# Patient Record
Sex: MALE | Race: WHITE | Employment: OTHER
[De-identification: names, ages, dates, MRNs, and addresses within clinical notes are randomized per-mention and may not be internally consistent; named-entity substitution may affect disease eponyms.]

---

## 2017-02-27 ENCOUNTER — OFFICE VISIT (OUTPATIENT)
Dept: CARDIOLOGY | Facility: CLINIC | Age: 72
End: 2017-02-27

## 2017-02-27 VITALS
SYSTOLIC BLOOD PRESSURE: 142 MMHG | WEIGHT: 207 LBS | RESPIRATION RATE: 16 BRPM | DIASTOLIC BLOOD PRESSURE: 71 MMHG | HEART RATE: 57 BPM | HEIGHT: 69 IN | OXYGEN SATURATION: 99 % | BODY MASS INDEX: 30.66 KG/M2

## 2017-02-27 DIAGNOSIS — I50.43 ACUTE ON CHRONIC COMBINED SYSTOLIC AND DIASTOLIC CONGESTIVE HEART FAILURE, NYHA CLASS 3 (HCC): Primary | ICD-10-CM

## 2017-02-27 PROCEDURE — 99214 OFFICE O/P EST MOD 30 MIN: CPT | Performed by: FAMILY MEDICINE

## 2017-02-27 PROCEDURE — 1123F ACP DISCUSS/DSCN MKR DOCD: CPT | Performed by: FAMILY MEDICINE

## 2017-02-27 PROCEDURE — 4040F PNEUMOC VAC/ADMIN/RCVD: CPT | Performed by: FAMILY MEDICINE

## 2017-02-27 PROCEDURE — 93000 ELECTROCARDIOGRAM COMPLETE: CPT | Performed by: FAMILY MEDICINE

## 2017-02-27 PROCEDURE — G8417 CALC BMI ABV UP PARAM F/U: HCPCS | Performed by: FAMILY MEDICINE

## 2017-02-27 PROCEDURE — 3017F COLORECTAL CA SCREEN DOC REV: CPT | Performed by: FAMILY MEDICINE

## 2017-02-27 PROCEDURE — G8598 ASA/ANTIPLAT THER USED: HCPCS | Performed by: FAMILY MEDICINE

## 2017-02-27 PROCEDURE — G8484 FLU IMMUNIZE NO ADMIN: HCPCS | Performed by: FAMILY MEDICINE

## 2017-02-27 PROCEDURE — 1036F TOBACCO NON-USER: CPT | Performed by: FAMILY MEDICINE

## 2017-02-27 PROCEDURE — G8427 DOCREV CUR MEDS BY ELIG CLIN: HCPCS | Performed by: FAMILY MEDICINE

## 2017-02-27 RX ORDER — FUROSEMIDE 20 MG/1
20 TABLET ORAL DAILY
Qty: 36 TABLET | Refills: 3 | Status: SHIPPED | OUTPATIENT
Start: 2017-02-27 | End: 2017-03-22

## 2017-03-17 LAB
ANION GAP SERPL CALCULATED.3IONS-SCNC: 11 MMOL/L
BUN BLDV-MCNC: 23 MG/DL (ref 10–25)
CALCIUM SERPL-MCNC: 9.3 MG/DL (ref 8.7–10.8)
CHLORIDE BLD-SCNC: 102 MMOL/L (ref 95–111)
CO2: 29 MMOL/L (ref 21–32)
CREAT SERPL-MCNC: 1.4 MG/DL (ref 0.7–1.5)
EGFR AFRICAN AMERICAN: 60
EGFR IF NONAFRICAN AMERICAN: 50
GLUCOSE: 148 MG/DL (ref 70–100)
POTASSIUM SERPL-SCNC: 3.7 MMOL/L (ref 3.5–5.4)
SODIUM BLD-SCNC: 138 MMOL/L (ref 134–147)

## 2017-03-22 ENCOUNTER — OFFICE VISIT (OUTPATIENT)
Dept: CARDIOLOGY | Age: 72
End: 2017-03-22
Payer: MEDICARE

## 2017-03-22 VITALS
OXYGEN SATURATION: 98 % | RESPIRATION RATE: 16 BRPM | DIASTOLIC BLOOD PRESSURE: 81 MMHG | HEART RATE: 56 BPM | WEIGHT: 213 LBS | HEIGHT: 69 IN | BODY MASS INDEX: 31.55 KG/M2 | SYSTOLIC BLOOD PRESSURE: 180 MMHG

## 2017-03-22 DIAGNOSIS — I50.33 ACUTE ON CHRONIC DIASTOLIC CHF (CONGESTIVE HEART FAILURE), NYHA CLASS 3 (HCC): Primary | ICD-10-CM

## 2017-03-22 DIAGNOSIS — I25.10 CORONARY ARTERY DISEASE INVOLVING NATIVE CORONARY ARTERY OF NATIVE HEART WITHOUT ANGINA PECTORIS: ICD-10-CM

## 2017-03-22 PROCEDURE — G8417 CALC BMI ABV UP PARAM F/U: HCPCS | Performed by: FAMILY MEDICINE

## 2017-03-22 PROCEDURE — 4040F PNEUMOC VAC/ADMIN/RCVD: CPT | Performed by: FAMILY MEDICINE

## 2017-03-22 PROCEDURE — 1123F ACP DISCUSS/DSCN MKR DOCD: CPT | Performed by: FAMILY MEDICINE

## 2017-03-22 PROCEDURE — G8428 CUR MEDS NOT DOCUMENT: HCPCS | Performed by: FAMILY MEDICINE

## 2017-03-22 PROCEDURE — G8484 FLU IMMUNIZE NO ADMIN: HCPCS | Performed by: FAMILY MEDICINE

## 2017-03-22 PROCEDURE — 99214 OFFICE O/P EST MOD 30 MIN: CPT | Performed by: FAMILY MEDICINE

## 2017-03-22 PROCEDURE — G8598 ASA/ANTIPLAT THER USED: HCPCS | Performed by: FAMILY MEDICINE

## 2017-03-22 PROCEDURE — 1036F TOBACCO NON-USER: CPT | Performed by: FAMILY MEDICINE

## 2017-03-22 PROCEDURE — 3017F COLORECTAL CA SCREEN DOC REV: CPT | Performed by: FAMILY MEDICINE

## 2017-03-22 RX ORDER — FUROSEMIDE 20 MG/1
20 TABLET ORAL 2 TIMES DAILY
Qty: 90 TABLET | Refills: 3 | Status: SHIPPED | OUTPATIENT
Start: 2017-03-22 | End: 2017-04-24 | Stop reason: SDUPTHER

## 2017-04-04 ENCOUNTER — OFFICE VISIT (OUTPATIENT)
Dept: CARDIOLOGY | Age: 72
End: 2017-04-04
Payer: MEDICARE

## 2017-04-04 VITALS
RESPIRATION RATE: 18 BRPM | BODY MASS INDEX: 31.1 KG/M2 | HEART RATE: 62 BPM | HEIGHT: 69 IN | OXYGEN SATURATION: 96 % | DIASTOLIC BLOOD PRESSURE: 75 MMHG | WEIGHT: 210 LBS | SYSTOLIC BLOOD PRESSURE: 146 MMHG

## 2017-04-04 DIAGNOSIS — I20.9 ANGINA, CLASS III (HCC): Primary | ICD-10-CM

## 2017-04-04 DIAGNOSIS — I50.33 ACUTE ON CHRONIC DIASTOLIC CHF (CONGESTIVE HEART FAILURE), NYHA CLASS 3 (HCC): ICD-10-CM

## 2017-04-04 DIAGNOSIS — I25.10 CORONARY ARTERY DISEASE INVOLVING NATIVE CORONARY ARTERY OF NATIVE HEART WITHOUT ANGINA PECTORIS: ICD-10-CM

## 2017-04-04 PROCEDURE — 1036F TOBACCO NON-USER: CPT | Performed by: FAMILY MEDICINE

## 2017-04-04 PROCEDURE — 99214 OFFICE O/P EST MOD 30 MIN: CPT | Performed by: FAMILY MEDICINE

## 2017-04-04 PROCEDURE — 4040F PNEUMOC VAC/ADMIN/RCVD: CPT | Performed by: FAMILY MEDICINE

## 2017-04-04 PROCEDURE — G8417 CALC BMI ABV UP PARAM F/U: HCPCS | Performed by: FAMILY MEDICINE

## 2017-04-04 PROCEDURE — 1123F ACP DISCUSS/DSCN MKR DOCD: CPT | Performed by: FAMILY MEDICINE

## 2017-04-04 PROCEDURE — G8598 ASA/ANTIPLAT THER USED: HCPCS | Performed by: FAMILY MEDICINE

## 2017-04-04 PROCEDURE — 3017F COLORECTAL CA SCREEN DOC REV: CPT | Performed by: FAMILY MEDICINE

## 2017-04-04 PROCEDURE — G8427 DOCREV CUR MEDS BY ELIG CLIN: HCPCS | Performed by: FAMILY MEDICINE

## 2017-04-10 ENCOUNTER — HOSPITAL ENCOUNTER (OUTPATIENT)
Dept: CARDIAC CATH/INVASIVE PROCEDURES | Age: 72
Discharge: HOME | End: 2017-04-11
Attending: INTERNAL MEDICINE | Admitting: INTERNAL MEDICINE
Payer: MEDICARE

## 2017-04-10 DIAGNOSIS — Z95.5 S/P DRUG ELUTING CORONARY STENT PLACEMENT: ICD-10-CM

## 2017-04-10 LAB
ACTIVATED CLOTTING TIME: 234 SEC (ref 79–149)
GLUCOSE BLD-MCNC: 137 MG/DL (ref 74–106)
PLATELET # BLD: 176 K/UL (ref 140–450)
POC BUN: 26 MG/DL (ref 6–20)
POC CHLORIDE: 100 MMOL/L (ref 98–110)
POC CREATININE: 1.3 MG/DL (ref 0.6–1.4)
POC HEMATOCRIT: 34 % (ref 41–53)
POC HEMOGLOBIN: 11.6 GM/DL (ref 13.5–17.5)
POC POTASSIUM: 3.6 MMOL/L (ref 3.5–5.1)
POC SODIUM: 141 MMOL/L (ref 136–145)

## 2017-04-10 PROCEDURE — C1894 INTRO/SHEATH, NON-LASER: HCPCS

## 2017-04-10 PROCEDURE — 7100000011 HC PHASE II RECOVERY - ADDTL 15 MIN

## 2017-04-10 PROCEDURE — 85014 HEMATOCRIT: CPT

## 2017-04-10 PROCEDURE — 84295 ASSAY OF SERUM SODIUM: CPT

## 2017-04-10 PROCEDURE — C9600 PERC DRUG-EL COR STENT SING: HCPCS | Performed by: INTERNAL MEDICINE

## 2017-04-10 PROCEDURE — 2500000003 HC RX 250 WO HCPCS

## 2017-04-10 PROCEDURE — 82947 ASSAY GLUCOSE BLOOD QUANT: CPT

## 2017-04-10 PROCEDURE — 84132 ASSAY OF SERUM POTASSIUM: CPT

## 2017-04-10 PROCEDURE — C1725 CATH, TRANSLUMIN NON-LASER: HCPCS

## 2017-04-10 PROCEDURE — C1887 CATHETER, GUIDING: HCPCS

## 2017-04-10 PROCEDURE — 6370000000 HC RX 637 (ALT 250 FOR IP)

## 2017-04-10 PROCEDURE — C1769 GUIDE WIRE: HCPCS

## 2017-04-10 PROCEDURE — 82435 ASSAY OF BLOOD CHLORIDE: CPT

## 2017-04-10 PROCEDURE — 85347 COAGULATION TIME ACTIVATED: CPT

## 2017-04-10 PROCEDURE — 93458 L HRT ARTERY/VENTRICLE ANGIO: CPT | Performed by: INTERNAL MEDICINE

## 2017-04-10 PROCEDURE — 2709999900 HC NON-CHARGEABLE SUPPLY

## 2017-04-10 PROCEDURE — 85049 AUTOMATED PLATELET COUNT: CPT

## 2017-04-10 PROCEDURE — 84520 ASSAY OF UREA NITROGEN: CPT

## 2017-04-10 PROCEDURE — 6360000002 HC RX W HCPCS

## 2017-04-10 PROCEDURE — 7100000010 HC PHASE II RECOVERY - FIRST 15 MIN

## 2017-04-10 PROCEDURE — 82565 ASSAY OF CREATININE: CPT

## 2017-04-10 PROCEDURE — C1874 STENT, COATED/COV W/DEL SYS: HCPCS

## 2017-04-10 RX ORDER — FUROSEMIDE 20 MG/1
20 TABLET ORAL 2 TIMES DAILY
Status: DISCONTINUED | OUTPATIENT
Start: 2017-04-10 | End: 2017-04-11 | Stop reason: HOSPADM

## 2017-04-10 RX ORDER — MELATONIN
2000 DAILY
Status: DISCONTINUED | OUTPATIENT
Start: 2017-04-10 | End: 2017-04-11 | Stop reason: HOSPADM

## 2017-04-10 RX ORDER — LISINOPRIL 10 MG/1
40 TABLET ORAL DAILY
Status: DISCONTINUED | OUTPATIENT
Start: 2017-04-10 | End: 2017-04-11 | Stop reason: HOSPADM

## 2017-04-10 RX ORDER — SODIUM CHLORIDE 9 MG/ML
INJECTION, SOLUTION INTRAVENOUS CONTINUOUS
Status: DISCONTINUED | OUTPATIENT
Start: 2017-04-10 | End: 2017-04-11 | Stop reason: HOSPADM

## 2017-04-10 RX ORDER — CARVEDILOL 25 MG/1
25 TABLET ORAL 2 TIMES DAILY WITH MEALS
Status: DISCONTINUED | OUTPATIENT
Start: 2017-04-10 | End: 2017-04-11 | Stop reason: HOSPADM

## 2017-04-10 RX ORDER — NITROGLYCERIN 0.4 MG/1
0.4 TABLET SUBLINGUAL EVERY 5 MIN PRN
Status: DISCONTINUED | OUTPATIENT
Start: 2017-04-10 | End: 2017-04-11 | Stop reason: HOSPADM

## 2017-04-10 RX ORDER — ASPIRIN 81 MG/1
81 TABLET ORAL DAILY
Status: DISCONTINUED | OUTPATIENT
Start: 2017-04-10 | End: 2017-04-11 | Stop reason: HOSPADM

## 2017-04-10 RX ORDER — AMLODIPINE BESYLATE 10 MG/1
10 TABLET ORAL DAILY
Status: DISCONTINUED | OUTPATIENT
Start: 2017-04-10 | End: 2017-04-11 | Stop reason: HOSPADM

## 2017-04-10 RX ORDER — CLONIDINE HYDROCHLORIDE 0.1 MG/1
0.1 TABLET ORAL 2 TIMES DAILY
Status: DISCONTINUED | OUTPATIENT
Start: 2017-04-10 | End: 2017-04-11 | Stop reason: HOSPADM

## 2017-04-10 RX ORDER — GLIPIZIDE 5 MG/1
5 TABLET ORAL
Status: DISCONTINUED | OUTPATIENT
Start: 2017-04-11 | End: 2017-04-11 | Stop reason: HOSPADM

## 2017-04-10 RX ORDER — PEN NEEDLE, DIABETIC 31 G X1/4"
1 NEEDLE, DISPOSABLE MISCELLANEOUS DAILY
Status: DISCONTINUED | OUTPATIENT
Start: 2017-04-10 | End: 2017-04-10

## 2017-04-10 RX ORDER — PREDNISOLONE ACETATE 10 MG/ML
1 SUSPENSION/ DROPS OPHTHALMIC DAILY
Status: DISCONTINUED | OUTPATIENT
Start: 2017-04-10 | End: 2017-04-11 | Stop reason: HOSPADM

## 2017-04-10 RX ADMIN — SODIUM CHLORIDE: 9 INJECTION, SOLUTION INTRAVENOUS at 13:18

## 2017-04-11 VITALS
DIASTOLIC BLOOD PRESSURE: 66 MMHG | RESPIRATION RATE: 16 BRPM | WEIGHT: 209 LBS | TEMPERATURE: 97.9 F | SYSTOLIC BLOOD PRESSURE: 160 MMHG | HEIGHT: 69 IN | OXYGEN SATURATION: 96 % | BODY MASS INDEX: 30.96 KG/M2 | HEART RATE: 60 BPM

## 2017-04-11 LAB — GLUCOSE BLD-MCNC: 200 MG/DL (ref 75–110)

## 2017-04-11 PROCEDURE — 6370000000 HC RX 637 (ALT 250 FOR IP): Performed by: INTERNAL MEDICINE

## 2017-04-11 PROCEDURE — 82947 ASSAY GLUCOSE BLOOD QUANT: CPT

## 2017-04-11 RX ORDER — CLOPIDOGREL BISULFATE 75 MG/1
75 TABLET ORAL DAILY
Status: DISCONTINUED | OUTPATIENT
Start: 2017-04-11 | End: 2017-04-11 | Stop reason: HOSPADM

## 2017-04-11 RX ORDER — CLOPIDOGREL BISULFATE 75 MG/1
75 TABLET ORAL DAILY
Qty: 30 TABLET | Refills: 11 | Status: SHIPPED | OUTPATIENT
Start: 2017-04-11 | End: 2017-04-24 | Stop reason: SDUPTHER

## 2017-04-11 RX ADMIN — CLOPIDOGREL 75 MG: 75 TABLET, FILM COATED ORAL at 09:17

## 2017-04-24 PROBLEM — I50.33 ACUTE ON CHRONIC DIASTOLIC CHF (CONGESTIVE HEART FAILURE), NYHA CLASS 3 (HCC): Status: RESOLVED | Noted: 2017-03-22 | Resolved: 2017-04-24

## 2017-04-24 PROBLEM — E78.5 HYPERLIPIDEMIA: Status: ACTIVE | Noted: 2017-04-24

## 2017-04-24 PROBLEM — I50.32 CHRONIC DIASTOLIC HEART FAILURE (HCC): Status: ACTIVE | Noted: 2017-04-24

## 2017-04-25 ENCOUNTER — OFFICE VISIT (OUTPATIENT)
Dept: CARDIOLOGY | Age: 72
End: 2017-04-25
Payer: MEDICARE

## 2017-04-25 VITALS
DIASTOLIC BLOOD PRESSURE: 75 MMHG | HEIGHT: 69 IN | SYSTOLIC BLOOD PRESSURE: 155 MMHG | BODY MASS INDEX: 30.96 KG/M2 | OXYGEN SATURATION: 95 % | RESPIRATION RATE: 16 BRPM | WEIGHT: 209 LBS | HEART RATE: 56 BPM

## 2017-04-25 DIAGNOSIS — R53.81 PHYSICAL DECONDITIONING: ICD-10-CM

## 2017-04-25 DIAGNOSIS — Z95.5 S/P DRUG ELUTING CORONARY STENT PLACEMENT: ICD-10-CM

## 2017-04-25 DIAGNOSIS — I25.10 CORONARY ARTERY DISEASE INVOLVING NATIVE CORONARY ARTERY OF NATIVE HEART WITHOUT ANGINA PECTORIS: Primary | ICD-10-CM

## 2017-04-25 PROCEDURE — G8417 CALC BMI ABV UP PARAM F/U: HCPCS | Performed by: FAMILY MEDICINE

## 2017-04-25 PROCEDURE — G8427 DOCREV CUR MEDS BY ELIG CLIN: HCPCS | Performed by: FAMILY MEDICINE

## 2017-04-25 PROCEDURE — G8598 ASA/ANTIPLAT THER USED: HCPCS | Performed by: FAMILY MEDICINE

## 2017-04-25 PROCEDURE — 1036F TOBACCO NON-USER: CPT | Performed by: FAMILY MEDICINE

## 2017-04-25 PROCEDURE — 99213 OFFICE O/P EST LOW 20 MIN: CPT | Performed by: FAMILY MEDICINE

## 2017-04-25 PROCEDURE — 1123F ACP DISCUSS/DSCN MKR DOCD: CPT | Performed by: FAMILY MEDICINE

## 2017-04-25 PROCEDURE — 3017F COLORECTAL CA SCREEN DOC REV: CPT | Performed by: FAMILY MEDICINE

## 2017-04-25 PROCEDURE — 4040F PNEUMOC VAC/ADMIN/RCVD: CPT | Performed by: FAMILY MEDICINE

## 2017-05-01 ENCOUNTER — HOSPITAL ENCOUNTER (OUTPATIENT)
Dept: CARDIAC REHAB | Age: 72
Setting detail: THERAPIES SERIES
Discharge: HOME | End: 2017-05-01
Payer: MEDICARE

## 2017-05-01 VITALS
RESPIRATION RATE: 16 BRPM | WEIGHT: 208 LBS | DIASTOLIC BLOOD PRESSURE: 76 MMHG | OXYGEN SATURATION: 97 % | BODY MASS INDEX: 31.52 KG/M2 | HEIGHT: 68 IN | SYSTOLIC BLOOD PRESSURE: 138 MMHG | HEART RATE: 58 BPM

## 2017-05-03 ENCOUNTER — HOSPITAL ENCOUNTER (OUTPATIENT)
Dept: CARDIAC REHAB | Age: 72
Setting detail: THERAPIES SERIES
Discharge: HOME | End: 2017-05-03
Payer: MEDICARE

## 2017-05-03 PROCEDURE — 93798 PHYS/QHP OP CAR RHAB W/ECG: CPT

## 2017-05-04 ENCOUNTER — HOSPITAL ENCOUNTER (OUTPATIENT)
Dept: CARDIAC REHAB | Age: 72
Setting detail: THERAPIES SERIES
Discharge: HOME | End: 2017-05-04
Payer: MEDICARE

## 2017-05-04 PROCEDURE — 93798 PHYS/QHP OP CAR RHAB W/ECG: CPT

## 2017-05-08 ENCOUNTER — HOSPITAL ENCOUNTER (OUTPATIENT)
Dept: CARDIAC REHAB | Age: 72
Setting detail: THERAPIES SERIES
Discharge: HOME | End: 2017-05-08
Payer: MEDICARE

## 2017-05-08 PROCEDURE — 93798 PHYS/QHP OP CAR RHAB W/ECG: CPT

## 2017-05-10 ENCOUNTER — HOSPITAL ENCOUNTER (OUTPATIENT)
Dept: CARDIAC REHAB | Age: 72
Setting detail: THERAPIES SERIES
Discharge: HOME | End: 2017-05-10
Payer: MEDICARE

## 2017-05-10 PROCEDURE — 93798 PHYS/QHP OP CAR RHAB W/ECG: CPT

## 2017-05-11 ENCOUNTER — HOSPITAL ENCOUNTER (OUTPATIENT)
Dept: CARDIAC REHAB | Age: 72
Setting detail: THERAPIES SERIES
Discharge: HOME | End: 2017-05-11
Payer: MEDICARE

## 2017-05-11 PROCEDURE — 93798 PHYS/QHP OP CAR RHAB W/ECG: CPT

## 2017-05-15 ENCOUNTER — HOSPITAL ENCOUNTER (OUTPATIENT)
Dept: CARDIAC REHAB | Age: 72
Setting detail: THERAPIES SERIES
Discharge: HOME | End: 2017-05-15
Payer: MEDICARE

## 2017-05-15 PROCEDURE — 93798 PHYS/QHP OP CAR RHAB W/ECG: CPT

## 2017-05-17 ENCOUNTER — HOSPITAL ENCOUNTER (OUTPATIENT)
Dept: CARDIAC REHAB | Age: 72
Setting detail: THERAPIES SERIES
Discharge: HOME | End: 2017-05-17
Payer: MEDICARE

## 2017-05-17 PROCEDURE — 93798 PHYS/QHP OP CAR RHAB W/ECG: CPT

## 2017-05-18 ENCOUNTER — HOSPITAL ENCOUNTER (OUTPATIENT)
Dept: CARDIAC REHAB | Age: 72
Setting detail: THERAPIES SERIES
Discharge: HOME | End: 2017-05-18
Payer: MEDICARE

## 2017-05-18 PROCEDURE — 93798 PHYS/QHP OP CAR RHAB W/ECG: CPT

## 2017-05-31 ENCOUNTER — HOSPITAL ENCOUNTER (OUTPATIENT)
Dept: CARDIAC REHAB | Age: 72
Setting detail: THERAPIES SERIES
Discharge: HOME | End: 2017-05-31
Payer: MEDICARE

## 2017-05-31 PROCEDURE — 93798 PHYS/QHP OP CAR RHAB W/ECG: CPT

## 2017-06-01 ENCOUNTER — HOSPITAL ENCOUNTER (OUTPATIENT)
Dept: CARDIAC REHAB | Age: 72
Setting detail: THERAPIES SERIES
Discharge: HOME | End: 2017-06-01
Payer: MEDICARE

## 2017-06-01 PROCEDURE — 93798 PHYS/QHP OP CAR RHAB W/ECG: CPT

## 2017-06-05 ENCOUNTER — HOSPITAL ENCOUNTER (OUTPATIENT)
Dept: CARDIAC REHAB | Age: 72
Setting detail: THERAPIES SERIES
Discharge: HOME | End: 2017-06-05
Payer: MEDICARE

## 2017-06-05 PROCEDURE — 93798 PHYS/QHP OP CAR RHAB W/ECG: CPT

## 2017-06-07 ENCOUNTER — HOSPITAL ENCOUNTER (OUTPATIENT)
Dept: CARDIAC REHAB | Age: 72
Setting detail: THERAPIES SERIES
Discharge: HOME | End: 2017-06-07
Payer: MEDICARE

## 2017-06-07 PROCEDURE — 93798 PHYS/QHP OP CAR RHAB W/ECG: CPT

## 2017-06-08 ENCOUNTER — HOSPITAL ENCOUNTER (OUTPATIENT)
Dept: CARDIAC REHAB | Age: 72
Setting detail: THERAPIES SERIES
Discharge: HOME | End: 2017-06-08
Payer: MEDICARE

## 2017-06-08 PROCEDURE — 93798 PHYS/QHP OP CAR RHAB W/ECG: CPT

## 2017-06-12 ENCOUNTER — HOSPITAL ENCOUNTER (OUTPATIENT)
Dept: CARDIAC REHAB | Age: 72
Setting detail: THERAPIES SERIES
Discharge: HOME | End: 2017-06-12
Payer: MEDICARE

## 2017-06-12 PROCEDURE — 93798 PHYS/QHP OP CAR RHAB W/ECG: CPT

## 2017-06-13 ENCOUNTER — OFFICE VISIT (OUTPATIENT)
Dept: CARDIOLOGY | Age: 72
End: 2017-06-13
Payer: MEDICARE

## 2017-06-13 VITALS
OXYGEN SATURATION: 95 % | WEIGHT: 210 LBS | BODY MASS INDEX: 31.1 KG/M2 | DIASTOLIC BLOOD PRESSURE: 66 MMHG | HEART RATE: 63 BPM | RESPIRATION RATE: 16 BRPM | HEIGHT: 69 IN | SYSTOLIC BLOOD PRESSURE: 148 MMHG

## 2017-06-13 DIAGNOSIS — I25.10 CORONARY ARTERY DISEASE INVOLVING NATIVE CORONARY ARTERY OF NATIVE HEART WITHOUT ANGINA PECTORIS: Primary | ICD-10-CM

## 2017-06-13 DIAGNOSIS — R07.89 CHEST PAIN, NON-CARDIAC: ICD-10-CM

## 2017-06-13 DIAGNOSIS — Z95.5 S/P DRUG ELUTING CORONARY STENT PLACEMENT: ICD-10-CM

## 2017-06-13 PROCEDURE — 1123F ACP DISCUSS/DSCN MKR DOCD: CPT | Performed by: FAMILY MEDICINE

## 2017-06-13 PROCEDURE — G8417 CALC BMI ABV UP PARAM F/U: HCPCS | Performed by: FAMILY MEDICINE

## 2017-06-13 PROCEDURE — G8427 DOCREV CUR MEDS BY ELIG CLIN: HCPCS | Performed by: FAMILY MEDICINE

## 2017-06-13 PROCEDURE — 3017F COLORECTAL CA SCREEN DOC REV: CPT | Performed by: FAMILY MEDICINE

## 2017-06-13 PROCEDURE — 4040F PNEUMOC VAC/ADMIN/RCVD: CPT | Performed by: FAMILY MEDICINE

## 2017-06-13 PROCEDURE — G8598 ASA/ANTIPLAT THER USED: HCPCS | Performed by: FAMILY MEDICINE

## 2017-06-13 PROCEDURE — 1036F TOBACCO NON-USER: CPT | Performed by: FAMILY MEDICINE

## 2017-06-13 PROCEDURE — 99213 OFFICE O/P EST LOW 20 MIN: CPT | Performed by: FAMILY MEDICINE

## 2017-06-14 ENCOUNTER — HOSPITAL ENCOUNTER (OUTPATIENT)
Dept: CARDIAC REHAB | Age: 72
Setting detail: THERAPIES SERIES
Discharge: HOME | End: 2017-06-14
Payer: MEDICARE

## 2017-06-14 PROCEDURE — 93798 PHYS/QHP OP CAR RHAB W/ECG: CPT

## 2017-10-31 PROBLEM — R53.81 PHYSICAL DECONDITIONING: Status: RESOLVED | Noted: 2017-04-25 | Resolved: 2017-10-31

## 2017-11-02 ENCOUNTER — HOSPITAL ENCOUNTER (OUTPATIENT)
Dept: LAB | Age: 72
Discharge: HOME | End: 2017-11-02
Payer: MEDICARE

## 2017-11-02 DIAGNOSIS — I10 ESSENTIAL HYPERTENSION: ICD-10-CM

## 2017-11-02 DIAGNOSIS — Z12.5 ENCOUNTER FOR PROSTATE CANCER SCREENING: ICD-10-CM

## 2017-11-02 LAB
ABSOLUTE EOS #: 0.5 K/UL (ref 0–0.4)
ABSOLUTE IMMATURE GRANULOCYTE: ABNORMAL K/UL (ref 0–0.3)
ABSOLUTE LYMPH #: 1.9 K/UL (ref 1–4.8)
ABSOLUTE MONO #: 0.6 K/UL (ref 0–1)
ALBUMIN SERPL-MCNC: 4.2 G/DL (ref 3.5–5.2)
ALBUMIN/GLOBULIN RATIO: 1.9 (ref 1–2.5)
ALP BLD-CCNC: 65 U/L (ref 40–129)
ALT SERPL-CCNC: 21 U/L (ref 5–41)
ANION GAP SERPL CALCULATED.3IONS-SCNC: 13 MMOL/L (ref 9–17)
AST SERPL-CCNC: 17 U/L
BASOPHILS # BLD: 1 %
BASOPHILS ABSOLUTE: 0 K/UL (ref 0–0.2)
BILIRUB SERPL-MCNC: 0.44 MG/DL (ref 0.3–1.2)
BUN BLDV-MCNC: 27 MG/DL (ref 8–23)
BUN/CREAT BLD: 22 (ref 9–20)
CALCIUM SERPL-MCNC: 9 MG/DL (ref 8.6–10.4)
CHLORIDE BLD-SCNC: 103 MMOL/L (ref 98–107)
CHOLESTEROL/HDL RATIO: 7.5
CHOLESTEROL: 172 MG/DL
CO2: 26 MMOL/L (ref 20–31)
CREAT SERPL-MCNC: 1.21 MG/DL (ref 0.7–1.2)
DIFFERENTIAL TYPE: ABNORMAL
EOSINOPHILS RELATIVE PERCENT: 6 %
GFR AFRICAN AMERICAN: >60 ML/MIN
GFR NON-AFRICAN AMERICAN: 59 ML/MIN
GFR SERPL CREATININE-BSD FRML MDRD: ABNORMAL ML/MIN/{1.73_M2}
GFR SERPL CREATININE-BSD FRML MDRD: ABNORMAL ML/MIN/{1.73_M2}
GLUCOSE BLD-MCNC: 149 MG/DL (ref 70–99)
HCT VFR BLD CALC: 38.3 % (ref 41–53)
HDLC SERPL-MCNC: 23 MG/DL
HEMOGLOBIN: 13.2 G/DL (ref 13.5–17)
IMMATURE GRANULOCYTES: ABNORMAL %
LDL CHOLESTEROL DIRECT: 44 MG/DL
LDL CHOLESTEROL: ABNORMAL MG/DL (ref 0–130)
LYMPHOCYTES # BLD: 26 %
MCH RBC QN AUTO: 29.7 PG (ref 26–34)
MCHC RBC AUTO-ENTMCNC: 34.4 G/DL (ref 31–37)
MCV RBC AUTO: 86.4 FL (ref 80–100)
MONOCYTES # BLD: 8 %
PDW BLD-RTO: 14.6 % (ref 12.1–15.2)
PLATELET # BLD: 206 K/UL (ref 140–450)
PLATELET ESTIMATE: ABNORMAL
PMV BLD AUTO: 8.3 FL (ref 6–12)
POTASSIUM SERPL-SCNC: 3.7 MMOL/L (ref 3.7–5.3)
PROSTATE SPECIFIC ANTIGEN: 3.26 UG/L
RBC # BLD: 4.43 M/UL (ref 4.5–5.9)
RBC # BLD: ABNORMAL 10*6/UL
SEG NEUTROPHILS: 59 %
SEGMENTED NEUTROPHILS ABSOLUTE COUNT: 4.5 K/UL (ref 1.8–7.7)
SODIUM BLD-SCNC: 142 MMOL/L (ref 135–144)
TOTAL PROTEIN: 6.4 G/DL (ref 6.4–8.3)
TRIGL SERPL-MCNC: 672 MG/DL
TSH SERPL DL<=0.05 MIU/L-ACNC: 2.26 MIU/L (ref 0.3–5)
VLDLC SERPL CALC-MCNC: ABNORMAL MG/DL (ref 1–30)
WBC # BLD: 7.5 K/UL (ref 3.5–11)
WBC # BLD: ABNORMAL 10*3/UL

## 2017-11-02 PROCEDURE — 83721 ASSAY OF BLOOD LIPOPROTEIN: CPT

## 2017-11-02 PROCEDURE — 80061 LIPID PANEL: CPT

## 2017-11-02 PROCEDURE — G0103 PSA SCREENING: HCPCS

## 2017-11-02 PROCEDURE — 85025 COMPLETE CBC W/AUTO DIFF WBC: CPT

## 2017-11-02 PROCEDURE — 80053 COMPREHEN METABOLIC PANEL: CPT

## 2017-11-02 PROCEDURE — 84443 ASSAY THYROID STIM HORMONE: CPT

## 2017-11-02 PROCEDURE — 36415 COLL VENOUS BLD VENIPUNCTURE: CPT

## 2017-12-20 ENCOUNTER — OFFICE VISIT (OUTPATIENT)
Dept: CARDIOLOGY | Age: 72
End: 2017-12-20
Payer: MEDICARE

## 2017-12-20 VITALS
HEIGHT: 69 IN | HEART RATE: 54 BPM | DIASTOLIC BLOOD PRESSURE: 63 MMHG | SYSTOLIC BLOOD PRESSURE: 149 MMHG | RESPIRATION RATE: 16 BRPM | WEIGHT: 202.8 LBS | BODY MASS INDEX: 30.04 KG/M2 | OXYGEN SATURATION: 97 %

## 2017-12-20 DIAGNOSIS — R42 EPISODIC LIGHTHEADEDNESS: ICD-10-CM

## 2017-12-20 DIAGNOSIS — I25.10 CORONARY ATHEROSCLEROSIS DUE TO CALCIFIED CORONARY LESION: ICD-10-CM

## 2017-12-20 DIAGNOSIS — R55 NEAR SYNCOPE: Primary | ICD-10-CM

## 2017-12-20 DIAGNOSIS — G90.1 DYSAUTONOMIA (HCC): ICD-10-CM

## 2017-12-20 DIAGNOSIS — I25.84 CORONARY ATHEROSCLEROSIS DUE TO CALCIFIED CORONARY LESION: ICD-10-CM

## 2017-12-20 DIAGNOSIS — I10 ESSENTIAL HYPERTENSION: ICD-10-CM

## 2017-12-20 PROBLEM — I20.9 ANGINA, CLASS III (HCC): Status: RESOLVED | Noted: 2017-04-04 | Resolved: 2017-12-20

## 2017-12-20 PROCEDURE — G8484 FLU IMMUNIZE NO ADMIN: HCPCS | Performed by: FAMILY MEDICINE

## 2017-12-20 PROCEDURE — G8427 DOCREV CUR MEDS BY ELIG CLIN: HCPCS | Performed by: FAMILY MEDICINE

## 2017-12-20 PROCEDURE — 1123F ACP DISCUSS/DSCN MKR DOCD: CPT | Performed by: FAMILY MEDICINE

## 2017-12-20 PROCEDURE — 3017F COLORECTAL CA SCREEN DOC REV: CPT | Performed by: FAMILY MEDICINE

## 2017-12-20 PROCEDURE — 1036F TOBACCO NON-USER: CPT | Performed by: FAMILY MEDICINE

## 2017-12-20 PROCEDURE — 99214 OFFICE O/P EST MOD 30 MIN: CPT | Performed by: FAMILY MEDICINE

## 2017-12-20 PROCEDURE — G8417 CALC BMI ABV UP PARAM F/U: HCPCS | Performed by: FAMILY MEDICINE

## 2017-12-20 PROCEDURE — G8598 ASA/ANTIPLAT THER USED: HCPCS | Performed by: FAMILY MEDICINE

## 2017-12-20 PROCEDURE — 4040F PNEUMOC VAC/ADMIN/RCVD: CPT | Performed by: FAMILY MEDICINE

## 2017-12-20 NOTE — PROGRESS NOTES
Patient: Mariama Posey  : 1945  Date of Visit: 2017    REASON FOR VISIT / CONSULTATION: 6 Month Follow-Up (Hx: CAD, CHF and stent on 17. Pt states that he is doing pretty good. Mild SOB when putting his hands up. Pt was in the shower and fell, he had said that mat came out from underneath him. He had a near fall at the grocery store [employer] due to losing his balance. Dizziness/lightheadedness after taking BP medicine but then goes away about an hour after. Denies CP and palpitations. C/O: back pain and thinks this may be due to his falling bc the left leg giving out on him (he has had a history of rods) )      Dear Jeri Cervantes MD    As you know, Mr. London Roberto is a 70 y.o. male with a known history of dysautonomia where on tilt table testing his blood pressure dropped from 981 systolic to 78 systolic with only a 48-91 HR response. More recently, a CT of he head in the past showed evidence of at least 2 old CVA's and a heart catheterization showed severe single vessel disease in the ostium of a moderate sized OM1 branch of the circumflex. However, maximal guidelines directed medical management was opted for an place of stenting partly due to the risk associated with stenting this vessel. Recently he has had a coronary angiography procedure with stenting of his HA Om1. As you know, Mr. London Roberto  is a 67 y.o. male with a history of recent onset substernal chest discomfort that led to a stress test and a subsequent heart catheterization which showed severe single vessel coronary artery disease of the HA Om1 with successful angioplasty and stenting of that vessel that was done by Dr Linda Fontana on 4/10/17. Since the last time I saw Mr. London Roberto, he does complain of intermittent episodes of lightheadedness and dizziness as well as 2 falls, one while in the shower and another at [employer].  He is not clear about the circumstances but thinks he may have had some pain in his back and left leg, may have gotten lightheaded and dizzy and almost fell but his wife caught him. He denies any repeat chest pain or chest pressure. He denied any current chest pain, shortness of breath, abdominal pain, bleeding problems including blood in his stool, blood in his urine, or black tarry stools, problems with his medications or any other concerns at this time. Past Medical History:   Diagnosis Date    Acute MI     CAD (coronary artery disease)     Cerebral artery occlusion with cerebral infarction ([facility] [address]     [facility]congest[facility] failure) [facility]     Chronic back pain     Closed fracture of lumbar vertebra without mention of spinal cord injury     Diabetes mellitus ([facility] [address]     Displacement of intervertebral disc, site unspecified, without myelopathy     H/O cardiac catheterization 2/19/16    LMCA: Mild irregularities 10-20%. LAD: Lesion on PRox LAD: Mid subsection. 65% stenosis. LCx: Lesion on 1st Ob Valentina: Proximal subesection. 70% steniosis. RCA: Small non-dominant RCA. Lesion on PRox RCA: Ostial. 50% stenosis. EF:55%.  H/O cardiovascular stress test 2/19/16    Abnormal. Moderate perfusion defect of mild intensity in the inferior, inferoseptal adn inferoapical regions during stress imaging, which most consistent with ischemia. Global LV systolic function normal without regional wall motion abnormalities. OVerall these results are most consistent with an intermediate risk for signficant CAD. Additional testing including cardiac cath may be indicated.  History of 24 hour EKG monitoring 8/14/14    Occasional PAC's and PVC's which appear to be at least moderately symptomatic.  History of 24 hour EKG monitoring 11/19/14    Event Monitor. Sinus rhythm and sinus bradycardia. Infrequent isolated PVC's    History of cardiovascular stress test 2/19/14    Relatively NL    History of CVA (cerebrovascular accident) without residual deficits 2014    Incidentally found on CT head.  No known impairment now or in the past.    History of echocardiogram 2/18/14    LA mildly dilated, EF 55%, LV wall thickness is moderately increased, no definite wall motion abnormalilities, what appears to be a pacer wire is seen w/n the RA and RV, mild-mod TR, mild pulmonary hypertension.  History of tilt table evaluation 8/12/14    Abnormal    Hyperlipidemia     Hypertension     Pacemaker     non-functioning    S/P cardiac cath 04/10/2017    S/P coronary artery stent placement     Successful PTCA - HA Om1    Type II or unspecified type diabetes mellitus without mention of complication, not stated as uncontrolled        CURRENT ALLERGIES: Lipitor [atorvastatin]; Crestor [rosuvastatin calcium]; Invokana [canagliflozin]; and Januvia [sitagliptin] REVIEW OF SYSTEMS: 10 systems were reviewed. Pertinent positives and negatives as above, all else negative. Past Surgical History:   Procedure Laterality Date    BACK SURGERY      CARDIAC CATHETERIZATION Left 2/19/16    via right radial approach/ [facility]/ Dr. Jonathon Cerna  8/17/15    Liang/[facility]/ [facility]    COLONOSCOPY  2010    COLONOSCOPY  2/19/15    -polyps,diverticulosis,hemorrhoids    CORNEAL TRANSPLANT      left eye    PACEMAKER INSERTION      PACEMAKER PLACEMENT      Social History:  Social History   Substance Use Topics    Smoking status: Former Smoker     Packs/day: 1.50     Years: 8.00     Quit date: 3/4/1980    Smokeless tobacco: Never Used    Alcohol use No        CURRENT MEDICATIONS:  Outpatient Prescriptions Marked as Taking for the 12/20/17 encounter (Office Visit) with Ryan Tobias MD   Medication Sig Dispense Refill    Iron-Vitamins (GERITOL COMPLETE PO) Take by mouth      traMADol (ULTRAM) 50 MG tablet Take 1 tablet by mouth every 6 hours as needed for Pain .  90 tablet 0    tiZANidine (ZANAFLEX) 2 MG tablet Take 1 tablet by mouth every 6 hours as needed (spasm) 30 tablet 2    gemfibrozil (LOPID) 600 MG tablet Take 1 tablet by mouth 2 times daily (before meals) 60 tablet 3    amLODIPine (NORVASC) 10 MG tablet Take 1 tablet by mouth daily 90 tablet 0    carvedilol (COREG) 25 MG tablet Take 1 tablet by mouth 2 times daily (with meals) 180 tablet 0    cloNIDine (CATAPRES) 0.1 MG tablet Take 1 tablet by mouth three times daily 270 tablet 0    glipiZIDE (GLIPIZIDE XL) 5 MG extended release tablet TAKE 2 TABLETS TWICE DAILY 360 tablet 0    insulin glargine (LANTUS SOLOSTAR) 100 UNIT/ML injection pen Inject 40 Units into the skin nightly (Patient taking differently: Inject 20 Units into the skin 2 times daily ) 5 Pen 3    lisinopril (PRINIVIL;ZESTRIL) 40 MG tablet TAKE ONE TABLET BY MOUTH ONCE DAILY 90 tablet 0    DIANA CONTOUR TEST strip USE ONE STRIP TO CHECK GLUCOSE TWICE DAILY 100 strip 11    clopidogrel (PLAVIX) 75 MG tablet Take 1 tablet by mouth daily 90 tablet 3    furosemide (LASIX) 20 MG tablet Take 1 tablet by mouth 2 times daily 90 tablet 3    nitroGLYCERIN (NITROSTAT) 0.4 MG SL tablet Place 1 tablet under the tongue every 5 minutes as needed for Chest pain 25 tablet 3    DIANA MICROLET LANCETS MISC TEST TWICE DAILY 100 each 2    Insulin Pen Needle (PEN NEEDLES) 31G X 6 MM MISC 1 each by Does not apply route daily 100 each 3    aspirin 81 MG tablet Take 81 mg by mouth daily       prednisoLONE acetate (PRED FORTE) 1 % ophthalmic suspension Place 1 drop into the left eye daily          FAMILY HISTORY: family history includes Cancer in his father and mother. PHYSICAL EXAM:   BP (!) 161/74 (Site: Left Arm, Position: Sitting, Cuff Size: Large Adult)   Pulse 58   Resp 16   Ht 5' 9\" (1.753 m)   Wt 202 lb 12.8 oz (92 kg)   SpO2 97%   BMI 29.95 kg/m²  Body mass index is 29.95 kg/m². Constitutional: He is oriented to person, place, and time. He appears well-developed and well-nourished. In no acute distress. HEENT: Normocephalic and atraumatic. No JVD present. Carotid bruit is not present.  No mass and no thyromegaly present. No lymphadenopathy present. Cardiovascular: Normal rate, regular rhythm, normal heart sounds and intact distal pulses. Exam reveals no gallop and no friction rub. Pulmonary/Chest: Effort normal and breath sounds normal. No respiratory distress. He has no wheezes, rhonchi or rales. Abdominal: Soft, non-tender. Bowel sounds and aorta are normal. He exhibits no organomegaly, mass or bruit. Extremities: No edema. Pulses are 2+ radial/carotid/dorsalis pedis and posterior tibial bilaterally. No cyanosis, or clubbing. Pulses are 2+ radial/carotid/dorsalis pedis and posterior tibial bilaterally. Compression stockings in place. Neurological: He is alert and oriented to person, place, and time. No evidence of gross cranial nerve deficit. Coordination appeared normal.   Skin: Skin is warm and dry. There is no rash or diaphoresis. Psychiatric: He has a normal mood and affect. His speech is normal and behavior is normal.      MOST RECENT LABS ON RECORD:   Lab Results   Component Value Date    WBC 7.5 11/02/2017    HGB 13.2 (L) 11/02/2017    HCT 38.3 (L) 11/02/2017     11/02/2017    CHOL 172 11/02/2017    TRIG 672 (H) 11/02/2017    HDL 23 (L) 11/02/2017    LDLDIRECT 44 11/02/2017    ALT 21 11/02/2017    AST 17 11/02/2017     11/02/2017    K 3.7 11/02/2017     11/02/2017    CREATININE 1.21 (H) 11/02/2017    BUN 27 (H) 11/02/2017    CO2 26 11/02/2017    TSH 2.26 11/02/2017    PSA 3.26 11/02/2017    INR 1.0 02/17/2016    LABA1C 7.3 10/31/2017    LABMICR 30 10/17/2015       ASSESSMENT:  1. Near syncope    2. Episodic lightheadedness    3. Coronary atherosclerosis due to calcified coronary lesion    4. Dysautonomia    5. Essential hypertension         PLAN:  Syncope/Near Syncope  · Pharmacological Management: Although his blood pressure is elevated today and I would be tempted to start additional treatment for his dysautonomia but I would like to get further information before doing this.

## 2017-12-26 ENCOUNTER — HOSPITAL ENCOUNTER (OUTPATIENT)
Dept: NON INVASIVE DIAGNOSTICS | Age: 72
Discharge: HOME | End: 2017-12-26
Payer: MEDICARE

## 2017-12-26 DIAGNOSIS — R42 EPISODIC LIGHTHEADEDNESS: ICD-10-CM

## 2017-12-26 DIAGNOSIS — R55 NEAR SYNCOPE: ICD-10-CM

## 2017-12-26 PROCEDURE — 93660 TILT TABLE EVALUATION: CPT

## 2017-12-26 PROCEDURE — 6370000000 HC RX 637 (ALT 250 FOR IP): Performed by: FAMILY MEDICINE

## 2017-12-26 PROCEDURE — 93225 XTRNL ECG REC<48 HRS REC: CPT

## 2017-12-26 RX ORDER — NITROGLYCERIN 0.3 MG/1
0.3 TABLET SUBLINGUAL EVERY 5 MIN PRN
Status: DISCONTINUED | OUTPATIENT
Start: 2017-12-26 | End: 2017-12-27 | Stop reason: HOSPADM

## 2017-12-26 RX ADMIN — NITROGLYCERIN 0.3 MG: 0.3 TABLET SUBLINGUAL at 11:40

## 2017-12-26 NOTE — PROCEDURES
[facility] [address], [address]                                  TILT TABLE TEST    PATIENT NAME: Jamison Troncoso                     :        1945  MED REC NO:   [id number]                              ROOM:  ACCOUNT NO:   [de-identified]                           ADMIT DATE: 2017  PROVIDER:     Lavern Talamantes    Cardiovascular Diagnostics Department    DATE OF PROCEDURE:  2017    ORDERING PROVIDER:  Raul Story MD    PRIMARY CARE PROVIDER:  Nilesh Ludwig MD    INTERPRETING PHYSICIAN:  Lavern Talamantes MD    Diagnosis:  Near syncope; episodic lightheadedness    PROCEDURE SUMMARY:  After explaining the risk, benefits and alternatives to  the procedure, informed written consent was obtained. The patient was  brought to the tilt table laboratory in a fasting and resting state. The  patient was placed on the tilt table in the supine position, ECG patches  were applied and an IV was placed. The patient's baseline blood pressure  was 178/81 mmHg with a heart rate of 51/minute. The patient was then  raised to the 70 degree head upright tilt position with and pulse rate,  blood pressure and cardiac rhythm were monitored and recorded each minute  of the study for a maximum of 30 minutes. During the initial 20 minutes of the study, the patient's blood pressure  ranged from a high of 162/81 mmHg to a low of 127/82 mmHg, while their  heart rate ranged from a low of 57/minute to a high of 64/minute. During  this period, the patient reported no symptoms. During the last 10 minutes of the study, nitroglycerin 0.3 mg was give  sublingually. During this time, the patient's blood pressure ranged from a  high of 135/76 mmHg to a low of 80/56 mmHg, while their heart rate ranged  from a low of 53/minute to a high of 64/minute.   During this period, the  patient reported severe lightheadedness, vision change and feeling as if he  were going to pass out. At this point, the patient was returned to the supine position and  monitored for an additional ten minutes. Once the patient felt well enough  to be discharged home, they were discharged home with instructions to  follow up with their primary care physician and/or cardiologist as  previously scheduled. STUDY CONCLUSIONS:  Abnormal head upright tilt table study. The patient's heart rate, blood  pressure response and symptoms were most consistent with dysautonomia. Combined with vigilant maintenance of euvolemia and maintaining a moderate  salt intake, pharmacologic treatment with a serotonin selective reuptake  inhibitor (SSRI) such as Lexapro and/or Mestinon among other treatments  have shown some effectiveness in the treatment of this condition. PATRICK THORNTON    D: 12/26/2017 15:39:02       T: 12/26/2017 15:42:34     MARIAN_LEONARD  Job#: [id number]    Doc#: Unknown    CC:  Melissa Rowan

## 2017-12-29 ENCOUNTER — TELEPHONE (OUTPATIENT)
Dept: CARDIOLOGY | Age: 72
End: 2017-12-29

## 2017-12-29 NOTE — TELEPHONE ENCOUNTER
----- Message from Harolyn Rubinstein, MD sent at 12/28/2017 11:20 PM EST -----  Please make an appointment for them in the next 2-3 weeks if not already done. Thanks. Please have him reduce his coreg to 12.5 mg bid because of the results of his tilt table testing.   Jackelin

## 2017-12-29 NOTE — PROGRESS NOTES
Please make an appointment for them in the next 2-3 weeks if not already done. Thanks. Please have him reduce his coreg to 12.5 mg bid because of the results of his tilt table testing.   Jackelin

## 2018-01-02 ENCOUNTER — TELEPHONE (OUTPATIENT)
Dept: CARDIOLOGY | Age: 73
End: 2018-01-02

## 2018-01-08 ENCOUNTER — TELEPHONE (OUTPATIENT)
Dept: CARDIOLOGY | Age: 73
End: 2018-01-08

## 2018-01-08 ENCOUNTER — OFFICE VISIT (OUTPATIENT)
Dept: CARDIOLOGY | Age: 73
End: 2018-01-08
Payer: MEDICARE

## 2018-01-08 VITALS
WEIGHT: 204.6 LBS | OXYGEN SATURATION: 97 % | RESPIRATION RATE: 16 BRPM | BODY MASS INDEX: 30.3 KG/M2 | SYSTOLIC BLOOD PRESSURE: 160 MMHG | DIASTOLIC BLOOD PRESSURE: 76 MMHG | HEIGHT: 69 IN | HEART RATE: 60 BPM

## 2018-01-08 DIAGNOSIS — I20.9 ANGINA, CLASS II (HCC): ICD-10-CM

## 2018-01-08 DIAGNOSIS — G90.1 DYSAUTONOMIA (HCC): ICD-10-CM

## 2018-01-08 DIAGNOSIS — I10 HYPERTENSION, UNCONTROLLED: Primary | ICD-10-CM

## 2018-01-08 DIAGNOSIS — I50.32 HEART FAILURE, DIASTOLIC, CHRONIC (HCC): ICD-10-CM

## 2018-01-08 DIAGNOSIS — I25.10 CORONARY ARTERY DISEASE INVOLVING NATIVE CORONARY ARTERY OF NATIVE HEART WITHOUT ANGINA PECTORIS: ICD-10-CM

## 2018-01-08 PROCEDURE — 1123F ACP DISCUSS/DSCN MKR DOCD: CPT | Performed by: FAMILY MEDICINE

## 2018-01-08 PROCEDURE — 4040F PNEUMOC VAC/ADMIN/RCVD: CPT | Performed by: FAMILY MEDICINE

## 2018-01-08 PROCEDURE — G8598 ASA/ANTIPLAT THER USED: HCPCS | Performed by: FAMILY MEDICINE

## 2018-01-08 PROCEDURE — G8417 CALC BMI ABV UP PARAM F/U: HCPCS | Performed by: FAMILY MEDICINE

## 2018-01-08 PROCEDURE — G8484 FLU IMMUNIZE NO ADMIN: HCPCS | Performed by: FAMILY MEDICINE

## 2018-01-08 PROCEDURE — G8427 DOCREV CUR MEDS BY ELIG CLIN: HCPCS | Performed by: FAMILY MEDICINE

## 2018-01-08 PROCEDURE — 3017F COLORECTAL CA SCREEN DOC REV: CPT | Performed by: FAMILY MEDICINE

## 2018-01-08 PROCEDURE — 99214 OFFICE O/P EST MOD 30 MIN: CPT | Performed by: FAMILY MEDICINE

## 2018-01-08 PROCEDURE — 1036F TOBACCO NON-USER: CPT | Performed by: FAMILY MEDICINE

## 2018-01-08 RX ORDER — FUROSEMIDE 20 MG/1
20 TABLET ORAL DAILY
Qty: 90 TABLET | Refills: 3
Start: 2018-01-08 | End: 2018-04-27

## 2018-01-08 RX ORDER — SPIRONOLACTONE 25 MG/1
25 TABLET ORAL DAILY
Qty: 90 TABLET | Refills: 3 | Status: SHIPPED | OUTPATIENT
Start: 2018-01-08 | End: 2018-04-27 | Stop reason: SINTOL

## 2018-01-08 NOTE — TELEPHONE ENCOUNTER
See Narayan called in and stated that today you put him on Aldactone 25 mg. In Dec of 2016 it was DC due to him breaking out in a rash. He would like to know if there is anything else he can take?

## 2018-01-08 NOTE — PROGRESS NOTES
Patient: Tamara Tilley  : 1945  Date of Visit: 2018    REASON FOR VISIT / CONSULTATION: Results (Hx: CHF and CAD. Tilt table test done  and holter monitor done . Due to Tilt table test, pt was called to decrease coreg to 12.5 mg BID. C/o: pt continues to have balance issues. CP with shoveling snow (left side of chest and sharp which radiated to left arm (duration of half hour) and went away with rest and had taken a nitro about 15 mins after it went away. Denies palpitations. weight at last visit was 202 lb and todays weight is 204 lb. )      Dear Lawrence Kumar MD    As you know, Mr. Jake Plunkett is a 70 y.o. male with a known history of dysautonomia where on tilt table testing his blood pressure dropped from 340 systolic to 78 systolic with only a 54-00 HR response. More recently, a CT of he head in the past showed evidence of at least 2 old CVA's and a heart catheterization showed severe single vessel disease in the ostium of a moderate sized OM1 branch of the circumflex. However, maximal guidelines directed medical management was opted for an place of stenting partly due to the risk associated with stenting this vessel. Recently he has had a coronary angiography procedure with stenting of his HA Om1. As you know, Mr. Jake Plunkett  is a 67 y.o. male with a history of recent onset substernal chest discomfort that led to a stress test and a subsequent heart catheterization which showed severe single vessel coronary artery disease of the HA Om1 with successful angioplasty and stenting of that vessel that was done by Dr. Prema Joshua on 4/10/17. More recently he has had problems with balance problems when he is in his feet and says that a Neurologist has told him in the past this is because of his previous strokes. Since the last time I saw him, he reports continuing to participate in physical therapy and also continues to wear his compression stockings.  However, he does report 1 episodes of among other treatments have shown some effectiveness in treatment of this condition    History of 24 hour EKG monitoring 8/14/14    Occasional PAC's and PVC's which appear to be at least moderately symptomatic.  History of 24 hour EKG monitoring 11/19/14    Event Monitor. Sinus rhythm and sinus bradycardia. Infrequent isolated PVC's    History of cardiovascular stress test 2/19/14    Relatively NL    History of coronary artery stent placement 04/2017    PTCA / Drug Eluting Stent:, CX and / or branches    History of CVA (cerebrovascular accident) without residual deficits 2014    Incidentally found on CT head. No known impairment now or in the past.    History of echocardiogram 2/18/14    LA mildly dilated, EF 55%, LV wall thickness is moderately increased, no definite wall motion abnormalilities, what appears to be a pacer wire is seen w/n the RA and RV, mild-mod TR, mild pulmonary hypertension.  History of Holter monitoring 12/29/2017    Rare PAC's and PVC's.  History of tilt table evaluation 8/12/14    Abnormal    Hyperlipidemia     Hypertension     Pacemaker     non-functioning    S/P cardiac cath 04/10/2017    S/P coronary artery stent placement     Successful PTCA - HA Om1    Type II or unspecified type diabetes mellitus without mention of complication, not stated as uncontrolled        CURRENT ALLERGIES: Lipitor [atorvastatin]; Crestor [rosuvastatin calcium]; Invokana [canagliflozin]; and Januvia [sitagliptin] REVIEW OF SYSTEMS: 10 systems were reviewed. Pertinent positives and negatives as above, all else negative.      Past Surgical History:   Procedure Laterality Date    BACK SURGERY      CARDIAC CATHETERIZATION Left 2/19/16    via right radial approach/ [facility]/ Dr. Shepherd Cubero  8/17/15    Liang/Charles/[facility]/ [facility]    COLONOSCOPY  2010    COLONOSCOPY  2/19/15    -polyps,diverticulosis,hemorrhoids    CORNEAL TRANSPLANT      left eye    PACEMAKER INSERTION      PACEMAKER PLACEMENT      Social History:  Social History   Substance Use Topics    Smoking status: Former Smoker     Packs/day: 1.50     Years: 8.00     Quit date: 3/4/1980    Smokeless tobacco: Never Used    Alcohol use No        CURRENT MEDICATIONS:  Outpatient Prescriptions Marked as Taking for the 1/8/18 encounter (Office Visit) with Raya Rodriguez MD   Medication Sig Dispense Refill    furosemide (LASIX) 20 MG tablet Take 1 tablet by mouth daily 90 tablet 3    spironolactone (ALDACTONE) 25 MG tablet Take 1 tablet by mouth daily 90 tablet 3    Iron-Vitamins (GERITOL COMPLETE PO) Take by mouth daily       traMADol (ULTRAM) 50 MG tablet Take 1 tablet by mouth every 6 hours as needed for Pain .  90 tablet 0    tiZANidine (ZANAFLEX) 2 MG tablet Take 1 tablet by mouth every 6 hours as needed (spasm) 30 tablet 2    amLODIPine (NORVASC) 10 MG tablet Take 1 tablet by mouth daily 90 tablet 0    carvedilol (COREG) 25 MG tablet Take 1 tablet by mouth 2 times daily (with meals) (Patient taking differently: Take 12.5 mg by mouth 2 times daily (with meals) ) 180 tablet 0    cloNIDine (CATAPRES) 0.1 MG tablet Take 1 tablet by mouth three times daily 270 tablet 0    glipiZIDE (GLIPIZIDE XL) 5 MG extended release tablet TAKE 2 TABLETS TWICE DAILY 360 tablet 0    insulin glargine (LANTUS SOLOSTAR) 100 UNIT/ML injection pen Inject 40 Units into the skin nightly (Patient taking differently: Inject 20 Units into the skin 2 times daily ) 5 Pen 3    lisinopril (PRINIVIL;ZESTRIL) 40 MG tablet TAKE ONE TABLET BY MOUTH ONCE DAILY 90 tablet 0    DIANA CONTOUR TEST strip USE ONE STRIP TO CHECK GLUCOSE TWICE DAILY 100 strip 11    nitroGLYCERIN (NITROSTAT) 0.4 MG SL tablet Place 1 tablet under the tongue every 5 minutes as needed for Chest pain 25 tablet 3    DIANA MICROLET LANCETS MISC TEST TWICE DAILY 100 each 2    Insulin Pen Needle (PEN NEEDLES) 31G X 6 MM MISC 1 each by Does not apply route daily 100 each 11/02/2017     11/02/2017    CREATININE 1.21 (H) 11/02/2017    BUN 27 (H) 11/02/2017    CO2 26 11/02/2017    TSH 2.26 11/02/2017    PSA 3.26 11/02/2017    INR 1.0 02/17/2016    LABA1C 7.3 10/31/2017    LABMICR 30 10/17/2015       ASSESSMENT:  1. Hypertension, uncontrolled    2. Dysautonomia    3. Coronary artery disease involving native coronary artery of native heart without angina pectoris    4. Angina, class II ([facility] [address]         PLAN:  Essential Hypertension: Controlled  · ACE Inibitor/ARB: Continue lisinopril 40 mg      · Diuretics: Start aldactone  25 mg once daily I told him to watch for any increased lightheadedness or dizziness and call if this develops  · Calcium Channel Blocker: Continue amlodipine (Norvasc) 10 mg daily. · Beta Blocker Therapy: Continue carvedilol 12.5 mg twice daily. This was actually reduced after his recent tilt table test result. Dysautonomia:  · Pharmacological Management: Decrease coreg 12.5 mg bid      · Nonpharmacologic counseling: Because of his condition, I reminded him to try and keep himself well-hydrated and to take extra time when moving from laying to sitting, sitting to standing and standing to walking. I also explained to him to help improve his symptoms he should include 3 g sodium diet, 1 or 2 L of sports drinks daily, Knee-high, thigh to high or waist to high custom fitted compressions stockings, 4-6 inch elevation of the head of the bed, and regular exercise, which could initially consist of non-gravitational exercise. I also told him to practice, learn, and regularly implement vasovagal abortive counter pressure counter measure maneuvers which are simply isometric exercises of the upper and lower extremities, called leg crossing and arm tensing, which are to be performed on a regular basis and practice and applied during the his syncopal episodes.    · Additional Testing: None    · Atherosclerotic Heart Disease:  Antiplatelet Agent: Continue Aspirin 81 mg daily and clopidogrel (Plavix 75 mg daily. I also reminded him to watch for signs of blood in his stool or black tarry stools and stop the medication immediately if this develops as this could be life threatening. · Beta Blocker Therapy: Continue Carvedilol 12.5 mg twice daily I discussed the potential side effects of this medication including lightheadedness and dizziness and told him to call the office if this occurs. · Other Anti-anginal medications: Continue Amlodipine    · Statin Therapy: Not able to take due to muscle aches. Continue Lopid    · Additional Testing: none     Once again, thank you for allowing me to participate in this patients care. Please do not hesitate to contact me if I could be of any further assistance. FOLLOW UP:   I told Mr. Jayesh Dempsey  to call my office if he had any problems, but otherwise told him to Return in about 4 months (around 5/8/2018). However, as always I would be happy to see him sooner should the need arise. Once again, thank you for allowing me to participate in this patients care. Please do not hesitate to contact me could I be of further assistance. Sincerely,  Patricia Wei. Jackelin BLOUNT, MS, F.A.C.C. [facility]iology Specialist  [address], [facility], [address] [place] [address]  Phone: [phone], Fax: [phone]     I believe that the risk of significant morbidity and mortality related to the patient's current medical conditions are: Intermediate.

## 2018-03-16 ENCOUNTER — HOSPITAL ENCOUNTER (OUTPATIENT)
Dept: GENERAL RADIOLOGY | Age: 73
Discharge: HOME | End: 2018-03-18
Payer: MEDICARE

## 2018-03-16 ENCOUNTER — HOSPITAL ENCOUNTER (OUTPATIENT)
Age: 73
Discharge: HOME | End: 2018-03-18
Payer: MEDICARE

## 2018-03-16 DIAGNOSIS — R06.02 SHORTNESS OF BREATH: ICD-10-CM

## 2018-03-16 PROCEDURE — 71046 X-RAY EXAM CHEST 2 VIEWS: CPT

## 2018-03-21 ENCOUNTER — HOSPITAL ENCOUNTER (OUTPATIENT)
Dept: NON INVASIVE DIAGNOSTICS | Age: 73
Discharge: HOME | End: 2018-03-21
Payer: MEDICARE

## 2018-03-21 DIAGNOSIS — I20.9 ANGINA, CLASS II (HCC): ICD-10-CM

## 2018-03-21 PROCEDURE — 3430000000 HC RX DIAGNOSTIC RADIOPHARMACEUTICAL: Performed by: FAMILY MEDICINE

## 2018-03-21 PROCEDURE — 78452 HT MUSCLE IMAGE SPECT MULT: CPT

## 2018-03-21 PROCEDURE — A9500 TC99M SESTAMIBI: HCPCS | Performed by: FAMILY MEDICINE

## 2018-03-21 PROCEDURE — 93017 CV STRESS TEST TRACING ONLY: CPT

## 2018-03-21 PROCEDURE — 6360000002 HC RX W HCPCS: Performed by: FAMILY MEDICINE

## 2018-03-21 RX ADMIN — Medication 30 MILLICURIE: at 09:47

## 2018-03-21 RX ADMIN — REGADENOSON 0.4 MG: 0.08 INJECTION, SOLUTION INTRAVENOUS at 09:44

## 2018-03-22 ENCOUNTER — HOSPITAL ENCOUNTER (OUTPATIENT)
Dept: NON INVASIVE DIAGNOSTICS | Age: 73
Discharge: HOME | End: 2018-03-22
Payer: MEDICARE

## 2018-03-22 PROCEDURE — A9500 TC99M SESTAMIBI: HCPCS | Performed by: FAMILY MEDICINE

## 2018-03-22 PROCEDURE — 3430000000 HC RX DIAGNOSTIC RADIOPHARMACEUTICAL: Performed by: FAMILY MEDICINE

## 2018-03-22 RX ADMIN — Medication 30 MILLICURIE: at 12:56

## 2018-03-23 ENCOUNTER — OFFICE VISIT (OUTPATIENT)
Dept: CARDIOLOGY | Age: 73
End: 2018-03-23
Payer: MEDICARE

## 2018-03-23 VITALS
RESPIRATION RATE: 16 BRPM | OXYGEN SATURATION: 96 % | WEIGHT: 206.4 LBS | DIASTOLIC BLOOD PRESSURE: 72 MMHG | SYSTOLIC BLOOD PRESSURE: 154 MMHG | BODY MASS INDEX: 30.57 KG/M2 | HEART RATE: 59 BPM | HEIGHT: 69 IN

## 2018-03-23 DIAGNOSIS — R07.89 NON-CARDIAC CHEST PAIN: ICD-10-CM

## 2018-03-23 DIAGNOSIS — G90.1 DYSAUTONOMIA (HCC): ICD-10-CM

## 2018-03-23 DIAGNOSIS — R00.1 BRADYCARDIA: ICD-10-CM

## 2018-03-23 DIAGNOSIS — I10 ESSENTIAL HYPERTENSION: ICD-10-CM

## 2018-03-23 DIAGNOSIS — T50.905A MEDICATION SIDE EFFECT, INITIAL ENCOUNTER: Primary | ICD-10-CM

## 2018-03-23 DIAGNOSIS — I25.10 CORONARY ARTERY DISEASE INVOLVING NATIVE CORONARY ARTERY OF NATIVE HEART WITHOUT ANGINA PECTORIS: ICD-10-CM

## 2018-03-23 PROCEDURE — G8598 ASA/ANTIPLAT THER USED: HCPCS | Performed by: FAMILY MEDICINE

## 2018-03-23 PROCEDURE — G8417 CALC BMI ABV UP PARAM F/U: HCPCS | Performed by: FAMILY MEDICINE

## 2018-03-23 PROCEDURE — 1123F ACP DISCUSS/DSCN MKR DOCD: CPT | Performed by: FAMILY MEDICINE

## 2018-03-23 PROCEDURE — G8427 DOCREV CUR MEDS BY ELIG CLIN: HCPCS | Performed by: FAMILY MEDICINE

## 2018-03-23 PROCEDURE — 99214 OFFICE O/P EST MOD 30 MIN: CPT | Performed by: FAMILY MEDICINE

## 2018-03-23 PROCEDURE — 1036F TOBACCO NON-USER: CPT | Performed by: FAMILY MEDICINE

## 2018-03-23 PROCEDURE — 3017F COLORECTAL CA SCREEN DOC REV: CPT | Performed by: FAMILY MEDICINE

## 2018-03-23 PROCEDURE — 4040F PNEUMOC VAC/ADMIN/RCVD: CPT | Performed by: FAMILY MEDICINE

## 2018-03-23 PROCEDURE — G8482 FLU IMMUNIZE ORDER/ADMIN: HCPCS | Performed by: FAMILY MEDICINE

## 2018-03-23 RX ORDER — ASPIRIN 81 MG/1
81 TABLET ORAL DAILY
Qty: 30 TABLET | Refills: 3 | Status: ON HOLD | COMMUNITY
Start: 2018-03-23 | End: 2021-09-22 | Stop reason: HOSPADM

## 2018-03-23 RX ORDER — HYDRALAZINE HYDROCHLORIDE 50 MG/1
50 TABLET, FILM COATED ORAL 3 TIMES DAILY
Qty: 90 TABLET | Refills: 3
Start: 2018-03-23 | End: 2018-04-03 | Stop reason: SDUPTHER

## 2018-03-23 NOTE — PROGRESS NOTES
which most consistent with ischemia. Global LV systolic function normal without regional wall motion abnormalities. OVerall these results are most consistent with an intermediate risk for signficant CAD. Additional testing including cardiac cath may be indicated.  H/O tilt table evaluation 12/26/2017    Abnormal. Patients HR, BP response and symptoms were most consistent with dysautonomia. Combined with viligant maintenance of euvolemia and maintaining a moderate salft intake, pharmacologic treatment with SSRI such as lexapro and or mestinon among other treatments have shown some effectiveness in treatment of this condition    H/O tilt table evaluation 12/26/2017    Abnormal head upright tilt table study. The pt heart rate, blood pressure response and symptoms were most consistent with dysautonomia.  History of 24 hour EKG monitoring 8/14/14    Occasional PAC's and PVC's which appear to be at least moderately symptomatic.  History of 24 hour EKG monitoring 11/19/14    Event Monitor. Sinus rhythm and sinus bradycardia. Infrequent isolated PVC's    History of cardiovascular stress test 2/19/14    Relatively NL    History of coronary artery stent placement 04/2017    PTCA / Drug Eluting Stent:, CX and / or branches    History of CVA (cerebrovascular accident) without residual deficits 2014    Incidentally found on CT head. No known impairment now or in the past.    History of echocardiogram 2/18/14    LA mildly dilated, EF 55%, LV wall thickness is moderately increased, no definite wall motion abnormalilities, what appears to be a pacer wire is seen w/n the RA and RV, mild-mod TR, mild pulmonary hypertension.  History of Holter monitoring 12/29/2017    Rare PAC's and PVC's.      History of tilt table evaluation 8/12/14    Abnormal    Hyperlipidemia     Hypertension     Pacemaker     non-functioning    S/P cardiac cath 04/10/2017    S/P coronary artery stent placement     Successful PTCA - HA Pulses are 2+ radial/carotid/dorsalis pedis and posterior tibial bilaterally. No cyanosis, or clubbing. Pulses are 2+ radial/carotid/dorsalis pedis and posterior tibial bilaterally. Compression stockings in place. Neurological: He is alert and oriented to person, place, and time. No evidence of gross cranial nerve deficit. Coordination appeared normal.   Skin: Skin is warm and dry. There is no rash or diaphoresis. Psychiatric: He has a normal mood and affect. His speech is normal and behavior is normal.      MOST RECENT LABS ON RECORD:   Lab Results   Component Value Date    WBC 7.5 11/02/2017    HGB 13.2 (L) 11/02/2017    HCT 38.3 (L) 11/02/2017     11/02/2017    CHOL 172 11/02/2017    TRIG 672 (H) 11/02/2017    HDL 23 (L) 11/02/2017    LDLDIRECT 44 11/02/2017    ALT 21 11/02/2017    AST 17 11/02/2017     11/02/2017    K 3.7 11/02/2017     11/02/2017    CREATININE 1.21 (H) 11/02/2017    BUN 27 (H) 11/02/2017    CO2 26 11/02/2017    TSH 2.26 11/02/2017    PSA 3.26 11/02/2017    INR 1.0 02/17/2016    LABA1C 8.0 01/30/2018    LABMICR 30 10/17/2015       ASSESSMENT:  1. Bradycardia    2. Coronary artery disease involving native coronary artery of native heart without angina pectoris    3. Non-cardiac chest pain    4. Essential hypertension    5. Dysautonomia         PLAN:  · Medication side effect: Mr. Vy Gomez  Is convinced that clonidine is the source of all of his symptoms and therefore I think that it may be worthwhile to at least test to see how he does off of this medications. Therefore I gave instructions to titrate off of this and increase his hydralazine as below. Bradycardia: Possibly symptomatic  · Beta Blocker Therapy: Continue Carvedilol 12.5 mg twice daily   · Calcium Channel Blocker: Continue Amlodipine (Norvasc) 10 mg daily    · Alpha agonist: I told Mr. Jimenez to take his Clonidine tomorrow night but after that I told him to stop this completely to see if this is the source of

## 2018-03-23 NOTE — PROCEDURES
[facility] [address], [address] [place]                                CARDIAC STRESS TEST    PATIENT NAME: Khushi Cardoso                     :        1945  MED REC NO:   [id number]                              ROOM:  ACCOUNT NO:   [de-identified]                           ADMIT DATE: 2018  PROVIDER:     [facility]    CARDIOVASCULAR DIAGNOSTIC DEPARTMENT    DATE OF STUDY:  2018    ORDERING PROVIDER:  Joselyn Greene MD    PRIMARY CARE PROVIDER:  Joselyn Greene MD    INTERPRETING PHYSICIAN:  Teresa Nunez. Eligio Mercado MD    PHARMACOLOGIC MYOCARDIAL PERFUSION STRESS TESTING    Stress/rest single isotope SPECT imaging with exercise stress and gated  SPECT imaging. INDICATIONS:  Assessment of a cardiac cause of:  Angina II    CLINICAL HISTORY:  The patient is a 68-year-old man with known coronary  artery disease. Previous cardiac history includes:  CAD, stress test, cardiac  catheterization, PTCA, myocardial infarction. Other previous history includes:  Palpitations, fatigue, dyspnea,  lightheadedness, diabetes mellitus, heartburn, arthritis, pacemaker    Symptoms just prior to testing include:  Shortness of breath    Relevant medications:  Coreg, Norvasc    PROCEDURE:  The heart rate was 64 at baseline and brigido to 72 beats per  minute during the regadenoson infusion. The rest blood pressure was 165/81  mm/Hg and decreased to 107/60 mm/Hg. The patient did complain of shortness  of breath following infusion. Pharmacologic stress testing was performed with regadenoson at a dose of  0.4 mg. Additionally, low level exercise using hand compressions were  performed along with vasodilator infusion. MYOCARDIAL PERFUSION IMAGING:  Imaging was performed at rest 30-45 minutes  following the injection of 30.0 mCi of sestamibi. Approximately 10 seconds  after Lexiscan injection, the patient was injected with 30.0 mCi of  sestamibi.

## 2018-04-02 ENCOUNTER — TELEPHONE (OUTPATIENT)
Dept: CARDIOLOGY | Age: 73
End: 2018-04-02

## 2018-04-03 RX ORDER — HYDRALAZINE HYDROCHLORIDE 100 MG/1
100 TABLET, FILM COATED ORAL 3 TIMES DAILY
Qty: 90 TABLET | Refills: 3 | Status: SHIPPED | OUTPATIENT
Start: 2018-04-03 | End: 2018-07-25 | Stop reason: SDUPTHER

## 2018-04-03 RX ORDER — HYDRALAZINE HYDROCHLORIDE 50 MG/1
50 TABLET, FILM COATED ORAL 3 TIMES DAILY
Qty: 90 TABLET | Refills: 3 | Status: SHIPPED | OUTPATIENT
Start: 2018-04-03 | End: 2018-04-03

## 2018-04-27 ENCOUNTER — OFFICE VISIT (OUTPATIENT)
Dept: CARDIOLOGY | Age: 73
End: 2018-04-27
Payer: MEDICARE

## 2018-04-27 VITALS
SYSTOLIC BLOOD PRESSURE: 117 MMHG | OXYGEN SATURATION: 98 % | DIASTOLIC BLOOD PRESSURE: 57 MMHG | BODY MASS INDEX: 29.18 KG/M2 | WEIGHT: 197 LBS | HEART RATE: 62 BPM | RESPIRATION RATE: 16 BRPM | HEIGHT: 69 IN

## 2018-04-27 DIAGNOSIS — I10 ESSENTIAL HYPERTENSION: ICD-10-CM

## 2018-04-27 DIAGNOSIS — I25.10 CORONARY ARTERY DISEASE INVOLVING NATIVE CORONARY ARTERY OF NATIVE HEART WITHOUT ANGINA PECTORIS: ICD-10-CM

## 2018-04-27 DIAGNOSIS — G90.1 DYSAUTONOMIA (HCC): Primary | ICD-10-CM

## 2018-04-27 PROCEDURE — G8417 CALC BMI ABV UP PARAM F/U: HCPCS | Performed by: FAMILY MEDICINE

## 2018-04-27 PROCEDURE — G8598 ASA/ANTIPLAT THER USED: HCPCS | Performed by: FAMILY MEDICINE

## 2018-04-27 PROCEDURE — 1123F ACP DISCUSS/DSCN MKR DOCD: CPT | Performed by: FAMILY MEDICINE

## 2018-04-27 PROCEDURE — 1036F TOBACCO NON-USER: CPT | Performed by: FAMILY MEDICINE

## 2018-04-27 PROCEDURE — 3017F COLORECTAL CA SCREEN DOC REV: CPT | Performed by: FAMILY MEDICINE

## 2018-04-27 PROCEDURE — G8427 DOCREV CUR MEDS BY ELIG CLIN: HCPCS | Performed by: FAMILY MEDICINE

## 2018-04-27 PROCEDURE — 4040F PNEUMOC VAC/ADMIN/RCVD: CPT | Performed by: FAMILY MEDICINE

## 2018-04-27 PROCEDURE — 99214 OFFICE O/P EST MOD 30 MIN: CPT | Performed by: FAMILY MEDICINE

## 2018-05-19 PROBLEM — Z12.11 COLON CANCER SCREENING: Status: ACTIVE | Noted: 2018-05-19

## 2018-05-23 ENCOUNTER — ANESTHESIA (OUTPATIENT)
Dept: OPERATING ROOM | Age: 73
End: 2018-05-23
Payer: MEDICARE

## 2018-05-23 ENCOUNTER — HOSPITAL ENCOUNTER (OUTPATIENT)
Age: 73
Setting detail: OUTPATIENT SURGERY
Discharge: HOME | End: 2018-05-23
Attending: INTERNAL MEDICINE | Admitting: INTERNAL MEDICINE
Payer: MEDICARE

## 2018-05-23 ENCOUNTER — ANESTHESIA EVENT (OUTPATIENT)
Dept: OPERATING ROOM | Age: 73
End: 2018-05-23
Payer: MEDICARE

## 2018-05-23 VITALS
HEIGHT: 69 IN | SYSTOLIC BLOOD PRESSURE: 106 MMHG | RESPIRATION RATE: 16 BRPM | WEIGHT: 196 LBS | OXYGEN SATURATION: 97 % | DIASTOLIC BLOOD PRESSURE: 66 MMHG | BODY MASS INDEX: 29.03 KG/M2 | HEART RATE: 57 BPM | TEMPERATURE: 98.9 F

## 2018-05-23 VITALS
RESPIRATION RATE: 18 BRPM | OXYGEN SATURATION: 97 % | DIASTOLIC BLOOD PRESSURE: 40 MMHG | SYSTOLIC BLOOD PRESSURE: 84 MMHG

## 2018-05-23 PROCEDURE — C1773 RET DEV, INSERTABLE: HCPCS | Performed by: INTERNAL MEDICINE

## 2018-05-23 PROCEDURE — 7100000010 HC PHASE II RECOVERY - FIRST 15 MIN: Performed by: INTERNAL MEDICINE

## 2018-05-23 PROCEDURE — 2580000003 HC RX 258: Performed by: INTERNAL MEDICINE

## 2018-05-23 PROCEDURE — 3700000000 HC ANESTHESIA ATTENDED CARE: Performed by: INTERNAL MEDICINE

## 2018-05-23 PROCEDURE — 6360000002 HC RX W HCPCS: Performed by: NURSE ANESTHETIST, CERTIFIED REGISTERED

## 2018-05-23 PROCEDURE — 3609010600 HC COLONOSCOPY POLYPECTOMY SNARE/COLD BIOPSY: Performed by: INTERNAL MEDICINE

## 2018-05-23 PROCEDURE — 7100000011 HC PHASE II RECOVERY - ADDTL 15 MIN: Performed by: INTERNAL MEDICINE

## 2018-05-23 PROCEDURE — 88305 TISSUE EXAM BY PATHOLOGIST: CPT

## 2018-05-23 PROCEDURE — 45385 COLONOSCOPY W/LESION REMOVAL: CPT | Performed by: INTERNAL MEDICINE

## 2018-05-23 PROCEDURE — 2500000003 HC RX 250 WO HCPCS: Performed by: NURSE ANESTHETIST, CERTIFIED REGISTERED

## 2018-05-23 PROCEDURE — 3700000001 HC ADD 15 MINUTES (ANESTHESIA): Performed by: INTERNAL MEDICINE

## 2018-05-23 RX ORDER — LIDOCAINE HYDROCHLORIDE 20 MG/ML
INJECTION, SOLUTION EPIDURAL; INFILTRATION; INTRACAUDAL; PERINEURAL PRN
Status: DISCONTINUED | OUTPATIENT
Start: 2018-05-23 | End: 2018-05-23 | Stop reason: SDUPTHER

## 2018-05-23 RX ORDER — PROPOFOL 10 MG/ML
INJECTION, EMULSION INTRAVENOUS PRN
Status: DISCONTINUED | OUTPATIENT
Start: 2018-05-23 | End: 2018-05-23 | Stop reason: SDUPTHER

## 2018-05-23 RX ORDER — SODIUM CHLORIDE, SODIUM LACTATE, POTASSIUM CHLORIDE, CALCIUM CHLORIDE 600; 310; 30; 20 MG/100ML; MG/100ML; MG/100ML; MG/100ML
INJECTION, SOLUTION INTRAVENOUS CONTINUOUS
Status: DISCONTINUED | OUTPATIENT
Start: 2018-05-23 | End: 2018-05-23 | Stop reason: HOSPADM

## 2018-05-23 RX ORDER — PROPOFOL 10 MG/ML
INJECTION, EMULSION INTRAVENOUS CONTINUOUS PRN
Status: DISCONTINUED | OUTPATIENT
Start: 2018-05-23 | End: 2018-05-23 | Stop reason: SDUPTHER

## 2018-05-23 RX ADMIN — LIDOCAINE HYDROCHLORIDE 100 MG: 20 INJECTION, SOLUTION EPIDURAL; INFILTRATION; INTRACAUDAL; PERINEURAL at 09:37

## 2018-05-23 RX ADMIN — PHENYLEPHRINE HYDROCHLORIDE 50 MCG: 10 INJECTION INTRAVENOUS at 10:04

## 2018-05-23 RX ADMIN — PROPOFOL 160 MCG/KG/MIN: 10 INJECTION, EMULSION INTRAVENOUS at 09:37

## 2018-05-23 RX ADMIN — SODIUM CHLORIDE, POTASSIUM CHLORIDE, SODIUM LACTATE AND CALCIUM CHLORIDE: 600; 310; 30; 20 INJECTION, SOLUTION INTRAVENOUS at 08:58

## 2018-05-23 RX ADMIN — PROPOFOL 40 MG: 10 INJECTION, EMULSION INTRAVENOUS at 09:38

## 2018-05-23 RX ADMIN — PROPOFOL 130 MG: 10 INJECTION, EMULSION INTRAVENOUS at 09:37

## 2018-05-23 RX ADMIN — PROPOFOL 35 MG: 10 INJECTION, EMULSION INTRAVENOUS at 09:40

## 2018-05-23 ASSESSMENT — PAIN SCALES - GENERAL
PAINLEVEL_OUTOF10: 0
PAINLEVEL_OUTOF10: 0

## 2018-05-23 ASSESSMENT — PAIN - FUNCTIONAL ASSESSMENT: PAIN_FUNCTIONAL_ASSESSMENT: 0-10

## 2018-05-26 LAB — SURGICAL PATHOLOGY REPORT: NORMAL

## 2018-06-18 PROBLEM — Z12.11 COLON CANCER SCREENING: Status: RESOLVED | Noted: 2018-05-19 | Resolved: 2018-06-18

## 2018-07-25 ENCOUNTER — OFFICE VISIT (OUTPATIENT)
Dept: CARDIOLOGY | Age: 73
End: 2018-07-25
Payer: MEDICARE

## 2018-07-25 VITALS
WEIGHT: 205 LBS | HEIGHT: 69 IN | SYSTOLIC BLOOD PRESSURE: 133 MMHG | HEART RATE: 66 BPM | RESPIRATION RATE: 20 BRPM | DIASTOLIC BLOOD PRESSURE: 75 MMHG | BODY MASS INDEX: 30.36 KG/M2

## 2018-07-25 DIAGNOSIS — Z95.5 S/P DRUG ELUTING CORONARY STENT PLACEMENT: ICD-10-CM

## 2018-07-25 DIAGNOSIS — G90.1 DYSAUTONOMIA (HCC): ICD-10-CM

## 2018-07-25 DIAGNOSIS — I10 ESSENTIAL HYPERTENSION: ICD-10-CM

## 2018-07-25 DIAGNOSIS — I25.10 CORONARY ARTERY DISEASE INVOLVING NATIVE CORONARY ARTERY OF NATIVE HEART WITHOUT ANGINA PECTORIS: ICD-10-CM

## 2018-07-25 DIAGNOSIS — I50.33 ACUTE ON CHRONIC DIASTOLIC CHF (CONGESTIVE HEART FAILURE), NYHA CLASS 3 (HCC): Primary | ICD-10-CM

## 2018-07-25 PROCEDURE — 1123F ACP DISCUSS/DSCN MKR DOCD: CPT | Performed by: FAMILY MEDICINE

## 2018-07-25 PROCEDURE — 3017F COLORECTAL CA SCREEN DOC REV: CPT | Performed by: FAMILY MEDICINE

## 2018-07-25 PROCEDURE — G8598 ASA/ANTIPLAT THER USED: HCPCS | Performed by: FAMILY MEDICINE

## 2018-07-25 PROCEDURE — G8427 DOCREV CUR MEDS BY ELIG CLIN: HCPCS | Performed by: FAMILY MEDICINE

## 2018-07-25 PROCEDURE — G8417 CALC BMI ABV UP PARAM F/U: HCPCS | Performed by: FAMILY MEDICINE

## 2018-07-25 PROCEDURE — 1101F PT FALLS ASSESS-DOCD LE1/YR: CPT | Performed by: FAMILY MEDICINE

## 2018-07-25 PROCEDURE — 99213 OFFICE O/P EST LOW 20 MIN: CPT | Performed by: FAMILY MEDICINE

## 2018-07-25 PROCEDURE — 4040F PNEUMOC VAC/ADMIN/RCVD: CPT | Performed by: FAMILY MEDICINE

## 2018-07-25 PROCEDURE — 1036F TOBACCO NON-USER: CPT | Performed by: FAMILY MEDICINE

## 2018-07-25 RX ORDER — HYDRALAZINE HYDROCHLORIDE 100 MG/1
100 TABLET, FILM COATED ORAL 3 TIMES DAILY
Qty: 240 TABLET | Refills: 3 | Status: SHIPPED | OUTPATIENT
Start: 2018-07-25 | End: 2019-01-09 | Stop reason: SDUPTHER

## 2018-07-25 RX ORDER — FUROSEMIDE 20 MG/1
20 TABLET ORAL DAILY
Qty: 90 TABLET | Refills: 3 | Status: SHIPPED | OUTPATIENT
Start: 2018-07-25 | End: 2018-09-04 | Stop reason: DRUGHIGH

## 2018-07-25 NOTE — PROGRESS NOTES
History:   Procedure Laterality Date    BACK SURGERY      CARDIAC CATHETERIZATION Left 2/19/16    via right radial approach/ [id number] [facility]/ Dr. Mateo Dowd  8/17/15    Liang/Charles/[facility]/ Lap    COLONOSCOPY  2010    COLONOSCOPY  2/19/15    -polyps,diverticulosis,hemorrhoids    COLONOSCOPY  05/23/2018    Dr Estevan Cavanaugh    COLONOSCOPY N/A 5/23/2018    COLONOSCOPY POLYPECTOMY SNARE/COLD BIOPSY  cold snare  and  hot snare performed by Rodrigo Alvarado MD at [address] [facility]      left eye    PACEMAKER INSERTION      PACEMAKER PLACEMENT      Social History:  Social History   Substance Use Topics    Smoking status: Former Smoker     Packs/day: 1.50     Years: 8.00     Quit date: 3/4/1980    Smokeless tobacco: Never Used    Alcohol use No        CURRENT MEDICATIONS:  Outpatient Prescriptions Marked as Taking for the 7/25/18 encounter (Office Visit) with Leann Dash MD   Medication Sig Dispense Refill    insulin glargine (LANTUS SOLOSTAR) 100 UNIT/ML injection pen Inject 20 Units into the skin 2 times daily 36 mL 0    amLODIPine (NORVASC) 10 MG tablet Take 1 tablet by mouth daily 90 tablet 0    carvedilol (COREG) 12.5 MG tablet Take 1 tablet by mouth 2 times daily 180 tablet 0    glipiZIDE (GLIPIZIDE XL) 5 MG extended release tablet TAKE 2 TABLETS TWICE DAILY 360 tablet 0    lisinopril (PRINIVIL;ZESTRIL) 40 MG tablet TAKE ONE TABLET BY MOUTH ONCE DAILY 90 tablet 0    albuterol sulfate HFA (PROAIR HFA) 108 (90 Base) MCG/ACT inhaler Inhale 2 puffs into the lungs every 6 hours as needed for Wheezing 1 Inhaler 3    hydrALAZINE (APRESOLINE) 100 MG tablet Take 1 tablet by mouth 3 times daily 90 tablet 3    aspirin EC 81 MG EC tablet Take 1 tablet by mouth daily 30 tablet 3    Iron-Vitamins (GERITOL COMPLETE PO) Take by mouth daily       traMADol (ULTRAM) 50 MG tablet Take 1 tablet by mouth every 6 hours as needed for Pain .  90 tablet 0    tiZANidine (ZANAFLEX) 2 MG tablet Take 1 tablet by mouth every 6 hours as needed (spasm) 30 tablet 2    DIANA CONTOUR TEST strip USE ONE STRIP TO CHECK GLUCOSE TWICE DAILY 100 strip 11    nitroGLYCERIN (NITROSTAT) 0.4 MG SL tablet Place 1 tablet under the tongue every 5 minutes as needed for Chest pain 25 tablet 3    DIANA MICROLET LANCETS MISC TEST TWICE DAILY 100 each 2    Insulin Pen Needle (PEN NEEDLES) 31G X 6 MM MISC 1 each by Does not apply route daily 100 each 3    prednisoLONE acetate (PRED FORTE) 1 % ophthalmic suspension Place 1 drop into the left eye daily          FAMILY HISTORY: family history includes Cancer in his father and mother. PHYSICAL EXAM:   /75 (Site: Left Arm, Position: Standing, Cuff Size: Medium Adult)   Pulse 66   Resp 20   Ht 5' 9\" (1.753 m)   Wt 205 lb (93 kg)   BMI 30.27 kg/m²  Body mass index is 30.27 kg/m². Constitutional: He is oriented to person, place, and time. He appears well-developed and well-nourished. In no acute distress. HEENT: Normocephalic and atraumatic. No JVD present. Carotid bruit is not present. No mass and no thyromegaly present. No lymphadenopathy present. Cardiovascular: Normal rate, regular rhythm, normal heart sounds and intact distal pulses. Exam reveals no gallop and no friction rub. No murmur heard. Pulmonary/Chest: Effort normal and breath sounds normal. No respiratory distress. He has no wheezes, rhonchi or rales. Abdominal: Soft, non-tender. Bowel sounds and aorta are normal. He exhibits no organomegaly, mass or bruit. Extremities: 1+ lower extremity pitting pedal edema. No cyanosis and no clubbing. Pulses are 2+ radial and carotid pulses. 2+ dorsalis pedis and posterior tibial pulses bilaterally. L > R. Neurological: He is alert and oriented to person, place, and time. No evidence of gross cranial nerve deficit. Coordination appeared normal.   Skin: Skin is warm and dry. There is no rash or diaphoresis.    Psychiatric: He has a normal mood and affect. His speech is normal and behavior is normal.      MOST RECENT LABS ON RECORD:   Lab Results   Component Value Date    WBC 7.5 11/02/2017    HGB 13.2 (L) 11/02/2017    HCT 38.3 (L) 11/02/2017     11/02/2017    CHOL 172 11/02/2017    TRIG 672 (H) 11/02/2017    HDL 23 (L) 11/02/2017    LDLDIRECT 44 11/02/2017    ALT 21 11/02/2017    AST 17 11/02/2017     11/02/2017    K 3.7 11/02/2017     11/02/2017    CREATININE 1.21 (H) 11/02/2017    BUN 27 (H) 11/02/2017    CO2 26 11/02/2017    TSH 2.26 11/02/2017    PSA 3.26 11/02/2017    INR 1.0 02/17/2016    LABA1C 8.0 01/30/2018    LABMICR 30 10/17/2015       ASSESSMENT:  1. Acute on chronic diastolic CHF (congestive heart failure), NYHA class 3 ([address]    2. Dysautonomia    3. Coronary artery disease involving native coronary artery of native heart without angina pectoris    4. Essential hypertension    5. S/P drug eluting coronary stent placement-OM1 4/10/17       PLAN:  Acute on Chronic Diastolic Heart Failure: >6 lb weight gain over 3 weeks  Beta Blocker: Continue carvedilol (Coreg) 12.5 mg twice daily. ACE Inibitor/ARB: Continue lisinopril 40 mgonce daily   Diuretics: Restart furosemide (lasix) 20 mg  once daily. I also discussed the potential side effects of this medication including lightheadedness and dizziness and told him to call the office if this occurs. Nonpharmacologic management of Heart Failure: I advised him to try and keep his legs up whenever possible and to limit salt in his diet. Laboratory testing: Basic metabolic panel (BMP) for 5-7 days from now to assess his potassium and renal function.    Additional Testing: None    Dysautonomia: Currently well controlled  · Pharmacological Management:   · Beta Blocker: Continue Carvedilol 12.5 mg twice daily   · Nonpharmacologic counseling: Because of his condition, I reminded him to try and keep himself well-hydrated and to take extra time when moving from patient's current medical conditions are: Intermediate. The documentation recorded by the scribe, accurately and completely reflects the services I personally performed and the decisions made by me. Simran Wisdom.  Terrence Shook MD, MS, F.A.C.C. 7/25/2018

## 2018-07-25 NOTE — PATIENT INSTRUCTIONS
SURVEY:    You may be receiving a survey from [employer] regarding your visit today. Please complete the survey to enable us to provide the highest quality of care to you and your family. If you cannot score us a very good on any question, please call the office to discuss how we could have made your experience a very good one. Thank you.

## 2018-07-31 PROBLEM — I50.33 ACUTE ON CHRONIC DIASTOLIC CHF (CONGESTIVE HEART FAILURE), NYHA CLASS 3 (HCC): Status: RESOLVED | Noted: 2017-03-22 | Resolved: 2018-07-31

## 2018-08-08 ENCOUNTER — HOSPITAL ENCOUNTER (OUTPATIENT)
Dept: LAB | Age: 73
Discharge: HOME | End: 2018-08-08
Payer: MEDICARE

## 2018-08-08 DIAGNOSIS — I10 ESSENTIAL HYPERTENSION: ICD-10-CM

## 2018-08-08 DIAGNOSIS — G90.1 DYSAUTONOMIA (HCC): ICD-10-CM

## 2018-08-08 DIAGNOSIS — I25.10 CORONARY ARTERY DISEASE INVOLVING NATIVE CORONARY ARTERY OF NATIVE HEART WITHOUT ANGINA PECTORIS: ICD-10-CM

## 2018-08-08 LAB
ABSOLUTE EOS #: 0.36 K/UL (ref 0–0.44)
ABSOLUTE IMMATURE GRANULOCYTE: 0.07 K/UL (ref 0–0.3)
ABSOLUTE LYMPH #: 1.9 K/UL (ref 1.1–3.7)
ABSOLUTE MONO #: 0.8 K/UL (ref 0.1–1.2)
ALBUMIN SERPL-MCNC: 4.3 G/DL (ref 3.5–5.2)
ALBUMIN/GLOBULIN RATIO: 1.5 (ref 1–2.5)
ALP BLD-CCNC: 56 U/L (ref 40–129)
ALT SERPL-CCNC: 29 U/L (ref 5–41)
ANION GAP SERPL CALCULATED.3IONS-SCNC: 12 MMOL/L (ref 9–17)
AST SERPL-CCNC: 22 U/L
BASOPHILS # BLD: 1 % (ref 0–2)
BASOPHILS ABSOLUTE: 0.06 K/UL (ref 0–0.2)
BILIRUB SERPL-MCNC: 0.59 MG/DL (ref 0.3–1.2)
BUN BLDV-MCNC: 32 MG/DL (ref 8–23)
BUN/CREAT BLD: 18 (ref 9–20)
CALCIUM SERPL-MCNC: 9.8 MG/DL (ref 8.6–10.4)
CHLORIDE BLD-SCNC: 104 MMOL/L (ref 98–107)
CHOLESTEROL/HDL RATIO: 5.1
CHOLESTEROL: 152 MG/DL
CO2: 28 MMOL/L (ref 20–31)
CREAT SERPL-MCNC: 1.76 MG/DL (ref 0.7–1.2)
DIFFERENTIAL TYPE: ABNORMAL
EOSINOPHILS RELATIVE PERCENT: 5 % (ref 1–4)
GFR AFRICAN AMERICAN: 46 ML/MIN
GFR NON-AFRICAN AMERICAN: 38 ML/MIN
GFR SERPL CREATININE-BSD FRML MDRD: ABNORMAL ML/MIN/{1.73_M2}
GFR SERPL CREATININE-BSD FRML MDRD: ABNORMAL ML/MIN/{1.73_M2}
GLUCOSE BLD-MCNC: 115 MG/DL (ref 70–99)
HCT VFR BLD CALC: 36.7 % (ref 40.7–50.3)
HDLC SERPL-MCNC: 30 MG/DL
HEMOGLOBIN: 12.4 G/DL (ref 13–17)
IMMATURE GRANULOCYTES: 1 %
LDL CHOLESTEROL: 60 MG/DL (ref 0–130)
LYMPHOCYTES # BLD: 25 % (ref 24–43)
MCH RBC QN AUTO: 30.1 PG (ref 25.2–33.5)
MCHC RBC AUTO-ENTMCNC: 33.8 G/DL (ref 28.4–34.8)
MCV RBC AUTO: 89.1 FL (ref 82.6–102.9)
MONOCYTES # BLD: 10 % (ref 3–12)
NRBC AUTOMATED: 0 PER 100 WBC
PDW BLD-RTO: 13.8 % (ref 11.8–14.4)
PLATELET # BLD: 191 K/UL (ref 138–453)
PLATELET ESTIMATE: ABNORMAL
PMV BLD AUTO: 10.5 FL (ref 8.1–13.5)
POTASSIUM SERPL-SCNC: 3.8 MMOL/L (ref 3.7–5.3)
RBC # BLD: 4.12 M/UL (ref 4.21–5.77)
RBC # BLD: ABNORMAL 10*6/UL
SEG NEUTROPHILS: 58 % (ref 36–65)
SEGMENTED NEUTROPHILS ABSOLUTE COUNT: 4.58 K/UL (ref 1.5–8.1)
SODIUM BLD-SCNC: 144 MMOL/L (ref 135–144)
TOTAL PROTEIN: 7.1 G/DL (ref 6.4–8.3)
TRIGL SERPL-MCNC: 309 MG/DL
VLDLC SERPL CALC-MCNC: ABNORMAL MG/DL (ref 1–30)
WBC # BLD: 7.8 K/UL (ref 3.5–11.3)
WBC # BLD: ABNORMAL 10*3/UL

## 2018-08-08 PROCEDURE — 80053 COMPREHEN METABOLIC PANEL: CPT

## 2018-08-08 PROCEDURE — 80061 LIPID PANEL: CPT

## 2018-08-08 PROCEDURE — 85025 COMPLETE CBC W/AUTO DIFF WBC: CPT

## 2018-08-08 PROCEDURE — 36415 COLL VENOUS BLD VENIPUNCTURE: CPT

## 2018-09-11 ENCOUNTER — HOSPITAL ENCOUNTER (OUTPATIENT)
Dept: ULTRASOUND IMAGING | Age: 73
Discharge: HOME | End: 2018-09-13
Payer: MEDICARE

## 2018-09-11 ENCOUNTER — HOSPITAL ENCOUNTER (OUTPATIENT)
Dept: LAB | Age: 73
Discharge: HOME | End: 2018-09-11
Payer: MEDICARE

## 2018-09-11 DIAGNOSIS — Z79.4 CONTROLLED TYPE 2 DIABETES MELLITUS WITH DIABETIC NEPHROPATHY, WITH LONG-TERM CURRENT USE OF INSULIN (HCC): ICD-10-CM

## 2018-09-11 DIAGNOSIS — I10 ESSENTIAL HYPERTENSION: ICD-10-CM

## 2018-09-11 DIAGNOSIS — E11.21 CONTROLLED TYPE 2 DIABETES MELLITUS WITH DIABETIC NEPHROPATHY, WITH LONG-TERM CURRENT USE OF INSULIN (HCC): ICD-10-CM

## 2018-09-11 DIAGNOSIS — N18.30 CHRONIC KIDNEY DISEASE, STAGE III (MODERATE) (HCC): ICD-10-CM

## 2018-09-11 LAB
ALBUMIN SERPL-MCNC: 4.2 G/DL (ref 3.5–5.2)
ANION GAP SERPL CALCULATED.3IONS-SCNC: 13 MMOL/L (ref 9–17)
BUN BLDV-MCNC: 26 MG/DL (ref 8–23)
BUN/CREAT BLD: 21 (ref 9–20)
CALCIUM SERPL-MCNC: 9 MG/DL (ref 8.6–10.4)
CHLORIDE BLD-SCNC: 98 MMOL/L (ref 98–107)
CO2: 24 MMOL/L (ref 20–31)
COMPLEMENT C3: 134 MG/DL (ref 90–180)
COMPLEMENT C4: 23 MG/DL (ref 10–40)
CREAT SERPL-MCNC: 1.25 MG/DL (ref 0.7–1.2)
CREATININE URINE: 99.6 MG/DL (ref 39–259)
FREE KAPPA/LAMBDA RATIO: 1.2 (ref 0.26–1.65)
GFR AFRICAN AMERICAN: >60 ML/MIN
GFR NON-AFRICAN AMERICAN: 57 ML/MIN
GFR SERPL CREATININE-BSD FRML MDRD: ABNORMAL ML/MIN/{1.73_M2}
GFR SERPL CREATININE-BSD FRML MDRD: ABNORMAL ML/MIN/{1.73_M2}
GLUCOSE BLD-MCNC: 298 MG/DL (ref 70–99)
HCT VFR BLD CALC: 37.2 % (ref 40.7–50.3)
HEMOGLOBIN: 12.3 G/DL (ref 13–17)
KAPPA FREE LIGHT CHAINS QNT: 1.68 MG/DL (ref 0.37–1.94)
LAMBDA FREE LIGHT CHAINS QNT: 1.4 MG/DL (ref 0.57–2.63)
MCH RBC QN AUTO: 29.7 PG (ref 25.2–33.5)
MCHC RBC AUTO-ENTMCNC: 33.1 G/DL (ref 28.4–34.8)
MCV RBC AUTO: 89.9 FL (ref 82.6–102.9)
NRBC AUTOMATED: 0 PER 100 WBC
PDW BLD-RTO: 13.7 % (ref 11.8–14.4)
PLATELET # BLD: 175 K/UL (ref 138–453)
PMV BLD AUTO: 10.3 FL (ref 8.1–13.5)
POTASSIUM SERPL-SCNC: 4.7 MMOL/L (ref 3.7–5.3)
RBC # BLD: 4.14 M/UL (ref 4.21–5.77)
SODIUM BLD-SCNC: 135 MMOL/L (ref 135–144)
TOTAL PROTEIN, URINE: 7 MG/DL
WBC # BLD: 7.6 K/UL (ref 3.5–11.3)

## 2018-09-11 PROCEDURE — 86334 IMMUNOFIX E-PHORESIS SERUM: CPT

## 2018-09-11 PROCEDURE — 76770 US EXAM ABDO BACK WALL COMP: CPT

## 2018-09-11 PROCEDURE — 85027 COMPLETE CBC AUTOMATED: CPT

## 2018-09-11 PROCEDURE — 80048 BASIC METABOLIC PNL TOTAL CA: CPT

## 2018-09-11 PROCEDURE — 86160 COMPLEMENT ANTIGEN: CPT

## 2018-09-11 PROCEDURE — 84156 ASSAY OF PROTEIN URINE: CPT

## 2018-09-11 PROCEDURE — 82040 ASSAY OF SERUM ALBUMIN: CPT

## 2018-09-11 PROCEDURE — 84165 PROTEIN E-PHORESIS SERUM: CPT

## 2018-09-11 PROCEDURE — 83883 ASSAY NEPHELOMETRY NOT SPEC: CPT

## 2018-09-11 PROCEDURE — 36415 COLL VENOUS BLD VENIPUNCTURE: CPT

## 2018-09-11 PROCEDURE — 82570 ASSAY OF URINE CREATININE: CPT

## 2018-09-11 PROCEDURE — 84155 ASSAY OF PROTEIN SERUM: CPT

## 2018-09-11 PROCEDURE — 86038 ANTINUCLEAR ANTIBODIES: CPT

## 2018-09-12 LAB — ANTI-NUCLEAR ANTIBODY (ANA): NEGATIVE

## 2018-09-13 LAB
ALBUMIN (CALCULATED): 3.9 G/DL (ref 3.2–5.2)
ALBUMIN PERCENT: 62 % (ref 45–65)
ALPHA 1 PERCENT: 3 % (ref 3–6)
ALPHA 2 PERCENT: 11 % (ref 6–13)
ALPHA-1-GLOBULIN: 0.2 G/DL (ref 0.1–0.4)
ALPHA-2-GLOBULIN: 0.7 G/DL (ref 0.5–0.9)
BETA GLOBULIN: 0.7 G/DL (ref 0.5–1.1)
BETA PERCENT: 12 % (ref 11–19)
GAMMA GLOBULIN %: 13 % (ref 9–20)
GAMMA GLOBULIN: 0.8 G/DL (ref 0.5–1.5)
PATHOLOGIST: ABNORMAL
PATHOLOGIST: NORMAL
PROTEIN ELECTROPHORESIS, SERUM: ABNORMAL
SERUM IFX INTERP: NORMAL
TOTAL PROT. SUM,%: 101 % (ref 98–102)
TOTAL PROT. SUM: 6.3 G/DL (ref 6.3–8.2)
TOTAL PROTEIN: 6.3 G/DL (ref 6.4–8.3)

## 2018-10-02 PROBLEM — I70.1 RENAL ARTERY STENOSIS, NATIVE (HCC): Status: ACTIVE | Noted: 2018-10-02

## 2018-10-02 PROBLEM — N17.0 ACUTE RENAL FAILURE WITH TUBULAR NECROSIS (HCC): Status: ACTIVE | Noted: 2018-10-02

## 2018-11-02 ENCOUNTER — HOSPITAL ENCOUNTER (OUTPATIENT)
Age: 73
Discharge: HOME | End: 2018-11-02
Payer: MEDICARE

## 2018-11-02 DIAGNOSIS — Z12.5 ENCOUNTER FOR PROSTATE CANCER SCREENING: ICD-10-CM

## 2018-11-02 LAB — PROSTATE SPECIFIC ANTIGEN: 3.11 UG/L

## 2018-11-02 PROCEDURE — G0103 PSA SCREENING: HCPCS

## 2018-11-02 PROCEDURE — 36415 COLL VENOUS BLD VENIPUNCTURE: CPT

## 2018-12-10 ENCOUNTER — HOSPITAL ENCOUNTER (OUTPATIENT)
Age: 73
Discharge: HOME | End: 2018-12-10
Payer: MEDICARE

## 2018-12-10 DIAGNOSIS — N18.30 CHRONIC KIDNEY DISEASE, STAGE III (MODERATE) (HCC): ICD-10-CM

## 2018-12-10 LAB
-: ABNORMAL
ALBUMIN SERPL-MCNC: 4.2 G/DL (ref 3.5–5.2)
AMORPHOUS: ABNORMAL
ANION GAP SERPL CALCULATED.3IONS-SCNC: 10 MMOL/L (ref 9–17)
BACTERIA: ABNORMAL
BILIRUBIN URINE: NEGATIVE
BUN BLDV-MCNC: 30 MG/DL (ref 8–23)
BUN/CREAT BLD: 21 (ref 9–20)
CALCIUM SERPL-MCNC: 9.5 MG/DL (ref 8.6–10.4)
CASTS UA: ABNORMAL /LPF
CHLORIDE BLD-SCNC: 99 MMOL/L (ref 98–107)
CO2: 25 MMOL/L (ref 20–31)
COLOR: YELLOW
COMMENT UA: ABNORMAL
CREAT SERPL-MCNC: 1.43 MG/DL (ref 0.7–1.2)
CREATININE URINE: 181.5 MG/DL (ref 39–259)
CRYSTALS, UA: ABNORMAL /HPF
EPITHELIAL CELLS UA: ABNORMAL /HPF (ref 0–5)
GFR AFRICAN AMERICAN: 59 ML/MIN
GFR NON-AFRICAN AMERICAN: 48 ML/MIN
GFR SERPL CREATININE-BSD FRML MDRD: ABNORMAL ML/MIN/{1.73_M2}
GFR SERPL CREATININE-BSD FRML MDRD: ABNORMAL ML/MIN/{1.73_M2}
GLUCOSE BLD-MCNC: 267 MG/DL (ref 70–99)
GLUCOSE URINE: ABNORMAL
HCT VFR BLD CALC: 38.5 % (ref 40.7–50.3)
HEMOGLOBIN: 12.8 G/DL (ref 13–17)
KETONES, URINE: ABNORMAL
LEUKOCYTE ESTERASE, URINE: NEGATIVE
MCH RBC QN AUTO: 29.9 PG (ref 25.2–33.5)
MCHC RBC AUTO-ENTMCNC: 33.2 G/DL (ref 28.4–34.8)
MCV RBC AUTO: 90 FL (ref 82.6–102.9)
MUCUS: ABNORMAL
NITRITE, URINE: NEGATIVE
NRBC AUTOMATED: 0 PER 100 WBC
OTHER OBSERVATIONS UA: ABNORMAL
PDW BLD-RTO: 13.7 % (ref 11.8–14.4)
PH UA: 5.5 (ref 5–9)
PHOSPHORUS: 3.5 MG/DL (ref 2.5–4.5)
PLATELET # BLD: 210 K/UL (ref 138–453)
PMV BLD AUTO: 10.9 FL (ref 8.1–13.5)
POTASSIUM SERPL-SCNC: 4.1 MMOL/L (ref 3.7–5.3)
PROTEIN UA: NEGATIVE
PTH INTACT: 26.65 PG/ML (ref 15–65)
RBC # BLD: 4.28 M/UL (ref 4.21–5.77)
RBC UA: ABNORMAL /HPF (ref 0–2)
RENAL EPITHELIAL, UA: ABNORMAL /HPF
SODIUM BLD-SCNC: 134 MMOL/L (ref 135–144)
SPECIFIC GRAVITY UA: >1.03 (ref 1.01–1.02)
TOTAL PROTEIN, URINE: 14 MG/DL
TRICHOMONAS: ABNORMAL
TURBIDITY: CLEAR
URINE HGB: NEGATIVE
UROBILINOGEN, URINE: NORMAL
WBC # BLD: 11.4 K/UL (ref 3.5–11.3)
WBC UA: ABNORMAL /HPF (ref 0–5)
YEAST: ABNORMAL

## 2018-12-10 PROCEDURE — 85027 COMPLETE CBC AUTOMATED: CPT

## 2018-12-10 PROCEDURE — 36415 COLL VENOUS BLD VENIPUNCTURE: CPT

## 2018-12-10 PROCEDURE — 82040 ASSAY OF SERUM ALBUMIN: CPT

## 2018-12-10 PROCEDURE — 83970 ASSAY OF PARATHORMONE: CPT

## 2018-12-10 PROCEDURE — 84100 ASSAY OF PHOSPHORUS: CPT

## 2018-12-10 PROCEDURE — 80048 BASIC METABOLIC PNL TOTAL CA: CPT

## 2018-12-10 PROCEDURE — 84156 ASSAY OF PROTEIN URINE: CPT

## 2018-12-10 PROCEDURE — 81001 URINALYSIS AUTO W/SCOPE: CPT

## 2018-12-10 PROCEDURE — 82570 ASSAY OF URINE CREATININE: CPT

## 2019-01-09 DIAGNOSIS — G90.1 DYSAUTONOMIA (HCC): ICD-10-CM

## 2019-01-09 DIAGNOSIS — I10 ESSENTIAL HYPERTENSION: ICD-10-CM

## 2019-01-09 DIAGNOSIS — I25.10 CORONARY ARTERY DISEASE INVOLVING NATIVE CORONARY ARTERY OF NATIVE HEART WITHOUT ANGINA PECTORIS: ICD-10-CM

## 2019-01-09 RX ORDER — HYDRALAZINE HYDROCHLORIDE 100 MG/1
100 TABLET, FILM COATED ORAL 3 TIMES DAILY
Qty: 270 TABLET | Refills: 3 | Status: SHIPPED | OUTPATIENT
Start: 2019-01-09 | End: 2019-03-12 | Stop reason: SDUPTHER

## 2019-02-05 ENCOUNTER — OFFICE VISIT (OUTPATIENT)
Dept: CARDIOLOGY | Age: 74
End: 2019-02-05
Payer: MEDICARE

## 2019-02-05 VITALS
RESPIRATION RATE: 16 BRPM | OXYGEN SATURATION: 97 % | HEART RATE: 61 BPM | WEIGHT: 207.2 LBS | HEIGHT: 69 IN | BODY MASS INDEX: 30.69 KG/M2 | SYSTOLIC BLOOD PRESSURE: 162 MMHG | DIASTOLIC BLOOD PRESSURE: 75 MMHG

## 2019-02-05 DIAGNOSIS — I10 ESSENTIAL HYPERTENSION: ICD-10-CM

## 2019-02-05 DIAGNOSIS — I50.32 CHRONIC DIASTOLIC (CONGESTIVE) HEART FAILURE (HCC): Primary | ICD-10-CM

## 2019-02-05 DIAGNOSIS — G90.1 DYSAUTONOMIA (HCC): ICD-10-CM

## 2019-02-05 DIAGNOSIS — I25.10 CORONARY ARTERY DISEASE INVOLVING NATIVE CORONARY ARTERY OF NATIVE HEART WITHOUT ANGINA PECTORIS: ICD-10-CM

## 2019-02-05 PROCEDURE — G8599 NO ASA/ANTIPLAT THER USE RNG: HCPCS | Performed by: FAMILY MEDICINE

## 2019-02-05 PROCEDURE — 3017F COLORECTAL CA SCREEN DOC REV: CPT | Performed by: FAMILY MEDICINE

## 2019-02-05 PROCEDURE — G8427 DOCREV CUR MEDS BY ELIG CLIN: HCPCS | Performed by: FAMILY MEDICINE

## 2019-02-05 PROCEDURE — 4040F PNEUMOC VAC/ADMIN/RCVD: CPT | Performed by: FAMILY MEDICINE

## 2019-02-05 PROCEDURE — G8482 FLU IMMUNIZE ORDER/ADMIN: HCPCS | Performed by: FAMILY MEDICINE

## 2019-02-05 PROCEDURE — G8417 CALC BMI ABV UP PARAM F/U: HCPCS | Performed by: FAMILY MEDICINE

## 2019-02-05 PROCEDURE — 99213 OFFICE O/P EST LOW 20 MIN: CPT | Performed by: FAMILY MEDICINE

## 2019-02-05 PROCEDURE — 1123F ACP DISCUSS/DSCN MKR DOCD: CPT | Performed by: FAMILY MEDICINE

## 2019-02-05 PROCEDURE — 1036F TOBACCO NON-USER: CPT | Performed by: FAMILY MEDICINE

## 2019-02-05 PROCEDURE — 1101F PT FALLS ASSESS-DOCD LE1/YR: CPT | Performed by: FAMILY MEDICINE

## 2019-03-12 ENCOUNTER — HOSPITAL ENCOUNTER (OUTPATIENT)
Age: 74
Discharge: HOME | End: 2019-03-12
Payer: MEDICARE

## 2019-03-12 DIAGNOSIS — N18.30 CHRONIC KIDNEY DISEASE, STAGE III (MODERATE) (HCC): ICD-10-CM

## 2019-03-12 LAB
-: NORMAL
ALBUMIN SERPL-MCNC: 4.2 G/DL (ref 3.5–5.2)
AMORPHOUS: NORMAL
ANION GAP SERPL CALCULATED.3IONS-SCNC: 12 MMOL/L (ref 9–17)
BACTERIA: NORMAL
BILIRUBIN URINE: NEGATIVE
BUN BLDV-MCNC: 26 MG/DL (ref 8–23)
BUN/CREAT BLD: 21 (ref 9–20)
CALCIUM SERPL-MCNC: 9.8 MG/DL (ref 8.6–10.4)
CASTS UA: NORMAL /LPF
CHLORIDE BLD-SCNC: 96 MMOL/L (ref 98–107)
CO2: 24 MMOL/L (ref 20–31)
COLOR: YELLOW
COMMENT UA: ABNORMAL
CREAT SERPL-MCNC: 1.25 MG/DL (ref 0.7–1.2)
CREATININE URINE: 92.3 MG/DL (ref 39–259)
CRYSTALS, UA: NORMAL /HPF
EPITHELIAL CELLS UA: NORMAL /HPF (ref 0–5)
GFR AFRICAN AMERICAN: >60 ML/MIN
GFR NON-AFRICAN AMERICAN: 57 ML/MIN
GFR SERPL CREATININE-BSD FRML MDRD: ABNORMAL ML/MIN/{1.73_M2}
GFR SERPL CREATININE-BSD FRML MDRD: ABNORMAL ML/MIN/{1.73_M2}
GLUCOSE BLD-MCNC: 358 MG/DL (ref 70–99)
GLUCOSE URINE: ABNORMAL
HCT VFR BLD CALC: 37.6 % (ref 40.7–50.3)
HEMOGLOBIN: 12.5 G/DL (ref 13–17)
KETONES, URINE: NEGATIVE
LEUKOCYTE ESTERASE, URINE: NEGATIVE
MCH RBC QN AUTO: 30 PG (ref 25.2–33.5)
MCHC RBC AUTO-ENTMCNC: 33.2 G/DL (ref 28.4–34.8)
MCV RBC AUTO: 90.2 FL (ref 82.6–102.9)
MUCUS: NORMAL
NITRITE, URINE: NEGATIVE
NRBC AUTOMATED: 0 PER 100 WBC
OTHER OBSERVATIONS UA: NORMAL
PDW BLD-RTO: 14.4 % (ref 11.8–14.4)
PH UA: 5.5 (ref 5–9)
PHOSPHORUS: 3.6 MG/DL (ref 2.5–4.5)
PLATELET # BLD: 173 K/UL (ref 138–453)
PMV BLD AUTO: 10.9 FL (ref 8.1–13.5)
POTASSIUM SERPL-SCNC: 4 MMOL/L (ref 3.7–5.3)
PROTEIN UA: NEGATIVE
PTH INTACT: 22.31 PG/ML (ref 15–65)
RBC # BLD: 4.17 M/UL (ref 4.21–5.77)
RBC UA: NORMAL /HPF (ref 0–2)
RENAL EPITHELIAL, UA: NORMAL /HPF
SODIUM BLD-SCNC: 132 MMOL/L (ref 135–144)
SPECIFIC GRAVITY UA: 1.02 (ref 1.01–1.02)
TOTAL PROTEIN, URINE: 8 MG/DL
TRICHOMONAS: NORMAL
TURBIDITY: CLEAR
URINE HGB: NEGATIVE
UROBILINOGEN, URINE: NORMAL
WBC # BLD: 6.8 K/UL (ref 3.5–11.3)
WBC UA: NORMAL /HPF (ref 0–5)
YEAST: NORMAL

## 2019-03-12 PROCEDURE — 80048 BASIC METABOLIC PNL TOTAL CA: CPT

## 2019-03-12 PROCEDURE — 81001 URINALYSIS AUTO W/SCOPE: CPT

## 2019-03-12 PROCEDURE — 84100 ASSAY OF PHOSPHORUS: CPT

## 2019-03-12 PROCEDURE — 83970 ASSAY OF PARATHORMONE: CPT

## 2019-03-12 PROCEDURE — 82570 ASSAY OF URINE CREATININE: CPT

## 2019-03-12 PROCEDURE — 85027 COMPLETE CBC AUTOMATED: CPT

## 2019-03-12 PROCEDURE — 36415 COLL VENOUS BLD VENIPUNCTURE: CPT

## 2019-03-12 PROCEDURE — 84156 ASSAY OF PROTEIN URINE: CPT

## 2019-03-12 PROCEDURE — 82040 ASSAY OF SERUM ALBUMIN: CPT

## 2019-03-28 ENCOUNTER — HOSPITAL ENCOUNTER (OUTPATIENT)
Age: 74
Discharge: HOME | End: 2019-03-28
Payer: MEDICARE

## 2019-03-28 DIAGNOSIS — M79.18 ACUTE MYOFASCIAL PAIN: ICD-10-CM

## 2019-03-28 LAB
MYOGLOBIN: 62 NG/ML (ref 28–72)
TOTAL CK: 116 U/L (ref 39–308)

## 2019-03-28 PROCEDURE — 82550 ASSAY OF CK (CPK): CPT

## 2019-03-28 PROCEDURE — 36415 COLL VENOUS BLD VENIPUNCTURE: CPT

## 2019-03-28 PROCEDURE — 83874 ASSAY OF MYOGLOBIN: CPT

## 2019-04-01 ENCOUNTER — HOSPITAL ENCOUNTER (OUTPATIENT)
Dept: PHYSICAL THERAPY | Age: 74
Setting detail: THERAPIES SERIES
Discharge: HOME | End: 2019-04-01
Payer: MEDICARE

## 2019-04-01 PROCEDURE — 97162 PT EVAL MOD COMPLEX 30 MIN: CPT

## 2019-04-02 NOTE — PLAN OF CARE
[facility]           Phone: [phone]             Outpatient Physical Therapy  Fax: [phone]                                           Date: 2019  Patient: Nasim Fisher : 1945 [facility] #: [id number]   Referring Practitioner:  Chapis Hudson MD Referral Date:  19       [X] Plan of Care   [] Updated Plan of Care    Dates of Service to Include: 2019 to 19    Diagnosis:  Acute myofascial pain, M79.18    Rehab (Treatment) Diagnosis:  Back pain, R hip/anterior thigh pain             Onset Date:  19    Attendance  Total # of Visits to Date: 1 No Show: 0 Canceled Appointment: 0    Assessment  Body structures, Functions, Activity limitations: Decreased functional mobility , Decreased ADL status, Decreased endurance, Decreased ROM, Decreased strength, Decreased balance, Increased Pain  Assessment: The patient is a 68 y.o. male who reports a month history of R thoracic spine pain along with R hip and radiating pain to R knee. The patient presents with R thoracic spine hypertonicity and tenderness to palpation. The patient would benefit from aquatic exercise and manual techniques to improve core stability and strength in an unloaded environment. Goals  Short term goals  Time Frame for Short term goals: 2 weeks  Short term goal 1: Patient will modify his daily exercise routine  Short term goal 2: Patient will tolerate 30 min of aquatic exercise  Long term goals  Time Frame for Long term goals : 4 weeks  Long term goal 1: Patient will be independent and compliant with a HEP  Long term goal 2: Patient will improve R LE strength to 5/5 in all major joints and planes  Long term goal 3: Patient will report decreased pain to <5/10 at worst  Long term goal 4: Patient will be able to ambulate 300' with an exacerbation of symptoms.      Prognosis  Prognosis: Fair    Treatment Plan   Times per week: 3  Plan weeks: 4  [X] HP/CP      [] Electrical Stim   [X] Therapeutic Exercise      [] Gait Training  [X] Aquatics   [] Ultrasound         [X]Patient Education/HEP   [X] Manual Therapy  [] Traction    [X] Neuro-kateryna        [X] Soft Tissue Mobs            [] Home TENS  [] Iontophoresis    [] Orthotic casting/fitting      [X] Dry Needling             Electronically signed by: Mark Colmenares PT, DPT    Date: 4/1/2019      ______________________________________ Date: 4/1/2019   Physician Signature

## 2019-04-02 NOTE — PROGRESS NOTES
Phone: [address]          Fax: [phone]                      Outpatient Physical Therapy                                                                      Evaluation  Date: 2019  Patient: Rashard Weeks  : 1945  CSN #: [id number]  Referring Practitioner: Tunde Flores. Marcelino Cha MD    Referral Date : 19     Diagnosis: Acute myofascial pain, M79.18    Treatment Diagnosis: Back pain, R hip/anterior thigh pain  Onset Date: 19  PT Insurance Information: Medicare/[employer]  Total # of Visits Approved: 12   Total # of Visits to Date: 1  No Show: 0  Canceled Appointment: 0     Subjective  Subjective: The patient reports a previous onset of R LBP with pain radiating to right knee which resolved on it's own in October. He reports a recent onset 3/5/19 of R thoracic spine pain along with pain radiating from R hip to anterior right knee. He reports pain ranges from 2/10 at best to 10/10 at worst.  Patient reports pain is worse in the evenings and will wake up with 10/10 pain in the morning with. He reports walking is also an aggravating factor. He reports relief with heat, ice packs, medication, and sitting with feet reclined. Additional Pertinent Hx: History of L1-2 fusion, pacemaker, DMII, HTN    Objective     Observation/Palpation  Posture: Fair  Palpation: Hypertonicity of bilateral thoracic paraspinals. No palpable tenderness of IT band, glut med, piriformis, R quads  Observation: Pt ambulates with SC, had an instance of LOB when ambulating into clinic. He amb with stiff knee gait pattern.     Spine  Lumbar: Flexion: 75%, extension: 50%, bilateral side bend: WNL  No change in symptoms     Strength RLE  Strength RLE: Exception  R Hip Flexion: 4/5  R Hip ABduction: 4+/5  R Knee Extension: 4+/5  Strength LLE  Strength LLE: WNL    Additional Measures  Flexibility: SLR to 60* bilaterally  Special Tests: -SLR, slump test    Functional Outcome Measures  Pt disability report: 90% limitation    Assessment  Body structures, Functions, Activity limitations: Decreased functional mobility , Decreased ADL status, Decreased endurance, Decreased ROM, Decreased strength, Decreased balance, Increased Pain  Assessment: The patient is a 68 y.o. male who reports a month history of R thoracic spine pain along with R hip and radiating pain to R knee. The patient presents with R thoracic spine hypertonicity and tenderness to palpation. The patient would benefit from aquatic exercise and manual techniques to improve core stability and strength in an unloaded environment. Prognosis: Fair        Decision Making: Medium Complexity    Patient Education  Patient Education: POC  Pt verbalized/demonstrated good understanding:     [X] Yes         ? No, pt required further clarification. Goals  Short term goals  Time Frame for Short term goals: 2 weeks  Short term goal 1: Patient will modify his daily exercise routine  Short term goal 2: Patient will tolerate 30 min of aquatic exercise    Long term goals  Time Frame for Long term goals : 4 weeks  Long term goal 1: Patient will be independent and compliant with a HEP  Long term goal 2: Patient will improve R LE strength to 5/5 in all major joints and planes  Long term goal 3: Patient will report decreased pain to <5/10 at worst  Long term goal 4: Patient will be able to ambulate 300' with an exacerbation of symptoms.     Patient goals : Decrease pain to be able to walk and sleep    Minutes Tracking:  Time In: 1815  Time Out: 1858  Minutes: Valerie Borrego, JERRY Hargrove   4/1/2019

## 2019-04-05 ENCOUNTER — HOSPITAL ENCOUNTER (OUTPATIENT)
Dept: PHYSICAL THERAPY | Age: 74
Setting detail: THERAPIES SERIES
Discharge: HOME | End: 2019-04-05
Payer: MEDICARE

## 2019-04-05 PROCEDURE — 97113 AQUATIC THERAPY/EXERCISES: CPT

## 2019-04-05 ASSESSMENT — PAIN DESCRIPTION - DESCRIPTORS: DESCRIPTORS: ACHING;THROBBING;BURNING

## 2019-04-05 ASSESSMENT — PAIN SCALES - GENERAL: PAINLEVEL_OUTOF10: 8

## 2019-04-05 ASSESSMENT — PAIN DESCRIPTION - PAIN TYPE: TYPE: CHRONIC PAIN

## 2019-04-05 ASSESSMENT — PAIN DESCRIPTION - ORIENTATION: ORIENTATION: RIGHT

## 2019-04-05 ASSESSMENT — PAIN DESCRIPTION - LOCATION: LOCATION: LEG

## 2019-04-05 NOTE — PROGRESS NOTES
[x]Met   []Partially met  []Not met   Short term goal 2: Patient will tolerate 30 min of aquatic exercise-met  [x]Met   []Partially met  []Not met      []Met []Partially met  []Not met      []Met   []Partially met  []Not met     Long Term Goals - Time Frame for Long term goals : 4 weeks  Long term goal 1: Patient will be independent and compliant with a HEP []Met  []Partially met  [x]Not met   Long term goal 2: Patient will improve R LE strength to 5/5 in all major joints and planes []Met  []Partially met  [x]Not met   Long term goal 3: Patient will report decreased pain to <5/10 at worst []Met  []Partially met  [x]Not met   Long term goal 4: Patient will be able to ambulate 300' with an exacerbation of symptoms.  []Met  []Partially met  [x]Not met     []Met  []Partially met  []Not met       Minutes Tracking:  Time In: 1115  Time Out: 1200  Minutes: [address] [id number]    Date: 4/5/2019

## 2019-04-08 ENCOUNTER — HOSPITAL ENCOUNTER (OUTPATIENT)
Dept: PHYSICAL THERAPY | Age: 74
Setting detail: THERAPIES SERIES
Discharge: HOME | End: 2019-04-08
Payer: MEDICARE

## 2019-04-08 PROCEDURE — 97113 AQUATIC THERAPY/EXERCISES: CPT

## 2019-04-08 ASSESSMENT — PAIN DESCRIPTION - PAIN TYPE: TYPE: CHRONIC PAIN

## 2019-04-08 ASSESSMENT — PAIN SCALES - GENERAL: PAINLEVEL_OUTOF10: 8

## 2019-04-08 ASSESSMENT — PAIN DESCRIPTION - FREQUENCY: FREQUENCY: INTERMITTENT

## 2019-04-08 ASSESSMENT — PAIN DESCRIPTION - ORIENTATION: ORIENTATION: RIGHT

## 2019-04-08 ASSESSMENT — PAIN DESCRIPTION - DESCRIPTORS: DESCRIPTORS: ACHING;BURNING;DULL

## 2019-04-08 ASSESSMENT — PAIN DESCRIPTION - LOCATION: LOCATION: KNEE;LEG

## 2019-04-08 NOTE — PROGRESS NOTES
Phone: Diann           Fax: [phone]                           Outpatient Physical Therapy                                                                            Daily Note    Patient: David Hurtado : 1945  [facility] #: [id number]   Referring Practitioner:  Suzanne Everett. Rayna Ram MD    Referral Date : 19     Date: 2019    Diagnosis: Acute myofascial pain, M79.18  Treatment Diagnosis: Back pain, R hip/anterior thigh pain    Onset Date: 19  PT Insurance Information: Medicare/[employer]  Total # of Visits Approved: 12 Per Physician Order  Total # of Visits to Date: 3  No Show: 0  Canceled Appointment: 0      Pre-Treatment Pain:  8/10  Subjective: Pt reports pain in R knee and posterior to knee, LB and B shoulder blades 210    Exercises:  Exercise 1: HEP: removed crunches and added bridge to his morning exercise routine  Exercise 2: water walking x2 laps each of: forward, retro, side stepping, sink ex x10, side lunges [id number] on side lunges due to increased LBP. Exercise 3: FSU/LSU x10, step stretches with 5 sec hold x5, push blue float down x10  Exercise 4: bue bar for abs x10, bench ex x10, biking in well x5 min, hanging in well x5 min  Exercise 5: jet massage x5 min   Exercise 7: Continue with aquatics to decrease pain, increase endurance, RLE strength, ambulation distance without increase in sxs. Assessment  Assessment: Pt continues to demonstrate fair toleance to treatment. Required constast VCs to slow down with reps on ther ex. Required 2 seated rest breaks during tx due to RLE pain. Patient Education  Educated pt on correct technique and slow steady reps on there ex. Pt verbalized/demonstrated good understanding:     [x] Yes         [] No, pt required further clarification.     Post Treatment Pain:  8/10      Plan  Times per week: 3  Plan weeks: 4      Goals  (Total # of Visits to Date: 3)   Short Term Goals - Time Frame for Short term goals: 2 weeks    Short term goal 1: Patient will modify his daily exercise routine-met                                        [x]Met   []Partially met  []Not met   Short term goal 2: Patient will tolerate 30 min of aquatic exercise-met  [x]Met   []Partially met  []Not met      []Met   []Partially met  []Not met      []Met   []Partially met  []Not met     Long Term Goals - Time Frame for Long term goals : 4 weeks  Long term goal 1: Patient will be independent and compliant with a HEP []Met  []Partially met  [x]Not met   Long term goal 2: Patient will improve R LE strength to 5/5 in all major joints and planes []Met  []Partially met  [x]Not met   Long term goal 3: Patient will report decreased pain to <5/10 at worst []Met  []Partially met  [x]Not met   Long term goal 4: Patient will be able to ambulate 300' with an exacerbation of symptoms.  []Met  []Partially met  [x]Not met     []Met  []Partially met  []Not met       Minutes Tracking:  Time In: [address] [facility] [[email or web address]]  Time Out: [address] [facility]  Minutes: [address] [facility] [id number]    Date: 4/8/2019

## 2019-04-10 ENCOUNTER — HOSPITAL ENCOUNTER (OUTPATIENT)
Dept: PHYSICAL THERAPY | Age: 74
Setting detail: THERAPIES SERIES
Discharge: HOME | End: 2019-04-10
Payer: MEDICARE

## 2019-04-10 PROCEDURE — 97140 MANUAL THERAPY 1/> REGIONS: CPT

## 2019-04-10 PROCEDURE — 97110 THERAPEUTIC EXERCISES: CPT

## 2019-04-10 NOTE — PROGRESS NOTES
Phone: Diann           Fax: [phone]                           Outpatient Physical Therapy                                                                            Daily Note    Patient: Uche Rangel : 1945  [facility] #: [id number]   Referring Practitioner:  Andie Taylor MD    Referral Date : 19     Date: 4/10/2019    Diagnosis: Acute myofascial pain, M79.18  Treatment Diagnosis: Back pain, R hip/anterior thigh pain    Onset Date: 19  PT Insurance Information: Medicare/[employer]  Total # of Visits Approved: 12 Per Physician Order  Total # of Visits to Date: 4  No Show: 0  Canceled Appointment: 0      Pre-Treatment Pain:  7/10  Subjective: Pt. reports he can only sleep about 3 hours in bed on his side before pain wakes him up and he has to move to relieve pain. Pt. reports current pain 7/10 in R hip traveling down the front of the thigh. Exercises:  Exercise 1: HEP: Bridges, LTR, SKTC, DKTC  Exercise 8: Scifit x10 min L1.5 hills  Exercise 9: Supine bridges 2x10  Exercise 10: LTR, SKTC    Manual:  Joint mobilization: R hip inferolateral distraction Gr II-III mobilizations  PROM: R passive piriformis stretch [id number]  Soft Tissue Mobalization: DTM to R lumbar paraspinals    Assessment  Assessment: Pt. reports compliance with supine HEP LTR, SKTC and bridges and reports no pain with these exercises. Pt. reports relief of pain from 7/10 to 4/10 with inferolateral hip distraction in supine and increased pain with manual traction over therapy ball with cramping in R anterior thigh. Pt. reports increased pain with extension movements. Will cont. to progress as marco. Patient Education  Exercise technique; manual rationale    Pt verbalized/demonstrated good understanding:     [x] Yes         [] No, pt required further clarification.     Post Treatment Pain:  4/10      Plan  Times per week: 3  Plan weeks: 4      Goals  (Total # of Visits to

## 2019-04-12 ENCOUNTER — HOSPITAL ENCOUNTER (OUTPATIENT)
Dept: PHYSICAL THERAPY | Age: 74
Setting detail: THERAPIES SERIES
Discharge: HOME | End: 2019-04-12
Payer: MEDICARE

## 2019-04-12 PROCEDURE — 97113 AQUATIC THERAPY/EXERCISES: CPT

## 2019-04-12 ASSESSMENT — PAIN DESCRIPTION - LOCATION: LOCATION: BACK;LEG

## 2019-04-12 ASSESSMENT — PAIN DESCRIPTION - ORIENTATION: ORIENTATION: RIGHT;LOWER

## 2019-04-12 ASSESSMENT — PAIN DESCRIPTION - PAIN TYPE: TYPE: CHRONIC PAIN

## 2019-04-12 ASSESSMENT — PAIN DESCRIPTION - FREQUENCY: FREQUENCY: INTERMITTENT

## 2019-04-12 ASSESSMENT — PAIN SCALES - GENERAL: PAINLEVEL_OUTOF10: 6

## 2019-04-12 ASSESSMENT — PAIN DESCRIPTION - DESCRIPTORS: DESCRIPTORS: ACHING;BURNING

## 2019-04-12 NOTE — PROGRESS NOTES
Phone: Diann           Fax: [phone]                           Outpatient Physical Therapy                                                                            Daily Note    Patient: Cheri Chang : 1945  CSN #: [id number]   Referring Practitioner:  Lawrence Montiel MD    Referral Date : 19     Date: 2019    Diagnosis: Acute myofascial pain, M79.18  Treatment Diagnosis: Back pain, R hip/anterior thigh pain    Onset Date: 19  PT Insurance Information: Medicare/[employer]  Total # of Visits Approved: 12 Per Physician Order  Total # of Visits to Date: 5  No Show: 0  Canceled Appointment: 0      Pre-Treatment Pain:  6/10  Subjective: Pt reports pain has decreased since last land tx. Tody pain in 10 in LB and R LE radiating down to knee. Exercises:  Exercise 1: HEP: Bridges, LTR, SKTC, DKTC  Exercise 2: water walking x2 laps each of: forward, retro, side stepping, sink ex x15, side lunges [id number] on side lunges due to increased LBP. Exercise 3: FSU/LSU x15, step stretches with 5 sec hold x5, push blue float down x15, ant lunges with pink board x10  Exercise 4: bue bar for abs x15, bench ex x15, biking in well x5 min, hanging in well x5 min  Exercise 5: jet massage x5 min   Exercise 7: Continue with aquatics to decrease pain, increase endurance, RLE strength, ambulation distance without increase in sxs. Assessment  Assessment: Pt demonstrated good tolerance to exercise progression, without c/o of increased pain or sxs. Negotiated pool steps, reciprocally 1 HR. Patient Education  Educated pt on slowing speed of reps and decreasing reps on HEP. Pt verbalized/demonstrated good understanding:     [x] Yes         [] No, pt required further clarification.     Post Treatment Pain:  5/10      Plan  Times per week: 3  Plan weeks: 4      Goals  (Total # of Visits to Date: 5)   Short Term Goals - Time Frame for Short term goals: 2 weeks     Short term goal 1: Patient will modify his daily exercise routine-met                                        [x]Met   []Partially met  []Not met   Short term goal 2: Patient will tolerate 30 min of aquatic exercise-met  [x]Met []Partially met  []Not met      []Met   []Partially met  []Not met      []Met   []Partially met  []Not met     Long Term Goals - Time Frame for Long term goals : 4 weeks  Long term goal 1: Patient will be independent and compliant with a HEP-progressing []Met  []Partially met  [x]Not met   Long term goal 2: Patient will improve R LE strength to 5/5 in all major joints and planes []Met  []Partially met  [x]Not met   Long term goal 3: Patient will report decreased pain to <5/10 at worst-progressing []Met  [x]Partially met  []Not met   Long term goal 4: Patient will be able to ambulate 300' with an exacerbation of symptoms.  []Met  []Partially met  [x]Not met     []Met  []Partially met  []Not met       Minutes Tracking:  Time In: 0930  Time Out: [address]  Minutes: 401 [facility] [place] [employer] [id number]    Date: 4/12/2019

## 2019-04-15 ENCOUNTER — HOSPITAL ENCOUNTER (OUTPATIENT)
Dept: PHYSICAL THERAPY | Age: 74
Setting detail: THERAPIES SERIES
Discharge: HOME | End: 2019-04-15
Payer: MEDICARE

## 2019-04-15 PROCEDURE — 97113 AQUATIC THERAPY/EXERCISES: CPT

## 2019-04-15 ASSESSMENT — PAIN DESCRIPTION - PAIN TYPE: TYPE: CHRONIC PAIN

## 2019-04-15 ASSESSMENT — PAIN DESCRIPTION - LOCATION: LOCATION: HIP;LEG

## 2019-04-15 ASSESSMENT — PAIN DESCRIPTION - DESCRIPTORS: DESCRIPTORS: ACHING;BURNING

## 2019-04-15 ASSESSMENT — PAIN SCALES - GENERAL: PAINLEVEL_OUTOF10: 6

## 2019-04-15 ASSESSMENT — PAIN DESCRIPTION - FREQUENCY: FREQUENCY: INTERMITTENT

## 2019-04-15 ASSESSMENT — PAIN DESCRIPTION - ORIENTATION: ORIENTATION: RIGHT;LOWER

## 2019-04-15 NOTE — PROGRESS NOTES
Phone: Diann           Fax: [phone]                           Outpatient Physical Therapy                                                                            Daily Note    Patient: Janelle Jiménez : 1945  CSN #: [id number]   Referring Practitioner:  Stephanie Salgado. Jose M Fink MD    Referral Date : 19     Date: 4/15/2019    Diagnosis: Acute myofascial pain, M79.18  Treatment Diagnosis: Back pain, R hip/anterior thigh pain    Onset Date: 19  PT Insurance Information: Medicare/[employer]  Total # of Visits Approved: 12 Per Physician Order  Total # of Visits to Date: 6  No Show: 0  Canceled Appointment: 0      Pre-Treatment Pain:  5-6/10  Subjective: Pt reports R LE, hip to knee, and R hip 5-6/10 today. Exercises:  Exercise 1: HEP: Sandra Moreno, DKTC  Exercise 2: water walking x2 laps each of: forward, retro, side stepping, sink ex x15, side lunges x10  Exercise 3: FSU/LSU x15, step stretches with 5 sec hold x5, push blue float down x15, ant lunges with pink board x10  Exercise 4: blue bar for abs x15, bench ex x15, biking in well x5 min, hanging in well x5 min  Exercise 5: jet massage x5 min   Exercise 7: Continue with aquatics to decrease pain, increase endurance, RLE strength, ambulation distance without increase in sxs. Assessment  Assessment: Pt demonstrated good tolerance to tx today. Step stretches were the only exercise that increase pain, but pain subsided when stretch was complete. Patient Education  Educated pt on importance of compliance with daily exercise and ambulation  Pt verbalized/demonstrated good understanding:     [x] Yes         [] No, pt required further clarification.     Post Treatment Pain:  4/10      Plan  Times per week: 3  Plan weeks: 4      Goals  (Total # of Visits to Date: 6)   Short Term Goals - Time Frame for Short term goals: 2 weeks     Short term goal 1: Patient will modify his daily exercise routine-met                                        [x]Met   []Partially met  []Not met   Short term goal 2: Patient will tolerate 30 min of aquatic exercise-met  [x]Met []Partially met  []Not met      []Met []Partially met  []Not met      []Met   []Partially met  []Not met     Long Term Goals -    Long term goal 1: Patient will be independent and compliant with a HEP-progressing []Met  [x]Partially met  []Not met   Long term goal 2: Patient will improve R LE strength to 5/5 in all major joints and planes []Met  []Partially met  [x]Not met   Long term goal 3: Patient will report decreased pain to <5/10 at worst-progressing []Met  [x]Partially met  []Not met   Long term goal 4: Patient will be able to ambulate 300' with an exacerbation of symptoms.  []Met  []Partially met  [x]Not met     []Met  []Partially met  []Not met       Minutes Tracking:  Time In: Ezekiel  Time Out: [address]  Minutes: 401 [facility] [address] [id number]     Date: 4/15/2019

## 2019-04-17 ENCOUNTER — HOSPITAL ENCOUNTER (OUTPATIENT)
Dept: PHYSICAL THERAPY | Age: 74
Setting detail: THERAPIES SERIES
Discharge: HOME | End: 2019-04-17
Payer: MEDICARE

## 2019-04-17 PROCEDURE — 97140 MANUAL THERAPY 1/> REGIONS: CPT

## 2019-04-17 PROCEDURE — 97110 THERAPEUTIC EXERCISES: CPT

## 2019-04-17 NOTE — PROGRESS NOTES
[]Met   []Partially met  []Not met      []Met   []Partially met  []Not met     Long Term Goals - Time Frame for Long term goals : 4 weeks  Long term goal 1: Patient will be independent and compliant with a HEP-progressing []Met  [x]Partially met  []Not met   Long term goal 2: Patient will improve R LE strength to 5/5 in all major joints and planes []Met  []Partially met  [x]Not met   Long term goal 3: Patient will report decreased pain to <5/10 at worst-progressing []Met  [x]Partially met  []Not met   Long term goal 4: Patient will be able to ambulate 300' with an exacerbation of symptoms.  []Met  []Partially met  [x]Not met     []Met  []Partially met  []Not met       Minutes Tracking:  Time In: 9517  Time Out: 52[address] [facility]  Minutes: [address] [place] [address]        Date: 4/17/2019

## 2019-04-19 ENCOUNTER — HOSPITAL ENCOUNTER (OUTPATIENT)
Dept: PHYSICAL THERAPY | Age: 74
Setting detail: THERAPIES SERIES
Discharge: HOME | End: 2019-04-19
Payer: MEDICARE

## 2019-04-19 PROCEDURE — 97113 AQUATIC THERAPY/EXERCISES: CPT

## 2019-04-19 ASSESSMENT — PAIN DESCRIPTION - FREQUENCY: FREQUENCY: CONTINUOUS

## 2019-04-19 ASSESSMENT — PAIN DESCRIPTION - LOCATION: LOCATION: HIP;KNEE

## 2019-04-19 ASSESSMENT — PAIN DESCRIPTION - DESCRIPTORS: DESCRIPTORS: BURNING;THROBBING

## 2019-04-19 ASSESSMENT — PAIN SCALES - GENERAL: PAINLEVEL_OUTOF10: 5

## 2019-04-19 ASSESSMENT — PAIN DESCRIPTION - PAIN TYPE: TYPE: CHRONIC PAIN

## 2019-04-19 NOTE — PROGRESS NOTES
Phone: Diann           Fax: [phone]                           Outpatient Physical Therapy                                                                            Daily Note    Patient: Rashard Weeks : 1945  CSN #: [id number]   Referring Practitioner:  Tunde Flores. Marcelino Cha MD    Referral Date : 19     Date: 2019    Diagnosis: Acute myofascial pain, M79.18  Treatment Diagnosis: Back pain, R hip/anterior thigh pain    Onset Date: 19  PT Insurance Information: Medicare/[employer]  Total # of Visits Approved: 12 Per Physician Order  Total # of Visits to Date: 8  No Show: 0  Canceled Appointment: 0      Pre-Treatment Pain:  5/10  Subjective: Pt currently reports 5/10 LB to R knee pain upon arrival.  Pt reports feeling \"50% better since beginning therapy. \"    Exercises:  Exercise 1: HEP: Roxy Rosas, SKTC, DKTC  Exercise 2: water walking x2 laps each of: forward, retro, side stepping, sink ex x15, side lunges x10  Exercise 3: FSU/LSU x15, step stretches with 5 sec hold x5, push blue float down x15, ant lunges with pink board x10  Exercise 4: blue bar for abs x15, bench ex x15, biking in well x5 min, hanging in well x5 min  Exercise 5: jet massage x5 min   Exercise 7: Continue with aquatics to decrease pain, increase endurance, RLE strength, ambulation distance without increase in sxs. Assessment  Assessment: Pt tolerated tx well on this date. Pt reports no increase or decrease in pain post tx. Pt stated he wants pain below a 2/10 before he travels again. Patient Education  Importance of HEP  Pt verbalized/demonstrated good understanding:     [x] Yes         [] No, pt required further clarification.     Post Treatment Pain:  5/10      Plan  Times per week: 3  Plan weeks: 4      Goals  (Total # of Visits to Date: 8)   Short Term Goals - Time Frame for Short term goals: 2 weeks    Short term goal 1: Patient will modify his daily exercise routine-met                                        [x]Met   []Partially met  []Not met   Short term goal 2: Patient will tolerate 30 min of aquatic exercise-met  [x]Met   []Partially met  []Not met      []Met   []Partially met  []Not met      []Met   []Partially met  []Not met     Long Term Goals - Time Frame for Long term goals : 4 weeks  Long term goal 1: Patient will be independent and compliant with a HEP-progressing []Met  [x]Partially met  []Not met   Long term goal 2: Patient will improve R LE strength to 5/5 in all major joints and planes []Met  []Partially met  [x]Not met   Long term goal 3: Patient will report decreased pain to <5/10 at worst-progressing []Met  [x]Partially met  []Not met   Long term goal 4: Patient will be able to ambulate 300' with an exacerbation of symptoms.  []Met  []Partially met  [x]Not met     []Met  []Partially met  []Not met       Minutes Tracking:  Time In: 0915  Time Out: 1000  Minutes: [address] PTA     Date: 4/19/2019

## 2019-04-22 ENCOUNTER — HOSPITAL ENCOUNTER (OUTPATIENT)
Dept: PHYSICAL THERAPY | Age: 74
Setting detail: THERAPIES SERIES
Discharge: HOME | End: 2019-04-22
Payer: MEDICARE

## 2019-04-22 PROCEDURE — 97113 AQUATIC THERAPY/EXERCISES: CPT

## 2019-04-22 ASSESSMENT — PAIN SCALES - GENERAL: PAINLEVEL_OUTOF10: 5

## 2019-04-22 ASSESSMENT — PAIN DESCRIPTION - PAIN TYPE: TYPE: CHRONIC PAIN

## 2019-04-22 ASSESSMENT — PAIN DESCRIPTION - DESCRIPTORS: DESCRIPTORS: ACHING;THROBBING

## 2019-04-22 ASSESSMENT — PAIN DESCRIPTION - FREQUENCY: FREQUENCY: INTERMITTENT

## 2019-04-22 ASSESSMENT — PAIN DESCRIPTION - ORIENTATION: ORIENTATION: RIGHT;LOWER

## 2019-04-22 ASSESSMENT — PAIN DESCRIPTION - LOCATION: LOCATION: BACK;LEG

## 2019-04-22 NOTE — PROGRESS NOTES
Phone: Diann           Fax: [phone]                           Outpatient Physical Therapy                                                                            Daily Note    Patient: Korey Myers : 1945  CSN #: [id number]   Referring Practitioner:  Colin Aguilar. Love Burton MD    Referral Date : 19     Date: 2019    Diagnosis: Acute myofascial pain, M79.18  Treatment Diagnosis: Back pain, R hip/anterior thigh pain    Onset Date: 19  PT Insurance Information: Medicare/[employer]  Total # of Visits Approved: 12 Per Physician Order  Total # of Visits to Date: 9  No Show: 0  Canceled Appointment: 0      Pre-Treatment Pain:  5/10  Subjective: Pt reports R LB pain at 5/10 but 0/10 pain when first got up. Then pain gradually increased as morning went on and radiated down leg to knee. Exercises:  Exercise 1: HEP: FUENTES English  Exercise 2: water walking x2 laps each of: forward, retro, side stepping, sink ex x15, side lunges x10  Exercise 3: FSU/LSU x15, step stretches with 5 sec hold x5, push blue float down x15, ant lunges with pink board x10  Exercise 4: blue bar for abs x15, bench ex x15, biking in well x5 min, hanging in well x5 min  Exercise 5: jet massage x5 min   Exercise 7: Continue with aquatics to decrease pain, increase endurance, RLE strength, ambulation distance without increase in sxs. Assessment  Assessment: Pt entered pool steps non-reciprocally with B HR. MMT R hip flex 4 to 4+ with c/o slight pain. Patient Education:   Educated pt on importance of proper posture daily. Pt verbalized/demonstrated good understanding:     [x] Yes         [] No, pt required further clarification.     Post Treatment Pain:  5/10      Plan  Times per week: 3  Plan weeks: 4      Goals  (Total # of Visits to Date: 5)   Short Term Goals - Time Frame for Short term goals: 2 weeks    Short term goal 1: Patient will modify his daily exercise routine-met                                        [x]Met   []Partially met  []Not met   Short term goal 2: Patient will tolerate 30 min of aquatic exercise-met  [x]Met   []Partially met  []Not met      []Met []Partially met  []Not met      []Met   []Partially met  []Not met     Long Term Goals - Time Frame for Long term goals : 4 weeks  Long term goal 1: Patient will be independent and compliant with a HEP-progressing []Met  [x]Partially met  []Not met   Long term goal 2: Patient will improve R LE strength to 5/5 in all major joints and planes-progressing []Met  [x]Partially met  []Not met   Long term goal 3: Patient will report decreased pain to <5/10 at worst-progressing []Met  [x]Partially met  []Not met     []Met  []Partially met  []Not met     []Met  []Partially met  []Not met       Minutes Tracking:  Time In: 1100  Time Out: 121 [facility]  Minutes: 401 [facility] [id number]    Date: 4/22/2019

## 2019-04-23 ENCOUNTER — HOSPITAL ENCOUNTER (OUTPATIENT)
Dept: CT IMAGING | Age: 74
Discharge: HOME | End: 2019-04-25
Payer: MEDICARE

## 2019-04-23 DIAGNOSIS — M54.41 ACUTE MIDLINE LOW BACK PAIN WITH RIGHT-SIDED SCIATICA: ICD-10-CM

## 2019-04-23 PROCEDURE — 72131 CT LUMBAR SPINE W/O DYE: CPT

## 2019-04-24 ENCOUNTER — HOSPITAL ENCOUNTER (OUTPATIENT)
Dept: PHYSICAL THERAPY | Age: 74
Setting detail: THERAPIES SERIES
Discharge: HOME | End: 2019-04-24
Payer: MEDICARE

## 2019-04-24 PROCEDURE — 97140 MANUAL THERAPY 1/> REGIONS: CPT

## 2019-04-24 PROCEDURE — 97110 THERAPEUTIC EXERCISES: CPT

## 2019-04-24 NOTE — PROGRESS NOTES
Phone: Diann           Fax: [phone]                           Outpatient Physical Therapy                                                                            Daily Note    Patient: Magali Marin : 1945  [facility] #: [id number]   Referring Practitioner:  Nehemias Anton. Hill Nguyen MD    Referral Date : 19     Date: 2019    Diagnosis: Acute myofascial pain, M79.18  Treatment Diagnosis: Back pain, R hip/anterior thigh pain    Onset Date: 19  PT Insurance Information: Medicare/[employer]  Total # of Visits Approved: 12 Per Physician Order  Total # of Visits to Date: 10  No Show: 0  Canceled Appointment: 0      Pre-Treatment Pain:  5/10  Subjective: Pt reports 5/10 LBP that radiates to right knee. He reports he is about 50% better overall since starting PT. Exercises:  Exercise 10: LTR, SKTC  Exercise 13: Sit to stands 2x5  Exercise 14: BTB rows and shoulder extension 2x10    Manual:  Joint mobilization: R hip inferolateral distraction Gr II-III mobilizations, MWM IR/ER with lateral distraction  PROM: R passive piriformis stretch [id number]  Soft Tissue Mobalization: Heated thermoprobe to lumbar and thoracic spine paraspinals x15 min    Assessment  Body structures, Functions, Activity limitations: Decreased functional mobility , Decreased ADL status, Decreased endurance, Decreased ROM, Decreased strength, Decreased balance, Increased Pain  Assessment: Used manual techniques to decrease back pain and to improve hip ROM. Patient reported decreased pain following manual interventions. Theraband rows and shoulder extension added to HEP. Patient Education  Patient Education: New ex rationale. Pt verbalized/demonstrated good understanding:     [x] Yes         [] No, pt required further clarification.     Post Treatment Pain:  3/10      Plan  Times per week: 3  Plan weeks: 4      Goals  (Total # of Visits to Date: 10)   Short Term Goals - Time Frame for Short term goals: 2 weeks     Short term goal 1: Patient will modify his daily exercise routine-met                                        [x]Met   []Partially met  []Not met   Short term goal 2: Patient will tolerate 30 min of aquatic exercise-met  [x]Met   []Partially met  []Not met      []Met   []Partially met  []Not met      []Met   []Partially met  []Not met     Long Term Goals - Time Frame for Long term goals : 4 weeks  Long term goal 1: Patient will be independent and compliant with a HEP-progressing []Met  []Partially met  []Not met   Long term goal 2: Patient will improve R LE strength to 5/5 in all major joints and planes-progressing []Met  []Partially met  []Not met   Long term goal 3: Patient will report decreased pain to <5/10 at worst-progressing []Met  []Partially met  []Not met   Long term goal 4: Patient will be able to ambulate 300' with an exacerbation of symptoms.  []Met  []Partially met  []Not met     []Met  []Partially met  []Not met       Minutes Tracking:  Time In: 1330  Time Out: Asher Gutierrez  Minutes: Valerie 33 PT, DPT     Date: 4/24/2019

## 2019-04-25 ENCOUNTER — TELEPHONE (OUTPATIENT)
Dept: CARDIOLOGY | Age: 74
End: 2019-04-25

## 2019-04-26 ENCOUNTER — HOSPITAL ENCOUNTER (OUTPATIENT)
Dept: PHYSICAL THERAPY | Age: 74
Setting detail: THERAPIES SERIES
Discharge: HOME | End: 2019-04-26
Payer: MEDICARE

## 2019-04-26 PROCEDURE — 97113 AQUATIC THERAPY/EXERCISES: CPT

## 2019-04-26 ASSESSMENT — PAIN SCALES - GENERAL: PAINLEVEL_OUTOF10: 5

## 2019-04-26 ASSESSMENT — PAIN DESCRIPTION - LOCATION: LOCATION: BACK;LEG

## 2019-04-26 ASSESSMENT — PAIN DESCRIPTION - DESCRIPTORS: DESCRIPTORS: ACHING;THROBBING

## 2019-04-26 ASSESSMENT — PAIN DESCRIPTION - FREQUENCY: FREQUENCY: INTERMITTENT

## 2019-04-26 ASSESSMENT — PAIN DESCRIPTION - ORIENTATION: ORIENTATION: RIGHT;LOWER

## 2019-04-26 NOTE — PROGRESS NOTES
Phone: Diann           Fax: [phone]                           Outpatient Physical Therapy                                                                            Daily Note    Patient: Ree Muniz : 1945  CSN #: [id number]   Referring Practitioner:  Carlos Tamez. Latia Lopez MD    Referral Date : 19     Date: 2019    Diagnosis: Acute myofascial pain, M79.18  Treatment Diagnosis: Back pain, R hip/anterior thigh pain    Onset Date: 19  PT Insurance Information: Medicare/[employer] Life  Total # of Visits Approved: 12 Per Physician Order  Total # of Visits to Date: 11  No Show: 0  Canceled Appointment: 0      Pre-Treatment Pain:  5/10  Subjective: Pt c/o 5/10 LBP that radiates into R knee. Pt had CT scan completed on  and a follow up appt this morning. Pt stated that his Dr. noted \"V29 and L1 disc degeneration which is causing the pain. \"  Per pt, the Dr stated that he noticed his R LE is stronger since beginning therapy. Exercises:  Exercise 1: HEP: FUENTES Crowley  Exercise 2: water walking x2 laps each of: forward, retro, side stepping, sink ex x15, side lunges x10  Exercise 3: FSU/LSU x15, step stretches with 10 sec hold x5, push blue float down x15, ant lunges with pink board x10  Exercise 4: blue bar for abs x15, bench ex x15, biking in well x5 min, hanging in well x5 min  Exercise 5: jet massage x5 min   Exercise 7: Continue with aquatics to decrease pain, increase endurance, RLE strength, ambulation distance without increase in sxs. Assessment  Body structures, Functions, Activity limitations: Decreased functional mobility , Decreased ADL status, Decreased endurance, Decreased ROM, Decreased strength, Decreased balance, Increased Pain  Assessment: Pt has fair tolerance to tx on this date. Pt has no increase/decrease in pain following tx session. Increased to 10 sec holds with step stretch with fair tolerance.  Pt reports compliance with updated HEP, no questions or concerns. Patient Education  HEP  Pt verbalized/demonstrated good understanding:     [x] Yes         [] No, pt required further clarification. Post Treatment Pain:  5/10      Plan  Times per week: 3  Plan weeks: 4      Goals  (Total # of Visits to Date: 6)   Short Term Goals - Time Frame for Short term goals: 2 weeks     Short term goal 1: Patient will modify his daily exercise routine-met                                        [x]Met   []Partially met  []Not met   Short term goal 2: Patient will tolerate 30 min of aquatic exercise-met  [x]Met  []Partially met  []Not met      []Met   []Partially met  []Not met      []Met   []Partially met  []Not met     Long Term Goals - Time Frame for Long term goals : 4 weeks  Long term goal 1: Patient will be independent and compliant with a HEP-met [x]Met  []Partially met  []Not met   Long term goal 2: Patient will improve R LE strength to 5/5 in all major joints and planes-progressing []Met  [x]Partially met  []Not met   Long term goal 3: Patient will report decreased pain to <5/10 at worst-progressing []Met  [x]Partially met  []Not met   Long term goal 4: Patient will be able to ambulate 300' with an exacerbation of symptoms.  []Met  []Partially met  [x]Not met     []Met  []Partially met  []Not met       Minutes Tracking:  Time In: 1102  Time Out: 121 [facility]  Minutes: 17 [facility]     Date: 4/26/2019

## 2019-04-29 ENCOUNTER — HOSPITAL ENCOUNTER (OUTPATIENT)
Dept: PHYSICAL THERAPY | Age: 74
Setting detail: THERAPIES SERIES
Discharge: HOME | End: 2019-04-29
Payer: MEDICARE

## 2019-04-29 PROCEDURE — 97113 AQUATIC THERAPY/EXERCISES: CPT

## 2019-04-29 ASSESSMENT — PAIN DESCRIPTION - DESCRIPTORS: DESCRIPTORS: ACHING

## 2019-04-29 ASSESSMENT — PAIN DESCRIPTION - ORIENTATION: ORIENTATION: RIGHT;LEFT

## 2019-04-29 ASSESSMENT — PAIN DESCRIPTION - FREQUENCY: FREQUENCY: INTERMITTENT

## 2019-04-29 ASSESSMENT — PAIN SCALES - GENERAL: PAINLEVEL_OUTOF10: 2

## 2019-04-29 ASSESSMENT — PAIN DESCRIPTION - LOCATION: LOCATION: SHOULDER

## 2019-04-29 ASSESSMENT — PAIN DESCRIPTION - PAIN TYPE: TYPE: CHRONIC PAIN

## 2019-05-01 ENCOUNTER — HOSPITAL ENCOUNTER (OUTPATIENT)
Dept: PHYSICAL THERAPY | Age: 74
Setting detail: THERAPIES SERIES
Discharge: HOME | End: 2019-05-01
Payer: MEDICARE

## 2019-05-01 PROCEDURE — 97110 THERAPEUTIC EXERCISES: CPT

## 2019-05-01 NOTE — PROGRESS NOTES
Phone: Diann           Fax: [phone]                           Outpatient Physical Therapy                                                                            Daily Note    Patient: Josue Rodriguez : 1945  [facility] #: [id number]   Referring Practitioner:  Shirley Saavedra MD    Referral Date : 19     Date: 2019    Diagnosis: Acute myofascial pain, M79.18  Treatment Diagnosis: Back pain, R hip/anterior thigh pain    Onset Date: 19  PT Insurance Information: Medicare/[employer]  Total # of Visits Approved: 12 Per Physician Order  Total # of Visits to Date: 13  No Show: 0  Canceled Appointment: 0      Pre-Treatment Pain:  0/10  Subjective: Pt denies pain coming into therapy. He reports he is sleeping better and has been able to ambulate without his cane at times. He reports 4/10 pain has been the worst in the past week. He reports he is 99% better right now than he was initially. Exercises:  Exercise 8: Scifit x10 min L2.0 hills  Exercise 9: Supine bridges 2x20, single leg bridge x5 ea  Exercise 10: LTR, SKTC  Exercise 13: Sit to stands 2x10  Exercise 14: PTB rows and shoulder extension 2x10 ea  Exercise 15: Palloff press x10 ea with PTB    Assessment  Body structures, Functions, Activity limitations: Decreased functional mobility , Decreased ADL status, Decreased endurance, Decreased ROM, Decreased strength, Decreased balance, Increased Pain  Assessment: Pt reports a significant improvement in pain levels allowing him to sleep and ambulate without a cane at times. He reports he is 99% better overall. Reviewed patient's HEP and educated patient to continue. Patient will be put on a two week hold at this time. Patient Education  Patient Education: New ex rationale. Pt verbalized/demonstrated good understanding:     [x] Yes         [] No, pt required further clarification.     Post Treatment Pain:  0/10      Plan  Times per week: 3  Plan weeks: 4      Goals  (Total # of Visits to Date: 15)   Short Term Goals - Time Frame for Short term goals: 2 weeks     Short term goal 1: Patient will modify his daily exercise routine-met                                        [x]Met   []Partially met  []Not met   Short term goal 2: Patient will tolerate 30 min of aquatic exercise-met  [x]Met   []Partially met  []Not met      []Met   []Partially met  []Not met      []Met   []Partially met  []Not met     Long Term Goals - Time Frame for Long term goals : 4 weeks  Long term goal 1: Patient will be independent and compliant with a HEP-met [x]Met  []Partially met  []Not met   Long term goal 2: Patient will improve R LE strength to 5/5 in all major joints and planes-progressing []Met  []Partially met  []Not met   Long term goal 3: Patient will report decreased pain to <5/10 at worst-progressing []Met  []Partially met  []Not met   Long term goal 4: Patient will be able to ambulate 300' with an exacerbation of symptoms. -progressing []Met  []Partially met  []Not met     []Met  []Partially met  []Not met       Minutes Tracking:  Time In: 1030  Time Out: [address] [employer]  Minutes: 5277 Airline Wilmer PT, DPT     Date: 5/1/2019

## 2019-05-07 ENCOUNTER — HOSPITAL ENCOUNTER (EMERGENCY)
Age: 74
Discharge: HOME | End: 2019-05-07
Attending: EMERGENCY MEDICINE
Payer: MEDICARE

## 2019-05-07 VITALS
SYSTOLIC BLOOD PRESSURE: 183 MMHG | DIASTOLIC BLOOD PRESSURE: 73 MMHG | RESPIRATION RATE: 16 BRPM | TEMPERATURE: 97.3 F | OXYGEN SATURATION: 98 % | BODY MASS INDEX: 29.53 KG/M2 | HEART RATE: 68 BPM | WEIGHT: 200 LBS

## 2019-05-07 DIAGNOSIS — M51.36 DEGENERATIVE DISC DISEASE, LUMBAR: ICD-10-CM

## 2019-05-07 DIAGNOSIS — G89.29 CHRONIC RIGHT-SIDED LOW BACK PAIN WITHOUT SCIATICA: Primary | ICD-10-CM

## 2019-05-07 DIAGNOSIS — M54.50 CHRONIC RIGHT-SIDED LOW BACK PAIN WITHOUT SCIATICA: Primary | ICD-10-CM

## 2019-05-07 PROCEDURE — 96372 THER/PROPH/DIAG INJ SC/IM: CPT

## 2019-05-07 PROCEDURE — 6360000002 HC RX W HCPCS: Performed by: EMERGENCY MEDICINE

## 2019-05-07 PROCEDURE — 99283 EMERGENCY DEPT VISIT LOW MDM: CPT

## 2019-05-07 PROCEDURE — 6370000000 HC RX 637 (ALT 250 FOR IP): Performed by: EMERGENCY MEDICINE

## 2019-05-07 RX ORDER — TRAMADOL HYDROCHLORIDE 50 MG/1
100 TABLET ORAL ONCE
Status: COMPLETED | OUTPATIENT
Start: 2019-05-07 | End: 2019-05-07

## 2019-05-07 RX ORDER — ONDANSETRON 4 MG/1
4 TABLET, ORALLY DISINTEGRATING ORAL ONCE
Status: COMPLETED | OUTPATIENT
Start: 2019-05-07 | End: 2019-05-07

## 2019-05-07 RX ORDER — ORPHENADRINE CITRATE 30 MG/ML
60 INJECTION INTRAMUSCULAR; INTRAVENOUS ONCE
Status: COMPLETED | OUTPATIENT
Start: 2019-05-07 | End: 2019-05-07

## 2019-05-07 RX ORDER — TRAMADOL HYDROCHLORIDE 50 MG/1
50 TABLET ORAL EVERY 4 HOURS PRN
Qty: 18 TABLET | Refills: 0 | Status: SHIPPED | OUTPATIENT
Start: 2019-05-07 | End: 2019-05-10

## 2019-05-07 RX ORDER — MORPHINE SULFATE 4 MG/ML
4 INJECTION, SOLUTION INTRAMUSCULAR; INTRAVENOUS ONCE
Status: COMPLETED | OUTPATIENT
Start: 2019-05-07 | End: 2019-05-07

## 2019-05-07 RX ADMIN — ONDANSETRON 4 MG: 4 TABLET, ORALLY DISINTEGRATING ORAL at 08:39

## 2019-05-07 RX ADMIN — ORPHENADRINE CITRATE 60 MG: 30 INJECTION INTRAMUSCULAR; INTRAVENOUS at 08:39

## 2019-05-07 RX ADMIN — MORPHINE SULFATE 4 MG: 4 INJECTION INTRAVENOUS at 08:39

## 2019-05-07 RX ADMIN — TRAMADOL HYDROCHLORIDE 100 MG: 50 TABLET, FILM COATED ORAL at 09:46

## 2019-05-07 ASSESSMENT — ENCOUNTER SYMPTOMS
ABDOMINAL PAIN: 0
DIARRHEA: 0
SHORTNESS OF BREATH: 0
BACK PAIN: 1
NAUSEA: 0
COLOR CHANGE: 0
VOMITING: 0
COUGH: 0

## 2019-05-07 ASSESSMENT — PAIN DESCRIPTION - PAIN TYPE: TYPE: ACUTE PAIN

## 2019-05-07 ASSESSMENT — PAIN SCALES - GENERAL
PAINLEVEL_OUTOF10: 10

## 2019-05-07 ASSESSMENT — PAIN DESCRIPTION - DESCRIPTORS: DESCRIPTORS: BURNING

## 2019-05-07 ASSESSMENT — PAIN DESCRIPTION - LOCATION: LOCATION: BACK

## 2019-05-07 NOTE — ED PROVIDER NOTES
[facility] ED  eMERGENCY dEPARTMENT eNCOUnter      Pt Name: Uche Rangel  MRN: [id number]  [place] 1945  Date of evaluation: 5/7/2019  Provider: [address]       Chief Complaint   Patient presents with    Back Pain     mid upper back pain with mid lower and right flank, with radiation down his right leg         HISTORY OF PRESENT ILLNESS   (Location/Symptom, Timing/Onset, Context/Setting, Quality, Duration, Modifying Factors, Severity) Note limiting factors. HPI    Uche Rangel is a 68 y.o. male who presents to the emergency department with complaint of back pain. patient reports he has a history of back pain that he had to have a fusion approximately 15 years ago. He states he's been having pain in his low back for approximately 2 months. He states that there was no injury prior to the pain beginning. He states that Has been evaluated by his family doctor gone through physical therapy and had a CT scan. He states despite doing this he's had no symptom improvement. He denies any dysuria or hematuria he denies any loss of bowel or bladder control or IV drug use. However secondary to the persistent pain he presents for reevaluation      Nursing Notes were reviewed. REVIEW OF SYSTEMS    (2+ for level 4; 10+ for level 5)   Review of Systems   Constitutional: Negative for chills and fever. Respiratory: Negative for cough and shortness of breath. Cardiovascular: Negative for chest pain. Gastrointestinal: Negative for abdominal pain, diarrhea, nausea and vomiting. Genitourinary: Negative for dysuria, flank pain and hematuria. Musculoskeletal: Positive for back pain. Skin: Negative for color change and rash. Neurological: Negative for weakness, light-headedness, numbness and headaches. All other systems reviewed and are negative.       PAST MEDICAL HISTORY     Past Medical History:   Diagnosis Date    Acute MI ([facility] [address]     Acute renal failure with tubular necrosis ([facility] [address] 10/2/2018    ssecondary to hemodynamic effects of loop diuretics and ace inhibitors, bbaseline 1.2-1.3 which peaked up to 1.8, resolving    CAD (coronary artery disease)     Cerebral artery occlusion with cerebral infarction ([facility] [address]     CHF (congestive heart failure) (HCC)     Chronic back pain     Closed fracture of lumbar vertebra without mention of spinal cord injury     Diabetes mellitus ([facility] [address]     Displacement of intervertebral disc, site unspecified, without myelopathy     H/O cardiac catheterization 2/19/16    LMCA: Mild irregularities 10-20%. LAD: Lesion on PRox LAD: Mid subsection. 65% stenosis. LCx: Lesion on 1st Ob Valentina: Proximal subesection. 70% steniosis. RCA: Small non-dominant RCA. Lesion on PRox RCA: Ostial. 50% stenosis. EF:55%.  H/O cardiovascular stress test 2/19/16    Abnormal. Moderate perfusion defect of mild intensity in the inferior, inferoseptal adn inferoapical regions during stress imaging, which most consistent with ischemia. Global LV systolic function normal without regional wall motion abnormalities. OVerall these results are most consistent with an intermediate risk for signficant CAD. Additional testing including cardiac cath may be indicated.  H/O tilt table evaluation 12/26/2017    Abnormal. Patients HR, BP response and symptoms were most consistent with dysautonomia. Combined with viligant maintenance of euvolemia and maintaining a moderate salft intake, pharmacologic treatment with SSRI such as lexapro and or mestinon among other treatments have shown some effectiveness in treatment of this condition    H/O tilt table evaluation 12/26/2017    Abnormal head upright tilt table study. The pt heart rate, blood pressure response and symptoms were most consistent with dysautonomia.  History of 24 hour EKG monitoring 8/14/14    Occasional PAC's and PVC's which appear to be at least moderately symptomatic.     History of 24 hour EKG monitoring 11/19/14    Event Monitor. Sinus rhythm and sinus bradycardia. Infrequent isolated PVC's    History of cardiovascular stress test 2/19/14    Relatively NL    History of cardiovascular stress test 03/21/2018    Normal myocardial perfusion. Global left ventricular systolic function was normal with an EF of 68%. Overall, these results are most consistent with a low risk scan.  History of coronary artery stent placement 04/2017    PTCA / Drug Eluting Stent:, CX and / or branches    History of CVA (cerebrovascular accident) without residual deficits 2014    Incidentally found on CT head. No known impairment now or in the past.    History of echocardiogram 2/18/14    LA mildly dilated, EF 55%, LV wall thickness is moderately increased, no definite wall motion abnormalilities, what appears to be a pacer wire is seen w/n the RA and RV, mild-mod TR, mild pulmonary hypertension.  History of Holter monitoring 12/29/2017    Rare PAC's and PVC's.      History of tilt table evaluation 8/12/14    Abnormal    Hyperlipidemia     Hypertension     Non critical Right Renal artery stenosis, native ([address] 10/2/2018    Non critical Right Renal artery stenosis, based on cath in 2016, Rt RA 30% stenosis    Pacemaker     non-functioning    S/P cardiac cath 04/10/2017    S/P coronary artery stent placement     Successful PTCA - HA Om1    Type II or unspecified type diabetes mellitus without mention of complication, not stated as uncontrolled        SURGICAL HISTORY       Past Surgical History:   Procedure Laterality Date    BACK SURGERY      CARDIAC CATHETERIZATION Left 2/19/16    via right radial approach/ [address] [place]/ Dr. Colmenares Pronto  8/17/15    Liang/Charles/[facility]/ [facility]    COLONOSCOPY  2010    COLONOSCOPY  2/19/15    -polyps,diverticulosis,hemorrhoids    COLONOSCOPY  05/23/2018    Dr Abril Alanis    COLONOSCOPY N/A 5/23/2018    COLONOSCOPY POLYPECTOMY SNARE/COLD BIOPSY  cold snare  and  hot snare performed by Leavy Hammans, MD at [facility]      left eye   [facility]       Discharge Medication List as of 5/7/2019  9:42 AM      CONTINUE these medications which have NOT CHANGED    Details   acyclovir (ZOVIRAX) 400 MG tablet Take 400 mg by mouth 3 times dailyHistorical Med      tiZANidine (ZANAFLEX) 2 MG tablet Take 1 tablet by mouth every 6 hours as needed (spasm), Disp-30 tablet, R-2Normal      hydrALAZINE (APRESOLINE) 100 MG tablet Take 1 tablet by mouth 3 times daily, Disp-270 tablet, R-3Normal      insulin glargine (LANTUS SOLOSTAR) 100 UNIT/ML injection pen Inject 20 Units into the skin 2 times daily, Disp-36 mL, R-0Normal      lisinopril (PRINIVIL;ZESTRIL) 40 MG tablet TAKE 1 TABLET BY MOUTH ONCE DAILY, Disp-90 tablet, R-0Normal      glipiZIDE (GLIPIZIDE XL) 5 MG extended release tablet TAKE 2 TABLETS BY MOUTH TWICE DAILY, Disp-360 tablet, R-0Normal      carvedilol (COREG) 12.5 MG tablet TAKE 1 TABLET BY MOUTH TWICE DAILY, Disp-180 tablet, R-0Normal      eplerenone (INSPRA) 25 MG tablet Take 1 tablet by mouth daily, Disp-90 tablet, R-3Normal      Omega-3 Fatty Acids (FISH OIL) 1000 MG CAPS Take 1,000 mg by mouth 2 times dailyHistorical Med      DIANA CONTOUR TEST strip USE ONE STRIP TO CHECK GLUCOSE TWICE DAILY, Disp-100 strip, R-11Please consider 90 day supplies to promote better adherenceNormal      albuterol sulfate HFA (PROAIR HFA) 108 (90 Base) MCG/ACT inhaler Inhale 2 puffs into the lungs every 6 hours as needed for Wheezing, Disp-1 Inhaler, R-3Normal      aspirin EC 81 MG EC tablet Take 1 tablet by mouth daily, Disp-30 tablet, R-3OTC      Iron-Vitamins (GERITOL COMPLETE PO) Take by mouth daily Historical Med      nitroGLYCERIN (NITROSTAT) 0.4 MG SL tablet Place 1 tablet under the tongue every 5 minutes as needed for Chest pain, Disp-25 tablet, R-3      DIANA MICROLET LANCETS MISC Disp-100 each, R-2, NormalTEST TWICE DAILY      Insulin Pen Needle (PEN NEEDLES) 31G X 6 MM MISC DAILY Starting 10/16/2015, Until Discontinued, Disp-100 each, R-3, Normal      prednisoLONE acetate (PRED FORTE) 1 % ophthalmic suspension Place 1 drop into the left eye daily              ALLERGIES     Lipitor [atorvastatin]; Aldactone [spironolactone]; Crestor [rosuvastatin calcium]; Lopid [gemfibrozil];  Invokana [canagliflozin]; and Januvia [sitagliptin]    FAMILY HISTORY       Family History   Problem Relation Age of Onset    Cancer Mother         breast    Cancer Father         lung        SOCIAL HISTORY       Social History     Socioeconomic History    Marital status:      Spouse name: Not on file    Number of children: Not on file    Years of education: Not on file    Highest education level: Not on file   Occupational History    Not on file   Social Needs    Financial resource strain: Not on file    Food insecurity:     Worry: Not on file     Inability: Not on file    Transportation needs:     Medical: Not on file     Non-medical: Not on file   Tobacco Use    Smoking status: Former Smoker     Packs/day: 1.50     Years: 8.00     Pack years: 12.00     Types: Cigarettes     Last attempt to quit: 3/4/1980     Years since quittin.2    Smokeless tobacco: Never Used   Substance and Sexual Activity    Alcohol use: No    Drug use: No    Sexual activity: Not on file   Lifestyle    Physical activity:     Days per week: Not on file     Minutes per session: Not on file    Stress: Not on file   Relationships    Social connections:     Talks on phone: Not on file     Gets together: Not on file     Attends [employer] service: Not on file     Active member of club or organization: Not on file     Attends meetings of clubs or organizations: Not on file     Relationship status: Not on file    Intimate partner violence:     Fear of current or ex partner: Not on file     Emotionally abused: Not on file     Physically abused: Not on file     Forced sexual activity: Not on file   Other Topics Concern    Not on file   Social History Narrative    Not on file       SCREENINGS           PHYSICAL EXAM    (up to 7 for level 4, 8 or more for level 5)   @[email or web address]    Physical Exam   Constitutional: He is oriented to person, place, and time. He appears well-developed and well-nourished. No distress. HENT:   Head: Normocephalic and atraumatic. Nose: Nose normal.   Mouth/Throat: Oropharynx is clear and moist. No oropharyngeal exudate. Eyes: Pupils are equal, round, and reactive to light. Conjunctivae and EOM are normal. Right eye exhibits no discharge. Left eye exhibits no discharge. No scleral icterus. Neck: Normal range of motion. Neck supple. Cardiovascular: Normal rate, regular rhythm, normal heart sounds and intact distal pulses. Exam reveals no gallop and no friction rub. No murmur heard. Radial pulses are +2 out of 4 bilaterally there is equal and symmetric   Pulmonary/Chest: Effort normal and breath sounds normal. No stridor. No respiratory distress. He has no wheezes. Abdominal: Soft. Bowel sounds are normal. He exhibits no distension. There is no tenderness. There is no guarding. No voluntary guarding or rigidity no pulsatile mass   Musculoskeletal: Normal range of motion. He exhibits tenderness. He exhibits no edema or deformity. No bony deformity or step-off of the thoracic or lumbar spine. There is mild midline lumbar pain with palpation. Is also bilateral paralumbar tenderness that worsens with motion. No saddle anesthesia. Negative straight leg raise. Negative clonus and Babinski. Patellar reflex is +2 out of 4 in the left and +1 out of 4 on the right. Neurological: He is alert and oriented to person, place, and time. No cranial nerve deficit or sensory deficit. Skin: Skin is warm and dry. Capillary refill takes less than 2 seconds. No rash noted.  He is not

## 2019-05-19 ENCOUNTER — HOSPITAL ENCOUNTER (INPATIENT)
Age: 74
LOS: 2 days | Discharge: HOME | DRG: 641 | End: 2019-05-21
Attending: EMERGENCY MEDICINE | Admitting: FAMILY MEDICINE
Payer: MEDICARE

## 2019-05-19 ENCOUNTER — APPOINTMENT (OUTPATIENT)
Dept: CT IMAGING | Age: 74
DRG: 641 | End: 2019-05-19
Payer: MEDICARE

## 2019-05-19 DIAGNOSIS — N39.0 URINARY TRACT INFECTION WITHOUT HEMATURIA, SITE UNSPECIFIED: ICD-10-CM

## 2019-05-19 DIAGNOSIS — R11.2 NAUSEA AND VOMITING, INTRACTABILITY OF VOMITING NOT SPECIFIED, UNSPECIFIED VOMITING TYPE: ICD-10-CM

## 2019-05-19 DIAGNOSIS — E87.1 HYPONATREMIA: ICD-10-CM

## 2019-05-19 DIAGNOSIS — E86.0 DEHYDRATION: Primary | ICD-10-CM

## 2019-05-19 DIAGNOSIS — A41.9 SEPTICEMIA (HCC): ICD-10-CM

## 2019-05-19 PROBLEM — R65.10 SIRS (SYSTEMIC INFLAMMATORY RESPONSE SYNDROME) (HCC): Status: ACTIVE | Noted: 2019-05-19

## 2019-05-19 LAB
-: ABNORMAL
ABSOLUTE EOS #: 0.14 K/UL (ref 0–0.44)
ABSOLUTE IMMATURE GRANULOCYTE: 0 K/UL (ref 0–0.3)
ABSOLUTE LYMPH #: 0.54 K/UL (ref 1.1–3.7)
ABSOLUTE MONO #: 0.54 K/UL (ref 0.1–1.2)
AMORPHOUS: ABNORMAL
ANION GAP SERPL CALCULATED.3IONS-SCNC: 15 MMOL/L (ref 9–17)
BACTERIA: ABNORMAL
BASOPHILS # BLD: 0 % (ref 0–2)
BASOPHILS ABSOLUTE: 0 K/UL (ref 0–0.2)
BILIRUBIN URINE: NEGATIVE
BUN BLDV-MCNC: 35 MG/DL (ref 8–23)
BUN/CREAT BLD: 31 (ref 9–20)
CALCIUM SERPL-MCNC: 9.1 MG/DL (ref 8.6–10.4)
CASTS UA: ABNORMAL /LPF
CHLORIDE BLD-SCNC: 95 MMOL/L (ref 98–107)
CO2: 22 MMOL/L (ref 20–31)
COLOR: YELLOW
COMMENT UA: ABNORMAL
CREAT SERPL-MCNC: 1.14 MG/DL (ref 0.7–1.2)
CRYSTALS, UA: ABNORMAL /HPF
DIFFERENTIAL TYPE: ABNORMAL
EOSINOPHILS RELATIVE PERCENT: 1 % (ref 1–4)
EPITHELIAL CELLS UA: ABNORMAL /HPF (ref 0–5)
ESTIMATED AVERAGE GLUCOSE: 197 MG/DL
GFR AFRICAN AMERICAN: >60 ML/MIN
GFR NON-AFRICAN AMERICAN: >60 ML/MIN
GFR SERPL CREATININE-BSD FRML MDRD: ABNORMAL ML/MIN/{1.73_M2}
GFR SERPL CREATININE-BSD FRML MDRD: ABNORMAL ML/MIN/{1.73_M2}
GLUCOSE BLD-MCNC: 286 MG/DL (ref 70–99)
GLUCOSE BLD-MCNC: 340 MG/DL (ref 74–100)
GLUCOSE BLD-MCNC: 364 MG/DL (ref 74–100)
GLUCOSE URINE: ABNORMAL
HBA1C MFR BLD: 8.5 % (ref 4.8–5.9)
HCT VFR BLD CALC: 35.3 % (ref 40.7–50.3)
HEMOGLOBIN: 12.1 G/DL (ref 13–17)
IMMATURE GRANULOCYTES: 0 %
KETONES, URINE: ABNORMAL
LACTIC ACID, SEPSIS WHOLE BLOOD: ABNORMAL MMOL/L (ref 0.5–1.9)
LACTIC ACID, SEPSIS WHOLE BLOOD: ABNORMAL MMOL/L (ref 0.5–1.9)
LACTIC ACID, SEPSIS: 2 MMOL/L (ref 0.5–1.9)
LACTIC ACID, SEPSIS: 2 MMOL/L (ref 0.5–1.9)
LEUKOCYTE ESTERASE, URINE: ABNORMAL
LYMPHOCYTES # BLD: 4 % (ref 24–43)
MCH RBC QN AUTO: 31.1 PG (ref 25.2–33.5)
MCHC RBC AUTO-ENTMCNC: 34.3 G/DL (ref 28.4–34.8)
MCV RBC AUTO: 90.7 FL (ref 82.6–102.9)
MONOCYTES # BLD: 4 % (ref 3–12)
MORPHOLOGY: NORMAL
MUCUS: ABNORMAL
NITRITE, URINE: POSITIVE
NRBC AUTOMATED: 0 PER 100 WBC
OTHER OBSERVATIONS UA: ABNORMAL
PDW BLD-RTO: 13.3 % (ref 11.8–14.4)
PH UA: 5.5 (ref 5–9)
PLATELET # BLD: 136 K/UL (ref 138–453)
PLATELET ESTIMATE: ABNORMAL
PMV BLD AUTO: 10.3 FL (ref 8.1–13.5)
POTASSIUM SERPL-SCNC: 3.7 MMOL/L (ref 3.7–5.3)
PROTEIN UA: ABNORMAL
RBC # BLD: 3.89 M/UL (ref 4.21–5.77)
RBC # BLD: ABNORMAL 10*6/UL
RBC UA: ABNORMAL /HPF (ref 0–2)
RENAL EPITHELIAL, UA: ABNORMAL /HPF
SEG NEUTROPHILS: 91 % (ref 36–65)
SEGMENTED NEUTROPHILS ABSOLUTE COUNT: 12.38 K/UL (ref 1.5–8.1)
SODIUM BLD-SCNC: 132 MMOL/L (ref 135–144)
SPECIFIC GRAVITY UA: >1.03 (ref 1.01–1.02)
TRICHOMONAS: ABNORMAL
TURBIDITY: ABNORMAL
URINE HGB: ABNORMAL
UROBILINOGEN, URINE: NORMAL
WBC # BLD: 13.6 K/UL (ref 3.5–11.3)
WBC # BLD: ABNORMAL 10*3/UL
WBC UA: ABNORMAL /HPF (ref 0–5)
YEAST: ABNORMAL

## 2019-05-19 PROCEDURE — 6370000000 HC RX 637 (ALT 250 FOR IP): Performed by: FAMILY MEDICINE

## 2019-05-19 PROCEDURE — P9612 CATHETERIZE FOR URINE SPEC: HCPCS

## 2019-05-19 PROCEDURE — 99285 EMERGENCY DEPT VISIT HI MDM: CPT

## 2019-05-19 PROCEDURE — 85025 COMPLETE CBC W/AUTO DIFF WBC: CPT

## 2019-05-19 PROCEDURE — 96375 TX/PRO/DX INJ NEW DRUG ADDON: CPT

## 2019-05-19 PROCEDURE — 81001 URINALYSIS AUTO W/SCOPE: CPT

## 2019-05-19 PROCEDURE — 83605 ASSAY OF LACTIC ACID: CPT

## 2019-05-19 PROCEDURE — 80048 BASIC METABOLIC PNL TOTAL CA: CPT

## 2019-05-19 PROCEDURE — 2580000003 HC RX 258: Performed by: FAMILY MEDICINE

## 2019-05-19 PROCEDURE — 6360000002 HC RX W HCPCS: Performed by: EMERGENCY MEDICINE

## 2019-05-19 PROCEDURE — 36415 COLL VENOUS BLD VENIPUNCTURE: CPT

## 2019-05-19 PROCEDURE — 2580000003 HC RX 258: Performed by: EMERGENCY MEDICINE

## 2019-05-19 PROCEDURE — 82947 ASSAY GLUCOSE BLOOD QUANT: CPT

## 2019-05-19 PROCEDURE — 74176 CT ABD & PELVIS W/O CONTRAST: CPT

## 2019-05-19 PROCEDURE — 94664 DEMO&/EVAL PT USE INHALER: CPT

## 2019-05-19 PROCEDURE — 87086 URINE CULTURE/COLONY COUNT: CPT

## 2019-05-19 PROCEDURE — 96365 THER/PROPH/DIAG IV INF INIT: CPT

## 2019-05-19 PROCEDURE — 83036 HEMOGLOBIN GLYCOSYLATED A1C: CPT

## 2019-05-19 PROCEDURE — 6360000002 HC RX W HCPCS: Performed by: FAMILY MEDICINE

## 2019-05-19 PROCEDURE — 87040 BLOOD CULTURE FOR BACTERIA: CPT

## 2019-05-19 PROCEDURE — 1200000000 HC SEMI PRIVATE

## 2019-05-19 RX ORDER — SODIUM CHLORIDE 0.9 % (FLUSH) 0.9 %
10 SYRINGE (ML) INJECTION EVERY 12 HOURS SCHEDULED
Status: DISCONTINUED | OUTPATIENT
Start: 2019-05-19 | End: 2019-05-19

## 2019-05-19 RX ORDER — M-VIT,TX,IRON,MINS/CALC/FOLIC 27MG-0.4MG
1 TABLET ORAL DAILY
Status: DISCONTINUED | OUTPATIENT
Start: 2019-05-20 | End: 2019-05-21 | Stop reason: HOSPADM

## 2019-05-19 RX ORDER — SODIUM CHLORIDE 0.9 % (FLUSH) 0.9 %
10 SYRINGE (ML) INJECTION EVERY 12 HOURS SCHEDULED
Status: DISCONTINUED | OUTPATIENT
Start: 2019-05-19 | End: 2019-05-21 | Stop reason: HOSPADM

## 2019-05-19 RX ORDER — CHLORAL HYDRATE 500 MG
1000 CAPSULE ORAL 2 TIMES DAILY
Status: DISCONTINUED | OUTPATIENT
Start: 2019-05-19 | End: 2019-05-19

## 2019-05-19 RX ORDER — TIZANIDINE 4 MG/1
2 TABLET ORAL EVERY 6 HOURS PRN
Status: DISCONTINUED | OUTPATIENT
Start: 2019-05-19 | End: 2019-05-21 | Stop reason: HOSPADM

## 2019-05-19 RX ORDER — SODIUM CHLORIDE 9 MG/ML
INJECTION, SOLUTION INTRAVENOUS CONTINUOUS
Status: DISCONTINUED | OUTPATIENT
Start: 2019-05-19 | End: 2019-05-21 | Stop reason: HOSPADM

## 2019-05-19 RX ORDER — FAMOTIDINE 20 MG/1
20 TABLET, FILM COATED ORAL 2 TIMES DAILY
Status: DISCONTINUED | OUTPATIENT
Start: 2019-05-19 | End: 2019-05-21 | Stop reason: HOSPADM

## 2019-05-19 RX ORDER — SODIUM CHLORIDE 0.9 % (FLUSH) 0.9 %
10 SYRINGE (ML) INJECTION PRN
Status: DISCONTINUED | OUTPATIENT
Start: 2019-05-19 | End: 2019-05-21 | Stop reason: HOSPADM

## 2019-05-19 RX ORDER — NICOTINE POLACRILEX 4 MG
15 LOZENGE BUCCAL PRN
Status: DISCONTINUED | OUTPATIENT
Start: 2019-05-19 | End: 2019-05-21 | Stop reason: HOSPADM

## 2019-05-19 RX ORDER — ALBUTEROL SULFATE 90 UG/1
2 AEROSOL, METERED RESPIRATORY (INHALATION) EVERY 6 HOURS PRN
Status: DISCONTINUED | OUTPATIENT
Start: 2019-05-19 | End: 2019-05-21 | Stop reason: HOSPADM

## 2019-05-19 RX ORDER — EPLERENONE 25 MG/1
25 TABLET, FILM COATED ORAL DAILY
Status: DISCONTINUED | OUTPATIENT
Start: 2019-05-19 | End: 2019-05-21 | Stop reason: HOSPADM

## 2019-05-19 RX ORDER — NITROGLYCERIN 0.4 MG/1
0.4 TABLET SUBLINGUAL EVERY 5 MIN PRN
Status: DISCONTINUED | OUTPATIENT
Start: 2019-05-19 | End: 2019-05-21 | Stop reason: HOSPADM

## 2019-05-19 RX ORDER — CARVEDILOL 12.5 MG/1
12.5 TABLET ORAL 2 TIMES DAILY
Status: DISCONTINUED | OUTPATIENT
Start: 2019-05-19 | End: 2019-05-21 | Stop reason: HOSPADM

## 2019-05-19 RX ORDER — ONDANSETRON 2 MG/ML
4 INJECTION INTRAMUSCULAR; INTRAVENOUS ONCE
Status: COMPLETED | OUTPATIENT
Start: 2019-05-19 | End: 2019-05-19

## 2019-05-19 RX ORDER — 0.9 % SODIUM CHLORIDE 0.9 %
30 INTRAVENOUS SOLUTION INTRAVENOUS ONCE
Status: DISCONTINUED | OUTPATIENT
Start: 2019-05-19 | End: 2019-05-19

## 2019-05-19 RX ORDER — HYDRALAZINE HYDROCHLORIDE 50 MG/1
100 TABLET, FILM COATED ORAL 3 TIMES DAILY
Status: DISCONTINUED | OUTPATIENT
Start: 2019-05-19 | End: 2019-05-21 | Stop reason: HOSPADM

## 2019-05-19 RX ORDER — ASPIRIN 81 MG/1
81 TABLET ORAL DAILY
Status: DISCONTINUED | OUTPATIENT
Start: 2019-05-19 | End: 2019-05-21 | Stop reason: HOSPADM

## 2019-05-19 RX ORDER — GLIPIZIDE 5 MG/1
10 TABLET ORAL
Status: DISCONTINUED | OUTPATIENT
Start: 2019-05-20 | End: 2019-05-21 | Stop reason: HOSPADM

## 2019-05-19 RX ORDER — B1/B2/B3/B5/B6/IRON/METH/CHOLN 2.5-18/15
1 LIQUID (ML) ORAL DAILY
Status: DISCONTINUED | OUTPATIENT
Start: 2019-05-19 | End: 2019-05-19 | Stop reason: CLARIF

## 2019-05-19 RX ORDER — PREDNISOLONE ACETATE 10 MG/ML
1 SUSPENSION/ DROPS OPHTHALMIC DAILY
Status: DISCONTINUED | OUTPATIENT
Start: 2019-05-19 | End: 2019-05-21 | Stop reason: HOSPADM

## 2019-05-19 RX ORDER — DEXTROSE MONOHYDRATE 50 MG/ML
100 INJECTION, SOLUTION INTRAVENOUS PRN
Status: DISCONTINUED | OUTPATIENT
Start: 2019-05-19 | End: 2019-05-21 | Stop reason: HOSPADM

## 2019-05-19 RX ORDER — ACETAMINOPHEN 325 MG/1
650 TABLET ORAL EVERY 4 HOURS PRN
Status: DISCONTINUED | OUTPATIENT
Start: 2019-05-19 | End: 2019-05-21 | Stop reason: HOSPADM

## 2019-05-19 RX ORDER — LISINOPRIL 20 MG/1
40 TABLET ORAL DAILY
Status: DISCONTINUED | OUTPATIENT
Start: 2019-05-20 | End: 2019-05-21 | Stop reason: HOSPADM

## 2019-05-19 RX ORDER — 0.9 % SODIUM CHLORIDE 0.9 %
1000 INTRAVENOUS SOLUTION INTRAVENOUS ONCE
Status: DISCONTINUED | OUTPATIENT
Start: 2019-05-19 | End: 2019-05-19

## 2019-05-19 RX ORDER — ONDANSETRON 2 MG/ML
4 INJECTION INTRAMUSCULAR; INTRAVENOUS EVERY 6 HOURS PRN
Status: DISCONTINUED | OUTPATIENT
Start: 2019-05-19 | End: 2019-05-21 | Stop reason: HOSPADM

## 2019-05-19 RX ORDER — DEXTROSE MONOHYDRATE 25 G/50ML
12.5 INJECTION, SOLUTION INTRAVENOUS PRN
Status: DISCONTINUED | OUTPATIENT
Start: 2019-05-19 | End: 2019-05-21 | Stop reason: HOSPADM

## 2019-05-19 RX ORDER — 0.9 % SODIUM CHLORIDE 0.9 %
500 INTRAVENOUS SOLUTION INTRAVENOUS ONCE
Status: COMPLETED | OUTPATIENT
Start: 2019-05-19 | End: 2019-05-19

## 2019-05-19 RX ORDER — DOCUSATE SODIUM 100 MG/1
100 CAPSULE, LIQUID FILLED ORAL 2 TIMES DAILY
Status: DISCONTINUED | OUTPATIENT
Start: 2019-05-19 | End: 2019-05-21 | Stop reason: HOSPADM

## 2019-05-19 RX ORDER — LISINOPRIL 40 MG/1
1 TABLET ORAL DAILY
Status: DISCONTINUED | OUTPATIENT
Start: 2019-05-19 | End: 2019-05-19

## 2019-05-19 RX ADMIN — PREDNISOLONE ACETATE 1 DROP: 10 SUSPENSION/ DROPS OPHTHALMIC at 15:54

## 2019-05-19 RX ADMIN — INSULIN LISPRO 5 UNITS: 100 INJECTION, SOLUTION INTRAVENOUS; SUBCUTANEOUS at 20:48

## 2019-05-19 RX ADMIN — SODIUM CHLORIDE: 9 INJECTION, SOLUTION INTRAVENOUS at 13:30

## 2019-05-19 RX ADMIN — CEFEPIME 2 G: 2 INJECTION, POWDER, FOR SOLUTION INTRAVENOUS at 10:19

## 2019-05-19 RX ADMIN — CEFTRIAXONE 1 G: 1 INJECTION, POWDER, FOR SOLUTION INTRAMUSCULAR; INTRAVENOUS at 20:47

## 2019-05-19 RX ADMIN — INSULIN GLARGINE 20 UNITS: 100 INJECTION, SOLUTION SUBCUTANEOUS at 20:48

## 2019-05-19 RX ADMIN — SODIUM CHLORIDE 500 ML: 9 INJECTION, SOLUTION INTRAVENOUS at 10:13

## 2019-05-19 RX ADMIN — INSULIN LISPRO 8 UNITS: 100 INJECTION, SOLUTION INTRAVENOUS; SUBCUTANEOUS at 17:12

## 2019-05-19 RX ADMIN — FAMOTIDINE 20 MG: 20 TABLET ORAL at 20:47

## 2019-05-19 RX ADMIN — ENOXAPARIN SODIUM 40 MG: 40 INJECTION SUBCUTANEOUS at 15:54

## 2019-05-19 RX ADMIN — TIZANIDINE 2 MG: 4 TABLET ORAL at 15:53

## 2019-05-19 RX ADMIN — ONDANSETRON 4 MG: 2 INJECTION INTRAMUSCULAR; INTRAVENOUS at 11:03

## 2019-05-19 RX ADMIN — ASPIRIN 81 MG: 81 TABLET ORAL at 15:53

## 2019-05-19 RX ADMIN — HYDRALAZINE HYDROCHLORIDE 100 MG: 50 TABLET, FILM COATED ORAL at 20:47

## 2019-05-19 RX ADMIN — CARVEDILOL 12.5 MG: 12.5 TABLET, FILM COATED ORAL at 20:47

## 2019-05-19 RX ADMIN — SODIUM CHLORIDE: 9 INJECTION, SOLUTION INTRAVENOUS at 20:37

## 2019-05-19 RX ADMIN — HYDRALAZINE HYDROCHLORIDE 100 MG: 50 TABLET, FILM COATED ORAL at 15:54

## 2019-05-19 ASSESSMENT — ENCOUNTER SYMPTOMS
SHORTNESS OF BREATH: 0
CHEST TIGHTNESS: 0
SORE THROAT: 0
EYE REDNESS: 0
VOMITING: 1
APNEA: 0
NAUSEA: 1
ABDOMINAL PAIN: 0
EYE DISCHARGE: 0

## 2019-05-19 ASSESSMENT — PAIN DESCRIPTION - DESCRIPTORS: DESCRIPTORS: BURNING

## 2019-05-19 ASSESSMENT — PAIN DESCRIPTION - LOCATION: LOCATION: GROIN

## 2019-05-19 ASSESSMENT — PAIN SCALES - GENERAL
PAINLEVEL_OUTOF10: 0
PAINLEVEL_OUTOF10: 8
PAINLEVEL_OUTOF10: 0
PAINLEVEL_OUTOF10: 0

## 2019-05-19 ASSESSMENT — PAIN DESCRIPTION - PAIN TYPE: TYPE: ACUTE PAIN

## 2019-05-19 NOTE — ED PROVIDER NOTES
[facility] AT THE [facility]  EMERGENCY DEPARTMENT ENCOUNTER      Pt Name: Jose Fuentes  MRN: [id number]  [place] 1945  Date of evaluation: 5/19/2019  Provider: Robert Mauro, [facility]       Chief Complaint   Patient presents with    Urinary Retention     unable to pee since 11pm,     Nausea       HISTORY OF PRESENT ILLNESS    Jose Fuentes is a 68 y.o. male with PMH of diabetes, pacemaker, CRF, and CHF who presents to the emergency department from home with CC of pelvic pain, dysuria, nausea, and vomiting. Patient states he noticed \"sharp, burning pain with urination at 11 pm last night. \"  Patient states he has also had multiple bouts of nausea and vomiting since then. Patient has vomited 5 times since yesterday. He has been unable to keep down fluids. Patient has never had symptoms similar to this in the past.  Patient denies:  Fever, flank pain, and diarrhea. Upon presentation to emergency room, patient was found to have an elevated Heart rate, lactate level, and WBC count. Patient is also complaining of continued vomiting in emergency room. Triage notes and Nursing notes were reviewed by myself. Any discrepancies are addressed above. PAST MEDICAL HISTORY     Past Medical History:   Diagnosis Date    Acute MI ([address]     Acute renal failure with tubular necrosis ([address] 10/2/2018    ssecondary to hemodynamic effects of loop diuretics and ace inhibitors, bbaseline 1.2-1.3 which peaked up to 1.8, resolving    CAD (coronary artery disease)     Cerebral artery occlusion with cerebral infarction ([address]     CHF (congestive heart failure) (HCC)     Chronic back pain     Closed fracture of lumbar vertebra without mention of spinal cord injury     Diabetes mellitus ([address]     Displacement of intervertebral disc, site unspecified, without myelopathy     H/O cardiac catheterization 2/19/16    LMCA: Mild irregularities 10-20%. LAD: Lesion on PRox LAD: Mid subsection. 65% stenosis.  LCx: Lesion on 1st Ob Valentina: Proximal subesection. 70% steniosis. RCA: Small non-dominant RCA. Lesion on PRox RCA: Ostial. 50% stenosis. EF:55%.  H/O cardiovascular stress test 2/19/16    Abnormal. Moderate perfusion defect of mild intensity in the inferior, inferoseptal adn inferoapical regions during stress imaging, which most consistent with ischemia. Global LV systolic function normal without regional wall motion abnormalities. OVerall these results are most consistent with an intermediate risk for signficant CAD. Additional testing including cardiac cath may be indicated.  H/O tilt table evaluation 12/26/2017    Abnormal. Patients HR, BP response and symptoms were most consistent with dysautonomia. Combined with viligant maintenance of euvolemia and maintaining a moderate salft intake, pharmacologic treatment with SSRI such as lexapro and or mestinon among other treatments have shown some effectiveness in treatment of this condition    H/O tilt table evaluation 12/26/2017    Abnormal head upright tilt table study. The pt heart rate, blood pressure response and symptoms were most consistent with dysautonomia.  History of 24 hour EKG monitoring 8/14/14    Occasional PAC's and PVC's which appear to be at least moderately symptomatic.  History of 24 hour EKG monitoring 11/19/14    Event Monitor. Sinus rhythm and sinus bradycardia. Infrequent isolated PVC's    History of cardiovascular stress test 2/19/14    Relatively NL    History of cardiovascular stress test 03/21/2018    Normal myocardial perfusion. Global left ventricular systolic function was normal with an EF of 68%. Overall, these results are most consistent with a low risk scan.  History of coronary artery stent placement 04/2017    PTCA / Drug Eluting Stent:, CX and / or branches    History of CVA (cerebrovascular accident) without residual deficits 2014    Incidentally found on CT head.  No known impairment now or in the past.    History of echocardiogram 2/18/14    LA mildly dilated, EF 55%, LV wall thickness is moderately increased, no definite wall motion abnormalilities, what appears to be a pacer wire is seen w/n the RA and RV, mild-mod TR, mild pulmonary hypertension.  History of Holter monitoring 12/29/2017    Rare PAC's and PVC's.  History of tilt table evaluation 8/12/14    Abnormal    Hyperlipidemia     Hypertension     Non critical Right Renal artery stenosis, native ([facility] 10/2/2018    Non critical Right Renal artery stenosis, based on cath in 2016, Rt RA 30% stenosis    Pacemaker     non-functioning    S/P cardiac cath 04/10/2017    S/P coronary artery stent placement     Successful PTCA - HA Om1    SIRS (systemic inflammatory response syndrome) ([facility] 5/19/2019    Type II or unspecified type diabetes mellitus without mention of complication, not stated as uncontrolled        SURGICAL HISTORY       Past Surgical History:   Procedure Laterality Date    BACK SURGERY      CARDIAC CATHETERIZATION Left 2/19/16    via right radial approach/ [facility]/ Dr. Ofelia Alvarado  8/17/15    Liang/Charles/Saulo/ Lap    COLONOSCOPY  2010    COLONOSCOPY  2/19/15    -polyps,diverticulosis,hemorrhoids    COLONOSCOPY  05/23/2018    Dr Juan Ramos    COLONOSCOPY N/A 5/23/2018    COLONOSCOPY POLYPECTOMY SNARE/COLD BIOPSY  cold snare  and  hot snare performed by Danyel MD Abdias at [facility]      left eye   [address]       Current Discharge Medication List      CONTINUE these medications which have NOT CHANGED    Details   tiZANidine (ZANAFLEX) 2 MG tablet Take 1 tablet by mouth every 6 hours as needed (spasm)  Qty: 30 tablet, Refills: 2      hydrALAZINE (APRESOLINE) 100 MG tablet Take 1 tablet by mouth 3 times daily  Qty: 270 tablet, Refills: 3    Associated Diagnoses: Dysautonomia ([address]  Coronary artery disease involving native coronary artery of native heart without angina pectoris; Essential hypertension      insulin glargine (LANTUS SOLOSTAR) 100 UNIT/ML injection pen Inject 20 Units into the skin 2 times daily  Qty: 36 mL, Refills: 0      lisinopril (PRINIVIL;ZESTRIL) 40 MG tablet TAKE 1 TABLET BY MOUTH ONCE DAILY  Qty: 90 tablet, Refills: 0      glipiZIDE (GLIPIZIDE XL) 5 MG extended release tablet TAKE 2 TABLETS BY MOUTH TWICE DAILY  Qty: 360 tablet, Refills: 0      carvedilol (COREG) 12.5 MG tablet TAKE 1 TABLET BY MOUTH TWICE DAILY  Qty: 180 tablet, Refills: 0      eplerenone (INSPRA) 25 MG tablet Take 1 tablet by mouth daily  Qty: 90 tablet, Refills: 3      Omega-3 Fatty Acids (FISH OIL) 1000 MG CAPS Take 1,000 mg by mouth 2 times daily      DIANA CONTOUR TEST strip USE ONE STRIP TO CHECK GLUCOSE TWICE DAILY  Qty: 100 strip, Refills: 11    Comments: Please consider 90 day supplies to promote better adherence  Associated Diagnoses: Type 2 diabetes mellitus with hyperglycemia, with long-term current use of insulin (HCC)      aspirin EC 81 MG EC tablet Take 1 tablet by mouth daily  Qty: 30 tablet, Refills: 3      Iron-Vitamins (GERITOL COMPLETE PO) Take by mouth daily       DIANA MICROLET LANCETS MISC TEST TWICE DAILY  Qty: 100 each, Refills: 2      Insulin Pen Needle (PEN NEEDLES) 31G X 6 MM MISC 1 each by Does not apply route daily  Qty: 100 each, Refills: 3      prednisoLONE acetate (PRED FORTE) 1 % ophthalmic suspension Place 1 drop into the left eye daily       docusate sodium (COLACE) 100 MG capsule Take 1 capsule by mouth 2 times daily  Qty: 60 capsule, Refills: 0      albuterol sulfate HFA (PROAIR HFA) 108 (90 Base) MCG/ACT inhaler Inhale 2 puffs into the lungs every 6 hours as needed for Wheezing  Qty: 1 Inhaler, Refills: 3      nitroGLYCERIN (NITROSTAT) 0.4 MG SL tablet Place 1 tablet under the tongue every 5 minutes as needed for Chest pain  Qty: 25 tablet, Refills: 3             ALLERGIES Lipitor [atorvastatin]; Aldactone [spironolactone]; Crestor [rosuvastatin calcium]; Lopid [gemfibrozil]; Invokana [canagliflozin]; and Januvia [sitagliptin]    FAMILY HISTORY       Family History   Problem Relation Age of Onset    Cancer Mother         breast    Cancer Father         lung        SOCIAL HISTORY       Social History     Socioeconomic History    Marital status:      Spouse name: None    Number of children: None    Years of education: None    Highest education level: None   Occupational History    None   Social Needs    Financial resource strain: None    Food insecurity:     Worry: None     Inability: None    Transportation needs:     Medical: None     Non-medical: None   Tobacco Use    Smoking status: Former Smoker     Packs/day: 1.50     Years: 8.00     Pack years: 12.00     Types: Cigarettes     Last attempt to quit: 3/4/1980     Years since quittin.2    Smokeless tobacco: Never Used   Substance and Sexual Activity    Alcohol use: No    Drug use: No    Sexual activity: None   Lifestyle    Physical activity:     Days per week: None     Minutes per session: None    Stress: None   Relationships    Social connections:     Talks on phone: None     Gets together: None     Attends [employer] service: None     Active member of club or organization: None     Attends meetings of clubs or organizations: None     Relationship status: None    Intimate partner violence:     Fear of current or ex partner: None     Emotionally abused: None     Physically abused: None     Forced sexual activity: None   Other Topics Concern    None   Social History Narrative    None       REVIEW OF SYSTEMS     Review of Systems   Constitutional: Negative for activity change, diaphoresis and fever. HENT: Negative for congestion, ear discharge, ear pain and sore throat. Eyes: Negative for discharge and redness. Respiratory: Negative for apnea, chest tightness and shortness of breath. Cardiovascular: Negative for chest pain. Gastrointestinal: Positive for nausea and vomiting. Negative for abdominal pain. Genitourinary: Positive for dysuria. Skin: Negative for pallor and rash. Neurological: Negative for dizziness, seizures, syncope, facial asymmetry, speech difficulty, weakness and headaches. All other systems reviewed and are negative. Except as noted above the remainder of the review of systems was reviewed and is negative. PHYSICAL EXAM    (up to 7 for level 4, 8 or more for level 5)     ED Triage Vitals [05/19/19 0859]   BP Temp Temp src Pulse Resp SpO2 Height Weight   (!) 197/84 99.2 °F (37.3 °C) -- 94 17 96 % -- --       Physical Exam   Constitutional: He is oriented to person, place, and time. He appears well-developed and well-nourished. HENT:   Head: Normocephalic and atraumatic. Eyes: Pupils are equal, round, and reactive to light. Conjunctivae and EOM are normal.   Neck: Normal range of motion. Neck supple. Cardiovascular: Normal rate, regular rhythm, normal heart sounds and intact distal pulses. Pulmonary/Chest: Effort normal and breath sounds normal.   Abdominal: Soft. Bowel sounds are normal. There is tenderness in the right lower quadrant, suprapubic area and left lower quadrant. Musculoskeletal: Normal range of motion. Neurological: He is alert and oriented to person, place, and time. Skin: Skin is warm and dry. Nursing note and vitals reviewed. DIAGNOSTIC RESULTS         RADIOLOGY:   Interpretation per the Radiologist below, if available at the time of this note:    CT ABDOMEN PELVIS WO CONTRAST Additional Contrast? None   Final Result   No acute abdominal or pelvic abnormality. Hepatic steatosis. Mild splenomegaly. Uncomplicated colonic diverticulosis.              LABS:  Labs Reviewed   BASIC METABOLIC PANEL - Abnormal; Notable for the following components:       Result Value    Glucose 286 (*)     BUN 35 (*)     Bun/Cre Ratio 31 (*)     Sodium 132 (*)     Chloride 95 (*)     All other components within normal limits   CBC WITH AUTO DIFFERENTIAL - Abnormal; Notable for the following components:    WBC 13.6 (*)     RBC 3.89 (*)     Hemoglobin 12.1 (*)     Hematocrit 35.3 (*)     Platelets 240 (*)     Seg Neutrophils 91 (*)     Lymphocytes 4 (*)     Segs Absolute 12.38 (*)     Absolute Lymph # 0.54 (*)     All other components within normal limits   URINALYSIS WITH MICROSCOPIC - Abnormal; Notable for the following components:    Turbidity UA SLIGHTLY CLOUDY (*)     Glucose, Ur 2+ (*)     Ketones, Urine TRACE (*)     Specific Gravity, UA >1.030 (*)     Urine Hgb 2+ (*)     Protein, UA 2+ (*)     Nitrite, Urine POSITIVE (*)     Leukocyte Esterase, Urine TRACE (*)     Bacteria, UA 2+ (*)     All other components within normal limits   LACTATE, SEPSIS - Abnormal; Notable for the following components:    Lactic Acid, Sepsis 2.0 (*)     All other components within normal limits   LACTATE, SEPSIS - Abnormal; Notable for the following components:    Lactic Acid, Sepsis 2.0 (*)     All other components within normal limits   HEMOGLOBIN A1C - Abnormal; Notable for the following components:    Hemoglobin A1C 8.5 (*)     All other components within normal limits   GLUCOSE, WHOLE BLOOD - Abnormal; Notable for the following components:    POC Glucose 340 (*)     All other components within normal limits   CBC WITH AUTO DIFFERENTIAL - Abnormal; Notable for the following components:    WBC 15.1 (*)     RBC 3.51 (*)     Hemoglobin 10.5 (*)     Hematocrit 32.0 (*)     Platelets 004 (*)     Seg Neutrophils 86 (*)     Lymphocytes 8 (*)     Eosinophils % 0 (*)     Immature Granulocytes 1 (*)     Segs Absolute 12.89 (*)     All other components within normal limits   COMPREHENSIVE METABOLIC PANEL W/ REFLEX TO MG FOR LOW K - Abnormal; Notable for the following components:    Glucose 259 (*)     BUN 35 (*)     CREATININE 1.37 (*)     Bun/Cre Ratio 26 (*) Calcium 8.4 (*)     Sodium 134 (*)     Total Protein 5.9 (*)     Alb 3.2 (*)     GFR Non- 51 (*)     All other components within normal limits   GLUCOSE, WHOLE BLOOD - Abnormal; Notable for the following components:    POC Glucose 364 (*)     All other components within normal limits   GLUCOSE, WHOLE BLOOD - Abnormal; Notable for the following components:    POC Glucose 248 (*)     All other components within normal limits   GLUCOSE, WHOLE BLOOD - Abnormal; Notable for the following components:    POC Glucose 350 (*)     All other components within normal limits   GLUCOSE, WHOLE BLOOD - Abnormal; Notable for the following components:    POC Glucose 203 (*)     All other components within normal limits   CBC WITH AUTO DIFFERENTIAL - Abnormal; Notable for the following components:    WBC 11.5 (*)     RBC 3.10 (*)     Hemoglobin 9.5 (*)     Hematocrit 28.5 (*)     Platelets 140 (*)     Seg Neutrophils 86 (*)     Lymphocytes 7 (*)     Eosinophils % 0 (*)     Immature Granulocytes 1 (*)     Segs Absolute 9.95 (*)     Absolute Lymph # 0.75 (*)     All other components within normal limits   COMPREHENSIVE METABOLIC PANEL W/ REFLEX TO MG FOR LOW K - Abnormal; Notable for the following components:    Glucose 210 (*)     BUN 34 (*)     CREATININE 1.34 (*)     Bun/Cre Ratio 25 (*)     Calcium 8.3 (*)     Potassium 3.4 (*)     Total Protein 5.8 (*)     Alb 3.0 (*)     GFR Non- 52 (*)     All other components within normal limits   GLUCOSE, WHOLE BLOOD - Abnormal; Notable for the following components:    POC Glucose 273 (*)     All other components within normal limits   GLUCOSE, WHOLE BLOOD - Abnormal; Notable for the following components:    POC Glucose 188 (*)     All other components within normal limits   GLUCOSE, WHOLE BLOOD - Abnormal; Notable for the following components:    POC Glucose 247 (*)     All other components within normal limits   CULTURE BLOOD #1   CULTURE BLOOD #2   URINE (LOVENOX) injection 40 mg (40 mg Subcutaneous Given 5/21/19 0927)   glucose (GLUTOSE) 40 % oral gel 15 g (has no administration in time range)   dextrose 50 % solution 12.5 g (has no administration in time range)   glucagon (rDNA) injection 1 mg (has no administration in time range)   dextrose 5 % solution (has no administration in time range)   acetaminophen (TYLENOL) tablet 650 mg (650 mg Oral Given 5/20/19 2110)   cefTRIAXone (ROCEPHIN) 1 g in sterile water 10 mL IV syringe (1 g Intravenous New Bag 5/20/19 2109)   insulin lispro (HUMALOG) injection pen 0-12 Units (4 Units Subcutaneous Given 5/21/19 1146)   insulin lispro (HUMALOG) injection pen 0-6 Units (3 Units Subcutaneous Given 5/20/19 2122)   therapeutic multivitamin-minerals 1 tablet (1 tablet Oral Given 5/21/19 0927)   lisinopril (PRINIVIL;ZESTRIL) tablet 40 mg (40 mg Oral Given 5/21/19 0927)   promethazine (PHENERGAN) injection 12.5 mg (12.5 mg Intramuscular Given 5/20/19 1554)   cefepime (MAXIPIME) 2 g IVPB extended (mini-bag) (0 g Intravenous Stopped 5/19/19 1120)   0.9 % sodium chloride bolus (0 mLs Intravenous Stopped 5/19/19 1215)   ondansetron (ZOFRAN) injection 4 mg (4 mg Intravenous Given 5/19/19 1103)   potassium chloride (KLOR-CON M) extended release tablet 40 mEq (40 mEq Oral Given 5/21/19 1146)       CONSULTS:   1104:  Spoke with Dr. Jose M Fink about all aspects of patient's case. Will admit to hospital.       CLINICAL       1. Dehydration    2. Nausea and vomiting, intractability of vomiting not specified, unspecified vomiting type    3. Septicemia ([address]    4. Urinary tract infection without hematuria, site unspecified    5.  Hyponatremia          DISPOSITION/PLAN   DISPOSITION            (Please note that portions of this note were completed with a voice recognition program.  Efforts were made to edit the dictations but occasionallywords are mis-transcribed.)      Wyatt Stringer DO (electronically signed)  Attending Emergency Department Provider

## 2019-05-19 NOTE — PROGRESS NOTES
RESPIRATORY ASSESSMENT PROTOCOL                                                                                              Patient Name: Darcy Gilliam Room#: 1536/2326-11 : 1945     Admitting diagnosis: UTI (urinary tract infection) [N39.0]  Urinary tract infection [N39.0]       Medical History:   Past Medical History:   Diagnosis Date    Acute MI ([facility]     Acute renal failure with tubular necrosis ([facility] 10/2/2018    ssecondary to hemodynamic effects of loop diuretics and ace inhibitors, bbaseline 1.2-1.3 which peaked up to 1.8, resolving    CAD (coronary artery disease)     Cerebral artery occlusion with cerebral infarction ([facility]     CHF (congestive heart failure) ([facility])     Chronic back pain     Closed fracture of lumbar vertebra without mention of spinal cord injury     Diabetes mellitus ([facility]     Displacement of intervertebral disc, site unspecified, without myelopathy     H/O cardiac catheterization 16    LMCA: Mild irregularities 10-20%. LAD: Lesion on PRox LAD: Mid subsection. 65% stenosis. LCx: Lesion on 1st Ob Valentina: Proximal subesection. 70% steniosis. RCA: Small non-dominant RCA. Lesion on PRox RCA: Ostial. 50% stenosis. EF:55%.  H/O cardiovascular stress test 16    Abnormal. Moderate perfusion defect of mild intensity in the inferior, inferoseptal adn inferoapical regions during stress imaging, which most consistent with ischemia. Global LV systolic function normal without regional wall motion abnormalities. OVerall these results are most consistent with an intermediate risk for signficant CAD. Additional testing including cardiac cath may be indicated.  H/O tilt table evaluation 2017    Abnormal. Patients HR, BP response and symptoms were most consistent with dysautonomia.  Combined with viligant maintenance of euvolemia and maintaining a moderate salft intake, pharmacologic treatment with SSRI such as lexapro and or mestinon among other treatments have shown some effectiveness in treatment of this condition    H/O tilt table evaluation 12/26/2017    Abnormal head upright tilt table study. The pt heart rate, blood pressure response and symptoms were most consistent with dysautonomia.  History of 24 hour EKG monitoring 8/14/14    Occasional PAC's and PVC's which appear to be at least moderately symptomatic.  History of 24 hour EKG monitoring 11/19/14    Event Monitor. Sinus rhythm and sinus bradycardia. Infrequent isolated PVC's    History of cardiovascular stress test 2/19/14    Relatively NL    History of cardiovascular stress test 03/21/2018    Normal myocardial perfusion. Global left ventricular systolic function was normal with an EF of 68%. Overall, these results are most consistent with a low risk scan.  History of coronary artery stent placement 04/2017    PTCA / Drug Eluting Stent:, CX and / or branches    History of CVA (cerebrovascular accident) without residual deficits 2014    Incidentally found on CT head. No known impairment now or in the past.    History of echocardiogram 2/18/14    LA mildly dilated, EF 55%, LV wall thickness is moderately increased, no definite wall motion abnormalilities, what appears to be a pacer wire is seen w/n the RA and RV, mild-mod TR, mild pulmonary hypertension.  History of Holter monitoring 12/29/2017    Rare PAC's and PVC's.      History of tilt table evaluation 8/12/14    Abnormal    Hyperlipidemia     Hypertension     Non critical Right Renal artery stenosis, native ([address] 10/2/2018    Non critical Right Renal artery stenosis, based on cath in 2016, Rt RA 30% stenosis    Pacemaker     non-functioning    S/P cardiac cath 04/10/2017    S/P coronary artery stent placement     Successful PTCA - HA Om1    Type II or unspecified type diabetes mellitus without mention of complication, not stated as uncontrolled        PATIENT ASSESSMENT    LABORATORY DATA  Hematology:   Lab Results   Component Value Date WBC 13.6 05/19/2019    RBC 3.89 05/19/2019    HGB 12.1 05/19/2019    HCT 35.3 05/19/2019     05/19/2019     Chemistry:  No results found for: PHART, VNX1YZF, PO2ART, C6LJLDDG, VER6WWN, PBEA    VITALS  Pulse: 92   Resp: 16  BP: (!) 179/73  SpO2: 97 % O2 Device: None (Room air)  Temp: 99.6 °F (37.6 °C)    SKIN COLOR  [x] Normal  [] Pale  [] Dusky  [] Cyanotic    ESPIRATORY PATTERN  [] Normal  [] Dyspnea  [] Cheyne-Cruz  [] Kussmaul  [] Biots    AMBULATORY  [] Yes  [] No  [] With Assistance      Patient Acuity 0 1 2 3 4 Score   Level of Concious (LOC) [x]  Alert & Oriented or Pt normal LOC []  Confused;follows directions []  Confused & uncooper-ative []  Obtunded []  Comatose 0   Respiratory Rate  (RR) [x]  Reg. rate & pattern. 12 - 20 bpm  []  Increased RR. Greater than 20 bpm   []  SOB w/ exertion or RR greater than 24 bpm []  Access- ory muscle use at rest. Abn.  resp. []  SOB at rest.   0   Bilateral Breath Sounds (BBS) [x]  Clear []  Diminish-ed bases  []  Diminish-ed t/o, or rales   []  Sporadic, scattered wheezes or rhonchi []  Persistentwheezes and, or absent BBS 0   Cough [x]  Strong, effective, & non-prod. []  Effective & prod. Less than 25 ml (2 TBSP) over past 24 hrs []  Ineffective & non-prod to less than 25 ML over past 24 hrs []  Ineffective and, or greater than 25 ml sputum prod. past 24 hrs. []  Nonspon- taneous; Requires suctioning 0   Pulmonary History  (PULM HX) [x]  No smoking and no chronic pulmonaryhistory []  Former smoker. Quit over 12 mos. ago []  Current smoker or quit w/ in 12 mos []  Pulm. History and, or 20 pk/yr smoking hx []  Admitted w/ acute pulm. dx and, or has been admitted w/ pulm. dx 2 or more times over past 12 mos 0   Surgical History this Admit  (SURG HX) [x]  No surgery []  General surgery []  Lower abdominal []  Thoracic or upper abdominal   []  Thoracic w/ pulm.  disease 0   Chest X-Ray (CXR)/CT Scan [x]  Clear or not applicable []  Not available []  Atelect- asis clearance. Reverse atelectasis. [] Bronchopulmonary Hygiene Protocol    Total Acuity:   16-32  []  Secondary Assessment in 24 hrs Total Acuity:  9-15  []  Secondary Assessment in 24 hrs Total Acuity:  4-8  []  Secondary Assessment in 48 hrs Total Acuity:  0-3  []  Secondary Assessment in 72 hrs   METANEB QID with [physician-ordered bronchodilator(s)] if CXR Acuity = 4; otherwise:  PD&P, PEP, or Vest QID & PRN  NT Sxn PRN for ineffective cough  METANEB QID with [physician-ordered bronchodilator(s)] if CXR Acuity = 4; otherwise:  PD&P, PEP, or Vest TID & PRN  NT Sxn PRN for ineffective cough  Instruct patient to self-perform IS q1hr WA   Directed Cough self-performed q1hr WA     If Acuity Level is 2 or above in the following:    [] PULMONARY HISTORY (PULM HX)    Goal: Assist patient in quitting smoking to slow or stop the progression of lung disease.     [] Smoking Cessation Protocol    SMOKING CESSATION EDUCATION provided according to policy [id number]: (vito with an X)  ____Yes    ____ No     ____ NA    Smoking Cessation Booklet given:  ____Yes  ____No ____Patient Keagan Escobar

## 2019-05-19 NOTE — H&P
[facility]  History and Physical        Patient:  Petar cMcoy  MRN: [id number]    Chief Complaint:  Fever,dysuria,vomiting    History Obtained From:  patient, family member - spouce    PCP: Oralia Johnson MD    History of Present Illness: The patient is a 68 y.o. male who presents with sudenonset of fever,nausea,vomiting and dysuria,came to er and has uti with sirs and hence admitted    Past Medical History:        Diagnosis Date    Acute MI ([address]     Acute renal failure with tubular necrosis ([address] 10/2/2018    ssecondary to hemodynamic effects of loop diuretics and ace inhibitors, bbaseline 1.2-1.3 which peaked up to 1.8, resolving    CAD (coronary artery disease)     Cerebral artery occlusion with cerebral infarction ([address]     CHF (congestive heart failure) ([address]     Chronic back pain     Closed fracture of lumbar vertebra without mention of spinal cord injury     Diabetes mellitus ([address]     Displacement of intervertebral disc, site unspecified, without myelopathy     H/O cardiac catheterization 2/19/16    LMCA: Mild irregularities 10-20%. LAD: Lesion on PRox LAD: Mid subsection. 65% stenosis. LCx: Lesion on 1st Ob Valentina: Proximal subesection. 70% steniosis. RCA: Small non-dominant RCA. Lesion on PRox RCA: Ostial. 50% stenosis. EF:55%.  H/O cardiovascular stress test 2/19/16    Abnormal. Moderate perfusion defect of mild intensity in the inferior, inferoseptal adn inferoapical regions during stress imaging, which most consistent with ischemia. Global LV systolic function normal without regional wall motion abnormalities. OVerall these results are most consistent with an intermediate risk for signficant CAD. Additional testing including cardiac cath may be indicated.  H/O tilt table evaluation 12/26/2017    Abnormal. Patients HR, BP response and symptoms were most consistent with dysautonomia.  Combined with viligant maintenance of euvolemia and maintaining a moderate salft intake, pharmacologic treatment with SSRI such as lexapro and or mestinon among other treatments have shown some effectiveness in treatment of this condition    H/O tilt table evaluation 12/26/2017    Abnormal head upright tilt table study. The pt heart rate, blood pressure response and symptoms were most consistent with dysautonomia.  History of 24 hour EKG monitoring 8/14/14    Occasional PAC's and PVC's which appear to be at least moderately symptomatic.  History of 24 hour EKG monitoring 11/19/14    Event Monitor. Sinus rhythm and sinus bradycardia. Infrequent isolated PVC's    History of cardiovascular stress test 2/19/14    Relatively NL    History of cardiovascular stress test 03/21/2018    Normal myocardial perfusion. Global left ventricular systolic function was normal with an EF of 68%. Overall, these results are most consistent with a low risk scan.  History of coronary artery stent placement 04/2017    PTCA / Drug Eluting Stent:, CX and / or branches    History of CVA (cerebrovascular accident) without residual deficits 2014    Incidentally found on CT head. No known impairment now or in the past.    History of echocardiogram 2/18/14    LA mildly dilated, EF 55%, LV wall thickness is moderately increased, no definite wall motion abnormalilities, what appears to be a pacer wire is seen w/n the RA and RV, mild-mod TR, mild pulmonary hypertension.  History of Holter monitoring 12/29/2017    Rare PAC's and PVC's.      History of tilt table evaluation 8/12/14    Abnormal    Hyperlipidemia     Hypertension     Non critical Right Renal artery stenosis, native ([address] 10/2/2018    Non critical Right Renal artery stenosis, based on cath in 2016, Rt RA 30% stenosis    Pacemaker     non-functioning    S/P cardiac cath 04/10/2017    S/P coronary artery stent placement     Successful PTCA - HA Om1    Type II or unspecified type diabetes mellitus without mention of complication, not stated as uncontrolled        Past Surgical History:        Procedure Laterality Date    BACK SURGERY      CARDIAC CATHETERIZATION Left 2/19/16    via right radial approach/ [facility] [place]/ Dr. Sandra Diane  8/17/15    Liang/Charles/Saulo/ Millicent    COLONOSCOPY  2010    COLONOSCOPY  2/19/15    -polyps,diverticulosis,hemorrhoids    COLONOSCOPY  05/23/2018    Dr Doyle Holder    COLONOSCOPY N/A 5/23/2018    COLONOSCOPY POLYPECTOMY SNARE/COLD BIOPSY  cold snare  and  hot snare performed by Tia Narvaez MD at [facility]      left eye    PACEMAKER INSERTION      PACEMAKER PLACEMENT         Family History:       Problem Relation Age of Onset   Jamia Polo Cancer Mother         breast    Cancer Father         lung       Social History:   TOBACCO:   reports that he quit smoking about 39 years ago. His smoking use included cigarettes. He has a 12.00 pack-year smoking history. He has never used smokeless tobacco.  ETOH:   reports that he does not drink alcohol. OCCUPATION: none    Allergies:  Lipitor [atorvastatin]; Aldactone [spironolactone]; Crestor [rosuvastatin calcium]; Lopid [gemfibrozil]; Invokana [canagliflozin]; and Januvia [sitagliptin]    Medications Prior to Admission:    Prior to Admission medications    Medication Sig Start Date End Date Taking?  Authorizing Provider   tiZANidine (ZANAFLEX) 2 MG tablet Take 1 tablet by mouth every 6 hours as needed (spasm) 3/28/19  Yes Rosalie Peng MD   hydrALAZINE (APRESOLINE) 100 MG tablet Take 1 tablet by mouth 3 times daily 3/12/19  Yes Rosalie Peng MD   insulin glargine (LANTUS SOLOSTAR) 100 UNIT/ML injection pen Inject 20 Units into the skin 2 times daily 3/12/19 6/10/19 Yes Rosalie Peng MD   lisinopril (PRINIVIL;ZESTRIL) 40 MG tablet TAKE 1 TABLET BY MOUTH ONCE DAILY 3/12/19  Yes Rosalie ePng MD   glipiZIDE (GLIPIZIDE XL) 5 MG extended release tablet TAKE 2 TABLETS BY MOUTH pain,positive for nausea,positive for vomiting, negative for diarrhea, negative for constipation, and negative for hematochezia or melena  Genitourinary: positive for dysuria, positive for urinary urgency, positive for urinary frequency, and negative for hematuria  Skin: negative for skin rash, and negative for skin lesions  Neurological: positive for unilateral weakness, numbness and pain rt leg. Physical Exam:    Vitals:   Vitals:    05/19/19 1202   BP: (!) 179/73   Pulse:    Resp:    Temp:    SpO2:      Weight: 196 lb 12.8 oz (89.3 kg)   Height: 5' 9\" (175.3 cm)     GEN:  alert and oriented to person, place and time, well-developed and well-nourished, in no acute distress  EYES: No gross abnormalities.   NECK: normal, supple, no lymphadenopathy,  no carotid bruits  PULM: clear to auscultation bilaterally- no wheezes, rales or rhonchi, normal air movement, no respiratory distress  COR: regular rate & rhythm, no gallops and 2/6 murmer  ABD:  soft, non-tender, non-distended, normal bowel sounds, no masses or organomegaly  EXT:   no cyanosis, clubbing or edema present    NEURO: follows commands, VARGAS, no deficits  SKIN:  no rashes or significant lesions  -----------------------------------------------------------------  Diagnostic Data:   Lab Results   Component Value Date    WBC 13.6 (H) 05/19/2019    HGB 12.1 (L) 05/19/2019     (L) 05/19/2019       Lab Results   Component Value Date    BUN 35 (H) 05/19/2019    CREATININE 1.14 05/19/2019     (L) 05/19/2019    K 3.7 05/19/2019    CALCIUM 9.1 05/19/2019    CL 95 (L) 05/19/2019    CO2 22 05/19/2019    LABGLOM >60 05/19/2019       Lab Results   Component Value Date    WBCUA 10 TO 20 05/19/2019    RBCUA 5 TO 10 05/19/2019    EPITHUA None 05/19/2019    LEUKOCYTESUR TRACE (A) 05/19/2019    SPECGRAV >1.030 (H) 05/19/2019    GLUCOSEU 2+ (A) 05/19/2019    KETUA TRACE (A) 05/19/2019    PROTEINU 2+ (A) 05/19/2019    HGBUR 2+ (A) 05/19/2019    CASTUA NOT REPORTED 05/19/2019    CRYSTUA NOT REPORTED 05/19/2019    BACTERIA 2+ (A) 05/19/2019    YEAST NOT REPORTED 05/19/2019       Lab Results   Component Value Date    MYOGLOBIN 62 03/28/2019    TROPONINT <0.03 02/17/2016    CKTOTAL 116 03/28/2019       Ct Abdomen Pelvis Wo Contrast Additional Contrast? None    Result Date: 5/19/2019  EXAMINATION: CT OF THE ABDOMEN AND PELVIS WITHOUT CONTRAST 5/19/2019 9:55 am TECHNIQUE: CT of the abdomen and pelvis was performed without the administration of intravenous contrast. Multiplanar reformatted images are provided for review. Dose modulation, iterative reconstruction, and/or weight based adjustment of the mA/kV was utilized to reduce the radiation dose to as low as reasonably achievable. COMPARISON: None. HISTORY: ORDERING SYSTEM PROVIDED HISTORY: PYELONEPHRITIS, COMPLICATED (DM, IMMUNODIFF, HX OF STONES, PRIOR SURGERY, NOT RESPONDING TO ABX) TECHNOLOGIST PROVIDED HISTORY: FINDINGS: Lower Chest: The lung bases are without consolidation or effusion. The visualized cardiac structures are unremarkable. Organs: There is diffuse hepatic steatosis. The gallbladder has been removed. The pancreas and adrenal glands are unremarkable. The spleen is enlarged measuring 15.2 cm in craniocaudal dimension. The kidneys are without obstructive uropathy. The ureters are not dilated. The urinary bladder is unremarkable. GI/Bowel: The stomach is unremarkable. Loops of small bowel are normal in caliber without evidence for obstruction. The colon contains air and fecal residue. There are uncomplicated diverticula. The appendix is normal. There is no intraperitoneal free air or free fluid. Pelvis: The prostate gland and seminal vesicles are unremarkable. Phleboliths are seen in the pelvis. Peritoneum/Retroperitoneum: The psoas muscles are symmetric. The abdominal aorta is normal in caliber containing calcification. The inferior vena cava is unremarkable.   There is no retroperitoneal or mesenteric adenopathy. Bones/Soft Tissues: The extra-abdominal soft tissues are unremarkable. There is no acute osseous abnormality. No acute abdominal or pelvic abnormality. Hepatic steatosis. Mild splenomegaly. Uncomplicated colonic diverticulosis. Assessment:    Principal Problem:    UTI (urinary tract infection)  Active Problems:    Essential hypertension    Controlled type 2 diabetes mellitus with diabetic nephropathy, with long-term current use of insulin (HCC)    Coronary artery disease involving native coronary artery of native heart without angina pectoris    SIRS (systemic inflammatory response syndrome) ([address]  Resolved Problems:    * No resolved hospital problems.  *      Patient Active Problem List    Diagnosis Date Noted    S/P drug eluting coronary stent placement-OM1 4/10/17 04/10/2017     Priority: High    Coronary atherosclerosis due to calcified coronary lesion 02/20/2015     Priority: Medium     Class: Chronic    UTI (urinary tract infection) 05/19/2019    Urinary tract infection 05/19/2019    SIRS (systemic inflammatory response syndrome) ([address] 05/19/2019    Acute renal failure with tubular necrosis ([address] 10/02/2018    Non critical Right Renal artery stenosis, native ([address] 10/02/2018    Medication side effect, initial encounter 03/23/2018    Angina, class II ([address] 01/08/2018    Hyperlipidemia 04/24/2017    Chronic diastolic heart failure ([address] 04/24/2017    Coronary artery disease involving native coronary artery of native heart without angina pectoris 05/02/2016    Cervical stenosis of spinal canal 02/29/2016    Abnormal tilt table test 02/23/2016    Chronic kidney disease, stage III (moderate) ([address] 02/22/2016    Controlled type 2 diabetes mellitus with diabetic nephropathy, with long-term current use of insulin ([address] 02/22/2016    Ischemic chest pain 02/17/2016    Essential hypertension 10/16/2015    Chronotropic incompetence with autonomic dysfunction 09/02/2015   

## 2019-05-19 NOTE — CONSULTS
[facility] Herbal Suspension    To avoid potential drug interactions with herbal and nutritional supplements, it is the policy of [facility] to suspend all orders for these products at the time of admission. Per hospital policy the following herbal/nutritional supplements were suspended during the hospital stay:    Fish Oil capsule 1000 mg    Thank you,    Bridgette Willard.  Huong Carrillo, 5/19/2019, 2:00 PM

## 2019-05-20 LAB
ABSOLUTE EOS #: <0.03 K/UL (ref 0–0.44)
ABSOLUTE IMMATURE GRANULOCYTE: 0.08 K/UL (ref 0–0.3)
ABSOLUTE LYMPH #: 1.14 K/UL (ref 1.1–3.7)
ABSOLUTE MONO #: 0.92 K/UL (ref 0.1–1.2)
ALBUMIN SERPL-MCNC: 3.2 G/DL (ref 3.5–5.2)
ALBUMIN/GLOBULIN RATIO: 1.2 (ref 1–2.5)
ALP BLD-CCNC: 50 U/L (ref 40–129)
ALT SERPL-CCNC: 17 U/L (ref 5–41)
ANION GAP SERPL CALCULATED.3IONS-SCNC: 11 MMOL/L (ref 9–17)
AST SERPL-CCNC: 14 U/L
BASOPHILS # BLD: 0 % (ref 0–2)
BASOPHILS ABSOLUTE: 0.04 K/UL (ref 0–0.2)
BILIRUB SERPL-MCNC: 1.02 MG/DL (ref 0.3–1.2)
BUN BLDV-MCNC: 35 MG/DL (ref 8–23)
BUN/CREAT BLD: 26 (ref 9–20)
CALCIUM SERPL-MCNC: 8.4 MG/DL (ref 8.6–10.4)
CHLORIDE BLD-SCNC: 100 MMOL/L (ref 98–107)
CO2: 23 MMOL/L (ref 20–31)
CREAT SERPL-MCNC: 1.37 MG/DL (ref 0.7–1.2)
CULTURE: NORMAL
DIFFERENTIAL TYPE: ABNORMAL
EOSINOPHILS RELATIVE PERCENT: 0 % (ref 1–4)
GFR AFRICAN AMERICAN: >60 ML/MIN
GFR NON-AFRICAN AMERICAN: 51 ML/MIN
GFR SERPL CREATININE-BSD FRML MDRD: ABNORMAL ML/MIN/{1.73_M2}
GFR SERPL CREATININE-BSD FRML MDRD: ABNORMAL ML/MIN/{1.73_M2}
GLUCOSE BLD-MCNC: 203 MG/DL (ref 74–100)
GLUCOSE BLD-MCNC: 248 MG/DL (ref 74–100)
GLUCOSE BLD-MCNC: 259 MG/DL (ref 70–99)
GLUCOSE BLD-MCNC: 273 MG/DL (ref 74–100)
GLUCOSE BLD-MCNC: 350 MG/DL (ref 74–100)
HCT VFR BLD CALC: 32 % (ref 40.7–50.3)
HEMOGLOBIN: 10.5 G/DL (ref 13–17)
IMMATURE GRANULOCYTES: 1 %
LACTIC ACID, SEPSIS WHOLE BLOOD: NORMAL MMOL/L (ref 0.5–1.9)
LACTIC ACID, SEPSIS: 1.1 MMOL/L (ref 0.5–1.9)
LYMPHOCYTES # BLD: 8 % (ref 24–43)
Lab: NORMAL
MCH RBC QN AUTO: 29.9 PG (ref 25.2–33.5)
MCHC RBC AUTO-ENTMCNC: 32.8 G/DL (ref 28.4–34.8)
MCV RBC AUTO: 91.2 FL (ref 82.6–102.9)
MONOCYTES # BLD: 6 % (ref 3–12)
NRBC AUTOMATED: 0 PER 100 WBC
PDW BLD-RTO: 13.6 % (ref 11.8–14.4)
PLATELET # BLD: 110 K/UL (ref 138–453)
PLATELET ESTIMATE: ABNORMAL
PMV BLD AUTO: 10.7 FL (ref 8.1–13.5)
POTASSIUM SERPL-SCNC: 3.7 MMOL/L (ref 3.7–5.3)
RBC # BLD: 3.51 M/UL (ref 4.21–5.77)
RBC # BLD: ABNORMAL 10*6/UL
SEG NEUTROPHILS: 86 % (ref 36–65)
SEGMENTED NEUTROPHILS ABSOLUTE COUNT: 12.89 K/UL (ref 1.5–8.1)
SODIUM BLD-SCNC: 134 MMOL/L (ref 135–144)
SPECIMEN DESCRIPTION: NORMAL
TOTAL PROTEIN: 5.9 G/DL (ref 6.4–8.3)
WBC # BLD: 15.1 K/UL (ref 3.5–11.3)
WBC # BLD: ABNORMAL 10*3/UL

## 2019-05-20 PROCEDURE — 2580000003 HC RX 258: Performed by: FAMILY MEDICINE

## 2019-05-20 PROCEDURE — 80053 COMPREHEN METABOLIC PANEL: CPT

## 2019-05-20 PROCEDURE — 82947 ASSAY GLUCOSE BLOOD QUANT: CPT

## 2019-05-20 PROCEDURE — 97161 PT EVAL LOW COMPLEX 20 MIN: CPT

## 2019-05-20 PROCEDURE — 51798 US URINE CAPACITY MEASURE: CPT

## 2019-05-20 PROCEDURE — 6360000002 HC RX W HCPCS: Performed by: FAMILY MEDICINE

## 2019-05-20 PROCEDURE — 6360000002 HC RX W HCPCS: Performed by: PHYSICIAN ASSISTANT

## 2019-05-20 PROCEDURE — 83605 ASSAY OF LACTIC ACID: CPT

## 2019-05-20 PROCEDURE — 85025 COMPLETE CBC W/AUTO DIFF WBC: CPT

## 2019-05-20 PROCEDURE — 6370000000 HC RX 637 (ALT 250 FOR IP): Performed by: FAMILY MEDICINE

## 2019-05-20 PROCEDURE — 36415 COLL VENOUS BLD VENIPUNCTURE: CPT

## 2019-05-20 PROCEDURE — 1200000000 HC SEMI PRIVATE

## 2019-05-20 PROCEDURE — 97116 GAIT TRAINING THERAPY: CPT

## 2019-05-20 RX ORDER — PROMETHAZINE HYDROCHLORIDE 25 MG/ML
12.5 INJECTION, SOLUTION INTRAMUSCULAR; INTRAVENOUS EVERY 8 HOURS PRN
Status: DISCONTINUED | OUTPATIENT
Start: 2019-05-20 | End: 2019-05-21 | Stop reason: HOSPADM

## 2019-05-20 RX ADMIN — HYDRALAZINE HYDROCHLORIDE 100 MG: 50 TABLET, FILM COATED ORAL at 09:51

## 2019-05-20 RX ADMIN — CEFTRIAXONE 1 G: 1 INJECTION, POWDER, FOR SOLUTION INTRAMUSCULAR; INTRAVENOUS at 21:09

## 2019-05-20 RX ADMIN — FAMOTIDINE 20 MG: 20 TABLET ORAL at 21:10

## 2019-05-20 RX ADMIN — ACETAMINOPHEN 650 MG: 325 TABLET, FILM COATED ORAL at 01:25

## 2019-05-20 RX ADMIN — INSULIN GLARGINE 20 UNITS: 100 INJECTION, SOLUTION SUBCUTANEOUS at 21:24

## 2019-05-20 RX ADMIN — MULTIPLE VITAMINS W/ MINERALS TAB 1 TABLET: TAB at 09:51

## 2019-05-20 RX ADMIN — PROMETHAZINE HYDROCHLORIDE 12.5 MG: 25 INJECTION INTRAMUSCULAR; INTRAVENOUS at 15:54

## 2019-05-20 RX ADMIN — ONDANSETRON 4 MG: 2 INJECTION INTRAMUSCULAR; INTRAVENOUS at 07:58

## 2019-05-20 RX ADMIN — GLIPIZIDE 10 MG: 5 TABLET ORAL at 09:51

## 2019-05-20 RX ADMIN — INSULIN LISPRO 4 UNITS: 100 INJECTION, SOLUTION INTRAVENOUS; SUBCUTANEOUS at 18:01

## 2019-05-20 RX ADMIN — ACETAMINOPHEN 650 MG: 325 TABLET, FILM COATED ORAL at 21:10

## 2019-05-20 RX ADMIN — ONDANSETRON 4 MG: 2 INJECTION INTRAMUSCULAR; INTRAVENOUS at 21:10

## 2019-05-20 RX ADMIN — INSULIN LISPRO 3 UNITS: 100 INJECTION, SOLUTION INTRAVENOUS; SUBCUTANEOUS at 21:22

## 2019-05-20 RX ADMIN — ENOXAPARIN SODIUM 40 MG: 40 INJECTION SUBCUTANEOUS at 08:13

## 2019-05-20 RX ADMIN — ACETAMINOPHEN 650 MG: 325 TABLET, FILM COATED ORAL at 15:54

## 2019-05-20 RX ADMIN — DOCUSATE SODIUM 100 MG: 100 CAPSULE, LIQUID FILLED ORAL at 09:51

## 2019-05-20 RX ADMIN — INSULIN LISPRO 4 UNITS: 100 INJECTION, SOLUTION INTRAVENOUS; SUBCUTANEOUS at 08:14

## 2019-05-20 RX ADMIN — PREDNISOLONE ACETATE 1 DROP: 10 SUSPENSION/ DROPS OPHTHALMIC at 08:00

## 2019-05-20 RX ADMIN — CARVEDILOL 12.5 MG: 12.5 TABLET, FILM COATED ORAL at 21:09

## 2019-05-20 RX ADMIN — HYDRALAZINE HYDROCHLORIDE 100 MG: 50 TABLET, FILM COATED ORAL at 15:54

## 2019-05-20 RX ADMIN — FAMOTIDINE 20 MG: 20 TABLET ORAL at 09:51

## 2019-05-20 RX ADMIN — CARVEDILOL 12.5 MG: 12.5 TABLET, FILM COATED ORAL at 09:51

## 2019-05-20 RX ADMIN — EPLERENONE 25 MG: 25 TABLET, FILM COATED ORAL at 09:51

## 2019-05-20 RX ADMIN — INSULIN LISPRO 10 UNITS: 100 INJECTION, SOLUTION INTRAVENOUS; SUBCUTANEOUS at 13:06

## 2019-05-20 RX ADMIN — INSULIN GLARGINE 20 UNITS: 100 INJECTION, SOLUTION SUBCUTANEOUS at 08:13

## 2019-05-20 RX ADMIN — ASPIRIN 81 MG: 81 TABLET ORAL at 09:50

## 2019-05-20 RX ADMIN — LISINOPRIL 40 MG: 20 TABLET ORAL at 09:51

## 2019-05-20 RX ADMIN — Medication 10 ML: at 08:13

## 2019-05-20 RX ADMIN — HYDRALAZINE HYDROCHLORIDE 100 MG: 50 TABLET, FILM COATED ORAL at 21:10

## 2019-05-20 ASSESSMENT — PAIN SCALES - GENERAL
PAINLEVEL_OUTOF10: 1
PAINLEVEL_OUTOF10: 0
PAINLEVEL_OUTOF10: 1
PAINLEVEL_OUTOF10: 6
PAINLEVEL_OUTOF10: 0

## 2019-05-20 ASSESSMENT — PAIN DESCRIPTION - LOCATION: LOCATION: HEAD

## 2019-05-20 ASSESSMENT — PAIN DESCRIPTION - DESCRIPTORS: DESCRIPTORS: HEADACHE

## 2019-05-20 ASSESSMENT — PAIN DESCRIPTION - FREQUENCY: FREQUENCY: INTERMITTENT

## 2019-05-20 ASSESSMENT — PAIN - FUNCTIONAL ASSESSMENT: PAIN_FUNCTIONAL_ASSESSMENT: ACTIVITIES ARE NOT PREVENTED

## 2019-05-20 ASSESSMENT — PAIN DESCRIPTION - PAIN TYPE: TYPE: ACUTE PAIN

## 2019-05-20 NOTE — PROGRESS NOTES
Nutrition Assessment    Type and Reason for Visit: Initial, Positive Nutrition Screen(MST-1)    Nutrition Recommendations: Continue current diet, 60gm CHO/meal. Encourage PO intake as tolerated. Nutrition Assessment: Inadequate oral intake RT nausea, vomiting AEB intakes of 0-25%, N/V, and lack of appetite x 2 days. Pt admitted with UTI, experienced N/V yesterday along with nausea this morning. Stated his appetite has been fine prior to illness. Has had 4.8% wt loss x 3.5 mos. Pt tried eating dinner last night and breakfast this morning but was only able to tolerate a couple bites. BUN/creatinine are altered, Hx of CKD. Glucose is elevated, A1c of 8.5. Education not appropriate at this time, pt feeling poor. Will continue to monitor PO and education needs. Malnutrition Assessment:  · Malnutrition Status: At risk for malnutrition  · Context: Acute illness or injury  · Findings of the 6 clinical characteristics of malnutrition (Minimum of 2 out of 6 clinical characteristics is required to make the diagnosis of moderate or severe Protein Calorie Malnutrition based on AND/ASPEN Guidelines):  1. Energy Intake-Less than or equal to 50% of estimated energy requirement, (x 2 days)    2. Weight Loss-2% loss or greater, in 3 months  3. Fat Loss-No significant subcutaneous fat loss,    4. Muscle Loss-No significant muscle mass loss,    5. Fluid Accumulation-No significant fluid accumulation,    6.  Strength-Not measured    Nutrition Risk Level:  Moderate    Nutrient Needs:  · Estimated Daily Total Kcal: 1590-0977(23-15WGMO/PK)  · Estimated Daily Protein (g): (1.3-1.5g/kg)  · Estimated Daily Total Fluid (ml/day): 4919-1905(30-35mL/kg)    Nutrition Diagnosis:   · Problem: Inadequate oral intake  · Etiology: related to Nausea, Vomiting     Signs and symptoms:  as evidenced by Intake 0-25%, Nausea, Vomiting, Patient report of(lack of appetite)    Objective Information:  · Nutrition-Focused Physical Findings: Has dentures  · Wound Type: None  · Current Nutrition Therapies:  · Oral Diet Orders: Carb Control 4 Carbs/Meal   · Oral Diet intake: 1-25%  · Oral Nutrition Supplement (ONS) Orders: None  · Anthropometric Measures:  · Ht: 5' 9\" (175.3 cm)   · Current Body Wt: 197 lb 4 oz (89.5 kg)  · Admission Body Wt: 196 lb 12.8 oz (89.3 kg)  · Usual Body Wt: 207 lb (93.9 kg)  · % Weight Change:  ,  4.8% wt loss x 3.5 mos  · Ideal Body Wt: 160 lb (72.6 kg), % Ideal Body 123%  · BMI Classification: BMI 25.0 - 29.9 Overweight    Nutrition Interventions:   Continue current diet  Continued Inpatient Monitoring, Education not appropriate at this time, Coordination of Care    Nutrition Evaluation:   · Evaluation: Goals set   · Goals: PO >75% of meals    · Monitoring: Meal Intake, I&O, Weight, Pertinent Labs, Nausea or Vomiting, Patient/Family Education    Recent Labs     05/19/19  0930 05/20/19  0549   * 134*   K 3.7 3.7   CL 95* 100   CO2 22 23   BUN 35* 35*   CREATININE 1.14 1.37*   GLUCOSE 286* 259*   ALT  --  17   ALKPHOS  --  50   GFR                            Lab Results   Component Value Date    LABALBU 3.2 05/20/2019     Lab Results   Component Value Date    LABA1C 8.5 05/19/2019        Wt Readings from Last 10 Encounters:   05/20/19 197 lb 4 oz (89.5 kg)   05/07/19 200 lb (90.7 kg)   04/30/19 204 lb (92.5 kg)   04/26/19 201 lb (91.2 kg)   02/05/19 207 lb 3.2 oz (94 kg)   01/29/19 208 lb (94.3 kg)   10/02/18 207 lb (93.9 kg)   09/04/18 206 lb 1.6 oz (93.5 kg)   07/31/18 206 lb (93.4 kg)   07/25/18 205 lb (93 kg)       Electronically signed by Nilesh Leal on 5/20/19 at 11:28 AM    Contact Number: [phone]

## 2019-05-20 NOTE — PROGRESS NOTES
Physical Therapy    Facility/Department: [facility] MED SURG  Initial Assessment    NAME: Korey Myers  : 1945  MRN: [id number]    Date of Service: 2019    Discharge Recommendations:  Continue to assess pending progress        Assessment   Body structures, Functions, Activity limitations: Decreased functional mobility ; Decreased high-level IADLs;Decreased ADL status; Decreased endurance;Decreased strength;Decreased balance;Decreased safe awareness  Assessment: Pt is a 67 y/o male admitted for UTI. Pt currently requires supervision with bed mobility, SBA with transfers and CGA with gait. Pt ambulates 8ft with use of IV pole, but typically uses SPC for ambulation. No LOB observed at time of evaluation. Pt will benefit from skilled PT services during hospital stay to improve functional mobility and restore toward PLOF. Treatment Diagnosis: generalized weakness  Prognosis: Good  Decision Making: Low Complexity  Patient Education: Purpose of PT eval and POC. REQUIRES PT FOLLOW UP: Yes  Activity Tolerance  Activity Tolerance: Patient Tolerated treatment well       Patient Diagnosis(es): The primary encounter diagnosis was Dehydration. Diagnoses of Nausea and vomiting, intractability of vomiting not specified, unspecified vomiting type, Septicemia ([address] Urinary tract infection without hematuria, site unspecified, and Hyponatremia were also pertinent to this visit.      has a past medical history of Acute MI ([address] Acute renal failure with tubular necrosis ([address] CAD (coronary artery disease), Cerebral artery occlusion with cerebral infarction [facility]), CHF (congestive heart failure) ([address] Chronic back pain, Closed fracture of lumbar vertebra without mention of spinal cord injury, Diabetes mellitus ([address] Displacement of intervertebral disc, site unspecified, without myelopathy, H/O cardiac catheterization, H/O cardiovascular stress test, H/O tilt table evaluation, H/O tilt table evaluation, History of 24 hour EKG monitoring, History of 24 hour EKG monitoring, History of cardiovascular stress test, History of cardiovascular stress test, History of coronary artery stent placement, History of CVA (cerebrovascular accident) without residual deficits, History of echocardiogram, History of Holter monitoring, History of tilt table evaluation, Hyperlipidemia, Hypertension, Non critical Right Renal artery stenosis, native [facility]), Pacemaker, S/P cardiac cath, S/P coronary artery stent placement, and Type II or unspecified type diabetes mellitus without mention of complication, not stated as uncontrolled. has a past surgical history that includes Pacemaker insertion; back surgery; Cholecystectomy (8/17/15); Corneal transplant; Cardiac catheterization (Left, 2/19/16); pacemaker placement; Colonoscopy (2010); Colonoscopy (2/19/15); Colonoscopy (05/23/2018); and Colonoscopy (N/A, 5/23/2018). Restrictions  Restrictions/Precautions  Restrictions/Precautions: General Precautions, Fall Risk  Vision/Hearing  Vision: Impaired  Vision Exceptions: Wears glasses for reading  Hearing: Within functional limits     Subjective  General  Patient assessed for rehabilitation services?: Yes  Subjective  Subjective: Pt denies pain upon arrival to pt's room.    Pain Screening  Patient Currently in Pain: No          Orientation  Orientation  Overall Orientation Status: Within Functional Limits  Social/Functional History  Social/Functional History  Lives With: Spouse  Type of Home: House  Home Layout: One level  Home Access: Stairs to enter with rails  Entrance Stairs - Number of Steps: 2  Home Equipment: Cane  ADL Assistance: Independent  Homemaking Assistance: Independent  Active : Yes  Occupation: Retired  Cognition   Cognition  Overall Cognitive Status: WNL    Objective          AROM RLE (degrees)  RLE AROM: WFL  AROM LLE (degrees)  LLE AROM : WFL  Strength RLE  Strength RLE: WFL  Strength LLE  Strength LLE: WFL        Bed mobility  Rolling

## 2019-05-20 NOTE — PROGRESS NOTES
[facility] SPECIALTY [facility]  OCCUPATIONAL THERAPY  No Visit Note    [] ICU    [x] Acute   Patient: Tyree Hernández  Room: 0329/0329-01      Tyree Hernández not seen on 5/20/2019 at 3:45 PM due to this therapist re-attempted OT eval. Pt still reporting that he feels nauseous and dizzy while laying down. Family reports that fever spiked to 101 degrees. Pt was waiting on tylenol and medication for the nausea. Requested that therapy wait until tomorrow. Will re-attempt at our earliest opportunity.           Signature: Rohan Mayer, GEOFF, OTR/L

## 2019-05-20 NOTE — PLAN OF CARE
Problem: Falls - Risk of:  Goal: Will remain free from falls  Description  Will remain free from falls  Outcome: Ongoing  Note:   Bed in lowest position. Wheels locked. Call light in reach. 2/4 siderails up. Bed alarm on. Problem: SAFETY  Goal: Free from accidental physical injury  Outcome: Ongoing  Note:   Wheels locked, call light within reach, siderails 2/4 up, and bed in lowest position. Patient will remain free of injury. Problem: DAILY CARE  Goal: Daily care needs are met  Outcome: Ongoing  Note:   Patient is up x1 assist with a cane. Patient is able to bathe and eat on his own. Will continue to assess. Problem: PAIN  Goal: Patient's pain/discomfort is manageable  Outcome: Ongoing  Note:   Patient is able to verbalize pain. Patient stated no pain on 0-10 pain scale. Will continue to monitor.

## 2019-05-20 NOTE — PROGRESS NOTES
Physical Therapy  Facility/Department: [facility] MED SURG  Daily Treatment Note  NAME: Reba Raphael  : 1945  MRN: [id number]    Date of Service: 2019    Discharge Recommendations:  Continue to assess pending progress        Patient Diagnosis(es): The primary encounter diagnosis was Dehydration. Diagnoses of Nausea and vomiting, intractability of vomiting not specified, unspecified vomiting type, Septicemia ([facility] Urinary tract infection without hematuria, site unspecified, and Hyponatremia were also pertinent to this visit. has a past medical history of Acute MI ([facility] Acute renal failure with tubular necrosis ([facility] CAD (coronary artery disease), Cerebral artery occlusion with cerebral infarction [facility]), CHF (congestive heart failure) ([facility] [address] Chronic back pain, Closed fracture of lumbar vertebra without mention of spinal cord injury, Diabetes mellitus ([facility] [address] [facility] Displacement of intervertebral disc, site unspecified, without myelopathy, H/O cardiac catheterization, H/O cardiovascular stress test, H/O tilt table evaluation, H/O tilt table evaluation, History of 24 hour EKG monitoring, History of 24 hour EKG monitoring, History of cardiovascular stress test, History of cardiovascular stress test, History of coronary artery stent placement, History of CVA (cerebrovascular accident) without residual deficits, History of echocardiogram, History of Holter monitoring, History of tilt table evaluation, Hyperlipidemia, Hypertension, Non critical Right Renal artery stenosis, native ([facility] Pacemaker, S/P cardiac cath, S/P coronary artery stent placement, and Type II or unspecified type diabetes mellitus without mention of complication, not stated as uncontrolled. has a past surgical history that includes Pacemaker insertion; back surgery; Cholecystectomy (8/17/15); Corneal transplant; Cardiac catheterization (Left, 16); pacemaker placement; Colonoscopy (); Colonoscopy (2/19/15);  Colonoscopy (2018); and Colonoscopy (N/A, 5/23/2018). Restrictions  Restrictions/Precautions  Restrictions/Precautions: General Precautions, Fall Risk  Subjective   General  Chart Reviewed: Yes  Response To Previous Treatment: Patient with no complaints from previous session. Family / Caregiver Present: Yes  Subjective  Subjective: Pt with no c/o pain, but reports feeling very nauseous upon arrival.  Pt requested to use bathroom before beginning exercises. Pain Screening  Patient Currently in Pain: No  Vital Signs  Patient Currently in Pain: No       Orientation  Orientation  Overall Orientation Status: Within Functional Limits  Cognition      Objective   Bed mobility  Rolling to Right: Supervision  Supine to Sit: Supervision  Scooting: Supervision  Transfers  Sit to Stand: Stand by assistance  Stand to sit: Stand by assistance  Ambulation  Ambulation?: Yes  Ambulation 1  Surface: level tile  Device: (Pushing IV pole.)  Assistance: Contact guard assistance  Quality of Gait: SLow lyndsey with reciprocal pattern. Distance: 8 ft x2  Comments: Pt slightly unsteady when initiating ambulation. Stairs/Curb  Stairs?: No        Exercises  Comments: no ther ex completed today d/t pt having reports of increased nausea and requested to hold off. Assessment   Treatment Diagnosis: generalized weakness  Prognosis: Good  REQUIRES PT FOLLOW UP: Yes  Activity Tolerance  Activity Tolerance: Treatment limited secondary to medical complications (free text)(Increased nausea throughout session.)     Goals  Short term goals  Time Frame for Short term goals: 10 days  Short term goal 1: Pt will be independent with bed mob/transfers for functional independence. Short term goal 2: Pt will ambulate 300ft with Lily and no LOB or unsteadiness for safety with household/community ambulation. Short term goal 3: Pt will tolerate 20-30mins ther ex/act to improve endurance for ADLs and functional tasks.      Plan    Plan  Times per week: 7 days per week  Times per day: Twice a day(Daily on weekends)  Current Treatment Recommendations: Strengthening, ADL/Self-care Training, Gait Training, Patient/Caregiver Education & Training, IADL Training, Stair training, Balance Training, Neuromuscular Re-education, Functional Mobility Training, Endurance Training, Home Exercise Program, Transfer Training, Safety Education & Training  Safety Devices  Type of devices:  All fall risk precautions in place, Call light within reach, Left in bed, Gait belt     Therapy Time   Individual Concurrent Group Co-treatment   Time In 1220         Time Out 1234         Minutes [address]

## 2019-05-20 NOTE — PROGRESS NOTES
Progress Note  Monae Arce MD    SUBJECTIVE: Patient is seen for Principal Problem:    UTI (urinary tract infection)  Active Problems:    Essential hypertension    Chronic kidney disease, stage III (moderate) ([facility])    Controlled type 2 diabetes mellitus with diabetic nephropathy, with long-term current use of insulin ([facility])    Coronary artery disease involving native coronary artery of native heart without angina pectoris    SIRS (systemic inflammatory response syndrome) ([address]  Resolved Problems:    * No resolved hospital problems. *     pt improving  No fever  Pain better  Lactic acid normal    OBJECTIVE:    Vitals:   Vitals:    05/20/19 0115   BP: (!) 149/56   Pulse: 82   Resp: 16   Temp: 99.8 °F (37.7 °C)   SpO2: 94%     Weight: 197 lb 4 oz (89.5 kg)   Height: 5' 9\" (175.3 cm)   -----------------------------------------------------------------  Exam:    CONSTITUTIONAL:  awake, alert, cooperative, no apparent distress, and appears stated age  EYES:  Lids and lashes normal, pupils equal, round and reactive to light, extra ocular muscles intact, sclera clear, conjunctiva normal  ENT:  Normocephalic, without obvious abnormality, atraumatic, sinuses nontender on palpation, external ears without lesions, oral pharynx with moist mucus membranes, tonsils without erythema or exudates, gums normal and good dentition.   NECK:  Supple, symmetrical, trachea midline, no adenopathy, thyroid symmetric, not enlarged and no tenderness, skin normal  HEMATOLOGIC/LYMPHATICS:  no cervical lymphadenopathy  LUNGS:  No increased work of breathing, good air exchange, clear to auscultation bilaterally, no crackles or wheezing  CARDIOVASCULAR:  Normal apical impulse, regular rate and rhythm, normal S1 and S2, no S3 or S4, and 2/6 murmur noted  ABDOMEN:  No scars, normal bowel sounds, soft, non-distended, non-tender, no masses palpated, no hepatosplenomegally    MUSCULOSKELETAL:  there is no redness, warmth, or swelling of the joints  NEUROLOGIC:  No motor deficit  SKIN:  no bruising or bleeding  EXT:     no cyanosis, clubbing or edema present    -----------------------------------------------------------------  Diagnostic Data: Reviewed    More Recent Labs:    Results for orders placed or performed during the hospital encounter of 05/19/19   Culture blood #1   Result Value Ref Range    Specimen Description . BLOOD     Special Requests LT FA 20 ML     Culture NO GROWTH 21 HOURS    Culture blood #2   Result Value Ref Range    Specimen Description . BLOOD     Special Requests RT AC  20 ML     Culture NO GROWTH 21 HOURS    Basic Metabolic Panel   Result Value Ref Range    Glucose 286 (H) 70 - 99 mg/dL    BUN 35 (H) 8 - 23 mg/dL    CREATININE 1.14 0.70 - 1.20 mg/dL    Bun/Cre Ratio 31 (H) 9 - 20    Calcium 9.1 8.6 - 10.4 mg/dL    Sodium 132 (L) 135 - 144 mmol/L    Potassium 3.7 3.7 - 5.3 mmol/L    Chloride 95 (L) 98 - 107 mmol/L    CO2 22 20 - 31 mmol/L    Anion Gap 15 9 - 17 mmol/L    GFR Non-African American >60 >60 mL/min    GFR African American >60 >60 mL/min    GFR Comment          GFR Staging         CBC Auto Differential   Result Value Ref Range    WBC 13.6 (H) 3.5 - 11.3 k/uL    RBC 3.89 (L) 4.21 - 5.77 m/uL    Hemoglobin 12.1 (L) 13.0 - 17.0 g/dL    Hematocrit 35.3 (L) 40.7 - 50.3 %    MCV 90.7 82.6 - 102.9 fL    MCH 31.1 25.2 - 33.5 pg    MCHC 34.3 28.4 - 34.8 g/dL    RDW 13.3 11.8 - 14.4 %    Platelets 665 (L) 245 - 453 k/uL    MPV 10.3 8.1 - 13.5 fL    NRBC Automated 0.0 0.0 per 100 WBC    Differential Type NOT REPORTED     WBC Morphology NOT REPORTED     RBC Morphology NOT REPORTED     Platelet Estimate NOT REPORTED     Seg Neutrophils 91 (H) 36 - 65 %    Lymphocytes 4 (L) 24 - 43 %    Monocytes 4 3 - 12 %    Eosinophils % 1 1 - 4 %    Immature Granulocytes 0 0 %    Basophils 0 0 - 2 %    Segs Absolute 12.38 (H) 1.50 - 8.10 k/uL    Absolute Lymph # 0.54 (L) 1.10 - 3.70 k/uL    Absolute Mono # 0.54 0.10 - 1.20 k/uL    Absolute Eos # 0. 14 0.00 - 0.44 k/uL    Absolute Immature Granulocyte 0.00 0.00 - 0.30 k/uL    Basophils # 0.00 0.0 - 0.2 k/uL    Morphology Normal    Urinalysis with Microscopic   Result Value Ref Range    Color, UA YELLOW YELLOW    Turbidity UA SLIGHTLY CLOUDY (A) CLEAR    Glucose, Ur 2+ (A) NEGATIVE    Bilirubin Urine NEGATIVE NEGATIVE    Ketones, Urine TRACE (A) NEGATIVE    Specific Gravity, UA >1.030 (H) 1.010 - 1.020    Urine Hgb 2+ (A) NEGATIVE    pH, UA 5.5 5.0 - 9.0    Protein, UA 2+ (A) NEGATIVE    Urobilinogen, Urine Normal Normal    Nitrite, Urine POSITIVE (A) NEGATIVE    Leukocyte Esterase, Urine TRACE (A) NEGATIVE    Urinalysis Comments NOT REPORTED     -          WBC, UA 10 TO 20 0 - 5 /HPF    RBC, UA 5 TO 10 0 - 2 /HPF    Casts UA NOT REPORTED /LPF    Crystals UA NOT REPORTED None /HPF    Epithelial Cells UA None 0 - 5 /HPF    Renal Epithelial, Urine NOT REPORTED 0 /HPF    Bacteria, UA 2+ (A) None    Mucus, UA NOT REPORTED None    Trichomonas, UA NOT REPORTED None    Amorphous, UA NOT REPORTED None    Other Observations UA NOT REPORTED NOT REQ.     Yeast, UA NOT REPORTED None   Lactate, Sepsis   Result Value Ref Range    Lactic Acid, Sepsis 2.0 (H) 0.5 - 1.9 mmol/L    Lactic Acid, Sepsis, Whole Blood NOT REPORTED 0.5 - 1.9 mmol/L   Lactate, Sepsis   Result Value Ref Range    Lactic Acid, Sepsis 2.0 (H) 0.5 - 1.9 mmol/L    Lactic Acid, Sepsis, Whole Blood NOT REPORTED 0.5 - 1.9 mmol/L   Hemoglobin A1c   Result Value Ref Range    Hemoglobin A1C 8.5 (H) 4.8 - 5.9 %    Estimated Avg Glucose 197 mg/dL   Glucose, Whole Blood   Result Value Ref Range    POC Glucose 340 (H) 74 - 100 mg/dL   CBC auto differential   Result Value Ref Range    WBC 15.1 (H) 3.5 - 11.3 k/uL    RBC 3.51 (L) 4.21 - 5.77 m/uL    Hemoglobin 10.5 (L) 13.0 - 17.0 g/dL    Hematocrit 32.0 (L) 40.7 - 50.3 %    MCV 91.2 82.6 - 102.9 fL    MCH 29.9 25.2 - 33.5 pg    MCHC 32.8 28.4 - 34.8 g/dL    RDW 13.6 11.8 - 14.4 %    Platelets 965 (L) 737 - 453 k/uL MPV 10.7 8.1 - 13.5 fL    NRBC Automated 0.0 0.0 per 100 WBC    Differential Type NOT REPORTED     WBC Morphology NOT REPORTED     RBC Morphology NOT REPORTED     Platelet Estimate NOT REPORTED     Seg Neutrophils 86 (H) 36 - 65 %    Lymphocytes 8 (L) 24 - 43 %    Monocytes 6 3 - 12 %    Eosinophils % 0 (L) 1 - 4 %    Basophils 0 0 - 2 %    Immature Granulocytes 1 (H) 0 %    Segs Absolute 12.89 (H) 1.50 - 8.10 k/uL    Absolute Lymph # 1.14 1.10 - 3.70 k/uL    Absolute Mono # 0.92 0.10 - 1.20 k/uL    Absolute Eos # <0.03 0.00 - 0.44 k/uL    Basophils # 0.04 0.00 - 0.20 k/uL    Absolute Immature Granulocyte 0.08 0.00 - 0.30 k/uL   Comprehensive Metabolic Panel w/ Reflex to MG   Result Value Ref Range    Glucose 259 (H) 70 - 99 mg/dL    BUN 35 (H) 8 - 23 mg/dL    CREATININE 1.37 (H) 0.70 - 1.20 mg/dL    Bun/Cre Ratio 26 (H) 9 - 20    Calcium 8.4 (L) 8.6 - 10.4 mg/dL    Sodium 134 (L) 135 - 144 mmol/L    Potassium 3.7 3.7 - 5.3 mmol/L    Chloride 100 98 - 107 mmol/L    CO2 23 20 - 31 mmol/L    Anion Gap 11 9 - 17 mmol/L    Alkaline Phosphatase 50 40 - 129 U/L    ALT 17 5 - 41 U/L    AST 14 <40 U/L    Total Bilirubin 1.02 0.3 - 1.2 mg/dL    Total Protein 5.9 (L) 6.4 - 8.3 g/dL    Alb 3.2 (L) 3.5 - 5.2 g/dL    Albumin/Globulin Ratio 1.2 1.0 - 2.5    GFR Non- 51 (L) >60 mL/min    GFR African American >60 >60 mL/min    GFR Comment          GFR Staging         Lactate, Sepsis   Result Value Ref Range    Lactic Acid, Sepsis 1.1 0.5 - 1.9 mmol/L    Lactic Acid, Sepsis, Whole Blood NOT REPORTED 0.5 - 1.9 mmol/L   Glucose, Whole Blood   Result Value Ref Range    POC Glucose 364 (H) 74 - 100 mg/dL   Glucose, Whole Blood   Result Value Ref Range    POC Glucose 248 (H) 74 - 100 mg/dL       ASSESSMENT:   Patient Active Problem List    Diagnosis Date Noted    S/P drug eluting coronary stent placement-OM1 4/10/17 04/10/2017     Priority: High    Coronary atherosclerosis due to calcified coronary lesion 02/20/2015 Priority: Medium     Class: Chronic    UTI (urinary tract infection) 05/19/2019    Urinary tract infection 05/19/2019    SIRS (systemic inflammatory response syndrome) ([address] 05/19/2019    Acute renal failure with tubular necrosis ([facility]) 10/02/2018    Non critical Right Renal artery stenosis, native ([address] 10/02/2018    Medication side effect, initial encounter 03/23/2018    Angina, class II ([address] 01/08/2018    Hyperlipidemia 04/24/2017    Chronic diastolic heart failure ([address] 04/24/2017    Coronary artery disease involving native coronary artery of native heart without angina pectoris 05/02/2016    Cervical stenosis of spinal canal 02/29/2016    Abnormal tilt table test 02/23/2016    Chronic kidney disease, stage III (moderate) ([facility]) 02/22/2016    Controlled type 2 diabetes mellitus with diabetic nephropathy, with long-term current use of insulin ([address] 02/22/2016    Ischemic chest pain 02/17/2016    Essential hypertension 10/16/2015    Chronotropic incompetence with autonomic dysfunction 09/02/2015    Episodic lightheadedness 09/02/2015    Cholecystitis 08/17/2015    Syncope, near 08/05/2015    Dysautonomia ([address] 06/29/2015    History of CVA (cerebrovascular accident) without residual deficits     Displacement of intervertebral disc, site unspecified, without myelopathy 03/04/2015    History of colon polyps 77/33/4417    Systolic murmur 29/70/6042    History of MI (myocardial infarction) 02/12/2014    Vertigo, benign paroxysmal 10/17/2012     Class: Acute         PLAN:  · Continue iv antibiotics  · Await culture  · Pt and ot      Tawnya Tse M.D.

## 2019-05-20 NOTE — PROGRESS NOTES
[facility] SPECIALTY [facility]  OCCUPATIONAL THERAPY  No Visit Note    [] ICU    [x] Acute   Patient: Sandoval Laguerre  Room: 0329/0329-01      Sandoval Laguerre not seen on 5/20/2019 at 1:02 PM due to pt reporting feeling very dizzy and nauseous, even while laying in bed. Pt and family reporting that when pt was just up with PT that they had to lay him back down d/t the nausea and dizziness. Nurse notified. Will re-attempt evaluation at our earliest opportunity.         Signature: Minna Goltz, MOT, OTR/L

## 2019-05-21 VITALS
OXYGEN SATURATION: 96 % | DIASTOLIC BLOOD PRESSURE: 54 MMHG | SYSTOLIC BLOOD PRESSURE: 141 MMHG | WEIGHT: 199.7 LBS | TEMPERATURE: 99 F | HEART RATE: 76 BPM | RESPIRATION RATE: 18 BRPM | BODY MASS INDEX: 29.58 KG/M2 | HEIGHT: 69 IN

## 2019-05-21 PROBLEM — R65.10 SIRS (SYSTEMIC INFLAMMATORY RESPONSE SYNDROME) (HCC): Status: RESOLVED | Noted: 2019-05-19 | Resolved: 2019-05-21

## 2019-05-21 PROBLEM — D64.9 ANEMIA: Status: ACTIVE | Noted: 2019-05-21

## 2019-05-21 LAB
ABSOLUTE EOS #: <0.03 K/UL (ref 0–0.44)
ABSOLUTE IMMATURE GRANULOCYTE: 0.06 K/UL (ref 0–0.3)
ABSOLUTE LYMPH #: 0.75 K/UL (ref 1.1–3.7)
ABSOLUTE MONO #: 0.73 K/UL (ref 0.1–1.2)
ALBUMIN SERPL-MCNC: 3 G/DL (ref 3.5–5.2)
ALBUMIN/GLOBULIN RATIO: 1.1 (ref 1–2.5)
ALP BLD-CCNC: 63 U/L (ref 40–129)
ALT SERPL-CCNC: 13 U/L (ref 5–41)
ANION GAP SERPL CALCULATED.3IONS-SCNC: 10 MMOL/L (ref 9–17)
AST SERPL-CCNC: 12 U/L
BASOPHILS # BLD: 0 % (ref 0–2)
BASOPHILS ABSOLUTE: 0.03 K/UL (ref 0–0.2)
BILIRUB SERPL-MCNC: 0.51 MG/DL (ref 0.3–1.2)
BUN BLDV-MCNC: 34 MG/DL (ref 8–23)
BUN/CREAT BLD: 25 (ref 9–20)
CALCIUM SERPL-MCNC: 8.3 MG/DL (ref 8.6–10.4)
CHLORIDE BLD-SCNC: 101 MMOL/L (ref 98–107)
CO2: 24 MMOL/L (ref 20–31)
CREAT SERPL-MCNC: 1.34 MG/DL (ref 0.7–1.2)
DIFFERENTIAL TYPE: ABNORMAL
EOSINOPHILS RELATIVE PERCENT: 0 % (ref 1–4)
GFR AFRICAN AMERICAN: >60 ML/MIN
GFR NON-AFRICAN AMERICAN: 52 ML/MIN
GFR SERPL CREATININE-BSD FRML MDRD: ABNORMAL ML/MIN/{1.73_M2}
GFR SERPL CREATININE-BSD FRML MDRD: ABNORMAL ML/MIN/{1.73_M2}
GLUCOSE BLD-MCNC: 188 MG/DL (ref 74–100)
GLUCOSE BLD-MCNC: 210 MG/DL (ref 70–99)
GLUCOSE BLD-MCNC: 247 MG/DL (ref 74–100)
HCT VFR BLD CALC: 28.5 % (ref 40.7–50.3)
HEMOGLOBIN: 9.5 G/DL (ref 13–17)
IMMATURE GRANULOCYTES: 1 %
LYMPHOCYTES # BLD: 7 % (ref 24–43)
MAGNESIUM: 1.9 MG/DL (ref 1.6–2.6)
MCH RBC QN AUTO: 30.6 PG (ref 25.2–33.5)
MCHC RBC AUTO-ENTMCNC: 33.3 G/DL (ref 28.4–34.8)
MCV RBC AUTO: 91.9 FL (ref 82.6–102.9)
MONOCYTES # BLD: 6 % (ref 3–12)
NRBC AUTOMATED: 0 PER 100 WBC
PDW BLD-RTO: 13.7 % (ref 11.8–14.4)
PLATELET # BLD: 103 K/UL (ref 138–453)
PLATELET ESTIMATE: ABNORMAL
PMV BLD AUTO: 11.1 FL (ref 8.1–13.5)
POTASSIUM SERPL-SCNC: 3.4 MMOL/L (ref 3.7–5.3)
RBC # BLD: 3.1 M/UL (ref 4.21–5.77)
RBC # BLD: ABNORMAL 10*6/UL
SEG NEUTROPHILS: 86 % (ref 36–65)
SEGMENTED NEUTROPHILS ABSOLUTE COUNT: 9.95 K/UL (ref 1.5–8.1)
SODIUM BLD-SCNC: 135 MMOL/L (ref 135–144)
TOTAL PROTEIN: 5.8 G/DL (ref 6.4–8.3)
WBC # BLD: 11.5 K/UL (ref 3.5–11.3)
WBC # BLD: ABNORMAL 10*3/UL

## 2019-05-21 PROCEDURE — 6370000000 HC RX 637 (ALT 250 FOR IP): Performed by: FAMILY MEDICINE

## 2019-05-21 PROCEDURE — 2580000003 HC RX 258: Performed by: FAMILY MEDICINE

## 2019-05-21 PROCEDURE — 82947 ASSAY GLUCOSE BLOOD QUANT: CPT

## 2019-05-21 PROCEDURE — 80053 COMPREHEN METABOLIC PANEL: CPT

## 2019-05-21 PROCEDURE — 6360000002 HC RX W HCPCS: Performed by: FAMILY MEDICINE

## 2019-05-21 PROCEDURE — 36415 COLL VENOUS BLD VENIPUNCTURE: CPT

## 2019-05-21 PROCEDURE — 97110 THERAPEUTIC EXERCISES: CPT

## 2019-05-21 PROCEDURE — 85025 COMPLETE CBC W/AUTO DIFF WBC: CPT

## 2019-05-21 PROCEDURE — 97116 GAIT TRAINING THERAPY: CPT

## 2019-05-21 PROCEDURE — 97165 OT EVAL LOW COMPLEX 30 MIN: CPT

## 2019-05-21 PROCEDURE — 83735 ASSAY OF MAGNESIUM: CPT

## 2019-05-21 PROCEDURE — 6370000000 HC RX 637 (ALT 250 FOR IP): Performed by: PHYSICIAN ASSISTANT

## 2019-05-21 RX ORDER — LOPERAMIDE HYDROCHLORIDE 2 MG/1
2 CAPSULE ORAL 4 TIMES DAILY PRN
COMMUNITY
Start: 2019-05-21 | End: 2019-05-31

## 2019-05-21 RX ORDER — ONDANSETRON 4 MG/1
4 TABLET, ORALLY DISINTEGRATING ORAL EVERY 8 HOURS PRN
Qty: 20 TABLET | Refills: 0 | Status: SHIPPED | OUTPATIENT
Start: 2019-05-21 | End: 2020-04-20 | Stop reason: ALTCHOICE

## 2019-05-21 RX ORDER — POTASSIUM CHLORIDE 20 MEQ/1
40 TABLET, EXTENDED RELEASE ORAL ONCE
Status: COMPLETED | OUTPATIENT
Start: 2019-05-21 | End: 2019-05-21

## 2019-05-21 RX ADMIN — GLIPIZIDE 10 MG: 5 TABLET ORAL at 07:31

## 2019-05-21 RX ADMIN — INSULIN LISPRO 4 UNITS: 100 INJECTION, SOLUTION INTRAVENOUS; SUBCUTANEOUS at 11:46

## 2019-05-21 RX ADMIN — CARVEDILOL 12.5 MG: 12.5 TABLET, FILM COATED ORAL at 09:27

## 2019-05-21 RX ADMIN — SODIUM CHLORIDE: 9 INJECTION, SOLUTION INTRAVENOUS at 03:52

## 2019-05-21 RX ADMIN — ASPIRIN 81 MG: 81 TABLET ORAL at 09:27

## 2019-05-21 RX ADMIN — LISINOPRIL 40 MG: 20 TABLET ORAL at 09:27

## 2019-05-21 RX ADMIN — MULTIPLE VITAMINS W/ MINERALS TAB 1 TABLET: TAB at 09:27

## 2019-05-21 RX ADMIN — INSULIN LISPRO 2 UNITS: 100 INJECTION, SOLUTION INTRAVENOUS; SUBCUTANEOUS at 09:28

## 2019-05-21 RX ADMIN — ONDANSETRON 4 MG: 2 INJECTION INTRAMUSCULAR; INTRAVENOUS at 07:32

## 2019-05-21 RX ADMIN — INSULIN GLARGINE 20 UNITS: 100 INJECTION, SOLUTION SUBCUTANEOUS at 09:28

## 2019-05-21 RX ADMIN — HYDRALAZINE HYDROCHLORIDE 100 MG: 50 TABLET, FILM COATED ORAL at 09:27

## 2019-05-21 RX ADMIN — EPLERENONE 25 MG: 25 TABLET, FILM COATED ORAL at 09:33

## 2019-05-21 RX ADMIN — PREDNISOLONE ACETATE 1 DROP: 10 SUSPENSION/ DROPS OPHTHALMIC at 09:28

## 2019-05-21 RX ADMIN — FAMOTIDINE 20 MG: 20 TABLET ORAL at 09:27

## 2019-05-21 RX ADMIN — POTASSIUM CHLORIDE 40 MEQ: 20 TABLET, EXTENDED RELEASE ORAL at 11:46

## 2019-05-21 RX ADMIN — ENOXAPARIN SODIUM 40 MG: 40 INJECTION SUBCUTANEOUS at 09:27

## 2019-05-21 RX ADMIN — DOCUSATE SODIUM 100 MG: 100 CAPSULE, LIQUID FILLED ORAL at 09:27

## 2019-05-21 ASSESSMENT — PAIN DESCRIPTION - DESCRIPTORS
DESCRIPTORS: ACHING
DESCRIPTORS: BURNING

## 2019-05-21 ASSESSMENT — PAIN DESCRIPTION - ORIENTATION: ORIENTATION: RIGHT

## 2019-05-21 ASSESSMENT — PAIN - FUNCTIONAL ASSESSMENT: PAIN_FUNCTIONAL_ASSESSMENT: ACTIVITIES ARE NOT PREVENTED

## 2019-05-21 ASSESSMENT — PAIN DESCRIPTION - PAIN TYPE
TYPE: ACUTE PAIN
TYPE: ACUTE PAIN

## 2019-05-21 ASSESSMENT — PAIN SCALES - GENERAL
PAINLEVEL_OUTOF10: 5
PAINLEVEL_OUTOF10: 3

## 2019-05-21 ASSESSMENT — PAIN DESCRIPTION - LOCATION
LOCATION: GROIN
LOCATION: PELVIS

## 2019-05-21 ASSESSMENT — PAIN DESCRIPTION - FREQUENCY
FREQUENCY: INTERMITTENT
FREQUENCY: INTERMITTENT

## 2019-05-21 ASSESSMENT — PAIN DESCRIPTION - ONSET: ONSET: GRADUAL

## 2019-05-21 ASSESSMENT — PAIN DESCRIPTION - PROGRESSION: CLINICAL_PROGRESSION: GRADUALLY WORSENING

## 2019-05-21 NOTE — PLAN OF CARE
Problem: Falls - Risk of:  Goal: Will remain free from falls  Description  Will remain free from falls  Outcome: Ongoing  Note:   Patient is alert and oriented. Bed alarm on. Patient education reinforced on the need of putting the call light on while getting up. Needs assessed with hourly rounding and environment scanned for any safety hazards. Problem: DAILY CARE  Goal: Daily care needs are met  Outcome: Ongoing  Note:   Daily care needs are met by patient independently. Patient is assisted when needed. Problem: PAIN  Goal: Patient's pain/discomfort is manageable  Outcome: Ongoing  Note:   Patient is able to report the pain. Patient is stating the pain is tolerable and refused any medication at this moment.

## 2019-05-21 NOTE — PROGRESS NOTES
and Colonoscopy (N/A, 5/23/2018). Restrictions  Restrictions/Precautions  Restrictions/Precautions: General Precautions  Subjective   General  Chart Reviewed: Yes  Response To Previous Treatment: Patient with no complaints from previous session. Family / Caregiver Present: Yes  Subjective  Subjective: Pt reports a \"3/10 groin pain\" upon arrival.  Pt reports feeling \"so much better today\" and reports not getting sick since yesterday. BRE Roach) came in room at beginning of session to update pt on status (Pt has been cleared of UTI and is planned to be discharged today)  Pain Screening  Patient Currently in Pain: Yes  Vital Signs  Patient Currently in Pain: Yes       Orientation  Orientation  Overall Orientation Status: Within Functional Limits  Cognition      Objective      Transfers  Sit to Stand: Stand by assistance  Stand to sit: Stand by assistance           Exercises  Hip Flexion: x15  Hip Abduction: x15  Knee Long Arc Quad: x15  Ankle Pumps: x15  Comments: Seated ther ex completed with exercises listed above. Assessment   Treatment Diagnosis: generalized weakness  Prognosis: Good  REQUIRES PT FOLLOW UP: Yes  Activity Tolerance  Activity Tolerance: Patient Tolerated treatment well     Short term goals  Time Frame for Short term goals: 10 days  Short term goal 1: Pt will be independent with bed mob/transfers for functional independence. Short term goal 2: Pt will ambulate 300ft with Lily and no LOB or unsteadiness for safety with household/community ambulation. Short term goal 3: Pt will tolerate 20-30mins ther ex/act to improve endurance for ADLs and functional tasks.      Plan    Plan  Times per week: 7 days per week  Times per day: Twice a day(Daily on weekends)  Current Treatment Recommendations: Strengthening, ADL/Self-care Training, Gait Training, Patient/Caregiver Education & Training, IADL Training, Stair training, Balance Training, Neuromuscular Re-education, Functional Mobility Training, Endurance Training, Home Exercise Program, Transfer Training, Safety Education & Training  Safety Devices  Type of devices:  All fall risk precautions in place, Call light within reach, Left in chair, Gait belt(Family in room upon arrival/departure.)     Therapy Time   Individual Concurrent Group Co-treatment   Time In 1012         Time Out [address], PTA

## 2019-05-21 NOTE — DISCHARGE SUMMARY
no masses or organomegaly  EXT:    no cyanosis, clubbing or edema present    NEURO: follows commands, VARGAS, no deficits  SKIN:   no rashes or significant lesions        Significant Diagnostic Studies:   Lab Results   Component Value Date    WBC 11.5 (H) 05/21/2019    HGB 9.5 (L) 05/21/2019     (L) 05/21/2019       Lab Results   Component Value Date    BUN 34 (H) 05/21/2019    CREATININE 1.34 (H) 05/21/2019     05/21/2019    K 3.4 (L) 05/21/2019    CALCIUM 8.3 (L) 05/21/2019     05/21/2019    CO2 24 05/21/2019    LABGLOM 52 (L) 05/21/2019       Lab Results   Component Value Date    WBCUA 10 TO 20 05/19/2019    RBCUA 5 TO 10 05/19/2019    EPITHUA None 05/19/2019    LEUKOCYTESUR TRACE (A) 05/19/2019    SPECGRAV >1.030 (H) 05/19/2019    GLUCOSEU 2+ (A) 05/19/2019    KETUA TRACE (A) 05/19/2019    PROTEINU 2+ (A) 05/19/2019    HGBUR 2+ (A) 05/19/2019    CASTUA NOT REPORTED 05/19/2019    CRYSTUA NOT REPORTED 05/19/2019    BACTERIA 2+ (A) 05/19/2019    YEAST NOT REPORTED 05/19/2019       Ct Abdomen Pelvis Wo Contrast Additional Contrast? None    Result Date: 5/19/2019  EXAMINATION: CT OF THE ABDOMEN AND PELVIS WITHOUT CONTRAST 5/19/2019 9:55 am TECHNIQUE: CT of the abdomen and pelvis was performed without the administration of intravenous contrast. Multiplanar reformatted images are provided for review. Dose modulation, iterative reconstruction, and/or weight based adjustment of the mA/kV was utilized to reduce the radiation dose to as low as reasonably achievable. COMPARISON: None. HISTORY: ORDERING SYSTEM PROVIDED HISTORY: PYELONEPHRITIS, COMPLICATED (DM, IMMUNODIFF, HX OF STONES, PRIOR SURGERY, NOT RESPONDING TO ABX) TECHNOLOGIST PROVIDED HISTORY: FINDINGS: Lower Chest: The lung bases are without consolidation or effusion. The visualized cardiac structures are unremarkable. Organs: There is diffuse hepatic steatosis. The gallbladder has been removed. The pancreas and adrenal glands are unremarkable. The spleen is enlarged measuring 15.2 cm in craniocaudal dimension. The kidneys are without obstructive uropathy. The ureters are not dilated. The urinary bladder is unremarkable. GI/Bowel: The stomach is unremarkable. Loops of small bowel are normal in caliber without evidence for obstruction. The colon contains air and fecal residue. There are uncomplicated diverticula. The appendix is normal. There is no intraperitoneal free air or free fluid. Pelvis: The prostate gland and seminal vesicles are unremarkable. Phleboliths are seen in the pelvis. Peritoneum/Retroperitoneum: The psoas muscles are symmetric. The abdominal aorta is normal in caliber containing calcification. The inferior vena cava is unremarkable. There is no retroperitoneal or mesenteric adenopathy. Bones/Soft Tissues: The extra-abdominal soft tissues are unremarkable. There is no acute osseous abnormality. No acute abdominal or pelvic abnormality. Hepatic steatosis. Mild splenomegaly. Uncomplicated colonic diverticulosis. Discharge Diagnoses:     Active Problems:    Essential hypertension    Chronic kidney disease, stage III (moderate) ([facility])    Controlled type 2 diabetes mellitus with diabetic nephropathy, with long-term current use of insulin ([facility])    Coronary artery disease involving native coronary artery of native heart without angina pectoris    Anemia - unknown cause  Resolved Problems:    SIRS due to non-infectious process without acute organ dysfunction [facility])      Active Hospital Problems    Diagnosis Date Noted    Anemia - unknown cause [D64.9] 05/21/2019    Coronary artery disease involving native coronary artery of native heart without angina pectoris [I25.10] 05/02/2016    Controlled type 2 diabetes mellitus with diabetic nephropathy, with long-term current use of insulin ([address] [E11.21, Z79.4] 02/22/2016    Chronic kidney disease, stage III (moderate) ([address] [N18.3] 02/22/2016    Essential hypertension [I10] tiZANidine (ZANAFLEX) 2 MG tablet  Take 1 tablet by mouth every 6 hours as needed (spasm)                 Patient Instructions:    Activity: activity as tolerated  Diet: diabetic diet  Wound Care: none needed  Other: none     Disposition:   Discharge to Home    Follow up:  Patient will be followed by Margaret Banerjee MD this week    CORE MEASURES on Discharge (if applicable)  ACE/ARB in CHF: NA  Statin in MI: NA  ASA in MI: NA  Statin in CVA: NA  Antiplatelet in CVA: NA    Total time spent on discharge services: 30 minutes    Including the following activities:  Evaluation and Management of patient  Discussion with patient and/or surrogate about current care plan  Coordination with Case Management and/or   Coordination of care with Consultants (if applicable)   Coordination of care with Receiving Facility Physician (if applicable)  Completion of DME forms (if applicable)  Preparation of Discharge Summary  Preparation of Medication Reconciliation  Preparation of Discharge Prescriptions    Signed:  Paul Bush PA-C  5/21/2019, 11:21 AM

## 2019-05-21 NOTE — PROGRESS NOTES
Discussed discharge plans with the patient. Patient is a 68year old male here with UTI with sirs requiring iv antibiotics and iv fluids. He is alert and oriented. Patient is  and lives at home with his wife. He uses a cane. The patient and his wife do the cooking and the cleaning. He manages his own medications and drives. His PCP is Dr. Alva Taylor. He has medical insurance that helps with medication costs. The discharge plan is home with no services. He does not have advance directives. LSW to monitor and assist with any needs or concerns as they arise.     FERDINAND Hannah

## 2019-05-21 NOTE — PROGRESS NOTES
Hospitalist Progress Note    SUBJECTIVE/INTERVAL HISTORY:    Patient seen in follow up for SIRS, urine culture negative. Nausea overnight. Some loose stools as well. Overall feels better than on admission. T max 100.5. K 3.4. WBC 11.5. NO urinary complaints. 5/20/2019  7:44 PM - Ludin Wilson Incoming Lab Results From [facility]     Specimen Information: Urine        Component Collected Lab   Specimen Description 05/19/2019  9:35 PM [address]   . URINE    Special Requests 05/19/2019  9:35 PM [place]    Culture 05/19/2019  9:35  [address]   NO SIGNIFICANT GROWTH    Testing Performed By     Lab - Abbreviation Name Director Address Valid Date Range   [address] [facility] Neha Carrasco  [place].  [facility] [address] 08/30/17 0802-Present   [address] Nguyen  [address] [address] 08/30/17 0801-Present   Lab and Collection     Urine culture - 5/19/2019   Result History     Urine culture on 5/20/2019         OBJECTIVE:    Vitals:   Temp: 99 °F (37.2 °C)  BP: (!) 141/54  Resp: 18  Pulse: 76  SpO2: 96 %  24HR INTAKE/OUTPUT:      Intake/Output Summary (Last 24 hours) at 5/21/2019 1109  Last data filed at 5/21/2019 0847  Gross per 24 hour   Intake 2372.31 ml   Output 375 ml   Net 1997.31 ml       -----------------------------------------------------------------  Review of Systems:  Constitutional:negative  for fevers, and negative for chills.   Eyes: negative for visual disturbance   ENT: negative for sore throat, negative nasal congestion, and negative for earache  Respiratory: negative for shortness of breath, negative for cough, and negative for wheezing  Cardiovascular: negative for chest pain, negative for palpitations, and negative for syncope  Gastrointestinal: negative for abdominal pain, negative for nausea,negative for vomiting, negative for diarrhea, List    Diagnosis Date Noted    S/P drug eluting coronary stent placement-OM1 4/10/17 04/10/2017     Priority: High    Coronary atherosclerosis due to calcified coronary lesion 02/20/2015     Priority: Medium     Class: Chronic    Urinary tract infection 05/19/2019    Acute renal failure with tubular necrosis (HCC) 10/02/2018    Non critical Right Renal artery stenosis, native ([address] 10/02/2018    Medication side effect, initial encounter 03/23/2018    Angina, class II ([address] 01/08/2018    Hyperlipidemia 04/24/2017    Chronic diastolic heart failure ([address] 04/24/2017    Coronary artery disease involving native coronary artery of native heart without angina pectoris 05/02/2016    Cervical stenosis of spinal canal 02/29/2016    Abnormal tilt table test 02/23/2016    Chronic kidney disease, stage III (moderate) ([facility]) 02/22/2016    Controlled type 2 diabetes mellitus with diabetic nephropathy, with long-term current use of insulin ([address] 02/22/2016    Ischemic chest pain 02/17/2016    Essential hypertension 10/16/2015    Chronotropic incompetence with autonomic dysfunction 09/02/2015    Episodic lightheadedness 09/02/2015    Cholecystitis 08/17/2015    Syncope, near 08/05/2015    Dysautonomia ([address] 06/29/2015    History of CVA (cerebrovascular accident) without residual deficits     Displacement of intervertebral disc, site unspecified, without myelopathy 03/04/2015    History of colon polyps 11/20/9730    Systolic murmur 73/61/5093    History of MI (myocardial infarction) 02/12/2014    Vertigo, benign paroxysmal 10/17/2012     Class: Acute       PLAN:  SIRS due to non-infectious process without acute organ dysfunction   Urine culture NGTD   Per Dr. Lizeth Chisholm no abx on discharge    Essential hypertension    Chronic kidney disease, stage III     Controlled type 2 diabetes mellitus with diabetic nephropathy, with long-term current use of insulin     Coronary artery disease involving native coronary artery of native heart without angina pectoris  Kdur 40mEq X 1  Prn Aislinn Sidle    See Dr. Wanda Middleton this week    · High risk medication: none    Nina Patel PA-C  5/21/2019, 11:09 AM

## 2019-05-21 NOTE — PROGRESS NOTES
Writer reviewed discharge instructions with patient and spouse. Both verbalized understanding. Denies questions. Copy of discharge instructions given to patient.

## 2019-05-21 NOTE — PROGRESS NOTES
Occupational Therapy   Occupational Therapy Initial Assessment  Date: 2019   Patient Name: Nasim Fisher  MRN: [id number]     : 1945    Date of Service: 2019    Discharge Recommendations:  Home with assist PRN       Assessment   Assessment: Pt. finishing up in restroom upon arrival, sarkis's ability to complete fxl mobility pushing IV pole and simple ADL tasks with SUP for eval purposes, no LOB or SOB noted, G safety, pt. states he is feeling much better compared to yesterday. Pt. requires no further OT services at this time, pt. agreeable and reports no further questions/concerns. No Skilled OT: At baseline function; No OT goals identified  REQUIRES OT FOLLOW UP: No  Safety Devices  Safety Devices in place: Yes  Type of devices: Left in chair;Call light within reach           Patient Diagnosis(es): The primary encounter diagnosis was Dehydration. Diagnoses of Nausea and vomiting, intractability of vomiting not specified, unspecified vomiting type, Septicemia ([address] Urinary tract infection without hematuria, site unspecified, and Hyponatremia were also pertinent to this visit.      has a past medical history of Acute MI ([address] Acute renal failure with tubular necrosis ([address] CAD (coronary artery disease), Cerebral artery occlusion with cerebral infarction [facility]), CHF (congestive heart failure) ([address] Chronic back pain, Closed fracture of lumbar vertebra without mention of spinal cord injury, Diabetes mellitus ([address] Displacement of intervertebral disc, site unspecified, without myelopathy, H/O cardiac catheterization, H/O cardiovascular stress test, H/O tilt table evaluation, H/O tilt table evaluation, History of 24 hour EKG monitoring, History of 24 hour EKG monitoring, History of cardiovascular stress test, History of cardiovascular stress test, History of coronary artery stent placement, History of CVA (cerebrovascular accident) without residual deficits, History of echocardiogram, History of Holter monitoring, History of tilt table evaluation, Hyperlipidemia, Hypertension, Non critical Right Renal artery stenosis, native [facility]), Pacemaker, S/P cardiac cath, S/P coronary artery stent placement, and Type II or unspecified type diabetes mellitus without mention of complication, not stated as uncontrolled. has a past surgical history that includes Pacemaker insertion; back surgery; Cholecystectomy (8/17/15); Corneal transplant; Cardiac catheterization (Left, 2/19/16); pacemaker placement; Colonoscopy (2010); Colonoscopy (2/19/15); Colonoscopy (05/23/2018); and Colonoscopy (N/A, 5/23/2018). Restrictions  Restrictions/Precautions  Restrictions/Precautions: General Precautions    Subjective   General  Chart Reviewed: Yes  Patient assessed for rehabilitation services?: Yes  Family / Caregiver Present: Yes  Diagnosis: UTI  General Comment  Comments: Pt. states he is feeling much better this date, hoping to discharge home this afternoon.         Social/Functional History  Social/Functional History  Lives With: Spouse  Type of Home: House  Home Layout: One level  Home Access: Stairs to enter with rails  Entrance Stairs - Number of Steps: 2  Bathroom Shower/Tub: Walk-in shower  Bathroom Toilet: Standard  Home Equipment: [employer]  ADL Assistance: Independent  Homemaking Assistance: Independent  Homemaking Responsibilities: Yes  Ambulation Assistance: Independent  Transfer Assistance: Independent  Active : Yes  Mode of Transportation: [facility]  Occupation: Retired       Objective   Vision: Impaired  Vision Exceptions: Wears glasses for reading  Hearing: Within functional limits    Orientation  Overall Orientation Status: Within Functional Limits     Functional Mobility  Functional - Mobility Device: Other(pushing IV pole)  Activity: To/from bathroom  Assist Level: Supervision  ADL  Feeding: Independent  Grooming: Supervision  UE Bathing: Supervision  LE Bathing: Supervision  UE Dressing: Supervision  LE Dressing: Supervision  Toileting: Supervision        Transfers  Stand Pivot Transfers: Supervision  Sit to stand: Supervision  Stand to sit: Supervision     Cognition  Overall Cognitive Status: WFL       LUE AROM (degrees)  LUE AROM : WFL  RUE AROM (degrees)  RUE AROM : WFL  LUE Strength  Gross LUE Strength: WFL  RUE Strength  Gross RUE Strength: WFL          Therapy Time   Individual Concurrent Group Co-treatment   Time In 9380         Time Out 1004         Minutes [address] [place], [place]

## 2019-05-22 ENCOUNTER — CARE COORDINATION (OUTPATIENT)
Dept: CASE MANAGEMENT | Age: 74
End: 2019-05-22

## 2019-05-22 DIAGNOSIS — N30.00 ACUTE CYSTITIS WITHOUT HEMATURIA: Primary | ICD-10-CM

## 2019-05-22 PROCEDURE — 1111F DSCHRG MED/CURRENT MED MERGE: CPT | Performed by: FAMILY MEDICINE

## 2019-05-22 NOTE — CARE COORDINATION
[address] [facility] Transitions Initial Follow Up Call    Call within 2 business days of discharge: Yes    Patient: Ousmane Scales Patient : 1945   MRN: <[id number]>  Reason for Admission: dehydration  Discharge Date: 19 RARS: Readmission Risk Score: 23      Last Discharge [facility]       Complaint Diagnosis Description Type Department Provider    19 Urinary Retention; Nausea Dehydration . .. ED to Hosp-Admission (Discharged) (Jennifer Blanco) Marcia Del Valle MD; Nate WHITEHEAD Be. .. Spoke with: [address] [facility] [address]: [place]    Non-face-to-face services provided:  Obtained and reviewed discharge summary and/or continuity of care documents  Assessment and support for treatment adherence and medication management-completed 1111f    Care Transitions 24 Hour Call    Do you have any ongoing symptoms?:  No  Do you have a copy of your discharge instructions?:  Yes  Do you have all of your prescriptions and are they filled?:  Yes  Have you been contacted by a [address]?:  No  Have you scheduled your follow up appointment?:  Yes  How are you going to get to your appointment?:  Car - drive self  Were you discharged with any Home Care or Post Acute Services:  No  Do you feel like you have everything you need to keep you well at home?:  Yes  Care Transitions Interventions       CTC spoke to [facility] for initial transitions call. Pt stated prior to admission he was up all night and unable to urinate. Pt diagnosed with UTI. Stated he is feeling much better and has increased fluid intake. Denies abdominal pain, urinary frequency or dysuria, burning, hematuria. Reviewed medications 1111f. Pt has not needed Zofran or anti diarrheal medication. Stated bowels are moving regularly. Stated his FBS was 295 this a.m. Stated typically his FBS runs @  and he isn't sure why it's been elevated lately. Denies nausea, jitteriness, confusion, HA, increased fatigue.  Pt has PCP f/u on  and will discuss with

## 2019-05-24 LAB
CULTURE: NORMAL
CULTURE: NORMAL
Lab: NORMAL
Lab: NORMAL
SPECIMEN DESCRIPTION: NORMAL
SPECIMEN DESCRIPTION: NORMAL

## 2019-05-28 ENCOUNTER — ANESTHESIA EVENT (OUTPATIENT)
Dept: OPERATING ROOM | Age: 74
End: 2019-05-28
Payer: MEDICARE

## 2019-05-28 ENCOUNTER — ANESTHESIA (OUTPATIENT)
Dept: OPERATING ROOM | Age: 74
End: 2019-05-28
Payer: MEDICARE

## 2019-05-28 ENCOUNTER — HOSPITAL ENCOUNTER (OUTPATIENT)
Age: 74
Setting detail: OUTPATIENT SURGERY
Discharge: HOME | End: 2019-05-28
Attending: INTERNAL MEDICINE | Admitting: INTERNAL MEDICINE
Payer: MEDICARE

## 2019-05-28 VITALS
WEIGHT: 200 LBS | TEMPERATURE: 97 F | DIASTOLIC BLOOD PRESSURE: 79 MMHG | RESPIRATION RATE: 16 BRPM | HEART RATE: 69 BPM | BODY MASS INDEX: 29.62 KG/M2 | SYSTOLIC BLOOD PRESSURE: 169 MMHG | HEIGHT: 69 IN | OXYGEN SATURATION: 99 %

## 2019-05-28 VITALS
DIASTOLIC BLOOD PRESSURE: 41 MMHG | OXYGEN SATURATION: 99 % | SYSTOLIC BLOOD PRESSURE: 117 MMHG | RESPIRATION RATE: 21 BRPM

## 2019-05-28 LAB — GLUCOSE BLD-MCNC: 130 MG/DL (ref 74–100)

## 2019-05-28 PROCEDURE — 3609012400 HC EGD TRANSORAL BIOPSY SINGLE/MULTIPLE: Performed by: INTERNAL MEDICINE

## 2019-05-28 PROCEDURE — 82947 ASSAY GLUCOSE BLOOD QUANT: CPT

## 2019-05-28 PROCEDURE — 43239 EGD BIOPSY SINGLE/MULTIPLE: CPT | Performed by: INTERNAL MEDICINE

## 2019-05-28 PROCEDURE — 3700000000 HC ANESTHESIA ATTENDED CARE: Performed by: INTERNAL MEDICINE

## 2019-05-28 PROCEDURE — 2500000003 HC RX 250 WO HCPCS: Performed by: NURSE ANESTHETIST, CERTIFIED REGISTERED

## 2019-05-28 PROCEDURE — 2580000003 HC RX 258: Performed by: INTERNAL MEDICINE

## 2019-05-28 PROCEDURE — 7100000011 HC PHASE II RECOVERY - ADDTL 15 MIN: Performed by: INTERNAL MEDICINE

## 2019-05-28 PROCEDURE — 7100000010 HC PHASE II RECOVERY - FIRST 15 MIN: Performed by: INTERNAL MEDICINE

## 2019-05-28 PROCEDURE — 2709999900 HC NON-CHARGEABLE SUPPLY: Performed by: INTERNAL MEDICINE

## 2019-05-28 PROCEDURE — 87077 CULTURE AEROBIC IDENTIFY: CPT

## 2019-05-28 PROCEDURE — 6360000002 HC RX W HCPCS: Performed by: NURSE ANESTHETIST, CERTIFIED REGISTERED

## 2019-05-28 RX ORDER — PROPOFOL 10 MG/ML
INJECTION, EMULSION INTRAVENOUS PRN
Status: DISCONTINUED | OUTPATIENT
Start: 2019-05-28 | End: 2019-05-28 | Stop reason: SDUPTHER

## 2019-05-28 RX ORDER — SODIUM CHLORIDE, SODIUM LACTATE, POTASSIUM CHLORIDE, CALCIUM CHLORIDE 600; 310; 30; 20 MG/100ML; MG/100ML; MG/100ML; MG/100ML
INJECTION, SOLUTION INTRAVENOUS CONTINUOUS
Status: DISCONTINUED | OUTPATIENT
Start: 2019-05-28 | End: 2019-05-28 | Stop reason: HOSPADM

## 2019-05-28 RX ORDER — LIDOCAINE HYDROCHLORIDE 20 MG/ML
INJECTION, SOLUTION INFILTRATION; PERINEURAL PRN
Status: DISCONTINUED | OUTPATIENT
Start: 2019-05-28 | End: 2019-05-28 | Stop reason: SDUPTHER

## 2019-05-28 RX ADMIN — PROPOFOL 30 MG: 10 INJECTION, EMULSION INTRAVENOUS at 09:35

## 2019-05-28 RX ADMIN — PROPOFOL 30 MG: 10 INJECTION, EMULSION INTRAVENOUS at 09:38

## 2019-05-28 RX ADMIN — PROPOFOL 20 MG: 10 INJECTION, EMULSION INTRAVENOUS at 09:39

## 2019-05-28 RX ADMIN — PROPOFOL 20 MG: 10 INJECTION, EMULSION INTRAVENOUS at 09:36

## 2019-05-28 RX ADMIN — SODIUM CHLORIDE, POTASSIUM CHLORIDE, SODIUM LACTATE AND CALCIUM CHLORIDE: 600; 310; 30; 20 INJECTION, SOLUTION INTRAVENOUS at 09:00

## 2019-05-28 RX ADMIN — LIDOCAINE HYDROCHLORIDE 5 ML: 20 INJECTION, SOLUTION INFILTRATION; PERINEURAL at 09:35

## 2019-05-28 RX ADMIN — PROPOFOL 30 MG: 10 INJECTION, EMULSION INTRAVENOUS at 09:37

## 2019-05-28 ASSESSMENT — PAIN - FUNCTIONAL ASSESSMENT: PAIN_FUNCTIONAL_ASSESSMENT: 0-10

## 2019-05-28 ASSESSMENT — PAIN SCALES - GENERAL
PAINLEVEL_OUTOF10: 0

## 2019-05-28 NOTE — OP NOTE
PROCEDURE NOTE    DATE OF PROCEDURE: 5/28/2019     ENDOSCOPIST: Jaz Nuñez. Lay Burris MD, [facility]    ASSISTANT: None    PREOPERATIVE DIAGNOSIS: Epigastric pain, history of melena, anemia    POSTOPERATIVE DIAGNOSIS: -Duodenal bulbar/antral erythema    OPERATION: EGD with biopsy, DIANE test    ANESTHESIA: MAC     ESTIMATED BLOOD LOSS:  Minimal    COMPLICATIONS: None. SPECIMENS: were obtained    HISTORY: The patient is a 68y.o. year old male with history of above preop diagnosis. Esophagogastroduodenoscopy with possible biopsy and dilation has been recommended. I explained the risk, benefits, expected outcome, and alternatives to the procedure. Risks include but are not limited to bleeding, infection, respiratory distress, hypotension, and perforation of the esophagus, stomach, or duodenum. Patient understands and is in agreement. PROCEDURE: The patient was given monitored anesthesia care. The patient was given oxygen by nasal cannula. The endoscope was inserted orally and advanced under direct vision through the esophagus, through the stomach, through the pylorus, and into the descending duodenum. Findings:  Duodenum:     Descending: normal    Bulb: abnormal: Erythema    Stomach:    Antrum: abnormal: Erythema, biopsied for CLOtest    Body: normal    Fundus: normal    Esophagus: normal    The scope was removed and the patient tolerated the procedure well.        Electronically signed by Geraldo Soares MD  on 5/28/2019 at 9:51 AM

## 2019-05-28 NOTE — H&P
History and Physical    Patient's Name/Date of Birth: Janlele Jiménez / 1945 (67 y.o.)    Date: May 28, 2019     CHIEF COMPLAINT:  Epigastric pain, melena    Past Medical History:   Diagnosis Date    Acute MI ([facility]     Acute renal failure with tubular necrosis ([facility] 10/2/2018    ssecondary to hemodynamic effects of loop diuretics and ace inhibitors, bbaseline 1.2-1.3 which peaked up to 1.8, resolving    CAD (coronary artery disease)     Cerebral artery occlusion with cerebral infarction ([address]     CHF (congestive heart failure) (HCC)     Chronic back pain     Closed fracture of lumbar vertebra without mention of spinal cord injury     Diabetes mellitus ([facility]     Displacement of intervertebral disc, site unspecified, without myelopathy     H/O cardiac catheterization 2/19/16    LMCA: Mild irregularities 10-20%. LAD: Lesion on PRox LAD: Mid subsection. 65% stenosis. LCx: Lesion on 1st Ob Valentina: Proximal subesection. 70% steniosis. RCA: Small non-dominant RCA. Lesion on PRox RCA: Ostial. 50% stenosis. EF:55%.  H/O cardiovascular stress test 2/19/16    Abnormal. Moderate perfusion defect of mild intensity in the inferior, inferoseptal adn inferoapical regions during stress imaging, which most consistent with ischemia. Global LV systolic function normal without regional wall motion abnormalities. OVerall these results are most consistent with an intermediate risk for signficant CAD. Additional testing including cardiac cath may be indicated.  H/O tilt table evaluation 12/26/2017    Abnormal. Patients HR, BP response and symptoms were most consistent with dysautonomia. Combined with viligant maintenance of euvolemia and maintaining a moderate salft intake, pharmacologic treatment with SSRI such as lexapro and or mestinon among other treatments have shown some effectiveness in treatment of this condition    H/O tilt table evaluation 12/26/2017    Abnormal head upright tilt table study.  The pt heart rate, blood pressure response and symptoms were most consistent with dysautonomia.  History of 24 hour EKG monitoring 8/14/14    Occasional PAC's and PVC's which appear to be at least moderately symptomatic.  History of 24 hour EKG monitoring 11/19/14    Event Monitor. Sinus rhythm and sinus bradycardia. Infrequent isolated PVC's    History of cardiovascular stress test 2/19/14    Relatively NL    History of cardiovascular stress test 03/21/2018    Normal myocardial perfusion. Global left ventricular systolic function was normal with an EF of 68%. Overall, these results are most consistent with a low risk scan.  History of coronary artery stent placement 04/2017    PTCA / Drug Eluting Stent:, CX and / or branches    History of CVA (cerebrovascular accident) without residual deficits 2014    Incidentally found on CT head. No known impairment now or in the past.    History of echocardiogram 2/18/14    LA mildly dilated, EF 55%, LV wall thickness is moderately increased, no definite wall motion abnormalilities, what appears to be a pacer wire is seen w/n the RA and RV, mild-mod TR, mild pulmonary hypertension.  History of Holter monitoring 12/29/2017    Rare PAC's and PVC's.      History of tilt table evaluation 8/12/14    Abnormal    Hyperlipidemia     Hypertension     Non critical Right Renal artery stenosis, native ([address] 10/2/2018    Non critical Right Renal artery stenosis, based on cath in 2016, Rt RA 30% stenosis    Pacemaker     non-functioning    S/P cardiac cath 04/10/2017    S/P coronary artery stent placement     Successful PTCA - HA Om1    SIRS (systemic inflammatory response syndrome) ([address] 5/19/2019    Type II or unspecified type diabetes mellitus without mention of complication, not stated as uncontrolled      Past Surgical History:   Procedure Laterality Date    BACK SURGERY      CARDIAC CATHETERIZATION Left 2/19/16    via right radial approach/ [facility]/ Dr. Janki Mckeon CHOLECYSTECTOMY  8/17/15    Liang/Charles/[facility]/ Lap    COLONOSCOPY      COLONOSCOPY  2/19/15    -polyps,diverticulosis,hemorrhoids    COLONOSCOPY  2018    Dr Sophy Garcia    COLONOSCOPY N/A 2018    COLONOSCOPY POLYPECTOMY SNARE/COLD BIOPSY  cold snare  and  hot snare performed by Hugh Mortensen MD at [facility]      left eye    PACEMAKER INSERTION      PACEMAKER PLACEMENT       Current Facility-Administered Medications   Medication Dose Route Frequency Provider Last Rate Last Dose    lactated ringers infusion   Intravenous Continuous Hugh Mortensen  mL/hr at 19 0900       Allergies   Allergen Reactions    Lipitor [Atorvastatin] Other (See Comments)     Muscle aches and joint pain    Aldactone [Spironolactone] Hives    Crestor [Rosuvastatin Calcium] Other (See Comments)     Muscle aches and joint pain    Lopid [Gemfibrozil]      hyperglycemia    Invokana [Canagliflozin] Rash    Januvia [Sitagliptin] Nausea And Vomiting     Family History   Problem Relation Age of Onset    Cancer Mother         breast    Cancer Father         lung     Social History     Socioeconomic History    Marital status:      Spouse name: Not on file    Number of children: Not on file    Years of education: Not on file    Highest education level: Not on file   Occupational History    Not on file   Social Needs    Financial resource strain: Not on file    Food insecurity:     Worry: Not on file     Inability: Not on file    Transportation needs:     Medical: Not on file     Non-medical: Not on file   Tobacco Use    Smoking status: Former Smoker     Packs/day: 1.50     Years: 8.00     Pack years: 12.00     Types: Cigarettes     Last attempt to quit: 3/4/1980     Years since quittin.2    Smokeless tobacco: Never Used   Substance and Sexual Activity    Alcohol use: No    Drug use: No    Sexual activity: Not on file Lifestyle    Physical activity:     Days per week: Not on file     Minutes per session: Not on file    Stress: Not on file   Relationships    Social connections:     Talks on phone: Not on file     Gets together: Not on file     Attends Hinduism service: Not on file     Active member of club or organization: Not on file     Attends meetings of clubs or organizations: Not on file     Relationship status: Not on file    Intimate partner violence:     Fear of current or ex partner: Not on file     Emotionally abused: Not on file     Physically abused: Not on file     Forced sexual activity: Not on file   Other Topics Concern    Not on file   Social History Narrative    Not on file     ROS: Non-contributory    Physical Exam:  Vitals:    05/28/19 0834   BP: (!) 152/69   Pulse: 72   Resp: 18   Temp: 96.9 °F (36.1 °C)   SpO2: 97%       Chest: Breath sounds were clear and equal with no rales, wheezes, or rhonchi. Respiratory effort was normal with no retractions or use of accessory muscles. Cardiovascular: Heart sounds were normal with a regular rate and rhythm. There were no murmurs, gallops or rubs. Abdomen: Bowel sounds were normal.  The abdomen was soft and non distended. There was no tenderness, guarding, rebound, or rigidity. There were no masses, hepatosplenomegaly, or hernias.     Plan: EGD      Electronically by Leavy Hammans, MD  on 5/28/2019 at 9:21 AM

## 2019-05-28 NOTE — PROGRESS NOTES
Discharge instructions given to patient and patient wife. Both verbalize understanding and denies any questions at this time. Discharge Criteria    Inpatients must meet Criteria 1 through 7. All other patients are either YES or N/A. If a NO is chosen then Anesthesia or Surgeon must be notified. 1.  Minimum 30 minutes after last dose of sedative medication, minimum 120 minutes after last dose of reversal agent. Yes      2. Systolic BP stable within 20 mmHg for 30 minutes & systolic BP between 90 & 264 or within 10 mmHg of baseline. Yes      3. Pulse between 60 and 100 or within 10 bpm of baseline. Yes      4. Spontaneous respiratory rate >/= 10 per minute. Yes      5. SaO2 >/= 95 or  >/= baseline. Yes      6. Able to cough and swallow or return to baseline function. Yes      7. Alert and oriented or return to baseline mental status. Yes      8. Demonstrates controlled, coordinated movements, ambulates with steady gait, or return to baseline activity function. Yes      9. Minimal or no pain or nausea, or at a level tolerable and acceptable to patient. Yes      10. Takes and retains oral fluids as allowed. Yes      11. Procedural / perioperative site stable. Minimal or no bleeding. Yes          12. If GI endoscopy procedure, minimal or no abdominal distention or passing flatus. Yes      13. Written discharge instructions and emergency telephone number provided. Yes      14. Accompanied by a responsible adult.     Yes      Adult patient discharged from facility without responsible person meets above criteria plus the following:   a) remains awake without stimulus for 30 minutes     b) oriented appropriate for age      c) all vital signs stable   d) no significant risk of losing protective reflexes      e) able to maintain pre-procedure mobility without assistance   f) no nausea or dizziness      g) transportation arrangements that do not require patient to operate motor Vehicle.      N/A

## 2019-05-28 NOTE — ANESTHESIA PRE PROCEDURE
Department of Anesthesiology  Preprocedure Note       Name:  Varinder Morataya   Age:  68 y.o.  :  1945                                          MRN:  [id number]         Date:  2019      Surgeon: Kingston Escoto):  Leavy Hammans, MD    Procedure: EGD ESOPHAGOGASTRODUODENOSCOPY (N/A )    Medications prior to admission:   Prior to Admission medications    Medication Sig Start Date End Date Taking?  Authorizing Provider   insulin glargine (LANTUS SOLOSTAR) 100 UNIT/ML injection pen Inject 24 Units into the skin 2 times daily 19 Yes Ronald Meyers MD   ondansetron (ZOFRAN ODT) 4 MG disintegrating tablet Take 1 tablet by mouth every 8 hours as needed for Nausea or Vomiting 19  Yes Laura Pickering PA-C   docusate sodium (COLACE) 100 MG capsule Take 1 capsule by mouth 2 times daily 5/10/19  Yes Ronald Meyers MD   tiZANidine (ZANAFLEX) 2 MG tablet Take 1 tablet by mouth every 6 hours as needed (spasm) 3/28/19  Yes Ronald Meyers MD   hydrALAZINE (APRESOLINE) 100 MG tablet Take 1 tablet by mouth 3 times daily 3/12/19  Yes Ronald Meyers MD   lisinopril (PRINIVIL;ZESTRIL) 40 MG tablet TAKE 1 TABLET BY MOUTH ONCE DAILY 3/12/19  Yes Ronald Meyers MD   glipiZIDE (GLIPIZIDE XL) 5 MG extended release tablet TAKE 2 TABLETS BY MOUTH TWICE DAILY 3/12/19  Yes Ronald Meyers MD   carvedilol (COREG) 12.5 MG tablet TAKE 1 TABLET BY MOUTH TWICE DAILY 3/12/19  Yes Ronald Meyers MD   eplerenone (INSPRA) 25 MG tablet Take 1 tablet by mouth daily  Patient taking differently: Take 25 mg by mouth daily Insurance not covering it 18  Yes Terrence Mcdaniels MD   Omega-3 Fatty Acids (FISH OIL) 1000 MG CAPS Take 1,000 mg by mouth 2 times daily   Yes Historical Provider, MD   Iron-Vitamins (GERITOL COMPLETE PO) Take by mouth daily    Yes Historical Provider, MD   prednisoLONE acetate (PRED FORTE) 1 % ophthalmic suspension Place 1 drop into the left eye daily  3/30/15  Yes Historical Provider, MD   loperamide (RA ANTI-DIARRHEAL) 2 MG capsule Take 1 capsule by mouth 4 times daily as needed for Diarrhea 5/21/19 5/31/19  Carren Sever D [place], PA-C   DIANA CONTOUR TEST strip USE ONE STRIP TO CHECK GLUCOSE TWICE DAILY 8/15/18   Aditi De La Cruz MD   albuterol sulfate HFA (PROAIR HFA) 108 (90 Base) MCG/ACT inhaler Inhale 2 puffs into the lungs every 6 hours as needed for Wheezing 4/10/18   Aditi De La Cruz MD   aspirin EC 81 MG EC tablet Take 1 tablet by mouth daily 3/23/18   Tobias Cordova MD   nitroGLYCERIN (NITROSTAT) 0.4 MG SL tablet Place 1 tablet under the tongue every 5 minutes as needed for Chest pain 2/20/16   Gregoria García MD   DIANA MICROLET LANCETS [address] [facility] TEST TWICE DAILY 11/16/15   Aditi De La Cruz MD   Insulin Pen Needle (PEN NEEDLES) 31G X 6 MM MISC 1 each by Does not apply route daily 10/16/15   Aditi De La Cruz MD       Current medications:    Current Facility-Administered Medications   Medication Dose Route Frequency Provider Last Rate Last Dose    lactated ringers infusion   Intravenous Continuous Junior Abdias  mL/hr at 05/28/19 0900         Allergies:     Allergies   Allergen Reactions    Lipitor [Atorvastatin] Other (See Comments)     Muscle aches and joint pain    Aldactone [Spironolactone] Hives    Crestor [Rosuvastatin Calcium] Other (See Comments)     Muscle aches and joint pain    Lopid [Gemfibrozil]      hyperglycemia    Invokana [Canagliflozin] Rash    Januvia [Sitagliptin] Nausea And Vomiting       Problem List:    Patient Active Problem List   Diagnosis Code    Vertigo, benign paroxysmal I19.61    Systolic murmur V64.7    History of MI (myocardial infarction) I25.2    History of colon polyps Z86.010    Coronary atherosclerosis due to calcified coronary lesion I25.10, I25.84    Displacement of intervertebral disc, site unspecified, without myelopathy EPS1415    History of CVA (cerebrovascular accident) without residual deficits Z86.73    D[facility] ([facility] G90.1    Syncope, near R55    Cholecystitis K81.9    Chronotropic incompetence with autonomic dysfunction G90.9    Episodic lightheadedness R42    Essential hypertension I10    Ischemic chest pain I25.9    Chronic kidney disease, stage III (moderate) ([facility] N18.3    Controlled type 2 diabetes mellitus with diabetic nephropathy, with long-term current use of insulin ([facility]) E11.21, Z79.4    Abnormal tilt table test R94.09    Cervical stenosis of spinal canal M48.02    Coronary artery disease involving native coronary artery of native heart without angina pectoris I25.10    S/P drug eluting coronary stent placement-OM1 4/10/17 Z95.5    Hyperlipidemia E78.5    Chronic diastolic heart failure ([facility]) I50.32    Angina, class II ([facility]) I20.9    Medication side effect, initial encounter T50.905A    Acute renal failure with tubular necrosis ([facility]) N17.0    Non critical Right Renal artery stenosis, native ([facility]) I70.1    Urinary tract infection N39.0    Anemia - unknown cause D64.9       Past Medical History:        Diagnosis Date    Acute MI ([facility]     Acute renal failure with tubular necrosis ([facility] 10/2/2018    ssecondary to hemodynamic effects of loop diuretics and ace inhibitors, bbaseline 1.2-1.3 which peaked up to 1.8, resolving    CAD (coronary artery disease)     Cerebral artery occlusion with cerebral infarction ([facility]     CHF (congestive heart failure) ([facility])     Chronic back pain     Closed fracture of lumbar vertebra without mention of spinal cord injury     Diabetes mellitus ([facility]     Displacement of intervertebral disc, site unspecified, without myelopathy     H/O cardiac catheterization 2/19/16    LMCA: Mild irregularities 10-20%. LAD: Lesion on PRox LAD: Mid subsection. 65% stenosis. LCx: Lesion on 1st Ob Valentina: Proximal subesection. 70% steniosis. RCA: Small non-dominant RCA. Lesion on PRox RCA: Ostial. 50% stenosis. EF:55%.      H/O cardiovascular stress test 2/19/16    Abnormal. Moderate perfusion defect of mild intensity in the inferior, inferoseptal adn inferoapical regions during stress imaging, which most consistent with ischemia. Global LV systolic function normal without regional wall motion abnormalities. OVerall these results are most consistent with an intermediate risk for signficant CAD. Additional testing including cardiac cath may be indicated.  H/O tilt table evaluation 12/26/2017    Abnormal. Patients HR, BP response and symptoms were most consistent with dysautonomia. Combined with viligant maintenance of euvolemia and maintaining a moderate salft intake, pharmacologic treatment with SSRI such as lexapro and or mestinon among other treatments have shown some effectiveness in treatment of this condition    H/O tilt table evaluation 12/26/2017    Abnormal head upright tilt table study. The pt heart rate, blood pressure response and symptoms were most consistent with dysautonomia.  History of 24 hour EKG monitoring 8/14/14    Occasional PAC's and PVC's which appear to be at least moderately symptomatic.  History of 24 hour EKG monitoring 11/19/14    Event Monitor. Sinus rhythm and sinus bradycardia. Infrequent isolated PVC's    History of cardiovascular stress test 2/19/14    Relatively NL    History of cardiovascular stress test 03/21/2018    Normal myocardial perfusion. Global left ventricular systolic function was normal with an EF of 68%. Overall, these results are most consistent with a low risk scan.  History of coronary artery stent placement 04/2017    PTCA / Drug Eluting Stent:, CX and / or branches    History of CVA (cerebrovascular accident) without residual deficits 2014    Incidentally found on CT head.  No known impairment now or in the past.    History of echocardiogram 2/18/14    LA mildly dilated, EF 55%, LV wall thickness is moderately increased, no definite wall motion abnormalilities, what appears to be a pacer wire is seen w/n the RA and RV, mild-mod TR, mild pulmonary hypertension.  History of Holter monitoring 2017    Rare PAC's and PVC's.  History of tilt table evaluation 14    Abnormal    Hyperlipidemia     Hypertension     Non critical Right Renal artery stenosis, native [facility] 10/2/2018    Non critical Right Renal artery stenosis, based on cath in , Rt RA 30% stenosis    Pacemaker     non-functioning    S/P cardiac cath 04/10/2017    S/P coronary artery stent placement     Successful PTCA - HA Om1    SIRS (systemic inflammatory response syndrome) ([facility] 2019    Type II or unspecified type diabetes mellitus without mention of complication, not stated as uncontrolled        Past Surgical History:        Procedure Laterality Date    BACK SURGERY      CARDIAC CATHETERIZATION Left 16    via right radial approach/ [facility]/ Dr. Mely Geiger  8/17/15    Liang/Charles/[facility]/ Lap    COLONOSCOPY      COLONOSCOPY  2/19/15    -polyps,diverticulosis,hemorrhoids    COLONOSCOPY  2018    Dr Yesi Bose    COLONOSCOPY N/A 2018    COLONOSCOPY POLYPECTOMY SNARE/COLD BIOPSY  cold snare  and  hot snare performed by J Carlos Sabillon MD at [address] [facility]      left eye    PACEMAKER INSERTION      PACEMAKER PLACEMENT         Social History:    Social History     Tobacco Use    Smoking status: Former Smoker     Packs/day: 1.50     Years: 8.00     Pack years: 12.00     Types: Cigarettes     Last attempt to quit: 3/4/1980     Years since quittin.2    Smokeless tobacco: Never Used   Substance Use Topics    Alcohol use:  No                                Counseling given: Not Answered      Vital Signs (Current):   Vitals:    19 0834   BP: (!) 152/69   Pulse: 72   Resp: 18   Temp: 36.1 °C (96.9 °F)   TempSrc: Temporal   SpO2: 97%   Weight: 200 lb (90.7 kg)   Height: 5' 9\" (1.753 m) BP Readings from Last 3 Encounters:   05/28/19 (!) 152/69   05/23/19 136/78   05/21/19 (!) 141/54       NPO Status: Time of last liquid consumption: 2100(sips this am at 0700 with meds)                        Time of last solid consumption: 1700                        Date of last liquid consumption: 05/27/19                        Date of last solid food consumption: 05/27/19    BMI:   Wt Readings from Last 3 Encounters:   05/28/19 200 lb (90.7 kg)   05/21/19 199 lb 11.2 oz (90.6 kg)   05/07/19 200 lb (90.7 kg)     Body mass index is 29.53 kg/m².     CBC:   Lab Results   Component Value Date    WBC 11.5 05/21/2019    RBC 3.10 05/21/2019    HGB 9.5 05/21/2019    HCT 28.5 05/21/2019    MCV 91.9 05/21/2019    RDW 13.7 05/21/2019     05/21/2019       CMP:   Lab Results   Component Value Date     05/21/2019    K 3.4 05/21/2019     05/21/2019    CO2 24 05/21/2019    BUN 34 05/21/2019    CREATININE 1.34 05/21/2019    GFRAA >60 05/21/2019    LABGLOM 52 05/21/2019    GLUCOSE 210 05/21/2019    GLUCOSE 148 03/16/2017    PROT 5.8 05/21/2019    CALCIUM 8.3 05/21/2019    BILITOT 0.51 05/21/2019    ALKPHOS 63 05/21/2019    AST 12 05/21/2019    ALT 13 05/21/2019       POC Tests:   Recent Labs     05/28/19  0859   POCGLU 130*       Coags:   Lab Results   Component Value Date    PROTIME 10.0 02/17/2016    INR 1.0 02/17/2016    APTT 30.9 02/17/2016       HCG (If Applicable): No results found for: PREGTESTUR, PREGSERUM, HCG, HCGQUANT     ABGs: No results found for: PHART, PO2ART, REI7SDT, JDK3MBF, BEART, L4XYAXEQ     Type & Screen (If Applicable):  No results found for: LABABO, LABRH    Anesthesia Evaluation   no history of anesthetic complications:   Airway: Mallampati: III  TM distance: <3 FB   Neck ROM: full  Mouth opening: > = 3 FB Dental: normal exam         Pulmonary:Negative Pulmonary ROS and normal exam  breath sounds clear to auscultation                             Cardiovascular:  Exercise tolerance: poor (<4 METS),   (+) hypertension:, valvular problems/murmurs (murmur):, angina (resolves with nitroglycerin, episode over 1 month ago): with exertion, pacemaker (pacemaker battery is dead, Dr Gabo douglas says he does not require the pacemaker at this time): pacemaker, past MI: > 6 months, CAD:, CHF:, murmur, hyperlipidemia      ECG reviewed        Stress test reviewed                Neuro/Psych:   (+) CVA: no interval change,             GI/Hepatic/Renal:   (+) GERD: well controlled, PUD, renal disease: CRI, morbid obesity          Endo/Other:    (+) DiabetesType II DM, well controlled, using insulin, . Abdominal:   (+) obese,         Vascular:                                        Anesthesia Plan      general and TIVA     ASA 4       Induction: intravenous. Anesthetic plan and risks discussed with patient.                       REY Rob - CRNA   5/28/2019

## 2019-05-29 ENCOUNTER — CARE COORDINATION (OUTPATIENT)
Dept: CASE MANAGEMENT | Age: 74
End: 2019-05-29

## 2019-05-29 LAB
DIRECT EXAM: NEGATIVE
Lab: NORMAL
SPECIMEN DESCRIPTION: NORMAL

## 2019-05-29 NOTE — CARE COORDINATION
[facility] Transitions Follow Up Call    2019    Patient: Morris Webster  Patient : 1945   MRN: <[id number]>  Reason for Admission:   Discharge Date: 19 RARS: Readmission Risk Score: 23         Spoke with: [address] Transitions Subsequent and Final Call    Subsequent and Final Calls  Do you have any ongoing symptoms?:  No  Have your medications changed?:  No  Do you have any questions related to your medications?:  No  Do you currently have any active services?:  No  Do you have any needs or concerns that I can assist you with?:  No  Identified Barriers:  None  Care Transitions Interventions  No Identified Needs  Other Interventions: Follow Up : Per patient he is doing \"much better\", he denies abd pain, pelvic pain, n/v/d, fever, chills, chest pain, sob, he is eating more he stated that his appetite is returning, he is drinking fluids and having normal elimination patterns. He had EGD yesterday, denies any discomfort, or complications, He denies any issues, needs, or concerns to report at this time. He has f/u appt's scheduled and has transportation.      Future Appointments   Date Time Provider [facility]   2019  2:40 PM [facility] [place] [facility]   2019  9:45 AM MD Krystin Orozco   2019  9:40 AM MD MICHAEL Dale [facility]   2019  1:40 PM MD Merari Peck None       Shana Zambrano, PIO BSN  Care Transitions Coordinator

## 2019-06-05 ENCOUNTER — CARE COORDINATION (OUTPATIENT)
Dept: CASE MANAGEMENT | Age: 74
End: 2019-06-05

## 2019-06-05 NOTE — CARE COORDINATION
[facility] Transitions Follow Up Call    2019    Patient: Uche Rangel  Patient : 1945   MRN: [id number]  Reason for Admission:   Discharge Date: 19 RARS: Readmission Risk Score: 23         Spoke with:  [address] Transitions Subsequent and Final Call    Subsequent and Final Calls  Do you have any ongoing symptoms?:  Yes  Onset of Patient-reported symptoms:  Other  Patient-reported symptoms:  Other  Interventions for patient-reported symptoms:  Other  Have your medications changed?:  No  Do you have any questions related to your medications?:  No  Do you currently have any active services?:  No  Do you have any needs or concerns that I can assist you with?:  No  Identified Barriers:  None  Care Transitions Interventions  Other Interventions:          CTC spoke to [facility] for f/u transitions call. Pt stated he is doing OK, has Neuro appt on 19 for lower back pain. Stated he used to have PT and was told pain is r/t pinched nerve. Pt stated FBS has been good since increase in Lantus to 24 U bid. Stated FBS this a.m. Was 80 and 121 yesterday. Denies increased SOB, CP/pressure, palpitations. No questions or concerns for CTC. Will continue to follow for transitions.     Richard Miranda RN BSN   Care Transitions Coordinator  [phone]     Follow Up  Future Appointments   Date Time Provider Isadora Younger   2019  2:40 PM Lilo Looney F[place], [place] [facility] Neuro Graylin Pila   2019  9:45 AM MD Krystin Marieakkumar A   2019  9:40 AM MD MICHAEL Carter TPP   2019  1:40 PM MD Merari Daughertyf None       Richard Miranda RN

## 2019-06-07 ENCOUNTER — OFFICE VISIT (OUTPATIENT)
Dept: NEUROSURGERY | Age: 74
End: 2019-06-07
Payer: MEDICARE

## 2019-06-07 VITALS
BODY MASS INDEX: 28.5 KG/M2 | HEART RATE: 69 BPM | WEIGHT: 193 LBS | SYSTOLIC BLOOD PRESSURE: 158 MMHG | DIASTOLIC BLOOD PRESSURE: 77 MMHG

## 2019-06-07 DIAGNOSIS — M48.062 SPINAL STENOSIS OF LUMBAR REGION WITH NEUROGENIC CLAUDICATION: Primary | ICD-10-CM

## 2019-06-07 DIAGNOSIS — Z98.1 HISTORY OF LUMBAR SPINAL FUSION: ICD-10-CM

## 2019-06-07 DIAGNOSIS — R26.9 GAIT DIFFICULTY: ICD-10-CM

## 2019-06-07 PROCEDURE — G8417 CALC BMI ABV UP PARAM F/U: HCPCS | Performed by: PHYSICIAN ASSISTANT

## 2019-06-07 PROCEDURE — G8427 DOCREV CUR MEDS BY ELIG CLIN: HCPCS | Performed by: PHYSICIAN ASSISTANT

## 2019-06-07 PROCEDURE — 99202 OFFICE O/P NEW SF 15 MIN: CPT | Performed by: PHYSICIAN ASSISTANT

## 2019-06-07 RX ORDER — PREDNISONE 20 MG/1
TABLET ORAL
Qty: 18 TABLET | Refills: 0 | Status: SHIPPED | OUTPATIENT
Start: 2019-06-07 | End: 2019-06-16

## 2019-06-07 NOTE — PROGRESS NOTES
[facility]  [address], [facility] [address]  [address] [id number]  Dept: [phone]    Patient:  David Hurtado  YOB: 1945  Date: 6/7/2019   PRIMARY CARE PHYSICIAN: Alina Sanchez MD  REFERRED BY: No ref. provider found     Chief Complaint   Patient presents with    Back Pain     lumbar, Hx L4/5 fusion    Leg Pain     right thigh        HPI:     David Hurtado is a 68 y.o. male on whom a neurosurgical consultation has been requested by Alina Sanchez MD for increased low back and right leg pain x 6 months, gait decline. Current complaint was of acute onset about 6 months ago. No preceding trauma or other event. Patient underwent L4/5 spinal fusion about 15 years ago. He had complete resolution of pre operative symptoms (low back and left leg pain) up until 6 months ago. Pain is midline lumbar and radiates into right hip, groin, and anterior lateral thigh to the knee. .Quality is aching, burning, and stabbing. Severity is 8/10. Exacerbation is with walking and standing. Pain is somewhat improved with sitting down. Pain is associated with numbness and tingling in the thigh. Patient admits to difficulty emptying his bladder at times. Was hospitalized at [facility] for a UTI 5/19/19. Denies bowel or bladder incontinence or saddle anesthesia. Back pain is>leg pain. He has completed 12 sessions of  physical therapy without improvement. Patient's symptoms have limited overall function and mobility to a significant extent. He has not yet seen a pain management physician.     History:     Past Medical History:   Diagnosis Date    Acute MI ([address]     Acute renal failure with tubular necrosis ([address] 10/2/2018    ssecondary to hemodynamic effects of loop diuretics and ace inhibitors, bbaseline 1.2-1.3 which peaked up to 1.8, resolving    CAD (coronary artery disease)     Cerebral artery occlusion with cerebral infarction ([address]     CHF (congestive heart failure) (HCC)     Chronic back pain     Closed fracture of lumbar vertebra without mention of spinal cord injury     Diabetes mellitus ([facility]     Displacement of intervertebral disc, site unspecified, without myelopathy     H/O cardiac catheterization 2/19/16    LMCA: Mild irregularities 10-20%. LAD: Lesion on PRox LAD: Mid subsection. 65% stenosis. LCx: Lesion on 1st Ob Valentina: Proximal subesection. 70% steniosis. RCA: Small non-dominant RCA. Lesion on PRox RCA: Ostial. 50% stenosis. EF:55%.  H/O cardiovascular stress test 2/19/16    Abnormal. Moderate perfusion defect of mild intensity in the inferior, inferoseptal adn inferoapical regions during stress imaging, which most consistent with ischemia. Global LV systolic function normal without regional wall motion abnormalities. OVerall these results are most consistent with an intermediate risk for signficant CAD. Additional testing including cardiac cath may be indicated.  H/O tilt table evaluation 12/26/2017    Abnormal. Patients HR, BP response and symptoms were most consistent with dysautonomia. Combined with viligant maintenance of euvolemia and maintaining a moderate salft intake, pharmacologic treatment with SSRI such as lexapro and or mestinon among other treatments have shown some effectiveness in treatment of this condition    H/O tilt table evaluation 12/26/2017    Abnormal head upright tilt table study. The pt heart rate, blood pressure response and symptoms were most consistent with dysautonomia.  History of 24 hour EKG monitoring 8/14/14    Occasional PAC's and PVC's which appear to be at least moderately symptomatic.  History of 24 hour EKG monitoring 11/19/14    Event Monitor. Sinus rhythm and sinus bradycardia. Infrequent isolated PVC's    History of cardiovascular stress test 2/19/14    Relatively NL    History of cardiovascular stress test 03/21/2018    Normal myocardial perfusion.  Global left ventricular systolic function was normal with an EF of 68%. Overall, these results are most consistent with a low risk scan.  History of coronary artery stent placement 04/2017    PTCA / Drug Eluting Stent:, CX and / or branches    History of CVA (cerebrovascular accident) without residual deficits 2014    Incidentally found on CT head. No known impairment now or in the past.    History of echocardiogram 2/18/14    LA mildly dilated, EF 55%, LV wall thickness is moderately increased, no definite wall motion abnormalilities, what appears to be a pacer wire is seen w/n the RA and RV, mild-mod TR, mild pulmonary hypertension.  History of Holter monitoring 12/29/2017    Rare PAC's and PVC's.      History of tilt table evaluation 8/12/14    Abnormal    Hyperlipidemia     Hypertension     Non critical Right Renal artery stenosis, native ([facility] 10/2/2018    Non critical Right Renal artery stenosis, based on cath in 2016, Rt RA 30% stenosis    Pacemaker     non-functioning    S/P cardiac cath 04/10/2017    S/P coronary artery stent placement     Successful PTCA - HA Om1    SIRS (systemic inflammatory response syndrome) ([facility] 5/19/2019    Type II or unspecified type diabetes mellitus without mention of complication, not stated as uncontrolled      Past Surgical History:   Procedure Laterality Date    BACK SURGERY      CARDIAC CATHETERIZATION Left 2/19/16    via right radial approach/ [address] [place]/ Dr. Arteaga Exon  8/17/15    Liang/Charles/[facility]/ Mill[facility]    COLONOSCOPY  2010    COLONOSCOPY  2/19/15    -polyps,diverticulosis,hemorrhoids    COLONOSCOPY  05/23/2018    Dr Aleja Nunez    COLONOSCOPY N/A 5/23/2018    COLONOSCOPY POLYPECTOMY SNARE/COLD BIOPSY  cold snare  and  hot snare performed by Jordan Chowdhury MD at [facility]      left eye   [address] ENDOSCOPY  05/28/2019 Dr. Ashley Lin H-Pylori)duodenal bulbar/antral erythema    UPPER GASTROINTESTINAL ENDOSCOPY N/A 5/28/2019    EGD BIOPSY, clotest performed by Naila Wang MD at [place] [address] History   Problem Relation Age of Onset    Cancer Mother         breast    Cancer Father         lung     Current Outpatient Medications on File Prior to Visit   Medication Sig Dispense Refill    insulin glargine (LANTUS SOLOSTAR) 100 UNIT/ML injection pen Inject 24 Units into the skin 2 times daily 43.2 mL 0    ondansetron (ZOFRAN ODT) 4 MG disintegrating tablet Take 1 tablet by mouth every 8 hours as needed for Nausea or Vomiting 20 tablet 0    docusate sodium (COLACE) 100 MG capsule Take 1 capsule by mouth 2 times daily 60 capsule 0    hydrALAZINE (APRESOLINE) 100 MG tablet Take 1 tablet by mouth 3 times daily 270 tablet 3    lisinopril (PRINIVIL;ZESTRIL) 40 MG tablet TAKE 1 TABLET BY MOUTH ONCE DAILY 90 tablet 0    glipiZIDE (GLIPIZIDE XL) 5 MG extended release tablet TAKE 2 TABLETS BY MOUTH TWICE DAILY 360 tablet 0    carvedilol (COREG) 12.5 MG tablet TAKE 1 TABLET BY MOUTH TWICE DAILY 180 tablet 0    eplerenone (INSPRA) 25 MG tablet Take 1 tablet by mouth daily (Patient taking differently: Take 25 mg by mouth daily Insurance not covering it) 90 tablet 3    Omega-3 Fatty Acids (FISH OIL) 1000 MG CAPS Take 1,000 mg by mouth 2 times daily      DIANA CONTOUR TEST strip USE ONE STRIP TO CHECK GLUCOSE TWICE DAILY 100 strip 11    albuterol sulfate HFA (PROAIR HFA) 108 (90 Base) MCG/ACT inhaler Inhale 2 puffs into the lungs every 6 hours as needed for Wheezing 1 Inhaler 3    aspirin EC 81 MG EC tablet Take 1 tablet by mouth daily 30 tablet 3    Iron-Vitamins (GERITOL COMPLETE PO) Take by mouth daily       nitroGLYCERIN (NITROSTAT) 0.4 MG SL tablet Place 1 tablet under the tongue every 5 minutes as needed for Chest pain 25 tablet 3    DIANA MICROLET LANCETS MISC TEST TWICE DAILY 100 each 2    Insulin Pen Needle (PEN NEEDLES) 31G X 6 MM MISC 1 each by Does not apply route daily 100 each 3    prednisoLONE acetate (PRED FORTE) 1 % ophthalmic suspension Place 1 drop into the left eye daily        No current facility-administered medications on file prior to visit. Social History     Tobacco Use    Smoking status: Former Smoker     Packs/day: 1.50     Years: 8.00     Pack years: 12.00     Types: Cigarettes     Last attempt to quit: 3/4/1980     Years since quittin.3    Smokeless tobacco: Never Used   Substance Use Topics    Alcohol use: No    Drug use: No     Allergies   Allergen Reactions    Lipitor [Atorvastatin] Other (See Comments)     Muscle aches and joint pain    Aldactone [Spironolactone] Hives    Crestor [Rosuvastatin Calcium] Other (See Comments)     Muscle aches and joint pain    Lopid [Gemfibrozil]      hyperglycemia    Invokana [Canagliflozin] Rash    Januvia [Sitagliptin] Nausea And Vomiting       Review of Systems  Constitutional: Negative for activity change and appetite change. HEENT: Negative for ear pain and facial swelling. Eyes: Negative for discharge and itching. Respiratory: Negative for choking and chest tightness. Cardiovascular: Negative for chest pain and leg swelling. Gastrointestinal: Negative for nausea and abdominal pain. Endocrine: Negative for cold intolerance and heat intolerance. Genitourinary: Negative for frequency and flank pain. Musculoskeletal: Negative for myalgias and joint swelling. Skin: Negative for rash and wound. Allergic/Immunologic: Negative for environmental allergies and food allergies. Hematological: Negative for adenopathy. Does not bruise/bleed easily. Psychiatric/Behavioral: Negative for self-injury. The patient is not nervous/anxious.       Physical Exam:      BP (!) 158/77 (Site: Right Upper Arm, Position: Sitting, Cuff Size: Medium Adult)   Pulse 69   Wt 193 lb (87.5 kg)   BMI 28.50 kg/m²   Estimated body mass index is 28.5 kg/m² as calculated from the following:    Height as of 5/28/19: 5' 9\" (1.753 m). Weight as of this encounter: 193 lb (87.5 kg). General:  Ousmane Scales is a 68y.o. year old male who appears his stated age. HEENT: Normocephalic atraumatic. Neck supple. Chest: Clear to auscultation bilaterally. Regular rate and rhythm. Abdomen: Soft nontender nondistended. Normoactive bowel sounds. Neurological Exam  Alert and oriented x 3. CN II-XII intact. Motor examination:   Strength in right upper extremity at 5/5 deltoid, triceps, biceps, wrist ext/flex, and . Strength in left upper extremity at 5/5 deltoid, triceps, biceps, wrist ext/flex, and . Tender to palpation over lumbar spine, right SI joint/hip joint. Fabers negative bilaterally. Strength right lower extremity at +4/5 iliopsoas, quadriceps, hamstring, 5/5 tibialis anterior, gastrocnemius, and EHL. Strength left lower extremity at 5/5 iliopsoas, quadriceps, hamstring, tibialis anterior, gastrocnemius, and EHL. Sensory: Grossly intact. Reflexes: +2/4 in bilateral upper extremities. 1/4 right patellar +2/4 left patellar and right achilles. Hoffmans negative, no clonus. Gait: Antalgic wide based gait, ambulating with cane    Studies Review:     Reviewed CT Type:  Film and Report    Images:  Narrative   EXAMINATION:   CT OF THE LUMBAR SPINE WITHOUT CONTRAST  4/23/2019       TECHNIQUE:   CT of the lumbar spine was performed without the administration of   intravenous contrast. Multiplanar reformatted images are provided for review.    Dose modulation, iterative reconstruction, and/or weight based adjustment of   the mA/kV was utilized to reduce the radiation dose to as low as reasonably   achievable.       COMPARISON:   CT abdomen pelvis from February 19, 2015       HISTORY:   ORDERING SYSTEM PROVIDED HISTORY: Acute midline low back pain with   right-sided sciatica       FINDINGS:   BONES/ALIGNMENT: Posterior transpedicular fusion L4-L5 back and right leg pain, gait decline x 6 months. Patient's symptoms have limited overall function and mobility. Pertinent pmhx for L4/5 fusion abut 15 years ago and complete resolution of axial lower back pain until 6 months ago. Would do CT myelogram lumbar spine considering development of axial spine pain after initial improvement to allow evaluation of hardware /fusion site. Will obtain XR imaging of the lumbar spine, including flexion/extension views, to rule out intervertebral instability. Referred to pain management for evaluation and initiation of conservative pain management interventions including steroid injections (spinal, joint, trigger point, etc.) as indicated. Diagnosis and plan discussed with the patient and all questions answered. Followup: Return in about 2 weeks (around 6/21/2019) for with Dr Jamesetta Runner for surgical consult, hx lumbar fusion, adjacent disease.     Prescriptions Ordered:  Orders Placed This Encounter   Medications    predniSONE (DELTASONE) 20 MG tablet     Sig: Take 3 tabs daily x 3 days, take 2 tabs daily x 3 days, take 1 tab daily x 3 days     Dispense:  18 tablet     Refill:  0        Orders Placed:  Orders Placed This Encounter   Procedures    IR LUMBAR PUNCTURE FOR MYELOGRAM  CT     Standing Status:   Future     Number of Occurrences:   1     Standing Expiration Date:   6/7/2020    CT Lumbar Spine W Contrast     Standing Status:   Future     Number of Occurrences:   1     Standing Expiration Date:   9/5/2019    XR Lumbar Spine Flex and Ext Only     Standing Status:   Future     Number of Occurrences:   1     Standing Expiration Date:   9/5/2019     Order Specific Question:   Reason for exam:     Answer:   neurogenic claudication    External Referral To Pain Clinic     Referral Priority:   Routine     Referral Type:   Eval and Treat     Referral Reason:   Specialty Services Required     Referred to Provider:   Deisy Jamison MD     Requested Specialty: Pain Management     Number of Visits Requested:   1       Electronically signed by BRE Carlos on 6/30/2019 at 8:16 PM    Please note that this chart was generated using voice recognition Dragon dictation software. Although every effort was made to ensure the accuracy of this automated transcription, some errors in transcription may have occurred.

## 2019-06-10 ENCOUNTER — HOSPITAL ENCOUNTER (OUTPATIENT)
Dept: GENERAL RADIOLOGY | Age: 74
Discharge: HOME | End: 2019-06-12
Payer: MEDICARE

## 2019-06-10 ENCOUNTER — HOSPITAL ENCOUNTER (OUTPATIENT)
Age: 74
Discharge: HOME | End: 2019-06-12
Payer: MEDICARE

## 2019-06-10 ENCOUNTER — TELEPHONE (OUTPATIENT)
Dept: NEUROSURGERY | Age: 74
End: 2019-06-10

## 2019-06-10 DIAGNOSIS — R26.9 GAIT DIFFICULTY: ICD-10-CM

## 2019-06-10 DIAGNOSIS — M48.062 SPINAL STENOSIS OF LUMBAR REGION WITH NEUROGENIC CLAUDICATION: ICD-10-CM

## 2019-06-10 DIAGNOSIS — M48.062 SPINAL STENOSIS OF LUMBAR REGION WITH NEUROGENIC CLAUDICATION: Primary | ICD-10-CM

## 2019-06-10 DIAGNOSIS — Z98.1 HISTORY OF LUMBAR SPINAL FUSION: ICD-10-CM

## 2019-06-10 PROCEDURE — 72120 X-RAY BEND ONLY L-S SPINE: CPT

## 2019-06-11 ENCOUNTER — TELEPHONE (OUTPATIENT)
Dept: NEUROLOGY | Age: 74
End: 2019-06-11

## 2019-06-11 DIAGNOSIS — M48.02 CERVICAL STENOSIS OF SPINAL CANAL: ICD-10-CM

## 2019-06-11 DIAGNOSIS — M48.062 LUMBAR STENOSIS WITH NEUROGENIC CLAUDICATION: Primary | ICD-10-CM

## 2019-06-11 NOTE — TELEPHONE ENCOUNTER
Dr. Nancy Aviles no longer in [place] and pt would rather see Dr. Abhi Mcclain in [facility]. Fax [phone]. Can we make a new order for this?

## 2019-06-13 ENCOUNTER — CARE COORDINATION (OUTPATIENT)
Dept: CASE MANAGEMENT | Age: 74
End: 2019-06-13

## 2019-06-18 ENCOUNTER — APPOINTMENT (OUTPATIENT)
Dept: LAB | Age: 74
End: 2019-06-18
Payer: MEDICARE

## 2019-06-18 ENCOUNTER — HOSPITAL ENCOUNTER (OUTPATIENT)
Dept: INTERVENTIONAL RADIOLOGY/VASCULAR | Age: 74
Discharge: HOME | End: 2019-06-20
Payer: MEDICARE

## 2019-06-18 ENCOUNTER — HOSPITAL ENCOUNTER (OUTPATIENT)
Dept: GENERAL RADIOLOGY | Age: 74
Discharge: HOME | End: 2019-06-20
Payer: MEDICARE

## 2019-06-18 ENCOUNTER — HOSPITAL ENCOUNTER (OUTPATIENT)
Dept: CT IMAGING | Age: 74
Discharge: HOME | End: 2019-06-20
Payer: MEDICARE

## 2019-06-18 VITALS
HEART RATE: 61 BPM | SYSTOLIC BLOOD PRESSURE: 142 MMHG | OXYGEN SATURATION: 97 % | RESPIRATION RATE: 16 BRPM | DIASTOLIC BLOOD PRESSURE: 63 MMHG

## 2019-06-18 DIAGNOSIS — R26.9 GAIT DIFFICULTY: ICD-10-CM

## 2019-06-18 DIAGNOSIS — M48.062 SPINAL STENOSIS OF LUMBAR REGION WITH NEUROGENIC CLAUDICATION: ICD-10-CM

## 2019-06-18 DIAGNOSIS — Z98.1 HISTORY OF LUMBAR SPINAL FUSION: ICD-10-CM

## 2019-06-18 PROBLEM — N39.0 URINARY TRACT INFECTION: Status: RESOLVED | Noted: 2019-05-19 | Resolved: 2019-06-18

## 2019-06-18 PROCEDURE — 6360000004 HC RX CONTRAST MEDICATION: Performed by: PHYSICIAN ASSISTANT

## 2019-06-18 PROCEDURE — 72132 CT LUMBAR SPINE W/DYE: CPT

## 2019-06-18 PROCEDURE — 2500000003 HC RX 250 WO HCPCS: Performed by: RADIOLOGY

## 2019-06-18 PROCEDURE — 62284 INJECTION FOR MYELOGRAM: CPT

## 2019-06-18 RX ORDER — LIDOCAINE HYDROCHLORIDE 10 MG/ML
INJECTION, SOLUTION EPIDURAL; INFILTRATION; INTRACAUDAL; PERINEURAL
Status: COMPLETED | OUTPATIENT
Start: 2019-06-18 | End: 2019-06-18

## 2019-06-18 RX ORDER — ACETAMINOPHEN 325 MG/1
650 TABLET ORAL EVERY 4 HOURS PRN
Status: DISCONTINUED | OUTPATIENT
Start: 2019-06-18 | End: 2019-06-21 | Stop reason: HOSPADM

## 2019-06-18 RX ADMIN — LIDOCAINE HYDROCHLORIDE 3 ML: 10 INJECTION, SOLUTION EPIDURAL; INFILTRATION; INTRACAUDAL; PERINEURAL at 10:42

## 2019-06-18 RX ADMIN — IOHEXOL 12 ML: 240 INJECTION, SOLUTION INTRATHECAL; INTRAVASCULAR; INTRAVENOUS; ORAL at 11:00

## 2019-06-18 NOTE — OP NOTE
Brief Postoperative Note    Jose Mercado  YOB: 1945  [id number]    Pre-operative Diagnosis: Lumbar stenosis, s/p fusion    Post-operative Diagnosis: Same    Procedure: Lumbar myelogram    Anesthesia: Local    Surgeons/Assistants: Rupesh Linda MD    Estimated Blood Loss: less than 50     Complications: None    Specimens: Was Not Obtained    Findings: 10ml injected, accessed at LS junction.      Electronically signed by Starla Aguila MD on 6/18/2019 at 10:59 AM

## 2019-06-18 NOTE — PROGRESS NOTES
Patients head elevated to 45 degrees. Denies headache, pain, numbness or tingling. Will continue to monitor.

## 2019-07-01 ENCOUNTER — OFFICE VISIT (OUTPATIENT)
Dept: NEUROSURGERY | Age: 74
End: 2019-07-01
Payer: MEDICARE

## 2019-07-01 VITALS — SYSTOLIC BLOOD PRESSURE: 132 MMHG | HEART RATE: 70 BPM | DIASTOLIC BLOOD PRESSURE: 66 MMHG

## 2019-07-01 DIAGNOSIS — M54.17 LUMBOSACRAL RADICULOPATHY AT L3: Primary | ICD-10-CM

## 2019-07-01 DIAGNOSIS — M43.16 LUMBAR ADJACENT SEGMENT DISEASE WITH SPONDYLOLISTHESIS: ICD-10-CM

## 2019-07-01 DIAGNOSIS — M51.36 LUMBAR ADJACENT SEGMENT DISEASE WITH SPONDYLOLISTHESIS: ICD-10-CM

## 2019-07-01 PROCEDURE — 99213 OFFICE O/P EST LOW 20 MIN: CPT | Performed by: NEUROLOGICAL SURGERY

## 2019-07-01 PROCEDURE — 1123F ACP DISCUSS/DSCN MKR DOCD: CPT | Performed by: NEUROLOGICAL SURGERY

## 2019-07-01 PROCEDURE — 3017F COLORECTAL CA SCREEN DOC REV: CPT | Performed by: NEUROLOGICAL SURGERY

## 2019-07-01 PROCEDURE — G8598 ASA/ANTIPLAT THER USED: HCPCS | Performed by: NEUROLOGICAL SURGERY

## 2019-07-01 PROCEDURE — G8427 DOCREV CUR MEDS BY ELIG CLIN: HCPCS | Performed by: NEUROLOGICAL SURGERY

## 2019-07-01 PROCEDURE — 1036F TOBACCO NON-USER: CPT | Performed by: NEUROLOGICAL SURGERY

## 2019-07-01 PROCEDURE — G8417 CALC BMI ABV UP PARAM F/U: HCPCS | Performed by: NEUROLOGICAL SURGERY

## 2019-07-01 PROCEDURE — 4040F PNEUMOC VAC/ADMIN/RCVD: CPT | Performed by: NEUROLOGICAL SURGERY

## 2019-07-02 NOTE — DISCHARGE SUMMARY
Phone: Diann          Fax: [phone]                            Outpatient Physical Therapy                                                                    Discharge Summary    Patient: Candido Poe  : 1945  [facility] #: [id number]   Referring physician: No admitting provider for patient encounter. Referring Practitioner: Alise Rossi. oTya Ramesh MD      Diagnosis: Acute myofascial pain, M79.18      Date Treatment Initiated: 19  Date of Last Treatment: 19      PT Visit Information  Onset Date: 19  PT Insurance Information: Medicare/[employer]  Total # of Visits Approved: 12  Total # of Visits to Date: 15  Plan of Care/Certification Expiration Date: 19  No Show: 0  Canceled Appointment: 0      Frequency/Duration  3 times per week  4 weeks      Treatment Received  [x] HP/CP      [] Electrical Stim   [x] Therapeutic Exercise      [] Gait Training  [x] Aquatics   [] Ultrasound         [x] Patient Education/HEP   [x] Manual Therapy  [] Traction    [x] Neuro-kateryna        [x] Soft Tissue Mobs            [] Home TENS  [] Iontophoresis    [] Orthotic casting/fitting      [x] Dry Needling    Assessment  Assessment: At last attended visit, patient met goals for tolerating aquatic exercise, decreased pain and independence with HEP with moderations as necessary and made good progress toward goals for improved B LE strength and tolerance to walking without cane. Pt. reported feeling 99% better overall and was placed on two week hold with HEP and did not return for further PT. Will d/c patient at this time due to meeting goals and optimal function.         Goals  Short term goals  Time Frame for Short term goals: 2 weeks  Short term goal 1: Patient will modify his daily exercise routine-met  Short term goal 2: Patient will tolerate 30 min of aquatic exercise-met    Long term goals  Time Frame for Long term goals : 4 weeks  Long term goal 1: Patient will be independent and compliant with a HEP-met  Long term goal 2: Patient will improve R LE strength to 5/5 in all major joints and planes-progressing  Long term goal 3: Patient will report decreased pain to <5/10 at [address] term goal 4: Patient will be able to ambulate 300' with an exacerbation of symptoms. -progressing      Reason for Discharge  [x] Goals Achieved                        []  Poor Follow Through/Attendance                  [x]  Optimal Function Achieved     []  Patient Discharged Self    []  Hospitalization                         []  Physician discharge      Thank you for this referral      Nyla Russo, PT, DPT               Date: 7/2/2019

## 2019-07-05 ENCOUNTER — HOSPITAL ENCOUNTER (OUTPATIENT)
Age: 74
Discharge: HOME | End: 2019-07-05
Payer: MEDICARE

## 2019-07-05 DIAGNOSIS — N18.30 CHRONIC KIDNEY DISEASE, STAGE III (MODERATE) (HCC): ICD-10-CM

## 2019-07-05 LAB
-: ABNORMAL
ALBUMIN SERPL-MCNC: 4 G/DL (ref 3.5–5.2)
AMORPHOUS: ABNORMAL
ANION GAP SERPL CALCULATED.3IONS-SCNC: 11 MMOL/L (ref 9–17)
BACTERIA: ABNORMAL
BILIRUBIN URINE: NEGATIVE
BUN BLDV-MCNC: 28 MG/DL (ref 8–23)
BUN/CREAT BLD: 19 (ref 9–20)
CALCIUM SERPL-MCNC: 9.6 MG/DL (ref 8.6–10.4)
CASTS UA: ABNORMAL /LPF
CHLORIDE BLD-SCNC: 102 MMOL/L (ref 98–107)
CO2: 26 MMOL/L (ref 20–31)
COLOR: YELLOW
COMMENT UA: ABNORMAL
CREAT SERPL-MCNC: 1.46 MG/DL (ref 0.7–1.2)
CREATININE URINE: 139.7 MG/DL (ref 39–259)
CRYSTALS, UA: ABNORMAL /HPF
EPITHELIAL CELLS UA: ABNORMAL /HPF (ref 0–5)
GFR AFRICAN AMERICAN: 57 ML/MIN
GFR NON-AFRICAN AMERICAN: 47 ML/MIN
GFR SERPL CREATININE-BSD FRML MDRD: ABNORMAL ML/MIN/{1.73_M2}
GFR SERPL CREATININE-BSD FRML MDRD: ABNORMAL ML/MIN/{1.73_M2}
GLUCOSE BLD-MCNC: 233 MG/DL (ref 70–99)
GLUCOSE URINE: ABNORMAL
HCT VFR BLD CALC: 31.1 % (ref 40.7–50.3)
HEMOGLOBIN: 10.4 G/DL (ref 13–17)
KETONES, URINE: NEGATIVE
LEUKOCYTE ESTERASE, URINE: ABNORMAL
MCH RBC QN AUTO: 29.2 PG (ref 25.2–33.5)
MCHC RBC AUTO-ENTMCNC: 33.4 G/DL (ref 28.4–34.8)
MCV RBC AUTO: 87.4 FL (ref 82.6–102.9)
MUCUS: ABNORMAL
NITRITE, URINE: POSITIVE
NRBC AUTOMATED: 0 PER 100 WBC
OTHER OBSERVATIONS UA: ABNORMAL
PDW BLD-RTO: 14 % (ref 11.8–14.4)
PH UA: 6 (ref 5–9)
PHOSPHORUS: 2.9 MG/DL (ref 2.5–4.5)
PLATELET # BLD: 194 K/UL (ref 138–453)
PMV BLD AUTO: 10.6 FL (ref 8.1–13.5)
POTASSIUM SERPL-SCNC: 4 MMOL/L (ref 3.7–5.3)
PROTEIN UA: ABNORMAL
PTH INTACT: 21.76 PG/ML (ref 15–65)
RBC # BLD: 3.56 M/UL (ref 4.21–5.77)
RBC UA: ABNORMAL /HPF (ref 0–2)
RENAL EPITHELIAL, UA: ABNORMAL /HPF
SODIUM BLD-SCNC: 139 MMOL/L (ref 135–144)
SPECIFIC GRAVITY UA: 1.02 (ref 1.01–1.02)
TOTAL PROTEIN, URINE: 39 MG/DL
TRICHOMONAS: ABNORMAL
TURBIDITY: ABNORMAL
URINE HGB: ABNORMAL
UROBILINOGEN, URINE: NORMAL
WBC # BLD: 7 K/UL (ref 3.5–11.3)
WBC UA: ABNORMAL /HPF (ref 0–5)
YEAST: ABNORMAL

## 2019-07-05 PROCEDURE — 82570 ASSAY OF URINE CREATININE: CPT

## 2019-07-05 PROCEDURE — 36415 COLL VENOUS BLD VENIPUNCTURE: CPT

## 2019-07-05 PROCEDURE — 84156 ASSAY OF PROTEIN URINE: CPT

## 2019-07-05 PROCEDURE — 85027 COMPLETE CBC AUTOMATED: CPT

## 2019-07-05 PROCEDURE — 80048 BASIC METABOLIC PNL TOTAL CA: CPT

## 2019-07-05 PROCEDURE — 84100 ASSAY OF PHOSPHORUS: CPT

## 2019-07-05 PROCEDURE — 83970 ASSAY OF PARATHORMONE: CPT

## 2019-07-05 PROCEDURE — 82040 ASSAY OF SERUM ALBUMIN: CPT

## 2019-07-05 PROCEDURE — 81001 URINALYSIS AUTO W/SCOPE: CPT

## 2019-07-09 ENCOUNTER — HOSPITAL ENCOUNTER (OUTPATIENT)
Age: 74
Discharge: HOME | End: 2019-07-09
Payer: MEDICARE

## 2019-07-09 DIAGNOSIS — R31.9 URINARY TRACT INFECTION WITH HEMATURIA, SITE UNSPECIFIED: ICD-10-CM

## 2019-07-09 DIAGNOSIS — N39.0 URINARY TRACT INFECTION WITH HEMATURIA, SITE UNSPECIFIED: ICD-10-CM

## 2019-07-09 PROCEDURE — 87086 URINE CULTURE/COLONY COUNT: CPT

## 2019-07-09 PROCEDURE — 87186 SC STD MICRODIL/AGAR DIL: CPT

## 2019-07-09 PROCEDURE — 87077 CULTURE AEROBIC IDENTIFY: CPT

## 2019-07-11 LAB
CULTURE: ABNORMAL
Lab: ABNORMAL
SPECIMEN DESCRIPTION: ABNORMAL

## 2019-07-30 ENCOUNTER — OFFICE VISIT (OUTPATIENT)
Dept: NEUROLOGY | Age: 74
End: 2019-07-30
Payer: MEDICARE

## 2019-07-30 DIAGNOSIS — M43.16 LUMBAR ADJACENT SEGMENT DISEASE WITH SPONDYLOLISTHESIS: ICD-10-CM

## 2019-07-30 DIAGNOSIS — R20.0 NUMBNESS IN BOTH LEGS: Primary | ICD-10-CM

## 2019-07-30 DIAGNOSIS — M54.17 LUMBOSACRAL RADICULOPATHY AT L3: ICD-10-CM

## 2019-07-30 DIAGNOSIS — M51.36 LUMBAR ADJACENT SEGMENT DISEASE WITH SPONDYLOLISTHESIS: ICD-10-CM

## 2019-07-30 PROCEDURE — 95886 MUSC TEST DONE W/N TEST COMP: CPT | Performed by: PSYCHIATRY & NEUROLOGY

## 2019-07-30 PROCEDURE — 95912 NRV CNDJ TEST 11-12 STUDIES: CPT | Performed by: PSYCHIATRY & NEUROLOGY

## 2019-07-30 NOTE — PROGRESS NOTES
LOWER EXTREMITY QUESTIONNAIRE    Reason for this testing:  Right leg pain, numbness and tingling. Left foot pain in the arch. Is there any history of head or neck injury? No    Is there any history of back injury? Yes    Is there any history of motor vehicle accident? No    Is there any pending litigation (or possible lawsuit) involved? If yes, please explain. No    Are you currently taking any blood thinners?  (for ex: ASA, Plavix, Coumadin. No    Do you have a pacemaker? Yes    Do you have an implanted defibrillator? No      Do you have back pain? Yes    If so, what kind of pain? Lower back   Where does the pain start? Where does the pain stop? Does the pain radiate down to the thigh(Rt / Erla Kermit / both)? Which part of the thigh (front / anterior) or (back / posterior)? Yes right  upper   Does the pain radiate down to the leg ([employer] / Mehnaz Elena)? Which part of the leg (medial calf / lateral calf)? Yes lateral   Does the pain radiate down to the foot? Yes right foot     Does the back pain get worse with sitting? No    Does the back pain get worse with standing or walking? Yes    Does the back pain get worse with lying flat? Yes    Do you have weakness in your legs and foot? Yes    Do you have numbness and tingling shooting down from the lower back or hip? Yes    If so where does it shoot down to (thigh, leg, foot, toes)? Tell me more about it. To the ankle   What makes you feel better as far as pain concerned?  sitting down   What makes the pain get worse?    walking   For how long have you had these problems? 6 month(s)    Do you have bladder or bowel incontinence? No    Do you have diabetes? Yes    Do you have MRI of the lumbar spine? No    Do you have CT scan of the lumbar spine? Yes    Did you ever have any work related injury? Is so, when and what kind? No    Do you lift weights? No    Did you have EMG/nerve conduction study before? If so, when and where? No    Do you have cold feet?  Yes

## 2019-07-30 NOTE — PROGRESS NOTES
distal latency with significantly reduced amplitude and conduction velocity. 9. Left tibial motor potential demonstrated prolonged distal latency with significantly reduced amplitude and conduction velocity. 10. Left tibial F-wave latency is significantly prolonged. 11. Needle recording using monopolar needle was performed in a sampling of L2-L3 to L5-S1 innervated limb and paraspinal muscles and right lower extremity. At his request, needle study of left lower extremity is not performed. 12. Right lower extremity needle study demonstrated increased amplitude and decreased recruitment along with few fibrillation potentials in right rectus femoris, vastus lateralis and right adductor longus muscles. Right upper paraspinal muscles demonstrated small fibrillation potentials. Electrodiagnostic interpretation:   There is electrophysiologic evidence of right L3 lumbar radiculopathy of subacute-chronic nature.  Nerve conduction study demonstrated findings suggest length dependent peripheral polyneuropathy.  Findings are significant for sensorimotor peripheral polyneuropathy in bilateral lower extremities suggestive of diabetic peripheral polyneuropathy. Clinical correlation is recommended. Please feel free to contact me should you have any questions or concerns regarding results of any of the above studies. Again, thank you for referring this patient and for allowing me to participate in the care of your patient. With sincere appreciation,      _______________________  Carlos Flores M.D.   Board Certified Neurologist  Dictated, but not edited    Greg Watson :-  *EMG: electromyography *NCS: nerve conduction study*FDI : first dorsal Interosseous *ADM : abductor digiti minimi *APB : abductor pollicis brevis *FCU : flexor corpi ulnaris  *EIP : extensor indicis pollicis  *EDC : extensor digitorum communis *ECR : extensor carpi radialis *Rect Fem: rectus femoris * Vast Lat: vastus lateralis * Gastroc Med: gastrocnemius, medial  *Tib Ant: tibialis anterior * Peron Long: peroneus longus * Abd Berger: abductor hallucis * Abd dig henrietta: abductor digiti quinti *abn : abnormal  *fibs : fibrillations  *PSW : positive sharp waves  *Fascics : fasciculations*MUAP : motor unit action potential *CTS : carpal tunnel syndrome*CRD: complex repetitive discharges*norm: normal*UNE : ulnar neuropathy @ elbow  *SNAP : sensory nerve action potential  *CMAP : compound muscle action potential

## 2019-08-05 ENCOUNTER — OFFICE VISIT (OUTPATIENT)
Dept: CARDIOLOGY | Age: 74
End: 2019-08-05
Payer: MEDICARE

## 2019-08-05 VITALS
DIASTOLIC BLOOD PRESSURE: 74 MMHG | WEIGHT: 196.8 LBS | SYSTOLIC BLOOD PRESSURE: 157 MMHG | OXYGEN SATURATION: 98 % | HEART RATE: 70 BPM | BODY MASS INDEX: 29.15 KG/M2 | HEIGHT: 69 IN

## 2019-08-05 DIAGNOSIS — G90.1 DYSAUTONOMIA (HCC): ICD-10-CM

## 2019-08-05 DIAGNOSIS — I25.10 CORONARY ARTERY DISEASE INVOLVING NATIVE CORONARY ARTERY OF NATIVE HEART WITHOUT ANGINA PECTORIS: ICD-10-CM

## 2019-08-05 DIAGNOSIS — I10 ESSENTIAL HYPERTENSION: ICD-10-CM

## 2019-08-05 DIAGNOSIS — D64.9 ANEMIA, UNSPECIFIED TYPE: ICD-10-CM

## 2019-08-05 DIAGNOSIS — I50.32 CHRONIC DIASTOLIC CONGESTIVE HEART FAILURE (HCC): Primary | ICD-10-CM

## 2019-08-05 PROCEDURE — 93000 ELECTROCARDIOGRAM COMPLETE: CPT | Performed by: FAMILY MEDICINE

## 2019-08-05 PROCEDURE — 99213 OFFICE O/P EST LOW 20 MIN: CPT | Performed by: FAMILY MEDICINE

## 2019-08-05 PROCEDURE — 4040F PNEUMOC VAC/ADMIN/RCVD: CPT | Performed by: FAMILY MEDICINE

## 2019-08-05 PROCEDURE — 3017F COLORECTAL CA SCREEN DOC REV: CPT | Performed by: FAMILY MEDICINE

## 2019-08-05 PROCEDURE — 1123F ACP DISCUSS/DSCN MKR DOCD: CPT | Performed by: FAMILY MEDICINE

## 2019-08-05 PROCEDURE — 1036F TOBACCO NON-USER: CPT | Performed by: FAMILY MEDICINE

## 2019-08-05 PROCEDURE — G8427 DOCREV CUR MEDS BY ELIG CLIN: HCPCS | Performed by: FAMILY MEDICINE

## 2019-08-05 PROCEDURE — G8598 ASA/ANTIPLAT THER USED: HCPCS | Performed by: FAMILY MEDICINE

## 2019-08-05 PROCEDURE — G8417 CALC BMI ABV UP PARAM F/U: HCPCS | Performed by: FAMILY MEDICINE

## 2019-08-05 RX ORDER — NITROGLYCERIN 0.4 MG/1
0.4 TABLET SUBLINGUAL EVERY 5 MIN PRN
Qty: 25 TABLET | Refills: 3 | Status: SHIPPED | OUTPATIENT
Start: 2019-08-05 | End: 2021-06-28 | Stop reason: SDUPTHER

## 2019-08-05 NOTE — PATIENT INSTRUCTIONS
SURVEY:    You may be receiving a survey from [employer] regarding your visit today. Please complete the survey to enable us to provide the highest quality of care to you and your family. If you cannot score us a very good on any question, please call the office to discuss how we could have made your experience a very good one. Thank you.

## 2019-08-05 NOTE — PROGRESS NOTES
[atorvastatin]; Aldactone [spironolactone]; Crestor [rosuvastatin calcium]; Lopid [gemfibrozil]; Invokana [canagliflozin]; and Januvia [sitagliptin] REVIEW OF SYSTEMS: 14 systems were reviewed. Pertinent positives and negatives as above, all else negative.      Past Surgical History:   Procedure Laterality Date    BACK SURGERY      CARDIAC CATHETERIZATION Left 16    via right radial approach/ [facility] J[facility] [place]/ Dr. Mana White  8/17/15    Liang/Charles/Saulo/ Lap    COLONOSCOPY      COLONOSCOPY  2/19/15    -polyps,diverticulosis,hemorrhoids    COLONOSCOPY  2018    Dr Yosvany Guillermo    COLONOSCOPY N/A 2018    COLONOSCOPY POLYPECTOMY SNARE/COLD BIOPSY  cold snare  and  hot snare performed by Giovanny Asencio MD at [facility]      left eye    PACEMAKER INSERTION      PACEMAKER PLACEMENT      UPPER GASTROINTESTINAL ENDOSCOPY  2019    Dr. Veena Timmons H-Pylori)duodenal bulbar/antral erythema    UPPER GASTROINTESTINAL ENDOSCOPY N/A 2019    EGD BIOPSY, clotest performed by Giovanny Asencio MD at [address] History:  Social History     Tobacco Use    Smoking status: Former Smoker     Packs/day: 1.50     Years: 8.00     Pack years: 12.00     Types: Cigarettes     Last attempt to quit: 3/4/1980     Years since quittin.4    Smokeless tobacco: Never Used   Substance Use Topics    Alcohol use: No    Drug use: No        CURRENT MEDICATIONS:  Outpatient Medications Marked as Taking for the 19 encounter (Office Visit) with Betty Ball MD   Medication Sig Dispense Refill    glipiZIDE (GLIPIZIDE XL) 5 MG extended release tablet TAKE 2 TABLETS BY MOUTH TWICE DAILY 360 tablet 0    lisinopril (PRINIVIL;ZESTRIL) 40 MG tablet TAKE 1 TABLET BY MOUTH ONCE DAILY 90 tablet 0    carvedilol (COREG) 12.5 MG tablet TAKE 1 TABLET BY MOUTH TWICE DAILY 180 tablet 0    LANTUS SOLOSTAR 100 UNIT/ML injection pen INJECT 20 UNITS SUBCUTANEOUSLY TWICE DAILY (Patient taking differently: 24 Units 2 times daily ) 45 mL 0    tiZANidine (ZANAFLEX) 2 MG tablet Take 1 tablet by mouth every 6 hours as needed (spasm) 30 tablet 2    ondansetron (ZOFRAN ODT) 4 MG disintegrating tablet Take 1 tablet by mouth every 8 hours as needed for Nausea or Vomiting 20 tablet 0    docusate sodium (COLACE) 100 MG capsule Take 1 capsule by mouth 2 times daily 60 capsule 0    hydrALAZINE (APRESOLINE) 100 MG tablet Take 1 tablet by mouth 3 times daily 270 tablet 3    eplerenone (INSPRA) 25 MG tablet Take 1 tablet by mouth daily (Patient taking differently: Take 25 mg by mouth daily Insurance not covering it) 90 tablet 3    Omega-3 Fatty Acids (FISH OIL) 1000 MG CAPS Take 1,000 mg by mouth 2 times daily      [facility] CONTOUR TEST strip USE ONE STRIP TO CHECK GLUCOSE TWICE DAILY 100 strip 11    aspirin EC 81 MG EC tablet Take 1 tablet by mouth daily 30 tablet 3    Iron-Vitamins (GERITOL COMPLETE PO) Take by mouth daily       nitroGLYCERIN (NITROSTAT) 0.4 MG SL tablet Place 1 tablet under the tongue every 5 minutes as needed for Chest pain 25 tablet 3    DIANA MICROLET LANCETS MISC TEST TWICE DAILY 100 each 2    Insulin Pen Needle (PEN NEEDLES) 31G X 6 MM MISC 1 each by Does not apply route daily 100 each 3    prednisoLONE acetate (PRED FORTE) 1 % ophthalmic suspension Place 1 drop into the left eye daily          FAMILY HISTORY: family history includes Cancer in his father and mother. PHYSICAL EXAM:   BP (!) 148/67 (Site: Left Upper Arm, Position: Standing, Cuff Size: Medium Adult)   Pulse 75   Ht 5' 9\" (1.753 m)   Wt 196 lb 12.8 oz (89.3 kg)   SpO2 98%   BMI 29.06 kg/m²  Body mass index is 29.06 kg/m². Constitutional: He is oriented to person, place, and time. He appears well-developed and well-nourished. In no acute distress. HEENT: Normocephalic and atraumatic. No JVD present. Carotid bruit is not present.  No mass and no thyromegaly present. No lymphadenopathy present. Cardiovascular: Normal rate, regular rhythm, normal heart sounds. Exam reveals no gallop and no friction rubs. 2/6 systolic murmur, 5th intercostal space on the LEFT in the mid-clavicular line (cardiac apex). Pulmonary/Chest: Effort normal and breath sounds normal. No respiratory distress. He has no wheezes, rhonchi or rales. Abdominal: Soft, non-tender. Bowel sounds and aorta are normal. He exhibits no organomegaly, mass or bruit. Extremities: No edema. No cyanosis and no clubbing. Pulses are 2+ radial and carotid pulses. 2+ dorsalis pedis and posterior tibial pulses bilaterally. Neurological: He is alert and oriented to person, place, and time. No evidence of gross cranial nerve deficit. Coordination appeared normal.   Skin: Skin is warm and dry. There is no rash or diaphoresis. Psychiatric: He has a normal mood and affect. His speech is normal and behavior is normal.      MOST RECENT LABS ON RECORD:   Lab Results   Component Value Date    WBC 7.0 07/05/2019    HGB 10.4 (L) 07/05/2019    HCT 31.1 (L) 07/05/2019     07/05/2019    CHOL 152 08/08/2018    TRIG 309 (H) 08/08/2018    HDL 30 (L) 08/08/2018    LDLDIRECT 44 11/02/2017    ALT 13 05/21/2019    AST 12 05/21/2019     07/05/2019    K 4.0 07/05/2019     07/05/2019    CREATININE 1.46 (H) 07/05/2019    BUN 28 (H) 07/05/2019    CO2 26 07/05/2019    TSH 2.26 11/02/2017    PSA 3.11 11/02/2018    INR 1.0 02/17/2016    LABA1C 8.5 (H) 05/19/2019    LABMICR 30 10/17/2015       ASSESSMENT:  1. Chronic diastolic congestive heart failure ([address]    2. Dysautonomia ([address]    3. Coronary artery disease involving native coronary artery of native heart without angina pectoris    4. Essential hypertension       PLAN:  Chronic Diastolic Heart RRGWGPN:UR:>36% via echo on 2/17/2016. Currently well controlled. Beta Blocker: Continue carvedilol (Coreg) 12.5 mg twice daily.     ACE Inibitor/ARB: Continue lisinopril 40 mg Please do not hesitate to contact me could I be of further assistance. Sincerely,  Cristine Benítez MD, MS, F.A.C.C. [facility] Cardiology Specialist   [address]  Phone: [phone], Fax: [phone]     I believe that the risk of significant morbidity and mortality related to the patient's current medical conditions are: low-intermediate. The documentation recorded by the scribe, accurately and completely reflects the services I personally performed and the decisions made by me. Britt Millan MD, MS, F.A.C.C.  August 5, 2019

## 2019-08-07 ENCOUNTER — OFFICE VISIT (OUTPATIENT)
Dept: NEUROSURGERY | Age: 74
End: 2019-08-07
Payer: MEDICARE

## 2019-08-07 VITALS — DIASTOLIC BLOOD PRESSURE: 72 MMHG | HEART RATE: 73 BPM | SYSTOLIC BLOOD PRESSURE: 169 MMHG

## 2019-08-07 DIAGNOSIS — M51.36 LUMBAR ADJACENT SEGMENT DISEASE WITH SPONDYLOLISTHESIS: ICD-10-CM

## 2019-08-07 DIAGNOSIS — M54.17 LUMBOSACRAL RADICULOPATHY AT L3: Primary | ICD-10-CM

## 2019-08-07 DIAGNOSIS — M43.16 LUMBAR ADJACENT SEGMENT DISEASE WITH SPONDYLOLISTHESIS: ICD-10-CM

## 2019-08-07 PROCEDURE — 4040F PNEUMOC VAC/ADMIN/RCVD: CPT | Performed by: NEUROLOGICAL SURGERY

## 2019-08-07 PROCEDURE — G8428 CUR MEDS NOT DOCUMENT: HCPCS | Performed by: NEUROLOGICAL SURGERY

## 2019-08-07 PROCEDURE — 99213 OFFICE O/P EST LOW 20 MIN: CPT | Performed by: NEUROLOGICAL SURGERY

## 2019-08-07 PROCEDURE — 1036F TOBACCO NON-USER: CPT | Performed by: NEUROLOGICAL SURGERY

## 2019-08-07 PROCEDURE — 1123F ACP DISCUSS/DSCN MKR DOCD: CPT | Performed by: NEUROLOGICAL SURGERY

## 2019-08-07 PROCEDURE — 3017F COLORECTAL CA SCREEN DOC REV: CPT | Performed by: NEUROLOGICAL SURGERY

## 2019-08-07 PROCEDURE — G8417 CALC BMI ABV UP PARAM F/U: HCPCS | Performed by: NEUROLOGICAL SURGERY

## 2019-08-07 PROCEDURE — G8598 ASA/ANTIPLAT THER USED: HCPCS | Performed by: NEUROLOGICAL SURGERY

## 2019-08-07 NOTE — PROGRESS NOTES
echocardiogram 2/18/14    LA mildly dilated, EF 55%, LV wall thickness is moderately increased, no definite wall motion abnormalilities, what appears to be a pacer wire is seen w/n the RA and RV, mild-mod TR, mild pulmonary hypertension.  History of Holter monitoring 12/29/2017    Rare PAC's and PVC's.      History of tilt table evaluation 8/12/14    Abnormal    Hyperlipidemia     Hypertension     Non critical Right Renal artery stenosis, native ([address] 10/2/2018    Non critical Right Renal artery stenosis, based on cath in 2016, Rt RA 30% stenosis    Pacemaker     non-functioning    S/P cardiac cath 04/10/2017    S/P coronary artery stent placement     Successful PTCA - HA Om1    SIRS (systemic inflammatory response syndrome) ([facility] [address] 5/19/2019    Type II or unspecified type diabetes mellitus without mention of complication, not stated as uncontrolled      Past Surgical History:   Procedure Laterality Date    BACK SURGERY      CARDIAC CATHETERIZATION Left 2/19/16    via right radial approach/ [facility] [place]/ Dr. Stefanie Aguirre  8/17/15    Liang/Charles/Saulo/ [facility]    COLONOSCOPY  2010    COLONOSCOPY  2/19/15    -polyps,diverticulosis,hemorrhoids    COLONOSCOPY  05/23/2018    Dr Yudy Limon    COLONOSCOPY N/A 5/23/2018    COLONOSCOPY POLYPECTOMY SNARE/COLD BIOPSY  cold snare  and  hot snare performed by Orville Au MD at [address] [place] [address]      left eye    PACEMAKER INSERTION      PACEMAKER PLACEMENT      UPPER GASTROINTESTINAL ENDOSCOPY  05/28/2019    Dr. Ceci Manzano H-Pylori)duodenal bulbar/antral erythema    UPPER GASTROINTESTINAL ENDOSCOPY N/A 5/28/2019    EGD BIOPSY, clotest performed by Orville Au MD at [place] [address] History   Problem Relation Age of Onset    Cancer Mother         breast    Cancer Father         lung     Current Outpatient Medications on File Prior to Visit   Medication Sig Dispense Refill   

## 2019-10-09 ENCOUNTER — HOSPITAL ENCOUNTER (OUTPATIENT)
Age: 74
Discharge: HOME | End: 2019-10-09
Payer: MEDICARE

## 2019-10-09 DIAGNOSIS — N18.30 CHRONIC KIDNEY DISEASE, STAGE III (MODERATE) (HCC): ICD-10-CM

## 2019-10-09 DIAGNOSIS — M48.062 SPINAL STENOSIS OF LUMBAR REGION WITH NEUROGENIC CLAUDICATION: ICD-10-CM

## 2019-10-09 LAB
-: ABNORMAL
ABSOLUTE EOS #: 0.26 K/UL (ref 0–0.44)
ABSOLUTE IMMATURE GRANULOCYTE: 0.05 K/UL (ref 0–0.3)
ABSOLUTE LYMPH #: 2.19 K/UL (ref 1.1–3.7)
ABSOLUTE MONO #: 0.73 K/UL (ref 0.1–1.2)
ALBUMIN SERPL-MCNC: 4.3 G/DL (ref 3.5–5.2)
AMORPHOUS: ABNORMAL
ANION GAP SERPL CALCULATED.3IONS-SCNC: 15 MMOL/L (ref 9–17)
BACTERIA: ABNORMAL
BASOPHILS # BLD: 1 % (ref 0–2)
BASOPHILS ABSOLUTE: 0.06 K/UL (ref 0–0.2)
BILIRUBIN URINE: NEGATIVE
BUN BLDV-MCNC: 32 MG/DL (ref 8–23)
BUN/CREAT BLD: 25 (ref 9–20)
CALCIUM SERPL-MCNC: 9.8 MG/DL (ref 8.6–10.4)
CASTS UA: ABNORMAL /LPF
CHLORIDE BLD-SCNC: 99 MMOL/L (ref 98–107)
CO2: 23 MMOL/L (ref 20–31)
COLOR: YELLOW
COMMENT UA: ABNORMAL
CREAT SERPL-MCNC: 1.27 MG/DL (ref 0.7–1.2)
CREATININE URINE: 215.4 MG/DL (ref 39–259)
CRYSTALS, UA: ABNORMAL /HPF
DIFFERENTIAL TYPE: ABNORMAL
EOSINOPHILS RELATIVE PERCENT: 3 % (ref 1–4)
EPITHELIAL CELLS UA: ABNORMAL /HPF (ref 0–5)
GFR AFRICAN AMERICAN: >60 ML/MIN
GFR NON-AFRICAN AMERICAN: 56 ML/MIN
GFR SERPL CREATININE-BSD FRML MDRD: ABNORMAL ML/MIN/{1.73_M2}
GFR SERPL CREATININE-BSD FRML MDRD: ABNORMAL ML/MIN/{1.73_M2}
GLUCOSE BLD-MCNC: 201 MG/DL (ref 70–99)
GLUCOSE URINE: ABNORMAL
HCT VFR BLD CALC: 37.1 % (ref 40.7–50.3)
HEMOGLOBIN: 12.4 G/DL (ref 13–17)
IMMATURE GRANULOCYTES: 1 %
KETONES, URINE: NEGATIVE
LEUKOCYTE ESTERASE, URINE: NEGATIVE
LYMPHOCYTES # BLD: 23 % (ref 24–43)
MCH RBC QN AUTO: 30 PG (ref 25.2–33.5)
MCHC RBC AUTO-ENTMCNC: 33.4 G/DL (ref 28.4–34.8)
MCV RBC AUTO: 89.6 FL (ref 82.6–102.9)
MONOCYTES # BLD: 8 % (ref 3–12)
MUCUS: ABNORMAL
NITRITE, URINE: NEGATIVE
NRBC AUTOMATED: 0 PER 100 WBC
OTHER OBSERVATIONS UA: ABNORMAL
PDW BLD-RTO: 14.3 % (ref 11.8–14.4)
PH UA: 5.5 (ref 5–9)
PHOSPHORUS: 3.5 MG/DL (ref 2.5–4.5)
PLATELET # BLD: 193 K/UL (ref 138–453)
PLATELET ESTIMATE: ABNORMAL
PMV BLD AUTO: 10.6 FL (ref 8.1–13.5)
POTASSIUM SERPL-SCNC: 4.1 MMOL/L (ref 3.7–5.3)
PROTEIN UA: NEGATIVE
PTH INTACT: 19.96 PG/ML (ref 15–65)
RBC # BLD: 4.14 M/UL (ref 4.21–5.77)
RBC # BLD: ABNORMAL 10*6/UL
RBC UA: ABNORMAL /HPF (ref 0–2)
RENAL EPITHELIAL, UA: ABNORMAL /HPF
SEG NEUTROPHILS: 64 % (ref 36–65)
SEGMENTED NEUTROPHILS ABSOLUTE COUNT: 6.05 K/UL (ref 1.5–8.1)
SODIUM BLD-SCNC: 137 MMOL/L (ref 135–144)
SPECIFIC GRAVITY UA: >1.03 (ref 1.01–1.02)
TOTAL PROTEIN, URINE: 15 MG/DL
TRICHOMONAS: ABNORMAL
TURBIDITY: ABNORMAL
URINE HGB: NEGATIVE
UROBILINOGEN, URINE: NORMAL
WBC # BLD: 9.3 K/UL (ref 3.5–11.3)
WBC # BLD: ABNORMAL 10*3/UL
WBC UA: ABNORMAL /HPF (ref 0–5)
YEAST: ABNORMAL

## 2019-10-09 PROCEDURE — 81001 URINALYSIS AUTO W/SCOPE: CPT

## 2019-10-09 PROCEDURE — 84100 ASSAY OF PHOSPHORUS: CPT

## 2019-10-09 PROCEDURE — 80048 BASIC METABOLIC PNL TOTAL CA: CPT

## 2019-10-09 PROCEDURE — 36415 COLL VENOUS BLD VENIPUNCTURE: CPT

## 2019-10-09 PROCEDURE — 83970 ASSAY OF PARATHORMONE: CPT

## 2019-10-09 PROCEDURE — 82040 ASSAY OF SERUM ALBUMIN: CPT

## 2019-10-09 PROCEDURE — 85025 COMPLETE CBC W/AUTO DIFF WBC: CPT

## 2019-10-09 PROCEDURE — 84156 ASSAY OF PROTEIN URINE: CPT

## 2019-10-09 PROCEDURE — 82570 ASSAY OF URINE CREATININE: CPT

## 2019-11-25 ENCOUNTER — HOSPITAL ENCOUNTER (OUTPATIENT)
Age: 74
Discharge: HOME | End: 2019-11-25
Payer: MEDICARE

## 2019-11-25 DIAGNOSIS — Z12.5 ENCOUNTER FOR PROSTATE CANCER SCREENING: ICD-10-CM

## 2019-11-25 LAB — PROSTATE SPECIFIC ANTIGEN: 6.13 UG/L

## 2019-11-25 PROCEDURE — 36415 COLL VENOUS BLD VENIPUNCTURE: CPT

## 2019-11-25 PROCEDURE — G0103 PSA SCREENING: HCPCS

## 2019-12-10 ENCOUNTER — OFFICE VISIT (OUTPATIENT)
Dept: UROLOGY | Age: 74
End: 2019-12-10
Payer: MEDICARE

## 2019-12-10 ENCOUNTER — HOSPITAL ENCOUNTER (OUTPATIENT)
Age: 74
Setting detail: SPECIMEN
Discharge: HOME | End: 2019-12-10
Payer: MEDICARE

## 2019-12-10 VITALS
DIASTOLIC BLOOD PRESSURE: 75 MMHG | HEIGHT: 69 IN | HEART RATE: 70 BPM | SYSTOLIC BLOOD PRESSURE: 139 MMHG | TEMPERATURE: 98.1 F | WEIGHT: 200 LBS | BODY MASS INDEX: 29.62 KG/M2

## 2019-12-10 DIAGNOSIS — R97.20 ELEVATED PSA: ICD-10-CM

## 2019-12-10 DIAGNOSIS — R97.20 ELEVATED PSA: Primary | ICD-10-CM

## 2019-12-10 DIAGNOSIS — N40.1 BPH WITH OBSTRUCTION/LOWER URINARY TRACT SYMPTOMS: ICD-10-CM

## 2019-12-10 DIAGNOSIS — N13.8 BPH WITH OBSTRUCTION/LOWER URINARY TRACT SYMPTOMS: ICD-10-CM

## 2019-12-10 LAB
-: ABNORMAL
AMORPHOUS: ABNORMAL
BACTERIA: ABNORMAL
BILIRUBIN URINE: NEGATIVE
CASTS UA: ABNORMAL /LPF
COLOR: YELLOW
COMMENT UA: ABNORMAL
CRYSTALS, UA: ABNORMAL /HPF
EPITHELIAL CELLS UA: ABNORMAL /HPF (ref 0–5)
GLUCOSE URINE: NEGATIVE
KETONES, URINE: NEGATIVE
LEUKOCYTE ESTERASE, URINE: NEGATIVE
MUCUS: ABNORMAL
NITRITE, URINE: NEGATIVE
OTHER OBSERVATIONS UA: ABNORMAL
PH UA: 5.5 (ref 5–9)
PROTEIN UA: NEGATIVE
RBC UA: ABNORMAL /HPF (ref 0–2)
RENAL EPITHELIAL, UA: ABNORMAL /HPF
SPECIFIC GRAVITY UA: >1.03 (ref 1.01–1.02)
TRICHOMONAS: ABNORMAL
TURBIDITY: ABNORMAL
URINE HGB: NEGATIVE
UROBILINOGEN, URINE: NORMAL
WBC UA: ABNORMAL /HPF (ref 0–5)
YEAST: ABNORMAL

## 2019-12-10 PROCEDURE — G8598 ASA/ANTIPLAT THER USED: HCPCS | Performed by: NURSE PRACTITIONER

## 2019-12-10 PROCEDURE — 87086 URINE CULTURE/COLONY COUNT: CPT

## 2019-12-10 PROCEDURE — 99204 OFFICE O/P NEW MOD 45 MIN: CPT | Performed by: NURSE PRACTITIONER

## 2019-12-10 PROCEDURE — 51798 US URINE CAPACITY MEASURE: CPT | Performed by: NURSE PRACTITIONER

## 2019-12-10 PROCEDURE — G8417 CALC BMI ABV UP PARAM F/U: HCPCS | Performed by: NURSE PRACTITIONER

## 2019-12-10 PROCEDURE — 3017F COLORECTAL CA SCREEN DOC REV: CPT | Performed by: NURSE PRACTITIONER

## 2019-12-10 PROCEDURE — 4040F PNEUMOC VAC/ADMIN/RCVD: CPT | Performed by: NURSE PRACTITIONER

## 2019-12-10 PROCEDURE — 81001 URINALYSIS AUTO W/SCOPE: CPT

## 2019-12-10 PROCEDURE — 1036F TOBACCO NON-USER: CPT | Performed by: NURSE PRACTITIONER

## 2019-12-10 PROCEDURE — G8482 FLU IMMUNIZE ORDER/ADMIN: HCPCS | Performed by: NURSE PRACTITIONER

## 2019-12-10 PROCEDURE — 1123F ACP DISCUSS/DSCN MKR DOCD: CPT | Performed by: NURSE PRACTITIONER

## 2019-12-10 PROCEDURE — G8427 DOCREV CUR MEDS BY ELIG CLIN: HCPCS | Performed by: NURSE PRACTITIONER

## 2019-12-10 RX ORDER — DOXYCYCLINE HYCLATE 100 MG
100 TABLET ORAL 2 TIMES DAILY
Qty: 42 TABLET | Refills: 0 | Status: SHIPPED | OUTPATIENT
Start: 2019-12-10 | End: 2019-12-31

## 2019-12-10 ASSESSMENT — ENCOUNTER SYMPTOMS
CONSTIPATION: 0
VOMITING: 0
SHORTNESS OF BREATH: 0
COUGH: 0
EYE REDNESS: 0
NAUSEA: 0
ABDOMINAL PAIN: 0
BACK PAIN: 0
EYE PAIN: 0
WHEEZING: 0
COLOR CHANGE: 0

## 2019-12-11 LAB
CULTURE: NO GROWTH
Lab: NORMAL
SPECIMEN DESCRIPTION: NORMAL

## 2019-12-12 ENCOUNTER — TELEPHONE (OUTPATIENT)
Dept: UROLOGY | Age: 74
End: 2019-12-12

## 2019-12-26 RX ORDER — EPLERENONE 25 MG/1
25 TABLET, FILM COATED ORAL DAILY
Qty: 90 TABLET | Refills: 3 | Status: SHIPPED | OUTPATIENT
Start: 2019-12-26 | End: 2019-12-26 | Stop reason: SDUPTHER

## 2019-12-26 RX ORDER — EPLERENONE 25 MG/1
25 TABLET, FILM COATED ORAL DAILY
Qty: 90 TABLET | Refills: 3 | Status: SHIPPED | OUTPATIENT
Start: 2019-12-26 | End: 2019-12-27 | Stop reason: SDUPTHER

## 2020-01-10 ENCOUNTER — HOSPITAL ENCOUNTER (OUTPATIENT)
Age: 75
Discharge: HOME | End: 2020-01-10
Payer: MEDICARE

## 2020-01-10 LAB
ALBUMIN SERPL-MCNC: 4.3 G/DL (ref 3.5–5.2)
ANION GAP SERPL CALCULATED.3IONS-SCNC: 14 MMOL/L (ref 9–17)
BUN BLDV-MCNC: 38 MG/DL (ref 8–23)
BUN/CREAT BLD: 24 (ref 9–20)
CALCIUM SERPL-MCNC: 9.6 MG/DL (ref 8.6–10.4)
CHLORIDE BLD-SCNC: 101 MMOL/L (ref 98–107)
CO2: 22 MMOL/L (ref 20–31)
CREAT SERPL-MCNC: 1.58 MG/DL (ref 0.7–1.2)
CREATININE URINE: 192.4 MG/DL (ref 39–259)
GFR AFRICAN AMERICAN: 52 ML/MIN
GFR NON-AFRICAN AMERICAN: 43 ML/MIN
GFR SERPL CREATININE-BSD FRML MDRD: ABNORMAL ML/MIN/{1.73_M2}
GFR SERPL CREATININE-BSD FRML MDRD: ABNORMAL ML/MIN/{1.73_M2}
GLUCOSE BLD-MCNC: 228 MG/DL (ref 70–99)
HCT VFR BLD CALC: 36.3 % (ref 40.7–50.3)
HEMOGLOBIN: 12.1 G/DL (ref 13–17)
MCH RBC QN AUTO: 30.9 PG (ref 25.2–33.5)
MCHC RBC AUTO-ENTMCNC: 33.3 G/DL (ref 28.4–34.8)
MCV RBC AUTO: 92.6 FL (ref 82.6–102.9)
NRBC AUTOMATED: 0 PER 100 WBC
PDW BLD-RTO: 13.6 % (ref 11.8–14.4)
PHOSPHORUS: 3.8 MG/DL (ref 2.5–4.5)
PLATELET # BLD: 176 K/UL (ref 138–453)
PMV BLD AUTO: 10.2 FL (ref 8.1–13.5)
POTASSIUM SERPL-SCNC: 4.6 MMOL/L (ref 3.7–5.3)
PROSTATE SPECIFIC ANTIGEN: 5 UG/L
PTH INTACT: 19.34 PG/ML (ref 15–65)
RBC # BLD: 3.92 M/UL (ref 4.21–5.77)
SODIUM BLD-SCNC: 137 MMOL/L (ref 135–144)
TOTAL PROTEIN, URINE: 18 MG/DL
URINE TOTAL PROTEIN CREATININE RATIO: 0.09 (ref 0–0.2)
WBC # BLD: 6.7 K/UL (ref 3.5–11.3)

## 2020-01-10 PROCEDURE — 82040 ASSAY OF SERUM ALBUMIN: CPT

## 2020-01-10 PROCEDURE — 84153 ASSAY OF PSA TOTAL: CPT

## 2020-01-10 PROCEDURE — 84156 ASSAY OF PROTEIN URINE: CPT

## 2020-01-10 PROCEDURE — 82570 ASSAY OF URINE CREATININE: CPT

## 2020-01-10 PROCEDURE — 80048 BASIC METABOLIC PNL TOTAL CA: CPT

## 2020-01-10 PROCEDURE — 84100 ASSAY OF PHOSPHORUS: CPT

## 2020-01-10 PROCEDURE — 36415 COLL VENOUS BLD VENIPUNCTURE: CPT

## 2020-01-10 PROCEDURE — 83970 ASSAY OF PARATHORMONE: CPT

## 2020-01-10 PROCEDURE — 85027 COMPLETE CBC AUTOMATED: CPT

## 2020-01-15 ENCOUNTER — OFFICE VISIT (OUTPATIENT)
Dept: UROLOGY | Age: 75
End: 2020-01-15
Payer: MEDICARE

## 2020-01-15 VITALS
BODY MASS INDEX: 29.62 KG/M2 | DIASTOLIC BLOOD PRESSURE: 68 MMHG | SYSTOLIC BLOOD PRESSURE: 144 MMHG | HEIGHT: 69 IN | WEIGHT: 200 LBS

## 2020-01-15 PROCEDURE — G8482 FLU IMMUNIZE ORDER/ADMIN: HCPCS | Performed by: UROLOGY

## 2020-01-15 PROCEDURE — 3017F COLORECTAL CA SCREEN DOC REV: CPT | Performed by: UROLOGY

## 2020-01-15 PROCEDURE — G8417 CALC BMI ABV UP PARAM F/U: HCPCS | Performed by: UROLOGY

## 2020-01-15 PROCEDURE — 4040F PNEUMOC VAC/ADMIN/RCVD: CPT | Performed by: UROLOGY

## 2020-01-15 PROCEDURE — 99212 OFFICE O/P EST SF 10 MIN: CPT | Performed by: UROLOGY

## 2020-01-15 PROCEDURE — 99213 OFFICE O/P EST LOW 20 MIN: CPT | Performed by: UROLOGY

## 2020-01-15 PROCEDURE — 1123F ACP DISCUSS/DSCN MKR DOCD: CPT | Performed by: UROLOGY

## 2020-01-15 PROCEDURE — 1036F TOBACCO NON-USER: CPT | Performed by: UROLOGY

## 2020-01-15 PROCEDURE — G8427 DOCREV CUR MEDS BY ELIG CLIN: HCPCS | Performed by: UROLOGY

## 2020-01-15 ASSESSMENT — ENCOUNTER SYMPTOMS
BACK PAIN: 0
EYE REDNESS: 0
WHEEZING: 0
NAUSEA: 0
ABDOMINAL PAIN: 0
SHORTNESS OF BREATH: 0
COLOR CHANGE: 0
COUGH: 0
VOMITING: 0
EYE PAIN: 0

## 2020-01-15 NOTE — PATIENT INSTRUCTIONS
SURVEY:    You may be receiving a survey from [employer] regarding your visit today. Please complete the survey to enable us to provide the highest quality of care to you and your family. If you cannot score us a very good on any question, please call the office to discuss how we could have made your experience a very good one. Thank you.

## 2020-01-15 NOTE — PROGRESS NOTES
HPI:    Patient is a 76 y.o. male in no acute distress. He is alert and oriented to person, place, and time. New patient referral from Dr. Alejo Root for elevated PSA. Most recent PSA is 6. 13. This is elevated from 3.11 one year ago. All prior PSAs reviewed and summarized below:     PSA  1/2020 - 5.00  11/2019 - 6.13  11/2018 - 3.11  11/2017 - 3.26  11/2016 - 2.43  10/2015 - 2.70  10/2014 - 2.35  10/2013 - 2.15     He was a previous patient of Dr. Lalitha Cook for elevated PSA. He denies unintentional weight loss, decreased energy or appetite, new or worsening bone/hip/back pain. He denies any lower extremity numbness and tingling. He denies new or worsening LUTS. He denies frequency, urgency, or incontinence. He denies gross hematuria or dysuria. He denies testicular or perineal pain. PVR 11ml. Currently  Patient is here today for 6-week follow-up. We did give him a course of doxycycline. We also started him back on Flomax. Patient did have a repeat PSA. This did decrease to 5.0. Patient is having no recent gross hematuria dysuria. He reports no new or worsening lower urinary tract symptoms. He has no unintentional weight loss or decreased appetite. He reports no pain today. Past Medical History:   Diagnosis Date    Acute MI ([address]     Acute renal failure with tubular necrosis ([address] 10/2/2018    ssecondary to hemodynamic effects of loop diuretics and ace inhibitors, bbaseline 1.2-1.3 which peaked up to 1.8, resolving    CAD (coronary artery disease)     Cerebral artery occlusion with cerebral infarction ([address]     CHF (congestive heart failure) (HCC)     Chronic back pain     Closed fracture of lumbar vertebra without mention of spinal cord injury     Diabetes mellitus ([address]     Displacement of intervertebral disc, site unspecified, without myelopathy     H/O cardiac catheterization 2/19/16    LMCA: Mild irregularities 10-20%. LAD: Lesion on PRox LAD: Mid subsection. 65% stenosis.  LCx: Lesion on 1st INJECT 24 UNITS SUBCUTANEOUSLY TWICE DAILY 45 mL 0    tiZANidine (ZANAFLEX) 2 MG tablet TAKE 1 TABLET BY MOUTH EVERY 6 HOURS AS NEEDED FOR  SPASM 30 tablet 0    ferrous sulfate 325 (65 Fe) MG tablet Take 325 mg by mouth 2 times daily      lisinopril (PRINIVIL;ZESTRIL) 40 MG tablet TAKE 1 TABLET BY MOUTH ONCE DAILY 90 tablet 0    carvedilol (COREG) 12.5 MG tablet TAKE 1 TABLET BY MOUTH TWICE DAILY 180 tablet 0    glipiZIDE (GLUCOTROL XL) 5 MG extended release tablet TAKE 2 TABLETS BY MOUTH TWICE DAILY 360 tablet 0    CONTOUR TEST strip USE 1 STRIP TO CHECK GLUCOSE TWICE DAILY 100 strip 11    gabapentin (NEURONTIN) 100 MG capsule Take 1 capsule by mouth 3 times daily for 180 days. Intended supply: 30 days (Patient not taking: Reported on 12/10/2019) 90 capsule 5    nitroGLYCERIN (NITROSTAT) 0.4 MG SL tablet Place 1 tablet under the tongue every 5 minutes as needed for Chest pain 25 tablet 3    ondansetron (ZOFRAN ODT) 4 MG disintegrating tablet Take 1 tablet by mouth every 8 hours as needed for Nausea or Vomiting 20 tablet 0    docusate sodium (COLACE) 100 MG capsule Take 1 capsule by mouth 2 times daily 60 capsule 0    hydrALAZINE (APRESOLINE) 100 MG tablet Take 1 tablet by mouth 3 times daily 270 tablet 3    Omega-3 Fatty Acids (FISH OIL) 1000 MG CAPS Take 1,000 mg by mouth daily       aspirin EC 81 MG EC tablet Take 1 tablet by mouth daily 30 tablet 3    Iron-Vitamins (GERITOL COMPLETE PO) Take by mouth daily       DIANA MICROLET LANCETS MISC TEST TWICE DAILY 100 each 2    Insulin Pen Needle (PEN NEEDLES) 31G X 6 MM MISC 1 each by Does not apply route daily 100 each 3    prednisoLONE acetate (PRED FORTE) 1 % ophthalmic suspension Place 1 drop into the left eye daily        No facility-administered encounter medications on file as of 1/15/2020.        Current Outpatient Medications on File Prior to Visit   Medication Sig Dispense Refill    eplerenone (INSPRA) 25 MG tablet Take 1 tablet by mouth daily 90 tablet 0    LANTUS SOLOSTAR 100 UNIT/ML injection pen INJECT 24 UNITS SUBCUTANEOUSLY TWICE DAILY 45 mL 0    tiZANidine (ZANAFLEX) 2 MG tablet TAKE 1 TABLET BY MOUTH EVERY 6 HOURS AS NEEDED FOR  SPASM 30 tablet 0    ferrous sulfate 325 (65 Fe) MG tablet Take 325 mg by mouth 2 times daily      lisinopril (PRINIVIL;ZESTRIL) 40 MG tablet TAKE 1 TABLET BY MOUTH ONCE DAILY 90 tablet 0    carvedilol (COREG) 12.5 MG tablet TAKE 1 TABLET BY MOUTH TWICE DAILY 180 tablet 0    glipiZIDE (GLUCOTROL XL) 5 MG extended release tablet TAKE 2 TABLETS BY MOUTH TWICE DAILY 360 tablet 0    CONTOUR TEST strip USE 1 STRIP TO CHECK GLUCOSE TWICE DAILY 100 strip 11    gabapentin (NEURONTIN) 100 MG capsule Take 1 capsule by mouth 3 times daily for 180 days. Intended supply: 30 days (Patient not taking: Reported on 12/10/2019) 90 capsule 5    nitroGLYCERIN (NITROSTAT) 0.4 MG SL tablet Place 1 tablet under the tongue every 5 minutes as needed for Chest pain 25 tablet 3    ondansetron (ZOFRAN ODT) 4 MG disintegrating tablet Take 1 tablet by mouth every 8 hours as needed for Nausea or Vomiting 20 tablet 0    docusate sodium (COLACE) 100 MG capsule Take 1 capsule by mouth 2 times daily 60 capsule 0    hydrALAZINE (APRESOLINE) 100 MG tablet Take 1 tablet by mouth 3 times daily 270 tablet 3    Omega-3 Fatty Acids (FISH OIL) 1000 MG CAPS Take 1,000 mg by mouth daily       aspirin EC 81 MG EC tablet Take 1 tablet by mouth daily 30 tablet 3    Iron-Vitamins (GERITOL COMPLETE PO) Take by mouth daily       DIANA MICROLET LANCETS MISC TEST TWICE DAILY 100 each 2    Insulin Pen Needle (PEN NEEDLES) 31G X 6 MM MISC 1 each by Does not apply route daily 100 each 3    prednisoLONE acetate (PRED FORTE) 1 % ophthalmic suspension Place 1 drop into the left eye daily        No current facility-administered medications on file prior to visit. Lipitor [atorvastatin]; Aldactone [spironolactone]; Crestor [rosuvastatin calcium];  Lopid [gemfibrozil]; Irene Frank; and Cara [sitagliptin]  Family History   Problem Relation Age of Onset    Cancer Mother         breast    Cancer Father         lung     Social History     Tobacco Use   Smoking Status Former Smoker    Packs/day: 1.50    Years: 8.00    Pack years: 12.00    Types: Cigarettes    Last attempt to quit: 3/4/1980    Years since quittin.8   Smokeless Tobacco Never Used       Social History     Substance and Sexual Activity   Alcohol Use No       Review of Systems   Constitutional: Negative for appetite change, chills and fever. Eyes: Negative for pain, redness and visual disturbance. Respiratory: Negative for cough, shortness of breath and wheezing. Cardiovascular: Negative for chest pain and leg swelling. Gastrointestinal: Negative for abdominal pain, nausea and vomiting. Genitourinary: Negative for difficulty urinating, discharge, dysuria, flank pain, frequency, hematuria, scrotal swelling and testicular pain. Musculoskeletal: Negative for back pain, joint swelling and myalgias. Skin: Negative for color change, rash and wound. Neurological: Negative for dizziness, tremors and numbness. Hematological: Negative for adenopathy. Does not bruise/bleed easily. There were no vitals taken for this visit. PHYSICAL EXAM:  Constitutional: Patient in no acute distress; Neuro: alert and oriented to person place and time. Psych: Mood and affect normal.  Skin: Normal  Lungs: Respiratory effort normal  Cardiovascular:  Normal peripheral pulses  Abdomen: Soft, non-tender, non-distended with no CVA, flank pain, hepatosplenomegaly or hernia. Kidneys normal.  Bladder non-tender and not distended.   Lymphatics: no palpable lymphadenopathy  Penis normal  Urethral meatus normal  Scrotal exam normal  Testicles normal bilaterally  Epididymis normal bilaterally  No evidence of inguinal hernia      Lab Results   Component Value Date    BUN 38 (H) 01/10/2020 Lab Results   Component Value Date    CREATININE 1.58 (H) 01/10/2020     Lab Results   Component Value Date    PSA 5.00 (H) 01/10/2020    PSA 6.13 (H) 11/25/2019    PSA 3.11 11/02/2018       ASSESSMENT:  This is a 76 y.o. male with the following diagnoses:   Diagnosis Orders   1. Elevated PSA  PSA, Diagnostic   2. BPH with obstruction/lower urinary tract symptoms           PLAN:  We will plan for a repeat PSA in 3 months.

## 2020-04-06 ENCOUNTER — HOSPITAL ENCOUNTER (OUTPATIENT)
Dept: LAB | Age: 75
Discharge: HOME | End: 2020-04-06
Payer: MEDICARE

## 2020-04-06 LAB
ALBUMIN SERPL-MCNC: 4.4 G/DL (ref 3.5–5.2)
ANION GAP SERPL CALCULATED.3IONS-SCNC: 10 MMOL/L (ref 9–17)
BUN BLDV-MCNC: 30 MG/DL (ref 8–23)
BUN/CREAT BLD: 19 (ref 9–20)
CALCIUM SERPL-MCNC: 9.9 MG/DL (ref 8.6–10.4)
CHLORIDE BLD-SCNC: 97 MMOL/L (ref 98–107)
CO2: 25 MMOL/L (ref 20–31)
CREAT SERPL-MCNC: 1.62 MG/DL (ref 0.7–1.2)
CREATININE URINE: 229.5 MG/DL (ref 39–259)
GFR AFRICAN AMERICAN: 51 ML/MIN
GFR NON-AFRICAN AMERICAN: 42 ML/MIN
GFR SERPL CREATININE-BSD FRML MDRD: ABNORMAL ML/MIN/{1.73_M2}
GFR SERPL CREATININE-BSD FRML MDRD: ABNORMAL ML/MIN/{1.73_M2}
GLUCOSE BLD-MCNC: 235 MG/DL (ref 70–99)
HCT VFR BLD CALC: 36.8 % (ref 40.7–50.3)
HEMOGLOBIN: 12.1 G/DL (ref 13–17)
MCH RBC QN AUTO: 30.4 PG (ref 25.2–33.5)
MCHC RBC AUTO-ENTMCNC: 32.9 G/DL (ref 28.4–34.8)
MCV RBC AUTO: 92.5 FL (ref 82.6–102.9)
NRBC AUTOMATED: 0 PER 100 WBC
PDW BLD-RTO: 13.9 % (ref 11.8–14.4)
PHOSPHORUS: 3.8 MG/DL (ref 2.5–4.5)
PLATELET # BLD: 187 K/UL (ref 138–453)
PMV BLD AUTO: 9.8 FL (ref 8.1–13.5)
POTASSIUM SERPL-SCNC: 4.2 MMOL/L (ref 3.7–5.3)
PROSTATE SPECIFIC ANTIGEN: 4.42 UG/L
PTH INTACT: 22.32 PG/ML (ref 15–65)
RBC # BLD: 3.98 M/UL (ref 4.21–5.77)
SODIUM BLD-SCNC: 132 MMOL/L (ref 135–144)
TOTAL PROTEIN, URINE: 27 MG/DL
URINE TOTAL PROTEIN CREATININE RATIO: 0.12 (ref 0–0.2)
WBC # BLD: 7.7 K/UL (ref 3.5–11.3)

## 2020-04-06 PROCEDURE — 84156 ASSAY OF PROTEIN URINE: CPT

## 2020-04-06 PROCEDURE — 85027 COMPLETE CBC AUTOMATED: CPT

## 2020-04-06 PROCEDURE — 36415 COLL VENOUS BLD VENIPUNCTURE: CPT

## 2020-04-06 PROCEDURE — 82040 ASSAY OF SERUM ALBUMIN: CPT

## 2020-04-06 PROCEDURE — 80048 BASIC METABOLIC PNL TOTAL CA: CPT

## 2020-04-06 PROCEDURE — 84100 ASSAY OF PHOSPHORUS: CPT

## 2020-04-06 PROCEDURE — 83970 ASSAY OF PARATHORMONE: CPT

## 2020-04-06 PROCEDURE — 84153 ASSAY OF PSA TOTAL: CPT

## 2020-04-06 PROCEDURE — 82570 ASSAY OF URINE CREATININE: CPT

## 2020-04-20 ENCOUNTER — TELEMEDICINE (OUTPATIENT)
Dept: UROLOGY | Age: 75
End: 2020-04-20
Payer: MEDICARE

## 2020-04-20 PROCEDURE — 99213 OFFICE O/P EST LOW 20 MIN: CPT | Performed by: NURSE PRACTITIONER

## 2020-04-20 PROCEDURE — 4040F PNEUMOC VAC/ADMIN/RCVD: CPT | Performed by: NURSE PRACTITIONER

## 2020-04-20 PROCEDURE — 1123F ACP DISCUSS/DSCN MKR DOCD: CPT | Performed by: NURSE PRACTITIONER

## 2020-04-20 PROCEDURE — G8427 DOCREV CUR MEDS BY ELIG CLIN: HCPCS | Performed by: NURSE PRACTITIONER

## 2020-04-20 PROCEDURE — 3017F COLORECTAL CA SCREEN DOC REV: CPT | Performed by: NURSE PRACTITIONER

## 2020-04-20 ASSESSMENT — ENCOUNTER SYMPTOMS
COLOR CHANGE: 0
COUGH: 0
NAUSEA: 0
SHORTNESS OF BREATH: 0
CONSTIPATION: 0
EYE PAIN: 0
WHEEZING: 0
BACK PAIN: 0
EYE REDNESS: 0
ABDOMINAL PAIN: 0
VOMITING: 0

## 2020-04-20 NOTE — PROGRESS NOTES
2020    TELEHEALTH EVALUATION -- Audio/Visual (During ZIIXV-53 public health emergency)    HPI:    Jose Goff (:  1945) has requested an audio/video evaluation for the following concern(s):    History  2019 Referral from Dr. Fabrice Marshall for elevated PSA of 6.13. He was a previous patient of Dr. Shreya Garcia for elevated PSA. Several brothers with prostate cancer.      PSA  2020 - 4.42  2020 - 5.00  2019 - 6.13  2018 - 3.11  2017 - 3.26  2016 - 2.43  10/2015 - 2.70  10/2014 - 2.35  10/2013 - 2.15     Today  Here today for a 3-month follow-up for elevated PSA. Most recent PSA is 4.42. This has declined from 6.13 and 5.0. He denies unintentional weight loss, decreased energy or appetite, new or worsening bone/hip/back pain. He denies any lower extremity numbness and tingling. He denies new or worsening LUTS. He denies frequency, urgency, or incontinence. He denies gross hematuria or dysuria.     Review of Systems   Constitutional: Negative for appetite change, chills and fever. Eyes: Negative for pain, redness and visual disturbance. Respiratory: Negative for cough, shortness of breath and wheezing. Cardiovascular: Negative for chest pain and leg swelling. Gastrointestinal: Negative for abdominal pain, constipation, nausea and vomiting. Genitourinary: Negative for decreased urine volume, difficulty urinating, discharge, dysuria, enuresis, flank pain, frequency, hematuria, penile pain, scrotal swelling, testicular pain and urgency. Musculoskeletal: Negative for back pain, joint swelling and myalgias. Skin: Negative for color change, rash and wound. Neurological: Negative for dizziness, tremors and numbness. Hematological: Negative for adenopathy. Does not bruise/bleed easily.        Allergies   Allergen Reactions    Lipitor [Atorvastatin] Other (See Comments)     Muscle aches and joint pain    Aldactone [Spironolactone] Hives    Crestor [Rosuvastatin Calcium] Other (See years: 12.00     Types: Cigarettes     Last attempt to quit: 3/4/1980     Years since quittin.1    Smokeless tobacco: Never Used   Substance Use Topics    Alcohol use: No    Drug use: No        Past Medical History:   Diagnosis Date    [facility]     Acute renal failure with tubular necrosis ([facility] 10/2/2018    ssecondary to hemodynamic effects of loop diuretics and ace inhibitors, bbaseline 1.2-1.3 which peaked up to 1.8, resolving    CAD (coronary artery disease)     Cerebral artery occlusion with cerebral infarction ([facility]     CH[facility]congest[facility] failure) ([facility]     Chronic back pain     Closed fracture of lumbar vertebra without mention of spinal cord injury     Diabetes mellitus ([facility]     Displacement of intervertebral disc, site unspecified, without myelopathy     H/O cardiac catheterization 16    LMCA: Mild irregularities 10-20%. LAD: Lesion on PRox LAD: Mid subsection. 65% stenosis. LCx: Lesion on 1st Ob Valentina: Proximal subesection. 70% steniosis. RCA: Small non-dominant RCA. Lesion on PRox RCA: Ostial. 50% stenosis. EF:55%.  H/O cardiovascular stress test 16    Abnormal. Moderate perfusion defect of mild intensity in the inferior, inferoseptal adn inferoapical regions during stress imaging, which most consistent with ischemia. Global LV systolic function normal without regional wall motion abnormalities. OVerall these results are most consistent with an intermediate risk for signficant CAD. Additional testing including cardiac cath may be indicated.  H/O tilt table evaluation 2017    Abnormal. Patients HR, BP response and symptoms were most consistent with dysautonomia.  Combined with viligant maintenance of euvolemia and maintaining a moderate salft intake, pharmacologic treatment with SSRI such as lexapro and or mestinon among other treatments have shown some effectiveness in treatment of this condition    H/O tilt table evaluation 2017    Abnormal head Abnormal-            Psychiatric:       [x] Normal Affect [x] No Hallucinations        [] Abnormal-          ASSESSMENT/PLAN:  1. Elevated PSA  PSA has declined. We will plan to repeat PSA in 6 months. He will call sooner if needed for new or worsening symptoms.  - PSA, Diagnostic; Future          Mireya Mcclendon is a 76 y.o. male being evaluated by a Virtual Visit (video visit) encounter to address concerns as mentioned above. A caregiver was present when appropriate. Due to this being a TeleHealth encounter (During [facility] public health emergency), evaluation of the following organ systems was limited: Vitals/Constitutional/EENT/Resp/CV/GI//MS/Neuro/Skin/Heme-Lymph-Imm. Pursuant to the emergency declaration under the [address] [place] [address] authority and the [employer] Act, this Virtual Visit was conducted with patient's (and/or legal guardian's) consent, to reduce the patient's risk of exposure to COVID-19 and provide necessary medical care. The patient (and/or legal guardian) has also been advised to contact this office for worsening conditions or problems, and seek emergency medical treatment and/or call 911 if deemed necessary. Services were provided through a video synchronous discussion virtually to substitute for in-person clinic visit. The patient was located in their home. The provider was located in the office. --REY Mendez - CNP on 4/20/2020 at 4:12 PM    An electronic signature was used to authenticate this note.

## 2020-05-27 ENCOUNTER — HOSPITAL ENCOUNTER (OUTPATIENT)
Age: 75
Discharge: HOME | End: 2020-05-27
Payer: MEDICARE

## 2020-05-27 LAB
ANION GAP SERPL CALCULATED.3IONS-SCNC: 10 MMOL/L (ref 9–17)
BUN BLDV-MCNC: 36 MG/DL (ref 8–23)
BUN/CREAT BLD: 25 (ref 9–20)
CALCIUM SERPL-MCNC: 9.7 MG/DL (ref 8.6–10.4)
CHLORIDE BLD-SCNC: 103 MMOL/L (ref 98–107)
CO2: 21 MMOL/L (ref 20–31)
CREAT SERPL-MCNC: 1.46 MG/DL (ref 0.7–1.2)
GFR AFRICAN AMERICAN: 57 ML/MIN
GFR NON-AFRICAN AMERICAN: 47 ML/MIN
GFR SERPL CREATININE-BSD FRML MDRD: ABNORMAL ML/MIN/{1.73_M2}
GFR SERPL CREATININE-BSD FRML MDRD: ABNORMAL ML/MIN/{1.73_M2}
GLUCOSE BLD-MCNC: 270 MG/DL (ref 70–99)
POTASSIUM SERPL-SCNC: 4.6 MMOL/L (ref 3.7–5.3)
SODIUM BLD-SCNC: 134 MMOL/L (ref 135–144)

## 2020-05-27 PROCEDURE — 36415 COLL VENOUS BLD VENIPUNCTURE: CPT

## 2020-05-27 PROCEDURE — 80048 BASIC METABOLIC PNL TOTAL CA: CPT

## 2020-05-31 RX ORDER — EPLERENONE 50 MG/1
50 TABLET, FILM COATED ORAL DAILY
Qty: 90 TABLET | Refills: 0 | Status: SHIPPED | OUTPATIENT
Start: 2020-05-31 | End: 2020-07-14

## 2020-06-25 ENCOUNTER — APPOINTMENT (OUTPATIENT)
Dept: GENERAL RADIOLOGY | Age: 75
End: 2020-06-25
Payer: MEDICARE

## 2020-06-25 ENCOUNTER — HOSPITAL ENCOUNTER (EMERGENCY)
Age: 75
Discharge: HOME | End: 2020-06-25
Attending: EMERGENCY MEDICINE
Payer: MEDICARE

## 2020-06-25 ENCOUNTER — TELEPHONE (OUTPATIENT)
Dept: CARDIOLOGY | Age: 75
End: 2020-06-25

## 2020-06-25 VITALS
BODY MASS INDEX: 28.88 KG/M2 | RESPIRATION RATE: 18 BRPM | SYSTOLIC BLOOD PRESSURE: 152 MMHG | OXYGEN SATURATION: 97 % | WEIGHT: 195 LBS | DIASTOLIC BLOOD PRESSURE: 54 MMHG | HEART RATE: 61 BPM | TEMPERATURE: 97.7 F | HEIGHT: 69 IN

## 2020-06-25 LAB
ABSOLUTE EOS #: 0.15 K/UL (ref 0–0.44)
ABSOLUTE IMMATURE GRANULOCYTE: 0.15 K/UL (ref 0–0.3)
ABSOLUTE LYMPH #: 1.55 K/UL (ref 1.1–3.7)
ABSOLUTE MONO #: 0.32 K/UL (ref 0.1–1.2)
ALBUMIN SERPL-MCNC: 3.5 G/DL (ref 3.5–5.2)
ALBUMIN/GLOBULIN RATIO: 1.1 (ref 1–2.5)
ALP BLD-CCNC: 64 U/L (ref 40–129)
ALT SERPL-CCNC: 16 U/L (ref 5–41)
ANION GAP SERPL CALCULATED.3IONS-SCNC: 13 MMOL/L (ref 9–17)
AST SERPL-CCNC: 18 U/L
BASOPHILS # BLD: 0 % (ref 0–2)
BASOPHILS ABSOLUTE: 0.03 K/UL (ref 0–0.2)
BILIRUB SERPL-MCNC: 0.29 MG/DL (ref 0.3–1.2)
BILIRUBIN URINE: NEGATIVE
BNP INTERPRETATION: ABNORMAL
BUN BLDV-MCNC: 36 MG/DL (ref 8–23)
BUN/CREAT BLD: 27 (ref 9–20)
CALCIUM SERPL-MCNC: 9.6 MG/DL (ref 8.6–10.4)
CHLORIDE BLD-SCNC: 101 MMOL/L (ref 98–107)
CO2: 22 MMOL/L (ref 20–31)
COLOR: YELLOW
COMMENT UA: ABNORMAL
CREAT SERPL-MCNC: 1.34 MG/DL (ref 0.7–1.2)
DIFFERENTIAL TYPE: ABNORMAL
EKG ATRIAL RATE: 63 BPM
EKG P AXIS: 35 DEGREES
EKG P-R INTERVAL: 204 MS
EKG Q-T INTERVAL: 402 MS
EKG QRS DURATION: 100 MS
EKG QTC CALCULATION (BAZETT): 411 MS
EKG R AXIS: -10 DEGREES
EKG T AXIS: 22 DEGREES
EKG VENTRICULAR RATE: 63 BPM
EOSINOPHILS RELATIVE PERCENT: 2 % (ref 1–4)
GFR AFRICAN AMERICAN: >60 ML/MIN
GFR NON-AFRICAN AMERICAN: 52 ML/MIN
GFR SERPL CREATININE-BSD FRML MDRD: ABNORMAL ML/MIN/{1.73_M2}
GFR SERPL CREATININE-BSD FRML MDRD: ABNORMAL ML/MIN/{1.73_M2}
GLUCOSE BLD-MCNC: 190 MG/DL (ref 70–99)
GLUCOSE URINE: NEGATIVE
HCT VFR BLD CALC: 28.7 % (ref 40.7–50.3)
HEMOGLOBIN: 9.1 G/DL (ref 13–17)
IMMATURE GRANULOCYTES: 2 %
KETONES, URINE: NEGATIVE
LEUKOCYTE ESTERASE, URINE: NEGATIVE
LYMPHOCYTES # BLD: 22 % (ref 24–43)
MAGNESIUM: 1.9 MG/DL (ref 1.6–2.6)
MCH RBC QN AUTO: 28.5 PG (ref 25.2–33.5)
MCHC RBC AUTO-ENTMCNC: 31.7 G/DL (ref 28.4–34.8)
MCV RBC AUTO: 90 FL (ref 82.6–102.9)
MONOCYTES # BLD: 5 % (ref 3–12)
NITRITE, URINE: NEGATIVE
NRBC AUTOMATED: 0 PER 100 WBC
PDW BLD-RTO: 13.9 % (ref 11.8–14.4)
PH UA: 5.5 (ref 5–9)
PLATELET # BLD: 192 K/UL (ref 138–453)
PLATELET ESTIMATE: ABNORMAL
PMV BLD AUTO: 9.6 FL (ref 8.1–13.5)
POTASSIUM SERPL-SCNC: 4.5 MMOL/L (ref 3.7–5.3)
PRO-BNP: 588 PG/ML
PROTEIN UA: NEGATIVE
RBC # BLD: 3.19 M/UL (ref 4.21–5.77)
RBC # BLD: ABNORMAL 10*6/UL
SEG NEUTROPHILS: 69 % (ref 36–65)
SEGMENTED NEUTROPHILS ABSOLUTE COUNT: 4.95 K/UL (ref 1.5–8.1)
SODIUM BLD-SCNC: 136 MMOL/L (ref 135–144)
SPECIFIC GRAVITY UA: 1.02 (ref 1.01–1.02)
TOTAL PROTEIN: 6.7 G/DL (ref 6.4–8.3)
TROPONIN INTERP: ABNORMAL
TROPONIN INTERP: ABNORMAL
TROPONIN T: ABNORMAL NG/ML
TROPONIN T: ABNORMAL NG/ML
TROPONIN, HIGH SENSITIVITY: 29 NG/L (ref 0–22)
TROPONIN, HIGH SENSITIVITY: 29 NG/L (ref 0–22)
TURBIDITY: CLEAR
URINE HGB: NEGATIVE
UROBILINOGEN, URINE: NORMAL
WBC # BLD: 7.2 K/UL (ref 3.5–11.3)
WBC # BLD: ABNORMAL 10*3/UL

## 2020-06-25 PROCEDURE — 81003 URINALYSIS AUTO W/O SCOPE: CPT

## 2020-06-25 PROCEDURE — 80053 COMPREHEN METABOLIC PANEL: CPT

## 2020-06-25 PROCEDURE — 83735 ASSAY OF MAGNESIUM: CPT

## 2020-06-25 PROCEDURE — 85025 COMPLETE CBC W/AUTO DIFF WBC: CPT

## 2020-06-25 PROCEDURE — 36415 COLL VENOUS BLD VENIPUNCTURE: CPT

## 2020-06-25 PROCEDURE — 93010 ELECTROCARDIOGRAM REPORT: CPT | Performed by: FAMILY MEDICINE

## 2020-06-25 PROCEDURE — 84484 ASSAY OF TROPONIN QUANT: CPT

## 2020-06-25 PROCEDURE — 99285 EMERGENCY DEPT VISIT HI MDM: CPT

## 2020-06-25 PROCEDURE — 93005 ELECTROCARDIOGRAM TRACING: CPT | Performed by: EMERGENCY MEDICINE

## 2020-06-25 PROCEDURE — 71045 X-RAY EXAM CHEST 1 VIEW: CPT

## 2020-06-25 PROCEDURE — 83880 ASSAY OF NATRIURETIC PEPTIDE: CPT

## 2020-06-25 RX ORDER — FUROSEMIDE 20 MG/1
20 TABLET ORAL 2 TIMES DAILY
Qty: 6 TABLET | Refills: 0 | Status: SHIPPED | OUTPATIENT
Start: 2020-06-25 | End: 2020-06-26

## 2020-06-25 NOTE — ED PROVIDER NOTES
[facility]  EMERGENCY DEPARTMENT ENCOUNTER   CHIEF COMPLAINT   Chief Complaint   Patient presents with    Shortness of Breath     With exertion, x 2 weeks    Dizziness     Pt reports for past 2-3 days he \"nearly blacks out\" when he stands      HPI   Erminio Rinne is a 76 y.o. male who presents with shortness of breath. The onset was a week ago. He gets tired when he exerts himself now. He doesn't have a cough. He later mentioned that he has kidney disease and some anemia for which he is on iron. REVIEW OF SYSTEMS   Cardiac: Denies palpitations, Denies syncope  Respiratory: +SOB  as above  Neurologic: Denies syncope or LOC  GI: Denies Vomiting, Denies Diarrhea  General: Denies measured Fever  All other review of systems otherwise negative. PAST MEDICAL & SURGICAL HISTORY   Past Medical History:   Diagnosis Date    Acute MI [facility]     Acute renal failure with tubular necrosis ([facility] [address] [facility] 10/2/2018    ssecondary to hemodynamic effects of loop diuretics and ace inhibitors, bbaseline 1.2-1.3 which peaked up to 1.8, resolving    CAD (coronary artery disease)     Cerebral artery occlusion with cerebral infarction ([facility]     CHF (congestive heart failure) (HCC)     Chronic back pain     Closed fracture of lumbar vertebra without mention of spinal cord injury     Diabetes mellitus (HCC)     Displacement of intervertebral disc, site unspecified, without myelopathy     H/O cardiac catheterization 2/19/16    LMCA: Mild irregularities 10-20%. LAD: Lesion on PRox LAD: Mid subsection. 65% stenosis. LCx: Lesion on 1st Ob Valentina: Proximal subesection. 70% steniosis. RCA: Small non-dominant RCA. Lesion on PRox RCA: Ostial. 50% stenosis. EF:55%.  H/O cardiovascular stress test 2/19/16    Abnormal. Moderate perfusion defect of mild intensity in the inferior, inferoseptal adn inferoapical regions during stress imaging, which most consistent with ischemia.  Global LV systolic function normal without regional wall motion exacerbation, foreign body aspiration, Congestive Heart Failure, Myocardial Infarction, Pulmonary Embolus, Pneumonia, Pneumothorax, other , anemia    MDM: Patient's symptoms apparently are from anemia. He is occult blood negative on the rectal exam.  I suspect that he is tired because he is 75% of his normal hemoglobin. This may be due to anemia from chronic kidney disease. I advised him to see his nephrologist in follow-up. I also put him on diuretics for 3 days. He was last on diuretics a year ago and they took him off to protect his kidneys. I do not believe 3 days of Lasix will injure his kidneys and may help with his pitting edema. FINAL IMPRESSION   1. Anemia, unspecified type    2.  Edema, unspecified type      PLAN  Home with follow up  Electronically signed by: Chilo Booker MD, 6/25/2020 4:59 PM  (This note was completed wt a voice recognition program)            Chilo Booker MD  06/25/20 1700

## 2020-06-26 ENCOUNTER — OFFICE VISIT (OUTPATIENT)
Dept: CARDIOLOGY | Age: 75
End: 2020-06-26
Payer: MEDICARE

## 2020-06-26 ENCOUNTER — HOSPITAL ENCOUNTER (OUTPATIENT)
Age: 75
Discharge: HOME | End: 2020-06-26
Payer: MEDICARE

## 2020-06-26 ENCOUNTER — CARE COORDINATION (OUTPATIENT)
Dept: CARE COORDINATION | Age: 75
End: 2020-06-26

## 2020-06-26 VITALS
WEIGHT: 196 LBS | HEIGHT: 69 IN | RESPIRATION RATE: 20 BRPM | SYSTOLIC BLOOD PRESSURE: 98 MMHG | HEART RATE: 74 BPM | OXYGEN SATURATION: 97 % | BODY MASS INDEX: 29.03 KG/M2 | DIASTOLIC BLOOD PRESSURE: 50 MMHG

## 2020-06-26 LAB
ABSOLUTE RETIC #: 0.08 M/UL (ref 0.03–0.08)
FOLATE: 19.8 NG/ML
IMMATURE RETIC FRACT: 19.2 % (ref 2.7–18.3)
IRON: 37 UG/DL (ref 59–158)
RETIC %: 2.6 % (ref 0.5–1.9)
RETIC HEMOGLOBIN: 31.8 PG (ref 28.2–35.7)
VITAMIN B-12: 448 PG/ML (ref 232–1245)

## 2020-06-26 PROCEDURE — 85045 AUTOMATED RETICULOCYTE COUNT: CPT

## 2020-06-26 PROCEDURE — 1123F ACP DISCUSS/DSCN MKR DOCD: CPT | Performed by: FAMILY MEDICINE

## 2020-06-26 PROCEDURE — 3017F COLORECTAL CA SCREEN DOC REV: CPT | Performed by: FAMILY MEDICINE

## 2020-06-26 PROCEDURE — 82746 ASSAY OF FOLIC ACID SERUM: CPT

## 2020-06-26 PROCEDURE — 4040F PNEUMOC VAC/ADMIN/RCVD: CPT | Performed by: FAMILY MEDICINE

## 2020-06-26 PROCEDURE — 1036F TOBACCO NON-USER: CPT | Performed by: FAMILY MEDICINE

## 2020-06-26 PROCEDURE — G8417 CALC BMI ABV UP PARAM F/U: HCPCS | Performed by: FAMILY MEDICINE

## 2020-06-26 PROCEDURE — 99215 OFFICE O/P EST HI 40 MIN: CPT | Performed by: FAMILY MEDICINE

## 2020-06-26 PROCEDURE — 36415 COLL VENOUS BLD VENIPUNCTURE: CPT

## 2020-06-26 PROCEDURE — G8427 DOCREV CUR MEDS BY ELIG CLIN: HCPCS | Performed by: FAMILY MEDICINE

## 2020-06-26 PROCEDURE — 82607 VITAMIN B-12: CPT

## 2020-06-26 PROCEDURE — 83540 ASSAY OF IRON: CPT

## 2020-06-26 PROCEDURE — 99211 OFF/OP EST MAY X REQ PHY/QHP: CPT | Performed by: FAMILY MEDICINE

## 2020-06-26 RX ORDER — LISINOPRIL 10 MG/1
10 TABLET ORAL DAILY
Qty: 90 TABLET | Refills: 3 | Status: SHIPPED | OUTPATIENT
Start: 2020-06-26 | End: 2020-07-07 | Stop reason: ALTCHOICE

## 2020-06-26 RX ORDER — FUROSEMIDE 20 MG/1
20 TABLET ORAL SEE ADMIN INSTRUCTIONS
Qty: 90 TABLET | Refills: 0 | Status: SHIPPED | OUTPATIENT
Start: 2020-06-26 | End: 2021-06-28 | Stop reason: ALTCHOICE

## 2020-06-26 NOTE — PROGRESS NOTES
pressure when he becomes shortness of breath. He has been having issues with lightheaded and dizziness that has been getting worse. He has had no falls. He does feel this way every time he goes from sitting to a standing position. He denied any abdominal pain, bleeding problems including blood in his stool, blood in his urine, or black tarry stools, problems with his medications or any other concerns at this time. Bleeding Risks: Mr. Rhett Summers denies any current or recent bleeding problems including a history of a GI bleed, ulcers, recent or upcoming surgeries, blood in his stool or black tarry stools or blood in his urine. Past Medical History:   Diagnosis Date    Acute MI ([address]     Acute renal failure with tubular necrosis ([address] 10/2/2018    ssecondary to hemodynamic effects of loop diuretics and ace inhibitors, bbaseline 1.2-1.3 which peaked up to 1.8, resolving    CAD (coronary artery disease)     [facility]erebral artery occlusion with [facility] infarction [facility] [address]     [facility]     Chronic back pain     Closed fracture of lumbar vertebra without mention of spinal cord injury     Diabetes mellitus ([facility] [address]     Displacement of intervertebral disc, site unspecified, without myelopathy     H/O cardiac catheterization 2/19/16    LMCA: Mild irregularities 10-20%. LAD: Lesion on PRox LAD: Mid subsection. 65% stenosis. LCx: Lesion on 1st Ob Valentina: Proximal subesection. 70% steniosis. RCA: Small non-dominant RCA. Lesion on PRox RCA: Ostial. 50% stenosis. EF:55%.  H/O cardiovascular stress test 2/19/16    Abnormal. Moderate perfusion defect of mild intensity in the inferior, inferoseptal adn inferoapical regions during stress imaging, which most consistent with ischemia. Global LV systolic function normal without regional wall motion abnormalities. OVerall these results are most consistent with an intermediate risk for signficant CAD.  Additional testing including cardiac cath may be indicated.  H/O tilt table evaluation 12/26/2017    Abnormal. Patients HR, BP response and symptoms were most consistent with dysautonomia. Combined with viligant maintenance of euvolemia and maintaining a moderate salft intake, pharmacologic treatment with SSRI such as lexapro and or mestinon among other treatments have shown some effectiveness in treatment of this condition    H/O tilt table evaluation 12/26/2017    Abnormal head upright tilt table study. The pt heart rate, blood pressure response and symptoms were most consistent with dysautonomia.  History of 24 hour EKG monitoring 8/14/14    Occasional PAC's and PVC's which appear to be at least moderately symptomatic.  History of 24 hour EKG monitoring 11/19/14    Event Monitor. Sinus rhythm and sinus bradycardia. Infrequent isolated PVC's    History of cardiovascular stress test 2/19/14    Relatively NL    History of cardiovascular stress test 03/21/2018    Normal myocardial perfusion. Global left ventricular systolic function was normal with an EF of 68%. Overall, these results are most consistent with a low risk scan.  History of coronary artery stent placement 04/2017    PTCA / Drug Eluting Stent:, CX and / or branches    History of CVA (cerebrovascular accident) without residual deficits 2014    Incidentally found on CT head. No known impairment now or in the past.    History of echocardiogram 2/18/14    LA mildly dilated, EF 55%, LV wall thickness is moderately increased, no definite wall motion abnormalilities, what appears to be a pacer wire is seen w/n the RA and RV, mild-mod TR, mild pulmonary hypertension.  History of Holter monitoring 12/29/2017    Rare PAC's and PVC's.      History of tilt table evaluation 8/12/14    Abnormal    Hyperlipidemia     Hypertension     Non critical Right Renal artery stenosis, native ([address] 10/2/2018    Non critical Right Renal artery stenosis, based on cath in 2016, Rt RA 30% stenosis    Pacemaker     non-functioning    S/P cardiac cath 04/10/2017    S/P coronary artery stent placement     Successful PTCA - HA Om1    SIRS (systemic inflammatory response syndrome) ([facility] [address] 2019    Type II or unspecified type diabetes mellitus without mention of complication, not stated as uncontrolled        CURRENT ALLERGIES: Lipitor [atorvastatin]; Aldactone [spironolactone]; Crestor [rosuvastatin calcium]; Lopid [gemfibrozil]; Invokana [canagliflozin]; and Januvia [sitagliptin] REVIEW OF SYSTEMS: 14 systems were reviewed. Pertinent positives and negatives as above, all else negative.      Past Surgical History:   Procedure Laterality Date    BACK SURGERY      CARDIAC CATHETERIZATION Left 16    via right radial approach/ [facility] [place]/ Dr. Sonia Stafford  8/17/15    Liang/Charles/Saulo/ Lap    COLONOSCOPY      COLONOSCOPY  2/19/15    -polyps,diverticulosis,hemorrhoids    COLONOSCOPY  2018    Dr Ania Rodriguez    COLONOSCOPY N/A 2018    COLONOSCOPY POLYPECTOMY SNARE/COLD BIOPSY  cold snare  and  hot snare performed by Humberto Dickinson MD at [address]      left eye    PACEMAKER INSERTION      PACEMAKER PLACEMENT      UPPER GASTROINTESTINAL ENDOSCOPY  2019    Dr. Jerome Tavarez H-Pylori)duodenal bulbar/antral erythema    UPPER GASTROINTESTINAL ENDOSCOPY N/A 2019    EGD BIOPSY, clotest performed by Humberto Dickinson MD at [address] History:  Social History     Tobacco Use    Smoking status: Former Smoker     Packs/day: 1.50     Years: 8.00     Pack years: 12.00     Types: Cigarettes     Last attempt to quit: 3/4/1980     Years since quittin.3    Smokeless tobacco: Never Used   Substance Use Topics    Alcohol use: No    Drug use: No        CURRENT MEDICATIONS:  Outpatient Medications Marked as Taking for the 20 encounter (Office Visit) with Jj Devlin MD   Medication Sig Dispense Refill    carvedilol (COREG) 12.5 MG tablet Take 1 tablet by mouth twice daily 180 tablet 0    glipiZIDE (GLUCOTROL XL) 5 MG extended release tablet Take 2 tablets by mouth twice daily 360 tablet 0    furosemide (LASIX) 20 MG tablet Take 1 tablet by mouth 2 times daily for 3 days 6 tablet 0    eplerenone (INSPRA) 50 MG tablet Take 1 tablet by mouth daily 90 tablet 0    insulin glargine (LANTUS SOLOSTAR) 100 UNIT/ML injection pen Inject 28 Units into the skin 2 times daily DX:E11.9 16.8 mL 2    lisinopril (PRINIVIL;ZESTRIL) 20 MG tablet Take 1 tablet by mouth daily 1 tab daily 90 tablet 3    hydrALAZINE (APRESOLINE) 100 MG tablet Take 1 tablet by mouth 3 times daily 270 tablet 3    ferrous sulfate 325 (65 Fe) MG tablet Take 325 mg by mouth 2 times daily      CONTOUR TEST strip USE 1 STRIP TO CHECK GLUCOSE TWICE DAILY 100 strip 11    nitroGLYCERIN (NITROSTAT) 0.4 MG SL tablet Place 1 tablet under the tongue every 5 minutes as needed for Chest pain 25 tablet 3    Omega-3 Fatty Acids (FISH OIL) 1000 MG CAPS Take 1,000 mg by mouth daily       aspirin EC 81 MG EC tablet Take 1 tablet by mouth daily 30 tablet 3    Iron-Vitamins (GERITOL COMPLETE PO) Take 1 tablet by mouth daily       DIANA MICROLET LANCETS MISC TEST TWICE DAILY 100 each 2    Insulin Pen Needle (PEN NEEDLES) 31G X 6 MM MISC 1 each by Does not apply route daily 100 each 3    prednisoLONE acetate (PRED FORTE) 1 % ophthalmic suspension Place 1 drop into the left eye daily          FAMILY HISTORY: family history includes Cancer in his father and mother. PHYSICAL EXAM:   BP (!) 98/50 (Site: Left Upper Arm, Position: Standing, Cuff Size: Medium Adult)   Pulse 74   Resp 20   Ht 5' 9\" (1.753 m)   Wt 196 lb (88.9 kg)   SpO2 97%   BMI 28.94 kg/m²  Body mass index is 28.94 kg/m². Constitutional: He is oriented to person, place, and time. He appears well-developed and well-nourished. In no acute distress.    HEENT: Normocephalic and extra time when moving from laying to sitting, sitting to standing and standing to walking as well as wearing at least knee high compressions stockings. Chronic Diastolic Heart PKLGWCP:YE:>74% via echo on 2/17/2016. Currently well controlled. Beta Blocker: Continue carvedilol (Coreg) 12.5 mg twice daily. ACE Inibitor/ARB: Decrease lisinopril 10 mg once daily    Diuretics: DECREASE to furosemide (Lasix) 20 mg every Monday, Wednesday and Friday. Nonpharmacologic management of Heart Failure: I advised him to try and keep his legs up whenever possible and to limit salt in his diet. Atherosclerotic Heart Disease: Coronary Artery stent: 4/10/2017  Antiplatelet Agent: Continue aspirin 81 mg daily. I also reminded him to watch for signs of blood in his stool or black tarry stools and stop the medication immediately if this develops as this could be life threatening. Beta Blocker: Continue carvedilol (Coreg) 12.5 mg twice daily. Statin Therapy: Not able to take due to severe myalgia with multiple different statins. Essential Hypertension: Controlled and actually a bit low today  · ACE Inibitor/ARB: Decrease lisinopril 10 mg     · Beta Blocker Therapy: Continue carvedilol 12.5 mg twice daily. · Anemia: Because of his recent anemia, I ordered iron studies, B12, folate and a ferritin level to assess for these possible deficiencies. Once again, thank you for allowing me to participate in this patients care. Please do not hesitate to contact me if I could be of any further assistance. FOLLOW UP:   I told Mr. Roslyn Rodriguez  to call my office if he had any problems, but otherwise told him to Return in about 2 weeks (around 7/10/2020). However, as always I would be happy to see him sooner should the need arise. Once again, thank you for allowing me to participate in this patients care. Please do not hesitate to contact me could I be of further assistance. Sincerely,  Celeste Bryan.  Jackelin BLOUNT, MS, F. A.C.C. [address] [facility] Specialist  [address], [place], [address] [place] [address]  Phone: [phone], Fax: [phone]     I believe that the risk of significant morbidity and mortality related to the patient's current medical conditions are: intermediate-high. The documentation recorded by the scribe, accurately and completely reflects the services I personally performed and the decisions made by me. Amalia Delgadillo MD, MS, F.A.C.C.  June 26, 2020

## 2020-06-26 NOTE — PATIENT INSTRUCTIONS
SURVEY:    You may be receiving a survey from [employer] regarding your visit today. Please complete the survey to enable us to provide the highest quality of care to you and your family. If you cannot score us a very good on any question, please call the office to discuss how we could have made your experience a very good one. Thank you.

## 2020-06-29 ENCOUNTER — TELEPHONE (OUTPATIENT)
Dept: CARDIOLOGY | Age: 75
End: 2020-06-29

## 2020-07-08 ENCOUNTER — HOSPITAL ENCOUNTER (OUTPATIENT)
Dept: NON INVASIVE DIAGNOSTICS | Age: 75
Discharge: HOME | End: 2020-07-08
Payer: MEDICARE

## 2020-07-08 LAB
LV EF: 60 %
LVEF MODALITY: NORMAL

## 2020-07-08 PROCEDURE — 93017 CV STRESS TEST TRACING ONLY: CPT

## 2020-07-08 PROCEDURE — A9500 TC99M SESTAMIBI: HCPCS | Performed by: FAMILY MEDICINE

## 2020-07-08 PROCEDURE — 93306 TTE W/DOPPLER COMPLETE: CPT

## 2020-07-08 PROCEDURE — 3430000000 HC RX DIAGNOSTIC RADIOPHARMACEUTICAL: Performed by: FAMILY MEDICINE

## 2020-07-08 PROCEDURE — 6360000002 HC RX W HCPCS: Performed by: FAMILY MEDICINE

## 2020-07-08 RX ADMIN — REGADENOSON 0.4 MG: 0.08 INJECTION, SOLUTION INTRAVENOUS at 10:10

## 2020-07-08 RX ADMIN — Medication 30 MILLICURIE: at 10:14

## 2020-07-09 ENCOUNTER — HOSPITAL ENCOUNTER (OUTPATIENT)
Dept: NON INVASIVE DIAGNOSTICS | Age: 75
Discharge: HOME | End: 2020-07-09
Payer: MEDICARE

## 2020-07-09 ENCOUNTER — TELEPHONE (OUTPATIENT)
Dept: CARDIOLOGY | Age: 75
End: 2020-07-09

## 2020-07-09 PROCEDURE — A9500 TC99M SESTAMIBI: HCPCS | Performed by: FAMILY MEDICINE

## 2020-07-09 PROCEDURE — 3430000000 HC RX DIAGNOSTIC RADIOPHARMACEUTICAL: Performed by: FAMILY MEDICINE

## 2020-07-09 PROCEDURE — 78452 HT MUSCLE IMAGE SPECT MULT: CPT

## 2020-07-09 RX ADMIN — Medication 30 MILLICURIE: at 12:25

## 2020-07-10 ENCOUNTER — CARE COORDINATION (OUTPATIENT)
Dept: CARE COORDINATION | Age: 75
End: 2020-07-10

## 2020-07-10 NOTE — PROCEDURES
[address] [facility], [address] [place] [address]                              CARDIAC STRESS TEST    PATIENT NAME: Rajiv Mcneal                     :        1945  MED REC NO:   [id number]                              ROOM:  ACCOUNT NO:   [de-identified]                           ADMIT DATE: 2020  PROVIDER:     Noni Villarreal    CARDIOVASCULAR DIAGNOSTIC DEPARTMENT    DATE OF STUDY:  2020    ORDERING PROVIDER:  Noni Villarreal MD    PRIMARY CARE PROVIDER:  Marivel Gomez MD    INTERPRETING PHYSICIAN:  Noni Villarreal MD    PHARMACOLOGIC MYOCARDIAL PERFUSION STRESS TESTING    Stress/rest single isotope SPECT imaging with exercise stress and gated  SPECT imaging. INDICATIONS:  Assessment of recent chest pain and/or discomfort    CLINICAL HISTORY:  The patient is a 79-year-old man with known coronary  artery disease. Previous cardiac history includes:  CAD, stress test, cardiac  catheterization, PTCA, myocardial infarction    Other previous history includes:  Chest pain, palpitations, fatigue,  dyspnea, lightheadedness, diabetes mellitus, indigestion, heartburn,  arthritis    Symptoms just prior to testing include:  None    Relevant medications:  None    PROCEDURE:  The heart rate was 77 at baseline and brigido to 94 beats per  minute during the regadenoson infusion. The rest blood pressure was  151/54 mm/Hg and decreased to 114/37 mm/Hg. The patient did not  complain of any significant symptoms following infusion. Pharmacologic stress testing was performed with regadenoson at a dose of  0.4 mg. Additionally, low level exercise using hand compressions were  performed along with vasodilator infusion. MYOCARDIAL PERFUSION IMAGING:  Imaging was performed at rest 30-45  minutes following the injection of 30.0 mCi of sestamibi. Approximately  10 seconds after Lexiscan injection, the patient was injected with 30.0  mCi of sestamibi.   Gating

## 2020-07-13 ENCOUNTER — HOSPITAL ENCOUNTER (OUTPATIENT)
Age: 75
Discharge: HOME | End: 2020-07-13
Payer: MEDICARE

## 2020-07-13 ENCOUNTER — TELEPHONE (OUTPATIENT)
Dept: CARDIOLOGY | Age: 75
End: 2020-07-13

## 2020-07-13 LAB
ALBUMIN SERPL-MCNC: 3.6 G/DL (ref 3.5–5.2)
ANION GAP SERPL CALCULATED.3IONS-SCNC: 10 MMOL/L (ref 9–17)
BUN BLDV-MCNC: 30 MG/DL (ref 8–23)
BUN/CREAT BLD: 21 (ref 9–20)
CALCIUM SERPL-MCNC: 9.3 MG/DL (ref 8.6–10.4)
CHLORIDE BLD-SCNC: 99 MMOL/L (ref 98–107)
CO2: 25 MMOL/L (ref 20–31)
CREAT SERPL-MCNC: 1.44 MG/DL (ref 0.7–1.2)
CREATININE URINE: 192.8 MG/DL (ref 39–259)
GFR AFRICAN AMERICAN: 58 ML/MIN
GFR NON-AFRICAN AMERICAN: 48 ML/MIN
GFR SERPL CREATININE-BSD FRML MDRD: ABNORMAL ML/MIN/{1.73_M2}
GFR SERPL CREATININE-BSD FRML MDRD: ABNORMAL ML/MIN/{1.73_M2}
GLUCOSE BLD-MCNC: 233 MG/DL (ref 70–99)
HCT VFR BLD CALC: 32.8 % (ref 40.7–50.3)
HEMOGLOBIN: 10 G/DL (ref 13–17)
MCH RBC QN AUTO: 27.7 PG (ref 25.2–33.5)
MCHC RBC AUTO-ENTMCNC: 30.5 G/DL (ref 28.4–34.8)
MCV RBC AUTO: 90.9 FL (ref 82.6–102.9)
NRBC AUTOMATED: 0 PER 100 WBC
PDW BLD-RTO: 14.4 % (ref 11.8–14.4)
PHOSPHORUS: 3.9 MG/DL (ref 2.5–4.5)
PLATELET # BLD: 271 K/UL (ref 138–453)
PMV BLD AUTO: 9.3 FL (ref 8.1–13.5)
POTASSIUM SERPL-SCNC: 4.3 MMOL/L (ref 3.7–5.3)
PTH INTACT: 19.55 PG/ML (ref 15–65)
RBC # BLD: 3.61 M/UL (ref 4.21–5.77)
SODIUM BLD-SCNC: 134 MMOL/L (ref 135–144)
TOTAL PROTEIN, URINE: 31 MG/DL
URINE TOTAL PROTEIN CREATININE RATIO: 0.16 (ref 0–0.2)
WBC # BLD: 9.5 K/UL (ref 3.5–11.3)

## 2020-07-13 PROCEDURE — 83970 ASSAY OF PARATHORMONE: CPT

## 2020-07-13 PROCEDURE — 80048 BASIC METABOLIC PNL TOTAL CA: CPT

## 2020-07-13 PROCEDURE — 82040 ASSAY OF SERUM ALBUMIN: CPT

## 2020-07-13 PROCEDURE — 84100 ASSAY OF PHOSPHORUS: CPT

## 2020-07-13 PROCEDURE — 36415 COLL VENOUS BLD VENIPUNCTURE: CPT

## 2020-07-13 PROCEDURE — 82570 ASSAY OF URINE CREATININE: CPT

## 2020-07-13 PROCEDURE — 85027 COMPLETE CBC AUTOMATED: CPT

## 2020-07-13 PROCEDURE — 84156 ASSAY OF PROTEIN URINE: CPT

## 2020-07-14 ENCOUNTER — OFFICE VISIT (OUTPATIENT)
Dept: CARDIOLOGY | Age: 75
End: 2020-07-14
Payer: MEDICARE

## 2020-07-14 VITALS
RESPIRATION RATE: 18 BRPM | HEIGHT: 69 IN | HEART RATE: 67 BPM | SYSTOLIC BLOOD PRESSURE: 145 MMHG | DIASTOLIC BLOOD PRESSURE: 65 MMHG | WEIGHT: 197.8 LBS | BODY MASS INDEX: 29.3 KG/M2 | OXYGEN SATURATION: 98 %

## 2020-07-14 PROCEDURE — 1036F TOBACCO NON-USER: CPT | Performed by: FAMILY MEDICINE

## 2020-07-14 PROCEDURE — 1123F ACP DISCUSS/DSCN MKR DOCD: CPT | Performed by: FAMILY MEDICINE

## 2020-07-14 PROCEDURE — 99211 OFF/OP EST MAY X REQ PHY/QHP: CPT | Performed by: FAMILY MEDICINE

## 2020-07-14 PROCEDURE — 3017F COLORECTAL CA SCREEN DOC REV: CPT | Performed by: FAMILY MEDICINE

## 2020-07-14 PROCEDURE — G8417 CALC BMI ABV UP PARAM F/U: HCPCS | Performed by: FAMILY MEDICINE

## 2020-07-14 PROCEDURE — 99214 OFFICE O/P EST MOD 30 MIN: CPT | Performed by: FAMILY MEDICINE

## 2020-07-14 PROCEDURE — 4040F PNEUMOC VAC/ADMIN/RCVD: CPT | Performed by: FAMILY MEDICINE

## 2020-07-14 PROCEDURE — G8427 DOCREV CUR MEDS BY ELIG CLIN: HCPCS | Performed by: FAMILY MEDICINE

## 2020-07-14 RX ORDER — EPLERENONE 25 MG/1
25 TABLET, FILM COATED ORAL DAILY
Qty: 90 TABLET | Refills: 3 | Status: SHIPPED | OUTPATIENT
Start: 2020-07-14 | End: 2020-11-03 | Stop reason: DRUGHIGH

## 2020-07-14 RX ORDER — ACYCLOVIR 400 MG/1
400 TABLET ORAL 2 TIMES DAILY
COMMUNITY
Start: 2020-07-06

## 2020-07-14 NOTE — PATIENT INSTRUCTIONS
SURVEY:    You may be receiving a survey from [employer] regarding your visit today. Please complete the survey to enable us to provide the highest quality of care to you and your family. If you cannot score us a very good on any question, please call the office to discuss how we could have made your experience a very good one. Thank you.

## 2020-07-14 NOTE — PROGRESS NOTES
Marlin Cota am scribing for and in the presence of Terri Conroy. Jackelin BLOUNT, MS, F.A.C.C. Patient: Glenna Booker  : 1945  Date of Visit: 2020    REASON FOR VISIT / CONSULTATION: Follow-up (HX:SOB, chest pressure, Dysautonomia, CHF, CAD, s/p stent, HTN, iron def anemia Pt is here today for follow up he states he is still fatigue and has SOB, lightheaded/dizziness and palpitations Denies:CP,)      Dear Venessa Neal MD,    As you know, Mr. Madeleine Allen is a 70 y.o. male with a known history of dysautonomia where on tilt table testing his blood pressure dropped from 043 systolic to 78 systolic with only a 47-12 HR response. More recently, a CT of he head in the past showed evidence of at least 2 old CVA's and a heart catheterization showed severe single vessel disease in the ostium of a moderate sized OM1 branch of the circumflex. However, maximal guidelines directed medical management was opted for an place of stenting partly due to the risk associated with stenting this vessel. Recently he has had a coronary angiography procedure with stenting of his HA Om1. As you know, Mr. Madeleine Allen  is a 76 y.o. male with a history of recent onset substernal chest discomfort that led to a stress test and a subsequent heart catheterization which showed severe single vessel coronary artery disease of the HA Om1 with successful angioplasty and stenting of that vessel that was done by Dr. Wyatt Morejon on 4/10/17. More recently he has had problems with balance problems when he is in his feet and says that a Neurologist has told him in the past this is because of his previous strokes. Most recently he underwent a cardiovascular stress test which fortunately had shown to be normal on 3/21/2018. Mr. Madeleine Allen has significant normal stress test and echocardiogram on 2020. Mr. Madeleine Allen is here for follow up today. He continues to complain of shortness of breath with exertion.  He is still unable to walk with his wife altogether due to his breathing. He also continues to have  issues with lightheaded and dizziness that has been getting worse. He has had no falls although he did come close to collapsing last week and had some difficulty focusing at times per his wife. He denied any abdominal pain, bleeding problems including blood in his stool, blood in his urine, or black tarry stools, problems with his medications or any other concerns at this time. Bleeding Risks: Mr. Teresa Simmons denies any current or recent bleeding problems including a history of a GI bleed, ulcers, recent or upcoming surgeries, blood in his stool or black tarry stools or blood in his urine. Past Medical History:   Diagnosis Date    Acute MI ([address]     Acute renal failure with tubular necrosis ([address] 10/2/2018    ssecondary to hemodynamic effects of loop diuretics and ace inhibitors, bbaseline 1.2-1.3 which peaked up to 1.8, resolving    CAD (coronary artery disease)     Cerebral artery occlusion with cerebral infarction ([address]     CHF (congestive heart failure) (HCC)     Chronic back pain     Closed fracture of lumbar vertebra without mention of spinal cord injury     Diabetes mellitus ([facility]     Displacement of intervertebral disc, site unspecified, without myelopathy     H/O cardiac catheterization 2/19/16    LMCA: Mild irregularities 10-20%. LAD: Lesion on PRox LAD: Mid subsection. 65% stenosis. LCx: Lesion on 1st Ob Valentina: Proximal subesection. 70% steniosis. RCA: Small non-dominant RCA. Lesion on PRox RCA: Ostial. 50% stenosis. EF:55%.  H/O cardiovascular stress test 2/19/16    Abnormal. Moderate perfusion defect of mild intensity in the inferior, inferoseptal adn inferoapical regions during stress imaging, which most consistent with ischemia. Global LV systolic function normal without regional wall motion abnormalities. OVerall these results are most consistent with an intermediate risk for signficant CAD.  Additional testing including cardiac cath may be indicated.  H/O tilt table evaluation 12/26/2017    Abnormal. Patients HR, BP response and symptoms were most consistent with dysautonomia. Combined with viligant maintenance of euvolemia and maintaining a moderate salft intake, pharmacologic treatment with SSRI such as lexapro and or mestinon among other treatments have shown some effectiveness in treatment of this condition    H/O tilt table evaluation 12/26/2017    Abnormal head upright tilt table study. The pt heart rate, blood pressure response and symptoms were most consistent with dysautonomia.  History of 24 hour EKG monitoring 8/14/14    Occasional PAC's and PVC's which appear to be at least moderately symptomatic.  History of 24 hour EKG monitoring 11/19/14    Event Monitor. Sinus rhythm and sinus bradycardia. Infrequent isolated PVC's    History of cardiovascular stress test 2/19/14    Relatively NL    History of cardiovascular stress test 03/21/2018    Normal myocardial perfusion. Global left ventricular systolic function was normal with an EF of 68%. Overall, these results are most consistent with a low risk scan.  History of coronary artery stent placement 04/2017    PTCA / Drug Eluting Stent:, CX and / or branches    History of CVA (cerebrovascular accident) without residual deficits 2014    Incidentally found on CT head. No known impairment now or in the past.    History of echocardiogram 2/18/14    LA mildly dilated, EF 55%, LV wall thickness is moderately increased, no definite wall motion abnormalilities, what appears to be a pacer wire is seen w/n the RA and RV, mild-mod TR, mild pulmonary hypertension.  History of Holter monitoring 12/29/2017    Rare PAC's and PVC's.      History of tilt table evaluation 8/12/14    Abnormal    Hyperlipidemia     Hypertension     Non critical Right Renal artery stenosis, native ([address] 10/2/2018    Non critical Right Renal artery stenosis, based on cath in 2016, Rt RA LEFT in the mid-clavicular line (cardiac apex). Pulmonary/Chest: Effort normal and breath sounds normal. No respiratory distress. He has no wheezes, rhonchi or rales. Abdominal: Soft, non-tender. Bowel sounds and aorta are normal. He exhibits no organomegaly, mass or bruit. Extremities: No edema. No cyanosis and no clubbing. Pulses are 2+ radial and carotid pulses. 2+ dorsalis pedis and posterior tibial pulses bilaterally. Neurological: He is alert and oriented to person, place, and time. No evidence of gross cranial nerve deficit. Coordination appeared normal.   Skin: Skin is warm and dry. There is no rash or diaphoresis. Psychiatric: He has a normal mood and affect. His speech is normal and behavior is normal.      MOST RECENT LABS ON RECORD:   Lab Results   Component Value Date    WBC 9.5 07/13/2020    HGB 10.0 (L) 07/13/2020    HCT 32.8 (L) 07/13/2020     07/13/2020    CHOL 152 08/08/2018    TRIG 309 (H) 08/08/2018    HDL 30 (L) 08/08/2018    LDLDIRECT 44 11/02/2017    ALT 16 06/25/2020    AST 18 06/25/2020     (L) 07/13/2020    K 4.3 07/13/2020    CL 99 07/13/2020    CREATININE 1.44 (H) 07/13/2020    BUN 30 (H) 07/13/2020    CO2 25 07/13/2020    TSH 2.26 11/02/2017    PSA 4.42 (H) 04/06/2020    INR 1.0 02/17/2016    LABA1C 6.7 02/21/2020    LABMICR 30 10/17/2015       ASSESSMENT:  1. SOB (shortness of breath)    2. Chest pressure    3. Dysautonomia ([address]    4. Chronic diastolic heart failure ([address]    5. Coronary artery disease involving native coronary artery of native heart without angina pectoris    6. Essential hypertension    7. Anemia, unspecified type       PLAN:  · Shortness of breath with mild exertion: Worsening since April, a bit better since starting lasix. Unremarkable stress and echo makes me believe that this is not due to any ischemic cardiac cause.  No active signs of heart failure    Additional Testing List: None   Diuretics: Continue furosemide (Lasix) 20 mg every Monday, Wednesday and Friday. · Chest Pressure with Shortness of Breath:    Antiplatelet Agent: Continue aspirin 81 mg daily. I also reminded him to watch for signs of blood in his stool or black tarry stools and stop the medication immediately if this develops as this could be life threatening. · Beta Blocker Therapy: Not indicated        · Counseling: I advised Mr. Teresa Simmons to call our office or go to the emergency room if he develops worsening or persistent chest pain or shortness of breath as this could be life threatening. Dysautonomia: Moderately Symptomatic    · Diuretics: Continue reduced dose of furosemide (Lasix) 20 mg to every Monday, Wednesday and Friday. ACE Inibitor/ARB: Not indicated at this time. · Nonpharmacologic counseling: Because of his condition, I reminded him to try and keep himself well-hydrated and to take extra time when moving from laying to sitting, sitting to standing and standing to walking as well as wearing at least knee high compressions stockings. · Additional Testing: I ordered a tilt table test to try and better assess the etiology of Mr. Jimenez's recent symptoms. Mr. Teresa Simmons reports he will wait to schedule this until he see's oncology tomorrow. Chronic Diastolic Heart YSIYMYB:LZ:>61% via echo on 7/8/2020. Currently well controlled. Beta Blocker: Not indicated       ACE Inibitor/ARB: Not indicated       Diuretics: Continue furosemide (Lasix) 20 mg every Monday, Wednesday and Friday. Nonpharmacologic management of Heart Failure: I advised him to try and keep his legs up whenever possible and to limit salt in his diet. Atherosclerotic Heart Disease: Coronary Artery stent: 4/10/2017  Antiplatelet Agent: Continue aspirin 81 mg daily. I also reminded him to watch for signs of blood in his stool or black tarry stools and stop the medication immediately if this develops as this could be life threatening.    Beta Blocker: Not indicated       Statin Therapy: Not able to take due to severe myalgia with multiple different statins. Essential Hypertension: Controlled   · ACE Inibitor/ARB: Decrease Inspra   25 mg daily    · Beta Blocker Therapy: Not indicated         · Anemia: was as in the 8-9 range, most recent HgB 10 as of 7/13/2020. Agree with follow up with hematology tomorrow. Once again, thank you for allowing me to participate in this patients care. Please do not hesitate to contact me if I could be of any further assistance. FOLLOW UP:   I told Mr. Dev Perla  to call my office if he had any problems, but otherwise told him to Return in about 6 weeks (around 8/25/2020). However, as always I would be happy to see him sooner should the need arise. Once again, thank you for allowing me to participate in this patients care. Please do not hesitate to contact me could I be of further assistance. Sincerely,  Sonia Benítez MD, MS, F.A.C.C. [facility]  [address] [place] [address], [address] [place] [address]  Phone: [phone], Fax: [phone]     I believe that the risk of significant morbidity and mortality related to the patient's current medical conditions are: Intermediate. The documentation recorded by the scribe, accurately and completely reflects the services I personally performed and the decisions made by me. Lv Wells MD, MS, F.A.C.C.  July 14, 2020

## 2020-07-15 ENCOUNTER — OFFICE VISIT (OUTPATIENT)
Dept: ONCOLOGY | Age: 75
End: 2020-07-15
Payer: MEDICARE

## 2020-07-15 VITALS
BODY MASS INDEX: 29.15 KG/M2 | DIASTOLIC BLOOD PRESSURE: 44 MMHG | TEMPERATURE: 97.5 F | HEART RATE: 66 BPM | RESPIRATION RATE: 16 BRPM | HEIGHT: 69 IN | WEIGHT: 196.8 LBS | SYSTOLIC BLOOD PRESSURE: 128 MMHG

## 2020-07-15 PROCEDURE — G8427 DOCREV CUR MEDS BY ELIG CLIN: HCPCS | Performed by: INTERNAL MEDICINE

## 2020-07-15 PROCEDURE — 99211 OFF/OP EST MAY X REQ PHY/QHP: CPT

## 2020-07-15 PROCEDURE — G8417 CALC BMI ABV UP PARAM F/U: HCPCS | Performed by: INTERNAL MEDICINE

## 2020-07-15 PROCEDURE — 99204 OFFICE O/P NEW MOD 45 MIN: CPT | Performed by: INTERNAL MEDICINE

## 2020-07-15 RX ORDER — PANTOPRAZOLE SODIUM 40 MG/1
40 TABLET, DELAYED RELEASE ORAL DAILY
Qty: 30 TABLET | Refills: 3 | Status: SHIPPED | OUTPATIENT
Start: 2020-07-15 | End: 2020-11-18 | Stop reason: SDUPTHER

## 2020-07-15 NOTE — PROGRESS NOTES
DIAGNOSIS:   1. Iron deficiency anemia  2. Evidence of intermittent GI bleed  CURRENT THERAPY:  Work-up in progress  BRIEF CASE HISTORY:   Cynthia Carvalho is a very pleasant 76 y.o. male who is referred to us for iron deficiency anemia. He has been followed by cardiology because of congestive heart failure. The patient symptoms have been worsening and it was attributed to worsening hemoglobin. Looking at his hemoglobin over the last few years. He had episodes where his hemoglobin will drop by 1 to 2 g and it is a sudden drop. Followed by slow recovery. He never required blood transfusion but he was quite symptomatic. The patient reports epigastric pain followed by dark stool for a few days happened just before his anemia. He was seen by GI last year and EGD was done. There was some inflammation in the stomach but no active bleeding was appreciated.       PAST MEDICAL HISTORY: has a past medical history of Acute MI ([facility] Acute renal failure with tubular necrosis ([facility] CAD (coronary artery disease), Cerebral artery occlusion with cerebral infarction [facility]), CHF (congestive heart failure) ([facility] Chronic back pain, Closed fracture of lumbar vertebra without mention of spinal cord injury, Diabetes mellitus ([facility] [facility] Displacement of intervertebral disc, site unspecified, without myelopathy, H/O cardiac catheterization, H/O cardiovascular stress test, H/O tilt table evaluation, H/O tilt table evaluation, History of 24 hour EKG monitoring, History of 24 hour EKG monitoring, History of cardiovascular stress test, History of cardiovascular stress test, History of coronary artery stent placement, History of CVA (cerebrovascular accident) without residual deficits, History of echocardiogram, History of Holter monitoring, History of tilt table evaluation, Hyperlipidemia, Hypertension, Non critical Right Renal artery stenosis, native ([facility] Pacemaker, S/P cardiac cath, S/P coronary artery stent placement, SIRS (systemic inflammatory response syndrome) ([facility] [address] and Type II or unspecified type diabetes mellitus without mention of complication, not stated as uncontrolled. PAST SURGICAL HISTORY: has a past surgical history that includes Pacemaker insertion; back surgery; Cholecystectomy (8/17/15); Corneal transplant; Cardiac catheterization (Left, 2/19/16); pacemaker placement; Colonoscopy (2010); Colonoscopy (2/19/15); Colonoscopy (05/23/2018); Colonoscopy (N/A, 5/23/2018); Upper gastrointestinal endoscopy (05/28/2019); and Upper gastrointestinal endoscopy (N/A, 5/28/2019). CURRENT MEDICATIONS:  has a current medication list which includes the following prescription(s): acyclovir, eplerenone, geritol, furosemide, insulin glargine, glipizide, ferrous sulfate, contour test, nitroglycerin, fish oil, aspirin ec, susan microlet lancets, pen needles, prednisolone acetate, furosemide, and lantus solostar. ALLERGIES:  is allergic to lipitor [atorvastatin]; aldactone [spironolactone]; crestor [rosuvastatin calcium]; lopid [gemfibrozil]; invokana [canagliflozin]; and januvia [sitagliptin]. FAMILY HISTORY: Negative for any hematological or oncological conditions. SOCIAL HISTORY:  reports that he quit smoking about 40 years ago. His smoking use included cigarettes. He has a 12.00 pack-year smoking history. He has never used smokeless tobacco. He reports that he does not drink alcohol or use drugs. REVIEW OF SYSTEMS:   General: no fever or night sweats, Weight is stable. ENT: No double or blurred vision, no tinnitus or hearing problem, no dysphagia or sore throat   Respiratory: No chest pain, no shortness of breath, no cough or hemoptysis. Cardiovascular: Denies chest pain, PND or orthopnea. No L E swelling or palpitations. Gastrointestinal:    No nausea or vomiting, abdominal pain, diarrhea or constipation. Genitourinary: Denies dysuria, hematuria, frequency, urgency or incontinence.     Neurological: Denies headaches, decreased LOC, no sensory or motor focal deficits. Musculoskeletal:  No arthralgia no back pain or joint swelling. Skin: There are no rashes or bleeding. Psychiatric:  No anxiety, no depression. Endocrine: no diabetes or thyroid disease. Hematologic: no bleeding , no adenopathy. PHYSICAL EXAM: Shows a well appearing 76y.o.-year-old male who is not in pain or distress. Vital Signs: Blood pressure (!) 128/44, pulse 66, temperature 97.5 °F (36.4 °C), temperature source Temporal, resp. rate 16, height 5' 9\" (1.753 m), weight 196 lb 12.8 oz (89.3 kg). HEENT: Normocephalic and atraumatic. Pupils are equal, round, reactive to light and accommodation. Extraocular muscles are intact. Neck: Showed no JVD, no carotid bruit . Lungs: Clear to auscultation bilaterally. Heart: Regular without any murmur. Abdomen: Soft, nontender. No hepatosplenomegaly. Extremities: Lower extremities show no edema, clubbing, or cyanosis. Breasts: Examination not done today. Neuro exam: intact cranial nerves bilaterally no motor or sensory deficit, gait is normal. Lymphatic: no adenopathy appreciated in the supraclavicular, axillary, cervical or inguinal area    REVIEW OF LABORATORY DATA:   Lab Results   Component Value Date    WBC 9.5 07/13/2020    HGB 10.0 (L) 07/13/2020    HCT 32.8 (L) 07/13/2020    MCV 90.9 07/13/2020     07/13/2020     Lab Results   Component Value Date    IRON 37 (L) 06/26/2020       REVIEW OF RADIOLOGICAL RESULTS:     IMPRESSION:   1. Iron deficiency anemia  2. Clinical evidence of GI bleeding  PLAN:   I think the patient anemia is related to chronic GI bleeding. But his GI bleeding has been recurrent and episodic. I recommend referring him back to GI, will start him on Protonix  We will continue him on oral iron and we will watch him closely over the next few months  If his hemoglobin does not improve with oral iron, will plan to offer him IV iron.   Patient verbalized understanding and agreement with the plan.

## 2020-08-04 PROBLEM — D63.1 ANEMIA IN STAGE 3 CHRONIC KIDNEY DISEASE (HCC): Status: ACTIVE | Noted: 2019-05-21

## 2020-08-04 PROBLEM — N18.30 ANEMIA IN STAGE 3 CHRONIC KIDNEY DISEASE (HCC): Status: ACTIVE | Noted: 2019-05-21

## 2020-08-26 ENCOUNTER — OFFICE VISIT (OUTPATIENT)
Dept: CARDIOLOGY | Age: 75
End: 2020-08-26
Payer: MEDICARE

## 2020-08-26 VITALS
OXYGEN SATURATION: 98 % | WEIGHT: 197.8 LBS | RESPIRATION RATE: 15 BRPM | DIASTOLIC BLOOD PRESSURE: 74 MMHG | HEIGHT: 69 IN | HEART RATE: 65 BPM | SYSTOLIC BLOOD PRESSURE: 146 MMHG | BODY MASS INDEX: 29.3 KG/M2

## 2020-08-26 PROCEDURE — G8417 CALC BMI ABV UP PARAM F/U: HCPCS | Performed by: FAMILY MEDICINE

## 2020-08-26 PROCEDURE — 1036F TOBACCO NON-USER: CPT | Performed by: FAMILY MEDICINE

## 2020-08-26 PROCEDURE — 1123F ACP DISCUSS/DSCN MKR DOCD: CPT | Performed by: FAMILY MEDICINE

## 2020-08-26 PROCEDURE — 4040F PNEUMOC VAC/ADMIN/RCVD: CPT | Performed by: FAMILY MEDICINE

## 2020-08-26 PROCEDURE — G8427 DOCREV CUR MEDS BY ELIG CLIN: HCPCS | Performed by: FAMILY MEDICINE

## 2020-08-26 PROCEDURE — 99214 OFFICE O/P EST MOD 30 MIN: CPT | Performed by: FAMILY MEDICINE

## 2020-08-26 PROCEDURE — 99211 OFF/OP EST MAY X REQ PHY/QHP: CPT | Performed by: FAMILY MEDICINE

## 2020-08-26 PROCEDURE — 3017F COLORECTAL CA SCREEN DOC REV: CPT | Performed by: FAMILY MEDICINE

## 2020-08-26 RX ORDER — EZETIMIBE 10 MG/1
10 TABLET ORAL DAILY
Qty: 90 TABLET | Refills: 3 | Status: SHIPPED | OUTPATIENT
Start: 2020-08-26 | End: 2021-11-11

## 2020-08-26 NOTE — PATIENT INSTRUCTIONS
SURVEY:    You may be receiving a survey from [employer] regarding your visit today. Please complete the survey to enable us to provide the highest quality of care to you and your family. If you cannot score us a very good on any question, please call the office to discuss how we could have made your experience a very good one. Thank you.       Your Medical Assistant today was Goldie Im :)

## 2020-08-26 NOTE — PROGRESS NOTES
Lalito Sharma am scribing for and in the presence of Anders Mcclure. Jackelin BLOUNT, MS, F.A.C.C. Patient: Annalise Manrique  : 1945  Date of Visit: 2020    REASON FOR VISIT / CONSULTATION: Follow-up (6 week follow up. Decrease Inspra. Tilt-Active. Hx:CHF,SOB,Chest Pressure,Dysautonomia, CAD, Anemia. He has been doing okay, no changes. Did go to the cancer center and was told he has been internally bleeding for the last 8 years occasionally. Lightheaded/dizziness often. Palps every once in awhile. Denies: CP, SOB. )      Dear Carolin Jeans, MD,    As you know, Mr. Miroslava Au is a 70 y.o. male with a known history of dysautonomia where on tilt table testing his blood pressure dropped from 548 systolic to 78 systolic with only a 66-16 HR response. More recently, a CT of he head in the past showed evidence of at least 2 old CVA's and a heart catheterization showed severe single vessel disease in the ostium of a moderate sized OM1 branch of the circumflex. However, maximal guidelines directed medical management was opted for an place of stenting partly due to the risk associated with stenting this vessel. Recently he has had a coronary angiography procedure with stenting of his HA Om1. As you know, Mr. Miroslava Au  is a 76 y.o. male with a history of recent onset substernal chest discomfort that led to a stress test and a subsequent heart catheterization which showed severe single vessel coronary artery disease of the HA Om1 with successful angioplasty and stenting of that vessel that was done by Dr. Mac Overall on 4/10/17. More recently he has had problems with balance problems when he is in his feet and says that a Neurologist has told him in the past this is because of his previous strokes. Most recently he underwent a cardiovascular stress test which fortunately had shown to be normal on 3/21/2018. Mr. Miroslava Au has significant normal stress test and echocardiogram on 2020.     Since the last time I saw Mr. Milana Cox he reports he has been doing okay. He recently did see Dr. Ledora Schilder and was told he has been having internal bleeding for the last 8 years. He was suppose to be referred to Dr. Chen Holman but says he has not heard anything. He is able to walk some now with his wife, maybe about 1/2 mile. He also continues to have  issues with lightheaded and dizziness but doesn't seem to be worsening and he denies any falls or near falls. He denies any chest pain, pressure or tightness. He denies any worsening shortness of breath. He denied any abdominal pain,bowel issues, problems with his medications or any other concerns at this time. Bleeding Risks: Mr. Milana Cox denies any current or recent bleeding problems including a history of a GI bleed, ulcers, recent or upcoming surgeries, blood in his stool or black tarry stools or blood in his urine. Past Medical History:   Diagnosis Date    Acute MI ([facility] [address]     Acute renal failure with tubular necrosis ([facility] [address] 10/2/2018    ssecondary to hemodynamic effects of loop diuretics and ace inhibitors, bbaseline 1.2-1.3 which peaked up to 1.8, resolving    CAD (coronary artery disease)     Cerebral artery occlusion with cerebral infarction ([address]     CHF (congestive heart failure) (HCC)     Chronic back pain     Closed fracture of lumbar vertebra without mention of spinal cord injury     Diabetes mellitus ([facility] [address]     Displacement of intervertebral disc, site unspecified, without myelopathy     H/O cardiac catheterization 2/19/16    LMCA: Mild irregularities 10-20%. LAD: Lesion on PRox LAD: Mid subsection. 65% stenosis. LCx: Lesion on 1st Ob Valentina: Proximal subesection. 70% steniosis. RCA: Small non-dominant RCA. Lesion on PRox RCA: Ostial. 50% stenosis. EF:55%.  H/O cardiovascular stress test 2/19/16    Abnormal. Moderate perfusion defect of mild intensity in the inferior, inferoseptal adn inferoapical regions during stress imaging, which most consistent with ischemia. Global LV systolic function normal without regional wall motion abnormalities. OVerall these results are most consistent with an intermediate risk for signficant CAD. Additional testing including cardiac cath may be indicated.  H/O tilt table evaluation 12/26/2017    Abnormal. Patients HR, BP response and symptoms were most consistent with dysautonomia. Combined with viligant maintenance of euvolemia and maintaining a moderate salft intake, pharmacologic treatment with SSRI such as lexapro and or mestinon among other treatments have shown some effectiveness in treatment of this condition    H/O tilt table evaluation 12/26/2017    Abnormal head upright tilt table study. The pt heart rate, blood pressure response and symptoms were most consistent with dysautonomia.  History of 24 hour EKG monitoring 8/14/14    Occasional PAC's and PVC's which appear to be at least moderately symptomatic.  History of 24 hour EKG monitoring 11/19/14    Event Monitor. Sinus rhythm and sinus bradycardia. Infrequent isolated PVC's    History of cardiovascular stress test 2/19/14    Relatively NL    History of cardiovascular stress test 03/21/2018    Normal myocardial perfusion. Global left ventricular systolic function was normal with an EF of 68%. Overall, these results are most consistent with a low risk scan.  History of coronary artery stent placement 04/2017    PTCA / Drug Eluting Stent:, CX and / or branches    History of CVA (cerebrovascular accident) without residual deficits 2014    Incidentally found on CT head. No known impairment now or in the past.    History of echocardiogram 2/18/14    LA mildly dilated, EF 55%, LV wall thickness is moderately increased, no definite wall motion abnormalilities, what appears to be a pacer wire is seen w/n the RA and RV, mild-mod TR, mild pulmonary hypertension.  History of Holter monitoring 12/29/2017    Rare PAC's and PVC's.      History of tilt table evaluation 14    Abnormal    Hyperlipidemia     Hypertension     Non critical Right Renal artery stenosis, native ([facility] [address] 10/2/2018    Non critical Right Renal artery stenosis, based on cath in 2016, Rt RA 30% stenosis    Pacemaker     non-functioning    S/P cardiac cath 04/10/2017    S/P coronary artery stent placement     Successful PTCA - HA Om1    SIRS (systemic inflammatory response syndrome) ([facility] [address] 2019    Type II or unspecified type diabetes mellitus without mention of complication, not stated as uncontrolled        CURRENT ALLERGIES: Lipitor [atorvastatin]; Aldactone [spironolactone]; Crestor [rosuvastatin calcium]; Lopid [gemfibrozil]; Invokana [canagliflozin]; and Januvia [sitagliptin] REVIEW OF SYSTEMS: 14 systems were reviewed. Pertinent positives and negatives as above, all else negative.      Past Surgical History:   Procedure Laterality Date    BACK SURGERY      CARDIAC CATHETERIZATION Left 16    via right radial approach/ [id number] [place]/ Dr. Chante Nathan  8/17/15    Liang/Charles/[place]/ Lap    COLONOSCOPY      COLONOSCOPY  2/19/15    -polyps,diverticulosis,hemorrhoids    COLONOSCOPY  2018    Dr Donovan Ceja    COLONOSCOPY N/A 2018    COLONOSCOPY POLYPECTOMY SNARE/COLD BIOPSY  cold snare  and  hot snare performed by Man Cardona MD at [address]      left eye    PACEMAKER INSERTION      PACEMAKER PLACEMENT      UPPER GASTROINTESTINAL ENDOSCOPY  2019    Dr. Moncho Escobedo H-Pylori)duodenal bulbar/antral erythema    UPPER GASTROINTESTINAL ENDOSCOPY N/A 2019    EGD BIOPSY, clotest performed by Man Cardona MD at [address] History:  Social History     Tobacco Use    Smoking status: Former Smoker     Packs/day: 1.50     Years: 8.00     Pack years: 12.00     Types: Cigarettes     Last attempt to quit: 3/4/1980     Years since quittin.5    Smokeless tobacco: Never Used Substance Use Topics    Alcohol use: No    Drug use: No        CURRENT MEDICATIONS:  Outpatient Medications Marked as Taking for the 8/26/20 encounter (Office Visit) with Lv Wells MD   Medication Sig Dispense Refill    insulin glargine (LANTUS SOLOSTAR) 100 UNIT/ML injection pen Inject 28 Units into the skin 2 times daily DX:E11.9 16.8 mL 2    pantoprazole (PROTONIX) 40 MG tablet Take 1 tablet by mouth daily 30 tablet 3    acyclovir (ZOVIRAX) 400 MG tablet 400 mg 2 times daily       eplerenone (INSPRA) 25 MG tablet Take 1 tablet by mouth daily (Patient taking differently: Take 50 mg by mouth daily ) 90 tablet 3    Insulin Glargine (LANTUS SC) Inject 28 Units into the skin 2 times daily      glipiZIDE (GLUCOTROL XL) 5 MG extended release tablet Take 2 tablets by mouth twice daily (Patient taking differently: 5 mg 2 times daily TAKE 2 TABLETS BY MOUTH TWICE DAILY) 360 tablet 0    furosemide (LASIX) 20 MG tablet Take 1 tablet by mouth See Admin Instructions for 3 days Pt is to take one tablet by mouth on Monday, Wednesday and Friday. 90 tablet 0    ferrous sulfate 325 (65 Fe) MG tablet Take 325 mg by mouth 2 times daily      CONTOUR TEST strip USE 1 STRIP TO CHECK GLUCOSE TWICE DAILY 100 strip 11    nitroGLYCERIN (NITROSTAT) 0.4 MG SL tablet Place 1 tablet under the tongue every 5 minutes as needed for Chest pain 25 tablet 3    Omega-3 Fatty Acids (FISH OIL) 1000 MG CAPS Take 1,000 mg by mouth daily       aspirin EC 81 MG EC tablet Take 1 tablet by mouth daily 30 tablet 3    DIANA MICROLET LANCETS MISC TEST TWICE DAILY 100 each 2    Insulin Pen Needle (PEN NEEDLES) 31G X 6 MM MISC 1 each by Does not apply route daily 100 each 3    prednisoLONE acetate (PRED FORTE) 1 % ophthalmic suspension Place 1 drop into the left eye daily          FAMILY HISTORY: family history includes Cancer in his father and mother.      PHYSICAL EXAM:   BP (!) 146/74 (Site: Left Upper Arm, Position: Standing, Cuff Size: Medium Adult)   Pulse 65   Resp 15   Ht 5' 9\" (1.753 m)   Wt 197 lb 12.8 oz (89.7 kg)   SpO2 98%   BMI 29.21 kg/m²  Body mass index is 29.21 kg/m². Constitutional: He is oriented to person, place, and time. He appears well-developed and well-nourished. In no acute distress. HEENT: Normocephalic and atraumatic. No JVD present. Carotid bruit is not present. No mass and no thyromegaly present. No lymphadenopathy present. Cardiovascular: Normal rate, regular rhythm, normal heart sounds. Exam reveals no gallop and no friction rubs. 1/6 systolic murmur, 5th intercostal space on the LEFT in the mid-clavicular line (cardiac apex). Pulmonary/Chest: Effort normal and breath sounds normal. No respiratory distress. He has no wheezes, rhonchi or rales. Abdominal: Soft, non-tender. Bowel sounds and aorta are normal. He exhibits no organomegaly, mass or bruit. Extremities: Trace-1+ at the ankles bilaterally. No cyanosis or clubbing. 2+ radial and carotid pulses. Distal extremity pulses: 2+ bilaterally. Neurological: He is alert and oriented to person, place, and time. No evidence of gross cranial nerve deficit. Coordination appeared normal.   Skin: Skin is warm and dry. There is no rash or diaphoresis. Psychiatric: He has a normal mood and affect. His speech is normal and behavior is normal.      MOST RECENT LABS ON RECORD:   Lab Results   Component Value Date    WBC 9.5 07/13/2020    HGB 10.0 (L) 07/13/2020    HCT 32.8 (L) 07/13/2020     07/13/2020    CHOL 152 08/08/2018    TRIG 309 (H) 08/08/2018    HDL 30 (L) 08/08/2018    LDLDIRECT 44 11/02/2017    ALT 16 06/25/2020    AST 18 06/25/2020     (L) 07/13/2020    K 4.3 07/13/2020    CL 99 07/13/2020    CREATININE 1.44 (H) 07/13/2020    BUN 30 (H) 07/13/2020    CO2 25 07/13/2020    TSH 2.26 11/02/2017    PSA 4.42 (H) 04/06/2020    INR 1.0 02/17/2016    LABA1C 7.6 07/20/2020    LABMICR 30 10/17/2015       ASSESSMENT:  1. SOB (shortness of breath)    2. Dysautonomia ([facility] [address]    3. Chronic diastolic congestive heart failure ([facility] [address]    4. ASHD (arteriosclerotic heart disease)    5. Essential hypertension       PLAN:  · Shortness of breath with mild exertion: Improving - No active signs of heart failure   · Diuretics: Continue furosemide (Lasix) 20 mg every Monday, Wednesday and Friday. · Counseling: I advised Mr. Onofre Johnson to call our office or go to the emergency room if he develops worsening or persistent chest pain or shortness of breath as this could be life threatening. Dysautonomia: Mildly Symptomatic    · Diuretics: Continue Lasix 20 mg to every Monday, Wednesday and Friday. ACE Inibitor/ARB: Continue Inspra  · Nonpharmacologic counseling: Because of his condition, I reminded him to try and keep himself well-hydrated and to take extra time when moving from laying to sitting, sitting to standing and standing to walking as well as wearing at least knee high compressions stockings. · Additional Testing: None    Chronic Diastolic Heart [id number]:>26% via echo on 7/8/2020. Currently well controlled. Beta Blocker: Not indicated       ACE Inibitor/ARB: Continue Inspra     Diuretics: Continue furosemide (Lasix) 20 mg every Monday, Wednesday and Friday. Nonpharmacologic management of Heart Failure: I advised him to try and keep his legs up whenever possible and to limit salt in his diet. Additional Testing List: I took the liberty of ordering a CBC    Atherosclerotic Heart Disease: Coronary Artery stent: 4/10/2017  Antiplatelet Agent: Continue aspirin 81 mg daily. I also reminded him to watch for signs of blood in his stool or black tarry stools and stop the medication immediately if this develops as this could be life threatening. Beta Blocker: Not indicated       Cholesterol Reduction Therapy: START ezetimide (Zetia) 10 mg daily.    Laboratory testing: Lipid Panel to assess cholesterol level    Essential Hypertension: Controlled   · ACE Inibitor/ARB:

## 2020-09-09 ENCOUNTER — HOSPITAL ENCOUNTER (OUTPATIENT)
Age: 75
Discharge: HOME | End: 2020-09-09
Payer: MEDICARE

## 2020-09-09 LAB
ABSOLUTE EOS #: 0.51 K/UL (ref 0–0.44)
ABSOLUTE IMMATURE GRANULOCYTE: 0.07 K/UL (ref 0–0.3)
ABSOLUTE LYMPH #: 2.07 K/UL (ref 1.1–3.7)
ABSOLUTE MONO #: 0.6 K/UL (ref 0.1–1.2)
BASOPHILS # BLD: 1 % (ref 0–2)
BASOPHILS ABSOLUTE: 0.07 K/UL (ref 0–0.2)
CHOLESTEROL/HDL RATIO: 5
CHOLESTEROL: 119 MG/DL
DIFFERENTIAL TYPE: ABNORMAL
EOSINOPHILS RELATIVE PERCENT: 7 % (ref 1–4)
HCT VFR BLD CALC: 36.8 % (ref 40.7–50.3)
HDLC SERPL-MCNC: 24 MG/DL
HEMOGLOBIN: 12 G/DL (ref 13–17)
IMMATURE GRANULOCYTES: 1 %
LDL CHOLESTEROL: 26 MG/DL (ref 0–130)
LYMPHOCYTES # BLD: 27 % (ref 24–43)
MCH RBC QN AUTO: 29.1 PG (ref 25.2–33.5)
MCHC RBC AUTO-ENTMCNC: 32.6 G/DL (ref 28.4–34.8)
MCV RBC AUTO: 89.3 FL (ref 82.6–102.9)
MONOCYTES # BLD: 8 % (ref 3–12)
NRBC AUTOMATED: 0 PER 100 WBC
PDW BLD-RTO: 15.2 % (ref 11.8–14.4)
PLATELET # BLD: 198 K/UL (ref 138–453)
PLATELET ESTIMATE: ABNORMAL
PMV BLD AUTO: 10.2 FL (ref 8.1–13.5)
RBC # BLD: 4.12 M/UL (ref 4.21–5.77)
RBC # BLD: ABNORMAL 10*6/UL
SEG NEUTROPHILS: 56 % (ref 36–65)
SEGMENTED NEUTROPHILS ABSOLUTE COUNT: 4.41 K/UL (ref 1.5–8.1)
TRIGL SERPL-MCNC: 347 MG/DL
VLDLC SERPL CALC-MCNC: ABNORMAL MG/DL (ref 1–30)
WBC # BLD: 7.7 K/UL (ref 3.5–11.3)
WBC # BLD: ABNORMAL 10*3/UL

## 2020-09-09 PROCEDURE — 83540 ASSAY OF IRON: CPT

## 2020-09-09 PROCEDURE — 82728 ASSAY OF FERRITIN: CPT

## 2020-09-09 PROCEDURE — 36415 COLL VENOUS BLD VENIPUNCTURE: CPT

## 2020-09-09 PROCEDURE — 80061 LIPID PANEL: CPT

## 2020-09-09 PROCEDURE — 85025 COMPLETE CBC W/AUTO DIFF WBC: CPT

## 2020-09-09 PROCEDURE — 83550 IRON BINDING TEST: CPT

## 2020-09-10 ENCOUNTER — TELEPHONE (OUTPATIENT)
Dept: CARDIOLOGY | Age: 75
End: 2020-09-10

## 2020-09-10 LAB
FERRITIN: 225 UG/L (ref 30–400)
IRON SATURATION: 20 % (ref 20–55)
IRON: 48 UG/DL (ref 59–158)
TOTAL IRON BINDING CAPACITY: 235 UG/DL (ref 250–450)
UNSATURATED IRON BINDING CAPACITY: 187 UG/DL (ref 112–347)

## 2020-09-17 ENCOUNTER — OFFICE VISIT (OUTPATIENT)
Dept: ONCOLOGY | Age: 75
End: 2020-09-17
Payer: MEDICARE

## 2020-09-17 VITALS
SYSTOLIC BLOOD PRESSURE: 140 MMHG | BODY MASS INDEX: 30.07 KG/M2 | RESPIRATION RATE: 18 BRPM | WEIGHT: 203 LBS | HEART RATE: 62 BPM | TEMPERATURE: 97.7 F | HEIGHT: 69 IN | DIASTOLIC BLOOD PRESSURE: 70 MMHG

## 2020-09-17 PROCEDURE — 3017F COLORECTAL CA SCREEN DOC REV: CPT | Performed by: INTERNAL MEDICINE

## 2020-09-17 PROCEDURE — 4040F PNEUMOC VAC/ADMIN/RCVD: CPT | Performed by: INTERNAL MEDICINE

## 2020-09-17 PROCEDURE — G8427 DOCREV CUR MEDS BY ELIG CLIN: HCPCS | Performed by: INTERNAL MEDICINE

## 2020-09-17 PROCEDURE — 1036F TOBACCO NON-USER: CPT | Performed by: INTERNAL MEDICINE

## 2020-09-17 PROCEDURE — 99214 OFFICE O/P EST MOD 30 MIN: CPT | Performed by: INTERNAL MEDICINE

## 2020-09-17 PROCEDURE — G8417 CALC BMI ABV UP PARAM F/U: HCPCS | Performed by: INTERNAL MEDICINE

## 2020-09-17 PROCEDURE — 1123F ACP DISCUSS/DSCN MKR DOCD: CPT | Performed by: INTERNAL MEDICINE

## 2020-09-17 PROCEDURE — 99211 OFF/OP EST MAY X REQ PHY/QHP: CPT | Performed by: INTERNAL MEDICINE

## 2020-09-17 NOTE — PROGRESS NOTES
DIAGNOSIS:   1. Iron deficiency anemia  2. Evidence of intermittent GI bleed  CURRENT THERAPY:  Work-up in progress  BRIEF CASE HISTORY:   Candace Lindsey is a very pleasant 76 y.o. male who is referred to us for iron deficiency anemia. He has been followed by cardiology because of congestive heart failure. The patient symptoms have been worsening and it was attributed to worsening hemoglobin. Looking at his hemoglobin over the last few years. He had episodes where his hemoglobin will drop by 1 to 2 g and it is a sudden drop. Followed by slow recovery. He never required blood transfusion but he was quite symptomatic. The patient reports epigastric pain followed by dark stool for a few days happened just before his anemia. He was seen by GI last year and EGD was done. There was some inflammation in the stomach but no active bleeding was appreciated. The patient comes in for follow-up, despite iron replenishment and improvement in hemoglobin, he still feeling weak and about the same. He still has intermittent abdominal discomfort and rectal bleeding.       PAST MEDICAL HISTORY: has a past medical history of Acute MI ([facility] [address] Acute renal failure with tubular necrosis ([facility] [address] CAD (coronary artery disease), Cerebral artery occlusion with cerebral infarction [facility]), CHF (congestive heart failure) ([facility] [address] Chronic back pain, Closed fracture of lumbar vertebra without mention of spinal cord injury, Diabetes mellitus ([facility] [address] Displacement of intervertebral disc, site unspecified, without myelopathy, H/O cardiac catheterization, H/O cardiovascular stress test, H/O tilt table evaluation, H/O tilt table evaluation, History of 24 hour EKG monitoring, History of 24 hour EKG monitoring, History of cardiovascular stress test, History of cardiovascular stress test, History of coronary artery stent placement, History of CVA (cerebrovascular accident) without residual deficits, History of echocardiogram, History of Holter monitoring, History of tilt table evaluation, Hyperlipidemia, Hypertension, Non critical Right Renal artery stenosis, native [facility]), Pacemaker, S/P cardiac cath, S/P coronary artery stent placement, SIRS (systemic inflammatory response syndrome) ([facility] [address] and Type II or unspecified type diabetes mellitus without mention of complication, not stated as uncontrolled. PAST SURGICAL HISTORY: has a past surgical history that includes Pacemaker insertion; back surgery; Cholecystectomy (8/17/15); Corneal transplant; Cardiac catheterization (Left, 2/19/16); pacemaker placement; Colonoscopy (2010); Colonoscopy (2/19/15); Colonoscopy (05/23/2018); Colonoscopy (N/A, 5/23/2018); Upper gastrointestinal endoscopy (05/28/2019); and Upper gastrointestinal endoscopy (N/A, 5/28/2019). CURRENT MEDICATIONS:  has a current medication list which includes the following prescription(s): ezetimibe, lantus solostar, pantoprazole, acyclovir, eplerenone, insulin glargine, glipizide, furosemide, ferrous sulfate, contour test, nitroglycerin, fish oil, aspirin ec, prednisolone acetate, susan microlet lancets, and pen needles. ALLERGIES:  is allergic to lipitor [atorvastatin]; aldactone [spironolactone]; crestor [rosuvastatin calcium]; lopid [gemfibrozil]; invokana [canagliflozin]; and januvia [sitagliptin]. FAMILY HISTORY: Negative for any hematological or oncological conditions. SOCIAL HISTORY:  reports that he quit smoking about 40 years ago. His smoking use included cigarettes. He has a 12.00 pack-year smoking history. He has never used smokeless tobacco. He reports that he does not drink alcohol or use drugs. REVIEW OF SYSTEMS:   General: no fever or night sweats, Weight is stable. ENT: No double or blurred vision, no tinnitus or hearing problem, no dysphagia or sore throat   Respiratory: No chest pain, no shortness of breath, no cough or hemoptysis. Cardiovascular: Denies chest pain, PND or orthopnea.  No L E swelling or palpitations. Gastrointestinal:    No nausea or vomiting, abdominal pain, diarrhea or constipation. Genitourinary: Denies dysuria, hematuria, frequency, urgency or incontinence. Neurological: Denies headaches, decreased LOC, no sensory or motor focal deficits. Musculoskeletal:  No arthralgia no back pain or joint swelling. Skin: There are no rashes or bleeding. Psychiatric:  No anxiety, no depression. Endocrine: no diabetes or thyroid disease. Hematologic: no bleeding , no adenopathy. PHYSICAL EXAM: Shows a well appearing 76y.o.-year-old male who is not in pain or distress. Vital Signs: Blood pressure (!) 140/70, pulse 62, temperature 97.7 °F (36.5 °C), temperature source Temporal, resp. rate 18, height 5' 9\" (1.753 m), weight 203 lb (92.1 kg). HEENT: Normocephalic and atraumatic. Pupils are equal, round, reactive to light and accommodation. Extraocular muscles are intact. Neck: Showed no JVD, no carotid bruit . Lungs: Clear to auscultation bilaterally. Heart: Regular without any murmur. Abdomen: Soft, nontender. No hepatosplenomegaly. Extremities: Lower extremities show no edema, clubbing, or cyanosis. Breasts: Examination not done today. Neuro exam: intact cranial nerves bilaterally no motor or sensory deficit, gait is normal. Lymphatic: no adenopathy appreciated in the supraclavicular, axillary, cervical or inguinal area    REVIEW OF LABORATORY DATA:   Lab Results   Component Value Date    WBC 7.7 09/09/2020    HGB 12.0 (L) 09/09/2020    HCT 36.8 (L) 09/09/2020    MCV 89.3 09/09/2020     09/09/2020     Lab Results   Component Value Date    IRON 48 (L) 09/09/2020    TIBC 235 (L) 09/09/2020    FERRITIN 225 09/09/2020       REVIEW OF RADIOLOGICAL RESULTS:     IMPRESSION:   1. Iron deficiency anemia  2. Clinical evidence of GI bleeding  PLAN:   Unfortunately, the patient is not feeling better despite improvement in ferritin and hemoglobin. He is still bleeding.   I think need to be investigated. We will refer him for repeat endoscopy. Since Dr. Cortez Him is retiring, we will refer him to Dr. Madina Kiran for evaluation.   Return in 2 months

## 2020-10-15 ENCOUNTER — HOSPITAL ENCOUNTER (OUTPATIENT)
Age: 75
Discharge: HOME | End: 2020-10-15
Payer: MEDICARE

## 2020-10-15 LAB
ALBUMIN SERPL-MCNC: 4.3 G/DL (ref 3.5–5.2)
ANION GAP SERPL CALCULATED.3IONS-SCNC: 12 MMOL/L (ref 9–17)
BUN BLDV-MCNC: 32 MG/DL (ref 8–23)
BUN/CREAT BLD: 22 (ref 9–20)
CALCIUM SERPL-MCNC: 9.3 MG/DL (ref 8.6–10.4)
CHLORIDE BLD-SCNC: 99 MMOL/L (ref 98–107)
CO2: 23 MMOL/L (ref 20–31)
CREAT SERPL-MCNC: 1.44 MG/DL (ref 0.7–1.2)
CREATININE URINE: 52.8 MG/DL (ref 39–259)
GFR AFRICAN AMERICAN: 58 ML/MIN
GFR NON-AFRICAN AMERICAN: 48 ML/MIN
GFR SERPL CREATININE-BSD FRML MDRD: ABNORMAL ML/MIN/{1.73_M2}
GFR SERPL CREATININE-BSD FRML MDRD: ABNORMAL ML/MIN/{1.73_M2}
GLUCOSE BLD-MCNC: 425 MG/DL (ref 70–99)
HCT VFR BLD CALC: 38.8 % (ref 40.7–50.3)
HEMOGLOBIN: 13 G/DL (ref 13–17)
MCH RBC QN AUTO: 30.2 PG (ref 25.2–33.5)
MCHC RBC AUTO-ENTMCNC: 33.5 G/DL (ref 28.4–34.8)
MCV RBC AUTO: 90 FL (ref 82.6–102.9)
NRBC AUTOMATED: 0 PER 100 WBC
PDW BLD-RTO: 14.7 % (ref 11.8–14.4)
PHOSPHORUS: 3.5 MG/DL (ref 2.5–4.5)
PLATELET # BLD: 207 K/UL (ref 138–453)
PMV BLD AUTO: 10.2 FL (ref 8.1–13.5)
POTASSIUM SERPL-SCNC: 4.4 MMOL/L (ref 3.7–5.3)
PROSTATE SPECIFIC ANTIGEN: 5.43 UG/L
PTH INTACT: 24.44 PG/ML (ref 15–65)
RBC # BLD: 4.31 M/UL (ref 4.21–5.77)
SODIUM BLD-SCNC: 134 MMOL/L (ref 135–144)
TOTAL PROTEIN, URINE: 7 MG/DL
URINE TOTAL PROTEIN CREATININE RATIO: 0.13 (ref 0–0.2)
WBC # BLD: 7.6 K/UL (ref 3.5–11.3)

## 2020-10-15 PROCEDURE — 36415 COLL VENOUS BLD VENIPUNCTURE: CPT

## 2020-10-15 PROCEDURE — 84153 ASSAY OF PSA TOTAL: CPT

## 2020-10-15 PROCEDURE — 85027 COMPLETE CBC AUTOMATED: CPT

## 2020-10-15 PROCEDURE — 80048 BASIC METABOLIC PNL TOTAL CA: CPT

## 2020-10-15 PROCEDURE — 84156 ASSAY OF PROTEIN URINE: CPT

## 2020-10-15 PROCEDURE — 83970 ASSAY OF PARATHORMONE: CPT

## 2020-10-15 PROCEDURE — 82570 ASSAY OF URINE CREATININE: CPT

## 2020-10-15 PROCEDURE — 84100 ASSAY OF PHOSPHORUS: CPT

## 2020-10-15 PROCEDURE — 82040 ASSAY OF SERUM ALBUMIN: CPT

## 2020-10-21 ENCOUNTER — OFFICE VISIT (OUTPATIENT)
Dept: SURGERY | Age: 75
End: 2020-10-21
Payer: MEDICARE

## 2020-10-21 ENCOUNTER — HOSPITAL ENCOUNTER (OUTPATIENT)
Age: 75
Setting detail: SPECIMEN
Discharge: HOME | End: 2020-10-21
Payer: MEDICARE

## 2020-10-21 ENCOUNTER — OFFICE VISIT (OUTPATIENT)
Dept: UROLOGY | Age: 75
End: 2020-10-21
Payer: MEDICARE

## 2020-10-21 VITALS
WEIGHT: 203 LBS | HEIGHT: 69 IN | HEART RATE: 69 BPM | TEMPERATURE: 96.9 F | SYSTOLIC BLOOD PRESSURE: 147 MMHG | BODY MASS INDEX: 30.07 KG/M2 | DIASTOLIC BLOOD PRESSURE: 73 MMHG

## 2020-10-21 VITALS
DIASTOLIC BLOOD PRESSURE: 77 MMHG | SYSTOLIC BLOOD PRESSURE: 161 MMHG | BODY MASS INDEX: 30.13 KG/M2 | RESPIRATION RATE: 20 BRPM | HEIGHT: 69 IN | WEIGHT: 203.4 LBS | TEMPERATURE: 97.3 F | HEART RATE: 70 BPM

## 2020-10-21 LAB
-: ABNORMAL
AMORPHOUS: ABNORMAL
BACTERIA: ABNORMAL
BILIRUBIN URINE: NEGATIVE
CASTS UA: ABNORMAL /LPF
COLOR: YELLOW
COMMENT UA: ABNORMAL
CRYSTALS, UA: ABNORMAL /HPF
EPITHELIAL CELLS UA: ABNORMAL /HPF (ref 0–5)
GLUCOSE URINE: ABNORMAL
KETONES, URINE: NEGATIVE
LEUKOCYTE ESTERASE, URINE: NEGATIVE
MUCUS: ABNORMAL
NITRITE, URINE: NEGATIVE
OTHER OBSERVATIONS UA: ABNORMAL
PH UA: 5.5 (ref 5–9)
PROTEIN UA: NEGATIVE
RBC UA: ABNORMAL /HPF (ref 0–2)
RENAL EPITHELIAL, UA: ABNORMAL /HPF
SPECIFIC GRAVITY UA: 1.02 (ref 1.01–1.02)
TRICHOMONAS: ABNORMAL
TURBIDITY: CLEAR
URINE HGB: NEGATIVE
UROBILINOGEN, URINE: NORMAL
WBC UA: ABNORMAL /HPF (ref 0–5)
YEAST: ABNORMAL

## 2020-10-21 PROCEDURE — 3017F COLORECTAL CA SCREEN DOC REV: CPT | Performed by: SURGERY

## 2020-10-21 PROCEDURE — 87086 URINE CULTURE/COLONY COUNT: CPT

## 2020-10-21 PROCEDURE — G8482 FLU IMMUNIZE ORDER/ADMIN: HCPCS | Performed by: SURGERY

## 2020-10-21 PROCEDURE — 1036F TOBACCO NON-USER: CPT | Performed by: PHYSICIAN ASSISTANT

## 2020-10-21 PROCEDURE — 99203 OFFICE O/P NEW LOW 30 MIN: CPT | Performed by: SURGERY

## 2020-10-21 PROCEDURE — 87077 CULTURE AEROBIC IDENTIFY: CPT

## 2020-10-21 PROCEDURE — 99211 OFF/OP EST MAY X REQ PHY/QHP: CPT

## 2020-10-21 PROCEDURE — 4040F PNEUMOC VAC/ADMIN/RCVD: CPT | Performed by: PHYSICIAN ASSISTANT

## 2020-10-21 PROCEDURE — G8482 FLU IMMUNIZE ORDER/ADMIN: HCPCS | Performed by: PHYSICIAN ASSISTANT

## 2020-10-21 PROCEDURE — 3017F COLORECTAL CA SCREEN DOC REV: CPT | Performed by: PHYSICIAN ASSISTANT

## 2020-10-21 PROCEDURE — 1123F ACP DISCUSS/DSCN MKR DOCD: CPT | Performed by: PHYSICIAN ASSISTANT

## 2020-10-21 PROCEDURE — G8427 DOCREV CUR MEDS BY ELIG CLIN: HCPCS | Performed by: PHYSICIAN ASSISTANT

## 2020-10-21 PROCEDURE — G8417 CALC BMI ABV UP PARAM F/U: HCPCS | Performed by: SURGERY

## 2020-10-21 PROCEDURE — 1036F TOBACCO NON-USER: CPT | Performed by: SURGERY

## 2020-10-21 PROCEDURE — 87186 SC STD MICRODIL/AGAR DIL: CPT

## 2020-10-21 PROCEDURE — 4040F PNEUMOC VAC/ADMIN/RCVD: CPT | Performed by: SURGERY

## 2020-10-21 PROCEDURE — 99214 OFFICE O/P EST MOD 30 MIN: CPT | Performed by: PHYSICIAN ASSISTANT

## 2020-10-21 PROCEDURE — G8417 CALC BMI ABV UP PARAM F/U: HCPCS | Performed by: PHYSICIAN ASSISTANT

## 2020-10-21 PROCEDURE — 81001 URINALYSIS AUTO W/SCOPE: CPT

## 2020-10-21 PROCEDURE — G8427 DOCREV CUR MEDS BY ELIG CLIN: HCPCS | Performed by: SURGERY

## 2020-10-21 PROCEDURE — 1123F ACP DISCUSS/DSCN MKR DOCD: CPT | Performed by: SURGERY

## 2020-10-21 RX ORDER — CIPROFLOXACIN 500 MG/1
500 TABLET, FILM COATED ORAL 2 TIMES DAILY
Qty: 6 TABLET | Refills: 0 | Status: SHIPPED | OUTPATIENT
Start: 2020-10-21 | End: 2020-10-24

## 2020-10-21 ASSESSMENT — ENCOUNTER SYMPTOMS
VOMITING: 0
CONSTIPATION: 0
ABDOMINAL PAIN: 0
NAUSEA: 0
SHORTNESS OF BREATH: 0
COUGH: 0
EYE REDNESS: 0
EYE PAIN: 0
COLOR CHANGE: 0
BACK PAIN: 0
WHEEZING: 0

## 2020-10-21 NOTE — PATIENT INSTRUCTIONS
will insert the biopsy needle and obtain samples. Typically 12 biopsy samples are taken, unless the prostate is very large where several additional samples are taken. The probe and needle are then removed and the samples are sent for a pathologist to read. Typically results take about 1-2 weeks. You will receive a call from [facility] once your results are in and you will be scheduled for an appointment to come discuss your results. After the biopsy  Do not do heavy work or exercise for 4 hours after the test.   You may have mild pain in the pelvic area and a little blood in your urine for 1 to 5 days. A little bleeding from the rectum for 2 to 3 days after the test is normal. Call your doctor if you have more than a little bleeding or if the bleeding does not stop. You may have some blood in your semen for a week or longer when you ejaculate. Take your antibiotics as directed. Do not stop taking them just because you feel better. You need to take the full course of antibiotics. When should you call for help? Call your doctor or seek immediate medical care if:   You have heavy bleeding from your rectum or in your urine. You have pain in your pelvic area that gets worse. You have a fever. You cannot urinate within 8 hours. You have light rectal bleeding that does not stop after 2 to 3 days. You do not get better as expected.

## 2020-10-21 NOTE — PROGRESS NOTES
GENERAL SURGERY CONSULTATION / OFFICE VISIT      Patient's Name/ Date of Birth/ Gender: Eamon River / 1945 (71 y.o.) / male     PCP: Janessa Fay MD  Referring: Dr. Juancarlos Luu    History of present Illness:  Patient is a pleasant 76 y.o. male  kindly referred by Dr. Juancarlos Luu for endoscopy for GI bleed. Patient currently taking po iron. Last EGD 2019 and last colonoscopy 2018 by GI. Patient describes melena. Dark stools also may be from the iron supplementation. He says he has epigastric pain after eating. Loose stools. Nausea. No jaundice. No vomiting. Difficulty eating due to postprandial pain. No belching. He has gallstones on an US noted from 2015. Past Medical History:  has a past medical history of Acute MI ([facility] [address] Acute renal failure with tubular necrosis ([facility] [address] CAD (coronary artery disease), Cerebral artery occlusion with cerebral infarction [facility]), CHF (congestive heart failure) ([address] Chronic back pain, Closed fracture of lumbar vertebra without mention of spinal cord injury, Displacement of intervertebral disc, site unspecified, without myelopathy, H/O cardiac catheterization, H/O cardiovascular stress test, H/O tilt table evaluation, H/O tilt table evaluation, History of 24 hour EKG monitoring, History of 24 hour EKG monitoring, History of cardiovascular stress test, History of cardiovascular stress test, History of coronary artery stent placement, History of CVA (cerebrovascular accident) without residual deficits, History of echocardiogram, History of Holter monitoring, History of tilt table evaluation, Hyperlipidemia, Hypertension, Non critical Right Renal artery stenosis, native ([address] Pacemaker, S/P cardiac cath, S/P coronary artery stent placement, SIRS (systemic inflammatory response syndrome) ([address] and Type II or unspecified type diabetes mellitus without mention of complication, not stated as uncontrolled.     Past Surgical History:   Past Surgical History:   Procedure Laterality Date    BACK SURGERY      CARDIAC CATHETERIZATION Left 2/19/16    via right radial approach/ Melina Vernon/ Dr. Jordan Lewis  8/17/15    Liang/Charles/Saulo/ Millicent    COLONOSCOPY  2010    COLONOSCOPY  2/19/15    -polyps,diverticulosis,hemorrhoids    COLONOSCOPY  05/23/2018    Dr Padma Suarez    COLONOSCOPY N/A 5/23/2018    COLONOSCOPY POLYPECTOMY SNARE/COLD BIOPSY  cold snare  and  hot snare performed by Keith Wilson MD at [address] [place] [address]      left eye    PACEMAKER INSERTION      PACEMAKER PLACEMENT      UPPER GASTROINTESTINAL ENDOSCOPY  05/28/2019    Dr. Rupert Dobbs H-Pylori)duodenal bulbar/antral erythema    UPPER GASTROINTESTINAL ENDOSCOPY N/A 5/28/2019    EGD BIOPSY, clotest performed by Keith Wilson MD at [facility] History:  reports that he quit smoking about 40 years ago. His smoking use included cigarettes. He has a 12.00 pack-year smoking history. He has never used smokeless tobacco. He reports that he does not drink alcohol or use drugs. Family History: family history includes Cancer in his father and mother. Review of Systems:   General: Denies fever, chills, night sweats, weight loss, malaise, fatigue  HEENT: Denies sore throat, sinus problems, allergic rhinosinusitis  Card: Denies chest pain, palpitations, orthopnea/PND. HTN.CAD.CHF. pacemaker one heart stent. MI  Pulm: Denies cough, shortness of breath, dyspnea on exertion  GI:  per HPI. : Denies polyuria, dysuria, hematuria. Renal artery stenosis  Endo: DM  Heme: neg  Psych: neg  Neuro:  h/o CVA  Skin: Denies rashes, ulcers  Musculoskeletal: no gross motor dysfunction. OA. Chronic back pain    Allergies: Lipitor [atorvastatin]; Aldactone [spironolactone]; Crestor [rosuvastatin calcium]; Lopid [gemfibrozil];  Invokana [canagliflozin]; and Januvia [sitagliptin]    Current Meds:  Current Outpatient Medications:     ciprofloxacin (CIPRO) 500 MG tablet, Take 1 tablet by mouth 2 times daily for 3 days Start the day before the prostate biopsy, Disp: 6 tablet, Rfl: 0    hydrALAZINE (APRESOLINE) 100 MG tablet, Take 100 mg by mouth daily, Disp: , Rfl:     lisinopril (PRINIVIL;ZESTRIL) 10 MG tablet, Take 10 mg by mouth daily, Disp: , Rfl:     carvedilol (COREG) 12.5 MG tablet, Take 12.5 mg by mouth 2 times daily (with meals), Disp: , Rfl:     insulin glargine (LANTUS SOLOSTAR) 100 UNIT/ML injection pen, Inject 32 Units into the skin nightly, Disp: 15 mL, Rfl: 3    Continuous Blood Gluc Sensor (FREESTYLE CAILIN 14 DAY SENSOR) MISC, 2 Devices by Does not apply route every 14 days for 28 days, Disp: 2 each, Rfl: 2    ezetimibe (ZETIA) 10 MG tablet, Take 1 tablet by mouth daily, Disp: 90 tablet, Rfl: 3    pantoprazole (PROTONIX) 40 MG tablet, Take 1 tablet by mouth daily, Disp: 30 tablet, Rfl: 3    acyclovir (ZOVIRAX) 400 MG tablet, 400 mg 2 times daily , Disp: , Rfl:     eplerenone (INSPRA) 25 MG tablet, Take 1 tablet by mouth daily (Patient taking differently: Take 10 mg by mouth daily ), Disp: 90 tablet, Rfl: 3    Insulin Glargine (LANTUS SC), Inject 32 Units into the skin 2 times daily , Disp: , Rfl:     glipiZIDE (GLUCOTROL XL) 5 MG extended release tablet, Take 2 tablets by mouth twice daily (Patient taking differently: 5 mg 2 times daily TAKE 2 TABLETS BY MOUTH TWICE DAILY), Disp: 360 tablet, Rfl: 0    furosemide (LASIX) 20 MG tablet, Take 1 tablet by mouth See Admin Instructions for 3 days Pt is to take one tablet by mouth on Monday, Wednesday and Friday., Disp: 90 tablet, Rfl: 0    ferrous sulfate 325 (65 Fe) MG tablet, Take 325 mg by mouth 2 times daily, Disp: , Rfl:     CONTOUR TEST strip, USE 1 STRIP TO CHECK GLUCOSE TWICE DAILY, Disp: 100 strip, Rfl: 11    nitroGLYCERIN (NITROSTAT) 0.4 MG SL tablet, Place 1 tablet under the tongue every 5 minutes as needed for Chest pain, Disp: 25 tablet, Rfl: 3    Omega-3 Fatty Acids (FISH OIL) 1000 MG CAPS, Take 1,000 mg by mouth daily , Disp: , Rfl:     aspirin EC 81 MG EC tablet, Take 1 tablet by mouth daily, Disp: 30 tablet, Rfl: 3    DIANA MICROLET LANCETS MISC, TEST TWICE DAILY, Disp: 100 each, Rfl: 2    Insulin Pen Needle (PEN NEEDLES) 31G X 6 MM MISC, 1 each by Does not apply route daily, Disp: 100 each, Rfl: 3    prednisoLONE acetate (PRED FORTE) 1 % ophthalmic suspension, Place 1 drop into the left eye daily , Disp: , Rfl:     Physical Exam:  Vital signs and Nurse's note reviewed  Gen:  A&Ox3, NAD. Pleasant and cooperative. HEENT: PERRLA, EOMI, no scleral icterus  Neck:  no goiter  CVS: Regular rate and rhythm  Resp: Good bilateral air entry, no active wheezing, no labored breathing  Abd: soft, epigastric tenderness, non-distended, bowel sounds present. rectal exam to be done at scope  Ext: Moves all extremities, no gross focal motor deficits  Skin: No erythema or ulcerations     Labs:   Lab Results   Component Value Date    WBC 7.6 10/15/2020    HGB 13.0 10/15/2020    HCT 38.8 10/15/2020    MCV 90.0 10/15/2020     10/15/2020     Lab Results   Component Value Date     10/15/2020    K 4.4 10/15/2020    CL 99 10/15/2020    CO2 23 10/15/2020    BUN 32 10/15/2020    CREATININE 1.44 10/15/2020    GLUCOSE 425 10/15/2020    GLUCOSE 148 03/16/2017    CALCIUM 9.3 10/15/2020     Lab Results   Component Value Date    ALKPHOS 64 06/25/2020    ALT 16 06/25/2020    AST 18 06/25/2020    PROT 6.7 06/25/2020    BILITOT 0.29 06/25/2020    LABALBU 4.3 10/15/2020     No results found for: LACTA  No results found for: AMYLASE  No results found for: LIPASE  Lab Results   Component Value Date    INR 1.0 02/17/2016       Radiologic Studies:  EXAMINATION:    CT OF THE ABDOMEN AND PELVIS WITHOUT CONTRAST 5/19/2019 9:55 am         TECHNIQUE:    CT of the abdomen and pelvis was performed without the administration of    intravenous contrast. Multiplanar reformatted images are provided for review.     Dose modulation, iterative reconstruction, and/or weight based adjustment of    the mA/kV was utilized to reduce the radiation dose to as low as reasonably    achievable.         COMPARISON:    None.         HISTORY:    ORDERING SYSTEM PROVIDED HISTORY: PYELONEPHRITIS, COMPLICATED (DM,    IMMUNODIFF, HX OF STONES, PRIOR SURGERY, NOT RESPONDING TO ABX)    TECHNOLOGIST PROVIDED HISTORY:              FINDINGS:    Lower Chest: The lung bases are without consolidation or effusion.  The    visualized cardiac structures are unremarkable.         Organs: There is diffuse hepatic steatosis.  The gallbladder has been    removed.  The pancreas and adrenal glands are unremarkable.  The spleen is    enlarged measuring 15.2 cm in craniocaudal dimension.  The kidneys are    without obstructive uropathy.  The ureters are not dilated.  The urinary    bladder is unremarkable.         GI/Bowel: The stomach is unremarkable.  Loops of small bowel are normal in    caliber without evidence for obstruction.  The colon contains air and fecal    residue. Luly Knuckles are uncomplicated diverticula.  The appendix is normal.    There is no intraperitoneal free air or free fluid.         Pelvis: The prostate gland and seminal vesicles are unremarkable. Phleboliths are seen in the pelvis.         Peritoneum/Retroperitoneum: The psoas muscles are symmetric.  The abdominal    aorta is normal in caliber containing calcification.  The inferior vena cava    is unremarkable. Luly Knuckles is no retroperitoneal or mesenteric adenopathy.         Bones/Soft Tissues:  The extra-abdominal soft tissues are unremarkable. Luly Knuckles    is no acute osseous abnormality.              Impression    No acute abdominal or pelvic abnormality.         Hepatic steatosis.         Mild splenomegaly.         Uncomplicated colonic diverticulosis.           ULTRASOUND GALLBLADDER         TECHNIQUE: High resolution sonographic evaluation of the gallbladder performed.         INDICATION: Right upper quadrant abdominal pain         FINDINGS: Background liver parenchyma is increased in echogenicity typical of fatty infiltration.  Gallbladder is normal in size and contour. Numerous small layering gallstones identified in the gallbladder. No gallbladder wall thickening or    pericholecystic fluid. No intra-or extrahepatic biliary ductal dilation. Common bile duct is normal in size at 3 mm. Pancreas is mostly obscured by bowel gas. Survey images of the right kidney reveal no hydronephrosis.           Impression    IMPRESSION:    1. Cholelithiasis    2. Fatty infiltration of the liver         Report electronically signed by Mariah Davila on 7/24/2015 1:11 PM 7/24/2015 1:11 PM          Impressions/Recommendations:     Intermittent episodes of anemia/GI bleed unknown source. Epigastric pain. Melena. Symptomatic. No transfusions required. History rectal bleeding. Fatigue/weakness. US gallbladder from 2015 + gallstones    Planned EGD and colonoscopy. Risks and benefits discussed for both. If negative, recommend GI referral for video capsule endoscopy to check for small bowel source of bleeding.       Myriam Zaidi DO, MPH, [address] [place] [address] office [phone]  [place] office [phone]

## 2020-10-21 NOTE — PROGRESS NOTES
Ostial. 50% stenosis. EF:55%.  H/O cardiovascular stress test 2/19/16    Abnormal. Moderate perfusion defect of mild intensity in the inferior, inferoseptal adn inferoapical regions during stress imaging, which most consistent with ischemia. Global LV systolic function normal without regional wall motion abnormalities. OVerall these results are most consistent with an intermediate risk for signficant CAD. Additional testing including cardiac cath may be indicated.  H/O tilt table evaluation 12/26/2017    Abnormal. Patients HR, BP response and symptoms were most consistent with dysautonomia. Combined with viligant maintenance of euvolemia and maintaining a moderate salft intake, pharmacologic treatment with SSRI such as lexapro and or mestinon among other treatments have shown some effectiveness in treatment of this condition    H/O tilt table evaluation 12/26/2017    Abnormal head upright tilt table study. The pt heart rate, blood pressure response and symptoms were most consistent with dysautonomia.  History of 24 hour EKG monitoring 8/14/14    Occasional PAC's and PVC's which appear to be at least moderately symptomatic.  History of 24 hour EKG monitoring 11/19/14    Event Monitor. Sinus rhythm and sinus bradycardia. Infrequent isolated PVC's    History of cardiovascular stress test 2/19/14    Relatively NL    History of cardiovascular stress test 03/21/2018    Normal myocardial perfusion. Global left ventricular systolic function was normal with an EF of 68%. Overall, these results are most consistent with a low risk scan.  History of coronary artery stent placement 04/2017    PTCA / Drug Eluting Stent:, CX and / or branches    History of CVA (cerebrovascular accident) without residual deficits 2014    Incidentally found on CT head.  No known impairment now or in the past.    History of echocardiogram 2/18/14    LA mildly dilated, EF 55%, LV wall thickness is moderately increased, no definite wall motion abnormalilities, what appears to be a pacer wire is seen w/n the RA and RV, mild-mod TR, mild pulmonary hypertension.  History of Holter monitoring 12/29/2017    Rare PAC's and PVC's.      History of tilt table evaluation 8/12/14    Abnormal    Hyperlipidemia     Hypertension     Non critical Right Renal artery stenosis, native ([facility] [address] 10/2/2018    Non critical Right Renal artery stenosis, based on cath in 2016, Rt RA 30% stenosis    Pacemaker     non-functioning    S/P cardiac cath 04/10/2017    S/P coronary artery stent placement     Successful PTCA - HA Om1    SIRS (systemic inflammatory response syndrome) ([facility] [address] 5/19/2019    Type II or unspecified type diabetes mellitus without mention of complication, not stated as uncontrolled      Past Surgical History:   Procedure Laterality Date    BACK SURGERY      CARDIAC CATHETERIZATION Left 2/19/16    via right radial approach/ Talmage Hashimoto [place]/ Dr. Alvarez Sep  8/17/15    Liang/Charles/Saulo/ Millicent    COLONOSCOPY  2010    COLONOSCOPY  2/19/15    -polyps,diverticulosis,hemorrhoids    COLONOSCOPY  05/23/2018    Dr Amira Benavides    COLONOSCOPY N/A 5/23/2018    COLONOSCOPY POLYPECTOMY SNARE/COLD BIOPSY  cold snare  and  hot snare performed by Kiana Mccoy MD at [address] [place] [address]      left eye    PACEMAKER INSERTION      PACEMAKER PLACEMENT      UPPER GASTROINTESTINAL ENDOSCOPY  05/28/2019    Dr. Bria Gutiérrez H-Pylori)duodenal bulbar/antral erythema    UPPER GASTROINTESTINAL ENDOSCOPY N/A 5/28/2019    EGD BIOPSY, clotest performed by Kiana Mccoy MD at [address] Encounter Medications as of 10/21/2020   Medication Sig Dispense Refill    hydrALAZINE (APRESOLINE) 100 MG tablet Take 100 mg by mouth daily      lisinopril (PRINIVIL;ZESTRIL) 10 MG tablet Take 10 mg by mouth daily      carvedilol (COREG) 12.5 MG tablet Take 12.5 mg by mouth 2 times daily (with meals)      insulin glargine (LANTUS SOLOSTAR) 100 UNIT/ML injection pen Inject 32 Units into the skin nightly 15 mL 3    Continuous Blood Gluc Sensor (FREESTYLE CAILIN 14 DAY SENSOR) MISC 2 Devices by Does not apply route every 14 days for 28 days 2 each 2    ezetimibe (ZETIA) 10 MG tablet Take 1 tablet by mouth daily 90 tablet 3    pantoprazole (PROTONIX) 40 MG tablet Take 1 tablet by mouth daily 30 tablet 3    acyclovir (ZOVIRAX) 400 MG tablet 400 mg 2 times daily       eplerenone (INSPRA) 25 MG tablet Take 1 tablet by mouth daily (Patient taking differently: Take 10 mg by mouth daily ) 90 tablet 3    Insulin Glargine (LANTUS SC) Inject 32 Units into the skin 2 times daily       glipiZIDE (GLUCOTROL XL) 5 MG extended release tablet Take 2 tablets by mouth twice daily (Patient taking differently: 5 mg 2 times daily TAKE 2 TABLETS BY MOUTH TWICE DAILY) 360 tablet 0    furosemide (LASIX) 20 MG tablet Take 1 tablet by mouth See Admin Instructions for 3 days Pt is to take one tablet by mouth on Monday, Wednesday and Friday. 90 tablet 0    ferrous sulfate 325 (65 Fe) MG tablet Take 325 mg by mouth 2 times daily      CONTOUR TEST strip USE 1 STRIP TO CHECK GLUCOSE TWICE DAILY 100 strip 11    nitroGLYCERIN (NITROSTAT) 0.4 MG SL tablet Place 1 tablet under the tongue every 5 minutes as needed for Chest pain 25 tablet 3    Omega-3 Fatty Acids (FISH OIL) 1000 MG CAPS Take 1,000 mg by mouth daily       aspirin EC 81 MG EC tablet Take 1 tablet by mouth daily 30 tablet 3    DIANA MICROLET LANCETS MISC TEST TWICE DAILY 100 each 2    Insulin Pen Needle (PEN NEEDLES) 31G X 6 MM MISC 1 each by Does not apply route daily 100 each 3    prednisoLONE acetate (PRED FORTE) 1 % ophthalmic suspension Place 1 drop into the left eye daily        No facility-administered encounter medications on file as of 10/21/2020.        Current Outpatient Medications on File Prior to Visit   Medication Sig Dispense Refill    hydrALAZINE (APRESOLINE) 100 MG tablet Take 100 mg by mouth daily      lisinopril (PRINIVIL;ZESTRIL) 10 MG tablet Take 10 mg by mouth daily      carvedilol (COREG) 12.5 MG tablet Take 12.5 mg by mouth 2 times daily (with meals)      insulin glargine (LANTUS SOLOSTAR) 100 UNIT/ML injection pen Inject 32 Units into the skin nightly 15 mL 3    Continuous Blood Gluc Sensor (FREESTYLE CAILIN 14 DAY SENSOR) MISC 2 Devices by Does not apply route every 14 days for 28 days 2 each 2    ezetimibe (ZETIA) 10 MG tablet Take 1 tablet by mouth daily 90 tablet 3    pantoprazole (PROTONIX) 40 MG tablet Take 1 tablet by mouth daily 30 tablet 3    acyclovir (ZOVIRAX) 400 MG tablet 400 mg 2 times daily       eplerenone (INSPRA) 25 MG tablet Take 1 tablet by mouth daily (Patient taking differently: Take 10 mg by mouth daily ) 90 tablet 3    Insulin Glargine (LANTUS SC) Inject 32 Units into the skin 2 times daily       glipiZIDE (GLUCOTROL XL) 5 MG extended release tablet Take 2 tablets by mouth twice daily (Patient taking differently: 5 mg 2 times daily TAKE 2 TABLETS BY MOUTH TWICE DAILY) 360 tablet 0    furosemide (LASIX) 20 MG tablet Take 1 tablet by mouth See Admin Instructions for 3 days Pt is to take one tablet by mouth on Monday, Wednesday and Friday.  90 tablet 0    ferrous sulfate 325 (65 Fe) MG tablet Take 325 mg by mouth 2 times daily      CONTOUR TEST strip USE 1 STRIP TO CHECK GLUCOSE TWICE DAILY 100 strip 11    nitroGLYCERIN (NITROSTAT) 0.4 MG SL tablet Place 1 tablet under the tongue every 5 minutes as needed for Chest pain 25 tablet 3    Omega-3 Fatty Acids (FISH OIL) 1000 MG CAPS Take 1,000 mg by mouth daily       aspirin EC 81 MG EC tablet Take 1 tablet by mouth daily 30 tablet 3    DIANA MICROLET LANCETS MISC TEST TWICE DAILY 100 each 2    Insulin Pen Needle (PEN NEEDLES) 31G X 6 MM MISC 1 each by Does not apply route daily 100 each 3    prednisoLONE acetate (PRED FORTE) 1 % ophthalmic suspension Place 1 drop into the left eye daily        No current facility-administered medications on file prior to visit. Lipitor [atorvastatin]; Aldactone [spironolactone]; Crestor [rosuvastatin calcium]; Lopid [gemfibrozil]; Art Speaks; and Januvia [sitagliptin]  Family History   Problem Relation Age of Onset    Cancer Mother         breast    Cancer Father         lung     Social History     Tobacco Use   Smoking Status Former Smoker    Packs/day: 1.50    Years: 8.00    Pack years: 12.00    Types: Cigarettes    Last attempt to quit: 3/4/1980    Years since quittin.6   Smokeless Tobacco Never Used       Social History     Substance and Sexual Activity   Alcohol Use No       Review of Systems   Constitutional: Negative for appetite change, chills and fever. Eyes: Negative for pain, redness and visual disturbance. Respiratory: Negative for cough, shortness of breath and wheezing. Cardiovascular: Negative for chest pain and leg swelling. Gastrointestinal: Negative for abdominal pain, constipation, nausea and vomiting. Genitourinary: Negative for decreased urine volume, difficulty urinating, discharge, dysuria, enuresis, flank pain, frequency, hematuria, penile pain, scrotal swelling, testicular pain and urgency. Positive for nocturia   Musculoskeletal: Negative for back pain, joint swelling and myalgias. Skin: Negative for color change, rash and wound. Neurological: Negative for dizziness, tremors and numbness. Hematological: Negative for adenopathy. Does not bruise/bleed easily. BP (!) 147/73 (Site: Right Upper Arm, Position: Sitting, Cuff Size: Medium Adult)   Pulse 69   Temp 96.9 °F (36.1 °C)   Ht 5' 9\" (1.753 m)   Wt 203 lb (92.1 kg)   BMI 29.98 kg/m²       PHYSICAL EXAM:  Constitutional: Patient in no acute distress; Neuro: alert and oriented to person place and time.     Psych: Mood and affect normal.  Skin: Normal  Lungs: Respiratory effort

## 2020-10-22 ENCOUNTER — TELEPHONE (OUTPATIENT)
Dept: SURGERY | Age: 75
End: 2020-10-22

## 2020-10-22 NOTE — TELEPHONE ENCOUNTER
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 Erika Josue. Can he hold his ASA for 7 days prior to the procedure?

## 2020-10-22 NOTE — TELEPHONE ENCOUNTER
Informed Sofiyaster to hold his Aspirin 7 days prior to his colonoscopy and he voiced understanding.

## 2020-10-23 ENCOUNTER — HOSPITAL ENCOUNTER (OUTPATIENT)
Dept: PREADMISSION TESTING | Age: 75
Setting detail: SPECIMEN
Discharge: HOME | End: 2020-10-27
Payer: MEDICARE

## 2020-10-23 ENCOUNTER — TELEPHONE (OUTPATIENT)
Dept: UROLOGY | Age: 75
End: 2020-10-23

## 2020-10-23 LAB
CULTURE: ABNORMAL
Lab: ABNORMAL
SARS-COV-2, RAPID: NORMAL
SARS-COV-2: NORMAL
SARS-COV-2: NOT DETECTED
SOURCE: NORMAL
SPECIMEN DESCRIPTION: ABNORMAL

## 2020-10-23 PROCEDURE — U0003 INFECTIOUS AGENT DETECTION BY NUCLEIC ACID (DNA OR RNA); SEVERE ACUTE RESPIRATORY SYNDROME CORONAVIRUS 2 (SARS-COV-2) (CORONAVIRUS DISEASE [COVID-19]), AMPLIFIED PROBE TECHNIQUE, MAKING USE OF HIGH THROUGHPUT TECHNOLOGIES AS DESCRIBED BY CMS-2020-01-R: HCPCS

## 2020-10-23 PROCEDURE — C9803 HOPD COVID-19 SPEC COLLECT: HCPCS

## 2020-10-23 NOTE — TELEPHONE ENCOUNTER
Marisel Gonzalez was confused about the Cipro he was prescribed. He only got 6 tablets and was instructed to take them prior to his prostate biopsy on 11/2/2020. Should he start the ATB now or wait untl 11/1/2020? Health Maintenance   Topic Date Due    Annual Wellness Visit (AWV)  05/29/2019    Hepatitis C screen  10/29/2026 (Originally 1945)    Diabetic foot exam  11/25/2020    Shingles Vaccine (3 of 3) 12/14/2020    Diabetic retinal exam  01/28/2021    Colon cancer screen colonoscopy  05/23/2021    Lipid screen  09/09/2021    Potassium monitoring  10/15/2021    Creatinine monitoring  10/15/2021    PSA counseling  10/15/2021    A1C test (Diabetic or Prediabetic)  10/19/2021    DTaP/Tdap/Td vaccine (2 - Td) 01/14/2025    Flu vaccine  Completed    Pneumococcal 65+ years Vaccine  Completed    AAA screen  Completed    Hepatitis A vaccine  Aged Out    Hib vaccine  Aged Out    Meningococcal (ACWY) vaccine  Aged Out             (applicable per patient's age: Cancer Screenings, Depression Screening, Fall Risk Screening, Immunizations)    Hemoglobin A1C (%)   Date Value   10/19/2020 8.1   07/20/2020 7.6   02/21/2020 6.7     Microalb/Crt.  Ratio (mcg/mg creat)   Date Value   10/17/2015 30     LDL Cholesterol (mg/dL)   Date Value   09/09/2020 26     AST (U/L)   Date Value   06/25/2020 18     ALT (U/L)   Date Value   06/25/2020 16     BUN (mg/dL)   Date Value   10/15/2020 32 (H)      (goal A1C is < 7)   (goal LDL is <100) need 30-50% reduction from baseline     BP Readings from Last 3 Encounters:   10/21/20 (!) 161/77   10/21/20 (!) 147/73   10/19/20 136/78    (goal /80)      All Future Testing planned in [facility]:  Lab Frequency Next Occurrence   Transferrin Once 06/26/2020   Tilt table test Once 07/14/2020   CBC Auto Differential Once 11/11/2020   Ferritin Once 11/11/2020   Iron and TIBC Once 11/11/2020   PSA, Diagnostic Once 04/19/2021   CBC Auto Differential         Next Visit Date:  Future Appointments   Date Time Provider Isadora Younger   11/2/2020  8:30 AM [facility]   11/3/2020  1:20 PM Ena Flores MD [facility] None   11/18/2020  2:45 PM [facility]   11/30/2020 11:00 AM Cara Nazario MD [facility]   1/19/2021  9:00 AM MD KAMILA LarsonDipakAmin Dipakkumar A            Patient Active Problem List:     Vertigo, benign paroxysmal     Systolic murmur     History of MI (myocardial infarction)     History of colon polyps     Coronary atherosclerosis due to calcified coronary lesion     Displacement of intervertebral disc, site unspecified, without myelopathy     History of CVA (cerebrovascular accident) without residual deficits     Dysautonomia ([address]     Syncope, near     Cholecystitis     Chronotropic incompetence with autonomic dysfunction     Episodic lightheadedness     Essential hypertension     Ischemic chest pain (HCC)     Chronic kidney disease, stage III (moderate) ([address]     Controlled type 2 diabetes mellitus with diabetic nephropathy, with long-term current use of insulin (HCC)     Abnormal tilt table test     Cervical stenosis of spinal canal     Coronary artery disease involving native coronary artery of native heart without angina pectoris     S/P drug eluting coronary stent placement-OM1 4/10/17     Hyperlipidemia     Chronic diastolic heart failure (HCC)     Angina, class II (HCC)     Medication side effect, initial encounter     Acute renal failure with tubular necrosis (HCC)     Non critical Right Renal artery stenosis, native (HCC)     Anemia in stage 3 chronic kidney disease     BPH with obstruction/lower urinary tract symptoms     Elevated PSA

## 2020-10-23 NOTE — TELEPHONE ENCOUNTER
----- Message from REY Lockett CNP sent at 10/23/2020 10:39 AM EDT -----  Urine culture is positive for infection. Continue culture specific cipro as prescribed.

## 2020-10-24 ENCOUNTER — ANESTHESIA EVENT (OUTPATIENT)
Dept: OPERATING ROOM | Age: 75
End: 2020-10-24
Payer: MEDICARE

## 2020-10-30 ENCOUNTER — HOSPITAL ENCOUNTER (OUTPATIENT)
Age: 75
Setting detail: OUTPATIENT SURGERY
Discharge: HOME | End: 2020-10-30
Attending: SURGERY | Admitting: SURGERY
Payer: MEDICARE

## 2020-10-30 ENCOUNTER — ANESTHESIA (OUTPATIENT)
Dept: OPERATING ROOM | Age: 75
End: 2020-10-30
Payer: MEDICARE

## 2020-10-30 VITALS
SYSTOLIC BLOOD PRESSURE: 134 MMHG | DIASTOLIC BLOOD PRESSURE: 48 MMHG | RESPIRATION RATE: 18 BRPM | WEIGHT: 203 LBS | TEMPERATURE: 98.6 F | HEIGHT: 69 IN | HEART RATE: 72 BPM | OXYGEN SATURATION: 96 % | BODY MASS INDEX: 30.07 KG/M2

## 2020-10-30 VITALS
OXYGEN SATURATION: 99 % | SYSTOLIC BLOOD PRESSURE: 108 MMHG | RESPIRATION RATE: 20 BRPM | DIASTOLIC BLOOD PRESSURE: 38 MMHG

## 2020-10-30 LAB — GLUCOSE BLD-MCNC: 154 MG/DL (ref 74–100)

## 2020-10-30 PROCEDURE — 7100000010 HC PHASE II RECOVERY - FIRST 15 MIN: Performed by: SURGERY

## 2020-10-30 PROCEDURE — 6360000002 HC RX W HCPCS: Performed by: NURSE ANESTHETIST, CERTIFIED REGISTERED

## 2020-10-30 PROCEDURE — 2580000003 HC RX 258: Performed by: NURSE ANESTHETIST, CERTIFIED REGISTERED

## 2020-10-30 PROCEDURE — 3700000000 HC ANESTHESIA ATTENDED CARE: Performed by: SURGERY

## 2020-10-30 PROCEDURE — 7100000011 HC PHASE II RECOVERY - ADDTL 15 MIN: Performed by: SURGERY

## 2020-10-30 PROCEDURE — 3609027000 HC COLONOSCOPY: Performed by: SURGERY

## 2020-10-30 PROCEDURE — 2709999900 HC NON-CHARGEABLE SUPPLY: Performed by: SURGERY

## 2020-10-30 PROCEDURE — 43235 EGD DIAGNOSTIC BRUSH WASH: CPT | Performed by: SURGERY

## 2020-10-30 PROCEDURE — 3700000001 HC ADD 15 MINUTES (ANESTHESIA): Performed by: SURGERY

## 2020-10-30 PROCEDURE — G0121 COLON CA SCRN NOT HI RSK IND: HCPCS | Performed by: SURGERY

## 2020-10-30 PROCEDURE — 82947 ASSAY GLUCOSE BLOOD QUANT: CPT

## 2020-10-30 PROCEDURE — 3609017100 HC EGD: Performed by: SURGERY

## 2020-10-30 PROCEDURE — 2500000003 HC RX 250 WO HCPCS: Performed by: NURSE ANESTHETIST, CERTIFIED REGISTERED

## 2020-10-30 PROCEDURE — 2580000003 HC RX 258: Performed by: SURGERY

## 2020-10-30 RX ORDER — SODIUM CHLORIDE 9 MG/ML
INJECTION INTRAVENOUS PRN
Status: DISCONTINUED | OUTPATIENT
Start: 2020-10-30 | End: 2020-10-30 | Stop reason: SDUPTHER

## 2020-10-30 RX ORDER — SODIUM CHLORIDE, SODIUM LACTATE, POTASSIUM CHLORIDE, CALCIUM CHLORIDE 600; 310; 30; 20 MG/100ML; MG/100ML; MG/100ML; MG/100ML
INJECTION, SOLUTION INTRAVENOUS CONTINUOUS
Status: DISCONTINUED | OUTPATIENT
Start: 2020-10-30 | End: 2020-10-30 | Stop reason: HOSPADM

## 2020-10-30 RX ORDER — PHENYLEPHRINE HYDROCHLORIDE 10 MG/ML
INJECTION INTRAVENOUS PRN
Status: DISCONTINUED | OUTPATIENT
Start: 2020-10-30 | End: 2020-10-30 | Stop reason: SDUPTHER

## 2020-10-30 RX ORDER — LIDOCAINE HYDROCHLORIDE 20 MG/ML
INJECTION, SOLUTION EPIDURAL; INFILTRATION; INTRACAUDAL; PERINEURAL PRN
Status: DISCONTINUED | OUTPATIENT
Start: 2020-10-30 | End: 2020-10-30 | Stop reason: SDUPTHER

## 2020-10-30 RX ORDER — PROPOFOL 10 MG/ML
INJECTION, EMULSION INTRAVENOUS PRN
Status: DISCONTINUED | OUTPATIENT
Start: 2020-10-30 | End: 2020-10-30 | Stop reason: SDUPTHER

## 2020-10-30 RX ORDER — PROPOFOL 10 MG/ML
INJECTION, EMULSION INTRAVENOUS CONTINUOUS PRN
Status: DISCONTINUED | OUTPATIENT
Start: 2020-10-30 | End: 2020-10-30 | Stop reason: SDUPTHER

## 2020-10-30 RX ADMIN — PHENYLEPHRINE HYDROCHLORIDE 150 MCG: 10 INJECTION INTRAVENOUS at 09:02

## 2020-10-30 RX ADMIN — LIDOCAINE HYDROCHLORIDE 60 MG: 20 INJECTION, SOLUTION EPIDURAL; INFILTRATION; INTRACAUDAL; PERINEURAL at 08:31

## 2020-10-30 RX ADMIN — GLUCAGON HYDROCHLORIDE 1 MG: KIT at 08:52

## 2020-10-30 RX ADMIN — SODIUM CHLORIDE, POTASSIUM CHLORIDE, SODIUM LACTATE AND CALCIUM CHLORIDE: 600; 310; 30; 20 INJECTION, SOLUTION INTRAVENOUS at 07:46

## 2020-10-30 RX ADMIN — PROPOFOL 80 MG: 10 INJECTION, EMULSION INTRAVENOUS at 08:31

## 2020-10-30 RX ADMIN — PROPOFOL 150 MCG/KG/MIN: 10 INJECTION, EMULSION INTRAVENOUS at 08:33

## 2020-10-30 RX ADMIN — PROPOFOL 40 MG: 10 INJECTION, EMULSION INTRAVENOUS at 08:33

## 2020-10-30 RX ADMIN — SODIUM CHLORIDE 20 ML: 9 INJECTION, SOLUTION INTRAMUSCULAR; INTRAVENOUS; SUBCUTANEOUS at 08:37

## 2020-10-30 RX ADMIN — PHENYLEPHRINE HYDROCHLORIDE 150 MCG: 10 INJECTION INTRAVENOUS at 08:37

## 2020-10-30 RX ADMIN — LIDOCAINE HYDROCHLORIDE 40 MG: 20 INJECTION, SOLUTION EPIDURAL; INFILTRATION; INTRACAUDAL; PERINEURAL at 08:26

## 2020-10-30 RX ADMIN — PROPOFOL 20 MG: 10 INJECTION, EMULSION INTRAVENOUS at 09:02

## 2020-10-30 NOTE — ANESTHESIA PRE PROCEDURE
Yes Annika Moscoso MD   DIANA MICROLET LANCETS MISC TEST TWICE DAILY 11/16/15  Yes Kelly Blanco MD   Insulin Pen Needle (PEN NEEDLES) 31G X 6 MM MISC 1 each by Does not apply route daily 10/16/15  Yes Kelly Blanco MD   prednisoLONE acetate (PRED FORTE) 1 % ophthalmic suspension Place 1 drop into the left eye daily  3/30/15  Yes Historical Provider, MD   insulin glargine (LANTUS SOLOSTAR) 100 UNIT/ML injection pen Inject 32 Units into the skin nightly 10/19/20   Kelly Blanco MD   eplerenone (INSPRA) 25 MG tablet Take 1 tablet by mouth daily  Patient taking differently: Take 10 mg by mouth daily  7/14/20 10/21/20  Annika Moscoso MD   furosemide (LASIX) 20 MG tablet Take 1 tablet by mouth See Admin Instructions for 3 days Pt is to take one tablet by mouth on Monday, Wednesday and Friday. 6/26/20 10/21/20  Annika Moscoso MD   nitroGLYCERIN (NITROSTAT) 0.4 MG SL tablet Place 1 tablet under the tongue every 5 minutes as needed for Chest pain 8/5/19   Annika Moscoso MD       Current medications:    Current Facility-Administered Medications   Medication Dose Route Frequency Provider Last Rate Last Dose    lactated ringers infusion   Intravenous Continuous Rosario Brantley,            Allergies:     Allergies   Allergen Reactions    Lipitor [Atorvastatin] Other (See Comments)     Muscle aches and joint pain    Aldactone [Spironolactone] Hives    Crestor [Rosuvastatin Calcium] Other (See Comments)     Muscle aches and joint pain    Lopid [Gemfibrozil]      hyperglycemia    Invokana [Canagliflozin] Rash    Januvia [Sitagliptin] Nausea And Vomiting       Problem List:    Patient Active Problem List   Diagnosis Code    Vertigo, benign paroxysmal P13.19    Systolic murmur V59.3    History of MI (myocardial infarction) I25.2    History of colon polyps Z86.010    Coronary atherosclerosis due to calcified coronary lesion I25.10, I25.84    Displacement of intervertebral disc, site unspecified, without Proximal subesection. 70% steniosis. RCA: Small non-dominant RCA. Lesion on PRox RCA: Ostial. 50% stenosis. EF:55%.  H/O cardiovascular stress test 2/19/16    Abnormal. Moderate perfusion defect of mild intensity in the inferior, inferoseptal adn inferoapical regions during stress imaging, which most consistent with ischemia. Global LV systolic function normal without regional wall motion abnormalities. OVerall these results are most consistent with an intermediate risk for signficant CAD. Additional testing including cardiac cath may be indicated.  H/O tilt table evaluation 12/26/2017    Abnormal. Patients HR, BP response and symptoms were most consistent with dysautonomia. Combined with viligant maintenance of euvolemia and maintaining a moderate salft intake, pharmacologic treatment with SSRI such as lexapro and or mestinon among other treatments have shown some effectiveness in treatment of this condition    H/O tilt table evaluation 12/26/2017    Abnormal head upright tilt table study. The pt heart rate, blood pressure response and symptoms were most consistent with dysautonomia.  History of 24 hour EKG monitoring 8/14/14    Occasional PAC's and PVC's which appear to be at least moderately symptomatic.  History of 24 hour EKG monitoring 11/19/14    Event Monitor. Sinus rhythm and sinus bradycardia. Infrequent isolated PVC's    History of cardiovascular stress test 2/19/14    Relatively NL    History of cardiovascular stress test 03/21/2018    Normal myocardial perfusion. Global left ventricular systolic function was normal with an EF of 68%. Overall, these results are most consistent with a low risk scan.  History of coronary artery stent placement 04/2017    PTCA / Drug Eluting Stent:, CX and / or branches    History of CVA (cerebrovascular accident) without residual deficits 2014    Incidentally found on CT head.  No known impairment now or in the past.    History of echocardiogram 2/18/14    LA mildly dilated, EF 55%, LV wall thickness is moderately increased, no definite wall motion abnormalilities, what appears to be a pacer wire is seen w/n the RA and RV, mild-mod TR, mild pulmonary hypertension.  History of Holter monitoring 12/29/2017    Rare PAC's and PVC's.      History of tilt table evaluation 8/12/14    Abnormal    Hyperlipidemia     Hypertension     Non critical Right Renal artery stenosis, native ([address] 10/2/2018    Non critical Right Renal artery stenosis, based on cath in 2016, Rt RA 30% stenosis    Pacemaker     non-functioning    S/P cardiac cath 04/10/2017    S/P coronary artery stent placement     Successful PTCA - HA Om1    SIRS (systemic inflammatory response syndrome) [facility] 5/19/2019    Type II or unspecified type diabetes mellitus without mention of complication, not stated as uncontrolled        Past Surgical History:        Procedure Laterality Date    BACK SURGERY      CARDIAC CATHETERIZATION Left 2/19/16    via right radial approach/ [facility]/ Dr. Juan Carlos Giraldo  8/17/15    Liang/Charles/[facility]/ Lap    COLONOSCOPY  2010    COLONOSCOPY  2/19/15    -polyps,diverticulosis,hemorrhoids    COLONOSCOPY  05/23/2018    Dr Dayna Lyon    COLONOSCOPY N/A 5/23/2018    COLONOSCOPY POLYPECTOMY SNARE/COLD BIOPSY  cold snare  and  hot snare performed by Agnes Kaur MD at [address] [place] [address]      left eye    PACEMAKER INSERTION      PACEMAKER PLACEMENT      UPPER GASTROINTESTINAL ENDOSCOPY  05/28/2019    Dr. Lisa Lund H-Pylori)duodenal bulbar/antral erythema    UPPER GASTROINTESTINAL ENDOSCOPY N/A 5/28/2019    EGD BIOPSY, clotest performed by Agnes Kaur MD at [facility] History:    Social History     Tobacco Use    Smoking status: Former Smoker     Packs/day: 1.50     Years: 8.00     Pack years: 12.00     Types: Cigarettes     Last attempt to quit: 3/4/1980     Years No results found for: PHART, PO2ART, HNA4MPB, TNI6YRD, BEART, Z0OWTSDC     Type & Screen (If Applicable):  No results found for: LABABO, LABRH    Drug/Infectious Status (If Applicable):  No results found for: HIV, HEPCAB    COVID-19 Screening (If Applicable):   Lab Results   Component Value Date    COVID19 Not Detected 10/23/2020         Anesthesia Evaluation  Patient summary reviewed and Nursing notes reviewed no history of anesthetic complications:   Airway: Mallampati: I  TM distance: >3 FB   Neck ROM: full  Mouth opening: > = 3 FB Dental:    (+) partials      Pulmonary:Negative Pulmonary ROS and normal exam  breath sounds clear to auscultation                             Cardiovascular:    (+) hypertension: moderate, pacemaker: pacemaker, past MI: no interval change and > 6 months, CAD: no interval change, CABG/stent: no interval change, CHF: no interval change and diastolic, hyperlipidemia    (-)  angina    ECG reviewed  Rhythm: regular  Rate: normal  Echocardiogram reviewed    Cleared by cardiology           ROS comment: Pacemaker has not worked x 5 yrs  Stent 2017 - OM1  Echo 7/2020 EF 60%  Cardiac eval - intermediate risk     Neuro/Psych:   (+) CVA: no interval change,             GI/Hepatic/Renal:   (+) renal disease: CRI,           Endo/Other:    (+) DiabetesType II DM, poorly controlled, using insulin, blood dyscrasia: anemia:., .                  ROS comment: A1C 8.1, BS today 156 Abdominal:           Vascular: negative vascular ROS. Anesthesia Plan      general and TIVA     ASA 3       Induction: intravenous. Anesthetic plan and risks discussed with patient.                       Andry Naqvi, REY - CRNA   10/30/2020

## 2020-10-30 NOTE — ANESTHESIA POSTPROCEDURE EVALUATION
Department of Anesthesiology  Postprocedure Note    Patient: Terrie Chapman  MRN: [id number]  YOB: 1945  Date of evaluation: 10/30/2020  Time:  10:14 AM     Procedure Summary     Date:  10/30/20 Room / Location:  [address] [place] [address]    Anesthesia Start:  0825 Anesthesia Stop:  8850    Procedures:       COLORECTAL CANCER SCREENING, NOT HIGH RISK (N/A )      EGD ESOPHAGOGASTRODUODENOSCOPY (N/A ) Diagnosis:  (GI BLEED, INTERMITTENT ANEMIA)    Surgeon:  Briana Santizo DO Responsible Provider:  REY Vick CRNA    Anesthesia Type:  general, TIVA ASA Status:  3          Anesthesia Type: general, TIVA    Joshua Phase I: Joshua Score: 10    Joshua Phase II: Joshua Score: 8    Last vitals: Reviewed and per EMR flowsheets.        Anesthesia Post Evaluation    Patient location during evaluation: PACU  Patient participation: complete - patient participated  Level of consciousness: awake and alert  Pain score: 0  Airway patency: patent  Nausea & Vomiting: no nausea and no vomiting  Complications: no  Cardiovascular status: blood pressure returned to baseline  Respiratory status: acceptable and room air  Hydration status: stable

## 2020-10-30 NOTE — OP NOTE
Operative Note      Patient: Cesar Torres  YOB: 1945  MRN: [id number]   Jose Angel Tate MD      Date of Procedure: 10/30/2020    Pre-Op Diagnosis: intermittent anemia. GI bleed evaluation. Screening colonoscopy    Post-Op Diagnosis: normal EGD. Severe diverticulosis without bleeding or inflammation. No obvious upper or lower GI source of bleed. Procedure(s):  1) EGD diagnostic  2) colonoscopy to cecum screening    Surgeon(s):  Sherlyn Leavitt DO    Assistant:  * No surgical staff found *    Anesthesia: Monitor Anesthesia Care    Estimated Blood Loss (mL): none    Complications: None    Specimens:   None      Details:    1) EGD:  Please see H&P for indications. The patient was positioned appropriately and placed under monitored anesthesia. Protective bite block was placed. Time out called procedure confirmed. The lubricated gastroscope was carefully inserted into the oropharynx and advanced under direct vision into the esophagus, the stomach, through the pylorus, and into the 1st and second portions of the duodenum. The scope was withdrawn into the stomach. the scope was retroflexed in the stomach. Findings:    Esophagus: normal    GE junction: normal    Stomach: retroflexion: normal    Cardia, body, antrum: normal. No ulcers. No masses    Pylorus: normal    Duodenum: bulb and sweep: normal.      The scope was removed slowly without any new findings and the patient tolerated the procedure well. 2) COLONOSCOPY:    Procedure details:     Patient was positioned in left lateral position. Time out called and procedure confirmed. The lubricated variable stiffness pediatric colonoscope was carefully passed under direct vision into the rectum, advanced through the sigmoid colon, transverse colon, ascending colon and the cecum. The ileocecal valve was well visualized. Additional findings:    Findings:     Cecum: normal cecal pouch.  IC valve and appendiceal orifice well confirmed  Ascending:

## 2020-10-30 NOTE — H&P
GENERAL SURGERY H&P        Patient's Name/ Date of Birth/ Gender: Luana Fry / 1945 (71 y.o.) / male      PCP: Piotr Kumar MD  Referring: Dr. Crissy Edwards     History of present Illness:  Patient is a pleasant 76 y.o. male  kindly referred by Dr. Crissy Edwards for endoscopy for GI bleed. Patient currently taking po iron. Last EGD 2019 and last colonoscopy 2018 by GI. Patient describes melena. Dark stools also may be from the iron supplementation. He says he has epigastric pain after eating. Loose stools. Nausea. No jaundice. No vomiting. Difficulty eating due to postprandial pain. No belching.  He has gallstones on an US noted from 2015.      Past Medical History:  has a past medical history of Acute MI ([facility] Acute renal failure with tubular necrosis ([facility] [address] CAD (coronary artery disease), Cerebral artery occlusion with cerebral infarction [facility]), CHF (congestive heart failure) ([address] Chronic back pain, Closed fracture of lumbar vertebra without mention of spinal cord injury, Displacement of intervertebral disc, site unspecified, without myelopathy, H/O cardiac catheterization, H/O cardiovascular stress test, H/O tilt table evaluation, H/O tilt table evaluation, History of 24 hour EKG monitoring, History of 24 hour EKG monitoring, History of cardiovascular stress test, History of cardiovascular stress test, History of coronary artery stent placement, History of CVA (cerebrovascular accident) without residual deficits, History of echocardiogram, History of Holter monitoring, History of tilt table evaluation, Hyperlipidemia, Hypertension, Non critical Right Renal artery stenosis, native ([address] Pacemaker, S/P cardiac cath, S/P coronary artery stent placement, SIRS (systemic inflammatory response syndrome) ([address] and Type II or unspecified type diabetes mellitus without mention of complication, not stated as uncontrolled.     Past Surgical History:   Past Surgical History         Past Surgical History:   Procedure Laterality Date    BACK SURGERY        CARDIAC CATHETERIZATION Left 2/19/16     via right radial approach/ [facility]/ Dr. Arelis Hanna   8/17/15     Liang/Charles/[facility]/ Lap    COLONOSCOPY   2010    COLONOSCOPY   2/19/15     -polyps,diverticulosis,hemorrhoids    COLONOSCOPY   05/23/2018     Dr Warden Augustin    COLONOSCOPY N/A 5/23/2018     COLONOSCOPY POLYPECTOMY SNARE/COLD BIOPSY  cold snare  and  hot snare performed by Wanda Cantrell MD at [facility]         left eye    PACEMAKER INSERTION        PACEMAKER PLACEMENT        UPPER GASTROINTESTINAL ENDOSCOPY   05/28/2019     Dr. Isabel Duty H-Pylori)duodenal bulbar/antral erythema    UPPER GASTROINTESTINAL ENDOSCOPY N/A 5/28/2019     EGD BIOPSY, clotest [facility] [address] [facility] History:  reports that he quit smoking about 40 years ago. His smoking use included cigarettes. He has a 12.00 pack-year smoking history. He has never used smokeless tobacco. He reports that he does not drink alcohol or use drugs.     Family History: family history includes Cancer in his father and mother.     Review of Systems:   General: Denies fever, chills, night sweats, weight loss, malaise, fatigue  HEENT: Denies sore throat, sinus problems, allergic rhinosinusitis  Card: Denies chest pain, palpitations, orthopnea/PND. HTN.CAD.CHF. pacemaker one heart stent. MI  Pulm: Denies cough, shortness of breath, dyspnea on exertion  GI:  per HPI. : Denies polyuria, dysuria, hematuria. Renal artery stenosis  Endo: DM  Heme: neg  Psych: neg  Neuro:  h/o CVA  Skin: Denies rashes, ulcers  Musculoskeletal: no gross motor dysfunction. OA. Chronic back pain     Allergies: Lipitor [atorvastatin]; Aldactone [spironolactone]; Crestor [rosuvastatin calcium]; Lopid [gemfibrozil];  Invokana [canagliflozin]; and Januvia [sitagliptin]     Current Meds:  Current Medication   Current tablet, Rfl: 3    Omega-3 Fatty Acids (FISH OIL) 1000 MG CAPS, Take 1,000 mg by mouth daily , Disp: , Rfl:     aspirin EC 81 MG EC tablet, Take 1 tablet by mouth daily, Disp: 30 tablet, Rfl: 3    DIANA MICROLET LANCETS MISC, TEST TWICE DAILY, Disp: 100 each, Rfl: 2    Insulin Pen Needle (PEN NEEDLES) 31G X 6 MM MISC, 1 each by Does not apply route daily, Disp: 100 each, Rfl: 3    prednisoLONE acetate (PRED FORTE) 1 % ophthalmic suspension, Place 1 drop into the left eye daily , Disp: , Rfl:         Physical Exam:  Vital signs and Nurse's note reviewed  Gen:  A&Ox3, NAD. Pleasant and cooperative. HEENT: PERRLA, EOMI, no scleral icterus  Neck:  no goiter  CVS: Regular rate and rhythm  Resp: Good bilateral air entry, no active wheezing, no labored breathing  Abd: soft, epigastric tenderness, non-distended, bowel sounds present. rectal exam to be done at scope  Ext: Moves all extremities, no gross focal motor deficits  Skin: No erythema or ulcerations      Labs:         Lab Results   Component Value Date     WBC 7.6 10/15/2020     HGB 13.0 10/15/2020     HCT 38.8 10/15/2020     MCV 90.0 10/15/2020      10/15/2020            Lab Results   Component Value Date      10/15/2020     K 4.4 10/15/2020     CL 99 10/15/2020     CO2 23 10/15/2020     BUN 32 10/15/2020     CREATININE 1.44 10/15/2020     GLUCOSE 425 10/15/2020     GLUCOSE 148 03/16/2017     CALCIUM 9.3 10/15/2020            Lab Results   Component Value Date     ALKPHOS 64 06/25/2020     ALT 16 06/25/2020     AST 18 06/25/2020     PROT 6.7 06/25/2020     BILITOT 0.29 06/25/2020     LABALBU 4.3 10/15/2020      No results found for: LACTA  No results found for: AMYLASE  No results found for: LIPASE        Lab Results   Component Value Date     INR 1.0 02/17/2016         Radiologic Studies:  EXAMINATION:    CT OF THE ABDOMEN AND PELVIS WITHOUT CONTRAST 5/19/2019 9:55 am         TECHNIQUE:    CT of the abdomen and pelvis was performed without the TECHNIQUE: High resolution sonographic evaluation of the gallbladder performed.         INDICATION: Right upper quadrant abdominal pain         FINDINGS: Background liver parenchyma is increased in echogenicity typical of fatty infiltration.  Gallbladder is normal in size and contour. Numerous small layering gallstones identified in the gallbladder. No gallbladder wall thickening or    pericholecystic fluid. No intra-or extrahepatic biliary ductal dilation. Common bile duct is normal in size at 3 mm. Pancreas is mostly obscured by bowel gas. Survey images of the right kidney reveal no hydronephrosis.           Impression    IMPRESSION:    1. Cholelithiasis    2. Fatty infiltration of the liver         Report electronically signed by Adonay Lizama on 7/24/2015 1:11 PM 7/24/2015 1:11 PM             Impressions/Recommendations:      Intermittent episodes of anemia/GI bleed unknown source. Epigastric pain. Melena. Symptomatic. No transfusions required. History rectal bleeding. Fatigue/weakness.     US gallbladder from 2015 + gallstones     Planned EGD and colonoscopy. Risks and benefits discussed for both. If negative, recommend GI referral for video capsule endoscopy to check for small bowel source of bleeding.      H&P  General Surgery        Pt Name: Ishaan Maya  MRN: [id number]  YOB: 1945  Date of evaluation: 10/30/2020      [x] I have examined the patient and reviewed the H&P/Consult completed, and there are no changes to the patient or plans. [] I have examined the patient and reviewed the H&P/Consult and have noted the following changes: The patient was counseled at length about the risks of yassine Covid-19 during their perioperative period and any recovery window from their procedure. The patient was made aware that yassine Covid-19  may worsen their prognosis for recovering from their procedure  and lend to a higher morbidity and/or mortality risk.   All material risks, benefits, and reasonable alternatives including postponing the procedure were discussed. The patient does wish to proceed with the procedure at this time.         Electronically signed by Elihue Olszewski, DO  on 10/30/2020 at 8:09 AM

## 2020-10-30 NOTE — BRIEF OP NOTE
Brief Postoperative Note      Patient: Mainor Alcaraz  YOB: 1945  MRN: [id number]   Mariya Qureshi MD      Date of Procedure: 10/30/2020    Pre-Op Diagnosis: intermittent anemia. GI bleed evaluation. Screening colonoscopy    Post-Op Diagnosis: normal EGD. Severe diverticulosis without bleeding or inflammation. No obvious upper or lower GI source of bleed.        Procedure(s):  1) EGD diagnostic  2) colonoscopy to cecum screening    Surgeon(s):  Eliza Cline DO    Assistant:  * No surgical staff found *    Anesthesia: Monitor Anesthesia Care    Estimated Blood Loss (mL): none    Complications: None    Specimens:   None      Electronically signed by Eliza Cline DO, FACOS, FACS on 10/31/2020 at 5:58 PM

## 2020-11-02 ENCOUNTER — HOSPITAL ENCOUNTER (OUTPATIENT)
Age: 75
Setting detail: SPECIMEN
Discharge: HOME | End: 2020-11-02
Payer: MEDICARE

## 2020-11-02 ENCOUNTER — PROCEDURE VISIT (OUTPATIENT)
Dept: UROLOGY | Age: 75
End: 2020-11-02
Payer: MEDICARE

## 2020-11-02 VITALS
HEART RATE: 69 BPM | BODY MASS INDEX: 30.07 KG/M2 | DIASTOLIC BLOOD PRESSURE: 70 MMHG | SYSTOLIC BLOOD PRESSURE: 145 MMHG | HEIGHT: 69 IN | WEIGHT: 203 LBS | TEMPERATURE: 96.9 F

## 2020-11-02 PROCEDURE — 76872 US TRANSRECTAL: CPT | Performed by: UROLOGY

## 2020-11-02 PROCEDURE — 55700 PR BIOPSY OF PROSTATE,NEEDLE/PUNCH: CPT | Performed by: UROLOGY

## 2020-11-02 PROCEDURE — 76942 ECHO GUIDE FOR BIOPSY: CPT | Performed by: UROLOGY

## 2020-11-02 PROCEDURE — 88305 TISSUE EXAM BY PATHOLOGIST: CPT

## 2020-11-02 NOTE — PROGRESS NOTES
HPI:          Patient is a 76 y.o. male in no acute distress. He is alert and oriented to person, place, and time. History  12/2019 Referral from Dr. Ronen Winchester for elevated PSA of 6.13. He was a previous patient of Dr. Mabel Bejarano for elevated PSA. Several brothers with prostate cancer.      PSA  10/2020 - 5.43  4/2020 - 4.42  1/2020 - 5.00  11/2019 - 6.13  11/2018 - 3.11  11/2017 - 3.26  11/2016 - 2.43  10/2015 - 2.70  10/2014 - 2.35  10/2013 - 2.15      Currently    We discussed that due to his elevated PSA we recommend a prostate biopsy. The patient was told this was typically done with ultrasound guidance and a prostate block with local anesthesia to minimize the discomfort. I discussed the risks including infection, bleeding, hematospermia and rarely sepsis. He was encouraged to give himself a Fleets enema the night prior to the biopsy and to take his antibiotics as instructed. He was told to stop taking ASA and other blood thinners at least a week prior to the biopsy. He was told to go the ER if he experiences fever =101 or greater, chills or severe bleeding after the biopsy. Patient amenable to schedule biopsy. He did receive appropriate antibiotic therapy the day prior to biopsy, the day of the biopsy and will continue to take antibiotics after his biopsy. He did undergo 2 enemas prior. Patient was place in the left lateral decubitus position. Biplanar transrectal ultrasound probe was placed in patients rectum. Prostate visualized in both transverse and longitudinal views. Area of Neurovascular bundles visualized and bathed with 1% lidocaine, approximately 5ml per side. Prostate biopsy today shows volume of 44 grams. No abnormal calcifications or hypoechoic areas. 12 needle core biopsies taken, 6 from each side. Lateral and Mid from each portion base, apex, and mid    No adverse events noted. He tolerated the procedure well with no issues.     Past Medical History: Diagnosis Date    Acute MI ([facility]     Acute renal failure with tubular necrosis ([facility] 10/2/2018    ssecondary to hemodynamic effects of loop diuretics and ace inhibitors, bbaseline 1.2-1.3 which peaked up to 1.8, resolving    CAD (coronary artery disease)     Cerebral artery occlusion with cerebral infarction ([facility]     CHF (congestive heart failure) (HCC)     Chronic back pain     Closed fracture of lumbar vertebra without mention of spinal cord injury     Displacement of intervertebral disc, site unspecified, without myelopathy     H/O cardiac catheterization 2/19/16    LMCA: Mild irregularities 10-20%. LAD: Lesion on PRox LAD: Mid subsection. 65% stenosis. LCx: Lesion on 1st Ob Valentina: Proximal subesection. 70% steniosis. RCA: Small non-dominant RCA. Lesion on PRox RCA: Ostial. 50% stenosis. EF:55%.  H/O cardiovascular stress test 2/19/16    Abnormal. Moderate perfusion defect of mild intensity in the inferior, inferoseptal adn inferoapical regions during stress imaging, which most consistent with ischemia. Global LV systolic function normal without regional wall motion abnormalities. OVerall these results are most consistent with an intermediate risk for signficant CAD. Additional testing including cardiac cath may be indicated.  H/O tilt table evaluation 12/26/2017    Abnormal. Patients HR, BP response and symptoms were most consistent with dysautonomia. Combined with viligant maintenance of euvolemia and maintaining a moderate salft intake, pharmacologic treatment with SSRI such as lexapro and or mestinon among other treatments have shown some effectiveness in treatment of this condition    H/O tilt table evaluation 12/26/2017    Abnormal head upright tilt table study. The pt heart rate, blood pressure response and symptoms were most consistent with dysautonomia.  History of 24 hour EKG monitoring 8/14/14    Occasional PAC's and PVC's which appear to be at least moderately symptomatic.     Zheng-polyps(adenomatous)diverticulosis    COLONOSCOPY N/A 5/23/2018    COLONOSCOPY POLYPECTOMY SNARE/COLD BIOPSY  cold snare  and  hot snare performed by Larissa Pereira MD at [address]  10/30/2020    with Dr. Raymundo Walker 10/30/2020    [address], NOT HIGH RISK performed by Adalberto Rojas DO at [address] [facility]      left eye    PACEMAKER INSERTION      PACEMAKER PLACEMENT      UPPER GASTROINTESTINAL ENDOSCOPY  05/28/2019    Dr. Ortez Graft H-Pylori)duodenal bulbar/antral erythema    UPPER GASTROINTESTINAL ENDOSCOPY N/A 5/28/2019    EGD BIOPSY, clotest performed by Larissa Pereira MD at [address] [facility] 10/30/2020    EGD ESOPHAGOGASTRODUODENOSCOPY performed by Adalberto Rojas DO at [address] Encounter Medications as of 11/2/2020   Medication Sig Dispense Refill    hydrALAZINE (APRESOLINE) 100 MG tablet Take 100 mg by mouth daily      lisinopril (PRINIVIL;ZESTRIL) 10 MG tablet Take 10 mg by mouth daily      carvedilol (COREG) 12.5 MG tablet Take 12.5 mg by mouth 2 times daily (with meals)      insulin glargine (LANTUS SOLOSTAR) 100 UNIT/ML injection pen Inject 32 Units into the skin nightly 15 mL 3    Continuous Blood Gluc Sensor (FREESTYLE CAILIN 14 DAY SENSOR) MISC 2 Devices by Does not apply route every 14 days for 28 days 2 each 2    pantoprazole (PROTONIX) 40 MG tablet Take 1 tablet by mouth daily 30 tablet 3    Insulin Glargine (LANTUS SC) Inject 32 Units into the skin 2 times daily       glipiZIDE (GLUCOTROL XL) 5 MG extended release tablet Take 2 tablets by mouth twice daily (Patient taking differently: 5 mg 2 times daily TAKE 2 TABLETS BY MOUTH TWICE DAILY) 360 tablet 0    furosemide (LASIX) 20 MG tablet Take 1 tablet by mouth See Admin Instructions for 3 days Pt is to take one tablet by mouth on Monday, Wednesday and Friday.  90 tablet 0    ferrous sulfate 325 (65 Fe) MG tablet Take 325 mg by mouth 2 times daily      CONTOUR TEST strip USE 1 STRIP TO CHECK GLUCOSE TWICE DAILY 100 strip 11    Omega-3 Fatty Acids (FISH OIL) 1000 MG CAPS Take 1,000 mg by mouth daily       aspirin EC 81 MG EC tablet Take 1 tablet by mouth daily 30 tablet 3    DIANA MICROLET LANCETS MISC TEST TWICE DAILY 100 each 2    Insulin Pen Needle (PEN NEEDLES) 31G X 6 MM MISC 1 each by Does not apply route daily 100 each 3    prednisoLONE acetate (PRED FORTE) 1 % ophthalmic suspension Place 1 drop into the left eye daily       ezetimibe (ZETIA) 10 MG tablet Take 1 tablet by mouth daily 90 tablet 3    acyclovir (ZOVIRAX) 400 MG tablet 400 mg 2 times daily       eplerenone (INSPRA) 25 MG tablet Take 1 tablet by mouth daily (Patient taking differently: Take 10 mg by mouth daily ) 90 tablet 3    nitroGLYCERIN (NITROSTAT) 0.4 MG SL tablet Place 1 tablet under the tongue every 5 minutes as needed for Chest pain (Patient not taking: Reported on 11/2/2020) 25 tablet 3     No facility-administered encounter medications on file as of 11/2/2020.        Current Outpatient Medications on File Prior to Visit   Medication Sig Dispense Refill    hydrALAZINE (APRESOLINE) 100 MG tablet Take 100 mg by mouth daily      lisinopril (PRINIVIL;ZESTRIL) 10 MG tablet Take 10 mg by mouth daily      carvedilol (COREG) 12.5 MG tablet Take 12.5 mg by mouth 2 times daily (with meals)      insulin glargine (LANTUS SOLOSTAR) 100 UNIT/ML injection pen Inject 32 Units into the skin nightly 15 mL 3    Continuous Blood Gluc Sensor (FREESTYLE CAILIN 14 DAY SENSOR) MISC 2 Devices by Does not apply route every 14 days for 28 days 2 each 2    pantoprazole (PROTONIX) 40 MG tablet Take 1 tablet by mouth daily 30 tablet 3    Insulin Glargine (LANTUS SC) Inject 32 Units into the skin 2 times daily       glipiZIDE (GLUCOTROL XL) 5 MG extended release tablet Take 2 tablets by mouth twice daily (Patient taking differently: 5 mg 2 times daily TAKE 2 TABLETS BY MOUTH TWICE DAILY) 360 tablet 0    furosemide (LASIX) 20 MG tablet Take 1 tablet by mouth See Admin Instructions for 3 days Pt is to take one tablet by mouth on Monday, Wednesday and Friday. 90 tablet 0    ferrous sulfate 325 (65 Fe) MG tablet Take 325 mg by mouth 2 times daily      CONTOUR TEST strip USE 1 STRIP TO CHECK GLUCOSE TWICE DAILY 100 strip 11    Omega-3 Fatty Acids (FISH OIL) 1000 MG CAPS Take 1,000 mg by mouth daily       aspirin EC 81 MG EC tablet Take 1 tablet by mouth daily 30 tablet 3    DIANA MICROLET LANCETS MISC TEST TWICE DAILY 100 each 2    Insulin Pen Needle (PEN NEEDLES) 31G X 6 MM MISC 1 each by Does not apply route daily 100 each 3    prednisoLONE acetate (PRED FORTE) 1 % ophthalmic suspension Place 1 drop into the left eye daily       ezetimibe (ZETIA) 10 MG tablet Take 1 tablet by mouth daily 90 tablet 3    acyclovir (ZOVIRAX) 400 MG tablet 400 mg 2 times daily       eplerenone (INSPRA) 25 MG tablet Take 1 tablet by mouth daily (Patient taking differently: Take 10 mg by mouth daily ) 90 tablet 3    nitroGLYCERIN (NITROSTAT) 0.4 MG SL tablet Place 1 tablet under the tongue every 5 minutes as needed for Chest pain (Patient not taking: Reported on 2020) 25 tablet 3     No current facility-administered medications on file prior to visit. Lipitor [atorvastatin]; Aldactone [spironolactone]; Crestor [rosuvastatin calcium]; Lopid [gemfibrozil];  Sharl Santana; and Januvia [sitagliptin]  Family History   Problem Relation Age of Onset    Cancer Mother         breast    Cancer Father         lung     Social History     Tobacco Use   Smoking Status Former Smoker    Packs/day: 1.50    Years: 8.00    Pack years: 12.00    Types: Cigarettes    Last attempt to quit: 3/4/1980    Years since quittin.6   Smokeless Tobacco Never Used       Social History     Substance and Sexual Activity   Alcohol Use No       Review of Systems    BP (!) 145/70 (Site: Left Upper Arm, Position: Sitting, Cuff Size: Medium Adult)   Pulse 69   Temp 96.9 °F (36.1 °C) (Temporal)   Ht 5' 9\" (1.753 m)   Wt 203 lb (92.1 kg)   BMI 29.98 kg/m²       PHYSICAL EXAM:  Constitutional: Patient in no acute distress; Neuro: alert and oriented to person place and time. Psych: Mood and affect normal.  Skin: Normal  Lungs: Respiratory effort normal  Cardiovascular:  Normal peripheral pulses  Abdomen: Soft, non-tender, non-distended with no CVA, flank pain, hepatosplenomegaly or hernia. Kidneys normal.  Bladder non-tender and not distended. Lymphatics: no palpable lymphadenopathy  Penis normal  Urethral meatus normal  Scrotal exam normal  Testicles normal bilaterally  Epididymis normal bilaterally  No evidence of inguinal hernia      Lab Results   Component Value Date    BUN 32 (H) 10/15/2020     Lab Results   Component Value Date    CREATININE 1.44 (H) 10/15/2020     Lab Results   Component Value Date    PSA 5.43 (H) 10/15/2020    PSA 4.42 (H) 04/06/2020    PSA 5.00 (H) 01/10/2020       ASSESSMENT:  This is a 76 y.o. male with the following diagnoses:   Diagnosis Orders   1. Elevated PSA  MN BIOPSY OF PROSTATE,NEEDLE/PUNCH    MN SONO GUIDE NEEDLE BIOPSY    US TRANSRECTAL   2. BPH with obstruction/lower urinary tract symptoms         PLAN:  Prostate biopsy today. Patient will follow up with us in 1 week for pathology results.

## 2020-11-02 NOTE — PATIENT INSTRUCTIONS
SURVEY:    You may be receiving a survey from "[employer] regarding your visit today. Please complete the survey to enable us to provide the highest quality of care to you and your family. If you cannot score us a very good on any question, please call the office to discuss how we could have made your experience a very good one. Thank you.

## 2020-11-04 LAB — SURGICAL PATHOLOGY REPORT: NORMAL

## 2020-11-09 ENCOUNTER — VIRTUAL VISIT (OUTPATIENT)
Dept: UROLOGY | Age: 75
End: 2020-11-09
Payer: MEDICARE

## 2020-11-09 PROCEDURE — 1123F ACP DISCUSS/DSCN MKR DOCD: CPT | Performed by: NURSE PRACTITIONER

## 2020-11-09 PROCEDURE — 99213 OFFICE O/P EST LOW 20 MIN: CPT | Performed by: NURSE PRACTITIONER

## 2020-11-09 PROCEDURE — 3017F COLORECTAL CA SCREEN DOC REV: CPT | Performed by: NURSE PRACTITIONER

## 2020-11-09 PROCEDURE — G8427 DOCREV CUR MEDS BY ELIG CLIN: HCPCS | Performed by: NURSE PRACTITIONER

## 2020-11-09 PROCEDURE — 4040F PNEUMOC VAC/ADMIN/RCVD: CPT | Performed by: NURSE PRACTITIONER

## 2020-11-09 ASSESSMENT — ENCOUNTER SYMPTOMS
ABDOMINAL PAIN: 0
NAUSEA: 0
BACK PAIN: 0
CONSTIPATION: 0
COUGH: 0
EYE PAIN: 0
WHEEZING: 0
EYE REDNESS: 0
SHORTNESS OF BREATH: 0
COLOR CHANGE: 0
VOMITING: 0

## 2020-11-09 NOTE — PROGRESS NOTES
2020    TELEHEALTH EVALUATION -- Audio/Visual (During AWPRW-73 public health emergency)    HPI:    Isaac James (:  1945) has requested an audio/video evaluation for the following concern(s):    History  2019 Referral from Dr. Ronen Winchester for elevated PSA of 6.13. He was a previous patient of Dr. Mabel Bejarano for elevated PSA. Several brothers with prostate cancer.      PSA  10/2020 - 5.43  2020 - 4.42  2020 - 5.00  2019 - 6.13  2018 - 3.11  2017 - 3.26  2016 - 2.43  10/2015 - 2.70  10/2014 - 2.35  10/2013 - 2.15    2020 Prostate biopsy for PSA of 5.43. Pathology: benign prostate tissue in all 12 cores    Today  Here today to review recent prostate biopsy results. Patient denies any dysuria, gross hematuria, fevers, nausea or vomiting following prostate biopsy. Pathology shows benign prostate tissue with 12 cores there is acute and chronic prostatitis. Patient denies frequency, urgency, nocturia, incontinence. Review of Systems   Constitutional: Negative for appetite change, chills and fever. Eyes: Negative for pain, redness and visual disturbance. Respiratory: Negative for cough, shortness of breath and wheezing. Cardiovascular: Negative for chest pain and leg swelling. Gastrointestinal: Negative for abdominal pain, constipation, nausea and vomiting. Genitourinary: Negative for decreased urine volume, difficulty urinating, discharge, dysuria, enuresis, flank pain, frequency, hematuria, penile pain, scrotal swelling, testicular pain and urgency. Musculoskeletal: Negative for back pain, joint swelling and myalgias. Skin: Negative for color change, rash and wound. Neurological: Negative for dizziness, tremors and numbness. Hematological: Negative for adenopathy. Does not bruise/bleed easily.        Allergies   Allergen Reactions    Lipitor [Atorvastatin] Other (See Comments)     Muscle aches and joint pain    Aldactone [Spironolactone] Hives    Crestor [employer] Calcium] Other (See Comments)     Muscle aches and joint pain    Lopid [Gemfibrozil]      hyperglycemia    Invokana [Canagliflozin] Rash    Januvia [Sitagliptin] Nausea And Vomiting       Prior to Visit Medications    Medication Sig Taking? Authorizing Provider   insulin glargine (LANTUS SOLOSTAR) 100 UNIT/ML injection pen Inject 32 Units into the skin 2 times daily  Almeta Landau, MD   carvedilol (COREG) 25 MG tablet Take 1 tablet by mouth 2 times daily (with meals)  Chelsea Xiong MD   eplerenone (INSPRA) 50 MG tablet Take 1 tablet by mouth daily  Chelsea Xiong MD   lisinopril (PRINIVIL;ZESTRIL) 10 MG tablet Take 10 mg by mouth daily  Historical Provider, MD   Continuous Blood Gluc Sensor (FREESTYLE CAILIN 14 DAY SENSOR) MISC 2 Devices by Does not apply route every 14 days for 28 days  Almeta Landau, MD   ezetimibe (ZETIA) 10 MG tablet Take 1 tablet by mouth daily  Scarlet Ramires MD   pantoprazole (PROTONIX) 40 MG tablet Take 1 tablet by mouth daily  Chasity Sanders MD   acyclovir (ZOVIRAX) 400 MG tablet 400 mg 2 times daily   Historical Provider, MD   glipiZIDE (GLUCOTROL XL) 5 MG extended release tablet Take 2 tablets by mouth twice daily  Patient taking differently: 5 mg 2 times daily TAKE 2 TABLETS BY MOUTH TWICE DAILY  Almeta Landau, MD   furosemide (LASIX) 20 MG tablet Take 1 tablet by mouth See Admin Instructions for 3 days Pt is to take one tablet by mouth on Monday, Wednesday and Friday.   Scarlet Ramires MD   ferrous sulfate 325 (65 Fe) MG tablet Take 325 mg by mouth 2 times daily  Historical Provider, MD   CONTOUR TEST strip USE 1 STRIP TO [facility] Gabe Pepper MD   nitroGLYCERIN (NITROSTAT) 0.4 MG SL tablet Place 1 tablet under the tongue every 5 minutes as needed for Chest pain  Patient not taking: Reported on 11/2/2020  Scarlet Ramires MD   Omega-3 Fatty Acids (FISH OIL) 1000 MG CAPS Take 1,000 mg by mouth daily   Historical Provider, MD   aspirin EC 81 MG cardiac cath may be indicated.  H/O tilt table evaluation 12/26/2017    Abnormal. Patients HR, BP response and symptoms were most consistent with dysautonomia. Combined with viligant maintenance of euvolemia and maintaining a moderate salft intake, pharmacologic treatment with SSRI such as lexapro and or mestinon among other treatments have shown some effectiveness in treatment of this condition    H/O tilt table evaluation 12/26/2017    Abnormal head upright tilt table study. The pt heart rate, blood pressure response and symptoms were most consistent with dysautonomia.  History of 24 hour EKG monitoring 8/14/14    Occasional PAC's and PVC's which appear to be at least moderately symptomatic.  History of 24 hour EKG monitoring 11/19/14    Event Monitor. Sinus rhythm and sinus bradycardia. Infrequent isolated PVC's    History of cardiovascular stress test 2/19/14    Relatively NL    History of cardiovascular stress test 03/21/2018    Normal myocardial perfusion. Global left ventricular systolic function was normal with an EF of 68%. Overall, these results are most consistent with a low risk scan.  History of coronary artery stent placement 04/2017    PTCA / Drug Eluting Stent:, CX and / or branches    History of CVA (cerebrovascular accident) without residual deficits 2014    Incidentally found on CT head. No known impairment now or in the past.    History of echocardiogram 2/18/14    LA mildly dilated, EF 55%, LV wall thickness is moderately increased, no definite wall motion abnormalilities, what appears to be a pacer wire is seen w/n the RA and RV, mild-mod TR, mild pulmonary hypertension.  History of Holter monitoring 12/29/2017    Rare PAC's and PVC's.      History of tilt table evaluation 8/12/14    Abnormal    Hyperlipidemia     Hypertension     Non critical Right Renal artery stenosis, native ([address] 10/2/2018    Non critical Right Renal artery stenosis, based on cath in 2016, Rt RA 30% stenosis    Pacemaker     non-functioning    S/P cardiac cath 04/10/2017    S/P coronary artery stent placement     Successful PTCA - HA Om1    SIRS (systemic inflammatory response syndrome) ([address] 5/19/2019    Type II or unspecified type diabetes mellitus without mention of complication, not stated as uncontrolled        Past Surgical History:   Procedure Laterality Date    BACK SURGERY      CARDIAC CATHETERIZATION Left 2/19/16    via right radial approach/ [address] [place]/ Dr. Annabelle Rodriguez  8/17/15    Liang/Charles/[place]/ [facility]    COLONOSCOPY  2010    COLONOSCOPY  2/19/15    -polyps,diverticulosis,hemorrhoids    COLONOSCOPY  05/23/2018    Dr Avalos [place]    COLONOSCOPY N/A 5/23/2018    COLONOSCOPY POLYPECTOMY SNARE/COLD BIOPSY  cold snare  and  hot snare performed by Daniel Smith MD at [address] [facility]  10/30/2020    with Dr. Mary Proctor 10/30/2020    Napolean Martin, NOT HIGH RISK performed by Donald Carvalho DO at [address] [facility]      left eye    PACEMAKER INSERTION      PACEMAKER PLACEMENT      UPPER GASTROINTESTINAL ENDOSCOPY  05/28/2019    Dr. Arely Barnard H-Pylori)duodenal bulbar/antral erythema    UPPER GASTROINTESTINAL ENDOSCOPY N/A 5/28/2019    EGD BIOPSY, clotest performed by Daniel Smith MD at [address] [facility] 10/30/2020    EGD ESOPHAGOGASTRODUODENOSCOPY performed by Donald Carvalho DO at [address] History   Problem Relation Age of Onset   [facility] Cancer Mother         breast    Cancer Father         lung       PHYSICAL EXAMINATION:  Constitutional: [x] Appears well-developed and well-nourished [x] No apparent distress      [] Abnormal-   Mental status  [x] Alert and awake  [x] Oriented to person/place/time [x]Able to follow commands      Eyes:  EOM    [x]  Normal  [] Abnormal-  Sclera  [x]  Normal  [] Abnormal -         Discharge [x] emergency medical treatment and/or call 911 if deemed necessary. Services were provided through a video synchronous discussion virtually to substitute for in-person clinic visit. The patient was located in their home. The provider was located in the office. --REY Haas CNP on 11/9/2020 at 5:16 PM    An electronic signature was used to authenticate this note.

## 2020-11-18 ENCOUNTER — OFFICE VISIT (OUTPATIENT)
Dept: ONCOLOGY | Age: 75
End: 2020-11-18
Payer: MEDICARE

## 2020-11-18 VITALS
HEIGHT: 69 IN | DIASTOLIC BLOOD PRESSURE: 77 MMHG | RESPIRATION RATE: 18 BRPM | SYSTOLIC BLOOD PRESSURE: 163 MMHG | TEMPERATURE: 98.2 F | WEIGHT: 204.8 LBS | HEART RATE: 57 BPM | BODY MASS INDEX: 30.33 KG/M2

## 2020-11-18 PROCEDURE — 1036F TOBACCO NON-USER: CPT | Performed by: INTERNAL MEDICINE

## 2020-11-18 PROCEDURE — 1123F ACP DISCUSS/DSCN MKR DOCD: CPT | Performed by: INTERNAL MEDICINE

## 2020-11-18 PROCEDURE — 99214 OFFICE O/P EST MOD 30 MIN: CPT | Performed by: INTERNAL MEDICINE

## 2020-11-18 PROCEDURE — G8482 FLU IMMUNIZE ORDER/ADMIN: HCPCS | Performed by: INTERNAL MEDICINE

## 2020-11-18 PROCEDURE — 99211 OFF/OP EST MAY X REQ PHY/QHP: CPT | Performed by: INTERNAL MEDICINE

## 2020-11-18 PROCEDURE — G8427 DOCREV CUR MEDS BY ELIG CLIN: HCPCS | Performed by: INTERNAL MEDICINE

## 2020-11-18 PROCEDURE — 4040F PNEUMOC VAC/ADMIN/RCVD: CPT | Performed by: INTERNAL MEDICINE

## 2020-11-18 PROCEDURE — 3017F COLORECTAL CA SCREEN DOC REV: CPT | Performed by: INTERNAL MEDICINE

## 2020-11-18 PROCEDURE — G8417 CALC BMI ABV UP PARAM F/U: HCPCS | Performed by: INTERNAL MEDICINE

## 2020-11-18 RX ORDER — PANTOPRAZOLE SODIUM 40 MG/1
40 TABLET, DELAYED RELEASE ORAL DAILY
Qty: 30 TABLET | Refills: 3 | Status: SHIPPED | OUTPATIENT
Start: 2020-11-18 | End: 2021-03-08

## 2020-11-18 NOTE — PROGRESS NOTES
DIAGNOSIS:   1. Iron deficiency anemia  2. Evidence of intermittent GI bleed  CURRENT THERAPY:  Work-up in progress  BRIEF CASE HISTORY:   Jake Ramos is a very pleasant 76 y.o. male who is referred to us for iron deficiency anemia. He has been followed by cardiology because of congestive heart failure. The patient symptoms have been worsening and it was attributed to worsening hemoglobin. Looking at his hemoglobin over the last few years. He had episodes where his hemoglobin will drop by 1 to 2 g and it is a sudden drop. Followed by slow recovery. He never required blood transfusion but he was quite symptomatic. The patient reports epigastric pain followed by dark stool for a few days happened just before his anemia. He was seen by GI last year and EGD was done. There was some inflammation in the stomach but no active bleeding was appreciated. Patient received IV iron and it took him a while to feel the improvement. Repeat GI work-up was negative  He feels well today. He has more energy. No nausea or vomiting.   No diarrhea      PAST MEDICAL HISTORY: has a past medical history of Acute MI ([facility] [address] Acute renal failure with tubular necrosis ([facility] [address] Anemia, CAD (coronary artery disease), Cerebral artery occlusion with cerebral infarction [facility]), CHF (congestive heart failure) ([facility] [address] Chronic back pain, Closed fracture of lumbar vertebra without mention of spinal cord injury, Displacement of intervertebral disc, site unspecified, without myelopathy, H/O cardiac catheterization, H/O cardiovascular stress test, H/O tilt table evaluation, H/O tilt table evaluation, History of 24 hour EKG monitoring, History of 24 hour EKG monitoring, History of cardiovascular stress test, History of cardiovascular stress test, History of coronary artery stent placement, History of CVA (cerebrovascular accident) without residual deficits, History of echocardiogram, History of Holter monitoring, History of tilt table evaluation, Hyperlipidemia, Hypertension, Non critical Right Renal artery stenosis, native [facility]), Pacemaker, S/P cardiac cath, S/P coronary artery stent placement, SIRS (systemic inflammatory response syndrome) ([facility] [address] and Type II or unspecified type diabetes mellitus without mention of complication, not stated as uncontrolled. PAST SURGICAL HISTORY: has a past surgical history that includes Pacemaker insertion; back surgery; Cholecystectomy (8/17/15); Corneal transplant; Cardiac catheterization (Left, 2/19/16); pacemaker placement; Colonoscopy (2010); Colonoscopy (2/19/15); Colonoscopy (05/23/2018); Colonoscopy (N/A, 5/23/2018); Upper gastrointestinal endoscopy (05/28/2019); Upper gastrointestinal endoscopy (N/A, 5/28/2019); Colonoscopy (10/30/2020); Colonoscopy (N/A, 10/30/2020); and Upper gastrointestinal endoscopy (N/A, 10/30/2020). CURRENT MEDICATIONS:  has a current medication list which includes the following prescription(s): glipizide, lantus solostar, carvedilol, eplerenone, lisinopril, ezetimibe, pantoprazole, acyclovir, ferrous sulfate, contour test, fish oil, aspirin ec, susan microlet lancets, pen needles, prednisolone acetate, furosemide, and nitroglycerin. ALLERGIES:  is allergic to lipitor [atorvastatin]; aldactone [spironolactone]; crestor [rosuvastatin calcium]; lopid [gemfibrozil]; invokana [canagliflozin]; and januvia [sitagliptin]. FAMILY HISTORY: Negative for any hematological or oncological conditions. SOCIAL HISTORY:  reports that he quit smoking about 40 years ago. His smoking use included cigarettes. He has a 12.00 pack-year smoking history. He has never used smokeless tobacco. He reports that he does not drink alcohol or use drugs. REVIEW OF SYSTEMS:   General: no fever or night sweats, Weight is stable.   ENT: No double or blurred vision, no tinnitus or hearing problem, no dysphagia or sore throat   Respiratory: No chest pain, no shortness of breath, no cough or hemoptysis. Cardiovascular: Denies chest pain, PND or orthopnea. No L E swelling or palpitations. Gastrointestinal:    No nausea or vomiting, abdominal pain, diarrhea or constipation. Genitourinary: Denies dysuria, hematuria, frequency, urgency or incontinence. Neurological: Denies headaches, decreased LOC, no sensory or motor focal deficits. Musculoskeletal:  No arthralgia no back pain or joint swelling. Skin: There are no rashes or bleeding. Psychiatric:  No anxiety, no depression. Endocrine: no diabetes or thyroid disease. Hematologic: no bleeding , no adenopathy. PHYSICAL EXAM: Shows a well appearing 76y.o.-year-old male who is not in pain or distress. Vital Signs: Blood pressure (!) 163/77, pulse 57, temperature 98.2 °F (36.8 °C), temperature source Temporal, resp. rate 18, height 5' 9\" (1.753 m), weight 204 lb 12.8 oz (92.9 kg). HEENT: Normocephalic and atraumatic. Pupils are equal, round, reactive to light and accommodation. Extraocular muscles are intact. Neck: Showed no JVD, no carotid bruit . Lungs: Clear to auscultation bilaterally. Heart: Regular without any murmur. Abdomen: Soft, nontender. No hepatosplenomegaly. Extremities: Lower extremities show no edema, clubbing, or cyanosis. Breasts: Examination not done today. Neuro exam: intact cranial nerves bilaterally no motor or sensory deficit, gait is normal. Lymphatic: no adenopathy appreciated in the supraclavicular, axillary, cervical or inguinal area    REVIEW OF LABORATORY DATA:   Lab Results   Component Value Date    WBC 7.6 10/15/2020    HGB 13.0 10/15/2020    HCT 38.8 (L) 10/15/2020    MCV 90.0 10/15/2020     10/15/2020     Lab Results   Component Value Date    IRON 48 (L) 09/09/2020    TIBC 235 (L) 09/09/2020    FERRITIN 225 09/09/2020       REVIEW OF RADIOLOGICAL RESULTS:     IMPRESSION:   1. Iron deficiency anemia  2. Clinical evidence of GI bleeding  PLAN:   The patient continues to do very well.   Hemoglobin and iron studies are normal.  He is feeling much more energetic. There is no active bleeding. Colonoscopy and EGD were negative. Will consider capsule endoscopy but he is not clinically bleeding now. I decided to continue observing him and will refer him for GI work-up and capsule endoscopy if there is any further clinical bleeding.   I asked the patient stop taking the iron watch his stool for blood or melena  Return in 6 months

## 2020-11-24 ENCOUNTER — HOSPITAL ENCOUNTER (OUTPATIENT)
Dept: LAB | Age: 75
Setting detail: SPECIMEN
Discharge: HOME | End: 2020-11-24
Payer: MEDICARE

## 2020-11-24 PROCEDURE — U0003 INFECTIOUS AGENT DETECTION BY NUCLEIC ACID (DNA OR RNA); SEVERE ACUTE RESPIRATORY SYNDROME CORONAVIRUS 2 (SARS-COV-2) (CORONAVIRUS DISEASE [COVID-19]), AMPLIFIED PROBE TECHNIQUE, MAKING USE OF HIGH THROUGHPUT TECHNOLOGIES AS DESCRIBED BY CMS-2020-01-R: HCPCS

## 2020-11-24 PROCEDURE — C9803 HOPD COVID-19 SPEC COLLECT: HCPCS

## 2020-11-27 LAB — SARS-COV-2, NAA: DETECTED

## 2020-11-28 ENCOUNTER — TELEPHONE (OUTPATIENT)
Dept: PRIMARY CARE CLINIC | Age: 75
End: 2020-11-28

## 2020-12-16 ENCOUNTER — HOSPITAL ENCOUNTER (OUTPATIENT)
Dept: NON INVASIVE DIAGNOSTICS | Age: 75
Discharge: HOME | End: 2020-12-16
Payer: MEDICARE

## 2020-12-16 ENCOUNTER — OFFICE VISIT (OUTPATIENT)
Dept: CARDIOLOGY | Age: 75
End: 2020-12-16
Payer: MEDICARE

## 2020-12-16 VITALS
WEIGHT: 204.6 LBS | BODY MASS INDEX: 30.3 KG/M2 | OXYGEN SATURATION: 100 % | HEIGHT: 69 IN | HEART RATE: 52 BPM | SYSTOLIC BLOOD PRESSURE: 176 MMHG | DIASTOLIC BLOOD PRESSURE: 65 MMHG | RESPIRATION RATE: 16 BRPM

## 2020-12-16 PROCEDURE — 99214 OFFICE O/P EST MOD 30 MIN: CPT | Performed by: FAMILY MEDICINE

## 2020-12-16 PROCEDURE — 93017 CV STRESS TEST TRACING ONLY: CPT

## 2020-12-16 PROCEDURE — 93225 XTRNL ECG REC<48 HRS REC: CPT

## 2020-12-16 PROCEDURE — G8417 CALC BMI ABV UP PARAM F/U: HCPCS | Performed by: FAMILY MEDICINE

## 2020-12-16 PROCEDURE — 4040F PNEUMOC VAC/ADMIN/RCVD: CPT | Performed by: FAMILY MEDICINE

## 2020-12-16 PROCEDURE — 1036F TOBACCO NON-USER: CPT | Performed by: FAMILY MEDICINE

## 2020-12-16 PROCEDURE — 3017F COLORECTAL CA SCREEN DOC REV: CPT | Performed by: FAMILY MEDICINE

## 2020-12-16 PROCEDURE — 1123F ACP DISCUSS/DSCN MKR DOCD: CPT | Performed by: FAMILY MEDICINE

## 2020-12-16 PROCEDURE — G8427 DOCREV CUR MEDS BY ELIG CLIN: HCPCS | Performed by: FAMILY MEDICINE

## 2020-12-16 PROCEDURE — G8482 FLU IMMUNIZE ORDER/ADMIN: HCPCS | Performed by: FAMILY MEDICINE

## 2020-12-16 PROCEDURE — 93226 XTRNL ECG REC<48 HR SCAN A/R: CPT

## 2020-12-16 PROCEDURE — 99211 OFF/OP EST MAY X REQ PHY/QHP: CPT | Performed by: FAMILY MEDICINE

## 2020-12-16 RX ORDER — LISINOPRIL 10 MG/1
10 TABLET ORAL DAILY
Qty: 90 TABLET | Refills: 3 | Status: SHIPPED | OUTPATIENT
Start: 2020-12-16 | End: 2021-06-28

## 2020-12-16 RX ORDER — CARVEDILOL 25 MG/1
25 TABLET ORAL 2 TIMES DAILY WITH MEALS
Qty: 180 TABLET | Refills: 3 | Status: SHIPPED | OUTPATIENT
Start: 2020-12-16 | End: 2021-06-28 | Stop reason: ALTCHOICE

## 2020-12-16 NOTE — PATIENT INSTRUCTIONS
SURVEY:    You may be receiving a survey from [employer] regarding your visit today. Please complete the survey to enable us to provide the highest quality of care to you and your family. If you cannot score us a very good on any question, please call the office to discuss how we could have made your experience a very good one. Thank you.

## 2020-12-18 ENCOUNTER — HOSPITAL ENCOUNTER (OUTPATIENT)
Dept: NON INVASIVE DIAGNOSTICS | Age: 75
Discharge: HOME | End: 2020-12-18
Payer: MEDICARE

## 2020-12-18 LAB
LV EF: 60 %
LVEF MODALITY: NORMAL

## 2020-12-18 PROCEDURE — 93306 TTE W/DOPPLER COMPLETE: CPT

## 2020-12-21 ENCOUNTER — TELEPHONE (OUTPATIENT)
Dept: CARDIOLOGY | Age: 75
End: 2020-12-21

## 2020-12-21 NOTE — TELEPHONE ENCOUNTER
----- Message from Mandie Hogue MD sent at 12/20/2020 11:43 AM EST -----  Let Mr. Jose Alejandro Orona know their test result was ok. Thanks.

## 2020-12-24 LAB
ACQUISITION DURATION: NORMAL S
AVERAGE HEART RATE: 58 BPM
EKG DIAGNOSIS: NORMAL
HOLTER MAX HEART RATE: 81 BPM
HOOKUP DATE: NORMAL
HOOKUP TIME: NORMAL
MAX HEART RATE TIME/DATE: NORMAL
MIN HEART RATE TIME/DATE: NORMAL
MIN HEART RATE: 47 BPM
NUMBER OF QRS COMPLEXES: NORMAL
NUMBER OF SUPRAVENTRICULAR BEATS IN RUNS: 0
NUMBER OF SUPRAVENTRICULAR COUPLETS: 0
NUMBER OF SUPRAVENTRICULAR ECTOPICS: 32
NUMBER OF SUPRAVENTRICULAR ISOLATED BEATS: 33
NUMBER OF SUPRAVENTRICULAR RUNS: 0
NUMBER OF VENTRICULAR BEATS IN RUNS: 0
NUMBER OF VENTRICULAR BIGEMINAL CYCLES: 0
NUMBER OF VENTRICULAR COUPLETS: 1
NUMBER OF VENTRICULAR ECTOPICS: 75
NUMBER OF VENTRICULAR ISOLATED BEATS: 73
NUMBER OF VENTRICULAR RUNS: 0

## 2020-12-28 ENCOUNTER — TELEPHONE (OUTPATIENT)
Dept: CARDIOLOGY | Age: 75
End: 2020-12-28

## 2020-12-28 NOTE — TELEPHONE ENCOUNTER
----- Message from Babar Morris MD sent at 12/25/2020 12:00 AM EST -----  Let Mr. Shruti Garcia know their test result was ok. Will discuss at next visit. Thanks.

## 2021-01-06 NOTE — PROGRESS NOTES
met   Short term goal 2: Patient will tolerate 30 min of aquatic exercise-met  [x]Met []Partially met  []Not met      []Met   []Partially met  []Not met      []Met   []Partially met  []Not met     Long Term Goals - Time Frame for Long term goals : 4 weeks  Long term goal 1: Patient will be independent and compliant with a HEP-met [x]Met  []Partially met  []Not met   Long term goal 2: Patient will improve R LE strength to 5/5 in all major joints and planes-progressing []Met  [x]Partially met  []Not met   Long term goal 3: Patient will report decreased pain to <5/10 at worst-progressing []Met  [x]Partially met  []Not met   Long term goal 4: Patient will be able to ambulate 300' with an exacerbation of symptoms. -progressing []Met  [x]Partially met  []Not met     []Met  []Partially met  []Not met       Minutes Tracking:  Time In: [address]  Time Out: [address] [facility]  [facility] [id number]     Date: 4/29/2019 Minoxidil Pregnancy And Lactation Text: This medication has not been assigned a Pregnancy Risk Category but animal studies failed to show danger with the topical medication. It is unknown if the medication is excreted in breast milk. Odomzo Pregnancy And Lactation Text: This medication is Pregnancy Category X and is absolutely contraindicated during pregnancy. It is unknown if it is excreted in breast milk. Stelara Counseling:  I discussed with the patient the risks of ustekinumab including but not limited to immunosuppression, malignancy, posterior leukoencephalopathy syndrome, and serious infections.  The patient understands that monitoring is required including a PPD at baseline and must alert us or the primary physician if symptoms of infection or other concerning signs are noted. Quinolones Pregnancy And Lactation Text: This medication is Pregnancy Category C and it isn't know if it is safe during pregnancy. It is also excreted in breast milk. Quinolones Counseling:  I discussed with the patient the risks of fluoroquinolones including but not limited to GI upset, allergic reaction, drug rash, diarrhea, dizziness, photosensitivity, yeast infections, liver function test abnormalities, tendonitis/tendon rupture. Odomzo Counseling- I discussed with the patient the risks of Odomzo including but not limited to nausea, vomiting, diarrhea, constipation, weight loss, changes in the sense of taste, decreased appetite, muscle spasms, and hair loss.  The patient verbalized understanding of the proper use and possible adverse effects of Odomzo.  All of the patient's questions and concerns were addressed. High Dose Vitamin A Pregnancy And Lactation Text: High dose vitamin A therapy is contraindicated during pregnancy and breast feeding. Arava Counseling:  Patient counseled regarding adverse effects of Arava including but not limited to nausea, vomiting, abnormalities in liver function tests. Patients may develop mouth sores, rash, diarrhea, and abnormalities in blood counts. The patient understands that monitoring is required including LFTs and blood counts.  There is a rare possibility of scarring of the liver and lung problems that can occur when taking methotrexate. Persistent nausea, loss of appetite, pale stools, dark urine, cough, and shortness of breath should be reported immediately. Patient advised to discontinue Arava treatment and consult with a physician prior to attempting conception. The patient will have to undergo a treatment to eliminate Arava from the body prior to conception. Cosentyx Pregnancy And Lactation Text: This medication is Pregnancy Category B and is considered safe during pregnancy. It is unknown if this medication is excreted in breast milk. Cyclosporine Pregnancy And Lactation Text: This medication is Pregnancy Category C and it isn't know if it is safe during pregnancy. This medication is excreted in breast milk. Otezla Counseling: The side effects of Otezla were discussed with the patient, including but not limited to worsening or new depression, weight loss, diarrhea, nausea, upper respiratory tract infection, and headache. Patient instructed to call the office should any adverse effect occur.  The patient verbalized understanding of the proper use and possible adverse effects of Otezla.  All the patient's questions and concerns were addressed. Rifampin Counseling: I discussed with the patient the risks of rifampin including but not limited to liver damage, kidney damage, red-orange body fluids, nausea/vomiting and severe allergy. Benzoyl Peroxide Counseling: Patient counseled that medicine may cause skin irritation and bleach clothing.  In the event of skin irritation, the patient was advised to reduce the amount of the drug applied or use it less frequently.   The patient verbalized understanding of the proper use and possible adverse effects of benzoyl peroxide.  All of the patient's questions and concerns were addressed. Dupixent Pregnancy And Lactation Text: This medication likely crosses the placenta but the risk for the fetus is uncertain. This medication is excreted in breast milk. Dupixent Counseling: I discussed with the patient the risks of dupilumab including but not limited to eye infection and irritation, cold sores, injection site reactions, worsening of asthma, allergic reactions and increased risk of parasitic infection.  Live vaccines should be avoided while taking dupilumab. Dupilumab will also interact with certain medications such as warfarin and cyclosporine. The patient understands that monitoring is required and they must alert us or the primary physician if symptoms of infection or other concerning signs are noted. Methotrexate Counseling:  Patient counseled regarding adverse effects of methotrexate including but not limited to nausea, vomiting, abnormalities in liver function tests. Patients may develop mouth sores, rash, diarrhea, and abnormalities in blood counts. The patient understands that monitoring is required including LFT's and blood counts.  There is a rare possibility of scarring of the liver and lung problems that can occur when taking methotrexate. Persistent nausea, loss of appetite, pale stools, dark urine, cough, and shortness of breath should be reported immediately. Patient advised to discontinue methotrexate treatment at least three months before attempting to become pregnant.  I discussed the need for folate supplements while taking methotrexate.  These supplements can decrease side effects during methotrexate treatment. The patient verbalized understanding of the proper use and possible adverse effects of methotrexate.  All of the patient's questions and concerns were addressed. Cimetidine Counseling:  I discussed with the patient the risks of Cimetidine including but not limited to gynecomastia, headache, diarrhea, nausea, drowsiness, arrhythmias, pancreatitis, skin rashes, psychosis, bone marrow suppression and kidney toxicity. Picato Counseling:  I discussed with the patient the risks of Picato including but not limited to erythema, scaling, itching, weeping, crusting, and pain. Rifampin Pregnancy And Lactation Text: This medication is Pregnancy Category C and it isn't know if it is safe during pregnancy. It is also excreted in breast milk and should not be used if you are breast feeding. Otezla Pregnancy And Lactation Text: This medication is Pregnancy Category C and it isn't known if it is safe during pregnancy. It is unknown if it is excreted in breast milk. Enbrel Counseling:  I discussed with the patient the risks of etanercept including but not limited to myelosuppression, immunosuppression, autoimmune hepatitis, demyelinating diseases, lymphoma, and infections.  The patient understands that monitoring is required including a PPD at baseline and must alert us or the primary physician if symptoms of infection or other concerning signs are noted. Benzoyl Peroxide Pregnancy And Lactation Text: This medication is Pregnancy Category C. It is unknown if benzoyl peroxide is excreted in breast milk. Clofazimine Pregnancy And Lactation Text: This medication is Pregnancy Category C and isn't considered safe during pregnancy. It is excreted in breast milk. Azithromycin Pregnancy And Lactation Text: This medication is considered safe during pregnancy and is also secreted in breast milk. Azithromycin Counseling:  I discussed with the patient the risks of azithromycin including but not limited to GI upset, allergic reaction, drug rash, diarrhea, and yeast infections. Methotrexate Pregnancy And Lactation Text: This medication is Pregnancy Category X and is known to cause fetal harm. This medication is excreted in breast milk. Clofazimine Counseling:  I discussed with the patient the risks of clofazimine including but not limited to skin and eye pigmentation, liver damage, nausea/vomiting, gastrointestinal bleeding and allergy. Cimetidine Pregnancy And Lactation Text: This medication is Pregnancy Category B and is considered safe during pregnancy. It is also excreted in breast milk and breast feeding isn't recommended. Picato Pregnancy And Lactation Text: This medication is Pregnancy Category C. It is unknown if this medication is excreted in breast milk. Taltz Counseling: I discussed with the patient the risks of ixekizumab including but not limited to immunosuppression, serious infections, worsening of inflammatory bowel disease and drug reactions.  The patient understands that monitoring is required including a PPD at baseline and must alert us or the primary physician if symptoms of infection or other concerning signs are noted. Bactrim Counseling:  I discussed with the patient the risks of sulfa antibiotics including but not limited to GI upset, allergic reaction, drug rash, diarrhea, dizziness, photosensitivity, and yeast infections.  Rarely, more serious reactions can occur including but not limited to aplastic anemia, agranulocytosis, methemoglobinemia, blood dyscrasias, liver or kidney failure, lung infiltrates or desquamative/blistering drug rashes. Colchicine Counseling:  Patient counseled regarding adverse effects including but not limited to stomach upset (nausea, vomiting, stomach pain, or diarrhea).  Patient instructed to limit alcohol consumption while taking this medication.  Colchicine may reduce blood counts especially with prolonged use.  The patient understands that monitoring of kidney function and blood counts may be required, especially at baseline. The patient verbalized understanding of the proper use and possible adverse effects of colchicine.  All of the patient's questions and concerns were addressed. Protopic Pregnancy And Lactation Text: This medication is Pregnancy Category C. It is unknown if this medication is excreted in breast milk when applied topically. Doxepin Pregnancy And Lactation Text: This medication is Pregnancy Category C and it isn't known if it is safe during pregnancy. It is also excreted in breast milk and breast feeding isn't recommended. Doxepin Counseling:  Patient advised that the medication is sedating and not to drive a car after taking this medication. Patient informed of potential adverse effects including but not limited to dry mouth, urinary retention, and blurry vision.  The patient verbalized understanding of the proper use and possible adverse effects of doxepin.  All of the patient's questions and concerns were addressed. Protopic Counseling: Patient may experience a mild burning sensation during topical application. Protopic is not approved in children less than 2 years of age. There have been case reports of hematologic and skin malignancies in patients using topical calcineurin inhibitors although causality is questionable. Prednisone Counseling:  I discussed with the patient the risks of prolonged use of prednisone including but not limited to weight gain, insomnia, osteoporosis, mood changes, diabetes, susceptibility to infection, glaucoma and high blood pressure.  In cases where prednisone use is prolonged, patients should be monitored with blood pressure checks, serum glucose levels and an eye exam.  Additionally, the patient may need to be placed on GI prophylaxis, PCP prophylaxis, and calcium and vitamin D supplementation and/or a bisphosphonate.  The patient verbalized understanding of the proper use and the possible adverse effects of prednisone.  All of the patient's questions and concerns were addressed. Taltz Pregnancy And Lactation Text: The risk during pregnancy and breastfeeding is uncertain with this medication. Sarecycline Counseling: Patient advised regarding possible photosensitivity and discoloration of the teeth, skin, lips, tongue and gums.  Patient instructed to avoid sunlight, if possible.  When exposed to sunlight, patients should wear protective clothing, sunglasses, and sunscreen.  The patient was instructed to call the office immediately if the following severe adverse effects occur:  hearing changes, easy bruising/bleeding, severe headache, or vision changes.  The patient verbalized understanding of the proper use and possible adverse effects of sarecycline.  All of the patient's questions and concerns were addressed. Carac Counseling:  I discussed with the patient the risks of Carac including but not limited to erythema, scaling, itching, weeping, crusting, and pain. Oxybutynin Counseling:  I discussed with the patient the risks of oxybutynin including but not limited to skin rash, drowsiness, dry mouth, difficulty urinating, and blurred vision. Bactrim Pregnancy And Lactation Text: This medication is Pregnancy Category D and is known to cause fetal risk.  It is also excreted in breast milk. Oxybutynin Pregnancy And Lactation Text: This medication is Pregnancy Category B and is considered safe during pregnancy. It is unknown if it is excreted in breast milk. Hydroxyzine Counseling: Patient advised that the medication is sedating and not to drive a car after taking this medication.  Patient informed of potential adverse effects including but not limited to dry mouth, urinary retention, and blurry vision.  The patient verbalized understanding of the proper use and possible adverse effects of hydroxyzine.  All of the patient's questions and concerns were addressed. Solaraze Counseling:  I discussed with the patient the risks of Solaraze including but not limited to erythema, scaling, itching, weeping, crusting, and pain. Sarecycline Pregnancy And Lactation Text: This medication is Pregnancy Category D and not consider safe during pregnancy. It is also excreted in breast milk. Carac Pregnancy And Lactation Text: This medication is Pregnancy Category X and contraindicated in pregnancy and in women who may become pregnant. It is unknown if this medication is excreted in breast milk. Humira Counseling:  I discussed with the patient the risks of adalimumab including but not limited to myelosuppression, immunosuppression, autoimmune hepatitis, demyelinating diseases, lymphoma, and serious infections.  The patient understands that monitoring is required including a PPD at baseline and must alert us or the primary physician if symptoms of infection or other concerning signs are noted. Tremfya Counseling: I discussed with the patient the risks of guselkumab including but not limited to immunosuppression, serious infections, and drug reactions.  The patient understands that monitoring is required including a PPD at baseline and must alert us or the primary physician if symptoms of infection or other concerning signs are noted. Dapsone Counseling: I discussed with the patient the risks of dapsone including but not limited to hemolytic anemia, agranulocytosis, rashes, methemoglobinemia, kidney failure, peripheral neuropathy, headaches, GI upset, and liver toxicity.  Patients who start dapsone require monitoring including baseline LFTs and weekly CBCs for the first month, then every month thereafter.  The patient verbalized understanding of the proper use and possible adverse effects of dapsone.  All of the patient's questions and concerns were addressed. Hydroxyzine Pregnancy And Lactation Text: This medication is not safe during pregnancy and should not be taken. It is also excreted in breast milk and breast feeding isn't recommended. Tetracycline Counseling: Patient counseled regarding possible photosensitivity and increased risk for sunburn.  Patient instructed to avoid sunlight, if possible.  When exposed to sunlight, patients should wear protective clothing, sunglasses, and sunscreen.  The patient was instructed to call the office immediately if the following severe adverse effects occur:  hearing changes, easy bruising/bleeding, severe headache, or vision changes.  The patient verbalized understanding of the proper use and possible adverse effects of tetracycline.  All of the patient's questions and concerns were addressed. Patient understands to avoid pregnancy while on therapy due to potential birth defects. 5-Fu Counseling: 5-Fluorouracil Counseling:  I discussed with the patient the risks of 5-fluorouracil including but not limited to erythema, scaling, itching, weeping, crusting, and pain. Birth Control Pills Counseling: Birth Control Pill Counseling: I discussed with the patient the potential side effects of OCPs including but not limited to increased risk of stroke, heart attack, thrombophlebitis, deep venous thrombosis, hepatic adenomas, breast changes, GI upset, headaches, and depression.  The patient verbalized understanding of the proper use and possible adverse effects of OCPs. All of the patient's questions and concerns were addressed. Cephalexin Counseling: I counseled the patient regarding use of cephalexin as an antibiotic for prophylactic and/or therapeutic purposes. Cephalexin (commonly prescribed under brand name Keflex) is a cephalosporin antibiotic which is active against numerous classes of bacteria, including most skin bacteria. Side effects may include nausea, diarrhea, gastrointestinal upset, rash, hives, yeast infections, and in rare cases, hepatitis, kidney disease, seizures, fever, confusion, neurologic symptoms, and others. Patients with severe allergies to penicillin medications are cautioned that there is about a 10% incidence of cross-reactivity with cephalosporins. When possible, patients with penicillin allergies should use alternatives to cephalosporins for antibiotic therapy. Dapsone Pregnancy And Lactation Text: This medication is Pregnancy Category C and is not considered safe during pregnancy or breast feeding. Topical Retinoid counseling:  Patient advised to apply a pea-sized amount only at bedtime and wait 30 minutes after washing their face before applying.  If too drying, patient may add a non-comedogenic moisturizer. The patient verbalized understanding of the proper use and possible adverse effects of retinoids.  All of the patient's questions and concerns were addressed. Xelshreyaz Pregnancy And Lactation Text: This medication is Pregnancy Category D and is not considered safe during pregnancy.  The risk during breast feeding is also uncertain. Clindamycin Counseling: I counseled the patient regarding use of clindamycin as an antibiotic for prophylactic and/or therapeutic purposes. Clindamycin is active against numerous classes of bacteria, including skin bacteria. Side effects may include nausea, diarrhea, gastrointestinal upset, rash, hives, yeast infections, and in rare cases, colitis. Birth Control Pills Pregnancy And Lactation Text: This medication should be avoided if pregnant and for the first 30 days post-partum. Solaraze Pregnancy And Lactation Text: This medication is Pregnancy Category B and is considered safe. There is some data to suggest avoiding during the third trimester. It is unknown if this medication is excreted in breast milk. Cephalexin Pregnancy And Lactation Text: This medication is Pregnancy Category B and considered safe during pregnancy.  It is also excreted in breast milk but can be used safely for shorter doses. Xeljanz Counseling: I discussed with the patient the risks of Xeljanz therapy including increased risk of infection, liver issues, headache, diarrhea, or cold symptoms. Live vaccines should be avoided. They were instructed to call if they have any problems. Ilumya Counseling: I discussed with the patient the risks of tildrakizumab including but not limited to immunosuppression, malignancy, posterior leukoencephalopathy syndrome, and serious infections.  The patient understands that monitoring is required including a PPD at baseline and must alert us or the primary physician if symptoms of infection or other concerning signs are noted. Albendazole Counseling:  I discussed with the patient the risks of albendazole including but not limited to cytopenia, kidney damage, nausea/vomiting and severe allergy.  The patient understands that this medication is being used in an off-label manner. Detail Level: Detailed Spironolactone Pregnancy And Lactation Text: This medication can cause feminization of the male fetus and should be avoided during pregnancy. The active metabolite is also found in breast milk. Xolair Counseling:  Patient informed of potential adverse effects including but not limited to fever, muscle aches, rash and allergic reactions.  The patient verbalized understanding of the proper use and possible adverse effects of Xolair.  All of the patient's questions and concerns were addressed. Fluconazole Counseling:  Patient counseled regarding adverse effects of fluconazole including but not limited to headache, diarrhea, nausea, upset stomach, liver function test abnormalities, taste disturbance, and stomach pain.  There is a rare possibility of liver failure that can occur when taking fluconazole.  The patient understands that monitoring of LFTs and kidney function test may be required, especially at baseline. The patient verbalized understanding of the proper use and possible adverse effects of fluconazole.  All of the patient's questions and concerns were addressed. Drysol Pregnancy And Lactation Text: This medication is considered safe during pregnancy and breast feeding. Spironolactone Counseling: Patient advised regarding risks of diarrhea, abdominal pain, hyperkalemia, birth defects (for female patients), liver toxicity and renal toxicity. The patient may need blood work to monitor liver and kidney function and potassium levels while on therapy. The patient verbalized understanding of the proper use and possible adverse effects of spironolactone.  All of the patient's questions and concerns were addressed. Drysol Counseling:  I discussed with the patient the risks of drysol/aluminum chloride including but not limited to skin rash, itching, irritation, burning. Erivedge Counseling- I discussed with the patient the risks of Erivedge including but not limited to nausea, vomiting, diarrhea, constipation, weight loss, changes in the sense of taste, decreased appetite, muscle spasms, and hair loss.  The patient verbalized understanding of the proper use and possible adverse effects of Erivedge.  All of the patient's questions and concerns were addressed. Xolair Pregnancy And Lactation Text: This medication is Pregnancy Category B and is considered safe during pregnancy. This medication is excreted in breast milk. SSKI Counseling:  I discussed with the patient the risks of SSKI including but not limited to thyroid abnormalities, metallic taste, GI upset, fever, headache, acne, arthralgias, paraesthesias, lymphadenopathy, easy bleeding, arrhythmias, and allergic reaction. Tazorac Counseling:  Patient advised that medication is irritating and drying.  Patient may need to apply sparingly and wash off after an hour before eventually leaving it on overnight.  The patient verbalized understanding of the proper use and possible adverse effects of tazorac.  All of the patient's questions and concerns were addressed. Elidel Counseling: Patient may experience a mild burning sensation during topical application. Elidel is not approved in children less than 2 years of age. There have been case reports of hematologic and skin malignancies in patients using topical calcineurin inhibitors although causality is questionable. Infliximab Counseling:  I discussed with the patient the risks of infliximab including but not limited to myelosuppression, immunosuppression, autoimmune hepatitis, demyelinating diseases, lymphoma, and serious infections.  The patient understands that monitoring is required including a PPD at baseline and must alert us or the primary physician if symptoms of infection or other concerning signs are noted. Clindamycin Pregnancy And Lactation Text: This medication can be used in pregnancy if certain situations. Clindamycin is also present in breast milk. Albendazole Pregnancy And Lactation Text: This medication is Pregnancy Category C and it isn't known if it is safe during pregnancy. It is also excreted in breast milk. Griseofulvin Counseling:  I discussed with the patient the risks of griseofulvin including but not limited to photosensitivity, cytopenia, liver damage, nausea/vomiting and severe allergy.  The patient understands that this medication is best absorbed when taken with a fatty meal (e.g., ice cream or french fries). Sski Pregnancy And Lactation Text: This medication is Pregnancy Category D and isn't considered safe during pregnancy. It is excreted in breast milk. Rituxan Counseling:  I discussed with the patient the risks of Rituxan infusions. Side effects can include infusion reactions, severe drug rashes including mucocutaneous reactions, reactivation of latent hepatitis and other infections and rarely progressive multifocal leukoencephalopathy.  All of the patient's questions and concerns were addressed. Doxycycline Pregnancy And Lactation Text: This medication is Pregnancy Category D and not consider safe during pregnancy. It is also excreted in breast milk but is considered safe for shorter treatment courses. Acitretin Counseling:  I discussed with the patient the risks of acitretin including but not limited to hair loss, dry lips/skin/eyes, liver damage, hyperlipidemia, depression/suicidal ideation, photosensitivity.  Serious rare side effects can include but are not limited to pancreatitis, pseudotumor cerebri, bony changes, clot formation/stroke/heart attack.  Patient understands that alcohol is contraindicated since it can result in liver toxicity and significantly prolong the elimination of the drug by many years. Doxycycline Counseling:  Patient counseled regarding possible photosensitivity and increased risk for sunburn.  Patient instructed to avoid sunlight, if possible.  When exposed to sunlight, patients should wear protective clothing, sunglasses, and sunscreen.  The patient was instructed to call the office immediately if the following severe adverse effects occur:  hearing changes, easy bruising/bleeding, severe headache, or vision changes.  The patient verbalized understanding of the proper use and possible adverse effects of doxycycline.  All of the patient's questions and concerns were addressed. Gabapentin Counseling: I discussed with the patient the risks of gabapentin including but not limited to dizziness, somnolence, fatigue and ataxia. Ivermectin Counseling:  Patient instructed to take medication on an empty stomach with a full glass of water.  Patient informed of potential adverse effects including but not limited to nausea, diarrhea, dizziness, itching, and swelling of the extremities or lymph nodes.  The patient verbalized understanding of the proper use and possible adverse effects of ivermectin.  All of the patient's questions and concerns were addressed. Tazorac Pregnancy And Lactation Text: This medication is not safe during pregnancy. It is unknown if this medication is excreted in breast milk. Rituxan Pregnancy And Lactation Text: This medication is Pregnancy Category C and it isn't know if it is safe during pregnancy. It is unknown if this medication is excreted in breast milk but similar antibodies are known to be excreted. Erythromycin Counseling:  I discussed with the patient the risks of erythromycin including but not limited to GI upset, allergic reaction, drug rash, diarrhea, increase in liver enzymes, and yeast infections. Glycopyrrolate Counseling:  I discussed with the patient the risks of glycopyrrolate including but not limited to skin rash, drowsiness, dry mouth, difficulty urinating, and blurred vision. Acitretin Pregnancy And Lactation Text: This medication is Pregnancy Category X and should not be given to women who are pregnant or may become pregnant in the future. This medication is excreted in breast milk. Topical Clindamycin Counseling: Patient counseled that this medication may cause skin irritation or allergic reactions.  In the event of skin irritation, the patient was advised to reduce the amount of the drug applied or use it less frequently.   The patient verbalized understanding of the proper use and possible adverse effects of clindamycin.  All of the patient's questions and concerns were addressed. Azathioprine Counseling:  I discussed with the patient the risks of azathioprine including but not limited to myelosuppression, immunosuppression, hepatotoxicity, lymphoma, and infections.  The patient understands that monitoring is required including baseline LFTs, Creatinine, possible TPMP genotyping and weekly CBCs for the first month and then every 2 weeks thereafter.  The patient verbalized understanding of the proper use and possible adverse effects of azathioprine.  All of the patient's questions and concerns were addressed. Griseofulvin Pregnancy And Lactation Text: This medication is Pregnancy Category X and is known to cause serious birth defects. It is unknown if this medication is excreted in breast milk but breast feeding should be avoided. Eucrisa Counseling: Patient may experience a mild burning sensation during topical application. Eucrisa is not approved in children less than 2 years of age. Thalidomide Counseling: I discussed with the patient the risks of thalidomide including but not limited to birth defects, anxiety, weakness, chest pain, dizziness, cough and severe allergy. Erythromycin Pregnancy And Lactation Text: This medication is Pregnancy Category B and is considered safe during pregnancy. It is also excreted in breast milk. Bexarotene Counseling:  I discussed with the patient the risks of bexarotene including but not limited to hair loss, dry lips/skin/eyes, liver abnormalities, hyperlipidemia, pancreatitis, depression/suicidal ideation, photosensitivity, drug rash/allergic reactions, hypothyroidism, anemia, leukopenia, infection, cataracts, and teratogenicity.  Patient understands that they will need regular blood tests to check lipid profile, liver function tests, white blood cell count, thyroid function tests and pregnancy test if applicable. Glycopyrrolate Pregnancy And Lactation Text: This medication is Pregnancy Category B and is considered safe during pregnancy. It is unknown if it is excreted breast milk. Topical Sulfur Applications Counseling: Topical Sulfur Counseling: Patient counseled that this medication may cause skin irritation or allergic reactions.  In the event of skin irritation, the patient was advised to reduce the amount of the drug applied or use it less frequently.   The patient verbalized understanding of the proper use and possible adverse effects of topical sulfur application.  All of the patient's questions and concerns were addressed. Include Pregnancy/Lactation Warning?: No Azathioprine Pregnancy And Lactation Text: This medication is Pregnancy Category D and isn't considered safe during pregnancy. It is unknown if this medication is excreted in breast milk. Itraconazole Counseling:  I discussed with the patient the risks of itraconazole including but not limited to liver damage, nausea/vomiting, neuropathy, and severe allergy.  The patient understands that this medication is best absorbed when taken with acidic beverages such as non-diet cola or ginger ale.  The patient understands that monitoring is required including baseline LFTs and repeat LFTs at intervals.  The patient understands that they are to contact us or the primary physician if concerning signs are noted. Siliq Counseling:  I discussed with the patient the risks of Siliq including but not limited to new or worsening depression, suicidal thoughts and behavior, immunosuppression, malignancy, posterior leukoencephalopathy syndrome, and serious infections.  The patient understands that monitoring is required including a PPD at baseline and must alert us or the primary physician if symptoms of infection or other concerning signs are noted. There is also a special program designed to monitor depression which is required with Siliq. Topical Sulfur Applications Pregnancy And Lactation Text: This medication is Pregnancy Category C and has an unknown safety profile during pregnancy. It is unknown if this topical medication is excreted in breast milk. Ketoconazole Counseling:   Patient counseled regarding improving absorption with orange juice.  Adverse effects include but are not limited to breast enlargement, headache, diarrhea, nausea, upset stomach, liver function test abnormalities, taste disturbance, and stomach pain.  There is a rare possibility of liver failure that can occur when taking ketoconazole. The patient understands that monitoring of LFTs may be required, especially at baseline. The patient verbalized understanding of the proper use and possible adverse effects of ketoconazole.  All of the patient's questions and concerns were addressed. Hydroquinone Counseling:  Patient advised that medication may result in skin irritation, lightening (hypopigmentation), dryness, and burning.  In the event of skin irritation, the patient was advised to reduce the amount of the drug applied or use it less frequently.  Rarely, spots that are treated with hydroquinone can become darker (pseudoochronosis).  Should this occur, patient instructed to stop medication and call the office. The patient verbalized understanding of the proper use and possible adverse effects of hydroquinone.  All of the patient's questions and concerns were addressed. Cellcept Counseling:  I discussed with the patient the risks of mycophenolate mofetil including but not limited to infection/immunosuppression, GI upset, hypokalemia, hypercholesterolemia, bone marrow suppression, lymphoproliferative disorders, malignancy, GI ulceration/bleed/perforation, colitis, interstitial lung disease, kidney failure, progressive multifocal leukoencephalopathy, and birth defects.  The patient understands that monitoring is required including a baseline creatinine and regular CBC testing. In addition, patient must alert us immediately if symptoms of infection or other concerning signs are noted. Hydroxychloroquine Counseling:  I discussed with the patient that a baseline ophthalmologic exam is needed at the start of therapy and every year thereafter while on therapy. A CBC may also be warranted for monitoring.  The side effects of this medication were discussed with the patient, including but not limited to agranulocytosis, aplastic anemia, seizures, rashes, retinopathy, and liver toxicity. Patient instructed to call the office should any adverse effect occur.  The patient verbalized understanding of the proper use and possible adverse effects of Plaquenil.  All the patient's questions and concerns were addressed. Valtrex Counseling: I discussed with the patient the risks of valacyclovir including but not limited to kidney damage, nausea, vomiting and severe allergy.  The patient understands that if the infection seems to be worsening or is not improving, they are to call. Metronidazole Counseling:  I discussed with the patient the risks of metronidazole including but not limited to seizures, nausea/vomiting, a metallic taste in the mouth, nausea/vomiting and severe allergy. Bexarotene Pregnancy And Lactation Text: This medication is Pregnancy Category X and should not be given to women who are pregnant or may become pregnant. This medication should not be used if you are breast feeding. Hydroxychloroquine Pregnancy And Lactation Text: This medication has been shown to cause fetal harm but it isn't assigned a Pregnancy Risk Category. There are small amounts excreted in breast milk. Zyclara Counseling:  I discussed with the patient the risks of imiquimod including but not limited to erythema, scaling, itching, weeping, crusting, and pain.  Patient understands that the inflammatory response to imiquimod is variable from person to person and was educated regarded proper titration schedule.  If flu-like symptoms develop, patient knows to discontinue the medication and contact us. Cimzia Counseling:  I discussed with the patient the risks of Cimzia including but not limited to immunosuppression, allergic reactions and infections.  The patient understands that monitoring is required including a PPD at baseline and must alert us or the primary physician if symptoms of infection or other concerning signs are noted. Cyclophosphamide Counseling:  I discussed with the patient the risks of cyclophosphamide including but not limited to hair loss, hormonal abnormalities, decreased fertility, abdominal pain, diarrhea, nausea and vomiting, bone marrow suppression and infection. The patient understands that monitoring is required while taking this medication. Valtrex Pregnancy And Lactation Text: this medication is Pregnancy Category B and is considered safe during pregnancy. This medication is not directly found in breast milk but it's metabolite acyclovir is present. Metronidazole Pregnancy And Lactation Text: This medication is Pregnancy Category B and considered safe during pregnancy.  It is also excreted in breast milk. Isotretinoin Counseling: Patient should get monthly blood tests, not donate blood, not drive at night if vision affected, not share medication, and not undergo elective surgery for 6 months after tx completed. Side effects reviewed, pt to contact office should one occur. Simponi Counseling:  I discussed with the patient the risks of golimumab including but not limited to myelosuppression, immunosuppression, autoimmune hepatitis, demyelinating diseases, lymphoma, and serious infections.  The patient understands that monitoring is required including a PPD at baseline and must alert us or the primary physician if symptoms of infection or other concerning signs are noted. Cimzia Pregnancy And Lactation Text: This medication crosses the placenta but can be considered safe in certain situations. Cimzia may be excreted in breast milk. Cyclophosphamide Pregnancy And Lactation Text: This medication is Pregnancy Category D and it isn't considered safe during pregnancy. This medication is excreted in breast milk. Terbinafine Counseling: Patient counseling regarding adverse effects of terbinafine including but not limited to headache, diarrhea, rash, upset stomach, liver function test abnormalities, itching, taste/smell disturbance, nausea, abdominal pain, and flatulence.  There is a rare possibility of liver failure that can occur when taking terbinafine.  The patient understands that a baseline LFT and kidney function test may be required. The patient verbalized understanding of the proper use and possible adverse effects of terbinafine.  All of the patient's questions and concerns were addressed. Minocycline Counseling: Patient advised regarding possible photosensitivity and discoloration of the teeth, skin, lips, tongue and gums.  Patient instructed to avoid sunlight, if possible.  When exposed to sunlight, patients should wear protective clothing, sunglasses, and sunscreen.  The patient was instructed to call the office immediately if the following severe adverse effects occur:  hearing changes, easy bruising/bleeding, severe headache, or vision changes.  The patient verbalized understanding of the proper use and possible adverse effects of minocycline.  All of the patient's questions and concerns were addressed. Isotretinoin Pregnancy And Lactation Text: This medication is Pregnancy Category X and is considered extremely dangerous during pregnancy. It is unknown if it is excreted in breast milk. Ketoconazole Pregnancy And Lactation Text: This medication is Pregnancy Category C and it isn't know if it is safe during pregnancy. It is also excreted in breast milk and breast feeding isn't recommended. Imiquimod Counseling:  I discussed with the patient the risks of imiquimod including but not limited to erythema, scaling, itching, weeping, crusting, and pain.  Patient understands that the inflammatory response to imiquimod is variable from person to person and was educated regarded proper titration schedule.  If flu-like symptoms develop, patient knows to discontinue the medication and contact us. Nsaids Counseling: NSAID Counseling: I discussed with the patient that NSAIDs should be taken with food. Prolonged use of NSAIDs can result in the development of stomach ulcers.  Patient advised to stop taking NSAIDs if abdominal pain occurs.  The patient verbalized understanding of the proper use and possible adverse effects of NSAIDs.  All of the patient's questions and concerns were addressed. Cyclosporine Counseling:  I discussed with the patient the risks of cyclosporine including but not limited to hypertension, gingival hyperplasia,myelosuppression, immunosuppression, liver damage, kidney damage, neurotoxicity, lymphoma, and serious infections. The patient understands that monitoring is required including baseline blood pressure, CBC, CMP, lipid panel and uric acid, and then 1-2 times monthly CMP and blood pressure. Minoxidil Counseling: Minoxidil is a topical medication which can increase blood flow where it is applied. It is uncertain how this medication increases hair growth. Side effects are uncommon and include stinging and allergic reactions. Skyrizi Counseling: I discussed with the patient the risks of risankizumab-rzaa including but not limited to immunosuppression, and serious infections.  The patient understands that monitoring is required including a PPD at baseline and must alert us or the primary physician if symptoms of infection or other concerning signs are noted. Nsaids Pregnancy And Lactation Text: These medications are considered safe up to 30 weeks gestation. It is excreted in breast milk. High Dose Vitamin A Counseling: Side effects reviewed, pt to contact office should one occur. Cosentyx Counseling:  I discussed with the patient the risks of Cosentyx including but not limited to worsening of Crohn's disease, immunosuppression, allergic reactions and infections.  The patient understands that monitoring is required including a PPD at baseline and must alert us or the primary physician if symptoms of infection or other concerning signs are noted.

## 2021-01-19 ENCOUNTER — HOSPITAL ENCOUNTER (OUTPATIENT)
Age: 76
Discharge: HOME | End: 2021-01-19
Payer: MEDICARE

## 2021-01-19 DIAGNOSIS — N18.32 STAGE 3B CHRONIC KIDNEY DISEASE (HCC): ICD-10-CM

## 2021-01-19 LAB
ALBUMIN SERPL-MCNC: 3.9 G/DL (ref 3.5–5.2)
ANION GAP SERPL CALCULATED.3IONS-SCNC: 8 MMOL/L (ref 9–17)
BUN BLDV-MCNC: 28 MG/DL (ref 8–23)
BUN/CREAT BLD: 17 (ref 9–20)
CALCIUM SERPL-MCNC: 9 MG/DL (ref 8.6–10.4)
CHLORIDE BLD-SCNC: 101 MMOL/L (ref 98–107)
CO2: 24 MMOL/L (ref 20–31)
CREAT SERPL-MCNC: 1.66 MG/DL (ref 0.7–1.2)
CREATININE URINE: 248.8 MG/DL (ref 39–259)
GFR AFRICAN AMERICAN: 49 ML/MIN
GFR NON-AFRICAN AMERICAN: 41 ML/MIN
GFR SERPL CREATININE-BSD FRML MDRD: ABNORMAL ML/MIN/{1.73_M2}
GFR SERPL CREATININE-BSD FRML MDRD: ABNORMAL ML/MIN/{1.73_M2}
GLUCOSE BLD-MCNC: 181 MG/DL (ref 70–99)
HCT VFR BLD CALC: 34.4 % (ref 40.7–50.3)
HEMOGLOBIN: 11.4 G/DL (ref 13–17)
MCH RBC QN AUTO: 31.1 PG (ref 25.2–33.5)
MCHC RBC AUTO-ENTMCNC: 33.1 G/DL (ref 28.4–34.8)
MCV RBC AUTO: 94 FL (ref 82.6–102.9)
NRBC AUTOMATED: 0 PER 100 WBC
PDW BLD-RTO: 13.8 % (ref 11.8–14.4)
PHOSPHORUS: 3.5 MG/DL (ref 2.5–4.5)
PLATELET # BLD: 191 K/UL (ref 138–453)
PMV BLD AUTO: 10.3 FL (ref 8.1–13.5)
POTASSIUM SERPL-SCNC: 4.4 MMOL/L (ref 3.7–5.3)
PTH INTACT: 39.08 PG/ML (ref 15–65)
RBC # BLD: 3.66 M/UL (ref 4.21–5.77)
SODIUM BLD-SCNC: 133 MMOL/L (ref 135–144)
TOTAL PROTEIN, URINE: 32 MG/DL
URINE TOTAL PROTEIN CREATININE RATIO: 0.13 (ref 0–0.2)
WBC # BLD: 6.8 K/UL (ref 3.5–11.3)

## 2021-01-19 PROCEDURE — 82040 ASSAY OF SERUM ALBUMIN: CPT

## 2021-01-19 PROCEDURE — 80048 BASIC METABOLIC PNL TOTAL CA: CPT

## 2021-01-19 PROCEDURE — 82570 ASSAY OF URINE CREATININE: CPT

## 2021-01-19 PROCEDURE — 84156 ASSAY OF PROTEIN URINE: CPT

## 2021-01-19 PROCEDURE — 84100 ASSAY OF PHOSPHORUS: CPT

## 2021-01-19 PROCEDURE — 85027 COMPLETE CBC AUTOMATED: CPT

## 2021-01-19 PROCEDURE — 83970 ASSAY OF PARATHORMONE: CPT

## 2021-01-19 PROCEDURE — 36415 COLL VENOUS BLD VENIPUNCTURE: CPT

## 2021-04-19 ENCOUNTER — HOSPITAL ENCOUNTER (OUTPATIENT)
Age: 76
Discharge: HOME | End: 2021-04-19
Payer: MEDICARE

## 2021-04-19 ENCOUNTER — OFFICE VISIT (OUTPATIENT)
Dept: PRIMARY CARE CLINIC | Age: 76
End: 2021-04-19
Payer: MEDICARE

## 2021-04-19 VITALS
SYSTOLIC BLOOD PRESSURE: 132 MMHG | WEIGHT: 207.4 LBS | DIASTOLIC BLOOD PRESSURE: 72 MMHG | BODY MASS INDEX: 30.63 KG/M2 | HEART RATE: 68 BPM | RESPIRATION RATE: 16 BRPM

## 2021-04-19 DIAGNOSIS — E11.21 CONTROLLED TYPE 2 DIABETES MELLITUS WITH DIABETIC NEPHROPATHY, WITH LONG-TERM CURRENT USE OF INSULIN (HCC): Primary | ICD-10-CM

## 2021-04-19 DIAGNOSIS — I50.32 CHRONIC DIASTOLIC HEART FAILURE (HCC): ICD-10-CM

## 2021-04-19 DIAGNOSIS — G90.1 DYSAUTONOMIA (HCC): ICD-10-CM

## 2021-04-19 DIAGNOSIS — I70.1 RENAL ARTERY STENOSIS, NATIVE (HCC): ICD-10-CM

## 2021-04-19 DIAGNOSIS — E11.21 CONTROLLED TYPE 2 DIABETES MELLITUS WITH DIABETIC NEPHROPATHY, WITH LONG-TERM CURRENT USE OF INSULIN (HCC): ICD-10-CM

## 2021-04-19 DIAGNOSIS — Z79.4 CONTROLLED TYPE 2 DIABETES MELLITUS WITH DIABETIC NEPHROPATHY, WITH LONG-TERM CURRENT USE OF INSULIN (HCC): Primary | ICD-10-CM

## 2021-04-19 DIAGNOSIS — Z79.4 CONTROLLED TYPE 2 DIABETES MELLITUS WITH DIABETIC NEPHROPATHY, WITH LONG-TERM CURRENT USE OF INSULIN (HCC): ICD-10-CM

## 2021-04-19 DIAGNOSIS — N18.32 STAGE 3B CHRONIC KIDNEY DISEASE (HCC): ICD-10-CM

## 2021-04-19 DIAGNOSIS — I20.9 ANGINA PECTORIS, UNSPECIFIED (HCC): ICD-10-CM

## 2021-04-19 LAB
ABSOLUTE EOS #: 0.17 K/UL (ref 0–0.44)
ABSOLUTE IMMATURE GRANULOCYTE: 0.1 K/UL (ref 0–0.3)
ABSOLUTE LYMPH #: 1.39 K/UL (ref 1.1–3.7)
ABSOLUTE MONO #: 0.39 K/UL (ref 0.1–1.2)
ALBUMIN SERPL-MCNC: 3.9 G/DL (ref 3.5–5.2)
ANION GAP SERPL CALCULATED.3IONS-SCNC: 9 MMOL/L (ref 9–17)
BASOPHILS # BLD: 1 % (ref 0–2)
BASOPHILS ABSOLUTE: 0.04 K/UL (ref 0–0.2)
BUN BLDV-MCNC: 30 MG/DL (ref 8–23)
BUN/CREAT BLD: 19 (ref 9–20)
CALCIUM SERPL-MCNC: 9.7 MG/DL (ref 8.6–10.4)
CHLORIDE BLD-SCNC: 102 MMOL/L (ref 98–107)
CO2: 23 MMOL/L (ref 20–31)
CREAT SERPL-MCNC: 1.57 MG/DL (ref 0.7–1.2)
CREATININE URINE: 259.3 MG/DL (ref 39–259)
DIFFERENTIAL TYPE: ABNORMAL
EOSINOPHILS RELATIVE PERCENT: 2 % (ref 1–4)
GFR AFRICAN AMERICAN: 52 ML/MIN
GFR NON-AFRICAN AMERICAN: 43 ML/MIN
GFR SERPL CREATININE-BSD FRML MDRD: ABNORMAL ML/MIN/{1.73_M2}
GFR SERPL CREATININE-BSD FRML MDRD: ABNORMAL ML/MIN/{1.73_M2}
GLUCOSE BLD-MCNC: 199 MG/DL (ref 70–99)
HBA1C MFR BLD: 5.9 %
HCT VFR BLD CALC: 32.6 % (ref 40.7–50.3)
HEMOGLOBIN: 10.8 G/DL (ref 13–17)
IMMATURE GRANULOCYTES: 1 %
LYMPHOCYTES # BLD: 19 % (ref 24–43)
MCH RBC QN AUTO: 30.4 PG (ref 25.2–33.5)
MCHC RBC AUTO-ENTMCNC: 33.1 G/DL (ref 28.4–34.8)
MCV RBC AUTO: 91.8 FL (ref 82.6–102.9)
MONOCYTES # BLD: 5 % (ref 3–12)
NRBC AUTOMATED: 0 PER 100 WBC
PDW BLD-RTO: 13.7 % (ref 11.8–14.4)
PHOSPHORUS: 3.3 MG/DL (ref 2.5–4.5)
PLATELET # BLD: 206 K/UL (ref 138–453)
PLATELET ESTIMATE: ABNORMAL
PMV BLD AUTO: 9.3 FL (ref 8.1–13.5)
POTASSIUM SERPL-SCNC: 4.5 MMOL/L (ref 3.7–5.3)
PTH INTACT: 17.77 PG/ML (ref 15–65)
RBC # BLD: 3.55 M/UL (ref 4.21–5.77)
RBC # BLD: ABNORMAL 10*6/UL
SEG NEUTROPHILS: 72 % (ref 36–65)
SEGMENTED NEUTROPHILS ABSOLUTE COUNT: 5.11 K/UL (ref 1.5–8.1)
SODIUM BLD-SCNC: 134 MMOL/L (ref 135–144)
TOTAL PROTEIN, URINE: 36 MG/DL
URINE TOTAL PROTEIN CREATININE RATIO: 0.14 (ref 0–0.2)
WBC # BLD: 7.2 K/UL (ref 3.5–11.3)
WBC # BLD: ABNORMAL 10*3/UL

## 2021-04-19 PROCEDURE — 3017F COLORECTAL CA SCREEN DOC REV: CPT | Performed by: FAMILY MEDICINE

## 2021-04-19 PROCEDURE — 85025 COMPLETE CBC W/AUTO DIFF WBC: CPT

## 2021-04-19 PROCEDURE — 82040 ASSAY OF SERUM ALBUMIN: CPT

## 2021-04-19 PROCEDURE — 2022F DILAT RTA XM EVC RTNOPTHY: CPT | Performed by: FAMILY MEDICINE

## 2021-04-19 PROCEDURE — G8427 DOCREV CUR MEDS BY ELIG CLIN: HCPCS | Performed by: FAMILY MEDICINE

## 2021-04-19 PROCEDURE — 4040F PNEUMOC VAC/ADMIN/RCVD: CPT | Performed by: FAMILY MEDICINE

## 2021-04-19 PROCEDURE — 82570 ASSAY OF URINE CREATININE: CPT

## 2021-04-19 PROCEDURE — 83970 ASSAY OF PARATHORMONE: CPT

## 2021-04-19 PROCEDURE — G8417 CALC BMI ABV UP PARAM F/U: HCPCS | Performed by: FAMILY MEDICINE

## 2021-04-19 PROCEDURE — 3044F HG A1C LEVEL LT 7.0%: CPT | Performed by: FAMILY MEDICINE

## 2021-04-19 PROCEDURE — 99214 OFFICE O/P EST MOD 30 MIN: CPT | Performed by: FAMILY MEDICINE

## 2021-04-19 PROCEDURE — 80048 BASIC METABOLIC PNL TOTAL CA: CPT

## 2021-04-19 PROCEDURE — 83036 HEMOGLOBIN GLYCOSYLATED A1C: CPT | Performed by: FAMILY MEDICINE

## 2021-04-19 PROCEDURE — 1123F ACP DISCUSS/DSCN MKR DOCD: CPT | Performed by: FAMILY MEDICINE

## 2021-04-19 PROCEDURE — 84156 ASSAY OF PROTEIN URINE: CPT

## 2021-04-19 PROCEDURE — 36415 COLL VENOUS BLD VENIPUNCTURE: CPT

## 2021-04-19 PROCEDURE — 84100 ASSAY OF PHOSPHORUS: CPT

## 2021-04-19 PROCEDURE — 1036F TOBACCO NON-USER: CPT | Performed by: FAMILY MEDICINE

## 2021-04-19 RX ORDER — LATANOPROST 50 UG/ML
1 SOLUTION/ DROPS OPHTHALMIC NIGHTLY
COMMUNITY
Start: 2021-02-02

## 2021-04-19 RX ORDER — INSULIN GLARGINE 100 [IU]/ML
28 INJECTION, SOLUTION SUBCUTANEOUS 2 TIMES DAILY
Qty: 50.4 ML | Refills: 0 | Status: SHIPPED | OUTPATIENT
Start: 2021-04-19 | End: 2021-07-19 | Stop reason: SDUPTHER

## 2021-04-19 NOTE — PROGRESS NOTES
Enriqueta Hinkle is a 76 y.o. male here for routine follow up of diabetes. He has been checking  his blood glucose levels regularly. He reports numbness tingling in the and feet. He has been compliant with diet. He has been compliant with his medications. He is tolerating medications. Hypertension: Patient here for follow-up of elevated blood pressure. He is not exercising and is adherent to low salt diet. Blood pressure is well controlled at home. Cardiac symptoms chest pain. Patient denies fatigue and palpitations. Cardiovascular risk factors: advanced age (older than 54 for men, 72 for women), hypertension, male gender and obesity (BMI >= 30 kg/m2). Use of agents associated with hypertension: none. Hyperlipidemia: Patient presents with hyperlipidemia. There is a family history of hyperlipidemia. Congestive Heart Failure: Patient states he had chest pains yesterday morning. He said that since he had Covid in November he has been very short of breath and would like the doctor to look at his lungs today. Allergies:  Lipitor [atorvastatin], Aldactone [spironolactone], Crestor [rosuvastatin calcium], Lopid [gemfibrozil], Invokana [canagliflozin], and Januvia [sitagliptin]    Past Medical History:    Past Medical History:   Diagnosis Date    Acute MI ([facility]     Acute renal failure with tubular necrosis ([facility] 10/2/2018    ssecondary to hemodynamic effects of loop diuretics and ace inhibitors, bbaseline 1.21.3 which peaked up to 1.8, resolving    Anemia     CAD (coronary artery disease)     Cerebral artery occlusion with cerebral infarction ([facility]     CHF (congestive heart failure) (HCC)     Chronic back pain     Closed fracture of lumbar vertebra without mention of spinal cord injury     Displacement of intervertebral disc, site unspecified, without myelopathy     H/O cardiac catheterization 2/19/16    LMCA: Mild irregularities 10-20%.  LAD: Lesion on PRox LAD: Mid subsection. 65% stenosis. LCx: Lesion on 1st Ob Valentina: Proximal subesection. 70% steniosis. RCA: Small non-dominant RCA. Lesion on PRox RCA: Ostial. 50% stenosis. EF:55%.  H/O cardiovascular stress test 2/19/16    Abnormal. Moderate perfusion defect of mild intensity in the inferior, inferoseptal adn inferoapical regions during stress imaging, which most consistent with ischemia. Global LV systolic function normal without regional wall motion abnormalities. OVerall these results are most consistent with an intermediate risk for signficant CAD. Additional testing including cardiac cath may be indicated.  H/O tilt table evaluation 12/26/2017    Abnormal. Patients HR, BP response and symptoms were most consistent with dysautonomia. Combined with viligant maintenance of euvolemia and maintaining a moderate salft intake, pharmacologic treatment with SSRI such as lexapro and or mestinon among other treatments have shown some effectiveness in treatment of this condition    H/O tilt table evaluation 12/26/2017    Abnormal head upright tilt table study. The pt heart rate, blood pressure response and symptoms were most consistent with dysautonomia.  History of 24 hour EKG monitoring 8/14/14    Occasional PAC's and PVC's which appear to be at least moderately symptomatic.  History of 24 hour EKG monitoring 11/19/14    Event Monitor. Sinus rhythm and sinus bradycardia. Infrequent isolated PVC's    History of cardiovascular stress test 2/19/14    Relatively NL    History of cardiovascular stress test 03/21/2018    Normal myocardial perfusion. Global left ventricular systolic function was normal with an EF of 68%. Overall, these results are most consistent with a low risk scan.  History of coronary artery stent placement 04/2017    PTCA / Drug Eluting Stent:, CX and / or branches    History of CVA (cerebrovascular accident) without residual deficits 2014    Incidentally found on CT head.  No known impairment now or in the past.    History of echocardiogram 2/18/14    LA mildly dilated, EF 55%, LV wall thickness is moderately increased, no definite wall motion abnormalilities, what appears to be a pacer wire is seen w/n the RA and RV, mild-mod TR, mild pulmonary hypertension.  History of Holter monitoring 12/29/2017    Rare PAC's and PVC's.      History of tilt table evaluation 8/12/14    Abnormal    Hyperlipidemia     Hypertension     Non critical Right Renal artery stenosis, native ([facility] [address] 10/2/2018    Non critical Right Renal artery stenosis, based on cath in 2016, Rt RA 30% stenosis    Pacemaker     non-functioning    S/P cardiac cath 04/10/2017    S/P coronary artery stent placement     Successful PTCA - HA Om1    SIRS (systemic inflammatory response syndrome) ([facility] 5/19/2019    Type II or unspecified type diabetes mellitus without mention of complication, not stated as uncontrolled        Past Surgical History:    Past Surgical History:   Procedure Laterality Date    BACK SURGERY      CARDIAC CATHETERIZATION Left 2/19/16    via right radial approach/ Melina Vernon/ Dr. Rae Perfect  8/17/15    Liang/Charles/Saulo/ Lap    COLONOSCOPY  2010    COLONOSCOPY  2/19/15    -polyps,diverticulosis,hemorrhoids    COLONOSCOPY  05/23/2018    Dr Santosh Mckeon    COLONOSCOPY N/A 5/23/2018    COLONOSCOPY POLYPECTOMY SNARE/COLD BIOPSY  cold snare  and  hot snare performed by Bertie Schaumann, MD at [address] [facility]  10/30/2020    with Dr. Endy Jimenez 10/30/2020    Unknown [place], NOT HIGH RISK performed by Stevo Laird DO at [facility]      left eye    PACEMAKER INSERTION      PACEMAKER PLACEMENT      UPPER GASTROINTESTINAL ENDOSCOPY  05/28/2019    Dr. Evangelina Luu H-Pylori)duodenal bulbar/antral erythema    UPPER GASTROINTESTINAL ENDOSCOPY N/A 5/28/2019    EGD BIOPSY, clotest [facility] Delbert Chen [facility] N/A 10/30/2020    EGD ESOPHAGOGASTRODUODENOSCOPY performed by Devin Navarro DO at [facility] History:   Social History     Tobacco Use    Smoking status: Former Smoker     Packs/day: 1.50     Years: 8.00     Pack years: 12.00     Types: Cigarettes     Quit date: 3/4/1980     Years since quittin.1    Smokeless tobacco: Never Used   Substance Use Topics    Alcohol use: No       Family History:   Family History   Problem Relation Age of Onset    Cancer Mother         breast    Cancer Father         lung         Review of Systems:  Constitutional: negative for fever or chills  Eyes: negative for visual disturbance   ENT: negative for sore throat or nasal congestion  Respiratory: negative for cough,positive for shortness of breath and neg for sputum  Cardiovascular: negative for chest pain,pnd,claudication or palpitations  Gastrointestinal: negative for abd pain, dion,nausea, vomiting, diarrhea or constipation  Genitourinary: negative for dysuria,,hematuria urgency or frequency  Musculoskeletal:positive for arthralgias  and stiff joints   Integument/breast: negative for skin rash or lesions  Neurological: positive for   numbness and tingling. Psych: negative for anxiety, depression, suicidial ideation and suicidal attempt. Objective:  Physical Exam:  /72 (Site: Left Upper Arm, Position: Sitting)   Pulse 68   Resp 16   Wt 207 lb 6.4 oz (94.1 kg)   BMI 30.63 kg/m²   GEN:   He is alert and oriented  ENT:    ENT exam normal, no neck nodes or sinus tenderness  NECK:   neck supple and non tender without mass, no thyromegaly or thyroid nodules, no cervical lymphadenopathy  EYES:   No gross abnormalities.   CVS:     CVS exam BP noted to be well controlled today in office, S1, S2 normal, no gallop, 2/6 murmur, chest clear, no JVD, no HSM, no edema  PULM:   chest clear, no wheezing, rales, normal symmetric air entry, no Inject 28 Units into the skin 2 times daily 50.4 mL 0    latanoprost (XALATAN) 0.005 % ophthalmic solution       eplerenone (INSPRA) 50 MG tablet Take 1 tablet by mouth once daily 90 tablet 0    pantoprazole (PROTONIX) 40 MG tablet Take 1 tablet by mouth once daily 30 tablet 1    hydrALAZINE (APRESOLINE) 100 MG tablet Take 100 mg by mouth 2 times daily      carvedilol (COREG) 25 MG tablet Take 1 tablet by mouth 2 times daily (with meals) 180 tablet 3    lisinopril (PRINIVIL;ZESTRIL) 10 MG tablet Take 1 tablet by mouth daily 90 tablet 3    glipiZIDE (GLUCOTROL XL) 5 MG extended release tablet Take 2 tablets by mouth twice daily 360 tablet 0    ezetimibe (ZETIA) 10 MG tablet Take 1 tablet by mouth daily 90 tablet 3    acyclovir (ZOVIRAX) 400 MG tablet 400 mg 2 times daily       furosemide (LASIX) 20 MG tablet Take 1 tablet by mouth See Admin Instructions for 3 days Pt is to take one tablet by mouth on Monday, Wednesday and Friday. 90 tablet 0    ferrous sulfate 325 (65 Fe) MG tablet Take 325 mg by mouth 2 times daily      CONTOUR TEST strip USE 1 STRIP TO CHECK GLUCOSE TWICE DAILY 100 strip 11    nitroGLYCERIN (NITROSTAT) 0.4 MG SL tablet Place 1 tablet under the tongue every 5 minutes as needed for Chest pain 25 tablet 3    Omega-3 Fatty Acids (FISH OIL) 1000 MG CAPS Take 1,000 mg by mouth daily       aspirin EC 81 MG EC tablet Take 1 tablet by mouth daily 30 tablet 3    DIANA MICROLET LANCETS MISC TEST TWICE DAILY 100 each 2    Insulin Pen Needle (PEN NEEDLES) 31G X 6 MM MISC 1 each by Does not apply route daily 100 each 3    prednisoLONE acetate (PRED FORTE) 1 % ophthalmic suspension Place 1 drop into the left eye daily        No current facility-administered medications for this visit. No follow-ups on file.       Electronically signed by Brigid Luevano MD on 4/19/2021 at 9:59 AM

## 2021-04-30 ENCOUNTER — HOSPITAL ENCOUNTER (OUTPATIENT)
Age: 76
Discharge: HOME | End: 2021-04-30
Payer: MEDICARE

## 2021-04-30 DIAGNOSIS — R97.20 ELEVATED PSA: ICD-10-CM

## 2021-04-30 PROCEDURE — 36415 COLL VENOUS BLD VENIPUNCTURE: CPT

## 2021-04-30 PROCEDURE — 84153 ASSAY OF PSA TOTAL: CPT

## 2021-05-01 LAB — PROSTATE SPECIFIC ANTIGEN: 3.68 UG/L

## 2021-05-05 ENCOUNTER — OFFICE VISIT (OUTPATIENT)
Dept: UROLOGY | Age: 76
End: 2021-05-05
Payer: MEDICARE

## 2021-05-05 ENCOUNTER — HOSPITAL ENCOUNTER (OUTPATIENT)
Age: 76
Setting detail: SPECIMEN
Discharge: HOME | End: 2021-05-05
Payer: MEDICARE

## 2021-05-05 VITALS
TEMPERATURE: 98.4 F | WEIGHT: 207 LBS | DIASTOLIC BLOOD PRESSURE: 77 MMHG | BODY MASS INDEX: 30.57 KG/M2 | HEART RATE: 59 BPM | SYSTOLIC BLOOD PRESSURE: 151 MMHG

## 2021-05-05 DIAGNOSIS — R31.0 GROSS HEMATURIA: ICD-10-CM

## 2021-05-05 DIAGNOSIS — N40.1 BPH WITH OBSTRUCTION/LOWER URINARY TRACT SYMPTOMS: ICD-10-CM

## 2021-05-05 DIAGNOSIS — R97.20 ELEVATED PSA: ICD-10-CM

## 2021-05-05 DIAGNOSIS — N13.8 BPH WITH OBSTRUCTION/LOWER URINARY TRACT SYMPTOMS: ICD-10-CM

## 2021-05-05 DIAGNOSIS — R31.0 GROSS HEMATURIA: Primary | ICD-10-CM

## 2021-05-05 LAB
-: ABNORMAL
AMORPHOUS: ABNORMAL
BACTERIA: ABNORMAL
BILIRUBIN URINE: NEGATIVE
CASTS UA: ABNORMAL /LPF
COLOR: YELLOW
COMMENT UA: ABNORMAL
CRYSTALS, UA: ABNORMAL /HPF
EPITHELIAL CELLS UA: ABNORMAL /HPF (ref 0–5)
GLUCOSE URINE: NEGATIVE
KETONES, URINE: NEGATIVE
LEUKOCYTE ESTERASE, URINE: NEGATIVE
MUCUS: ABNORMAL
NITRITE, URINE: NEGATIVE
OTHER OBSERVATIONS UA: ABNORMAL
PH UA: 5.5 (ref 5–9)
PROTEIN UA: ABNORMAL
RBC UA: ABNORMAL /HPF (ref 0–2)
RENAL EPITHELIAL, UA: ABNORMAL /HPF
SPECIFIC GRAVITY UA: >1.03 (ref 1.01–1.02)
TRICHOMONAS: ABNORMAL
TURBIDITY: CLEAR
URINE HGB: ABNORMAL
UROBILINOGEN, URINE: NORMAL
WBC UA: ABNORMAL /HPF (ref 0–5)
YEAST: ABNORMAL

## 2021-05-05 PROCEDURE — 1036F TOBACCO NON-USER: CPT | Performed by: NURSE PRACTITIONER

## 2021-05-05 PROCEDURE — 87086 URINE CULTURE/COLONY COUNT: CPT

## 2021-05-05 PROCEDURE — 51798 US URINE CAPACITY MEASURE: CPT | Performed by: NURSE PRACTITIONER

## 2021-05-05 PROCEDURE — G8417 CALC BMI ABV UP PARAM F/U: HCPCS | Performed by: NURSE PRACTITIONER

## 2021-05-05 PROCEDURE — 1123F ACP DISCUSS/DSCN MKR DOCD: CPT | Performed by: NURSE PRACTITIONER

## 2021-05-05 PROCEDURE — 3017F COLORECTAL CA SCREEN DOC REV: CPT | Performed by: NURSE PRACTITIONER

## 2021-05-05 PROCEDURE — G8427 DOCREV CUR MEDS BY ELIG CLIN: HCPCS | Performed by: NURSE PRACTITIONER

## 2021-05-05 PROCEDURE — 81001 URINALYSIS AUTO W/SCOPE: CPT

## 2021-05-05 PROCEDURE — 4040F PNEUMOC VAC/ADMIN/RCVD: CPT | Performed by: NURSE PRACTITIONER

## 2021-05-05 PROCEDURE — 99214 OFFICE O/P EST MOD 30 MIN: CPT | Performed by: NURSE PRACTITIONER

## 2021-05-05 ASSESSMENT — ENCOUNTER SYMPTOMS
VOMITING: 0
BACK PAIN: 0
WHEEZING: 0
CONSTIPATION: 0
ABDOMINAL PAIN: 0
NAUSEA: 0
SHORTNESS OF BREATH: 0
COUGH: 0
EYE REDNESS: 0
COLOR CHANGE: 0

## 2021-05-05 NOTE — PATIENT INSTRUCTIONS
Schedule a Vaccine  When you qualify to receive the vaccine per the [address] guidelines, call the [facility]) COVID-19 Vaccination Hotline to schedule your appointment or to get additional information about the [facility]) locations which are offering the COVID-19 vaccine. To be most effective, it's important that you receive two doses of one of the COVID-19 vaccines. -If you are receiving the Carrillo Peter vaccine, your second shot will be scheduled as close to 21 days after the first shot as possible. -If you are receiving the Moderna vaccine, your second shot will be scheduled as close to 28 days after the first shot as possible. [address] Vaccination Hotline: [phone]    In partnership with [facility] and [facility] HEALTH Departments, patients can call [phone], Monday-Friday 8:00am-4:00pm for scheduling at our Hospitals. Or visit the [facility] websites for additional information of vaccine administration locations. Links to [facility]) website and Health Department websites:    for; to (do)/[facility]-monitoring-coronavirus-covid-19/covid-19-vaccine/[place]/siu-vaccine    [email or web address].tn    [email or web address]      SURVEY:    You may be receiving a survey from [employer] regarding your visit today. Please complete the survey to enable us to provide the highest quality of care to you and your family. If you cannot score us a very good on any question, please call the office to discuss how we could have made your experience a very good one. Thank you.   Your MA today: Luís Hughes

## 2021-05-05 NOTE — PROGRESS NOTES
what appears to be a pacer wire is seen w/n the RA and RV, mild-mod TR, mild pulmonary hypertension.  History of Holter monitoring 12/29/2017    Rare PAC's and PVC's.      History of tilt table evaluation 8/12/14    Abnormal    Hyperlipidemia     Hypertension     Non critical Right Renal artery stenosis, native ([facility] 10/2/2018    Non critical Right Renal artery stenosis, based on cath in 2016, Rt RA 30% stenosis    Pacemaker     non-functioning    S/P cardiac cath 04/10/2017    S/P coronary artery stent placement     Successful PTCA - HA Om1    SIRS (systemic inflammatory response syndrome) ([facility] 5/19/2019    Type II or unspecified type diabetes mellitus without mention of complication, not stated as uncontrolled      Past Surgical History:   Procedure Laterality Date    BACK SURGERY      CARDIAC CATHETERIZATION Left 2/19/16    via right radial approach/ Melina Vernon/ Dr. Lucila Rodriguez  8/17/15    Liang/Charles/Saulo/ Lap    COLONOSCOPY  2010    COLONOSCOPY  2/19/15    -polyps,diverticulosis,hemorrhoids    COLONOSCOPY  05/23/2018    Dr Cynthia Morales    COLONOSCOPY N/A 5/23/2018    COLONOSCOPY POLYPECTOMY SNARE/COLD BIOPSY  cold snare  and  hot snare performed by Hue Bal MD at [address] [place] [address]  10/30/2020    with Dr. Zaman Rush Hill 10/30/2020    Parveen Hughes, DANA HIGH RISK performed by Moo Mars DO at [address]      left eye    PACEMAKER INSERTION      PACEMAKER PLACEMENT      UPPER GASTROINTESTINAL ENDOSCOPY  05/28/2019    Dr. Deepthi Sánchez H-Pylori)duodenal bulbar/antral erythema    UPPER GASTROINTESTINAL ENDOSCOPY N/A 5/28/2019    EGD BIOPSY, clotest performed by Hue Bal MD at [address] [facility] 10/30/2020    EGD ESOPHAGOGASTRODUODENOSCOPY performed by Moo Mars DO at [address] Encounter Medications as of 5/5/2021   Medication Sig Dispense Refill    doxazosin (CARDURA) 4 MG tablet Take 1 tablet by mouth nightly 90 tablet 3    latanoprost (XALATAN) 0.005 % ophthalmic solution       insulin glargine (LANTUS SOLOSTAR) 100 UNIT/ML injection pen Inject 28 Units into the skin 2 times daily 50.4 mL 0    eplerenone (INSPRA) 50 MG tablet Take 1 tablet by mouth once daily 90 tablet 0    carvedilol (COREG) 25 MG tablet Take 1 tablet by mouth 2 times daily (with meals) (Patient taking differently: Take 12.5 mg by mouth 2 times daily (with meals) ) 180 tablet 3    lisinopril (PRINIVIL;ZESTRIL) 10 MG tablet Take 1 tablet by mouth daily 90 tablet 3    glipiZIDE (GLUCOTROL XL) 5 MG extended release tablet Take 2 tablets by mouth twice daily 360 tablet 0    ezetimibe (ZETIA) 10 MG tablet Take 1 tablet by mouth daily 90 tablet 3    acyclovir (ZOVIRAX) 400 MG tablet 400 mg 2 times daily       ferrous sulfate 325 (65 Fe) MG tablet Take 325 mg by mouth daily (with breakfast)       CONTOUR TEST strip USE 1 STRIP TO CHECK GLUCOSE TWICE DAILY 100 strip 11    nitroGLYCERIN (NITROSTAT) 0.4 MG SL tablet Place 1 tablet under the tongue every 5 minutes as needed for Chest pain 25 tablet 3    Omega-3 Fatty Acids (FISH OIL) 1000 MG CAPS Take 1,000 mg by mouth daily       aspirin EC 81 MG EC tablet Take 1 tablet by mouth daily 30 tablet 3    DIANA MICROLET LANCETS MISC TEST TWICE DAILY 100 each 2    Insulin Pen Needle (PEN NEEDLES) 31G X 6 MM MISC 1 each by Does not apply route daily 100 each 3    prednisoLONE acetate (PRED FORTE) 1 % ophthalmic suspension Place 1 drop into the left eye daily       [DISCONTINUED] pantoprazole (PROTONIX) 40 MG tablet Take 1 tablet by mouth once daily (Patient not taking: Reported on 5/4/2021) 30 tablet 1    furosemide (LASIX) 20 MG tablet Take 1 tablet by mouth See Admin Instructions for 3 days Pt is to take one tablet by mouth on Monday, Wednesday and Friday.  90 tablet 0     No facility-administered encounter medications on file as of 5/5/2021. Current Outpatient Medications on File Prior to Visit   Medication Sig Dispense Refill    doxazosin (CARDURA) 4 MG tablet Take 1 tablet by mouth nightly 90 tablet 3    latanoprost (XALATAN) 0.005 % ophthalmic solution       insulin glargine (LANTUS SOLOSTAR) 100 UNIT/ML injection pen Inject 28 Units into the skin 2 times daily 50.4 mL 0    eplerenone (INSPRA) 50 MG tablet Take 1 tablet by mouth once daily 90 tablet 0    carvedilol (COREG) 25 MG tablet Take 1 tablet by mouth 2 times daily (with meals) (Patient taking differently: Take 12.5 mg by mouth 2 times daily (with meals) ) 180 tablet 3    lisinopril (PRINIVIL;ZESTRIL) 10 MG tablet Take 1 tablet by mouth daily 90 tablet 3    glipiZIDE (GLUCOTROL XL) 5 MG extended release tablet Take 2 tablets by mouth twice daily 360 tablet 0    ezetimibe (ZETIA) 10 MG tablet Take 1 tablet by mouth daily 90 tablet 3    acyclovir (ZOVIRAX) 400 MG tablet 400 mg 2 times daily       ferrous sulfate 325 (65 Fe) MG tablet Take 325 mg by mouth daily (with breakfast)       CONTOUR TEST strip USE 1 STRIP TO CHECK GLUCOSE TWICE DAILY 100 strip 11    nitroGLYCERIN (NITROSTAT) 0.4 MG SL tablet Place 1 tablet under the tongue every 5 minutes as needed for Chest pain 25 tablet 3    Omega-3 Fatty Acids (FISH OIL) 1000 MG CAPS Take 1,000 mg by mouth daily       aspirin EC 81 MG EC tablet Take 1 tablet by mouth daily 30 tablet 3    DIANA MICROLET LANCETS MISC TEST TWICE DAILY 100 each 2    Insulin Pen Needle (PEN NEEDLES) 31G X 6 MM MISC 1 each by Does not apply route daily 100 each 3    prednisoLONE acetate (PRED FORTE) 1 % ophthalmic suspension Place 1 drop into the left eye daily       furosemide (LASIX) 20 MG tablet Take 1 tablet by mouth See Admin Instructions for 3 days Pt is to take one tablet by mouth on Monday, Wednesday and Friday.  90 tablet 0     No current facility-administered medications on file prior to visit. Lipitor [atorvastatin], Aldactone [spironolactone], Crestor [rosuvastatin calcium], Lopid [gemfibrozil], Invokana [canagliflozin], and Januvia [sitagliptin]  Family History   Problem Relation Age of Onset    Cancer Mother         breast    Cancer Father         lung     Social History     Tobacco Use   Smoking Status Former Smoker    Packs/day: 1.50    Years: 8.00    Pack years: 12.00    Types: Cigarettes    Quit date: 3/4/1980    Years since quittin.1   Smokeless Tobacco Never Used       Social History     Substance and Sexual Activity   Alcohol Use No       Review of Systems   Constitutional: Negative for appetite change, chills and fever. Eyes: Negative for redness and visual disturbance. Respiratory: Negative for cough, shortness of breath and wheezing. Cardiovascular: Negative for chest pain and leg swelling. Gastrointestinal: Negative for abdominal pain, constipation, nausea and vomiting. Genitourinary: Positive for hematuria. Negative for decreased urine volume, difficulty urinating, discharge, dysuria, enuresis, flank pain, frequency, penile pain, scrotal swelling, testicular pain and urgency. Musculoskeletal: Negative for back pain, joint swelling and myalgias. Skin: Negative for color change, rash and wound. Neurological: Negative for dizziness, tremors and numbness. Hematological: Negative for adenopathy. Does not bruise/bleed easily. BP (!) 151/77 (Site: Left Upper Arm, Position: Sitting, Cuff Size: Medium Adult)   Pulse 59   Temp 98.4 °F (36.9 °C) (Temporal)   Wt 207 lb (93.9 kg)   BMI 30.57 kg/m²       PHYSICAL EXAM:  Constitutional: Patient in no acute distress; Neuro: alert and oriented to person place and time. Psych: Mood and affect normal.  Skin: Normal  Lungs: Respiratory effort normal  Cardiovascular:  Normal peripheral pulses  Abdomen: Soft, non-tender, non-distended with no CVA, flank pain, hepatosplenomegaly or hernia. Kidneys normal.  Bladder non-tender and not distended. Lab Results   Component Value Date    BUN 30 (H) 04/19/2021     Lab Results   Component Value Date    CREATININE 1.57 (H) 04/19/2021     Lab Results   Component Value Date    PSA 3.68 04/30/2021    PSA 5.43 (H) 10/15/2020    PSA 4.42 (H) 04/06/2020       ASSESSMENT:   Diagnosis Orders   1. Gross hematuria  CT UROGRAM    BUN & Creatinine    Urinalysis with Microscopic    Culture, Urine    MO MEASUREMENT,POST-VOID RESIDUAL VOLUME BY US,NON-IMAGING   2. Elevated PSA  PSA, Diagnostic   3.  BPH with obstruction/lower urinary tract symptoms  MO MEASUREMENT,POST-VOID RESIDUAL VOLUME BY US,NON-IMAGING         PLAN:  We will check a UA C&S    We will proceed with a hematuria work-up with a CT urogram, then he will return for lower tract visualization with cystoscopy    PSA will be due in 6 months

## 2021-05-06 LAB
CULTURE: NORMAL
Lab: NORMAL
SPECIMEN DESCRIPTION: NORMAL

## 2021-05-07 ENCOUNTER — TELEPHONE (OUTPATIENT)
Dept: UROLOGY | Age: 76
End: 2021-05-07

## 2021-05-07 NOTE — TELEPHONE ENCOUNTER
----- Message from Denys Love PA-C sent at 5/7/2021  9:08 AM EDT -----  Please let him know that his urine culture was negative for urinary tract infection, we will proceed with cystoscopy as planned

## 2021-05-10 ENCOUNTER — HOSPITAL ENCOUNTER (OUTPATIENT)
Age: 76
Discharge: HOME | End: 2021-05-10
Payer: MEDICARE

## 2021-05-10 DIAGNOSIS — D50.0 IRON DEFICIENCY ANEMIA DUE TO CHRONIC BLOOD LOSS: ICD-10-CM

## 2021-05-10 DIAGNOSIS — D63.1 ANEMIA IN STAGE 3 CHRONIC KIDNEY DISEASE, UNSPECIFIED WHETHER STAGE 3A OR 3B CKD (HCC): ICD-10-CM

## 2021-05-10 DIAGNOSIS — N18.30 ANEMIA IN STAGE 3 CHRONIC KIDNEY DISEASE, UNSPECIFIED WHETHER STAGE 3A OR 3B CKD (HCC): ICD-10-CM

## 2021-05-10 LAB
ABSOLUTE EOS #: 0.37 K/UL (ref 0–0.44)
ABSOLUTE IMMATURE GRANULOCYTE: 0.09 K/UL (ref 0–0.3)
ABSOLUTE LYMPH #: 1.82 K/UL (ref 1.1–3.7)
ABSOLUTE MONO #: 0.46 K/UL (ref 0.1–1.2)
BASOPHILS # BLD: 1 % (ref 0–2)
BASOPHILS ABSOLUTE: 0.06 K/UL (ref 0–0.2)
DIFFERENTIAL TYPE: ABNORMAL
EOSINOPHILS RELATIVE PERCENT: 5 % (ref 1–4)
FERRITIN: 199 UG/L (ref 30–400)
HCT VFR BLD CALC: 33.9 % (ref 40.7–50.3)
HEMOGLOBIN: 11 G/DL (ref 13–17)
IMMATURE GRANULOCYTES: 1 %
LYMPHOCYTES # BLD: 22 % (ref 24–43)
MCH RBC QN AUTO: 29.7 PG (ref 25.2–33.5)
MCHC RBC AUTO-ENTMCNC: 32.4 G/DL (ref 28.4–34.8)
MCV RBC AUTO: 91.6 FL (ref 82.6–102.9)
MONOCYTES # BLD: 6 % (ref 3–12)
NRBC AUTOMATED: 0 PER 100 WBC
PDW BLD-RTO: 13.5 % (ref 11.8–14.4)
PLATELET # BLD: 202 K/UL (ref 138–453)
PLATELET ESTIMATE: ABNORMAL
PMV BLD AUTO: 10.2 FL (ref 8.1–13.5)
RBC # BLD: 3.7 M/UL (ref 4.21–5.77)
RBC # BLD: ABNORMAL 10*6/UL
SEG NEUTROPHILS: 65 % (ref 36–65)
SEGMENTED NEUTROPHILS ABSOLUTE COUNT: 5.35 K/UL (ref 1.5–8.1)
WBC # BLD: 8.2 K/UL (ref 3.5–11.3)
WBC # BLD: ABNORMAL 10*3/UL

## 2021-05-10 PROCEDURE — 82728 ASSAY OF FERRITIN: CPT

## 2021-05-10 PROCEDURE — 36415 COLL VENOUS BLD VENIPUNCTURE: CPT

## 2021-05-10 PROCEDURE — 85025 COMPLETE CBC W/AUTO DIFF WBC: CPT

## 2021-05-19 ENCOUNTER — OFFICE VISIT (OUTPATIENT)
Dept: ONCOLOGY | Age: 76
End: 2021-05-19
Payer: MEDICARE

## 2021-05-19 VITALS
WEIGHT: 206 LBS | BODY MASS INDEX: 30.51 KG/M2 | RESPIRATION RATE: 18 BRPM | HEART RATE: 59 BPM | TEMPERATURE: 97 F | HEIGHT: 69 IN | DIASTOLIC BLOOD PRESSURE: 78 MMHG | SYSTOLIC BLOOD PRESSURE: 152 MMHG

## 2021-05-19 DIAGNOSIS — D50.0 IRON DEFICIENCY ANEMIA DUE TO CHRONIC BLOOD LOSS: Primary | ICD-10-CM

## 2021-05-19 DIAGNOSIS — N18.30 ANEMIA IN STAGE 3 CHRONIC KIDNEY DISEASE, UNSPECIFIED WHETHER STAGE 3A OR 3B CKD (HCC): ICD-10-CM

## 2021-05-19 DIAGNOSIS — D63.1 ANEMIA IN STAGE 3 CHRONIC KIDNEY DISEASE, UNSPECIFIED WHETHER STAGE 3A OR 3B CKD (HCC): ICD-10-CM

## 2021-05-19 PROCEDURE — 99213 OFFICE O/P EST LOW 20 MIN: CPT | Performed by: INTERNAL MEDICINE

## 2021-05-19 PROCEDURE — G8428 CUR MEDS NOT DOCUMENT: HCPCS | Performed by: INTERNAL MEDICINE

## 2021-05-19 PROCEDURE — 99211 OFF/OP EST MAY X REQ PHY/QHP: CPT | Performed by: INTERNAL MEDICINE

## 2021-05-19 PROCEDURE — 1123F ACP DISCUSS/DSCN MKR DOCD: CPT | Performed by: INTERNAL MEDICINE

## 2021-05-19 PROCEDURE — 1036F TOBACCO NON-USER: CPT | Performed by: INTERNAL MEDICINE

## 2021-05-19 PROCEDURE — 4040F PNEUMOC VAC/ADMIN/RCVD: CPT | Performed by: INTERNAL MEDICINE

## 2021-05-19 PROCEDURE — G8417 CALC BMI ABV UP PARAM F/U: HCPCS | Performed by: INTERNAL MEDICINE

## 2021-05-19 PROCEDURE — 3017F COLORECTAL CA SCREEN DOC REV: CPT | Performed by: INTERNAL MEDICINE

## 2021-05-19 NOTE — PROGRESS NOTES
DIAGNOSIS:   1. Iron deficiency anemia  2. Evidence of intermittent GI bleed  CURRENT THERAPY:  Work-up in progress  BRIEF CASE HISTORY:   Jitendra Jones is a very pleasant 76 y.o. male who is referred to us for iron deficiency anemia. He has been followed by cardiology because of congestive heart failure. The patient symptoms have been worsening and it was attributed to worsening hemoglobin. Looking at his hemoglobin over the last few years. He had episodes where his hemoglobin will drop by 1 to 2 g and it is a sudden drop. Followed by slow recovery. He never required blood transfusion but he was quite symptomatic. The patient reports epigastric pain followed by dark stool for a few days happened just before his anemia. He was seen by GI last year and EGD was done. There was some inflammation in the stomach but no active bleeding was appreciated. Patient received IV iron and it took him a while to feel the improvement. Repeat GI work-up was negative  Interim history  The patient comes in today for a follow-up. He felt more tired last month and his hemoglobin dipped a little bit but it started to improve. His iron studies are okay. Work-up suggested that he might have microscopic hematuria and urologic work-up is underway.       PAST MEDICAL HISTORY: has a past medical history of Acute MI ([facility] [address] Acute renal failure with tubular necrosis ([facility] [address] Anemia, CAD (coronary artery disease), Cerebral artery occlusion with cerebral infarction [facility]), CHF (congestive heart failure) ([facility] [address] Chronic back pain, Closed fracture of lumbar vertebra without mention of spinal cord injury, Displacement of intervertebral disc, site unspecified, without myelopathy, H/O cardiac catheterization, H/O cardiovascular stress test, H/O tilt table evaluation, H/O tilt table evaluation, History of 24 hour EKG monitoring, History of 24 hour EKG monitoring, History of cardiovascular stress test, History of cardiovascular stress test, History of coronary artery stent placement, History of CVA (cerebrovascular accident) without residual deficits, History of echocardiogram, History of Holter monitoring, History of tilt table evaluation, Hyperlipidemia, Hypertension, Non critical Right Renal artery stenosis, native ([facility] [address] Pacemaker, S/P cardiac cath, S/P coronary artery stent placement, SIRS (systemic inflammatory response syndrome) ([facility] [address] and Type II or unspecified type diabetes mellitus without mention of complication, not stated as uncontrolled. PAST SURGICAL HISTORY: has a past surgical history that includes Pacemaker insertion; back surgery; Cholecystectomy (8/17/15); Corneal transplant; Cardiac catheterization (Left, 2/19/16); pacemaker placement; Colonoscopy (2010); Colonoscopy (2/19/15); Colonoscopy (05/23/2018); Colonoscopy (N/A, 5/23/2018); Upper gastrointestinal endoscopy (05/28/2019); Upper gastrointestinal endoscopy (N/A, 5/28/2019); Colonoscopy (10/30/2020); Colonoscopy (N/A, 10/30/2020); and Upper gastrointestinal endoscopy (N/A, 10/30/2020). CURRENT MEDICATIONS:  has a current medication list which includes the following prescription(s): doxazosin, latanoprost, lantus solostar, eplerenone, lisinopril, glipizide, ezetimibe, acyclovir, furosemide, ferrous sulfate, nitroglycerin, aspirin ec, susan microlet lancets, pen needles, prednisolone acetate, carvedilol, contour test, and fish oil. ALLERGIES:  is allergic to lipitor [atorvastatin], aldactone [spironolactone], crestor [rosuvastatin calcium], lopid [gemfibrozil], invokana [canagliflozin], and januvia [sitagliptin]. FAMILY HISTORY: Negative for any hematological or oncological conditions. SOCIAL HISTORY:  reports that he quit smoking about 41 years ago. His smoking use included cigarettes. He has a 12.00 pack-year smoking history. He has never used smokeless tobacco. He reports that he does not drink alcohol and does not use drugs.     REVIEW OF SYSTEMS: General: no fever or night sweats, Weight is stable. ENT: No double or blurred vision, no tinnitus or hearing problem, no dysphagia or sore throat   Respiratory: No chest pain, no shortness of breath, no cough or hemoptysis. Cardiovascular: Denies chest pain, PND or orthopnea. No L E swelling or palpitations. Gastrointestinal:    No nausea or vomiting, abdominal pain, diarrhea or constipation. Genitourinary: Denies dysuria, hematuria, frequency, urgency or incontinence. Neurological: Denies headaches, decreased LOC, no sensory or motor focal deficits. Musculoskeletal:  No arthralgia no back pain or joint swelling. Skin: There are no rashes or bleeding. Psychiatric:  No anxiety, no depression. Endocrine: no diabetes or thyroid disease. Hematologic: no bleeding , no adenopathy. PHYSICAL EXAM: Shows a well appearing 76y.o.-year-old male who is not in pain or distress. Vital Signs: Blood pressure (!) 152/78, pulse 59, temperature 97 °F (36.1 °C), temperature source Temporal, resp. rate 18, height 5' 9\" (1.753 m), weight 206 lb (93.4 kg). HEENT: Normocephalic and atraumatic. Pupils are equal, round, reactive to light and accommodation. Extraocular muscles are intact. Neck: Showed no JVD, no carotid bruit . Lungs: Clear to auscultation bilaterally. Heart: Regular without any murmur. Abdomen: Soft, nontender. No hepatosplenomegaly. Extremities: Lower extremities show no edema, clubbing, or cyanosis. Breasts: Examination not done today.  Neuro exam: intact cranial nerves bilaterally no motor or sensory deficit, gait is normal. Lymphatic: no adenopathy appreciated in the supraclavicular, axillary, cervical or inguinal area    REVIEW OF LABORATORY DATA:   Lab Results   Component Value Date    WBC 8.2 05/10/2021    HGB 11.0 (L) 05/10/2021    HCT 33.9 (L) 05/10/2021    MCV 91.6 05/10/2021     05/10/2021     Lab Results   Component Value Date    IRON 48 (L) 09/09/2020    TIBC 235 (L) 09/09/2020 FERRITIN 199 05/10/2021       REVIEW OF RADIOLOGICAL RESULTS:     IMPRESSION:   1. Iron deficiency anemia  2. Clinical evidence of GI bleeding, endoscopic evaluation is negative  3. Microscopic hematuria  PLAN:   The patient hemoglobin is still within okay range and his  iron studies are okay  No evidence of clinical bleeding but hematuria is concerning.   Await urologic work-up  We will continue to watch his hemoglobin and iron studies closely and intervene as needed  We will see him back in 6 months, sooner if he has any symptoms

## 2021-05-20 ENCOUNTER — HOSPITAL ENCOUNTER (OUTPATIENT)
Age: 76
Discharge: HOME | End: 2021-05-20
Payer: MEDICARE

## 2021-05-20 ENCOUNTER — HOSPITAL ENCOUNTER (OUTPATIENT)
Dept: CT IMAGING | Age: 76
Discharge: HOME | End: 2021-05-22
Payer: MEDICARE

## 2021-05-20 DIAGNOSIS — R31.0 GROSS HEMATURIA: ICD-10-CM

## 2021-05-20 LAB
BUN BLDV-MCNC: 28 MG/DL (ref 8–23)
CREAT SERPL-MCNC: 1.65 MG/DL (ref 0.7–1.2)
GFR AFRICAN AMERICAN: 50 ML/MIN
GFR NON-AFRICAN AMERICAN: 41 ML/MIN
GFR SERPL CREATININE-BSD FRML MDRD: ABNORMAL ML/MIN/{1.73_M2}
GFR SERPL CREATININE-BSD FRML MDRD: ABNORMAL ML/MIN/{1.73_M2}

## 2021-05-20 PROCEDURE — 84520 ASSAY OF UREA NITROGEN: CPT

## 2021-05-20 PROCEDURE — 74178 CT ABD&PLV WO CNTR FLWD CNTR: CPT

## 2021-05-20 PROCEDURE — 82565 ASSAY OF CREATININE: CPT

## 2021-05-20 PROCEDURE — 36415 COLL VENOUS BLD VENIPUNCTURE: CPT

## 2021-05-20 PROCEDURE — 6360000004 HC RX CONTRAST MEDICATION: Performed by: NURSE PRACTITIONER

## 2021-05-20 RX ORDER — PANTOPRAZOLE SODIUM 40 MG/1
TABLET, DELAYED RELEASE ORAL
Qty: 30 TABLET | Refills: 5 | Status: SHIPPED | OUTPATIENT
Start: 2021-05-20 | End: 2021-09-20

## 2021-05-20 RX ADMIN — IOPAMIDOL 120 ML: 755 INJECTION, SOLUTION INTRAVENOUS at 09:42

## 2021-05-24 ENCOUNTER — PROCEDURE VISIT (OUTPATIENT)
Dept: UROLOGY | Age: 76
End: 2021-05-24
Payer: MEDICARE

## 2021-05-24 VITALS
DIASTOLIC BLOOD PRESSURE: 73 MMHG | SYSTOLIC BLOOD PRESSURE: 187 MMHG | BODY MASS INDEX: 30.86 KG/M2 | WEIGHT: 209 LBS | HEART RATE: 62 BPM

## 2021-05-24 DIAGNOSIS — N13.8 BPH WITH OBSTRUCTION/LOWER URINARY TRACT SYMPTOMS: Primary | ICD-10-CM

## 2021-05-24 DIAGNOSIS — N20.0 RENAL CALCULUS: ICD-10-CM

## 2021-05-24 DIAGNOSIS — R97.20 ELEVATED PSA: ICD-10-CM

## 2021-05-24 DIAGNOSIS — N40.1 BPH WITH OBSTRUCTION/LOWER URINARY TRACT SYMPTOMS: Primary | ICD-10-CM

## 2021-05-24 PROCEDURE — 1123F ACP DISCUSS/DSCN MKR DOCD: CPT | Performed by: UROLOGY

## 2021-05-24 PROCEDURE — 4040F PNEUMOC VAC/ADMIN/RCVD: CPT | Performed by: UROLOGY

## 2021-05-24 PROCEDURE — G8417 CALC BMI ABV UP PARAM F/U: HCPCS | Performed by: UROLOGY

## 2021-05-24 PROCEDURE — 1036F TOBACCO NON-USER: CPT | Performed by: UROLOGY

## 2021-05-24 PROCEDURE — 99213 OFFICE O/P EST LOW 20 MIN: CPT | Performed by: UROLOGY

## 2021-05-24 PROCEDURE — 3017F COLORECTAL CA SCREEN DOC REV: CPT | Performed by: UROLOGY

## 2021-05-24 PROCEDURE — 99211 OFF/OP EST MAY X REQ PHY/QHP: CPT

## 2021-05-24 PROCEDURE — G8427 DOCREV CUR MEDS BY ELIG CLIN: HCPCS | Performed by: UROLOGY

## 2021-06-22 ENCOUNTER — HOSPITAL ENCOUNTER (OUTPATIENT)
Age: 76
Discharge: HOME | End: 2021-06-22
Payer: MEDICARE

## 2021-06-22 ENCOUNTER — OFFICE VISIT (OUTPATIENT)
Dept: PRIMARY CARE CLINIC | Age: 76
End: 2021-06-22
Payer: MEDICARE

## 2021-06-22 VITALS
HEART RATE: 56 BPM | DIASTOLIC BLOOD PRESSURE: 78 MMHG | WEIGHT: 207.4 LBS | BODY MASS INDEX: 30.63 KG/M2 | SYSTOLIC BLOOD PRESSURE: 132 MMHG | RESPIRATION RATE: 16 BRPM

## 2021-06-22 DIAGNOSIS — R42 DIZZINESS AND GIDDINESS: ICD-10-CM

## 2021-06-22 DIAGNOSIS — R51.9 NONINTRACTABLE EPISODIC HEADACHE, UNSPECIFIED HEADACHE TYPE: ICD-10-CM

## 2021-06-22 DIAGNOSIS — I50.32 CHRONIC DIASTOLIC HEART FAILURE (HCC): ICD-10-CM

## 2021-06-22 DIAGNOSIS — R26.81 UNSTEADY GAIT: ICD-10-CM

## 2021-06-22 DIAGNOSIS — I10 ESSENTIAL HYPERTENSION: ICD-10-CM

## 2021-06-22 DIAGNOSIS — I10 ESSENTIAL HYPERTENSION: Primary | ICD-10-CM

## 2021-06-22 LAB
ABSOLUTE EOS #: 0.5 K/UL (ref 0–0.44)
ABSOLUTE IMMATURE GRANULOCYTE: 0.05 K/UL (ref 0–0.3)
ABSOLUTE LYMPH #: 1.95 K/UL (ref 1.1–3.7)
ABSOLUTE MONO #: 0.61 K/UL (ref 0.1–1.2)
ANION GAP SERPL CALCULATED.3IONS-SCNC: 10 MMOL/L (ref 9–17)
BASOPHILS # BLD: 1 % (ref 0–2)
BASOPHILS ABSOLUTE: 0.07 K/UL (ref 0–0.2)
BUN BLDV-MCNC: 26 MG/DL (ref 8–23)
BUN/CREAT BLD: 18 (ref 9–20)
CALCIUM SERPL-MCNC: 9.9 MG/DL (ref 8.6–10.4)
CHLORIDE BLD-SCNC: 102 MMOL/L (ref 98–107)
CO2: 27 MMOL/L (ref 20–31)
CREAT SERPL-MCNC: 1.48 MG/DL (ref 0.7–1.2)
DIFFERENTIAL TYPE: ABNORMAL
EOSINOPHILS RELATIVE PERCENT: 6 % (ref 1–4)
GFR AFRICAN AMERICAN: 56 ML/MIN
GFR NON-AFRICAN AMERICAN: 46 ML/MIN
GFR SERPL CREATININE-BSD FRML MDRD: ABNORMAL ML/MIN/{1.73_M2}
GFR SERPL CREATININE-BSD FRML MDRD: ABNORMAL ML/MIN/{1.73_M2}
GLUCOSE BLD-MCNC: 236 MG/DL (ref 70–99)
HCT VFR BLD CALC: 38.5 % (ref 40.7–50.3)
HEMOGLOBIN: 12.9 G/DL (ref 13–17)
IMMATURE GRANULOCYTES: 1 %
LYMPHOCYTES # BLD: 23 % (ref 24–43)
MCH RBC QN AUTO: 30.4 PG (ref 25.2–33.5)
MCHC RBC AUTO-ENTMCNC: 33.5 G/DL (ref 28.4–34.8)
MCV RBC AUTO: 90.6 FL (ref 82.6–102.9)
MONOCYTES # BLD: 7 % (ref 3–12)
NRBC AUTOMATED: 0 PER 100 WBC
PDW BLD-RTO: 13.7 % (ref 11.8–14.4)
PLATELET # BLD: 190 K/UL (ref 138–453)
PLATELET ESTIMATE: ABNORMAL
PMV BLD AUTO: 10.7 FL (ref 8.1–13.5)
POTASSIUM SERPL-SCNC: 4.7 MMOL/L (ref 3.7–5.3)
RBC # BLD: 4.25 M/UL (ref 4.21–5.77)
RBC # BLD: ABNORMAL 10*6/UL
SEG NEUTROPHILS: 62 % (ref 36–65)
SEGMENTED NEUTROPHILS ABSOLUTE COUNT: 5.36 K/UL (ref 1.5–8.1)
SODIUM BLD-SCNC: 139 MMOL/L (ref 135–144)
WBC # BLD: 8.5 K/UL (ref 3.5–11.3)
WBC # BLD: ABNORMAL 10*3/UL

## 2021-06-22 PROCEDURE — 85025 COMPLETE CBC W/AUTO DIFF WBC: CPT

## 2021-06-22 PROCEDURE — 1123F ACP DISCUSS/DSCN MKR DOCD: CPT | Performed by: FAMILY MEDICINE

## 2021-06-22 PROCEDURE — 4040F PNEUMOC VAC/ADMIN/RCVD: CPT | Performed by: FAMILY MEDICINE

## 2021-06-22 PROCEDURE — 3017F COLORECTAL CA SCREEN DOC REV: CPT | Performed by: FAMILY MEDICINE

## 2021-06-22 PROCEDURE — 80048 BASIC METABOLIC PNL TOTAL CA: CPT

## 2021-06-22 PROCEDURE — G8427 DOCREV CUR MEDS BY ELIG CLIN: HCPCS | Performed by: FAMILY MEDICINE

## 2021-06-22 PROCEDURE — 1036F TOBACCO NON-USER: CPT | Performed by: FAMILY MEDICINE

## 2021-06-22 PROCEDURE — 36415 COLL VENOUS BLD VENIPUNCTURE: CPT

## 2021-06-22 PROCEDURE — 99214 OFFICE O/P EST MOD 30 MIN: CPT | Performed by: FAMILY MEDICINE

## 2021-06-22 PROCEDURE — G8417 CALC BMI ABV UP PARAM F/U: HCPCS | Performed by: FAMILY MEDICINE

## 2021-06-22 PROCEDURE — 99211 OFF/OP EST MAY X REQ PHY/QHP: CPT

## 2021-06-22 RX ORDER — HYDRALAZINE HYDROCHLORIDE 25 MG/1
25 TABLET, FILM COATED ORAL 3 TIMES DAILY PRN
Qty: 90 TABLET | Refills: 0 | Status: SHIPPED | OUTPATIENT
Start: 2021-06-22 | End: 2021-06-28 | Stop reason: ALTCHOICE

## 2021-06-22 SDOH — ECONOMIC STABILITY: TRANSPORTATION INSECURITY
IN THE PAST 12 MONTHS, HAS LACK OF TRANSPORTATION KEPT YOU FROM MEETINGS, WORK, OR FROM GETTING THINGS NEEDED FOR DAILY LIVING?: NO

## 2021-06-22 SDOH — ECONOMIC STABILITY: FOOD INSECURITY: WITHIN THE PAST 12 MONTHS, YOU WORRIED THAT YOUR FOOD WOULD RUN OUT BEFORE YOU GOT MONEY TO BUY MORE.: NEVER TRUE

## 2021-06-22 SDOH — ECONOMIC STABILITY: FOOD INSECURITY: WITHIN THE PAST 12 MONTHS, THE FOOD YOU BOUGHT JUST DIDN'T LAST AND YOU DIDN'T HAVE MONEY TO GET MORE.: NEVER TRUE

## 2021-06-22 SDOH — ECONOMIC STABILITY: TRANSPORTATION INSECURITY
IN THE PAST 12 MONTHS, HAS THE LACK OF TRANSPORTATION KEPT YOU FROM MEDICAL APPOINTMENTS OR FROM GETTING MEDICATIONS?: NO

## 2021-06-22 ASSESSMENT — SOCIAL DETERMINANTS OF HEALTH (SDOH): HOW HARD IS IT FOR YOU TO PAY FOR THE VERY BASICS LIKE FOOD, HOUSING, MEDICAL CARE, AND HEATING?: NOT HARD AT ALL

## 2021-06-22 NOTE — PROGRESS NOTES
Patient is here with complaints of headache with standing. He said he is also getting dizziness with it. He said that this has been going on and off for a while, but for the last week it has been constant. He said that that they get so bad his stomach also hurts with it. He said the only way thing that helps is sitting or standing. He said he hasn't been able to go walking because of it. His wife states that he isn't doing well with walking either way because he gets short of breath, but they said that they are trying. He said he even tried yoga for the vertigo and that did not help. He said when he goes to the bathroom at night, by the time he gets there his headache has started. He has also tried tylenol and that did not help at all. Allergies:  Lipitor [atorvastatin], Aldactone [spironolactone], Crestor [rosuvastatin calcium], Lopid [gemfibrozil], Invokana [canagliflozin], and Januvia [sitagliptin]    Past Medical History:    Past Medical History:   Diagnosis Date    Acute MI ([facility] [address]     Acute renal failure with tubular necrosis ([facility] [address] 10/2/2018    ssecondary to hemodynamic effects of loop diuretics and ace inhibitors, bbaseline 1.21.3 which peaked up to 1.8, resolving    Anemia     CAD (coronary artery disease)     Cerebral artery occlusion with cerebral infarction ([facility] [address]     CHF (congestive heart failure) (HCC)     Chronic back pain     Closed fracture of lumbar vertebra without mention of spinal cord injury     Displacement of intervertebral disc, site unspecified, without myelopathy     H/O cardiac catheterization 2/19/16    LMCA: Mild irregularities 10-20%. LAD: Lesion on PRox LAD: Mid subsection. 65% stenosis. LCx: Lesion on 1st Ob Valentina: Proximal subesection. 70% steniosis. RCA: Small non-dominant RCA. Lesion on PRox RCA: Ostial. 50% stenosis. EF:55%.      H/O cardiovascular stress test 2/19/16    Abnormal. Moderate perfusion defect of mild intensity in the inferior, inferoseptal adn 12.00     Types: Cigarettes     Quit date: 3/4/1980     Years since quittin.3    Smokeless tobacco: Never Used   Substance Use Topics    Alcohol use: No       Family History:   Family History   Problem Relation Age of Onset    Cancer Mother         breast    Cancer Father         lung         Review of Systems:  Constitutional: negative for fevers or chills, has headache and dizziness,has nausea  Eyes: negative for visual disturbance   ENT: negative for sore throat or nasal congestion  Respiratory: no cough or shortness of breath  Cardiovascular: negative for chest pain or palpitations  Gastrointestinal: negative for abd pain, nausea, vomiting, diarrhea or constipation  Genitourinary: negative for dysuria, urgency or frequency  Integument/breast: negative for skin rash or lesions  Neurological: negative for unilateral weakness, numbness or tingling. Skeletal Muscular: has chronic back pain     Medication List          Accurate as of 2021 11:45 AM. If you have any questions, ask your nurse or doctor.             START taking these medications    hydrALAZINE 25 MG tablet  Commonly known as: APRESOLINE  Take 1 tablet by mouth 3 times daily as needed (if systolic bp is greather than 140 mm of hg)  Started by: Jackie Montejo MD        CONTINUE taking these medications    acyclovir 400 MG tablet  Commonly known as: ZOVIRAX     aspirin EC 81 MG EC tablet  Take 1 tablet by mouth daily     Jayy Microlet Lancets Misc  TEST TWICE DAILY     carvedilol 25 MG tablet  Commonly known as: COREG  Take 1 tablet by mouth 2 times daily (with meals)     Contour Test strip  Generic drug: blood glucose test strips  USE 1 STRIP TO CHECK GLUCOSE TWICE DAILY     doxazosin 4 MG tablet  Commonly known as: Cardura  Take 1 tablet by mouth nightly     eplerenone 50 MG tablet  Commonly known as: INSPRA  Take 1 tablet by mouth once daily     ezetimibe 10 MG tablet  Commonly known as: ZETIA  Take 1 tablet by mouth daily ferrous sulfate 325 (65 Fe) MG tablet  Commonly known as: IRON 325     fish oil 1000 MG Caps     furosemide 20 MG tablet  Commonly known as: Lasix  Take 1 tablet by mouth See Admin Instructions for 3 days Pt is to take one tablet by mouth on Monday, Wednesday and Friday. glipiZIDE 5 MG extended release tablet  Commonly known as: GLUCOTROL XL  Take 2 tablets by mouth twice daily     Lantus SoloStar 100 UNIT/ML injection pen  Generic drug: insulin glargine  Inject 28 Units into the skin 2 times daily     latanoprost 0.005 % ophthalmic solution  Commonly known as: XALATAN     lisinopril 10 MG tablet  Commonly known as: PRINIVIL;ZESTRIL  Take 1 tablet by mouth daily     nitroGLYCERIN 0.4 MG SL tablet  Commonly known as: NITROSTAT  Place 1 tablet under the tongue every 5 minutes as needed for Chest pain     pantoprazole 40 MG tablet  Commonly known as: PROTONIX  Take 1 tablet by mouth once daily     Pen Needles 31G X 6 MM Misc  1 each by Does not apply route daily     prednisoLONE acetate 1 % ophthalmic suspension  Commonly known as: PRED FORTE           Where to Get Your Medications      These medications were sent to [address], [address] [place] [address] [place] [address]    Phone: [phone]   · hydrALAZINE 25 MG tablet         Objective:  Physical Exam:  VitalsBP 132/78 (Site: Left Upper Arm, Position: Sitting)   Pulse 56   Resp 16   Wt 207 lb 6.4 oz (94.1 kg)   BMI 30.63 kg/m²   GEN:   A & O x3, no apparent distress  EYES: No gross abnormalities.   ENT:ENT exam normal, no neck nodes or sinus tenderness  NECK: normal, supple, no lymphadenopathy,  no carotid bruits  PULM: clear to auscultation bilaterally- no wheezes, rales or rhonchi, normal air movement, no respiratory distress  COR: regular rate & rhythm, no gallops and 2/6 murmer  ABD:  soft, non-tender  : deferred  EXT: normal strength, tone, and muscle mass  NEURO: Motor and sensory grossly intact. Has unsteady gait  SKIN:  No skin lesions or rashes      Labs:  Lab Results   Component Value Date    BUN 28 (H) 05/20/2021    CREATININE 1.65 (H) 05/20/2021     (L) 04/19/2021    K 4.5 04/19/2021    CALCIUM 9.7 04/19/2021     04/19/2021    CO2 23 04/19/2021    LABGLOM 41 (L) 05/20/2021    CHOL 119 09/09/2020    LDLCHOLESTEROL 26 09/09/2020    HDL 24 (L) 09/09/2020    TRIG 347 (H) 09/09/2020    ALT 16 06/25/2020    AST 18 06/25/2020       Assessment:  1. Essential hypertension    2. Chronic diastolic heart failure ([facility] [address]    3. Nonintractable episodic headache, unspecified headache type    4. Dizziness and giddiness    5.  Unsteady gait      Patient Active Problem List   Diagnosis Code    Vertigo, benign paroxysmal L23.40    Systolic murmur K43.5    History of MI (myocardial infarction) I25.2    History of colon polyps Z86.010    Coronary atherosclerosis due to calcified coronary lesion I25.10, I25.84    Displacement of intervertebral disc, site unspecified, without myelopathy [id number]    History of CVA (cerebrovascular accident) without residual deficits Z86.73    Dysautonomia ([facility] [address] G90.1    Syncope, near R55    Cholecystitis K81.9    Chronotropic incompetence with autonomic dysfunction G90.8    Episodic lightheadedness R42    Essential hypertension I10    Ischemic chest pain (HCC) I20.9    Chronic kidney disease, stage III (moderate) ([facility]) N18.30    Controlled type 2 diabetes mellitus with diabetic nephropathy, with long-term current use of insulin ([facility]) E11.21, Z79.4    Abnormal tilt table test R94.09    Cervical stenosis of spinal canal M48.02    Coronary artery disease involving native coronary artery of native heart without angina pectoris I25.10    S/P drug eluting coronary stent placement-OM1 4/10/17 Z95.5    Hyperlipidemia E78.5    Chronic diastolic heart failure ([facility]) I50.32    Angina, class II ([facility]) I20.9    Medication side effect, initial Metabolic Panel     Standing Status:   Future     Standing Expiration Date:   6/22/2022    CBC Auto Differential     Standing Status:   Future     Standing Expiration Date:   6/22/2022   Veronica Fragoso MD, Neurology, [place]     Referral Priority:   Routine     Referral Type:   Eval and Treat     Referral Reason:   Specialty Services Required     Referred to Provider:   Cait Rodriguez MD     Requested Specialty:   Neurology     Number of Visits Requested:   1    DME Order for Shreya Corona as OP     You must complete the order parameters below and add the medical necessity documentation for this DME in a separate note.     Folding Walker with Wheels and Seat    Current patient weight: Weight: 207 lb 6.4 oz (94.1 kg)  Current patient height:    Diagnosis: unsteady gait  Duration: Purchase       Current Outpatient Medications   Medication Sig Dispense Refill    hydrALAZINE (APRESOLINE) 25 MG tablet Take 1 tablet by mouth 3 times daily as needed (if systolic bp is greather than 140 mm of hg) 90 tablet 0    pantoprazole (PROTONIX) 40 MG tablet Take 1 tablet by mouth once daily 30 tablet 5    doxazosin (CARDURA) 4 MG tablet Take 1 tablet by mouth nightly 90 tablet 3    latanoprost (XALATAN) 0.005 % ophthalmic solution       insulin glargine (LANTUS SOLOSTAR) 100 UNIT/ML injection pen Inject 28 Units into the skin 2 times daily 50.4 mL 0    eplerenone (INSPRA) 50 MG tablet Take 1 tablet by mouth once daily 90 tablet 0    carvedilol (COREG) 25 MG tablet Take 1 tablet by mouth 2 times daily (with meals) 180 tablet 3    lisinopril (PRINIVIL;ZESTRIL) 10 MG tablet Take 1 tablet by mouth daily 90 tablet 3    glipiZIDE (GLUCOTROL XL) 5 MG extended release tablet Take 2 tablets by mouth twice daily 360 tablet 0    ezetimibe (ZETIA) 10 MG tablet Take 1 tablet by mouth daily 90 tablet 3    acyclovir (ZOVIRAX) 400 MG tablet 400 mg 2 times daily       furosemide (LASIX) 20 MG tablet Take 1 tablet by mouth See Admin Instructions for 3 days Pt is to take one tablet by mouth on Monday, Wednesday and Friday. 90 tablet 0    ferrous sulfate 325 (65 Fe) MG tablet Take 325 mg by mouth daily (with breakfast)       CONTOUR TEST strip USE 1 STRIP TO CHECK GLUCOSE TWICE DAILY 100 strip 11    nitroGLYCERIN (NITROSTAT) 0.4 MG SL tablet Place 1 tablet under the tongue every 5 minutes as needed for Chest pain 25 tablet 3    Omega-3 Fatty Acids (FISH OIL) 1000 MG CAPS Take 1,000 mg by mouth daily       aspirin EC 81 MG EC tablet Take 1 tablet by mouth daily 30 tablet 3    DIANA MICROLET LANCETS MISC TEST TWICE DAILY 100 each 2    Insulin Pen Needle (PEN NEEDLES) 31G X 6 MM MISC 1 each by Does not apply route daily 100 each 3    prednisoLONE acetate (PRED FORTE) 1 % ophthalmic suspension Place 1 drop into the left eye daily        No current facility-administered medications for this visit. No follow-ups on file.       Electronically signed by Geoff Alvarado MD on 6/22/2021 at 11:45 AM

## 2021-06-22 NOTE — PATIENT INSTRUCTIONS
SURVEY:    You may be receiving a survey from [email or web address] regarding your visit today. You may get this in the mail, through your MyChart, or in your email. Please complete the survey to enable us to provide the highest quality of care to you and your family. If you cannot score us a very good (5 Stars) on any question, please call the office to discuss how we could of made your experience exceptional.    Thank you!     Dr. Quentin Gonzalez JONATHAN  [facility], [address] [place] [address]    Phone: [phone]  Fax: [phone]    Office Hours:   Elodia Mccarthymauri, F: 8-5 Wednesday: 9-11

## 2021-06-25 ENCOUNTER — HOSPITAL ENCOUNTER (OUTPATIENT)
Dept: CT IMAGING | Age: 76
Discharge: HOME | End: 2021-06-27
Payer: MEDICARE

## 2021-06-25 DIAGNOSIS — R51.9 NONINTRACTABLE EPISODIC HEADACHE, UNSPECIFIED HEADACHE TYPE: ICD-10-CM

## 2021-06-25 PROCEDURE — 70450 CT HEAD/BRAIN W/O DYE: CPT

## 2021-06-28 ENCOUNTER — OFFICE VISIT (OUTPATIENT)
Dept: CARDIOLOGY | Age: 76
End: 2021-06-28
Payer: MEDICARE

## 2021-06-28 VITALS
BODY MASS INDEX: 30.42 KG/M2 | WEIGHT: 205.4 LBS | DIASTOLIC BLOOD PRESSURE: 72 MMHG | HEIGHT: 69 IN | RESPIRATION RATE: 17 BRPM | OXYGEN SATURATION: 99 % | SYSTOLIC BLOOD PRESSURE: 122 MMHG | HEART RATE: 86 BPM

## 2021-06-28 DIAGNOSIS — G90.1 DYSAUTONOMIA (HCC): Primary | ICD-10-CM

## 2021-06-28 DIAGNOSIS — R51.9 NONINTRACTABLE HEADACHE, UNSPECIFIED CHRONICITY PATTERN, UNSPECIFIED HEADACHE TYPE: ICD-10-CM

## 2021-06-28 DIAGNOSIS — Z95.820 S/P ANGIOPLASTY WITH STENT: ICD-10-CM

## 2021-06-28 DIAGNOSIS — I10 ESSENTIAL HYPERTENSION: ICD-10-CM

## 2021-06-28 DIAGNOSIS — I50.32 CHRONIC DIASTOLIC HEART FAILURE (HCC): ICD-10-CM

## 2021-06-28 DIAGNOSIS — R42 DIZZINESS: ICD-10-CM

## 2021-06-28 DIAGNOSIS — E78.2 MIXED HYPERLIPIDEMIA: ICD-10-CM

## 2021-06-28 DIAGNOSIS — I25.10 ASHD (ARTERIOSCLEROTIC HEART DISEASE): ICD-10-CM

## 2021-06-28 PROCEDURE — 99211 OFF/OP EST MAY X REQ PHY/QHP: CPT | Performed by: FAMILY MEDICINE

## 2021-06-28 PROCEDURE — G8417 CALC BMI ABV UP PARAM F/U: HCPCS | Performed by: FAMILY MEDICINE

## 2021-06-28 PROCEDURE — G8427 DOCREV CUR MEDS BY ELIG CLIN: HCPCS | Performed by: FAMILY MEDICINE

## 2021-06-28 PROCEDURE — 1036F TOBACCO NON-USER: CPT | Performed by: FAMILY MEDICINE

## 2021-06-28 PROCEDURE — 93005 ELECTROCARDIOGRAM TRACING: CPT | Performed by: FAMILY MEDICINE

## 2021-06-28 PROCEDURE — 93010 ELECTROCARDIOGRAM REPORT: CPT | Performed by: FAMILY MEDICINE

## 2021-06-28 PROCEDURE — 1123F ACP DISCUSS/DSCN MKR DOCD: CPT | Performed by: FAMILY MEDICINE

## 2021-06-28 PROCEDURE — 99214 OFFICE O/P EST MOD 30 MIN: CPT | Performed by: FAMILY MEDICINE

## 2021-06-28 PROCEDURE — 3017F COLORECTAL CA SCREEN DOC REV: CPT | Performed by: FAMILY MEDICINE

## 2021-06-28 PROCEDURE — 4040F PNEUMOC VAC/ADMIN/RCVD: CPT | Performed by: FAMILY MEDICINE

## 2021-06-28 RX ORDER — NITROGLYCERIN 0.4 MG/1
0.4 TABLET SUBLINGUAL EVERY 5 MIN PRN
Qty: 25 TABLET | Refills: 3 | Status: SHIPPED | OUTPATIENT
Start: 2021-06-28

## 2021-06-28 RX ORDER — LISINOPRIL 20 MG/1
20 TABLET ORAL DAILY
Qty: 90 TABLET | Refills: 3 | Status: ON HOLD | OUTPATIENT
Start: 2021-06-28 | End: 2021-09-22 | Stop reason: SDUPTHER

## 2021-06-28 RX ORDER — FUROSEMIDE 20 MG/1
20 TABLET ORAL SEE ADMIN INSTRUCTIONS
Qty: 90 TABLET | Refills: 3 | Status: CANCELLED | OUTPATIENT
Start: 2021-06-28 | End: 2021-07-01

## 2021-06-28 NOTE — PROGRESS NOTES
Luis Angel Lara am scribing for and in the presence of María Elena Bray. Jackelin BLOUNT, MS, F.A.C.C. Patient: Nathalia Neal  : 1945  Date of Visit: 2021    REASON FOR VISIT / CONSULTATION: 6 Month Follow-Up (Hx:CAD, S/P Stent,CHF,HTN,HLD,Dysautonomia. He has not been doing too well, really bad headaches. Lightheaded/dizziness all the times. Palpitations every so often, do not last long. Denies: CP, SOB. )    Dear Jimmy Elliott MD,    As you know, Mr. Janie Coppoal is a 70 y.o. male with a known history of dysautonomia where on tilt table testing his blood pressure dropped from 572 systolic to 78 systolic with only a 71-41 HR response. More recently, a CT of he head in the past showed evidence of at least 2 old CVA's and a heart catheterization showed severe single vessel disease in the ostium of a moderate sized OM1 branch of the circumflex. However, maximal guidelines directed medical management was opted for an place of stenting partly due to the risk associated with stenting this vessel. Recently he has had a coronary angiography procedure with stenting of his HA Om1. As you know, Mr. Janie Coppola  is a 76 y.o. male with a history of recent onset substernal chest discomfort that led to a stress test and a subsequent heart catheterization which showed severe single vessel coronary artery disease of the HA Om1 with successful angioplasty and stenting of that vessel that was done by Dr. Lucero Houston on 4/10/17. More recently he has had problems with balance problems when he is in his feet and says that a Neurologist has told him in the past this is because of his previous strokes. Most recently he underwent a cardiovascular stress test which fortunately had shown to be normal on 3/21/2018. Mr. Janie Coppola has significant normal stress test and echocardiogram on 2020. Holter monitor done on 2020: The rhythm was sinus.  Average daily heart rate 58 ranging from 47 to 81 bpm. Bradycardia for 72% test duration. Rare premature supraventricular ectopic beats consisting of 33 isolated PACs. Rare premature ventricular ectopic beats total 75 consisting of 73 isolatedPVCs and a ventricular couplet. Echocardiogram done on 12/18/2020: EF of 60%. Moderately increased LV wall thickness. Borderline pulmonary hypertension. Mild tricuspid regurgitation. Mild diastolic dysfunction is seen/ Aortic root is mildly dilated. Since the last time I saw Mr. Wing Rosario he reports he has moderate dizziness. He has it all the time, it is worse when he gets up and walks and resolves when he sits down. It doesn't seem to be affected by what time of day but does happen daily and he says is moderately problematic for him. Denies any falls or near falls. He also says he does not want to take coreg because it upsets his stomach. He denies having any chest pain, pressure or tightness. He denies having any shortness of breath or feeling any palpitations. He denied any abdominal pain, bleeding problems, bowel issues, problems with his medications or any other concerns at this time. No nausea or vomiting. No cough, fever or chills. Bleeding Risks: Mr. Wing Rosario denies any current or recent bleeding problems including a history of a GI bleed, ulcers, recent or upcoming surgeries, blood in his stool or black tarry stools or blood in his urine.         Past Medical History:   Diagnosis Date    Acute MI ([address]     Acute renal failure with tubular necrosis ([address] 10/2/2018    ssecondary to hemodynamic effects of loop diuretics and ace inhibitors, bbaseline 1.21.3 which peaked up to 1.8, resolving    Anemia     CAD (coronary artery disease)     Cerebral artery occlusion with cerebral infarction ([address]     CHF (congestive heart failure) (HCC)     Chronic back pain     Closed fracture of lumbar vertebra without mention of spinal cord injury     Displacement of intervertebral disc, site unspecified, without myelopathy     H/O cardiac catheterization 2/19/16    LMCA: Mild irregularities 10-20%. LAD: Lesion on PRox LAD: Mid subsection. 65% stenosis. LCx: Lesion on 1st Ob Valentina: Proximal subesection. 70% steniosis. RCA: Small non-dominant RCA. Lesion on PRox RCA: Ostial. 50% stenosis. EF:55%.  H/O cardiovascular stress test 2/19/16    Abnormal. Moderate perfusion defect of mild intensity in the inferior, inferoseptal adn inferoapical regions during stress imaging, which most consistent with ischemia. Global LV systolic function normal without regional wall motion abnormalities. OVerall these results are most consistent with an intermediate risk for signficant CAD. Additional testing including cardiac cath may be indicated.  H/O tilt table evaluation 12/26/2017    Abnormal. Patients HR, BP response and symptoms were most consistent with dysautonomia. Combined with viligant maintenance of euvolemia and maintaining a moderate salft intake, pharmacologic treatment with SSRI such as lexapro and or mestinon among other treatments have shown some effectiveness in treatment of this condition    H/O tilt table evaluation 12/26/2017    Abnormal head upright tilt table study. The pt heart rate, blood pressure response and symptoms were most consistent with dysautonomia.  History of 24 hour EKG monitoring 8/14/14    Occasional PAC's and PVC's which appear to be at least moderately symptomatic.  History of 24 hour EKG monitoring 11/19/14    Event Monitor. Sinus rhythm and sinus bradycardia. Infrequent isolated PVC's    History of cardiovascular stress test 2/19/14    Relatively NL    History of cardiovascular stress test 03/21/2018    Normal myocardial perfusion. Global left ventricular systolic function was normal with an EF of 68%. Overall, these results are most consistent with a low risk scan.     History of coronary artery stent placement 04/2017    PTCA / Drug Eluting Stent:, CX and / or branches    History of CVA (cerebrovascular accident) without residual deficits 2014    Incidentally found on CT head. No known impairment now or in the past.    History of echocardiogram 2/18/14    LA mildly dilated, EF 55%, LV wall thickness is moderately increased, no definite wall motion abnormalilities, what appears to be a pacer wire is seen w/n the RA and RV, mild-mod TR, mild pulmonary hypertension.  History of Holter monitoring 12/29/2017    Rare PAC's and PVC's.  History of tilt table evaluation 8/12/14    Abnormal    Hyperlipidemia     Hypertension     Non critical Right Renal artery stenosis, native ([facility] 10/2/2018    Non critical Right Renal artery stenosis, based on cath in 2016, Rt RA 30% stenosis    Pacemaker     non-functioning    S/P cardiac cath 04/10/2017    S/P coronary artery stent placement     Successful PTCA - HA Om1    SIRS (systemic inflammatory response syndrome) ([facility] 5/19/2019    Type II or unspecified type diabetes mellitus without mention of complication, not stated as uncontrolled        CURRENT ALLERGIES: Lipitor [atorvastatin], Aldactone [spironolactone], Crestor [rosuvastatin calcium], Lopid [gemfibrozil], Invokana [canagliflozin], and Januvia [sitagliptin] REVIEW OF SYSTEMS: 14 systems were reviewed. Pertinent positives and negatives as above, all else negative.      Past Surgical History:   Procedure Laterality Date    BACK SURGERY      CARDIAC CATHETERIZATION Left 2/19/16    via right radial approach/ [facility]/ Dr. Ary De Souza  8/17/15    [facility]/ [facility]    COLONOSCOPY  2010    COLONOSCOPY  2/19/15    -polyps,diverticulosis,hemorrhoids    COLONOSCOPY  05/23/2018    Dr Osiris Martins    COLONOSCOPY N/A 5/23/2018    COLONOSCOPY POLYPECTOMY SNARE/COLD BIOPSY  cold snare  and  hot snare performed by Ila Mo MD at [facility]  10/30/2020    with Dr. Jensen Upland 10/30/2020    Lisa Gibbs NOT HIGH RISK performed by Liberty Waters DO at [address] [facility]      left eye    PACEMAKER INSERTION      PACEMAKER PLACEMENT      UPPER GASTROINTESTINAL ENDOSCOPY  2019    Dr. Dee Villanueva H-Pylori)duodenal bulbar/antral erythema    UPPER GASTROINTESTINAL ENDOSCOPY N/A 2019    EGD BIOPSY, clotest performed by Anthony Brunner MD at [facility] N/A 10/30/2020    EGD ESOPHAGOGASTRODUODENOSCOPY performed by Liberty Waters DO at [address] History:  Social History     Tobacco Use    Smoking status: Former Smoker     Packs/day: 1.50     Years: 8.00     Pack years: 12.00     Types: Cigarettes     Quit date: 3/4/1980     Years since quittin.3    Smokeless tobacco: Never Used   Vaping Use    Vaping Use: Never used   Substance Use Topics    Alcohol use: No    Drug use: No        CURRENT MEDICATIONS:  Outpatient Medications Marked as Taking for the 21 encounter (Office Visit) with Lorelei Dowling MD   Medication Sig Dispense Refill    pantoprazole (PROTONIX) 40 MG tablet Take 1 tablet by mouth once daily 30 tablet 5    doxazosin (CARDURA) 4 MG tablet Take 1 tablet by mouth nightly 90 tablet 3    latanoprost (XALATAN) 0.005 % ophthalmic solution       insulin glargine (LANTUS SOLOSTAR) 100 UNIT/ML injection pen Inject 28 Units into the skin 2 times daily 50.4 mL 0    eplerenone (INSPRA) 50 MG tablet Take 1 tablet by mouth once daily 90 tablet 0    carvedilol (COREG) 25 MG tablet Take 1 tablet by mouth 2 times daily (with meals) 180 tablet 3    lisinopril (PRINIVIL;ZESTRIL) 10 MG tablet Take 1 tablet by mouth daily 90 tablet 3    glipiZIDE (GLUCOTROL XL) 5 MG extended release tablet Take 2 tablets by mouth twice daily 360 tablet 0    ezetimibe (ZETIA) 10 MG tablet Take 1 tablet by mouth daily 90 tablet 3    acyclovir (ZOVIRAX) 400 MG tablet 400 mg 2 times daily       furosemide (LASIX) 20 MG tablet Take 1 tablet by mouth See Admin Instructions for 3 days Pt is to take one tablet by mouth on Monday, Wednesday and Friday. 90 tablet 0    ferrous sulfate 325 (65 Fe) MG tablet Take 325 mg by mouth daily (with breakfast)       nitroGLYCERIN (NITROSTAT) 0.4 MG SL tablet Place 1 tablet under the tongue every 5 minutes as needed for Chest pain 25 tablet 3    Omega-3 Fatty Acids (FISH OIL) 1000 MG CAPS Take 1,000 mg by mouth daily       aspirin EC 81 MG EC tablet Take 1 tablet by mouth daily 30 tablet 3    Insulin Pen Needle (PEN NEEDLES) 31G X 6 MM MISC 1 each by Does not apply route daily 100 each 3    prednisoLONE acetate (PRED FORTE) 1 % ophthalmic suspension Place 1 drop into the left eye daily          FAMILY HISTORY: family history includes Cancer in his father and mother. PHYSICAL EXAM:   BP (!) 150/75 (Site: Left Upper Arm, Position: Sitting, Cuff Size: Medium Adult)   Pulse 84   Resp 17   Ht 5' 9\" (1.753 m)   Wt 205 lb 6.4 oz (93.2 kg)   SpO2 99%   BMI 30.33 kg/m²  Body mass index is 30.33 kg/m². Constitutional: He is oriented to person, place, and time. He appears well-developed and well-nourished. In no acute distress. HEENT: Normocephalic and atraumatic. No JVD present. Carotid bruit is not present. No mass and no thyromegaly present. No lymphadenopathy present. Cardiovascular: Normal rate, regular rhythm, normal heart sounds. Exam reveals no gallop and no friction rubs. 2/6 systolic murmur, 5th intercostal space on the LEFT in the mid-clavicular line (cardiac apex). Pulmonary/Chest: Effort normal and breath sounds normal. No respiratory distress. He has no wheezes, rhonchi or rales. Abdominal: Soft, non-tender. Bowel sounds and aorta are normal. He exhibits no organomegaly, mass or bruit. Extremities: None. No cyanosis or clubbing. 2+ radial and carotid pulses. Distal extremity pulses: 2+ bilaterally. Neurological: He is alert and oriented to person, place, and time.  No evidence of gross cranial nerve deficit. Coordination appeared normal.   Skin: Skin is warm and dry. There is no rash or diaphoresis. Psychiatric: He has a normal mood and affect. His speech is normal and behavior is normal.      MOST RECENT LABS ON RECORD:   Lab Results   Component Value Date    WBC 8.5 06/22/2021    HGB 12.9 (L) 06/22/2021    HCT 38.5 (L) 06/22/2021     06/22/2021    CHOL 119 09/09/2020    TRIG 347 (H) 09/09/2020    HDL 24 (L) 09/09/2020    LDLDIRECT 44 11/02/2017    ALT 16 06/25/2020    AST 18 06/25/2020     06/22/2021    K 4.7 06/22/2021     06/22/2021    CREATININE 1.48 (H) 06/22/2021    BUN 26 (H) 06/22/2021    CO2 27 06/22/2021    TSH 2.26 11/02/2017    PSA 3.68 04/30/2021    INR 1.0 02/17/2016    LABA1C 5.9 04/19/2021    LABMICR 30 10/17/2015       ASSESSMENT:  1. Dysautonomia ([address]    2. Dizziness    3. Chronic diastolic heart failure ([address]    4. ASHD (arteriosclerotic heart disease)    5. S/P angioplasty with stent    6. Essential hypertension    7. Mixed hyperlipidemia       PLAN:    Dysautonomia: Mildly to moderately Symptomatic    Is having a lot of dizziness  · Diuretics: STOP furosemide (Lasix)   · Beta Blocker: STOP Carvedilol (Coreg)   ACE Inibitor/ARB: INCREASE to lisinopril 20 mg daily. Says he was recently started on Doxazosin which he says he knows is associated with lightheadedness and dizziness but says this was recently started by Dr. Rizwan Leavitt for his kidneys. Will continue to monitor this at least for now. · Nonpharmacologic counseling: Because of his condition, I reminded him to try and keep himself well-hydrated and to take extra time when moving from laying to sitting, sitting to standing and standing to walking as well as wearing at least knee high compressions stockings. Chronic Diastolic Heart Failure: EF of 60% via echo on 12/18/2020. Currently well controlled.   · Beta Blocker: STOP Carvedilol (Coreg)  Diuretics: STOP furosemide (Lasix)  Nonpharmacologic management of Heart Failure: I advised him to try and keep his legs up whenever possible and to limit salt in his diet. Atherosclerotic Heart Disease: Coronary Artery stent: 4/10/2017  Antiplatelet Agent: Continue aspirin 81 mg daily. I also reminded him to watch for signs of blood in his stool or black tarry stools and stop the medication immediately if this develops as this could be life threatening. ACE Inibitor/ARB: INCREASE to lisinopril 20 mg daily. I also discussed the potential side effects of this medication including lightheadedness and dizziness and instructed them to stop the medication of this occurs and call our office if this occurs. Beta Blocker: STOP Carvedilol (Coreg)   Cholesterol Reduction Therapy: Continue ezetimide (Zetia) 10 mg daily. Laboratory testing: None    Essential Hypertension: Controlled   ACE Inibitor/ARB: INCREASE to lisinopril 20 mg daily. · Beta Blocker: STOP Carvedilol (Coreg)    Hyperlipidemia: Mixed - Last LDL on 9/9/2020 was 26 mg/dL   · Cholesterol Reduction Therapy: Continue ezetimide (Zetia) 10 mg daily. · Headaches: CT of head done on 6/25/2021  · Follow with Bernadette Xiao MD and in agreement with him following with Neurology     Finally, I recommended that he continue his other medications and follow up with you as previously scheduled. FOLLOW UP:   I told Mr. Manoj Méndez  to call my office if he had any problems, but otherwise told him to Return in about 3 months (around 9/28/2021). However, as always I would be happy to see him sooner should the need arise. Once again, thank you for allowing me to participate in this patients care. Please do not hesitate to contact me could I be of further assistance. Sincerely,  Nathen Benítez MD, MS, F.A.C.C.   [facility] Cardiology Specialist  [address], [place], [address] [place] [address]  Phone: [phone], Fax: [phone]     I believe that the risk of significant morbidity and mortality related to the patient's current medical conditions are: Intermediate. >30 minutes were spent during prep work, discussion and exam of the patient, and follow up documentation and all of their questions were answered. The documentation recorded by the scribe, accurately and completely reflects the services I personally performed and the decisions made by me. Portia Lomeli MD, MS, F.A.C.C.  June 28, 2021

## 2021-06-28 NOTE — PATIENT INSTRUCTIONS
SURVEY:    You may be receiving a survey from [employer] regarding your visit today. Please complete the survey to enable us to provide the highest quality of care to you and your family. If you cannot score us a very good on any question, please call the office to discuss how we could have made your experience a very good one. Thank you.

## 2021-07-19 ENCOUNTER — OFFICE VISIT (OUTPATIENT)
Dept: PRIMARY CARE CLINIC | Age: 76
End: 2021-07-19
Payer: MEDICARE

## 2021-07-19 VITALS
SYSTOLIC BLOOD PRESSURE: 180 MMHG | DIASTOLIC BLOOD PRESSURE: 78 MMHG | RESPIRATION RATE: 16 BRPM | WEIGHT: 212 LBS | HEART RATE: 86 BPM | BODY MASS INDEX: 31.31 KG/M2

## 2021-07-19 DIAGNOSIS — I50.32 CHRONIC DIASTOLIC HEART FAILURE (HCC): ICD-10-CM

## 2021-07-19 DIAGNOSIS — Z79.4 CONTROLLED TYPE 2 DIABETES MELLITUS WITH DIABETIC NEPHROPATHY, WITH LONG-TERM CURRENT USE OF INSULIN (HCC): ICD-10-CM

## 2021-07-19 DIAGNOSIS — F33.0 MAJOR DEPRESSIVE DISORDER, RECURRENT, MILD (HCC): ICD-10-CM

## 2021-07-19 DIAGNOSIS — G90.1 DYSAUTONOMIA (HCC): ICD-10-CM

## 2021-07-19 DIAGNOSIS — I10 ESSENTIAL HYPERTENSION: Primary | ICD-10-CM

## 2021-07-19 DIAGNOSIS — E11.21 CONTROLLED TYPE 2 DIABETES MELLITUS WITH DIABETIC NEPHROPATHY, WITH LONG-TERM CURRENT USE OF INSULIN (HCC): ICD-10-CM

## 2021-07-19 PROCEDURE — 1123F ACP DISCUSS/DSCN MKR DOCD: CPT | Performed by: FAMILY MEDICINE

## 2021-07-19 PROCEDURE — G8417 CALC BMI ABV UP PARAM F/U: HCPCS | Performed by: FAMILY MEDICINE

## 2021-07-19 PROCEDURE — 99211 OFF/OP EST MAY X REQ PHY/QHP: CPT

## 2021-07-19 PROCEDURE — 3017F COLORECTAL CA SCREEN DOC REV: CPT | Performed by: FAMILY MEDICINE

## 2021-07-19 PROCEDURE — G8427 DOCREV CUR MEDS BY ELIG CLIN: HCPCS | Performed by: FAMILY MEDICINE

## 2021-07-19 PROCEDURE — 3044F HG A1C LEVEL LT 7.0%: CPT | Performed by: FAMILY MEDICINE

## 2021-07-19 PROCEDURE — 1036F TOBACCO NON-USER: CPT | Performed by: FAMILY MEDICINE

## 2021-07-19 PROCEDURE — 99214 OFFICE O/P EST MOD 30 MIN: CPT | Performed by: FAMILY MEDICINE

## 2021-07-19 PROCEDURE — 2022F DILAT RTA XM EVC RTNOPTHY: CPT | Performed by: FAMILY MEDICINE

## 2021-07-19 PROCEDURE — 4040F PNEUMOC VAC/ADMIN/RCVD: CPT | Performed by: FAMILY MEDICINE

## 2021-07-19 RX ORDER — INSULIN GLARGINE 100 [IU]/ML
28 INJECTION, SOLUTION SUBCUTANEOUS 2 TIMES DAILY
Qty: 50.4 ML | Refills: 0 | Status: SHIPPED | OUTPATIENT
Start: 2021-07-19 | End: 2021-12-09 | Stop reason: SDUPTHER

## 2021-07-19 RX ORDER — GLIPIZIDE 5 MG/1
10 TABLET, FILM COATED, EXTENDED RELEASE ORAL 2 TIMES DAILY
Qty: 360 TABLET | Refills: 0 | Status: ON HOLD | OUTPATIENT
Start: 2021-07-19 | End: 2021-11-26 | Stop reason: HOSPADM

## 2021-07-19 RX ORDER — HYDRALAZINE HYDROCHLORIDE 25 MG/1
25 TABLET, FILM COATED ORAL 2 TIMES DAILY
COMMUNITY
End: 2021-07-19 | Stop reason: SDUPTHER

## 2021-07-19 RX ORDER — ESCITALOPRAM OXALATE 10 MG/1
10 TABLET ORAL DAILY
Qty: 30 TABLET | Refills: 0 | Status: SHIPPED | OUTPATIENT
Start: 2021-07-19 | End: 2021-08-05

## 2021-07-19 RX ORDER — HYDRALAZINE HYDROCHLORIDE 50 MG/1
50 TABLET, FILM COATED ORAL 3 TIMES DAILY
Qty: 270 TABLET | Refills: 0 | Status: SHIPPED | OUTPATIENT
Start: 2021-07-19 | End: 2021-08-19 | Stop reason: SDUPTHER

## 2021-07-19 NOTE — PROGRESS NOTES
Rolly Gleason is a 76 y.o. male here for routine follow up of diabetes. He has been checking  his blood glucose levels regularly. He states it was 111 this morning, and normally runs between 90-98. He denies numbness and tingling in the hands and feet. He states the only time it happens is when he has chest pains. He has been compliant with diet and exercise. He has been compliant with his medications. He is tolerating medications. Hypertension: Patient here for follow-up of elevated blood pressure. He is exercising by walking with the walker and is adherent to low salt diet. Patient does take his blood pressure at home, and he states it has been running high. Cardiac symptoms chest pain. Patient was having chest pains when he was taking the doxazosin. He states that since he stopped taking that he is no longer having chest pain. Patient denies fatigue and palpitations. Cardiovascular risk factors: advanced age (older than 54 for men, 72 for women), diabetes mellitus, male gender and obesity (BMI >= 30 kg/m2). Use of agents associated with hypertension: none. Hyperlipidemia: Patient presents with hyperlipidemia. There is a family history of hyperlipidemia. Congestive Heart Failure  Patient presents for re-evaluation of congestive heart failure. Patient was getting chest pain until he stopped taking the Doxasosin. He states that he normally does not have shortness of breath but he does today. He states he is compliant all of the time with his medications. He states he is compliant all of the time with his diet. Patient states that he is still very dizzy and is still experiencing headaches. He states that this was the same way at his last visit. He still has not seen the neurologist, they could not get him in until 8/05/2021. Patient has been taking the Hydralazine due to high blood pressure. He started the Hydralazine when he stopped the Hydrochlorothiazide.  He is currently taking 25MG of the the prescription BID. He stated that if the doctor wanted him to stay on that he would need a refill today. He states that before he was taking the Hydralazine his blood pressure was over 200. Allergies:  Lipitor [atorvastatin], Aldactone [spironolactone], Crestor [rosuvastatin calcium], Lopid [gemfibrozil], Invokana [canagliflozin], and Januvia [sitagliptin]    Past Medical History:    Past Medical History:   Diagnosis Date    [facility]     Acute renal failure with tubular necrosis ([facility] 10/2/2018    ssecondary to hemodynamic effects of loop diuretics and ace inhibitors, bbaseline 1.21.3 which peaked up to 1.8, resolving    Anemia     CAD (coronary artery disease)     Cerebral artery occlusion with cerebral infarction ([facility]     [facility]     Chronic back pain     Closed fracture of lumbar vertebra without mention of spinal cord injury     Displacement of intervertebral disc, site unspecified, without myelopathy     H/O cardiac catheterization 2/19/16    LMCA: Mild irregularities 10-20%. LAD: Lesion on PRox LAD: Mid subsection. 65% stenosis. LCx: Lesion on 1st Ob Valentina: Proximal subesection. 70% steniosis. RCA: Small non-dominant RCA. Lesion on PRox RCA: Ostial. 50% stenosis. EF:55%.  H/O cardiovascular stress test 2/19/16    Abnormal. Moderate perfusion defect of mild intensity in the inferior, inferoseptal adn inferoapical regions during stress imaging, which most consistent with ischemia. Global LV systolic function normal without regional wall motion abnormalities. OVerall these results are most consistent with an intermediate risk for signficant CAD. Additional testing including cardiac cath may be indicated.  H/O tilt table evaluation 12/26/2017    Abnormal. Patients HR, BP response and symptoms were most consistent with dysautonomia.  Combined with viligant maintenance of euvolemia and maintaining a moderate salft intake, pharmacologic treatment with SSRI such as lexapro and or mestinon among other treatments have shown some effectiveness in treatment of this condition    H/O tilt table evaluation 12/26/2017    Abnormal head upright tilt table study. The pt heart rate, blood pressure response and symptoms were most consistent with dysautonomia.  History of 24 hour EKG monitoring 8/14/14    Occasional PAC's and PVC's which appear to be at least moderately symptomatic.  History of 24 hour EKG monitoring 11/19/14    Event Monitor. Sinus rhythm and sinus bradycardia. Infrequent isolated PVC's    History of cardiovascular stress test 2/19/14    Relatively NL    History of cardiovascular stress test 03/21/2018    Normal myocardial perfusion. Global left ventricular systolic function was normal with an EF of 68%. Overall, these results are most consistent with a low risk scan.  History of coronary artery stent placement 04/2017    PTCA / Drug Eluting Stent:, CX and / or branches    History of CVA (cerebrovascular accident) without residual deficits 2014    Incidentally found on CT head. No known impairment now or in the past.    History of echocardiogram 2/18/14    LA mildly dilated, EF 55%, LV wall thickness is moderately increased, no definite wall motion abnormalilities, what appears to be a pacer wire is seen w/n the RA and RV, mild-mod TR, mild pulmonary hypertension.  History of Holter monitoring 12/29/2017    Rare PAC's and PVC's.      History of tilt table evaluation 8/12/14    Abnormal    Hyperlipidemia     Hypertension     Non critical Right Renal artery stenosis, native ([address] 10/2/2018    Non critical Right Renal artery stenosis, based on cath in 2016, Rt RA 30% stenosis    Pacemaker     non-functioning    S/P cardiac cath 04/10/2017    S/P coronary artery stent placement     Successful PTCA - HA Om1    SIRS (systemic inflammatory response syndrome) ([address] 5/19/2019    Type II or unspecified type diabetes mellitus without mention of complication, not stated as uncontrolled        Past Surgical History:    Past Surgical History:   Procedure Laterality Date    BACK SURGERY      CARDIAC CATHETERIZATION Left 16    via right radial approach/ [facility] - [facility] [place]/ Dr. Dhaval Barry  8/17/15    Linag/Charles/Saulo/ Lap    COLONOSCOPY      COLONOSCOPY  2/19/15    -polyps,diverticulosis,hemorrhoids    COLONOSCOPY  2018    Dr Ivory Fuentes    COLONOSCOPY N/A 2018    COLONOSCOPY POLYPECTOMY SNARE/COLD BIOPSY  cold snare  and  hot snare performed by Aditya Owusu MD at [address]  10/30/2020    with Dr. Vahe Ram 10/30/2020    COLORECTAL [facility], NOT HIGH RISK performed by Elsa Burns DO at [address] [facility]      left eye    PACEMAKER INSERTION      PACEMAKER PLACEMENT      UPPER GASTROINTESTINAL ENDOSCOPY  2019    Dr. Hammer Mini H-Pylori)duodenal bulbar/antral erythema    UPPER GASTROINTESTINAL ENDOSCOPY N/A 2019    EGD BIOPSY, clotest performed by Aditya Owusu MD at [address] 10/30/2020    EGD ESOPHAGOGASTRODUODENOSCOPY performed by Elsa Burns DO at [address] [facility] History:   Social History     Tobacco Use    Smoking status: Former Smoker     Packs/day: 1.50     Years: 8.00     Pack years: 12.00     Types: Cigarettes     Quit date: 3/4/1980     Years since quittin.4    Smokeless tobacco: Never Used   Substance Use Topics    Alcohol use: No       Family History:   Family History   Problem Relation Age of Onset    Cancer Mother         breast    Cancer Father         lung         Review of Systems:  Constitutional: negative for fever or chills,positive for dizziness and headache  Eyes: negative for visual disturbance   ENT: negative for sore throat or nasal congestion  Respiratory: negative for cough, shortness of breath and sputum  Cardiovascular: negative for chest pain,pnd,claudication or palpitations  Gastrointestinal: negative for abd pain, dion,nausea, vomiting, diarrhea or constipation  Genitourinary: negative for dysuria,,hematuria urgency or frequency  Musculoskeletal:negative for arthralgias, muscle weakness and stiff joints   Integument/breast: negative for skin rash or lesions  Neurological: positive for   numbness and tingling. Psych: negative for anxiety,positive for  depression, neg for suicidial ideation and suicidal attempt. Objective:  Physical Exam:  BP (!) 180/78   Pulse 86   Resp 16   Wt 212 lb (96.2 kg)   BMI 31.31 kg/m²   GEN:   He is alert and oriented  ENT:    ENT exam normal, no neck nodes or sinus tenderness  NECK:   neck supple and non tender without mass, no thyromegaly or thyroid nodules, no cervical lymphadenopathy  EYES:   No gross abnormalities. CVS:     CVS exam BP noted to be well controlled today in office, S1, S2 normal, no gallop, no murmur, chest clear, no JVD, no HSM, no edema  PULM:   chest clear, no wheezing, rales, normal symmetric air entry  ABD:   exam deferred  MUSC: no joint tenderness, deformity or swelling  Skin : no  rash or lesions  EXT: {EXTREMITIES EXAM:76524::\"Extremities: + 2 pedal pulses, no edema or calf tenderness, and warm to touch. FEET:  no open sores are present bilaterally  NEURO: monofilament normal, decreased bilaterally DTR -normal        Diagnostic Data:  Lab Results   Component Value Date    GLUCOSE 236 (H) 06/22/2021    LABA1C 5.9 04/19/2021    BUN 26 (H) 06/22/2021     06/22/2021    K 4.7 06/22/2021    CALCIUM 9.9 06/22/2021     06/22/2021    CO2 27 06/22/2021       Assessment:  1. Essential hypertension    2. Major depressive disorder, recurrent, mild    3. Chronic diastolic heart failure ([address]    4. Controlled type 2 diabetes mellitus with diabetic nephropathy, with long-term current use of insulin ([address]    5. Dysautonomia ([facility]      Plan   Diagnosis Orders   1. Essential hypertension - not well controlled,increase hydralazine to 50 mg tid,close home monitoring    2. Major depressive disorder, recurrent, mild - start lexapro 10 mg hs    3. Chronic diastolic heart failure (HCC)  -well controlled     4. Controlled type 2 diabetes mellitus with diabetic nephropathy, with long-term current use of insulin (HCC) -continue lantus and glucotrol,closely monitor blood sugars    5. Dysautonomia ([facility] - continue support stockings,add lexapro        · Check BS 3 times per day  · Medication change: increase hydralazine to 50 mg tid, addlexapro 10 mg  · Encouraged low fat and diabetic, 1800 calorie diet. · Goal for blood pressure control is 120/80  · Recommended regular exercise as tolerated, 5 times per week  · Labs reviewed - cbc,bmp  · Labs ordered: none  ascivd risk  No orders of the defined types were placed in this encounter.       Current Outpatient Medications   Medication Sig Dispense Refill    glipiZIDE (GLUCOTROL XL) 5 MG extended release tablet Take 2 tablets by mouth 2 times daily 360 tablet 0    insulin glargine (LANTUS SOLOSTAR) 100 UNIT/ML injection pen Inject 28 Units into the skin 2 times daily 50.4 mL 0    hydrALAZINE (APRESOLINE) 50 MG tablet Take 1 tablet by mouth 3 times daily 270 tablet 0    escitalopram (LEXAPRO) 10 MG tablet Take 1 tablet by mouth daily 30 tablet 0    nitroGLYCERIN (NITROSTAT) 0.4 MG SL tablet Place 1 tablet under the tongue every 5 minutes as needed for Chest pain 25 tablet 3    lisinopril (PRINIVIL;ZESTRIL) 20 MG tablet Take 1 tablet by mouth daily 90 tablet 3    pantoprazole (PROTONIX) 40 MG tablet Take 1 tablet by mouth once daily 30 tablet 5    latanoprost (XALATAN) 0.005 % ophthalmic solution       eplerenone (INSPRA) 50 MG tablet Take 1 tablet by mouth once daily 90 tablet 0    acyclovir (ZOVIRAX) 400 MG tablet 400 mg 2 times daily       ferrous sulfate 325 (65 Fe) MG tablet Take 325 mg by mouth daily (with breakfast)       Omega-3 Fatty Acids (FISH OIL) 1000 MG CAPS Take 1,000 mg by mouth daily       aspirin EC 81 MG EC tablet Take 1 tablet by mouth daily 30 tablet 3    Insulin Pen Needle (PEN NEEDLES) 31G X 6 MM MISC 1 each by Does not apply route daily 100 each 3    prednisoLONE acetate (PRED FORTE) 1 % ophthalmic suspension Place 1 drop into the left eye daily       ezetimibe (ZETIA) 10 MG tablet Take 1 tablet by mouth daily (Patient not taking: Reported on 7/19/2021) 90 tablet 3    CONTOUR TEST strip USE 1 STRIP TO CHECK GLUCOSE TWICE DAILY (Patient not taking: Reported on 6/28/2021) 100 strip 11    DIANA MICROLET LANCETS MISC TEST TWICE DAILY (Patient not taking: Reported on 6/28/2021) 100 each 2     No current facility-administered medications for this visit. Return in about 1 month (around 8/19/2021) for diabetes, hypertension.       Electronically signed by Almeta Landau, MD on 7/19/2021 at 11:54 AM

## 2021-07-19 NOTE — PATIENT INSTRUCTIONS
SURVEY:    You may be receiving a survey from [employer] regarding your visit today. You may get this in the mail, through your MyChart, or in your email. Please complete the survey to enable us to provide the highest quality of care to you and your family. If you cannot score us a very good (5 Stars) on any question, please call the office to discuss how we could of made your experience exceptional.    Thank you!     Dr. Jayla Cuevas, ARIANE Lofton, PIO Vo, [address] [place] [address], [address]    Phone: [phone]  Fax: [phone]    Office Hours:   Mellie Schilder, [place], F: 8-5 Wednesday: 9-11
normal...

## 2021-07-29 ENCOUNTER — HOSPITAL ENCOUNTER (OUTPATIENT)
Age: 76
Discharge: HOME | End: 2021-07-29
Payer: MEDICARE

## 2021-07-29 DIAGNOSIS — N18.32 STAGE 3B CHRONIC KIDNEY DISEASE (HCC): ICD-10-CM

## 2021-07-29 LAB
ALBUMIN SERPL-MCNC: 4.3 G/DL (ref 3.5–5.2)
ANION GAP SERPL CALCULATED.3IONS-SCNC: 12 MMOL/L (ref 9–17)
BUN BLDV-MCNC: 29 MG/DL (ref 8–23)
BUN/CREAT BLD: 19 (ref 9–20)
CALCIUM SERPL-MCNC: 9.4 MG/DL (ref 8.6–10.4)
CHLORIDE BLD-SCNC: 102 MMOL/L (ref 98–107)
CO2: 24 MMOL/L (ref 20–31)
CREAT SERPL-MCNC: 1.49 MG/DL (ref 0.7–1.2)
CREATININE URINE: 57 MG/DL (ref 39–259)
GFR AFRICAN AMERICAN: 56 ML/MIN
GFR NON-AFRICAN AMERICAN: 46 ML/MIN
GFR SERPL CREATININE-BSD FRML MDRD: ABNORMAL ML/MIN/{1.73_M2}
GFR SERPL CREATININE-BSD FRML MDRD: ABNORMAL ML/MIN/{1.73_M2}
GLUCOSE BLD-MCNC: 116 MG/DL (ref 70–99)
HCT VFR BLD CALC: 33.6 % (ref 40.7–50.3)
HEMOGLOBIN: 11.2 G/DL (ref 13–17)
MCH RBC QN AUTO: 29.9 PG (ref 25.2–33.5)
MCHC RBC AUTO-ENTMCNC: 33.3 G/DL (ref 28.4–34.8)
MCV RBC AUTO: 89.8 FL (ref 82.6–102.9)
NRBC AUTOMATED: 0 PER 100 WBC
PDW BLD-RTO: 13.9 % (ref 11.8–14.4)
PHOSPHORUS: 3.4 MG/DL (ref 2.5–4.5)
PLATELET # BLD: 242 K/UL (ref 138–453)
PMV BLD AUTO: 9.5 FL (ref 8.1–13.5)
POTASSIUM SERPL-SCNC: 4.1 MMOL/L (ref 3.7–5.3)
PTH INTACT: 28.07 PG/ML (ref 15–65)
RBC # BLD: 3.74 M/UL (ref 4.21–5.77)
SODIUM BLD-SCNC: 138 MMOL/L (ref 135–144)
TOTAL PROTEIN, URINE: 34 MG/DL
URINE TOTAL PROTEIN CREATININE RATIO: 0.6 (ref 0–0.2)
WBC # BLD: 7.3 K/UL (ref 3.5–11.3)

## 2021-07-29 PROCEDURE — 36415 COLL VENOUS BLD VENIPUNCTURE: CPT

## 2021-07-29 PROCEDURE — 80048 BASIC METABOLIC PNL TOTAL CA: CPT

## 2021-07-29 PROCEDURE — 82570 ASSAY OF URINE CREATININE: CPT

## 2021-07-29 PROCEDURE — 84100 ASSAY OF PHOSPHORUS: CPT

## 2021-07-29 PROCEDURE — 82040 ASSAY OF SERUM ALBUMIN: CPT

## 2021-07-29 PROCEDURE — 83970 ASSAY OF PARATHORMONE: CPT

## 2021-07-29 PROCEDURE — 85027 COMPLETE CBC AUTOMATED: CPT

## 2021-07-29 PROCEDURE — 84156 ASSAY OF PROTEIN URINE: CPT

## 2021-08-05 ENCOUNTER — OFFICE VISIT (OUTPATIENT)
Dept: NEUROLOGY | Age: 76
End: 2021-08-05
Payer: MEDICARE

## 2021-08-05 VITALS
HEIGHT: 69 IN | RESPIRATION RATE: 18 BRPM | TEMPERATURE: 97.8 F | BODY MASS INDEX: 30.72 KG/M2 | WEIGHT: 207.4 LBS | HEART RATE: 81 BPM | SYSTOLIC BLOOD PRESSURE: 185 MMHG | DIASTOLIC BLOOD PRESSURE: 79 MMHG

## 2021-08-05 DIAGNOSIS — R42 SPELL OF DIZZINESS: ICD-10-CM

## 2021-08-05 DIAGNOSIS — R51.0 POSTURAL HEADACHE: Primary | ICD-10-CM

## 2021-08-05 PROCEDURE — G8427 DOCREV CUR MEDS BY ELIG CLIN: HCPCS | Performed by: NEUROMUSCULOSKELETAL MEDICINE, SPORTS MEDICINE

## 2021-08-05 PROCEDURE — 1036F TOBACCO NON-USER: CPT | Performed by: NEUROMUSCULOSKELETAL MEDICINE, SPORTS MEDICINE

## 2021-08-05 PROCEDURE — 99204 OFFICE O/P NEW MOD 45 MIN: CPT | Performed by: NEUROMUSCULOSKELETAL MEDICINE, SPORTS MEDICINE

## 2021-08-05 PROCEDURE — G8417 CALC BMI ABV UP PARAM F/U: HCPCS | Performed by: NEUROMUSCULOSKELETAL MEDICINE, SPORTS MEDICINE

## 2021-08-05 PROCEDURE — 4040F PNEUMOC VAC/ADMIN/RCVD: CPT | Performed by: NEUROMUSCULOSKELETAL MEDICINE, SPORTS MEDICINE

## 2021-08-05 PROCEDURE — 99214 OFFICE O/P EST MOD 30 MIN: CPT | Performed by: NEUROMUSCULOSKELETAL MEDICINE, SPORTS MEDICINE

## 2021-08-05 PROCEDURE — 3017F COLORECTAL CA SCREEN DOC REV: CPT | Performed by: NEUROMUSCULOSKELETAL MEDICINE, SPORTS MEDICINE

## 2021-08-05 PROCEDURE — 1123F ACP DISCUSS/DSCN MKR DOCD: CPT | Performed by: NEUROMUSCULOSKELETAL MEDICINE, SPORTS MEDICINE

## 2021-08-05 NOTE — PATIENT INSTRUCTIONS
SURVEY:    You may be receiving a survey from [employer] regarding your visit today. Please complete the survey to enable us to provide the highest quality of care to you and your family. If you cannot score us a very good on any question, please call the office to discuss how we could have made your experience a very good one. Thank you.

## 2021-08-05 NOTE — PROGRESS NOTES
NEUROLOGY CONSULT    Patient Name:  Anjelica Mcneal  :   1945  Clinic Visit Date: 2021    I saw Mr. Anjelica Mcneal  in the neurology clinic today for symptoms of persistent recurrent headaches and dizzy spells. Patient is a 41-year-old right-handed gentleman with multiple medical problems   including history of CAD, status post coronary stents [], chronic diastolic heart failure, permanent pacemaker, diabetes with diabetic neuropathy, dysautonomia, hypertension, anemia, presents to the office today with his wife with a history of recurrent headaches which occur mainly whenever he sits up or stands  from a supine position. These symptoms started about 2 months ago. Headaches are usually generalized, throbbing in nature, brief in duration with intermittent blurred vision and nausea. He says after he and off a supine position the headaches gradually improve. No history of loss of vision double vision vertigo, vomiting neck pain or weakness or numbness of the extremities, or significant cardiac symptoms when the headaches occur. Cardiac work-up has demonstrated  orthostatic hypotension for which he has been seen by Dr. Yumiko Hansen .  CT scan of the brain in  demonstrated diffuse mild to moderate cortical atrophy and also modest atrophy of the cerebellar vermis, along with chronic deep white matter ischemic changes. Regarding symptoms of dizziness he has had this for several years and has been seen by Dr. César Galeas and Dr. Dashawn Desai for the same problem in this clinic. Work-up has included  a  CT scan of the brain []  and an unremarkable EEG []. He has history of poor balance and has several falls in the past year. No neck or lower back pain or history of incontinence. REVIEW OF SYSTEMS    Constitutional Weight changes: absent, change in appetite: absent Fatigue: absent; Fevers : absent, Any recent hospitalizations:  absent   HEENT Ears: normal,  Visual disturbance: absent Respiratory Shortness of breath: absent, choking:  absent, Cough: absent, Snoring : absent   Cardiovascular Chest pain: absent, Leg swelling :absent, palpitations : absent, fainting : absent   GI Constipation: absent, Diarrhea: absent, Swallowing change: absent    Urinary frequency: absent, Urinary urgency: absent, Urinary incontinence: absent   Musculoskeletal Neck pain: absent, Back pain: absent, Stiffness: absent, Muscle pain: absent, Joint pain: absent, restless leg : absent   Dermatological Hair loss: absent, Skin changes: absent   Neurological Confusion: absent, Trouble concentrating: absent, Seizures: absent;  Memory loss: absent, balance problem: present, Dizziness: present, vertigo: present, Weakness: present, Numbness absent, Tremor: absent, Spasm: absent, involuntary movement: absent, Speech difficulty: absent, Headache: present, Light sensitivity: absent   Psychiatric Anxiety: absent, Depression  present, drug abuse: absent, Hallucination: absent, mood disorder: absent, Suicidal ideations absent   Hematologic Abnormal bleeding: absent, Anemia: absent, Lymph gland changes: absent Clotting disorder: absent     Past Medical History:   Diagnosis Date    Acute MI ([facility] [address]     Acute renal failure with tubular necrosis ([facility] [address] 10/2/2018    ssecondary to hemodynamic effects of loop diuretics and ace inhibitors, bbaseline 1.21.3 which peaked up to 1.8, resolving    Anemia     CAD (coronary artery disease)     Cerebral artery occlusion with cerebral infarction ([facility] [address]     CHF (congestive heart failure) (HCC)     Chronic back pain     Closed fracture of lumbar vertebra without mention of spinal cord injury     Displacement of intervertebral disc, site unspecified, without myelopathy     H/O cardiac catheterization 2/19/16    LMCA: Mild irregularities 10-20%. LAD: Lesion on PRox LAD: Mid subsection. 65% stenosis. LCx: Lesion on 1st Ob Valentina: Proximal subesection. 70% steniosis. RCA: Small non-dominant RCA. Lesion on PRox RCA: Ostial. 50% stenosis. EF:55%.  H/O cardiovascular stress test 2/19/16    Abnormal. Moderate perfusion defect of mild intensity in the inferior, inferoseptal adn inferoapical regions during stress imaging, which most consistent with ischemia. Global LV systolic function normal without regional wall motion abnormalities. OVerall these results are most consistent with an intermediate risk for signficant CAD. Additional testing including cardiac cath may be indicated.  H/O tilt table evaluation 12/26/2017    Abnormal. Patients HR, BP response and symptoms were most consistent with dysautonomia. Combined with viligant maintenance of euvolemia and maintaining a moderate salft intake, pharmacologic treatment with SSRI such as lexapro and or mestinon among other treatments have shown some effectiveness in treatment of this condition    H/O tilt table evaluation 12/26/2017    Abnormal head upright tilt table study. The pt heart rate, blood pressure response and symptoms were most consistent with dysautonomia.  History of 24 hour EKG monitoring 8/14/14    Occasional PAC's and PVC's which appear to be at least moderately symptomatic.  History of 24 hour EKG monitoring 11/19/14    Event Monitor. Sinus rhythm and sinus bradycardia. Infrequent isolated PVC's    History of cardiovascular stress test 2/19/14    Relatively NL    History of cardiovascular stress test 03/21/2018    Normal myocardial perfusion. Global left ventricular systolic function was normal with an EF of 68%. Overall, these results are most consistent with a low risk scan.  History of coronary artery stent placement 04/2017    PTCA / Drug Eluting Stent:, CX and / or branches    History of CVA (cerebrovascular accident) without residual deficits 2014    Incidentally found on CT head.  No known impairment now or in the past.    History of echocardiogram 2/18/14    LA mildly dilated, EF 55%, LV wall thickness is moderately increased, no definite wall motion abnormalilities, what appears to be a pacer wire is seen w/n the RA and RV, mild-mod TR, mild pulmonary hypertension.  History of Holter monitoring 12/29/2017    Rare PAC's and PVC's.      History of tilt table evaluation 8/12/14    Abnormal    Hyperlipidemia     Hypertension     Non critical Right Renal artery stenosis, native ([facility] [address] 10/2/2018    Non critical Right Renal artery stenosis, based on cath in 2016, Rt RA 30% stenosis    Pacemaker     non-functioning    S/P cardiac cath 04/10/2017    S/P coronary artery stent placement     Successful PTCA - HA Om1    SIRS (systemic inflammatory response syndrome) ([facility] [address] 5/19/2019    Type II or unspecified type diabetes mellitus without mention of complication, not stated as uncontrolled        Past Surgical History:   Procedure Laterality Date    BACK SURGERY      CARDIAC CATHETERIZATION Left 2/19/16    via right radial approach/ Melina Vernon/ Dr. Espinal Core  8/17/15    Liang/Charles/Saulo/ Millicent    COLONOSCOPY  2010    COLONOSCOPY  2/19/15    -polyps,diverticulosis,hemorrhoids    COLONOSCOPY  05/23/2018    Dr Rakan Mei    COLONOSCOPY N/A 5/23/2018    COLONOSCOPY POLYPECTOMY SNARE/COLD BIOPSY  cold snare  and  hot snare performed by Mary Mccann MD at [facility]  10/30/2020    with Dr. Oz Bejarano 10/30/2020    Macarena Logan, NOT HIGH RISK performed by Chai Meza DO at [address] [facility]      left eye    PACEMAKER INSERTION      PACEMAKER PLACEMENT      UPPER GASTROINTESTINAL ENDOSCOPY  05/28/2019    Dr. Nereyda Clifton H-Pylori)duodenal bulbar/antral erythema    UPPER GASTROINTESTINAL ENDOSCOPY N/A 5/28/2019    EGD BIOPSY, clotest performed by Mary Mccann MD at [address] [facility] 10/30/2020    EGD ESOPHAGOGASTRODUODENOSCOPY performed by Chai Meza DO at [facility] OR       Social History     Socioeconomic History    Marital status:      Spouse name: Not on file    Number of children: Not on file    Years of education: Not on file    Highest education level: Not on file   Occupational History    Not on file   Tobacco Use    Smoking status: Former Smoker     Packs/day: 1.50     Years: 8.00     Pack years: 12.00     Types: Cigarettes     Quit date: 3/4/1980     Years since quittin.4    Smokeless tobacco: Never Used   Vaping Use    Vaping Use: Never used   Substance and Sexual Activity    Alcohol use: No    Drug use: No    Sexual activity: Not on file   Other Topics Concern    Not on file   Social History Narrative    Not on file     Social Determinants of Health     Financial Resource Strain: Low Risk     Difficulty of Paying Living Expenses: Not hard at all   Food Insecurity: No Food Insecurity    Worried About [address] in the Last Year: Never true    [facility] [employer] in the Last Year: Never true   Transportation Needs: No Transportation Needs    Lack of Transportation (Medical): No    Lack of Transportation (Non-Medical):  No   Physical Activity:     Days of Exercise per Week:     Minutes of Exercise per Session:    Stress:     Feeling of Stress :    Social Connections:     Frequency of Communication with Friends and Family:     Frequency of Social Gatherings with Friends and Family:     Attends Alevism Services:     Active Member of Clubs or Organizations:     Attends Club or Organization Meetings:     Marital Status:    Intimate Partner Violence:     Fear of Current or Ex-Partner:     Emotionally Abused:     Physically Abused:     Sexually Abused:        Family History   Problem Relation Age of Onset    Cancer Mother         breast    Cancer Father         lung       Current Outpatient Medications   Medication Sig Dispense Refill    glipiZIDE (GLUCOTROL XL) 5 MG extended release tablet Take 2 tablets by mouth 2 times daily 360 tablet 0    insulin glargine (LANTUS SOLOSTAR) 100 UNIT/ML injection pen Inject 28 Units into the skin 2 times daily 50.4 mL 0    hydrALAZINE (APRESOLINE) 50 MG tablet Take 1 tablet by mouth 3 times daily 270 tablet 0    nitroGLYCERIN (NITROSTAT) 0.4 MG SL tablet Place 1 tablet under the tongue every 5 minutes as needed for Chest pain 25 tablet 3    lisinopril (PRINIVIL;ZESTRIL) 20 MG tablet Take 1 tablet by mouth daily 90 tablet 3    pantoprazole (PROTONIX) 40 MG tablet Take 1 tablet by mouth once daily 30 tablet 5    latanoprost (XALATAN) 0.005 % ophthalmic solution       eplerenone (INSPRA) 50 MG tablet Take 1 tablet by mouth once daily 90 tablet 0    acyclovir (ZOVIRAX) 400 MG tablet 400 mg 2 times daily       ferrous sulfate 325 (65 Fe) MG tablet Take 325 mg by mouth daily (with breakfast)       Omega-3 Fatty Acids (FISH OIL) 1000 MG CAPS Take 1,000 mg by mouth daily       aspirin EC 81 MG EC tablet Take 1 tablet by mouth daily 30 tablet 3    Insulin Pen Needle (PEN NEEDLES) 31G X 6 MM MISC 1 each by Does not apply route daily 100 each 3    prednisoLONE acetate (PRED FORTE) 1 % ophthalmic suspension Place 1 drop into the left eye daily       ezetimibe (ZETIA) 10 MG tablet Take 1 tablet by mouth daily (Patient not taking: Reported on 7/19/2021) 90 tablet 3    CONTOUR TEST strip USE 1 STRIP TO CHECK GLUCOSE TWICE DAILY (Patient not taking: Reported on 6/28/2021) 100 strip 11    DIANA MICROLET LANCETS MISC TEST TWICE DAILY (Patient not taking: Reported on 6/28/2021) 100 each 2     No current facility-administered medications for this visit.       DATA:  Lab Results   Component Value Date    WBC 7.3 07/29/2021    HGB 11.2 (L) 07/29/2021     07/29/2021    CHOL 119 09/09/2020    TRIG 347 (H) 09/09/2020    HDL 24 (L) 09/09/2020    LDLDIRECT 44 11/02/2017    ALT 16 06/25/2020    AST 18 06/25/2020     07/29/2021    K 4.1 07/29/2021     07/29/2021    CREATININE 1.49 (H) 07/29/2021    BUN 29 (H) 07/29/2021    CO2 24 07/29/2021    TSH 2.26 11/02/2017    INR 1.0 02/17/2016    LABA1C 5.9 04/19/2021    LABMICR 30 10/17/2015       BP (!) 185/79 (Site: Left Upper Arm, Position: Sitting, Cuff Size: Medium Adult)   Pulse 81   Temp 97.8 °F (36.6 °C) (Temporal)   Resp 18   Ht 5' 9\" (1.753 m)   Wt 207 lb 6.4 oz (94.1 kg)   BMI 30.63 kg/m²     NEUROLOGICAL EXAMINATION:     MENTAL STATUS: Patient is alert and oriented x3. No confusion or aphasia. Memory is normal.     CRANIAL NERVES: Pupils are equal and reactive. EOMS are equal in all directions. Mild left eye lid droop, which apparently  is chronic , after corneal surgery. .  No  nystagmus or any other abnormal eye movements. Facial sensation is normal.  No facial weakness. Hearing is normal.  Palate and tongue movements are normal.     MOTOR EXAMINATION: Muscle tone is normal in all the limbs. No cogwheel rigidity. There is wasting of the interossei muscles bilaterally along with weakness of these muscles. Otherwise muscle strength is 5/5 in both upper and lower limbs. No tremors, or any other abnormal limb movements. SENSORY EXAMINATION:.  Mild distal decrease sensation in a glove and stocking manner. Vibration and position sensations are intact in both upper and lower limbs. STRETCH REFLEXES: 1+ and symmetrical in both the upper and lower limbs. GAIT: Unsteady gait. Unable to walk tandem. Romberg sign +ve    IMPRESSION:    1. Symptoms of postural headache and dizzy spells could be related to underlying dysautonomia from diabetes. 2.  Diabetic peripheral neuropathy  3. Bilateral ulnar neuropathies across the elbow, per clinical examination  4. CAD. PLAN:    1. Continue measures such as increasing fluid intake, regular exercises of the lower extremities, fall precautions to be taken while getting out of a chair or bed  2.   Follow-up in this office as needed    NOTE: This neurology evaluation is part of outpatient coverage at [facility]  1-2 days per week. Patients requiring frequent evaluations or uncomfortable with potential 3-4 day turnaround on questions or calls  may be better served by a neurologist in the area full time. [facility] at [facility]. Efraín is available for outpatient visits and procedures including EMG/NCS. Non-[facility] neurologists also practice in [facility] (Dr. Negar Marie) and [facility] (Mei Persaud).        Vee Caba MD   8/5/2021  12:03 PM

## 2021-08-19 ENCOUNTER — OFFICE VISIT (OUTPATIENT)
Dept: PRIMARY CARE CLINIC | Age: 76
End: 2021-08-19
Payer: MEDICARE

## 2021-08-19 VITALS
DIASTOLIC BLOOD PRESSURE: 64 MMHG | BODY MASS INDEX: 30.89 KG/M2 | HEART RATE: 82 BPM | SYSTOLIC BLOOD PRESSURE: 164 MMHG | WEIGHT: 209.2 LBS | RESPIRATION RATE: 18 BRPM

## 2021-08-19 DIAGNOSIS — E11.21 CONTROLLED TYPE 2 DIABETES MELLITUS WITH DIABETIC NEPHROPATHY, WITH LONG-TERM CURRENT USE OF INSULIN (HCC): Primary | ICD-10-CM

## 2021-08-19 DIAGNOSIS — Z79.4 CONTROLLED TYPE 2 DIABETES MELLITUS WITH DIABETIC NEPHROPATHY, WITH LONG-TERM CURRENT USE OF INSULIN (HCC): Primary | ICD-10-CM

## 2021-08-19 DIAGNOSIS — J20.9 ACUTE BRONCHITIS, UNSPECIFIED ORGANISM: ICD-10-CM

## 2021-08-19 DIAGNOSIS — I50.32 CHRONIC DIASTOLIC HEART FAILURE (HCC): ICD-10-CM

## 2021-08-19 DIAGNOSIS — I10 ESSENTIAL HYPERTENSION: ICD-10-CM

## 2021-08-19 LAB — HBA1C MFR BLD: 7.3 %

## 2021-08-19 PROCEDURE — 3017F COLORECTAL CA SCREEN DOC REV: CPT | Performed by: FAMILY MEDICINE

## 2021-08-19 PROCEDURE — 83036 HEMOGLOBIN GLYCOSYLATED A1C: CPT | Performed by: FAMILY MEDICINE

## 2021-08-19 PROCEDURE — 2022F DILAT RTA XM EVC RTNOPTHY: CPT | Performed by: FAMILY MEDICINE

## 2021-08-19 PROCEDURE — 4040F PNEUMOC VAC/ADMIN/RCVD: CPT | Performed by: FAMILY MEDICINE

## 2021-08-19 PROCEDURE — G8427 DOCREV CUR MEDS BY ELIG CLIN: HCPCS | Performed by: FAMILY MEDICINE

## 2021-08-19 PROCEDURE — 99214 OFFICE O/P EST MOD 30 MIN: CPT | Performed by: FAMILY MEDICINE

## 2021-08-19 PROCEDURE — 1036F TOBACCO NON-USER: CPT | Performed by: FAMILY MEDICINE

## 2021-08-19 PROCEDURE — G8417 CALC BMI ABV UP PARAM F/U: HCPCS | Performed by: FAMILY MEDICINE

## 2021-08-19 PROCEDURE — 3051F HG A1C>EQUAL 7.0%<8.0%: CPT | Performed by: FAMILY MEDICINE

## 2021-08-19 PROCEDURE — 1123F ACP DISCUSS/DSCN MKR DOCD: CPT | Performed by: FAMILY MEDICINE

## 2021-08-19 RX ORDER — AZITHROMYCIN 250 MG/1
250 TABLET, FILM COATED ORAL SEE ADMIN INSTRUCTIONS
Qty: 6 TABLET | Refills: 0 | Status: SHIPPED | OUTPATIENT
Start: 2021-08-19 | End: 2021-08-24

## 2021-08-19 RX ORDER — HYDRALAZINE HYDROCHLORIDE 100 MG/1
100 TABLET, FILM COATED ORAL 3 TIMES DAILY
Qty: 270 TABLET | Refills: 0 | Status: ON HOLD | OUTPATIENT
Start: 2021-08-19 | End: 2021-11-26 | Stop reason: HOSPADM

## 2021-08-19 RX ORDER — HYDRALAZINE HYDROCHLORIDE 100 MG/1
100 TABLET, FILM COATED ORAL 3 TIMES DAILY
Qty: 270 TABLET | Refills: 0 | Status: SHIPPED | OUTPATIENT
Start: 2021-08-19 | End: 2021-08-19

## 2021-08-19 RX ORDER — AMLODIPINE BESYLATE 5 MG/1
5 TABLET ORAL NIGHTLY
Qty: 30 TABLET | Refills: 0 | Status: ON HOLD | OUTPATIENT
Start: 2021-08-19 | End: 2021-09-22 | Stop reason: SDUPTHER

## 2021-08-19 NOTE — PROGRESS NOTES
infarction ([address]     CHF (congestive heart failure) (HCC)     Chronic back pain     Closed fracture of lumbar vertebra without mention of spinal cord injury     Displacement of intervertebral disc, site unspecified, without myelopathy     H/O cardiac catheterization 2/19/16    LMCA: Mild irregularities 10-20%. LAD: Lesion on PRox LAD: Mid subsection. 65% stenosis. LCx: Lesion on 1st Ob Valentina: Proximal subesection. 70% steniosis. RCA: Small non-dominant RCA. Lesion on PRox RCA: Ostial. 50% stenosis. EF:55%.  H/O cardiovascular stress test 2/19/16    Abnormal. Moderate perfusion defect of mild intensity in the inferior, inferoseptal adn inferoapical regions during stress imaging, which most consistent with ischemia. Global LV systolic function normal without regional wall motion abnormalities. OVerall these results are most consistent with an intermediate risk for signficant CAD. Additional testing including cardiac cath may be indicated.  H/O tilt table evaluation 12/26/2017    Abnormal. Patients HR, BP response and symptoms were most consistent with dysautonomia. Combined with viligant maintenance of euvolemia and maintaining a moderate salft intake, pharmacologic treatment with SSRI such as lexapro and or mestinon among other treatments have shown some effectiveness in treatment of this condition    H/O tilt table evaluation 12/26/2017    Abnormal head upright tilt table study. The pt heart rate, blood pressure response and symptoms were most consistent with dysautonomia.  History of 24 hour EKG monitoring 8/14/14    Occasional PAC's and PVC's which appear to be at least moderately symptomatic.  History of 24 hour EKG monitoring 11/19/14    Event Monitor. Sinus rhythm and sinus bradycardia. Infrequent isolated PVC's    History of cardiovascular stress test 2/19/14    Relatively NL    History of cardiovascular stress test 03/21/2018    Normal myocardial perfusion.  Global left ventricular systolic function was normal with an EF of 68%. Overall, these results are most consistent with a low risk scan.  History of coronary artery stent placement 04/2017    PTCA / Drug Eluting Stent:, CX and / or branches    History of CVA (cerebrovascular accident) without residual deficits 2014    Incidentally found on CT head. No known impairment now or in the past.    History of echocardiogram 2/18/14    LA mildly dilated, EF 55%, LV wall thickness is moderately increased, no definite wall motion abnormalilities, what appears to be a pacer wire is seen w/n the RA and RV, mild-mod TR, mild pulmonary hypertension.  History of Holter monitoring 12/29/2017    Rare PAC's and PVC's.      History of tilt table evaluation 8/12/14    Abnormal    Hyperlipidemia     Hypertension     Non critical Right Renal artery stenosis, native ([facility] 10/2/2018    Non critical Right Renal artery stenosis, based on cath in 2016, Rt RA 30% stenosis    Pacemaker     non-functioning    S/P cardiac cath 04/10/2017    S/P coronary artery stent placement     Successful PTCA - HA Om1    SIRS (systemic inflammatory response syndrome) ([facility] [address] 5/19/2019    Type II or unspecified type diabetes mellitus without mention of complication, not stated as uncontrolled        Past Surgical History:    Past Surgical History:   Procedure Laterality Date    BACK SURGERY      CARDIAC CATHETERIZATION Left 2/19/16    via right radial approach/ [facility]/ Dr. Yordan Franks  8/17/15    [facility]    COLONOSCOPY  2010    COLONOSCOPY  2/19/15    -polyps,diverticulosis,hemorrhoids    COLONOSCOPY  05/23/2018    Dr Alicia Henao    COLONOSCOPY N/A 5/23/2018    COLONOSCOPY POLYPECTOMY SNARE/COLD BIOPSY  cold snare  and  hot snare performed by Dalila Rain MD at [place] [address]  10/30/2020    with Dr. Lorna Hamilton 10/30/2020    COLORECTAL [facility] HIGH RISK performed by Sonya Arguello DO at [address]      left eye    PACEMAKER INSERTION      PACEMAKER PLACEMENT      UPPER GASTROINTESTINAL ENDOSCOPY  2019    Dr. Davidson Yazdanism H-Pylori)duodenal bulbar/antral erythema    UPPER GASTROINTESTINAL ENDOSCOPY N/A 2019    EGD BIOPSY, clotest performed by Jeffrey Heimlich, MD at [address] N/A 10/30/2020    EGD ESOPHAGOGASTRODUODENOSCOPY performed by Sonya Arguello DO at [facility] History:   Social History     Tobacco Use    Smoking status: Former Smoker     Packs/day: 1.50     Years: 8.00     Pack years: 12.00     Types: Cigarettes     Quit date: 3/4/1980     Years since quittin.4    Smokeless tobacco: Never Used   Substance Use Topics    Alcohol use: No       Family History:   Family History   Problem Relation Age of Onset    Cancer Mother         breast    Cancer Father         lung         Review of Systems:  Constitutional: negative for fever or chills  Eyes: negative for visual disturbance   ENT: negative for sore throat ,positive for  nasal congestion  Respiratory: positive for cough, neg for shortness of breath and sputum  Cardiovascular: negative for chest pain,pnd,claudication or palpitations  Gastrointestinal: negative for abd pain, dion,nausea, vomiting, diarrhea or constipation  Genitourinary: negative for dysuria,,hematuria urgency or frequency  Musculoskeletal:negative for arthralgias, muscle weakness and stiff joints   Integument/breast: negative for skin rash or lesions  Neurological: positive for   numbness and tingling. Psych: negative for anxiety, depression, suicidial ideation and suicidal attempt.            Objective:  Physical Exam:  BP (!) 164/64 (Site: Left Upper Arm, Position: Sitting)   Pulse 82   Resp 18   Wt 209 lb 3.2 oz (94.9 kg)   BMI 30.89 kg/m²   GEN:   He is alert and oriented  ENT:    right and left TM normal without fluid or infection, neck without nodes and nasal mucosa congested  NECK:   neck supple and non tender without mass, no thyromegaly or thyroid nodules, no cervical lymphadenopathy  EYES:   No gross abnormalities. CVS:     CVS exam BP noted to be well controlled today in office, S1, S2 normal, no gallop, 2/6 murmur, chest clear, no JVD, no HSM, no edema  PULM:   chest clear, no wheezing, rales, normal symmetric air entry, no tachypnea, retractions or cyanosis  ABD:   exam deferred  MUSC: not examined  EXT: {EXTREMITIES EXAM:20816::\"Extremities: + 2 pedal pulses, no edema or calf tenderness, and warm to touch. NEURO:-DTR -normal        Diagnostic Data:  Lab Results   Component Value Date    GLUCOSE 116 (H) 07/29/2021    LABA1C 7.3 08/19/2021    BUN 29 (H) 07/29/2021     07/29/2021    K 4.1 07/29/2021    CALCIUM 9.4 07/29/2021     07/29/2021    CO2 24 07/29/2021       Assessment:  1. Controlled type 2 diabetes mellitus with diabetic nephropathy, with long-term current use of insulin ([facility]    2. Essential hypertension    3. Chronic diastolic heart failure ([facility]    4. Acute bronchitis, unspecified organism      Plan   Diagnosis Orders   1. Controlled type 2 diabetes mellitus with diabetic nephropathy, with long-term current use of insulin ([facility])  POCT glycosylated hemoglobin (Hb A1C)- hba1c 7.3,continue novolog 70/30.watch for hpoglycemia   2. Essential hypertension  Still high,add norvasc 5 mg daily   3. Chronic diastolic heart failure ([address]  Well conrolled  With lisinopril and   4. Acute bronchitis, unspecified organism  azithromycin (ZITHROMAX) 250 MG tablet       · Check BS 4 times per day  · Medication change: add norvasc 5 mg daily,zpack  · Encouraged low fat, low sodium and diabetic, 1800 calorie diet.   · Goal for blood pressure control is 130/80  · Recommended regular exercise as tolerated, 5 times per week  · Labs reviewed - hba1c,bmp,cbc  · Labs ordered: none  ascivd risk  Orders Placed This Encounter Procedures    POCT glycosylated hemoglobin (Hb A1C)       Current Outpatient Medications   Medication Sig Dispense Refill    hydrALAZINE (APRESOLINE) 100 MG tablet Take 1 tablet by mouth 3 times daily Patient is taking 100 MG daily 270 tablet 0    azithromycin (ZITHROMAX) 250 MG tablet Take 1 tablet by mouth See Admin Instructions for 5 days 500mg on day 1 followed by 250mg on days 2 - 5 6 tablet 0    amLODIPine (NORVASC) 5 MG tablet Take 1 tablet by mouth nightly 30 tablet 0    glipiZIDE (GLUCOTROL XL) 5 MG extended release tablet Take 2 tablets by mouth 2 times daily 360 tablet 0    insulin glargine (LANTUS SOLOSTAR) 100 UNIT/ML injection pen Inject 28 Units into the skin 2 times daily 50.4 mL 0    nitroGLYCERIN (NITROSTAT) 0.4 MG SL tablet Place 1 tablet under the tongue every 5 minutes as needed for Chest pain 25 tablet 3    lisinopril (PRINIVIL;ZESTRIL) 20 MG tablet Take 1 tablet by mouth daily 90 tablet 3    pantoprazole (PROTONIX) 40 MG tablet Take 1 tablet by mouth once daily 30 tablet 5    latanoprost (XALATAN) 0.005 % ophthalmic solution       eplerenone (INSPRA) 50 MG tablet Take 1 tablet by mouth once daily 90 tablet 0    ezetimibe (ZETIA) 10 MG tablet Take 1 tablet by mouth daily (Patient not taking: Reported on 7/19/2021) 90 tablet 3    acyclovir (ZOVIRAX) 400 MG tablet 400 mg 2 times daily       ferrous sulfate 325 (65 Fe) MG tablet Take 325 mg by mouth daily (with breakfast)       CONTOUR TEST strip USE 1 STRIP TO CHECK GLUCOSE TWICE DAILY (Patient not taking: Reported on 6/28/2021) 100 strip 11    Omega-3 Fatty Acids (FISH OIL) 1000 MG CAPS Take 1,000 mg by mouth daily       aspirin EC 81 MG EC tablet Take 1 tablet by mouth daily 30 tablet 3    DIANA MICROLET LANCETS MISC TEST TWICE DAILY (Patient not taking: Reported on 6/28/2021) 100 each 2    Insulin Pen Needle (PEN NEEDLES) 31G X 6 MM MISC 1 each by Does not apply route daily 100 each 3    prednisoLONE acetate (PRED FORTE) 1 % ophthalmic suspension Place 1 drop into the left eye daily        No current facility-administered medications for this visit. No follow-ups on file.       Electronically signed by Zaira Collazo MD on 8/19/2021 at 10:21 AM

## 2021-08-19 NOTE — PATIENT INSTRUCTIONS
SURVEY:    You may be receiving a survey from [employer] regarding your visit today. You may get this in the mail, through your MyChart, or in your email. Please complete the survey to enable us to provide the highest quality of care to you and your family. If you cannot score us a very good (5 Stars) on any question, please call the office to discuss how we could of made your experience exceptional.    Thank you!     Dr. Courtney Feliciano, ARIANE Beltran, RN   [facility], [address] [place] [address], [address] [facility] [address]    Phone: [phone]  Fax: [phone]    Office Hours:   [facility] [place], F: 8-5 Wednesday: 9-11

## 2021-09-20 ENCOUNTER — HOSPITAL ENCOUNTER (OUTPATIENT)
Age: 76
Setting detail: OBSERVATION
Discharge: HOME | End: 2021-09-22
Attending: INTERNAL MEDICINE
Payer: MEDICARE

## 2021-09-20 ENCOUNTER — APPOINTMENT (OUTPATIENT)
Dept: CT IMAGING | Age: 76
End: 2021-09-20
Payer: MEDICARE

## 2021-09-20 ENCOUNTER — APPOINTMENT (OUTPATIENT)
Dept: GENERAL RADIOLOGY | Age: 76
End: 2021-09-20
Payer: MEDICARE

## 2021-09-20 DIAGNOSIS — D64.9 SYMPTOMATIC ANEMIA: ICD-10-CM

## 2021-09-20 DIAGNOSIS — R07.9 CHEST PAIN, UNSPECIFIED TYPE: Primary | ICD-10-CM

## 2021-09-20 DIAGNOSIS — D62 ACUTE ON CHRONIC BLOOD LOSS ANEMIA: ICD-10-CM

## 2021-09-20 LAB
ABSOLUTE EOS #: 0.09 K/UL (ref 0–0.44)
ABSOLUTE IMMATURE GRANULOCYTE: 0.07 K/UL (ref 0–0.3)
ABSOLUTE LYMPH #: 0.84 K/UL (ref 1.1–3.7)
ABSOLUTE MONO #: 0.33 K/UL (ref 0.1–1.2)
ALBUMIN SERPL-MCNC: 3.4 G/DL (ref 3.5–5.2)
ALBUMIN/GLOBULIN RATIO: 1 (ref 1–2.5)
ALP BLD-CCNC: 68 U/L (ref 40–129)
ALT SERPL-CCNC: 16 U/L (ref 5–41)
ANION GAP SERPL CALCULATED.3IONS-SCNC: 16 MMOL/L (ref 9–17)
AST SERPL-CCNC: 19 U/L
BASOPHILS # BLD: 0 % (ref 0–2)
BASOPHILS ABSOLUTE: <0.03 K/UL (ref 0–0.2)
BILIRUB SERPL-MCNC: 0.37 MG/DL (ref 0.3–1.2)
BNP INTERPRETATION: ABNORMAL
BUN BLDV-MCNC: 37 MG/DL (ref 8–23)
BUN/CREAT BLD: 20 (ref 9–20)
CALCIUM SERPL-MCNC: 8.8 MG/DL (ref 8.6–10.4)
CHLORIDE BLD-SCNC: 103 MMOL/L (ref 98–107)
CO2: 17 MMOL/L (ref 20–31)
CREAT SERPL-MCNC: 1.89 MG/DL (ref 0.7–1.2)
DIFFERENTIAL TYPE: ABNORMAL
EKG ATRIAL RATE: 76 BPM
EKG ATRIAL RATE: 78 BPM
EKG P AXIS: 31 DEGREES
EKG P AXIS: 45 DEGREES
EKG P-R INTERVAL: 202 MS
EKG P-R INTERVAL: 204 MS
EKG Q-T INTERVAL: 382 MS
EKG Q-T INTERVAL: 394 MS
EKG QRS DURATION: 86 MS
EKG QRS DURATION: 90 MS
EKG QTC CALCULATION (BAZETT): 429 MS
EKG QTC CALCULATION (BAZETT): 449 MS
EKG R AXIS: -12 DEGREES
EKG R AXIS: -9 DEGREES
EKG T AXIS: -2 DEGREES
EKG T AXIS: 17 DEGREES
EKG VENTRICULAR RATE: 76 BPM
EKG VENTRICULAR RATE: 78 BPM
EOSINOPHILS RELATIVE PERCENT: 2 % (ref 1–4)
GFR AFRICAN AMERICAN: 42 ML/MIN
GFR NON-AFRICAN AMERICAN: 35 ML/MIN
GFR SERPL CREATININE-BSD FRML MDRD: ABNORMAL ML/MIN/{1.73_M2}
GFR SERPL CREATININE-BSD FRML MDRD: ABNORMAL ML/MIN/{1.73_M2}
GLUCOSE BLD-MCNC: 176 MG/DL (ref 74–100)
GLUCOSE BLD-MCNC: 299 MG/DL (ref 70–99)
HCT VFR BLD CALC: 24.2 % (ref 40.7–50.3)
HEMOGLOBIN: 7.6 G/DL (ref 13–17)
IMMATURE GRANULOCYTES: 1 %
INR BLD: 1.1
LYMPHOCYTES # BLD: 14 % (ref 24–43)
MCH RBC QN AUTO: 27.7 PG (ref 25.2–33.5)
MCHC RBC AUTO-ENTMCNC: 31.4 G/DL (ref 28.4–34.8)
MCV RBC AUTO: 88.3 FL (ref 82.6–102.9)
MONOCYTES # BLD: 6 % (ref 3–12)
NRBC AUTOMATED: 0 PER 100 WBC
PARTIAL THROMBOPLASTIN TIME: 47.9 SEC (ref 23.9–33.8)
PDW BLD-RTO: 14.5 % (ref 11.8–14.4)
PLATELET # BLD: 214 K/UL (ref 138–453)
PLATELET ESTIMATE: ABNORMAL
PMV BLD AUTO: 9.7 FL (ref 8.1–13.5)
POTASSIUM SERPL-SCNC: 4.5 MMOL/L (ref 3.7–5.3)
PRO-BNP: 1565 PG/ML
PROTHROMBIN TIME: 13.9 SEC (ref 11.5–14.2)
RBC # BLD: 2.74 M/UL (ref 4.21–5.77)
RBC # BLD: ABNORMAL 10*6/UL
SEG NEUTROPHILS: 77 % (ref 36–65)
SEGMENTED NEUTROPHILS ABSOLUTE COUNT: 4.6 K/UL (ref 1.5–8.1)
SODIUM BLD-SCNC: 136 MMOL/L (ref 135–144)
TOTAL PROTEIN: 6.7 G/DL (ref 6.4–8.3)
TROPONIN INTERP: ABNORMAL
TROPONIN INTERP: ABNORMAL
TROPONIN T: ABNORMAL NG/ML
TROPONIN T: ABNORMAL NG/ML
TROPONIN, HIGH SENSITIVITY: 49 NG/L (ref 0–22)
TROPONIN, HIGH SENSITIVITY: 52 NG/L (ref 0–22)
WBC # BLD: 6 K/UL (ref 3.5–11.3)
WBC # BLD: ABNORMAL 10*3/UL

## 2021-09-20 PROCEDURE — 82947 ASSAY GLUCOSE BLOOD QUANT: CPT

## 2021-09-20 PROCEDURE — G0378 HOSPITAL OBSERVATION PER HR: HCPCS

## 2021-09-20 PROCEDURE — 93005 ELECTROCARDIOGRAM TRACING: CPT | Performed by: EMERGENCY MEDICINE

## 2021-09-20 PROCEDURE — 36415 COLL VENOUS BLD VENIPUNCTURE: CPT

## 2021-09-20 PROCEDURE — 6360000004 HC RX CONTRAST MEDICATION: Performed by: PHYSICIAN ASSISTANT

## 2021-09-20 PROCEDURE — 2580000003 HC RX 258: Performed by: INTERNAL MEDICINE

## 2021-09-20 PROCEDURE — 85025 COMPLETE CBC W/AUTO DIFF WBC: CPT

## 2021-09-20 PROCEDURE — 71045 X-RAY EXAM CHEST 1 VIEW: CPT

## 2021-09-20 PROCEDURE — 93010 ELECTROCARDIOGRAM REPORT: CPT | Performed by: INTERNAL MEDICINE

## 2021-09-20 PROCEDURE — 83880 ASSAY OF NATRIURETIC PEPTIDE: CPT

## 2021-09-20 PROCEDURE — 6370000000 HC RX 637 (ALT 250 FOR IP): Performed by: INTERNAL MEDICINE

## 2021-09-20 PROCEDURE — 94761 N-INVAS EAR/PLS OXIMETRY MLT: CPT

## 2021-09-20 PROCEDURE — 84484 ASSAY OF TROPONIN QUANT: CPT

## 2021-09-20 PROCEDURE — 93005 ELECTROCARDIOGRAM TRACING: CPT | Performed by: PHYSICIAN ASSISTANT

## 2021-09-20 PROCEDURE — 99284 EMERGENCY DEPT VISIT MOD MDM: CPT

## 2021-09-20 PROCEDURE — 85610 PROTHROMBIN TIME: CPT

## 2021-09-20 PROCEDURE — 85730 THROMBOPLASTIN TIME PARTIAL: CPT

## 2021-09-20 PROCEDURE — 6360000002 HC RX W HCPCS: Performed by: INTERNAL MEDICINE

## 2021-09-20 PROCEDURE — 71260 CT THORAX DX C+: CPT

## 2021-09-20 PROCEDURE — 80053 COMPREHEN METABOLIC PANEL: CPT

## 2021-09-20 PROCEDURE — 2580000003 HC RX 258: Performed by: PHYSICIAN ASSISTANT

## 2021-09-20 PROCEDURE — 6370000000 HC RX 637 (ALT 250 FOR IP): Performed by: PHYSICIAN ASSISTANT

## 2021-09-20 PROCEDURE — 96372 THER/PROPH/DIAG INJ SC/IM: CPT

## 2021-09-20 RX ORDER — SODIUM CHLORIDE 0.9 % (FLUSH) 0.9 %
5-40 SYRINGE (ML) INJECTION PRN
Status: DISCONTINUED | OUTPATIENT
Start: 2021-09-20 | End: 2021-09-22 | Stop reason: HOSPADM

## 2021-09-20 RX ORDER — ACETAMINOPHEN 325 MG/1
650 TABLET ORAL EVERY 6 HOURS PRN
Status: DISCONTINUED | OUTPATIENT
Start: 2021-09-20 | End: 2021-09-22 | Stop reason: HOSPADM

## 2021-09-20 RX ORDER — ACETAMINOPHEN 650 MG/1
650 SUPPOSITORY RECTAL EVERY 6 HOURS PRN
Status: DISCONTINUED | OUTPATIENT
Start: 2021-09-20 | End: 2021-09-22 | Stop reason: HOSPADM

## 2021-09-20 RX ORDER — HYDRALAZINE HYDROCHLORIDE 50 MG/1
100 TABLET, FILM COATED ORAL 3 TIMES DAILY
Status: DISCONTINUED | OUTPATIENT
Start: 2021-09-20 | End: 2021-09-22 | Stop reason: HOSPADM

## 2021-09-20 RX ORDER — ASPIRIN 81 MG/1
81 TABLET ORAL DAILY
Status: DISCONTINUED | OUTPATIENT
Start: 2021-09-20 | End: 2021-09-21

## 2021-09-20 RX ORDER — EZETIMIBE 10 MG/1
10 TABLET ORAL DAILY
Status: DISCONTINUED | OUTPATIENT
Start: 2021-09-20 | End: 2021-09-22 | Stop reason: HOSPADM

## 2021-09-20 RX ORDER — EPLERENONE 25 MG/1
25 TABLET, FILM COATED ORAL DAILY
Status: DISCONTINUED | OUTPATIENT
Start: 2021-09-21 | End: 2021-09-22 | Stop reason: HOSPADM

## 2021-09-20 RX ORDER — ASPIRIN 81 MG/1
162 TABLET, CHEWABLE ORAL ONCE
Status: COMPLETED | OUTPATIENT
Start: 2021-09-20 | End: 2021-09-20

## 2021-09-20 RX ORDER — GLIPIZIDE 5 MG/1
10 TABLET ORAL
Status: DISCONTINUED | OUTPATIENT
Start: 2021-09-21 | End: 2021-09-22 | Stop reason: HOSPADM

## 2021-09-20 RX ORDER — ASPIRIN 81 MG/1
81 TABLET, CHEWABLE ORAL DAILY
Status: DISCONTINUED | OUTPATIENT
Start: 2021-09-21 | End: 2021-09-20

## 2021-09-20 RX ORDER — SODIUM CHLORIDE 9 MG/ML
INJECTION, SOLUTION INTRAVENOUS CONTINUOUS
Status: DISCONTINUED | OUTPATIENT
Start: 2021-09-20 | End: 2021-09-22 | Stop reason: HOSPADM

## 2021-09-20 RX ORDER — POLYETHYLENE GLYCOL 3350 17 G/17G
17 POWDER, FOR SOLUTION ORAL DAILY PRN
Status: DISCONTINUED | OUTPATIENT
Start: 2021-09-20 | End: 2021-09-22 | Stop reason: HOSPADM

## 2021-09-20 RX ORDER — LISINOPRIL 20 MG/1
20 TABLET ORAL DAILY
Status: DISCONTINUED | OUTPATIENT
Start: 2021-09-21 | End: 2021-09-22 | Stop reason: HOSPADM

## 2021-09-20 RX ORDER — LISINOPRIL 5 MG/1
5 TABLET ORAL DAILY
Status: DISCONTINUED | OUTPATIENT
Start: 2021-09-20 | End: 2021-09-20 | Stop reason: SDUPTHER

## 2021-09-20 RX ORDER — SODIUM CHLORIDE 0.9 % (FLUSH) 0.9 %
5-40 SYRINGE (ML) INJECTION EVERY 12 HOURS SCHEDULED
Status: DISCONTINUED | OUTPATIENT
Start: 2021-09-20 | End: 2021-09-22 | Stop reason: HOSPADM

## 2021-09-20 RX ORDER — LATANOPROST 50 UG/ML
1 SOLUTION/ DROPS OPHTHALMIC NIGHTLY
Status: DISCONTINUED | OUTPATIENT
Start: 2021-09-20 | End: 2021-09-22 | Stop reason: HOSPADM

## 2021-09-20 RX ORDER — NITROGLYCERIN 0.4 MG/1
0.4 TABLET SUBLINGUAL ONCE
Status: COMPLETED | OUTPATIENT
Start: 2021-09-20 | End: 2021-09-20

## 2021-09-20 RX ORDER — ONDANSETRON 4 MG/1
4 TABLET, ORALLY DISINTEGRATING ORAL EVERY 8 HOURS PRN
Status: DISCONTINUED | OUTPATIENT
Start: 2021-09-20 | End: 2021-09-22 | Stop reason: HOSPADM

## 2021-09-20 RX ORDER — SODIUM CHLORIDE 9 MG/ML
25 INJECTION, SOLUTION INTRAVENOUS PRN
Status: DISCONTINUED | OUTPATIENT
Start: 2021-09-20 | End: 2021-09-22 | Stop reason: HOSPADM

## 2021-09-20 RX ORDER — INSULIN GLARGINE 100 [IU]/ML
28 INJECTION, SOLUTION SUBCUTANEOUS 2 TIMES DAILY
Status: DISCONTINUED | OUTPATIENT
Start: 2021-09-20 | End: 2021-09-22 | Stop reason: HOSPADM

## 2021-09-20 RX ORDER — AMLODIPINE BESYLATE 5 MG/1
5 TABLET ORAL NIGHTLY
Status: DISCONTINUED | OUTPATIENT
Start: 2021-09-20 | End: 2021-09-21

## 2021-09-20 RX ORDER — PREDNISOLONE ACETATE 10 MG/ML
1 SUSPENSION/ DROPS OPHTHALMIC DAILY
Status: DISCONTINUED | OUTPATIENT
Start: 2021-09-21 | End: 2021-09-22 | Stop reason: HOSPADM

## 2021-09-20 RX ORDER — ONDANSETRON 2 MG/ML
4 INJECTION INTRAMUSCULAR; INTRAVENOUS EVERY 6 HOURS PRN
Status: DISCONTINUED | OUTPATIENT
Start: 2021-09-20 | End: 2021-09-22 | Stop reason: HOSPADM

## 2021-09-20 RX ADMIN — NITROGLYCERIN 0.4 MG: 0.4 TABLET SUBLINGUAL at 11:55

## 2021-09-20 RX ADMIN — ENOXAPARIN SODIUM 40 MG: 40 INJECTION SUBCUTANEOUS at 20:34

## 2021-09-20 RX ADMIN — LATANOPROST 1 DROP: 50 SOLUTION OPHTHALMIC at 20:07

## 2021-09-20 RX ADMIN — SODIUM CHLORIDE: 9 INJECTION, SOLUTION INTRAVENOUS at 20:34

## 2021-09-20 RX ADMIN — SODIUM CHLORIDE: 9 INJECTION, SOLUTION INTRAVENOUS at 13:26

## 2021-09-20 RX ADMIN — INSULIN GLARGINE 28 UNITS: 100 INJECTION, SOLUTION SUBCUTANEOUS at 20:07

## 2021-09-20 RX ADMIN — HYDRALAZINE HYDROCHLORIDE 100 MG: 50 TABLET, FILM COATED ORAL at 20:08

## 2021-09-20 RX ADMIN — AMLODIPINE BESYLATE 5 MG: 5 TABLET ORAL at 20:08

## 2021-09-20 RX ADMIN — IOPAMIDOL 75 ML: 755 INJECTION, SOLUTION INTRAVENOUS at 13:09

## 2021-09-20 RX ADMIN — SODIUM CHLORIDE, PRESERVATIVE FREE 10 ML: 5 INJECTION INTRAVENOUS at 20:34

## 2021-09-20 RX ADMIN — ASPIRIN 162 MG: 81 TABLET, CHEWABLE ORAL at 11:54

## 2021-09-20 ASSESSMENT — PAIN DESCRIPTION - FREQUENCY
FREQUENCY: CONTINUOUS
FREQUENCY: CONTINUOUS

## 2021-09-20 ASSESSMENT — ENCOUNTER SYMPTOMS
EYE DISCHARGE: 0
ABDOMINAL PAIN: 0
NAUSEA: 0
CONSTIPATION: 0
SHORTNESS OF BREATH: 1
WHEEZING: 0
EYE REDNESS: 0
DIARRHEA: 0
RHINORRHEA: 0
CHEST TIGHTNESS: 0
BACK PAIN: 0
BLOOD IN STOOL: 0
VOMITING: 0
COUGH: 0
SORE THROAT: 0

## 2021-09-20 ASSESSMENT — PAIN DESCRIPTION - ORIENTATION
ORIENTATION: MID
ORIENTATION: MID

## 2021-09-20 ASSESSMENT — PAIN DESCRIPTION - PAIN TYPE
TYPE: ACUTE PAIN
TYPE: ACUTE PAIN

## 2021-09-20 ASSESSMENT — PAIN DESCRIPTION - LOCATION
LOCATION: CHEST
LOCATION: CHEST

## 2021-09-20 ASSESSMENT — PAIN DESCRIPTION - DESCRIPTORS
DESCRIPTORS: CONSTANT
DESCRIPTORS: SHARP

## 2021-09-20 ASSESSMENT — PAIN SCALES - GENERAL
PAINLEVEL_OUTOF10: 2
PAINLEVEL_OUTOF10: 2
PAINLEVEL_OUTOF10: 4

## 2021-09-21 ENCOUNTER — APPOINTMENT (OUTPATIENT)
Dept: NON INVASIVE DIAGNOSTICS | Age: 76
End: 2021-09-21
Payer: MEDICARE

## 2021-09-21 PROBLEM — D64.9 ACUTE ON CHRONIC ANEMIA: Status: ACTIVE | Noted: 2021-09-21

## 2021-09-21 PROBLEM — I24.9 ACUTE CORONARY SYNDROME (HCC): Status: ACTIVE | Noted: 2021-09-21

## 2021-09-21 LAB
CHOLESTEROL/HDL RATIO: 4.9
CHOLESTEROL: 69 MG/DL
EKG ATRIAL RATE: 72 BPM
EKG P AXIS: 36 DEGREES
EKG P-R INTERVAL: 214 MS
EKG Q-T INTERVAL: 396 MS
EKG QRS DURATION: 100 MS
EKG QTC CALCULATION (BAZETT): 433 MS
EKG R AXIS: -9 DEGREES
EKG T AXIS: 14 DEGREES
EKG VENTRICULAR RATE: 72 BPM
GLUCOSE BLD-MCNC: 268 MG/DL (ref 74–100)
GLUCOSE BLD-MCNC: 274 MG/DL (ref 74–100)
GLUCOSE BLD-MCNC: 77 MG/DL (ref 74–100)
GLUCOSE BLD-MCNC: 88 MG/DL (ref 74–100)
HCT VFR BLD CALC: 22.6 % (ref 40.7–50.3)
HCT VFR BLD CALC: 24.9 % (ref 40.7–50.3)
HDLC SERPL-MCNC: 14 MG/DL
HEMOGLOBIN: 7.1 G/DL (ref 13–17)
HEMOGLOBIN: 8.1 G/DL (ref 13–17)
LDL CHOLESTEROL: 18 MG/DL (ref 0–130)
MCH RBC QN AUTO: 27.4 PG (ref 25.2–33.5)
MCHC RBC AUTO-ENTMCNC: 31.4 G/DL (ref 28.4–34.8)
MCV RBC AUTO: 87.3 FL (ref 82.6–102.9)
NRBC AUTOMATED: 0 PER 100 WBC
PDW BLD-RTO: 14.2 % (ref 11.8–14.4)
PLATELET # BLD: 198 K/UL (ref 138–453)
PMV BLD AUTO: 10.1 FL (ref 8.1–13.5)
RBC # BLD: 2.59 M/UL (ref 4.21–5.77)
TRIGL SERPL-MCNC: 185 MG/DL
VLDLC SERPL CALC-MCNC: ABNORMAL MG/DL (ref 1–30)
WBC # BLD: 4.3 K/UL (ref 3.5–11.3)

## 2021-09-21 PROCEDURE — 80061 LIPID PANEL: CPT

## 2021-09-21 PROCEDURE — 96375 TX/PRO/DX INJ NEW DRUG ADDON: CPT

## 2021-09-21 PROCEDURE — 82947 ASSAY GLUCOSE BLOOD QUANT: CPT

## 2021-09-21 PROCEDURE — 6360000002 HC RX W HCPCS: Performed by: INTERNAL MEDICINE

## 2021-09-21 PROCEDURE — 96374 THER/PROPH/DIAG INJ IV PUSH: CPT

## 2021-09-21 PROCEDURE — G0378 HOSPITAL OBSERVATION PER HR: HCPCS

## 2021-09-21 PROCEDURE — 6360000002 HC RX W HCPCS: Performed by: FAMILY MEDICINE

## 2021-09-21 PROCEDURE — 2500000003 HC RX 250 WO HCPCS: Performed by: NURSE PRACTITIONER

## 2021-09-21 PROCEDURE — 36430 TRANSFUSION BLD/BLD COMPNT: CPT

## 2021-09-21 PROCEDURE — 36415 COLL VENOUS BLD VENIPUNCTURE: CPT

## 2021-09-21 PROCEDURE — 6370000000 HC RX 637 (ALT 250 FOR IP): Performed by: FAMILY MEDICINE

## 2021-09-21 PROCEDURE — 6370000000 HC RX 637 (ALT 250 FOR IP): Performed by: INTERNAL MEDICINE

## 2021-09-21 PROCEDURE — P9016 RBC LEUKOCYTES REDUCED: HCPCS

## 2021-09-21 PROCEDURE — 99214 OFFICE O/P EST MOD 30 MIN: CPT | Performed by: FAMILY MEDICINE

## 2021-09-21 PROCEDURE — 93005 ELECTROCARDIOGRAM TRACING: CPT | Performed by: INTERNAL MEDICINE

## 2021-09-21 PROCEDURE — 85018 HEMOGLOBIN: CPT

## 2021-09-21 PROCEDURE — 94760 N-INVAS EAR/PLS OXIMETRY 1: CPT

## 2021-09-21 PROCEDURE — 2580000003 HC RX 258: Performed by: INTERNAL MEDICINE

## 2021-09-21 PROCEDURE — 2500000003 HC RX 250 WO HCPCS: Performed by: FAMILY MEDICINE

## 2021-09-21 PROCEDURE — 96372 THER/PROPH/DIAG INJ SC/IM: CPT

## 2021-09-21 PROCEDURE — 85014 HEMATOCRIT: CPT

## 2021-09-21 PROCEDURE — 86850 RBC ANTIBODY SCREEN: CPT

## 2021-09-21 PROCEDURE — 86920 COMPATIBILITY TEST SPIN: CPT

## 2021-09-21 PROCEDURE — 93017 CV STRESS TEST TRACING ONLY: CPT

## 2021-09-21 PROCEDURE — 93010 ELECTROCARDIOGRAM REPORT: CPT | Performed by: INTERNAL MEDICINE

## 2021-09-21 PROCEDURE — A9500 TC99M SESTAMIBI: HCPCS | Performed by: FAMILY MEDICINE

## 2021-09-21 PROCEDURE — 3430000000 HC RX DIAGNOSTIC RADIOPHARMACEUTICAL: Performed by: FAMILY MEDICINE

## 2021-09-21 PROCEDURE — 78452 HT MUSCLE IMAGE SPECT MULT: CPT

## 2021-09-21 PROCEDURE — 86901 BLOOD TYPING SEROLOGIC RH(D): CPT

## 2021-09-21 PROCEDURE — 86900 BLOOD TYPING SEROLOGIC ABO: CPT

## 2021-09-21 PROCEDURE — 85027 COMPLETE CBC AUTOMATED: CPT

## 2021-09-21 RX ORDER — AMLODIPINE BESYLATE 10 MG/1
10 TABLET ORAL NIGHTLY
Status: DISCONTINUED | OUTPATIENT
Start: 2021-09-21 | End: 2021-09-22 | Stop reason: HOSPADM

## 2021-09-21 RX ORDER — SODIUM CHLORIDE 9 MG/ML
INJECTION, SOLUTION INTRAVENOUS PRN
Status: DISCONTINUED | OUTPATIENT
Start: 2021-09-21 | End: 2021-09-22 | Stop reason: HOSPADM

## 2021-09-21 RX ORDER — ENALAPRILAT 2.5 MG/2ML
1.25 INJECTION INTRAVENOUS ONCE
Status: COMPLETED | OUTPATIENT
Start: 2021-09-21 | End: 2021-09-21

## 2021-09-21 RX ORDER — DEXTROSE MONOHYDRATE 50 MG/ML
100 INJECTION, SOLUTION INTRAVENOUS PRN
Status: DISCONTINUED | OUTPATIENT
Start: 2021-09-21 | End: 2021-09-22 | Stop reason: HOSPADM

## 2021-09-21 RX ORDER — LABETALOL HYDROCHLORIDE 5 MG/ML
10 INJECTION, SOLUTION INTRAVENOUS EVERY 6 HOURS PRN
Status: DISCONTINUED | OUTPATIENT
Start: 2021-09-21 | End: 2021-09-22 | Stop reason: HOSPADM

## 2021-09-21 RX ORDER — DEXTROSE MONOHYDRATE 25 G/50ML
12.5 INJECTION, SOLUTION INTRAVENOUS PRN
Status: DISCONTINUED | OUTPATIENT
Start: 2021-09-21 | End: 2021-09-22 | Stop reason: HOSPADM

## 2021-09-21 RX ORDER — NICOTINE POLACRILEX 4 MG
15 LOZENGE BUCCAL PRN
Status: DISCONTINUED | OUTPATIENT
Start: 2021-09-21 | End: 2021-09-22 | Stop reason: HOSPADM

## 2021-09-21 RX ADMIN — REGADENOSON 0.4 MG: 0.08 INJECTION, SOLUTION INTRAVENOUS at 11:31

## 2021-09-21 RX ADMIN — LATANOPROST 1 DROP: 50 SOLUTION OPHTHALMIC at 21:42

## 2021-09-21 RX ADMIN — ENALAPRILAT 1.25 MG: 1.25 INJECTION INTRAVENOUS at 06:54

## 2021-09-21 RX ADMIN — EZETIMIBE 10 MG: 10 TABLET ORAL at 13:48

## 2021-09-21 RX ADMIN — AMLODIPINE BESYLATE 10 MG: 10 TABLET ORAL at 21:41

## 2021-09-21 RX ADMIN — ASPIRIN 81 MG: 81 TABLET, COATED ORAL at 08:34

## 2021-09-21 RX ADMIN — INSULIN GLARGINE 28 UNITS: 100 INJECTION, SOLUTION SUBCUTANEOUS at 21:43

## 2021-09-21 RX ADMIN — GLIPIZIDE 10 MG: 5 TABLET ORAL at 13:48

## 2021-09-21 RX ADMIN — Medication 30 MILLICURIE: at 11:31

## 2021-09-21 RX ADMIN — ENOXAPARIN SODIUM 40 MG: 40 INJECTION SUBCUTANEOUS at 21:41

## 2021-09-21 RX ADMIN — Medication 10 MILLICURIE: at 11:30

## 2021-09-21 RX ADMIN — SODIUM CHLORIDE, PRESERVATIVE FREE 10 ML: 5 INJECTION INTRAVENOUS at 21:43

## 2021-09-21 RX ADMIN — SODIUM CHLORIDE: 9 INJECTION, SOLUTION INTRAVENOUS at 06:40

## 2021-09-21 RX ADMIN — HYDRALAZINE HYDROCHLORIDE 100 MG: 50 TABLET, FILM COATED ORAL at 13:48

## 2021-09-21 RX ADMIN — LISINOPRIL 20 MG: 20 TABLET ORAL at 08:34

## 2021-09-21 RX ADMIN — PREDNISOLONE ACETATE 1 DROP: 10 SUSPENSION/ DROPS OPHTHALMIC at 21:48

## 2021-09-21 RX ADMIN — LABETALOL HYDROCHLORIDE 10 MG: 5 INJECTION INTRAVENOUS at 15:27

## 2021-09-21 RX ADMIN — HYDRALAZINE HYDROCHLORIDE 100 MG: 50 TABLET, FILM COATED ORAL at 08:34

## 2021-09-21 RX ADMIN — HYDRALAZINE HYDROCHLORIDE 100 MG: 50 TABLET, FILM COATED ORAL at 21:41

## 2021-09-21 ASSESSMENT — PAIN SCALES - GENERAL
PAINLEVEL_OUTOF10: 0

## 2021-09-21 NOTE — CONSULTS
Kaleigh Ram am scribing for and in the presence of Brendan Benítez MD, MS, F.A.C.C..    Patient: Jose Roberto Mcwilliams  : 1945  Date of Admission: 2021  Primary Care Physician: Teresa Mcgarry  Today's Date: 2021    REASON FOR CONSULTATION/CC: Chest discomfort     HPI: I had the pleasure of seeing Mr. Reanna Garcia today who is a 76 y.o. male with a history of intermittent chest discomfort leading to this consultation. As you know, Mr. Reanna Garcia is a 70 y.o. male with a known history of dysautonomia where on tilt table testing his blood pressure dropped from 454 systolic to 78 systolic with only a 88-57 HR response. More recently, a CT of he head in the past showed evidence of at least 2 old CVA's and a heart catheterization showed severe single vessel disease in the ostium of a moderate sized OM1 branch of the circumflex. However, maximal guidelines directed medical management was opted for an place of stenting partly due to the risk associated with stenting this vessel. Recently he has had a coronary angiography procedure with stenting of his HA Om1. As you know, Mr. Reanna Garcia  is a 76 y.o. male with a history of recent onset substernal chest discomfort that led to a stress test and a subsequent heart catheterization which showed severe single vessel coronary artery disease of the HA Om1 with successful angioplasty and stenting of that vessel that was done by Dr. Erik Villagran on 4/10/17. More recently he has had problems with balance problems when he is in his feet and says that a Neurologist has told him in the past this is because of his previous strokes. Most recently he underwent a cardiovascular stress test which fortunately had shown to be normal on 3/21/2018. Mr. Reanna Garcia has significant normal stress test and echocardiogram on 2020. Holter monitor done on 2020: The rhythm was sinus. Average daily heart rate 58 ranging from 47 to 81 bpm. Bradycardia for 72% test duration.  Rare premature supraventricular ectopic beats consisting of 33 isolated PACs. Rare premature ventricular ectopic beats total 75 consisting of 73 isolatedPVCs and a ventricular couplet. Echocardiogram done on 12/18/2020: EF of 60%. Moderately increased LV wall thickness. Borderline pulmonary hypertension. Mild tricuspid regurgitation. Mild diastolic dysfunction is seen/ Aortic root is mildly dilated    Mr. Beverley Dos Santos was admitted to the hospital with left sided chest pain that felt sharp and lasted about 20 minutes. It was happening when he would get short of breath. It was relieved with Nitro. He denied any problems with his medications or any other concerns at this time. He is eating and drinking okay. Bleeding Risks: Mr. Beverley Dos Santos denies any current or recent bleeding problems including a history of a GI bleed, ulcers, recent or upcoming surgeries, blood in his stool or black tarry stools or blood in his urine. Past Medical History:   Diagnosis Date    Acute MI ([address]     Acute renal failure with tubular necrosis ([address] 10/2/2018    ssecondary to hemodynamic effects of loop diuretics and ace inhibitors, bbaseline 1.2-1.3 which peaked up to 1.8, resolving    Anemia     CAD (coronary artery disease)     Cerebral artery occlusion with cerebral infarction ([address]     CHF (congestive heart failure) (HCC)     Chronic back pain     Closed fracture of lumbar vertebra without mention of spinal cord injury     Displacement of intervertebral disc, site unspecified, without myelopathy     H/O cardiac catheterization 2/19/16    LMCA: Mild irregularities 10-20%. LAD: Lesion on PRox LAD: Mid subsection. 65% stenosis. LCx: Lesion on 1st Ob Valentina: Proximal subesection. 70% steniosis. RCA: Small non-dominant RCA. Lesion on PRox RCA: Ostial. 50% stenosis. EF:55%.      H/O cardiovascular stress test 2/19/16    Abnormal. Moderate perfusion defect of mild intensity in the inferior, inferoseptal adn inferoapical regions during stress imaging, which most consistent with ischemia. Global LV systolic function normal without regional wall motion abnormalities. OVerall these results are most consistent with an intermediate risk for signficant CAD. Additional testing including cardiac cath may be indicated.  H/O tilt table evaluation 12/26/2017    Abnormal. Patients HR, BP response and symptoms were most consistent with dysautonomia. Combined with viligant maintenance of euvolemia and maintaining a moderate salft intake, pharmacologic treatment with SSRI such as lexapro and or mestinon among other treatments have shown some effectiveness in treatment of this condition    H/O tilt table evaluation 12/26/2017    Abnormal head upright tilt table study. The pt heart rate, blood pressure response and symptoms were most consistent with dysautonomia.  History of 24 hour EKG monitoring 8/14/14    Occasional PAC's and PVC's which appear to be at least moderately symptomatic.  History of 24 hour EKG monitoring 11/19/14    Event Monitor. Sinus rhythm and sinus bradycardia. Infrequent isolated PVC's    History of cardiovascular stress test 2/19/14    Relatively NL    History of cardiovascular stress test 03/21/2018    Normal myocardial perfusion. Global left ventricular systolic function was normal with an EF of 68%. Overall, these results are most consistent with a low risk scan.  History of coronary artery stent placement 04/2017    PTCA / Drug Eluting Stent:, CX and / or branches    History of CVA (cerebrovascular accident) without residual deficits 2014    Incidentally found on CT head. No known impairment now or in the past.    History of echocardiogram 2/18/14    LA mildly dilated, EF 55%, LV wall thickness is moderately increased, no definite wall motion abnormalilities, what appears to be a pacer wire is seen w/n the RA and RV, mild-mod TR, mild pulmonary hypertension.      History of Holter monitoring 12/29/2017    Rare PAC's and PVC's.     History of tilt table evaluation 8/12/14    Abnormal    Hyperlipidemia     Hypertension     Non critical Right Renal artery stenosis, native ([facility] 10/2/2018    Non critical Right Renal artery stenosis, based on cath in 2016, Rt RA 30% stenosis    Pacemaker     non-functioning    S/P cardiac cath 04/10/2017    S/P coronary artery stent placement     Successful PTCA - HA Om1    SIRS (systemic inflammatory response syndrome) ([facility] 5/19/2019    Type II or unspecified type diabetes mellitus without mention of complication, not stated as uncontrolled        CURRENT ALLERGIES: Lipitor [atorvastatin], Aldactone [spironolactone], Crestor [rosuvastatin calcium], Lopid [gemfibrozil], Invokana [canagliflozin], and Januvia [sitagliptin] REVIEW OF SYSTEMS: 14 systems were reviewed. Pertinent positives and negatives as above, all else negative.      Past Surgical History:   Procedure Laterality Date    BACK SURGERY      CARDIAC CATHETERIZATION Left 2/19/16    via right radial approach/ Melina Vernon/ Dr. Aline Giraldo  8/17/15    Liang/Charles/Saulo/ Millicent    COLONOSCOPY  2010    COLONOSCOPY  2/19/15    -polyps,diverticulosis,hemorrhoids    COLONOSCOPY  05/23/2018    Dr Quin[facility]    COLONOSCOPY N/A 5/23/2018    COLONOSCOPY POLYPECTOMY SNARE/COLD BIOPSY  cold snare  and  hot snare performed by Radha Mueller MD at [address] [place] [address]  10/30/2020    with Dr. Jacques Avila 10/30/2020    Zachary Perez, NOT HIGH RISK performed by Thu Guerrero DO at [address]      left eye    PACEMAKER INSERTION      PACEMAKER PLACEMENT      UPPER GASTROINTESTINAL ENDOSCOPY  05/28/2019    Dr. Servando Haley H-Pylori)duodenal bulbar/antral erythema    UPPER GASTROINTESTINAL ENDOSCOPY N/A 5/28/2019    EGD BIOPSY, clotest performed by Radha Mueller MD at [facility] [facility] 10/30/2020 EGD ESOPHAGOGASTRODUODENOSCOPY performed by Elihue Olszewski, DO at [address] History:  Social History     Tobacco Use    Smoking status: Former Smoker     Packs/day: 1.50     Years: 8.00     Pack years: 12.00     Types: Cigarettes     Quit date: 3/4/1980     Years since quittin.5    Smokeless tobacco: Never Used   Vaping Use    Vaping Use: Never used   Substance Use Topics    Alcohol use: No    Drug use: No        OUTPATIENT MEDICATIONS:  Outpatient Medications Marked as Taking for the 21 encounter [facility] Encounter)   Medication Sig Dispense Refill    eplerenone (INSPRA) 50 MG tablet Take 1 tablet by mouth once daily (Patient taking differently: 50 mg nightly ) 90 tablet 0    amLODIPine (NORVASC) 5 MG tablet Take 1 tablet by mouth nightly 30 tablet 0    hydrALAZINE (APRESOLINE) 100 MG tablet Take 1 tablet by mouth 3 times daily Patient is taking 100 MG 3 times daily 270 tablet 0    glipiZIDE (GLUCOTROL XL) 5 MG extended release tablet Take 2 tablets by mouth 2 times daily 360 tablet 0    insulin glargine (LANTUS SOLOSTAR) 100 UNIT/ML injection pen Inject 28 Units into the skin 2 times daily 50.4 mL 0    nitroGLYCERIN (NITROSTAT) 0.4 MG SL tablet Place 1 tablet under the tongue every 5 minutes as needed for Chest pain 25 tablet 3    lisinopril (PRINIVIL;ZESTRIL) 20 MG tablet Take 1 tablet by mouth daily 90 tablet 3    ezetimibe (ZETIA) 10 MG tablet Take 1 tablet by mouth daily 90 tablet 3    acyclovir (ZOVIRAX) 400 MG tablet 400 mg 2 times daily       ferrous sulfate 325 (65 Fe) MG tablet Take 325 mg by mouth daily (with breakfast)       Omega-3 Fatty Acids (FISH OIL) 1000 MG CAPS Take 1,000 mg by mouth daily       aspirin EC 81 MG EC tablet Take 1 tablet by mouth daily 30 tablet 3    prednisoLONE acetate (PRED FORTE) 1 % ophthalmic suspension Place 1 drop into the left eye daily          glucose (GLUTOSE) 40 % oral gel 15 g, PRN  dextrose 50 % IV solution, PRN  glucagon (rDNA) injection 1 mg, PRN  dextrose 5 % solution, PRN  amLODIPine (NORVASC) tablet 10 mg, Nightly  labetalol (NORMODYNE;TRANDATE) injection 10 mg, Q6H PRN  0.9 % sodium chloride infusion, PRN  0.9 % sodium chloride infusion, Continuous  eplerenone (INSPRA) tablet 25 mg, Daily  ezetimibe (ZETIA) tablet 10 mg, Daily  hydrALAZINE (APRESOLINE) tablet 100 mg, TID  glipiZIDE (GLUCOTROL) tablet 10 mg, QAM AC  latanoprost (XALATAN) 0.005 % ophthalmic solution 1 drop, Nightly  insulin glargine (LANTUS) injection vial 28 Units, BID  lisinopril (PRINIVIL;ZESTRIL) tablet 20 mg, Daily  prednisoLONE acetate (PRED FORTE) 1 % ophthalmic suspension 1 drop, Daily  sodium chloride flush 0.9 % injection 5-40 mL, 2 times per day  sodium chloride flush 0.9 % injection 5-40 mL, PRN  0.9 % sodium chloride infusion, PRN  ondansetron (ZOFRAN-ODT) disintegrating tablet 4 mg, Q8H PRN   Or  ondansetron (ZOFRAN) injection 4 mg, Q6H PRN  acetaminophen (TYLENOL) tablet 650 mg, Q6H PRN   Or  acetaminophen (TYLENOL) suppository 650 mg, Q6H PRN  polyethylene glycol (GLYCOLAX) packet 17 g, Daily PRN  enoxaparin (LOVENOX) injection 40 mg, Daily        FAMILY HISTORY: family history includes Cancer in his father and mother. PHYSICAL EXAM:   BP (!) 159/58   Pulse 66   Temp 97.2 °F (36.2 °C) (Temporal)   Resp 16   Ht 5' 9\" (1.753 m)   Wt 205 lb 4 oz (93.1 kg)   SpO2 97%   BMI 30.31 kg/m²  Body mass index is 30.31 kg/m². Constitutional: He is oriented to person, place, and time. He appears well-developed and well-nourished. In no acute distress. HEENT: Normocephalic and atraumatic. No JVD present. Carotid bruit is not present. No mass and no thyromegaly present. No lymphadenopathy present. Cardiovascular: Normal rate, regular rhythm, normal heart sounds. Exam reveals no gallop and no friction rubs. 1/6 systolic murmur, 5th intercostal space on the LEFT in the mid-clavicular line (cardiac apex).   Pulmonary/Chest: Effort normal and breath sounds normal. No respiratory distress. He has no wheezes, rhonchi or rales. Abdominal: Soft, non-tender. Bowel sounds and aorta are normal. He exhibits no organomegaly, mass or bruit. Extremities: None. No cyanosis or clubbing. 2+ radial and carotid pulses. Distal extremity pulses: 2+ bilaterally. Neurological: He is alert and oriented to person, place, and time. No evidence of gross cranial nerve deficit. Coordination appeared normal.   Skin: Skin is warm and dry. There is no rash or diaphoresis. Psychiatric: He has a normal mood and affect.  His speech is normal and behavior is normal.        MOST RECENT LABS ON RECORD:   Lab Results   Component Value Date    WBC 4.3 09/21/2021    HGB 7.1 (L) 09/21/2021    HCT 22.6 (L) 09/21/2021     09/21/2021    CHOL 119 09/09/2020    TRIG 347 (H) 09/09/2020    HDL 24 (L) 09/09/2020    LDLDIRECT 44 11/02/2017    ALT 16 09/20/2021    AST 19 09/20/2021     09/20/2021    K 4.5 09/20/2021     09/20/2021    CREATININE 1.89 (H) 09/20/2021    BUN 37 (H) 09/20/2021    CO2 17 (L) 09/20/2021    TSH 2.26 11/02/2017    PSA 3.68 04/30/2021    INR 1.1 09/20/2021    LABA1C 7.3 08/19/2021    LABMICR 30 10/17/2015       ASSESSMENT:  Patient Active Problem List    Diagnosis Date Noted    S/P drug eluting coronary stent placement-OM1 4/10/17 04/10/2017    Coronary atherosclerosis due to calcified coronary lesion 02/20/2015    Acute on chronic anemia 09/21/2021    Acute coronary syndrome with high troponin ([address] 09/21/2021    Chest pain in adult 09/20/2021    Gross hematuria 05/05/2021    BPH with obstruction/lower urinary tract symptoms 12/10/2019    Elevated PSA 12/10/2019    Anemia in stage 3 chronic kidney disease ([address] 05/21/2019    Acute renal failure with tubular necrosis ([address] 10/02/2018    Non critical Right Renal artery stenosis, native ([facility] 10/02/2018    Medication side effect, initial encounter 03/23/2018    Angina, class II ([facility] 01/08/2018    Hyperlipidemia 04/24/2017    Chronic diastolic heart failure ([facility] 04/24/2017    Coronary artery disease involving native coronary artery of native heart without angina pectoris 05/02/2016    Cervical stenosis of spinal canal 02/29/2016    Abnormal tilt table test 02/23/2016    Chronic kidney disease, stage III (moderate) (HCC) 02/22/2016    Controlled type 2 diabetes mellitus with diabetic nephropathy, with long-term current use of insulin ([facility] 02/22/2016    Ischemic chest pain ([facility] 02/17/2016    Accelerated hypertension 10/16/2015    Chronotropic incompetence with autonomic dysfunction 09/02/2015    Episodic lightheadedness 09/02/2015    Cholecystitis 08/17/2015    Syncope, near 08/05/2015    Dysautonomia ([facility] 06/29/2015    History of CVA (cerebrovascular accident) without residual deficits     Displacement of intervertebral disc, site unspecified, without myelopathy 03/04/2015    History of colon polyps 93/40/5664    Systolic murmur 13/63/0360    History of MI (myocardial infarction) 02/12/2014    Vertigo, benign paroxysmal 10/17/2012        PLAN:  · Atypical chest pain but still suspicious for possible myocardial ischemia: equivocal stress test done today with normal EF but suspect that   · Medical Management for suspected coronary artery disease   Antiplatelet Agent: STOP aspirin    Beta Blocker: Continue Labetolol   Anti-anginal medications: Continue nitroglycerin 0.4 mg tablets as needed for chest pain.  Cholesterol Reduction Therapy: Not indicated at this time.  Counseling: I advised Mr. Jorge Luis Richardson to call our office or go to the emergency room if he develops worsening or persistent chest pain or shortness of breath as this could be life threatening.      · Anemia: hemoglobin A1c was 7.1 today   · STOP aspirin  · His last colonoscopy was October 2020, agree with plan to do GI camera  · Agree with bood transfusion    Once again, thank you for allowing me to participate in this patients care. Please do not hesitate to contact me could I be of further assistance. Sincerely,  Brendan Benítez MD, MS, F.A.C.C. [facility]iology Specialist    [address] [place] [address] [place], [address] [place] [address]  Phone: [phone], Fax: [phone]     I believe that the risk of significant morbidity and mortality related to the patient's current medical conditions are: Intermediate. The documentation recorded by the scribe, accurately and completely reflects the services I personally performed and the decisions made by me. Zeenat Kumar MD, MS, F.A.C.C.  September 21, 2021

## 2021-09-21 NOTE — PROGRESS NOTES
Patient to room from ER per wheelchair. Patient ambulated from chair to bed without difficulty. Patient is alert and oriented x4. Admit assessment and vitals to be addressed. Call light and bedside table within reach.

## 2021-09-21 NOTE — PROGRESS NOTES
Writer checked patients blood sugar as he states he feels low and nearly passed out when getting up to get into wheelchair for stress test. Writer checked FSBS 77.  Writer gave patient 4 oz of orange juice to drink prior to being taken down to stress test.

## 2021-09-21 NOTE — PROGRESS NOTES
Writer called blood bank for the second time to check on availability of blood for patient to be transfused. Patient has been crossmatched since this morning.

## 2021-09-21 NOTE — PROGRESS NOTES
Nutrition Assessment     Type and Reason for Visit: Initial    Nutrition Recommendations/Plan:  Encourage oral intakes     Nutrition Assessment:  Inadequate nutrient intakes r/t altered cardiac status, AEB NPO for stress test. Noted anemia and history of low iron. On supplemental Fe at home. Reasonably controlled diabetes per A1C, with excursions currently r/t steroid. Will attach heart healthy education to d/c instructions. Malnutrition Assessment:  Malnutrition Status:  At risk for malnutrition (Comment)    Nutrition Related Findings: DELONTE at stress test      Current Nutrition Therapies:    Diet NPO    Anthropometric Measures:  · Height: 5' 9\" (175.3 cm)  · Current Body Wt: 205 lb 4 oz (93.1 kg)   · BMI: 30.3    Nutrition Diagnosis:   · Inadequate oral intake related to cardiac dysfunction as evidenced by NPO or clear liquid status due to medical condition    Lab Results   Component Value Date     09/20/2021    K 4.5 09/20/2021     09/20/2021    CO2 17 (L) 09/20/2021    BUN 37 (H) 09/20/2021    CREATININE 1.89 (H) 09/20/2021    GLUCOSE 299 (H) 09/20/2021    CALCIUM 8.8 09/20/2021    PROT 6.7 09/20/2021    LABALBU 3.4 (L) 09/20/2021    BILITOT 0.37 09/20/2021    ALKPHOS 68 09/20/2021    AST 19 09/20/2021    ALT 16 09/20/2021    LABGLOM 35 (L) 09/20/2021    GFRAA 42 (L) 09/20/2021     Lab Results   Component Value Date    LABA1C 7.3 08/19/2021     Lab Results   Component Value Date     05/19/2019     No results found for: VITD25    Nutrition Interventions:   Food and/or Nutrient Delivery:  Start Oral Diet  Nutrition Education/Counseling:  No recommendation at this time   Coordination of Nutrition Care:  Continue to monitor while inpatient    Goals:  PO >75% meals on advanced diet       Nutrition Monitoring and Evaluation:   Behavioral-Environmental Outcomes:  None Identified   Food/Nutrient Intake Outcomes:  Food and Nutrient Intake, Diet Advancement/Tolerance  Physical Signs/Symptoms Outcomes: Biochemical Data, Weight, Fluid Status or Edema     Discharge Planning:    No discharge needs at this time     Electronically signed by Bryant Scruggs RD, LD on 9/21/21 at 11:28 AM EDT    Contact: [phone]

## 2021-09-21 NOTE — PROGRESS NOTES
Per Dr. uLx Cuellar, give patient his scheduled blood pressure/heart pills to get patients blood pressure under control.

## 2021-09-21 NOTE — PROGRESS NOTES
Patient resting in bed with eyes closed at this time while receiving blood transfusion. Vials being monitored. Patient denies pain when writer asks then falls back to sleep.

## 2021-09-21 NOTE — H&P
[address] [facility]  History and Physical        Patient:  Ishaan Maya  MRN: [id number]    Chief Complaint:    Chief Complaint   Patient presents with    Chest Pain     chest pain started last week, sob with ambulation       History Obtained From:  patient, electronic medical record  PCP: Lillie Gray MD    History of Present Illness: The patient is a 76 y.o. male  With h/o cad who presents with lt sided chest pain lasting for 30 min at rest ,releived with sublingual ntg. He came to er and admitted for acs. His blood pressure is also elavated    Past Medical History:        Diagnosis Date    Acute MI ([address]     Acute renal failure with tubular necrosis ([facility] 10/2/2018    ssecondary to hemodynamic effects of loop diuretics and ace inhibitors, bbaseline 1.2-1.3 which peaked up to 1.8, resolving    Anemia     CAD (coronary artery disease)     Cerebral artery occlusion with cerebral infarction ([facility]     CHF (congestive heart failure) (HCC)     Chronic back pain     Closed fracture of lumbar vertebra without mention of spinal cord injury     Displacement of intervertebral disc, site unspecified, without myelopathy     H/O cardiac catheterization 2/19/16    LMCA: Mild irregularities 10-20%. LAD: Lesion on PRox LAD: Mid subsection. 65% stenosis. LCx: Lesion on 1st Ob Valentina: Proximal subesection. 70% steniosis. RCA: Small non-dominant RCA. Lesion on PRox RCA: Ostial. 50% stenosis. EF:55%.  H/O cardiovascular stress test 2/19/16    Abnormal. Moderate perfusion defect of mild intensity in the inferior, inferoseptal adn inferoapical regions during stress imaging, which most consistent with ischemia. Global LV systolic function normal without regional wall motion abnormalities. OVerall these results are most consistent with an intermediate risk for signficant CAD. Additional testing including cardiac cath may be indicated.      H/O tilt table evaluation 12/26/2017    Abnormal. Patients HR, BP response and symptoms were most consistent with dysautonomia. Combined with viligant maintenance of euvolemia and maintaining a moderate salft intake, pharmacologic treatment with SSRI such as lexapro and or mestinon among other treatments have shown some effectiveness in treatment of this condition    H/O tilt table evaluation 12/26/2017    Abnormal head upright tilt table study. The pt heart rate, blood pressure response and symptoms were most consistent with dysautonomia.  History of 24 hour EKG monitoring 8/14/14    Occasional PAC's and PVC's which appear to be at least moderately symptomatic.  History of 24 hour EKG monitoring 11/19/14    Event Monitor. Sinus rhythm and sinus bradycardia. Infrequent isolated PVC's    History of cardiovascular stress test 2/19/14    Relatively NL    History of cardiovascular stress test 03/21/2018    Normal myocardial perfusion. Global left ventricular systolic function was normal with an EF of 68%. Overall, these results are most consistent with a low risk scan.  History of coronary artery stent placement 04/2017    PTCA / Drug Eluting Stent:, CX and / or branches    History of CVA (cerebrovascular accident) without residual deficits 2014    Incidentally found on CT head. No known impairment now or in the past.    History of echocardiogram 2/18/14    LA mildly dilated, EF 55%, LV wall thickness is moderately increased, no definite wall motion abnormalilities, what appears to be a pacer wire is seen w/n the RA and RV, mild-mod TR, mild pulmonary hypertension.  History of Holter monitoring 12/29/2017    Rare PAC's and PVC's.      History of tilt table evaluation 8/12/14    Abnormal    Hyperlipidemia     Hypertension     Non critical Right Renal artery stenosis, native ([address] 10/2/2018    Non critical Right Renal artery stenosis, based on cath in 2016, Rt RA 30% stenosis    Pacemaker     non-functioning    S/P cardiac cath 04/10/2017    S/P coronary artery stent placement     Successful PTCA - HA Om1    SIRS (systemic inflammatory response syndrome) ([facility] [address] 5/19/2019    Type II or unspecified type diabetes mellitus without mention of complication, not stated as uncontrolled        Past Surgical History:        Procedure Laterality Date    BACK SURGERY      CARDIAC CATHETERIZATION Left 2/19/16    via right radial approach/ Melina Vernon/ Dr. Lamin Aj  8/17/15    Liang/Charles/Saulo/ Lap    COLONOSCOPY  2010    COLONOSCOPY  2/19/15    -polyps,diverticulosis,hemorrhoids    COLONOSCOPY  05/23/2018    Dr [facility]    COLONOSCOPY N/A 5/23/2018    COLONOSCOPY POLYPECTOMY SNARE/COLD BIOPSY  cold snare  and  hot snare performed by Mabel Galindo MD at [address] [facility]  10/30/2020    with Dr. Leif De La Cruz 10/30/2020    Vergia Yael, NOT HIGH RISK performed by Bairon Martinez DO at [address]      left eye    PACEMAKER INSERTION      PACEMAKER PLACEMENT      UPPER GASTROINTESTINAL ENDOSCOPY  05/28/2019    Dr. Ang Trinidad H-Pylori)duodenal bulbar/antral erythema    UPPER GASTROINTESTINAL ENDOSCOPY N/A 5/28/2019    EGD BIOPSY, clotest performed by Mabel Galindo MD at [address] [facility] 10/30/2020    EGD ESOPHAGOGASTRODUODENOSCOPY performed by Bairon Martinez DO at [address]       Medications Prior to Admission:    Prior to Admission medications    Medication Sig Start Date End Date Taking?  Authorizing Provider   eplerenone (INSPRA) 50 MG tablet Take 1 tablet by mouth once daily 8/31/21  Yes He Mcmahon MD   amLODIPine (NORVASC) 5 MG tablet Take 1 tablet by mouth nightly 8/19/21  Yes He Mcmahon MD   hydrALAZINE (APRESOLINE) 100 MG tablet Take 1 tablet by mouth 3 times daily Patient is taking 100 MG 3 times daily 8/19/21  Yes He Mcmahon MD   glipiZIDE (GLUCOTROL XL) 5 MG extended release tablet Take 2 tablets by mouth 2 times daily 7/19/21  Yes Angel Cardoso MD   insulin glargine (LANTUS SOLOSTAR) 100 UNIT/ML injection pen Inject 28 Units into the skin 2 times daily 7/19/21 10/17/21 Yes Angel Cardoso MD   nitroGLYCERIN (NITROSTAT) 0.4 MG SL tablet Place 1 tablet under the tongue every 5 minutes as needed for Chest pain 6/28/21  Yes Rochelle Osler, MD   lisinopril (PRINIVIL;ZESTRIL) 20 MG tablet Take 1 tablet by mouth daily 6/28/21  Yes Rochelle Osler, MD   ezetimibe (ZETIA) 10 MG tablet Take 1 tablet by mouth daily 8/26/20  Yes Rochelle Osler, MD   acyclovir (ZOVIRAX) 400 MG tablet 400 mg 2 times daily  7/6/20  Yes Historical Provider, MD   ferrous sulfate 325 (65 Fe) MG tablet Take 325 mg by mouth daily (with breakfast)    Yes Historical Provider, MD   Omega-3 Fatty Acids (FISH OIL) 1000 MG CAPS Take 1,000 mg by mouth daily    Yes Historical Provider, MD   aspirin EC 81 MG EC tablet Take 1 tablet by mouth daily 3/23/18  Yes Rochelle Osler, MD   prednisoLONE acetate (PRED FORTE) 1 % ophthalmic suspension Place 1 drop into the left eye daily  3/30/15  Yes Historical Provider, MD   latanoprost (XALATAN) 0.005 % ophthalmic solution  2/2/21   Historical Provider, MD   CONTOUR TEST strip USE 1 STRIP TO [facility]  Patient not taking: Reported on 6/28/2021 10/21/19   Angel Cardoso MD   DIANA MICROLET LANCETS [address] TEST TWICE DAILY  Patient not taking: Reported on 6/28/2021 11/16/15   Angel Cardoso MD   Insulin Pen Needle (PEN NEEDLES) 31G X 6 MM MISC 1 each by Does not apply route daily 10/16/15   Angel Cardoso MD       Allergies:  Lipitor [atorvastatin], Aldactone [spironolactone], Crestor [rosuvastatin calcium], Lopid [gemfibrozil], Invokana [canagliflozin], and Januvia [sitagliptin]    Social History:   TOBACCO:   reports that he quit smoking about 41 years ago. His smoking use included cigarettes. He has a 12.00 pack-year smoking history.  He has never used smokeless tobacco.  ETOH:   reports no history of alcohol use. Family History:       Problem Relation Age of Onset    Cancer Mother         breast    Cancer Father         lung       Review of Systems:  Constitutional:negative  for fevers, and negative for chills. Respiratory: negative for shortness of breath, negative for cough, and negative for wheezing  Cardiovascular: positive for chest pain, negative for palpitations, and negative for syncope  Gastrointestinal: negative for abdominal pain, negative for nausea,negative for vomiting, negative for diarrhea, negative for constipation, and negative for hematochezia or melena  Genitourinary: negative for dysuria, negative for urinary urgency, negative for urinary frequency, and negative for hematuria  Neurological: negative for unilateral weakness, numbness or tingling. Has chronic back pain  All other systems were reviewed with the patient and are negative except as stated    Objective:    Vitals:   Temp: 97.1 °F (36.2 °C)  BP: (!) 194/68  Resp: 20  Pulse: 73  SpO2: 97 %  -----------------------------------------------------------------  Exam:  GEN:    Awake, alert and oriented x3. EYES:  EOMI, pupils equal   NECK: Supple. No lymphadenopathy. No carotid bruit  CVS:    regular rate and rhythm, 2/6 systolic murmur  PULM:  CTA, no wheezes, rales or rhonchi, no acute respiratory distress  ABD:    Bowels sounds normal.  Abdomen is soft. No distention. no tenderness to palpation. EXT:   no edema bilaterally . No calf tenderness. NEURO: Moves all extremities. Motor and sensory are grossly intact  SKIN:  No rashes.   No skin lesions.    -----------------------------------------------------------------  Diagnostic Data:   · All diagnostic data was reviewed  Lab Results   Component Value Date    WBC 4.3 09/21/2021    HGB 7.1 (L) 09/21/2021    MCV 87.3 09/21/2021     09/21/2021      Lab Results   Component Value Date    GLUCOSE 299 (H) 09/20/2021    BUN 37 (H) 09/20/2021    CREATININE 1.89 (H) 09/20/2021     09/20/2021    K 4.5 09/20/2021    CALCIUM 8.8 09/20/2021     09/20/2021    CO2 17 (L) 09/20/2021     Lab Results   Component Value Date    WBCUA 0 TO 2 05/05/2021    RBCUA 0 TO 2 05/05/2021    EPITHUA None 05/05/2021    LEUKOCYTESUR NEGATIVE 05/05/2021    SPECGRAV >1.030 (H) 05/05/2021    GLUCOSEU NEGATIVE 05/05/2021    KETUA NEGATIVE 05/05/2021    PROTEINU 1+ (A) 05/05/2021    HGBUR 1+ (A) 05/05/2021    CASTUA NOT REPORTED 05/05/2021    CRYSTUA NOT REPORTED 05/05/2021    BACTERIA 3+ (A) 05/05/2021    YEAST NOT REPORTED 05/05/2021       Assessment:     Principal Problem:    Chest pain in adult  Active Problems:    Accelerated hypertension    Controlled type 2 diabetes mellitus with diabetic nephropathy, with long-term current use of insulin (HCC)    Angina, class II (HCC)    Anemia in stage 3 chronic kidney disease (HCC)    Acute on chronic anemia  Resolved Problems:    * No resolved hospital problems. *      Plan:     Problem List     None           · This patient requires inpatient admission because of acute coronary syndrome requiring cardiac monitoring,cardiology eval  · Factors affecting the medical complexity of this patient include acclerated htn,cad,type 2 dm with ckd,  · Estimated length of stay is 2 days  · Discussed patient's symptoms and data results including labs and imaging studies with the ER MD at time of admission  · High risk drug monitoring: none  ·     CORE MEASURES  · DVT prophylaxis: Lovenox  · Decubitus ulcer present on admission: No  · CODE STATUS: FULL CODE  · Nutrition Status: fair   · Physical therapy: No   · Old Charts reviewed: Yes  · EKG Reviewed:  Yes  · Advance Directive Addressed: Yes    Teresa Mcgarry MD , M.D.  9/21/2021  7:58 AM

## 2021-09-21 NOTE — PROGRESS NOTES
Discussed discharge plans with the patient. Patient is a 76year old male here with Chest pain in adult  He is alert , oriented , pleasant , and cooperative during our conversation.     Patient is  and lives at home with his wife. He uses a cane and walker. The patient and his wife do the cooking and the cleaning. He is independent with his ADL's. Patient  manages his own medications and drives.     His PCP is Dr. Ping Dos Santos. He has medical insurance that helps with medication costs.     The discharge plan is home with no services. He does not have advance directives.  LSW to monitor and assist with any needs or concerns as they arise.      FERDINAND Abernathy

## 2021-09-22 VITALS
OXYGEN SATURATION: 95 % | DIASTOLIC BLOOD PRESSURE: 55 MMHG | BODY MASS INDEX: 30.73 KG/M2 | RESPIRATION RATE: 16 BRPM | HEIGHT: 69 IN | SYSTOLIC BLOOD PRESSURE: 162 MMHG | TEMPERATURE: 97.7 F | WEIGHT: 207.45 LBS | HEART RATE: 71 BPM

## 2021-09-22 LAB
GLUCOSE BLD-MCNC: 117 MG/DL (ref 74–100)
HCT VFR BLD CALC: 26.7 % (ref 40.7–50.3)
HEMOGLOBIN: 8.6 G/DL (ref 13–17)

## 2021-09-22 PROCEDURE — 82947 ASSAY GLUCOSE BLOOD QUANT: CPT

## 2021-09-22 PROCEDURE — 6370000000 HC RX 637 (ALT 250 FOR IP): Performed by: INTERNAL MEDICINE

## 2021-09-22 PROCEDURE — 85018 HEMOGLOBIN: CPT

## 2021-09-22 PROCEDURE — G0378 HOSPITAL OBSERVATION PER HR: HCPCS

## 2021-09-22 PROCEDURE — 94761 N-INVAS EAR/PLS OXIMETRY MLT: CPT

## 2021-09-22 PROCEDURE — 85014 HEMATOCRIT: CPT

## 2021-09-22 PROCEDURE — 36415 COLL VENOUS BLD VENIPUNCTURE: CPT

## 2021-09-22 PROCEDURE — 99225 PR SBSQ OBSERVATION CARE/DAY 25 MINUTES: CPT | Performed by: FAMILY MEDICINE

## 2021-09-22 PROCEDURE — 2580000003 HC RX 258: Performed by: INTERNAL MEDICINE

## 2021-09-22 RX ORDER — AMLODIPINE BESYLATE 10 MG/1
10 TABLET ORAL NIGHTLY
Qty: 90 TABLET | Refills: 0 | Status: ON HOLD | OUTPATIENT
Start: 2021-09-22 | End: 2021-11-26 | Stop reason: HOSPADM

## 2021-09-22 RX ORDER — LISINOPRIL 40 MG/1
40 TABLET ORAL DAILY
Qty: 30 TABLET | Refills: 1 | Status: SHIPPED | OUTPATIENT
Start: 2021-09-22 | End: 2021-10-15

## 2021-09-22 RX ADMIN — INSULIN GLARGINE 28 UNITS: 100 INJECTION, SOLUTION SUBCUTANEOUS at 08:54

## 2021-09-22 RX ADMIN — LISINOPRIL 20 MG: 20 TABLET ORAL at 08:53

## 2021-09-22 RX ADMIN — EPLERENONE 25 MG: 25 TABLET, FILM COATED ORAL at 08:58

## 2021-09-22 RX ADMIN — EZETIMIBE 10 MG: 10 TABLET ORAL at 08:53

## 2021-09-22 RX ADMIN — GLIPIZIDE 10 MG: 5 TABLET ORAL at 07:05

## 2021-09-22 RX ADMIN — HYDRALAZINE HYDROCHLORIDE 100 MG: 50 TABLET, FILM COATED ORAL at 08:53

## 2021-09-22 RX ADMIN — SODIUM CHLORIDE, PRESERVATIVE FREE 10 ML: 5 INJECTION INTRAVENOUS at 08:54

## 2021-09-22 ASSESSMENT — PAIN SCALES - GENERAL: PAINLEVEL_OUTOF10: 0

## 2021-09-22 NOTE — PROCEDURES
[address] [facility] [address]                              CARDIAC STRESS TEST    PATIENT NAME: Lennie Urbina                     :        1945  MED REC NO:   [id number]                              ROOM:       8097  ACCOUNT NO:   [de-identified]                           ADMIT DATE: 2021  PROVIDER:     Raz Albarran    CARDIOVASCULAR DIAGNOSTIC DEPARTMENT    DATE OF STUDY:  2021    ORDERING PROVIDER:  Monae Varela MD    PRIMARY CARE PROVIDER:  Monae Varela MD    INTERPRETING PHYSICIAN:  Raz Albarran MD    PHARMACOLOGIC MYOCARDIAL PERFUSION STRESS TESTING    Rest/stress single isotope SPECT imaging with exercise stress and gated  SPECT imaging. INDICATIONS:  Assessment of chest pain and/or chest discomfort. CLINICAL HISTORY:  The patient is a 77-year-old man with known coronary  artery disease. Previous cardiac history includes:  Coronary artery disease, stress  test, cardiac catheterization, percutaneous transluminal coronary  angioplasty. Other previous history includes:  Chest pain, palpitations, fatigue,  dyspnea. lightheadedness, diabetes mellitus, hypertension. Symptoms just prior to testing include:  Lightheadedness. Relevant medications:  Norvasc. PROCEDURE:  The heart rate was 76 at baseline and brigido to 81 beats per  minute during the regadenoson infusion. The rest blood pressure was  142/58 mm/Hg and decreased to 112/44 mm/Hg. The patient did not  complain of any symptoms following infusion. Pharmacologic stress testing was performed with regadenoson at a dose of  0.4 mg. Additionally, low level exercise using hand compressions was  performed along with vasodilator infusion. MYOCARDIAL PERFUSION IMAGING:  Imaging was performed at rest 30-45  minutes following the injection of 10 mCi of sestamibi.   Approximately  10 seconds after Lexiscan injection, the patient was
injected with 30  mCi of sestamibi. Gating post-stress tomographic imaging was performed  30-45 minutes after stress. STRESS ECG RESULTS:  The resting electrocardiogram demonstrated normal  sinus rhythm without significant ST-segment abnormalities that may  impair accurate ECG detection of stress induced cardiac ischemia. During vasodilator infusion and during recovery, the patient developed:    No significant ST segment changes suggestive of myocardial ischemia with  no premature atrial contractions (PACs) and no premature ventricular  contractions (PVCs). NUCLEAR IMAGING RESULTS:  The overall quality of the study is fair. Mild attenuation artifact was seen. There is no evidence of abnormal  lung uptake. Additionally, the right ventricle appears normal.  The  left ventricular cavity is noted to be normal in size on the stress  images. There is no evidence of transient ischemic dilatation (TID) of  the left ventricle. Gated SPECT imaging reveals normal myocardial thickening and wall motion  with a calculated left ventricular ejection fraction of 73%. The rest images demonstrated a small/moderate perfusion abnormality of  mild intensity in the lateral, inferior and inferoapical regions, which  is most likely due to artifact. On stress imaging, a small/moderate perfusion abnormality of mild  intensity in the lateral, inferior and inferoapical regions, which is  most likely due to artifact. IMPRESSION:  1. Equivocal myocardial perfusion study. There is a small/moderate  perfusion defect of mild intensity in the lateral, inferior and  inferoapical regions during stress and rest imaging which is most  consistent with artifact. 2.  Global left ventricular systolic function was normal with an EF of  73% without regional wall motion abnormalities.     3.  No significant electrocardiographic evidence of myocardial ischemia  during EKG monitoring without significant associated

## 2021-09-22 NOTE — PROGRESS NOTES
Patients wife states she does not feel comfortable having the patient discharged without having his blood counts rechecked today. Writer did tell her that his Hemoglobin increased to 8.1 post transfusion last night, she is still adamant that she wants this checked. Writer updated Ryan Hameed CNP, new orders for H&H to be completed now.

## 2021-09-22 NOTE — DISCHARGE SUMMARY
Physician Discharge Summary  Monae Arce MD       Patient ID:  Clara Benson  [id number]  1945    Admission date: 9/20/2021    Discharge date: 9/22/2021     Admitting Physician: Reinaldo Martines MD     Primary Care Physician: Reinaldo Martines MD     Primary Discharge Diagnoses:   Patient Active Problem List    Diagnosis Date Noted    S/P drug eluting coronary stent placement-OM1 4/10/17 04/10/2017    Coronary atherosclerosis due to calcified coronary lesion 02/20/2015    Symptomatic anemia 09/21/2021    Acute coronary syndrome with high troponin ([address] 09/21/2021    Chest pain 09/20/2021    Gross hematuria 05/05/2021    BPH with obstruction/lower urinary tract symptoms 12/10/2019    Elevated PSA 12/10/2019    Anemia in stage 3 chronic kidney disease ([address] 05/21/2019    Acute renal failure with tubular necrosis ([address] 10/02/2018    Non critical Right Renal artery stenosis, native ([address] 10/02/2018    Medication side effect, initial encounter 03/23/2018    Angina, class II ([address] 01/08/2018    Hyperlipidemia 04/24/2017    Chronic diastolic heart failure ([address] 04/24/2017    Coronary artery disease involving native coronary artery of native heart without angina pectoris 05/02/2016    Cervical stenosis of spinal canal 02/29/2016    Abnormal tilt table test 02/23/2016    Chronic kidney disease, stage III (moderate) ([address] 02/22/2016    Controlled type 2 diabetes mellitus with diabetic nephropathy, with long-term current use of insulin ([address] 02/22/2016    Abnormal cardiovascular stress test 02/18/2016    Ischemic chest pain ([address] 02/17/2016    Accelerated hypertension 10/16/2015    Chronotropic incompetence with autonomic dysfunction 09/02/2015    Episodic lightheadedness 09/02/2015    Cholecystitis 08/17/2015    Syncope, near 08/05/2015    Dysautonomia ([address] 06/29/2015    History of CVA (cerebrovascular accident) without residual deficits     Displacement of intervertebral disc, site unspecified, without myelopathy 03/04/2015    History of colon polyps 41/45/2659    Systolic murmur 91/71/6320    History of MI (myocardial infarction) 02/12/2014    Vertigo, benign paroxysmal 10/17/2012       Additional Diagnoses:       Diagnosis Date    Acute MI ([facility]     Acute renal failure with tubular necrosis ([facility] 10/2/2018    ssecondary to hemodynamic effects of loop diuretics and ace inhibitors, bbaseline 1.2-1.3 which peaked up to 1.8, resolving    Anemia     CAD (coronary artery disease)     Cerebral artery occlusion with cerebral infarction (HCC)     CHF (congestive heart failure) (HCC)     Chronic back pain     Closed fracture of lumbar vertebra without mention of spinal cord injury     Displacement of intervertebral disc, site unspecified, without myelopathy     H/O cardiac catheterization 2/19/16    LMCA: Mild irregularities 10-20%. LAD: Lesion on PRox LAD: Mid subsection. 65% stenosis. LCx: Lesion on 1st Ob Valentina: Proximal subesection. 70% steniosis. RCA: Small non-dominant RCA. Lesion on PRox RCA: Ostial. 50% stenosis. EF:55%.  H/O cardiovascular stress test 2/19/16    Abnormal. Moderate perfusion defect of mild intensity in the inferior, inferoseptal adn inferoapical regions during stress imaging, which most consistent with ischemia. Global LV systolic function normal without regional wall motion abnormalities. OVerall these results are most consistent with an intermediate risk for signficant CAD. Additional testing including cardiac cath may be indicated.  H/O tilt table evaluation 12/26/2017    Abnormal. Patients HR, BP response and symptoms were most consistent with dysautonomia.  Combined with viligant maintenance of euvolemia and maintaining a moderate salft intake, pharmacologic treatment with SSRI such as lexapro and or mestinon among other treatments have shown some effectiveness in treatment of this condition    H/O tilt table evaluation 12/26/2017    Abnormal head upright tilt table study. The pt heart rate, blood pressure response and symptoms were most consistent with dysautonomia.  History of 24 hour EKG monitoring 8/14/14    Occasional PAC's and PVC's which appear to be at least moderately symptomatic.  History of 24 hour EKG monitoring 11/19/14    Event Monitor. Sinus rhythm and sinus bradycardia. Infrequent isolated PVC's    History of cardiovascular stress test 2/19/14    Relatively NL    History of cardiovascular stress test 03/21/2018    Normal myocardial perfusion. Global left ventricular systolic function was normal with an EF of 68%. Overall, these results are most consistent with a low risk scan.  History of coronary artery stent placement 04/2017    PTCA / Drug Eluting Stent:, CX and / or branches    History of CVA (cerebrovascular accident) without residual deficits 2014    Incidentally found on CT head. No known impairment now or in the past.    History of echocardiogram 2/18/14    LA mildly dilated, EF 55%, LV wall thickness is moderately increased, no definite wall motion abnormalilities, what appears to be a pacer wire is seen w/n the RA and RV, mild-mod TR, mild pulmonary hypertension.  History of Holter monitoring 12/29/2017    Rare PAC's and PVC's.      History of tilt table evaluation 8/12/14    Abnormal    Hyperlipidemia     Hypertension     Non critical Right Renal artery stenosis, native ([address] 10/2/2018    Non critical Right Renal artery stenosis, based on cath in 2016, Rt RA 30% stenosis    Pacemaker     non-functioning    S/P cardiac cath 04/10/2017    S/P coronary artery stent placement     Successful PTCA - HA Om1    SIRS (systemic inflammatory response syndrome) ([address] 5/19/2019    Type II or unspecified type diabetes mellitus without mention of complication, not stated as uncontrolled          Review of Systems:  Constitutional: negative for fevers or chills  Eyes: negative for visual disturbance   ENT: negative for sore throat or nasal congestion  Respiratory: negative for shortness of breath or cough  Cardiovascular: negative for chest pain ,palpitations,pnd,syncope  Gastrointestinal: negative for abd pain, nausea, vomiting, diarrhea , constipation,hemetemesis,dion,blood in stool  Genitourinary: negative for dysuria, urgency ,frequency,hematuria  Integument/breast: negative for skin rash or lesions  Neurological: negative for unilateral weakness, numbness or tingling. Skeletal Muscular: no joint pain,joint swelling,has chronic back pain              Physical exam:      -----------------------------------------------------------------  Exam:  GEN:   A & O x3, no apparent distress  EYES: No gross abnormalities. NECK: normal, supple, no lymphadenopathy,  no carotid bruits  PULM: clear to auscultation bilaterally- no wheezes, rales or rhonchi, normal air movement, no respiratory distress  COR: regular rate & rhythm and no gallops  ABD:  soft, non-tender, non-distended, normal bowel sounds, no masses or organomegaly  EXT:   no cyanosis, clubbing or edema present    NEURO: negative  SKIN:  no rashes or significant lesions  -----------------------------------------------------------------          Hospital Course: The patient was admitted for the above. He was monitored and was given oral and iv medications for better contrrol of bp. He did not have any further chest pain. Hid stress test was equivocal and Dr Vijay Mosquera discussed with patient and decided on conservative management. He required  One unit of blood transfusion due to hb 7.1 and improved to 8. 1. he was advised to d/c asa and have capsular endoscopy as out pt     Consultants:    · cardiology    Procedures:    · none    Complications:   · none    Significant Diagnostic Studies:   XR CHEST PORTABLE    Result Date: 9/20/2021  EXAMINATION: ONE XRAY VIEW OF THE CHEST 9/20/2021 11:43 am COMPARISON: 06/25/2020 HISTORY: ORDERING SYSTEM PROVIDED HISTORY: chest pain sob TECHNOLOGIST PROVIDED HISTORY: chest pain sob FINDINGS: Transvenous pacer remains in place. The lungs are without acute focal process. There is no effusion or pneumothorax. The cardiomediastinal silhouette is stable. The osseous structures are stable. No acute process. CT CHEST PULMONARY EMBOLISM W CONTRAST    Result Date: 9/20/2021  EXAMINATION: CTA OF THE CHEST 9/20/2021 1:21 pm TECHNIQUE: CTA of the chest was performed after the administration of intravenous contrast.  Multiplanar reformatted images are provided for review. MIP images are provided for review. Dose modulation, iterative reconstruction, and/or weight based adjustment of the mA/kV was utilized to reduce the radiation dose to as low as reasonably achievable. COMPARISON: Chest radiograph today. HISTORY: ORDERING SYSTEM PROVIDED HISTORY: Chest pain shortness of breath concern for PE TECHNOLOGIST PROVIDED HISTORY: Chest pain shortness of breath concern for PE Decision Support Exception - unselect if not a suspected or confirmed emergency medical condition->Emergency Medical Condition (MA) FINDINGS: Pulmonary Arteries: Pulmonary arteries are adequately opacified for evaluation. No evidence of intraluminal filling defect to suggest pulmonary embolism. Main pulmonary artery is normal in caliber. Mediastinum: No evidence of mediastinal lymphadenopathy. Trace pericardial effusion. Calcified atheromatous plaque and coronary calcification. Right subclavian pacer in place. There is no acute abnormality of the thoracic aorta. Lungs/pleura: Trace left pleural effusion and minimal subsegmental atelectasis in the left lung base. Upper Abdomen: No acute findings. Soft Tissues/Bones: No acute bone or soft tissue abnormality. 1.  No evidence for acute pulmonary embolism. 2.  Trace left pleural effusion with findings consistent with subsegmental atelectasis.      Recent Results (from the past 96 hour(s))   EKG 12 Lead    Collection Time: 09/20/21 11:48 AM   Result Value Ref Range    Ventricular Rate 78 BPM    Atrial Rate 78 BPM    P-R Interval 202 ms    QRS Duration 90 ms    Q-T Interval 394 ms    QTc Calculation (Bazett) 449 ms    P Axis 45 degrees    R Axis -9 degrees    T Axis 17 degrees   CBC Auto Differential    Collection Time: 09/20/21 11:50 AM   Result Value Ref Range    WBC 6.0 3.5 - 11.3 k/uL    RBC 2.74 (L) 4.21 - 5.77 m/uL    Hemoglobin 7.6 (L) 13.0 - 17.0 g/dL    Hematocrit 24.2 (L) 40.7 - 50.3 %    MCV 88.3 82.6 - 102.9 fL    MCH 27.7 25.2 - 33.5 pg    MCHC 31.4 28.4 - 34.8 g/dL    RDW 14.5 (H) 11.8 - 14.4 %    Platelets 012 178 - 595 k/uL    MPV 9.7 8.1 - 13.5 fL    NRBC Automated 0.0 0.0 per 100 WBC    Differential Type NOT REPORTED     Seg Neutrophils 77 (H) 36 - 65 %    Lymphocytes 14 (L) 24 - 43 %    Monocytes 6 3 - 12 %    Eosinophils % 2 1 - 4 %    Basophils 0 0 - 2 %    Immature Granulocytes 1 (H) 0 %    Segs Absolute 4.60 1.50 - 8.10 k/uL    Absolute Lymph # 0.84 (L) 1.10 - 3.70 k/uL    Absolute Mono # 0.33 0.10 - 1.20 k/uL    Absolute Eos # 0.09 0.00 - 0.44 k/uL    Basophils Absolute <0.03 0.00 - 0.20 k/uL    Absolute Immature Granulocyte 0.07 0.00 - 0.30 k/uL    WBC Morphology NOT REPORTED     RBC Morphology NOT REPORTED     Platelet Estimate NOT REPORTED    Comprehensive Metabolic Panel    Collection Time: 09/20/21 11:50 AM   Result Value Ref Range    Glucose 299 (H) 70 - 99 mg/dL    BUN 37 (H) 8 - 23 mg/dL    CREATININE 1.89 (H) 0.70 - 1.20 mg/dL    Bun/Cre Ratio 20 9 - 20    Calcium 8.8 8.6 - 10.4 mg/dL    Sodium 136 135 - 144 mmol/L    Potassium 4.5 3.7 - 5.3 mmol/L    Chloride 103 98 - 107 mmol/L    CO2 17 (L) 20 - 31 mmol/L    Anion Gap 16 9 - 17 mmol/L    Alkaline Phosphatase 68 40 - 129 U/L    ALT 16 5 - 41 U/L    AST 19 <40 U/L    Total Bilirubin 0.37 0.3 - 1.2 mg/dL    Total Protein 6.7 6.4 - 8.3 g/dL    Albumin 3.4 (L) 3.5 - 5.2 g/dL    Albumin/Globulin Ratio 1.0 1.0 - 2.5    GFR Non- 35 (L) >60 mL/min GFR  42 (L) >60 mL/min    GFR Comment          GFR Staging         Troponin    Collection Time: 09/20/21 11:50 AM   Result Value Ref Range    Troponin, High Sensitivity 52 (HH) 0 - 22 ng/L    Troponin T NOT REPORTED <0.03 ng/mL    Troponin Interp NOT REPORTED    Brain Natriuretic Peptide    Collection Time: 09/20/21 11:50 AM   Result Value Ref Range    Pro-BNP 1,565 (H) <300 pg/mL    BNP Interpretation Pro-BNP Reference Range:    APTT    Collection Time: 09/20/21 11:50 AM   Result Value Ref Range    PTT 47.9 (H) 23.9 - 33.8 sec   Protime-INR    Collection Time: 09/20/21 11:50 AM   Result Value Ref Range    Protime 13.9 11.5 - 14.2 sec    INR 1.1    EKG 12 Lead    Collection Time: 09/20/21 12:52 PM   Result Value Ref Range    Ventricular Rate 76 BPM    Atrial Rate 76 BPM    P-R Interval 204 ms    QRS Duration 86 ms    Q-T Interval 382 ms    QTc Calculation (Bazett) 429 ms    P Axis 31 degrees    R Axis -12 degrees    T Axis -2 degrees   Troponin    Collection Time: 09/20/21  2:00 PM   Result Value Ref Range    Troponin, High Sensitivity 49 (H) 0 - 22 ng/L    Troponin T NOT REPORTED <0.03 ng/mL    Troponin Interp NOT REPORTED    Glucose, Whole Blood    Collection Time: 09/20/21  8:06 PM   Result Value Ref Range    POC Glucose 176 (H) 74 - 100 mg/dL   CBC    Collection Time: 09/21/21  5:12 AM   Result Value Ref Range    WBC 4.3 3.5 - 11.3 k/uL    RBC 2.59 (L) 4.21 - 5.77 m/uL    Hemoglobin 7.1 (L) 13.0 - 17.0 g/dL    Hematocrit 22.6 (L) 40.7 - 50.3 %    MCV 87.3 82.6 - 102.9 fL    MCH 27.4 25.2 - 33.5 pg    MCHC 31.4 28.4 - 34.8 g/dL    RDW 14.2 11.8 - 14.4 %    Platelets 397 393 - 534 k/uL    MPV 10.1 8.1 - 13.5 fL    NRBC Automated 0.0 0.0 per 100 WBC   Lipid panel - fasting    Collection Time: 09/21/21  5:12 AM   Result Value Ref Range    Cholesterol 69 <200 mg/dL    HDL 14 (L) >40 mg/dL    LDL Cholesterol 18 0 - 130 mg/dL    Chol/HDL Ratio 4.9 <5    Triglycerides 185 (H) <150 mg/dL    VLDL NOT REPORTED (H) 1 - 30 mg/dL   EKG 12 lead    Collection Time: 09/21/21  5:29 AM   Result Value Ref Range    Ventricular Rate 72 BPM    Atrial Rate 72 BPM    P-R Interval 214 ms    QRS Duration 100 ms    Q-T Interval 396 ms    QTc Calculation (Bazett) 433 ms    P Axis 36 degrees    R Axis -9 degrees    T Axis 14 degrees   Glucose, Whole Blood    Collection Time: 09/21/21  7:06 AM   Result Value Ref Range    POC Glucose 88 74 - 100 mg/dL   TYPE AND SCREEN    Collection Time: 09/21/21  8:30 AM   Result Value Ref Range    Expiration Date 09/24/2021,2359     Arm Band Number [id number]     ABO/Rh A POSITIVE     Antibody Screen NEGATIVE     Unit Number [id number]     Product Code Leukocyte Reduced Red Cell     Unit Divison 00     Dispense Status ISSUED     Transfusion Status OK TO TRANSFUSE     Crossmatch Result COMPATIBLE    Glucose, Whole Blood    Collection Time: 09/21/21 10:09 AM   Result Value Ref Range    POC Glucose 77 74 - 100 mg/dL   Glucose, Whole Blood    Collection Time: 09/21/21  4:24 PM   Result Value Ref Range    POC Glucose 274 (H) 74 - 100 mg/dL   Hemoglobin and Hematocrit, blood    Collection Time: 09/21/21  7:30 PM   Result Value Ref Range    Hemoglobin 8.1 (L) 13.0 - 17.0 g/dL    Hematocrit 24.9 (L) 40.7 - 50.3 %   Glucose, Whole Blood    Collection Time: 09/21/21  9:31 PM   Result Value Ref Range    POC Glucose 268 (H) 74 - 100 mg/dL   Glucose, Whole Blood    Collection Time: 09/22/21  6:59 AM   Result Value Ref Range    POC Glucose 117 (H) 74 - 100 mg/dL     ·     Discharge Condition:   · stable    Disposition:   · home    Discharge Medications:     Franciscomariela Abarcas   Home Medication Instructions [id number]    Printed on:09/22/21 0731   Medication Information                      acyclovir (ZOVIRAX) 400 MG tablet  400 mg 2 times daily              amLODIPine (NORVASC) 10 MG tablet  Take 1 tablet by mouth nightly             DIANA MICROLET LANCETS MISC  TEST TWICE DAILY             CONTOUR TEST

## 2021-09-22 NOTE — PROGRESS NOTES
Jabari Jurado am scribing for and in the presence of Yessy Tanner. Jackelin BLOUNT, MS, F.A.C.C..    Patient: Clara Benson  : 1945  Date of Admission: 2021  Primary Care Physician: Reinaldo Martines  Today's Date: 2021    REASON FOR CONSULTATION/CC: Chest discomfort     HPI: I had the pleasure of seeing Mr. Shruti Garcia today who is a 76 y.o. male with a history of intermittent chest discomfort leading to this consultation. As you know, Mr. Shruti Garcia is a 70 y.o. male with a known history of dysautonomia where on tilt table testing his blood pressure dropped from 696 systolic to 78 systolic with only a 99-21 HR response. More recently, a CT of he head in the past showed evidence of at least 2 old CVA's and a heart catheterization showed severe single vessel disease in the ostium of a moderate sized OM1 branch of the circumflex. However, maximal guidelines directed medical management was opted for an place of stenting partly due to the risk associated with stenting this vessel. Recently he has had a coronary angiography procedure with stenting of his HA Om1. As you know, Mr. Shruti Garcia  is a 76 y.o. male with a history of recent onset substernal chest discomfort that led to a stress test and a subsequent heart catheterization which showed severe single vessel coronary artery disease of the HA Om1 with successful angioplasty and stenting of that vessel that was done by Dr. Sugar Esquivel on 4/10/17. More recently he has had problems with balance problems when he is in his feet and says that a Neurologist has told him in the past this is because of his previous strokes. Most recently he underwent a cardiovascular stress test which fortunately had shown to be normal on 3/21/2018. Mr. Shruti Garcia has significant normal stress test and echocardiogram on 2020. Holter monitor done on 2020: The rhythm was sinus. Average daily heart rate 58 ranging from 47 to 81 bpm. Bradycardia for 72% test duration.  Rare premature supraventricular ectopic beats consisting of 33 isolated PACs. Rare premature ventricular ectopic beats total 75 consisting of 73 isolatedPVCs and a ventricular couplet. Echocardiogram done on 12/18/2020: EF of 60%. Moderately increased LV wall thickness. Borderline pulmonary hypertension. Mild tricuspid regurgitation. Mild diastolic dysfunction is seen/ Aortic root is mildly dilated    Mr. Margaret Apple was admitted to the hospital with left sided chest pain that felt sharp and lasted about 20 minutes. It was happening when he would get short of breath. It was relieved with Nitro. Today he is still not feeling too much better. He denied any problems with his medications or any other concerns at this time. He is eating and drinking okay. Bleeding Risks: Mr. Margaret Apple denies any current or recent bleeding problems including a history of a GI bleed, ulcers, recent or upcoming surgeries, blood in his stool or black tarry stools or blood in his urine. Past Medical History:   Diagnosis Date    Acute MI [facility]     Acute renal failure with tubular necrosis ([facility] [address] [facility] 10/2/2018    ssecondary to hemodynamic effects of loop diuretics and ace inhibitors, bbaseline 1.2-1.3 which peaked up to 1.8, resolving    Anemia     CAD (coronary artery disease)     Cerebral artery occlusion with cerebral infarction ([facility] [facility]     CHF (congestive heart failure) (HCC)     Chronic back pain     Closed fracture of lumbar vertebra without mention of spinal cord injury     Displacement of intervertebral disc, site unspecified, without myelopathy     H/O cardiac catheterization 2/19/16    LMCA: Mild irregularities 10-20%. LAD: Lesion on PRox LAD: Mid subsection. 65% stenosis. LCx: Lesion on 1st Ob Valentina: Proximal subesection. 70% steniosis. RCA: Small non-dominant RCA. Lesion on PRox RCA: Ostial. 50% stenosis. EF:55%.      H/O cardiovascular stress test 2/19/16    Abnormal. Moderate perfusion defect of mild intensity in the inferior, Holter monitoring 12/29/2017    Rare PAC's and PVC's.  History of tilt table evaluation 8/12/14    Abnormal    Hyperlipidemia     Hypertension     Non critical Right Renal artery stenosis, native ([facility] [address] 10/2/2018    Non critical Right Renal artery stenosis, based on cath in 2016, Rt RA 30% stenosis    Pacemaker     non-functioning    S/P cardiac cath 04/10/2017    S/P coronary artery stent placement     Successful PTCA - HA Om1    SIRS (systemic inflammatory response syndrome) ([facility] [address] 5/19/2019    Type II or unspecified type diabetes mellitus without mention of complication, not stated as uncontrolled        CURRENT ALLERGIES: Lipitor [atorvastatin], Aldactone [spironolactone], Crestor [rosuvastatin calcium], Lopid [gemfibrozil], Invokana [canagliflozin], and Januvia [sitagliptin] REVIEW OF SYSTEMS: 14 systems were reviewed. Pertinent positives and negatives as above, all else negative.      Past Surgical History:   Procedure Laterality Date    BACK SURGERY      CARDIAC CATHETERIZATION Left 2/19/16    via right radial approach/ Melina Vernon/ Dr. Yu Barnett  8/17/15    Liang/Charles/Saulo/ Lap    COLONOSCOPY  2010    COLONOSCOPY  2/19/15    -polyps,diverticulosis,hemorrhoids    COLONOSCOPY  05/23/2018    Dr Parag Kovacs    COLONOSCOPY N/A 5/23/2018    COLONOSCOPY POLYPECTOMY SNARE/COLD BIOPSY  cold snare  and  hot snare performed by Fam Robertson MD at [address] [place] [address]  10/30/2020    with Dr. Debbie Mcdowell 10/30/2020    Shan Staple, NOT HIGH RISK performed by Barbie Alfaro DO at [facility]      left eye    PACEMAKER INSERTION      PACEMAKER PLACEMENT      UPPER GASTROINTESTINAL ENDOSCOPY  05/28/2019    Dr. Madina Guevara H-Pylori)duodenal bulbar/antral erythema    UPPER GASTROINTESTINAL ENDOSCOPY N/A 5/28/2019    EGD BIOPSY, clotest performed by Fam Robertson MD at [address] [facility] UPPER GASTROINTESTINAL ENDOSCOPY N/A 10/30/2020    EGD ESOPHAGOGASTRODUODENOSCOPY performed by Adalberto Rojas DO at [address] History:  Social History     Tobacco Use    Smoking status: Former Smoker     Packs/day: 1.50     Years: 8.00     Pack years: 12.00     Types: Cigarettes     Quit date: 3/4/1980     Years since quittin.5    Smokeless tobacco: Never Used   Vaping Use    Vaping Use: Never used   Substance Use Topics    Alcohol use: No    Drug use: No        OUTPATIENT MEDICATIONS:  Outpatient Medications Marked as Taking for the 21 encounter [facility] Encounter)   Medication Sig Dispense Refill    amLODIPine (NORVASC) 10 MG tablet Take 1 tablet by mouth nightly 90 tablet 0    eplerenone (INSPRA) 50 MG tablet Take 1 tablet by mouth once daily (Patient taking differently: 50 mg nightly ) 90 tablet 0    hydrALAZINE (APRESOLINE) 100 MG tablet Take 1 tablet by mouth 3 times daily Patient is taking 100 MG 3 times daily 270 tablet 0    glipiZIDE (GLUCOTROL XL) 5 MG extended release tablet Take 2 tablets by mouth 2 times daily 360 tablet 0    insulin glargine (LANTUS SOLOSTAR) 100 UNIT/ML injection pen Inject 28 Units into the skin 2 times daily 50.4 mL 0    nitroGLYCERIN (NITROSTAT) 0.4 MG SL tablet Place 1 tablet under the tongue every 5 minutes as needed for Chest pain 25 tablet 3    lisinopril (PRINIVIL;ZESTRIL) 20 MG tablet Take 1 tablet by mouth daily 90 tablet 3    ezetimibe (ZETIA) 10 MG tablet Take 1 tablet by mouth daily 90 tablet 3    acyclovir (ZOVIRAX) 400 MG tablet 400 mg 2 times daily       ferrous sulfate 325 (65 Fe) MG tablet Take 325 mg by mouth daily (with breakfast)       Omega-3 Fatty Acids (FISH OIL) 1000 MG CAPS Take 1,000 mg by mouth daily       prednisoLONE acetate (PRED FORTE) 1 % ophthalmic suspension Place 1 drop into the left eye daily          glucose (GLUTOSE) 40 % oral gel 15 g, PRN  dextrose 50 % IV solution, PRN  glucagon (rDNA) injection 1 mg, PRN  dextrose 5 % solution, PRN  amLODIPine (NORVASC) tablet 10 mg, Nightly  labetalol (NORMODYNE;TRANDATE) injection 10 mg, Q6H PRN  0.9 % sodium chloride infusion, PRN  0.9 % sodium chloride infusion, Continuous  eplerenone (INSPRA) tablet 25 mg, Daily  ezetimibe (ZETIA) tablet 10 mg, Daily  hydrALAZINE (APRESOLINE) tablet 100 mg, TID  glipiZIDE (GLUCOTROL) tablet 10 mg, QAM AC  latanoprost (XALATAN) 0.005 % ophthalmic solution 1 drop, Nightly  insulin glargine (LANTUS) injection vial 28 Units, BID  lisinopril (PRINIVIL;ZESTRIL) tablet 20 mg, Daily  prednisoLONE acetate (PRED FORTE) 1 % ophthalmic suspension 1 drop, Daily  sodium chloride flush 0.9 % injection 5-40 mL, 2 times per day  sodium chloride flush 0.9 % injection 5-40 mL, PRN  0.9 % sodium chloride infusion, PRN  ondansetron (ZOFRAN-ODT) disintegrating tablet 4 mg, Q8H PRN   Or  ondansetron (ZOFRAN) injection 4 mg, Q6H PRN  acetaminophen (TYLENOL) tablet 650 mg, Q6H PRN   Or  acetaminophen (TYLENOL) suppository 650 mg, Q6H PRN  polyethylene glycol (GLYCOLAX) packet 17 g, Daily PRN  enoxaparin (LOVENOX) injection 40 mg, Daily        FAMILY HISTORY: family history includes Cancer in his father and mother. PHYSICAL EXAM:   BP (!) 162/55   Pulse 71   Temp 97.7 °F (36.5 °C) (Temporal)   Resp 16   Ht 5' 9\" (1.753 m)   Wt 207 lb 7.3 oz (94.1 kg)   SpO2 95%   BMI 30.64 kg/m²  Body mass index is 30.64 kg/m². Constitutional: He is oriented to person, place, and time. He appears well-developed and well-nourished. In no acute distress. HEENT: Normocephalic and atraumatic. No JVD present. Carotid bruit is not present. No mass and no thyromegaly present. No lymphadenopathy present. Cardiovascular: Normal rate, regular rhythm, normal heart sounds. Exam reveals no gallop and no friction rubs. 1/6 systolic murmur, 5th intercostal space on the LEFT in the mid-clavicular line (cardiac apex).   Pulmonary/Chest: Effort normal and breath sounds normal. No respiratory distress. He has no wheezes, rhonchi or rales. Abdominal: Soft, non-tender. Bowel sounds and aorta are normal. He exhibits no organomegaly, mass or bruit. Extremities: None. No cyanosis or clubbing. 2+ radial and carotid pulses. Distal extremity pulses: 2+ bilaterally. Neurological: He is alert and oriented to person, place, and time. No evidence of gross cranial nerve deficit. Coordination appeared normal.   Skin: Skin is warm and dry. There is no rash or diaphoresis. Psychiatric: He has a normal mood and affect.  His speech is normal and behavior is normal.        MOST RECENT LABS ON RECORD:   Lab Results   Component Value Date    WBC 4.3 09/21/2021    HGB 8.6 (L) 09/22/2021    HCT 26.7 (L) 09/22/2021     09/21/2021    CHOL 69 09/21/2021    TRIG 185 (H) 09/21/2021    HDL 14 (L) 09/21/2021    LDLDIRECT 44 11/02/2017    ALT 16 09/20/2021    AST 19 09/20/2021     09/20/2021    K 4.5 09/20/2021     09/20/2021    CREATININE 1.89 (H) 09/20/2021    BUN 37 (H) 09/20/2021    CO2 17 (L) 09/20/2021    TSH 2.26 11/02/2017    PSA 3.68 04/30/2021    INR 1.1 09/20/2021    LABA1C 7.3 08/19/2021    LABMICR 30 10/17/2015       ASSESSMENT:  Patient Active Problem List    Diagnosis Date Noted    S/P drug eluting coronary stent placement-OM1 4/10/17 04/10/2017    Coronary atherosclerosis due to calcified coronary lesion 02/20/2015    Symptomatic anemia 09/21/2021    Acute coronary syndrome with high troponin ([facility] 09/21/2021    Chest pain 09/20/2021    Gross hematuria 05/05/2021    BPH with obstruction/lower urinary tract symptoms 12/10/2019    Elevated PSA 12/10/2019    Anemia in stage 3 chronic kidney disease ([address] 05/21/2019    Acute renal failure with tubular necrosis (HCC) 10/02/2018    Non critical Right Renal artery stenosis, native ([address] 10/02/2018    Medication side effect, initial encounter 03/23/2018    Angina, class II ([facility] 01/08/2018    Hyperlipidemia 04/24/2017    Chronic diastolic heart failure ([facility] [address] 04/24/2017    Coronary artery disease involving native coronary artery of native heart without angina pectoris 05/02/2016    Cervical stenosis of spinal canal 02/29/2016    Abnormal tilt table test 02/23/2016    Chronic kidney disease, stage III (moderate) ([facility] 02/22/2016    Controlled type 2 diabetes mellitus with diabetic nephropathy, with long-term current use of insulin ([facility] [address] 02/22/2016    Abnormal cardiovascular stress test 02/18/2016    [facility]schem[facility] chest pain [facility] 02/17/2016    Accelerated hypertension 10/16/2015    Chronotropic incompetence with autonomic dysfunction 09/02/2015    Episodic lightheadedness 09/02/2015    Cholecystitis 08/17/2015    Syncope, near 08/05/2015    Dysautonomia ([facility] 06/29/2015    History of CVA (cerebrovascular accident) without residual deficits     Displacement of intervertebral disc, site unspecified, without myelopathy 03/04/2015    History of colon polyps 88/45/2693    Systolic murmur 56/42/8517    History of MI (myocardial infarction) 02/12/2014    Vertigo, benign paroxysmal 10/17/2012        PLAN:  · Atypical chest pain but still suspicious for possible myocardial ischemia: equivocal stress test done 9/21/21 with normal EF but suspect that   · Medical Management for suspected coronary artery disease   Antiplatelet Agent: STOP aspirin    Beta Blocker: Continue Labetolol   Anti-anginal medications: Continue nitroglycerin 0.4 mg tablets as needed for chest pain.  Cholesterol Reduction Therapy: Not indicated at this time.  Counseling: I advised Mr. Karly Torres to call our office or go to the emergency room if he develops worsening or persistent chest pain or shortness of breath as this could be life threatening. Anemia: hemoglobin A1c was 8.6 9/22/2021.   · STOP aspirin  · His last colonoscopy was October 2020, agree with plan to do GI camera  · Agree with bood transfusion    · Essential Hypertension: Uncontrolled   Beta Blocker: Continue Labetolol    ACE Inibitor/ARB: INCREASE to lisinopril 40 mg daily. I also discussed the potential side effects of this medication including lightheadedness and dizziness and instructed them to stop the medication of this occurs and call our office if this occurs.  Calcium Channel Blocker: Continue amlodipine (Norvasc) 10 mg once daily.  Diuretics: Not indicated at this time.  Additional Testing List: None    Once again, thank you for allowing me to participate in this patients care. Please do not hesitate to contact me could I be of further assistance. Sincerely,  Elma Snow. Jackelin BLOUNT, MS, F.A.C.C. [facility] Cardiology Specialist    [address] [place] [address], [address] [place] [address]  Phone: [phone], Fax: [phone]     I believe that the risk of significant morbidity and mortality related to the patient's current medical conditions are: Intermediate. The documentation recorded by the scribe, accurately and completely reflects the services I personally performed and the decisions made by me. Darien Ramírez MD, MS, F.A.C.C.  September 22, 2021

## 2021-09-22 NOTE — PROGRESS NOTES
Progress Note  Monae Arce MD    OBJECTIVE:    Patient seen for f/u of Acute coronary syndrome with high troponin ([facility] [address] He is better. No chest pain. bp better     ROS:   Constitutional: negative  for fevers, and negative for chills. Respiratory: negative for shortness of breath, negative for cough, and negative for wheezing  Cardiovascular: negative for chest pain, and negative for palpitations  Gastrointestinal: negative for abdominal pain, negative for nausea,negative for vomiting, negative for diarrhea, and negative for constipation     All other systems were reviewed with the patient and are negative unless otherwise stated in HPI    OBJECTIVE:  Vitals:   Temp: 97.7 °F (36.5 °C)  BP: (!) 162/55  Resp: 16  Pulse: 71  SpO2: 95 %    24HR INTAKE/OUTPUT:      Intake/Output Summary (Last 24 hours) at 9/22/2021 0723  Last data filed at 9/22/2021 0529  Gross per 24 hour   Intake 2003.87 ml   Output 225 ml   Net 1778.87 ml     -----------------------------------------------------------------  Exam:  GEN:    Awake, alert and oriented x3. EYES:  EOMI, pupils equal   NECK: Supple. No lymphadenopathy. No carotid bruit  CVS:    regular rate and rhythm, 2/6 systolic murmur  PULM:  CTA, no wheezes, rales or rhonchi, no acute respiratory distress  ABD:    Bowels sounds normal.  Abdomen is soft. No distention. no tenderness to palpation. EXT:   no edema bilaterally . No calf tenderness. NEURO: Moves all extremities. Motor and sensory are grossly intact  SKIN:  No rashes.   No skin lesions.    -----------------------------------------------------------------  Diagnostic Data:    · All available data reviewed  Lab Results   Component Value Date    WBC 4.3 09/21/2021    HGB 8.1 (L) 09/21/2021    MCV 87.3 09/21/2021     09/21/2021      Lab Results   Component Value Date    GLUCOSE 299 (H) 09/20/2021    BUN 37 (H) 09/20/2021    CREATININE 1.89 (H) 09/20/2021     09/20/2021    K 4.5 09/20/2021    CALCIUM 8.8 09/20/2021     09/20/2021    CO2 17 (L) 09/20/2021       PROBLEM LIST:  Principal Problem:    Acute coronary syndrome with high troponin (HCC)  Active Problems:    Accelerated hypertension    Abnormal cardiovascular stress test    Controlled type 2 diabetes mellitus with diabetic nephropathy, with long-term current use of insulin (HCC)    Angina, class II (HCC)    Anemia in stage 3 chronic kidney disease (HCC)    Chest pain    Symptomatic anemia  Resolved Problems:    * No resolved hospital problems. *      ASSESSMENT / PLAN:  Acute coronary syndrome with high troponin (HCC)  · Improved. · D/c home  ·   · Nutrition status:  Well developed, well nourished with no malnutrition  · DVT prophylaxis: Lovenox   · High risk medications: none   · Disposition:  Discharge plan is home    Jose Angelmagui Tate MD , M.D.  9/22/2021  7:23 AM

## 2021-09-22 NOTE — PROGRESS NOTES
Patient discharged at this time to home. Ambulatory to private car. Discharged with documented belongings.

## 2021-09-23 ENCOUNTER — CARE COORDINATION (OUTPATIENT)
Dept: CASE MANAGEMENT | Age: 76
End: 2021-09-23

## 2021-09-23 ENCOUNTER — OFFICE VISIT (OUTPATIENT)
Dept: PRIMARY CARE CLINIC | Age: 76
End: 2021-09-23
Payer: MEDICARE

## 2021-09-23 VITALS — SYSTOLIC BLOOD PRESSURE: 146 MMHG | HEART RATE: 76 BPM | RESPIRATION RATE: 16 BRPM | DIASTOLIC BLOOD PRESSURE: 82 MMHG

## 2021-09-23 DIAGNOSIS — I20.9 ANGINA PECTORIS, UNSPECIFIED (HCC): Primary | ICD-10-CM

## 2021-09-23 DIAGNOSIS — D64.9 ACUTE ON CHRONIC ANEMIA: ICD-10-CM

## 2021-09-23 DIAGNOSIS — Z23 NEEDS FLU SHOT: ICD-10-CM

## 2021-09-23 DIAGNOSIS — Z79.4 CONTROLLED TYPE 2 DIABETES MELLITUS WITH DIABETIC NEPHROPATHY, WITH LONG-TERM CURRENT USE OF INSULIN (HCC): ICD-10-CM

## 2021-09-23 DIAGNOSIS — I50.32 CHRONIC DIASTOLIC HEART FAILURE (HCC): ICD-10-CM

## 2021-09-23 DIAGNOSIS — E11.21 CONTROLLED TYPE 2 DIABETES MELLITUS WITH DIABETIC NEPHROPATHY, WITH LONG-TERM CURRENT USE OF INSULIN (HCC): ICD-10-CM

## 2021-09-23 DIAGNOSIS — I10 ESSENTIAL HYPERTENSION: ICD-10-CM

## 2021-09-23 LAB
ABO/RH: NORMAL
ANTIBODY SCREEN: NEGATIVE
ARM BAND NUMBER: NORMAL
BLD PROD TYP BPU: NORMAL
CROSSMATCH RESULT: NORMAL
DISPENSE STATUS BLOOD BANK: NORMAL
EXPIRATION DATE: NORMAL
TRANSFUSION STATUS: NORMAL
UNIT DIVISION: 0
UNIT NUMBER: NORMAL

## 2021-09-23 PROCEDURE — 90694 VACC AIIV4 NO PRSRV 0.5ML IM: CPT | Performed by: FAMILY MEDICINE

## 2021-09-23 PROCEDURE — 99496 TRANSJ CARE MGMT HIGH F2F 7D: CPT | Performed by: FAMILY MEDICINE

## 2021-09-23 PROCEDURE — 1111F DSCHRG MED/CURRENT MED MERGE: CPT | Performed by: FAMILY MEDICINE

## 2021-09-23 PROCEDURE — PBSHW INFLUENZA, QUADV, ADJUVANTED, 65 YRS +, IM, PF, PREFILL SYR, 0.5ML (FLUAD): Performed by: FAMILY MEDICINE

## 2021-09-23 NOTE — CARE COORDINATION
[facility] Transitions Initial Follow Up Call    Call within 2 business days of discharge: Yes    Patient: Malena Funk Patient : 1945   MRN: <[id number]>  Reason for Admission: Acute coronary syndrome with high troponin [facility])  Discharge Date: 21 RARS: No data recorded    Last Discharge [facility]       Complaint Diagnosis Description Type Department Provider    21 Chest Pain Chest pain, unspecified type . .. ED to Hosp-Admission (Discharged) ([place]) Dilan Burrell MD; Tyler Monae. .. First attempt at initial 24 hour CTN call     Spoke with:  Called to speak with patient for initial  transition of care. Left HIPPA compliant voice message with contact information [phone] for a call  Back with an update.     Patient noted to have a PCP visit this am.   Facility: [facility]    Non-face-to-face services provided:  Obtained and reviewed discharge summary and/or continuity of care documents    Care Transitions 24 Hour Call    Do you have all of your prescriptions and are they filled?: Yes  Care Transitions Interventions         Follow Up  Future Appointments   Date Time Provider Isadora Younger   2021  9:45 AM [facility]   10/4/2021 10:40 AM [facility]   2021 11:20 AM [facility]   2021 11:30 AM [facility]   2021  1:45 PM [facility]       Vanessa Dennis LPN

## 2021-09-24 ENCOUNTER — CARE COORDINATION (OUTPATIENT)
Dept: CASE MANAGEMENT | Age: 76
End: 2021-09-24

## 2021-09-24 NOTE — CARE COORDINATION
[facility] Transitions Initial Follow Up Call    Call within 2 business days of discharge: Yes    Patient: Isaac James Patient : 1945   MRN: <[id number]>  Reason for Admission: Acute coronary syndrome with high troponin [facility])  Discharge Date: 21 RARS: No data recorded    Last Discharge [address]       Complaint Diagnosis Description Type Department Provider    21 Chest Pain Chest pain, unspecified type . .. ED to Hosp-Admission (Discharged) (Zac Levi) Lynn Mancuso MD; Mabel Tang. .. Spoke with: Juan Granado he states he is still fatigued no longer having chest pain, does have SOB with exertion . Denies dizziness , has some edema to fingers this am. Eating and drinking well, normal bowel and bladder elimination. BS this am was 113. Medications reviewed and taking as directed. Patient had f/u with PCP yesterday and is waiting on scheduling for endoscopy. Patient has no other concerns at this time. Facility:[facility]    Non-face-to-face services provided:  Obtained and reviewed discharge summary and/or continuity of care documents  Transitions of Care Initial Call    Was this an external facility discharge? No     Challenges to be reviewed by the provider   Additional needs identified to be addressed with provider: No  none             Method of communication with provider : none      Advance Care Planning:   Does patient have an Advance Directive: reviewed and current, reviewed and needs to be updated, not on file; education provided, patient declined education, decision maker updated and referral to internal ACP facilitator. Was this a readmission? No  Patient stated reason for admission: chest pain fatigue SOB  Patients top risk factors for readmission: functional physical ability, lack of knowledge about disease and medication management    Care Transition Nurse (CTN) contacted the patient by telephone to perform post hospital discharge assessment.  Verified name and  with patient as identifiers. Provided introduction to self, and explanation of the CTN role. CTN reviewed discharge instructions, medical action plan and red flags with patient who verbalized understanding. Patient given an opportunity to ask questions and does not have any further questions or concerns at this time. Were discharge instructions available to patient? Yes. Reviewed appropriate site of care based on symptoms and resources available to patient including: PCP. The patient agrees to contact the PCP office for questions related to their healthcare. Medication reconciliation was performed with patient, who verbalizes understanding of administration of home medications. Advised obtaining a 90-day supply of all daily and as-needed medications. Covid Risk Education     Educated patient about risk for severe COVID-19 due to risk factors according to CDC guidelines. LPN CC reviewed discharge instructions, medical action plan and red flag symptoms with the patient who verbalized understanding. Discussed COVID vaccination status: Yes. Education provided on COVID-19 vaccination as appropriate. Discussed exposure protocols and quarantine with CDC Guidelines. Patient was given an opportunity to verbalize any questions and concerns and agrees to contact LPN CC or health care provider for questions related to their healthcare. Reviewed and educated patient on any new and changed medications related to discharge diagnosis. Was patient discharged with a pulse oximeter? No Discussed and confirmed pulse oximeter discharge instructions and when to notify provider or seek emergency care. LPN CC provided contact information. Plan for follow-up call in 5-7 days based on severity of symptoms and risk factors.   Plan for next call: symptom management-fatigue chest pain       Care Transitions 24 Hour Call    Do you have any ongoing symptoms?: Yes  Patient-reported symptoms: Fatigue, Shortness of Breath  Interventions for patient-reported symptoms: Notified PCP/Physician  Do you have a copy of your discharge instructions?: Yes  Do you have all of your prescriptions and are they filled?: Yes  Have you been contacted by a [address]?: No  Have you scheduled your follow up appointment?: Yes  How are you going to get to your appointment?: Car - family or friend to transport  Were you discharged with any Home Care or Post Acute Services: No  Do you feel like you have everything you need to keep you well at home?: Yes  Care Transitions Interventions         Follow Up  Future Appointments   Date Time Provider Isadora Younger   10/4/2021 10:40 AM [facility]   10/25/2021  9:30 AM [facility]   11/2/2021 11:20 AM [facility]   11/17/2021 11:30 AM [facility]   11/29/2021  1:45 PM Chele Alford MD [facility] [facility]       Phi Humphries LPN

## 2021-09-30 ENCOUNTER — HOSPITAL ENCOUNTER (OUTPATIENT)
Age: 76
Discharge: HOME | End: 2021-09-30
Payer: MEDICARE

## 2021-09-30 DIAGNOSIS — D64.9 ACUTE ON CHRONIC ANEMIA: ICD-10-CM

## 2021-09-30 LAB
ABSOLUTE EOS #: 0.1 K/UL (ref 0–0.44)
ABSOLUTE IMMATURE GRANULOCYTE: 0.09 K/UL (ref 0–0.3)
ABSOLUTE LYMPH #: 1.14 K/UL (ref 1.1–3.7)
ABSOLUTE MONO #: 0.33 K/UL (ref 0.1–1.2)
BASOPHILS # BLD: 0 % (ref 0–2)
BASOPHILS ABSOLUTE: 0.03 K/UL (ref 0–0.2)
DIFFERENTIAL TYPE: ABNORMAL
EOSINOPHILS RELATIVE PERCENT: 1 % (ref 1–4)
HCT VFR BLD CALC: 27.9 % (ref 40.7–50.3)
HEMOGLOBIN: 8.8 G/DL (ref 13–17)
IMMATURE GRANULOCYTES: 1 %
LYMPHOCYTES # BLD: 15 % (ref 24–43)
MCH RBC QN AUTO: 27.7 PG (ref 25.2–33.5)
MCHC RBC AUTO-ENTMCNC: 31.5 G/DL (ref 28.4–34.8)
MCV RBC AUTO: 87.7 FL (ref 82.6–102.9)
MONOCYTES # BLD: 4 % (ref 3–12)
NRBC AUTOMATED: 0 PER 100 WBC
PDW BLD-RTO: 14.6 % (ref 11.8–14.4)
PLATELET # BLD: 245 K/UL (ref 138–453)
PLATELET ESTIMATE: ABNORMAL
PMV BLD AUTO: 9.5 FL (ref 8.1–13.5)
RBC # BLD: 3.18 M/UL (ref 4.21–5.77)
RBC # BLD: ABNORMAL 10*6/UL
SEG NEUTROPHILS: 78 % (ref 36–65)
SEGMENTED NEUTROPHILS ABSOLUTE COUNT: 6.08 K/UL (ref 1.5–8.1)
WBC # BLD: 7.8 K/UL (ref 3.5–11.3)
WBC # BLD: ABNORMAL 10*3/UL

## 2021-09-30 PROCEDURE — 85025 COMPLETE CBC W/AUTO DIFF WBC: CPT

## 2021-09-30 PROCEDURE — 36415 COLL VENOUS BLD VENIPUNCTURE: CPT

## 2021-10-01 ENCOUNTER — CARE COORDINATION (OUTPATIENT)
Dept: CASE MANAGEMENT | Age: 76
End: 2021-10-01

## 2021-10-04 ENCOUNTER — HOSPITAL ENCOUNTER (OUTPATIENT)
Age: 76
Discharge: HOME | End: 2021-10-04
Payer: MEDICARE

## 2021-10-04 ENCOUNTER — OFFICE VISIT (OUTPATIENT)
Dept: CARDIOLOGY | Age: 76
End: 2021-10-04
Payer: MEDICARE

## 2021-10-04 VITALS
HEIGHT: 69 IN | WEIGHT: 195.8 LBS | RESPIRATION RATE: 18 BRPM | OXYGEN SATURATION: 98 % | DIASTOLIC BLOOD PRESSURE: 57 MMHG | HEART RATE: 80 BPM | SYSTOLIC BLOOD PRESSURE: 132 MMHG | BODY MASS INDEX: 29 KG/M2

## 2021-10-04 DIAGNOSIS — I50.32 CHRONIC DIASTOLIC HEART FAILURE (HCC): ICD-10-CM

## 2021-10-04 DIAGNOSIS — R42 DIZZINESS: ICD-10-CM

## 2021-10-04 DIAGNOSIS — I25.10 ASHD (ARTERIOSCLEROTIC HEART DISEASE): ICD-10-CM

## 2021-10-04 DIAGNOSIS — G90.1 DYSAUTONOMIA (HCC): ICD-10-CM

## 2021-10-04 DIAGNOSIS — E78.2 MIXED HYPERLIPIDEMIA: ICD-10-CM

## 2021-10-04 DIAGNOSIS — I10 ESSENTIAL HYPERTENSION: ICD-10-CM

## 2021-10-04 DIAGNOSIS — R07.89 ATYPICAL CHEST PAIN: Primary | ICD-10-CM

## 2021-10-04 DIAGNOSIS — D64.9 ANEMIA, UNSPECIFIED TYPE: ICD-10-CM

## 2021-10-04 DIAGNOSIS — Z95.820 S/P ANGIOPLASTY WITH STENT: ICD-10-CM

## 2021-10-04 DIAGNOSIS — R07.89 ATYPICAL CHEST PAIN: ICD-10-CM

## 2021-10-04 LAB
ANION GAP SERPL CALCULATED.3IONS-SCNC: 12 MMOL/L (ref 9–17)
BUN BLDV-MCNC: 51 MG/DL (ref 8–23)
BUN/CREAT BLD: 24 (ref 9–20)
CALCIUM SERPL-MCNC: 10.2 MG/DL (ref 8.6–10.4)
CHLORIDE BLD-SCNC: 106 MMOL/L (ref 98–107)
CO2: 17 MMOL/L (ref 20–31)
CREAT SERPL-MCNC: 2.16 MG/DL (ref 0.7–1.2)
GFR AFRICAN AMERICAN: 36 ML/MIN
GFR NON-AFRICAN AMERICAN: 30 ML/MIN
GFR SERPL CREATININE-BSD FRML MDRD: ABNORMAL ML/MIN/{1.73_M2}
GFR SERPL CREATININE-BSD FRML MDRD: ABNORMAL ML/MIN/{1.73_M2}
GLUCOSE BLD-MCNC: 174 MG/DL (ref 70–99)
HCT VFR BLD CALC: 28.1 % (ref 40.7–50.3)
HEMOGLOBIN: 8.7 G/DL (ref 13–17)
MCH RBC QN AUTO: 27.1 PG (ref 25.2–33.5)
MCHC RBC AUTO-ENTMCNC: 31 G/DL (ref 28.4–34.8)
MCV RBC AUTO: 87.5 FL (ref 82.6–102.9)
NRBC AUTOMATED: 0 PER 100 WBC
PDW BLD-RTO: 14.7 % (ref 11.8–14.4)
PLATELET # BLD: 229 K/UL (ref 138–453)
PMV BLD AUTO: 9.2 FL (ref 8.1–13.5)
POTASSIUM SERPL-SCNC: 5 MMOL/L (ref 3.7–5.3)
RBC # BLD: 3.21 M/UL (ref 4.21–5.77)
SODIUM BLD-SCNC: 135 MMOL/L (ref 135–144)
TROPONIN INTERP: ABNORMAL
TROPONIN T: ABNORMAL NG/ML
TROPONIN, HIGH SENSITIVITY: 50 NG/L (ref 0–22)
WBC # BLD: 7 K/UL (ref 3.5–11.3)

## 2021-10-04 PROCEDURE — 99211 OFF/OP EST MAY X REQ PHY/QHP: CPT | Performed by: FAMILY MEDICINE

## 2021-10-04 PROCEDURE — G8427 DOCREV CUR MEDS BY ELIG CLIN: HCPCS | Performed by: FAMILY MEDICINE

## 2021-10-04 PROCEDURE — 1123F ACP DISCUSS/DSCN MKR DOCD: CPT | Performed by: FAMILY MEDICINE

## 2021-10-04 PROCEDURE — 80048 BASIC METABOLIC PNL TOTAL CA: CPT

## 2021-10-04 PROCEDURE — 85027 COMPLETE CBC AUTOMATED: CPT

## 2021-10-04 PROCEDURE — 99215 OFFICE O/P EST HI 40 MIN: CPT | Performed by: FAMILY MEDICINE

## 2021-10-04 PROCEDURE — G8417 CALC BMI ABV UP PARAM F/U: HCPCS | Performed by: FAMILY MEDICINE

## 2021-10-04 PROCEDURE — 4040F PNEUMOC VAC/ADMIN/RCVD: CPT | Performed by: FAMILY MEDICINE

## 2021-10-04 PROCEDURE — 3017F COLORECTAL CA SCREEN DOC REV: CPT | Performed by: FAMILY MEDICINE

## 2021-10-04 PROCEDURE — G8484 FLU IMMUNIZE NO ADMIN: HCPCS | Performed by: FAMILY MEDICINE

## 2021-10-04 PROCEDURE — 36415 COLL VENOUS BLD VENIPUNCTURE: CPT

## 2021-10-04 PROCEDURE — 1036F TOBACCO NON-USER: CPT | Performed by: FAMILY MEDICINE

## 2021-10-04 PROCEDURE — 84484 ASSAY OF TROPONIN QUANT: CPT

## 2021-10-04 NOTE — PROGRESS NOTES
David Hill am scribing for and in the presence of Karol Durand. Jackelin BLOUNT, MS, F.A.C.C. Patient: Mika Anderson  : 1945  Date of Visit: 2021    REASON FOR VISIT / CONSULTATION: Follow-up (Hx: CP, anemia, HTN. pt ishere for hosp f/u. thinks he is feeling worse since being in the hospital. he is having more dizziness. SOB. does have sharp CP when he walks and gets short of breath goes away 10 minutes after he stops walking. does have palps)    Dear Vy Zuleta MD,    As you know, Mr. Chato Sloan is a 70 y.o. male with a known history of dysautonomia where on tilt table testing his blood pressure dropped from 307 systolic to 78 systolic with only a 47-11 HR response. More recently, a CT of he head in the past showed evidence of at least 2 old CVA's and a heart catheterization showed severe single vessel disease in the ostium of a moderate sized OM1 branch of the circumflex. However, maximal guidelines directed medical management was opted for an place of stenting partly due to the risk associated with stenting this vessel. Recently he has had a coronary angiography procedure with stenting of his HA Om1. As you know, Mr. Chato Sloan  is a 76 y.o. male with a history of recent onset substernal chest discomfort that led to a stress test and a subsequent heart catheterization which showed severe single vessel coronary artery disease of the HA Om1 with successful angioplasty and stenting of that vessel that was done by Dr. Nir Retana on 4/10/17. More recently he has had problems with balance problems when he is in his feet and says that a Neurologist has told him in the past this is because of his previous strokes. Most recently he underwent a cardiovascular stress test which fortunately had shown to be normal on 3/21/2018. Mr. Chato Sloan has significant normal stress test and echocardiogram on 2020. Holter monitor done on 2020: The rhythm was sinus.  Average daily heart rate 58 ranging from 47 to 81 bpm. Bradycardia for 72% test duration. Rare premature supraventricular ectopic beats consisting of 33 isolated PACs. Rare premature ventricular ectopic beats total 75 consisting of 73 isolatedPVCs and a ventricular couplet. Echocardiogram done on 12/18/2020: EF of 60%. Moderately increased LV wall thickness. Borderline pulmonary hypertension. Mild tricuspid regurgitation. Mild diastolic dysfunction is seen/ Aortic root is mildly dilated. Stress test done on 9/20/2021 was equivocal, There is a small/moderate perfusion defect of mild intensity in the lateral, inferior and inferoapical regions. Mr. Luis A Cortez is here today for a hospital follow up. He says he is doing worse since being in the hospital. He continues to have lightheadedness and dizziness. He also has sharp chest pain, it comes and goes. He feels it when he gets up and walks. He also gets shortness of breath with this. He doesn't have acid reflux or GERD. He denied any abdominal pain, bleeding problems, bowel issues, problems with his medications or any other concerns at this time. No nausea or vomiting. No cough, fever or chills. He did have black stool last week. He says he stopped taking his iron today. Bleeding Risks: Mr. Luis A Cortez denies any current or recent bleeding problems including a history of a GI bleed, ulcers, recent or upcoming surgeries, blood in his stool or black tarry stools or blood in his urine.         Past Medical History:   Diagnosis Date    Acute MI ([address]     Acute renal failure with tubular necrosis ([address] 10/2/2018    ssecondary to hemodynamic effects of loop diuretics and ace inhibitors, bbaseline 1.2-1.3 which peaked up to 1.8, resolving    Anemia     CAD (coronary artery disease)     Cerebral artery occlusion with cerebral infarction ([address]     CHF (congestive heart failure) (HCC)     Chronic back pain     Closed fracture of lumbar vertebra without mention of spinal cord injury     Displacement of intervertebral disc, site unspecified, without myelopathy     H/O cardiac catheterization 2/19/16    LMCA: Mild irregularities 10-20%. LAD: Lesion on PRox LAD: Mid subsection. 65% stenosis. LCx: Lesion on 1st Ob Valentina: Proximal subesection. 70% steniosis. RCA: Small non-dominant RCA. Lesion on PRox RCA: Ostial. 50% stenosis. EF:55%.  H/O cardiovascular stress test 2/19/16    Abnormal. Moderate perfusion defect of mild intensity in the inferior, inferoseptal adn inferoapical regions during stress imaging, which most consistent with ischemia. Global LV systolic function normal without regional wall motion abnormalities. OVerall these results are most consistent with an intermediate risk for signficant CAD. Additional testing including cardiac cath may be indicated.  H/O tilt table evaluation 12/26/2017    Abnormal. Patients HR, BP response and symptoms were most consistent with dysautonomia. Combined with viligant maintenance of euvolemia and maintaining a moderate salft intake, pharmacologic treatment with SSRI such as lexapro and or mestinon among other treatments have shown some effectiveness in treatment of this condition    H/O tilt table evaluation 12/26/2017    Abnormal head upright tilt table study. The pt heart rate, blood pressure response and symptoms were most consistent with dysautonomia.  History of 24 hour EKG monitoring 8/14/14    Occasional PAC's and PVC's which appear to be at least moderately symptomatic.  History of 24 hour EKG monitoring 11/19/14    Event Monitor. Sinus rhythm and sinus bradycardia. Infrequent isolated PVC's    History of cardiovascular stress test 2/19/14    Relatively NL    History of cardiovascular stress test 03/21/2018    Normal myocardial perfusion. Global left ventricular systolic function was normal with an EF of 68%. Overall, these results are most consistent with a low risk scan.     History of coronary artery stent placement 04/2017    PTCA / Drug Eluting Stent:, CX and / or branches    History of CVA (cerebrovascular accident) without residual deficits 2014    Incidentally found on CT head. No known impairment now or in the past.    History of echocardiogram 2/18/14    LA mildly dilated, EF 55%, LV wall thickness is moderately increased, no definite wall motion abnormalilities, what appears to be a pacer wire is seen w/n the RA and RV, mild-mod TR, mild pulmonary hypertension.  History of Holter monitoring 12/29/2017    Rare PAC's and PVC's.  History of tilt table evaluation 8/12/14    Abnormal    Hyperlipidemia     Hypertension     Non critical Right Renal artery stenosis, native [facility] 10/2/2018    Non critical Right Renal artery stenosis, based on cath in 2016, Rt RA 30% stenosis    Pacemaker     non-functioning    S/P cardiac cath 04/10/2017    S/P coronary artery stent placement     Successful PTCA - HA Om1    SIRS (systemic inflammatory response syndrome) ([facility] 5/19/2019    Type II or unspecified type diabetes mellitus without mention of complication, not stated as uncontrolled        CURRENT ALLERGIES: Lipitor [atorvastatin], Aldactone [spironolactone], Crestor [rosuvastatin calcium], Lopid [gemfibrozil], Invokana [canagliflozin], and Januvia [sitagliptin] REVIEW OF SYSTEMS: 14 systems were reviewed. Pertinent positives and negatives as above, all else negative.      Past Surgical History:   Procedure Laterality Date    BACK SURGERY      CARDIAC CATHETERIZATION Left 2/19/16    via right radial approach/ [address] [place]/ Dr. Gina Bolaños  8/17/15    Liang/Charles/[place]/ Millicent    COLONOSCOPY  2010    COLONOSCOPY  2/19/15    -polyps,diverticulosis,hemorrhoids    COLONOSCOPY  05/23/2018    Dr Peace Jackman    COLONOSCOPY N/A 5/23/2018    COLONOSCOPY POLYPECTOMY SNARE/COLD BIOPSY  cold snare  and  hot snare performed by Hector Arnold MD at [address]  10/30/2020    with Dr. Tiara Jain COLONOSCOPY N/A 10/30/2020    COLORECTAL CANCER SCREENING, NOT HIGH RISK performed by Harmony Chauhan DO at [address] [facility]      left eye    PACEMAKER INSERTION      PACEMAKER PLACEMENT      UPPER GASTROINTESTINAL ENDOSCOPY  2019    Dr. Garfield Schuster H-Pylori)duodenal bulbar/antral erythema    UPPER GASTROINTESTINAL ENDOSCOPY N/A 2019    EGD BIOPSY, clotest performed by Noy Delvalle MD at [address] N/A 10/30/2020    EGD ESOPHAGOGASTRODUODENOSCOPY performed by Harmony Chauhan DO at [address] History:  Social History     Tobacco Use    Smoking status: Former Smoker     Packs/day: 1.50     Years: 8.00     Pack years: 12.00     Types: Cigarettes     Quit date: 3/4/1980     Years since quittin.6    Smokeless tobacco: Never Used   Vaping Use    Vaping Use: Never used   Substance Use Topics    Alcohol use: No    Drug use: No        CURRENT MEDICATIONS:  Outpatient Medications Marked as Taking for the 10/4/21 encounter (Office Visit) with Teri Feldman MD   Medication Sig Dispense Refill    amLODIPine (NORVASC) 10 MG tablet Take 1 tablet by mouth nightly 90 tablet 0    lisinopril (PRINIVIL;ZESTRIL) 40 MG tablet Take 1 tablet by mouth daily 30 tablet 1    eplerenone (INSPRA) 50 MG tablet Take 1 tablet by mouth once daily (Patient taking differently: 50 mg nightly ) 90 tablet 0    hydrALAZINE (APRESOLINE) 100 MG tablet Take 1 tablet by mouth 3 times daily Patient is taking 100 MG 3 times daily 270 tablet 0    glipiZIDE (GLUCOTROL XL) 5 MG extended release tablet Take 2 tablets by mouth 2 times daily 360 tablet 0    insulin glargine (LANTUS SOLOSTAR) 100 UNIT/ML injection pen Inject 28 Units into the skin 2 times daily 50.4 mL 0    nitroGLYCERIN (NITROSTAT) 0.4 MG SL tablet Place 1 tablet under the tongue every 5 minutes as needed for Chest pain 25 tablet 3    latanoprost (XALATAN) 0.005 % ophthalmic solution  acyclovir (ZOVIRAX) 400 MG tablet 400 mg 2 times daily       Omega-3 Fatty Acids (FISH OIL) 1000 MG CAPS Take 1,000 mg by mouth daily       Insulin Pen Needle (PEN NEEDLES) 31G X 6 MM MISC 1 each by Does not apply route daily 100 each 3    prednisoLONE acetate (PRED FORTE) 1 % ophthalmic suspension Place 1 drop into the left eye daily          FAMILY HISTORY: family history includes Cancer in his father and mother. PHYSICAL EXAM:   BP (!) 132/57 (Site: Right Upper Arm, Position: Sitting, Cuff Size: Large Adult)   Pulse 80   Resp 18   Ht 5' 9.02\" (1.753 m)   Wt 195 lb 12.8 oz (88.8 kg)   SpO2 98%   BMI 28.90 kg/m²  Body mass index is 28.9 kg/m². Constitutional: He is oriented to person, place, and time. He appears well-developed and well-nourished. In no acute distress. HEENT: Normocephalic and atraumatic. No JVD present. Carotid bruit is not present. No mass and no thyromegaly present. No lymphadenopathy present. Cardiovascular: Normal rate, regular rhythm, normal heart sounds. Exam reveals no gallop and no friction rubs. 2/6 systolic murmur, 2nd intercostal space on the RIGHT just lateral to the sternum. Pulmonary/Chest: Effort normal and breath sounds normal. No respiratory distress. He has no wheezes, rhonchi or rales. Abdominal: Soft, non-tender. Bowel sounds and aorta are normal. He exhibits no organomegaly, mass or bruit. Extremities: None. No cyanosis or clubbing. 2+ radial and carotid pulses. Distal extremity pulses: 2+ bilaterally. Neurological: He is alert and oriented to person, place, and time. No evidence of gross cranial nerve deficit. Coordination appeared normal.   Skin: Skin is warm and dry. There is no rash or diaphoresis. Psychiatric: He has a normal mood and affect.  His speech is normal and behavior is normal.      MOST RECENT LABS ON RECORD:   Lab Results   Component Value Date    WBC 7.8 09/30/2021    HGB 8.8 (L) 09/30/2021    HCT 27.9 (L) 09/30/2021     09/30/2021    CHOL 69 09/21/2021    TRIG 185 (H) 09/21/2021    HDL 14 (L) 09/21/2021    LDLDIRECT 44 11/02/2017    ALT 16 09/20/2021    AST 19 09/20/2021     09/20/2021    K 4.5 09/20/2021     09/20/2021    CREATININE 1.89 (H) 09/20/2021    BUN 37 (H) 09/20/2021    CO2 17 (L) 09/20/2021    TSH 2.26 11/02/2017    PSA 3.68 04/30/2021    INR 1.1 09/20/2021    LABA1C 7.3 08/19/2021    LABMICR 30 10/17/2015       ASSESSMENT:  1. Atypical chest pain    2. Dysautonomia ([address]    3. Dizziness    4. Chronic diastolic heart failure ([address]    5. ASHD (arteriosclerotic heart disease)    6. S/P angioplasty with stent    7. Essential hypertension    8. Mixed hyperlipidemia       PLAN:  · Typical chest pain consistent with myocardial ischemia: he had an episode of chest pain in the office while sitting in the chair but resolved in a few seconds. Equivical stress test in hospital last month  · Medical Management for Suspected coronary artery disease:   Antiplatelet Agent: Not indicated at this time. due to bleeding   Beta Blocker: Not indicated at this time.  Anti-anginal medications: Continue amlodipine (Norvasc) 10 mg once daily.  Cholesterol Reduction Therapy: Refused by patient.  Additional Testing List: For these reasons, I recommended that the patient consider undergoing a cardiac catheterization with coronary angiography to assess their coronary anatomy and facilitate better treatment recommendations. I discussed the risks, benefits, and alternatives to the procedure including the risk of bleeding, contrast induced allergy and/or kidney damage, arrythmia, stroke, heart attack, death, or the need for further procedures.  I also discussed the fact that although treatment with simple medical management is a potential treatment option in place of cardiac catheterization, I expressed my opinion that cardiac catheterization in order to define their coronary anatomy and rule out severe 3 vessel or left main coronary artery disease would significant help guide the most appropriate treatment strategy ranging from no treatment, to medications, stents, to even coronary bypass surgery. The patient verbalized understanding of the risks benefits and alternatives and stated that they would like to undergo the procedure. We will plan to schedule the procedure here at [facility] on Tuesday. 10/5/2021   Additional counseling: I advised them to call our office or go to the emergency room if they developed worsening or persistent chest pain or increased shortness of breath as this could be life threatening.   Additional Testing List: I took the liberty of ordering a BMP for today to assess their potassium and renal function. I told them that they could get their lab work performed at the location of their choosing, unfortunately, if the lab work was not performed at a [facility]) facility I could not guarantee my ability to follow up with them on their results. and  I took the liberty of ordering a CBC. I told them that they could get their lab work performed at the location of their choosing, unfortunately, if the lab work was not performed at a [facility]) facility I could not guarantee my ability to follow up with them on their results. I also ordered a STAT troponin to be done. · Shortness of breath with mild exertion:   Cardiac cath as above    · Anemia:   · I will make a call to try to get him set up for a GI camera    Dysautonomia: Mildly to moderately Symptomatic is having a lot of dizziness  · Diuretics: Not indicated at this time. · Beta Blocker: Not indicated at this time. ACE Inibitor/ARB: Continue lisinopril 40 mg daily.    · Nonpharmacologic counseling: Because of his condition, I reminded him to try and keep himself well-hydrated and to take extra time when moving from laying to sitting, sitting to standing and standing to walking as well as wearing at least knee high compressions stockings. Chronic Diastolic Heart Failure: EF of 60% via echo on 12/18/2020. Currently well controlled. · Beta Blocker: Not indicated at this time. Diuretics: Not indicated at this time. Nonpharmacologic management of Heart Failure: I advised him to try and keep his legs up whenever possible and to limit salt in his diet. Atherosclerotic Heart Disease: Coronary Artery stent: 4/10/2017. Sounds like he may now have a new or wersening stenosis. heart catheterization as above. Antiplatelet Agent: Not indicated due to bleeding  ACE Inibitor/ARB: Continue lisinopril 40 mg daily. Beta Blocker: Not indicated at this time. Cholesterol Reduction Therapy: Refused by patient. Laboratory testing: None    Essential Hypertension: Controlled   ACE Inibitor/ARB: Continue lisinopril 40 mg daily. · Beta Blocker: Not indicated at this time. · Calcium Channel Blocker: Continue amlodipine (Norvasc) 10 mg once daily. Hyperlipidemia: Mixed - Last LDL on 9/21/2021 was 18 mg/dL   · Cholesterol Reduction Therapy: Refused by patient. Finally, I recommended that he continue his other medications and follow up with you as previously scheduled. FOLLOW UP:   I told Mr. Kerry Lopez  to call my office if he had any problems, but otherwise told him to Return in about 6 weeks (around 11/15/2021). However, as always I would be happy to see him sooner should the need arise. Once again, thank you for allowing me to participate in this patients care. Please do not hesitate to contact me could I be of further assistance. Sincerely,  Jaun Moreno. Jackelin BLOUNT, MS, F.A.C.C. [facility] Cardiology Specialist  [address] [facility] [address] [place] [address]  Phone: [phone], Fax: [phone]     I believe that the risk of significant morbidity and mortality related to the patient's current medical conditions are: high.       The documentation recorded by the scribe, accurately and completely reflects the services I personally performed and the decisions made by me. Josse Uribe MD, MS, F.A.C.C.  October 4, 2021

## 2021-10-04 NOTE — PATIENT INSTRUCTIONS
SURVEY:    You may be receiving a survey from [employer] regarding your visit today. Please complete the survey to enable us to provide the highest quality of care to you and your family. If you cannot score us a very good on any question, please call the office to discuss how we could have made your experience a very good one. Thank you.

## 2021-10-05 ENCOUNTER — HOSPITAL ENCOUNTER (OUTPATIENT)
Dept: CARDIAC CATH/INVASIVE PROCEDURES | Age: 76
Discharge: HOME | End: 2021-10-05
Attending: FAMILY MEDICINE | Admitting: FAMILY MEDICINE
Payer: MEDICARE

## 2021-10-05 ENCOUNTER — APPOINTMENT (OUTPATIENT)
Dept: GENERAL RADIOLOGY | Age: 76
End: 2021-10-05
Attending: FAMILY MEDICINE
Payer: MEDICARE

## 2021-10-05 VITALS
TEMPERATURE: 97 F | HEART RATE: 77 BPM | RESPIRATION RATE: 18 BRPM | SYSTOLIC BLOOD PRESSURE: 155 MMHG | OXYGEN SATURATION: 97 % | HEIGHT: 69 IN | WEIGHT: 195 LBS | DIASTOLIC BLOOD PRESSURE: 45 MMHG | BODY MASS INDEX: 28.88 KG/M2

## 2021-10-05 DIAGNOSIS — I20.8 STABLE ANGINA PECTORIS (HCC): ICD-10-CM

## 2021-10-05 DIAGNOSIS — R06.02 SHORTNESS OF BREATH: Primary | ICD-10-CM

## 2021-10-05 PROCEDURE — 93458 L HRT ARTERY/VENTRICLE ANGIO: CPT | Performed by: FAMILY MEDICINE

## 2021-10-05 PROCEDURE — 2709999900 HC NON-CHARGEABLE SUPPLY

## 2021-10-05 PROCEDURE — C1769 GUIDE WIRE: HCPCS

## 2021-10-05 PROCEDURE — 71045 X-RAY EXAM CHEST 1 VIEW: CPT

## 2021-10-05 PROCEDURE — 2500000003 HC RX 250 WO HCPCS

## 2021-10-05 PROCEDURE — C1887 CATHETER, GUIDING: HCPCS

## 2021-10-05 PROCEDURE — 99152 MOD SED SAME PHYS/QHP 5/>YRS: CPT | Performed by: FAMILY MEDICINE

## 2021-10-05 PROCEDURE — 99153 MOD SED SAME PHYS/QHP EA: CPT | Performed by: FAMILY MEDICINE

## 2021-10-05 PROCEDURE — 6360000002 HC RX W HCPCS

## 2021-10-05 PROCEDURE — C1894 INTRO/SHEATH, NON-LASER: HCPCS

## 2021-10-05 PROCEDURE — 2580000003 HC RX 258: Performed by: FAMILY MEDICINE

## 2021-10-05 RX ORDER — SODIUM CHLORIDE 9 MG/ML
25 INJECTION, SOLUTION INTRAVENOUS PRN
Status: DISCONTINUED | OUTPATIENT
Start: 2021-10-05 | End: 2021-10-05 | Stop reason: HOSPADM

## 2021-10-05 RX ORDER — SODIUM CHLORIDE 9 MG/ML
INJECTION, SOLUTION INTRAVENOUS CONTINUOUS
Status: DISCONTINUED | OUTPATIENT
Start: 2021-10-05 | End: 2021-10-05 | Stop reason: HOSPADM

## 2021-10-05 RX ORDER — ACETAMINOPHEN 325 MG/1
650 TABLET ORAL EVERY 4 HOURS PRN
Status: DISCONTINUED | OUTPATIENT
Start: 2021-10-05 | End: 2021-10-05 | Stop reason: HOSPADM

## 2021-10-05 RX ORDER — DIPHENHYDRAMINE HCL 25 MG
50 CAPSULE ORAL ONCE
Status: DISCONTINUED | OUTPATIENT
Start: 2021-10-05 | End: 2021-10-05 | Stop reason: HOSPADM

## 2021-10-05 RX ORDER — NITROGLYCERIN 0.4 MG/1
0.4 TABLET SUBLINGUAL EVERY 5 MIN PRN
Status: DISCONTINUED | OUTPATIENT
Start: 2021-10-05 | End: 2021-10-05 | Stop reason: HOSPADM

## 2021-10-05 RX ORDER — SODIUM CHLORIDE 0.9 % (FLUSH) 0.9 %
5-40 SYRINGE (ML) INJECTION EVERY 12 HOURS SCHEDULED
Status: DISCONTINUED | OUTPATIENT
Start: 2021-10-05 | End: 2021-10-05 | Stop reason: HOSPADM

## 2021-10-05 RX ORDER — SODIUM CHLORIDE 0.9 % (FLUSH) 0.9 %
5-40 SYRINGE (ML) INJECTION PRN
Status: DISCONTINUED | OUTPATIENT
Start: 2021-10-05 | End: 2021-10-05 | Stop reason: HOSPADM

## 2021-10-05 RX ADMIN — SODIUM CHLORIDE: 9 INJECTION, SOLUTION INTRAVENOUS at 08:05

## 2021-10-05 NOTE — PROGRESS NOTES
Discharge instructions reviewed with patient and wife, voice understanding. Getting dressed for home after discontinuing IV and monitors.

## 2021-10-05 NOTE — OP NOTE
-  Coronary Angiography Brief Post Operative Note:    Moderate three vessel coronary artery disease involving a ostial 50-60% stenosis in a small, non-dominant RCA, a 60% mid LAD stenosis and a proximal 50-60% stenosis in the left anterior descending coronary artery. Normal left ventricular end diastolic pressure. Proceed with aggressive maximal medical management as clinically indicated.      Electronically signed by Sabrina Mccain MD on 10/5/2021 at 8:54 AM

## 2021-10-05 NOTE — PROGRESS NOTES
Dressed for home. Taken to car driven by daughter in wheelchair. All belongings sent with patient and wife.

## 2021-10-05 NOTE — PROGRESS NOTES
Inpatients must meet criteria 1 through 7.   1. Minimum 30 minutes after last dose of sedative medication, minimum 120 minutes after last dose of reversal agent. Yes   2. Systolic BP stable within 20 mmHg for 30 minutes & systolic BP between 90 & 210 or within 10 mmHg of baseline. Yes   3. Pulse between 60 and 100 or within 10 bpm of baseline. Yes   4. Spontaneous respiratory rate >/= 10 per minute. Yes   5. SaO2 >/= 95 or >/= baseline. Yes   6. Able to cough and swallow or return to baseline function. Yes   7. Alert and oriented or return to baseline mental status. Yes   8. Demonstrates controlled, coordinated movements, ambulates with steady gait, or return to baseline activity function. Yes   9. Minimal or no pain or nausea, or at a level tolerable and acceptable to patient. Yes   10. Takes and retains oral fluids as allowed. Yes   11. Procedural / perioperative site stable. Minimal or no bleeding. Yes   12. If GI endoscopy procedure, minimal or no abdominal distention or passing flatus. N/A   13. Written discharge instructions and emergency telephone number provided. Yes   14. Accompanied by a responsible adult. Yes   Adult patient discharged from facility without responsible person meets above criteria plus the following:   a) remains awake without stimulus for 30 minutes   b) oriented appropriate for age   c) all vital signs stable   d) no significant risk of losing protective reflexes   e) able to maintain pre-procedure mobility without assistance   f) no nausea or dizziness   g) transportation arrangements that do not require patient to operate motor Vehicle.    N/A

## 2021-10-07 ENCOUNTER — CARE COORDINATION (OUTPATIENT)
Dept: CASE MANAGEMENT | Age: 76
End: 2021-10-07

## 2021-10-07 NOTE — CARE COORDINATION
[facility] Transitions Follow Up Call    10/7/2021    Patient: Maria R Mendoza  Patient : 1945   MRN: <[id number]>  Reason for Admission: Acute coronary syndrome with high troponin [facility])  Discharge Date: 10/5/21 RARS: No data recorded       Spoke with: Aurea Boast he had cardiac catheterization done this week wrist site clean and dry looks good per patient he denies chest pain, SOB, dizziness, eating an drinking well states he is still very weak and tired still has not heard back about his internal bleeding from doctors, no other concerns noted    Care Transitions Subsequent and Final Call    Subsequent and Final Calls  Do you have any ongoing symptoms?: Yes  Onset of Patient-reported symptoms: Today  Patient-reported symptoms: Weakness, Fatigue  Interventions for patient-reported symptoms: Other  Have your medications changed?: No  Do you have any questions related to your medications?: No  Do you currently have any active services?: No  Do you have any needs or concerns that I can assist you with?: No  Identified Barriers: Lack of Education, Other  Care Transitions Interventions  Other Interventions:            Follow Up  Future Appointments   Date Time Provider Isadora Younger   10/19/2021 10:00 AM Alonzo Nieves MD [place] [facility]   10/25/2021  9:30 AM [facility]   2021 11:20 AM Chuck Montez MD [facility] None   2021 11:30 AM [facility]   2021  1:45 PM [facility]       Charlene Jimenez LPN

## 2021-10-12 ENCOUNTER — TELEPHONE (OUTPATIENT)
Dept: CARDIOLOGY | Age: 76
End: 2021-10-12

## 2021-10-12 NOTE — TELEPHONE ENCOUNTER
Janelle's daughter, Mirian Borges, called in reporting that her father had a heart cath with you last week and you had mentioned working on a referral for him to see GI either here in [place] [address] or [facility] so he could have a scope done to see where he was having bleeding from. Did you speak to anyone about this? If so, where should I place referral? Please advise.

## 2021-10-14 ENCOUNTER — CARE COORDINATION (OUTPATIENT)
Dept: CASE MANAGEMENT | Age: 76
End: 2021-10-14

## 2021-10-14 NOTE — TELEPHONE ENCOUNTER
Please let patient know that I am still working on this and will follow up again today on any updates. Sorry about the delay. Thanks.

## 2021-10-15 ENCOUNTER — CLINICAL DOCUMENTATION (OUTPATIENT)
Dept: NEPHROLOGY | Age: 76
End: 2021-10-15

## 2021-10-15 ENCOUNTER — HOSPITAL ENCOUNTER (OUTPATIENT)
Age: 76
Discharge: HOME | End: 2021-10-15
Payer: MEDICARE

## 2021-10-15 DIAGNOSIS — N18.32 STAGE 3B CHRONIC KIDNEY DISEASE (HCC): ICD-10-CM

## 2021-10-15 LAB
ALBUMIN SERPL-MCNC: 3.8 G/DL (ref 3.5–5.2)
ANION GAP SERPL CALCULATED.3IONS-SCNC: 14 MMOL/L (ref 9–17)
BUN BLDV-MCNC: 65 MG/DL (ref 8–23)
BUN/CREAT BLD: 28 (ref 9–20)
CALCIUM SERPL-MCNC: 9.8 MG/DL (ref 8.6–10.4)
CHLORIDE BLD-SCNC: 107 MMOL/L (ref 98–107)
CO2: 15 MMOL/L (ref 20–31)
CREAT SERPL-MCNC: 2.33 MG/DL (ref 0.7–1.2)
CREATININE URINE: 119.8 MG/DL (ref 39–259)
GFR AFRICAN AMERICAN: 33 ML/MIN
GFR NON-AFRICAN AMERICAN: 27 ML/MIN
GFR SERPL CREATININE-BSD FRML MDRD: ABNORMAL ML/MIN/{1.73_M2}
GFR SERPL CREATININE-BSD FRML MDRD: ABNORMAL ML/MIN/{1.73_M2}
GLUCOSE BLD-MCNC: 175 MG/DL (ref 70–99)
HCT VFR BLD CALC: 27.8 % (ref 40.7–50.3)
HEMOGLOBIN: 8.5 G/DL (ref 13–17)
MCH RBC QN AUTO: 26.9 PG (ref 25.2–33.5)
MCHC RBC AUTO-ENTMCNC: 30.6 G/DL (ref 28.4–34.8)
MCV RBC AUTO: 88 FL (ref 82.6–102.9)
NRBC AUTOMATED: 0 PER 100 WBC
PDW BLD-RTO: 15.8 % (ref 11.8–14.4)
PHOSPHORUS: 4.2 MG/DL (ref 2.5–4.5)
PLATELET # BLD: 191 K/UL (ref 138–453)
PMV BLD AUTO: 9.9 FL (ref 8.1–13.5)
POTASSIUM SERPL-SCNC: 5.5 MMOL/L (ref 3.7–5.3)
PTH INTACT: 11.91 PG/ML (ref 15–65)
RBC # BLD: 3.16 M/UL (ref 4.21–5.77)
SODIUM BLD-SCNC: 136 MMOL/L (ref 135–144)
TOTAL PROTEIN, URINE: 42 MG/DL
URINE TOTAL PROTEIN CREATININE RATIO: 0.35 (ref 0–0.2)
WBC # BLD: 6.4 K/UL (ref 3.5–11.3)

## 2021-10-15 PROCEDURE — 83970 ASSAY OF PARATHORMONE: CPT

## 2021-10-15 PROCEDURE — 84100 ASSAY OF PHOSPHORUS: CPT

## 2021-10-15 PROCEDURE — 85027 COMPLETE CBC AUTOMATED: CPT

## 2021-10-15 PROCEDURE — 80048 BASIC METABOLIC PNL TOTAL CA: CPT

## 2021-10-15 PROCEDURE — 36415 COLL VENOUS BLD VENIPUNCTURE: CPT

## 2021-10-15 PROCEDURE — 82570 ASSAY OF URINE CREATININE: CPT

## 2021-10-15 PROCEDURE — 82040 ASSAY OF SERUM ALBUMIN: CPT

## 2021-10-15 PROCEDURE — 84156 ASSAY OF PROTEIN URINE: CPT

## 2021-10-15 RX ORDER — LISINOPRIL 20 MG/1
20 TABLET ORAL DAILY
Qty: 90 TABLET | Refills: 3 | Status: ON HOLD | OUTPATIENT
Start: 2021-10-15 | End: 2021-11-26 | Stop reason: HOSPADM

## 2021-10-15 NOTE — PROGRESS NOTES
Creat 2.3baseline 1.4 , had cath 10/5/21. Also lisinopril dose increased to 40 daily. Will decrease lisinopril to 20 mg daily.  Rpt BMP in 2 weeks  BB

## 2021-10-19 ENCOUNTER — OFFICE VISIT (OUTPATIENT)
Dept: CARDIOLOGY | Age: 76
End: 2021-10-19
Payer: COMMERCIAL

## 2021-10-19 VITALS
HEART RATE: 78 BPM | DIASTOLIC BLOOD PRESSURE: 67 MMHG | BODY MASS INDEX: 28.85 KG/M2 | SYSTOLIC BLOOD PRESSURE: 128 MMHG | HEIGHT: 69 IN | WEIGHT: 194.8 LBS | OXYGEN SATURATION: 99 % | RESPIRATION RATE: 18 BRPM

## 2021-10-19 DIAGNOSIS — R07.89 ATYPICAL CHEST PAIN: Primary | ICD-10-CM

## 2021-10-19 DIAGNOSIS — I50.32 CHRONIC DIASTOLIC HEART FAILURE (HCC): ICD-10-CM

## 2021-10-19 DIAGNOSIS — D64.9 ANEMIA, UNSPECIFIED TYPE: ICD-10-CM

## 2021-10-19 DIAGNOSIS — R42 DIZZINESS: ICD-10-CM

## 2021-10-19 DIAGNOSIS — G90.1 DYSAUTONOMIA (HCC): ICD-10-CM

## 2021-10-19 DIAGNOSIS — I10 ESSENTIAL HYPERTENSION: ICD-10-CM

## 2021-10-19 DIAGNOSIS — I25.10 ASHD (ARTERIOSCLEROTIC HEART DISEASE): ICD-10-CM

## 2021-10-19 DIAGNOSIS — Z95.820 S/P ANGIOPLASTY WITH STENT: ICD-10-CM

## 2021-10-19 DIAGNOSIS — E78.2 MIXED HYPERLIPIDEMIA: ICD-10-CM

## 2021-10-19 DIAGNOSIS — R06.02 SHORTNESS OF BREATH: ICD-10-CM

## 2021-10-19 PROCEDURE — G8417 CALC BMI ABV UP PARAM F/U: HCPCS | Performed by: FAMILY MEDICINE

## 2021-10-19 PROCEDURE — G8427 DOCREV CUR MEDS BY ELIG CLIN: HCPCS | Performed by: FAMILY MEDICINE

## 2021-10-19 PROCEDURE — 4040F PNEUMOC VAC/ADMIN/RCVD: CPT | Performed by: FAMILY MEDICINE

## 2021-10-19 PROCEDURE — 99214 OFFICE O/P EST MOD 30 MIN: CPT | Performed by: FAMILY MEDICINE

## 2021-10-19 PROCEDURE — G8484 FLU IMMUNIZE NO ADMIN: HCPCS | Performed by: FAMILY MEDICINE

## 2021-10-19 PROCEDURE — 1036F TOBACCO NON-USER: CPT | Performed by: FAMILY MEDICINE

## 2021-10-19 PROCEDURE — 99211 OFF/OP EST MAY X REQ PHY/QHP: CPT

## 2021-10-19 PROCEDURE — 1123F ACP DISCUSS/DSCN MKR DOCD: CPT | Performed by: FAMILY MEDICINE

## 2021-10-19 PROCEDURE — 3017F COLORECTAL CA SCREEN DOC REV: CPT | Performed by: FAMILY MEDICINE

## 2021-10-19 RX ORDER — LORATADINE 10 MG/1
10 TABLET ORAL DAILY
Qty: 90 TABLET | Refills: 3 | Status: ON HOLD | OUTPATIENT
Start: 2021-10-19 | End: 2021-11-26 | Stop reason: HOSPADM

## 2021-10-19 NOTE — PATIENT INSTRUCTIONS
SURVEY:    You may be receiving a survey from [employer] regarding your visit today. Please complete the survey to enable us to provide the highest quality of care to you and your family. If you cannot score us a very good on any question, please call the office to discuss how we could have made your experience a very good one. Thank you.

## 2021-10-19 NOTE — PROGRESS NOTES
Carina Walton am scribing for and in the presence of Sarah Boogie. Jackelin BLOUNT, MS, F.A.C.C. Patient: Sabino Cook  : 1945  Date of Visit: 2021    REASON FOR VISIT / CONSULTATION: Follow-up (Hx: CP, dysautonomia. pt is here for heart cath f/u. has occasional CP, has had sore throat since heart cath. SOb when he walks. dizziness all the time. heart races when he stands), Other (go to GI dr in [place] on 10/26/2021), and Other ([facility]  fax [phone])    Dear Stalin Suarez MD,    As you know, Mr. Edilson Parra is a 70 y.o. male with a known history of dysautonomia where on tilt table testing his blood pressure dropped from 614 systolic to 78 systolic with only a 67-36 HR response. More recently, a CT of he head in the past showed evidence of at least 2 old CVA's and a heart catheterization showed severe single vessel disease in the ostium of a moderate sized OM1 branch of the circumflex. However, maximal guidelines directed medical management was opted for an place of stenting partly due to the risk associated with stenting this vessel. Recently he has had a coronary angiography procedure with stenting of his HA Om1. As you know, Mr. Edilson Parra  is a 76 y.o. male with a history of recent onset substernal chest discomfort that led to a stress test and a subsequent heart catheterization which showed severe single vessel coronary artery disease of the HA Om1 with successful angioplasty and stenting of that vessel that was done by Dr. Duard Favre on 4/10/17. More recently he has had problems with balance problems when he is in his feet and says that a Neurologist has told him in the past this is because of his previous strokes. Most recently he underwent a cardiovascular stress test which fortunately had shown to be normal on 3/21/2018. Mr. Edilson Parra has significant normal stress test and echocardiogram on 2020. Holter monitor done on 2020: The rhythm was sinus.  Average daily heart rate 58 ranging from 47 to 81 bpm. Bradycardia for 72% test duration. Rare premature supraventricular ectopic beats consisting of 33 isolated PACs. Rare premature ventricular ectopic beats total 75 consisting of 73 isolatedPVCs and a ventricular couplet. Echocardiogram done on 12/18/2020: EF of 60%. Moderately increased LV wall thickness. Borderline pulmonary hypertension. Mild tricuspid regurgitation. Mild diastolic dysfunction is seen/ Aortic root is mildly dilated. Stress test done on 9/20/2021 was equivocal, There is a small/moderate perfusion defect of mild intensity in the lateral, inferior and inferoapical regions. Cardiac cath done on 10/5/2021 showed Moderate three vessel coronary artery disease involving a ostial 50-60% stenosis in a small, non-dominant RCA, a 60% mid LAD stenosis and a proximal 50-60% stenosis in the left anterior descending coronary artery     Mr. Olga Torres is here today for a heart cath follow up. He continues to have the same symptoms of chest pain occasionally. He does have dizziness and lightheadedness. His heart does race when he stands. He denied any abdominal pain, bleeding problems, bowel issues, problems with his medications or any other concerns at this time. No nausea or vomiting. No cough, fever or chills. He continues to have some bleeding problems. He is scheduled to see a GI specialist in [facility] at the end of October. He is following with Hematology and was told this has been ongoing for 12-14 years looking back on his blood work.      Past Medical History:   Diagnosis Date    Acute MI ([address]     Acute renal failure with tubular necrosis ([address] 10/2/2018    ssecondary to hemodynamic effects of loop diuretics and ace inhibitors, bbaseline 1.2-1.3 which peaked up to 1.8, resolving    Anemia     CAD (coronary artery disease)     Cerebral artery occlusion with cerebral infarction ([address]     CHF (congestive heart failure) (HCC)     Chronic back pain     Closed results are most consistent with a low risk scan.  History of coronary artery stent placement 04/2017    PTCA / Drug Eluting Stent:, CX and / or branches    History of CVA (cerebrovascular accident) without residual deficits 2014    Incidentally found on CT head. No known impairment now or in the past.    History of echocardiogram 2/18/14    LA mildly dilated, EF 55%, LV wall thickness is moderately increased, no definite wall motion abnormalilities, what appears to be a pacer wire is seen w/n the RA and RV, mild-mod TR, mild pulmonary hypertension.  History of Holter monitoring 12/29/2017    Rare PAC's and PVC's.  History of tilt table evaluation 8/12/14    Abnormal    Hyperlipidemia     Hypertension     Non critical Right Renal artery stenosis, native ([address] 10/2/2018    Non critical Right Renal artery stenosis, based on cath in 2016, Rt RA 30% stenosis    Pacemaker     non-functioning    S/P cardiac cath 04/10/2017    S/P coronary artery stent placement     Successful PTCA - HA Om1    SIRS (systemic inflammatory response syndrome) [facility] 5/19/2019    Type II or unspecified type diabetes mellitus without mention of complication, not stated as uncontrolled        CURRENT ALLERGIES: Lipitor [atorvastatin], Aldactone [spironolactone], Crestor [rosuvastatin calcium], Lopid [gemfibrozil], Invokana [canagliflozin], and Januvia [sitagliptin] REVIEW OF SYSTEMS: 14 systems were reviewed. Pertinent positives and negatives as above, all else negative.      Past Surgical History:   Procedure Laterality Date    BACK SURGERY      CARDIAC CATHETERIZATION Left 02/19/2016    via right radial approach/ Melina Vernon/ Dr. Silas Covarrubias Left 10/05/2021    Dr [place] radial-Moderate three vessel coronary artery disease involving a ostial 50-60% stenosis in a small, non-dominant RCA, a 60% mid LAD stenosis and a proximal 50-60% stenosis in the left anterior descending coronary artery. Normal left ventricular end diastolic pressure. Proceed with aggressive maximal medical management as clinically indicated.     CHOLECYSTECTOMY  2015    Liang/Charles/Encinal/ Lap    COLONOSCOPY      COLONOSCOPY  2015    -polyps,diverticulosis,hemorrhoids    COLONOSCOPY  2018    Dr Aggie Barrett    COLONOSCOPY N/A 2018    COLONOSCOPY POLYPECTOMY SNARE/COLD BIOPSY  cold snare  and  hot snare performed by Lenore Frost MD at [facility]  10/30/2020    with Dr. Ulices Slater 10/30/2020    Vernia Reges, NOT HIGH RISK performed by Franny Hays DO at [facility]      left eye    ENDOSCOPY, COLON, DIAGNOSTIC      EYE SURGERY      PACEMAKER INSERTION      PACEMAKER PLACEMENT      UPPER GASTROINTESTINAL ENDOSCOPY  2019    Dr. Raheel Phan H-Pylori)duodenal bulbar/antral erythema    UPPER GASTROINTESTINAL ENDOSCOPY N/A 2019    EGD BIOPSY, clotest performed by Lenore Frost MD at [facility] 10/30/2020    EGD ESOPHAGOGASTRODUODENOSCOPY performed by Franny Hays DO at [address] History:  Social History     Tobacco Use    Smoking status: Former Smoker     Packs/day: 1.50     Years: 8.00     Pack years: 12.00     Types: Cigarettes     Quit date: 3/4/1980     Years since quittin.6    Smokeless tobacco: Never Used   Vaping Use    Vaping Use: Never used   Substance Use Topics    Alcohol use: No    Drug use: No        CURRENT MEDICATIONS:  Outpatient Medications Marked as Taking for the 10/19/21 encounter (Office Visit) with Stephanie Salazar MD   Medication Sig Dispense Refill    lisinopril (PRINIVIL;ZESTRIL) 20 MG tablet Take 1 tablet by mouth daily 90 tablet 3    amLODIPine (NORVASC) 10 MG tablet Take 1 tablet by mouth nightly 90 tablet 0    eplerenone (INSPRA) 50 MG tablet Take 1 tablet by mouth once daily (Patient taking differently: 50 mg nightly ) 90 tablet 0    hydrALAZINE (APRESOLINE) 100 MG tablet Take 1 tablet by mouth 3 times daily Patient is taking 100 MG 3 times daily 270 tablet 0    glipiZIDE (GLUCOTROL XL) 5 MG extended release tablet Take 2 tablets by mouth 2 times daily 360 tablet 0    insulin glargine (LANTUS SOLOSTAR) 100 UNIT/ML injection pen Inject 28 Units into the skin 2 times daily 50.4 mL 0    nitroGLYCERIN (NITROSTAT) 0.4 MG SL tablet Place 1 tablet under the tongue every 5 minutes as needed for Chest pain 25 tablet 3    latanoprost (XALATAN) 0.005 % ophthalmic solution       acyclovir (ZOVIRAX) 400 MG tablet 400 mg 2 times daily       ferrous sulfate 325 (65 Fe) MG tablet Take 65 mg by mouth 2 times daily       Omega-3 Fatty Acids (FISH OIL) 1000 MG CAPS Take 1,000 mg by mouth daily       Insulin Pen Needle (PEN NEEDLES) 31G X 6 MM MISC 1 each by Does not apply route daily 100 each 3    prednisoLONE acetate (PRED FORTE) 1 % ophthalmic suspension Place 1 drop into the left eye daily        FAMILY HISTORY: family history includes Cancer in his father and mother. PHYSICAL EXAM:   /67 (Site: Right Upper Arm, Position: Standing, Cuff Size: Large Adult)   Pulse 78   Resp 18   Ht 5' 9.02\" (1.753 m)   Wt 194 lb 12.8 oz (88.4 kg)   SpO2 99%   BMI 28.75 kg/m²  Body mass index is 28.75 kg/m². Constitutional: He is oriented to person, place, and time. He appears well-developed and well-nourished. In no acute distress. HEENT: Normocephalic and atraumatic. No JVD present. Carotid bruit is not present. No mass and no thyromegaly present. No lymphadenopathy present. Cardiovascular: Normal rate, regular rhythm, normal heart sounds. Exam reveals no gallop and no friction rubs. 2/6 systolic murmur, 2nd intercostal space on the RIGHT just lateral to the sternum. Pulmonary/Chest: Effort normal and breath sounds normal. No respiratory distress.  He has no wheezes, rhonchi or rales. Abdominal: Soft, non-tender. Bowel sounds and aorta are normal. He exhibits no organomegaly, mass or bruit. Extremities: None. No cyanosis or clubbing. 2+ radial and carotid pulses. Distal extremity pulses: 2+ bilaterally. Neurological: He is alert and oriented to person, place, and time. No evidence of gross cranial nerve deficit. Coordination appeared normal.   Skin: Skin is warm and dry. There is no rash or diaphoresis. Psychiatric: He has a normal mood and affect. His speech is normal and behavior is normal.      MOST RECENT LABS ON RECORD:   Lab Results   Component Value Date    WBC 6.4 10/15/2021    HGB 8.5 (L) 10/15/2021    HCT 27.8 (L) 10/15/2021     10/15/2021    CHOL 69 09/21/2021    TRIG 185 (H) 09/21/2021    HDL 14 (L) 09/21/2021    LDLDIRECT 44 11/02/2017    ALT 16 09/20/2021    AST 19 09/20/2021     10/15/2021    K 5.5 (H) 10/15/2021     10/15/2021    CREATININE 2.33 (H) 10/15/2021    BUN 65 (H) 10/15/2021    CO2 15 (L) 10/15/2021    TSH 2.26 11/02/2017    PSA 3.68 04/30/2021    INR 1.1 09/20/2021    LABA1C 7.3 08/19/2021    LABMICR 30 10/17/2015       ASSESSMENT:  1. Atypical chest pain    2. Shortness of breath    3. Dysautonomia ([address]    4. Dizziness    5. Chronic diastolic heart failure ([address]    6. ASHD (arteriosclerotic heart disease)    7. S/P angioplasty with stent    8. Essential hypertension    9. Mixed hyperlipidemia    10. Anemia, unspecified type       PLAN:    · Shortness of breath with mild exertion: Likely most likely related to anemia  · Follow up with Hematology and GI    · Anemia, unclear etiology:   · He is scheduled with gastroenterology in [facility] at the end of October. · I advised him to follow up with Hematology ASAP    Dysautonomia: Mildly to moderately, likely related to and worsened by anemia  · Diuretics: Not indicated at this time. · Beta Blocker: Not indicated at this time. ACE Inibitor/ARB: Continue lisinopril 40 mg daily. · Nonpharmacologic counseling: Because of his condition, I reminded him to try and keep himself well-hydrated and to take extra time when moving from laying to sitting, sitting to standing and standing to walking as well as wearing at least knee high compressions stockings. Chronic Diastolic Heart Failure: EF of 60% via echo on 12/18/2020. Currently well controlled. · Beta Blocker: Not indicated at this time. Diuretics: Not indicated at this time. Nonpharmacologic management of Heart Failure: I advised him to try and keep his legs up whenever possible and to limit salt in his diet. Atherosclerotic Heart Disease: Coronary Artery stent: 4/10/2017. Cardiac cath done on 10/5/2021 showed Moderate three vessel coronary artery disease involving a ostial 50-60% stenosis in a small, non-dominant RCA, a 60% mid LAD stenosis and a proximal 50-60% stenosis in the left anterior descending coronary artery  Antiplatelet Agent: Not indicated due to bleeding  ACE Inibitor/ARB: Continue lisinopril 40 mg daily. Beta Blocker: Not indicated at this time. Cholesterol Reduction Therapy: Refused by patient. Laboratory testing: None    Essential Hypertension: Controlled   ACE Inibitor/ARB: Continue lisinopril 40 mg daily. · Beta Blocker: Not indicated at this time. · Calcium Channel Blocker: Continue amlodipine (Norvasc) 10 mg once daily. Hyperlipidemia: Mixed - Last LDL on 9/21/2021 was 18 mg/dL   · Cholesterol Reduction Therapy: Refused by patient. Finally, I recommended that he continue his other medications and follow up with you as previously scheduled. FOLLOW UP:   I told Mr. Merlin Ko  to call my office if he had any problems, but otherwise told him to Return in about 2 months (around 12/19/2021). However, as always I would be happy to see him sooner should the need arise. Once again, thank you for allowing me to participate in this patients care.  Please do not hesitate to contact me could I be of further assistance. Sincerely,  Vicente Benítez MD, MS, F.A.C.C. [facility] Cardiology Specialist   Place  [facility], [address] [place] [address]  Phone: [phone], Fax: [phone]     I believe that the risk of significant morbidity and mortality related to the patient's current medical conditions are: Intermediate. The documentation recorded by the scribe, accurately and completely reflects the services I personally performed and the decisions made by me. Elena Burnham MD, MS, F.A.C.C.  October 19, 2021

## 2021-10-21 ENCOUNTER — CARE COORDINATION (OUTPATIENT)
Dept: CASE MANAGEMENT | Age: 76
End: 2021-10-21

## 2021-10-21 ENCOUNTER — OFFICE VISIT (OUTPATIENT)
Dept: ONCOLOGY | Age: 76
End: 2021-10-21
Payer: MEDICARE

## 2021-10-21 VITALS
RESPIRATION RATE: 18 BRPM | WEIGHT: 196 LBS | BODY MASS INDEX: 29.03 KG/M2 | HEART RATE: 84 BPM | DIASTOLIC BLOOD PRESSURE: 67 MMHG | TEMPERATURE: 96.6 F | HEIGHT: 69 IN | SYSTOLIC BLOOD PRESSURE: 151 MMHG

## 2021-10-21 DIAGNOSIS — N18.30 ANEMIA IN STAGE 3 CHRONIC KIDNEY DISEASE, UNSPECIFIED WHETHER STAGE 3A OR 3B CKD (HCC): Primary | ICD-10-CM

## 2021-10-21 DIAGNOSIS — K90.9 IRON MALABSORPTION: ICD-10-CM

## 2021-10-21 DIAGNOSIS — D63.1 ANEMIA IN STAGE 3 CHRONIC KIDNEY DISEASE, UNSPECIFIED WHETHER STAGE 3A OR 3B CKD (HCC): ICD-10-CM

## 2021-10-21 DIAGNOSIS — D50.8 IRON DEFICIENCY ANEMIA SECONDARY TO INADEQUATE DIETARY IRON INTAKE: ICD-10-CM

## 2021-10-21 DIAGNOSIS — D63.1 ANEMIA IN STAGE 3 CHRONIC KIDNEY DISEASE, UNSPECIFIED WHETHER STAGE 3A OR 3B CKD (HCC): Primary | ICD-10-CM

## 2021-10-21 DIAGNOSIS — N18.30 ANEMIA IN STAGE 3 CHRONIC KIDNEY DISEASE, UNSPECIFIED WHETHER STAGE 3A OR 3B CKD (HCC): ICD-10-CM

## 2021-10-21 DIAGNOSIS — D50.0 IRON DEFICIENCY ANEMIA DUE TO CHRONIC BLOOD LOSS: ICD-10-CM

## 2021-10-21 DIAGNOSIS — D50.8 IRON DEFICIENCY ANEMIA SECONDARY TO INADEQUATE DIETARY IRON INTAKE: Primary | ICD-10-CM

## 2021-10-21 PROCEDURE — 99211 OFF/OP EST MAY X REQ PHY/QHP: CPT

## 2021-10-21 PROCEDURE — 99205 OFFICE O/P NEW HI 60 MIN: CPT | Performed by: INTERNAL MEDICINE

## 2021-10-21 PROCEDURE — G8417 CALC BMI ABV UP PARAM F/U: HCPCS | Performed by: INTERNAL MEDICINE

## 2021-10-21 PROCEDURE — G8427 DOCREV CUR MEDS BY ELIG CLIN: HCPCS | Performed by: INTERNAL MEDICINE

## 2021-10-21 PROCEDURE — G8484 FLU IMMUNIZE NO ADMIN: HCPCS | Performed by: INTERNAL MEDICINE

## 2021-10-21 RX ORDER — HEPARIN SODIUM (PORCINE) LOCK FLUSH IV SOLN 100 UNIT/ML 100 UNIT/ML
500 SOLUTION INTRAVENOUS PRN
Status: CANCELLED | OUTPATIENT
Start: 2021-10-21

## 2021-10-21 RX ORDER — SODIUM CHLORIDE 9 MG/ML
INJECTION, SOLUTION INTRAVENOUS CONTINUOUS
Status: CANCELLED | OUTPATIENT
Start: 2021-10-21

## 2021-10-21 RX ORDER — DIPHENHYDRAMINE HYDROCHLORIDE 50 MG/ML
50 INJECTION INTRAMUSCULAR; INTRAVENOUS ONCE
Status: CANCELLED | OUTPATIENT
Start: 2021-10-21 | End: 2021-10-21

## 2021-10-21 RX ORDER — SODIUM CHLORIDE 0.9 % (FLUSH) 0.9 %
5-40 SYRINGE (ML) INJECTION PRN
Status: CANCELLED | OUTPATIENT
Start: 2021-10-21

## 2021-10-21 RX ORDER — METHYLPREDNISOLONE SODIUM SUCCINATE 125 MG/2ML
125 INJECTION, POWDER, LYOPHILIZED, FOR SOLUTION INTRAMUSCULAR; INTRAVENOUS ONCE
Status: CANCELLED | OUTPATIENT
Start: 2021-10-21 | End: 2021-10-21

## 2021-10-21 RX ORDER — EPINEPHRINE 1 MG/ML
0.3 INJECTION, SOLUTION, CONCENTRATE INTRAVENOUS PRN
Status: CANCELLED | OUTPATIENT
Start: 2021-10-21

## 2021-10-21 RX ORDER — SODIUM CHLORIDE 9 MG/ML
25 INJECTION, SOLUTION INTRAVENOUS PRN
Status: CANCELLED | OUTPATIENT
Start: 2021-10-21

## 2021-10-21 NOTE — CARE COORDINATION
[facility] Transitions Follow Up Call    10/21/2021    Patient: Toby Anthony  Patient : 1945   MRN: <[id number]>  Reason for Admission:  Acute coronary syndrome with high troponin [facility])  Discharge Date: 10/5/21 RARS: No data recorded       Spoke with: Called to speak with patient for update with transition of care. Left HIPPA compliant voice message with contact information [phone] for a call  Back with an update. Care Transitions Subsequent and Final Call    Subsequent and Final Calls  Care Transitions Interventions  Other Interventions:            Follow Up  Future Appointments   Date Time Provider Isadora Younger   10/21/2021  2:00 PM Niyah Price MD [place] onc spe TPP   10/25/2021  9:30 AM Amil Gitelman, MD [facility]   2021 11:20 AM [facility]   2021 11:30 AM Darci Dover MD [place] onc spe TPP   2021  1:45 PM [facility]   2021 10:00 AM Nitesh Mancera MD [place] CARD [facility]       Kaleigh Martinez LPN

## 2021-10-21 NOTE — PROGRESS NOTES
uncontrolled. PAST SURGICAL HISTORY: has a past surgical history that includes Pacemaker insertion; back surgery; Cholecystectomy (08/17/2015); Corneal transplant; Cardiac catheterization (Left, 02/19/2016); pacemaker placement; Colonoscopy (2010); Colonoscopy (02/19/2015); Colonoscopy (05/23/2018); Colonoscopy (N/A, 05/23/2018); Upper gastrointestinal endoscopy (05/28/2019); Upper gastrointestinal endoscopy (N/A, 05/28/2019); Colonoscopy (10/30/2020); Colonoscopy (N/A, 10/30/2020); Upper gastrointestinal endoscopy (N/A, 10/30/2020); Endoscopy, colon, diagnostic; eye surgery; and Cardiac catheterization (Left, 10/05/2021). CURRENT MEDICATIONS:  has a current medication list which includes the following prescription(s): loratadine, lisinopril, amlodipine, eplerenone, hydralazine, glipizide, nitroglycerin, latanoprost, ezetimibe, acyclovir, ferrous sulfate, contour test, fish oil, susan microlet lancets, pen needles, prednisolone acetate, bupropion, benzonatate, and lantus solostar, and the following Facility-Administered Medications: sodium chloride. ALLERGIES:  is allergic to lipitor [atorvastatin], aldactone [spironolactone], crestor [rosuvastatin calcium], lopid [gemfibrozil], invokana [canagliflozin], and januvia [sitagliptin]. FAMILY HISTORY: Negative for any hematological or oncological conditions. SOCIAL HISTORY:  reports that he quit smoking about 41 years ago. His smoking use included cigarettes. He has a 12.00 pack-year smoking history. He has never used smokeless tobacco. He reports that he does not drink alcohol and does not use drugs. REVIEW OF SYSTEMS:     · General: Positive for weakness and fatigue. No unanticipated weight loss or decreased appetite. No fever or chills. · Eyes: No blurred vision, eye pain or double vision. · Ears: No hearing problems or drainage. No tinnitus. · Throat: No sore throat, problems with swallowing or dysphagia.    · Respiratory: No cough, sputum or hemoptysis. No shortness of breath. No pleuritic chest pain. · Cardiovascular: No chest pain, orthopnea or PND. No lower extremity edema. No palpitation. · Gastrointestinal: As above. · Genitourinary: No dysuria, hematuria, frequency or urgency. · Musculoskeletal: No muscle aches or pains. No limitation of movement. No back pain. No gait disturbance, No joint complaints. · Dermatologic: No skin rashes or pruritus. No skin lesions or discolorations. · Psychiatric: No depression, anxiety, or stress or signs of schizophrenia. No change in mood or affect. · Hematologic: No history of bleeding tendency. No bruises or ecchymosis. No history of clotting problems. · Infectious disease: No fever, chills or frequent infections. · Endocrine: No polydipsia or polyuria. No temperature intolerance. · Neurologic: No headaches or dizziness. No weakness or numbness of the extremities. No changes in balance, coordination,  memory, mentation, behavior. · Allergic/Immunologic: No nasal congestion or hives. No repeated infections. PHYSICAL EXAM:  The patient is not in acute distress. Vital signs: Blood pressure (!) 151/67, pulse 84, temperature 96.6 °F (35.9 °C), temperature source Temporal, resp. rate 18, height 5' 9\" (1.753 m), weight 196 lb (88.9 kg).      General appearance - well appearing, not in pain or distress  Mental status - good mood, alert and oriented  Eyes - pupils equal and reactive, extraocular eye movements intact  Ears - bilateral TM's and external ear canals normal  Nose - normal and patent, no erythema, discharge or polyps  Mouth - mucous membranes moist, pharynx normal without lesions  Neck - supple, no significant adenopathy  Lymphatics - no palpable lymphadenopathy, no hepatosplenomegaly  Chest - clear to auscultation, no wheezes, rales or rhonchi, symmetric air entry  Heart - normal rate, regular rhythm, normal S1, S2, no murmurs, rubs, clicks or gallops  Abdomen - soft, nontender, nondistended, no masses or organomegaly  Neurological - alert, oriented, normal speech, no focal findings or movement disorder noted  Musculoskeletal - no joint tenderness, deformity or swelling  Extremities - peripheral pulses normal, no pedal edema, no clubbing or cyanosis  Skin - normal coloration and turgor, no rashes, no suspicious skin lesions noted     Review of Diagnostic data:   Lab Results   Component Value Date    WBC 6.4 10/15/2021    HGB 8.5 (L) 10/15/2021    HCT 27.8 (L) 10/15/2021    MCV 88.0 10/15/2021     10/15/2021       Chemistry        Component Value Date/Time     10/15/2021 0909    K 5.5 (H) 10/15/2021 0909     10/15/2021 0909    CO2 15 (L) 10/15/2021 0909    BUN 65 (H) 10/15/2021 0909    CREATININE 2.33 (H) 10/15/2021 0909        Component Value Date/Time    CALCIUM 9.8 10/15/2021 0909    ALKPHOS 68 09/20/2021 1150    AST 19 09/20/2021 1150    ALT 16 09/20/2021 1150    BILITOT 0.37 09/20/2021 1150            IMPRESSION:   Severe normocytic anemia  Episodes of GI bleeding  Anemia chronic renal insufficiency stage IV  Chronic renal insufficiency  Coronary artery disease  Multiple comorbidities as listed    PLAN: I reviewed the labs available to me and discussed with the patient. For more than 60 minutes of face to face discussion, I explained to the patient the nature of this hematologic problem. I explained the significance of these abnormalities in layman language. Reviewing patient's labs obviously he is having chronic anemia. Symptoms are getting worse. Anemia needs to be corrected especially with his underlying comorbidities. Patient has episodes of black tarry stool suggestive of possible episodes of GI blood loss. Upper and lower endoscopies were negative. We will have GI evaluation next week with possible Stool camera evaluation of the small intestine. In the interim I will give the patient IV iron infusion for fast correction of his anemia.   I expect that the patient is having element of anemia of chronic renal insufficiency. So his anemia may not be completely corrected with the iron infusion but I expect significant improvement. We will monitor response to treatment I will make further recommendations accordingly. If needed we will consider treatment with Aranesp for anemia of chronic renal insufficiency. Patient's questions were answered to the best of his satisfaction and he verbalized full understanding and agreement.

## 2021-10-21 NOTE — PATIENT INSTRUCTIONS
Will be seen next Tuesday for capsule camera at [facility]  IV iron infusion soon  RV 3 months with labs before RV

## 2021-10-25 ENCOUNTER — HOSPITAL ENCOUNTER (OUTPATIENT)
Dept: INFUSION THERAPY | Age: 76
Discharge: HOME | End: 2021-10-25
Payer: MEDICARE

## 2021-10-25 ENCOUNTER — OFFICE VISIT (OUTPATIENT)
Dept: PRIMARY CARE CLINIC | Age: 76
End: 2021-10-25
Payer: MEDICARE

## 2021-10-25 VITALS
WEIGHT: 196.2 LBS | DIASTOLIC BLOOD PRESSURE: 58 MMHG | OXYGEN SATURATION: 97 % | SYSTOLIC BLOOD PRESSURE: 136 MMHG | HEART RATE: 96 BPM | BODY MASS INDEX: 28.97 KG/M2

## 2021-10-25 VITALS
SYSTOLIC BLOOD PRESSURE: 146 MMHG | HEART RATE: 80 BPM | TEMPERATURE: 96.8 F | RESPIRATION RATE: 18 BRPM | DIASTOLIC BLOOD PRESSURE: 77 MMHG

## 2021-10-25 DIAGNOSIS — I50.32 CHRONIC DIASTOLIC HEART FAILURE (HCC): ICD-10-CM

## 2021-10-25 DIAGNOSIS — G90.1 DYSAUTONOMIA (HCC): ICD-10-CM

## 2021-10-25 DIAGNOSIS — F33.0 MAJOR DEPRESSIVE DISORDER, RECURRENT, MILD (HCC): ICD-10-CM

## 2021-10-25 DIAGNOSIS — D50.8 IRON DEFICIENCY ANEMIA SECONDARY TO INADEQUATE DIETARY IRON INTAKE: Primary | ICD-10-CM

## 2021-10-25 DIAGNOSIS — I10 ESSENTIAL HYPERTENSION: Primary | ICD-10-CM

## 2021-10-25 DIAGNOSIS — Z79.4 CONTROLLED TYPE 2 DIABETES MELLITUS WITH DIABETIC NEPHROPATHY, WITH LONG-TERM CURRENT USE OF INSULIN (HCC): ICD-10-CM

## 2021-10-25 DIAGNOSIS — K90.9 IRON MALABSORPTION: ICD-10-CM

## 2021-10-25 DIAGNOSIS — E11.21 CONTROLLED TYPE 2 DIABETES MELLITUS WITH DIABETIC NEPHROPATHY, WITH LONG-TERM CURRENT USE OF INSULIN (HCC): ICD-10-CM

## 2021-10-25 PROCEDURE — 96365 THER/PROPH/DIAG IV INF INIT: CPT

## 2021-10-25 PROCEDURE — G8417 CALC BMI ABV UP PARAM F/U: HCPCS | Performed by: FAMILY MEDICINE

## 2021-10-25 PROCEDURE — 4040F PNEUMOC VAC/ADMIN/RCVD: CPT | Performed by: FAMILY MEDICINE

## 2021-10-25 PROCEDURE — 1123F ACP DISCUSS/DSCN MKR DOCD: CPT | Performed by: FAMILY MEDICINE

## 2021-10-25 PROCEDURE — G8484 FLU IMMUNIZE NO ADMIN: HCPCS | Performed by: FAMILY MEDICINE

## 2021-10-25 PROCEDURE — G8427 DOCREV CUR MEDS BY ELIG CLIN: HCPCS | Performed by: FAMILY MEDICINE

## 2021-10-25 PROCEDURE — 6360000002 HC RX W HCPCS: Performed by: INTERNAL MEDICINE

## 2021-10-25 PROCEDURE — 2580000003 HC RX 258: Performed by: INTERNAL MEDICINE

## 2021-10-25 PROCEDURE — 99211 OFF/OP EST MAY X REQ PHY/QHP: CPT | Performed by: FAMILY MEDICINE

## 2021-10-25 PROCEDURE — 3051F HG A1C>EQUAL 7.0%<8.0%: CPT | Performed by: FAMILY MEDICINE

## 2021-10-25 PROCEDURE — 99214 OFFICE O/P EST MOD 30 MIN: CPT | Performed by: FAMILY MEDICINE

## 2021-10-25 PROCEDURE — 1036F TOBACCO NON-USER: CPT | Performed by: FAMILY MEDICINE

## 2021-10-25 RX ORDER — SODIUM CHLORIDE 0.9 % (FLUSH) 0.9 %
5-40 SYRINGE (ML) INJECTION PRN
Status: CANCELLED | OUTPATIENT
Start: 2021-11-01

## 2021-10-25 RX ORDER — SODIUM CHLORIDE 9 MG/ML
25 INJECTION, SOLUTION INTRAVENOUS PRN
Status: CANCELLED | OUTPATIENT
Start: 2021-11-01

## 2021-10-25 RX ORDER — BUPROPION HYDROCHLORIDE 150 MG/1
150 TABLET ORAL EVERY MORNING
Qty: 30 TABLET | Refills: 0 | Status: SHIPPED | OUTPATIENT
Start: 2021-10-25 | End: 2021-11-10

## 2021-10-25 RX ORDER — SODIUM CHLORIDE 9 MG/ML
INJECTION, SOLUTION INTRAVENOUS CONTINUOUS
Status: CANCELLED | OUTPATIENT
Start: 2021-11-01

## 2021-10-25 RX ORDER — BENZONATATE 100 MG/1
100 CAPSULE ORAL 3 TIMES DAILY PRN
Qty: 30 CAPSULE | Refills: 0 | Status: SHIPPED | OUTPATIENT
Start: 2021-10-25 | End: 2021-11-01

## 2021-10-25 RX ORDER — DIPHENHYDRAMINE HYDROCHLORIDE 50 MG/ML
50 INJECTION INTRAMUSCULAR; INTRAVENOUS ONCE
Status: CANCELLED | OUTPATIENT
Start: 2021-11-01 | End: 2021-11-01

## 2021-10-25 RX ORDER — HEPARIN SODIUM (PORCINE) LOCK FLUSH IV SOLN 100 UNIT/ML 100 UNIT/ML
500 SOLUTION INTRAVENOUS PRN
Status: CANCELLED | OUTPATIENT
Start: 2021-11-01

## 2021-10-25 RX ORDER — EPINEPHRINE 1 MG/ML
0.3 INJECTION, SOLUTION, CONCENTRATE INTRAVENOUS PRN
Status: CANCELLED | OUTPATIENT
Start: 2021-11-01

## 2021-10-25 RX ORDER — SODIUM CHLORIDE 9 MG/ML
INJECTION, SOLUTION INTRAVENOUS CONTINUOUS
Status: DISCONTINUED | OUTPATIENT
Start: 2021-10-25 | End: 2021-10-26 | Stop reason: HOSPADM

## 2021-10-25 RX ORDER — METHYLPREDNISOLONE SODIUM SUCCINATE 125 MG/2ML
125 INJECTION, POWDER, LYOPHILIZED, FOR SOLUTION INTRAMUSCULAR; INTRAVENOUS ONCE
Status: CANCELLED | OUTPATIENT
Start: 2021-11-01 | End: 2021-11-01

## 2021-10-25 RX ADMIN — FERRIC CARBOXYMALTOSE INJECTION 750 MG: 50 INJECTION, SOLUTION INTRAVENOUS at 13:23

## 2021-10-25 RX ADMIN — SODIUM CHLORIDE: 9 INJECTION, SOLUTION INTRAVENOUS at 13:16

## 2021-10-25 NOTE — PATIENT INSTRUCTIONS
SURVEY:    You may be receiving a survey from [employer] regarding your visit today. You may get this in the mail, through your MyChart, or in your email. Please complete the survey to enable us to provide the highest quality of care to you and your family. If you cannot score us a very good (5 Stars) on any question, please call the office to discuss how we could of made your experience exceptional.    Thank you!     Dr. Dicky Jeans Dr. Alberto Peer, [facility], [address] [place] [address], [address]    Phone: [phone]  Fax: [phone]    Office Hours:   Maritza Arenas, F: 8-5 Wednesday: 9-11

## 2021-10-25 NOTE — PROGRESS NOTES
Patient is here for a one month follow up. He was here for a hospital follow up and had complaints of severe fatigue, and shortness of breath. He said that it has gotten worse during that time. He said he gets short of breath when walking. He also mentioned that he is having a cough, and would like something for it. He said it kept him up most of the night last night. He did mention that he fell this morning as well. He said that he was dizzy and his legs collapsed. He states he does not have any injuries, and is not sore due to pain. He said this was his only fall within the past month. He also mentioned that he is getting an infusion at 1:00 today. He does also go to [facility] tomorrow to see if they can see where he is bleeding at as to why his blood is too low. Allergies:  Lipitor [atorvastatin], Aldactone [spironolactone], Crestor [rosuvastatin calcium], Lopid [gemfibrozil], Invokana [canagliflozin], and Januvia [sitagliptin]    Past Medical History:    Past Medical History:   Diagnosis Date    Acute MI ([facility]     Acute renal failure with tubular necrosis ([facility] 10/2/2018    ssecondary to hemodynamic effects of loop diuretics and ace inhibitors, bbaseline 1.2-1.3 which peaked up to 1.8, resolving    Anemia     CAD (coronary artery disease)     Cerebral artery occlusion with cerebral infarction ([facility]     CHF (congestive heart failure) (HCC)     Chronic back pain     Closed fracture of lumbar vertebra without mention of spinal cord injury     Displacement of intervertebral disc, site unspecified, without myelopathy     H/O cardiac catheterization 2/19/16    LMCA: Mild irregularities 10-20%. LAD: Lesion on PRox LAD: Mid subsection. 65% stenosis. LCx: Lesion on 1st Ob Valentina: Proximal subesection. 70% steniosis. RCA: Small non-dominant RCA. Lesion on PRox RCA: Ostial. 50% stenosis. EF:55%.      H/O cardiovascular stress test 2/19/16    Abnormal. Moderate perfusion defect of mild intensity in the inferior, inferoseptal adn inferoapical regions during stress imaging, which most consistent with ischemia. Global LV systolic function normal without regional wall motion abnormalities. OVerall these results are most consistent with an intermediate risk for signficant CAD. Additional testing including cardiac cath may be indicated.  H/O tilt table evaluation 12/26/2017    Abnormal. Patients HR, BP response and symptoms were most consistent with dysautonomia. Combined with viligant maintenance of euvolemia and maintaining a moderate salft intake, pharmacologic treatment with SSRI such as lexapro and or mestinon among other treatments have shown some effectiveness in treatment of this condition    H/O tilt table evaluation 12/26/2017    Abnormal head upright tilt table study. The pt heart rate, blood pressure response and symptoms were most consistent with dysautonomia.  History of 24 hour EKG monitoring 8/14/14    Occasional PAC's and PVC's which appear to be at least moderately symptomatic.  History of 24 hour EKG monitoring 11/19/14    Event Monitor. Sinus rhythm and sinus bradycardia. Infrequent isolated PVC's    History of blood transfusion     History of cardiovascular stress test 2/19/14    Relatively NL    History of cardiovascular stress test 03/21/2018    Normal myocardial perfusion. Global left ventricular systolic function was normal with an EF of 68%. Overall, these results are most consistent with a low risk scan.  History of coronary artery stent placement 04/2017    PTCA / Drug Eluting Stent:, CX and / or branches    History of CVA (cerebrovascular accident) without residual deficits 2014    Incidentally found on CT head.  No known impairment now or in the past.    History of echocardiogram 2/18/14    LA mildly dilated, EF 55%, LV wall thickness is moderately increased, no definite wall motion abnormalilities, what appears to be a pacer wire is seen w/n the RA and RV, mild-mod TR, mild pulmonary hypertension.  History of Holter monitoring 12/29/2017    Rare PAC's and PVC's.  History of tilt table evaluation 8/12/14    Abnormal    Hyperlipidemia     Hypertension     Non critical Right Renal artery stenosis, native ([facility] 10/2/2018    Non critical Right Renal artery stenosis, based on cath in 2016, Rt RA 30% stenosis    Pacemaker     non-functioning    S/P cardiac cath 04/10/2017    S/P coronary artery stent placement     Successful PTCA - HA Om1    SIRS (systemic inflammatory response syndrome) ([facility] [address] 5/19/2019    Type II or unspecified type diabetes mellitus without mention of complication, not stated as uncontrolled        Past Surgical History:    Past Surgical History:   Procedure Laterality Date    BACK SURGERY      CARDIAC CATHETERIZATION Left 02/19/2016    via right radial approach/ Melina Vernon/ Dr. Kush Marcelino Left 10/05/2021    Dr Lyn Singh radial-Moderate three vessel coronary artery disease involving a ostial 50-60% stenosis in a small, non-dominant RCA, a 60% mid LAD stenosis and a proximal 50-60% stenosis in the left anterior descending coronary artery. Normal left ventricular end diastolic pressure. Proceed with aggressive maximal medical management as clinically indicated.     CHOLECYSTECTOMY  08/17/2015    Liang/Charles/Saulo/ Mill[facility]    COLONOSCOPY  2010    COLONOSCOPY  02/19/2015    -polyps,diverticulosis,hemorrhoids    COLONOSCOPY  05/23/2018    Dr Moshe Smalls    COLONOSCOPY N/A 05/23/2018    COLONOSCOPY POLYPECTOMY SNARE/COLD BIOPSY  cold snare  and  hot snare performed by Erica Talbot MD at [address]  10/30/2020    with Dr. Brittany Harman 10/30/2020    Mayra Chi, DANA HIGH RISK performed by Jodi Renee DO at [address] [facility]      left eye    ENDOSCOPY, COLON, DIAGNOSTIC      EYE SURGERY      PACEMAKER INSERTION      PACEMAKER PLACEMENT      UPPER GASTROINTESTINAL ENDOSCOPY  2019    Dr. Lindsey Menon H-Pylori)duodenal bulbar/antral erythema    UPPER GASTROINTESTINAL ENDOSCOPY N/A 2019    EGD BIOPSY, clotest performed by Colby Gomez MD at [address] [facility] 10/30/2020    EGD ESOPHAGOGASTRODUODENOSCOPY performed by Guero August DO at [address] [facility] History:   Social History     Tobacco Use    Smoking status: Former Smoker     Packs/day: 1.50     Years: 8.00     Pack years: 12.00     Types: Cigarettes     Quit date: 3/4/1980     Years since quittin.6    Smokeless tobacco: Never Used   Substance Use Topics    Alcohol use: No       Family History:   Family History   Problem Relation Age of Onset    Cancer Mother         breast    Cancer Father         lung         Review of Systems:  Constitutional: negative for fever or chills  Eyes: negative for visual disturbance   ENT: negative for sore throat or nasal congestion  Respiratory: positive for cough, neg for shortness of breath and sputum  Cardiovascular: negative for chest pain,pnd,claudication or palpitations  Gastrointestinal: negative for abd pain, dion,nausea, vomiting, diarrhea or constipation  Genitourinary: negative for dysuria,,hematuria urgency or frequency  Musculoskeletal:negative for arthralgias, muscle weakness and stiff joints   Integument/breast: negative for skin rash or lesions  Neurological: positive for   numbness and tingling. Psych: negative for anxiety, positive for depression, neg forsuicidial ideation and suicidal attempt.            Objective:  Physical Exam:  BP (!) 136/58 (Site: Left Upper Arm, Position: Sitting)   Pulse 96   Wt 196 lb 3.2 oz (89 kg)   SpO2 97%   BMI 28.97 kg/m²   GEN:   He is alert and oriented  ENT:    ENT exam normal, no neck nodes or sinus tenderness  NECK:   neck supple and non tender without mass, no thyromegaly or thyroid nodules, no cervical lymphadenopathy  EYES:   No gross abnormalities. CVS:     CVS exam BP noted to be well controlled today in office, S1, S2 normal, no gallop, 2/6 murmur, chest clear, no JVD, no HSM, no edema  PULM:   chest clear, no wheezing, rales, normal symmetric air entry  ABD:   exam deferred  MUSC: no joint tenderness, deformity or swelling  Skin : no  rash or lesions  EXT: {EXTREMITIES EXAM:20816::\"Extremities: + 2 pedal pulses, no edema or calf tenderness, and warm to touch. NEURO:DTR -normal  Psych - depressed mood,no suicidal thoughts        Diagnostic Data:  Lab Results   Component Value Date    GLUCOSE 175 (H) 10/15/2021    LABA1C 7.3 08/19/2021    BUN 65 (H) 10/15/2021     10/15/2021    K 5.5 (H) 10/15/2021    CALCIUM 9.8 10/15/2021     10/15/2021    CO2 15 (L) 10/15/2021       Assessment:  1. Essential hypertension    2. Chronic diastolic heart failure ([address]    3. Dysautonomia ([address]    4. Controlled type 2 diabetes mellitus with diabetic nephropathy, with long-term current use of insulin ([address]    5. Major depressive disorder, recurrent, mild      Plan   Diagnosis Orders   1. Essential hypertension - improved,continue monitoring Basic Metabolic Panel    CBC Auto Differential   2. Chronic diastolic heart failure (HCC)     3. Dysautonomia ([address]- stable     4. Controlled type 2 diabetes mellitus with diabetic nephropathy, with long-term current use of insulin ([address] - discussed support stocking and isung walker to prevent fall    5. Major depressive disorder, recurrent, mild - worse again,start wellbutrin 150 mg in am        · Check BS 2 times per day  · Medication change: add wellbutrin 150 mg daily  · Encouraged diabetic, 1800 calorie diet.   · Goal for blood pressure control is 120/80  · Recommended regular exercise as tolerated, 5 times per week  · Labs reviewed- bmp  · Labs ordered: cbc,bmp  ascivd risk  Orders Placed This Encounter   Procedures    Basic Metabolic Panel     Standing Status:   Future suspension Place 1 drop into the left eye daily        No current facility-administered medications for this visit. No follow-ups on file.       Electronically signed by Makeda Hercules MD on 10/25/2021 at 10:06 AM

## 2021-11-01 ENCOUNTER — HOSPITAL ENCOUNTER (OUTPATIENT)
Dept: INFUSION THERAPY | Age: 76
Discharge: HOME | End: 2021-11-01
Payer: MEDICARE

## 2021-11-01 VITALS
BODY MASS INDEX: 28.88 KG/M2 | SYSTOLIC BLOOD PRESSURE: 162 MMHG | WEIGHT: 195 LBS | TEMPERATURE: 96.6 F | DIASTOLIC BLOOD PRESSURE: 70 MMHG | HEIGHT: 69 IN | RESPIRATION RATE: 18 BRPM | HEART RATE: 73 BPM

## 2021-11-01 DIAGNOSIS — D50.8 IRON DEFICIENCY ANEMIA SECONDARY TO INADEQUATE DIETARY IRON INTAKE: Primary | ICD-10-CM

## 2021-11-01 DIAGNOSIS — K90.9 IRON MALABSORPTION: ICD-10-CM

## 2021-11-01 PROCEDURE — 6360000002 HC RX W HCPCS: Performed by: INTERNAL MEDICINE

## 2021-11-01 PROCEDURE — 2580000003 HC RX 258: Performed by: INTERNAL MEDICINE

## 2021-11-01 PROCEDURE — 96365 THER/PROPH/DIAG IV INF INIT: CPT

## 2021-11-01 RX ORDER — SODIUM CHLORIDE 9 MG/ML
INJECTION, SOLUTION INTRAVENOUS CONTINUOUS
Status: CANCELLED | OUTPATIENT
Start: 2021-11-08

## 2021-11-01 RX ORDER — SODIUM CHLORIDE 9 MG/ML
INJECTION, SOLUTION INTRAVENOUS CONTINUOUS
Status: DISCONTINUED | OUTPATIENT
Start: 2021-11-01 | End: 2021-11-02 | Stop reason: HOSPADM

## 2021-11-01 RX ORDER — HEPARIN SODIUM (PORCINE) LOCK FLUSH IV SOLN 100 UNIT/ML 100 UNIT/ML
500 SOLUTION INTRAVENOUS PRN
OUTPATIENT
Start: 2021-11-08

## 2021-11-01 RX ORDER — METHYLPREDNISOLONE SODIUM SUCCINATE 125 MG/2ML
125 INJECTION, POWDER, LYOPHILIZED, FOR SOLUTION INTRAMUSCULAR; INTRAVENOUS ONCE
OUTPATIENT
Start: 2021-11-08 | End: 2021-11-08

## 2021-11-01 RX ORDER — DIPHENHYDRAMINE HYDROCHLORIDE 50 MG/ML
50 INJECTION INTRAMUSCULAR; INTRAVENOUS ONCE
OUTPATIENT
Start: 2021-11-08 | End: 2021-11-08

## 2021-11-01 RX ORDER — SODIUM CHLORIDE 0.9 % (FLUSH) 0.9 %
5-40 SYRINGE (ML) INJECTION PRN
OUTPATIENT
Start: 2021-11-08

## 2021-11-01 RX ORDER — SODIUM CHLORIDE 9 MG/ML
25 INJECTION, SOLUTION INTRAVENOUS PRN
OUTPATIENT
Start: 2021-11-08

## 2021-11-01 RX ORDER — SODIUM CHLORIDE 9 MG/ML
INJECTION, SOLUTION INTRAVENOUS CONTINUOUS
OUTPATIENT
Start: 2021-11-08

## 2021-11-01 RX ORDER — EPINEPHRINE 1 MG/ML
0.3 INJECTION, SOLUTION, CONCENTRATE INTRAVENOUS PRN
OUTPATIENT
Start: 2021-11-08

## 2021-11-01 RX ADMIN — SODIUM CHLORIDE: 9 INJECTION, SOLUTION INTRAVENOUS at 13:31

## 2021-11-01 RX ADMIN — FERRIC CARBOXYMALTOSE INJECTION 750 MG: 50 INJECTION, SOLUTION INTRAVENOUS at 13:35

## 2021-11-02 ENCOUNTER — HOSPITAL ENCOUNTER (OUTPATIENT)
Age: 76
Discharge: HOME | End: 2021-11-02
Payer: MEDICARE

## 2021-11-02 DIAGNOSIS — N18.32 STAGE 3B CHRONIC KIDNEY DISEASE (HCC): ICD-10-CM

## 2021-11-02 LAB
ALBUMIN SERPL-MCNC: 3.8 G/DL (ref 3.5–5.2)
ANION GAP SERPL CALCULATED.3IONS-SCNC: 13 MMOL/L (ref 9–17)
BUN BLDV-MCNC: 64 MG/DL (ref 8–23)
BUN/CREAT BLD: 27 (ref 9–20)
CALCIUM SERPL-MCNC: 9 MG/DL (ref 8.6–10.4)
CHLORIDE BLD-SCNC: 109 MMOL/L (ref 98–107)
CO2: 13 MMOL/L (ref 20–31)
CREAT SERPL-MCNC: 2.36 MG/DL (ref 0.7–1.2)
CREATININE URINE: 94.6 MG/DL (ref 39–259)
GFR AFRICAN AMERICAN: 33 ML/MIN
GFR NON-AFRICAN AMERICAN: 27 ML/MIN
GFR SERPL CREATININE-BSD FRML MDRD: ABNORMAL ML/MIN/{1.73_M2}
GFR SERPL CREATININE-BSD FRML MDRD: ABNORMAL ML/MIN/{1.73_M2}
GLUCOSE BLD-MCNC: 196 MG/DL (ref 70–99)
HCT VFR BLD CALC: 25.4 % (ref 40.7–50.3)
HEMOGLOBIN: 7.7 G/DL (ref 13–17)
MCH RBC QN AUTO: 26.8 PG (ref 25.2–33.5)
MCHC RBC AUTO-ENTMCNC: 30.3 G/DL (ref 28.4–34.8)
MCV RBC AUTO: 88.5 FL (ref 82.6–102.9)
NRBC AUTOMATED: 0 PER 100 WBC
PDW BLD-RTO: 16.4 % (ref 11.8–14.4)
PHOSPHORUS: 3.7 MG/DL (ref 2.5–4.5)
PLATELET # BLD: 208 K/UL (ref 138–453)
PMV BLD AUTO: 9.6 FL (ref 8.1–13.5)
POTASSIUM SERPL-SCNC: 5.6 MMOL/L (ref 3.7–5.3)
PTH INTACT: 17.93 PG/ML (ref 15–65)
RBC # BLD: 2.87 M/UL (ref 4.21–5.77)
SODIUM BLD-SCNC: 135 MMOL/L (ref 135–144)
TOTAL PROTEIN, URINE: 56 MG/DL
URINE TOTAL PROTEIN CREATININE RATIO: 0.59 (ref 0–0.2)
WBC # BLD: 6.3 K/UL (ref 3.5–11.3)

## 2021-11-02 PROCEDURE — 80048 BASIC METABOLIC PNL TOTAL CA: CPT

## 2021-11-02 PROCEDURE — 36415 COLL VENOUS BLD VENIPUNCTURE: CPT

## 2021-11-02 PROCEDURE — 83970 ASSAY OF PARATHORMONE: CPT

## 2021-11-02 PROCEDURE — 82040 ASSAY OF SERUM ALBUMIN: CPT

## 2021-11-02 PROCEDURE — 82570 ASSAY OF URINE CREATININE: CPT

## 2021-11-02 PROCEDURE — 85027 COMPLETE CBC AUTOMATED: CPT

## 2021-11-02 PROCEDURE — 84100 ASSAY OF PHOSPHORUS: CPT

## 2021-11-02 PROCEDURE — 84156 ASSAY OF PROTEIN URINE: CPT

## 2021-11-10 ENCOUNTER — HOSPITAL ENCOUNTER (OUTPATIENT)
Age: 76
Setting detail: SPECIMEN
Discharge: HOME | DRG: 682 | End: 2021-11-10
Payer: MEDICARE

## 2021-11-10 ENCOUNTER — OFFICE VISIT (OUTPATIENT)
Dept: PRIMARY CARE CLINIC | Age: 76
DRG: 682 | End: 2021-11-10
Payer: MEDICARE

## 2021-11-10 VITALS — BODY MASS INDEX: 28.56 KG/M2 | WEIGHT: 193.4 LBS | HEART RATE: 97 BPM

## 2021-11-10 DIAGNOSIS — L50.9 URTICARIA: Primary | ICD-10-CM

## 2021-11-10 DIAGNOSIS — L98.9 SKIN LESION OF LEFT LOWER EXTREMITY: ICD-10-CM

## 2021-11-10 DIAGNOSIS — R21 ACUTE PURPURIC ERUPTION: ICD-10-CM

## 2021-11-10 PROCEDURE — 88305 TISSUE EXAM BY PATHOLOGIST: CPT

## 2021-11-10 PROCEDURE — 11104 PUNCH BX SKIN SINGLE LESION: CPT | Performed by: STUDENT IN AN ORGANIZED HEALTH CARE EDUCATION/TRAINING PROGRAM

## 2021-11-10 PROCEDURE — 99213 OFFICE O/P EST LOW 20 MIN: CPT | Performed by: STUDENT IN AN ORGANIZED HEALTH CARE EDUCATION/TRAINING PROGRAM

## 2021-11-10 RX ORDER — CETIRIZINE HYDROCHLORIDE 10 MG/1
10 TABLET ORAL DAILY
Qty: 30 TABLET | Refills: 0 | Status: ON HOLD | OUTPATIENT
Start: 2021-11-10 | End: 2021-11-26 | Stop reason: SDUPTHER

## 2021-11-10 RX ORDER — PREDNISONE 20 MG/1
20 TABLET ORAL 2 TIMES DAILY
Qty: 10 TABLET | Refills: 0 | Status: SHIPPED | OUTPATIENT
Start: 2021-11-10 | End: 2021-11-15

## 2021-11-10 ASSESSMENT — ENCOUNTER SYMPTOMS
SINUS PRESSURE: 0
DIARRHEA: 0
TROUBLE SWALLOWING: 0
SINUS PAIN: 0
COLOR CHANGE: 1
BACK PAIN: 0
WHEEZING: 0
NAUSEA: 0
VOMITING: 0
ABDOMINAL PAIN: 0
COUGH: 0
SHORTNESS OF BREATH: 0
SORE THROAT: 0

## 2021-11-10 NOTE — PROGRESS NOTES
then spread upwards. He then also developed some hives on his abdomen. The patient has had these symptoms for 3 days. Possible triggers have not been identified, although the patient's wife states they went to [facility] Sunday and she is not sure if food from there may have triggered this event. He ate a similar meal in the past without any issues. The only recent med changes the patient had were 2 IV iron infusions. One was on October 25, 2021 and the second on 11/1/2021. There were no immediate side effects noted since that time. He was also evaluated by Nephrology on 11/2/2021 which noted no skin changes or rashes. There is no prior history of any rashes present. Each individual hive lasts greater than 24 hours. These lesions are pruritic and not painful. The patient has tried the following medications for control of these symptoms: over-the-counter antihistamines. These medications offer poor relief of symptoms. There has not been laryngeal/throat involvement. No respiratory symptoms. No GI symptoms. The patient has not required emergency room evaluation and treatment for these symptoms. Skin biopsy has not been performed. There is no history: atopy, hives, swelling and anaphylaxis. .  Patient also c/o edema in bilateral lower legs. . The edema has been moderate. Onset of symptoms was several years ago, and patient reports symptoms have gradually worsened since that time. The edema is present all day. The swelling has been aggravated by hot weather. The swelling has been relieved by diuretics, support stockings, elevation of involved area, low-salt diet. Patient recently had a diagnostic cardiac cath which noted moderated 3 vessel disease with plans for maximal med management.     Family History   Problem Relation Age of Onset    Cancer Mother         breast    Cancer Father         lung     Health Maintenance   Alcohol/Substance use History - None  Smoking History    Tobacco Use      Smoking status: Former Smoker        Packs/day: 1.50        Years: 8.00        Pack years: 12        Types: Cigarettes        Quit date: 3/4/1980        Years since quittin.7      Smokeless tobacco: Never Used      Health Maintenance   Topic Date Due    COVID-19 Vaccine (3 - Booster for Pfizer series) 2021    Hepatitis C screen  10/29/[birth year]26 (Originally 1945)    Annual Wellness Visit (AWV)  2022    Potassium monitoring  2022    Creatinine monitoring  2022    DTaP/Tdap/Td vaccine (2 - Td or Tdap) 2025    Flu vaccine  Completed    Shingles Vaccine  Completed    Pneumococcal 65+ years Vaccine  Completed    Hepatitis A vaccine  Aged Out    Hib vaccine  Aged Out    Meningococcal (ACWY) vaccine  Aged Out      Depression Screening  PHQ Scores 2021 10/19/2020 2019 2018 2017 2017 2015   PHQ2 Score 3 0 0 0 3 0 0   PHQ9 Score 8 0 0 0 4 0 0     Interpretation of Total Score Depression Severity: 1-4 = Minimal depression, 5-9 = Mild depression, 10-14 = Moderate depression, 15-19 = Moderately severe depression, 20-27 = Severe depression      Review of Systems   Constitutional: Negative for activity change, appetite change, chills, diaphoresis, fatigue, fever and unexpected weight change. HENT: Negative for sinus pressure, sinus pain, sore throat and trouble swallowing. Respiratory: Negative for cough, shortness of breath and wheezing. Cardiovascular: Negative for chest pain, palpitations and leg swelling. Gastrointestinal: Negative for abdominal pain, diarrhea, nausea and vomiting. Endocrine: Negative for cold intolerance, polydipsia, polyphagia and polyuria. Genitourinary: Negative for difficulty urinating, flank pain and frequency. Musculoskeletal: Negative for back pain, gait problem, joint swelling, neck pain and neck stiffness. Skin: Positive for color change and rash. Negative for pallor and wound.    Allergic/Immunologic: Negative for environmental allergies and food allergies. Neurological: Negative for light-headedness, numbness and headaches. Psychiatric/Behavioral: Negative for confusion, sleep disturbance and suicidal ideas. The patient is not nervous/anxious. Objective    Physical Exam  Vitals reviewed. Constitutional:       General: He is not in acute distress. Appearance: Normal appearance. He is well-developed. He is not ill-appearing or diaphoretic. HENT:      Head: Normocephalic and atraumatic. Nose: Nose normal. No congestion or rhinorrhea. Mouth/Throat:      Mouth: Mucous membranes are moist.      Pharynx: No oropharyngeal exudate or posterior oropharyngeal erythema. Eyes:      General: No scleral icterus. Right eye: No discharge. Left eye: No discharge. Extraocular Movements: Extraocular movements intact. Conjunctiva/sclera: Conjunctivae normal.      Pupils: Pupils are equal, round, and reactive to light. Cardiovascular:      Rate and Rhythm: Normal rate and regular rhythm. Pulses: Normal pulses. Heart sounds: Normal heart sounds. No murmur heard. Pulmonary:      Effort: Pulmonary effort is normal. No respiratory distress. Breath sounds: Normal breath sounds. No stridor. No wheezing or rhonchi. Abdominal:      General: Bowel sounds are normal. There is no distension. Palpations: Abdomen is soft. There is no mass. Tenderness: There is no abdominal tenderness. There is no right CVA tenderness or left CVA tenderness. Hernia: No hernia is present. Musculoskeletal:         General: No swelling, tenderness, deformity or signs of injury. Normal range of motion. Cervical back: Normal range of motion and neck supple. No rigidity or tenderness. Right lower leg: Edema (2+ to both knees) present. Left lower leg: Edema (2+ to both knees) present. Lymphadenopathy:      Cervical: No cervical adenopathy. Skin:     General: Skin is warm and dry. Capillary Refill: Capillary refill takes less than 2 seconds. Coloration: Skin is not jaundiced or pale. Findings: Lesion and rash present. No bruising or erythema. Comments: Multiple non-palpable flat irregular erythematous rash that may be purpura   Neurological:      General: No focal deficit present. Mental Status: He is alert and oriented to person, place, and time. Cranial Nerves: No cranial nerve deficit. Sensory: No sensory deficit. Motor: No weakness. Coordination: Coordination normal.      Gait: Gait normal.   Psychiatric:         Mood and Affect: Mood normal.         Speech: Speech normal.         Behavior: Behavior normal.         Thought Content:  Thought content normal.         Judgment: Judgment normal.          Pulse 97   Wt 193 lb 6.4 oz (87.7 kg)   BMI 28.56 kg/m²   BP Readings from Last 3 Encounters:   11/02/21 (!) 136/59   11/01/21 (!) 162/70   10/25/21 (!) 146/77     Lab Results   Component Value Date    WBC 6.3 11/02/2021    HGB 7.7 (L) 11/02/2021    HCT 25.4 (L) 11/02/2021     11/02/2021    CHOL 69 09/21/2021    TRIG 185 (H) 09/21/2021    HDL 14 (L) 09/21/2021    LDLDIRECT 44 11/02/2017    ALT 16 09/20/2021    AST 19 09/20/2021     11/02/2021    K 5.6 (H) 11/02/2021     (H) 11/02/2021    CREATININE 2.36 (H) 11/02/2021    BUN 64 (H) 11/02/2021    CO2 13 (L) 11/02/2021    TSH 2.26 11/02/2017    PSA 3.68 04/30/2021    INR 1.1 09/20/2021    LABA1C 7.3 08/19/2021    LABMICR 30 10/17/2015     Lab Results   Component Value Date    CALCIUM 9.0 11/02/2021    PHOS 3.7 11/02/2021     Lab Results   Component Value Date    LDLCHOLESTEROL 18 09/21/2021    LDLDIRECT 44 11/02/2017     CURRENT MEDS W/ ASSOC DIAG         Start Date End Date     acyclovir (ZOVIRAX) 400 MG tablet  07/06/20  --     Associated Diagnoses:  --     amLODIPine (NORVASC) 10 MG tablet  09/22/21  --     Take 1 tablet by mouth nightly     Associated Diagnoses:  --     DIANA MICROLET LANCETS MISC  11/16/15  --     TEST TWICE DAILY     Associated Diagnoses:  --     buPROPion (WELLBUTRIN XL) 150 MG extended release tablet  10/25/21  --     Take 1 tablet by mouth every morning     Patient not taking: Reported on 2021     Associated Diagnoses:  --     cetirizine (ZYRTEC) 10 MG tablet  11/10/21  12/10/21     Take 1 tablet by mouth daily     Associated Diagnoses:  Urticaria     CONTOUR TEST strip  10/21/19  --     USE 1 STRIP TO CHECK GLUCOSE TWICE DAILY     Associated Diagnoses:  Type 2 diabetes mellitus with hyperglycemia, with long-term current use of insulin ([facility])     eplerenone (INSPRA) 25 MG tablet  21     Take 1 tablet by mouth daily     Associated Diagnoses:  Essential hypertension     ezetimibe (ZETIA) 10 MG tablet  20  --     Take 1 tablet by mouth daily     Associated Diagnoses:  --     ferrous sulfate 325 (65 Fe) MG tablet  --  --     Associated Diagnoses:  --     glipiZIDE (GLUCOTROL XL) 5 MG extended release tablet  21  --     Take 2 tablets by mouth 2 times daily     Associated Diagnoses:  --     hydrALAZINE (APRESOLINE) 100 MG tablet  21  --     Take 1 tablet by mouth 3 times daily Patient is taking 100 MG 3 times daily     Associated Diagnoses:  --     insulin glargine (LANTUS SOLOSTAR) 100 UNIT/ML injection pen ()  21     Inject 28 Units into the skin 2 times daily     Associated Diagnoses:  --     Insulin Pen Needle (PEN NEEDLES) 31G X 6 MM MISC  10/16/15  --     1 each by Does not apply route daily     Associated Diagnoses:  --     latanoprost (XALATAN) 0.005 % ophthalmic solution  21  --     Associated Diagnoses:  --     lisinopril (PRINIVIL;ZESTRIL) 20 MG tablet  10/15/21  01/13/22     Take 1 tablet by mouth daily     Associated Diagnoses:  --     loratadine (CLARITIN) 10 MG tablet  10/19/21  --     Take 1 tablet by mouth daily     Patient taking differently: Take 10 mg by mouth daily as needed Associated Diagnoses:  --     nitroGLYCERIN (NITROSTAT) 0.4 MG SL tablet  06/28/21  --     Place 1 tablet under the tongue every 5 minutes as needed for Chest pain     Associated Diagnoses:  --     Omega-3 Fatty Acids (FISH OIL) 1000 MG CAPS  --  --     Associated Diagnoses:  --     omeprazole (PRILOSEC) 40 MG delayed release capsule  10/26/21  --     Associated Diagnoses:  --     prednisoLONE acetate (PRED FORTE) 1 % ophthalmic suspension  03/30/15  --     Associated Diagnoses:  --     predniSONE (DELTASONE) 20 MG tablet  11/10/21  11/15/21     Take 1 tablet by mouth 2 times daily for 5 days     Associated Diagnoses:  Urticaria     sucralfate (CARAFATE) 1 GM tablet  10/26/21  --     Associated Diagnoses:  --        Ongoing Comment    Vale Merlin, MD    11/03/2020  2:10 PM    I think I        Please note that this chart was generated using voice recognition Dragon dictation software. Although every effort was made to ensure the accuracy of this automated transcription, some errors in transcription may have occurred.     Electronically signed by Lew Landry MD on 11/10/21 at 4:41 PM

## 2021-11-11 ENCOUNTER — HOSPITAL ENCOUNTER (OUTPATIENT)
Age: 76
Discharge: HOME | DRG: 682 | End: 2021-11-11
Payer: MEDICARE

## 2021-11-11 ENCOUNTER — APPOINTMENT (OUTPATIENT)
Dept: GENERAL RADIOLOGY | Age: 76
DRG: 682 | End: 2021-11-11
Payer: MEDICARE

## 2021-11-11 ENCOUNTER — HOSPITAL ENCOUNTER (INPATIENT)
Age: 76
LOS: 10 days | Discharge: ANOTHER HOSPITAL | DRG: 682 | End: 2021-11-21
Attending: EMERGENCY MEDICINE
Payer: MEDICARE

## 2021-11-11 DIAGNOSIS — L95.8 URTICARIAL VASCULITIS: ICD-10-CM

## 2021-11-11 DIAGNOSIS — L95.8 URTICARIAL VASCULITIS: Primary | ICD-10-CM

## 2021-11-11 DIAGNOSIS — R21 ACUTE PURPURIC ERUPTION: ICD-10-CM

## 2021-11-11 DIAGNOSIS — N17.9 ACUTE KIDNEY INJURY (HCC): ICD-10-CM

## 2021-11-11 DIAGNOSIS — D64.9 ACUTE ON CHRONIC ANEMIA: ICD-10-CM

## 2021-11-11 DIAGNOSIS — R07.9 CHEST PAIN, UNSPECIFIED TYPE: Primary | ICD-10-CM

## 2021-11-11 DIAGNOSIS — E87.5 HYPERKALEMIA: ICD-10-CM

## 2021-11-11 DIAGNOSIS — Z11.59 NEED FOR HEPATITIS C SCREENING TEST: Primary | ICD-10-CM

## 2021-11-11 DIAGNOSIS — D64.9 CHRONIC ANEMIA: ICD-10-CM

## 2021-11-11 DIAGNOSIS — R73.9 HYPERGLYCEMIA: ICD-10-CM

## 2021-11-11 LAB
-: NORMAL
ABSOLUTE EOS #: 0 K/UL (ref 0–0.44)
ABSOLUTE EOS #: 0 K/UL (ref 0–0.44)
ABSOLUTE IMMATURE GRANULOCYTE: 0.16 K/UL (ref 0–0.3)
ABSOLUTE IMMATURE GRANULOCYTE: 0.17 K/UL (ref 0–0.3)
ABSOLUTE LYMPH #: 0.6 K/UL (ref 1.1–3.7)
ABSOLUTE LYMPH #: 0.73 K/UL (ref 1.1–3.7)
ABSOLUTE MONO #: 0.26 K/UL (ref 0.1–1.2)
ABSOLUTE MONO #: 0.32 K/UL (ref 0.1–1.2)
ALBUMIN SERPL-MCNC: 3.9 G/DL (ref 3.5–5.2)
ALBUMIN/GLOBULIN RATIO: 1.1 (ref 1–2.5)
ALP BLD-CCNC: 87 U/L (ref 40–129)
ALT SERPL-CCNC: 12 U/L (ref 5–41)
AMORPHOUS: NORMAL
ANION GAP SERPL CALCULATED.3IONS-SCNC: 14 MMOL/L (ref 9–17)
ANION GAP SERPL CALCULATED.3IONS-SCNC: 16 MMOL/L (ref 9–17)
AST SERPL-CCNC: 12 U/L
BACTERIA: NORMAL
BASOPHILS # BLD: 0 % (ref 0–2)
BASOPHILS # BLD: 0 % (ref 0–2)
BASOPHILS ABSOLUTE: 0 K/UL (ref 0–0.2)
BASOPHILS ABSOLUTE: 0 K/UL (ref 0–0.2)
BILIRUB SERPL-MCNC: 0.18 MG/DL (ref 0.3–1.2)
BILIRUBIN DIRECT: <0.08 MG/DL
BILIRUBIN URINE: NEGATIVE
BILIRUBIN, INDIRECT: ABNORMAL MG/DL (ref 0–1)
BNP INTERPRETATION: ABNORMAL
BUN BLDV-MCNC: 82 MG/DL (ref 8–23)
BUN BLDV-MCNC: 86 MG/DL (ref 8–23)
BUN/CREAT BLD: 24 (ref 9–20)
C-REACTIVE PROTEIN: 20.2 MG/L (ref 0–5)
CALCIUM SERPL-MCNC: 8.5 MG/DL (ref 8.6–10.4)
CALCIUM SERPL-MCNC: 8.7 MG/DL (ref 8.6–10.4)
CASTS UA: NORMAL /LPF
CHLORIDE BLD-SCNC: 102 MMOL/L (ref 98–107)
CHLORIDE BLD-SCNC: 103 MMOL/L (ref 98–107)
CK MB: 7.3 NG/ML
CO2: 10 MMOL/L (ref 20–31)
CO2: 13 MMOL/L (ref 20–31)
COLOR: YELLOW
COMMENT UA: ABNORMAL
CREAT SERPL-MCNC: 3.64 MG/DL (ref 0.7–1.2)
CREAT SERPL-MCNC: 3.7 MG/DL (ref 0.7–1.2)
CRYSTALS, UA: NORMAL /HPF
DERMATOLOGY PATHOLOGY REPORT: NORMAL
DIFFERENTIAL TYPE: ABNORMAL
DIFFERENTIAL TYPE: ABNORMAL
EKG ATRIAL RATE: 88 BPM
EKG P AXIS: 24 DEGREES
EKG P-R INTERVAL: 200 MS
EKG Q-T INTERVAL: 354 MS
EKG QRS DURATION: 104 MS
EKG QTC CALCULATION (BAZETT): 428 MS
EKG R AXIS: -7 DEGREES
EKG T AXIS: 60 DEGREES
EKG VENTRICULAR RATE: 88 BPM
EOSINOPHILS RELATIVE PERCENT: 0 % (ref 1–4)
EOSINOPHILS RELATIVE PERCENT: 0 % (ref 1–4)
EPITHELIAL CELLS UA: NORMAL /HPF (ref 0–5)
GFR AFRICAN AMERICAN: 19 ML/MIN
GFR AFRICAN AMERICAN: 20 ML/MIN
GFR NON-AFRICAN AMERICAN: 16 ML/MIN
GFR NON-AFRICAN AMERICAN: 16 ML/MIN
GFR SERPL CREATININE-BSD FRML MDRD: ABNORMAL ML/MIN/{1.73_M2}
GLUCOSE BLD-MCNC: 439 MG/DL (ref 70–99)
GLUCOSE BLD-MCNC: 480 MG/DL (ref 70–99)
GLUCOSE URINE: ABNORMAL
HCT VFR BLD CALC: 24.1 % (ref 40.7–50.3)
HCT VFR BLD CALC: 24.8 % (ref 40.7–50.3)
HEMOGLOBIN: 7.5 G/DL (ref 13–17)
HEMOGLOBIN: 7.5 G/DL (ref 13–17)
IMMATURE GRANULOCYTES: 2 %
IMMATURE GRANULOCYTES: 2 %
KETONES, URINE: NEGATIVE
LEUKOCYTE ESTERASE, URINE: NEGATIVE
LYMPHOCYTES # BLD: 7 % (ref 24–43)
LYMPHOCYTES # BLD: 9 % (ref 24–43)
MCH RBC QN AUTO: 27.1 PG (ref 25.2–33.5)
MCH RBC QN AUTO: 27.7 PG (ref 25.2–33.5)
MCHC RBC AUTO-ENTMCNC: 30.2 G/DL (ref 28.4–34.8)
MCHC RBC AUTO-ENTMCNC: 31.1 G/DL (ref 28.4–34.8)
MCV RBC AUTO: 88.9 FL (ref 82.6–102.9)
MCV RBC AUTO: 89.5 FL (ref 82.6–102.9)
MONOCYTES # BLD: 3 % (ref 3–12)
MONOCYTES # BLD: 4 % (ref 3–12)
MORPHOLOGY: ABNORMAL
MORPHOLOGY: NORMAL
MUCUS: NORMAL
NITRITE, URINE: NEGATIVE
NRBC AUTOMATED: 0 PER 100 WBC
NRBC AUTOMATED: 0 PER 100 WBC
OTHER OBSERVATIONS UA: NORMAL
PDW BLD-RTO: 17.7 % (ref 11.8–14.4)
PDW BLD-RTO: 17.8 % (ref 11.8–14.4)
PH UA: 5.5 (ref 5–9)
PLATELET # BLD: 182 K/UL (ref 138–453)
PLATELET # BLD: 200 K/UL (ref 138–453)
PLATELET ESTIMATE: ABNORMAL
PLATELET ESTIMATE: ABNORMAL
PMV BLD AUTO: 9.5 FL (ref 8.1–13.5)
PMV BLD AUTO: 9.6 FL (ref 8.1–13.5)
POTASSIUM SERPL-SCNC: 6.6 MMOL/L (ref 3.7–5.3)
POTASSIUM SERPL-SCNC: 6.6 MMOL/L (ref 3.7–5.3)
PRO-BNP: 3702 PG/ML
PROTEIN UA: ABNORMAL
RBC # BLD: 2.71 M/UL (ref 4.21–5.77)
RBC # BLD: 2.77 M/UL (ref 4.21–5.77)
RBC # BLD: ABNORMAL 10*6/UL
RBC # BLD: ABNORMAL 10*6/UL
RBC UA: NORMAL /HPF (ref 0–2)
RENAL EPITHELIAL, UA: NORMAL /HPF
SEDIMENTATION RATE, ERYTHROCYTE: 88 MM (ref 0–20)
SEG NEUTROPHILS: 85 % (ref 36–65)
SEG NEUTROPHILS: 88 % (ref 36–65)
SEGMENTED NEUTROPHILS ABSOLUTE COUNT: 6.89 K/UL (ref 1.5–8.1)
SEGMENTED NEUTROPHILS ABSOLUTE COUNT: 7.47 K/UL (ref 1.5–8.1)
SEND OUT REPORT: NORMAL
SODIUM BLD-SCNC: 128 MMOL/L (ref 135–144)
SODIUM BLD-SCNC: 130 MMOL/L (ref 135–144)
SPECIFIC GRAVITY UA: 1.02 (ref 1.01–1.02)
TEST NAME: NORMAL
TOTAL CK: 94 U/L (ref 39–308)
TOTAL PROTEIN: 7.4 G/DL (ref 6.4–8.3)
TRICHOMONAS: NORMAL
TROPONIN INTERP: ABNORMAL
TROPONIN INTERP: ABNORMAL
TROPONIN T: ABNORMAL NG/ML
TROPONIN T: ABNORMAL NG/ML
TROPONIN, HIGH SENSITIVITY: 45 NG/L (ref 0–22)
TROPONIN, HIGH SENSITIVITY: 49 NG/L (ref 0–22)
TURBIDITY: CLEAR
URINE HGB: ABNORMAL
UROBILINOGEN, URINE: NORMAL
WBC # BLD: 8.1 K/UL (ref 3.5–11.3)
WBC # BLD: 8.5 K/UL (ref 3.5–11.3)
WBC # BLD: ABNORMAL 10*3/UL
WBC # BLD: ABNORMAL 10*3/UL
WBC UA: NORMAL /HPF (ref 0–5)
YEAST: NORMAL

## 2021-11-11 PROCEDURE — 85025 COMPLETE CBC W/AUTO DIFF WBC: CPT

## 2021-11-11 PROCEDURE — 96375 TX/PRO/DX INJ NEW DRUG ADDON: CPT

## 2021-11-11 PROCEDURE — 82553 CREATINE MB FRACTION: CPT

## 2021-11-11 PROCEDURE — 93005 ELECTROCARDIOGRAM TRACING: CPT | Performed by: EMERGENCY MEDICINE

## 2021-11-11 PROCEDURE — 80053 COMPREHEN METABOLIC PANEL: CPT

## 2021-11-11 PROCEDURE — 86038 ANTINUCLEAR ANTIBODIES: CPT

## 2021-11-11 PROCEDURE — 93010 ELECTROCARDIOGRAM REPORT: CPT | Performed by: FAMILY MEDICINE

## 2021-11-11 PROCEDURE — 82550 ASSAY OF CK (CPK): CPT

## 2021-11-11 PROCEDURE — 2580000003 HC RX 258: Performed by: EMERGENCY MEDICINE

## 2021-11-11 PROCEDURE — 80048 BASIC METABOLIC PNL TOTAL CA: CPT

## 2021-11-11 PROCEDURE — 36415 COLL VENOUS BLD VENIPUNCTURE: CPT

## 2021-11-11 PROCEDURE — 71045 X-RAY EXAM CHEST 1 VIEW: CPT

## 2021-11-11 PROCEDURE — 84484 ASSAY OF TROPONIN QUANT: CPT

## 2021-11-11 PROCEDURE — 6370000000 HC RX 637 (ALT 250 FOR IP): Performed by: EMERGENCY MEDICINE

## 2021-11-11 PROCEDURE — 86225 DNA ANTIBODY NATIVE: CPT

## 2021-11-11 PROCEDURE — 83516 IMMUNOASSAY NONANTIBODY: CPT

## 2021-11-11 PROCEDURE — 86162 COMPLEMENT TOTAL (CH50): CPT

## 2021-11-11 PROCEDURE — 6360000002 HC RX W HCPCS: Performed by: EMERGENCY MEDICINE

## 2021-11-11 PROCEDURE — 82248 BILIRUBIN DIRECT: CPT

## 2021-11-11 PROCEDURE — 83880 ASSAY OF NATRIURETIC PEPTIDE: CPT

## 2021-11-11 PROCEDURE — 6370000000 HC RX 637 (ALT 250 FOR IP): Performed by: INTERNAL MEDICINE

## 2021-11-11 PROCEDURE — 85652 RBC SED RATE AUTOMATED: CPT

## 2021-11-11 PROCEDURE — 94761 N-INVAS EAR/PLS OXIMETRY MLT: CPT

## 2021-11-11 PROCEDURE — 2580000003 HC RX 258: Performed by: INTERNAL MEDICINE

## 2021-11-11 PROCEDURE — 96365 THER/PROPH/DIAG IV INF INIT: CPT

## 2021-11-11 PROCEDURE — 99284 EMERGENCY DEPT VISIT MOD MDM: CPT

## 2021-11-11 PROCEDURE — 2500000003 HC RX 250 WO HCPCS: Performed by: EMERGENCY MEDICINE

## 2021-11-11 PROCEDURE — 1200000000 HC SEMI PRIVATE

## 2021-11-11 PROCEDURE — 86140 C-REACTIVE PROTEIN: CPT

## 2021-11-11 PROCEDURE — 81001 URINALYSIS AUTO W/SCOPE: CPT

## 2021-11-11 RX ORDER — ACYCLOVIR 200 MG/1
400 CAPSULE ORAL 2 TIMES DAILY
Status: DISCONTINUED | OUTPATIENT
Start: 2021-11-11 | End: 2021-11-21 | Stop reason: HOSPADM

## 2021-11-11 RX ORDER — SODIUM CHLORIDE 9 MG/ML
25 INJECTION, SOLUTION INTRAVENOUS PRN
Status: DISCONTINUED | OUTPATIENT
Start: 2021-11-11 | End: 2021-11-21 | Stop reason: HOSPADM

## 2021-11-11 RX ORDER — AMLODIPINE BESYLATE 10 MG/1
10 TABLET ORAL NIGHTLY
Status: DISCONTINUED | OUTPATIENT
Start: 2021-11-11 | End: 2021-11-21 | Stop reason: HOSPADM

## 2021-11-11 RX ORDER — SODIUM POLYSTYRENE SULFONATE 15 G/60ML
15 SUSPENSION ORAL; RECTAL ONCE
Status: COMPLETED | OUTPATIENT
Start: 2021-11-11 | End: 2021-11-11

## 2021-11-11 RX ORDER — SODIUM CHLORIDE 9 MG/ML
INJECTION, SOLUTION INTRAVENOUS CONTINUOUS
Status: DISCONTINUED | OUTPATIENT
Start: 2021-11-11 | End: 2021-11-12

## 2021-11-11 RX ORDER — ONDANSETRON 4 MG/1
4 TABLET, ORALLY DISINTEGRATING ORAL EVERY 8 HOURS PRN
Status: DISCONTINUED | OUTPATIENT
Start: 2021-11-11 | End: 2021-11-21 | Stop reason: HOSPADM

## 2021-11-11 RX ORDER — ASPIRIN 81 MG/1
162 TABLET, CHEWABLE ORAL ONCE
Status: COMPLETED | OUTPATIENT
Start: 2021-11-11 | End: 2021-11-11

## 2021-11-11 RX ORDER — ACETAMINOPHEN 325 MG/1
650 TABLET ORAL EVERY 6 HOURS PRN
Status: DISCONTINUED | OUTPATIENT
Start: 2021-11-11 | End: 2021-11-21 | Stop reason: HOSPADM

## 2021-11-11 RX ORDER — SODIUM CHLORIDE 0.9 % (FLUSH) 0.9 %
10 SYRINGE (ML) INJECTION PRN
Status: DISCONTINUED | OUTPATIENT
Start: 2021-11-11 | End: 2021-11-21 | Stop reason: HOSPADM

## 2021-11-11 RX ORDER — CETIRIZINE HYDROCHLORIDE 10 MG/1
10 TABLET ORAL DAILY
Status: DISCONTINUED | OUTPATIENT
Start: 2021-11-12 | End: 2021-11-12

## 2021-11-11 RX ORDER — 0.9 % SODIUM CHLORIDE 0.9 %
1000 INTRAVENOUS SOLUTION INTRAVENOUS ONCE
Status: COMPLETED | OUTPATIENT
Start: 2021-11-11 | End: 2021-11-11

## 2021-11-11 RX ORDER — GLIPIZIDE 5 MG/1
10 TABLET ORAL 2 TIMES DAILY
Status: DISCONTINUED | OUTPATIENT
Start: 2021-11-11 | End: 2021-11-13

## 2021-11-11 RX ORDER — ONDANSETRON 2 MG/ML
4 INJECTION INTRAMUSCULAR; INTRAVENOUS EVERY 6 HOURS PRN
Status: DISCONTINUED | OUTPATIENT
Start: 2021-11-11 | End: 2021-11-21 | Stop reason: HOSPADM

## 2021-11-11 RX ORDER — HYDRALAZINE HYDROCHLORIDE 50 MG/1
100 TABLET, FILM COATED ORAL 3 TIMES DAILY
Status: DISCONTINUED | OUTPATIENT
Start: 2021-11-11 | End: 2021-11-21 | Stop reason: HOSPADM

## 2021-11-11 RX ORDER — NITROGLYCERIN 0.4 MG/1
0.4 TABLET SUBLINGUAL ONCE
Status: COMPLETED | OUTPATIENT
Start: 2021-11-11 | End: 2021-11-11

## 2021-11-11 RX ORDER — PREDNISOLONE ACETATE 10 MG/ML
1 SUSPENSION/ DROPS OPHTHALMIC DAILY
Status: DISCONTINUED | OUTPATIENT
Start: 2021-11-11 | End: 2021-11-21 | Stop reason: HOSPADM

## 2021-11-11 RX ORDER — SODIUM CHLORIDE 0.9 % (FLUSH) 0.9 %
5-40 SYRINGE (ML) INJECTION EVERY 12 HOURS SCHEDULED
Status: DISCONTINUED | OUTPATIENT
Start: 2021-11-11 | End: 2021-11-21 | Stop reason: HOSPADM

## 2021-11-11 RX ORDER — POLYETHYLENE GLYCOL 3350 17 G/17G
17 POWDER, FOR SOLUTION ORAL DAILY PRN
Status: DISCONTINUED | OUTPATIENT
Start: 2021-11-11 | End: 2021-11-21 | Stop reason: HOSPADM

## 2021-11-11 RX ORDER — ACETAMINOPHEN 650 MG/1
650 SUPPOSITORY RECTAL EVERY 6 HOURS PRN
Status: DISCONTINUED | OUTPATIENT
Start: 2021-11-11 | End: 2021-11-21 | Stop reason: HOSPADM

## 2021-11-11 RX ORDER — INSULIN GLARGINE 100 [IU]/ML
28 INJECTION, SOLUTION SUBCUTANEOUS 2 TIMES DAILY
Status: DISCONTINUED | OUTPATIENT
Start: 2021-11-11 | End: 2021-11-16

## 2021-11-11 RX ADMIN — GLIPIZIDE 10 MG: 5 TABLET ORAL at 21:34

## 2021-11-11 RX ADMIN — INSULIN GLARGINE 28 UNITS: 100 INJECTION, SOLUTION SUBCUTANEOUS at 21:36

## 2021-11-11 RX ADMIN — INSULIN HUMAN 15 UNITS: 100 INJECTION, SOLUTION PARENTERAL at 18:28

## 2021-11-11 RX ADMIN — SODIUM CHLORIDE: 9 INJECTION, SOLUTION INTRAVENOUS at 20:56

## 2021-11-11 RX ADMIN — NITROGLYCERIN 1 INCH: 20 OINTMENT TOPICAL at 17:47

## 2021-11-11 RX ADMIN — NITROGLYCERIN 0.4 MG: 0.4 TABLET SUBLINGUAL at 17:48

## 2021-11-11 RX ADMIN — PREDNISOLONE ACETATE 1 DROP: 10 SUSPENSION/ DROPS OPHTHALMIC at 21:40

## 2021-11-11 RX ADMIN — SODIUM CHLORIDE 1000 ML: 9 INJECTION, SOLUTION INTRAVENOUS at 17:50

## 2021-11-11 RX ADMIN — HYDRALAZINE HYDROCHLORIDE 100 MG: 50 TABLET, FILM COATED ORAL at 21:35

## 2021-11-11 RX ADMIN — SODIUM BICARBONATE 50 MEQ: 84 INJECTION, SOLUTION INTRAVENOUS at 18:35

## 2021-11-11 RX ADMIN — ACYCLOVIR 400 MG: 200 CAPSULE ORAL at 21:34

## 2021-11-11 RX ADMIN — AMLODIPINE BESYLATE 10 MG: 10 TABLET ORAL at 21:35

## 2021-11-11 RX ADMIN — CALCIUM GLUCONATE 1000 MG: 98 INJECTION, SOLUTION INTRAVENOUS at 18:46

## 2021-11-11 RX ADMIN — SODIUM POLYSTYRENE SULFONATE 15 G: 15 SUSPENSION ORAL; RECTAL at 18:43

## 2021-11-11 RX ADMIN — ASPIRIN 162 MG: 81 TABLET, CHEWABLE ORAL at 17:46

## 2021-11-11 ASSESSMENT — PAIN DESCRIPTION - DIRECTION: RADIATING_TOWARDS: LEFT ARM

## 2021-11-11 ASSESSMENT — PAIN SCALES - GENERAL
PAINLEVEL_OUTOF10: 0
PAINLEVEL_OUTOF10: 4
PAINLEVEL_OUTOF10: 0
PAINLEVEL_OUTOF10: 0

## 2021-11-11 ASSESSMENT — PAIN DESCRIPTION - DESCRIPTORS: DESCRIPTORS: TIGHTNESS

## 2021-11-11 ASSESSMENT — PAIN DESCRIPTION - LOCATION: LOCATION: CHEST

## 2021-11-11 ASSESSMENT — PAIN DESCRIPTION - PAIN TYPE: TYPE: ACUTE PAIN

## 2021-11-11 ASSESSMENT — PAIN DESCRIPTION - ORIENTATION: ORIENTATION: LEFT

## 2021-11-11 NOTE — ED NOTES
Called Dr Luli Villegas via answering service, waiting for call back.       Ella Torres  11/11/21 1829

## 2021-11-11 NOTE — ED PROVIDER NOTES
HPI:  11/11/21,   Time: 5:32 PM JOHN Rossi is a 68 y.o. male presenting to the ED for was told to come to the hospital because his potassium was high but while walking into the hospital and actually walking to his car he develops chest pressure with radiation to left arm, beginning just prior to arrival ago. The complaint has been constant, moderate in severity, and worsened by light exertion. Chest pain has improved significantly at this time with rest.  Patient does have history of angina and takes nitroglycerin occasionally but it has been a long time since he has had to take nitro.   He had an abnormal cardiac cath about a month ago but no intervention because of the patient's anemic status    ROS:   Pertinent positives and negatives are stated within HPI, all other systems reviewed and are negative.  --------------------------------------------- PAST HISTORY ---------------------------------------------  Past Medical History:  has a past medical history of Acute MI ([facility] [address] Acute renal failure with tubular necrosis ([facility] [address] Anemia, CAD (coronary artery disease), Cerebral artery occlusion with cerebral infarction ([facility] [address] CHF (congestive heart failure) ([facility] [address] Chronic back pain, Closed fracture of lumbar vertebra without mention of spinal cord injury, Displacement of intervertebral disc, site unspecified, without myelopathy, H/O cardiac catheterization, H/O cardiovascular stress test, H/O tilt table evaluation, H/O tilt table evaluation, History of 24 hour EKG monitoring, History of 24 hour EKG monitoring, History of blood transfusion, History of cardiovascular stress test, History of cardiovascular stress test, History of coronary artery stent placement, History of CVA (cerebrovascular accident) without residual deficits, History of echocardiogram, History of Holter monitoring, History of tilt table evaluation, Hyperlipidemia, Hypertension, Non critical Right Renal artery stenosis, native ([facility] [address] Pacemaker, S/P cardiac cath, S/P coronary artery stent placement, SIRS (systemic inflammatory response syndrome) ([facility] [address] and Type II or unspecified type diabetes mellitus without mention of complication, not stated as uncontrolled. Past Surgical History:  has a past surgical history that includes Pacemaker insertion; back surgery; Cholecystectomy (08/17/2015); Corneal transplant; Cardiac catheterization (Left, 02/19/2016); pacemaker placement; Colonoscopy (2010); Colonoscopy (02/19/2015); Colonoscopy (05/23/2018); Colonoscopy (N/A, 05/23/2018); Upper gastrointestinal endoscopy (05/28/2019); Upper gastrointestinal endoscopy (N/A, 05/28/2019); Colonoscopy (10/30/2020); Colonoscopy (N/A, 10/30/2020); Upper gastrointestinal endoscopy (N/A, 10/30/2020); Endoscopy, colon, diagnostic; eye surgery; and Cardiac catheterization (Left, 10/05/2021). Social History:  reports that he quit smoking about 41 years ago. His smoking use included cigarettes. He has a 12.00 pack-year smoking history. He has never used smokeless tobacco. He reports that he does not drink alcohol and does not use drugs. Family History: family history includes Cancer in his father and mother. The patients home medications have been reviewed.     Allergies: Lipitor [atorvastatin], Aldactone [spironolactone], Crestor [rosuvastatin calcium], Lopid [gemfibrozil], Invokana [canagliflozin], and Januvia [sitagliptin]    -------------------------------------------------- RESULTS -------------------------------------------------  All laboratory and radiology results have been personally reviewed by myself   LABS:  Results for orders placed or performed during the hospital encounter of 15/86/13   Basic Metabolic Panel   Result Value Ref Range    Glucose 480 (HH) 70 - 99 mg/dL    BUN 86 (H) 8 - 23 mg/dL    CREATININE 3.64 (H) 0.70 - 1.20 mg/dL    Bun/Cre Ratio 24 (H) 9 - 20    Calcium 8.5 (L) 8.6 - 10.4 mg/dL    Sodium 128 (L) 135 - 144 mmol/L Potassium 6.6 (HH) 3.7 - 5.3 mmol/L    Chloride 102 98 - 107 mmol/L    CO2 10 (L) 20 - 31 mmol/L    Anion Gap 16 9 - 17 mmol/L    GFR Non-African American 16 (L) >60 mL/min    GFR African American 20 (L) >60 mL/min    GFR Comment          GFR Staging         CBC Auto Differential   Result Value Ref Range    WBC 8.1 3.5 - 11.3 k/uL    RBC 2.71 (L) 4.21 - 5.77 m/uL    Hemoglobin 7.5 (L) 13.0 - 17.0 g/dL    Hematocrit 24.1 (L) 40.7 - 50.3 %    MCV 88.9 82.6 - 102.9 fL    MCH 27.7 25.2 - 33.5 pg    MCHC 31.1 28.4 - 34.8 g/dL    RDW 17.7 (H) 11.8 - 14.4 %    Platelets 089 274 - 596 k/uL    MPV 9.6 8.1 - 13.5 fL    NRBC Automated 0.0 0.0 per 100 WBC    Differential Type NOT REPORTED     WBC Morphology NOT REPORTED     RBC Morphology NOT REPORTED     Platelet Estimate NOT REPORTED     Seg Neutrophils 85 (H) 36 - 65 %    Lymphocytes 9 (L) 24 - 43 %    Monocytes 4 3 - 12 %    Eosinophils % 0 (L) 1 - 4 %    Immature Granulocytes 2 (H) 0 %    Basophils 0 0 - 2 %    Segs Absolute 6.89 1.50 - 8.10 k/uL    Absolute Lymph # 0.73 (L) 1.10 - 3.70 k/uL    Absolute Mono # 0.32 0.10 - 1.20 k/uL    Absolute Eos # 0.00 0.00 - 0.44 k/uL    Absolute Immature Granulocyte 0.16 0.00 - 0.30 k/uL    Basophils Absolute 0.00 0.0 - 0.2 k/uL    Morphology Normal    Troponin   Result Value Ref Range    Troponin, High Sensitivity 49 (H) 0 - 22 ng/L    Troponin T NOT REPORTED <0.03 ng/mL    Troponin Interp NOT REPORTED    EKG 12 Lead   Result Value Ref Range    Ventricular Rate 88 BPM    Atrial Rate 88 BPM    P-R Interval 200 ms    QRS Duration 104 ms    Q-T Interval 354 ms    QTc Calculation (Bazett) 428 ms    P Axis 24 degrees    R Axis -7 degrees    T Axis 60 degrees       RADIOLOGY:  Interpreted by Radiologist.  XR CHEST PORTABLE   Final Result   No acute process.              ------------------------- NURSING NOTES AND VITALS REVIEWED ---------------------------   The nursing notes within the ED encounter and vital signs as below have been nitroglycerin and aspirin    Counseling: The emergency provider has spoken with the patient and discussed todays results, in addition to providing specific details for the plan of care and counseling regarding the diagnosis and prognosis. Questions are answered at this time and they are agreeable with the plan. EKG: Normal sinus rhythm with a rate of 88 bpm, LVH and nonspecific ST abnormality but no STEMI  --------------------------------- IMPRESSION AND DISPOSITION ---------------------------------    IMPRESSION  1. Chest pain, unspecified type New Problem   2. Hyperkalemia New Problem   3. Acute kidney injury ([address] New Problem   4. Hyperglycemia New Problem   5.  Chronic anemia Stable       DISPOSITION  Disposition: Admit to telemetry  Patient condition is stable    Critical care time of 65 minutes              Luis Angel Marsh MD  11/11/21 [id number]

## 2021-11-12 PROBLEM — N17.9 ACUTE KIDNEY INJURY SUPERIMPOSED ON CKD (HCC): Status: ACTIVE | Noted: 2018-10-02

## 2021-11-12 PROBLEM — N18.9 ACUTE KIDNEY INJURY SUPERIMPOSED ON CKD (HCC): Status: ACTIVE | Noted: 2018-10-02

## 2021-11-12 PROBLEM — E44.0 MODERATE MALNUTRITION (HCC): Status: ACTIVE | Noted: 2021-11-12

## 2021-11-12 LAB
ABSOLUTE EOS #: 0 K/UL (ref 0–0.44)
ABSOLUTE IMMATURE GRANULOCYTE: 0.18 K/UL (ref 0–0.3)
ABSOLUTE LYMPH #: 0.65 K/UL (ref 1.1–3.7)
ABSOLUTE MONO #: 0.3 K/UL (ref 0.1–1.2)
ALBUMIN SERPL-MCNC: 3.4 G/DL (ref 3.5–5.2)
ALBUMIN SERPL-MCNC: 3.4 G/DL (ref 3.5–5.2)
ALBUMIN/GLOBULIN RATIO: 1.1 (ref 1–2.5)
ALBUMIN/GLOBULIN RATIO: 1.2 (ref 1–2.5)
ALP BLD-CCNC: 70 U/L (ref 40–129)
ALP BLD-CCNC: 73 U/L (ref 40–129)
ALT SERPL-CCNC: 11 U/L (ref 5–41)
ALT SERPL-CCNC: 11 U/L (ref 5–41)
ANION GAP SERPL CALCULATED.3IONS-SCNC: 12 MMOL/L (ref 9–17)
ANION GAP SERPL CALCULATED.3IONS-SCNC: 12 MMOL/L (ref 9–17)
AST SERPL-CCNC: 10 U/L
AST SERPL-CCNC: 11 U/L
BASOPHILS # BLD: 0 % (ref 0–2)
BASOPHILS ABSOLUTE: 0 K/UL (ref 0–0.2)
BILIRUB SERPL-MCNC: 0.17 MG/DL (ref 0.3–1.2)
BILIRUB SERPL-MCNC: 0.27 MG/DL (ref 0.3–1.2)
BUN BLDV-MCNC: 84 MG/DL (ref 8–23)
BUN BLDV-MCNC: 84 MG/DL (ref 8–23)
BUN/CREAT BLD: 27 (ref 9–20)
BUN/CREAT BLD: 28 (ref 9–20)
CALCIUM SERPL-MCNC: 8 MG/DL (ref 8.6–10.4)
CALCIUM SERPL-MCNC: 8.3 MG/DL (ref 8.6–10.4)
CHLORIDE BLD-SCNC: 107 MMOL/L (ref 98–107)
CHLORIDE BLD-SCNC: 108 MMOL/L (ref 98–107)
CHLORIDE, UR: 54 MMOL/L
CO2: 13 MMOL/L (ref 20–31)
CO2: 14 MMOL/L (ref 20–31)
CREAT SERPL-MCNC: 3.05 MG/DL (ref 0.7–1.2)
CREAT SERPL-MCNC: 3.11 MG/DL (ref 0.7–1.2)
DIFFERENTIAL TYPE: ABNORMAL
EOSINOPHILS RELATIVE PERCENT: 0 % (ref 1–4)
GFR AFRICAN AMERICAN: 24 ML/MIN
GFR AFRICAN AMERICAN: 24 ML/MIN
GFR NON-AFRICAN AMERICAN: 20 ML/MIN
GFR NON-AFRICAN AMERICAN: 20 ML/MIN
GFR SERPL CREATININE-BSD FRML MDRD: ABNORMAL ML/MIN/{1.73_M2}
GLUCOSE BLD-MCNC: 207 MG/DL (ref 74–100)
GLUCOSE BLD-MCNC: 259 MG/DL (ref 70–99)
GLUCOSE BLD-MCNC: 281 MG/DL (ref 70–99)
GLUCOSE BLD-MCNC: 330 MG/DL (ref 74–100)
GLUCOSE BLD-MCNC: 337 MG/DL (ref 74–100)
HCT VFR BLD CALC: 21.9 % (ref 40.7–50.3)
HCT VFR BLD CALC: 28.1 % (ref 40.7–50.3)
HEMOGLOBIN: 6.7 G/DL (ref 13–17)
HEMOGLOBIN: 8.6 G/DL (ref 13–17)
IMMATURE GRANULOCYTES: 3 %
LV EF: 60 %
LVEF MODALITY: NORMAL
LYMPHOCYTES # BLD: 11 % (ref 24–43)
MCH RBC QN AUTO: 27.5 PG (ref 25.2–33.5)
MCHC RBC AUTO-ENTMCNC: 30.6 G/DL (ref 28.4–34.8)
MCV RBC AUTO: 89.8 FL (ref 82.6–102.9)
MONOCYTES # BLD: 5 % (ref 3–12)
MORPHOLOGY: ABNORMAL
NRBC AUTOMATED: 0 PER 100 WBC
PDW BLD-RTO: 17.7 % (ref 11.8–14.4)
PLATELET # BLD: 167 K/UL (ref 138–453)
PLATELET ESTIMATE: ABNORMAL
PMV BLD AUTO: 9.7 FL (ref 8.1–13.5)
POTASSIUM SERPL-SCNC: 5.4 MMOL/L (ref 3.7–5.3)
POTASSIUM SERPL-SCNC: 6.2 MMOL/L (ref 3.7–5.3)
POTASSIUM, UR: 25.3 MMOL/L
RBC # BLD: 2.44 M/UL (ref 4.21–5.77)
RBC # BLD: ABNORMAL 10*6/UL
SEG NEUTROPHILS: 81 % (ref 36–65)
SEGMENTED NEUTROPHILS ABSOLUTE COUNT: 4.77 K/UL (ref 1.5–8.1)
SODIUM BLD-SCNC: 132 MMOL/L (ref 135–144)
SODIUM BLD-SCNC: 134 MMOL/L (ref 135–144)
SODIUM,UR: 67 MMOL/L
TOTAL PROTEIN: 6.3 G/DL (ref 6.4–8.3)
TOTAL PROTEIN: 6.4 G/DL (ref 6.4–8.3)
WBC # BLD: 5.9 K/UL (ref 3.5–11.3)
WBC # BLD: ABNORMAL 10*3/UL

## 2021-11-12 PROCEDURE — 2580000003 HC RX 258: Performed by: INTERNAL MEDICINE

## 2021-11-12 PROCEDURE — 6360000002 HC RX W HCPCS: Performed by: INTERNAL MEDICINE

## 2021-11-12 PROCEDURE — 84300 ASSAY OF URINE SODIUM: CPT

## 2021-11-12 PROCEDURE — 6370000000 HC RX 637 (ALT 250 FOR IP): Performed by: INTERNAL MEDICINE

## 2021-11-12 PROCEDURE — 84133 ASSAY OF URINE POTASSIUM: CPT

## 2021-11-12 PROCEDURE — P9016 RBC LEUKOCYTES REDUCED: HCPCS

## 2021-11-12 PROCEDURE — 86850 RBC ANTIBODY SCREEN: CPT

## 2021-11-12 PROCEDURE — 99214 OFFICE O/P EST MOD 30 MIN: CPT | Performed by: INTERNAL MEDICINE

## 2021-11-12 PROCEDURE — 93306 TTE W/DOPPLER COMPLETE: CPT

## 2021-11-12 PROCEDURE — 86900 BLOOD TYPING SEROLOGIC ABO: CPT

## 2021-11-12 PROCEDURE — 85025 COMPLETE CBC W/AUTO DIFF WBC: CPT

## 2021-11-12 PROCEDURE — 2500000003 HC RX 250 WO HCPCS: Performed by: INTERNAL MEDICINE

## 2021-11-12 PROCEDURE — 86901 BLOOD TYPING SEROLOGIC RH(D): CPT

## 2021-11-12 PROCEDURE — 82436 ASSAY OF URINE CHLORIDE: CPT

## 2021-11-12 PROCEDURE — 82947 ASSAY GLUCOSE BLOOD QUANT: CPT

## 2021-11-12 PROCEDURE — 6360000002 HC RX W HCPCS: Performed by: FAMILY MEDICINE

## 2021-11-12 PROCEDURE — 1200000000 HC SEMI PRIVATE

## 2021-11-12 PROCEDURE — 6370000000 HC RX 637 (ALT 250 FOR IP): Performed by: FAMILY MEDICINE

## 2021-11-12 PROCEDURE — 85018 HEMOGLOBIN: CPT

## 2021-11-12 PROCEDURE — 36415 COLL VENOUS BLD VENIPUNCTURE: CPT

## 2021-11-12 PROCEDURE — 85014 HEMATOCRIT: CPT

## 2021-11-12 PROCEDURE — 80053 COMPREHEN METABOLIC PANEL: CPT

## 2021-11-12 RX ORDER — CETIRIZINE HYDROCHLORIDE 10 MG/1
5 TABLET ORAL DAILY
Status: DISCONTINUED | OUTPATIENT
Start: 2021-11-13 | End: 2021-11-21 | Stop reason: HOSPADM

## 2021-11-12 RX ORDER — DEXTROSE MONOHYDRATE 25 G/50ML
12.5 INJECTION, SOLUTION INTRAVENOUS PRN
Status: DISCONTINUED | OUTPATIENT
Start: 2021-11-12 | End: 2021-11-21 | Stop reason: HOSPADM

## 2021-11-12 RX ORDER — DEXTROSE MONOHYDRATE 50 MG/ML
100 INJECTION, SOLUTION INTRAVENOUS PRN
Status: DISCONTINUED | OUTPATIENT
Start: 2021-11-12 | End: 2021-11-21 | Stop reason: HOSPADM

## 2021-11-12 RX ORDER — SODIUM POLYSTYRENE SULFONATE 15 G/60ML
15 SUSPENSION ORAL; RECTAL ONCE
Status: COMPLETED | OUTPATIENT
Start: 2021-11-12 | End: 2021-11-12

## 2021-11-12 RX ORDER — NICOTINE POLACRILEX 4 MG
15 LOZENGE BUCCAL PRN
Status: DISCONTINUED | OUTPATIENT
Start: 2021-11-12 | End: 2021-11-21 | Stop reason: HOSPADM

## 2021-11-12 RX ORDER — SODIUM CHLORIDE 9 MG/ML
INJECTION, SOLUTION INTRAVENOUS PRN
Status: DISCONTINUED | OUTPATIENT
Start: 2021-11-12 | End: 2021-11-21 | Stop reason: HOSPADM

## 2021-11-12 RX ORDER — METHYLPREDNISOLONE SODIUM SUCCINATE 40 MG/ML
40 INJECTION, POWDER, LYOPHILIZED, FOR SOLUTION INTRAMUSCULAR; INTRAVENOUS EVERY 12 HOURS
Status: DISCONTINUED | OUTPATIENT
Start: 2021-11-12 | End: 2021-11-15

## 2021-11-12 RX ORDER — CARVEDILOL 3.12 MG/1
3.12 TABLET ORAL 2 TIMES DAILY WITH MEALS
Status: DISCONTINUED | OUTPATIENT
Start: 2021-11-12 | End: 2021-11-15

## 2021-11-12 RX ADMIN — INSULIN GLARGINE 28 UNITS: 100 INJECTION, SOLUTION SUBCUTANEOUS at 08:25

## 2021-11-12 RX ADMIN — SODIUM POLYSTYRENE SULFONATE 15 G: 15 SUSPENSION ORAL; RECTAL at 05:02

## 2021-11-12 RX ADMIN — ACYCLOVIR 400 MG: 200 CAPSULE ORAL at 20:26

## 2021-11-12 RX ADMIN — AMLODIPINE BESYLATE 10 MG: 10 TABLET ORAL at 20:26

## 2021-11-12 RX ADMIN — HYDRALAZINE HYDROCHLORIDE 100 MG: 50 TABLET, FILM COATED ORAL at 20:26

## 2021-11-12 RX ADMIN — METHYLPREDNISOLONE SODIUM SUCCINATE 40 MG: 40 INJECTION, POWDER, FOR SOLUTION INTRAMUSCULAR; INTRAVENOUS at 08:27

## 2021-11-12 RX ADMIN — GLIPIZIDE 10 MG: 5 TABLET ORAL at 20:26

## 2021-11-12 RX ADMIN — CETIRIZINE HYDROCHLORIDE 10 MG: 10 TABLET, FILM COATED ORAL at 08:25

## 2021-11-12 RX ADMIN — SODIUM CHLORIDE, PRESERVATIVE FREE 10 ML: 5 INJECTION INTRAVENOUS at 08:27

## 2021-11-12 RX ADMIN — GLIPIZIDE 10 MG: 5 TABLET ORAL at 08:24

## 2021-11-12 RX ADMIN — INSULIN LISPRO 12 UNITS: 100 INJECTION, SOLUTION INTRAVENOUS; SUBCUTANEOUS at 17:14

## 2021-11-12 RX ADMIN — PREDNISOLONE ACETATE 1 DROP: 10 SUSPENSION/ DROPS OPHTHALMIC at 08:35

## 2021-11-12 RX ADMIN — HYDRALAZINE HYDROCHLORIDE 100 MG: 50 TABLET, FILM COATED ORAL at 08:24

## 2021-11-12 RX ADMIN — INSULIN GLARGINE 28 UNITS: 100 INJECTION, SOLUTION SUBCUTANEOUS at 20:39

## 2021-11-12 RX ADMIN — METHYLPREDNISOLONE SODIUM SUCCINATE 40 MG: 40 INJECTION, POWDER, FOR SOLUTION INTRAMUSCULAR; INTRAVENOUS at 20:26

## 2021-11-12 RX ADMIN — SODIUM BICARBONATE: 84 INJECTION, SOLUTION INTRAVENOUS at 14:54

## 2021-11-12 RX ADMIN — ACYCLOVIR 400 MG: 200 CAPSULE ORAL at 08:24

## 2021-11-12 RX ADMIN — CARVEDILOL 3.12 MG: 3.12 TABLET, FILM COATED ORAL at 17:03

## 2021-11-12 RX ADMIN — CALCIUM GLUCONATE 1000 MG: 94 INJECTION, SOLUTION INTRAVENOUS at 05:04

## 2021-11-12 RX ADMIN — HYDRALAZINE HYDROCHLORIDE 100 MG: 50 TABLET, FILM COATED ORAL at 14:41

## 2021-11-12 ASSESSMENT — PAIN SCALES - GENERAL
PAINLEVEL_OUTOF10: 0

## 2021-11-12 NOTE — PROGRESS NOTES
Lab called with critical labs, HGB 6.7 an K+ 6.2, Dr. Lo Houston notified of labs, new orders for 1 unit of blood, amp of Calcium gluconate, Kayexalate and to recheck CMP in 3 hours, patient and wife updated, blood consent form signed, patient blood drawn for type and crossed and sent to lab.

## 2021-11-12 NOTE — CONSULTS
Patient: Yaz Cisneros  : 1945  Date of Visit: 2021    REASON FOR VISIT / CONSULTATION: Chest Pain (Mid chest tightness, radiating to left arm. Onset 30mins ago), Shortness of Breath, and Other (Abnormal labwork drawn today. (potassium, glucose and kidney function))    HPI:  Mr. Brittany Knox is being seen today on the floor for a consult for his recurrent chest pain. As you know, Mr. Brittany Knox is a 70 y.o. male with a known history of dysautonomia where on tilt table testing his blood pressure dropped from 146 systolic to 78 systolic with only a 57-47 HR response. More recently, a CT of he head in the past showed evidence of at least 2 old CVA's and a heart catheterization showed severe single vessel disease in the ostium of a moderate sized OM1 branch of the circumflex. However, maximal guidelines directed medical management was opted for an place of stenting partly due to the risk associated with stenting this vessel. Recently he has had a coronary angiography procedure with stenting of his HA Om1. As you know, Mr. Brittany Knox  is a 68 y.o. male with a history of recent onset substernal chest discomfort that led to a stress test and a subsequent heart catheterization which showed severe single vessel coronary artery disease of the HA Om1 with successful angioplasty and stenting of that vessel that was done by Dr. Christa Cerda on 4/10/17. More recently he has had problems with balance problems when he is in his feet and says that a Neurologist has told him in the past this is because of his previous strokes. Most recently he underwent a cardiovascular stress test which fortunately had shown to be normal on 3/21/2018. Mr. Brittany Knox has significant normal stress test and echocardiogram on 2020. Holter monitor done on 2020: The rhythm was sinus. Average daily heart rate 58 ranging from 47 to 81 bpm. Bradycardia for 72% test duration.  Rare premature supraventricular ectopic beats consisting of 33 isolated PACs. Rare premature ventricular ectopic beats total 75 consisting of 73 isolatedPVCs and a ventricular couplet. Echocardiogram done on 12/18/2020: EF of 60%. Moderately increased LV wall thickness. Borderline pulmonary hypertension. Mild tricuspid regurgitation. Mild diastolic dysfunction is seen/ Aortic root is mildly dilated. Stress test done on 9/20/2021 was equivocal, There is a small/moderate perfusion defect of mild intensity in the lateral, inferior and inferoapical regions. Cardiac cath done on 10/5/2021 showed Moderate three vessel coronary artery disease involving a ostial 50-60% stenosis in a small, non-dominant RCA, a 60% mid LAD stenosis and a proximal 50-60% stenosis in the left anterior descending coronary artery     Mr. Cheko Burnett he has a chest pressure tightness sensation that occurring at night. He states it last for several minutes however last night it lasted for half hour. He also describes radiation of pain into his left arm. He is currently admitted for acute kidney injury superimposed on his chronic kidney disease. He notes his chest tightness continues to be similar to what he is experienced in the past for which he had a negative cardiac cath on 10/5/2021. He does have dizziness and lightheadedness during his medications in the morning. He denied any abdominal pain, bleeding problems, bowel issues, problems with his medications or any other concerns at this time. No nausea or vomiting. No cough, fever or chills. Chart reviewed, last troponin was elevated at 38.     Past Medical History:   Diagnosis Date    Acute MI ([address]     Acute renal failure with tubular necrosis ([address] 10/2/2018    ssecondary to hemodynamic effects of loop diuretics and ace inhibitors, bbaseline 1.21.3 which peaked up to 1.8, resolving    Anemia     CAD (coronary artery disease)     Cerebral artery occlusion with cerebral infarction ([address]     CHF (congestive heart failure) (HCC)     Chronic back pain     Closed fracture of lumbar vertebra without mention of spinal cord injury     Displacement of intervertebral disc, site unspecified, without myelopathy     H/O cardiac catheterization 2/19/16    LMCA: Mild irregularities 10-20%. LAD: Lesion on PRox LAD: Mid subsection. 65% stenosis. LCx: Lesion on 1st Ob Valentina: Proximal subesection. 70% steniosis. RCA: Small non-dominant RCA. Lesion on PRox RCA: Ostial. 50% stenosis. EF:55%.  H/O cardiovascular stress test 2/19/16    Abnormal. Moderate perfusion defect of mild intensity in the inferior, inferoseptal adn inferoapical regions during stress imaging, which most consistent with ischemia. Global LV systolic function normal without regional wall motion abnormalities. OVerall these results are most consistent with an intermediate risk for signficant CAD. Additional testing including cardiac cath may be indicated.  H/O tilt table evaluation 12/26/2017    Abnormal. Patients HR, BP response and symptoms were most consistent with dysautonomia. Combined with viligant maintenance of euvolemia and maintaining a moderate salft intake, pharmacologic treatment with SSRI such as lexapro and or mestinon among other treatments have shown some effectiveness in treatment of this condition    H/O tilt table evaluation 12/26/2017    Abnormal head upright tilt table study. The pt heart rate, blood pressure response and symptoms were most consistent with dysautonomia.  History of 24 hour EKG monitoring 8/14/14    Occasional PAC's and PVC's which appear to be at least moderately symptomatic.  History of 24 hour EKG monitoring 11/19/14    Event Monitor. Sinus rhythm and sinus bradycardia. Infrequent isolated PVC's    History of blood transfusion     History of cardiovascular stress test 2/19/14    Relatively NL    History of cardiovascular stress test 03/21/2018    Normal myocardial perfusion.  Global left ventricular systolic function was normal with an EF of anterior descending coronary artery. Normal left ventricular end diastolic pressure. Proceed with aggressive maximal medical management as clinically indicated.     CHOLECYSTECTOMY  2015    Liang/Charles/[facility]/ Lap    COLONOSCOPY      COLONOSCOPY  2015    -polyps,diverticulosis,hemorrhoids    COLONOSCOPY  2018    Dr Sorin Rangel    COLONOSCOPY N/A 2018    COLONOSCOPY POLYPECTOMY SNARE/COLD BIOPSY  cold snare  and  hot snare performed by Ro Newman MD at [address]  10/30/2020    with Dr. Nikita Flores 10/30/2020    Jade Oviedo, NOT HIGH RISK performed by Caron Martinez DO at [address] [facility]      left eye    ENDOSCOPY, COLON, DIAGNOSTIC      EYE SURGERY      PACEMAKER INSERTION      PACEMAKER PLACEMENT      UPPER GASTROINTESTINAL ENDOSCOPY  2019    Dr. Nettie Geronimo H-Pylori)duodenal bulbar/antral erythema    UPPER GASTROINTESTINAL ENDOSCOPY N/A 2019    EGD BIOPSY, clotest performed by Ro Newman MD at [address] [facility] 10/30/2020    EGD ESOPHAGOGASTRODUODENOSCOPY performed by Caron Martinez DO at [address] History:  Social History     Tobacco Use    Smoking status: Former Smoker     Packs/day: 1.50     Years: 8.00     Pack years: 12.00     Types: Cigarettes     Quit date: 3/4/1980     Years since quittin.7    Smokeless tobacco: Never Used   Vaping Use    Vaping Use: Never used   Substance Use Topics    Alcohol use: No    Drug use: No        CURRENT MEDICATIONS:  Outpatient Medications Marked as Taking for the 21 encounter [facility] Encounter)   Medication Sig Dispense Refill    predniSONE (DELTASONE) 20 MG tablet Take 1 tablet by mouth 2 times daily for 5 days 10 tablet 0    eplerenone (INSPRA) 25 MG tablet Take 1 tablet by mouth daily 90 tablet 3    loratadine (CLARITIN) 10 MG tablet Take 1 tablet by mouth daily (Patient taking differently: Take 10 mg by mouth daily as needed ) 90 tablet 3    lisinopril (PRINIVIL;ZESTRIL) 20 MG tablet Take 1 tablet by mouth daily 90 tablet 3    amLODIPine (NORVASC) 10 MG tablet Take 1 tablet by mouth nightly 90 tablet 0    hydrALAZINE (APRESOLINE) 100 MG tablet Take 1 tablet by mouth 3 times daily Patient is taking 100 MG 3 times daily 270 tablet 0    glipiZIDE (GLUCOTROL XL) 5 MG extended release tablet Take 2 tablets by mouth 2 times daily 360 tablet 0    nitroGLYCERIN (NITROSTAT) 0.4 MG SL tablet Place 1 tablet under the tongue every 5 minutes as needed for Chest pain 25 tablet 3    latanoprost (XALATAN) 0.005 % ophthalmic solution       acyclovir (ZOVIRAX) 400 MG tablet 400 mg 2 times daily       prednisoLONE acetate (PRED FORTE) 1 % ophthalmic suspension Place 1 drop into the left eye daily        FAMILY HISTORY: family history includes Cancer in his father and mother. PHYSICAL EXAM:   BP (!) 177/68   Pulse 66   Temp 98.4 °F (36.9 °C) (Temporal)   Resp 18   Ht 5' 9\" (1.753 m)   Wt 195 lb 8 oz (88.7 kg)   SpO2 97%   BMI 28.87 kg/m²  Body mass index is 28.87 kg/m². Constitutional: He is oriented to person, place, and time. He appears well-developed and well-nourished. In no acute distress. HEENT: Normocephalic and atraumatic. No JVD present. Carotid bruit is not present. No mass and no thyromegaly present. No lymphadenopathy present. Cardiovascular: Normal rate, regular rhythm, normal heart sounds. Exam reveals no gallop and no friction rubs. 2/6 systolic murmur, 2nd intercostal space on the RIGHT just lateral to the sternum. Pulmonary/Chest: Effort normal and breath sounds normal. No respiratory distress. He has no wheezes, rhonchi or rales. Abdominal: Soft, non-tender. Bowel sounds and aorta are normal. He exhibits no organomegaly, mass or bruit. Extremities: None. No cyanosis or clubbing. 2+ radial and carotid pulses.  Distal extremity pulses: 2+ bilaterally. Neurological: He is alert and oriented to person, place, and time. No evidence of gross cranial nerve deficit. Coordination appeared normal.   Skin: Skin is warm and dry. There is no rash or diaphoresis. Psychiatric: He has a normal mood and affect. His speech is normal and behavior is normal.      MOST RECENT LABS ON RECORD:   Lab Results   Component Value Date    WBC 5.9 11/12/2021    HGB 8.6 (L) 11/12/2021    HCT 28.1 (L) 11/12/2021     11/12/2021    CHOL 69 09/21/2021    TRIG 185 (H) 09/21/2021    HDL 14 (L) 09/21/2021    LDLDIRECT 44 11/02/2017    ALT 11 11/12/2021    AST 11 11/12/2021     (L) 11/12/2021    K 5.4 (H) 11/12/2021     (H) 11/12/2021    CREATININE 3.05 (H) 11/12/2021    BUN 84 (H) 11/12/2021    CO2 14 (L) 11/12/2021    TSH 2.26 11/02/2017    PSA 3.68 04/30/2021    INR 1.1 09/20/2021    LABA1C 7.3 08/19/2021    LABMICR 30 10/17/2015       ASSESSMENT:  1. Chest pain, unspecified type New Problem   2. Hyperkalemia New Problem   3. Acute kidney injury ([address] New Problem   4. Hyperglycemia New Problem   5. Chronic anemia Stable      PLAN:    · Shortness of breath with mild exertion: Likely most likely related to anemia chronic kidney disease  · Family is currently requested [facility]. · Continue treatment plan per nephrology. · Received 1 unit of packed red blood cells while here    · Anemia, unclear etiology:   · Continue guidance per hematology/nephrology    Dysautonomia: Mildly to moderately, likely related to and worsened by anemia  · Diuretics: Not indicated at this time. · Beta Blocker: Continue Carvedilol (Coreg) 6.25 mg twice daily. ACE Inibitor/ARB: Consider lisinopril if appropriate per nephrology.    · Nonpharmacologic counseling: Because of his condition, I reminded him to try and keep himself well-hydrated and to take extra time when moving from laying to sitting, sitting to standing and standing to walking as well as wearing at least knee high compressions stockings. Chronic Diastolic Heart Failure: EF of 60% via echo on 12/20/2021. Pulmonary artery systolic pressure increased from 30 to 42 mmHg. Currently well controlled. · Beta Blocker: Continue Carvedilol (Coreg) 6.25 mg twice daily. Diuretics: Not indicated at this time. Nonpharmacologic management of Heart Failure: I advised him to try and keep his legs up whenever possible and to limit salt in his diet. Atherosclerotic Heart Disease: Coronary Artery stent: 4/10/2017. Cardiac cath done on 10/5/2021 showed Moderate three vessel coronary artery disease involving a ostial 50-60% stenosis in a small, non-dominant RCA, a 60% mid LAD stenosis and a proximal 50-60% stenosis in the left anterior descending coronary artery  Antiplatelet Agent: Not indicated due to bleeding  ACE Inibitor/ARB: Consider lisinopril if appropriate per nephrology. Beta Blocker: Continue Carvedilol (Coreg) 6.25 mg twice daily. Cholesterol Reduction Therapy: Refused by patient. Laboratory testing: None    Essential Hypertension: Controlled   ACE Inibitor/ARB: Consider lisinopril if appropriate. · Beta Blocker: Continue Carvedilol (Coreg) 6.25 mg twice daily. · Calcium Channel Blocker: Continue amlodipine (Norvasc) 10 mg once daily. Hyperlipidemia: Mixed - Last LDL on 9/21/2021 was 18 mg/dL   · Cholesterol Reduction Therapy: Refused by patient. Finally, I recommended that he continue his other medications and follow up with you as previously scheduled. FOLLOW UP:   I told Mr. Pedro Luis Anderson  to call my office if he had any problems, but otherwise told him to No follow-ups on file. However, as always I would be happy to see him sooner should the need arise. Once again, thank you for allowing me to participate in this patients care. Please do not hesitate to contact me could I be of further assistance. Sincerely,  Jamia Patel. Jackelin BLOUNT, MS, F.A.C.C.   [facility]  [address] [place] [address] [place], [address] [place] [address]  Phone: [phone], Fax: [phone]     I believe that the risk of significant morbidity and mortality related to the patient's current medical conditions are: Intermediate. The documentation recorded by the scribe, accurately and completely reflects the services I personally performed and the decisions made by me. Norman Amos MD, MS, F.A.C.C.  November 12, 2021

## 2021-11-12 NOTE — PROGRESS NOTES
Comprehensive Nutrition Assessment    Type and Reason for Visit:  Initial    Nutrition Recommendations/Plan:   1. Maintain lower potassium intakes, provided potassium content food list  2. Provided CHO controlled diet education    Nutrition Assessment:  Moderate malnutrition related to inadequate protein-energy intake as evidenced by poor intake prior to admission, moderate muscle loss, mild loss of subcutaneous fat, constipation, weight loss greater than or equal to 5% in 1 month. Pt presents with elevated potassium, and had a decreased appetite for greater than 2-3 weeks, however. Pt reports eating because he knew he had to, but had altered taste to the food, and just was not hungry. He also reports lack of energy to move. Pt went on steroid therapy,  Elevation in blood glucose, and reports just before coming in, his appetite has improved, feels better, has constipation at home, only goes a little, takes \"some fiber mixed with water\" on a daily basis. Pt also reports minimizing potassium foods (potato, banana, juice). Labs reviewed. Renal indices elevated, Creatinine 3.7, down to 3.11 today, and potassium at 6.6 with a downward trend to 6.2. Pt also has elevated blood glucose. Pt is receptive to maintain potassium food restriction. He is also receptive to [facility] controlled meal planning. Pt is at moderate-high nutrition risk, with a moderate risk for malnutrition. Malnutrition Assessment:  Malnutrition Status:   Moderate malnutrition    Context:  Acute Illness     Findings of the 6 clinical characteristics of malnutrition:  Energy Intake:  7 - 50% or less of estimated energy requirements for 5 or more days  Weight Loss:  1 - 5% over 1 month (5.7% over 1.5 months)     Body Fat Loss:  1 - Mild body fat loss Orbital   Muscle Mass Loss:  1 - Mild muscle mass loss Hand (interosseous)  Fluid Accumulation:  No significant fluid accumulation Extremities   Strength:  Not Performed    Estimated Daily Nutrient Needs:  Energy (kcal):  1596-9440 (20-25); Weight Used for Energy Requirements:  Current     Protein (g):   (1.2-1.4); Weight Used for Protein Requirements:  Ideal        Fluid (ml/day):   ; Method Used for Fluid Requirements:  Standard Renal      Nutrition Related Findings:  appears adquately nourished, however has moderate muscle loss, mild subcutaneous fat loss      Wounds:  Surgical Incision (small incision from biopsy on leg)       Current Nutrition Therapies:    ADULT DIET; Regular    Anthropometric Measures:  · Height: 5' 9\" (175.3 cm)  · Current Body Weight: 195 lb 8.8 oz (88.7 kg)   · Admission Body Weight: 195 lb 1.7 oz (88.5 kg)    · Usual Body Weight: 207 lb (93.9 kg) (on 9/22/21)     · Ideal Body Weight: 160 lbs; % Ideal Body Weight 122.2 %   · BMI: 28.9  · BMI Categories: Overweight (BMI 25.0-29. 9)       Recent Labs     11/11/21  1500 11/11/21  1500 11/11/21  1718 11/12/21  0346   *  --  128* 132*   K 6.6*  --  6.6* 6.2*     --  102 107   CO2 13*  --  10* 13*   BUN 82*  --  86* 84*   CREATININE 3.70*  --  3.64* 3.11*   GLUCOSE 439*  --  480* 281*   ALT 12  --   --  11   ALKPHOS 87  --   --  70   GFR              < >                          < > = values in this interval not displayed.       Lab Results   Component Value Date    LABALBU 3.4 11/12/2021      Lab Results   Component Value Date    LABA1C 7.3 08/19/2021       Nutrition Diagnosis:   · Altered nutrition-related lab values related to endocrine dysfuntion as evidenced by lab values    · Moderate malnutrition related to inadequate protein-energy intake as evidenced by poor intake prior to admission, moderate muscle loss, mild loss of subcutaneous fat, constipation, weight loss greater than or equal to 5% in 1 month      Nutrition Interventions:   Food and/or Nutrient Delivery:  Continue Current Diet  Nutrition Education/Counseling:  Education initiated, Education completed   Coordination of Nutrition Care:  No recommendation at this time    Goals:  PO intake > 75% of meals improving blood sugar       Nutrition Monitoring and Evaluation:   Behavioral-Environmental Outcomes:  None Identified   Food/Nutrient Intake Outcomes:  Food and Nutrient Intake  Physical Signs/Symptoms Outcomes:  Biochemical Data, Constipation, Weight     Discharge Planning:    Continue current diet, Recommend pursue outpatient diabetes education     Electronically signed by Jas Jennings RD, LD on 11/12/21 at 8:22 AM EST    Contact: [phone]

## 2021-11-12 NOTE — PROGRESS NOTES
Met with Patient and his family this a.m. to discuss discharge planning. Patient is a 68year old , white male, admitted with a diagnosis of Hyperkalemia. Patient is alert and oriented, pleasant and cooperative with this assessment. States that he wishes to return home with family when deemed medically stable for discharge. Patient resides with his wife in [place] [address]. Has good informal support network including family and friends. Patient uses a walker and cane at home as needed. He currently utilizes no outside resources or services. Patient drives himself and provides for his own transportation needs. His wife does the cooking and both share in housekeeping chores. PCP is Dr. Juan Carlos Ovalle. Patient has no difficulty with regards to affording his medication per his own report. Discharge plan is home when stable. Patient remains a 'Full Code' status and voices no interest in pursuing Advanced Directive documents. He states that his wife will be his decision maker with his daughter as second. No anticipated discharge planning needs or concerns noted by Patient at this time. LSW will follow and assist as needs or concerns develop.     L[address]. [address]  11/12/2021

## 2021-11-12 NOTE — CONSULTS
[facility]    [address], [address]  [facility]  11/12/2021 2:00 PM    Pt Name:    Eusebio Alvarez  MRN:     [id number]   [id number]  YOB: 1945  Admit Date:    11/11/2021  5:03 PM  Primary Care Physician:  Yunier Benjamin MD        Reason for Consult:  VIDAL, hyperkalemia      TELEHEALTH EVALUATION -- Audio/Visual (During HURDD-73 public health emergency)     Telehealth service was provided with the patient at his room in [address] [place] [address] and myself the physician in my office in [place], [place] and the patient's RN, BODØ, who has initiated the visit. Pursuant to the emergency declaration under the [address] [place] [address], [facility] waiver authority and the [employer] Act, this Virtual  Visit was conducted, with patient's consent, to reduce the patient's risk of exposure to COVID-19 and provide continuity of care for an established patient. Services were provided through a video synchronous discussion virtually to substitute for in-person clinic visit. History:   The patient is a 68 y.o. male with history of CKD III, under the care of Dr. Flower Michel, DM, HTN, CAD,preserved EF, hyperlipidemia presented to the hospital with elevated potassium. He had outpatient labs done showing hyperkalemia and was instructed to come in however apparently while walking into the hospital he developed chest pressure with radiation to left arm. On arrival K was elevated at 6.6, sodium 128, creat 3.64, BUN 86, blood sugar 480. Troponin elevated. Baseline creatinine is around 1.5-1.8 mg/dL. He had [facility] on 10/5 creatinine has been increasing since then. He is on Lisinopril at home as well as Inspra. His sugars have been running high mainly since he was started on steroids few days ago. He had a presumed allergic reaction after receiving 2nd Injectafer infusion.     Past Medical History:  Past Medical History:   Diagnosis Date    Acute MI ([facility]     Acute renal failure with tubular necrosis ([facility] 10/2/2018    ssecondary to hemodynamic effects of loop diuretics and ace inhibitors, bbaseline 1.2-1.3 which peaked up to 1.8, resolving    Anemia     CAD (coronary artery disease)     Cerebral artery occlusion with cerebral infarction ([address]     CHF (congestive heart failure) (HCC)     Chronic back pain     Closed fracture of lumbar vertebra without mention of spinal cord injury     Displacement of intervertebral disc, site unspecified, without myelopathy     H/O cardiac catheterization 2/19/16    LMCA: Mild irregularities 10-20%. LAD: Lesion on PRox LAD: Mid subsection. 65% stenosis. LCx: Lesion on 1st Ob Valentina: Proximal subesection. 70% steniosis. RCA: Small non-dominant RCA. Lesion on PRox RCA: Ostial. 50% stenosis. EF:55%.  H/O cardiovascular stress test 2/19/16    Abnormal. Moderate perfusion defect of mild intensity in the inferior, inferoseptal adn inferoapical regions during stress imaging, which most consistent with ischemia. Global LV systolic function normal without regional wall motion abnormalities. OVerall these results are most consistent with an intermediate risk for signficant CAD. Additional testing including cardiac cath may be indicated.  H/O tilt table evaluation 12/26/2017    Abnormal. Patients HR, BP response and symptoms were most consistent with dysautonomia. Combined with viligant maintenance of euvolemia and maintaining a moderate salft intake, pharmacologic treatment with SSRI such as lexapro and or mestinon among other treatments have shown some effectiveness in treatment of this condition    H/O tilt table evaluation 12/26/2017    Abnormal head upright tilt table study. The pt heart rate, blood pressure response and symptoms were most consistent with dysautonomia.      History of 24 hour EKG monitoring 8/14/14    Occasional PAC's and PVC's which appear to be at least moderately symptomatic.  History of 24 hour EKG monitoring 11/19/14    Event Monitor. Sinus rhythm and sinus bradycardia. Infrequent isolated PVC's    History of blood transfusion     History of cardiovascular stress test 2/19/14    Relatively NL    History of cardiovascular stress test 03/21/2018    Normal myocardial perfusion. Global left ventricular systolic function was normal with an EF of 68%. Overall, these results are most consistent with a low risk scan.  History of coronary artery stent placement 04/2017    PTCA / Drug Eluting Stent:, CX and / or branches    History of CVA (cerebrovascular accident) without residual deficits 2014    Incidentally found on CT head. No known impairment now or in the past.    History of echocardiogram 2/18/14    LA mildly dilated, EF 55%, LV wall thickness is moderately increased, no definite wall motion abnormalilities, what appears to be a pacer wire is seen w/n the RA and RV, mild-mod TR, mild pulmonary hypertension.  History of Holter monitoring 12/29/2017    Rare PAC's and PVC's.      History of tilt table evaluation 8/12/14    Abnormal    Hyperlipidemia     Hypertension     Non critical Right Renal artery stenosis, native ([address] 10/2/2018    Non critical Right Renal artery stenosis, based on cath in 2016, Rt RA 30% stenosis    Pacemaker     non-functioning    S/P cardiac cath 04/10/2017    S/P coronary artery stent placement     Successful PTCA - HA Om1    SIRS (systemic inflammatory response syndrome) ([address] 5/19/2019    Type II or unspecified type diabetes mellitus without mention of complication, not stated as uncontrolled        Past Surgical History:  Past Surgical History:   Procedure Laterality Date    BACK SURGERY      CARDIAC CATHETERIZATION Left 02/19/2016    via right radial approach/ Melina Vernon/ Dr. Mardel Osgood Left 10/05/2021    Dr Trev Jones radial-Moderate three vessel coronary artery disease involving a ostial 50-60% stenosis in a small, non-dominant RCA, a 60% mid LAD stenosis and a proximal 50-60% stenosis in the left anterior descending coronary artery. Normal left ventricular end diastolic pressure. Proceed with aggressive maximal medical management as clinically indicated.     CHOLECYSTECTOMY  08/17/2015    Liang/Charles/Neffs/ Lap    COLONOSCOPY  2010    COLONOSCOPY  02/19/2015    -polyps,diverticulosis,hemorrhoids    COLONOSCOPY  05/23/2018    Dr Olivia Araujo    COLONOSCOPY N/A 05/23/2018    COLONOSCOPY POLYPECTOMY SNARE/COLD BIOPSY  cold snare  and  hot snare performed by Lucila Alonzo MD at [facility]  10/30/2020    with Dr. Mark Levi COLONOSCOPY N/A 10/30/2020    COLORECTAL [facility], NOT HIGH RISK performed by Joseph Gomez DO at [facility]      left eye    ENDOSCOPY, COLON, DIAGNOSTIC      EYE SURGERY      PACEMAKER INSERTION      PACEMAKER PLACEMENT      UPPER GASTROINTESTINAL ENDOSCOPY  05/28/2019    Dr. Dayne Mendez H-Pylori)duodenal bulbar/antral erythema    UPPER GASTROINTESTINAL ENDOSCOPY N/A 05/28/2019    EGD BIOPSY, clotest performed by Lucila Alonzo MD at [facility] 10/30/2020    EGD ESOPHAGOGASTRODUODENOSCOPY performed by Joseph Gomez DO at [facility] OR       Family History:  Family History   Problem Relation Age of Onset    Cancer Mother         breast    Cancer Father         lung       Social History:  Social History     Socioeconomic History    Marital status:      Spouse name: Not on file    Number of children: Not on file    Years of education: Not on file    Highest education level: Not on file   Occupational History    Not on file   Tobacco Use    Smoking status: Former Smoker     Packs/day: 1.50     Years: 8.00     Pack years: 12.00     Types: Cigarettes     Quit date: 3/4/1980     Years since MG tablet Take 1 tablet by mouth daily  Patient taking differently: Take 10 mg by mouth daily as needed  10/19/21  Yes Natividad Duran MD   lisinopril (PRINIVIL;ZESTRIL) 20 MG tablet Take 1 tablet by mouth daily 10/15/21 1/13/22 Yes Jimmy Cardoso MD   amLODIPine (NORVASC) 10 MG tablet Take 1 tablet by mouth nightly 9/22/21  Yes Angeline Willoughby MD   hydrALAZINE (APRESOLINE) 100 MG tablet Take 1 tablet by mouth 3 times daily Patient is taking 100 MG 3 times daily 8/19/21  Yes Angeline Willoughby MD   glipiZIDE (GLUCOTROL XL) 5 MG extended release tablet Take 2 tablets by mouth 2 times daily 7/19/21  Yes Angeline Willoughby MD   nitroGLYCERIN (NITROSTAT) 0.4 MG SL tablet Place 1 tablet under the tongue every 5 minutes as needed for Chest pain 6/28/21  Yes Natividad Druan MD   latanoprost (XALATAN) 0.005 % ophthalmic solution  2/2/21  Yes Historical Provider, MD   acyclovir (ZOVIRAX) 400 MG tablet 400 mg 2 times daily  7/6/20  Yes Historical Provider, MD   prednisoLONE acetate (PRED FORTE) 1 % ophthalmic suspension Place 1 drop into the left eye daily  3/30/15  Yes Historical Provider, MD   cetirizine (ZYRTEC) 10 MG tablet Take 1 tablet by mouth daily 11/10/21 12/10/21  Luci Saez MD   insulin glargine (LANTUS SOLOSTAR) 100 UNIT/ML injection pen Inject 28 Units into the skin 2 times daily 7/19/21 11/2/21  Angeline Willoughby MD   CONTOUR TEST strip USE 1 STRIP TO CHECK GLUCOSE TWICE DAILY 10/21/19   Angeline Willoughby MD   DIANA MICROLET LANCETS [address] [facility] TEST TWICE DAILY 11/16/15   Angeline Willoughby MD   Insulin Pen Needle (PEN NEEDLES) 31G X 6 MM MISC 1 each by Does not apply route daily 10/16/15   Angeline Willoughby MD       Review of Systems:  Constitutional: no fever or chills  Head: No headaches  Eyes: no blurry vision, no discharge  Ears: no ear pain or hearing changes  Nose: no runny nose or epistaxis  Respiratory: chronic SOB  Cardiovascular: no chest pain, + edema  GI: no nausea, no vomiting or diarrhea  : denies any hematuria, no flank pain  Skin: no rash  Musculoskeletal: no joint pain, moves all ext  Neuro: no tremor, no slurred speech  Psychiatric: stable mood, no depression or insomnia    Current Meds:  Infusion:    sodium chloride      dextrose      sodium chloride 75 mL/hr at 11/11/21 2056    sodium chloride       Meds:    methylPREDNISolone  40 mg IntraVENous Q12H    insulin lispro  0-12 Units SubCUTAneous TID WC    insulin lispro  0-6 Units SubCUTAneous Nightly    acyclovir  400 mg Oral BID    amLODIPine  10 mg Oral Nightly    cetirizine  10 mg Oral Daily    glipiZIDE  10 mg Oral BID    hydrALAZINE  100 mg Oral TID    insulin glargine  28 Units SubCUTAneous BID    prednisoLONE acetate  1 drop Left Eye Daily    sodium chloride flush  5-40 mL IntraVENous 2 times per day     Meds prn: sodium chloride, glucose, dextrose, glucagon (rDNA), dextrose, sodium chloride flush, sodium chloride, ondansetron **OR** ondansetron, polyethylene glycol, acetaminophen **OR** acetaminophen     Allergies/Intolerances: ALLERGIES: Lipitor [atorvastatin], Aldactone [spironolactone], Crestor [rosuvastatin calcium], Lopid [gemfibrozil], Invokana [canagliflozin], and Januvia [sitagliptin]    24HR INTAKE/OUTPUT:      Intake/Output Summary (Last 24 hours) at 11/12/2021 1400  Last data filed at 11/12/2021 1016  Gross per 24 hour   Intake 2567.5 ml   Output 1700 ml   Net 867.5 ml     I/O last 3 completed shifts: In: 2206.3 [P.O.:500; I.V.:606; IV Piggyback:1100.3]  Out: 1300 [Urine:1300]  I/O this shift:  In: 361.3 [Blood:361.3]  Out: 400 [Urine:400]  Admission weight: 195 lb (88.5 kg)  Wt Readings from Last 3 Encounters:   11/11/21 195 lb 8 oz (88.7 kg)   11/10/21 193 lb 6.4 oz (87.7 kg)   11/02/21 195 lb (88.5 kg)     Body mass index is 28.87 kg/m².     Physical Examination:  VITALS:  BP (!) 175/68   Pulse 65   Temp 97.4 °F (36.3 °C) (Temporal)   Resp 16   Ht 5' 9\" (1.753 m)   Wt 195 lb 8 oz (88.7 kg)   SpO2 98% BMI 28.87 kg/m²   Weight:   Wt Readings from Last 3 Encounters:   11/11/21 195 lb 8 oz (88.7 kg)   11/10/21 193 lb 6.4 oz (87.7 kg)   11/02/21 195 lb (88.5 kg)   General -- no distress  Oral Mucosa -- moist  Neck --  JVD - no  Extremities -- no significant edema  CNS - awake and alert   Pschy - not agitated, mood and memory normal  Skin:  Rash noted on lower extremities    Due to this being a TeleHealth encounter, evaluation of the following organ systems is limited: Vitals/EENT/Resp/CV/GI//MS/Neuro/Skin/Heme-Lymph-Imm. Lab Data  CBC:   Recent Labs     11/11/21  1500 11/11/21  1500 11/11/21  1718 11/12/21  0346 11/12/21  1215   WBC 8.5  --  8.1 5.9  --    HGB 7.5*   < > 7.5* 6.7* 8.6*   HCT 24.8*   < > 24.1* 21.9* 28.1*     --  182 167  --     < > = values in this interval not displayed. BMP:  Recent Labs     11/11/21  1718 11/12/21  0346 11/12/21  0840   * 132* 134*   K 6.6* 6.2* 5.4*    107 108*   CO2 10* 13* 14*   BUN 86* 84* 84*   CREATININE 3.64* 3.11* 3.05*   GLUCOSE 480* 281* 259*   CALCIUM 8.5* 8.0* 8.3*     PTH: @PTH@  TSH: No results for input(s): TSH in the last 72 hours. HgBa1c: No results for input(s): LABA1C in the last 72 hours. Hepatic:   Recent Labs     11/11/21  1500 11/12/21  0346 11/12/21  0840   LABALBU 3.9 3.4* 3.4*   AST 12 10 11   ALT 12 11 11   BILITOT 0.18* 0.17* 0.27*   ALKPHOS 87 70 73     ABGs: No results found for: PHART, PO2ART, ZTQ9PIX  Troponin: No results for input(s): TROPONINI in the last 72 hours. BNP: No results for input(s): BNP in the last 72 hours. Old labs reviewed. Impression and Plan:  1. VIDAL on CKD III: multifactorial from recent contrast nephropathy compounded by volume depletion from hyperglycemia + ACE-I and Epleronone  2. Hyperkalemia due to VIDAL, ACE-I, Epleronone, and hyperglycemia causing solvent drag  3. Metabolic acidosis from VIDAL. Consider DKA  4. Hyponatremia, translocational from hyperglycemia  5.  HTN, uncontrolled  6. CAD  7. Anemia s/p recent outpatient IV Iron  8. Chest pain  9. DM  10. Allergic reaction      Continue IV fluids but will change to bicarb drip given metabolic acidosis. Potassium is improving. Maintain off Lisinopril and epleronone. Add Coreg for BP support. If blood sugars worsen consider insulin drip. Since VIDAL is improving will hold of renal US for now but if no further improvement will obtain. Thank you for allowing me to participate in the care of this patient. Please feel free to call me if you have any questions.      Electronically signed by Donita Silva DO on 11/12/21 at 2:00 PM EST    [facility]

## 2021-11-12 NOTE — CONSULTS
[facility] Department of Pharmacy   Pharmacy Renal Adjustment Note    Mireya Sutherland is a 68 y.o. male. Pharmacist assessment of renally cleared medications. Recent Labs     11/11/21  1718 11/12/21  0346 11/12/21  0840   CREATININE 3.64* 3.11* 3.05*     Estimated Creatinine Clearance: 23 mL/min (A) (based on SCr of 3.05 mg/dL (H)). Height:   Ht Readings from Last 1 Encounters:   11/12/21 5' 9\" (1.753 m)     Weight:  Wt Readings from Last 1 Encounters:   11/11/21 195 lb 8 oz (88.7 kg)       The following medication has been adjusted based upon renal function:             Cetirizine 10 mg po daily decreased to cetirizine 5 mg po daily for CrCl < 31 mL/min. Thank you. Hayley Whitehead. Alexandra Dover [facility],11/12/2021,4:18 PM

## 2021-11-12 NOTE — H&P
History and Physical    Patient:  Jama Vargas  MRN: [id number]    Chief Complaint: Chest pressure    History Obtained From:  patient, electronic medical record    PCP: Angeles Salazar MD    History of Present Illness: The patient is a 68 y.o. male who presented to the emergency room due to elevated potassium. He stated while walking into the hospital he developed chest pressure with radiation to his left arm. Onset was just prior to arrival.  Patient described it as constant and moderate in severity and worsened with light exertion. Chest pain improved with rest.  Patient does have history of angina and takes nitroglycerin occasionally but has been sometime since he has done that. Patient had abnormal cardiac cath approximately 1 month ago without intervention due to patient's anemic status. Patient has history of iron deficiency anemia secondary to inadequate dietary iron intake. On November 1, 2021 patient received an IV infusion of Injectafer which was the second of 2 ordered treatments IV. Patient developed a rash and was started on Deltasone and Zyrtec for urticaria. Patient had acute purpuric eruption as well and is PCP ordered several lab studies. Patient also has chronic renal failure and is a patient of Dr. Michael Laws in [facility]. During the patient's evaluation his initial blood sugar was 480. His BUN was elevated to 86 and creatinine of 3.64. His potassium was elevated 6.6 and sodium was low at 128. Patient was ordered 30 g of Kayexalate in the ER was given. Patient's initial hemoglobin was 7.5 and decreased to 6.8. Patient was ordered blood transfusion of 1 unit of packed blood cells which was infusing upon admission. Patient was given calcium gluconate as well. Patient was treated with sublingual nitroglycerin, aspirin as well as insulin sodium bicarb.     Past Medical History:        Diagnosis Date    Acute MI ([facility]     Acute renal failure with tubular necrosis ([facility] 10/2/2018 ssecondary to hemodynamic effects of loop diuretics and ace inhibitors, bbaseline 1.2-1.3 which peaked up to 1.8, resolving    Anemia     CAD (coronary artery disease)     Cerebral artery occlusion with cerebral infarction (HCC)     CHF (congestive heart failure) (HCC)     Chronic back pain     Closed fracture of lumbar vertebra without mention of spinal cord injury     Displacement of intervertebral disc, site unspecified, without myelopathy     H/O cardiac catheterization 2/19/16    LMCA: Mild irregularities 10-20%. LAD: Lesion on PRox LAD: Mid subsection. 65% stenosis. LCx: Lesion on 1st Ob Valentina: Proximal subesection. 70% steniosis. RCA: Small non-dominant RCA. Lesion on PRox RCA: Ostial. 50% stenosis. EF:55%.  H/O cardiovascular stress test 2/19/16    Abnormal. Moderate perfusion defect of mild intensity in the inferior, inferoseptal adn inferoapical regions during stress imaging, which most consistent with ischemia. Global LV systolic function normal without regional wall motion abnormalities. OVerall these results are most consistent with an intermediate risk for signficant CAD. Additional testing including cardiac cath may be indicated.  H/O tilt table evaluation 12/26/2017    Abnormal. Patients HR, BP response and symptoms were most consistent with dysautonomia. Combined with viligant maintenance of euvolemia and maintaining a moderate salft intake, pharmacologic treatment with SSRI such as lexapro and or mestinon among other treatments have shown some effectiveness in treatment of this condition    H/O tilt table evaluation 12/26/2017    Abnormal head upright tilt table study. The pt heart rate, blood pressure response and symptoms were most consistent with dysautonomia.  History of 24 hour EKG monitoring 8/14/14    Occasional PAC's and PVC's which appear to be at least moderately symptomatic.  History of 24 hour EKG monitoring 11/19/14    Event Monitor.  Sinus rhythm and sinus bradycardia. Infrequent isolated PVC's    History of blood transfusion     History of cardiovascular stress test 2/19/14    Relatively NL    History of cardiovascular stress test 03/21/2018    Normal myocardial perfusion. Global left ventricular systolic function was normal with an EF of 68%. Overall, these results are most consistent with a low risk scan.  History of coronary artery stent placement 04/2017    PTCA / Drug Eluting Stent:, CX and / or branches    History of CVA (cerebrovascular accident) without residual deficits 2014    Incidentally found on CT head. No known impairment now or in the past.    History of echocardiogram 2/18/14    LA mildly dilated, EF 55%, LV wall thickness is moderately increased, no definite wall motion abnormalilities, what appears to be a pacer wire is seen w/n the RA and RV, mild-mod TR, mild pulmonary hypertension.  History of Holter monitoring 12/29/2017    Rare PAC's and PVC's.      History of tilt table evaluation 8/12/14    Abnormal    Hyperlipidemia     Hypertension     Non critical Right Renal artery stenosis, native ([address] 10/2/2018    Non critical Right Renal artery stenosis, based on cath in 2016, Rt RA 30% stenosis    Pacemaker     non-functioning    S/P cardiac cath 04/10/2017    S/P coronary artery stent placement     Successful PTCA - HA Om1    SIRS (systemic inflammatory response syndrome) ([address] 5/19/2019    Type II or unspecified type diabetes mellitus without mention of complication, not stated as uncontrolled        Past Surgical History:        Procedure Laterality Date    BACK SURGERY      CARDIAC CATHETERIZATION Left 02/19/2016    via right radial approach/ Melina Vernon/ Dr. Devin River Left 10/05/2021    Dr Jovanni Marley radial-Moderate three vessel coronary artery disease involving a ostial 50-60% stenosis in a small, non-dominant RCA, a 60% mid LAD stenosis and a proximal 50-60% stenosis in the left anterior descending coronary artery. Normal left ventricular end diastolic pressure. Proceed with aggressive maximal medical management as clinically indicated.  CHOLECYSTECTOMY  08/17/2015    [place]/ Lap    COLONOSCOPY  2010    COLONOSCOPY  02/19/2015    -polyps,diverticulosis,hemorrhoids    COLONOSCOPY  05/23/2018    Dr Martha Aguillon    COLONOSCOPY N/A 05/23/2018    COLONOSCOPY POLYPECTOMY SNARE/COLD BIOPSY  cold snare  and  hot sn[facility]  10/30/2020    with Dr. Sakina Chow 10/30/2020    Carlos Chow, DANA HIGH RISK performed by Francisco Javier John DO at [address] [facility]      left eye    ENDOSCOPY, COLON, DIAGNOSTIC      EYE SURGERY      PACEMAKER INSERTION      PACEMAKER PLACEMENT      UPPER GASTROINTESTINAL ENDOSCOPY  05/28/2019    Dr. Marissa Valdes H-Pylori)duodenal bulbar/antral erythema    UPPER GASTROINTESTINAL ENDOSCOPY N/A 05/28/2019    EGD BIOPSY, clotest performed by Delford Homans, MD at [address] [facility] 10/30/2020    EGD ESOPHAGOGASTRODUODENOSCOPY performed by Francisco Javier John DO at [address] [facility]       Medications Prior to Admission:    Prior to Admission medications    Medication Sig Start Date End Date Taking?  Authorizing Provider   predniSONE (DELTASONE) 20 MG tablet Take 1 tablet by mouth 2 times daily for 5 days 11/10/21 11/15/21 Yes Chelo Leach MD   eplerenone (INSPRA) 25 MG tablet Take 1 tablet by mouth daily 11/3/21 2/1/22 Yes Chuck Montez MD   loratadine (CLARITIN) 10 MG tablet Take 1 tablet by mouth daily  Patient taking differently: Take 10 mg by mouth daily as needed  10/19/21  Yes Alonzo Nieves MD   lisinopril (PRINIVIL;ZESTRIL) 20 MG tablet Take 1 tablet by mouth daily 10/15/21 1/13/22 Yes Chuck Montez MD   amLODIPine (NORVASC) 10 MG tablet Take 1 tablet by mouth nightly 9/22/21  Yes Brad Avilez MD   hydrALAZINE (APRESOLINE) 100 MG tablet Take 1 tablet by mouth 3 times daily Patient is taking 100 MG 3 times daily 8/19/21  Yes Brad Avilez MD   glipiZIDE (GLUCOTROL XL) 5 MG extended release tablet Take 2 tablets by mouth 2 times daily 7/19/21  Yes Brad Avilez MD   nitroGLYCERIN (NITROSTAT) 0.4 MG SL tablet Place 1 tablet under the tongue every 5 minutes as needed for Chest pain 6/28/21  Yes Linnea Gutiérrez MD   latanoprost (XALATAN) 0.005 % ophthalmic solution  2/2/21  Yes Historical Provider, MD   acyclovir (ZOVIRAX) 400 MG tablet 400 mg 2 times daily  7/6/20  Yes Historical Provider, MD   prednisoLONE acetate (PRED FORTE) 1 % ophthalmic suspension Place 1 drop into the left eye daily  3/30/15  Yes Historical Provider, MD   cetirizine (ZYRTEC) 10 MG tablet Take 1 tablet by mouth daily 11/10/21 12/10/21  Fleurette Seip, MD   insulin glargine (LANTUS SOLOSTAR) 100 UNIT/ML injection pen Inject 28 Units into the skin 2 times daily 7/19/21 11/2/21  Brad Avilez MD   CONTOUR TEST strip USE 1 STRIP TO CHECK GLUCOSE TWICE DAILY 10/21/19   Brad Avilez MD   DIANA MICROLET LANCETS [address] [facility] TEST TWICE DAILY 11/16/15   Brad Avilez MD   Insulin Pen Needle (PEN NEEDLES) 31G X 6 MM MISC 1 each by Does not apply route daily 10/16/15   Brad Avilez MD       Allergies:  Lipitor [atorvastatin], Aldactone [spironolactone], Crestor [rosuvastatin calcium], Lopid [gemfibrozil], Invokana [canagliflozin], and Januvia [sitagliptin]    Social History:   TOBACCO:   reports that he quit smoking about 41 years ago. His smoking use included cigarettes. He has a 12.00 pack-year smoking history. He has never used smokeless tobacco.  ETOH:   reports no history of alcohol use.     Family History:       Problem Relation Age of Onset    Cancer Mother         breast    Cancer Father         lung       Allergies:  Lipitor [atorvastatin], Aldactone [spironolactone], Crestor [rosuvastatin calcium], Lopid [gemfibrozil], Invokana [canagliflozin], and Januvia [sitagliptin]    Medications Prior to Admission:    Prior to Admission medications    Medication Sig Start Date End Date Taking?  Authorizing Provider   predniSONE (DELTASONE) 20 MG tablet Take 1 tablet by mouth 2 times daily for 5 days 11/10/21 11/15/21 Yes Taisha Montaño MD   eplerenone (INSPRA) 25 MG tablet Take 1 tablet by mouth daily 11/3/21 2/1/22 Yes Jose Cisse MD   loratadine (CLARITIN) 10 MG tablet Take 1 tablet by mouth daily  Patient taking differently: Take 10 mg by mouth daily as needed  10/19/21  Yes Stephen Mayorga MD   lisinopril (PRINIVIL;ZESTRIL) 20 MG tablet Take 1 tablet by mouth daily 10/15/21 1/13/22 Yes Jose Cisse MD   amLODIPine (NORVASC) 10 MG tablet Take 1 tablet by mouth nightly 9/22/21  Yes Geogrie Peters MD   hydrALAZINE (APRESOLINE) 100 MG tablet Take 1 tablet by mouth 3 times daily Patient is taking 100 MG 3 times daily 8/19/21  Yes Georgie Peters MD   glipiZIDE (GLUCOTROL XL) 5 MG extended release tablet Take 2 tablets by mouth 2 times daily 7/19/21  Yes Georgie Peters MD   nitroGLYCERIN (NITROSTAT) 0.4 MG SL tablet Place 1 tablet under the tongue every 5 minutes as needed for Chest pain 6/28/21  Yes Stephen Mayorga MD   latanoprost (XALATAN) 0.005 % ophthalmic solution  2/2/21  Yes Historical Provider, MD   acyclovir (ZOVIRAX) 400 MG tablet 400 mg 2 times daily  7/6/20  Yes Historical Provider, MD   prednisoLONE acetate (PRED FORTE) 1 % ophthalmic suspension Place 1 drop into the left eye daily  3/30/15  Yes Historical Provider, MD   cetirizine (ZYRTEC) 10 MG tablet Take 1 tablet by mouth daily 11/10/21 12/10/21  Taisha Montaño MD   insulin glargine (LANTUS SOLOSTAR) 100 UNIT/ML injection pen Inject 28 Units into the skin 2 times daily 7/19/21 11/2/21  Georgie Peters MD   CONTOUR TEST strip USE 1 STRIP TO CHECK GLUCOSE TWICE DAILY 10/21/19   Georgie Peters MD   DIANA MICROLET LANCETS MISC TEST TWICE DAILY 11/16/15   Ritesh Downing MD   Insulin Pen Needle (PEN NEEDLES) 31G X 6 MM MISC 1 each by Does not apply route daily 10/16/15   Ritesh Downing MD       Review of Systems:  Constitutional:negative  for fevers, and negative for chills. Eyes: negative for visual disturbance   ENT: negative for sore throat, negative nasal congestion, and negative for earache  Respiratory: negative for shortness of breath, negative for cough, and negative for wheezing  Cardiovascular: positive for chest pain, negative for palpitations, and negative for syncope  Gastrointestinal: negative for abdominal pain, negative for nausea,negative for vomiting, negative for diarrhea, negative for constipation, and negative for hematochezia or melena  Genitourinary: negative for dysuria, negative for urinary urgency, negative for urinary frequency, and negative for hematuria  Skin: positive for skin rash, and negative for skin lesions  Neurological: negative for unilateral weakness, numbness or tingling. Physical Exam:    Vitals:   Temp: 97.4 °F (36.3 °C)  BP: (!) 175/68  Resp: 16  Pulse: 65  SpO2: 98 %  24HR INTAKE/OUTPUT:      Intake/Output Summary (Last 24 hours) at 11/12/2021 1337  Last data filed at 11/12/2021 1016  Gross per 24 hour   Intake 2567.5 ml   Output 1700 ml   Net 867.5 ml       Weight    Body mass index is 28.87 kg/m². Exam:  GEN:    Awake, alert and oriented x3. EYES:  EOMI, pupils equal   NECK: Supple. No lymphadenopathy. No carotid bruit  CVS:    regular rate and rhythm, no audible murmur  PULM:  CTA, no wheezes, rales or rhonchi, no acute respiratory distress  ABD:    Bowels sounds normal.  Abdomen is soft. No distention. no tenderness to palpation. EXT:   no edema bilaterally . No calf tenderness. NEURO: Moves all extremities.   Motor and sensory are grossly intact  SKIN: Red purpuric rash to right lower extremity  -----------------------------------------------------------------  Diagnostic Data: DATA:    CBC:   Lab Results   Component Value Date    WBC 5.9 11/12/2021    RBC 2.44 (L) 11/12/2021    HGB 8.6 (L) 11/12/2021    HCT 28.1 (L) 11/12/2021    MCV 89.8 11/12/2021     11/12/2021        CMP:   Lab Results   Component Value Date    GLUCOSE 259 (H) 11/12/2021    BUN 84 (H) 11/12/2021    CREATININE 3.05 (H) 11/12/2021     (L) 11/12/2021    K 5.4 (H) 11/12/2021    CALCIUM 8.3 (L) 11/12/2021     (H) 11/12/2021    CO2 14 (L) 11/12/2021    PROT 6.4 11/12/2021    LABALBU 3.4 (L) 11/12/2021    BILITOT 0.27 (L) 11/12/2021    ALKPHOS 73 11/12/2021    ALT 11 11/12/2021    AST 11 11/12/2021       UA:   Lab Results   Component Value Date    COLORU Yellow 11/11/2021    SPECGRAV 1.020 11/11/2021    WBCUA 0 TO 2 11/11/2021    RBCUA 0 TO 2 11/11/2021    EPITHUA 0 TO 2 11/11/2021    LEUKOCYTESUR NEGATIVE 11/11/2021    GLUCOSEU 3+ (A) 11/11/2021    KETUA NEGATIVE 11/11/2021    PROTEINU TRACE (A) 11/11/2021    HGBUR TRACE (A) 11/11/2021    CASTUA NOT REPORTED 11/11/2021    CRYSTUA NOT REPORTED 11/11/2021    BACTERIA NOT REPORTED 11/11/2021    YEAST NOT REPORTED 11/11/2021       Lactic Acid:   No results found for: LACTA    D-Dimer:  No results found for: DDIMER    PT/INR:  Lab Results   Component Value Date    PROTIME 13.9 09/20/2021    INR 1.1 09/20/2021       High Sensitivity Troponin:  Recent Labs     11/11/21  1718 11/11/21  2006   TROPHS 49* 45*       ABGs:   No results found for: PHART, PH, LXN4RGQ, PCO2, PO2ART, PO2, KYY0LFA, HCO3, BEART, BE, THGBART, THB, XQM4OLD, M7KVSSML, O2SAT, FIO2        XR CHEST PORTABLE   Final Result   No acute process. EKG reviewed: my interpretation is:         Assessment:    Active Problems:     Moderate malnutrition ([address]    Coronary artery disease involving native coronary artery of native heart without angina pectoris    Acute kidney injury superimposed on CKD (HCC)    Chronic anemia    Acute purpuric eruption    Hyperkalemia  Resolved Problems:    * No resolved hospital problems.  *      Patient Active Problem List    Diagnosis Date Noted    S/P drug eluting coronary stent placement-OM1 4/10/17 04/10/2017    Moderate malnutrition ([address] 11/12/2021    Coronary atherosclerosis due to calcified coronary lesion 02/20/2015    Hyperkalemia 11/11/2021    Acute purpuric eruption 11/10/2021    Skin lesion of left lower extremity 11/10/2021    Urticaria 11/10/2021    Iron deficiency anemia secondary to inadequate dietary iron intake 10/21/2021    Iron malabsorption 10/21/2021    Chronic anemia 09/21/2021    Acute coronary syndrome with high troponin ([address] 09/21/2021    Chest pain 09/20/2021    Gross hematuria 05/05/2021    BPH with obstruction/lower urinary tract symptoms 12/10/2019    Elevated PSA 12/10/2019    Anemia in stage 3 chronic kidney disease ([address] 05/21/2019    Acute kidney injury superimposed on CKD ([address] 10/02/2018    Non critical Right Renal artery stenosis, native ([address] 10/02/2018    Medication side effect, initial encounter 03/23/2018    Angina, class II ([address] 01/08/2018    Hyperlipidemia 04/24/2017    Chronic diastolic heart failure ([address] 04/24/2017    Coronary artery disease involving native coronary artery of native heart without angina pectoris 05/02/2016    Cervical stenosis of spinal canal 02/29/2016    Abnormal tilt table test 02/23/2016    Chronic kidney disease, stage III (moderate) ([address] 02/22/2016    Controlled type 2 diabetes mellitus with diabetic nephropathy, with long-term current use of insulin ([address] 02/22/2016    Abnormal cardiovascular stress test 02/18/2016    Ischemic chest pain ([address] 02/17/2016    Accelerated hypertension 10/16/2015    Chronotropic incompetence with autonomic dysfunction 09/02/2015    Episodic lightheadedness 09/02/2015    Cholecystitis 08/17/2015    Syncope, near 08/05/2015    Dysautonomia ([address] 06/29/2015    History of CVA (cerebrovascular accident) without residual deficits     Displacement of intervertebral disc, site unspecified, without myelopathy 03/04/2015    History of colon polyps 47/12/3521    Systolic murmur 15/93/1551    History of MI (myocardial infarction) 02/12/2014    Vertigo, benign paroxysmal 10/17/2012       Plan:     · This patient requires inpatient admission because of hyperkalemia  · Factors affecting the medical complexity of this patient include CAD, chronic anemia, VIDAL on CKD  · Estimated length of stay is 3 days  · Hyperkalemia  · Trend potassium  · 30 g of Kayexalate received  · Appreciate nephrology  · CAD  · Continue Norvasc, Apresoline  · Chronic anemia  · Trend hemoglobin  · Transfuse 1 unit of PRBCs today  · Received IV Injectafer x2 doses as an outpatient  · VIDAL on CKD  · Continue IV fluids  · Trend labs  · Appreciate nephrology  · Monitor output  · Type 2 diabetes  · POCT before meals and at bedtime  · Medium dose sliding scale  · Continue Lantus, Glucotrol  · Hypoglycemia protocol  · Acute Purpura Eruption  · Secondary to Injectofer infusions  · Start IV Solu-Medrol  · Continue acyclovir, Zyrtec  · DVT prophylaxis: TEDs  · Peptic ulcer prophylaxis: Pepcid  · High risk medications: none  · Social Service and Case Management consults for DC planning  · Dietician consult initiated    CORE MEASURES  DVT prophylaxis: TEDs  Decubitus ulcer present on admission: No  CODE STATUS: FULL CODE  Nutrition Status: good   Physical therapy: No   Old Charts reviewed: Yes  EKG Reviewed:  Yes  Advance Directive Addressed: Yes    REY Siddiqi CNP, REY, NP-C  11/12/2021, 1:37 PM

## 2021-11-12 NOTE — PROGRESS NOTES
Pt admitted to floor, disaster admission initiated, vitals and assessment complete, noted to have petechiae to bilateral lower extremities, states he had a biopsy done today, one stitch noted to left lower leg, clean and dry, no drainage noted. Pt ambulated to the restroom with cane 1 assist, gait unsteady, noted to be SOB with exertion. Urine specimen collected and sent to lab, pacemaker noted to right upper chest, lungs sounds clear throughout, denies any chest pain or any other pain at this time, wife at bedside, will continue to monitor.

## 2021-11-13 LAB
ABO/RH: NORMAL
ABSOLUTE EOS #: 0 K/UL (ref 0–0.44)
ABSOLUTE IMMATURE GRANULOCYTE: 0.14 K/UL (ref 0–0.3)
ABSOLUTE LYMPH #: 0.69 K/UL (ref 1.1–3.7)
ABSOLUTE MONO #: 0 K/UL (ref 0.1–1.2)
ANION GAP SERPL CALCULATED.3IONS-SCNC: 11 MMOL/L (ref 9–17)
ANTIBODY SCREEN: NEGATIVE
ARM BAND NUMBER: NORMAL
BASOPHILS # BLD: 0 % (ref 0–2)
BASOPHILS ABSOLUTE: 0 K/UL (ref 0–0.2)
BLD PROD TYP BPU: NORMAL
BUN BLDV-MCNC: 87 MG/DL (ref 8–23)
BUN/CREAT BLD: 32 (ref 9–20)
CALCIUM SERPL-MCNC: 8.3 MG/DL (ref 8.6–10.4)
CHLORIDE BLD-SCNC: 111 MMOL/L (ref 98–107)
CO2: 14 MMOL/L (ref 20–31)
CREAT SERPL-MCNC: 2.69 MG/DL (ref 0.7–1.2)
CROSSMATCH RESULT: NORMAL
DIFFERENTIAL TYPE: ABNORMAL
DISPENSE STATUS BLOOD BANK: NORMAL
EOSINOPHILS RELATIVE PERCENT: 0 % (ref 1–4)
EXPIRATION DATE: NORMAL
GFR AFRICAN AMERICAN: 28 ML/MIN
GFR NON-AFRICAN AMERICAN: 23 ML/MIN
GFR SERPL CREATININE-BSD FRML MDRD: ABNORMAL ML/MIN/{1.73_M2}
GFR SERPL CREATININE-BSD FRML MDRD: ABNORMAL ML/MIN/{1.73_M2}
GLUCOSE BLD-MCNC: 217 MG/DL (ref 74–100)
GLUCOSE BLD-MCNC: 259 MG/DL (ref 70–99)
GLUCOSE BLD-MCNC: 298 MG/DL (ref 74–100)
GLUCOSE BLD-MCNC: 298 MG/DL (ref 74–100)
GLUCOSE BLD-MCNC: 382 MG/DL (ref 74–100)
HCT VFR BLD CALC: 25.4 % (ref 40.7–50.3)
HEMOGLOBIN: 8.1 G/DL (ref 13–17)
IMMATURE GRANULOCYTES: 2 %
LYMPHOCYTES # BLD: 10 % (ref 24–43)
MCH RBC QN AUTO: 27.9 PG (ref 25.2–33.5)
MCHC RBC AUTO-ENTMCNC: 31.9 G/DL (ref 28.4–34.8)
MCV RBC AUTO: 87.6 FL (ref 82.6–102.9)
MONOCYTES # BLD: 0 % (ref 3–12)
MORPHOLOGY: NORMAL
NRBC AUTOMATED: 0 PER 100 WBC
PDW BLD-RTO: 17.1 % (ref 11.8–14.4)
PLATELET # BLD: 170 K/UL (ref 138–453)
PLATELET ESTIMATE: ABNORMAL
PMV BLD AUTO: 10 FL (ref 8.1–13.5)
POTASSIUM SERPL-SCNC: 5 MMOL/L (ref 3.7–5.3)
RBC # BLD: 2.9 M/UL (ref 4.21–5.77)
RBC # BLD: ABNORMAL 10*6/UL
SEG NEUTROPHILS: 88 % (ref 36–65)
SEGMENTED NEUTROPHILS ABSOLUTE COUNT: 6.07 K/UL (ref 1.5–8.1)
SODIUM BLD-SCNC: 136 MMOL/L (ref 135–144)
TRANSFUSION STATUS: NORMAL
UNIT DIVISION: 0
UNIT NUMBER: NORMAL
WBC # BLD: 6.9 K/UL (ref 3.5–11.3)
WBC # BLD: ABNORMAL 10*3/UL

## 2021-11-13 PROCEDURE — 2580000003 HC RX 258: Performed by: INTERNAL MEDICINE

## 2021-11-13 PROCEDURE — 6370000000 HC RX 637 (ALT 250 FOR IP): Performed by: INTERNAL MEDICINE

## 2021-11-13 PROCEDURE — 6370000000 HC RX 637 (ALT 250 FOR IP): Performed by: FAMILY MEDICINE

## 2021-11-13 PROCEDURE — 1200000000 HC SEMI PRIVATE

## 2021-11-13 PROCEDURE — 2500000003 HC RX 250 WO HCPCS: Performed by: INTERNAL MEDICINE

## 2021-11-13 PROCEDURE — 99223 1ST HOSP IP/OBS HIGH 75: CPT | Performed by: FAMILY MEDICINE

## 2021-11-13 PROCEDURE — 82947 ASSAY GLUCOSE BLOOD QUANT: CPT

## 2021-11-13 PROCEDURE — 99232 SBSQ HOSP IP/OBS MODERATE 35: CPT | Performed by: INTERNAL MEDICINE

## 2021-11-13 PROCEDURE — 6360000002 HC RX W HCPCS: Performed by: FAMILY MEDICINE

## 2021-11-13 PROCEDURE — 94761 N-INVAS EAR/PLS OXIMETRY MLT: CPT

## 2021-11-13 PROCEDURE — 36415 COLL VENOUS BLD VENIPUNCTURE: CPT

## 2021-11-13 PROCEDURE — 85025 COMPLETE CBC W/AUTO DIFF WBC: CPT

## 2021-11-13 PROCEDURE — 80048 BASIC METABOLIC PNL TOTAL CA: CPT

## 2021-11-13 RX ORDER — CLONIDINE HYDROCHLORIDE 0.2 MG/1
0.2 TABLET ORAL 2 TIMES DAILY
Status: DISCONTINUED | OUTPATIENT
Start: 2021-11-13 | End: 2021-11-14

## 2021-11-13 RX ORDER — GLIPIZIDE 5 MG/1
10 TABLET ORAL
Status: DISCONTINUED | OUTPATIENT
Start: 2021-11-13 | End: 2021-11-21 | Stop reason: HOSPADM

## 2021-11-13 RX ADMIN — PREDNISOLONE ACETATE 1 DROP: 10 SUSPENSION/ DROPS OPHTHALMIC at 07:11

## 2021-11-13 RX ADMIN — INSULIN GLARGINE 28 UNITS: 100 INJECTION, SOLUTION SUBCUTANEOUS at 20:30

## 2021-11-13 RX ADMIN — CARVEDILOL 3.12 MG: 3.12 TABLET, FILM COATED ORAL at 16:58

## 2021-11-13 RX ADMIN — ACYCLOVIR 400 MG: 200 CAPSULE ORAL at 07:04

## 2021-11-13 RX ADMIN — CLONIDINE HYDROCHLORIDE 0.2 MG: 0.2 TABLET ORAL at 20:29

## 2021-11-13 RX ADMIN — GLIPIZIDE 10 MG: 5 TABLET ORAL at 07:04

## 2021-11-13 RX ADMIN — CETIRIZINE HYDROCHLORIDE 5 MG: 10 TABLET, FILM COATED ORAL at 07:04

## 2021-11-13 RX ADMIN — SODIUM BICARBONATE: 84 INJECTION, SOLUTION INTRAVENOUS at 21:34

## 2021-11-13 RX ADMIN — METHYLPREDNISOLONE SODIUM SUCCINATE 40 MG: 40 INJECTION, POWDER, FOR SOLUTION INTRAMUSCULAR; INTRAVENOUS at 20:29

## 2021-11-13 RX ADMIN — INSULIN GLARGINE 28 UNITS: 100 INJECTION, SOLUTION SUBCUTANEOUS at 07:22

## 2021-11-13 RX ADMIN — INSULIN LISPRO 6 UNITS: 100 INJECTION, SOLUTION INTRAVENOUS; SUBCUTANEOUS at 06:57

## 2021-11-13 RX ADMIN — ACYCLOVIR 400 MG: 200 CAPSULE ORAL at 20:29

## 2021-11-13 RX ADMIN — SODIUM CHLORIDE, PRESERVATIVE FREE 10 ML: 5 INJECTION INTRAVENOUS at 07:14

## 2021-11-13 RX ADMIN — CARVEDILOL 3.12 MG: 3.12 TABLET, FILM COATED ORAL at 07:04

## 2021-11-13 RX ADMIN — HYDRALAZINE HYDROCHLORIDE 100 MG: 50 TABLET, FILM COATED ORAL at 12:52

## 2021-11-13 RX ADMIN — METHYLPREDNISOLONE SODIUM SUCCINATE 40 MG: 40 INJECTION, POWDER, FOR SOLUTION INTRAMUSCULAR; INTRAVENOUS at 07:04

## 2021-11-13 RX ADMIN — SODIUM BICARBONATE: 84 INJECTION, SOLUTION INTRAVENOUS at 06:55

## 2021-11-13 RX ADMIN — CLONIDINE HYDROCHLORIDE 0.2 MG: 0.2 TABLET ORAL at 11:06

## 2021-11-13 RX ADMIN — INSULIN LISPRO 9 UNITS: 100 INJECTION, SOLUTION INTRAVENOUS; SUBCUTANEOUS at 11:14

## 2021-11-13 RX ADMIN — INSULIN LISPRO 9 UNITS: 100 INJECTION, SOLUTION INTRAVENOUS; SUBCUTANEOUS at 16:59

## 2021-11-13 RX ADMIN — SODIUM CHLORIDE, PRESERVATIVE FREE 10 ML: 5 INJECTION INTRAVENOUS at 20:30

## 2021-11-13 RX ADMIN — HYDRALAZINE HYDROCHLORIDE 100 MG: 50 TABLET, FILM COATED ORAL at 20:29

## 2021-11-13 RX ADMIN — GLIPIZIDE 10 MG: 5 TABLET ORAL at 16:58

## 2021-11-13 RX ADMIN — HYDRALAZINE HYDROCHLORIDE 100 MG: 50 TABLET, FILM COATED ORAL at 07:04

## 2021-11-13 RX ADMIN — AMLODIPINE BESYLATE 10 MG: 10 TABLET ORAL at 20:29

## 2021-11-13 ASSESSMENT — PAIN SCALES - GENERAL
PAINLEVEL_OUTOF10: 0

## 2021-11-13 NOTE — CONSULTS
Patient: Radha Johnston  : 1945  Date of Admission: 2021  Primary Care Physician: Ligia Altman  Today's Date: 2021    REASON FOR CONSULTATION: Chest Pain (Mid chest tightness, radiating to left arm. Onset 30mins ago), Shortness of Breath, and Other (Abnormal labwork drawn today. (potassium, glucose and kidney function))      HPI: Mr. Kerry Lopez is a 68 y.o. male well known to me with a known history of dysautonomia where on tilt table testing his blood pressure dropped from 247 systolic to 78 systolic with only a 08-92 HR response. More recently, a CT of he head in the past showed evidence of at least 2 old CVA's and a heart catheterization showed severe single vessel disease in the ostium of a moderate sized OM1 branch of the circumflex. However, maximal guidelines directed medical management was opted for an place of stenting partly due to the risk associated with stenting this vessel. Recently he has had a coronary angiography procedure with stenting of his HA Om1. As you know, Mr. Kerry Lopez  is a 76 y.o. male with a history of recent onset substernal chest discomfort that led to a stress test and a subsequent heart catheterization which showed severe single vessel coronary artery disease of the HA Om1 with successful angioplasty and stenting of that vessel that was done by Dr. Jolie Cardona on 4/10/17. More recently he has had problems with balance problems when he is in his feet and says that a Neurologist has told him in the past this is because of his previous strokes. Most recently he underwent a cardiovascular stress test which fortunately had shown to be normal on 3/21/2018. Mr. Kerry Lopez has significant normal stress test and echocardiogram on 2020. Holter monitor done on 2020: The rhythm was sinus. Average daily heart rate 58 ranging from 47 to 81 bpm. Bradycardia for 72% test duration. Rare premature supraventricular ectopic beats consisting of 33 isolated PACs.  Rare premature ventricular ectopic beats total 75 consisting of 73 isolatedPVCs and a ventricular couplet. Echocardiogram done on 12/18/2020: EF of 60%. Moderately increased LV wall thickness. Borderline pulmonary hypertension. Mild tricuspid regurgitation. Mild diastolic dysfunction is seen/ Aortic root is mildly dilated. Stress test done on 9/20/2021 was equivocal, There is a small/moderate perfusion defect of mild intensity in the lateral, inferior and inferoapical regions. Cardiac cath done on 10/5/2021 showed Moderate three vessel coronary artery disease involving a ostial 50-60% stenosis in a small, non-dominant RCA, a 60% mid LAD stenosis and a proximal 50-60% stenosis in the left anterior descending coronary artery     Mr. Christophe Limon was admitted for acute renal failure with hyperkalemia and acute on chronic anemia of unclear etiology. He reports he was feeling fine until last Sunday when he developed a rash that started on his feet and progressively went up his legs. He reports having a biopsy done and says he was started on prednisone for this. In the hospital he was found to have uncontrolled hypertension leading to this consultation. Mr. Christophe Limon also denied any current or recent chest pain, abdominal pain, bleeding problems, problems with his medications or any other concerns at this time.      Past Medical History:   Diagnosis Date    Acute MI ([address]     Acute renal failure with tubular necrosis ([address] 10/2/2018    ssecondary to hemodynamic effects of loop diuretics and ace inhibitors, bbaseline 1.2-1.3 which peaked up to 1.8, resolving    Anemia     CAD (coronary artery disease)     Cerebral artery occlusion with cerebral infarction ([address]     CHF (congestive heart failure) (HCC)     Chronic back pain     Closed fracture of lumbar vertebra without mention of spinal cord injury     Displacement of intervertebral disc, site unspecified, without myelopathy     H/O cardiac catheterization 2/19/16 LMCA: Mild irregularities 10-20%. LAD: Lesion on PRox LAD: Mid subsection. 65% stenosis. LCx: Lesion on 1st Ob Valentina: Proximal subesection. 70% steniosis. RCA: Small non-dominant RCA. Lesion on PRox RCA: Ostial. 50% stenosis. EF:55%.  H/O cardiovascular stress test 2/19/16    Abnormal. Moderate perfusion defect of mild intensity in the inferior, inferoseptal adn inferoapical regions during stress imaging, which most consistent with ischemia. Global LV systolic function normal without regional wall motion abnormalities. OVerall these results are most consistent with an intermediate risk for signficant CAD. Additional testing including cardiac cath may be indicated.  H/O tilt table evaluation 12/26/2017    Abnormal. Patients HR, BP response and symptoms were most consistent with dysautonomia. Combined with viligant maintenance of euvolemia and maintaining a moderate salft intake, pharmacologic treatment with SSRI such as lexapro and or mestinon among other treatments have shown some effectiveness in treatment of this condition    H/O tilt table evaluation 12/26/2017    Abnormal head upright tilt table study. The pt heart rate, blood pressure response and symptoms were most consistent with dysautonomia.  History of 24 hour EKG monitoring 8/14/14    Occasional PAC's and PVC's which appear to be at least moderately symptomatic.  History of 24 hour EKG monitoring 11/19/14    Event Monitor. Sinus rhythm and sinus bradycardia. Infrequent isolated PVC's    History of blood transfusion     History of cardiovascular stress test 2/19/14    Relatively NL    History of cardiovascular stress test 03/21/2018    Normal myocardial perfusion. Global left ventricular systolic function was normal with an EF of 68%. Overall, these results are most consistent with a low risk scan.     History of coronary artery stent placement 04/2017    PTCA / Drug Eluting Stent:, CX and / or branches    History of CVA (cerebrovascular accident) without residual deficits 2014    Incidentally found on CT head. No known impairment now or in the past.    History of echocardiogram 2/18/14    LA mildly dilated, EF 55%, LV wall thickness is moderately increased, no definite wall motion abnormalilities, what appears to be a pacer wire is seen w/n the RA and RV, mild-mod TR, mild pulmonary hypertension.  History of Holter monitoring 12/29/2017    Rare PAC's and PVC's.  History of tilt table evaluation 8/12/14    Abnormal    Hyperlipidemia     Hypertension     Non critical Right Renal artery stenosis, native ([facility] 10/2/2018    Non critical Right Renal artery stenosis, based on cath in 2016, Rt RA 30% stenosis    Pacemaker     non-functioning    S/P cardiac cath 04/10/2017    S/P coronary artery stent placement     Successful PTCA - HA Om1    SIRS (systemic inflammatory response syndrome) ([facility] 5/19/2019    Type II or unspecified type diabetes mellitus without mention of complication, not stated as uncontrolled        CURRENT ALLERGIES: Lipitor [atorvastatin], Aldactone [spironolactone], Crestor [rosuvastatin calcium], Lopid [gemfibrozil], Invokana [canagliflozin], and Januvia [sitagliptin] REVIEW OF SYSTEMS: 14 systems were reviewed. Pertinent positives and negatives as above, all else negative. Past Surgical History:   Procedure Laterality Date    BACK SURGERY      CARDIAC CATHETERIZATION Left 02/19/2016    via right radial approach/ [facility]/ Dr. Jaylene Duckworth Left 10/05/2021    Dr Drake Cathay radial-Moderate three vessel coronary artery disease involving a ostial 50-60% stenosis in a small, non-dominant RCA, a 60% mid LAD stenosis and a proximal 50-60% stenosis in the left anterior descending coronary artery. Normal left ventricular end diastolic pressure. Proceed with aggressive maximal medical management as clinically indicated.     CHOLECYSTECTOMY  08/17/2015 [facility]ang/[facility]/ [facility]    COLONOSCOPY  2010    COLONOSCOPY  2015    -polyps,diverticulosis,hemorrhoids    COLONOSCOPY  2018    Dr Caro Colon    COLONOSCOPY N/A 2018    COLONOSCOPY POLYPECTOMY SNARE/COLD BIOPSY  cold snare  and  hot snare performed by Konrad Salazar MD at [address]  10/30/2020    with Dr. Kaden Her 10/30/2020    Luci Pearson, NOT HIGH RISK performed by Caroline Gardner DO at [address]      left eye    ENDOSCOPY, COLON, DIAGNOSTIC      EYE SURGERY      PACEMAKER INSERTION      PACEMAKER PLACEMENT      UPPER GASTROINTESTINAL ENDOSCOPY  2019    Dr. Bria Garcia H-Pylori)duodenal bulbar/antral erythema    UPPER GASTROINTESTINAL ENDOSCOPY N/A 2019    EGD BIOPSY, clotest performed by Konrad Salazar MD at [address] [facility] 10/30/2020    EGD ESOPHAGOGASTRODUODENOSCOPY performed by Caroline Gardner DO at [address] History:  Social History     Tobacco Use    Smoking status: Former Smoker     Packs/day: 1.50     Years: 8.00     Pack years: 12.00     Types: Cigarettes     Quit date: 3/4/1980     Years since quittin.7    Smokeless tobacco: Never Used   Vaping Use    Vaping Use: Never used   Substance Use Topics    Alcohol use: No    Drug use: No        CURRENT MEDICATIONS:  Outpatient Medications Marked as Taking for the 21 encounter [facility] Encounter)   Medication Sig Dispense Refill    predniSONE (DELTASONE) 20 MG tablet Take 1 tablet by mouth 2 times daily for 5 days 10 tablet 0    eplerenone (INSPRA) 25 MG tablet Take 1 tablet by mouth daily 90 tablet 3    loratadine (CLARITIN) 10 MG tablet Take 1 tablet by mouth daily (Patient taking differently: Take 10 mg by mouth daily as needed ) 90 tablet 3    lisinopril (PRINIVIL;ZESTRIL) 20 MG tablet Take 1 tablet by mouth daily 90 tablet 3    amLODIPine (NORVASC) 10 MG tablet Take 1 tablet by mouth nightly 90 tablet 0    hydrALAZINE (APRESOLINE) 100 MG tablet Take 1 tablet by mouth 3 times daily Patient is taking 100 MG 3 times daily 270 tablet 0    glipiZIDE (GLUCOTROL XL) 5 MG extended release tablet Take 2 tablets by mouth 2 times daily 360 tablet 0    nitroGLYCERIN (NITROSTAT) 0.4 MG SL tablet Place 1 tablet under the tongue every 5 minutes as needed for Chest pain 25 tablet 3    latanoprost (XALATAN) 0.005 % ophthalmic solution       acyclovir (ZOVIRAX) 400 MG tablet 400 mg 2 times daily       prednisoLONE acetate (PRED FORTE) 1 % ophthalmic suspension Place 1 drop into the left eye daily          glipiZIDE (GLUCOTROL) tablet 10 mg, BID AC  cloNIDine (CATAPRES) tablet 0.2 mg, BID  0.9 % sodium chloride infusion, PRN  methylPREDNISolone sodium (SOLU-MEDROL) injection 40 mg, Q12H  glucose (GLUTOSE) 40 % oral gel 15 g, PRN  dextrose 50 % IV solution, PRN  glucagon (rDNA) injection 1 mg, PRN  dextrose 5 % solution, PRN  sodium bicarbonate 75 mEq in sodium chloride 0.45 % 1,000 mL infusion, Continuous  carvedilol (COREG) tablet 3.125 mg, BID WC  cetirizine (ZYRTEC) tablet 5 mg, Daily  insulin lispro (HUMALOG) injection vial 0-18 Units, TID WC  insulin lispro (HUMALOG) injection vial 0-9 Units, Nightly  acyclovir (ZOVIRAX) capsule 400 mg, BID  amLODIPine (NORVASC) tablet 10 mg, Nightly  hydrALAZINE (APRESOLINE) tablet 100 mg, TID  insulin glargine (LANTUS) injection vial 28 Units, BID  prednisoLONE acetate (PRED FORTE) 1 % ophthalmic suspension 1 drop, Daily  sodium chloride flush 0.9 % injection 5-40 mL, 2 times per day  sodium chloride flush 0.9 % injection 10 mL, PRN  0.9 % sodium chloride infusion, PRN  ondansetron (ZOFRAN-ODT) disintegrating tablet 4 mg, Q8H PRN   Or  ondansetron (ZOFRAN) injection 4 mg, Q6H PRN  polyethylene glycol (GLYCOLAX) packet 17 g, Daily PRN  acetaminophen (TYLENOL) tablet 650 mg, Q6H PRN   Or  acetaminophen (TYLENOL) suppository 650 mg, Q6H PRN       FAMILY HISTORY: family history includes Cancer in his father and mother. PHYSICAL EXAM:   BP (!) 179/80   Pulse 62   Temp 96.9 °F (36.1 °C) (Temporal)   Resp 16   Ht 5' 9\" (1.753 m)   Wt 196 lb (88.9 kg)   SpO2 98%   BMI 28.94 kg/m²  Body mass index is 28.94 kg/m². [ INSTRUCTIONS:  \"[x]\" Indicates a positive item  \"[]\" Indicates a negative item   Vital Signs: (As obtained by patient/caregiver or practitioner observation)    Blood pressure-  Heart rate-    Respiratory rate-    Temperature-  Pulse oximetry-     Constitutional: [x] Appears well-developed and well-nourished [x] No apparent distress      [] Abnormal-   Mental status  [x] Alert and awake  [x] Oriented to person/place/time [x]Able to follow commands      Eyes:  EOM    [x]  Normal  [] Abnormal-  Sclera  [x]  Normal  [] Abnormal -         Discharge [x]  None visible  [] Abnormal -    HENT:   [x] Normocephalic, atraumatic.   [] Abnormal   [] Mouth/Throat: Mucous membranes are moist.     External Ears [x] Normal  [] Abnormal-     Neck: [x] No visualized mass     Pulmonary/Chest: [x] Respiratory effort normal.  [x] No visualized signs of difficulty breathing or respiratory distress        [] Abnormal-     Neurological:        [x] No Facial Asymmetry (Cranial nerve 7 motor function) (limited exam to video visit)          [] No gaze palsy        [] Abnormal-         Skin:        [x] No significant exanthematous lesions or discoloration noted on facial skin         [] Abnormal-            Psychiatric:       [x] Normal Affect [] No Hallucinations        [] Abnormal-     Other pertinent observable physical exam findings: None       MOST RECENT LABS ON RECORD:   Lab Results   Component Value Date    WBC 6.9 11/13/2021    HGB 8.1 (L) 11/13/2021    HCT 25.4 (L) 11/13/2021     11/13/2021    CHOL 69 09/21/2021    TRIG 185 (H) 09/21/2021    HDL 14 (L) 09/21/2021    LDLCHOLESTEROL 18 09/21/2021    ALT 11 11/12/2021 AST 11 11/12/2021     11/13/2021    K 5.0 11/13/2021     (H) 11/13/2021    CREATININE 2.69 (H) 11/13/2021    BUN 87 (H) 11/13/2021    CO2 14 (L) 11/13/2021    TSH 2.26 11/02/2017    PSA 3.68 04/30/2021    INR 1.1 09/20/2021    LABA1C 7.3 08/19/2021    LABMICR 30 10/17/2015        ASSESSMENT:  Patient Active Problem List    Diagnosis Date Noted    S/P drug eluting coronary stent placement-OM1 4/10/17 04/10/2017    Abnormal tilt table test 02/23/2016    Abnormal cardiovascular stress test 02/18/2016    Moderate malnutrition ([address] 11/12/2021    Coronary atherosclerosis due to calcified coronary lesion 02/20/2015    Hyperkalemia 11/11/2021    Acute purpuric eruption 11/10/2021    Skin lesion of left lower extremity 11/10/2021    Urticaria 11/10/2021    Iron deficiency anemia secondary to inadequate dietary iron intake 10/21/2021    Iron malabsorption 10/21/2021    Chronic anemia 09/21/2021    Acute coronary syndrome with high troponin ([address] 09/21/2021    Chest pain 09/20/2021    Gross hematuria 05/05/2021    BPH with obstruction/lower urinary tract symptoms 12/10/2019    Elevated PSA 12/10/2019    Anemia in stage 3 chronic kidney disease ([address] 05/21/2019    Acute kidney injury superimposed on CKD ([address] 10/02/2018    Non critical Right Renal artery stenosis, native ([address] 10/02/2018    Medication side effect, initial encounter 03/23/2018    Angina, class II ([address] 01/08/2018    Hyperlipidemia 04/24/2017    Chronic diastolic heart failure ([address] 04/24/2017    Coronary artery disease involving native coronary artery of native heart without angina pectoris 05/02/2016    Cervical stenosis of spinal canal 02/29/2016    Chronic kidney disease, stage III (moderate) ([address] 02/22/2016    Controlled type 2 diabetes mellitus with diabetic nephropathy, with long-term current use of insulin ([address] 02/22/2016    Ischemic chest pain ([facility] 02/17/2016    Accelerated hypertension 10/16/2015    Chronotropic incompetence with autonomic dysfunction 09/02/2015    Episodic lightheadedness 09/02/2015    Cholecystitis 08/17/2015    Syncope, near 08/05/2015    Dysautonomia ([address] 06/29/2015    History of CVA (cerebrovascular accident) without residual deficits     Displacement of intervertebral disc, site unspecified, without myelopathy 03/04/2015    History of colon polyps 16/14/7174    Systolic murmur 97/82/4397    History of MI (myocardial infarction) 02/12/2014    Vertigo, benign paroxysmal 10/17/2012       PLAN:    · Acute on chronic renal insufficiency: A question was brought up about possibility of this being related to Mr. Jimenez's heart catheterization back on 10/4/21. His creatinine on the day of the heart catheterization was 2.16 and his repeat creatinine 11 days later on 10/15/21 was 2.33. I told Mr. Angelina Tamez that any damage to his kidneys from his heart catheterization would have occurred within 3-5 days after the heart catheterization so I really do not think this has anything to do with his recent acute renal insufficiency. Fortunately renal insufficiency improving with current treatment. · Anemia, unclear etiology: Likely multifactorial including GI losses and acute on chronic renal insufficiency. · Agree with blood transfusion    Dysautonomia: Mildly to moderately, likely related to and worsened by anemia  · Diuretics: Not indicated at this time. · Nonpharmacologic counseling: Because of his condition, I reminded him to try and keep himself well-hydrated and to take extra time when moving from laying to sitting, sitting to standing and standing to walking as well as wearing at least knee high compressions stockings. Chronic Diastolic Heart Failure: EF of 60% via echo on 12/18/2020. Currently well controlled. · Beta Blocker: Continue Carvedilol (Coreg) 3.125 mg twice daily. ACE Inibitor/ARB: Continue lisinopril 20 mg daily.   Diuretics: Not indicated at this time.  Nonpharmacologic management of Heart Failure: I advised him to try and keep his legs up whenever possible and to limit salt in his diet. Atherosclerotic Heart Disease: Coronary Artery stent: 4/10/2017. Cardiac cath done on 10/5/2021 showed Moderate three vessel coronary artery disease involving a ostial 50-60% stenosis in a small, non-dominant RCA, a 60% mid LAD stenosis and a proximal 50-60% stenosis in the left anterior descending coronary artery  Antiplatelet Agent: Not indicated due to bleeding  ACE Inibitor/ARB: Continue lisinopril 40 mg daily. Beta Blocker: Continue Carvedilol (Coreg) 3.125 mg twice daily. Cholesterol Reduction Therapy: Refused by patient. Laboratory testing: None    Essential Hypertension: Uncontrolled likely worsened by prednisone and stopping of eplerinone which I agree with  ACE Inibitor/ARB: Continue lisinopril 20 mg daily. · Beta Blocker: Continue Carvedilol (Coreg) 3.125 mg twice daily. · Calcium Channel Blocker: Continue amlodipine (Norvasc) 10 mg once daily. · Start Clonidine 0.2 mg bid  · Continue Hydralazine 100 mg 3x/day    Hyperlipidemia: Mixed - Last LDL on 9/21/2021 was 18 mg/dL   · Cholesterol Reduction Therapy: Refused by patient. Once again, thank you for allowing me to participate in this patients care. Please do not hesitate to contact me if I could be of any further assistance. Sincerely,  Joseph Perrin. Jackelin BLOUNT, MS, F.A.C.C. [facility] Cardiology Specialist    [address] [place] [address]  Phone: [phone], Fax: [phone]     I believe that the risk of significant morbidity and mortality related to the patient's current medical conditions are: intermediate-high. The documentation recorded by the scribe, accurately and completely reflects the services I personally performed and the decisions made by me. Dacia Tamayo MD, MS, F.A.C.C.  November 13, 2021     Anirudh Guido is a 68 y.o. male being evaluated by a Virtual Visit (video visit) encounter to address concerns as mentioned above. A caregiver was present when appropriate. Due to this being a TeleHealth encounter (During ITNEI-69 public health emergency), evaluation of the following organ systems was limited: Vitals/Constitutional/EENT/Resp/CV/GI//MS/Neuro/Skin/Heme-Lymph-Imm. Pursuant to the emergency declaration under the [address] [place] [address] and the [employer] Act, this Virtual Visit was conducted with patient's (and/or legal guardian's) consent, to reduce the patient's risk of exposure to COVID-19 and provide necessary medical care. The patient (and/or legal guardian) has also been advised to contact this office for worsening conditions or problems, and seek emergency medical treatment and/or call 911 if deemed necessary. Services were provided through a video synchronous discussion virtually to substitute for in-person visit. Mr. Angelina Tamez was located in the patients hospital room in [facility]  Michael So MD performed the visit from my home in [facility]    --Michael So MD on 11/13/2021 at 11:37 AM    An electronic signature was used to authenticate this note.

## 2021-11-13 NOTE — PROGRESS NOTES
Patient is resting in bed talking with his son at this time. Vitals and assessment complete, blood pressure is elevated. Writer went through patient's medications for the evening and asked patient if he remembered the medication that was added to help with his blood pressures. Patient provided teach back. He was able to name the medication as well as tell writer why he was on it. Patient states he was on it while ago and that it caused his stomach to feel sick and he was taken off it. Writer asked if patient was feeling sick today after taking the medication but he denied and issues at this time. Patient denies pain. Writer quickly updated patient on his lab results from this morning and told him that they were improving with the therapy. Patient and son deny questions at this time. Will continue to monitor and assess.

## 2021-11-13 NOTE — PROGRESS NOTES
2021    TELEHEALTH EVALUATION -- Audio/Visual (During ZIHJQ-94 public health emergency)   Virtual video visit for Mr. Morgan Flores  Reason for visit is follow-up for acute kidney injury and hyperkalemia  Place of visit for patient is his hospital bed [address] intensive care unit room 14  Place of visit for physician is office in [place]      HPI: This is a follow-up visit for Mr. Morgan Flores  a 55-year-old pleasant gentleman admitted for hyperkalemia and acute kidney injury as well as metabolic acidosis. Doing well this morning. Had a good night sleep. No complaint. No chest pain. No shortness of breath. Blood pressure is good. Urine output is good. Updated by the staff. No issues of concern. Morgan Flores (:  1945) has requested an audio/video evaluation for the following concern(s):    Follow-up for acute kidney injury, hyperkalemia and metabolic acidosis. Review of Systems: As in the history of present illness    Prior to Visit Medications    Medication Sig Taking?  Authorizing Provider   predniSONE (DELTASONE) 20 MG tablet Take 1 tablet by mouth 2 times daily for 5 days Yes Lew Landry MD   eplerenone (INSPRA) 25 MG tablet Take 1 tablet by mouth daily Yes Vale Merlin, MD   loratadine (CLARITIN) 10 MG tablet Take 1 tablet by mouth daily  Patient taking differently: Take 10 mg by mouth daily as needed  Yes Anahy Casiano MD   lisinopril (PRINIVIL;ZESTRIL) 20 MG tablet Take 1 tablet by mouth daily Yes Vale Merlin, MD   amLODIPine (NORVASC) 10 MG tablet Take 1 tablet by mouth nightly Yes Rom Bolton MD   hydrALAZINE (APRESOLINE) 100 MG tablet Take 1 tablet by mouth 3 times daily Patient is taking 100 MG 3 times daily Yes Rom Bolton MD   glipiZIDE (GLUCOTROL XL) 5 MG extended release tablet Take 2 tablets by mouth 2 times daily Yes Rom Bolton MD   nitroGLYCERIN (NITROSTAT) 0.4 MG SL tablet Place 1 tablet under the tongue every 5 minutes as needed for Chest pain Yes Radha Butt MD   latanoprost (XALATAN) 0.005 % ophthalmic solution  Yes Historical Provider, MD   acyclovir (ZOVIRAX) 400 MG tablet 400 mg 2 times daily  Yes Historical Provider, MD   prednisoLONE acetate (PRED FORTE) 1 % ophthalmic suspension Place 1 drop into the left eye daily  Yes Historical Provider, MD   cetirizine (ZYRTEC) 10 MG tablet Take 1 tablet by mouth daily  Kezia Tilley MD   insulin glargine (LANTUS SOLOSTAR) 100 UNIT/ML injection pen Inject 28 Units into the skin 2 times daily  Tricia Fields MD   CONTOUR TEST strip USE 1 STRIP TO CHECK GLUCOSE TWICE DAILY  Tricia Fields MD   Montaño Rather TEST TWICE Rebecca Talbot MD   Insulin Pen Needle (PEN NEEDLES) 31G X 6 MM MISC 1 each by Does not apply route daily  Tricia Fields MD       Social History     Tobacco Use    Smoking status: Former Smoker     Packs/day: 1.50     Years: 8.00     Pack years: 12.00     Types: Cigarettes     Quit date: 3/4/1980     Years since quittin.7    Smokeless tobacco: Never Used   Vaping Use    Vaping Use: Never used   Substance Use Topics    Alcohol use: No    Drug use:  No            PHYSICAL EXAMINATION:  [ INSTRUCTIONS:  \"[x]\" Indicates a positive item  \"[]\" Indicates a negative item  -- DELETE ALL ITEMS NOT EXAMINED]  Vital Signs: (As obtained by patient/caregiver or practitioner observation)    Blood pressure-  Heart rate-    Respiratory rate-    Temperature-  Pulse oximetry-   Vitals:    21 0645   BP: (!) 172/65   Pulse: 62   Resp: 16   Temp: 96.9 °F (36.1 °C)   SpO2: 98%         Constitutional: [x] Appears well-developed and well-nourished [x] No apparent distress      [] Abnormal-   Mental status  [x] Alert and awake  [x] Oriented to person/place/time [x]Able to follow commands      Eyes:  EOM    [x]  Normal  [] Abnormal-  Sclera  [x]  Normal  [] Abnormal -         Discharge [x]  None visible  [] Abnormal -    HENT:   [x] Normocephalic, atraumatic. [] Abnormal   [x] Mouth/Throat: Mucous membranes are moist.     External Ears [x] Normal  [] Abnormal-     Neck: [x] No visualized mass     Pulmonary/Chest: [x] Respiratory effort normal.  [x] No visualized signs of difficulty breathing or respiratory distress        [] Abnormal-      Musculoskeletal:   [] Normal gait with no signs of ataxia         [] Normal range of motion of neck        [] Abnormal-       Neurological:        [x] No Facial Asymmetry (Cranial nerve 7 motor function) (limited exam to video visit)          [x] No gaze palsy        [] Abnormal-         Skin:        [] No significant exanthematous lesions or discoloration noted on facial skin         [] Abnormal-            Psychiatric:       [x] Normal Affect [] No Hallucinations        [] Abnormal-     Other pertinent observable physical exam findings-     ASSESSMENT/PLAN:  #1.  Acute kidney injury much improved and multifactorial   #2. Hyperkalemia resolved and due to acute kidney injury and medications as well  #3. Metabolic acidosis very slightly improved and due to acute kidney injury  #4. Hypocalcemia  will correct if corrected for low serum albumin level. #5.  Normocytic anemia  #6. 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 Hypertension primary  PLAN:   I discussed my thoughts at length with the patient this morning  He understood  I addressed his questions  Labs reviewed  Serum creatinine is much improved  Serum potassium is back to normal  Medications reviewed  Increase sodium bicarbonate infusion from 75 ml/hr to 100 mill per hour  This was discussed with patient and staff  Labs in the morning  We will follow      No follow-ups on file. Morgan Flores, was evaluated through a synchronous (real-time) audio-video encounter. The patient (or guardian if applicable) is aware that this is a billable service. Verbal consent to proceed has been obtained within the past 12 months.  The visit was conducted pursuant

## 2021-11-13 NOTE — PROGRESS NOTES
Patient has been resting in bed with eyes closed. Vitals and assessment completed at this time. Vitals are stable and blood pressure is hypertensive but improved. Patient denies needs at this time. Will continue to monitor and assess.

## 2021-11-13 NOTE — PROGRESS NOTES
Patient alert and oriented x 4. Vital signs and head to toe assessment performed. Bed in lowest position and bed wheels locked. Call light and patient belongings in reach. Instructed to use call light for assistance.

## 2021-11-13 NOTE — PROGRESS NOTES
Celso Byrd M.D. Internal Medicine Progress Note    Patient: Nora Galdamez  Date of Admission: 11/11/2021  [address]  Date of Evaluation: 11/13/2021      SUBJECTIVE:    Patient seen for f/u of VIDAL, hyperkalemia, purpuric reaction. He is feeling a little better. His rash is somewhat better but still itchy. He denies fever, chills, chest pain or palpitations. He is tolerating diet. Denies any other complaints. ROS:   Constitutional: negative  for fevers, and negative for chills. Respiratory: negative for shortness of breath, negative for cough, and negative for wheezing  Cardiovascular: negative for chest pain, and negative for palpitations  Gastrointestinal: negative for abdominal pain, negative for nausea,negative for vomiting, negative for diarrhea, and negative for constipation     All other systems were reviewed with the patient and are negative unless otherwise stated in HPI    -----------------------------------------------------------------  OBJECTIVE:  Vitals:   Temp: 96.9 °F (36.1 °C)  BP: (!) 172/65  Resp: 16  Pulse: 62  SpO2: 98 % on room air    Weight  Wt Readings from Last 3 Encounters:   11/13/21 196 lb (88.9 kg)   11/10/21 193 lb 6.4 oz (87.7 kg)   11/02/21 195 lb (88.5 kg)     Body mass index is 28.94 kg/m². 24HR INTAKE/OUTPUT:      Intake/Output Summary (Last 24 hours) at 11/13/2021 0938  Last data filed at 11/13/2021 0517  Gross per 24 hour   Intake 861.25 ml   Output 1550 ml   Net -688.75 ml       Exam:  GEN:    Awake, alert and oriented x3. EYES:  EOMI, pupils equal   NECK: Supple. No lymphadenopathy. No carotid bruit  CVS:    regular rate and rhythm, 2/6 systolic murmur  PULM:  CTA, no wheezes, rales or rhonchi, no acute respiratory distress  ABD:    Bowels sounds normal.  Abdomen is soft. No distention. no tenderness to palpation. EXT:   no edema bilaterally . No calf tenderness. NEURO: Moves all extremities.   Motor and sensory are grossly intact  SKIN:  purpuric reaction bilateral LE's up to abdomen. Milder involvement of forearms. Back is cleared. -----------------------------------------------------------------  DATA:  Complete Blood Count:   Recent Labs     11/11/21 1718 11/11/21 1718 11/12/21  0346 11/12/21  1215 11/13/21  0510   WBC 8.1  --  5.9  --  6.9   RBC 2.71*  --  2.44*  --  2.90*   HGB 7.5*   < > 6.7* 8.6* 8.1*   HCT 24.1*   < > 21.9* 28.1* 25.4*   MCV 88.9  --  89.8  --  87.6   MCH 27.7  --  27.5  --  27.9   MCHC 31.1  --  30.6  --  31.9   RDW 17.7*  --  17.7*  --  17.1*     --  167  --  170   MPV 9.6  --  9.7  --  10.0    < > = values in this interval not displayed. Last 3 Blood Glucose:   Recent Labs     11/11/21  1500 11/11/21 1718 11/12/21 0346 11/12/21  0840 11/13/21  0510   GLUCOSE 439* 480* 281* 259* 259*        Comprehensive Metabolic Profile:   Recent Labs     11/11/21  1500 11/11/21 1718 11/12/21 0346 11/12/21  0840 11/13/21  0510   *   < > 132* 134* 136   K 6.6*   < > 6.2* 5.4* 5.0      < > 107 108* 111*   CO2 13*   < > 13* 14* 14*   BUN 82*   < > 84* 84* 87*   CREATININE 3.70*   < > 3.11* 3.05* 2.69*   GLUCOSE 439*   < > 281* 259* 259*   CALCIUM 8.7   < > 8.0* 8.3* 8.3*   PROT 7.4  --  6.3* 6.4  --    LABALBU 3.9  --  3.4* 3.4*  --    BILITOT 0.18*  --  0.17* 0.27*  --    ALKPHOS 87  --  70 73  --    AST 12  --  10 11  --    ALT 12  --  11 11  --     < > = values in this interval not displayed.         Urinalysis:   Lab Results   Component Value Date    NITRU NEGATIVE 11/11/2021    COLORU Yellow 11/11/2021    PHUR 5.5 11/11/2021    WBCUA 0 TO 2 11/11/2021    RBCUA 0 TO 2 11/11/2021    MUCUS NOT REPORTED 11/11/2021    TRICHOMONAS NOT REPORTED 11/11/2021    YEAST NOT REPORTED 11/11/2021    BACTERIA NOT REPORTED 11/11/2021    SPECGRAV 1.020 11/11/2021    LEUKOCYTESUR NEGATIVE 11/11/2021    UROBILINOGEN Normal 11/11/2021    BILIRUBINUR NEGATIVE 11/11/2021    GLUCOSEU 3+ 11/11/2021    [address] NEGATIVE 11/11/2021    AMORPHOUS NOT REPORTED 11/11/2021     Radiology/Imaging:  XR CHEST PORTABLE   Final Result   No acute process.                  ASSESSMENT / PLAN:  Hyperkalemia  · Continue kayexalate as needed   · Nephrology consult appreciated  · VIDAL superimposed on CKD  · Nephrology consult appreciated  · Improving with IVF's  · Monitor I/Os  · Acute allergic purpura eruption  · Continue SoluMedrol, Zyrtec  · Continue Acyclovir  · Acute on chronic iron deficiency anemia  · S/p 1 unit PRBC transfusion  · He gets outpatient IV iron infusions  · Diabetes mellitus  · Continue Lantus, Metfoomin  · Medium dose ISS  · Hypoglycemic protocol in place  · Hypertension  · Continue Amlodipine, Carvedilol, Hydralazine  · Nutrition status:   · moderate malnutrition  · Dietician consult initiated  · Hospital Prophylaxis:   · DVT: lovenox held due to allergic purura reaction / severe anemia  · High risk medications: none   · Disposition:    · Discharge plan is pending    Marino Conrad MD , M.D.  11/13/2021  9:38 AM

## 2021-11-14 LAB
ABSOLUTE EOS #: 0 K/UL (ref 0–0.44)
ABSOLUTE IMMATURE GRANULOCYTE: 0.33 K/UL (ref 0–0.3)
ABSOLUTE LYMPH #: 0.49 K/UL (ref 1.1–3.7)
ABSOLUTE MONO #: 0.16 K/UL (ref 0.1–1.2)
ANION GAP SERPL CALCULATED.3IONS-SCNC: 11 MMOL/L (ref 9–17)
BASOPHILS # BLD: 0 % (ref 0–2)
BASOPHILS ABSOLUTE: 0 K/UL (ref 0–0.2)
BUN BLDV-MCNC: 88 MG/DL (ref 8–23)
BUN/CREAT BLD: 33 (ref 9–20)
CALCIUM SERPL-MCNC: 7.8 MG/DL (ref 8.6–10.4)
CHLORIDE BLD-SCNC: 108 MMOL/L (ref 98–107)
CO2: 17 MMOL/L (ref 20–31)
COMPLEMENT TOTAL (CH50): 44.5 U/ML (ref 38.7–89.9)
CREAT SERPL-MCNC: 2.67 MG/DL (ref 0.7–1.2)
DIFFERENTIAL TYPE: ABNORMAL
EOSINOPHILS RELATIVE PERCENT: 0 % (ref 1–4)
GFR AFRICAN AMERICAN: 28 ML/MIN
GFR NON-AFRICAN AMERICAN: 23 ML/MIN
GFR SERPL CREATININE-BSD FRML MDRD: ABNORMAL ML/MIN/{1.73_M2}
GFR SERPL CREATININE-BSD FRML MDRD: ABNORMAL ML/MIN/{1.73_M2}
GLUCOSE BLD-MCNC: 177 MG/DL (ref 74–100)
GLUCOSE BLD-MCNC: 211 MG/DL (ref 70–99)
GLUCOSE BLD-MCNC: 228 MG/DL (ref 74–100)
GLUCOSE BLD-MCNC: 240 MG/DL (ref 74–100)
GLUCOSE BLD-MCNC: 364 MG/DL (ref 74–100)
HCT VFR BLD CALC: 25.5 % (ref 40.7–50.3)
HEMOGLOBIN: 8.2 G/DL (ref 13–17)
IMMATURE GRANULOCYTES: 4 %
LYMPHOCYTES # BLD: 6 % (ref 24–43)
MAGNESIUM: 1.6 MG/DL (ref 1.6–2.6)
MCH RBC QN AUTO: 28.1 PG (ref 25.2–33.5)
MCHC RBC AUTO-ENTMCNC: 32.2 G/DL (ref 28.4–34.8)
MCV RBC AUTO: 87.3 FL (ref 82.6–102.9)
MONOCYTES # BLD: 2 % (ref 3–12)
MORPHOLOGY: NORMAL
NRBC AUTOMATED: 0 PER 100 WBC
PDW BLD-RTO: 16.8 % (ref 11.8–14.4)
PLATELET # BLD: 163 K/UL (ref 138–453)
PLATELET ESTIMATE: ABNORMAL
PMV BLD AUTO: 9.2 FL (ref 8.1–13.5)
POTASSIUM SERPL-SCNC: 4.8 MMOL/L (ref 3.7–5.3)
RBC # BLD: 2.92 M/UL (ref 4.21–5.77)
RBC # BLD: ABNORMAL 10*6/UL
SEG NEUTROPHILS: 88 % (ref 36–65)
SEGMENTED NEUTROPHILS ABSOLUTE COUNT: 7.22 K/UL (ref 1.5–8.1)
SODIUM BLD-SCNC: 136 MMOL/L (ref 135–144)
WBC # BLD: 8.2 K/UL (ref 3.5–11.3)
WBC # BLD: ABNORMAL 10*3/UL

## 2021-11-14 PROCEDURE — 2580000003 HC RX 258: Performed by: INTERNAL MEDICINE

## 2021-11-14 PROCEDURE — 82947 ASSAY GLUCOSE BLOOD QUANT: CPT

## 2021-11-14 PROCEDURE — 94761 N-INVAS EAR/PLS OXIMETRY MLT: CPT

## 2021-11-14 PROCEDURE — 2500000003 HC RX 250 WO HCPCS: Performed by: INTERNAL MEDICINE

## 2021-11-14 PROCEDURE — 6370000000 HC RX 637 (ALT 250 FOR IP): Performed by: INTERNAL MEDICINE

## 2021-11-14 PROCEDURE — 6370000000 HC RX 637 (ALT 250 FOR IP): Performed by: FAMILY MEDICINE

## 2021-11-14 PROCEDURE — 83735 ASSAY OF MAGNESIUM: CPT

## 2021-11-14 PROCEDURE — 1200000000 HC SEMI PRIVATE

## 2021-11-14 PROCEDURE — 80048 BASIC METABOLIC PNL TOTAL CA: CPT

## 2021-11-14 PROCEDURE — 99232 SBSQ HOSP IP/OBS MODERATE 35: CPT | Performed by: INTERNAL MEDICINE

## 2021-11-14 PROCEDURE — 99233 SBSQ HOSP IP/OBS HIGH 50: CPT | Performed by: FAMILY MEDICINE

## 2021-11-14 PROCEDURE — 6360000002 HC RX W HCPCS: Performed by: FAMILY MEDICINE

## 2021-11-14 PROCEDURE — 85025 COMPLETE CBC W/AUTO DIFF WBC: CPT

## 2021-11-14 PROCEDURE — 36415 COLL VENOUS BLD VENIPUNCTURE: CPT

## 2021-11-14 RX ORDER — FUROSEMIDE 10 MG/ML
20 INJECTION INTRAMUSCULAR; INTRAVENOUS ONCE
Status: COMPLETED | OUTPATIENT
Start: 2021-11-14 | End: 2021-11-14

## 2021-11-14 RX ORDER — CLONIDINE HYDROCHLORIDE 0.2 MG/1
0.2 TABLET ORAL 3 TIMES DAILY
Status: DISCONTINUED | OUTPATIENT
Start: 2021-11-14 | End: 2021-11-21 | Stop reason: HOSPADM

## 2021-11-14 RX ADMIN — CARVEDILOL 3.12 MG: 3.12 TABLET, FILM COATED ORAL at 08:11

## 2021-11-14 RX ADMIN — INSULIN LISPRO 3 UNITS: 100 INJECTION, SOLUTION INTRAVENOUS; SUBCUTANEOUS at 08:15

## 2021-11-14 RX ADMIN — HYDRALAZINE HYDROCHLORIDE 100 MG: 50 TABLET, FILM COATED ORAL at 15:08

## 2021-11-14 RX ADMIN — INSULIN GLARGINE 28 UNITS: 100 INJECTION, SOLUTION SUBCUTANEOUS at 08:15

## 2021-11-14 RX ADMIN — GLIPIZIDE 10 MG: 5 TABLET ORAL at 06:41

## 2021-11-14 RX ADMIN — INSULIN LISPRO 6 UNITS: 100 INJECTION, SOLUTION INTRAVENOUS; SUBCUTANEOUS at 17:15

## 2021-11-14 RX ADMIN — CLONIDINE HYDROCHLORIDE 0.2 MG: 0.2 TABLET ORAL at 20:39

## 2021-11-14 RX ADMIN — SODIUM CHLORIDE, PRESERVATIVE FREE 10 ML: 5 INJECTION INTRAVENOUS at 20:39

## 2021-11-14 RX ADMIN — METHYLPREDNISOLONE SODIUM SUCCINATE 40 MG: 40 INJECTION, POWDER, FOR SOLUTION INTRAMUSCULAR; INTRAVENOUS at 20:39

## 2021-11-14 RX ADMIN — METHYLPREDNISOLONE SODIUM SUCCINATE 40 MG: 40 INJECTION, POWDER, FOR SOLUTION INTRAMUSCULAR; INTRAVENOUS at 08:11

## 2021-11-14 RX ADMIN — ACYCLOVIR 400 MG: 200 CAPSULE ORAL at 08:13

## 2021-11-14 RX ADMIN — PREDNISOLONE ACETATE 1 DROP: 10 SUSPENSION/ DROPS OPHTHALMIC at 08:11

## 2021-11-14 RX ADMIN — SODIUM BICARBONATE: 84 INJECTION, SOLUTION INTRAVENOUS at 09:08

## 2021-11-14 RX ADMIN — HYDRALAZINE HYDROCHLORIDE 100 MG: 50 TABLET, FILM COATED ORAL at 08:11

## 2021-11-14 RX ADMIN — CLONIDINE HYDROCHLORIDE 0.2 MG: 0.2 TABLET ORAL at 08:10

## 2021-11-14 RX ADMIN — INSULIN LISPRO 6 UNITS: 100 INJECTION, SOLUTION INTRAVENOUS; SUBCUTANEOUS at 12:43

## 2021-11-14 RX ADMIN — ACYCLOVIR 400 MG: 200 CAPSULE ORAL at 20:39

## 2021-11-14 RX ADMIN — ACETAMINOPHEN 650 MG: 325 TABLET ORAL at 19:08

## 2021-11-14 RX ADMIN — CLONIDINE HYDROCHLORIDE 0.2 MG: 0.2 TABLET ORAL at 15:09

## 2021-11-14 RX ADMIN — AMLODIPINE BESYLATE 10 MG: 10 TABLET ORAL at 20:39

## 2021-11-14 RX ADMIN — HYDRALAZINE HYDROCHLORIDE 100 MG: 50 TABLET, FILM COATED ORAL at 20:39

## 2021-11-14 RX ADMIN — INSULIN GLARGINE 28 UNITS: 100 INJECTION, SOLUTION SUBCUTANEOUS at 20:43

## 2021-11-14 RX ADMIN — CARVEDILOL 3.12 MG: 3.12 TABLET, FILM COATED ORAL at 17:10

## 2021-11-14 RX ADMIN — FUROSEMIDE 20 MG: 10 INJECTION, SOLUTION INTRAVENOUS at 17:11

## 2021-11-14 RX ADMIN — CETIRIZINE HYDROCHLORIDE 5 MG: 10 TABLET, FILM COATED ORAL at 08:10

## 2021-11-14 RX ADMIN — GLIPIZIDE 10 MG: 5 TABLET ORAL at 17:15

## 2021-11-14 ASSESSMENT — PAIN SCALES - GENERAL
PAINLEVEL_OUTOF10: 0

## 2021-11-14 NOTE — PROGRESS NOTES
Writer mcrae served Dr. Joanna Marin at this time per Dr. Méndez Courser. Patient has had a significant weight gain over the last 48 hours and patients extremities are becoming edematous. New orders from Dr. Joanna Marin calling writer back to discontinue IV fluids that are currently running.

## 2021-11-14 NOTE — PROGRESS NOTES
Staci Romo M.D. Internal Medicine Progress Note    Patient: Jaz Rodriguez  Date of Admission: 11/11/2021  [address]  Date of Evaluation: 11/14/2021      SUBJECTIVE:    Patient seen for f/u of VIDAL, hyperkalemia, purpuric reaction. He is feeling a little better. His rash is somewhat better but still itchy. He denies fever, chills, chest pain or palpitations. He is tolerating diet. Denies any other complaints. ROS:   Constitutional: negative  for fevers, and negative for chills. Respiratory: negative for shortness of breath, negative for cough, and negative for wheezing  Cardiovascular: negative for chest pain, and negative for palpitations  Gastrointestinal: negative for abdominal pain, negative for nausea,negative for vomiting, negative for diarrhea, and negative for constipation     All other systems were reviewed with the patient and are negative unless otherwise stated in HPI    -----------------------------------------------------------------  OBJECTIVE:  Vitals:   Temp: 97 °F (36.1 °C)  BP: (!) 175/67  Resp: 18  Pulse: 58  SpO2: 98 % on room air    Weight  Wt Readings from Last 3 Encounters:   11/14/21 205 lb 3 oz (93.1 kg)   11/10/21 193 lb 6.4 oz (87.7 kg)   11/02/21 195 lb (88.5 kg)     Body mass index is 30.3 kg/m². 24HR INTAKE/OUTPUT:      Intake/Output Summary (Last 24 hours) at 11/14/2021 1220  Last data filed at 11/14/2021 0930  Gross per 24 hour   Intake 4337 ml   Output 1850 ml   Net 2487 ml       Exam:  GEN:    Awake, alert and oriented x3. EYES:  EOMI, pupils equal   NECK: Supple. No lymphadenopathy. No carotid bruit  CVS:    regular rate and rhythm, 2/6 systolic murmur  PULM:  CTA, no wheezes, rales or rhonchi, no acute respiratory distress  ABD:    Bowels sounds normal.  Abdomen is soft. No distention. no tenderness to palpation. EXT:   no edema bilaterally . No calf tenderness. NEURO: Moves all extremities.   Motor and sensory are grossly intact  SKIN:  purpuric reaction bilateral LE's up to abdomen. Milder involvement of forearms. Back is cleared. -----------------------------------------------------------------  DATA:  Complete Blood Count:   Recent Labs     11/12/21  0346 11/12/21  0346 11/12/21  1215 11/13/21  0510 11/14/21  0540   WBC 5.9  --   --  6.9 8.2   RBC 2.44*  --   --  2.90* 2.92*   HGB 6.7*   < > 8.6* 8.1* 8.2*   HCT 21.9*   < > 28.1* 25.4* 25.5*   MCV 89.8  --   --  87.6 87.3   MCH 27.5  --   --  27.9 28.1   MCHC 30.6  --   --  31.9 32.2   RDW 17.7*  --   --  17.1* 16.8*     --   --  170 163   MPV 9.7  --   --  10.0 9.2    < > = values in this interval not displayed. Last 3 Blood Glucose:   Recent Labs     11/11/21  1500 11/11/21  1718 11/12/21  0346 11/12/21  0840 11/13/21  0510 11/14/21  0540   GLUCOSE 439* 480* 281* 259* 259* 211*        Comprehensive Metabolic Profile:   Recent Labs     11/11/21  1500 11/11/21 1718 11/12/21  0346 11/12/21  0346 11/12/21  0840 11/13/21  0510 11/14/21  0540   *   < > 132*   < > 134* 136 136   K 6.6*   < > 6.2*   < > 5.4* 5.0 4.8      < > 107   < > 108* 111* 108*   CO2 13*   < > 13*   < > 14* 14* 17*   BUN 82*   < > 84*   < > 84* 87* 88*   CREATININE 3.70*   < > 3.11*   < > 3.05* 2.69* 2.67*   GLUCOSE 439*   < > 281*   < > 259* 259* 211*   CALCIUM 8.7   < > 8.0*   < > 8.3* 8.3* 7.8*   PROT 7.4  --  6.3*  --  6.4  --   --    LABALBU 3.9  --  3.4*  --  3.4*  --   --    BILITOT 0.18*  --  0.17*  --  0.27*  --   --    ALKPHOS 87  --  70  --  73  --   --    AST 12  --  10  --  11  --   --    ALT 12  --  11  --  11  --   --     < > = values in this interval not displayed.         Urinalysis:   Lab Results   Component Value Date    NITRU NEGATIVE 11/11/2021    COLORU Yellow 11/11/2021    PHUR 5.5 11/11/2021    WBCUA 0 TO 2 11/11/2021    RBCUA 0 TO 2 11/11/2021    MUCUS NOT REPORTED 11/11/2021    TRICHOMONAS NOT REPORTED 11/11/2021    YEAST NOT REPORTED 11/11/2021    BACTERIA NOT REPORTED 11/11/2021    SPECGRAV 1.020 11/11/2021    LEUKOCYTESUR NEGATIVE 11/11/2021    UROBILINOGEN Normal 11/11/2021    BILIRUBINUR NEGATIVE 11/11/2021    GLUCOSEU 3+ 11/11/2021    KETUA NEGATIVE 11/11/2021    AMORPHOUS NOT REPORTED 11/11/2021     Radiology/Imaging:  XR CHEST PORTABLE   Final Result   No acute process.                  ASSESSMENT / PLAN:  · VIDAL superimposed on CKD  · Nephrology consult appreciated  · Improving with IVF's  · Monitor I/Os  · Acute allergic purpura eruption  · Continue SoluMedrol, Zyrtec  · Continue Acyclovir  · Acute on chronic iron deficiency anemia  · S/p 1 unit PRBC transfusion  · He gets outpatient IV iron infusions  · Hb stable   Hyperkalemia  · Resolved with kayexalate  · Nephrology consult appreciated  · Diabetes mellitus  · Continue Lantus, Metfoomin  · Medium dose ISS  · Hypoglycemic protocol in place  · Hypertension  · Continue Amlodipine, Carvedilol, Hydralazine  · Clonidine increased to TID by nephrology this morning  · Nutrition status:   · moderate malnutrition  · Dietician consult initiated  · Hospital Prophylaxis:   · DVT: lovenox held due to allergic purura reaction / severe anemia  · High risk medications: none   · Disposition:    · Discharge plan is pending    Birgit Matthews MD , M.D.  11/14/2021  12:20 PM

## 2021-11-14 NOTE — PROGRESS NOTES
Patient to room to assess vitals and shift assessment. Patient sitting up in bed watching TV, a/o. Patient denies any pain or needs at this time. Patient denies any pain or itching to BLE rash, will continue to monitor. Call light and bedside table within reach, bed alarm active because patient states feels dizzy when up from new BP medications.

## 2021-11-14 NOTE — PROGRESS NOTES
Patient washing up in bathroom at this time with assistance of family members that are visiting. Patient shaved and brushed teeth as well.

## 2021-11-14 NOTE — PROGRESS NOTES
Upon shift assessment patient states he had around a 5 minute episode of chest tightness that went away after going to bathroom. Writer spoke with Dr Fox Loya who denies need for cardio workup at this time. Per Dr Fox Loya, monitor overnight and notify of further changes.

## 2021-11-14 NOTE — PROGRESS NOTES
Writer placed 24 G IV at this time in patients posterior left hand after three attempts due to other IV that is placed kept getting occluded. Patient tolerated well.

## 2021-11-14 NOTE — PROGRESS NOTES
2021    TELEHEALTH EVALUATION -- Audio/Visual (During PULYQ-44 public health emergency)  Telehealth video visit with Mr. Rosana Mccain. Place of visit for patient is his hospital room [facility] of visit for physician is office in [facility]  Reason for visit is follow-up for acute kidney injury and metabolic acidosis    HPI: This is a follow-up visit for Mr. Rosana Mccain. He is a gentleman we see for acute kidney injury and metabolic acidosis. He also was hyperkalemic which is now resolved. Very awake and alert this morning. Had a very good night sleep. No complaint this morning. I was updated by the staff. No new issues of concern. . Blood pressure remains high however. Urine output is not completely documented. Jaz Rodriguez (:  1945) has requested an audio/video evaluation for the following concern(s):    Seen for acute kidney injury and metabolic acidosis. Review of Systems : As in HPI. Prior to Visit Medications    Medication Sig Taking?  Authorizing Provider   predniSONE (DELTASONE) 20 MG tablet Take 1 tablet by mouth 2 times daily for 5 days Yes Nicole Villagran MD   eplerenone (INSPRA) 25 MG tablet Take 1 tablet by mouth daily Yes Feli Simmons MD   loratadine (CLARITIN) 10 MG tablet Take 1 tablet by mouth daily  Patient taking differently: Take 10 mg by mouth daily as needed  Yes Barry Young MD   lisinopril (PRINIVIL;ZESTRIL) 20 MG tablet Take 1 tablet by mouth daily Yes Feli Simmons MD   amLODIPine (NORVASC) 10 MG tablet Take 1 tablet by mouth nightly Yes Jose Castro MD   hydrALAZINE (APRESOLINE) 100 MG tablet Take 1 tablet by mouth 3 times daily Patient is taking 100 MG 3 times daily Yes Jose Castro MD   glipiZIDE (GLUCOTROL XL) 5 MG extended release tablet Take 2 tablets by mouth 2 times daily Yes Jose Castro MD   nitroGLYCERIN (NITROSTAT) 0.4 MG SL tablet Place 1 tablet under the tongue every 5 minutes as needed for Chest pain Yes Liss Huitron MD Jackelin   latanoprost (XALATAN) 0.005 % ophthalmic solution  Yes Historical Provider, MD   acyclovir (ZOVIRAX) 400 MG tablet 400 mg 2 times daily  Yes Historical Provider, MD   prednisoLONE acetate (PRED FORTE) 1 % ophthalmic suspension Place 1 drop into the left eye daily  Yes Historical Provider, MD   cetirizine (ZYRTEC) 10 MG tablet Take 1 tablet by mouth daily  Taisha Montaño MD   insulin glargine (LANTUS SOLOSTAR) 100 UNIT/ML injection pen Inject 28 Units into the skin 2 times daily  Georgie Peters MD   CONTOUR TEST strip USE 1 STRIP TO CHECK GLUCOSE TWICE DAILY  MD Jovani Altman MD   Insulin Pen Needle (PEN NEEDLES) 31G X 6 MM MISC 1 each by Does not apply route daily  Georgie Peters MD       Social History     Tobacco Use    Smoking status: Former Smoker     Packs/day: 1.50     Years: 8.00     Pack years: 12.00     Types: Cigarettes     Quit date: 3/4/1980     Years since quittin.7    Smokeless tobacco: Never Used   Vaping Use    Vaping Use: Never used   Substance Use Topics    Alcohol use: No    Drug use: No            PHYSICAL EXAMINATION:  [ INSTRUCTIONS:  \"[x]\" Indicates a positive item  \"[]\" Indicates a negative item  -- DELETE ALL ITEMS NOT EXAMINED]  Vital Signs: (As obtained by patient/caregiver or practitioner observation)    Blood pressure-  Heart rate-    Respiratory rate-    Temperature-  Pulse oximetry-   Vitals:    21 0654   BP: (!) 175/67   Pulse: 58   Resp: 18   Temp: 97 °F (36.1 °C)   SpO2: 98%         Constitutional: [x] Appears well-developed and well-nourished [x] No apparent distress      [] Abnormal-   Mental status  [x] Alert and awake  [x] Oriented to person/place/time [x]Able to follow commands      Eyes:  EOM    [x]  Normal  [] Abnormal-  Sclera  [x]  Normal  [] Abnormal -         Discharge [x]  None visible  [] Abnormal -    HENT:   [x] Normocephalic, atraumatic.   [] Abnormal   [x] Mouth/Throat: Mucous membranes are moist.     External Ears [x] Normal  [] Abnormal-     Neck: [x] No visualized mass     Pulmonary/Chest: [x] Respiratory effort normal.  [x] No visualized signs of difficulty breathing or respiratory distress        [] Abnormal-      Musculoskeletal:   [x] Normal gait with no signs of ataxia         [] Normal range of motion of neck        [] Abnormal-       Neurological:        [x] No Facial Asymmetry (Cranial nerve 7 motor function) (limited exam to video visit)          [x] No gaze palsy        [] Abnormal-         Skin:        [x] No significant exanthematous lesions or discoloration noted on facial skin         [] Abnormal-            Psychiatric:       [x] Normal Affect [] No Hallucinations        [] Abnormal-     Other pertinent observable physical exam findings-     ASSESSMENT/PLAN:  1. Acute kidney injury improved and stable  2. Metabolic acidosis improved  3. Hypertension less than adequately controlled  4. Hypocalcemia which does not correct even when corrected for low serum albumin level  5. Normocytic anemia  PLAN:  I discussed my thoughts with the patient. He understood. He had no questions. Lab results  discussed with him. Serum creatinine is stable. Serum bicarbonate is improved. Medications reviewed. We will increase the clonidine from 0.2 mg twice a day to 0.2 mg 3 times a day since it is a short acting medication. Hopefully this will help control blood pressure better. This was discussed with patient and staff. Continue current intravenous fluid at least till tomorrow. Labs in the morning. Dr. Srinath Dinero will resume care tomorrow. No follow-ups on file. Annamarie Kim, was evaluated through a synchronous (real-time) audio-video encounter. The patient (or guardian if applicable) is aware that this is a billable service. Verbal consent to proceed has been obtained within the past 12 months.  The visit was conducted pursuant to the emergency declaration under the [facility], [address] [place] [address] waiver authority and the [employer] and [employer] General Act. Patient identification was verified, and a caregiver was present when appropriate. The patient was located in a state where the provider was credentialed to provide care. Total time spent on this encounter: About 15 minutes. --Cheryl Xie MD on 11/14/2021 at 8:45 AM    An electronic signature was used to authenticate this note.

## 2021-11-15 LAB
ABSOLUTE EOS #: 0.11 K/UL (ref 0–0.44)
ABSOLUTE IMMATURE GRANULOCYTE: 0.21 K/UL (ref 0–0.3)
ABSOLUTE LYMPH #: 0.42 K/UL (ref 1.1–3.7)
ABSOLUTE MONO #: 0.64 K/UL (ref 0.1–1.2)
ANION GAP SERPL CALCULATED.3IONS-SCNC: 13 MMOL/L (ref 9–17)
BASOPHILS # BLD: 0 % (ref 0–2)
BASOPHILS ABSOLUTE: 0 K/UL (ref 0–0.2)
BUN BLDV-MCNC: 94 MG/DL (ref 8–23)
BUN/CREAT BLD: 35 (ref 9–20)
CALCIUM SERPL-MCNC: 7.9 MG/DL (ref 8.6–10.4)
CHLORIDE BLD-SCNC: 106 MMOL/L (ref 98–107)
CO2: 17 MMOL/L (ref 20–31)
CREAT SERPL-MCNC: 2.65 MG/DL (ref 0.7–1.2)
DIFFERENTIAL TYPE: ABNORMAL
EKG ATRIAL RATE: 58 BPM
EKG P AXIS: 43 DEGREES
EKG P-R INTERVAL: 190 MS
EKG Q-T INTERVAL: 436 MS
EKG QRS DURATION: 106 MS
EKG QTC CALCULATION (BAZETT): 428 MS
EKG R AXIS: -3 DEGREES
EKG T AXIS: 36 DEGREES
EKG VENTRICULAR RATE: 58 BPM
EOSINOPHILS RELATIVE PERCENT: 1 % (ref 1–4)
GFR AFRICAN AMERICAN: 29 ML/MIN
GFR NON-AFRICAN AMERICAN: 24 ML/MIN
GFR SERPL CREATININE-BSD FRML MDRD: ABNORMAL ML/MIN/{1.73_M2}
GFR SERPL CREATININE-BSD FRML MDRD: ABNORMAL ML/MIN/{1.73_M2}
GLUCOSE BLD-MCNC: 213 MG/DL (ref 74–100)
GLUCOSE BLD-MCNC: 242 MG/DL (ref 70–99)
GLUCOSE BLD-MCNC: 247 MG/DL (ref 74–100)
GLUCOSE BLD-MCNC: 336 MG/DL (ref 74–100)
GLUCOSE BLD-MCNC: 395 MG/DL (ref 74–100)
HCT VFR BLD CALC: 27.8 % (ref 40.7–50.3)
HEMOGLOBIN: 9.1 G/DL (ref 13–17)
IMMATURE GRANULOCYTES: 2 %
LYMPHOCYTES # BLD: 4 % (ref 24–43)
MCH RBC QN AUTO: 28.1 PG (ref 25.2–33.5)
MCHC RBC AUTO-ENTMCNC: 32.7 G/DL (ref 28.4–34.8)
MCV RBC AUTO: 85.8 FL (ref 82.6–102.9)
MONOCYTES # BLD: 6 % (ref 3–12)
MORPHOLOGY: ABNORMAL
NRBC AUTOMATED: 0 PER 100 WBC
PDW BLD-RTO: 16.5 % (ref 11.8–14.4)
PLATELET # BLD: 179 K/UL (ref 138–453)
PLATELET ESTIMATE: ABNORMAL
PMV BLD AUTO: 9.6 FL (ref 8.1–13.5)
POTASSIUM SERPL-SCNC: 4.6 MMOL/L (ref 3.7–5.3)
RBC # BLD: 3.24 M/UL (ref 4.21–5.77)
RBC # BLD: ABNORMAL 10*6/UL
SEG NEUTROPHILS: 87 % (ref 36–65)
SEGMENTED NEUTROPHILS ABSOLUTE COUNT: 9.22 K/UL (ref 1.5–8.1)
SODIUM BLD-SCNC: 136 MMOL/L (ref 135–144)
TROPONIN INTERP: ABNORMAL
TROPONIN INTERP: ABNORMAL
TROPONIN T: ABNORMAL NG/ML
TROPONIN T: ABNORMAL NG/ML
TROPONIN, HIGH SENSITIVITY: 38 NG/L (ref 0–22)
TROPONIN, HIGH SENSITIVITY: 40 NG/L (ref 0–22)
WBC # BLD: 10.6 K/UL (ref 3.5–11.3)
WBC # BLD: ABNORMAL 10*3/UL

## 2021-11-15 PROCEDURE — 97166 OT EVAL MOD COMPLEX 45 MIN: CPT

## 2021-11-15 PROCEDURE — 85025 COMPLETE CBC W/AUTO DIFF WBC: CPT

## 2021-11-15 PROCEDURE — 2580000003 HC RX 258: Performed by: INTERNAL MEDICINE

## 2021-11-15 PROCEDURE — 93005 ELECTROCARDIOGRAM TRACING: CPT | Performed by: FAMILY MEDICINE

## 2021-11-15 PROCEDURE — 6360000002 HC RX W HCPCS: Performed by: FAMILY MEDICINE

## 2021-11-15 PROCEDURE — 1200000000 HC SEMI PRIVATE

## 2021-11-15 PROCEDURE — 80048 BASIC METABOLIC PNL TOTAL CA: CPT

## 2021-11-15 PROCEDURE — 84484 ASSAY OF TROPONIN QUANT: CPT

## 2021-11-15 PROCEDURE — 94761 N-INVAS EAR/PLS OXIMETRY MLT: CPT

## 2021-11-15 PROCEDURE — 93010 ELECTROCARDIOGRAM REPORT: CPT | Performed by: INTERNAL MEDICINE

## 2021-11-15 PROCEDURE — 6370000000 HC RX 637 (ALT 250 FOR IP): Performed by: INTERNAL MEDICINE

## 2021-11-15 PROCEDURE — 36415 COLL VENOUS BLD VENIPUNCTURE: CPT

## 2021-11-15 PROCEDURE — 6370000000 HC RX 637 (ALT 250 FOR IP): Performed by: FAMILY MEDICINE

## 2021-11-15 PROCEDURE — 99233 SBSQ HOSP IP/OBS HIGH 50: CPT | Performed by: FAMILY MEDICINE

## 2021-11-15 PROCEDURE — 97530 THERAPEUTIC ACTIVITIES: CPT

## 2021-11-15 PROCEDURE — 99232 SBSQ HOSP IP/OBS MODERATE 35: CPT | Performed by: INTERNAL MEDICINE

## 2021-11-15 PROCEDURE — 82947 ASSAY GLUCOSE BLOOD QUANT: CPT

## 2021-11-15 PROCEDURE — 97161 PT EVAL LOW COMPLEX 20 MIN: CPT

## 2021-11-15 RX ORDER — CARVEDILOL 6.25 MG/1
6.25 TABLET ORAL 2 TIMES DAILY WITH MEALS
Status: DISCONTINUED | OUTPATIENT
Start: 2021-11-15 | End: 2021-11-16

## 2021-11-15 RX ORDER — METHYLPREDNISOLONE SODIUM SUCCINATE 40 MG/ML
40 INJECTION, POWDER, LYOPHILIZED, FOR SOLUTION INTRAMUSCULAR; INTRAVENOUS DAILY
Status: DISCONTINUED | OUTPATIENT
Start: 2021-11-16 | End: 2021-11-15

## 2021-11-15 RX ORDER — FUROSEMIDE 10 MG/ML
20 INJECTION INTRAMUSCULAR; INTRAVENOUS ONCE
Status: COMPLETED | OUTPATIENT
Start: 2021-11-15 | End: 2021-11-15

## 2021-11-15 RX ADMIN — GLIPIZIDE 10 MG: 5 TABLET ORAL at 08:04

## 2021-11-15 RX ADMIN — INSULIN GLARGINE 28 UNITS: 100 INJECTION, SOLUTION SUBCUTANEOUS at 20:32

## 2021-11-15 RX ADMIN — METHYLPREDNISOLONE SODIUM SUCCINATE 40 MG: 40 INJECTION, POWDER, FOR SOLUTION INTRAMUSCULAR; INTRAVENOUS at 08:08

## 2021-11-15 RX ADMIN — AMLODIPINE BESYLATE 10 MG: 10 TABLET ORAL at 20:32

## 2021-11-15 RX ADMIN — SODIUM CHLORIDE, PRESERVATIVE FREE 10 ML: 5 INJECTION INTRAVENOUS at 20:34

## 2021-11-15 RX ADMIN — INSULIN LISPRO 6 UNITS: 100 INJECTION, SOLUTION INTRAVENOUS; SUBCUTANEOUS at 11:45

## 2021-11-15 RX ADMIN — CARVEDILOL 6.25 MG: 6.25 TABLET, FILM COATED ORAL at 17:35

## 2021-11-15 RX ADMIN — FUROSEMIDE 20 MG: 10 INJECTION, SOLUTION INTRAVENOUS at 13:20

## 2021-11-15 RX ADMIN — CETIRIZINE HYDROCHLORIDE 5 MG: 10 TABLET, FILM COATED ORAL at 07:58

## 2021-11-15 RX ADMIN — SODIUM CHLORIDE, PRESERVATIVE FREE 10 ML: 5 INJECTION INTRAVENOUS at 06:46

## 2021-11-15 RX ADMIN — PREDNISOLONE ACETATE 1 DROP: 10 SUSPENSION/ DROPS OPHTHALMIC at 08:08

## 2021-11-15 RX ADMIN — INSULIN LISPRO 12 UNITS: 100 INJECTION, SOLUTION INTRAVENOUS; SUBCUTANEOUS at 17:36

## 2021-11-15 RX ADMIN — INSULIN LISPRO 6 UNITS: 100 INJECTION, SOLUTION INTRAVENOUS; SUBCUTANEOUS at 07:59

## 2021-11-15 RX ADMIN — INSULIN GLARGINE 28 UNITS: 100 INJECTION, SOLUTION SUBCUTANEOUS at 08:46

## 2021-11-15 RX ADMIN — CLONIDINE HYDROCHLORIDE 0.2 MG: 0.2 TABLET ORAL at 13:20

## 2021-11-15 RX ADMIN — GLIPIZIDE 10 MG: 5 TABLET ORAL at 17:44

## 2021-11-15 RX ADMIN — CLONIDINE HYDROCHLORIDE 0.2 MG: 0.2 TABLET ORAL at 08:04

## 2021-11-15 RX ADMIN — CLONIDINE HYDROCHLORIDE 0.2 MG: 0.2 TABLET ORAL at 20:32

## 2021-11-15 RX ADMIN — SODIUM CHLORIDE, PRESERVATIVE FREE 10 ML: 5 INJECTION INTRAVENOUS at 06:49

## 2021-11-15 RX ADMIN — ACYCLOVIR 400 MG: 200 CAPSULE ORAL at 08:04

## 2021-11-15 RX ADMIN — CARVEDILOL 6.25 MG: 6.25 TABLET, FILM COATED ORAL at 08:05

## 2021-11-15 RX ADMIN — ACYCLOVIR 400 MG: 200 CAPSULE ORAL at 20:32

## 2021-11-15 RX ADMIN — HYDRALAZINE HYDROCHLORIDE 100 MG: 50 TABLET, FILM COATED ORAL at 08:05

## 2021-11-15 RX ADMIN — HYDRALAZINE HYDROCHLORIDE 100 MG: 50 TABLET, FILM COATED ORAL at 20:32

## 2021-11-15 RX ADMIN — SODIUM CHLORIDE, PRESERVATIVE FREE 10 ML: 5 INJECTION INTRAVENOUS at 08:14

## 2021-11-15 RX ADMIN — HYDRALAZINE HYDROCHLORIDE 100 MG: 50 TABLET, FILM COATED ORAL at 13:20

## 2021-11-15 ASSESSMENT — PAIN SCALES - GENERAL
PAINLEVEL_OUTOF10: 0

## 2021-11-15 NOTE — PROGRESS NOTES
Physical Therapy    Facility/Department: [facility] AT THE [facility] MED SURG  Initial Assessment    NAME: Freedom Lassiter  : 1945  MRN: [id number]    Date of Service: 11/15/2021    Discharge Recommendations:  Continue to assess pending progress      Assessment   Body structures, Functions, Activity limitations: Decreased functional mobility ; Decreased strength; Decreased coordination; Decreased balance; Decreased posture  Assessment: PT evaluation completed. Patient was able to perform sit to stand transfers with SBAx1. Patient ambulated 150feet with RW with CGAx1 and patient reporting leg's were fatigued at end of ambulation. Patient showed occasional signs of impulsivity requiring cues for safety with good carryover. Patient would benefit from continued therapy in order to address deficits in strength, balance and functional mobility  Treatment Diagnosis: general debility  Prognosis: Good  Decision Making: Low Complexity  PT Education: Plan of Care; PT Role  REQUIRES PT FOLLOW UP: Yes  Activity Tolerance  Activity Tolerance: Patient Tolerated treatment well       Patient Diagnosis(es): The primary encounter diagnosis was Chest pain, unspecified type. Diagnoses of Hyperkalemia, Acute kidney injury ([address] Hyperglycemia, and Chronic anemia were also pertinent to this visit.      has a past medical history of Acute MI ([address] Acute renal failure with tubular necrosis ([address] Anemia, CAD (coronary artery disease), Cerebral artery occlusion with cerebral infarction [facility]), CHF (congestive heart failure) ([address] Chronic back pain, Closed fracture of lumbar vertebra without mention of spinal cord injury, Displacement of intervertebral disc, site unspecified, without myelopathy, H/O cardiac catheterization, H/O cardiovascular stress test, H/O tilt table evaluation, H/O tilt table evaluation, History of 24 hour EKG monitoring, History of 24 hour EKG monitoring, History of blood transfusion, History of cardiovascular stress test, History of cardiovascular stress test, History of coronary artery stent placement, History of CVA (cerebrovascular accident) without residual deficits, History of echocardiogram, History of Holter monitoring, History of tilt table evaluation, Hyperlipidemia, Hypertension, Non critical Right Renal artery stenosis, native ([facility] [address] Pacemaker, S/P cardiac cath, S/P coronary artery stent placement, SIRS (systemic inflammatory response syndrome) ([facility] [address] and Type II or unspecified type diabetes mellitus without mention of complication, not stated as uncontrolled. has a past surgical history that includes Pacemaker insertion; back surgery; Cholecystectomy (08/17/2015); Corneal transplant; Cardiac catheterization (Left, 02/19/2016); pacemaker placement; Colonoscopy (2010); Colonoscopy (02/19/2015); Colonoscopy (05/23/2018); Colonoscopy (N/A, 05/23/2018); Upper gastrointestinal endoscopy (05/28/2019); Upper gastrointestinal endoscopy (N/A, 05/28/2019); Colonoscopy (10/30/2020); Colonoscopy (N/A, 10/30/2020); Upper gastrointestinal endoscopy (N/A, 10/30/2020); Endoscopy, colon, diagnostic; eye surgery; and Cardiac catheterization (Left, 10/05/2021).     Restrictions  Restrictions/Precautions  Restrictions/Precautions: Fall Risk, General Precautions  Vision/Hearing  Vision: Impaired  Vision Exceptions: Wears glasses for reading  Hearing: Within functional limits     Subjective  General  Chart Reviewed: Yes  Patient assessed for rehabilitation services?: Yes  Subjective  Subjective: patient denies pain  Pain Screening  Patient Currently in Pain: Denies   Social/Functional History  Social/Functional History  Lives With: Spouse  Type of Home: House (condo)  Home Layout: One level  Home Access: Stairs to enter without rails  Entrance Stairs - Number of Steps: 1  Entrance Stairs - Rails: None  Bathroom Shower/Tub: Walk-in shower  Bathroom Toilet: Standard  Home Equipment: 4 wheeled walker, Cane, Rolling walker  ADL Assistance: Independent  Homemaking Assistance: Independent  Homemaking Responsibilities: Yes  Ambulation Assistance: Independent  Transfer Assistance: Independent  Active : Yes  Mode of Transportation: Car  Occupation: Retired    Objective  AROM RLE (degrees)  RLE AROM: WFL  AROM LLE (degrees)  LLE AROM : WFL  Strength RLE  Comment: grossly 4-/5  Strength LLE  Comment: grossly 4-/5           Transfers  Sit to Stand: Stand by assistance  Stand to sit: Stand by assistance  Ambulation  Ambulation?: Yes  Ambulation 1  Surface: level tile  Device: Rolling Walker  Assistance: Contact guard assistance  Gait Deviations: Slow Linda; Shuffles; Deviated path  Distance: 150 feet  Comments: patient states slight dizziness with ambulation due to medications.  Patient reports legs being tired at end of ambulation     Balance  Sitting - Static: Good  Sitting - Dynamic: Fair; +  Standing - Static: Fair; +  Standing - Dynamic: [address]  Times per week: 7 times per week  Times per day: Twice a day (1 time per day on weekends)  Current Treatment Recommendations: Strengthening, Balance Training, Functional Mobility Training, Transfer Training, Neuromuscular Re-education, Gait Training, Stair training, Pain Management, Positioning, Equipment Evaluation, Education, & procurement, Patient/Caregiver Education & Training, Safety Education & Training, Home Exercise Program  Safety Devices  Type of devices: Left in chair, Call light within reach, Chair alarm in place (wife in room upon therapy leaving)    AM-PAC Score     AM-PAC Inpatient Mobility without Stair Climbing Raw Score : 15 (11/15/21 1441)  AM-PAC Inpatient without Stair Climbing T-Scale Score : 43.03 (11/15/21 1441)  Mobility Inpatient CMS 0-100% Score: 47.43 (11/15/21 1441)  Mobility Inpatient without Stair CMS G-Code Modifier : CK (11/15/21 1441)       Goals  Short term goals  Time Frame for Short term goals: 20 visits  Short term goal 1: Patient will demonstrate the ability to complete bed mobility and transfers independently in order to return to PLOF  Short term goal 2: Patient will demonstrate the ability to ambulate >200 feet with RW with supervision assistance in order to ease functional mobility  Short term goal 3: Patient will ascend/descend 1 step without handrail with supervision assistance in order to safely enter/exit the home  Short term goal 4: Patient will tolerate 35-45' ther-ex in order to increase endurance and ease ADLs       Therapy Time   Individual Concurrent Group Co-treatment   Time In 1410         Time Out 1425         Minutes 15                 Lala Grimaldo, PT, DPT

## 2021-11-15 NOTE — PROGRESS NOTES
Progress Note  Monae Arce MD    OBJECTIVE:    Patient seen for f/u of Acute kidney injury superimposed on CKD ([facility] [address] He has chest pain ast night,has some tightness this am.  His iv was dfiscontinued due to edema and weight gain and has lasix 20 mg iv- has lost 1 lb.  k 4.6  bp still high  Bun/Cr 94/2.65  Hb 9.1     ROS:   Constitutional: negative  for fevers, and negative for chills. Respiratory: negative for shortness of breath, negative for cough, and negative for wheezing  Cardiovascular: positive for chest pain, and negative for palpitations  Gastrointestinal: negative for abdominal pain, negative for nausea,negative for vomiting, negative for diarrhea, and negative for constipation     All other systems were reviewed with the patient and are negative unless otherwise stated in HPI    OBJECTIVE:  Vitals:   Temp: 97.7 °F (36.5 °C)  BP: (!) 167/71  Resp: 18  Pulse: 60  SpO2: 98 %    24HR INTAKE/OUTPUT:      Intake/Output Summary (Last 24 hours) at 11/15/2021 0759  Last data filed at 11/15/2021 8370  Gross per 24 hour   Intake 2514.71 ml   Output 2925 ml   Net -410.29 ml     -----------------------------------------------------------------  Exam:  GEN:    Awake, alert and oriented x3. EYES:  EOMI, pupils equal   NECK: Supple. No lymphadenopathy. No carotid bruit  CVS:    regular rate and rhythm, 2/6 systolic murmur  PULM:  CTA, no wheezes, rales or rhonchi, no acute respiratory distress  ABD:    Bowels sounds normal.  Abdomen is soft. No distention. no tenderness to palpation. EXT:   trace edema bilaterally . No calf tenderness. NEURO: Moves all extremities. Motor and sensory are grossly intact  SKIN:  No rashes.   No skin lesions.    -----------------------------------------------------------------  Diagnostic Data:    · All available data reviewed  Lab Results   Component Value Date    WBC 10.6 11/15/2021    HGB 9.1 (L) 11/15/2021    MCV 85.8 11/15/2021     11/15/2021      Lab Results

## 2021-11-15 NOTE — PROGRESS NOTES
Occupational Therapy   Occupational Therapy Initial Assessment  Date: 11/15/2021   Patient Name: Latisha Richard  MRN: [id number]     : 1945    Date of Service: 11/15/2021    Discharge Recommendations:  Continue to assess pending progress, Home with assist PRN  OT Equipment Recommendations  Equipment Needed: Yes  Mobility Devices: Hal Rear: Rolling    Assessment   Performance deficits / Impairments: Decreased functional mobility ; Decreased ADL status; Decreased high-level IADLs; Decreased endurance  Assessment: Pt is a 68 y.o male admitted due to an VIDAL. Pt demo decreased functional mobility, endurance and ADL/IADL participation. Pt would benefit from skilled OT services to address deficits and return to PLOF. Treatment Diagnosis: generalized weakness  Prognosis: Fair  Decision Making: Medium Complexity  OT Education: OT Role; Plan of Care; Transfer Training  Barriers to Learning: none  REQUIRES OT FOLLOW UP: Yes  Activity Tolerance  Activity Tolerance: Patient Tolerated treatment well  Safety Devices  Safety Devices in place: Yes  Type of devices: All fall risk precautions in place; Left in chair; Chair alarm in place; Call light within reach           Patient Diagnosis(es): The primary encounter diagnosis was Chest pain, unspecified type. Diagnoses of Hyperkalemia, Acute kidney injury ([address] Hyperglycemia, and Chronic anemia were also pertinent to this visit.      has a past medical history of Acute MI ([address] Acute renal failure with tubular necrosis ([address] Anemia, CAD (coronary artery disease), Cerebral artery occlusion with cerebral infarction [facility]), CHF (congestive heart failure) ([address] Chronic back pain, Closed fracture of lumbar vertebra without mention of spinal cord injury, Displacement of intervertebral disc, site unspecified, without myelopathy, H/O cardiac catheterization, H/O cardiovascular stress test, H/O tilt table evaluation, H/O tilt table evaluation, History of 24 hour EKG monitoring, History of 24 hour EKG monitoring, History of blood transfusion, History of cardiovascular stress test, History of cardiovascular stress test, History of coronary artery stent placement, History of CVA (cerebrovascular accident) without residual deficits, History of echocardiogram, History of Holter monitoring, History of tilt table evaluation, Hyperlipidemia, Hypertension, Non critical Right Renal artery stenosis, native ([facility] [address] Pacemaker, S/P cardiac cath, S/P coronary artery stent placement, SIRS (systemic inflammatory response syndrome) ([facility] [address] and Type II or unspecified type diabetes mellitus without mention of complication, not stated as uncontrolled. has a past surgical history that includes Pacemaker insertion; back surgery; Cholecystectomy (08/17/2015); Corneal transplant; Cardiac catheterization (Left, 02/19/2016); pacemaker placement; Colonoscopy (2010); Colonoscopy (02/19/2015); Colonoscopy (05/23/2018); Colonoscopy (N/A, 05/23/2018); Upper gastrointestinal endoscopy (05/28/2019); Upper gastrointestinal endoscopy (N/A, 05/28/2019); Colonoscopy (10/30/2020); Colonoscopy (N/A, 10/30/2020); Upper gastrointestinal endoscopy (N/A, 10/30/2020); Endoscopy, colon, diagnostic; eye surgery; and Cardiac catheterization (Left, 10/05/2021). Treatment Diagnosis: generalized weakness      Restrictions  Restrictions/Precautions  Restrictions/Precautions: Fall Risk, General Precautions    Subjective   General  Chart Reviewed: Yes  Patient assessed for rehabilitation services?: Yes  Family / Caregiver Present: No  Referring Practitioner: Everlena Litten  Diagnosis: VIDAL  Subjective  Subjective: Pt had no complaints of pain. Pt reported his legs feeling \"tired\" after walking. General Comment  Comments: Pt in bathroom upon arrival. Agreeable to OT evaluation.   Patient Currently in Pain: Denies  Pain Assessment  Pain Assessment: 0-10  Pain Level: 0  Vital Signs  Temp: 96.7 °F (35.9 °C)  Temp Source: Temporal  Pulse: 65  Heart Rate Source: Monitor; Apical  Resp: 18  BP: (!) 151/60  BP Location: Left upper arm  MAP (mmHg): 86  Patient Position: Up in chair  Level of Consciousness: Alert (0)  MEWS Score: 1  Patient Currently in Pain: Denies  Oxygen Therapy  SpO2: 97 %  Pulse Oximeter Device Mode: Intermittent  Pulse Oximeter Device Location: Finger  O2 Device: None (Room air)  Social/Functional History  Social/Functional History  Lives With: Spouse  Type of Home: House (condo)  Home Layout: One level  Home Access: Stairs to enter without rails  Entrance Stairs - Number of Steps: 1  Entrance Stairs - Rails: None  Bathroom Shower/Tub: Walk-in shower  Bathroom Toilet: Standard  Home Equipment: 4 wheeled walker, Cane, [employer] walker  ADL Assistance: Independent  Homemaking Assistance: Independent  Homemaking Responsibilities: Yes  Ambulation Assistance: Independent  Transfer Assistance: Independent  Active : Yes  Mode of Transportation: Car  Occupation: Retired       Objective   Vision: Impaired  Vision Exceptions: Wears glasses for reading  Hearing: Within functional limits          Balance  Sitting Balance: Independent  Standing Balance: Contact guard assistance  Standing Balance  Time: ~7 minutes  Activity: Negotiated to visiting area with 2WW and CGA. Functional Mobility  Functional - Mobility Device: Rolling Walker  Activity: Other  Assist Level: Contact guard assistance  Functional Mobility Comments: Pt verbalized increased fatigue after walking.   Toilet Transfers  Toilet - Technique: Ambulating  Equipment Used: Standard toilet  Toilet Transfer: Stand by assistance  ADL  Feeding: Setup  Grooming: Stand by assistance  UE Bathing: Stand by assistance  LE Bathing: Stand by assistance  UE Dressing: Stand by assistance  LE Dressing: Stand by assistance  Toileting: Supervision           Transfers  Sit to stand: Contact guard assistance  Stand to sit: Contact guard assistance     Cognition  Overall Cognitive Status: [facility]  Perception  Overall Perceptual Status: WFL     Sensation  Overall Sensation Status: WFL        LUE AROM (degrees)  LUE AROM : WFL  Left Hand AROM (degrees)  Left Hand AROM: WFL  RUE AROM (degrees)  RUE AROM : WFL  Right Hand AROM (degrees)  Right Hand AROM: WFL  LUE Strength  Gross LUE Strength: WFL  RUE Strength  Gross RUE Strength: WFL                   Plan   Plan  Times per week: 7x  Times per day: Daily  Current Treatment Recommendations: Strengthening, Safety Education & Training, Patient/Caregiver Education & Training, Self-Care / ADL, Functional Mobility Training, Endurance Training    G-Code     OutComes Score                                                  AM-PAC Score        AM-PAC Inpatient Daily Activity Raw Score: 19 (11/15/21 1435)  AM-PAC Inpatient ADL T-Scale Score : 40.22 (11/15/21 1435)  ADL Inpatient CMS 0-100% Score: 42.8 (11/15/21 1435)  ADL Inpatient CMS G-Code Modifier : CK (11/15/21 1435)    Goals  Short term goals  Time Frame for Short term goals: 21  Short term goal 1: Patient to tolerate standing >10' while participating in functional task of choice to improve standing tolerane, balance and safety during ADL, mobility and transfers. Short term goal 2: Patient to engage in 15 minutes of ther ex/ther act to improve strength as well as activity tolerance for self care tasks. Short term goal 3: Patient to complete self care routine c SUP to ensure safe return home. Short term goal 4: Pt will report and demo understanding of discharge recommendations and HEP.        Therapy Time   Individual Concurrent Group Co-treatment   Time In 1410         Time Out 1425         Minutes [address] Vin[address] Burrell

## 2021-11-15 NOTE — PROGRESS NOTES
Patient: Mika Anderson  : 1945  Date of Admission: 2021  Primary Care Physician: Vy Zuleta  Today's Date: 11/15/2021    REASON FOR CONSULTATION: Chest Pain (Mid chest tightness, radiating to left arm. Onset 30mins ago), Shortness of Breath, and Other (Abnormal labwork drawn today. (potassium, glucose and kidney function))      HPI: Mr. Chato Sloan is a 68 y.o. male well known to me with a known history of dysautonomia where on tilt table testing his blood pressure dropped from 771 systolic to 78 systolic with only a 13-27 HR response. More recently, a CT of he head in the past showed evidence of at least 2 old CVA's and a heart catheterization showed severe single vessel disease in the ostium of a moderate sized OM1 branch of the circumflex. However, maximal guidelines directed medical management was opted for an place of stenting partly due to the risk associated with stenting this vessel. Recently he has had a coronary angiography procedure with stenting of his HA Om1. As you know, Mr. Chato Sloan  is a 76 y.o. male with a history of recent onset substernal chest discomfort that led to a stress test and a subsequent heart catheterization which showed severe single vessel coronary artery disease of the HA Om1 with successful angioplasty and stenting of that vessel that was done by Dr. Nir Retana on 4/10/17. More recently he has had problems with balance problems when he is in his feet and says that a Neurologist has told him in the past this is because of his previous strokes. Most recently he underwent a cardiovascular stress test which fortunately had shown to be normal on 3/21/2018. Mr. Chato Sloan has significant normal stress test and echocardiogram on 2020. Holter monitor done on 2020: The rhythm was sinus. Average daily heart rate 58 ranging from 47 to 81 bpm. Bradycardia for 72% test duration. Rare premature supraventricular ectopic beats consisting of 33 isolated PACs.  Rare premature ventricular ectopic beats total 75 consisting of 73 isolatedPVCs and a ventricular couplet. Echocardiogram done on 12/18/2020: EF of 60%. Moderately increased LV wall thickness. Borderline pulmonary hypertension. Mild tricuspid regurgitation. Mild diastolic dysfunction is seen/ Aortic root is mildly dilated. Stress test done on 9/20/2021 was equivocal, There is a small/moderate perfusion defect of mild intensity in the lateral, inferior and inferoapical regions. Cardiac cath done on 10/5/2021 showed Moderate three vessel coronary artery disease involving a ostial 50-60% stenosis in a small, non-dominant RCA, a 60% mid LAD stenosis and a proximal 50-60% stenosis in the left anterior descending coronary artery    Mr. Héctor Walker he has a chest pressure tightness sensation that occurring at night. He states it last for several minutes however last night it lasted for half hour. He also describes radiation of pain into his left arm. He is currently admitted for acute kidney injury superimposed on his chronic kidney disease for which family was requesting transfer to [facility]. He notes his chest tightness continues to be similar to what he is experienced in the past for which he had a negative cardiac cath on 10/5/2021. He does have dizziness and lightheadedness during his medications in the morning. He continues to have a mild swelling/rash in is arms and legs. Biopsy site has stitch in place and mild dry drainage noted. He denied any abdominal pain, bleeding problems, bowel issues, problems with his medications or any other concerns at this time. No nausea or vomiting. No cough, fever or chills.      Chart reviewed, last troponin was elevated at 38.      Past Medical History:   Diagnosis Date    Acute MI ([facility]     Acute renal failure with tubular necrosis ([facility] [address] 10/2/2018    ssecondary to hemodynamic effects of loop diuretics and ace inhibitors, bbaseline 1.2-1.3 which peaked up to 1.8, resolving    Anemia     CAD (coronary artery disease)     Cerebral artery occlusion with cerebral infarction ([facility] [address]     CHF (congestive heart failure) (HCC)     Chronic back pain     Closed fracture of lumbar vertebra without mention of spinal cord injury     Displacement of intervertebral disc, site unspecified, without myelopathy     H/O cardiac catheterization 2/19/16    LMCA: Mild irregularities 10-20%. LAD: Lesion on PRox LAD: Mid subsection. 65% stenosis. LCx: Lesion on 1st Ob Valentina: Proximal subesection. 70% steniosis. RCA: Small non-dominant RCA. Lesion on PRox RCA: Ostial. 50% stenosis. EF:55%.  H/O cardiovascular stress test 2/19/16    Abnormal. Moderate perfusion defect of mild intensity in the inferior, inferoseptal adn inferoapical regions during stress imaging, which most consistent with ischemia. Global LV systolic function normal without regional wall motion abnormalities. OVerall these results are most consistent with an intermediate risk for signficant CAD. Additional testing including cardiac cath may be indicated.  H/O tilt table evaluation 12/26/2017    Abnormal. Patients HR, BP response and symptoms were most consistent with dysautonomia. Combined with viligant maintenance of euvolemia and maintaining a moderate salft intake, pharmacologic treatment with SSRI such as lexapro and or mestinon among other treatments have shown some effectiveness in treatment of this condition    H/O tilt table evaluation 12/26/2017    Abnormal head upright tilt table study. The pt heart rate, blood pressure response and symptoms were most consistent with dysautonomia.  History of 24 hour EKG monitoring 8/14/14    Occasional PAC's and PVC's which appear to be at least moderately symptomatic.  History of 24 hour EKG monitoring 11/19/14    Event Monitor. Sinus rhythm and sinus bradycardia.   Infrequent isolated PVC's    History of blood transfusion     History of cardiovascular stress test 2/19/14 Relatively NL    History of cardiovascular stress test 03/21/2018    Normal myocardial perfusion. Global left ventricular systolic function was normal with an EF of 68%. Overall, these results are most consistent with a low risk scan.  History of coronary artery stent placement 04/2017    PTCA / Drug Eluting Stent:, CX and / or branches    History of CVA (cerebrovascular accident) without residual deficits 2014    Incidentally found on CT head. No known impairment now or in the past.    History of echocardiogram 2/18/14    LA mildly dilated, EF 55%, LV wall thickness is moderately increased, no definite wall motion abnormalilities, what appears to be a pacer wire is seen w/n the RA and RV, mild-mod TR, mild pulmonary hypertension.  History of Holter monitoring 12/29/2017    Rare PAC's and PVC's.  History of tilt table evaluation 8/12/14    Abnormal    Hyperlipidemia     Hypertension     Non critical Right Renal artery stenosis, native ([facility] [address] 10/2/2018    Non critical Right Renal artery stenosis, based on cath in 2016, Rt RA 30% stenosis    Pacemaker     non-functioning    S/P cardiac cath 04/10/2017    S/P coronary artery stent placement     Successful PTCA - HA Om1    SIRS (systemic inflammatory response syndrome) ([facility] [address] 5/19/2019    Type II or unspecified type diabetes mellitus without mention of complication, not stated as uncontrolled        CURRENT ALLERGIES: Lipitor [atorvastatin], Aldactone [spironolactone], Crestor [rosuvastatin calcium], Lopid [gemfibrozil], Invokana [canagliflozin], and Januvia [sitagliptin] REVIEW OF SYSTEMS: 14 systems were reviewed. Pertinent positives and negatives as above, all else negative.      Past Surgical History:   Procedure Laterality Date    BACK SURGERY      CARDIAC CATHETERIZATION Left 02/19/2016    via right radial approach/ Melina Vernon/ Dr. Darrel Goins Left 10/05/2021    Dr Brooklynn Toribio radial-Moderate three vessel coronary artery disease involving a ostial 50-60% stenosis in a small, non-dominant RCA, a 60% mid LAD stenosis and a proximal 50-60% stenosis in the left anterior descending coronary artery. Normal left ventricular end diastolic pressure. Proceed with aggressive maximal medical management as clinically indicated.     CHOLECYSTECTOMY  2015    [facility]/ [facility]    COLONOSCOPY      COLONOSCOPY  2015    -polyps,diverticulosis,hemorrhoids    COLONOSCOPY  2018    Dr Terence Barnes    COLONOSCOPY N/A 2018    COLONOSCOPY POLYPECTOMY SNARE/COLD BIOPSY  cold snare  and  hot snare performed by Pearl Adames MD at [facility]  10/30/2020    with Dr. Talha Avila 10/30/2020    Arby Oms, NOT HIGH RISK performed by Levi Kingston DO at [facility]      left eye    ENDOSCOPY, COLON, DIAGNOSTIC      EYE SURGERY      PACEMAKER INSERTION      PACEMAKER PLACEMENT      UPPER GASTROINTESTINAL ENDOSCOPY  2019    Dr. Greer Every H-Pylori)duodenal bulbar/antral erythema    UPPER GASTROINTESTINAL ENDOSCOPY N/A 2019    EGD BIOPSY, clotest performed by Pearl Adames MD at [facility] 10/30/2020    EGD ESOPHAGOGASTRODUODENOSCOPY performed by Levi Kingston DO at [address] History:  Social History     Tobacco Use    Smoking status: Former Smoker     Packs/day: 1.50     Years: 8.00     Pack years: 12.00     Types: Cigarettes     Quit date: 3/4/1980     Years since quittin.7    Smokeless tobacco: Never Used   Vaping Use    Vaping Use: Never used   Substance Use Topics    Alcohol use: No    Drug use: No        CURRENT MEDICATIONS:  Outpatient Medications Marked as Taking for the 21 encounter [facility] Encounter)   Medication Sig Dispense Refill    predniSONE (DELTASONE) 20 MG tablet Take 1 tablet by mouth 2 times daily for 5 days 10 tablet 0    eplerenone (INSPRA) 25 MG tablet Take 1 tablet by mouth daily 90 tablet 3    loratadine (CLARITIN) 10 MG tablet Take 1 tablet by mouth daily (Patient taking differently: Take 10 mg by mouth daily as needed ) 90 tablet 3    lisinopril (PRINIVIL;ZESTRIL) 20 MG tablet Take 1 tablet by mouth daily 90 tablet 3    amLODIPine (NORVASC) 10 MG tablet Take 1 tablet by mouth nightly 90 tablet 0    hydrALAZINE (APRESOLINE) 100 MG tablet Take 1 tablet by mouth 3 times daily Patient is taking 100 MG 3 times daily 270 tablet 0    glipiZIDE (GLUCOTROL XL) 5 MG extended release tablet Take 2 tablets by mouth 2 times daily 360 tablet 0    nitroGLYCERIN (NITROSTAT) 0.4 MG SL tablet Place 1 tablet under the tongue every 5 minutes as needed for Chest pain 25 tablet 3    latanoprost (XALATAN) 0.005 % ophthalmic solution       acyclovir (ZOVIRAX) 400 MG tablet 400 mg 2 times daily       prednisoLONE acetate (PRED FORTE) 1 % ophthalmic suspension Place 1 drop into the left eye daily        carvedilol (COREG) tablet 6.25 mg, BID WC  cloNIDine (CATAPRES) tablet 0.2 mg, TID  glipiZIDE (GLUCOTROL) tablet 10 mg, BID AC  0.9 % sodium chloride infusion, PRN  glucose (GLUTOSE) 40 % oral gel 15 g, PRN  dextrose 50 % IV solution, PRN  glucagon (rDNA) injection 1 mg, PRN  dextrose 5 % solution, PRN  cetirizine (ZYRTEC) tablet 5 mg, Daily  insulin lispro (HUMALOG) injection vial 0-18 Units, TID WC  insulin lispro (HUMALOG) injection vial 0-9 Units, Nightly  acyclovir (ZOVIRAX) capsule 400 mg, BID  amLODIPine (NORVASC) tablet 10 mg, Nightly  hydrALAZINE (APRESOLINE) tablet 100 mg, TID  insulin glargine (LANTUS) injection vial 28 Units, BID  prednisoLONE acetate (PRED FORTE) 1 % ophthalmic suspension 1 drop, Daily  sodium chloride flush 0.9 % injection 5-40 mL, 2 times per day  sodium chloride flush 0.9 % injection 10 mL, PRN  0.9 % sodium chloride infusion, PRN  ondansetron (ZOFRAN-ODT) disintegrating tablet 4 mg, Q8H PRN   Or  ondansetron (ZOFRAN) injection 4 mg, Q6H PRN  polyethylene glycol (GLYCOLAX) packet 17 g, Daily PRN  acetaminophen (TYLENOL) tablet 650 mg, Q6H PRN   Or  acetaminophen (TYLENOL) suppository 650 mg, Q6H PRN       FAMILY HISTORY: family history includes Cancer in his father and mother. Physical Examination:     BP (!) 151/60   Pulse 65   Temp 96.7 °F (35.9 °C) (Temporal)   Resp 18   Ht 5' 9\" (1.753 m)   Wt 206 lb (93.4 kg)   SpO2 97%   BMI 30.42 kg/m²  Body mass index is 30.42 kg/m². Constitutional: He appeared oriented to person and place. He appears well-developed and well-nourished. In no acute distress. HEENT: Normocephalic and atraumatic. No JVD present. Carotid bruit is not present. No mass and no thyromegaly present. No lymphadenopathy noted. Cardiovascular: Normal rate, regular rhythm, normal heart sounds. Exam reveals no gallop and no friction rubs. 2/6 systolic murmur, 2nd intercostal space on the RIGHT just lateral to the sternum  Pulmonary/Chest: Effort normal and breath sounds normal. No respiratory distress. He has no wheezes, rhonchi or rales. Abdominal: Soft, non-tender. He exhibits no organomegaly, mass or bruit. Extremities: None. No cyanosis or clubbing. 2+ radial and carotid pulses. Distal extremity pulses: 2+ bilaterally. Neurological: Alertness and orientation as per Constitutional exam. No evidence of gross cranial nerve deficit. Coordination appeared normal.   Skin: Skin is warm and dry. There is no rash or diaphoresis. Psychiatric: He has a normal mood and affect.  His speech is normal and behavior is normal.          MOST RECENT LABS ON RECORD:   Lab Results   Component Value Date    WBC 10.6 11/15/2021    HGB 9.1 (L) 11/15/2021    HCT 27.8 (L) 11/15/2021     11/15/2021    CHOL 69 09/21/2021    TRIG 185 (H) 09/21/2021    HDL 14 (L) 09/21/2021    LDLCHOLESTEROL 18 09/21/2021    ALT 11 11/12/2021    AST 11 11/12/2021     11/15/2021    K 4.6 11/15/2021     11/15/2021    CREATININE 2.65 (H) 11/15/2021    BUN 94 (H) 11/15/2021    CO2 17 (L) 11/15/2021    TSH 2.26 11/02/2017    PSA 3.68 04/30/2021    INR 1.1 09/20/2021    LABA1C 7.3 08/19/2021    LABMICR 30 10/17/2015        ASSESSMENT:  Patient Active Problem List    Diagnosis Date Noted    S/P drug eluting coronary stent placement-OM1 4/10/17 04/10/2017    Moderate malnutrition ([address] 11/12/2021    Coronary atherosclerosis due to calcified coronary lesion 02/20/2015    Hyperkalemia 11/11/2021    Acute purpuric eruption 11/10/2021    Skin lesion of left lower extremity 11/10/2021    Urticaria 11/10/2021    Iron deficiency anemia secondary to inadequate dietary iron intake 10/21/2021    Iron malabsorption 10/21/2021    Chronic anemia 09/21/2021    Acute coronary syndrome with high troponin ([address] 09/21/2021    Chest pain 09/20/2021    Gross hematuria 05/05/2021    BPH with obstruction/lower urinary tract symptoms 12/10/2019    Elevated PSA 12/10/2019    Anemia in stage 3 chronic kidney disease ([address] 05/21/2019    Acute kidney injury superimposed on CKD ([address] 10/02/2018    Non critical Right Renal artery stenosis, native ([address] 10/02/2018    Medication side effect, initial encounter 03/23/2018    Angina, class II ([address] 01/08/2018    Hyperlipidemia 04/24/2017    Chronic diastolic HF (heart failure), NYHA class 3 ([address] 04/24/2017    Coronary artery disease involving native coronary artery of native heart without angina pectoris 05/02/2016    Cervical stenosis of spinal canal 02/29/2016    Abnormal tilt table test 02/23/2016    Chronic kidney disease, stage III (moderate) ([address] 02/22/2016    Controlled type 2 diabetes mellitus with diabetic nephropathy, with long-term current use of insulin ([address] 02/22/2016    Abnormal cardiovascular stress test 02/18/2016    Ischemic chest pain ([address] 02/17/2016    Accelerated hypertension 10/16/2015    Chronotropic incompetence with autonomic dysfunction 09/02/2015    Episodic lightheadedness 09/02/2015    Cholecystitis 08/17/2015    Syncope, near 08/05/2015    Dysautonomia ([address] 06/29/2015    History of CVA (cerebrovascular accident) without residual deficits     Displacement of intervertebral disc, site unspecified, without myelopathy 03/04/2015    History of colon polyps 41/91/5526    Systolic murmur 96/58/0270    History of MI (myocardial infarction) 02/12/2014    Vertigo, benign paroxysmal 10/17/2012     PLAN:    Acute on chronic renal insufficiency: Unclear etiology but likely multifactorial: Continue current treatment  I discussed treatment options extensively with Dr. Eveline Hernandez, [facility]     Anemia, unclear etiology: Likely multifactorial including GI losses and acute on chronic renal insufficiency. He received 1 unit pRBC transfusion    Dysautonomia: Mildly to moderately, likely related to and worsened by anemia  Diuretics: Not indicated at this time. Nonpharmacologic counseling: Because of his condition, I reminded him to try and keep himself well-hydrated and to take extra time when moving from laying to sitting, sitting to standing and standing to walking as well as wearing at least knee high compressions stockings. Acute on Chronic Diastolic Heart Failure: EF of 60% via echo on 12/18/2020. Up 9 lbs over last 24 hours  Beta Blocker: Continue Carvedilol (Coreg) 6.25 mg twice daily. ACE Inibitor/ARB: Will defer to nephrology with current acute on chronic renal insufficiency   Diuretics: Will give another dose of lasix 20 mg IV toward a goal losing 1-2 lbs over next 24 hours, probably still up 4-8 lbs. Nonpharmacologic management of Heart Failure: I advised him to try and keep his legs up whenever possible and to limit salt in his diet. Would like to try and cut back on steroid dose if possible for rash. Atherosclerotic Heart Disease: Coronary Artery stent: 4/10/2017.  Cardiac cath done on 10/5/2021 showed Moderate three vessel coronary artery disease involving a ostial 50-60% stenosis in a small, non-dominant RCA, a 60% mid LAD stenosis and a proximal 50-60% stenosis in the left anterior descending coronary artery  Antiplatelet Agent: Not indicated due to bleeding  ACE Inibitor/ARB: Will defer to nephrology with current acute on chronic renal insufficiency  Beta Blocker: Continue Carvedilol (Coreg) 3.125 mg twice daily. Cholesterol Reduction Therapy: Refused by patient. Laboratory testing: None    Essential Hypertension: Uncontrolled likely worsened by prednisone and stopping of eplerinone which I agree with  ACE Inibitor/ARB: Will defer to nephrology with current acute on chronic renal insufficiency  Beta Blocker: Continue Carvedilol (Coreg) 3.125 mg twice daily. Calcium Channel Blocker: Continue amlodipine (Norvasc) 10 mg once daily. Agree with increased Clonidine 0.2 mg tid  Continue Hydralazine 100 mg 3x/day  Would like to try and cut back on steroid dose if possible for rash. Hyperlipidemia: Mixed - Last LDL on 9/21/2021 was 18 mg/dL   Cholesterol Reduction Therapy: Refused by patient. Once again, thank you for allowing me to participate in this patients care. Please do not hesitate to contact me if I could be of any further assistance. Sincerely,  Phill Medina. Jackelin BLOUNT, MS, F.A.C.C. [facility] Specialist    [address]  [facility], [address] [place] [address]  Phone: [phone], Fax: [phone]     I believe that the risk of significant morbidity and mortality related to the patient's current medical conditions are: Intermediate.         November 15, 2021

## 2021-11-15 NOTE — PROGRESS NOTES
Patient and family states that patient is to no longer be transferred to [facility].

## 2021-11-15 NOTE — PROGRESS NOTES
Patients family member Cynthia Villa called in and spoke with writer at this time. Family would like the patient transferred to another facility and request for patient to go to [facility]. Writer did express that if this is the desire then to be prepared for patient to possibly wait days for this to happen if [facility] is to accept patient. Patient and family state understanding of such. Writer notified Dr. Rosario Brennan.

## 2021-11-15 NOTE — PROGRESS NOTES
Patient: Eusebio Alvarez  : 1945  Date of Admission: 2021  Primary Care Physician: Yunier Benjamin  Today's Date: 2021    REASON FOR CONSULTATION: Chest Pain (Mid chest tightness, radiating to left arm. Onset 30mins ago), Shortness of Breath, and Other (Abnormal labwork drawn today. (potassium, glucose and kidney function))      HPI: Mr. Joseph Mcgarry is a 68 y.o. male well known to me with a known history of dysautonomia where on tilt table testing his blood pressure dropped from 496 systolic to 78 systolic with only a 22-66 HR response. More recently, a CT of he head in the past showed evidence of at least 2 old CVA's and a heart catheterization showed severe single vessel disease in the ostium of a moderate sized OM1 branch of the circumflex. However, maximal guidelines directed medical management was opted for an place of stenting partly due to the risk associated with stenting this vessel. Recently he has had a coronary angiography procedure with stenting of his HA Om1. As you know, Mr. Joseph Mcgarry  is a 76 y.o. male with a history of recent onset substernal chest discomfort that led to a stress test and a subsequent heart catheterization which showed severe single vessel coronary artery disease of the HA Om1 with successful angioplasty and stenting of that vessel that was done by Dr. Debby Thomas on 4/10/17. More recently he has had problems with balance problems when he is in his feet and says that a Neurologist has told him in the past this is because of his previous strokes. Most recently he underwent a cardiovascular stress test which fortunately had shown to be normal on 3/21/2018. Mr. Joseph Mcgarry has significant normal stress test and echocardiogram on 2020. Holter monitor done on 2020: The rhythm was sinus. Average daily heart rate 58 ranging from 47 to 81 bpm. Bradycardia for 72% test duration. Rare premature supraventricular ectopic beats consisting of 33 isolated PACs.  Rare premature ventricular ectopic beats total 75 consisting of 73 isolatedPVCs and a ventricular couplet. Echocardiogram done on 12/18/2020: EF of 60%. Moderately increased LV wall thickness. Borderline pulmonary hypertension. Mild tricuspid regurgitation. Mild diastolic dysfunction is seen/ Aortic root is mildly dilated. Stress test done on 9/20/2021 was equivocal, There is a small/moderate perfusion defect of mild intensity in the lateral, inferior and inferoapical regions. Cardiac cath done on 10/5/2021 showed Moderate three vessel coronary artery disease involving a ostial 50-60% stenosis in a small, non-dominant RCA, a 60% mid LAD stenosis and a proximal 50-60% stenosis in the left anterior descending coronary artery     Mr. Lenny Reynoso was admitted for acute renal failure with hyperkalemia and acute on chronic anemia of unclear etiology. He reports he was feeling fine until last Sunday when he developed a rash that started on his feet and progressively went up his legs. He reports having a biopsy done and says he was started on prednisone for this. Mr. Lenny Reynoso reports feeling ok today. Had on BM this morning. He denied any current or recent chest pain, abdominal pain, bleeding problems, problems with his medications or any other concerns at this time. Past Medical History:   Diagnosis Date    Acute MI ([address]     Acute renal failure with tubular necrosis ([address] 10/2/2018    ssecondary to hemodynamic effects of loop diuretics and ace inhibitors, bbaseline 1.2-1.3 which peaked up to 1.8, resolving    Anemia     CAD (coronary artery disease)     Cerebral artery occlusion with cerebral infarction ([address]     [facility]     Chronic back pain     Closed fracture of lumbar vertebra without mention of spinal cord injury     Displacement of intervertebral disc, site unspecified, without myelopathy     H/O cardiac catheterization 2/19/16    LMCA: Mild irregularities 10-20%.  LAD: Lesion on PRox LAD: Mid subsection. 65% stenosis. LCx: Lesion on 1st Ob Valentina: Proximal subesection. 70% steniosis. RCA: Small non-dominant RCA. Lesion on PRox RCA: Ostial. 50% stenosis. EF:55%.  H/O cardiovascular stress test 2/19/16    Abnormal. Moderate perfusion defect of mild intensity in the inferior, inferoseptal adn inferoapical regions during stress imaging, which most consistent with ischemia. Global LV systolic function normal without regional wall motion abnormalities. OVerall these results are most consistent with an intermediate risk for signficant CAD. Additional testing including cardiac cath may be indicated.  H/O tilt table evaluation 12/26/2017    Abnormal. Patients HR, BP response and symptoms were most consistent with dysautonomia. Combined with viligant maintenance of euvolemia and maintaining a moderate salft intake, pharmacologic treatment with SSRI such as lexapro and or mestinon among other treatments have shown some effectiveness in treatment of this condition    H/O tilt table evaluation 12/26/2017    Abnormal head upright tilt table study. The pt heart rate, blood pressure response and symptoms were most consistent with dysautonomia.  History of 24 hour EKG monitoring 8/14/14    Occasional PAC's and PVC's which appear to be at least moderately symptomatic.  History of 24 hour EKG monitoring 11/19/14    Event Monitor. Sinus rhythm and sinus bradycardia. Infrequent isolated PVC's    History of blood transfusion     History of cardiovascular stress test 2/19/14    Relatively NL    History of cardiovascular stress test 03/21/2018    Normal myocardial perfusion. Global left ventricular systolic function was normal with an EF of 68%. Overall, these results are most consistent with a low risk scan.     History of coronary artery stent placement 04/2017    PTCA / Drug Eluting Stent:, CX and / or branches    History of CVA (cerebrovascular accident) without residual deficits 2014 Incidentally found on CT head. No known impairment now or in the past.    History of echocardiogram 2/18/14    LA mildly dilated, EF 55%, LV wall thickness is moderately increased, no definite wall motion abnormalilities, what appears to be a pacer wire is seen w/n the RA and RV, mild-mod TR, mild pulmonary hypertension.  History of Holter monitoring 12/29/2017    Rare PAC's and PVC's.  History of tilt table evaluation 8/12/14    Abnormal    Hyperlipidemia     Hypertension     Non critical Right Renal artery stenosis, native ([facility] 10/2/2018    Non critical Right Renal artery stenosis, based on cath in 2016, Rt RA 30% stenosis    Pacemaker     non-functioning    S/P cardiac cath 04/10/2017    S/P coronary artery stent placement     Successful PTCA - HA Om1    SIRS (systemic inflammatory response syndrome) ([facility] 5/19/2019    Type II or unspecified type diabetes mellitus without mention of complication, not stated as uncontrolled        CURRENT ALLERGIES: Lipitor [atorvastatin], Aldactone [spironolactone], Crestor [rosuvastatin calcium], Lopid [gemfibrozil], Invokana [canagliflozin], and Januvia [sitagliptin] REVIEW OF SYSTEMS: 14 systems were reviewed. Pertinent positives and negatives as above, all else negative. Past Surgical History:   Procedure Laterality Date    BACK SURGERY      CARDIAC CATHETERIZATION Left 02/19/2016    via right radial approach/ [facility]/ Dr. Jenny Guillen Left 10/05/2021    Dr Brando Moura radial-Moderate three vessel coronary artery disease involving a ostial 50-60% stenosis in a small, non-dominant RCA, a 60% mid LAD stenosis and a proximal 50-60% stenosis in the left anterior descending coronary artery. Normal left ventricular end diastolic pressure. Proceed with aggressive maximal medical management as clinically indicated.     CHOLECYSTECTOMY  08/17/2015    [facility]/ [facility]    COLONOSCOPY  2010    nightly 90 tablet 0    hydrALAZINE (APRESOLINE) 100 MG tablet Take 1 tablet by mouth 3 times daily Patient is taking 100 MG 3 times daily 270 tablet 0    glipiZIDE (GLUCOTROL XL) 5 MG extended release tablet Take 2 tablets by mouth 2 times daily 360 tablet 0    nitroGLYCERIN (NITROSTAT) 0.4 MG SL tablet Place 1 tablet under the tongue every 5 minutes as needed for Chest pain 25 tablet 3    latanoprost (XALATAN) 0.005 % ophthalmic solution       acyclovir (ZOVIRAX) 400 MG tablet 400 mg 2 times daily       prednisoLONE acetate (PRED FORTE) 1 % ophthalmic suspension Place 1 drop into the left eye daily          cloNIDine (CATAPRES) tablet 0.2 mg, TID  glipiZIDE (GLUCOTROL) tablet 10 mg, BID AC  0.9 % sodium chloride infusion, PRN  methylPREDNISolone sodium (SOLU-MEDROL) injection 40 mg, Q12H  glucose (GLUTOSE) 40 % oral gel 15 g, PRN  dextrose 50 % IV solution, PRN  glucagon (rDNA) injection 1 mg, PRN  dextrose 5 % solution, PRN  carvedilol (COREG) tablet 3.125 mg, BID WC  cetirizine (ZYRTEC) tablet 5 mg, Daily  insulin lispro (HUMALOG) injection vial 0-18 Units, TID WC  insulin lispro (HUMALOG) injection vial 0-9 Units, Nightly  acyclovir (ZOVIRAX) capsule 400 mg, BID  amLODIPine (NORVASC) tablet 10 mg, Nightly  hydrALAZINE (APRESOLINE) tablet 100 mg, TID  insulin glargine (LANTUS) injection vial 28 Units, BID  prednisoLONE acetate (PRED FORTE) 1 % ophthalmic suspension 1 drop, Daily  sodium chloride flush 0.9 % injection 5-40 mL, 2 times per day  sodium chloride flush 0.9 % injection 10 mL, PRN  0.9 % sodium chloride infusion, PRN  ondansetron (ZOFRAN-ODT) disintegrating tablet 4 mg, Q8H PRN   Or  ondansetron (ZOFRAN) injection 4 mg, Q6H PRN  polyethylene glycol (GLYCOLAX) packet 17 g, Daily PRN  acetaminophen (TYLENOL) tablet 650 mg, Q6H PRN   Or  acetaminophen (TYLENOL) suppository 650 mg, Q6H PRN       FAMILY HISTORY: family history includes Cancer in his father and mother.      PHYSICAL EXAM:   BP (!) 164/57 Pulse 60   Temp 96.7 °F (35.9 °C) (Temporal)   Resp 18   Ht 5' 9\" (1.753 m)   Wt 205 lb 3 oz (93.1 kg)   SpO2 96%   BMI 30.30 kg/m²  Body mass index is 30.3 kg/m². [ INSTRUCTIONS:  \"[x]\" Indicates a positive item  \"[]\" Indicates a negative item   Vital Signs: (As obtained by patient/caregiver or practitioner observation)    Blood pressure-  Heart rate-    Respiratory rate-    Temperature-  Pulse oximetry-     Constitutional: [x] Appears well-developed and well-nourished [x] No apparent distress      [] Abnormal-   Mental status  [x] Alert and awake  [x] Oriented to person/place/time [x]Able to follow commands      Eyes:  EOM    [x]  Normal  [] Abnormal-  Sclera  [x]  Normal  [] Abnormal -         Discharge [x]  None visible  [] Abnormal -    HENT:   [x] Normocephalic, atraumatic.   [] Abnormal   [] Mouth/Throat: Mucous membranes are moist.     External Ears [x] Normal  [] Abnormal-     Neck: [x] No visualized mass     Pulmonary/Chest: [x] Respiratory effort normal.  [x] No visualized signs of difficulty breathing or respiratory distress        [] Abnormal-     Neurological:        [x] No Facial Asymmetry (Cranial nerve 7 motor function) (limited exam to video visit)          [] No gaze palsy        [] Abnormal-         Skin:        [x] No significant exanthematous lesions or discoloration noted on facial skin         [] Abnormal-            Psychiatric:       [x] Normal Affect [] No Hallucinations        [] Abnormal-     Other pertinent observable physical exam findings: None       MOST RECENT LABS ON RECORD:   Lab Results   Component Value Date    WBC 8.2 11/14/2021    HGB 8.2 (L) 11/14/2021    HCT 25.5 (L) 11/14/2021     11/14/2021    CHOL 69 09/21/2021    TRIG 185 (H) 09/21/2021    HDL 14 (L) 09/21/2021    LDLCHOLESTEROL 18 09/21/2021    ALT 11 11/12/2021    AST 11 11/12/2021     11/14/2021    K 4.8 11/14/2021     (H) 11/14/2021    CREATININE 2.67 (H) 11/14/2021    BUN 88 (H) 11/14/2021    CO2 17 (L) 11/14/2021    TSH 2.26 11/02/2017    PSA 3.68 04/30/2021    INR 1.1 09/20/2021    LABA1C 7.3 08/19/2021    LABMICR 30 10/17/2015        ASSESSMENT:  Patient Active Problem List    Diagnosis Date Noted    S/P drug eluting coronary stent placement-OM1 4/10/17 04/10/2017    Abnormal tilt table test 02/23/2016    Abnormal cardiovascular stress test 02/18/2016    Moderate malnutrition ([address] 11/12/2021    Coronary atherosclerosis due to calcified coronary lesion 02/20/2015    Hyperkalemia 11/11/2021    Acute purpuric eruption 11/10/2021    Skin lesion of left lower extremity 11/10/2021    Urticaria 11/10/2021    Iron deficiency anemia secondary to inadequate dietary iron intake 10/21/2021    Iron malabsorption 10/21/2021    Chronic anemia 09/21/2021    Acute coronary syndrome with high troponin ([address] 09/21/2021    Chest pain 09/20/2021    Gross hematuria 05/05/2021    BPH with obstruction/lower urinary tract symptoms 12/10/2019    Elevated PSA 12/10/2019    Anemia in stage 3 chronic kidney disease ([address] 05/21/2019    Acute kidney injury superimposed on CKD ([address] 10/02/2018    Non critical Right Renal artery stenosis, native ([address] 10/02/2018    Medication side effect, initial encounter 03/23/2018    Angina, class II ([address] 01/08/2018    Hyperlipidemia 04/24/2017    Chronic diastolic HF (heart failure), NYHA class 3 ([address] 04/24/2017    ASHD (arteriosclerotic heart disease) 05/02/2016    Cervical stenosis of spinal canal 02/29/2016    Chronic kidney disease, stage III (moderate) ([address] 02/22/2016    Controlled type 2 diabetes mellitus with diabetic nephropathy, with long-term current use of insulin ([address] 02/22/2016    Ischemic chest pain ([address] 02/17/2016    Accelerated hypertension 10/16/2015    Chronotropic incompetence with autonomic dysfunction 09/02/2015    Episodic lightheadedness 09/02/2015    Cholecystitis 08/17/2015    Syncope, near 08/05/2015    Dysautonomia his legs up whenever possible and to limit salt in his diet. Atherosclerotic Heart Disease: Coronary Artery stent: 4/10/2017. Cardiac cath done on 10/5/2021 showed Moderate three vessel coronary artery disease involving a ostial 50-60% stenosis in a small, non-dominant RCA, a 60% mid LAD stenosis and a proximal 50-60% stenosis in the left anterior descending coronary artery  Antiplatelet Agent: Not indicated due to bleeding  ACE Inibitor/ARB: Continue lisinopril 40 mg daily. Beta Blocker: Continue Carvedilol (Coreg) 3.125 mg twice daily. Cholesterol Reduction Therapy: Refused by patient. Laboratory testing: None    Essential Hypertension: Uncontrolled likely worsened by prednisone and stopping of eplerinone which I agree with  ACE Inibitor/ARB: Continue lisinopril 20 mg daily. · Beta Blocker: Continue Carvedilol (Coreg) 3.125 mg twice daily. · Calcium Channel Blocker: Continue amlodipine (Norvasc) 10 mg once daily. · Agree with increased Clonidine 0.2 mg tid  · Continue Hydralazine 100 mg 3x/day  · May need to start a dieuretic tomorrow    Hyperlipidemia: Mixed - Last LDL on 9/21/2021 was 18 mg/dL   · Cholesterol Reduction Therapy: Refused by patient. Once again, thank you for allowing me to participate in this patients care. Please do not hesitate to contact me if I could be of any further assistance. Sincerely,  Jamia Patel. Jackelin BLOUNT, MS, F.A.C.C. [facility]    [address] [place] [address] Ludin Adela Mccall [address], [address] [facility]  Phone: [phone], Fax: [phone]     I believe that the risk of significant morbidity and mortality related to the patient's current medical conditions are: intermediate-high. The documentation recorded by the scribe, accurately and completely reflects the services I personally performed and the decisions made by me. Linnea Gutiérrez MD, MS, F.A.C.C.  November 14, 2021     Prema Haley is a 68 y.o. male being evaluated by a Virtual Visit (video visit) encounter to address concerns as mentioned above. A caregiver was present when appropriate. Due to this being a TeleHealth encounter (During UYHYS-03 public health emergency), evaluation of the following organ systems was limited: Vitals/Constitutional/EENT/Resp/CV/GI//MS/Neuro/Skin/Heme-Lymph-Imm. Pursuant to the emergency declaration under the [address] [place] [address] and the [employer] Act, this Virtual Visit was conducted with patient's (and/or legal guardian's) consent, to reduce the patient's risk of exposure to COVID-19 and provide necessary medical care. The patient (and/or legal guardian) has also been advised to contact this office for worsening conditions or problems, and seek emergency medical treatment and/or call 911 if deemed necessary. Services were provided through a video synchronous discussion virtually to substitute for in-person visit. Mr. Kerry Lopez was located in the patients hospital room in [address] [place] [address]  Javier Burgos MD performed the visit from my home in [facility]    --Javier Burgos MD on 11/14/2021 at 11:55 PM    An electronic signature was used to authenticate this note.

## 2021-11-15 NOTE — PROGRESS NOTES
Renal Progress Note  [facility]      TELEHEALTH EVALUATION -- Audio/Visual (During WEKUM-44 public health emergency)     Telehealth service was provided with the patient at his room in [address] [place] [address] and myself the physician in my office in [place], [place] and the patient's RN Keri who has initiated the visit. Pursuant to the emergency declaration under the [facility] [place] [address] [facility] authority and the [employer] Act, this Virtual  Visit was conducted, with patient's consent, to reduce the patient's risk of exposure to COVID-19 and provide continuity of care for an established patient. Services were provided through a video synchronous discussion virtually to substitute for in-person clinic visit. Patient :  Mika Anderson; 68 y.o. MRN# [id number]  Location:  9928/6349-75  Attending:  Vy Zuleta MD  Admit Date:  11/11/2021   Hospital Day: 4      Subjective:     Nephrology is following the patient for VIDAL/CKD. Patient was seen. He is feeling better. + edema and dyspnea on exertion. Bps are high but improving.      Outpatient Medications:     Medications Prior to Admission: predniSONE (DELTASONE) 20 MG tablet, Take 1 tablet by mouth 2 times daily for 5 days  eplerenone (INSPRA) 25 MG tablet, Take 1 tablet by mouth daily  loratadine (CLARITIN) 10 MG tablet, Take 1 tablet by mouth daily (Patient taking differently: Take 10 mg by mouth daily as needed )  lisinopril (PRINIVIL;ZESTRIL) 20 MG tablet, Take 1 tablet by mouth daily  amLODIPine (NORVASC) 10 MG tablet, Take 1 tablet by mouth nightly  hydrALAZINE (APRESOLINE) 100 MG tablet, Take 1 tablet by mouth 3 times daily Patient is taking 100 MG 3 times daily  glipiZIDE (GLUCOTROL XL) 5 MG extended release tablet, Take 2 tablets by mouth 2 times daily  nitroGLYCERIN (NITROSTAT) 0.4 MG SL tablet, Place 1 tablet under the tongue every 5 minutes as needed for Chest pain  latanoprost (XALATAN) 0.005 % ophthalmic solution,   acyclovir (ZOVIRAX) 400 MG tablet, 400 mg 2 times daily   prednisoLONE acetate (PRED FORTE) 1 % ophthalmic suspension, Place 1 drop into the left eye daily   cetirizine (ZYRTEC) 10 MG tablet, Take 1 tablet by mouth daily  insulin glargine (LANTUS SOLOSTAR) 100 UNIT/ML injection pen, Inject 28 Units into the skin 2 times daily  CONTOUR TEST strip, USE 1 STRIP TO CHECK GLUCOSE TWICE DAILY  DIANA MICROLET LANCETS MISC, TEST TWICE DAILY  Insulin Pen Needle (PEN NEEDLES) 31G X 6 MM MISC, 1 each by Does not apply route daily    Current Medications:     Scheduled Meds:    carvedilol  6.25 mg Oral BID WC    furosemide  20 mg IntraVENous Once    cloNIDine  0.2 mg Oral TID    glipiZIDE  10 mg Oral BID AC    methylPREDNISolone  40 mg IntraVENous Q12H    cetirizine  5 mg Oral Daily    insulin lispro  0-18 Units SubCUTAneous TID WC    insulin lispro  0-9 Units SubCUTAneous Nightly    acyclovir  400 mg Oral BID    amLODIPine  10 mg Oral Nightly    hydrALAZINE  100 mg Oral TID    insulin glargine  28 Units SubCUTAneous BID    prednisoLONE acetate  1 drop Left Eye Daily    sodium chloride flush  5-40 mL IntraVENous 2 times per day     Continuous Infusions:    sodium chloride      dextrose      sodium chloride       PRN Meds:  sodium chloride, glucose, dextrose, glucagon (rDNA), dextrose, sodium chloride flush, sodium chloride, ondansetron **OR** ondansetron, polyethylene glycol, acetaminophen **OR** acetaminophen    Input/Output:       I/O last 3 completed shifts: In: 2514.7 [P.O.:1520; I.V.:994.7]  Out: 7395 [Urine:3175].       Patient Vitals for the past 96 hrs (Last 3 readings):   Weight   11/15/21 0045 206 lb (93.4 kg)   21 0330 205 lb 3 oz (93.1 kg)   21 0516 196 lb (88.9 kg)       Vital Signs:   Temperature:  Temp: 97.7 °F (36.5 °C)  TMax:   Temp (24hrs), Av.3 °F (36.3 °C), Min:96.7 °F (35.9 °C), Max:97.7 °F (36.5 °C)    Respirations:  Resp: 18  Pulse:   Pulse: 65  BP:    BP: (!) 151/60  BP Range: Systolic (87VME), VXJ:906 , Min:148 , HCO:845       Diastolic (28MZZ), BQI:87, Min:57, Max:71      Physical Examination:     General -- no distress  Oral Mucosa -- moist  Neck --  JVD - no  Extremities -- +edema, rash noted  CNS - awake and alert   Pschy - not agitated, mood and memory normal    Due to this being a TeleHealth encounter, evaluation of the following organ systems is limited: Vitals/EENT/Resp/CV/GI//MS/Neuro/Skin/Heme-Lymph-Imm. Labs:       Recent Labs     11/13/21  0510 11/14/21  0540 11/15/21  0605   WBC 6.9 8.2 10.6   RBC 2.90* 2.92* 3.24*   HGB 8.1* 8.2* 9.1*   HCT 25.4* 25.5* 27.8*   MCV 87.6 87.3 85.8   MCH 27.9 28.1 28.1   MCHC 31.9 32.2 32.7   RDW 17.1* 16.8* 16.5*    163 179   MPV 10.0 9.2 9.6      BMP:   Recent Labs     11/13/21  0510 11/14/21  0540 11/15/21  0605    136 136   K 5.0 4.8 4.6   * 108* 106   CO2 14* 17* 17*   BUN 87* 88* 94*   CREATININE 2.69* 2.67* 2.65*   GLUCOSE 259* 211* 242*   CALCIUM 8.3* 7.8* 7.9*      Phosphorus:   No results for input(s): PHOS in the last 72 hours. Magnesium:    Recent Labs     11/14/21  0540   MG 1.6     Albumin:  No results for input(s): LABALBU in the last 72 hours. BNP:    No results found for: BNP  VALERIE:      Lab Results   Component Value Date    VALERIE NEGATIVE 09/11/2018     SPEP:  Lab Results   Component Value Date    PROT 6.4 11/12/2021    ALBCAL 3.9 09/11/2018    ALBPCT 62 09/11/2018    LABALPH 0.2 09/11/2018    LABALPH 0.7 09/11/2018    A1PCT 3 09/11/2018    A2PCT 11 09/11/2018    LABBETA 0.7 09/11/2018    BETAPCT 12 09/11/2018    GAMGLOB 0.8 09/11/2018    GGPCT 13 09/11/2018    PATH ELECTRONICALLY SIGNED. Aylin García M.D. 09/11/2018    PATH ELECTRONICALLY SIGNED.  Aylin García M.D. 09/11/2018     UPEP:   No results found for: LABPE  C3:     Lab Results   Component Value Date    C3 134 09/11/2018     C4:     Lab Results Component Value Date    C4 23 09/11/2018     MPO ANCA:   No results found for: MPO  PR3 ANCA:   No results found for: PR3  Anti-GBM:   No results found for: GBMABIGG  Hep BsAg:       No results found for: HEPBSAG  Hep C AB:        No results found for: HEPCAB    Urinalysis/Chemistries:      Lab Results   Component Value Date    NITRU NEGATIVE 11/11/2021    COLORU Yellow 11/11/2021    PHUR 5.5 11/11/2021    WBCUA 0 TO 2 11/11/2021    RBCUA 0 TO 2 11/11/2021    MUCUS NOT REPORTED 11/11/2021    TRICHOMONAS NOT REPORTED 11/11/2021    YEAST NOT REPORTED 11/11/2021    BACTERIA NOT REPORTED 11/11/2021    SPECGRAV 1.020 11/11/2021    LEUKOCYTESUR NEGATIVE 11/11/2021    UROBILINOGEN Normal 11/11/2021    BILIRUBINUR NEGATIVE 11/11/2021    GLUCOSEU 3+ 11/11/2021    KETUA NEGATIVE 11/11/2021    AMORPHOUS NOT REPORTED 11/11/2021     Urine Sodium:     Lab Results   Component Value Date    NIDHI 67 11/12/2021     Urine Potassium:    Lab Results   Component Value Date    KUR 25.3 11/12/2021     Urine Chloride:    Lab Results   Component Value Date    CLUR 54 11/12/2021     Urine Osmolarity: No results found for: OSMOU  Urine Protein:   No components found for: TOTALPROTEIN, URINE   Urine Creatinine:     Lab Results   Component Value Date    LABCREA 94.6 11/02/2021     Urine Eosinophils:  No components found for: UEOS        Impression and Plan:  1. VIDAL on CKD III  : multifactorial due to volume depletion from hyperglycemia + recent contrast nephropathy, ACE- I and epleronone: improving. Discussed case with Dr. Jc Buenrostro. No evidence of any renal vasculitis as he has no RBCs on urine sediment. 2. Hyperkalemia form VIDAL, ACE-I and Epleronone + hyperglycemia: resolved  3. Metabolic acidosis  4. HTN: uncontrolled. Coreg increased  5. Diastolic CHF. IVF stopped. Cardio managing diuretics  6. Anemia  7. Rash. S/p biopsy. Urticarial vasculitis. 8. DM        Please don't hesitate to call with any questions.   Electronically signed by Abdiaziz Chow DO on 11/15/2021 at 1:16 PM

## 2021-11-15 NOTE — PROGRESS NOTES
Patient is sitting up in the chair after getting washed up in the bathroom with assistance of patients wife. Compression stockings placed on patient at this time.

## 2021-11-16 ENCOUNTER — APPOINTMENT (OUTPATIENT)
Dept: ULTRASOUND IMAGING | Age: 76
DRG: 682 | End: 2021-11-16
Payer: MEDICARE

## 2021-11-16 LAB
ABSOLUTE EOS #: 0.11 K/UL (ref 0–0.44)
ABSOLUTE IMMATURE GRANULOCYTE: 0.53 K/UL (ref 0–0.3)
ABSOLUTE LYMPH #: 1.06 K/UL (ref 1.1–3.7)
ABSOLUTE MONO #: 0.42 K/UL (ref 0.1–1.2)
ANION GAP SERPL CALCULATED.3IONS-SCNC: 12 MMOL/L (ref 9–17)
BASOPHILS # BLD: 0 % (ref 0–2)
BASOPHILS ABSOLUTE: 0 K/UL (ref 0–0.2)
BUN BLDV-MCNC: 104 MG/DL (ref 8–23)
BUN/CREAT BLD: 38 (ref 9–20)
CALCIUM SERPL-MCNC: 7.4 MG/DL (ref 8.6–10.4)
CHLORIDE BLD-SCNC: 106 MMOL/L (ref 98–107)
CO2: 17 MMOL/L (ref 20–31)
CREAT SERPL-MCNC: 2.74 MG/DL (ref 0.7–1.2)
DIFFERENTIAL TYPE: ABNORMAL
EOSINOPHILS RELATIVE PERCENT: 1 % (ref 1–4)
GFR AFRICAN AMERICAN: 28 ML/MIN
GFR NON-AFRICAN AMERICAN: 23 ML/MIN
GFR SERPL CREATININE-BSD FRML MDRD: ABNORMAL ML/MIN/{1.73_M2}
GFR SERPL CREATININE-BSD FRML MDRD: ABNORMAL ML/MIN/{1.73_M2}
GLUCOSE BLD-MCNC: 125 MG/DL (ref 74–100)
GLUCOSE BLD-MCNC: 147 MG/DL (ref 74–100)
GLUCOSE BLD-MCNC: 156 MG/DL (ref 74–100)
GLUCOSE BLD-MCNC: 213 MG/DL (ref 70–99)
GLUCOSE BLD-MCNC: 231 MG/DL (ref 74–100)
HCT VFR BLD CALC: 26.3 % (ref 40.7–50.3)
HEMOGLOBIN: 8.4 G/DL (ref 13–17)
IMMATURE GRANULOCYTES: 5 %
LYMPHOCYTES # BLD: 10 % (ref 24–43)
MCH RBC QN AUTO: 27.9 PG (ref 25.2–33.5)
MCHC RBC AUTO-ENTMCNC: 31.9 G/DL (ref 28.4–34.8)
MCV RBC AUTO: 87.4 FL (ref 82.6–102.9)
MONOCYTES # BLD: 4 % (ref 3–12)
MORPHOLOGY: NORMAL
NRBC AUTOMATED: 0 PER 100 WBC
PDW BLD-RTO: 16.6 % (ref 11.8–14.4)
PLATELET # BLD: 148 K/UL (ref 138–453)
PLATELET ESTIMATE: ABNORMAL
PMV BLD AUTO: 9.2 FL (ref 8.1–13.5)
POTASSIUM SERPL-SCNC: 4.1 MMOL/L (ref 3.7–5.3)
RBC # BLD: 3.01 M/UL (ref 4.21–5.77)
RBC # BLD: ABNORMAL 10*6/UL
SEG NEUTROPHILS: 80 % (ref 36–65)
SEGMENTED NEUTROPHILS ABSOLUTE COUNT: 8.48 K/UL (ref 1.5–8.1)
SODIUM BLD-SCNC: 135 MMOL/L (ref 135–144)
TROPONIN INTERP: ABNORMAL
TROPONIN T: ABNORMAL NG/ML
TROPONIN, HIGH SENSITIVITY: 55 NG/L (ref 0–22)
WBC # BLD: 10.6 K/UL (ref 3.5–11.3)
WBC # BLD: ABNORMAL 10*3/UL

## 2021-11-16 PROCEDURE — 93005 ELECTROCARDIOGRAM TRACING: CPT | Performed by: FAMILY MEDICINE

## 2021-11-16 PROCEDURE — 85025 COMPLETE CBC W/AUTO DIFF WBC: CPT

## 2021-11-16 PROCEDURE — 97116 GAIT TRAINING THERAPY: CPT

## 2021-11-16 PROCEDURE — 2580000003 HC RX 258: Performed by: INTERNAL MEDICINE

## 2021-11-16 PROCEDURE — 76775 US EXAM ABDO BACK WALL LIM: CPT

## 2021-11-16 PROCEDURE — 84484 ASSAY OF TROPONIN QUANT: CPT

## 2021-11-16 PROCEDURE — 6370000000 HC RX 637 (ALT 250 FOR IP): Performed by: FAMILY MEDICINE

## 2021-11-16 PROCEDURE — 6370000000 HC RX 637 (ALT 250 FOR IP): Performed by: INTERNAL MEDICINE

## 2021-11-16 PROCEDURE — 82947 ASSAY GLUCOSE BLOOD QUANT: CPT

## 2021-11-16 PROCEDURE — 97110 THERAPEUTIC EXERCISES: CPT

## 2021-11-16 PROCEDURE — 94761 N-INVAS EAR/PLS OXIMETRY MLT: CPT

## 2021-11-16 PROCEDURE — 80048 BASIC METABOLIC PNL TOTAL CA: CPT

## 2021-11-16 PROCEDURE — 99233 SBSQ HOSP IP/OBS HIGH 50: CPT | Performed by: FAMILY MEDICINE

## 2021-11-16 PROCEDURE — 6360000002 HC RX W HCPCS: Performed by: INTERNAL MEDICINE

## 2021-11-16 PROCEDURE — 99232 SBSQ HOSP IP/OBS MODERATE 35: CPT | Performed by: INTERNAL MEDICINE

## 2021-11-16 PROCEDURE — 1200000000 HC SEMI PRIVATE

## 2021-11-16 PROCEDURE — 36415 COLL VENOUS BLD VENIPUNCTURE: CPT

## 2021-11-16 RX ORDER — CARVEDILOL 12.5 MG/1
12.5 TABLET ORAL 2 TIMES DAILY WITH MEALS
Status: DISCONTINUED | OUTPATIENT
Start: 2021-11-16 | End: 2021-11-16

## 2021-11-16 RX ORDER — FUROSEMIDE 10 MG/ML
40 INJECTION INTRAMUSCULAR; INTRAVENOUS ONCE
Status: COMPLETED | OUTPATIENT
Start: 2021-11-16 | End: 2021-11-17

## 2021-11-16 RX ORDER — INSULIN GLARGINE 100 [IU]/ML
32 INJECTION, SOLUTION SUBCUTANEOUS 2 TIMES DAILY
Status: DISCONTINUED | OUTPATIENT
Start: 2021-11-16 | End: 2021-11-17

## 2021-11-16 RX ADMIN — CLONIDINE HYDROCHLORIDE 0.2 MG: 0.2 TABLET ORAL at 14:17

## 2021-11-16 RX ADMIN — INSULIN GLARGINE 32 UNITS: 100 INJECTION, SOLUTION SUBCUTANEOUS at 21:41

## 2021-11-16 RX ADMIN — ACYCLOVIR 400 MG: 200 CAPSULE ORAL at 21:36

## 2021-11-16 RX ADMIN — INSULIN LISPRO 3 UNITS: 100 INJECTION, SOLUTION INTRAVENOUS; SUBCUTANEOUS at 09:44

## 2021-11-16 RX ADMIN — GLIPIZIDE 10 MG: 5 TABLET ORAL at 14:17

## 2021-11-16 RX ADMIN — HYDRALAZINE HYDROCHLORIDE 100 MG: 50 TABLET, FILM COATED ORAL at 14:17

## 2021-11-16 RX ADMIN — CETIRIZINE HYDROCHLORIDE 5 MG: 10 TABLET, FILM COATED ORAL at 09:42

## 2021-11-16 RX ADMIN — HYDRALAZINE HYDROCHLORIDE 100 MG: 50 TABLET, FILM COATED ORAL at 09:42

## 2021-11-16 RX ADMIN — CLONIDINE HYDROCHLORIDE 0.2 MG: 0.2 TABLET ORAL at 09:42

## 2021-11-16 RX ADMIN — SODIUM CHLORIDE, PRESERVATIVE FREE 10 ML: 5 INJECTION INTRAVENOUS at 09:47

## 2021-11-16 RX ADMIN — AMLODIPINE BESYLATE 10 MG: 10 TABLET ORAL at 21:36

## 2021-11-16 RX ADMIN — CLONIDINE HYDROCHLORIDE 0.2 MG: 0.2 TABLET ORAL at 21:36

## 2021-11-16 RX ADMIN — ACYCLOVIR 400 MG: 200 CAPSULE ORAL at 09:42

## 2021-11-16 RX ADMIN — HYDRALAZINE HYDROCHLORIDE 100 MG: 50 TABLET, FILM COATED ORAL at 21:36

## 2021-11-16 RX ADMIN — LIDOCAINE HYDROCHLORIDE: 20 SOLUTION ORAL; TOPICAL at 14:17

## 2021-11-16 RX ADMIN — INSULIN GLARGINE 32 UNITS: 100 INJECTION, SOLUTION SUBCUTANEOUS at 09:44

## 2021-11-16 RX ADMIN — DARBEPOETIN ALFA 60 MCG: 60 INJECTION, SOLUTION INTRAVENOUS; SUBCUTANEOUS at 16:34

## 2021-11-16 RX ADMIN — INSULIN LISPRO 3 UNITS: 100 INJECTION, SOLUTION INTRAVENOUS; SUBCUTANEOUS at 12:05

## 2021-11-16 RX ADMIN — PREDNISOLONE ACETATE 1 DROP: 10 SUSPENSION/ DROPS OPHTHALMIC at 09:47

## 2021-11-16 RX ADMIN — CARVEDILOL 12.5 MG: 12.5 TABLET, FILM COATED ORAL at 09:42

## 2021-11-16 RX ADMIN — GLIPIZIDE 10 MG: 5 TABLET ORAL at 09:42

## 2021-11-16 RX ADMIN — SODIUM CHLORIDE, PRESERVATIVE FREE 10 ML: 5 INJECTION INTRAVENOUS at 21:43

## 2021-11-16 ASSESSMENT — PAIN SCALES - GENERAL
PAINLEVEL_OUTOF10: 0
PAINLEVEL_OUTOF10: 10

## 2021-11-16 ASSESSMENT — PAIN DESCRIPTION - ORIENTATION: ORIENTATION: RIGHT;LEFT;MID

## 2021-11-16 ASSESSMENT — PAIN DESCRIPTION - PAIN TYPE: TYPE: ACUTE PAIN

## 2021-11-16 ASSESSMENT — PAIN DESCRIPTION - ONSET: ONSET: SUDDEN

## 2021-11-16 ASSESSMENT — PAIN DESCRIPTION - DESCRIPTORS: DESCRIPTORS: SHARP

## 2021-11-16 ASSESSMENT — PAIN DESCRIPTION - LOCATION: LOCATION: CHEST

## 2021-11-16 NOTE — PROGRESS NOTES
Progress Note  Monae Arce MD    OBJECTIVE:    Patient seen for f/u of Acute kidney injury superimposed on CKD ([address] He has no further chest pain ,has some tightness this am,gained 1 lb.  bp still high  Bun/Cr 104/2.74  Hb 8.4     ROS:   Constitutional: negative  for fevers, and negative for chills. Respiratory: negative for shortness of breath, negative for cough, and negative for wheezing  Cardiovascular: negative for chest pain, and negative for palpitations  Gastrointestinal: negative for abdominal pain, negative for nausea,negative for vomiting, negative for diarrhea, and negative for constipation     All other systems were reviewed with the patient and are negative unless otherwise stated in HPI    OBJECTIVE:  Vitals:   Temp: 96.8 °F (36 °C)  BP: (!) 147/67  Resp: 18  Pulse: 54  SpO2: 98 %    24HR INTAKE/OUTPUT:      Intake/Output Summary (Last 24 hours) at 11/16/2021 0743  Last data filed at 11/15/2021 2240  Gross per 24 hour   Intake 1405 ml   Output 1925 ml   Net -520 ml     -----------------------------------------------------------------  Exam:  GEN:    Awake, alert and oriented x3. EYES:  EOMI, pupils equal   NECK: Supple. No lymphadenopathy. No carotid bruit  CVS:    regular rate and rhythm, 2/6 systolic murmur  PULM: has diminished air entry at bases and has rales, no acute respiratory distress  ABD:    Bowels sounds normal.  Abdomen is soft. No distention. no tenderness to palpation. EXT:   trace edema bilaterally . No calf tenderness. NEURO: Moves all extremities. Motor and sensory are grossly intact  SKIN:  No rashes.   No skin lesions.    -----------------------------------------------------------------  Diagnostic Data:    · All available data reviewed  Lab Results   Component Value Date    WBC 10.6 11/16/2021    HGB 8.4 (L) 11/16/2021    MCV 87.4 11/16/2021     11/16/2021      Lab Results   Component Value Date    GLUCOSE 213 (H) 11/16/2021     (HH) 11/16/2021 CREATININE 2.74 (H) 11/16/2021     11/16/2021    K 4.1 11/16/2021    CALCIUM 7.4 (L) 11/16/2021     11/16/2021    CO2 17 (L) 11/16/2021       PROBLEM LIST:  Principal Problem:    Acute kidney injury superimposed on CKD ([address]  Active Problems: Moderate malnutrition (HCC)    ASHD (arteriosclerotic heart disease)    Chronic diastolic HF (heart failure), NYHA class 3 (HCC)    Chronic anemia    Acute purpuric eruption    Hyperkalemia  Resolved Problems:    * No resolved hospital problems.  *      ASSESSMENT / PLAN:  Acute kidney injury superimposed on CKD (HCC)  · Bun/cr  worse  · Hyperkalemia improved  · Cad- no chest pain  · Htn- higher,increase coreg to 12.5  · chf- worse- lasix 40 mg iv mg iv  · Anemia- hb 8.4,this is multifactorial including due to ckd,reaction to iv iron  · Nutrition status: at risk for malnutrition  · DVT prophylaxis:scd  · High risk medications: none   Disposition:  Discharge plan is home Patient does not wants to be transferred to [facility] clinic at present  Ligia Altman MD, M.D.  11/16/2021  7:43 AM

## 2021-11-16 NOTE — PROGRESS NOTES
Patient lying in bed. Vital signs and assessment completed. Patient has biopsy incision site with a single stitch on left leg. Per patient, biopsy was completed on 11/11/2021 in the morning and than patient was admitted that late night the same day. Area is undressed, clean and dry. BP remains elevated. BP was 184/69. Per Cyndee Sandhu RN, doctors are aware and have been adjusting his medications due to the recent pattern of elevated BP. Patient was given nightly medications at this time including, amlodipine, clonidine, and hydralazine for the elevated BP. Patient denies needing bathroom at this time. Patient denies any other needs at this time. Call light, bedside table and personal belongings within reach. Will continue to monitor.

## 2021-11-16 NOTE — PROGRESS NOTES
Renal Progress Note  [facility]      TELEHEALTH EVALUATION -- Audio/Visual (During AQZBI-32 public health emergency)     Telehealth service was provided with the patient at his room in [address] [place] [address] and myself the physician in my office in [place], [place] and the patient's RN Keri who has initiated the visit. Pursuant to the emergency declaration under the [facility] [place] [address] [facility] authority and the [employer] Act, this Virtual  Visit was conducted, with patient's consent, to reduce the patient's risk of exposure to COVID-19 and provide continuity of care for an established patient. Services were provided through a video synchronous discussion virtually to substitute for in-person clinic visit. Patient :  Prema Haley; 68 y.o. MRN# [id number]  Location:  3733/1567-16  Attending:  Brad Avilez MD  Admit Date:  2021   Hospital Day: 5      Subjective:     Nephrology is following the patient for VIDAL/CKD. Patient was seen. Had some chest pain earlier he felt related to gas. He is feeling sluggish today he thinks from BP meds. Up 1 pound today. BUN up to 104. Steroids stopped yesterday.     Outpatient Medications:     Medications Prior to Admission: [] predniSONE (DELTASONE) 20 MG tablet, Take 1 tablet by mouth 2 times daily for 5 days  eplerenone (INSPRA) 25 MG tablet, Take 1 tablet by mouth daily  loratadine (CLARITIN) 10 MG tablet, Take 1 tablet by mouth daily (Patient taking differently: Take 10 mg by mouth daily as needed )  lisinopril (PRINIVIL;ZESTRIL) 20 MG tablet, Take 1 tablet by mouth daily  amLODIPine (NORVASC) 10 MG tablet, Take 1 tablet by mouth nightly  hydrALAZINE (APRESOLINE) 100 MG tablet, Take 1 tablet by mouth 3 times daily Patient is taking 100 MG 3 times daily  glipiZIDE (GLUCOTROL XL) 5 MG extended release tablet, Take 2 tablets by mouth 2 times daily  nitroGLYCERIN (NITROSTAT) 0.4 MG SL tablet, Place 1 tablet under the tongue every 5 minutes as needed for Chest pain  latanoprost (XALATAN) 0.005 % ophthalmic solution,   acyclovir (ZOVIRAX) 400 MG tablet, 400 mg 2 times daily   prednisoLONE acetate (PRED FORTE) 1 % ophthalmic suspension, Place 1 drop into the left eye daily   cetirizine (ZYRTEC) 10 MG tablet, Take 1 tablet by mouth daily  insulin glargine (LANTUS SOLOSTAR) 100 UNIT/ML injection pen, Inject 28 Units into the skin 2 times daily  CONTOUR TEST strip, USE 1 STRIP TO CHECK GLUCOSE TWICE DAILY  DIANA MICROLET LANCETS MISC, TEST TWICE DAILY  Insulin Pen Needle (PEN NEEDLES) 31G X 6 MM MISC, 1 each by Does not apply route daily    Current Medications:     Scheduled Meds:    insulin glargine  32 Units SubCUTAneous BID    furosemide  40 mg IntraVENous Once    carvedilol  12.5 mg Oral BID WC    cloNIDine  0.2 mg Oral TID    glipiZIDE  10 mg Oral BID AC    cetirizine  5 mg Oral Daily    insulin lispro  0-18 Units SubCUTAneous TID WC    insulin lispro  0-9 Units SubCUTAneous Nightly    acyclovir  400 mg Oral BID    amLODIPine  10 mg Oral Nightly    hydrALAZINE  100 mg Oral TID    prednisoLONE acetate  1 drop Left Eye Daily    sodium chloride flush  5-40 mL IntraVENous 2 times per day     Continuous Infusions:    sodium chloride      dextrose      sodium chloride       PRN Meds:  sodium chloride, glucose, dextrose, glucagon (rDNA), dextrose, sodium chloride flush, sodium chloride, ondansetron **OR** ondansetron, polyethylene glycol, acetaminophen **OR** acetaminophen    Input/Output:       I/O last 3 completed shifts: In: 1280 [P.O.:1280]  Out: 1800 [Urine:1800].       Patient Vitals for the past 96 hrs (Last 3 readings):   Weight   21 0450 207 lb 3.2 oz (94 kg)   11/15/21 0045 206 lb (93.4 kg)   21 0330 205 lb 3 oz (93.1 kg)       Vital Signs:   Temperature:  Temp: 97.4 °F (36.3 °C)  TMax:   Temp (24hrs), Av.3 °F (36.3 °C), Min:96.8 °F (36 °C), Max:97.5 °F (36.4 °C)    Respirations:  Resp: 18  Pulse:   Pulse: 62  BP:    BP: (!) 142/59  BP Range: Systolic (55UJX), QWJ:116 , Min:142 , QQH:548       Diastolic (84TRV), KHL:30, Min:59, Max:69      Physical Examination:     General -- no distress  Oral Mucosa -- moist  Neck --  JVD - no  Extremities -- +edema, rash noted  CNS - awake and alert   Pschy - not agitated, mood and memory normal    Due to this being a TeleHealth encounter, evaluation of the following organ systems is limited: Vitals/EENT/Resp/CV/GI//MS/Neuro/Skin/Heme-Lymph-Imm. Labs:       Recent Labs     11/14/21  0540 11/15/21  0605 11/16/21  0605   WBC 8.2 10.6 10.6   RBC 2.92* 3.24* 3.01*   HGB 8.2* 9.1* 8.4*   HCT 25.5* 27.8* 26.3*   MCV 87.3 85.8 87.4   MCH 28.1 28.1 27.9   MCHC 32.2 32.7 31.9   RDW 16.8* 16.5* 16.6*    179 148   MPV 9.2 9.6 9.2      BMP:   Recent Labs     11/14/21  0540 11/15/21  0605 11/16/21  0605    136 135   K 4.8 4.6 4.1   * 106 106   CO2 17* 17* 17*   BUN 88* 94* 104*   CREATININE 2.67* 2.65* 2.74*   GLUCOSE 211* 242* 213*   CALCIUM 7.8* 7.9* 7.4*      Phosphorus:   No results for input(s): PHOS in the last 72 hours. Magnesium:    Recent Labs     11/14/21  0540   MG 1.6     Albumin:  No results for input(s): LABALBU in the last 72 hours. BNP:    No results found for: BNP  VALERIE:      Lab Results   Component Value Date    VALERIE NEGATIVE 09/11/2018     SPEP:  Lab Results   Component Value Date    PROT 6.4 11/12/2021    ALBCAL 3.9 09/11/2018    ALBPCT 62 09/11/2018    LABALPH 0.2 09/11/2018    LABALPH 0.7 09/11/2018    A1PCT 3 09/11/2018    A2PCT 11 09/11/2018    LABBETA 0.7 09/11/2018    BETAPCT 12 09/11/2018    GAMGLOB 0.8 09/11/2018    GGPCT 13 09/11/2018    PATH ELECTRONICALLY SIGNED. Yolette Kennedy M.D. 09/11/2018    PATH ELECTRONICALLY SIGNED.  Yolette Kennedy M.D. 09/11/2018     UPEP:   No results found for: LABPE  C3:     Lab Results   Component Value Date    C3 134 09/11/2018     C4:     Lab Results   Component Value Date    C4 23 09/11/2018     MPO ANCA:   No results found for: MPO  PR3 ANCA:   No results found for: PR3  Anti-GBM:   No results found for: GBMABIGG  Hep BsAg:       No results found for: HEPBSAG  Hep C AB:        No results found for: HEPCAB    Urinalysis/Chemistries:      Lab Results   Component Value Date    NITRU NEGATIVE 11/11/2021    COLORU Yellow 11/11/2021    PHUR 5.5 11/11/2021    WBCUA 0 TO 2 11/11/2021    RBCUA 0 TO 2 11/11/2021    MUCUS NOT REPORTED 11/11/2021    TRICHOMONAS NOT REPORTED 11/11/2021    YEAST NOT REPORTED 11/11/2021    BACTERIA NOT REPORTED 11/11/2021    SPECGRAV 1.020 11/11/2021    LEUKOCYTESUR NEGATIVE 11/11/2021    UROBILINOGEN Normal 11/11/2021    BILIRUBINUR NEGATIVE 11/11/2021    GLUCOSEU 3+ 11/11/2021    KETUA NEGATIVE 11/11/2021    AMORPHOUS NOT REPORTED 11/11/2021     Urine Sodium:     Lab Results   Component Value Date    NIDHI 67 11/12/2021     Urine Potassium:    Lab Results   Component Value Date    KUR 25.3 11/12/2021     Urine Chloride:    Lab Results   Component Value Date    CLUR 54 11/12/2021     Urine Osmolarity: No results found for: OSMOU  Urine Protein:   No components found for: TOTALPROTEIN, URINE   Urine Creatinine:     Lab Results   Component Value Date    LABCREA 94.6 11/02/2021     Urine Eosinophils:  No components found for: UEOS        Impression and Plan:  1. VIDAL on CKD III  : multifactorial due to volume depletion from hyperglycemia + recent contrast nephropathy, ACE- I and epleronone:overall improved from admission but BUN is going up likely from steroids. Steroids stopped yesterday. He is swollen, will place on 2 L fluid restriction, low sodium diet and add compression stockings. Would hold lasix today since BUN is so high. Re-evaluate tomorrow  -check renal US      2. Hyperkalemia form VIDAL, ACE-I and Epleronone + hyperglycemia: resolved, stop Low K diet  3. Metabolic acidosis.  Will hold off po bicarb due to edema  4. HTN: labile  5. Diastolic CHF  6. Anemia, s/p IV iron as outpatient. Start RAVI  7. Rash. S/p biopsy. Urticarial vasculitis. 8. DM        Please don't hesitate to call with any questions.   Electronically signed by Raleigh Coleman,  on 11/16/2021 at 3:16 PM

## 2021-11-16 NOTE — PROGRESS NOTES
Occupational Therapy  Facility/Department: [facility] AT THE [facility] MED SURG  Daily Treatment Note  NAME: Belkys Peña  : 1945  MRN: [id number]    Date of Service: 2021    Discharge Recommendations:  Continue to assess pending progress, Home with assist PRN       Assessment      OT Education: OT Role; Plan of Care; Home Exercise Program  Patient Education: educated on and reviewed discharge folder  Barriers to Learning: none  Activity Tolerance  Activity Tolerance: Patient limited by fatigue; Treatment limited secondary to medical complications (free text)  Activity Tolerance: Pt tolerated 1 set of exercises with frequent rest breaks d/t fatigue and weakness. After 1 set of ex pt reported chest pain and SOB and sessin was ended with RN notified and present  Safety Devices  Type of devices: All fall risk precautions in place; Left in chair; Chair alarm in place; Call light within reach; Nurse notified         Patient Diagnosis(es): The primary encounter diagnosis was Chest pain, unspecified type. Diagnoses of Hyperkalemia, Acute kidney injury ([address] Hyperglycemia, and Chronic anemia were also pertinent to this visit.       has a past medical history of Acute MI ([address] Acute renal failure with tubular necrosis ([address] Anemia, CAD (coronary artery disease), Cerebral artery occlusion with cerebral infarction [facility]), CHF (congestive heart failure) ([address] Chronic back pain, Closed fracture of lumbar vertebra without mention of spinal cord injury, Displacement of intervertebral disc, site unspecified, without myelopathy, H/O cardiac catheterization, H/O cardiovascular stress test, H/O tilt table evaluation, H/O tilt table evaluation, History of 24 hour EKG monitoring, History of 24 hour EKG monitoring, History of blood transfusion, History of cardiovascular stress test, History of cardiovascular stress test, History of coronary artery stent placement, History of CVA (cerebrovascular accident) without residual deficits, History ROM/Therapeutic Exercise  Type of ROM/Therapeutic Exercise: Free weights  Comment: Pt completed BUE strengthening exercises to increase strength and endurance needed for completion of ADLs. Pt completed 6 exercises x 20 reps x 1 set with frequent rest breaks d/t fatigue. Plan   Plan  Times per week: 7x  Times per day: Daily  Current Treatment Recommendations: Strengthening, Safety Education & Training, Patient/Caregiver Education & Training, Self-Care / ADL, Functional Mobility Training, Endurance Training  G-Code     OutComes Score                                                  AM-PAC Score             Goals  Short term goals  Time Frame for Short term goals: 24  Short term goal 1: Patient to tolerate standing >10' while participating in functional task of choice to improve standing tolerane, balance and safety during ADL, mobility and transfers. Short term goal 2: Patient to engage in 15 minutes of ther ex/ther act to improve strength as well as activity tolerance for self care tasks. Short term goal 3: Patient to complete self care routine c SUP to ensure safe return home. Short term goal 4: Pt will report and demo understanding of discharge recommendations and HEP.        Therapy Time   Individual Concurrent Group Co-treatment   Time In [address] [place] [address]         Time Out 1338         Minutes [address] [place], [place]

## 2021-11-16 NOTE — PROGRESS NOTES
Patient: Toni Dorsey  : 1945  Date of Admission: 2021  Primary Care Physician: Angeline Willoughby  Today's Date: 2021    REASON FOR CONSULTATION: Chest Pain (Mid chest tightness, radiating to left arm. Onset 30mins ago), Shortness of Breath, and Other (Abnormal labwork drawn today. (potassium, glucose and kidney function))      HPI: Mr. Melida Fritz is a 68 y.o. male well known to me with a known history of dysautonomia where on tilt table testing his blood pressure dropped from 269 systolic to 78 systolic with only a 93-81 HR response. More recently, a CT of he head in the past showed evidence of at least 2 old CVA's and a heart catheterization showed severe single vessel disease in the ostium of a moderate sized OM1 branch of the circumflex. However, maximal guidelines directed medical management was opted for an place of stenting partly due to the risk associated with stenting this vessel. Recently he has had a coronary angiography procedure with stenting of his HA Om1. As you know, Mr. Melida Fritz  is a 76 y.o. male with a history of recent onset substernal chest discomfort that led to a stress test and a subsequent heart catheterization which showed severe single vessel coronary artery disease of the HA Om1 with successful angioplasty and stenting of that vessel that was done by Dr. Jose Alejandro Arana on 4/10/17. More recently he has had problems with balance problems when he is in his feet and says that a Neurologist has told him in the past this is because of his previous strokes. Most recently he underwent a cardiovascular stress test which fortunately had shown to be normal on 3/21/2018. Mr. Melida Fritz has significant normal stress test and echocardiogram on 2020. Holter monitor done on 2020: The rhythm was sinus. Average daily heart rate 58 ranging from 47 to 81 bpm. Bradycardia for 72% test duration. Rare premature supraventricular ectopic beats consisting of 33 isolated PACs.  Rare premature ventricular ectopic beats total 75 consisting of 73 isolatedPVCs and a ventricular couplet. Echocardiogram done on 12/18/2020: EF of 60%. Moderately increased LV wall thickness. Borderline pulmonary hypertension. Mild tricuspid regurgitation. Mild diastolic dysfunction is seen/ Aortic root is mildly dilated. Stress test done on 9/20/2021 was equivocal, There is a small/moderate perfusion defect of mild intensity in the lateral, inferior and inferoapical regions. Cardiac cath done on 10/5/2021 showed Moderate three vessel coronary artery disease involving a ostial 50-60% stenosis in a small, non-dominant RCA, a 60% mid LAD stenosis and a proximal 50-60% stenosis in the left anterior descending coronary artery. He does note some mild worsening of his shortness of breath. He is currently admitted for acute kidney injury superimposed on his chronic kidney disease. He reports abdominal pain and states he thinks it's due to the coreg. He recently had chest pain today, which he attributes to gas. He had relief of his symptoms with a GI cocktail this afternoon. He denied any abdominal pain, bleeding problems, bowel issues, problems with his medications or any other concerns at this time. No nausea or vomiting. No cough, fever or chills. Past Medical History:   Diagnosis Date    Acute MI ([address]     Acute renal failure with tubular necrosis ([address] 10/2/2018    ssecondary to hemodynamic effects of loop diuretics and ace inhibitors, bbaseline 1.2-1.3 which peaked up to 1.8, resolving    Anemia     CAD (coronary artery disease)     Cerebral artery occlusion with cerebral infarction ([address]     [facility]     Chronic back pain     Closed fracture of lumbar vertebra without mention of spinal cord injury     Displacement of intervertebral disc, site unspecified, without myelopathy     H/O cardiac catheterization 2/19/16    LMCA: Mild irregularities 10-20%.  LAD: Lesion on PRox LAD: Mid subsection. 65% stenosis. LCx: Lesion on 1st Ob Valentina: Proximal subesection. 70% steniosis. RCA: Small non-dominant RCA. Lesion on PRox RCA: Ostial. 50% stenosis. EF:55%.  H/O cardiovascular stress test 2/19/16    Abnormal. Moderate perfusion defect of mild intensity in the inferior, inferoseptal adn inferoapical regions during stress imaging, which most consistent with ischemia. Global LV systolic function normal without regional wall motion abnormalities. OVerall these results are most consistent with an intermediate risk for signficant CAD. Additional testing including cardiac cath may be indicated.  H/O tilt table evaluation 12/26/2017    Abnormal. Patients HR, BP response and symptoms were most consistent with dysautonomia. Combined with viligant maintenance of euvolemia and maintaining a moderate salft intake, pharmacologic treatment with SSRI such as lexapro and or mestinon among other treatments have shown some effectiveness in treatment of this condition    H/O tilt table evaluation 12/26/2017    Abnormal head upright tilt table study. The pt heart rate, blood pressure response and symptoms were most consistent with dysautonomia.  History of 24 hour EKG monitoring 8/14/14    Occasional PAC's and PVC's which appear to be at least moderately symptomatic.  History of 24 hour EKG monitoring 11/19/14    Event Monitor. Sinus rhythm and sinus bradycardia. Infrequent isolated PVC's    History of blood transfusion     History of cardiovascular stress test 2/19/14    Relatively NL    History of cardiovascular stress test 03/21/2018    Normal myocardial perfusion. Global left ventricular systolic function was normal with an EF of 68%. Overall, these results are most consistent with a low risk scan.     History of coronary artery stent placement 04/2017    PTCA / Drug Eluting Stent:, CX and / or branches    History of CVA (cerebrovascular accident) without residual deficits 2014 Incidentally found on CT head. No known impairment now or in the past.    History of echocardiogram 2/18/14    LA mildly dilated, EF 55%, LV wall thickness is moderately increased, no definite wall motion abnormalilities, what appears to be a pacer wire is seen w/n the RA and RV, mild-mod TR, mild pulmonary hypertension.  History of Holter monitoring 12/29/2017    Rare PAC's and PVC's.  History of tilt table evaluation 8/12/14    Abnormal    Hyperlipidemia     Hypertension     Non critical Right Renal artery stenosis, native [facility] 10/2/2018    Non critical Right Renal artery stenosis, based on cath in 2016, Rt RA 30% stenosis    Pacemaker     non-functioning    S/P cardiac cath 04/10/2017    S/P coronary artery stent placement     Successful PTCA - HA Om1    SIRS (systemic inflammatory response syndrome) ([facility] 5/19/2019    Type II or unspecified type diabetes mellitus without mention of complication, not stated as uncontrolled        CURRENT ALLERGIES: Lipitor [atorvastatin], Aldactone [spironolactone], Crestor [rosuvastatin calcium], Lopid [gemfibrozil], Invokana [canagliflozin], and Januvia [sitagliptin] REVIEW OF SYSTEMS: 14 systems were reviewed. Pertinent positives and negatives as above, all else negative. Past Surgical History:   Procedure Laterality Date    BACK SURGERY      CARDIAC CATHETERIZATION Left 02/19/2016    via right radial approach/ [facility]/ Dr. Dimple Morin Left 10/05/2021    Dr Krystle Melchor radial-Moderate three vessel coronary artery disease involving a ostial 50-60% stenosis in a small, non-dominant RCA, a 60% mid LAD stenosis and a proximal 50-60% stenosis in the left anterior descending coronary artery. Normal left ventricular end diastolic pressure. Proceed with aggressive maximal medical management as clinically indicated.     CHOLECYSTECTOMY  08/17/2015    [facility]ang/[facility]/ [facility]    COLONOSCOPY  2010    COLONOSCOPY  2015    -polyps,diverticulosis,hemorrhoids    COLONOSCOPY  2018    Dr Marco Schaefer    COLONOSCOPY N/A 2018    COLONOSCOPY POLYPECTOMY SNARE/COLD BIOPSY  cold snare  and  hot snare performed by Margaret Simms MD at [address]  10/30/2020    with Dr. Remy Carpenter 10/30/2020    [place], NOT HIGH RISK performed by Lidia Burgos DO at [address] [facility]      left eye    ENDOSCOPY, COLON, DIAGNOSTIC      EYE SURGERY      PACEMAKER INSERTION      PACEMAKER PLACEMENT      UPPER GASTROINTESTINAL ENDOSCOPY  2019    Dr. Laz Gray H-Pylori)duodenal bulbar/antral erythema    UPPER GASTROINTESTINAL ENDOSCOPY N/A 2019    EGD BIOPSY, clotest performed by Margaret Simms MD at [address] [facility] 10/30/2020    EGD ESOPHAGOGASTRODUODENOSCOPY performed by Lidia Burgos DO at [address] History:  Social History     Tobacco Use    Smoking status: Former Smoker     Packs/day: 1.50     Years: 8.00     Pack years: 12.00     Types: Cigarettes     Quit date: 3/4/1980     Years since quittin.7    Smokeless tobacco: Never Used   Vaping Use    Vaping Use: Never used   Substance Use Topics    Alcohol use: No    Drug use: No        CURRENT MEDICATIONS:  Outpatient Medications Marked as Taking for the 21 encounter [facility] Encounter)   Medication Sig Dispense Refill    [] predniSONE (DELTASONE) 20 MG tablet Take 1 tablet by mouth 2 times daily for 5 days 10 tablet 0    eplerenone (INSPRA) 25 MG tablet Take 1 tablet by mouth daily 90 tablet 3    loratadine (CLARITIN) 10 MG tablet Take 1 tablet by mouth daily (Patient taking differently: Take 10 mg by mouth daily as needed ) 90 tablet 3    lisinopril (PRINIVIL;ZESTRIL) 20 MG tablet Take 1 tablet by mouth daily 90 tablet 3    amLODIPine (NORVASC) 10 MG tablet Take 1 tablet by mouth nightly 90 tablet 0    hydrALAZINE (APRESOLINE) 100 MG tablet Take 1 tablet by mouth 3 times daily Patient is taking 100 MG 3 times daily 270 tablet 0    glipiZIDE (GLUCOTROL XL) 5 MG extended release tablet Take 2 tablets by mouth 2 times daily 360 tablet 0    nitroGLYCERIN (NITROSTAT) 0.4 MG SL tablet Place 1 tablet under the tongue every 5 minutes as needed for Chest pain 25 tablet 3    latanoprost (XALATAN) 0.005 % ophthalmic solution       acyclovir (ZOVIRAX) 400 MG tablet 400 mg 2 times daily       prednisoLONE acetate (PRED FORTE) 1 % ophthalmic suspension Place 1 drop into the left eye daily        insulin glargine (LANTUS) injection vial 32 Units, BID  furosemide (LASIX) injection 40 mg, Once  carvedilol (COREG) tablet 12.5 mg, BID WC  cloNIDine (CATAPRES) tablet 0.2 mg, TID  glipiZIDE (GLUCOTROL) tablet 10 mg, BID AC  0.9 % sodium chloride infusion, PRN  glucose (GLUTOSE) 40 % oral gel 15 g, PRN  dextrose 50 % IV solution, PRN  glucagon (rDNA) injection 1 mg, PRN  dextrose 5 % solution, PRN  cetirizine (ZYRTEC) tablet 5 mg, Daily  insulin lispro (HUMALOG) injection vial 0-18 Units, TID WC  insulin lispro (HUMALOG) injection vial 0-9 Units, Nightly  acyclovir (ZOVIRAX) capsule 400 mg, BID  amLODIPine (NORVASC) tablet 10 mg, Nightly  hydrALAZINE (APRESOLINE) tablet 100 mg, TID  prednisoLONE acetate (PRED FORTE) 1 % ophthalmic suspension 1 drop, Daily  sodium chloride flush 0.9 % injection 5-40 mL, 2 times per day  sodium chloride flush 0.9 % injection 10 mL, PRN  0.9 % sodium chloride infusion, PRN  ondansetron (ZOFRAN-ODT) disintegrating tablet 4 mg, Q8H PRN   Or  ondansetron (ZOFRAN) injection 4 mg, Q6H PRN  polyethylene glycol (GLYCOLAX) packet 17 g, Daily PRN  acetaminophen (TYLENOL) tablet 650 mg, Q6H PRN   Or  acetaminophen (TYLENOL) suppository 650 mg, Q6H PRN       FAMILY HISTORY: family history includes Cancer in his father and mother.    Physical Examination:     BP (!) 147/67   Pulse 54   Temp 96.8 °F (36 °C) (Temporal)   Resp 18   Ht 5' 9\" (1.753 m)   Wt 207 lb 3.2 oz (94 kg)   SpO2 98%   BMI 30.60 kg/m²  Body mass index is 30.6 kg/m². Constitutional: He appeared oriented to person and place. He appears well-developed and well-nourished. In no acute distress. HEENT: Normocephalic and atraumatic. No JVD present. Carotid bruit is not present. No mass and no thyromegaly present. No lymphadenopathy noted. Cardiovascular: Normal rate, regular rhythm, normal heart sounds. Exam reveals no gallop and no friction rubs. 2/6 systolic murmur, 5th intercostal space on the LEFT in the mid-clavicular line (cardiac apex)  Pulmonary/Chest: Effort normal and breath sounds normal. No respiratory distress. He has no wheezes, rhonchi or rales. Abdominal: Hard, tender, appears to have fluid in his abdomen. He exhibits no organomegaly, mass or bruit. Extremities: None. No cyanosis or clubbing. 2+ radial and carotid pulses. Distal extremity pulses: 2+ bilaterally. Neurological: Alertness and orientation as per Constitutional exam. No evidence of gross cranial nerve deficit. Coordination appeared normal.   Skin: Skin is warm and dry. There is no rash or diaphoresis. Psychiatric: He has a normal mood and affect.  His speech is normal and behavior is normal.      MOST RECENT LABS ON RECORD:   Lab Results   Component Value Date    WBC 10.6 11/16/2021    HGB 8.4 (L) 11/16/2021    HCT 26.3 (L) 11/16/2021     11/16/2021    CHOL 69 09/21/2021    TRIG 185 (H) 09/21/2021    HDL 14 (L) 09/21/2021    LDLCHOLESTEROL 18 09/21/2021    ALT 11 11/12/2021    AST 11 11/12/2021     11/16/2021    K 4.1 11/16/2021     11/16/2021    CREATININE 2.74 (H) 11/16/2021     (HH) 11/16/2021    CO2 17 (L) 11/16/2021    TSH 2.26 11/02/2017    PSA 3.68 04/30/2021    INR 1.1 09/20/2021    LABA1C 7.3 08/19/2021    LABMICR 30 10/17/2015        ASSESSMENT:  Patient Active Problem List    Diagnosis Date Noted    S/P drug eluting coronary stent placement-OM1 4/10/17 04/10/2017    Moderate malnutrition ([address] 11/12/2021    Coronary atherosclerosis due to calcified coronary lesion 02/20/2015    Hyperkalemia 11/11/2021    Acute purpuric eruption 11/10/2021    Skin lesion of left lower extremity 11/10/2021    Urticaria 11/10/2021    Iron deficiency anemia secondary to inadequate dietary iron intake 10/21/2021    Iron malabsorption 10/21/2021    Chronic anemia 09/21/2021    Acute coronary syndrome with high troponin ([address] 09/21/2021    Chest pain 09/20/2021    Gross hematuria 05/05/2021    BPH with obstruction/lower urinary tract symptoms 12/10/2019    Elevated PSA 12/10/2019    Anemia in stage 3 chronic kidney disease ([address] 05/21/2019    Acute kidney injury superimposed on CKD ([address] 10/02/2018    Non critical Right Renal artery stenosis, native ([address] 10/02/2018    Medication side effect, initial encounter 03/23/2018    Angina, class II ([address] 01/08/2018    Hyperlipidemia 04/24/2017    Chronic diastolic HF (heart failure), NYHA class 3 ([address] 04/24/2017    ASHD (arteriosclerotic heart disease) 05/02/2016    Cervical stenosis of spinal canal 02/29/2016    Abnormal tilt table test 02/23/2016    Chronic kidney disease, stage III (moderate) ([address] 02/22/2016    Controlled type 2 diabetes mellitus with diabetic nephropathy, with long-term current use of insulin ([address] 02/22/2016    Abnormal cardiovascular stress test 02/18/2016    Ischemic chest pain ([address] 02/17/2016    Accelerated hypertension 10/16/2015    Chronotropic incompetence with autonomic dysfunction 09/02/2015    Episodic lightheadedness 09/02/2015    Cholecystitis 08/17/2015    Syncope, near 08/05/2015    Dysautonomia ([address] 06/29/2015    History of CVA (cerebrovascular accident) without residual deficits     Displacement of intervertebral disc, site unspecified, without myelopathy 03/04/2015    History of colon polyps 12/47/4906    Systolic murmur 02/12/2014    History of MI (myocardial infarction) 02/12/2014    Vertigo, benign paroxysmal 10/17/2012     PLAN:    Acute on chronic renal insufficiency: Unclear etiology but likely multifactorial: Continue current treatment  Treatment options discussed extensively with Dr. Eveline Hernandez, [facility]   Discussed Lasix 40 mg IV, however it was decided to hold Lasix at this time due to his BUN and recently worsening kidney function. Anemia, unclear etiology: Likely multifactorial including GI losses and acute on chronic renal insufficiency. He received 1 unit pRBC transfusion during hospital stay    Dysautonomia: Mildly to moderately, likely related to and worsened by anemia  Diuretics: Not indicated at this time. Nonpharmacologic counseling: Because of his condition, I reminded him to try and keep himself well-hydrated and to take extra time when moving from laying to sitting, sitting to standing and standing to walking as well as wearing at least knee high compressions stockings. Acute on Chronic Diastolic Heart Failure: EF of 60% via echo on 12/18/2020. Up 9 lbs over last 24 hours  Beta Blocker: STOP Carvedilol (Coreg) due to abdominal pain, nausea due to medication. ACE Inibitor/ARB: Will defer to nephrology with current acute on chronic renal insufficiency   Diuretics: Discussed Lasix 40 mg IV, however it was decided to hold Lasix at this time due to his BUN and recently worsening kidney function. probably still up 6-9 lbs. Nonpharmacologic management of Heart Failure: I advised him to try and keep his legs up whenever possible and to limit salt in his diet. Steroid and fluids were discontinued today. Compression stockings in place. Atherosclerotic Heart Disease: Coronary Artery stent: 4/10/2017.  Cardiac cath done on 10/5/2021 showed Moderate three vessel coronary artery disease involving a ostial 50-60% stenosis in a small, non-dominant RCA, a 60% mid LAD stenosis and a proximal 50-60% stenosis in the left anterior descending coronary artery  Antiplatelet Agent: Not indicated due to bleeding  ACE Inibitor/ARB: Will defer to nephrology with current acute on chronic renal insufficiency  Beta Blocker: STOP Carvedilol (Coreg)  Cholesterol Reduction Therapy: Refused by patient. Laboratory testing: None    Essential Hypertension: Uncontrolled likely worsened by prednisone and stopping of eplerinone which I agree with  ACE Inibitor/ARB: Will defer to nephrology with current acute on chronic renal insufficiency  Beta Blocker: STOP Carvedilol (Coreg) due to at least perceived side effects of abdominal pain  Calcium Channel Blocker: Continue amlodipine (Norvasc) 10 mg once daily. Agree with increased Clonidine 0.2 mg tid  Continue Hydralazine 100 mg 3x/day  Would like to try and cut back on steroid dose if possible for rash. Hyperlipidemia: Mixed - Last LDL on 9/21/2021 was 18 mg/dL   Cholesterol Reduction Therapy: Refused by patient. Once again, thank you for allowing me to participate in this patients care. Please do not hesitate to contact me if I could be of any further assistance. Sincerely,  Jamia Patel. Jackelin BLOUNT, MS, F.A.C.C. [facility] Cardiology Specialist    [address] [facility], [address] [place] [address]  Phone: [phone], Fax: [phone]     I believe that the risk of significant morbidity and mortality related to the patient's current medical conditions are: Intermediate.         November 16, 2021

## 2021-11-16 NOTE — PROGRESS NOTES
Patient resting in bed with eyes closed at this time. Call light, bedside table and personal belongings within reach. Will continue to monitor.

## 2021-11-16 NOTE — PROGRESS NOTES
[facility]  Inpatient/Observation/Outpatient Rehabilitation    Date: 2021  Patient Name: Celine Cifuentes       [] Inpatient Acute/Observation       []  Outpatient  : 1945       [] Pt no showed for scheduled appointment    [x] Pt refused/declined therapy at this time due to:      Pt. Declined therapy at this time stating earlier he was having some chest pains. RN aware.      [] Pt cancelled due to:  [] No Reason Given   [] Sick/ill   [] Other:        Keith Lanza, PTA Date: 2021

## 2021-11-16 NOTE — PROGRESS NOTES
Physical Therapy  Facility/Department: [facility] AT THE [facility] MED SURG  Daily Treatment Note  NAME: Belkys Peña  : 1945  MRN: [id number]    Date of Service: 2021    Discharge Recommendations:  Continue to assess pending progress        Assessment   Treatment Diagnosis: general debility  Prognosis: Good  Decision Making: Low Complexity  PT Education: Gait Training; General Safety; Transfer Training  Patient Education: Stair training. REQUIRES PT FOLLOW UP: Yes  Activity Tolerance  Activity Tolerance: Patient Tolerated treatment well     Patient Diagnosis(es): The primary encounter diagnosis was Chest pain, unspecified type. Diagnoses of Hyperkalemia, Acute kidney injury ([facility] [address] Hyperglycemia, and Chronic anemia were also pertinent to this visit.      has a past medical history of Acute MI ([facility] [address] Acute renal failure with tubular necrosis ([facility] [address] Anemia, CAD (coronary artery disease), Cerebral artery occlusion with cerebral infarction [facility]), CHF (congestive heart failure) ([facility] [address] Chronic back pain, Closed fracture of lumbar vertebra without mention of spinal cord injury, Displacement of intervertebral disc, site unspecified, without myelopathy, H/O cardiac catheterization, H/O cardiovascular stress test, H/O tilt table evaluation, H/O tilt table evaluation, History of 24 hour EKG monitoring, History of 24 hour EKG monitoring, History of blood transfusion, History of cardiovascular stress test, History of cardiovascular stress test, History of coronary artery stent placement, History of CVA (cerebrovascular accident) without residual deficits, History of echocardiogram, History of Holter monitoring, History of tilt table evaluation, Hyperlipidemia, Hypertension, Non critical Right Renal artery stenosis, native ([address] Pacemaker, S/P cardiac cath, S/P coronary artery stent placement, SIRS (systemic inflammatory response syndrome) ([address] and Type II or unspecified type diabetes mellitus without mention of complication, not stated as uncontrolled. has a past surgical history that includes Pacemaker insertion; back surgery; Cholecystectomy (08/17/2015); Corneal transplant; Cardiac catheterization (Left, 02/19/2016); pacemaker placement; Colonoscopy (2010); Colonoscopy (02/19/2015); Colonoscopy (05/23/2018); Colonoscopy (N/A, 05/23/2018); Upper gastrointestinal endoscopy (05/28/2019); Upper gastrointestinal endoscopy (N/A, 05/28/2019); Colonoscopy (10/30/2020); Colonoscopy (N/A, 10/30/2020); Upper gastrointestinal endoscopy (N/A, 10/30/2020); Endoscopy, colon, diagnostic; eye surgery; and Cardiac catheterization (Left, 10/05/2021). Restrictions  Restrictions/Precautions  Restrictions/Precautions: Fall Risk, General Precautions  Subjective   General  Chart Reviewed: Yes  Response To Previous Treatment: Patient with no complaints from previous session. Family / Caregiver Present: Yes  Subjective  Subjective: Pt denies pain, states he is feeling ok today. Orientation  Orientation  Overall Orientation Status: Within Normal Limits  Cognition      Objective      Transfers  Sit to Stand: Stand by assistance  Stand to sit: Stand by assistance  Ambulation  Ambulation?: Yes  Ambulation 1  Surface: level tile  Device: Rolling Walker  Assistance: Contact guard assistance  Quality of Gait: Slow lyndsey, reciprocal gait pattern, no LOB. Distance: 150 ft x2  Comments: Pt denies dizziness or tiredness post ambulation this date. Stairs/Curb  Stairs?: Yes  Stairs  # Steps : 2  Stairs Height: 6\"  Rails: Bilateral  Assistance: Contact guard assistance  Comment: Pt up/down 2 steps x4. Balance  Sitting - Static: Good  Sitting - Dynamic: Fair; +  Standing - Static: Fair; +  Standing - Dynamic: Fair  Exercises  Hip Flexion: x15  Hip Abduction: x15  Knee Long Arc Quad: x15  Ankle Pumps: x15  Comments: BLE ther ex completed in seated. Standing sink ex at 2WW with PTA holding x10: heel raise, march, hip abd. Pt required seated RB d/t fatigue. Attempted STS with PTA holding 2WW, pt able to completed 1 STS on 2nd stand pt leaned forward and had LOB requiring Julio to assist to chair. Pt states \"my balance is not good\". Goals  Short term goals  Time Frame for Short term goals: 20 visits  Short term goal 1: Patient will demonstrate the ability to complete bed mobility and transfers independently in order to return to OF  Short term goal 2: Patient will demonstrate the ability to ambulate >200 feet with RW with supervision assistance in order to ease functional mobility  Short term goal 3: Patient will ascend/descend 1 step without handrail with supervision assistance in order to safely enter/exit the home  Short term goal 4: Patient will tolerate 35-45' ther-ex in order to increase endurance and ease ADLs    Plan    Plan  Times per week: 7 times per week  Times per day: Twice a day  Current Treatment Recommendations: Strengthening, Balance Training, Functional Mobility Training, Transfer Training, Neuromuscular Re-education, Gait Training, Stair training, Pain Management, Positioning, Equipment Evaluation, Education, & procurement, Patient/Caregiver Education & Training, Safety Education & Training, Home Exercise Program  Safety Devices  Type of devices:  All fall risk precautions in place, Left in chair, Call light within reach, Nurse notified, Gait belt, Patient at risk for falls     Therapy Time   Individual Concurrent Group Co-treatment   Time In 0922         Time Out 0945         Minutes [place], [place]

## 2021-11-17 PROBLEM — I13.10 CARDIORENAL SYNDROME WITH RENAL FAILURE: Status: ACTIVE | Noted: 2021-11-17

## 2021-11-17 PROBLEM — I13.0 CARDIORENAL SYNDROME, STAGE 1-4 OR UNSPECIFIED CHRONIC KIDNEY DISEASE, WITH HEART FAILURE (HCC): Status: ACTIVE | Noted: 2021-11-17

## 2021-11-17 LAB
ABSOLUTE EOS #: 0.11 K/UL (ref 0–0.44)
ABSOLUTE IMMATURE GRANULOCYTE: 1.48 K/UL (ref 0–0.3)
ABSOLUTE LYMPH #: 0.95 K/UL (ref 1.1–3.7)
ABSOLUTE MONO #: 0.74 K/UL (ref 0.1–1.2)
ANCA MYELOPEROXIDASE: 15 AU/ML (ref 0–3.5)
ANCA PROTEINASE 3: 1.1 AU/ML (ref 0–2)
ANION GAP SERPL CALCULATED.3IONS-SCNC: 16 MMOL/L (ref 9–17)
ANTI DNA DOUBLE STRANDED: 1.7 IU/ML
ANTI-NUCLEAR ANTIBODY (ANA): NEGATIVE
BASOPHILS # BLD: 0 % (ref 0–2)
BASOPHILS ABSOLUTE: 0 K/UL (ref 0–0.2)
BUN BLDV-MCNC: 106 MG/DL (ref 8–23)
BUN/CREAT BLD: 34 (ref 9–20)
CALCIUM SERPL-MCNC: 7.6 MG/DL (ref 8.6–10.4)
CHLORIDE BLD-SCNC: 108 MMOL/L (ref 98–107)
CO2: 16 MMOL/L (ref 20–31)
CREAT SERPL-MCNC: 3.15 MG/DL (ref 0.7–1.2)
DIFFERENTIAL TYPE: ABNORMAL
EKG ATRIAL RATE: 55 BPM
EKG P AXIS: 46 DEGREES
EKG P-R INTERVAL: 190 MS
EKG Q-T INTERVAL: 440 MS
EKG QRS DURATION: 104 MS
EKG QTC CALCULATION (BAZETT): 420 MS
EKG R AXIS: 2 DEGREES
EKG T AXIS: 57 DEGREES
EKG VENTRICULAR RATE: 55 BPM
ENA ANTIBODIES SCREEN: 0.5 U/ML
EOSINOPHILS RELATIVE PERCENT: 1 % (ref 1–4)
GFR AFRICAN AMERICAN: 23 ML/MIN
GFR NON-AFRICAN AMERICAN: 19 ML/MIN
GFR SERPL CREATININE-BSD FRML MDRD: ABNORMAL ML/MIN/{1.73_M2}
GFR SERPL CREATININE-BSD FRML MDRD: ABNORMAL ML/MIN/{1.73_M2}
GLUCOSE BLD-MCNC: 108 MG/DL (ref 74–100)
GLUCOSE BLD-MCNC: 110 MG/DL (ref 74–100)
GLUCOSE BLD-MCNC: 152 MG/DL (ref 74–100)
GLUCOSE BLD-MCNC: 178 MG/DL (ref 74–100)
GLUCOSE BLD-MCNC: 179 MG/DL (ref 74–100)
GLUCOSE BLD-MCNC: 183 MG/DL (ref 74–100)
GLUCOSE BLD-MCNC: 45 MG/DL (ref 74–100)
GLUCOSE BLD-MCNC: 87 MG/DL (ref 70–99)
HCT VFR BLD CALC: 31.4 % (ref 40.7–50.3)
HEMOGLOBIN: 9.8 G/DL (ref 13–17)
IMMATURE GRANULOCYTES: 14 %
LYMPHOCYTES # BLD: 9 % (ref 24–43)
MCH RBC QN AUTO: 27.9 PG (ref 25.2–33.5)
MCHC RBC AUTO-ENTMCNC: 31.2 G/DL (ref 28.4–34.8)
MCV RBC AUTO: 89.5 FL (ref 82.6–102.9)
MONOCYTES # BLD: 7 % (ref 3–12)
MORPHOLOGY: NORMAL
NRBC AUTOMATED: 0.2 PER 100 WBC
PDW BLD-RTO: 17.4 % (ref 11.8–14.4)
PLATELET # BLD: 174 K/UL (ref 138–453)
PLATELET ESTIMATE: ABNORMAL
PMV BLD AUTO: 9.8 FL (ref 8.1–13.5)
POTASSIUM SERPL-SCNC: 4 MMOL/L (ref 3.7–5.3)
RBC # BLD: 3.51 M/UL (ref 4.21–5.77)
RBC # BLD: ABNORMAL 10*6/UL
SEG NEUTROPHILS: 69 % (ref 36–65)
SEGMENTED NEUTROPHILS ABSOLUTE COUNT: 7.32 K/UL (ref 1.5–8.1)
SODIUM BLD-SCNC: 140 MMOL/L (ref 135–144)
WBC # BLD: 10.6 K/UL (ref 3.5–11.3)
WBC # BLD: ABNORMAL 10*3/UL

## 2021-11-17 PROCEDURE — 1200000000 HC SEMI PRIVATE

## 2021-11-17 PROCEDURE — 6370000000 HC RX 637 (ALT 250 FOR IP): Performed by: INTERNAL MEDICINE

## 2021-11-17 PROCEDURE — 99233 SBSQ HOSP IP/OBS HIGH 50: CPT | Performed by: FAMILY MEDICINE

## 2021-11-17 PROCEDURE — 80048 BASIC METABOLIC PNL TOTAL CA: CPT

## 2021-11-17 PROCEDURE — 93010 ELECTROCARDIOGRAM REPORT: CPT | Performed by: INTERNAL MEDICINE

## 2021-11-17 PROCEDURE — 36415 COLL VENOUS BLD VENIPUNCTURE: CPT

## 2021-11-17 PROCEDURE — 2580000003 HC RX 258: Performed by: INTERNAL MEDICINE

## 2021-11-17 PROCEDURE — 94761 N-INVAS EAR/PLS OXIMETRY MLT: CPT

## 2021-11-17 PROCEDURE — 6370000000 HC RX 637 (ALT 250 FOR IP): Performed by: FAMILY MEDICINE

## 2021-11-17 PROCEDURE — 85025 COMPLETE CBC W/AUTO DIFF WBC: CPT

## 2021-11-17 PROCEDURE — 6360000002 HC RX W HCPCS: Performed by: FAMILY MEDICINE

## 2021-11-17 PROCEDURE — 82947 ASSAY GLUCOSE BLOOD QUANT: CPT

## 2021-11-17 PROCEDURE — 99232 SBSQ HOSP IP/OBS MODERATE 35: CPT | Performed by: INTERNAL MEDICINE

## 2021-11-17 PROCEDURE — 97116 GAIT TRAINING THERAPY: CPT

## 2021-11-17 PROCEDURE — 97110 THERAPEUTIC EXERCISES: CPT

## 2021-11-17 RX ORDER — INSULIN GLARGINE 100 [IU]/ML
30 INJECTION, SOLUTION SUBCUTANEOUS 2 TIMES DAILY
Status: DISCONTINUED | OUTPATIENT
Start: 2021-11-17 | End: 2021-11-18

## 2021-11-17 RX ORDER — HEPARIN SODIUM 5000 [USP'U]/ML
5000 INJECTION, SOLUTION INTRAVENOUS; SUBCUTANEOUS EVERY 8 HOURS SCHEDULED
Status: CANCELLED | OUTPATIENT
Start: 2021-11-17

## 2021-11-17 RX ADMIN — AMLODIPINE BESYLATE 10 MG: 10 TABLET ORAL at 22:10

## 2021-11-17 RX ADMIN — SODIUM CHLORIDE, PRESERVATIVE FREE 10 ML: 5 INJECTION INTRAVENOUS at 22:11

## 2021-11-17 RX ADMIN — CLONIDINE HYDROCHLORIDE 0.2 MG: 0.2 TABLET ORAL at 07:40

## 2021-11-17 RX ADMIN — HYDRALAZINE HYDROCHLORIDE 100 MG: 50 TABLET, FILM COATED ORAL at 22:09

## 2021-11-17 RX ADMIN — HYDRALAZINE HYDROCHLORIDE 100 MG: 50 TABLET, FILM COATED ORAL at 14:26

## 2021-11-17 RX ADMIN — CLONIDINE HYDROCHLORIDE 0.2 MG: 0.2 TABLET ORAL at 22:10

## 2021-11-17 RX ADMIN — SODIUM CHLORIDE, PRESERVATIVE FREE 10 ML: 5 INJECTION INTRAVENOUS at 07:40

## 2021-11-17 RX ADMIN — ACYCLOVIR 400 MG: 200 CAPSULE ORAL at 22:09

## 2021-11-17 RX ADMIN — PREDNISOLONE ACETATE 1 DROP: 10 SUSPENSION/ DROPS OPHTHALMIC at 07:41

## 2021-11-17 RX ADMIN — ACYCLOVIR 400 MG: 200 CAPSULE ORAL at 07:40

## 2021-11-17 RX ADMIN — HYDRALAZINE HYDROCHLORIDE 100 MG: 50 TABLET, FILM COATED ORAL at 07:40

## 2021-11-17 RX ADMIN — INSULIN GLARGINE 30 UNITS: 100 INJECTION, SOLUTION SUBCUTANEOUS at 22:10

## 2021-11-17 RX ADMIN — CETIRIZINE HYDROCHLORIDE 5 MG: 10 TABLET, FILM COATED ORAL at 07:40

## 2021-11-17 RX ADMIN — CLONIDINE HYDROCHLORIDE 0.2 MG: 0.2 TABLET ORAL at 14:26

## 2021-11-17 RX ADMIN — GLIPIZIDE 10 MG: 5 TABLET ORAL at 16:36

## 2021-11-17 RX ADMIN — INSULIN LISPRO 3 UNITS: 100 INJECTION, SOLUTION INTRAVENOUS; SUBCUTANEOUS at 16:37

## 2021-11-17 RX ADMIN — FUROSEMIDE 40 MG: 10 INJECTION, SOLUTION INTRAMUSCULAR; INTRAVENOUS at 07:40

## 2021-11-17 RX ADMIN — GLIPIZIDE 10 MG: 5 TABLET ORAL at 07:40

## 2021-11-17 ASSESSMENT — PAIN SCALES - GENERAL
PAINLEVEL_OUTOF10: 0
PAINLEVEL_OUTOF10: 0

## 2021-11-17 NOTE — PROGRESS NOTES
Writer at bedside for shift assessment. Patient laying in bed, respirations are even and unlabored while on room air. Vitals obtained and assessment completed, see flowsheet for details. Pt states he is cold, writer brought patient warm blanket. pt denies further needs at this time. Call light in reach, will continue to monitor.

## 2021-11-17 NOTE — PROGRESS NOTES
[facility]  Inpatient/Observation/Outpatient Rehabilitation    Date: 2021  Patient Name: Toña Irwin       [x] Inpatient Acute/Observation       []  Outpatient  : 1945       [] Pt no showed for scheduled appointment    [x] Pt refused/declined therapy at this time due to:           [] Pt cancelled due to:  [] No Reason Given   [] Sick/ill   [] Other:   Pt refused statin he feels like both of his ears are plugged and sound like wind tunnels and it is making him dizzy. Pt also states that he is going to be sent to [place], they are just waiting for a bed. RN confirmed but does not know when he is going but most likely not today. Will attempt evaluation at our earliest opportunity.        Joy Rubio, PTA Date: 2021

## 2021-11-17 NOTE — PROGRESS NOTES
The patient has been helped to the bathroom from his chair and back to the bed at this time. Patient tolerated the ambulation fairly well. Vitals have been checked and are within the normal except for his diastolic B/P being 49, will recheck again before giving B/P pills. The patient is alert and oriented as of now. He is denying of any pain or discomfort at this time. Further needs are assessed. Safety precautions are in place. Will continue to monitor.

## 2021-11-17 NOTE — PROGRESS NOTES
Renal Progress Note  [facility]      TELEHEALTH EVALUATION -- Audio/Visual (During UYLCJ-64 public [facility] emergency)     Telehealth service was provided with the patient at his room in [address] [place] [address] and myself the physician in my office in [place], [place] and the patient's RN Celso Vuong, who has initiated the visit. Pursuant to the emergency declaration under the [address] [place] [address] [facility] authority and the [employer] Act, this Virtual  Visit was conducted, with patient's consent, to reduce the patient's risk of exposure to COVID-19 and provide continuity of care for an established patient. Services were provided through a video synchronous discussion virtually to substitute for in-person clinic visit. Patient :  Freedom Lassiter; 68 y.o. MRN# [id number]  Location:  7896/4693-64  Attending:  Jesse Gudino MD  Admit Date:  2021   Hospital Day: 6      Subjective:     Nephrology is following the patient for VIDAL/CKD. Patient was seen. Weight up 3 pounds, renal function worsening with the diuretics.     Outpatient Medications:     Medications Prior to Admission: [] predniSONE (DELTASONE) 20 MG tablet, Take 1 tablet by mouth 2 times daily for 5 days  eplerenone (INSPRA) 25 MG tablet, Take 1 tablet by mouth daily  loratadine (CLARITIN) 10 MG tablet, Take 1 tablet by mouth daily (Patient taking differently: Take 10 mg by mouth daily as needed )  lisinopril (PRINIVIL;ZESTRIL) 20 MG tablet, Take 1 tablet by mouth daily  amLODIPine (NORVASC) 10 MG tablet, Take 1 tablet by mouth nightly  hydrALAZINE (APRESOLINE) 100 MG tablet, Take 1 tablet by mouth 3 times daily Patient is taking 100 MG 3 times daily  glipiZIDE (GLUCOTROL XL) 5 MG extended release tablet, Take 2 tablets by mouth 2 times daily  nitroGLYCERIN (NITROSTAT) 0.4 MG SL tablet, Place 1 tablet under the tongue every 5 minutes as needed for Chest pain  latanoprost (XALATAN) 0.005 % ophthalmic solution,   acyclovir (ZOVIRAX) 400 MG tablet, 400 mg 2 times daily   prednisoLONE acetate (PRED FORTE) 1 % ophthalmic suspension, Place 1 drop into the left eye daily   cetirizine (ZYRTEC) 10 MG tablet, Take 1 tablet by mouth daily  insulin glargine (LANTUS SOLOSTAR) 100 UNIT/ML injection pen, Inject 28 Units into the skin 2 times daily  CONTOUR TEST strip, USE 1 STRIP TO CHECK GLUCOSE TWICE DAILY  DIANA MICROLET LANCETS MISC, TEST TWICE DAILY  Insulin Pen Needle (PEN NEEDLES) 31G X 6 MM MISC, 1 each by Does not apply route daily    Current Medications:     Scheduled Meds:    insulin glargine  30 Units SubCUTAneous BID    darbepoetin shannon-polysorbate  60 mcg SubCUTAneous Weekly    cloNIDine  0.2 mg Oral TID    glipiZIDE  10 mg Oral BID AC    cetirizine  5 mg Oral Daily    insulin lispro  0-18 Units SubCUTAneous TID WC    insulin lispro  0-9 Units SubCUTAneous Nightly    acyclovir  400 mg Oral BID    amLODIPine  10 mg Oral Nightly    hydrALAZINE  100 mg Oral TID    prednisoLONE acetate  1 drop Left Eye Daily    sodium chloride flush  5-40 mL IntraVENous 2 times per day     Continuous Infusions:    sodium chloride      dextrose      sodium chloride       PRN Meds:  sodium chloride, glucose, dextrose, glucagon (rDNA), dextrose, sodium chloride flush, sodium chloride, ondansetron **OR** ondansetron, polyethylene glycol, acetaminophen **OR** acetaminophen    Input/Output:       I/O last 3 completed shifts: In: [address] [P.O.:1360]  Out: [facility] [Urine:1525].       Patient Vitals for the past 96 hrs (Last 3 readings):   Weight   21 0900 210 lb 1.6 oz (95.3 kg)   21 0450 207 lb 3.2 oz (94 kg)   11/15/21 0045 206 lb (93.4 kg)       Vital Signs:   Temperature:  Temp: 96.7 °F (35.9 °C)  TMax:   Temp (24hrs), Av °F (36.1 °C), Min:96.7 °F (35.9 °C), Max:97.4 °F (36.3 °C)    Respirations:  Resp: 18  Pulse:   Pulse: 62  BP:    BP: (!) 134/56  BP Range: Systolic (06FFR), QXY:437 , Min:119 , XWR:991       Diastolic (15RTP), UZZ:30, Min:46, Max:61      Physical Examination:     General -- no distress  Oral Mucosa -- moist  Neck --  JVD - no  Extremities -- +edema, , purpuric rash noted  CNS - awake and alert   Pschy - not agitated, mood and memory normal    Due to this being a TeleHealth encounter, evaluation of the following organ systems is limited: Vitals/EENT/Resp/CV/GI//MS/Neuro/Skin/Heme-Lymph-Imm. Labs:       Recent Labs     11/15/21  0605 11/16/21  0605 11/17/21  0540   WBC 10.6 10.6 10.6   RBC 3.24* 3.01* 3.51*   HGB 9.1* 8.4* 9.8*   HCT 27.8* 26.3* 31.4*   MCV 85.8 87.4 89.5   MCH 28.1 27.9 27.9   MCHC 32.7 31.9 31.2   RDW 16.5* 16.6* 17.4*    148 174   MPV 9.6 9.2 9.8      BMP:   Recent Labs     11/15/21  0605 11/16/21  0605 11/17/21  0540    135 140   K 4.6 4.1 4.0    106 108*   CO2 17* 17* 16*   BUN 94* 104* 106*   CREATININE 2.65* 2.74* 3.15*   GLUCOSE 242* 213* 87   CALCIUM 7.9* 7.4* 7.6*      Phosphorus:   No results for input(s): PHOS in the last 72 hours. Magnesium:    No results for input(s): MG in the last 72 hours. Albumin:  No results for input(s): LABALBU in the last 72 hours. BNP:    No results found for: BNP  VALERIE:      Lab Results   Component Value Date    VALERIE NEGATIVE 11/11/2021     SPEP:  Lab Results   Component Value Date    PROT 6.4 11/12/2021    ALBCAL 3.9 09/11/2018    ALBPCT 62 09/11/2018    LABALPH 0.2 09/11/2018    LABALPH 0.7 09/11/2018    A1PCT 3 09/11/2018    A2PCT 11 09/11/2018    LABBETA 0.7 09/11/2018    BETAPCT 12 09/11/2018    GAMGLOB 0.8 09/11/2018    GGPCT 13 09/11/2018    PATH ELECTRONICALLY SIGNED. Elsy Rosales M.D. 09/11/2018    PATH ELECTRONICALLY SIGNED.  Elsy Rosales M.D. 09/11/2018     UPEP:   No results found for: LABPE  C3:     Lab Results   Component Value Date    C3 134 09/11/2018     C4:     Lab Results   Component Value Date    C4 23 09/11/2018     MPO ANCA:     Lab Results   Component Value Date    MPO 15 11/11/2021     PR3 ANCA:     Lab Results   Component Value Date    PR3 1.1 11/11/2021     Anti-GBM:   No results found for: GBMABIGG  Hep BsAg:       No results found for: HEPBSAG  Hep C AB:        No results found for: HEPCAB    Urinalysis/Chemistries:      Lab Results   Component Value Date    NITRU NEGATIVE 11/11/2021    COLORU Yellow 11/11/2021    PHUR 5.5 11/11/2021    WBCUA 0 TO 2 11/11/2021    RBCUA 0 TO 2 11/11/2021    MUCUS NOT REPORTED 11/11/2021    TRICHOMONAS NOT REPORTED 11/11/2021    YEAST NOT REPORTED 11/11/2021    BACTERIA NOT REPORTED 11/11/2021    SPECGRAV 1.020 11/11/2021    LEUKOCYTESUR NEGATIVE 11/11/2021    UROBILINOGEN Normal 11/11/2021    BILIRUBINUR NEGATIVE 11/11/2021    GLUCOSEU 3+ 11/11/2021    KETUA NEGATIVE 11/11/2021    AMORPHOUS NOT REPORTED 11/11/2021     Urine Sodium:     Lab Results   Component Value Date    NIDHI 67 11/12/2021     Urine Potassium:    Lab Results   Component Value Date    KUR 25.3 11/12/2021     Urine Chloride:    Lab Results   Component Value Date    CLUR 54 11/12/2021     Urine Osmolarity: No results found for: OSMOU  Urine Protein:   No components found for: TOTALPROTEIN, URINE   Urine Creatinine:     Lab Results   Component Value Date    LABCREA 94.6 11/02/2021     Urine Eosinophils:  No components found for: UEOS        Impression and Plan:  1. VIDAL on CKD III  : initially improved with IV Fluids however patient became fluid overloaded and now requiring diuretics. Renal function worsening with diuretics, weights are increasing, and has increasing metabolic acidosis.   Discussed with Jeane Crump this AM and recommended transfer to [facility] where his primary nephrologist is located so he can have dialysis if needed  -renal US unremarakble  -I do not feel any underlying glomerulonephritis going on as urine is bland  -multiple factors on admission including hyperglyemia causing volume depletion, ACE-I, Epleronone and contrast nephropathy vs possible cholesterol emboli. [facility] 10/5.      2. Hyperkalemia form VIDAL, ACE-I and Epleronone + hyperglycemia: resolved  3. Metabolic acidosis, worsening. Check VBG  4. HTN: , improved  5. Diastolic CHF, weights increasing  6. Anemia, s/p IV iron as outpatient. RAVI  7. Rash. S/p biopsy. Urticarial vasculitis. 8. DM        Please don't hesitate to call with any questions.   Electronically signed by Raleigh Coleman DO on 11/17/2021 at 1:39 PM

## 2021-11-17 NOTE — PROGRESS NOTES
Spoke with Dr. Jackquelyn Barthel as well as Dr. Duke Le and Dr. Sam Araan. Updates given. Spoke with patient and family agree for transfer to [facility] [place] where his nephrologist Dr. Evangelista Church is. Call initiated patient is excepted by Dr. Oscar Clay however they are discharge dependent. Unable to state when a bed would be available. Family updated.     Reba Carrasco, APRN - CNP

## 2021-11-17 NOTE — PROGRESS NOTES
Physical Therapy  Facility/Department: Critical access hospital AT THE [facility] MED SURG  Daily Treatment Note  NAME: Celine Cifuentes  : 1945  MRN: [id number]    Date of Service: 2021    Discharge Recommendations:  Continue to assess pending progress        Assessment   Treatment Diagnosis: general debility  Prognosis: Good  Decision Making: Low Complexity  PT Education: Gait Training; General Safety; Transfer Training  REQUIRES PT FOLLOW UP: Yes  Activity Tolerance  Activity Tolerance: Patient Tolerated treatment well; Treatment limited secondary to medical complications (free text)  Activity Tolerance: limited by dizziness. Patient Diagnosis(es): The primary encounter diagnosis was Chest pain, unspecified type. Diagnoses of Hyperkalemia, Acute kidney injury ([facility] [address] Hyperglycemia, and Chronic anemia were also pertinent to this visit.      has a past medical history of Acute MI ([address] Acute renal failure with tubular necrosis ([address] Anemia, CAD (coronary artery disease), Cerebral artery occlusion with cerebral infarction [facility]), CHF (congestive heart failure) ([address] Chronic back pain, Closed fracture of lumbar vertebra without mention of spinal cord injury, Displacement of intervertebral disc, site unspecified, without myelopathy, H/O cardiac catheterization, H/O cardiovascular stress test, H/O tilt table evaluation, H/O tilt table evaluation, History of 24 hour EKG monitoring, History of 24 hour EKG monitoring, History of blood transfusion, History of cardiovascular stress test, History of cardiovascular stress test, History of coronary artery stent placement, History of CVA (cerebrovascular accident) without residual deficits, History of echocardiogram, History of Holter monitoring, History of tilt table evaluation, Hyperlipidemia, Hypertension, Non critical Right Renal artery stenosis, native ([address] Pacemaker, S/P cardiac cath, S/P coronary artery stent placement, SIRS (systemic inflammatory response syndrome) ([address] and Type II or ther ex completed in seated and reclined. BP taken during ther ex, 132/49, pt stating it has been normal for him. Pt required frequent RBs d/t fatigue this date, c/o mild SOB during ex, educated on proper breathing technique and nursing notified,. Goals  Short term goals  Time Frame for Short term goals: 20 visits  Short term goal 1: Patient will demonstrate the ability to complete bed mobility and transfers independently in order to return to [facility]  Short term goal 2: Patient will demonstrate the ability to ambulate >200 feet with RW with supervision assistance in order to ease functional mobility  Short term goal 3: Patient will ascend/descend 1 step without handrail with supervision assistance in order to safely enter/exit the home  Short term goal 4: Patient will tolerate 35-45' ther-ex in order to increase endurance and ease ADLs    Plan    Plan  Times per week: 7 times per week  Times per day: Twice a day  Current Treatment Recommendations: Strengthening, Balance Training, Functional Mobility Training, Transfer Training, Neuromuscular Re-education, Gait Training, Stair training, Pain Management, Positioning, Equipment Evaluation, Education, & procurement, Patient/Caregiver Education & Training, Safety Education & Training, Home Exercise Program  Safety Devices  Type of devices:  All fall risk precautions in place, Left in chair, Call light within reach, Nurse notified, Gait belt, Patient at risk for falls (no alarm upon entry.)     Therapy Time   Individual Concurrent Group Co-treatment   Time In 0913         Time Out 0937         Minutes [address] [place] [address]

## 2021-11-17 NOTE — PROGRESS NOTES
[address] [facility]  [place]  OCCUPATIONAL THERAPY  No Visit Note    [] ICU    [x] Acute   Patient: Micheline Abdi  Room: 1306/5379-67      Micheline Abdi not seen on 11/17/2021 at 1:16 PM due to pt refusal \"I am heading to [facility] and I want to save my energy. \" Per Lay Hankins note pt is waiting on a bed at [facility].          Signature: Roland WHITAKER

## 2021-11-17 NOTE — PROGRESS NOTES
Progress Note  Monae Arce MD    OBJECTIVE:    Patient seen for f/u of Acute kidney injury superimposed on CKD ([address] He has no further chest pain ,has gained 5 lb.  bp better  Bmp pending  Hb better     ROS:   Constitutional: negative  for fevers, and negative for chills. Respiratory: negative for shortness of breath, negative for cough, and negative for wheezing  Cardiovascular: negative for chest pain, and negative for palpitations  Gastrointestinal: negative for abdominal pain, negative for nausea,negative for vomiting, negative for diarrhea, and negative for constipation     All other systems were reviewed with the patient and are negative unless otherwise stated in HPI    OBJECTIVE:  Vitals:   Temp: 96.7 °F (35.9 °C)  BP: (!) 134/56  Resp: 18  Pulse: 62  SpO2: 97 %    24HR INTAKE/OUTPUT:      Intake/Output Summary (Last 24 hours) at 11/17/2021 0930  Last data filed at 11/17/2021 0747  Gross per 24 hour   Intake 1300 ml   Output 1125 ml   Net 175 ml     -----------------------------------------------------------------  Exam:  GEN:    Awake, alert and oriented x3. EYES:  EOMI, pupils equal   NECK: Supple. No lymphadenopathy. No carotid bruit  CVS:    regular rate and rhythm, 2/6 systolic murmur  PULM: has diminished air entry at bases and has basal rales, no acute respiratory distress  ABD:    Bowels sounds normal.  Abdomen is soft. No distention. no tenderness to palpation. EXT:   trace edema bilaterally . No calf tenderness. NEURO: Moves all extremities. Motor and sensory are grossly intact  SKIN:  No rashes.   No skin lesions.    -----------------------------------------------------------------  Diagnostic Data:    · All available data reviewed  Lab Results   Component Value Date    WBC 10.6 11/17/2021    HGB 9.8 (L) 11/17/2021    MCV 89.5 11/17/2021     11/17/2021      Lab Results   Component Value Date    GLUCOSE 213 (H) 11/16/2021     (HH) 11/16/2021    CREATININE 2.74 (H) 11/16/2021     11/16/2021    K 4.1 11/16/2021    CALCIUM 7.4 (L) 11/16/2021     11/16/2021    CO2 17 (L) 11/16/2021       PROBLEM LIST:  Principal Problem:    Acute kidney injury superimposed on CKD ([facility] [address]  Active Problems: Moderate malnutrition ([facility] [address]    Coronary artery disease involving native coronary artery of native heart without angina pectoris    Chronic diastolic HF (heart failure), NYHA class 3 (HCC)    Chronic anemia    Acute purpuric eruption    Hyperkalemia  Resolved Problems:    * No resolved hospital problems.  *      ASSESSMENT / PLAN:  Acute kidney injury superimposed on CKD ([facility] [address]  Await bmp,yesterdays renal functin was worse  · Hyperkalemia improved  · Cad- no chest pain  · Htn-improving with clonidine  · chf- worse- had lasix 40 mg iv this am,has gained 5 lbs  · Anemia- hb 9.8,this is multifactorial including due to ckd,reaction to iv iron  · Nutrition status: at risk for malnutrition  · DVT prophylaxis:scd  · High risk medications: none   Disposition:  Discharge plan is home   Ritesh Downing MD , M.D.  11/17/2021  9:40 AM

## 2021-11-17 NOTE — PROGRESS NOTES
Patient used call light to ask to go to the bathroom, Nurse Leobardo Soulier helped patient to the bathroom.  Patient stated that he felt like his sugar might be low, Fanny check pt sugar and it was 45, writer got patient orange juice a snack and some ice cream. Writer rechecked patients sugar about 20 minutes later and it was 183

## 2021-11-18 LAB
ABSOLUTE EOS #: 0.36 K/UL (ref 0–0.44)
ABSOLUTE IMMATURE GRANULOCYTE: 0.71 K/UL (ref 0–0.3)
ABSOLUTE LYMPH #: 0.62 K/UL (ref 1.1–3.7)
ABSOLUTE MONO #: 0.09 K/UL (ref 0.1–1.2)
ANION GAP SERPL CALCULATED.3IONS-SCNC: 14 MMOL/L (ref 9–17)
BASOPHILS # BLD: 0 % (ref 0–2)
BASOPHILS ABSOLUTE: 0 K/UL (ref 0–0.2)
BUN BLDV-MCNC: 104 MG/DL (ref 8–23)
BUN/CREAT BLD: 32 (ref 9–20)
CALCIUM SERPL-MCNC: 7.3 MG/DL (ref 8.6–10.4)
CHLORIDE BLD-SCNC: 108 MMOL/L (ref 98–107)
CO2: 16 MMOL/L (ref 20–31)
CREAT SERPL-MCNC: 3.23 MG/DL (ref 0.7–1.2)
DIFFERENTIAL TYPE: ABNORMAL
EOSINOPHILS RELATIVE PERCENT: 4 % (ref 1–4)
GFR AFRICAN AMERICAN: 23 ML/MIN
GFR NON-AFRICAN AMERICAN: 19 ML/MIN
GFR SERPL CREATININE-BSD FRML MDRD: ABNORMAL ML/MIN/{1.73_M2}
GFR SERPL CREATININE-BSD FRML MDRD: ABNORMAL ML/MIN/{1.73_M2}
GLUCOSE BLD-MCNC: 103 MG/DL (ref 74–100)
GLUCOSE BLD-MCNC: 125 MG/DL (ref 70–99)
GLUCOSE BLD-MCNC: 143 MG/DL (ref 74–100)
GLUCOSE BLD-MCNC: 196 MG/DL (ref 74–100)
GLUCOSE BLD-MCNC: 37 MG/DL (ref 74–100)
GLUCOSE BLD-MCNC: 56 MG/DL (ref 74–100)
GLUCOSE BLD-MCNC: 75 MG/DL (ref 74–100)
GLUCOSE BLD-MCNC: 87 MG/DL (ref 74–100)
HCT VFR BLD CALC: 25.6 % (ref 40.7–50.3)
HEMOGLOBIN: 8.2 G/DL (ref 13–17)
IMMATURE GRANULOCYTES: 8 %
LYMPHOCYTES # BLD: 7 % (ref 24–43)
MCH RBC QN AUTO: 28.3 PG (ref 25.2–33.5)
MCHC RBC AUTO-ENTMCNC: 32 G/DL (ref 28.4–34.8)
MCV RBC AUTO: 88.3 FL (ref 82.6–102.9)
MONOCYTES # BLD: 1 % (ref 3–12)
MORPHOLOGY: NORMAL
NRBC AUTOMATED: 0 PER 100 WBC
PDW BLD-RTO: 17.9 % (ref 11.8–14.4)
PLATELET # BLD: 136 K/UL (ref 138–453)
PLATELET ESTIMATE: ABNORMAL
PMV BLD AUTO: 9.7 FL (ref 8.1–13.5)
POTASSIUM SERPL-SCNC: 4.1 MMOL/L (ref 3.7–5.3)
RBC # BLD: 2.9 M/UL (ref 4.21–5.77)
RBC # BLD: ABNORMAL 10*6/UL
SEG NEUTROPHILS: 80 % (ref 36–65)
SEGMENTED NEUTROPHILS ABSOLUTE COUNT: 7.12 K/UL (ref 1.5–8.1)
SODIUM BLD-SCNC: 138 MMOL/L (ref 135–144)
WBC # BLD: 8.9 K/UL (ref 3.5–11.3)
WBC # BLD: ABNORMAL 10*3/UL

## 2021-11-18 PROCEDURE — 36415 COLL VENOUS BLD VENIPUNCTURE: CPT

## 2021-11-18 PROCEDURE — 2580000003 HC RX 258: Performed by: INTERNAL MEDICINE

## 2021-11-18 PROCEDURE — 85025 COMPLETE CBC W/AUTO DIFF WBC: CPT

## 2021-11-18 PROCEDURE — 97116 GAIT TRAINING THERAPY: CPT

## 2021-11-18 PROCEDURE — 99232 SBSQ HOSP IP/OBS MODERATE 35: CPT | Performed by: INTERNAL MEDICINE

## 2021-11-18 PROCEDURE — 6370000000 HC RX 637 (ALT 250 FOR IP): Performed by: INTERNAL MEDICINE

## 2021-11-18 PROCEDURE — 80048 BASIC METABOLIC PNL TOTAL CA: CPT

## 2021-11-18 PROCEDURE — 6360000002 HC RX W HCPCS: Performed by: INTERNAL MEDICINE

## 2021-11-18 PROCEDURE — 99233 SBSQ HOSP IP/OBS HIGH 50: CPT | Performed by: FAMILY MEDICINE

## 2021-11-18 PROCEDURE — 82947 ASSAY GLUCOSE BLOOD QUANT: CPT

## 2021-11-18 PROCEDURE — 6370000000 HC RX 637 (ALT 250 FOR IP): Performed by: FAMILY MEDICINE

## 2021-11-18 PROCEDURE — 1200000000 HC SEMI PRIVATE

## 2021-11-18 PROCEDURE — 97110 THERAPEUTIC EXERCISES: CPT

## 2021-11-18 PROCEDURE — 94761 N-INVAS EAR/PLS OXIMETRY MLT: CPT

## 2021-11-18 RX ORDER — INSULIN GLARGINE 100 [IU]/ML
25 INJECTION, SOLUTION SUBCUTANEOUS 2 TIMES DAILY
Status: DISCONTINUED | OUTPATIENT
Start: 2021-11-18 | End: 2021-11-21 | Stop reason: HOSPADM

## 2021-11-18 RX ADMIN — ACYCLOVIR 400 MG: 200 CAPSULE ORAL at 08:00

## 2021-11-18 RX ADMIN — HYDRALAZINE HYDROCHLORIDE 100 MG: 50 TABLET, FILM COATED ORAL at 08:00

## 2021-11-18 RX ADMIN — ONDANSETRON 4 MG: 2 INJECTION INTRAMUSCULAR; INTRAVENOUS at 10:08

## 2021-11-18 RX ADMIN — AMLODIPINE BESYLATE 10 MG: 10 TABLET ORAL at 23:11

## 2021-11-18 RX ADMIN — CLONIDINE HYDROCHLORIDE 0.2 MG: 0.2 TABLET ORAL at 23:11

## 2021-11-18 RX ADMIN — CLONIDINE HYDROCHLORIDE 0.2 MG: 0.2 TABLET ORAL at 08:00

## 2021-11-18 RX ADMIN — CETIRIZINE HYDROCHLORIDE 5 MG: 10 TABLET, FILM COATED ORAL at 08:00

## 2021-11-18 RX ADMIN — ACYCLOVIR 400 MG: 200 CAPSULE ORAL at 23:10

## 2021-11-18 RX ADMIN — SODIUM CHLORIDE, PRESERVATIVE FREE 10 ML: 5 INJECTION INTRAVENOUS at 08:00

## 2021-11-18 RX ADMIN — PREDNISOLONE ACETATE 1 DROP: 10 SUSPENSION/ DROPS OPHTHALMIC at 08:04

## 2021-11-18 RX ADMIN — CLONIDINE HYDROCHLORIDE 0.2 MG: 0.2 TABLET ORAL at 13:42

## 2021-11-18 RX ADMIN — HYDRALAZINE HYDROCHLORIDE 100 MG: 50 TABLET, FILM COATED ORAL at 13:41

## 2021-11-18 RX ADMIN — SODIUM CHLORIDE, PRESERVATIVE FREE 10 ML: 5 INJECTION INTRAVENOUS at 23:11

## 2021-11-18 RX ADMIN — HYDRALAZINE HYDROCHLORIDE 100 MG: 50 TABLET, FILM COATED ORAL at 23:11

## 2021-11-18 RX ADMIN — GLIPIZIDE 10 MG: 5 TABLET ORAL at 08:00

## 2021-11-18 RX ADMIN — GLIPIZIDE 10 MG: 5 TABLET ORAL at 17:11

## 2021-11-18 NOTE — PROGRESS NOTES
Patient used call light to call out, patient states to Hernandez Null that he believed his blood sugar was low. Hernandez Null took bs it was 40. Patient a & o able to talk clearly. Writer gave patient peanut butter crackers and orange juice. Writer recheck bs about 15 minutes later 56. Patient given another snack, patient is alert and oriented stated he is starting to feel better. Blood sugar rechecked and it was 87. Patient stated he felt okay. Will continue to monitor.

## 2021-11-18 NOTE — PROGRESS NOTES
Patient: Sabino Cook  : 1945  Date of Admission: 2021  Primary Care Physician: Stalin Suarez  Today's Date: 2021    REASON FOR CONSULTATION: Chest Pain (Mid chest tightness, radiating to left arm. Onset 30mins ago), Shortness of Breath, and Other (Abnormal labwork drawn today. (potassium, glucose and kidney function))      HPI: Mr. Edilson Parra is a 68 y.o. male well known to me with a known history of dysautonomia where on tilt table testing his blood pressure dropped from 447 systolic to 78 systolic with only a 20-07 HR response. More recently, a CT of he head in the past showed evidence of at least 2 old CVA's and a heart catheterization showed severe single vessel disease in the ostium of a moderate sized OM1 branch of the circumflex. However, maximal guidelines directed medical management was opted for an place of stenting partly due to the risk associated with stenting this vessel. Recently he has had a coronary angiography procedure with stenting of his HA Om1. As you know, Mr. Edilson Parra  is a 76 y.o. male with a history of recent onset substernal chest discomfort that led to a stress test and a subsequent heart catheterization which showed severe single vessel coronary artery disease of the HA Om1 with successful angioplasty and stenting of that vessel that was done by Dr. Duard Favre on 4/10/17. More recently he has had problems with balance problems when he is in his feet and says that a Neurologist has told him in the past this is because of his previous strokes. Most recently he underwent a cardiovascular stress test which fortunately had shown to be normal on 3/21/2018. Mr. Edilson Parra has significant normal stress test and echocardiogram on 2020. Holter monitor done on 2020: The rhythm was sinus. Average daily heart rate 58 ranging from 47 to 81 bpm. Bradycardia for 72% test duration. Rare premature supraventricular ectopic beats consisting of 33 isolated PACs.  Rare premature ventricular ectopic beats total 75 consisting of 73 isolatedPVCs and a ventricular couplet. Echocardiogram done on 12/18/2020: EF of 60%. Moderately increased LV wall thickness. Borderline pulmonary hypertension. Mild tricuspid regurgitation. Mild diastolic dysfunction is seen/ Aortic root is mildly dilated. Stress test done on 9/20/2021 was equivocal, There is a small/moderate perfusion defect of mild intensity in the lateral, inferior and inferoapical regions. Cardiac cath done on 10/5/2021 showed Moderate three vessel coronary artery disease involving a ostial 50-60% stenosis in a small, non-dominant RCA, a 60% mid LAD stenosis and a proximal 50-60% stenosis in the left anterior descending coronary artery. He does note some mild worsening of his shortness of breath. He is currently admitted for acute kidney injury superimposed on his chronic kidney disease. He says he is feeling pretty fatigued but denies any pain. He denied any abdominal pain, bleeding problems, bowel issues, problems with his medications or any other concerns at this time. No nausea or vomiting. No cough, fever or chills. Past Medical History:   Diagnosis Date    Acute MI ([address]     Acute renal failure with tubular necrosis ([facility] 10/2/2018    ssecondary to hemodynamic effects of loop diuretics and ace inhibitors, bbaseline 1.2-1.3 which peaked up to 1.8, resolving    Anemia     CAD (coronary artery disease)     Cerebral artery occlusion with cerebral infarction ([facility] [address] [facility]     CHF (congestive heart failure) (HCC)     Chronic back pain     Closed fracture of lumbar vertebra without mention of spinal cord injury     Displacement of intervertebral disc, site unspecified, without myelopathy     H/O cardiac catheterization 2/19/16    LMCA: Mild irregularities 10-20%. LAD: Lesion on PRox LAD: Mid subsection. 65% stenosis. LCx: Lesion on 1st Ob Valentina: Proximal subesection. 70% steniosis. RCA: Small non-dominant RCA.  Lesion on thickness is moderately increased, no definite wall motion abnormalilities, what appears to be a pacer wire is seen w/n the RA and RV, mild-mod TR, mild pulmonary hypertension.  History of Holter monitoring 12/29/2017    Rare PAC's and PVC's.  History of tilt table evaluation 8/12/14    Abnormal    Hyperlipidemia     Hypertension     Non critical Right Renal artery stenosis, native ([address] 10/2/2018    Non critical Right Renal artery stenosis, based on cath in 2016, Rt RA 30% stenosis    Pacemaker     non-functioning    S/P cardiac cath 04/10/2017    S/P coronary artery stent placement     Successful PTCA - HA Om1    SIRS (systemic inflammatory response syndrome) ([facility] [address] 5/19/2019    Type II or unspecified type diabetes mellitus without mention of complication, not stated as uncontrolled        CURRENT ALLERGIES: Lipitor [atorvastatin], Aldactone [spironolactone], Crestor [rosuvastatin calcium], Lopid [gemfibrozil], Invokana [canagliflozin], and Januvia [sitagliptin] REVIEW OF SYSTEMS: 14 systems were reviewed. Pertinent positives and negatives as above, all else negative. Past Surgical History:   Procedure Laterality Date    BACK SURGERY      CARDIAC CATHETERIZATION Left 02/19/2016    via right radial approach/ [facility] JACINDA [place]/ Dr. Ifeanyi Brantley Left 10/05/2021    Dr Ana Maria Zafar radial-Moderate three vessel coronary artery disease involving a ostial 50-60% stenosis in a small, non-dominant RCA, a 60% mid LAD stenosis and a proximal 50-60% stenosis in the left anterior descending coronary artery. Normal left ventricular end diastolic pressure. Proceed with aggressive maximal medical management as clinically indicated.     CHOLECYSTECTOMY  08/17/2015    Liang/Charles/Saulo/ Millicent    COLONOSCOPY  2010    COLONOSCOPY  02/19/2015    -polyps,diverticulosis,hemorrhoids    COLONOSCOPY  05/23/2018    Dr Micah Stern COLONOSCOPY N/A 2018    COLONOSCOPY POLYPECTOMY SNARE/COLD BIOPSY  cold snare  and  hot snare performed by Erica Talbot MD at [address]  10/30/2020    with Dr. Brittany Harman 10/30/2020    Nunezannie Lovells, NOT HIGH RISK performed by Jodi Renee DO at [address]      left eye    ENDOSCOPY, COLON, DIAGNOSTIC      EYE SURGERY      PACEMAKER INSERTION      PACEMAKER PLACEMENT      UPPER GASTROINTESTINAL ENDOSCOPY  2019    Dr. Hiren Barnes H-Pylori)duodenal bulbar/antral erythema    UPPER GASTROINTESTINAL ENDOSCOPY N/A 2019    EGD BIOPSY, clotest performed by Erica Talbot MD at [address] [facility] 10/30/2020    EGD ESOPHAGOGASTRODUODENOSCOPY performed by Jodi Renee DO at [address] History:  Social History     Tobacco Use    Smoking status: Former Smoker     Packs/day: 1.50     Years: 8.00     Pack years: 12.00     Types: Cigarettes     Quit date: 3/4/1980     Years since quittin.7    Smokeless tobacco: Never Used   Vaping Use    Vaping Use: Never used   Substance Use Topics    Alcohol use: No    Drug use: No        CURRENT MEDICATIONS:  Outpatient Medications Marked as Taking for the 21 encounter [facility] Encounter)   Medication Sig Dispense Refill    [] predniSONE (DELTASONE) 20 MG tablet Take 1 tablet by mouth 2 times daily for 5 days 10 tablet 0    eplerenone (INSPRA) 25 MG tablet Take 1 tablet by mouth daily 90 tablet 3    loratadine (CLARITIN) 10 MG tablet Take 1 tablet by mouth daily (Patient taking differently: Take 10 mg by mouth daily as needed ) 90 tablet 3    lisinopril (PRINIVIL;ZESTRIL) 20 MG tablet Take 1 tablet by mouth daily 90 tablet 3    amLODIPine (NORVASC) 10 MG tablet Take 1 tablet by mouth nightly 90 tablet 0    hydrALAZINE (APRESOLINE) 100 MG tablet Take 1 tablet by mouth 3 times daily Patient is taking 100 MG 3 times daily 270 tablet 0    glipiZIDE (GLUCOTROL XL) 5 MG extended release tablet Take 2 tablets by mouth 2 times daily 360 tablet 0    nitroGLYCERIN (NITROSTAT) 0.4 MG SL tablet Place 1 tablet under the tongue every 5 minutes as needed for Chest pain 25 tablet 3    latanoprost (XALATAN) 0.005 % ophthalmic solution       acyclovir (ZOVIRAX) 400 MG tablet 400 mg 2 times daily       prednisoLONE acetate (PRED FORTE) 1 % ophthalmic suspension Place 1 drop into the left eye daily        insulin glargine (LANTUS) injection vial 30 Units, BID  darbepoetin shannon-polysorbate (ARANESP) injection 60 mcg, Weekly  cloNIDine (CATAPRES) tablet 0.2 mg, TID  glipiZIDE (GLUCOTROL) tablet 10 mg, BID AC  0.9 % sodium chloride infusion, PRN  glucose (GLUTOSE) 40 % oral gel 15 g, PRN  dextrose 50 % IV solution, PRN  glucagon (rDNA) injection 1 mg, PRN  dextrose 5 % solution, PRN  cetirizine (ZYRTEC) tablet 5 mg, Daily  insulin lispro (HUMALOG) injection vial 0-18 Units, TID WC  insulin lispro (HUMALOG) injection vial 0-9 Units, Nightly  acyclovir (ZOVIRAX) capsule 400 mg, BID  amLODIPine (NORVASC) tablet 10 mg, Nightly  hydrALAZINE (APRESOLINE) tablet 100 mg, TID  prednisoLONE acetate (PRED FORTE) 1 % ophthalmic suspension 1 drop, Daily  sodium chloride flush 0.9 % injection 5-40 mL, 2 times per day  sodium chloride flush 0.9 % injection 10 mL, PRN  0.9 % sodium chloride infusion, PRN  ondansetron (ZOFRAN-ODT) disintegrating tablet 4 mg, Q8H PRN   Or  ondansetron (ZOFRAN) injection 4 mg, Q6H PRN  polyethylene glycol (GLYCOLAX) packet 17 g, Daily PRN  acetaminophen (TYLENOL) tablet 650 mg, Q6H PRN   Or  acetaminophen (TYLENOL) suppository 650 mg, Q6H PRN       FAMILY HISTORY: family history includes Cancer in his father and mother. Physical Examination:     BP (!) 157/57   Pulse 71   Temp 97.7 °F (36.5 °C) (Temporal)   Resp 18   Ht 5' 9\" (1.753 m)   Wt 210 lb 1.6 oz (95.3 kg)   SpO2 97%   BMI 31.03 kg/m²  Body mass index is 31.03 kg/m². Constitutional: He appeared oriented to person and place. He appears well-developed and well-nourished. In no acute distress. HEENT: Normocephalic and atraumatic. No JVD present. Carotid bruit is not present. No mass and no thyromegaly present. No lymphadenopathy noted. Cardiovascular: Normal rate, regular rhythm, normal heart sounds. Exam reveals no gallop and no friction rubs. 2/6 systolic murmur, 5th intercostal space on the LEFT in the mid-clavicular line (cardiac apex)  Pulmonary/Chest: Effort normal and breath sounds normal. No respiratory distress. He has no wheezes, rhonchi or rales. Abdominal: Hard, tender, appears to have fluid in his abdomen. He exhibits no organomegaly, mass or bruit. Extremities: None. No cyanosis or clubbing. 2+ radial and carotid pulses. Distal extremity pulses: 2+ bilaterally. Neurological: Alertness and orientation as per Constitutional exam. No evidence of gross cranial nerve deficit. Coordination appeared normal.   Skin: Skin is warm and dry. There is no rash or diaphoresis. Psychiatric: He has a normal mood and affect.  His speech is normal and behavior is normal.      MOST RECENT LABS ON RECORD:   Lab Results   Component Value Date    WBC 10.6 11/17/2021    HGB 9.8 (L) 11/17/2021    HCT 31.4 (L) 11/17/2021     11/17/2021    CHOL 69 09/21/2021    TRIG 185 (H) 09/21/2021    HDL 14 (L) 09/21/2021    LDLCHOLESTEROL 18 09/21/2021    ALT 11 11/12/2021    AST 11 11/12/2021     11/17/2021    K 4.0 11/17/2021     (H) 11/17/2021    CREATININE 3.15 (H) 11/17/2021     (HH) 11/17/2021    CO2 16 (L) 11/17/2021    TSH 2.26 11/02/2017    PSA 3.68 04/30/2021    INR 1.1 09/20/2021    LABA1C 7.3 08/19/2021    LABMICR 30 10/17/2015        ASSESSMENT:  Patient Active Problem List    Diagnosis Date Noted    S/P drug eluting coronary stent placement-OM1 4/10/17 04/10/2017    Abnormal tilt table test 02/23/2016    Abnormal cardiovascular stress test 02/18/2016    Moderate malnutrition ([address] 11/12/2021    Coronary atherosclerosis due to calcified coronary lesion 02/20/2015    Cardiorenal syndrome, stage 1-4 or unspecified chronic kidney disease, with heart failure ([address] 11/17/2021    Cardiorenal syndrome with renal failure 11/17/2021    Hyperkalemia 11/11/2021    Acute purpuric eruption 11/10/2021    Skin lesion of left lower extremity 11/10/2021    Urticaria 11/10/2021    Iron deficiency anemia secondary to inadequate dietary iron intake 10/21/2021    Iron malabsorption 10/21/2021    Chronic anemia 09/21/2021    Acute coronary syndrome with high troponin ([address] 09/21/2021    Chest pain 09/20/2021    Gross hematuria 05/05/2021    BPH with obstruction/lower urinary tract symptoms 12/10/2019    Elevated PSA 12/10/2019    Anemia in stage 3 chronic kidney disease ([address] 05/21/2019    Acute kidney injury superimposed on CKD ([address] 10/02/2018    Non critical Right Renal artery stenosis, native ([address] 10/02/2018    Medication side effect, initial encounter 03/23/2018    Angina, class II ([address] 01/08/2018    Hyperlipidemia 04/24/2017    Chronic diastolic HF (heart failure), NYHA class 3 ([address] 04/24/2017    Coronary artery disease involving native coronary artery of native heart without angina pectoris 05/02/2016    Cervical stenosis of spinal canal 02/29/2016    Chronic kidney disease, stage III (moderate) ([address] 02/22/2016    Controlled type 2 diabetes mellitus with diabetic nephropathy, with long-term current use of insulin ([address] 02/22/2016    Ischemic chest pain ([address] 02/17/2016    Accelerated hypertension 10/16/2015    Chronotropic incompetence with autonomic dysfunction 09/02/2015    Episodic lightheadedness 09/02/2015    Cholecystitis 08/17/2015    Syncope, near 08/05/2015    Dysautonomia ([address] 06/29/2015    History of CVA (cerebrovascular accident) without residual deficits     Displacement of intervertebral disc, site unspecified, without myelopathy 03/04/2015    History of colon polyps 34/15/3809    Systolic murmur 70/76/2381    History of MI (myocardial infarction) 02/12/2014    Vertigo, benign paroxysmal 10/17/2012     PLAN:    Acute on chronic renal insufficiency: Unclear etiology but likely multifactorial: Continue current treatment  Treatment options discussed extensively with Dr. Elizabeth Lopez, [facility]   Lasix on hold per nephrology due to worsening renal funcion  I had a 20 minute discussion with the patient and family on current status and told them that I agree with plan to transfer to  [facility]. I called this evening and although they said the wait list is about 14 patients, they expect that he should be able to be transferred in the next 24 hours. Anemia, unclear etiology: Likely multifactorial including GI losses and acute on chronic renal insufficiency. He received 1 unit pRBC transfusion during hospital stay    Dysautonomia: Mildly to moderately, likely related to and worsened by anemia. Currently stable  Diuretics: Not indicated at this time. Nonpharmacologic counseling: Because of his condition, I reminded him to try and keep himself well-hydrated and to take extra time when moving from laying to sitting, sitting to standing and standing to walking as well as wearing at least knee high compressions stockings. Acute on Chronic Diastolic Heart Failure: EF of 60% via echo on 12/18/2020. Up 9 lbs over last 24 hours  Beta Blocker: Contraindicated due to history of symptomatic bradycardia, intolerance and/or allergy. Patient reports abdominal pain, nausea due to medication. ACE Inibitor/ARB: Will defer to nephrology with current acute on chronic renal insufficiency   Diuretics: Lasix On hold due to his BUN and recently worsening kidney function. probably still up 9-12 lbs.   Nonpharmacologic management of Heart Failure: I advised him to try and keep his legs up whenever possible and to limit salt in his diet. Steroid and fluids were discontinued yesterday. Compression stockings in place. Atherosclerotic Heart Disease: Coronary Artery stent: 4/10/2017. Cardiac cath done on 10/5/2021 showed Moderate three vessel coronary artery disease involving a ostial 50-60% stenosis in a small, non-dominant RCA, a 60% mid LAD stenosis and a proximal 50-60% stenosis in the left anterior descending coronary artery  Antiplatelet Agent: Not indicated due to bleeding  ACE Inibitor/ARB: Will defer to nephrology with current acute on chronic renal insufficiency  Beta Blocker: Contraindicated due to history of symptomatic bradycardia, intolerance and/or allergy. Cholesterol Reduction Therapy: Refused by patient. Laboratory testing: None    Essential Hypertension: Uncontrolled likely worsened by prednisone and stopping of eplerinone which I agree with  ACE Inibitor/ARB: Will defer to nephrology with current acute on chronic renal insufficiency  Beta Blocker: Contraindicated due to history of symptomatic bradycardia, intolerance and/or allergy. due to at least perceived side effects of abdominal pain  Calcium Channel Blocker: Continue amlodipine (Norvasc) 10 mg once daily. Agree with increased Clonidine 0.2 mg tid  Continue Hydralazine 100 mg 3x/day    Hyperlipidemia: Mixed - Last LDL on 9/21/2021 was 18 mg/dL   Cholesterol Reduction Therapy: Refused by patient. Once again, thank you for allowing me to participate in this patients care. Please do not hesitate to contact me if I could be of any further assistance. Sincerely,  Vicente Benítez MD, MS, F.A.C.C. [facility] Specialist     [facility], [address] [place] [address]  Phone: [phone], Fax: [phone]     I believe that the risk of significant morbidity and mortality related to the patient's current medical conditions are: intermediate-high.         November 17, 2021

## 2021-11-18 NOTE — PROGRESS NOTES
Progress Note    SUBJECTIVE:    Patient seen for f/u of Cardiorenal syndrome, stage 1-4 or unspecified chronic kidney disease, with heart failure ([facility] [address] He resting in bed alert oriented no acute distress. States she does not feel well. Blood sugar was low this morning into the 30s. Patient is afebrile. Denies chest pain. Awaiting transfer to [facility]. Any ROS:   Constitutional: negative  for fevers, and negative for chills. Respiratory: positive for shortness of breath, negative for cough, and negative for wheezing  Cardiovascular: negative for chest pain, and negative for palpitations  Gastrointestinal: negative for abdominal pain, negative for nausea,negative for vomiting, negative for diarrhea, and negative for constipation     All other systems were reviewed with the patient and are negative unless otherwise stated in HPI      OBJECTIVE:      Vitals:   Vitals:    11/18/21 0645   BP: (!) 157/60   Pulse: 68   Resp: 16   Temp: 97.3 °F (36.3 °C)   SpO2: 96%     Weight: 209 lb 6.4 oz (95 kg)   Height: 5' 9\" (175.3 cm)     Weight  Wt Readings from Last 3 Encounters:   11/18/21 209 lb 6.4 oz (95 kg)   11/10/21 193 lb 6.4 oz (87.7 kg)   11/02/21 195 lb (88.5 kg)     Body mass index is 30.92 kg/m². 24HR INTAKE/OUTPUT:      Intake/Output Summary (Last 24 hours) at 11/18/2021 1043  Last data filed at 11/18/2021 0700  Gross per 24 hour   Intake 540 ml   Output 1125 ml   Net -585 ml     -----------------------------------------------------------------  Exam:    GEN:    Awake, alert and oriented x3. EYES:  EOMI, pupils equal   NECK: Supple. No lymphadenopathy. No carotid bruit  CVS:    regular rate and rhythm, systolic murmur  PULM:  CTA, no wheezes, rales or rhonchi, no acute respiratory distress  ABD:    Bowels sounds normal.  Abdomen is soft. No distention. no tenderness to palpation. EXT:   trace edema bilaterally . No calf tenderness. NEURO: Moves all extremities.   Motor and sensory are grossly kidney disease, with heart failure ([facility] [address]  · Continue current therapy   · Appreciate cardiology  · Appreciate nephrology  · Creatinine increasing BUN slight decrease  · Weight down 1 pound  · [facility]wait[facility]  · Acute purpuric eruption  · Continue Zyrtec  · Steroids stopped  · Chronic anemia  · Stable  · Nutrition status:   · Well developed, well nourished with no malnutrition  · Dietician consult initiated  · Hospital Prophylaxis:   · DVT: Teds   · Stress Ulcer: H2 Blocker   · High risk medications: none   · Disposition:    · Discharge plan is transfer to [address] [place] [address], REY - CNP , REY, NP-C  Hospitalist Medicine        11/18/2021, 10:43 AM

## 2021-11-18 NOTE — PROGRESS NOTES
Physical Therapy  Facility/Department: [facility] AT THE [facility] MED SURG  Daily Treatment Note  NAME: Radha Johnston  : 1945  MRN: [id number]    Date of Service: 2021    Discharge Recommendations:  Continue to assess pending progress        Assessment   Body structures, Functions, Activity limitations: Decreased functional mobility ; Decreased strength; Decreased coordination; Decreased balance; Decreased posture  Treatment Diagnosis: general debility  Prognosis: Good  Decision Making: Low Complexity  PT Education: Gait Training; General Safety; Transfer Training  Patient Education: Educated on importance of therapy to maintain and improve strength and endurance. Good understanding noted  REQUIRES PT FOLLOW UP: Yes  Activity Tolerance  Activity Tolerance: Patient Tolerated treatment well; Patient limited by fatigue; Patient limited by endurance; Treatment limited secondary to medical complications (free text)  Activity Tolerance: limited by dizziness. Patient Diagnosis(es): The primary encounter diagnosis was Chest pain, unspecified type. Diagnoses of Hyperkalemia, Acute kidney injury ([address] Hyperglycemia, and Chronic anemia were also pertinent to this visit.      has a past medical history of Acute MI ([address] Acute renal failure with tubular necrosis ([address] Anemia, CAD (coronary artery disease), Cerebral artery occlusion with cerebral infarction [facility]), CHF (congestive heart failure) ([address] Chronic back pain, Closed fracture of lumbar vertebra without mention of spinal cord injury, Displacement of intervertebral disc, site unspecified, without myelopathy, H/O cardiac catheterization, H/O cardiovascular stress test, H/O tilt table evaluation, H/O tilt table evaluation, History of 24 hour EKG monitoring, History of 24 hour EKG monitoring, History of blood transfusion, History of cardiovascular stress test, History of cardiovascular stress test, History of coronary artery stent placement, History of CVA (cerebrovascular accident) without residual deficits, History of echocardiogram, History of Holter monitoring, History of tilt table evaluation, Hyperlipidemia, Hypertension, Non critical Right Renal artery stenosis, native [facility]), Pacemaker, S/P cardiac cath, S/P coronary artery stent placement, SIRS (systemic inflammatory response syndrome) ([facility] [address] and Type II or unspecified type diabetes mellitus without mention of complication, not stated as uncontrolled. has a past surgical history that includes Pacemaker insertion; back surgery; Cholecystectomy (08/17/2015); Corneal transplant; Cardiac catheterization (Left, 02/19/2016); pacemaker placement; Colonoscopy (2010); Colonoscopy (02/19/2015); Colonoscopy (05/23/2018); Colonoscopy (N/A, 05/23/2018); Upper gastrointestinal endoscopy (05/28/2019); Upper gastrointestinal endoscopy (N/A, 05/28/2019); Colonoscopy (10/30/2020); Colonoscopy (N/A, 10/30/2020); Upper gastrointestinal endoscopy (N/A, 10/30/2020); Endoscopy, colon, diagnostic; eye surgery; and Cardiac catheterization (Left, 10/05/2021). Restrictions  Restrictions/Precautions  Restrictions/Precautions: Fall Risk, General Precautions  Subjective   General  Chart Reviewed: Yes  Response To Previous Treatment: Patient with no complaints from previous session. Family / Caregiver Present: Yes  Subjective  Subjective: Pt. agreeable to work with therapy at this time. Denies pain but reports light headed and dizzy when changing from laying to sitting to standing.   Orientation  Orientation  Overall Orientation Status: Within Normal Limits  Cognition      Objective   Bed mobility  Rolling to Left: Supervision; Stand by assistance  Supine to Sit: Supervision; Stand by assistance  Transfers  Sit to Stand: Stand by assistance  Stand to sit: Stand by assistance  Ambulation  Ambulation?: Yes  Ambulation 1  Surface: level tile  Device: Rolling Walker  Assistance: Contact guard assistance  Quality of Gait: Slow lyndsey, reciprocal gait pattern, no LOB. Gait Deviations: Slow Linda; Shuffles; Deviated path  Distance: 10ftx1        Exercises  Comments: Supine exercises B LE x15. Seated exercises B Le x15      G-Code     OutComes Score    AM-PAC Score    Goals  Short term goals  Time Frame for Short term goals: 20 visits  Short term goal 1: Patient will demonstrate the ability to complete bed mobility and transfers independently in order to return to [facility]  Short term goal 2: Patient will demonstrate the ability to ambulate >200 feet with RW with supervision assistance in order to ease functional mobility  Short term goal 3: Patient will ascend/descend 1 step without handrail with supervision assistance in order to safely enter/exit the home  Short term goal 4: Patient will tolerate 35-45' ther-ex in order to increase endurance and ease ADLs    Plan    Plan  Times per week: 7 times per week  Times per day: Twice a day  Current Treatment Recommendations: Strengthening, Balance Training, Functional Mobility Training, Transfer Training, Neuromuscular Re-education, Gait Training, Stair training, Pain Management, Positioning, Equipment Evaluation, Education, & procurement, Patient/Caregiver Education & Training, Safety Education & Training, Home Exercise Program  Safety Devices  Type of devices:  All fall risk precautions in place, Left in chair, Call light within reach, Nurse notified, Gait belt, Patient at risk for falls     Therapy Time   Individual Concurrent Group Co-treatment   Time In 1330         Time Out 1353         Minutes [address], PTA

## 2021-11-18 NOTE — PROGRESS NOTES
Writer called Radiology regarding paracentesis order. Paracentesis order is able to be seen on IR standpoint. Will try for paracentesis to be done tomorrow. Dr. Méndez Courser updated.

## 2021-11-18 NOTE — PROGRESS NOTES
[facility] access call, still waiting on a bed at [facility]

## 2021-11-18 NOTE — PROGRESS NOTES
Writer at bedside for shift assessment. Patient sitting up in bed, respirations are even and unlabored while on room air. Vitals obtained and assessment completed, see flowsheet for details. Pt family at bedside. pt denies further needs at this time. Call light in reach, will continue to monitor.  Still waiting on a bed at [facility]. V's for patient

## 2021-11-18 NOTE — PROGRESS NOTES
[facility]  Physical Therapy    Date: 2021  Patient Name: Toni Dorsey        : 1945       [] Pt Refusal           [x] Pt Unavailable due to:  Family requested no PT today due to patient going to [place].         Clarissa Levin, [facility], [id number] Date: 2021

## 2021-11-18 NOTE — PROGRESS NOTES
[facility] SPECIALTY [facility]  OCCUPATIONAL THERAPY  No Visit Note    [] ICU    [x] Acute   Patient: Aracelis Franco  Room: 5473/2902-27      Aracelis Franco not seen on 11/18/2021 at 12:50 PM due to feeling nauseous. Before therapist could talk pt stated \"Nope, I'm not doing therapy today\".         Signature: JELANI Farris/YARI

## 2021-11-18 NOTE — PROGRESS NOTES
Renal Progress Note  [facility]    Patient :  Arcaelis Franco; 68 y.o. MRN# [id number]  Location:  7103/7587-07  Attending:  Brigid Dougherty MD  Admit Date:  2021   Hospital Day: 7      Subjective:     Nephrology is following the patient for VIDAL/CKD. Patient was seen this morning. Family members in room. Doesn't feel well today. + nausea, poor appetite. Abdomen feels distended. Weight down 1 pound.     Outpatient Medications:     Medications Prior to Admission: [] predniSONE (DELTASONE) 20 MG tablet, Take 1 tablet by mouth 2 times daily for 5 days  eplerenone (INSPRA) 25 MG tablet, Take 1 tablet by mouth daily  loratadine (CLARITIN) 10 MG tablet, Take 1 tablet by mouth daily (Patient taking differently: Take 10 mg by mouth daily as needed )  lisinopril (PRINIVIL;ZESTRIL) 20 MG tablet, Take 1 tablet by mouth daily  amLODIPine (NORVASC) 10 MG tablet, Take 1 tablet by mouth nightly  hydrALAZINE (APRESOLINE) 100 MG tablet, Take 1 tablet by mouth 3 times daily Patient is taking 100 MG 3 times daily  glipiZIDE (GLUCOTROL XL) 5 MG extended release tablet, Take 2 tablets by mouth 2 times daily  nitroGLYCERIN (NITROSTAT) 0.4 MG SL tablet, Place 1 tablet under the tongue every 5 minutes as needed for Chest pain  latanoprost (XALATAN) 0.005 % ophthalmic solution,   acyclovir (ZOVIRAX) 400 MG tablet, 400 mg 2 times daily   prednisoLONE acetate (PRED FORTE) 1 % ophthalmic suspension, Place 1 drop into the left eye daily   cetirizine (ZYRTEC) 10 MG tablet, Take 1 tablet by mouth daily  insulin glargine (LANTUS SOLOSTAR) 100 UNIT/ML injection pen, Inject 28 Units into the skin 2 times daily  CONTOUR TEST strip, USE 1 STRIP TO CHECK GLUCOSE TWICE DAILY  DIANA MICROLET LANCETS MISC, TEST TWICE DAILY  Insulin Pen Needle (PEN NEEDLES) 31G X 6 MM MISC, 1 each by Does not apply route daily    Current Medications:     Scheduled Meds:    insulin glargine  25 Units SubCUTAneous BID    darbepoetin shannon-polysorbate  60 mcg SubCUTAneous Weekly    cloNIDine  0.2 mg Oral TID    glipiZIDE  10 mg Oral BID AC    cetirizine  5 mg Oral Daily    insulin lispro  0-18 Units SubCUTAneous TID WC    insulin lispro  0-9 Units SubCUTAneous Nightly    acyclovir  400 mg Oral BID    amLODIPine  10 mg Oral Nightly    hydrALAZINE  100 mg Oral TID    prednisoLONE acetate  1 drop Left Eye Daily    sodium chloride flush  5-40 mL IntraVENous 2 times per day     Continuous Infusions:    sodium chloride      dextrose      sodium chloride       PRN Meds:  sodium chloride, glucose, dextrose, glucagon (rDNA), dextrose, sodium chloride flush, sodium chloride, ondansetron **OR** ondansetron, polyethylene glycol, acetaminophen **OR** acetaminophen    Input/Output:       I/O last 3 completed shifts: In: 1020 [P.O.:1020]  Out: 5469 [Urine:1675].       Patient Vitals for the past 96 hrs (Last 3 readings):   Weight   21 0645 209 lb 6.4 oz (95 kg)   21 0900 210 lb 1.6 oz (95.3 kg)   21 0450 207 lb 3.2 oz (94 kg)       Vital Signs:   Temperature:  Temp: 97.3 °F (36.3 °C)  TMax:   Temp (24hrs), Av.4 °F (36.3 °C), Min:97.2 °F (36.2 °C), Max:97.7 °F (36.5 °C)    Respirations:  Resp: 16  Pulse:   Pulse: 68  BP:    BP: (!) 157/60  BP Range: Systolic (41JIL), WAD:358 , Min:140 , OX       Diastolic (24FVM), JBG:10, Min:51, Max:60      Physical Examination:     General:  Awake, alert, no acute distress  HEENT: NC/AT/ MMM  Chest:               Clear B/L no W/R/R  Cardiac:  S1 S2   Abdomen: Soft,distended  Neuro:  No facial droop,   SKIN:  Petechial rash  Extremities:  1+ edema    Labs:       Recent Labs     21  0605 21  0540 21  0528   WBC 10.6 10.6 8.9   RBC 3.01* 3.51* 2.90*   HGB 8.4* 9.8* 8.2*   HCT 26.3* 31.4* 25.6*   MCV 87.4 89.5 88.3   MCH 27.9 27.9 28.3   MCHC 31.9 31.2 32.0   RDW 16.6* 17.4* 17.9*    174 136*   MPV 9.2 9.8 9.7      BMP:   Recent Labs     21  0605 21  0540 11/18/21  0528    140 138   K 4.1 4.0 4.1    108* 108*   CO2 17* 16* 16*   * 106* 104*   CREATININE 2.74* 3.15* 3.23*   GLUCOSE 213* 87 125*   CALCIUM 7.4* 7.6* 7.3*      Phosphorus:   No results for input(s): PHOS in the last 72 hours. Magnesium:  No results for input(s): MG in the last 72 hours. Albumin:  No results for input(s): LABALBU in the last 72 hours. BNP:    No results found for: BNP  VALERIE:      Lab Results   Component Value Date    VALERIE NEGATIVE 11/11/2021     SPEP:  Lab Results   Component Value Date    PROT 6.4 11/12/2021    ALBCAL 3.9 09/11/2018    ALBPCT 62 09/11/2018    LABALPH 0.2 09/11/2018    LABALPH 0.7 09/11/2018    A1PCT 3 09/11/2018    A2PCT 11 09/11/2018    LABBETA 0.7 09/11/2018    BETAPCT 12 09/11/2018    GAMGLOB 0.8 09/11/2018    GGPCT 13 09/11/2018    PATH ELECTRONICALLY SIGNED. Jose Rafael Bah M.D. 09/11/2018    PATH ELECTRONICALLY SIGNED.  Jose Rafael Bah M.D. 09/11/2018     UPEP:   No results found for: LABPE  C3:     Lab Results   Component Value Date    C3 134 09/11/2018     C4:     Lab Results   Component Value Date    C4 23 09/11/2018     MPO ANCA:     Lab Results   Component Value Date    MPO 15 11/11/2021     PR3 ANCA:     Lab Results   Component Value Date    PR3 1.1 11/11/2021     Anti-GBM:   No results found for: GBMABIGG  Hep BsAg:       No results found for: HEPBSAG  Hep C AB:        No results found for: HEPCAB    Urinalysis/Chemistries:      Lab Results   Component Value Date    NITRU NEGATIVE 11/11/2021    COLORU Yellow 11/11/2021    PHUR 5.5 11/11/2021    WBCUA 0 TO 2 11/11/2021    RBCUA 0 TO 2 11/11/2021    MUCUS NOT REPORTED 11/11/2021    TRICHOMONAS NOT REPORTED 11/11/2021    YEAST NOT REPORTED 11/11/2021    BACTERIA NOT REPORTED 11/11/2021    SPECGRAV 1.020 11/11/2021    LEUKOCYTESUR NEGATIVE 11/11/2021    UROBILINOGEN Normal 11/11/2021    BILIRUBINUR NEGATIVE 11/11/2021    GLUCOSEU 3+ 11/11/2021    KETUA NEGATIVE 11/11/2021    AMORPHOUS NOT REPORTED 11/11/2021     Urine Sodium:     Lab Results   Component Value Date    NIDHI 67 11/12/2021     Urine Potassium:    Lab Results   Component Value Date    KUR 25.3 11/12/2021     Urine Chloride:    Lab Results   Component Value Date    CLUR 54 11/12/2021     Urine Osmolarity: No results found for: OSMOU  Urine Protein:   No components found for: TOTALPROTEIN, URINE   Urine Creatinine:     Lab Results   Component Value Date    LABCREA 94.6 11/02/2021     Urine Eosinophils:  No components found for: UEOS        Impression and Plan:  1. 1. VIDAL on CKD III  : initially improved with IV Fluids however patient became fluid overloaded and now requiring diuretics. Renal function worsening with diuretics. Still about 15 pounds above dry weight. + nausea and metabolic acidosis. Awaiting transfer to [facility]. [facility] where his primary nephrologist is for possible dialysis  -renal US unremarakble  -I do not feel any underlying glomerulonephritis going on as urine is bland  -multiple factors on admission including hyperglyemia causing volume depletion, ACE-I, Epleronone and contrast nephropathy vs possible cholesterol emboli. [facility] 10/5.        2. Hyperkalemia form VIDAL, ACE-I and Epleronone + hyperglycemia: resolved  3. Metabolic acidosis, worsening. Will avoid adding bicarb due to salt load  4. HTN: , improved  5. Diastolic CHF  6. Anemia, s/p IV iron as outpatient. RAVI  7. Rash. S/p biopsy. Urticarial vasculitis. 8. DM  9.angiomyolipoma: Follow outpatient        Please don't hesitate to call with any questions.   Electronically signed by Severo Kilts, DO on 11/18/2021 at 11:08 AM

## 2021-11-19 ENCOUNTER — APPOINTMENT (OUTPATIENT)
Dept: INTERVENTIONAL RADIOLOGY/VASCULAR | Age: 76
DRG: 682 | End: 2021-11-19
Payer: MEDICARE

## 2021-11-19 ENCOUNTER — HOSPITAL ENCOUNTER (INPATIENT)
Dept: ULTRASOUND IMAGING | Age: 76
Discharge: HOME | DRG: 682 | End: 2021-11-21
Payer: MEDICARE

## 2021-11-19 DIAGNOSIS — R18.8 OTHER ASCITES: ICD-10-CM

## 2021-11-19 PROBLEM — I50.43 ACUTE ON CHRONIC COMBINED SYSTOLIC AND DIASTOLIC HF (HEART FAILURE), NYHA CLASS 4 (HCC): Status: ACTIVE | Noted: 2017-03-22

## 2021-11-19 LAB
ABSOLUTE EOS #: 0.16 K/UL (ref 0–0.44)
ABSOLUTE IMMATURE GRANULOCYTE: 0.47 K/UL (ref 0–0.3)
ABSOLUTE LYMPH #: 0.84 K/UL (ref 1.1–3.7)
ABSOLUTE MONO #: 0.49 K/UL (ref 0.1–1.2)
ANION GAP SERPL CALCULATED.3IONS-SCNC: 12 MMOL/L (ref 9–17)
BASOPHILS # BLD: 0 % (ref 0–2)
BASOPHILS ABSOLUTE: <0.03 K/UL (ref 0–0.2)
BUN BLDV-MCNC: 94 MG/DL (ref 8–23)
BUN/CREAT BLD: 27 (ref 9–20)
CALCIUM SERPL-MCNC: 7.5 MG/DL (ref 8.6–10.4)
CHLORIDE BLD-SCNC: 104 MMOL/L (ref 98–107)
CO2: 18 MMOL/L (ref 20–31)
CREAT SERPL-MCNC: 3.48 MG/DL (ref 0.7–1.2)
DIFFERENTIAL TYPE: ABNORMAL
EOSINOPHILS RELATIVE PERCENT: 2 % (ref 1–4)
GFR AFRICAN AMERICAN: 21 ML/MIN
GFR NON-AFRICAN AMERICAN: 17 ML/MIN
GFR SERPL CREATININE-BSD FRML MDRD: ABNORMAL ML/MIN/{1.73_M2}
GFR SERPL CREATININE-BSD FRML MDRD: ABNORMAL ML/MIN/{1.73_M2}
GLUCOSE BLD-MCNC: 105 MG/DL (ref 74–100)
GLUCOSE BLD-MCNC: 115 MG/DL (ref 74–100)
GLUCOSE BLD-MCNC: 127 MG/DL (ref 74–100)
GLUCOSE BLD-MCNC: 161 MG/DL (ref 74–100)
GLUCOSE BLD-MCNC: 313 MG/DL (ref 74–100)
GLUCOSE BLD-MCNC: 75 MG/DL (ref 74–100)
GLUCOSE BLD-MCNC: 84 MG/DL (ref 70–99)
HCT VFR BLD CALC: 26.5 % (ref 40.7–50.3)
HEMOGLOBIN: 8.3 G/DL (ref 13–17)
IMMATURE GRANULOCYTES: 5 %
LYMPHOCYTES # BLD: 9 % (ref 24–43)
MCH RBC QN AUTO: 28.5 PG (ref 25.2–33.5)
MCHC RBC AUTO-ENTMCNC: 31.3 G/DL (ref 28.4–34.8)
MCV RBC AUTO: 91.1 FL (ref 82.6–102.9)
MONOCYTES # BLD: 5 % (ref 3–12)
NRBC AUTOMATED: 0 PER 100 WBC
PDW BLD-RTO: 17.9 % (ref 11.8–14.4)
PLATELET # BLD: 138 K/UL (ref 138–453)
PLATELET ESTIMATE: ABNORMAL
PMV BLD AUTO: 10 FL (ref 8.1–13.5)
POTASSIUM SERPL-SCNC: 4.6 MMOL/L (ref 3.7–5.3)
RBC # BLD: 2.91 M/UL (ref 4.21–5.77)
RBC # BLD: ABNORMAL 10*6/UL
SEG NEUTROPHILS: 79 % (ref 36–65)
SEGMENTED NEUTROPHILS ABSOLUTE COUNT: 7.71 K/UL (ref 1.5–8.1)
SODIUM BLD-SCNC: 134 MMOL/L (ref 135–144)
WBC # BLD: 9.7 K/UL (ref 3.5–11.3)
WBC # BLD: ABNORMAL 10*3/UL

## 2021-11-19 PROCEDURE — 1200000000 HC SEMI PRIVATE

## 2021-11-19 PROCEDURE — 80048 BASIC METABOLIC PNL TOTAL CA: CPT

## 2021-11-19 PROCEDURE — 2580000003 HC RX 258: Performed by: INTERNAL MEDICINE

## 2021-11-19 PROCEDURE — 6370000000 HC RX 637 (ALT 250 FOR IP): Performed by: INTERNAL MEDICINE

## 2021-11-19 PROCEDURE — 2500000003 HC RX 250 WO HCPCS: Performed by: INTERNAL MEDICINE

## 2021-11-19 PROCEDURE — 97110 THERAPEUTIC EXERCISES: CPT

## 2021-11-19 PROCEDURE — 97116 GAIT TRAINING THERAPY: CPT

## 2021-11-19 PROCEDURE — 85025 COMPLETE CBC W/AUTO DIFF WBC: CPT

## 2021-11-19 PROCEDURE — 82947 ASSAY GLUCOSE BLOOD QUANT: CPT

## 2021-11-19 PROCEDURE — 6370000000 HC RX 637 (ALT 250 FOR IP): Performed by: NURSE PRACTITIONER

## 2021-11-19 PROCEDURE — 99232 SBSQ HOSP IP/OBS MODERATE 35: CPT | Performed by: INTERNAL MEDICINE

## 2021-11-19 PROCEDURE — 36415 COLL VENOUS BLD VENIPUNCTURE: CPT

## 2021-11-19 PROCEDURE — 6370000000 HC RX 637 (ALT 250 FOR IP): Performed by: FAMILY MEDICINE

## 2021-11-19 PROCEDURE — 76705 ECHO EXAM OF ABDOMEN: CPT

## 2021-11-19 PROCEDURE — 99232 SBSQ HOSP IP/OBS MODERATE 35: CPT | Performed by: FAMILY MEDICINE

## 2021-11-19 PROCEDURE — 94761 N-INVAS EAR/PLS OXIMETRY MLT: CPT

## 2021-11-19 RX ORDER — METOLAZONE 2.5 MG/1
5 TABLET ORAL ONCE
Status: COMPLETED | OUTPATIENT
Start: 2021-11-19 | End: 2021-11-19

## 2021-11-19 RX ORDER — BUMETANIDE 0.25 MG/ML
2 INJECTION, SOLUTION INTRAMUSCULAR; INTRAVENOUS DAILY
Status: COMPLETED | OUTPATIENT
Start: 2021-11-19 | End: 2021-11-20

## 2021-11-19 RX ORDER — BUMETANIDE 0.25 MG/ML
2 INJECTION, SOLUTION INTRAMUSCULAR; INTRAVENOUS 2 TIMES DAILY
Status: DISCONTINUED | OUTPATIENT
Start: 2021-11-19 | End: 2021-11-19

## 2021-11-19 RX ORDER — BUMETANIDE 0.25 MG/ML
2 INJECTION, SOLUTION INTRAMUSCULAR; INTRAVENOUS EVERY 8 HOURS
Status: DISCONTINUED | OUTPATIENT
Start: 2021-11-19 | End: 2021-11-19

## 2021-11-19 RX ADMIN — HYDRALAZINE HYDROCHLORIDE 100 MG: 50 TABLET, FILM COATED ORAL at 20:59

## 2021-11-19 RX ADMIN — METOLAZONE 5 MG: 2.5 TABLET ORAL at 15:34

## 2021-11-19 RX ADMIN — ACYCLOVIR 400 MG: 200 CAPSULE ORAL at 20:58

## 2021-11-19 RX ADMIN — INSULIN LISPRO 3 UNITS: 100 INJECTION, SOLUTION INTRAVENOUS; SUBCUTANEOUS at 16:42

## 2021-11-19 RX ADMIN — BUMETANIDE 2 MG: 0.25 INJECTION INTRAMUSCULAR; INTRAVENOUS at 15:34

## 2021-11-19 RX ADMIN — ACYCLOVIR 400 MG: 200 CAPSULE ORAL at 07:57

## 2021-11-19 RX ADMIN — AMLODIPINE BESYLATE 10 MG: 10 TABLET ORAL at 20:58

## 2021-11-19 RX ADMIN — BISACODYL 10 MG: 5 TABLET, COATED ORAL at 14:10

## 2021-11-19 RX ADMIN — SODIUM CHLORIDE, PRESERVATIVE FREE 10 ML: 5 INJECTION INTRAVENOUS at 21:01

## 2021-11-19 RX ADMIN — CLONIDINE HYDROCHLORIDE 0.2 MG: 0.2 TABLET ORAL at 07:58

## 2021-11-19 RX ADMIN — HYDRALAZINE HYDROCHLORIDE 100 MG: 50 TABLET, FILM COATED ORAL at 14:10

## 2021-11-19 RX ADMIN — SODIUM CHLORIDE, PRESERVATIVE FREE 10 ML: 5 INJECTION INTRAVENOUS at 07:58

## 2021-11-19 RX ADMIN — GLIPIZIDE 10 MG: 5 TABLET ORAL at 07:57

## 2021-11-19 RX ADMIN — CLONIDINE HYDROCHLORIDE 0.2 MG: 0.2 TABLET ORAL at 14:10

## 2021-11-19 RX ADMIN — GLIPIZIDE 10 MG: 5 TABLET ORAL at 14:10

## 2021-11-19 RX ADMIN — PREDNISOLONE ACETATE 1 DROP: 10 SUSPENSION/ DROPS OPHTHALMIC at 07:59

## 2021-11-19 RX ADMIN — INSULIN LISPRO 12 UNITS: 100 INJECTION, SOLUTION INTRAVENOUS; SUBCUTANEOUS at 11:41

## 2021-11-19 RX ADMIN — CETIRIZINE HYDROCHLORIDE 5 MG: 10 TABLET, FILM COATED ORAL at 07:57

## 2021-11-19 RX ADMIN — CLONIDINE HYDROCHLORIDE 0.2 MG: 0.2 TABLET ORAL at 20:59

## 2021-11-19 RX ADMIN — HYDRALAZINE HYDROCHLORIDE 100 MG: 50 TABLET, FILM COATED ORAL at 07:58

## 2021-11-19 RX ADMIN — MAGNESIUM HYDROXIDE 30 ML: 400 SUSPENSION ORAL at 14:10

## 2021-11-19 ASSESSMENT — PAIN SCALES - GENERAL
PAINLEVEL_OUTOF10: 0

## 2021-11-19 NOTE — PROGRESS NOTES
Comprehensive Nutrition Assessment    Type and Reason for Visit:  Reassess    Nutrition Recommendations/Plan:  Monitor need to K+ restrict diet    Nutrition Assessment:  Continued moderate malnutrition r/t prior inadequate nutrient intakes, AEB significant weight declines pta, muscle and fat losses. Weight is with rebound, however likely r/t increased edema and cardio-renal syndrome. Up taking breakfast this AM as nursing does AM assessmenet. K+ has normalized, but is trending up. Glycemia running  mg/dl in last 24 hours. May ultimately require a K+ restriction in diet. Malnutrition Assessment:  Malnutrition Status: Moderate malnutrition    Context:  Acute Illness     Findings of the 6 clinical characteristics of malnutrition:  Energy Intake:  7 - 50% or less of estimated energy requirements for 5 or more days  Weight Loss:  1 - 5% over 1 month (5.7% over 1.5 months)     Body Fat Loss:  1 - Mild body fat loss Orbital   Muscle Mass Loss:  1 - Mild muscle mass loss Hand (interosseous)  Fluid Accumulation:  No significant fluid accumulation Extremities   Strength:  Not Performed    Estimated Daily Nutrient Needs:  Energy (kcal):  1962-0330 (20-25); Weight Used for Energy Requirements:  Current     Protein (g):   (1.2-1.4); Weight Used for Protein Requirements:  Ideal        Fluid (ml/day):  2000; Method Used for Fluid Requirements:  1 ml/kcal      Nutrition Related Findings:  2+ BLE and generalized edema, + bm, + flatus. Wounds:  None       Current Nutrition Therapies:    ADULT DIET; Regular; Low Sodium (2 gm); 2000 ml    Anthropometric Measures:  · Height: 5' 9\" (175.3 cm)  · Current Body Weight: 208 lb 3.2 oz (94.4 kg)   · Admission Body Weight: 195 lb 1.7 oz (88.5 kg)    · Usual Body Weight: 207 lb (93.9 kg) (in August, however, 193.4#-195# this month)     · Ideal Body Weight: 160 lbs; % Ideal Body Weight 122.2 %   · BMI: 30.7  · BMI Categories: Obese Class 1 (BMI 30.0-34. 9)       Nutrition Diagnosis:   · Altered nutrition-related lab values related to endocrine dysfuntion as evidenced by lab values    · Moderate malnutrition related to inadequate protein-energy intake as evidenced by poor intake prior to admission, moderate muscle loss, mild loss of subcutaneous fat, constipation, weight loss greater than or equal to 5% in 1 month    Lab Results   Component Value Date     (L) 11/19/2021    K 4.6 11/19/2021     11/19/2021    CO2 18 (L) 11/19/2021    BUN 94 (H) 11/19/2021    CREATININE 3.48 (H) 11/19/2021    GLUCOSE 84 11/19/2021    CALCIUM 7.5 (L) 11/19/2021    PROT 6.4 11/12/2021    LABALBU 3.4 (L) 11/12/2021    BILITOT 0.27 (L) 11/12/2021    ALKPHOS 73 11/12/2021    AST 11 11/12/2021    ALT 11 11/12/2021    LABGLOM 17 (L) 11/19/2021    GFRAA 21 (L) 11/19/2021     Recent Labs     11/18/21  0234 11/18/21  0700 11/18/21  1126 11/18/21  1620 11/18/21  1943 11/19/21  0247 11/19/21  0545 11/19/21  0645   POCGLU 87 75 103* 143* 196* 105* 75 115*     Nutrition Interventions:   Food and/or Nutrient Delivery:  Continue Current Diet  Nutrition Education/Counseling:  Education initiated, Education completed   Coordination of Nutrition Care:  Continue to monitor while inpatient    Goals:  PO >75% meals       Nutrition Monitoring and Evaluation:   Behavioral-Environmental Outcomes:  None Identified   Food/Nutrient Intake Outcomes:  Food and Nutrient Intake  Physical Signs/Symptoms Outcomes:  Biochemical Data, Fluid Status or Edema, Weight     Discharge Planning:    No discharge needs at this time     Electronically signed by Rodrick Reyna RD, LD on 11/19/21 at 8:07 AM EST    Contact: [phone]

## 2021-11-19 NOTE — PROGRESS NOTES
Occupational Therapy  Facility/Department: [facility] AT THE [facility] MED SURG  Daily Treatment Note  NAME: Anirudh Guido  : 1945  MRN: [id number]    Date of Service: 2021    Discharge Recommendations:  Continue to assess pending progress, Home with assist PRN       Assessment      OT Education: OT Role; Plan of Care  Patient Education: reviewed OT role, discussed progress with family, no questions at this time. Barriers to Learning: none  Safety Devices  Type of devices: All fall risk precautions in place; Left in chair; Chair alarm in place; Call light within reach (multiple family members in room, Doctor and nursing staff present upon exit)         Patient Diagnosis(es): The primary encounter diagnosis was Chest pain, unspecified type. Diagnoses of Hyperkalemia, Acute kidney injury ([facility] Hyperglycemia, and Chronic anemia were also pertinent to this visit.       has a past medical history of Acute MI ([address] Acute renal failure with tubular necrosis ([address] Anemia, CAD (coronary artery disease), Cerebral artery occlusion with cerebral infarction [facility]), CHF (congestive heart failure) ([address] Chronic back pain, Closed fracture of lumbar vertebra without mention of spinal cord injury, Displacement of intervertebral disc, site unspecified, without myelopathy, H/O cardiac catheterization, H/O cardiovascular stress test, H/O tilt table evaluation, H/O tilt table evaluation, History of 24 hour EKG monitoring, History of 24 hour EKG monitoring, History of blood transfusion, History of cardiovascular stress test, History of cardiovascular stress test, History of coronary artery stent placement, History of CVA (cerebrovascular accident) without residual deficits, History of echocardiogram, History of Holter monitoring, History of tilt table evaluation, Hyperlipidemia, Hypertension, Non critical Right Renal artery stenosis, native ([address] Pacemaker, S/P cardiac cath, S/P coronary artery stent placement, SIRS (systemic inflammatory response syndrome) ([facility] [address] and Type II or unspecified type diabetes mellitus without mention of complication, not stated as uncontrolled. has a past surgical history that includes Pacemaker insertion; back surgery; Cholecystectomy (08/17/2015); Corneal transplant; Cardiac catheterization (Left, 02/19/2016); pacemaker placement; Colonoscopy (2010); Colonoscopy (02/19/2015); Colonoscopy (05/23/2018); Colonoscopy (N/A, 05/23/2018); Upper gastrointestinal endoscopy (05/28/2019); Upper gastrointestinal endoscopy (N/A, 05/28/2019); Colonoscopy (10/30/2020); Colonoscopy (N/A, 10/30/2020); Upper gastrointestinal endoscopy (N/A, 10/30/2020); Endoscopy, colon, diagnostic; eye surgery; and Cardiac catheterization (Left, 10/05/2021). Restrictions  Restrictions/Precautions  Restrictions/Precautions: Fall Risk, General Precautions  Subjective   General  Chart Reviewed: Yes  Patient assessed for rehabilitation services?: Yes  Response to previous treatment: Patient with no complaints from previous session  Family / Caregiver Present: Yes  Referring Practitioner: Lianet Kaur  Diagnosis: VIDAL  Subjective  Subjective: Pt upright in chair upon arrival with family present. Requests water upon arrival, provided nursing approved amount. General Comment  Comments: Pt in bedside chair upon OT arrival with family present. Agreeable to OT treatment. Declines ADLs d/t already completing. Agreeable to ther ex with pt requesting 2 lb weight this date. Doctor enters toward end of session to review status. Pain Assessment  Pain Assessment: 0-10  Pain Level: 0  Pre Treatment Pain Screening  Comments / Details: Family notes increased swelling in BUE and BLE. Vital Signs  Patient Currently in Pain: Denies   Orientation     Objective                                                                Type of ROM/Therapeutic Exercise  Type of ROM/Therapeutic Exercise: AROM;  Free weights  Comment: Pt completed BUE strengthening exercises to increase strength and endurance needed for completion of ADLs. Pt tolerated ther ex using yellow flex bar x3 planes x15 reps each, 2# free weight exercises x 4 planes x15 reps x 1 set with frequent rest breaks d/t fatigue. Plan   Plan  Times per week: 7x  Times per day: Daily  Current Treatment Recommendations: Strengthening, Safety Education & Training, Patient/Caregiver Education & Training, Self-Care / ADL, Functional Mobility Training, Endurance Training  G-Code     OutComes Score                                                  AM-PAC Score             Goals  Short term goals  Time Frame for Short term goals: 24  Short term goal 1: Patient to tolerate standing >10' while participating in functional task of choice to improve standing tolerane, balance and safety during ADL, mobility and transfers. Short term goal 2: Patient to engage in 15 minutes of ther ex/ther act to improve strength as well as activity tolerance for self care tasks. Short term goal 3: Patient to complete self care routine c SUP to ensure safe return home. Short term goal 4: Pt will report and demo understanding of discharge recommendations and HEP.        Therapy Time   Individual Concurrent Group Co-treatment   Time In  1:22 pm         Time Out  1:45 pm         Minutes  23                 PORTER Sow/YARI

## 2021-11-19 NOTE — PROGRESS NOTES
Blood sugar checked per patient's request and it is 75. When asked if he wanted to have anything for snack before the breakfast gets here, he asked for some orange juice and peanut butter cracker and it is given. Will continue to monitor.

## 2021-11-19 NOTE — PROGRESS NOTES
Patient: Celine Cifuentes  : 1945  Date of Admission: 2021  Primary Care Physician: Jeff Titus  Today's Date: 2021    REASON FOR CONSULTATION: Chest Pain (Mid chest tightness, radiating to left arm. Onset 30mins ago), Shortness of Breath, and Other (Abnormal labwork drawn today. (potassium, glucose and kidney function))      HPI: Mr. Bubba Gomez is a 68 y.o. male well known to me with a known history of dysautonomia where on tilt table testing his blood pressure dropped from 193 systolic to 78 systolic with only a 07-66 HR response. More recently, a CT of he head in the past showed evidence of at least 2 old CVA's and a heart catheterization showed severe single vessel disease in the ostium of a moderate sized OM1 branch of the circumflex. However, maximal guidelines directed medical management was opted for an place of stenting partly due to the risk associated with stenting this vessel. Recently he has had a coronary angiography procedure with stenting of his HA Om1. As you know, Mr. Bubba Gomez  is a 76 y.o. male with a history of recent onset substernal chest discomfort that led to a stress test and a subsequent heart catheterization which showed severe single vessel coronary artery disease of the HA Om1 with successful angioplasty and stenting of that vessel that was done by Dr. Danii Whiting on 4/10/17. More recently he has had problems with balance problems when he is in his feet and says that a Neurologist has told him in the past this is because of his previous strokes. Most recently he underwent a cardiovascular stress test which fortunately had shown to be normal on 3/21/2018. Mr. Bubba Gomez has significant normal stress test and echocardiogram on 2020. Holter monitor done on 2020: The rhythm was sinus. Average daily heart rate 58 ranging from 47 to 81 bpm. Bradycardia for 72% test duration. Rare premature supraventricular ectopic beats consisting of 33 isolated PACs.  Rare premature ventricular ectopic beats total 75 consisting of 73 isolatedPVCs and a ventricular couplet. Echocardiogram done on 12/18/2020: EF of 60%. Moderately increased LV wall thickness. Borderline pulmonary hypertension. Mild tricuspid regurgitation. Mild diastolic dysfunction is seen/ Aortic root is mildly dilated. Stress test done on 9/20/2021 was equivocal, There is a small/moderate perfusion defect of mild intensity in the lateral, inferior and inferoapical regions. Cardiac cath done on 10/5/2021 showed Moderate three vessel coronary artery disease involving a ostial 50-60% stenosis in a small, non-dominant RCA, a 60% mid LAD stenosis and a proximal 50-60% stenosis in the left anterior descending coronary artery. Mr. Clovis Gamez says he feels about the same as yesterday. He is currently admitted for acute kidney injury superimposed on his chronic kidney disease. He says he is feeling pretty fatigued but denies any pain. He denied any abdominal pain, bleeding problems, bowel issues, problems with his medications or any other concerns at this time. No nausea or vomiting. No cough, fever or chills. Past Medical History:   Diagnosis Date    Acute MI ([address]     Acute renal failure with tubular necrosis ([address] 10/2/2018    ssecondary to hemodynamic effects of loop diuretics and ace inhibitors, bbaseline 1.2-1.3 which peaked up to 1.8, resolving    Anemia     CAD (coronary artery disease)     Cerebral artery occlusion with cerebral infarction ([address]     [facility]est[facility] failure) [facility]     Chronic back pain     Closed fracture of lumbar vertebra without mention of spinal cord injury     Displacement of intervertebral disc, site unspecified, without myelopathy     H/O cardiac catheterization 2/19/16    LMCA: Mild irregularities 10-20%. LAD: Lesion on PRox LAD: Mid subsection. 65% stenosis. LCx: Lesion on 1st Ob Valentina: Proximal subesection. 70% steniosis. RCA: Small non-dominant RCA.  Lesion on PRox RCA: Ostial. 50% stenosis. EF:55%.  H/O cardiovascular stress test 2/19/16    Abnormal. Moderate perfusion defect of mild intensity in the inferior, inferoseptal adn inferoapical regions during stress imaging, which most consistent with ischemia. Global LV systolic function normal without regional wall motion abnormalities. OVerall these results are most consistent with an intermediate risk for signficant CAD. Additional testing including cardiac cath may be indicated.  H/O tilt table evaluation 12/26/2017    Abnormal. Patients HR, BP response and symptoms were most consistent with dysautonomia. Combined with viligant maintenance of euvolemia and maintaining a moderate salft intake, pharmacologic treatment with SSRI such as lexapro and or mestinon among other treatments have shown some effectiveness in treatment of this condition    H/O tilt table evaluation 12/26/2017    Abnormal head upright tilt table study. The pt heart rate, blood pressure response and symptoms were most consistent with dysautonomia.  History of 24 hour EKG monitoring 8/14/14    Occasional PAC's and PVC's which appear to be at least moderately symptomatic.  History of 24 hour EKG monitoring 11/19/14    Event Monitor. Sinus rhythm and sinus bradycardia. Infrequent isolated PVC's    History of blood transfusion     History of cardiovascular stress test 2/19/14    Relatively NL    History of cardiovascular stress test 03/21/2018    Normal myocardial perfusion. Global left ventricular systolic function was normal with an EF of 68%. Overall, these results are most consistent with a low risk scan.  History of coronary artery stent placement 04/2017    PTCA / Drug Eluting Stent:, CX and / or branches    History of CVA (cerebrovascular accident) without residual deficits 2014    Incidentally found on CT head.  No known impairment now or in the past.    History of echocardiogram 2/18/14    LA mildly dilated, EF 55%, LV wall thickness is moderately increased, no definite wall motion abnormalilities, what appears to be a pacer wire is seen w/n the RA and RV, mild-mod TR, mild pulmonary hypertension.  History of Holter monitoring 12/29/2017    Rare PAC's and PVC's.  History of tilt table evaluation 8/12/14    Abnormal    Hyperlipidemia     Hypertension     Non critical Right Renal artery stenosis, native ([facility] 10/2/2018    Non critical Right Renal artery stenosis, based on cath in 2016, Rt RA 30% stenosis    Pacemaker     non-functioning    S/P cardiac cath 04/10/2017    S/P coronary artery stent placement     Successful PTCA - HA Om1    SIRS (systemic inflammatory response syndrome) ([facility] 5/19/2019    Type II or unspecified type diabetes mellitus without mention of complication, not stated as uncontrolled        CURRENT ALLERGIES: Lipitor [atorvastatin], Aldactone [spironolactone], Crestor [rosuvastatin calcium], Lopid [gemfibrozil], Invokana [canagliflozin], and Januvia [sitagliptin] REVIEW OF SYSTEMS: 14 systems were reviewed. Pertinent positives and negatives as above, all else negative. Past Surgical History:   Procedure Laterality Date    BACK SURGERY      CARDIAC CATHETERIZATION Left 02/19/2016    via right radial approach/ [facility]/ Dr. Jaylene Duckworth Left 10/05/2021    Dr Vidal Olguin radial-Moderate three vessel coronary artery disease involving a ostial 50-60% stenosis in a small, non-dominant RCA, a 60% mid LAD stenosis and a proximal 50-60% stenosis in the left anterior descending coronary artery. Normal left ventricular end diastolic pressure. Proceed with aggressive maximal medical management as clinically indicated.     CHOLECYSTECTOMY  08/17/2015    Liang/Charles/Saulo/ Millicent    COLONOSCOPY  2010    COLONOSCOPY  02/19/2015    -polyps,diverticulosis,hemorrhoids    COLONOSCOPY  05/23/2018    Dr Gladys Hoff    COLONOSCOPY N/A 2018    COLONOSCOPY POLYPECTOMY SNARE/COLD BIOPSY  cold snare  and  hot snare performed by Chaya Ferrer MD at [address]  10/30/2020    with Dr. Jean-Paul Orona 10/30/2020    Fatuma Round, NOT HIGH RISK performed by Damon Martini DO at [address] [facility]      left eye    ENDOSCOPY, COLON, DIAGNOSTIC      EYE SURGERY      PACEMAKER INSERTION      PACEMAKER PLACEMENT      UPPER GASTROINTESTINAL ENDOSCOPY  2019    Dr. Fab Kuhn H-Pylori)duodenal bulbar/antral erythema    UPPER GASTROINTESTINAL ENDOSCOPY N/A 2019    EGD BIOPSY, clotest performed by Chaya Ferrer MD at [address] [facility] 10/30/2020    EGD ESOPHAGOGASTRODUODENOSCOPY performed by Damon Martini DO at [address] History:  Social History     Tobacco Use    Smoking status: Former Smoker     Packs/day: 1.50     Years: 8.00     Pack years: 12.00     Types: Cigarettes     Quit date: 3/4/1980     Years since quittin.7    Smokeless tobacco: Never Used   Vaping Use    Vaping Use: Never used   Substance Use Topics    Alcohol use: No    Drug use: No        CURRENT MEDICATIONS:  Outpatient Medications Marked as Taking for the 21 encounter [facility] Encounter)   Medication Sig Dispense Refill    [] predniSONE (DELTASONE) 20 MG tablet Take 1 tablet by mouth 2 times daily for 5 days 10 tablet 0    eplerenone (INSPRA) 25 MG tablet Take 1 tablet by mouth daily 90 tablet 3    loratadine (CLARITIN) 10 MG tablet Take 1 tablet by mouth daily (Patient taking differently: Take 10 mg by mouth daily as needed ) 90 tablet 3    lisinopril (PRINIVIL;ZESTRIL) 20 MG tablet Take 1 tablet by mouth daily 90 tablet 3    amLODIPine (NORVASC) 10 MG tablet Take 1 tablet by mouth nightly 90 tablet 0    hydrALAZINE (APRESOLINE) 100 MG tablet Take 1 tablet by mouth 3 times daily Patient is taking 100 MG 3 times daily 270 tablet 0    glipiZIDE (GLUCOTROL XL) 5 MG extended release tablet Take 2 tablets by mouth 2 times daily 360 tablet 0    nitroGLYCERIN (NITROSTAT) 0.4 MG SL tablet Place 1 tablet under the tongue every 5 minutes as needed for Chest pain 25 tablet 3    latanoprost (XALATAN) 0.005 % ophthalmic solution       acyclovir (ZOVIRAX) 400 MG tablet 400 mg 2 times daily       prednisoLONE acetate (PRED FORTE) 1 % ophthalmic suspension Place 1 drop into the left eye daily        insulin glargine (LANTUS) injection vial 25 Units, BID  darbepoetin shannon-polysorbate (ARANESP) injection 60 mcg, Weekly  cloNIDine (CATAPRES) tablet 0.2 mg, TID  glipiZIDE (GLUCOTROL) tablet 10 mg, BID AC  0.9 % sodium chloride infusion, PRN  glucose (GLUTOSE) 40 % oral gel 15 g, PRN  dextrose 50 % IV solution, PRN  glucagon (rDNA) injection 1 mg, PRN  dextrose 5 % solution, PRN  cetirizine (ZYRTEC) tablet 5 mg, Daily  insulin lispro (HUMALOG) injection vial 0-18 Units, TID WC  insulin lispro (HUMALOG) injection vial 0-9 Units, Nightly  acyclovir (ZOVIRAX) capsule 400 mg, BID  amLODIPine (NORVASC) tablet 10 mg, Nightly  hydrALAZINE (APRESOLINE) tablet 100 mg, TID  prednisoLONE acetate (PRED FORTE) 1 % ophthalmic suspension 1 drop, Daily  sodium chloride flush 0.9 % injection 5-40 mL, 2 times per day  sodium chloride flush 0.9 % injection 10 mL, PRN  0.9 % sodium chloride infusion, PRN  ondansetron (ZOFRAN-ODT) disintegrating tablet 4 mg, Q8H PRN   Or  ondansetron (ZOFRAN) injection 4 mg, Q6H PRN  polyethylene glycol (GLYCOLAX) packet 17 g, Daily PRN  acetaminophen (TYLENOL) tablet 650 mg, Q6H PRN   Or  acetaminophen (TYLENOL) suppository 650 mg, Q6H PRN       FAMILY HISTORY: family history includes Cancer in his father and mother. Physical Examination:     BP (!) 160/54   Pulse 68   Temp 97.1 °F (36.2 °C) (Temporal)   Resp 18   Ht 5' 9\" (1.753 m)   Wt 209 lb 6.4 oz (95 kg)   SpO2 95%   BMI 30.92 kg/m²  Body mass index is 30.92 kg/m².     Constitutional: He appeared oriented to person and place. He appears well-developed and well-nourished. In no acute distress. HEENT: Normocephalic and atraumatic. No JVD present. Carotid bruit is not present. No mass and no thyromegaly present. No lymphadenopathy noted. Cardiovascular: Normal rate, regular rhythm, normal heart sounds. Exam reveals no gallop and no friction rubs. 2/6 systolic murmur, 5th intercostal space on the LEFT in the mid-clavicular line (cardiac apex)  Pulmonary/Chest: Effort normal and breath sounds normal. No respiratory distress. He has no wheezes, rhonchi or rales. Abdominal: Hard, tender, appears to have fluid in his abdomen. He exhibits no organomegaly, mass or bruit. Extremities: None. No cyanosis or clubbing. 2+ radial and carotid pulses. Distal extremity pulses: 2+ bilaterally. Neurological: Alertness and orientation as per Constitutional exam. No evidence of gross cranial nerve deficit. Coordination appeared normal.   Skin: Skin is warm and dry. There is no rash or diaphoresis. Psychiatric: He has a normal mood and affect.  His speech is normal and behavior is normal.      MOST RECENT LABS ON RECORD:   Lab Results   Component Value Date    WBC 8.9 11/18/2021    HGB 8.2 (L) 11/18/2021    HCT 25.6 (L) 11/18/2021     (L) 11/18/2021    CHOL 69 09/21/2021    TRIG 185 (H) 09/21/2021    HDL 14 (L) 09/21/2021    LDLCHOLESTEROL 18 09/21/2021    ALT 11 11/12/2021    AST 11 11/12/2021     11/18/2021    K 4.1 11/18/2021     (H) 11/18/2021    CREATININE 3.23 (H) 11/18/2021     (HH) 11/18/2021    CO2 16 (L) 11/18/2021    TSH 2.26 11/02/2017    PSA 3.68 04/30/2021    INR 1.1 09/20/2021    LABA1C 7.3 08/19/2021    LABMICR 30 10/17/2015      ASSESSMENT:  Patient Active Problem List    Diagnosis Date Noted    S/P drug eluting coronary stent placement-OM1 4/10/17 04/10/2017    Abnormal tilt table test 02/23/2016    Abnormal cardiovascular stress test 02/18/2016    Moderate malnutrition ([address] 11/12/2021    Coronary atherosclerosis due to calcified coronary lesion 02/20/2015    Cardiorenal syndrome, stage 1-4 or unspecified chronic kidney disease, with heart failure ([address] 11/17/2021    Cardiorenal syndrome with renal failure 11/17/2021    Hyperkalemia 11/11/2021    Acute purpuric eruption 11/10/2021    Skin lesion of left lower extremity 11/10/2021    Urticaria 11/10/2021    Iron deficiency anemia secondary to inadequate dietary iron intake 10/21/2021    Iron malabsorption 10/21/2021    Chronic anemia 09/21/2021    Acute coronary syndrome with high troponin ([address] 09/21/2021    Chest pain 09/20/2021    Gross hematuria 05/05/2021    BPH with obstruction/lower urinary tract symptoms 12/10/2019    Elevated PSA 12/10/2019    Anemia in stage 3 chronic kidney disease ([address] 05/21/2019    Acute kidney injury superimposed on CKD ([address] 10/02/2018    Non critical Right Renal artery stenosis, native ([address] 10/02/2018    Medication side effect, initial encounter 03/23/2018    Angina, class II ([address] 01/08/2018    Hyperlipidemia 04/24/2017    Chronic diastolic HF (heart failure), NYHA class 3 ([address] 04/24/2017    ASHD (arteriosclerotic heart disease) 05/02/2016    Cervical stenosis of spinal canal 02/29/2016    Chronic kidney disease, stage III (moderate) ([address] 02/22/2016    Controlled type 2 diabetes mellitus with diabetic nephropathy, with long-term current use of insulin ([address] 02/22/2016    Ischemic chest pain ([address] 02/17/2016    Accelerated hypertension 10/16/2015    Chronotropic incompetence with autonomic dysfunction 09/02/2015    Episodic lightheadedness 09/02/2015    Cholecystitis 08/17/2015    Syncope, near 08/05/2015    Dysautonomia ([address] 06/29/2015    History of CVA (cerebrovascular accident) without residual deficits     Displacement of intervertebral disc, site unspecified, without myelopathy 03/04/2015    History of colon polyps 30/81/9640    Systolic murmur 02/12/2014    History of MI (myocardial infarction) 02/12/2014    Vertigo, benign paroxysmal 10/17/2012     PLAN:    Acute on chronic renal insufficiency: Unclear etiology but likely multifactorial: Continue current treatment  Treatment options discussed extensively with Dr. Scott Anaya, Critical [facility] hospital Nephrology   Lasix on hold per nephrology due to worsening renal funcion  I had a 20 minute discussion with the patient and family on current status and told them that I agree with plan to transfer to  [facility]. A call tonight at 5 pm said that he was on the wait list about 9 patients behind but they expect that he should be able to be transferred in the next 24 hours. Anemia, unclear etiology: Likely multifactorial including GI losses and acute on chronic renal insufficiency. He received 1 unit pRBC transfusion during hospital stay    Dysautonomia: Mildly to moderately, likely related to and worsened by anemia. Currently stable  Diuretics: Not indicated at this time. Nonpharmacologic counseling: Because of his condition, I reminded him to try and keep himself well-hydrated and to take extra time when moving from laying to sitting, sitting to standing and standing to walking as well as wearing at least knee high compressions stockings. Acute on Chronic Diastolic Heart Failure: EF of 60% via echo on 12/18/2020. Up 9 lbs over last 24 hours  Beta Blocker: Contraindicated due to history of symptomatic bradycardia, intolerance and/or allergy. Patient reports abdominal pain, nausea due to medication. ACE Inibitor/ARB: Will defer to nephrology with current acute on chronic renal insufficiency   Diuretics: Lasix On hold due to his BUN and recently worsening kidney function. probably still up 9-12 lbs. Nonpharmacologic management of Heart Failure: I advised him to try and keep his legs up whenever possible and to limit salt in his diet.    Steroid and fluids were discontinued yesterday. Compression stockings in place. Refer for paracentesis in AM.    Atherosclerotic Heart Disease: Coronary Artery stent: 4/10/2017. Cardiac cath done on 10/5/2021 showed Moderate three vessel coronary artery disease involving a ostial 50-60% stenosis in a small, non-dominant RCA, a 60% mid LAD stenosis and a proximal 50-60% stenosis in the left anterior descending coronary artery  Antiplatelet Agent: Not indicated due to bleeding  ACE Inibitor/ARB: Will defer to nephrology with current acute on chronic renal insufficiency  Beta Blocker: Contraindicated due to history of symptomatic bradycardia, intolerance and/or allergy. Cholesterol Reduction Therapy: Refused by patient. Laboratory testing: None    Essential Hypertension: Uncontrolled likely worsened by prednisone and stopping of eplerinone which I agree with  ACE Inibitor/ARB: Will defer to nephrology with current acute on chronic renal insufficiency  Beta Blocker: Contraindicated due to history of symptomatic bradycardia, intolerance and/or allergy. due to at least perceived side effects of abdominal pain  Calcium Channel Blocker: Continue amlodipine (Norvasc) 10 mg once daily. Agree with increased Clonidine 0.2 mg tid  Continue Hydralazine 100 mg 3x/day    Hyperlipidemia: Mixed - Last LDL on 9/21/2021 was 18 mg/dL   Cholesterol Reduction Therapy: Refused by patient. Once again, thank you for allowing me to participate in this patients care. Please do not hesitate to contact me if I could be of any further assistance. Sincerely,  Vicente Benítez MD, MS, F.[facility] Cardiology Specialist    [address]  [facility] [address] [place] [address]  Phone: [phone], Fax: [phone]     I believe that the risk of significant morbidity and mortality related to the patient's current medical conditions are: intermediate-high.  >30 minutes were spent during prep work, discussion and exam of the patient, and follow up documentation and all of their questions were answered.       November 18, 2021

## 2021-11-19 NOTE — PROGRESS NOTES
Physical Therapy  Facility/Department: [facility] AT THE [facility] MED SURG  Daily Treatment Note  NAME: Toni Dorsey  : 1945  MRN: [id number]    Date of Service: 2021    Discharge Recommendations:  Continue to assess pending progress        Assessment   Treatment Diagnosis: general debility  Prognosis: Good  PT Education: Goals; PT Role; Plan of Care  Patient Education: Educated pt on above with fair to good understanding noted. REQUIRES PT FOLLOW UP: Yes  Activity Tolerance  Activity Tolerance: Patient Tolerated treatment well; Patient limited by fatigue; Patient limited by endurance; Treatment limited secondary to medical complications (free text)  Activity Tolerance: limited by dizziness. Patient Diagnosis(es): The primary encounter diagnosis was Chest pain, unspecified type. Diagnoses of Hyperkalemia, Acute kidney injury ([facility] Hyperglycemia, and Chronic anemia were also pertinent to this visit.      has a past medical history of Acute MI ([facility] [address] [facility] Acute renal failure with tubular necrosis ([facility] [address] [facility] Anemia, CAD (coronary artery disease), Cerebral artery occlusion with cerebral infarction [facility]), CHF (congestive heart failure) ([facility] [facility] Chronic back pain, Closed fracture of lumbar vertebra without mention of spinal cord injury, Displacement of intervertebral disc, site unspecified, without myelopathy, H/O cardiac catheterization, H/O cardiovascular stress test, H/O tilt table evaluation, H/O tilt table evaluation, History of 24 hour EKG monitoring, History of 24 hour EKG monitoring, History of blood transfusion, History of cardiovascular stress test, History of cardiovascular stress test, History of coronary artery stent placement, History of CVA (cerebrovascular accident) without residual deficits, History of echocardiogram, History of Holter monitoring, History of tilt table evaluation, Hyperlipidemia, Hypertension, Non critical Right Renal artery stenosis, native [facility]), Pacemaker, S/P cardiac cath, S/P coronary artery stent placement, SIRS (systemic inflammatory response syndrome) ([facility] [address] and Type II or unspecified type diabetes mellitus without mention of complication, not stated as uncontrolled. has a past surgical history that includes Pacemaker insertion; back surgery; Cholecystectomy (08/17/2015); Corneal transplant; Cardiac catheterization (Left, 02/19/2016); pacemaker placement; Colonoscopy (2010); Colonoscopy (02/19/2015); Colonoscopy (05/23/2018); Colonoscopy (N/A, 05/23/2018); Upper gastrointestinal endoscopy (05/28/2019); Upper gastrointestinal endoscopy (N/A, 05/28/2019); Colonoscopy (10/30/2020); Colonoscopy (N/A, 10/30/2020); Upper gastrointestinal endoscopy (N/A, 10/30/2020); Endoscopy, colon, diagnostic; eye surgery; and Cardiac catheterization (Left, 10/05/2021). Restrictions  Restrictions/Precautions  Restrictions/Precautions: Fall Risk, General Precautions  Subjective   General  Chart Reviewed: Yes  Response To Previous Treatment: Patient with no complaints from previous session. Family / Caregiver Present: Yes  Referring Practitioner: REY Mancilla CNP  Subjective  Subjective: Pt reported dizziness and requested bed exercises only. No complaints of pain. Orientation  Orientation  Overall Orientation Status: Within Normal Limits  Cognition      Objective   Bed mobility  Supine to Sit: Unable to assess  Sit to Supine: Unable to assess  Comment: Held per pt request d/t dizziness. Transfers  Sit to Stand: Unable to assess  Stand to sit: Unable to assess  Comment: Held per pt request d/t dizziness. Ambulation  Ambulation?: No (Held per pt request d/t dizziness.)        Exercises  Straight Leg Raise: 15x  Quad Sets: 15x  Heelslides: 15x  Gluteal Sets: 15x  Hip Abduction: 15x  Knee Short Arc Quad: 15x  Ankle Pumps: 15x  Comments: Above exercises completed in supine. Pt needed frequent short rest breaks d/t fatigue. Verbal cues needed for technique with fair to good understanding noted. G-Code     OutComes Score    AM-PAC Score    Goals  Short term goals  Time Frame for Short term goals: 20 visits  Short term goal 1: Patient will demonstrate the ability to complete bed mobility and transfers independently in order to return to OF  Short term goal 2: Patient will demonstrate the ability to ambulate >200 feet with RW with supervision assistance in order to ease functional mobility  Short term goal 3: Patient will ascend/descend 1 step without handrail with supervision assistance in order to safely enter/exit the home  Short term goal 4: Patient will tolerate 35-45' ther-ex in order to increase endurance and ease ADLs    Plan    Plan  Times per week: 7 times per week  Times per day: Twice a day  Current Treatment Recommendations: Strengthening, Balance Training, Functional Mobility Training, Transfer Training, Neuromuscular Re-education, Gait Training, Stair training, Pain Management, Positioning, Equipment Evaluation, Education, & procurement, Patient/Caregiver Education & Training, Safety Education & Training, Home Exercise Program  Safety Devices  Type of devices:  All fall risk precautions in place, Bed alarm in place, Call light within reach, Nurse notified, Patient at risk for falls, Left in bed     Therapy Time   Individual Concurrent Group Co-treatment   Time In 0830         Time Out 0900         Minutes [address] [place]

## 2021-11-19 NOTE — PROGRESS NOTES
Progress Note  Monae Arce MD    OBJECTIVE:    Patient seen for f/u of Cardiorenal syndrome, stage 1-4 or unspecified chronic kidney disease, with heart failure ([address] He has no further chest pain ,has lost 1 lb  bp better  Bmp - bun94, cr 3.48  Hb same    ROS:   Constitutional: negative  for fevers, and negative for chills. Respiratory: positive for shortness of breath, negative for cough, and negative for wheezing  Cardiovascular: negative for chest pain, and negative for palpitations  Gastrointestinal: negative for abdominal pain, negative for nausea,negative for vomiting, negative for diarrhea, and negative for constipation     All other systems were reviewed with the patient and are negative unless otherwise stated in HPI    OBJECTIVE:  Vitals:   Temp: 98.1 °F (36.7 °C)  BP: (!) 129/55  Resp: 16  Pulse: 66  SpO2: 95 %    24HR INTAKE/OUTPUT:      Intake/Output Summary (Last 24 hours) at 11/19/2021 0745  Last data filed at 11/19/2021 0216  Gross per 24 hour   Intake 490 ml   Output 1575 ml   Net -1085 ml     -----------------------------------------------------------------  Exam:  GEN:    Awake, alert and oriented x3. EYES:  EOMI, pupils equal   NECK: Supple. No lymphadenopathy. No carotid bruit  CVS:    regular rate and rhythm, 2/6 systolic murmur  PULM: has diminished air entry at bases and has basal rales, no acute respiratory distress  ABD:    Bowels sounds normal.  Abdomen is soft. No distention. no tenderness to palpation. EXT:   trace edema bilaterally . No calf tenderness. NEURO: Moves all extremities. Motor and sensory are grossly intact  SKIN:  No rashes.   No skin lesions.    -----------------------------------------------------------------  Diagnostic Data:    · All available data reviewed  Lab Results   Component Value Date    WBC 9.7 11/19/2021    HGB 8.3 (L) 11/19/2021    MCV 91.1 11/19/2021     11/19/2021      Lab Results   Component Value Date    GLUCOSE 84 11/19/2021 BUN 94 (H) 11/19/2021    CREATININE 3.48 (H) 11/19/2021     (L) 11/19/2021    K 4.6 11/19/2021    CALCIUM 7.5 (L) 11/19/2021     11/19/2021    CO2 18 (L) 11/19/2021       PROBLEM LIST:  Principal Problem:    Cardiorenal syndrome, stage 1-4 or unspecified chronic kidney disease, with heart failure (HCC)  Active Problems: Moderate malnutrition ([address]    Coronary artery disease involving native coronary artery of native heart without angina pectoris    Chronic diastolic HF (heart failure), NYHA class 3 (HCC)    Acute kidney injury superimposed on CKD (HCC)    Chronic anemia    Acute purpuric eruption    Hyperkalemia  Resolved Problems:    * No resolved hospital problems.  *      ASSESSMENT / PLAN:  Cardiorenal syndrome, stage 1-4 or unspecified chronic kidney disease, with heart failure (HCC)   renal functin  worse  · Hyperkalemia improved  · Cad- no chest pain  · Htn-improving with clonidine  · chf- same  · Anemia- hb 8.3, this is multifactorial including due to ckd,reaction to iv iron  · Nutrition status: at risk for malnutrition  · DVT prophylaxis:scd  · High risk medications: none   Disposition:  Discharge plan is Himanshu Karimi MD , M.D.  11/19/2021  7:45 AM

## 2021-11-19 NOTE — PROGRESS NOTES
Patient's family request possible transfer to [facility]. Writer spoke with nurse practitioner Lizzie Rice. [facility] They contacted [facility], who currently does not have any beds but will contact if available. Will update family.

## 2021-11-19 NOTE — PROGRESS NOTES
The patient stated he thinks his Blood sugar going low, so the writer checked it and it is 105 at this time and patient is made aware of his blood sugar. Will continue to monitor.

## 2021-11-19 NOTE — PROGRESS NOTES
Renal Progress Note  [facility]      TELEHEALTH EVALUATION -- Audio/Visual (During JOFPD-24 public health emergency)     Telehealth service was provided with the patient at his room in [address] [place] [address] and myself the physician in my office in [place], [place] and the patient's RN, Negro Carney, who has initiated the visit. Pursuant to the emergency declaration under the [facility], On license of [facility] authority and the [employer] Act, this Virtual  Visit was conducted, with patient's consent, to reduce the patient's risk of exposure to COVID-19 and provide continuity of care for an established patient. Services were provided through a video synchronous discussion virtually to substitute for in-person clinic visit. Patient :  Abbe Kirkpatrick; 68 y.o. MRN# [id number]  Location:  3339/0559-[address], MD  Admit Date:  2021   Hospital Day: 8      Subjective:     Nephrology is following the patient for VIDAL/CKD. Patient feels tired, poor appetite. Thinks swelling is getting worse. abd US showed no ascites. He has dyspnea on exertion. Weight down 1 pound.     Outpatient Medications:     Medications Prior to Admission: [] predniSONE (DELTASONE) 20 MG tablet, Take 1 tablet by mouth 2 times daily for 5 days  eplerenone (INSPRA) 25 MG tablet, Take 1 tablet by mouth daily  loratadine (CLARITIN) 10 MG tablet, Take 1 tablet by mouth daily (Patient taking differently: Take 10 mg by mouth daily as needed )  lisinopril (PRINIVIL;ZESTRIL) 20 MG tablet, Take 1 tablet by mouth daily  amLODIPine (NORVASC) 10 MG tablet, Take 1 tablet by mouth nightly  hydrALAZINE (APRESOLINE) 100 MG tablet, Take 1 tablet by mouth 3 times daily Patient is taking 100 MG 3 times daily  glipiZIDE (GLUCOTROL XL) 5 MG extended release tablet, Take 2 tablets by mouth 2 times daily  nitroGLYCERIN (NITROSTAT) 0.4 MG SL tablet, Place 1 tablet under the tongue every 5 minutes as needed for Chest pain  latanoprost (XALATAN) 0.005 % ophthalmic solution,   acyclovir (ZOVIRAX) 400 MG tablet, 400 mg 2 times daily   prednisoLONE acetate (PRED FORTE) 1 % ophthalmic suspension, Place 1 drop into the left eye daily   cetirizine (ZYRTEC) 10 MG tablet, Take 1 tablet by mouth daily  insulin glargine (LANTUS SOLOSTAR) 100 UNIT/ML injection pen, Inject 28 Units into the skin 2 times daily  CONTOUR TEST strip, USE 1 STRIP TO CHECK GLUCOSE TWICE DAILY  DIANA MICROLET LANCETS MISC, TEST TWICE DAILY  Insulin Pen Needle (PEN NEEDLES) 31G X 6 MM MISC, 1 each by Does not apply route daily    Current Medications:     Scheduled Meds:    bumetanide  2 mg IntraVENous BID    metOLazone  5 mg Oral Once    insulin glargine  25 Units SubCUTAneous BID    darbepoetin shannon-polysorbate  60 mcg SubCUTAneous Weekly    cloNIDine  0.2 mg Oral TID    glipiZIDE  10 mg Oral BID AC    cetirizine  5 mg Oral Daily    insulin lispro  0-18 Units SubCUTAneous TID WC    insulin lispro  0-9 Units SubCUTAneous Nightly    acyclovir  400 mg Oral BID    amLODIPine  10 mg Oral Nightly    hydrALAZINE  100 mg Oral TID    prednisoLONE acetate  1 drop Left Eye Daily    sodium chloride flush  5-40 mL IntraVENous 2 times per day     Continuous Infusions:    sodium chloride      dextrose      sodium chloride       PRN Meds:  sodium chloride, glucose, dextrose, glucagon (rDNA), dextrose, sodium chloride flush, sodium chloride, ondansetron **OR** ondansetron, polyethylene glycol, acetaminophen **OR** acetaminophen    Input/Output:       I/O last 3 completed shifts: In: 450 [P.O.:440; I.V.:10]  Out: 700 [Urine:700].       Patient Vitals for the past 96 hrs (Last 3 readings):   Weight   11/19/21 0541 208 lb 3.2 oz (94.4 kg)   11/18/21 0645 209 lb 6.4 oz (95 kg)   11/17/21 0900 210 lb 1.6 oz (95.3 kg)       Vital Signs:   Temperature:  Temp: 98 °F (36.7 °C)  TMax:   Temp (24hrs), Av.8 °F (36.6 °C), Min:97.1 °F (36.2 °C), Max:98.1 °F (36.7 °C)    Respirations:  Resp: 16  Pulse:   Pulse: 68  BP:    BP: 139/74  BP Range: Systolic (59NKP), XXJ:905 , Min:129 , QEV:671       Diastolic (99VUP), JJV:05, Min:49, Max:74      Physical Examination:     General -- no distress  Oral Mucosa -- moist  Neck --  JVD - no  Extremities -- edema noted, + rash  CNS - awake and alert   Pschy - not agitated, mood and memory normal    Due to this being a TeleHealth encounter, evaluation of the following organ systems is limited: Vitals/EENT/Resp/CV/GI//MS/Neuro/Skin/Heme-Lymph-Imm. Labs:       Recent Labs     21  0540 21  0528 21  0545   WBC 10.6 8.9 9.7   RBC 3.51* 2.90* 2.91*   HGB 9.8* 8.2* 8.3*   HCT 31.4* 25.6* 26.5*   MCV 89.5 88.3 91.1   MCH 27.9 28.3 28.5   MCHC 31.2 32.0 31.3   RDW 17.4* 17.9* 17.9*    136* 138   MPV 9.8 9.7 10.0      BMP:   Recent Labs     21  0540 21  0528 21  0545    138 134*   K 4.0 4.1 4.6   * 108* 104   CO2 16* 16* 18*   * 104* 94*   CREATININE 3.15* 3.23* 3.48*   GLUCOSE 87 125* 84   CALCIUM 7.6* 7.3* 7.5*      Phosphorus:   No results for input(s): PHOS in the last 72 hours. Magnesium:  No results for input(s): MG in the last 72 hours. Albumin:  No results for input(s): LABALBU in the last 72 hours. BNP:    No results found for: BNP  VALERIE:      Lab Results   Component Value Date    VALERIE NEGATIVE 2021     SPEP:  Lab Results   Component Value Date    PROT 6.4 2021    ALBCAL 3.9 2018    ALBPCT 62 2018    LABALPH 0.2 2018    LABALPH 0.7 2018    A1PCT 3 2018    A2PCT 11 2018    LABBETA 0.7 2018    BETAPCT 12 2018    GAMGLOB 0.8 2018    GGPCT 13 2018    PATH ELECTRONICALLY SIGNED. Roberto Zamora M.D. 2018    PATH ELECTRONICALLY SIGNED.  Roberto Zamora M.D. 2018     UPEP:   No results found for: LABPE  C3:     Lab Results Component Value Date    C3 134 09/11/2018     C4:     Lab Results   Component Value Date    C4 23 09/11/2018     MPO ANCA:     Lab Results   Component Value Date    MPO 15 11/11/2021     PR3 ANCA:     Lab Results   Component Value Date    PR3 1.1 11/11/2021     Anti-GBM:   No results found for: GBMABIGG  Hep BsAg:       No results found for: HEPBSAG  Hep C AB:        No results found for: HEPCAB    Urinalysis/Chemistries:      Lab Results   Component Value Date    NITRU NEGATIVE 11/11/2021    COLORU Yellow 11/11/2021    PHUR 5.5 11/11/2021    WBCUA 0 TO 2 11/11/2021    RBCUA 0 TO 2 11/11/2021    MUCUS NOT REPORTED 11/11/2021    TRICHOMONAS NOT REPORTED 11/11/2021    YEAST NOT REPORTED 11/11/2021    BACTERIA NOT REPORTED 11/11/2021    SPECGRAV 1.020 11/11/2021    LEUKOCYTESUR NEGATIVE 11/11/2021    UROBILINOGEN Normal 11/11/2021    BILIRUBINUR NEGATIVE 11/11/2021    GLUCOSEU 3+ 11/11/2021    KETUA NEGATIVE 11/11/2021    AMORPHOUS NOT REPORTED 11/11/2021     Urine Sodium:     Lab Results   Component Value Date    NIDHI 67 11/12/2021     Urine Potassium:    Lab Results   Component Value Date    KUR 25.3 11/12/2021     Urine Chloride:    Lab Results   Component Value Date    CLUR 54 11/12/2021     Urine Osmolarity: No results found for: OSMOU  Urine Protein:   No components found for: TOTALPROTEIN, URINE   Urine Creatinine:     Lab Results   Component Value Date    LABCREA 94.6 11/02/2021     Urine Eosinophils:  No components found for: UEOS        Impression and Plan:  1. 1. VIDAL on CKD III  : initially improved with IV Fluids however patient became fluid overloaded and now requiring diuretics and renal function worsening again.   Likely has component of cardiorenal syndrome .    -15 pounds above dry weight  -bumex 2 mg IV BID x2  Doses and metolazone 5 mg today  -Awaiting transfer to [address] [place] [address] where his primary nephrologist is for possible dialysis  -renal US unremarakble  -I do not feel any underlying glomerulonephritis going on as urine is bland  -multiple factors on admission including hyperglyemia causing volume depletion, ACE-I, Epleronone and contrast nephropathy vs possible cholesterol emboli. [facility] 10/5.        2. Hyperkalemia from VIDAL, ACE-I and Epleronone + hyperglycemia: resolved  3. Metabolic acidosis, improved. Avoiding po bicarb due to added salt load  4. HTN: , improved  5. Diastolic CHF  6. Anemia, s/p IV iron as outpatient. RAVI  7. Rash. S/p biopsy. Urticarial vasculitis. 8. DM  9.angiomyolipoma: Follow outpatient        Please don't hesitate to call with any questions.   Electronically signed by Ibrahima Olguin DO on 11/19/2021 at 3:21 PM

## 2021-11-19 NOTE — PROGRESS NOTES
Physical Therapy  Facility/Department: [facility] AT THE [facility] MED SURG  Daily Treatment Note  NAME: Freedom Lassiter  : 1945  MRN: [id number]    Date of Service: 2021    Discharge Recommendations:  Continue to assess pending progress        Assessment   Treatment Diagnosis: general debility  Prognosis: Good  Activity Tolerance  Activity Tolerance: Patient Tolerated treatment well; Patient limited by fatigue; Patient limited by endurance     Patient Diagnosis(es): The primary encounter diagnosis was Chest pain, unspecified type. Diagnoses of Hyperkalemia, Acute kidney injury ([address] Hyperglycemia, and Chronic anemia were also pertinent to this visit. has a past medical history of Acute MI ([facility] Acute renal failure with tubular necrosis ([facility] Anemia, CAD (coronary artery disease), Cerebral artery occlusion with cerebral infarction [facility]), CHF (congestive heart failure) ([facility] [address] [facility] Chronic back pain, Closed fracture of lumbar vertebra without mention of spinal cord injury, Displacement of intervertebral disc, site unspecified, without myelopathy, H/O cardiac catheterization, H/O cardiovascular stress test, H/O tilt table evaluation, H/O tilt table evaluation, History of 24 hour EKG monitoring, History of 24 hour EKG monitoring, History of blood transfusion, History of cardiovascular stress test, History of cardiovascular stress test, History of coronary artery stent placement, History of CVA (cerebrovascular accident) without residual deficits, History of echocardiogram, History of Holter monitoring, History of tilt table evaluation, Hyperlipidemia, Hypertension, Non critical Right Renal artery stenosis, native ([address] Pacemaker, S/P cardiac cath, S/P coronary artery stent placement, SIRS (systemic inflammatory response syndrome) ([address] and Type II or unspecified type diabetes mellitus without mention of complication, not stated as uncontrolled.    has a past surgical history that includes Pacemaker insertion; back surgery; Cholecystectomy (08/17/2015); Corneal transplant; Cardiac catheterization (Left, 02/19/2016); pacemaker placement; Colonoscopy (2010); Colonoscopy (02/19/2015); Colonoscopy (05/23/2018); Colonoscopy (N/A, 05/23/2018); Upper gastrointestinal endoscopy (05/28/2019); Upper gastrointestinal endoscopy (N/A, 05/28/2019); Colonoscopy (10/30/2020); Colonoscopy (N/A, 10/30/2020); Upper gastrointestinal endoscopy (N/A, 10/30/2020); Endoscopy, colon, diagnostic; eye surgery; and Cardiac catheterization (Left, 10/05/2021). Restrictions  Restrictions/Precautions  Restrictions/Precautions: Fall Risk, General Precautions  Subjective   General  Chart Reviewed: Yes  Response To Previous Treatment: Patient with no complaints from previous session. Family / Caregiver Present: Yes  Referring Practitioner: REY Mckenna CNP  Subjective  Subjective: Pt reports he has had some dizziness today however reports no pain. Orientation  Orientation  Overall Orientation Status: Within Normal Limits  Cognition      Objective      Transfers  Sit to Stand: Stand by assistance; Contact guard assistance  Stand to sit: Stand by assistance; Contact guard assistance  Ambulation  Ambulation?: Yes  Ambulation 1  Surface: level tile  Device: Rolling Walker  Assistance: Contact guard assistance  Quality of Gait: Slow lyndsey, reciprocal gait pattern, no LOB. Gait Deviations: Slow Lyndsey; Shuffles  Distance: 150 feet  Comments: 5 standing rest breaks needed d/t increased SOB. Stairs/Curb  Stairs?: No     Balance  Sitting - Static: Good  Sitting - Dynamic: Fair; +  Standing - Static: Fair; +  Standing - Dynamic: Fair  Exercises  Hip Flexion: x15  Hip Abduction: x15  Knee Long Arc Quad: x15  Ankle Pumps: x15  Comments: Above exercises completed in sitting.         Goals  Short term goals  Time Frame for Short term goals: 20 visits  Short term goal 1: Patient will demonstrate the ability to complete bed mobility and transfers independently in order to return to PLOF  Short term goal 2: Patient will demonstrate the ability to ambulate >200 feet with RW with supervision assistance in order to ease functional mobility  Short term goal 3: Patient will ascend/descend 1 step without handrail with supervision assistance in order to safely enter/exit the home  Short term goal 4: Patient will tolerate 35-45' ther-ex in order to increase endurance and ease ADLs    Plan    Plan  Times per week: 7 times per week  Times per day: Twice a day  Current Treatment Recommendations: Strengthening, Balance Training, Functional Mobility Training, Transfer Training, Neuromuscular Re-education, Gait Training, Stair training, Pain Management, Positioning, Equipment Evaluation, Education, & procurement, Patient/Caregiver Education & Training, Safety Education & Training, Home Exercise Program  Safety Devices  Type of devices:  All fall risk precautions in place, Left in chair, Nurse notified, Call light within reach     Therapy Time   Individual Concurrent Group Co-treatment   Time In 1450         Time Out 1513         Minutes [address], [place]

## 2021-11-19 NOTE — PROGRESS NOTES
Carolee Saavedra am scribing for and in the presence of Jaun Moreno. Jackelin BLOUNT, MS, F.A.C.C..    Patient: Radha Johnston  : 1945  Date of Admission: 2021  Primary Care Physician: Ligia Altman  Today's Date: 2021    REASON FOR CONSULTATION: Chest Pain (Mid chest tightness, radiating to left arm. Onset 30mins ago), Shortness of Breath, and Other (Abnormal labwork drawn today. (potassium, glucose and kidney function))      HPI: Mr. Kerry Lopez is a 68 y.o. male well known to me with a known history of dysautonomia where on tilt table testing his blood pressure dropped from 495 systolic to 78 systolic with only a 75-75 HR response. More recently, a CT of he head in the past showed evidence of at least 2 old CVA's and a heart catheterization showed severe single vessel disease in the ostium of a moderate sized OM1 branch of the circumflex. However, maximal guidelines directed medical management was opted for an place of stenting partly due to the risk associated with stenting this vessel. Recently he has had a coronary angiography procedure with stenting of his HA Om1. As you know, Mr. Kerry Lopez  is a 76 y.o. male with a history of recent onset substernal chest discomfort that led to a stress test and a subsequent heart catheterization which showed severe single vessel coronary artery disease of the HA Om1 with successful angioplasty and stenting of that vessel that was done by Dr. Jolie Cardona on 4/10/17. More recently he has had problems with balance problems when he is in his feet and says that a Neurologist has told him in the past this is because of his previous strokes. Most recently he underwent a cardiovascular stress test which fortunately had shown to be normal on 3/21/2018. Mr. Kerry Lopez has significant normal stress test and echocardiogram on 2020. Holter monitor done on 2020: The rhythm was sinus.  Average daily heart rate 58 ranging from 47 to 81 bpm. Bradycardia for 72% test duration. Rare premature supraventricular ectopic beats consisting of 33 isolated PACs. Rare premature ventricular ectopic beats total 75 consisting of 73 isolatedPVCs and a ventricular couplet. Echocardiogram done on 12/18/2020: EF of 60%. Moderately increased LV wall thickness. Borderline pulmonary hypertension. Mild tricuspid regurgitation. Mild diastolic dysfunction is seen/ Aortic root is mildly dilated. Stress test done on 9/20/2021 was equivocal, There is a small/moderate perfusion defect of mild intensity in the lateral, inferior and inferoapical regions. Cardiac cath done on 10/5/2021 showed Moderate three vessel coronary artery disease involving a ostial 50-60% stenosis in a small, non-dominant RCA, a 60% mid LAD stenosis and a proximal 50-60% stenosis in the left anterior descending coronary artery. Mr. Mana Faust says he feels like his breathing is worse than yesterday. His family states he \"gurgles\" more when he is sleeping. Some swelling in his arms. He denied any abdominal pain, bleeding problems, problems with his medications or any other concerns at this time. No nausea or vomiting. No cough, fever or chills. He has had some constipation since being admitted. Past Medical History:   Diagnosis Date    Acute MI ([address]     Acute renal failure with tubular necrosis ([address] 10/2/2018    ssecondary to hemodynamic effects of loop diuretics and ace inhibitors, bbaseline 1.2-1.3 which peaked up to 1.8, resolving    Anemia     CAD (coronary artery disease)     Cerebral artery occlusion with cerebral infarction ([address]     [facility]est[facility]     Chronic back pain     Closed fracture of lumbar vertebra without mention of spinal cord injury     Displacement of intervertebral disc, site unspecified, without myelopathy     H/O cardiac catheterization 2/19/16    LMCA: Mild irregularities 10-20%. LAD: Lesion on PRox LAD: Mid subsection. 65% stenosis.  LCx: Lesion on 1st Ob Valentina: Proximal subesection. 70% steniosis. RCA: Small non-dominant RCA. Lesion on PRox RCA: Ostial. 50% stenosis. EF:55%.  H/O cardiovascular stress test 2/19/16    Abnormal. Moderate perfusion defect of mild intensity in the inferior, inferoseptal adn inferoapical regions during stress imaging, which most consistent with ischemia. Global LV systolic function normal without regional wall motion abnormalities. OVerall these results are most consistent with an intermediate risk for signficant CAD. Additional testing including cardiac cath may be indicated.  H/O tilt table evaluation 12/26/2017    Abnormal. Patients HR, BP response and symptoms were most consistent with dysautonomia. Combined with viligant maintenance of euvolemia and maintaining a moderate salft intake, pharmacologic treatment with SSRI such as lexapro and or mestinon among other treatments have shown some effectiveness in treatment of this condition    H/O tilt table evaluation 12/26/2017    Abnormal head upright tilt table study. The pt heart rate, blood pressure response and symptoms were most consistent with dysautonomia.  History of 24 hour EKG monitoring 8/14/14    Occasional PAC's and PVC's which appear to be at least moderately symptomatic.  History of 24 hour EKG monitoring 11/19/14    Event Monitor. Sinus rhythm and sinus bradycardia. Infrequent isolated PVC's    History of blood transfusion     History of cardiovascular stress test 2/19/14    Relatively NL    History of cardiovascular stress test 03/21/2018    Normal myocardial perfusion. Global left ventricular systolic function was normal with an EF of 68%. Overall, these results are most consistent with a low risk scan.  History of coronary artery stent placement 04/2017    PTCA / Drug Eluting Stent:, CX and / or branches    History of CVA (cerebrovascular accident) without residual deficits 2014    Incidentally found on CT head.  No known impairment now or in the past.    History of echocardiogram 2/18/14    LA mildly dilated, EF 55%, LV wall thickness is moderately increased, no definite wall motion abnormalilities, what appears to be a pacer wire is seen w/n the RA and RV, mild-mod TR, mild pulmonary hypertension.  History of Holter monitoring 12/29/2017    Rare PAC's and PVC's.  History of tilt table evaluation 8/12/14    Abnormal    Hyperlipidemia     Hypertension     Non critical Right Renal artery stenosis, native ([facility] 10/2/2018    Non critical Right Renal artery stenosis, based on cath in 2016, Rt RA 30% stenosis    Pacemaker     non-functioning    S/P cardiac cath 04/10/2017    S/P coronary artery stent placement     Successful PTCA - HA Om1    SIRS (systemic inflammatory response syndrome) ([facility] 5/19/2019    Type II or unspecified type diabetes mellitus without mention of complication, not stated as uncontrolled        CURRENT ALLERGIES: Lipitor [atorvastatin], Aldactone [spironolactone], Crestor [rosuvastatin calcium], Lopid [gemfibrozil], Invokana [canagliflozin], and Januvia [sitagliptin] REVIEW OF SYSTEMS: 14 systems were reviewed. Pertinent positives and negatives as above, all else negative. Past Surgical History:   Procedure Laterality Date    BACK SURGERY      CARDIAC CATHETERIZATION Left 02/19/2016    via right radial approach/ [place]/ Dr. Silas Covarrubias Left 10/05/2021    Dr [place] radial-Moderate three vessel coronary artery disease involving a ostial 50-60% stenosis in a small, non-dominant RCA, a 60% mid LAD stenosis and a proximal 50-60% stenosis in the left anterior descending coronary artery. Normal left ventricular end diastolic pressure. Proceed with aggressive maximal medical management as clinically indicated.     CHOLECYSTECTOMY  08/17/2015    Liang/Charles/Saulo/ [facility]    COLONOSCOPY  2010    COLONOSCOPY  02/19/2015    -polyps,diverticulosis,hemorrhoids    COLONOSCOPY  2018    Dr Polly Payne    COLONOSCOPY N/A 2018    COLONOSCOPY POLYPECTOMY SNARE/COLD BIOPSY  cold snare  and  hot snare performed by Mata Barrett MD at [address]  10/30/2020    with Dr. Jesse Moreno 10/30/2020    Syble Pinna, NOT HIGH RISK performed by Jim Mcfarland DO at [facility]      left eye    ENDOSCOPY, COLON, DIAGNOSTIC      EYE SURGERY      PACEMAKER INSERTION      PACEMAKER PLACEMENT      UPPER GASTROINTESTINAL ENDOSCOPY  2019    Dr. Marta Zee H-Pylori)duodenal bulbar/antral erythema    UPPER GASTROINTESTINAL ENDOSCOPY N/A 2019    EGD BIOPSY, clotest performed by Mata Barrett MD at [facility] 10/30/2020    EGD ESOPHAGOGASTRODUODENOSCOPY performed by Jim Mcfarland DO at [address] History:  Social History     Tobacco Use    Smoking status: Former Smoker     Packs/day: 1.50     Years: 8.00     Pack years: 12.00     Types: Cigarettes     Quit date: 3/4/1980     Years since quittin.7    Smokeless tobacco: Never Used   Vaping Use    Vaping Use: Never used   Substance Use Topics    Alcohol use: No    Drug use: No        CURRENT MEDICATIONS:  Outpatient Medications Marked as Taking for the 21 encounter [facility] Encounter)   Medication Sig Dispense Refill    [] predniSONE (DELTASONE) 20 MG tablet Take 1 tablet by mouth 2 times daily for 5 days 10 tablet 0    eplerenone (INSPRA) 25 MG tablet Take 1 tablet by mouth daily 90 tablet 3    loratadine (CLARITIN) 10 MG tablet Take 1 tablet by mouth daily (Patient taking differently: Take 10 mg by mouth daily as needed ) 90 tablet 3    lisinopril (PRINIVIL;ZESTRIL) 20 MG tablet Take 1 tablet by mouth daily 90 tablet 3    amLODIPine (NORVASC) 10 MG tablet Take 1 tablet by mouth nightly 90 tablet 0    hydrALAZINE (APRESOLINE) 100 MG tablet Take 1 tablet by mouth 3 times daily Patient is taking 100 MG 3 times daily 270 tablet 0    glipiZIDE (GLUCOTROL XL) 5 MG extended release tablet Take 2 tablets by mouth 2 times daily 360 tablet 0    nitroGLYCERIN (NITROSTAT) 0.4 MG SL tablet Place 1 tablet under the tongue every 5 minutes as needed for Chest pain 25 tablet 3    latanoprost (XALATAN) 0.005 % ophthalmic solution       acyclovir (ZOVIRAX) 400 MG tablet 400 mg 2 times daily       prednisoLONE acetate (PRED FORTE) 1 % ophthalmic suspension Place 1 drop into the left eye daily        insulin glargine (LANTUS) injection vial 25 Units, BID  darbepoetin shannon-polysorbate (ARANESP) injection 60 mcg, Weekly  cloNIDine (CATAPRES) tablet 0.2 mg, TID  glipiZIDE (GLUCOTROL) tablet 10 mg, BID AC  0.9 % sodium chloride infusion, PRN  glucose (GLUTOSE) 40 % oral gel 15 g, PRN  dextrose 50 % IV solution, PRN  glucagon (rDNA) injection 1 mg, PRN  dextrose 5 % solution, PRN  cetirizine (ZYRTEC) tablet 5 mg, Daily  insulin lispro (HUMALOG) injection vial 0-18 Units, TID WC  insulin lispro (HUMALOG) injection vial 0-9 Units, Nightly  acyclovir (ZOVIRAX) capsule 400 mg, BID  amLODIPine (NORVASC) tablet 10 mg, Nightly  hydrALAZINE (APRESOLINE) tablet 100 mg, TID  prednisoLONE acetate (PRED FORTE) 1 % ophthalmic suspension 1 drop, Daily  sodium chloride flush 0.9 % injection 5-40 mL, 2 times per day  sodium chloride flush 0.9 % injection 10 mL, PRN  0.9 % sodium chloride infusion, PRN  ondansetron (ZOFRAN-ODT) disintegrating tablet 4 mg, Q8H PRN   Or  ondansetron (ZOFRAN) injection 4 mg, Q6H PRN  polyethylene glycol (GLYCOLAX) packet 17 g, Daily PRN  acetaminophen (TYLENOL) tablet 650 mg, Q6H PRN   Or  acetaminophen (TYLENOL) suppository 650 mg, Q6H PRN       FAMILY HISTORY: family history includes Cancer in his father and mother.    Physical Examination:     BP (!) 129/55   Pulse 66   Temp 98.1 °F (36.7 °C) (Oral)   Resp 16   Ht 5' 9\" (1.753 m)   Wt 208 lb 3.2 oz (94.4 kg) Comment: Bed rezeroed with bedsheet and bedpad only  SpO2 95%   BMI 30.75 kg/m²  Body mass index is 30.75 kg/m². Constitutional: He appeared oriented to person and place. He appears well-developed and well-nourished. In no acute distress. HEENT: Normocephalic and atraumatic. No JVD present. Carotid bruit is not present. No mass and no thyromegaly present. No lymphadenopathy noted. Cardiovascular: Normal rate, regular rhythm, normal heart sounds. Exam reveals no gallop and no friction rubs. 2/6 systolic murmur, 5th intercostal space on the LEFT in the mid-clavicular line (cardiac apex)  Pulmonary/Chest: Effort normal and breath sounds normal. No respiratory distress. He has no wheezes, rhonchi or rales. Abdominal: Hard, tender, appears to have fluid in his abdomen. He exhibits no organomegaly, mass or bruit. Extremities: Trace-1+ 1/2 up to the knees bilaterally. No cyanosis or clubbing. 2+ radial and carotid pulses. Distal extremity pulses: 2+ bilaterally. Also trace-1 in the arms. Neurological: Alertness and orientation as per Constitutional exam. No evidence of gross cranial nerve deficit. Coordination appeared normal.   Skin: Skin is warm and dry. There is no rash or diaphoresis. Psychiatric: He has a normal mood and affect.  His speech is normal and behavior is normal.      MOST RECENT LABS ON RECORD:   Lab Results   Component Value Date    WBC 9.7 11/19/2021    HGB 8.3 (L) 11/19/2021    HCT 26.5 (L) 11/19/2021     11/19/2021    CHOL 69 09/21/2021    TRIG 185 (H) 09/21/2021    HDL 14 (L) 09/21/2021    LDLCHOLESTEROL 18 09/21/2021    ALT 11 11/12/2021    AST 11 11/12/2021     (L) 11/19/2021    K 4.6 11/19/2021     11/19/2021    CREATININE 3.48 (H) 11/19/2021    BUN 94 (H) 11/19/2021    CO2 18 (L) 11/19/2021    TSH 2.26 11/02/2017    PSA 3.68 04/30/2021    INR 1.1 09/20/2021    LABA1C 7.3 08/19/2021    LABMICR 30 10/17/2015      ASSESSMENT:  Patient Active Problem List Diagnosis Date Noted    S/P drug eluting coronary stent placement-OM1 4/10/17 04/10/2017    Abnormal tilt table test 02/23/2016    Abnormal cardiovascular stress test 02/18/2016    Moderate malnutrition ([address] 11/12/2021    Coronary atherosclerosis due to calcified coronary lesion 02/20/2015    Cardiorenal syndrome, stage 1-4 or unspecified chronic kidney disease, with heart failure ([address] 11/17/2021    Cardiorenal syndrome with renal failure 11/17/2021    Hyperkalemia 11/11/2021    Acute purpuric eruption 11/10/2021    Skin lesion of left lower extremity 11/10/2021    Urticaria 11/10/2021    Iron deficiency anemia secondary to inadequate dietary iron intake 10/21/2021    Iron malabsorption 10/21/2021    Chronic anemia 09/21/2021    Acute coronary syndrome with high troponin ([address] 09/21/2021    Chest pain 09/20/2021    Gross hematuria 05/05/2021    BPH with obstruction/lower urinary tract symptoms 12/10/2019    Elevated PSA 12/10/2019    Anemia in stage 3 chronic kidney disease ([address] 05/21/2019    Acute kidney injury superimposed on CKD ([address] 10/02/2018    Non critical Right Renal artery stenosis, native ([address] 10/02/2018    Medication side effect, initial encounter 03/23/2018    Angina, class II ([address] 01/08/2018    Hyperlipidemia 04/24/2017    Chronic diastolic HF (heart failure), NYHA class 3 ([address] 04/24/2017    Coronary artery disease involving native coronary artery of native heart without angina pectoris 05/02/2016    Cervical stenosis of spinal canal 02/29/2016    Chronic kidney disease, stage III (moderate) ([address] 02/22/2016    Controlled type 2 diabetes mellitus with diabetic nephropathy, with long-term current use of insulin ([address] 02/22/2016    Ischemic chest pain ([address] 02/17/2016    Accelerated hypertension 10/16/2015    Chronotropic incompetence with autonomic dysfunction 09/02/2015    Episodic lightheadedness 09/02/2015    Cholecystitis 08/17/2015    Syncope, near 08/05/2015    Dysautonomia ([address] 06/29/2015    History of CVA (cerebrovascular accident) without residual deficits     Displacement of intervertebral disc, site unspecified, without myelopathy 03/04/2015    History of colon polyps 58/79/6471    Systolic murmur 91/46/0722    History of MI (myocardial infarction) 02/12/2014    Vertigo, benign paroxysmal 10/17/2012     PLAN:    Acute on chronic renal insufficiency: Unclear etiology but likely multifactorial: Continue current treatment  Treatment options discussed extensively with Dr. Beatris Aguilar, [facility]   Lasix on hold per nephrology due to worsening renal funcion  Still waiting for a bed at [facility]. I will call and see where he is in line for a bed and update patient. Anemia, unclear etiology: Likely multifactorial including GI losses and acute on chronic renal insufficiency. He received 1 unit pRBC transfusion during hospital stay    Dysautonomia: Mildly to moderately, likely related to and worsened by anemia. Currently stable  Diuretics: Not indicated at this time. Nonpharmacologic counseling: Because of his condition, I reminded him to try and keep himself well-hydrated and to take extra time when moving from laying to sitting, sitting to standing and standing to walking as well as wearing at least knee high compressions stockings. Acute on Chronic Diastolic Heart Failure: EF of 60% via echo on 12/18/2020. Up 9 lbs over last 24 hours  Beta Blocker: Contraindicated due to history of symptomatic bradycardia, intolerance and/or allergy. Patient reports abdominal pain, nausea due to medication. ACE Inibitor/ARB: Will defer to nephrology with current acute on chronic renal insufficiency   Diuretics: Lasix On hold due to his BUN and recently worsening kidney function. probably still up 9-12 lbs but down 1 lb since yesterday.   Nonpharmacologic management of Heart Failure: I advised him to try and keep his legs up whenever possible and to limit salt in his diet. Compression stockings in place  No fluid noted on ultrasound for paracentesis    Atherosclerotic Heart Disease: Coronary Artery stent: 4/10/2017. Cardiac cath done on 10/5/2021 showed Moderate three vessel coronary artery disease involving a ostial 50-60% stenosis in a small, non-dominant RCA, a 60% mid LAD stenosis and a proximal 50-60% stenosis in the left anterior descending coronary artery  Antiplatelet Agent: Not indicated due to bleeding  ACE Inibitor/ARB: Will defer to nephrology with current acute on chronic renal insufficiency  Beta Blocker: Contraindicated due to history of symptomatic bradycardia, intolerance and/or allergy. Cholesterol Reduction Therapy: Refused by patient. Laboratory testing: None    Essential Hypertension: Much better controlled today  ACE Inibitor/ARB: Will defer to nephrology with current acute on chronic renal insufficiency  Beta Blocker: Contraindicated due to history of symptomatic bradycardia, intolerance and/or allergy. due to at least perceived side effects of abdominal pain  Calcium Channel Blocker: Continue amlodipine (Norvasc) 10 mg once daily. Agree with increased Clonidine 0.2 mg tid  Continue Hydralazine 100 mg 3x/day    Hyperlipidemia: Mixed - Last LDL on 9/21/2021 was 18 mg/dL   Cholesterol Reduction Therapy: Refused by patient. Constipation:  I discussed this with Theron Thomas CNP working with the [facility] to start him on treatment for this. Once again, thank you for allowing me to participate in this patients care. Please do not hesitate to contact me if I could be of any further assistance. Sincerely,  Bita Benítez MD, MS, F.A.C.C.   [facility] Cardiology Specialist     [facility], [address] [place] [address]  Phone: [phone], Fax: [phone]     I believe that the risk of significant morbidity and mortality related to the patient's current medical conditions are: intermediate-high. The documentation recorded by the scribe, accurately and completely reflects the services I personally performed and the decisions made by me. Damari Gillespie MD, MS, F.A.C.C.  November 19, 2021

## 2021-11-20 VITALS
OXYGEN SATURATION: 97 % | SYSTOLIC BLOOD PRESSURE: 137 MMHG | WEIGHT: 207.4 LBS | BODY MASS INDEX: 30.72 KG/M2 | HEIGHT: 69 IN | RESPIRATION RATE: 18 BRPM | DIASTOLIC BLOOD PRESSURE: 53 MMHG | HEART RATE: 63 BPM | TEMPERATURE: 97.5 F

## 2021-11-20 LAB
ABSOLUTE EOS #: 0 K/UL (ref 0–0.44)
ABSOLUTE IMMATURE GRANULOCYTE: 0 K/UL (ref 0–0.3)
ABSOLUTE LYMPH #: 0.9 K/UL (ref 1.1–3.7)
ABSOLUTE MONO #: 0.45 K/UL (ref 0.1–1.2)
ANION GAP SERPL CALCULATED.3IONS-SCNC: 12 MMOL/L (ref 9–17)
BASOPHILS # BLD: 0 % (ref 0–2)
BASOPHILS ABSOLUTE: 0 K/UL (ref 0–0.2)
BUN BLDV-MCNC: 91 MG/DL (ref 8–23)
BUN/CREAT BLD: 24 (ref 9–20)
CALCIUM SERPL-MCNC: 7.5 MG/DL (ref 8.6–10.4)
CHLORIDE BLD-SCNC: 106 MMOL/L (ref 98–107)
CO2: 18 MMOL/L (ref 20–31)
CREAT SERPL-MCNC: 3.72 MG/DL (ref 0.7–1.2)
DIFFERENTIAL TYPE: ABNORMAL
EOSINOPHILS RELATIVE PERCENT: 0 % (ref 1–4)
GFR AFRICAN AMERICAN: 19 ML/MIN
GFR NON-AFRICAN AMERICAN: 16 ML/MIN
GFR SERPL CREATININE-BSD FRML MDRD: ABNORMAL ML/MIN/{1.73_M2}
GFR SERPL CREATININE-BSD FRML MDRD: ABNORMAL ML/MIN/{1.73_M2}
GLUCOSE BLD-MCNC: 144 MG/DL (ref 74–100)
GLUCOSE BLD-MCNC: 144 MG/DL (ref 74–100)
GLUCOSE BLD-MCNC: 156 MG/DL (ref 70–99)
GLUCOSE BLD-MCNC: 160 MG/DL (ref 74–100)
GLUCOSE BLD-MCNC: 208 MG/DL (ref 74–100)
GLUCOSE BLD-MCNC: 260 MG/DL (ref 74–100)
HCT VFR BLD CALC: 25.8 % (ref 40.7–50.3)
HEMOGLOBIN: 8 G/DL (ref 13–17)
IMMATURE GRANULOCYTES: 0 %
LYMPHOCYTES # BLD: 10 % (ref 24–43)
MCH RBC QN AUTO: 28.1 PG (ref 25.2–33.5)
MCHC RBC AUTO-ENTMCNC: 31 G/DL (ref 28.4–34.8)
MCV RBC AUTO: 90.5 FL (ref 82.6–102.9)
MONOCYTES # BLD: 5 % (ref 3–12)
MORPHOLOGY: NORMAL
NRBC AUTOMATED: 0 PER 100 WBC
PDW BLD-RTO: 17.9 % (ref 11.8–14.4)
PLATELET # BLD: 130 K/UL (ref 138–453)
PLATELET ESTIMATE: ABNORMAL
PMV BLD AUTO: 10.1 FL (ref 8.1–13.5)
POTASSIUM SERPL-SCNC: 4.9 MMOL/L (ref 3.7–5.3)
RBC # BLD: 2.85 M/UL (ref 4.21–5.77)
RBC # BLD: ABNORMAL 10*6/UL
SEG NEUTROPHILS: 85 % (ref 36–65)
SEGMENTED NEUTROPHILS ABSOLUTE COUNT: 7.65 K/UL (ref 1.5–8.1)
SODIUM BLD-SCNC: 136 MMOL/L (ref 135–144)
WBC # BLD: 9 K/UL (ref 3.5–11.3)
WBC # BLD: ABNORMAL 10*3/UL

## 2021-11-20 PROCEDURE — 80048 BASIC METABOLIC PNL TOTAL CA: CPT

## 2021-11-20 PROCEDURE — 6370000000 HC RX 637 (ALT 250 FOR IP): Performed by: FAMILY MEDICINE

## 2021-11-20 PROCEDURE — 97110 THERAPEUTIC EXERCISES: CPT

## 2021-11-20 PROCEDURE — 36415 COLL VENOUS BLD VENIPUNCTURE: CPT

## 2021-11-20 PROCEDURE — 2500000003 HC RX 250 WO HCPCS: Performed by: INTERNAL MEDICINE

## 2021-11-20 PROCEDURE — 99232 SBSQ HOSP IP/OBS MODERATE 35: CPT | Performed by: INTERNAL MEDICINE

## 2021-11-20 PROCEDURE — 2580000003 HC RX 258: Performed by: INTERNAL MEDICINE

## 2021-11-20 PROCEDURE — 82947 ASSAY GLUCOSE BLOOD QUANT: CPT

## 2021-11-20 PROCEDURE — 97535 SELF CARE MNGMENT TRAINING: CPT

## 2021-11-20 PROCEDURE — 1200000000 HC SEMI PRIVATE

## 2021-11-20 PROCEDURE — 6370000000 HC RX 637 (ALT 250 FOR IP): Performed by: INTERNAL MEDICINE

## 2021-11-20 PROCEDURE — 6360000002 HC RX W HCPCS: Performed by: INTERNAL MEDICINE

## 2021-11-20 PROCEDURE — 94761 N-INVAS EAR/PLS OXIMETRY MLT: CPT

## 2021-11-20 PROCEDURE — 85025 COMPLETE CBC W/AUTO DIFF WBC: CPT

## 2021-11-20 PROCEDURE — 99232 SBSQ HOSP IP/OBS MODERATE 35: CPT | Performed by: FAMILY MEDICINE

## 2021-11-20 RX ORDER — MAGNESIUM HYDROXIDE/ALUMINUM HYDROXICE/SIMETHICONE 120; 1200; 1200 MG/30ML; MG/30ML; MG/30ML
30 SUSPENSION ORAL EVERY 6 HOURS PRN
Status: DISCONTINUED | OUTPATIENT
Start: 2021-11-20 | End: 2021-11-21 | Stop reason: HOSPADM

## 2021-11-20 RX ADMIN — CLONIDINE HYDROCHLORIDE 0.2 MG: 0.2 TABLET ORAL at 15:31

## 2021-11-20 RX ADMIN — ACYCLOVIR 400 MG: 200 CAPSULE ORAL at 20:45

## 2021-11-20 RX ADMIN — PREDNISOLONE ACETATE 1 DROP: 10 SUSPENSION/ DROPS OPHTHALMIC at 08:56

## 2021-11-20 RX ADMIN — AMLODIPINE BESYLATE 10 MG: 10 TABLET ORAL at 20:45

## 2021-11-20 RX ADMIN — SODIUM CHLORIDE, PRESERVATIVE FREE 10 ML: 5 INJECTION INTRAVENOUS at 20:48

## 2021-11-20 RX ADMIN — INSULIN LISPRO 6 UNITS: 100 INJECTION, SOLUTION INTRAVENOUS; SUBCUTANEOUS at 17:50

## 2021-11-20 RX ADMIN — BUMETANIDE 2 MG: 0.25 INJECTION INTRAMUSCULAR; INTRAVENOUS at 08:54

## 2021-11-20 RX ADMIN — HYDRALAZINE HYDROCHLORIDE 100 MG: 50 TABLET, FILM COATED ORAL at 20:45

## 2021-11-20 RX ADMIN — INSULIN LISPRO 3 UNITS: 100 INJECTION, SOLUTION INTRAVENOUS; SUBCUTANEOUS at 08:24

## 2021-11-20 RX ADMIN — GLIPIZIDE 10 MG: 5 TABLET ORAL at 08:56

## 2021-11-20 RX ADMIN — HYDRALAZINE HYDROCHLORIDE 100 MG: 50 TABLET, FILM COATED ORAL at 15:31

## 2021-11-20 RX ADMIN — INSULIN LISPRO 3 UNITS: 100 INJECTION, SOLUTION INTRAVENOUS; SUBCUTANEOUS at 12:20

## 2021-11-20 RX ADMIN — ALUMINUM HYDROXIDE, MAGNESIUM HYDROXIDE, AND SIMETHICONE 30 ML: 200; 200; 20 SUSPENSION ORAL at 08:18

## 2021-11-20 RX ADMIN — HYDRALAZINE HYDROCHLORIDE 100 MG: 50 TABLET, FILM COATED ORAL at 08:54

## 2021-11-20 RX ADMIN — ACYCLOVIR 400 MG: 200 CAPSULE ORAL at 08:54

## 2021-11-20 RX ADMIN — SODIUM CHLORIDE, PRESERVATIVE FREE 10 ML: 5 INJECTION INTRAVENOUS at 08:56

## 2021-11-20 RX ADMIN — CLONIDINE HYDROCHLORIDE 0.2 MG: 0.2 TABLET ORAL at 08:54

## 2021-11-20 RX ADMIN — ONDANSETRON 4 MG: 2 INJECTION INTRAMUSCULAR; INTRAVENOUS at 12:24

## 2021-11-20 RX ADMIN — GLIPIZIDE 10 MG: 5 TABLET ORAL at 15:31

## 2021-11-20 RX ADMIN — CETIRIZINE HYDROCHLORIDE 5 MG: 10 TABLET, FILM COATED ORAL at 08:54

## 2021-11-20 RX ADMIN — CLONIDINE HYDROCHLORIDE 0.2 MG: 0.2 TABLET ORAL at 20:45

## 2021-11-20 ASSESSMENT — PAIN SCALES - GENERAL
PAINLEVEL_OUTOF10: 0
PAINLEVEL_OUTOF10: 0

## 2021-11-20 NOTE — PROGRESS NOTES
Progress Note    SUBJECTIVE:  FU related to some shortness of breath with activity only. No chest pain or chest pressure. OBJECTIVE:    Vitals:   TEMPERATURE:  Current - Temp: 97.4 °F (36.3 °C); Max - Temp  Av.5 °F (36.4 °C)  Min: 97 °F (36.1 °C)  Max: 98 °F (36.7 °C)  RESPIRATIONS RANGE: Resp  Av  Min: 16  Max: 16  PULSE RANGE: Pulse  Av.3  Min: 68  Max: 72  BLOOD PRESSURE RANGE:  Systolic (13VRS), OBZ:304 , Min:139 , RJH:293   ; Diastolic (46JNZ), SIF:97, Min:54, Max:74    PULSE OXIMETRY RANGE: SpO2  Av %  Min: 95 %  Max: 97 %  24HR INTAKE/OUTPUT:    Intake/Output Summary (Last 24 hours) at 2021 0734  Last data filed at 2021 0430  Gross per 24 hour   Intake 175 ml   Output 2450 ml   Net -2275 ml     -----------------------------------------------------------------  Exam:  General: A & O x3  HEENT: Supple neck & negative  Heart: Regular  Lungs: Just a little diminished breath sounds few crackles. Abdomen: Normal & soft, No tenderness and BS normal  Extremities: Edema of the legs noted.   Neuro: NonFocal     -----------------------------------------------------------------  Diagnostic Data:  Lab Results   Component Value Date    WBC 9.0 2021    HGB 8.0 (L) 2021     (L) 2021       Lab Results   Component Value Date    BUN 91 (H) 2021    CREATININE 3.72 (H) 2021     2021    K 4.9 2021    CALCIUM 7.5 (L) 2021     2021    CO2 18 (L) 2021    LABGLOM 16 (L) 2021       Lab Results   Component Value Date    WBCUA 0 TO 2 2021    RBCUA 0 TO 2 2021    EPITHUA 0 TO 2 2021    LEUKOCYTESUR NEGATIVE 2021    SPECGRAV 1.020 2021    GLUCOSEU 3+ (A) 2021    KETUA NEGATIVE 2021    PROTEINU TRACE (A) 2021    HGBUR TRACE (A) 2021    CASTUA NOT REPORTED 2021    CRYSTUA NOT REPORTED 2021    BACTERIA NOT REPORTED 2021    YEAST NOT REPORTED 2021 Lab Results   Component Value Date    MYOGLOBIN 62 03/28/2019    TROPONINT NOT REPORTED 11/16/2021    CKTOTAL 94 11/11/2021    CKMB 7.3 11/11/2021    PROBNP 3,702 (H) 11/11/2021       US ABDOMEN LIMITED    Result Date: 11/19/2021  EXAMINATION: LIMITED ABDOMINAL ULTRASOUND 11/19/2021 10:46 am COMPARISON: None. HISTORY: ORDERING SYSTEM PROVIDED HISTORY: Other ascites TECHNOLOGIST PROVIDED HISTORY: ascites check FINDINGS: 4 quadrant survey scanning of the abdomen was performed and shows no significant ascites. No significant ascites       ASSESSMENT:    Principal Problem:    Cardiorenal syndrome, stage 1-4 or unspecified chronic kidney disease, with heart failure (HCC)  Active Problems: Moderate malnutrition (HCC)    ASHD (arteriosclerotic heart disease)    Acute on chronic combined systolic and diastolic HF (heart failure), NYHA class 4 (HCC)    Chronic diastolic HF (heart failure), NYHA class 3 (HCC)    Acute kidney injury superimposed on CKD (HCC)    Chronic anemia    Acute purpuric eruption    Hyperkalemia  Resolved Problems:    * No resolved hospital problems.  *      Patient Active Problem List    Diagnosis Date Noted    S/P drug eluting coronary stent placement-OM1 4/10/17 04/10/2017    Moderate malnutrition ([address] 11/12/2021    Coronary atherosclerosis due to calcified coronary lesion 02/20/2015    Cardiorenal syndrome, stage 1-4 or unspecified chronic kidney disease, with heart failure ([address] 11/17/2021    Cardiorenal syndrome with renal failure 11/17/2021    Hyperkalemia 11/11/2021    Acute purpuric eruption 11/10/2021    Skin lesion of left lower extremity 11/10/2021    Urticaria 11/10/2021    Iron deficiency anemia secondary to inadequate dietary iron intake 10/21/2021    Iron malabsorption 10/21/2021    Chronic anemia 09/21/2021    Acute coronary syndrome with high troponin ([address] 09/21/2021    Chest pain 09/20/2021    Gross hematuria 05/05/2021    BPH with obstruction/lower urinary tract symptoms 12/10/2019    Elevated PSA 12/10/2019    Anemia in stage 3 chronic kidney disease ([address] 05/21/2019    Acute kidney injury superimposed on CKD ([address] 10/02/2018    Non critical Right Renal artery stenosis, native ([address] 10/02/2018    Medication side effect, initial encounter 03/23/2018    Angina, class II ([address] 01/08/2018    Hyperlipidemia 04/24/2017    Chronic diastolic HF (heart failure), NYHA class 3 ([address] 04/24/2017    Acute on chronic combined systolic and diastolic HF (heart failure), NYHA class 4 ([address] 03/22/2017    ASHD (arteriosclerotic heart disease) 05/02/2016    Cervical stenosis of spinal canal 02/29/2016    Abnormal tilt table test 02/23/2016    Chronic kidney disease, stage III (moderate) ([address] 02/22/2016    Controlled type 2 diabetes mellitus with diabetic nephropathy, with long-term current use of insulin ([address] 02/22/2016    Abnormal cardiovascular stress test 02/18/2016    Ischemic chest pain ([address] 02/17/2016    Accelerated hypertension 10/16/2015    Chronotropic incompetence with autonomic dysfunction 09/02/2015    Episodic lightheadedness 09/02/2015    Cholecystitis 08/17/2015    Syncope, near 08/05/2015    Dysautonomia ([address] 06/29/2015    History of CVA (cerebrovascular accident) without residual deficits     Displacement of intervertebral disc, site unspecified, without myelopathy 03/04/2015    History of colon polyps 14/08/2663    Systolic murmur 68/70/6527    History of MI (myocardial infarction) 02/12/2014    Vertigo, benign paroxysmal 10/17/2012       PLAN:  · Cardiology nephrology on board. · Cardiorenal  · Critical Care Time: Zero  · Potassium looks reasonable.   BUN and creatinine still clearly abnormal      Crispin Salazar MD , M.D.

## 2021-11-20 NOTE — PROGRESS NOTES
Occupational Therapy  Facility/Department: [facility] AT THE [facility] MED SURG  Daily Treatment Note  NAME: Laurel Woodward  : 1945  MRN: [id number]    Date of Service: 2021    Discharge Recommendations:  Continue to assess pending progress, Home with assist PRN       Assessment      Activity Tolerance  Activity Tolerance: Patient limited by pain  Safety Devices  Safety Devices in place: Yes  Type of devices: Call light within reach; Left in bed         Patient Diagnosis(es): The primary encounter diagnosis was Chest pain, unspecified type. Diagnoses of Hyperkalemia, Acute kidney injury ([address] Hyperglycemia, and Chronic anemia were also pertinent to this visit. has a past medical history of Acute MI ([address] Acute renal failure with tubular necrosis ([address] Anemia, CAD (coronary artery disease), Cerebral artery occlusion with cerebral infarction [facility]), CHF (congestive heart failure) ([facility] Chronic back pain, Closed fracture of lumbar vertebra without mention of spinal cord injury, Displacement of intervertebral disc, site unspecified, without myelopathy, H/O cardiac catheterization, H/O cardiovascular stress test, H/O tilt table evaluation, H/O tilt table evaluation, History of 24 hour EKG monitoring, History of 24 hour EKG monitoring, History of blood transfusion, History of cardiovascular stress test, History of cardiovascular stress test, History of coronary artery stent placement, History of CVA (cerebrovascular accident) without residual deficits, History of echocardiogram, History of Holter monitoring, History of tilt table evaluation, Hyperlipidemia, Hypertension, Non critical Right Renal artery stenosis, native ([address] Pacemaker, S/P cardiac cath, S/P coronary artery stent placement, SIRS (systemic inflammatory response syndrome) ([address] and Type II or unspecified type diabetes mellitus without mention of complication, not stated as uncontrolled.    has a past surgical history that includes Pacemaker insertion; back surgery; Cholecystectomy (08/17/2015); Corneal transplant; Cardiac catheterization (Left, 02/19/2016); pacemaker placement; Colonoscopy (2010); Colonoscopy (02/19/2015); Colonoscopy (05/23/2018); Colonoscopy (N/A, 05/23/2018); Upper gastrointestinal endoscopy (05/28/2019); Upper gastrointestinal endoscopy (N/A, 05/28/2019); Colonoscopy (10/30/2020); Colonoscopy (N/A, 10/30/2020); Upper gastrointestinal endoscopy (N/A, 10/30/2020); Endoscopy, colon, diagnostic; eye surgery; and Cardiac catheterization (Left, 10/05/2021). Restrictions  Restrictions/Precautions  Restrictions/Precautions: Fall Risk, General Precautions  Subjective   General  Chart Reviewed: Yes  Patient assessed for rehabilitation services?: Yes  Response to previous treatment: Patient with no complaints from previous session  Family / Caregiver Present: No  Referring Practitioner: Dicky Jeans  Diagnosis: VIDAL  Subjective  Subjective: Pt reports feeling nauseous upon sitting EOB. Pt stated \"I told the nurse and she was going to see if I can get something for it\". General Comment  Comments: Pt lying in bed upon arrival. Pt agreed to participate in therapy session this date.       Orientation     Objective    ADL  Toileting: Supervision        Functional Mobility  Functional - Mobility Device: Rolling Walker  Activity: To/from bathroom  Assist Level: Contact guard assistance  Toilet Transfers  Toilet - Technique: Ambulating  Equipment Used: Standard toilet  Toilet Transfer: Stand by assistance  Bed mobility  Supine to Sit: Supervision  Sit to Supine: Supervision  Transfers  Sit to stand: Contact guard assistance  Stand to sit: Contact guard assistance                                                                 Plan   Plan  Times per week: 7x  Times per day: Daily  Current Treatment Recommendations: Strengthening, Safety Education & Training, Patient/Caregiver Education & Training, Self-Care / ADL, Functional Mobility Training, Endurance Training  G-Code OutComes Score                                                  AM-PAC Score             Goals  Short term goals  Time Frame for Short term goals: 24  Short term goal 1: Patient to tolerate standing >10' while participating in functional task of choice to improve standing tolerane, balance and safety during ADL, mobility and transfers. Short term goal 2: Patient to engage in 15 minutes of ther ex/ther act to improve strength as well as activity tolerance for self care tasks. Short term goal 3: Patient to complete self care routine c SUP to ensure safe return home. Short term goal 4: Pt will report and demo understanding of discharge recommendations and HEP.        Therapy Time   Individual Concurrent Group Co-treatment   Time In [address] [place] [address]         Time Out 0747         Minutes 16                 JELANI Collins/YARI

## 2021-11-20 NOTE — PROGRESS NOTES
Physical Therapy  [facility]Department[facility] MED SURG  Daily Treatment Note  NAME: Tamar Rossi  : 1945  MRN: [id number]    Date of Service: 2021    Discharge Recommendations:  Continue to assess pending progress        Assessment   Treatment Diagnosis: general debility  Prognosis: Good  PT Education: Goals; PT Role; Plan of Care  Patient Education: Educated pt on above and on importance of completing transfers/amb with fair to good understanding noted. REQUIRES PT FOLLOW UP: Yes  Activity Tolerance  Activity Tolerance: Patient Tolerated treatment well; Patient limited by fatigue; Patient limited by endurance     Patient Diagnosis(es): The primary encounter diagnosis was Chest pain, unspecified type. Diagnoses of Hyperkalemia, Acute kidney injury ([facility] Hyperglycemia, and Chronic anemia were also pertinent to this visit.      has a past medical history of Acute MI ([facility] Acute renal failure with tubular necrosis ([facility] Anemia, CAD (coronary artery disease), Cerebral artery occlusion with cerebral infarction [facility]), CHF (congestive heart failure) ([facility] Chronic back pain, Closed fracture of lumbar vertebra without mention of spinal cord injury, Displacement of intervertebral disc, site unspecified, without myelopathy, H/O cardiac catheterization, H/O cardiovascular stress test, H/O tilt table evaluation, H/O tilt table evaluation, History of 24 hour EKG monitoring, History of 24 hour EKG monitoring, History of blood transfusion, History of cardiovascular stress test, History of cardiovascular stress test, History of coronary artery stent placement, History of CVA (cerebrovascular accident) without residual deficits, History of echocardiogram, History of Holter monitoring, History of tilt table evaluation, Hyperlipidemia, Hypertension, Non critical Right Renal artery stenosis, native ([facility] Pacemaker, S/P cardiac cath, S/P coronary artery stent placement, SIRS (systemic inflammatory response syndrome) ([facility] and Type II or unspecified type diabetes mellitus without mention of complication, not stated as uncontrolled. has a past surgical history that includes Pacemaker insertion; back surgery; Cholecystectomy (08/17/2015); Corneal transplant; Cardiac catheterization (Left, 02/19/2016); pacemaker placement; Colonoscopy (2010); Colonoscopy (02/19/2015); Colonoscopy (05/23/2018); Colonoscopy (N/A, 05/23/2018); Upper gastrointestinal endoscopy (05/28/2019); Upper gastrointestinal endoscopy (N/A, 05/28/2019); Colonoscopy (10/30/2020); Colonoscopy (N/A, 10/30/2020); Upper gastrointestinal endoscopy (N/A, 10/30/2020); Endoscopy, colon, diagnostic; eye surgery; and Cardiac catheterization (Left, 10/05/2021). Restrictions  Restrictions/Precautions  Restrictions/Precautions: Fall Risk, General Precautions  Subjective   General  Chart Reviewed: Yes  Response To Previous Treatment: Patient with no complaints from previous session. Family / Caregiver Present: Yes  Referring Practitioner: REY Sargent CNP  Subjective  Subjective: Pt reported having a HA but didn't verblize a number. Pt stated that his stomach has been upset and was willing to complete seated/reclined ther ex but no standing or walking. General Comment  Comments: Nursing [facility]) aware of pain. Orientation  Orientation  Overall Orientation Status: Within Normal Limits  Cognition      Objective      Transfers  Sit to Stand: Unable to assess  Stand to sit: Unable to assess  Comment: Pt refused this date. Ambulation  Ambulation?: No (Pt refused this date.)     Balance  Posture: Fair  Sitting - Static: Good  Sitting - Dynamic: Good  Exercises  Straight Leg Raise: 15x  Quad Sets: 15x  Heelslides: 15x  Gluteal Sets: 15x  Hip Flexion: 15x  Hip Abduction: 15x  Knee Long Arc Quad: 15x  Knee Short Arc Quad: 15x  Ankle Pumps: 15x  Comments: Above exercises completed while seated/reclined in chair. Frequent rest breaks d/t fatigue.       G-Code

## 2021-11-20 NOTE — PROGRESS NOTES
Evening medications administered as ordered. FSBS 127. Humalog parameters unmet and pt refuses scheduled Lantus at this time due to concern of blood sugar dropping throughout night. Small amount of water provided for pill intake. Pt is in bed and denies any other needs. Urinal and hat emptied in bathroom, output recorded. Call light and bedside table remain within reach. Will monitor.

## 2021-11-20 NOTE — PROGRESS NOTES
Pt sitting up in chair with family visiting. Initial shift assessment done and VS obtained as charted in flow sheet. Pt is A&Ox4. Pt denies any pain. Pt denies any needs at this time. Call light and bedside table remain within reach. Will monitor.

## 2021-11-20 NOTE — PROGRESS NOTES
Pt assisted to bathroom and back to bed using walker. Urine output recorded I flow sheet. VS rechecked and pt reassessed once back in bed. Pt continues to be A&Ox4. Pt denies any pain or needs at this time. Call light and bedside table remain within reach. Will monitor.

## 2021-11-20 NOTE — PROGRESS NOTES
Daily weight obtained. Urine emptied in bathroom, output recorded. Pt states he also had a bowel movement but had flushed the toilet already. Pt assisted with getting robe back on. Pt requested FSBS be checked due to dropping overnight previous nights. FSBS 144. Pt denies any other needs at this time. Call light and bedside table remain within reach.

## 2021-11-20 NOTE — PROGRESS NOTES
Pt's daughter approached this writer and stated that she knows the  at [facility] and he is going to see if expedite the patient's transfer and will also see if there are any beds available at [facility]. This writer tried to explain to the daughter that even if there were beds available a provider needs to establish acceptance of the patient. Daughter stated that she and the rest of the family is very frustrated that there is no dialysis available here as there is a dialysis facility right across the drive. She stated that the doctors here need to make that happen. This writer tried to explain to the daughter that the local doctors are not the ones who make those decisions and that is done higher up.  She states that she used to work in the [employer] section of [employer] and is going to make her feelings known

## 2021-11-20 NOTE — PROGRESS NOTES
Dr. Jaxon Worthy called and stated that this patient is now #2 on the wait list at [facility]. V's. Dr Savannah Zee on virtual visit with patient and family. Per Dr. Savannah Zee there are no changes at this time.

## 2021-11-21 ENCOUNTER — HOSPITAL ENCOUNTER (INPATIENT)
Age: 76
LOS: 5 days | Discharge: HOME | DRG: 673 | End: 2021-11-26
Attending: INTERNAL MEDICINE
Payer: MEDICARE

## 2021-11-21 DIAGNOSIS — N18.9 ACUTE KIDNEY INJURY SUPERIMPOSED ON CKD (HCC): Primary | ICD-10-CM

## 2021-11-21 DIAGNOSIS — N17.9 ACUTE KIDNEY INJURY SUPERIMPOSED ON CKD (HCC): Primary | ICD-10-CM

## 2021-11-21 DIAGNOSIS — L50.9 URTICARIA: ICD-10-CM

## 2021-11-21 PROBLEM — N18.4 ANEMIA IN STAGE 4 CHRONIC KIDNEY DISEASE (HCC): Status: ACTIVE | Noted: 2019-05-21

## 2021-11-21 LAB
-: ABNORMAL
ABSOLUTE EOS #: 0.11 K/UL (ref 0–0.44)
ABSOLUTE IMMATURE GRANULOCYTE: 0.23 K/UL (ref 0–0.3)
ABSOLUTE LYMPH #: 0.68 K/UL (ref 1.1–3.7)
ABSOLUTE MONO #: 0.54 K/UL (ref 0.1–1.2)
ALBUMIN SERPL-MCNC: 2.7 G/DL (ref 3.5–5.2)
AMORPHOUS: ABNORMAL
ANION GAP SERPL CALCULATED.3IONS-SCNC: 13 MMOL/L (ref 9–17)
BACTERIA: ABNORMAL
BASOPHILS # BLD: 0 % (ref 0–2)
BASOPHILS ABSOLUTE: <0.03 K/UL (ref 0–0.2)
BILIRUBIN URINE: NEGATIVE
BUN BLDV-MCNC: 84 MG/DL (ref 8–23)
BUN/CREAT BLD: ABNORMAL (ref 9–20)
CALCIUM SERPL-MCNC: 7.2 MG/DL (ref 8.6–10.4)
CASTS UA: ABNORMAL /LPF (ref 0–2)
CHLORIDE BLD-SCNC: 101 MMOL/L (ref 98–107)
CO2: 19 MMOL/L (ref 20–31)
COLOR: YELLOW
COMPLEMENT C3: 108 MG/DL (ref 90–180)
COMPLEMENT C4: 15 MG/DL (ref 10–40)
CREAT SERPL-MCNC: 3.69 MG/DL (ref 0.7–1.2)
CREATININE URINE: 62.1 MG/DL (ref 39–259)
CRYSTALS, UA: ABNORMAL /HPF
DIFFERENTIAL TYPE: ABNORMAL
EOSINOPHILS RELATIVE PERCENT: 1 % (ref 1–4)
EPITHELIAL CELLS UA: ABNORMAL /HPF (ref 0–5)
GFR AFRICAN AMERICAN: 20 ML/MIN
GFR NON-AFRICAN AMERICAN: 16 ML/MIN
GFR SERPL CREATININE-BSD FRML MDRD: ABNORMAL ML/MIN/{1.73_M2}
GFR SERPL CREATININE-BSD FRML MDRD: ABNORMAL ML/MIN/{1.73_M2}
GLUCOSE BLD-MCNC: 209 MG/DL (ref 75–110)
GLUCOSE BLD-MCNC: 250 MG/DL (ref 70–99)
GLUCOSE BLD-MCNC: 270 MG/DL (ref 75–110)
GLUCOSE BLD-MCNC: 287 MG/DL (ref 75–110)
GLUCOSE URINE: ABNORMAL
HCT VFR BLD CALC: 25.5 % (ref 40.7–50.3)
HCT VFR BLD CALC: 27.2 % (ref 40.7–50.3)
HEMOGLOBIN: 8 G/DL (ref 13–17)
HEMOGLOBIN: 8.3 G/DL (ref 13–17)
IMMATURE GRANULOCYTES: 2 %
KETONES, URINE: NEGATIVE
LEUKOCYTE ESTERASE, URINE: NEGATIVE
LYMPHOCYTES # BLD: 7 % (ref 24–43)
MAGNESIUM: 1.8 MG/DL (ref 1.6–2.6)
MCH RBC QN AUTO: 28.1 PG (ref 25.2–33.5)
MCH RBC QN AUTO: 28.4 PG (ref 25.2–33.5)
MCHC RBC AUTO-ENTMCNC: 30.5 G/DL (ref 28.4–34.8)
MCHC RBC AUTO-ENTMCNC: 31.4 G/DL (ref 28.4–34.8)
MCV RBC AUTO: 90.4 FL (ref 82.6–102.9)
MCV RBC AUTO: 92.2 FL (ref 82.6–102.9)
MONOCYTES # BLD: 6 % (ref 3–12)
MUCUS: ABNORMAL
NITRITE, URINE: NEGATIVE
NRBC AUTOMATED: 0 PER 100 WBC
NRBC AUTOMATED: 0 PER 100 WBC
OSMOLALITY URINE: 421 MOSM/KG (ref 80–1300)
OTHER OBSERVATIONS UA: ABNORMAL
PDW BLD-RTO: 17.4 % (ref 11.8–14.4)
PDW BLD-RTO: 17.4 % (ref 11.8–14.4)
PH UA: 5 (ref 5–8)
PHOSPHORUS: 3.5 MG/DL (ref 2.5–4.5)
PLATELET # BLD: 136 K/UL (ref 138–453)
PLATELET # BLD: 155 K/UL (ref 138–453)
PLATELET ESTIMATE: ABNORMAL
PMV BLD AUTO: 10.1 FL (ref 8.1–13.5)
PMV BLD AUTO: 9.9 FL (ref 8.1–13.5)
POTASSIUM SERPL-SCNC: 5 MMOL/L (ref 3.7–5.3)
PROTEIN UA: ABNORMAL
RBC # BLD: 2.82 M/UL (ref 4.21–5.77)
RBC # BLD: 2.95 M/UL (ref 4.21–5.77)
RBC # BLD: ABNORMAL 10*6/UL
RBC UA: ABNORMAL /HPF (ref 0–2)
RENAL EPITHELIAL, UA: ABNORMAL /HPF
SEG NEUTROPHILS: 83 % (ref 36–65)
SEGMENTED NEUTROPHILS ABSOLUTE COUNT: 7.92 K/UL (ref 1.5–8.1)
SODIUM BLD-SCNC: 133 MMOL/L (ref 135–144)
SODIUM,UR: 62 MMOL/L
SPECIFIC GRAVITY UA: 1.01 (ref 1–1.03)
TOTAL PROTEIN, URINE: 35 MG/DL
TRICHOMONAS: ABNORMAL
TURBIDITY: CLEAR
UREA NITROGEN, UR: 571 MG/DL
URINE HGB: ABNORMAL
URINE TOTAL PROTEIN CREATININE RATIO: 0.56 (ref 0–0.2)
UROBILINOGEN, URINE: NORMAL
WBC # BLD: 8.4 K/UL (ref 3.5–11.3)
WBC # BLD: 9.5 K/UL (ref 3.5–11.3)
WBC # BLD: ABNORMAL 10*3/UL
WBC UA: ABNORMAL /HPF (ref 0–5)
YEAST: ABNORMAL

## 2021-11-21 PROCEDURE — 99222 1ST HOSP IP/OBS MODERATE 55: CPT | Performed by: INTERNAL MEDICINE

## 2021-11-21 PROCEDURE — 84540 ASSAY OF URINE/UREA-N: CPT

## 2021-11-21 PROCEDURE — 6370000000 HC RX 637 (ALT 250 FOR IP): Performed by: NURSE PRACTITIONER

## 2021-11-21 PROCEDURE — 85025 COMPLETE CBC W/AUTO DIFF WBC: CPT

## 2021-11-21 PROCEDURE — 6370000000 HC RX 637 (ALT 250 FOR IP): Performed by: INTERNAL MEDICINE

## 2021-11-21 PROCEDURE — 81001 URINALYSIS AUTO W/SCOPE: CPT

## 2021-11-21 PROCEDURE — 84156 ASSAY OF PROTEIN URINE: CPT

## 2021-11-21 PROCEDURE — 99223 1ST HOSP IP/OBS HIGH 75: CPT | Performed by: INTERNAL MEDICINE

## 2021-11-21 PROCEDURE — 80069 RENAL FUNCTION PANEL: CPT

## 2021-11-21 PROCEDURE — 6370000000 HC RX 637 (ALT 250 FOR IP): Performed by: STUDENT IN AN ORGANIZED HEALTH CARE EDUCATION/TRAINING PROGRAM

## 2021-11-21 PROCEDURE — 83735 ASSAY OF MAGNESIUM: CPT

## 2021-11-21 PROCEDURE — 6360000002 HC RX W HCPCS: Performed by: NURSE PRACTITIONER

## 2021-11-21 PROCEDURE — 2500000003 HC RX 250 WO HCPCS: Performed by: INTERNAL MEDICINE

## 2021-11-21 PROCEDURE — 36415 COLL VENOUS BLD VENIPUNCTURE: CPT

## 2021-11-21 PROCEDURE — 36430 TRANSFUSION BLD/BLD COMPNT: CPT

## 2021-11-21 PROCEDURE — 85027 COMPLETE CBC AUTOMATED: CPT

## 2021-11-21 PROCEDURE — APPSS60 APP SPLIT SHARED TIME 46-60 MINUTES: Performed by: NURSE PRACTITIONER

## 2021-11-21 PROCEDURE — 83935 ASSAY OF URINE OSMOLALITY: CPT

## 2021-11-21 PROCEDURE — 2580000003 HC RX 258: Performed by: NURSE PRACTITIONER

## 2021-11-21 PROCEDURE — 86160 COMPLEMENT ANTIGEN: CPT

## 2021-11-21 PROCEDURE — 94761 N-INVAS EAR/PLS OXIMETRY MLT: CPT

## 2021-11-21 PROCEDURE — 82570 ASSAY OF URINE CREATININE: CPT

## 2021-11-21 PROCEDURE — 82947 ASSAY GLUCOSE BLOOD QUANT: CPT

## 2021-11-21 PROCEDURE — 84300 ASSAY OF URINE SODIUM: CPT

## 2021-11-21 PROCEDURE — 2060000000 HC ICU INTERMEDIATE R&B

## 2021-11-21 RX ORDER — SODIUM POLYSTYRENE SULFONATE 15 G/60ML
15 SUSPENSION ORAL; RECTAL
Status: DISPENSED | OUTPATIENT
Start: 2021-11-21 | End: 2021-11-21

## 2021-11-21 RX ORDER — HYDRALAZINE HYDROCHLORIDE 50 MG/1
50 TABLET, FILM COATED ORAL EVERY 8 HOURS SCHEDULED
Status: DISCONTINUED | OUTPATIENT
Start: 2021-11-21 | End: 2021-11-26 | Stop reason: HOSPADM

## 2021-11-21 RX ORDER — NIFEDIPINE 30 MG/1
30 TABLET, EXTENDED RELEASE ORAL DAILY
Status: DISCONTINUED | OUTPATIENT
Start: 2021-11-21 | End: 2021-11-21

## 2021-11-21 RX ORDER — BUMETANIDE 0.25 MG/ML
2 INJECTION, SOLUTION INTRAMUSCULAR; INTRAVENOUS 2 TIMES DAILY
Status: DISCONTINUED | OUTPATIENT
Start: 2021-11-21 | End: 2021-11-25

## 2021-11-21 RX ORDER — INSULIN GLARGINE 100 [IU]/ML
28 INJECTION, SOLUTION SUBCUTANEOUS 2 TIMES DAILY
Status: DISCONTINUED | OUTPATIENT
Start: 2021-11-21 | End: 2021-11-26 | Stop reason: HOSPADM

## 2021-11-21 RX ORDER — AMLODIPINE BESYLATE 10 MG/1
10 TABLET ORAL NIGHTLY
Status: DISCONTINUED | OUTPATIENT
Start: 2021-11-21 | End: 2021-11-21

## 2021-11-21 RX ORDER — NICOTINE POLACRILEX 4 MG
15 LOZENGE BUCCAL PRN
Status: DISCONTINUED | OUTPATIENT
Start: 2021-11-21 | End: 2021-11-26 | Stop reason: HOSPADM

## 2021-11-21 RX ORDER — DEXTROSE MONOHYDRATE 25 G/50ML
12.5 INJECTION, SOLUTION INTRAVENOUS PRN
Status: DISCONTINUED | OUTPATIENT
Start: 2021-11-21 | End: 2021-11-26 | Stop reason: HOSPADM

## 2021-11-21 RX ORDER — PREDNISOLONE ACETATE 10 MG/ML
1 SUSPENSION/ DROPS OPHTHALMIC DAILY
Status: DISCONTINUED | OUTPATIENT
Start: 2021-11-21 | End: 2021-11-26 | Stop reason: HOSPADM

## 2021-11-21 RX ORDER — ONDANSETRON 4 MG/1
4 TABLET, ORALLY DISINTEGRATING ORAL EVERY 8 HOURS PRN
Status: DISCONTINUED | OUTPATIENT
Start: 2021-11-21 | End: 2021-11-26 | Stop reason: HOSPADM

## 2021-11-21 RX ORDER — ACETAMINOPHEN 325 MG/1
650 TABLET ORAL EVERY 6 HOURS PRN
Status: DISCONTINUED | OUTPATIENT
Start: 2021-11-21 | End: 2021-11-26 | Stop reason: HOSPADM

## 2021-11-21 RX ORDER — SODIUM CHLORIDE 0.9 % (FLUSH) 0.9 %
5-40 SYRINGE (ML) INJECTION PRN
Status: DISCONTINUED | OUTPATIENT
Start: 2021-11-21 | End: 2021-11-26 | Stop reason: HOSPADM

## 2021-11-21 RX ORDER — DEXTROSE MONOHYDRATE 50 MG/ML
100 INJECTION, SOLUTION INTRAVENOUS PRN
Status: DISCONTINUED | OUTPATIENT
Start: 2021-11-21 | End: 2021-11-26 | Stop reason: HOSPADM

## 2021-11-21 RX ORDER — METOLAZONE 5 MG/1
5 TABLET ORAL DAILY
Status: DISCONTINUED | OUTPATIENT
Start: 2021-11-21 | End: 2021-11-26 | Stop reason: HOSPADM

## 2021-11-21 RX ORDER — ACYCLOVIR 200 MG/1
400 CAPSULE ORAL 2 TIMES DAILY
Status: DISCONTINUED | OUTPATIENT
Start: 2021-11-21 | End: 2021-11-26 | Stop reason: HOSPADM

## 2021-11-21 RX ORDER — ONDANSETRON 2 MG/ML
4 INJECTION INTRAMUSCULAR; INTRAVENOUS EVERY 6 HOURS PRN
Status: DISCONTINUED | OUTPATIENT
Start: 2021-11-21 | End: 2021-11-26 | Stop reason: HOSPADM

## 2021-11-21 RX ORDER — SODIUM BICARBONATE 650 MG/1
650 TABLET ORAL 2 TIMES DAILY
Status: DISCONTINUED | OUTPATIENT
Start: 2021-11-21 | End: 2021-11-26 | Stop reason: HOSPADM

## 2021-11-21 RX ORDER — EPLERENONE 25 MG/1
25 TABLET, FILM COATED ORAL DAILY
Status: DISCONTINUED | OUTPATIENT
Start: 2021-11-21 | End: 2021-11-21

## 2021-11-21 RX ORDER — SODIUM CHLORIDE 9 MG/ML
25 INJECTION, SOLUTION INTRAVENOUS PRN
Status: DISCONTINUED | OUTPATIENT
Start: 2021-11-21 | End: 2021-11-26 | Stop reason: HOSPADM

## 2021-11-21 RX ORDER — SODIUM CHLORIDE 0.9 % (FLUSH) 0.9 %
5-40 SYRINGE (ML) INJECTION EVERY 12 HOURS SCHEDULED
Status: DISCONTINUED | OUTPATIENT
Start: 2021-11-21 | End: 2021-11-26 | Stop reason: HOSPADM

## 2021-11-21 RX ORDER — ACETAMINOPHEN 650 MG/1
650 SUPPOSITORY RECTAL EVERY 6 HOURS PRN
Status: DISCONTINUED | OUTPATIENT
Start: 2021-11-21 | End: 2021-11-26 | Stop reason: HOSPADM

## 2021-11-21 RX ORDER — SODIUM CHLORIDE 9 MG/ML
INJECTION, SOLUTION INTRAVENOUS CONTINUOUS
Status: DISCONTINUED | OUTPATIENT
Start: 2021-11-21 | End: 2021-11-21

## 2021-11-21 RX ORDER — BUMETANIDE 0.25 MG/ML
2 INJECTION, SOLUTION INTRAMUSCULAR; INTRAVENOUS 2 TIMES DAILY
Status: DISCONTINUED | OUTPATIENT
Start: 2021-11-21 | End: 2021-11-21

## 2021-11-21 RX ADMIN — INSULIN LISPRO 1 UNITS: 100 INJECTION, SOLUTION INTRAVENOUS; SUBCUTANEOUS at 20:55

## 2021-11-21 RX ADMIN — SODIUM CHLORIDE, PRESERVATIVE FREE 10 ML: 5 INJECTION INTRAVENOUS at 20:50

## 2021-11-21 RX ADMIN — ACYCLOVIR 400 MG: 200 CAPSULE ORAL at 20:49

## 2021-11-21 RX ADMIN — INSULIN GLARGINE 28 UNITS: 100 INJECTION, SOLUTION SUBCUTANEOUS at 20:50

## 2021-11-21 RX ADMIN — ACETAMINOPHEN 650 MG: 325 TABLET ORAL at 20:56

## 2021-11-21 RX ADMIN — INSULIN LISPRO 3 UNITS: 100 INJECTION, SOLUTION INTRAVENOUS; SUBCUTANEOUS at 16:57

## 2021-11-21 RX ADMIN — SODIUM BICARBONATE 650 MG: 648 TABLET ORAL at 13:50

## 2021-11-21 RX ADMIN — SODIUM CHLORIDE, PRESERVATIVE FREE 10 ML: 5 INJECTION INTRAVENOUS at 08:12

## 2021-11-21 RX ADMIN — INSULIN LISPRO 3 UNITS: 100 INJECTION, SOLUTION INTRAVENOUS; SUBCUTANEOUS at 11:22

## 2021-11-21 RX ADMIN — PREDNISOLONE ACETATE 1 DROP: 10 SUSPENSION/ DROPS OPHTHALMIC at 13:50

## 2021-11-21 RX ADMIN — HYDRALAZINE HYDROCHLORIDE 50 MG: 50 TABLET, FILM COATED ORAL at 13:50

## 2021-11-21 RX ADMIN — INSULIN GLARGINE 28 UNITS: 100 INJECTION, SOLUTION SUBCUTANEOUS at 11:22

## 2021-11-21 RX ADMIN — BUMETANIDE 2 MG: 0.25 INJECTION INTRAMUSCULAR; INTRAVENOUS at 20:52

## 2021-11-21 RX ADMIN — HYDRALAZINE HYDROCHLORIDE 50 MG: 50 TABLET, FILM COATED ORAL at 20:50

## 2021-11-21 RX ADMIN — SODIUM CHLORIDE: 9 INJECTION, SOLUTION INTRAVENOUS at 06:25

## 2021-11-21 RX ADMIN — METOPROLOL TARTRATE 12.5 MG: 25 TABLET ORAL at 20:50

## 2021-11-21 RX ADMIN — SODIUM BICARBONATE 650 MG: 648 TABLET ORAL at 20:50

## 2021-11-21 RX ADMIN — ONDANSETRON 4 MG: 2 INJECTION INTRAMUSCULAR; INTRAVENOUS at 11:19

## 2021-11-21 RX ADMIN — BUMETANIDE 2 MG: 0.25 INJECTION INTRAMUSCULAR; INTRAVENOUS at 14:47

## 2021-11-21 ASSESSMENT — ENCOUNTER SYMPTOMS
STRIDOR: 0
CONSTIPATION: 0
DIARRHEA: 0
NAUSEA: 0
COUGH: 0
SHORTNESS OF BREATH: 1
BLOOD IN STOOL: 0
ABDOMINAL PAIN: 0
WHEEZING: 0
VOMITING: 0

## 2021-11-21 ASSESSMENT — PAIN DESCRIPTION - PROGRESSION: CLINICAL_PROGRESSION: GRADUALLY WORSENING

## 2021-11-21 ASSESSMENT — PAIN DESCRIPTION - LOCATION
LOCATION: HEAD
LOCATION: HEAD

## 2021-11-21 ASSESSMENT — PAIN DESCRIPTION - DESCRIPTORS: DESCRIPTORS: PRESSURE

## 2021-11-21 ASSESSMENT — PAIN SCALES - GENERAL
PAINLEVEL_OUTOF10: 4
PAINLEVEL_OUTOF10: 5
PAINLEVEL_OUTOF10: 5
PAINLEVEL_OUTOF10: 4

## 2021-11-21 ASSESSMENT — PAIN DESCRIPTION - PAIN TYPE
TYPE: ACUTE PAIN
TYPE: ACUTE PAIN

## 2021-11-21 ASSESSMENT — PAIN DESCRIPTION - ONSET: ONSET: ON-GOING

## 2021-11-21 NOTE — H&P
[facility]  Office: [address], DO, Jose Ventura, DO, Pili Dillard, DO, Ly Beltran Robin, DO, Marya Ocasio MD, Manuel Wu MD, Sonny Lang MD, Livier Larson MD, Ralph Pierce MD, Jessie Vera MD, Shirley Carranza MD, [place] Officer, DO, Shekhar Wiley, DO, Mich Sanz MD,  March , DO, Vance Christine MD, Jackson Mckay MD, [place] MD, Caroline Shepherd MD, Ada Mata MD, Estee Sánchez MD, Vane Randolph MD, Amaya Dean, [facility], [facility], CNP, Moni Dawson, CNP, Cain Tiwari, CNS, Elise Tian, CNP, Baldomero Christopher, CNP, Xavier Jones, CNP, Gearline Precise, CNP, Terri Blanco, CNP, Se Germain PA-C, Nathan Pat, [facility], Donna Goodson, CNP, Jhon Santiago, CNP, Merari Payne, CNP, Jemal Plunkett, CNP, Bailey Valadez, CNP, Ciro Eller, CNP, Jose Larsen, [address]    HISTORY AND PHYSICAL EXAMINATION            Date:   11/21/2021  Patient name:  Toña Irwin  Date of admission:  11/21/2021  1:46 AM  MRN:   [id number]  Account:  [de-identified]  YOB: 1945  PCP:    Preet Andersen MD  Room:   4723/2558-36  Code Status:    Prior    Chief Complaint:     Renal failure    History Obtained From:     patient, electronic medical record    History of Present Illness:     Toña Irwin is a 68 y.o. Non- / non  male who presents with No chief complaint on file. and is admitted to the hospital for the management of Acute kidney injury superimposed on CKD ([facility] [address] acute on chronic heart failure. Patient was a transfer to [place] [address] from [facility] for acute on chronic renal failure and acute exacerbation of CHF. Patient was originally mdirrqgf23/12/21 to outlying emergency room secondary to elevated potassium and chest pain.   During his initial evaluation in the emergency room he was noted to have a BUN of 86 and a creatinine of 3.64 with a potassium of 6.6 sodium of 128. He was given 30 g of Kayexalate, IV insulin and sodium bicarb. With an improvement of the potassium with a downward trend of 5.4 sodium of 134 and creatinine 3.05 and BUN of 84 he was noted to have a hemoglobin of 7.5 with a repeat of 6.8 and transfused 1 unit packed red blood cells. Patient was seen and evaluated by the nephrology group as well as cardiology. Patient was started on a bicarb drip  Throughout the hospital stay there was somewhat of an improvement in the renal function and heart failure however over time kidney function continued to decline and nephrology recommended transfer to [place] [address] so he can be followed by his personal nephrologist.  Due to the bed availability there was a great delay from the time the request was made to the time that patient arrived to the facility, with that being said BUN 91 creatinine 3.72. Hemogram unremarkable with no acute leukocytosis with a WBC of 9 hemoglobin 8.0 hematocrit 25.8. Patient is not on any chemical anticoagulation given the drop in hemoglobin earlier in the stay as well as the purpuric rash response he received from Lovenox. Past Medical History:     Past Medical History:   Diagnosis Date    Acute MI ([facility] [address]     Acute renal failure with tubular necrosis ([facility] [address] 10/2/2018    ssecondary to hemodynamic effects of loop diuretics and ace inhibitors, bbaseline 1.2-1.3 which peaked up to 1.8, resolving    Anemia     CAD (coronary artery disease)     Cerebral artery occlusion with cerebral infarction ([facility] [address]     [facility]est[facility] heart failure) ([facility]     Chronic back pain     Closed fracture of lumbar vertebra without mention of spinal cord injury     Displacement of intervertebral disc, site unspecified, without myelopathy     H/O cardiac catheterization 2/19/16    LMCA: Mild irregularities 10-20%. LAD: Lesion on PRox LAD: Mid subsection. 65% stenosis.  LCx: Lesion on 1st Ob Valentina:  History of echocardiogram 2/18/14    LA mildly dilated, EF 55%, LV wall thickness is moderately increased, no definite wall motion abnormalilities, what appears to be a pacer wire is seen w/n the RA and RV, mild-mod TR, mild pulmonary hypertension.  History of Holter monitoring 12/29/2017    Rare PAC's and PVC's.  History of tilt table evaluation 8/12/14    Abnormal    Hyperlipidemia     Hypertension     Non critical Right Renal artery stenosis, native ([address] 10/2/2018    Non critical Right Renal artery stenosis, based on cath in 2016, Rt RA 30% stenosis    Pacemaker     non-functioning    S/P cardiac cath 04/10/2017    S/P coronary artery stent placement     Successful PTCA - HA Om1    SIRS (systemic inflammatory response syndrome) ([facility] [address] 5/19/2019    Type II or unspecified type diabetes mellitus without mention of complication, not stated as uncontrolled         Past Surgical History:     Past Surgical History:   Procedure Laterality Date    BACK SURGERY      CARDIAC CATHETERIZATION Left 02/19/2016    via right radial approach/ Melina Vernon/ Dr. Mardel Osgood Left 10/05/2021    Dr Trev Jones radial-Moderate three vessel coronary artery disease involving a ostial 50-60% stenosis in a small, non-dominant RCA, a 60% mid LAD stenosis and a proximal 50-60% stenosis in the left anterior descending coronary artery. Normal left ventricular end diastolic pressure. Proceed with aggressive maximal medical management as clinically indicated.     CHOLECYSTECTOMY  08/17/2015    Liang/Charles/Saulo/ Millicent    COLONOSCOPY  2010    COLONOSCOPY  02/19/2015    -polyps,diverticulosis,hemorrhoids    COLONOSCOPY  05/23/2018    Dr Marco Schaefer    COLONOSCOPY N/A 05/23/2018    COLONOSCOPY POLYPECTOMY SNARE/COLD BIOPSY  cold snare  and  hot snare performed by Margaret Simms MD at [facility]  10/30/2020    with Dr. Daniel Tabor  COLONOSCOPY N/A 10/30/2020    COLORECTAL CANCER SCREENING, NOT HIGH RISK performed by Natalee Bertrand DO at [address] [facility]      left eye    ENDOSCOPY, COLON, DIAGNOSTIC      EYE SURGERY      PACEMAKER INSERTION      PACEMAKER PLACEMENT      UPPER GASTROINTESTINAL ENDOSCOPY  05/28/2019    Dr. Anita Salgado H-Pylori)duodenal bulbar/antral erythema    UPPER GASTROINTESTINAL ENDOSCOPY N/A 05/28/2019    EGD BIOPSY, clotest performed by Deion Perla MD at [address] 10/30/2020    EGD ESOPHAGOGASTRODUODENOSCOPY performed by Natalee Bertrand DO at [address]        Medications Prior to Admission:     Prior to Admission medications    Medication Sig Start Date End Date Taking?  Authorizing Provider   cetirizine (ZYRTEC) 10 MG tablet Take 1 tablet by mouth daily 11/10/21 12/10/21  Fleurette Seip, MD   eplerenone (INSPRA) 25 MG tablet Take 1 tablet by mouth daily 11/3/21 2/1/22  William Jovel MD   loratadine (CLARITIN) 10 MG tablet Take 1 tablet by mouth daily  Patient taking differently: Take 10 mg by mouth daily as needed  10/19/21   Linnea Gutiérrez MD   lisinopril (PRINIVIL;ZESTRIL) 20 MG tablet Take 1 tablet by mouth daily  Patient not taking: Reported on 11/21/2021 10/15/21 1/13/22  William Jovel MD   amLODIPine (NORVASC) 10 MG tablet Take 1 tablet by mouth nightly 9/22/21   Brad Avilez MD   hydrALAZINE (APRESOLINE) 100 MG tablet Take 1 tablet by mouth 3 times daily Patient is taking 100 MG 3 times daily 8/19/21   Brad Avilez MD   glipiZIDE (GLUCOTROL XL) 5 MG extended release tablet Take 2 tablets by mouth 2 times daily 7/19/21   Brad Avilez MD   insulin glargine (LANTUS SOLOSTAR) 100 UNIT/ML injection pen Inject 28 Units into the skin 2 times daily 7/19/21 11/2/21  Brad Avilez MD   nitroGLYCERIN (NITROSTAT) 0.4 MG SL tablet Place 1 tablet under the tongue every 5 minutes as needed for Chest pain 6/28/21   Linnea Gutiérrez MD latanoprost (XALATAN) 0.005 % ophthalmic solution  2/2/21   Historical Provider, MD   acyclovir (ZOVIRAX) 400 MG tablet 400 mg 2 times daily  7/6/20   Historical Provider, MD   CONTOUR TEST strip USE 1 STRIP TO CHECK GLUCOSE TWICE DAILY 10/21/19   Preet Andersen MD   DIANA MICROLET LANCETS [address] [facility] TEST TWICE DAILY 11/16/15   Preet Andersen MD   Insulin Pen Needle (PEN NEEDLES) 31G X 6 MM MISC 1 each by Does not apply route daily 10/16/15   Preet Andersen MD   prednisoLONE acetate (PRED FORTE) 1 % ophthalmic suspension Place 1 drop into the left eye daily  3/30/15   Historical Provider, MD        Allergies:     Coreg [carvedilol], Lipitor [atorvastatin], Aldactone [spironolactone], Crestor [rosuvastatin calcium], Lopid [gemfibrozil], Invokana [canagliflozin], and Januvia [sitagliptin]    Social History:     Tobacco:    reports that he quit smoking about 41 years ago. His smoking use included cigarettes. He has a 12.00 pack-year smoking history. He has never used smokeless tobacco.  Alcohol:      reports no history of alcohol use. Drug Use:  reports no history of drug use. Family History:     Family History   Problem Relation Age of Onset    Cancer Mother         breast    Cancer Father         lung       Review of Systems:     Positive and Negative as described in HPI. Review of Systems   Constitutional: Positive for fatigue. Negative for chills, diaphoresis and fever. HENT: Negative for congestion and hearing loss. Respiratory: Positive for shortness of breath ( improved nearly gone). Negative for cough, wheezing and stridor. Cardiovascular: Positive for leg swelling (Improved). Negative for chest pain and palpitations. Gastrointestinal: Negative for abdominal pain, blood in stool, constipation, diarrhea, nausea and vomiting. Genitourinary: Negative for dysuria and frequency. Musculoskeletal: Negative for myalgias. Skin: Negative for rash. Neurological: Positive for weakness. is no abdominal tenderness. There is no guarding. Musculoskeletal:         General: No tenderness. Cervical back: Neck supple. Right lower le+ Pitting Edema present. Left lower le+ Pitting Edema present. Skin:     General: Skin is warm and dry. Findings: No erythema, lesion or rash. Neurological:      General: No focal deficit present. Mental Status: He is alert and oriented to person, place, and time. He is not disoriented. GCS: GCS eye subscore is 4. GCS verbal subscore is 5. GCS motor subscore is 6. Cranial Nerves: No cranial nerve deficit. Sensory: No sensory deficit. Psychiatric:         Speech: Speech normal.         Behavior: Behavior normal. Behavior is cooperative.          Investigations:      Laboratory Testing:  Recent Results (from the past 24 hour(s))   Glucose, Whole Blood    Collection Time: 21  4:34 AM   Result Value Ref Range    POC Glucose 144 (H) 74 - 100 mg/dL   CBC Auto Differential    Collection Time: 21  5:45 AM   Result Value Ref Range    WBC 9.0 3.5 - 11.3 k/uL    RBC 2.85 (L) 4.21 - 5.77 m/uL    Hemoglobin 8.0 (L) 13.0 - 17.0 g/dL    Hematocrit 25.8 (L) 40.7 - 50.3 %    MCV 90.5 82.6 - 102.9 fL    MCH 28.1 25.2 - 33.5 pg    MCHC 31.0 28.4 - 34.8 g/dL    RDW 17.9 (H) 11.8 - 14.4 %    Platelets 038 (L) 809 - 453 k/uL    MPV 10.1 8.1 - 13.5 fL    NRBC Automated 0.0 0.0 per 100 WBC    Differential Type NOT REPORTED     WBC Morphology NOT REPORTED     RBC Morphology NOT REPORTED     Platelet Estimate NOT REPORTED     Seg Neutrophils 85 (H) 36 - 65 %    Lymphocytes 10 (L) 24 - 43 %    Monocytes 5 3 - 12 %    Eosinophils % 0 (L) 1 - 4 %    Immature Granulocytes 0 0 %    Basophils 0 0 - 2 %    Segs Absolute 7.65 1.50 - 8.10 k/uL    Absolute Lymph # 0.90 (L) 1.10 - 3.70 k/uL    Absolute Mono # 0.45 0.10 - 1.20 k/uL    Absolute Eos # 0.00 0.00 - 0.44 k/uL    Absolute Immature Granulocyte 0.00 0.00 - 0.30 k/uL    Basophils Absolute 0. 00 0.0 - 0.2 k/uL    Morphology Normal    Basic Metabolic Panel w/ Reflex to MG    Collection Time: 11/20/21  5:45 AM   Result Value Ref Range    Glucose 156 (H) 70 - 99 mg/dL    BUN 91 (H) 8 - 23 mg/dL    CREATININE 3.72 (H) 0.70 - 1.20 mg/dL    Bun/Cre Ratio 24 (H) 9 - 20    Calcium 7.5 (L) 8.6 - 10.4 mg/dL    Sodium 136 135 - 144 mmol/L    Potassium 4.9 3.7 - 5.3 mmol/L    Chloride 106 98 - 107 mmol/L    CO2 18 (L) 20 - 31 mmol/L    Anion Gap 12 9 - 17 mmol/L    GFR Non-African American 16 (L) >60 mL/min    GFR  19 (L) >60 mL/min    GFR Comment          GFR Staging         Glucose, Whole Blood    Collection Time: 11/20/21  6:57 AM   Result Value Ref Range    POC Glucose 144 (H) 74 - 100 mg/dL   Glucose, Whole Blood    Collection Time: 11/20/21 11:25 AM   Result Value Ref Range    POC Glucose 160 (H) 74 - 100 mg/dL   Glucose, Whole Blood    Collection Time: 11/20/21  4:01 PM   Result Value Ref Range    POC Glucose 208 (H) 74 - 100 mg/dL   Glucose, Whole Blood    Collection Time: 11/20/21  8:07 PM   Result Value Ref Range    POC Glucose 260 (H) 74 - 100 mg/dL       Imaging/Diagnostics:  US RENAL LIMITED    Result Date: 11/16/2021  No hydronephrosis or ultrasound evidence medical renal disease. Renal cortical thinning, greater on the right.      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 Baseline creatinine 1.4-1.6, renal function fluctuate volume status         Controlled type 2 diabetes mellitus with diabetic nephropathy, with long-term current use of insulin ([facility] [address] 11/21/2021 Yes    Anemia in stage 3 chronic kidney disease ([facility] [address] 11/21/2021 Yes    Overview Signed 8/4/2020 10:29 AM by Chuck Burgess MD     Also from suspected blood loss         Cardiorenal syndrome with renal failure 11/17/2021 Yes          Plan:     Patient status inpatient in the Progressive Unit/Step down    1. Consultation nephrology for further evaluation and management of the VIDAL and assistance with diuresis  2. Diuretics for heart failure per nephrology  3. Continue with aggressive medical management of risk factors for coronary artery disease management. 4. Utilize insulin sliding scale and receive home Lantus dose and resume when medications are confirmed  5. Continue to monitor H&H, patient had a possible  reaction to iron infusion per documentation at outlying facility  6. EPC cuffs for DVT prophylaxis as we cannot use chemical anticoagulation at this time given the concern for allergy which should be worked up at the discretion of team members, maximize ambulation  7. GI prophylaxis  8. Resume home medications once med list is updated in the computer    Cons[address]ations:   [address]     Patient is admitted as inpatient status because of co-morbidities listed above, severity of signs and symptoms as outlined, requirement for current medical therapies and most importantly because of direct risk to patient if care not provided in a hospital setting. Expected length of stay > 48 hours.     REY Chambers - CNP  11/21/2021  3:55 AM    Copy sent to Dr. Jeff Titus MD

## 2021-11-21 NOTE — PROGRESS NOTES
Patient is discharge via [address] transport going to [facility]. Patient's belonging was given to the patient.

## 2021-11-21 NOTE — PROGRESS NOTES
[facility] am scribing for and in the presence of Jamia Patel. Jackelin BLOUNT, MS, F.A.C.C..    Patient: Prema Haley  : 1945  Date of Admission: 2021  Primary Care Physician: Brad Avilez  Today's Date: 2021    REASON FOR CONSULTATION: Chest Pain (Mid chest tightness, radiating to left arm. Onset 30mins ago), Shortness of Breath, and Other (Abnormal labwork drawn today. (potassium, glucose and kidney function))      HPI: Mr. Pedro Luis Anderson is a 68 y.o. male well known to me with a known history of dysautonomia where on tilt table testing his blood pressure dropped from 056 systolic to 78 systolic with only a 80-67 HR response. More recently, a CT of he head in the past showed evidence of at least 2 old CVA's and a heart catheterization showed severe single vessel disease in the ostium of a moderate sized OM1 branch of the circumflex. However, maximal guidelines directed medical management was opted for an place of stenting partly due to the risk associated with stenting this vessel. Recently he has had a coronary angiography procedure with stenting of his HA Om1. As you know, Mr. Pedro Luis Anderson  is a 76 y.o. male with a history of recent onset substernal chest discomfort that led to a stress test and a subsequent heart catheterization which showed severe single vessel coronary artery disease of the HA Om1 with successful angioplasty and stenting of that vessel that was done by Dr. Eugenie Tijerina on 4/10/17. More recently he has had problems with balance problems when he is in his feet and says that a Neurologist has told him in the past this is because of his previous strokes. Most recently he underwent a cardiovascular stress test which fortunately had shown to be normal on 3/21/2018. Mr. Pedro Luis Anderson has significant normal stress test and echocardiogram on 2020. Holter monitor done on 2020: The rhythm was sinus.  Average daily heart rate 58 ranging from 47 to 81 bpm. Bradycardia for 72% test duration. Rare premature supraventricular ectopic beats consisting of 33 isolated PACs. Rare premature ventricular ectopic beats total 75 consisting of 73 isolatedPVCs and a ventricular couplet. Echocardiogram done on 12/18/2020: EF of 60%. Moderately increased LV wall thickness. Borderline pulmonary hypertension. Mild tricuspid regurgitation. Mild diastolic dysfunction is seen/ Aortic root is mildly dilated. Stress test done on 9/20/2021 was equivocal, There is a small/moderate perfusion defect of mild intensity in the lateral, inferior and inferoapical regions. Cardiac cath done on 10/5/2021 showed Moderate three vessel coronary artery disease involving a ostial 50-60% stenosis in a small, non-dominant RCA, a 60% mid LAD stenosis and a proximal 50-60% stenosis in the left anterior descending coronary artery. Mr. Demetrio Ramon says he feels like his breathing is about the same today but had some nausea this morning. He says he did have a BM yesterday and feels better but is having some diarrhea now. He denied any abdominal pain, bleeding problems, problems with his medications or any other concerns at this time. No nausea or vomiting. No cough, fever or chills. He has had some constipation since being admitted. Past Medical History:   Diagnosis Date    Acute MI ([address]     Acute renal failure with tubular necrosis ([address] 10/2/2018    ssecondary to hemodynamic effects of loop diuretics and ace inhibitors, bbaseline 1.2-1.3 which peaked up to 1.8, resolving    Anemia     CAD (coronary artery disease)     Cerebral artery occlusion with cerebral infarction ([address]     [facility]est[facility] failure[facility]     Chronic back pain     Closed fracture of lumbar vertebra without mention of spinal cord injury     Displacement of intervertebral disc, site unspecified, without myelopathy     H/O cardiac catheterization 2/19/16    LMCA: Mild irregularities 10-20%. LAD: Lesion on PRox LAD: Mid subsection. 65% stenosis.  LCx: Lesion on 1st Ob Valentina: Proximal subesection. 70% steniosis. RCA: Small non-dominant RCA. Lesion on PRox RCA: Ostial. 50% stenosis. EF:55%.  H/O cardiovascular stress test 2/19/16    Abnormal. Moderate perfusion defect of mild intensity in the inferior, inferoseptal adn inferoapical regions during stress imaging, which most consistent with ischemia. Global LV systolic function normal without regional wall motion abnormalities. OVerall these results are most consistent with an intermediate risk for signficant CAD. Additional testing including cardiac cath may be indicated.  H/O tilt table evaluation 12/26/2017    Abnormal. Patients HR, BP response and symptoms were most consistent with dysautonomia. Combined with viligant maintenance of euvolemia and maintaining a moderate salft intake, pharmacologic treatment with SSRI such as lexapro and or mestinon among other treatments have shown some effectiveness in treatment of this condition    H/O tilt table evaluation 12/26/2017    Abnormal head upright tilt table study. The pt heart rate, blood pressure response and symptoms were most consistent with dysautonomia.  History of 24 hour EKG monitoring 8/14/14    Occasional PAC's and PVC's which appear to be at least moderately symptomatic.  History of 24 hour EKG monitoring 11/19/14    Event Monitor. Sinus rhythm and sinus bradycardia. Infrequent isolated PVC's    History of blood transfusion     History of cardiovascular stress test 2/19/14    Relatively NL    History of cardiovascular stress test 03/21/2018    Normal myocardial perfusion. Global left ventricular systolic function was normal with an EF of 68%. Overall, these results are most consistent with a low risk scan.  History of coronary artery stent placement 04/2017    PTCA / Drug Eluting Stent:, CX and / or branches    History of CVA (cerebrovascular accident) without residual deficits 2014    Incidentally found on CT head.  No known impairment now or in the past.    History of echocardiogram 2/18/14    LA mildly dilated, EF 55%, LV wall thickness is moderately increased, no definite wall motion abnormalilities, what appears to be a pacer wire is seen w/n the RA and RV, mild-mod TR, mild pulmonary hypertension.  History of Holter monitoring 12/29/2017    Rare PAC's and PVC's.  History of tilt table evaluation 8/12/14    Abnormal    Hyperlipidemia     Hypertension     Non critical Right Renal artery stenosis, native ([facility] 10/2/2018    Non critical Right Renal artery stenosis, based on cath in 2016, Rt RA 30% stenosis    Pacemaker     non-functioning    S/P cardiac cath 04/10/2017    S/P coronary artery stent placement     Successful PTCA - HA Om1    SIRS (systemic inflammatory response syndrome) ([facility] 5/19/2019    Type II or unspecified type diabetes mellitus without mention of complication, not stated as uncontrolled        CURRENT ALLERGIES: Lipitor [atorvastatin], Aldactone [spironolactone], Crestor [rosuvastatin calcium], Lopid [gemfibrozil], Invokana [canagliflozin], and Januvia [sitagliptin] REVIEW OF SYSTEMS: 14 systems were reviewed. Pertinent positives and negatives as above, all else negative. Past Surgical History:   Procedure Laterality Date    BACK SURGERY      CARDIAC CATHETERIZATION Left 02/19/2016    via right radial approach/ [place]/ Dr. Imelda Zhu Left 10/05/2021    Dr Micah Elmore radial-Moderate three vessel coronary artery disease involving a ostial 50-60% stenosis in a small, non-dominant RCA, a 60% mid LAD stenosis and a proximal 50-60% stenosis in the left anterior descending coronary artery. Normal left ventricular end diastolic pressure. Proceed with aggressive maximal medical management as clinically indicated.     CHOLECYSTECTOMY  08/17/2015    Liang/Charles/Saulo/ [facility]    COLONOSCOPY  2010    COLONOSCOPY  02/19/2015 -polyps,diverticulosis,hemorrhoids    COLONOSCOPY  2018    Dr Elenita García    COLONOSCOPY N/A 2018    COLONOSCOPY POLYPECTOMY SNARE/COLD BIOPSY  cold snare  and  hot snare performed by Mahin Thomas MD at [address]  10/30/2020    with Dr. Leopold Penner 10/30/2020    Early Charm, NOT HIGH RISK performed by Bradley Etienne DO at [address]      left eye    ENDOSCOPY, COLON, DIAGNOSTIC      EYE SURGERY      PACEMAKER INSERTION      PACEMAKER PLACEMENT      UPPER GASTROINTESTINAL ENDOSCOPY  2019    Dr. Sienna Cobian H-Pylori)duodenal bulbar/antral erythema    UPPER GASTROINTESTINAL ENDOSCOPY N/A 2019    EGD BIOPSY, clotest performed by Mahin Thomas MD at [address] 10/30/2020    EGD ESOPHAGOGASTRODUODENOSCOPY performed by Bradley Etienne DO at [address] History:  Social History     Tobacco Use    Smoking status: Former Smoker     Packs/day: 1.50     Years: 8.00     Pack years: 12.00     Types: Cigarettes     Quit date: 3/4/1980     Years since quittin.7    Smokeless tobacco: Never Used   Vaping Use    Vaping Use: Never used   Substance Use Topics    Alcohol use: No    Drug use: No        CURRENT MEDICATIONS:  Outpatient Medications Marked as Taking for the [facility] Encounter)   Medication Sig Dispense Refill    [] predniSONE (DELTASONE) 20 MG tablet Take 1 tablet by mouth 2 times daily for 5 days 10 tablet 0    eplerenone (INSPRA) 25 MG tablet Take 1 tablet by mouth daily 90 tablet 3    loratadine (CLARITIN) 10 MG tablet Take 1 tablet by mouth daily (Patient taking differently: Take 10 mg by mouth daily as needed ) 90 tablet 3    lisinopril (PRINIVIL;ZESTRIL) 20 MG tablet Take 1 tablet by mouth daily 90 tablet 3    amLODIPine (NORVASC) 10 MG tablet Take 1 tablet by mouth nightly 90 tablet 0  hydrALAZINE (APRESOLINE) 100 MG tablet Take 1 tablet by mouth 3 times daily Patient is taking 100 MG 3 times daily 270 tablet 0    glipiZIDE (GLUCOTROL XL) 5 MG extended release tablet Take 2 tablets by mouth 2 times daily 360 tablet 0    nitroGLYCERIN (NITROSTAT) 0.4 MG SL tablet Place 1 tablet under the tongue every 5 minutes as needed for Chest pain 25 tablet 3    latanoprost (XALATAN) 0.005 % ophthalmic solution       acyclovir (ZOVIRAX) 400 MG tablet 400 mg 2 times daily       prednisoLONE acetate (PRED FORTE) 1 % ophthalmic suspension Place 1 drop into the left eye daily        aluminum & magnesium hydroxide-simethicone (MAALOX) 200-200-20 MG/5ML suspension 30 mL, Q6H PRN  insulin glargine (LANTUS) injection vial 25 Units, BID  darbepoetin shannon-polysorbate (ARANESP) injection 60 mcg, Weekly  cloNIDine (CATAPRES) tablet 0.2 mg, TID  glipiZIDE (GLUCOTROL) tablet 10 mg, BID AC  0.9 % sodium chloride infusion, PRN  glucose (GLUTOSE) 40 % oral gel 15 g, PRN  dextrose 50 % IV solution, PRN  glucagon (rDNA) injection 1 mg, PRN  dextrose 5 % solution, PRN  cetirizine (ZYRTEC) tablet 5 mg, Daily  insulin lispro (HUMALOG) injection vial 0-18 Units, TID WC  insulin lispro (HUMALOG) injection vial 0-9 Units, Nightly  acyclovir (ZOVIRAX) capsule 400 mg, BID  amLODIPine (NORVASC) tablet 10 mg, Nightly  hydrALAZINE (APRESOLINE) tablet 100 mg, TID  prednisoLONE acetate (PRED FORTE) 1 % ophthalmic suspension 1 drop, Daily  sodium chloride flush 0.9 % injection 5-40 mL, 2 times per day  sodium chloride flush 0.9 % injection 10 mL, PRN  0.9 % sodium chloride infusion, PRN  ondansetron (ZOFRAN-ODT) disintegrating tablet 4 mg, Q8H PRN   Or  ondansetron (ZOFRAN) injection 4 mg, Q6H PRN  polyethylene glycol (GLYCOLAX) packet 17 g, Daily PRN  acetaminophen (TYLENOL) tablet 650 mg, Q6H PRN   Or  acetaminophen (TYLENOL) suppository 650 mg, Q6H PRN       FAMILY HISTORY: family history includes Cancer in his father and mother. Physical Examination:     BP (!) 137/53   Pulse 63   Temp 97.5 °F (36.4 °C) (Temporal)   Resp 18   Ht 5' 9\" (1.753 m)   Wt 207 lb 6.4 oz (94.1 kg)   SpO2 97%   BMI 30.63 kg/m²  Body mass index is 30.63 kg/m². [ INSTRUCTIONS:  \"[x]\" Indicates a positive item  \"[]\" Indicates a negative item   Vital Signs: (As obtained by patient/caregiver or practitioner observation)  No flowsheet data found. Constitutional: [x] Appears well-developed and well-nourished [x] No apparent distress      [] Abnormal-   Mental status  [x] Alert and awake  [x] Oriented to person/place/time [x]Able to follow commands      Eyes:  EOM    [x]  Normal  [] Abnormal-  Sclera  [x]  Normal  [] Abnormal -         Discharge [x]  None visible  [] Abnormal -    HENT:   [x] Normocephalic, atraumatic.   [] Abnormal   [] Mouth/Throat: Mucous membranes are moist.     External Ears [x] Normal  [] Abnormal-     Neck: [x] No visualized mass     Pulmonary/Chest: [x] Respiratory effort normal.  [x] No visualized signs of difficulty breathing or respiratory distress        [] Abnormal-     Neurological:        [x] No Facial Asymmetry (Cranial nerve 7 motor function) (limited exam to video visit)          [] No gaze palsy        [] Abnormal-         Skin:        [x] No significant exanthematous lesions or discoloration noted on facial skin         [] Abnormal-            Psychiatric:       [x] Normal Affect [] No Hallucinations        [] Abnormal-     Other pertinent observable physical exam findings: None         MOST RECENT LABS ON RECORD:   Lab Results   Component Value Date    WBC 9.0 11/20/2021    HGB 8.0 (L) 11/20/2021    HCT 25.8 (L) 11/20/2021     (L) 11/20/2021    CHOL 69 09/21/2021    TRIG 185 (H) 09/21/2021    HDL 14 (L) 09/21/2021    LDLCHOLESTEROL 18 09/21/2021    ALT 11 11/12/2021    AST 11 11/12/2021     11/20/2021    K 4.9 11/20/2021     11/20/2021    CREATININE 3.72 (H) 11/20/2021    BUN 91 (H) 11/20/2021    CO2 18 (L) 11/20/2021    TSH 2.26 11/02/2017    PSA 3.68 04/30/2021    INR 1.1 09/20/2021    LABA1C 7.3 08/19/2021    LABMICR 30 10/17/2015      ASSESSMENT:  Patient Active Problem List    Diagnosis Date Noted    S/P drug eluting coronary stent placement-OM1 4/10/17 04/10/2017    Abnormal tilt table test 02/23/2016    Abnormal cardiovascular stress test 02/18/2016    Moderate malnutrition ([address] 11/12/2021    Coronary atherosclerosis due to calcified coronary lesion 02/20/2015    Cardiorenal syndrome, stage 1-4 or unspecified chronic kidney disease, with heart failure ([address] 11/17/2021    Cardiorenal syndrome with renal failure 11/17/2021    Hyperkalemia 11/11/2021    Acute purpuric eruption 11/10/2021    Skin lesion of left lower extremity 11/10/2021    Urticaria 11/10/2021    Iron deficiency anemia secondary to inadequate dietary iron intake 10/21/2021    Iron malabsorption 10/21/2021    Chronic anemia 09/21/2021    Acute coronary syndrome with high troponin ([address] 09/21/2021    Chest pain 09/20/2021    Gross hematuria 05/05/2021    BPH with obstruction/lower urinary tract symptoms 12/10/2019    Elevated PSA 12/10/2019    Anemia in stage 3 chronic kidney disease ([address] 05/21/2019    Acute kidney injury superimposed on CKD ([address] 10/02/2018    Non critical Right Renal artery stenosis, native ([address] 10/02/2018    Medication side effect, initial encounter 03/23/2018    Angina, class II ([address] 01/08/2018    Hyperlipidemia 04/24/2017    Chronic diastolic HF (heart failure), NYHA class 3 ([address] 04/24/2017    Acute on chronic combined systolic and diastolic HF (heart failure), NYHA class 4 ([address] 03/22/2017    ASHD (arteriosclerotic heart disease) 05/02/2016    Cervical stenosis of spinal canal 02/29/2016    Chronic kidney disease, stage III (moderate) ([address] 02/22/2016    Controlled type 2 diabetes mellitus with diabetic nephropathy, with long-term current use of insulin ([facility] 02/22/2016    Ischemic chest with current acute on chronic renal insufficiency   Diuretics: Lasix On hold due to his BUN and recently worsening kidney function. probably still up 9-12 lbs but down 1 lb since yesterday. Nonpharmacologic management of Heart Failure: I advised him to try and keep his legs up whenever possible and to limit salt in his diet. Compression stockings in place  No fluid noted on ultrasound for paracentesis    Atherosclerotic Heart Disease: Coronary Artery stent: 4/10/2017. Cardiac cath done on 10/5/2021 showed Moderate three vessel coronary artery disease involving a ostial 50-60% stenosis in a small, non-dominant RCA, a 60% mid LAD stenosis and a proximal 50-60% stenosis in the left anterior descending coronary artery  Antiplatelet Agent: Not indicated due to bleeding  ACE Inibitor/ARB: Will defer to nephrology with current acute on chronic renal insufficiency  Beta Blocker: Contraindicated due to history of symptomatic bradycardia, intolerance and/or allergy. Cholesterol Reduction Therapy: Refused by patient. Laboratory testing: None    Essential Hypertension: Much better controlled today  ACE Inibitor/ARB: Will defer to nephrology with current acute on chronic renal insufficiency  Beta Blocker: Contraindicated due to history of symptomatic bradycardia, intolerance and/or allergy. due to at least perceived side effects of abdominal pain  Calcium Channel Blocker: Continue amlodipine (Norvasc) 10 mg once daily. Agree with increased Clonidine 0.2 mg tid  Continue Hydralazine 100 mg 3x/day    Hyperlipidemia: Mixed - Last LDL on 9/21/2021 was 18 mg/dL   Cholesterol Reduction Therapy: Refused by patient. Constipation: Currently better controlled    Once again, thank you for allowing me to participate in this patients care. Please do not hesitate to contact me if I could be of any further assistance. Sincerely,  Ezekiel Benítez MD, MS, F.A.C.C.   [facility]) [facility]    [place] [address] [place] [address]  Phone: [phone], Fax: [phone]     I believe that the risk of significant morbidity and mortality related to the patient's current medical conditions are: intermediate-high. Annamarie Kim is a 68 y.o. male being evaluated by a Virtual Visit (video visit) encounter to address concerns as mentioned above. A caregiver was present when appropriate. Due to this being a TeleHealth encounter (During HATZK-53 public health emergency), evaluation of the following organ systems was limited: Vitals/Constitutional/EENT/Resp/CV/GI//MS/Neuro/Skin/Heme-Lymph-Imm. Pursuant to the emergency declaration under the [address] [place] [address] authority and the [employer] and [employer] Act, this Virtual Visit was conducted with patient's (and/or legal guardian's) consent, to reduce the patient's risk of exposure to COVID-19 and provide necessary medical care. The patient (and/or legal guardian) has also been advised to contact this office for worsening conditions or problems, and seek emergency medical treatment and/or call 911 if deemed necessary. Services were provided through a video synchronous discussion virtually to substitute for in-person visit. Mr. Hasmukh Mason was located in the patients hospital room in [facility]  Getachew Riley MD performed the visit from my home in [facility]    --Getachew Riley MD on 11/20/2021 at 10:44 PM    An electronic signature was used to authenticate this note.         November 20, 2021

## 2021-11-21 NOTE — CONSULTS
12/26/2017    Abnormal head upright tilt table study. The pt heart rate, blood pressure response and symptoms were most consistent with dysautonomia.  History of 24 hour EKG monitoring 8/14/14    Occasional PAC's and PVC's which appear to be at least moderately symptomatic.  History of 24 hour EKG monitoring 11/19/14    Event Monitor. Sinus rhythm and sinus bradycardia. Infrequent isolated PVC's    History of blood transfusion     History of cardiovascular stress test 2/19/14    Relatively NL    History of cardiovascular stress test 03/21/2018    Normal myocardial perfusion. Global left ventricular systolic function was normal with an EF of 68%. Overall, these results are most consistent with a low risk scan.  History of coronary artery stent placement 04/2017    PTCA / Drug Eluting Stent:, CX and / or branches    History of CVA (cerebrovascular accident) without residual deficits 2014    Incidentally found on CT head. No known impairment now or in the past.    History of echocardiogram 2/18/14    LA mildly dilated, EF 55%, LV wall thickness is moderately increased, no definite wall motion abnormalilities, what appears to be a pacer wire is seen w/n the RA and RV, mild-mod TR, mild pulmonary hypertension.  History of Holter monitoring 12/29/2017    Rare PAC's and PVC's.      History of tilt table evaluation 8/12/14    Abnormal    Hyperlipidemia     Hypertension     Non critical Right Renal artery stenosis, native ([address] 10/2/2018    Non critical Right Renal artery stenosis, based on cath in 2016, Rt RA 30% stenosis    Pacemaker     non-functioning    S/P cardiac cath 04/10/2017    S/P coronary artery stent placement     Successful PTCA - HA Om1    SIRS (systemic inflammatory response syndrome) ([address] 5/19/2019    Type II or unspecified type diabetes mellitus without mention of complication, not stated as uncontrolled        Allergies   Allergen Reactions    Coreg [Carvedilol] Shortness Of Breath and Nausea And Vomiting    Lipitor [Atorvastatin] Other (See Comments)     Muscle aches and joint pain    Aldactone [Spironolactone] Hives    Crestor [Rosuvastatin Calcium] Other (See Comments)     Muscle aches and joint pain    Lopid [Gemfibrozil]      hyperglycemia    Invokana [Canagliflozin] Rash    Januvia [Sitagliptin] Nausea And Vomiting     Past Surgical History:   Procedure Laterality Date    BACK SURGERY      CARDIAC CATHETERIZATION Left 02/19/2016    via right radial approach/ Melina Vernon/ Dr. Jaylene Dukcworth Left 10/05/2021    Dr Vidal Olguin radial-Moderate three vessel coronary artery disease involving a ostial 50-60% stenosis in a small, non-dominant RCA, a 60% mid LAD stenosis and a proximal 50-60% stenosis in the left anterior descending coronary artery. Normal left ventricular end diastolic pressure. Proceed with aggressive maximal medical management as clinically indicated.     CHOLECYSTECTOMY  08/17/2015    Liang/Charles/Saulo/ Lap    COLONOSCOPY  2010    COLONOSCOPY  02/19/2015    -polyps,diverticulosis,hemorrhoids    COLONOSCOPY  05/23/2018    Dr Gladys Hoff    COLONOSCOPY N/A 05/23/2018    COLONOSCOPY POLYPECTOMY SNARE/COLD BIOPSY  cold snare  and  hot snare performed by Christo Bonilla MD at [address] [place] [address]  10/30/2020    with Dr. Linda Perla 10/30/2020    DANA Bustillo HIGH RISK performed by Keli Aquino DO at [address] [facility]      left eye    ENDOSCOPY, COLON, DIAGNOSTIC      EYE SURGERY      PACEMAKER INSERTION      PACEMAKER PLACEMENT      UPPER GASTROINTESTINAL ENDOSCOPY  05/28/2019    Dr. Josie Castillo H-Pylori)duodenal bulbar/antral erythema    UPPER GASTROINTESTINAL ENDOSCOPY N/A 05/28/2019    EGD BIOPSY, clotest performed by Christo Bonilla MD at [address] [facility] 10/30/2020    EGD ESOPHAGOGASTRODUODENOSCOPY performed by Lawrence Chowdhury DO at [address] Marital status:      Spouse name: Not on file    Number of children: Not on file    Years of education: Not on file    Highest education level: Not on file   Occupational History    Not on file   Tobacco Use    Smoking status: Former Smoker     Packs/day: 1.50     Years: 8.00     Pack years: 12.00     Types: Cigarettes     Quit date: 3/4/1980     Years since quittin.7    Smokeless tobacco: Never Used   Vaping Use    Vaping Use: Never used   Substance and Sexual Activity    Alcohol use: No    Drug use: No    Sexual activity: Not on file   Other Topics Concern    Not on file   Social History Narrative    Not on file     Social Determinants of Health     Financial Resource Strain: Low Risk     Difficulty of Paying Living Expenses: Not hard at all   Food Insecurity: No Food Insecurity    Worried About [address] [employer] in the Last Year: Never true    [facility] in the Last Year: Never true   Transportation Needs: No Transportation Needs    Lack of Transportation (Medical): No    Lack of Transportation (Non-Medical):  No   Physical Activity:     Days of Exercise per Week: Not on file    Minutes of Exercise per Session: Not on file   Stress:     Feeling of Stress : Not on file   Social Connections:     Frequency of Communication with Friends and Family: Not on file    Frequency of Social Gatherings with Friends and Family: Not on file    Attends Jain Services: Not on file    Active Member of Clubs or Organizations: Not on file    Attends Club or Organization Meetings: Not on file    Marital Status: Not on file   Intimate Partner Violence:     Fear of Current or Ex-Partner: Not on file    Emotionally Abused: Not on file    Physically Abused: Not on file    Sexually Abused: Not on file   Housing Stability:     Unable to Pay for Housing in the Last Year: Not on file    Number of Places Lived in the Last Year: Not on file    Unstable Housing in the Last Year: Not on file       Family History:        Family History   Problem Relation Age of Onset   Ingrid Norton Cancer Mother         breast    Cancer Father         lung       Outpatient Medications:     Medications Prior to Admission: cetirizine (ZYRTEC) 10 MG tablet, Take 1 tablet by mouth daily  eplerenone (INSPRA) 25 MG tablet, Take 1 tablet by mouth daily  loratadine (CLARITIN) 10 MG tablet, Take 1 tablet by mouth daily (Patient taking differently: Take 10 mg by mouth daily as needed )  lisinopril (PRINIVIL;ZESTRIL) 20 MG tablet, Take 1 tablet by mouth daily (Patient not taking: Reported on 11/21/2021)  amLODIPine (NORVASC) 10 MG tablet, Take 1 tablet by mouth nightly  hydrALAZINE (APRESOLINE) 100 MG tablet, Take 1 tablet by mouth 3 times daily Patient is taking 100 MG 3 times daily  glipiZIDE (GLUCOTROL XL) 5 MG extended release tablet, Take 2 tablets by mouth 2 times daily  insulin glargine (LANTUS SOLOSTAR) 100 UNIT/ML injection pen, Inject 28 Units into the skin 2 times daily  nitroGLYCERIN (NITROSTAT) 0.4 MG SL tablet, Place 1 tablet under the tongue every 5 minutes as needed for Chest pain  latanoprost (XALATAN) 0.005 % ophthalmic solution,   acyclovir (ZOVIRAX) 400 MG tablet, 400 mg 2 times daily   CONTOUR TEST strip, USE 1 STRIP TO CHECK GLUCOSE TWICE DAILY  DIANA MICROLET LANCETS MISC, TEST TWICE DAILY  Insulin Pen Needle (PEN NEEDLES) 31G X 6 MM MISC, 1 each by Does not apply route daily  prednisoLONE acetate (PRED FORTE) 1 % ophthalmic suspension, Place 1 drop into the left eye daily     Current Medications:     Scheduled Meds:    sodium chloride flush  5-40 mL IntraVENous 2 times per day    insulin glargine  28 Units SubCUTAneous BID    prednisoLONE acetate  1 drop Left Eye Daily    insulin lispro  0-6 Units SubCUTAneous TID WC    insulin lispro  0-3 Units SubCUTAneous Nightly    [Held by provider] bumetanide  2 mg IntraVENous BID    [Held by provider] metOLazone  5 mg Oral Daily    acyclovir  400 mg Oral BID    NIFEdipine  30 mg Oral Daily    hydrALAZINE  50 mg Oral 3 times per day     Continuous Infusions:    sodium chloride      dextrose      dextrose       PRN Meds:  sodium chloride flush, sodium chloride, ondansetron **OR** ondansetron, acetaminophen **OR** acetaminophen, sodium polystyrene, glucose, dextrose, glucagon (rDNA), dextrose, glucose, dextrose, glucagon (rDNA), dextrose    Review of Systems:     Constitutional: No fever, no chills, no lethargy, no weakness. HEENT:  Positive for dizziness  Cardiac:  Positive for chest pain and dyspnea on exertion  Chest:   Positive for shortness of breath  Abdomen:  No abdominal pain, nausea or vomiting. Neuro:  No focal weakness, abnormal movements orseizure like activity. Skin:   No rashes, no itching. :   No hematuria, no pyuria, no dysuria, no flank pain. Extremities:  Positive for lower extremity edema  ROS was otherwise negative except as mentioned in the [address] Input/Output:       I/O last 3 completed shifts: In: 450 [P.O.:450]  Out: -     Vital Signs:   Temperature:  Temp: 98.3 °F (36.8 °C)  TMax:   Temp (24hrs), Av.6 °F (36.4 °C), Min:97 °F (36.1 °C), Max:98.3 °F (36.8 °C)    Respirations:  Resp: 16  Pulse:   Pulse: 69  BP:    BP: (!) 131/56  BP Range: Systolic (76ONT), ZKT:386 , Min:129 , ZPA:736       Diastolic (35JSR), DVN:59, Min:52, Max:56      Physical Examination:     General:  AAO x 3  HEENT: Atraumatic, normocephalic, no throat congestion, moist mucosa. Eyes:   Pupils equal, round and reactive to light, EOMI. Neck:   No JVD, no thyromegaly, no lymphadenopathy. Chest:   Bilateral vesicular breath sounds, no rales or wheezes. Cardiac:  S1 S2 RR, no murmurs, gallops or rubs, JVP not raised. Abdomen: Soft, non-tender, no masses or organomegaly, BS audible. :   No suprapubic or flank tenderness.   Neuro:  AAO x 3, No FND.  SKIN:  No rashes, good skin turgor. Extremities:  2+ edema    Labs:       Recent Labs     11/19/21  0545 11/20/21  0545 11/21/21  0905   WBC 9.7 9.0 8.4   RBC 2.91* 2.85* 2.82*   HGB 8.3* 8.0* 8.0*   HCT 26.5* 25.8* 25.5*   MCV 91.1 90.5 90.4   MCH 28.5 28.1 28.4   MCHC 31.3 31.0 31.4   RDW 17.9* 17.9* 17.4*    130* 136*   MPV 10.0 10.1 9.9      BMP:   Recent Labs     11/19/21  0545 11/20/21  0545 11/21/21  0905   * 136 133*   K 4.6 4.9 5.0    106 101   CO2 18* 18* 19*   BUN 94* 91* 84*   CREATININE 3.48* 3.72* 3.69*   GLUCOSE 84 156* 250*   CALCIUM 7.5* 7.5* 7.2*      Phosphorus:     Recent Labs     11/21/21  0905   PHOS 3.5     Magnesium:    Recent Labs     11/21/21  0905   MG 1.8     Albumin:    Recent Labs     11/21/21  0905   LABALBU 2.7*     VALERIE:      Lab Results   Component Value Date    VALERIE NEGATIVE 11/11/2021     SPEP:  Lab Results   Component Value Date    PROT 6.4 11/12/2021    ALBCAL 3.9 09/11/2018    ALBPCT 62 09/11/2018    LABALPH 0.2 09/11/2018    LABALPH 0.7 09/11/2018    A1PCT 3 09/11/2018    A2PCT 11 09/11/2018    LABBETA 0.7 09/11/2018    BETAPCT 12 09/11/2018    GAMGLOB 0.8 09/11/2018    GGPCT 13 09/11/2018    PATH ELECTRONICALLY SIGNED. Kenya Moreno M.D. 09/11/2018    PATH ELECTRONICALLY SIGNED.  Kenya Moreno M.D. 09/11/2018     UPEP:   No results found for: LABPE  C3:     Lab Results   Component Value Date    C3 134 09/11/2018     C4:     Lab Results   Component Value Date    C4 23 09/11/2018     MPO ANCA:     Lab Results   Component Value Date    MPO 15 11/11/2021     PR3 ANCA:     Lab Results   Component Value Date    PR3 1.1 11/11/2021       Urinalysis/Chemistries:      Lab Results   Component Value Date    NITRU NEGATIVE 11/11/2021    COLORU Yellow 11/11/2021    PHUR 5.5 11/11/2021    WBCUA 0 TO 2 11/11/2021    RBCUA 0 TO 2 11/11/2021    MUCUS NOT REPORTED 11/11/2021    TRICHOMONAS NOT REPORTED 11/11/2021    YEAST NOT REPORTED 11/11/2021    BACTERIA NOT REPORTED 11/11/2021    SPECGRAV 1.020 11/11/2021    LEUKOCYTESUR NEGATIVE 11/11/2021    UROBILINOGEN Normal 11/11/2021    BILIRUBINUR NEGATIVE 11/11/2021    GLUCOSEU 3+ 11/11/2021    KETUA NEGATIVE 11/11/2021    AMORPHOUS NOT REPORTED 11/11/2021     Urine Sodium:     Lab Results   Component Value Date    NIDHI 67 11/12/2021     Urine Potassium:    Lab Results   Component Value Date    KUR 25.3 11/12/2021     Urine Chloride:    Lab Results   Component Value Date    CLUR 54 11/12/2021     Urine Creatinine:     Lab Results   Component Value Date    LABCREA 94.6 11/02/2021     UPC:   0.59      Radiology:     CXR: Reviewed    Assessment:     1. Acute Kidney Injury on CKD Stage 4: Likely secondary from cholesterol embolization  vs ATN, patient had cardiac cath 6 weeks ago and intravascular volume depletion due to diarrhea, CHF exacerbation and recent contrast exposure. 2.  CKD, follows up with Dr. Vanessa Nicole, UPC 0.59  3. CAD status post PCI in 2013  4. Type 2 diabetes mellitus with long-term insulin use  5. Anemia of chronic disease    Plan:   1. Start the patient back on Bumex 2 mg IV twice daily. Hold ACE/ARB/eplerenone. 2.  Give the patient Lokelma 10 mg if potassium more than 5   3. Discussed with the family due to worsening kidney function patient's course and gotten towards dialysis. Patient, wife and son verbalized understanding  4. Start sodium bicarb 650 mg twice daily  5. Fluid restriction of 1500 cc/day. 6.  We will get urine protein creatinine ratio  7. We will do a bladder scan to rule out obstruction  9. We will get a CBC with differential to see if there is any eosinophilia, check complements       Nutrition   Please ensure that patient is on a renal diet/TF. Avoid nephrotoxic drugs/contrast exposure. Thank you for the consultation. Please do not hesitate to contact us for any further questions/concerns. We will continue to follow along with you.      Terell Sylvester MD  Internal Medicine Resident, PGY-2  [address], [place]  11/21/2021,9:55 AM  Attending Physician Statement  I have discussed the care of Mariangel Clemens, including pertinent history and exam findings with the resident/fellow. I have reviewed the key elements of all parts of the encounter with the resident/fellow. I have seen and examined the patient with the resident/fellow. I agree with the assessment and plan and status of the problem list as documented.         Clare Goode MD , MD

## 2021-11-21 NOTE — CARE COORDINATION
Case Management Initial Discharge Plan  Mariangel Clemens,             Met with:patient to discuss discharge plans. Information verified: address, contacts, phone number, , insurance Yes  Insurance Provider: Medicare/[employer]    Emergency Contact/Next of Kin name & number: joshua Aldridge [phone]  Who are involved in patient's support system? wife    PCP: Michelle Saldana MD  Date of last visit: Friday      Discharge Planning    Living Arrangements:  Spouse/Significant Other     Home has 1 stories  2 stairs to climb to get into front door, 0 stairs to climb to reach second floor  Location of bedroom/bathroom in home first floor    Patient able to perform ADL's:Independent    Current Services (outpatient & in home) n/a  DME equipment: cane/walker  DME provider:     Is patient receiving oral anticoagulation therapy? No    If indicated:   Physician managing anticoagulation treatment:   Where does patient obtain lab work for ATC treatment? Potential Assistance Needed:  N/A    Patient agreeable to home care: Yes  Freedom of choice provided:  will provide if home care indicated     Evaluation: n/a    Expected Discharge date:  21    Patient expects to be discharged to:   Home with wife    If home: is the family and/or caregiver wiling & able to provide support at home? yes  Who will be providing this support? wife    Follow Up Appointment: Best Day/ Time: Monday PM    Transportation provider: wife  Transportation arrangements needed for discharge: No    Readmission Risk              Risk of Unplanned Readmission:  25             Does patient have a readmission risk score greater than 14?: Yes  If yes, follow-up appointment must be made within 7 days of discharge. Goals of Care: safety      Educated patient on transitional options, provided freedom of choice and are agreeable with plan      Discharge Plan: Home independent. Monitor for  homecare needs.           Electronically

## 2021-11-22 ENCOUNTER — APPOINTMENT (OUTPATIENT)
Dept: INTERVENTIONAL RADIOLOGY/VASCULAR | Age: 76
DRG: 673 | End: 2021-11-22
Attending: INTERNAL MEDICINE
Payer: MEDICARE

## 2021-11-22 PROBLEM — I50.33 ACUTE ON CHRONIC DIASTOLIC (CONGESTIVE) HEART FAILURE (HCC): Status: ACTIVE | Noted: 2021-11-22

## 2021-11-22 PROBLEM — I27.20 PULMONARY HYPERTENSION (HCC): Status: ACTIVE | Noted: 2021-11-22

## 2021-11-22 LAB
ALBUMIN SERPL-MCNC: 2.6 G/DL (ref 3.5–5.2)
ALBUMIN/GLOBULIN RATIO: 0.9 (ref 1–2.5)
ALP BLD-CCNC: 67 U/L (ref 40–129)
ALT SERPL-CCNC: 38 U/L (ref 5–41)
ANION GAP SERPL CALCULATED.3IONS-SCNC: 12 MMOL/L (ref 9–17)
AST SERPL-CCNC: 14 U/L
BILIRUB SERPL-MCNC: 0.28 MG/DL (ref 0.3–1.2)
BUN BLDV-MCNC: 85 MG/DL (ref 8–23)
BUN/CREAT BLD: ABNORMAL (ref 9–20)
CALCIUM SERPL-MCNC: 7.7 MG/DL (ref 8.6–10.4)
CHLORIDE BLD-SCNC: 103 MMOL/L (ref 98–107)
CO2: 21 MMOL/L (ref 20–31)
CREAT SERPL-MCNC: 4.45 MG/DL (ref 0.7–1.2)
GFR AFRICAN AMERICAN: 16 ML/MIN
GFR NON-AFRICAN AMERICAN: 13 ML/MIN
GFR SERPL CREATININE-BSD FRML MDRD: ABNORMAL ML/MIN/{1.73_M2}
GFR SERPL CREATININE-BSD FRML MDRD: ABNORMAL ML/MIN/{1.73_M2}
GLUCOSE BLD-MCNC: 104 MG/DL (ref 75–110)
GLUCOSE BLD-MCNC: 146 MG/DL (ref 75–110)
GLUCOSE BLD-MCNC: 162 MG/DL (ref 75–110)
GLUCOSE BLD-MCNC: 185 MG/DL (ref 75–110)
GLUCOSE BLD-MCNC: 203 MG/DL (ref 75–110)
GLUCOSE BLD-MCNC: 69 MG/DL (ref 75–110)
GLUCOSE BLD-MCNC: 90 MG/DL (ref 75–110)
GLUCOSE BLD-MCNC: 98 MG/DL (ref 70–99)
HCT VFR BLD CALC: 25 % (ref 40.7–50.3)
HEMOGLOBIN: 7.9 G/DL (ref 13–17)
INR BLD: 0.9
MAGNESIUM: 1.9 MG/DL (ref 1.6–2.6)
MCH RBC QN AUTO: 28.8 PG (ref 25.2–33.5)
MCHC RBC AUTO-ENTMCNC: 31.6 G/DL (ref 28.4–34.8)
MCV RBC AUTO: 91.2 FL (ref 82.6–102.9)
NRBC AUTOMATED: 0 PER 100 WBC
PDW BLD-RTO: 17.2 % (ref 11.8–14.4)
PLATELET # BLD: 151 K/UL (ref 138–453)
PMV BLD AUTO: 10 FL (ref 8.1–13.5)
POTASSIUM SERPL-SCNC: 4.1 MMOL/L (ref 3.7–5.3)
PROTHROMBIN TIME: 10.2 SEC (ref 9.1–12.3)
RBC # BLD: 2.74 M/UL (ref 4.21–5.77)
SODIUM BLD-SCNC: 136 MMOL/L (ref 135–144)
TOTAL PROTEIN: 5.4 G/DL (ref 6.4–8.3)
WBC # BLD: 7.2 K/UL (ref 3.5–11.3)

## 2021-11-22 PROCEDURE — 76937 US GUIDE VASCULAR ACCESS: CPT

## 2021-11-22 PROCEDURE — 82947 ASSAY GLUCOSE BLOOD QUANT: CPT

## 2021-11-22 PROCEDURE — 6370000000 HC RX 637 (ALT 250 FOR IP): Performed by: INTERNAL MEDICINE

## 2021-11-22 PROCEDURE — 2709999900 HC NON-CHARGEABLE SUPPLY

## 2021-11-22 PROCEDURE — C1769 GUIDE WIRE: HCPCS

## 2021-11-22 PROCEDURE — 83735 ASSAY OF MAGNESIUM: CPT

## 2021-11-22 PROCEDURE — 2500000003 HC RX 250 WO HCPCS: Performed by: INTERNAL MEDICINE

## 2021-11-22 PROCEDURE — 2060000000 HC ICU INTERMEDIATE R&B

## 2021-11-22 PROCEDURE — 36415 COLL VENOUS BLD VENIPUNCTURE: CPT

## 2021-11-22 PROCEDURE — 6360000002 HC RX W HCPCS: Performed by: PHYSICIAN ASSISTANT

## 2021-11-22 PROCEDURE — C1881 DIALYSIS ACCESS SYSTEM: HCPCS

## 2021-11-22 PROCEDURE — 99232 SBSQ HOSP IP/OBS MODERATE 35: CPT | Performed by: INTERNAL MEDICINE

## 2021-11-22 PROCEDURE — 6360000002 HC RX W HCPCS: Performed by: RADIOLOGY

## 2021-11-22 PROCEDURE — 05HN33Z INSERTION OF INFUSION DEVICE INTO LEFT INTERNAL JUGULAR VEIN, PERCUTANEOUS APPROACH: ICD-10-PCS | Performed by: PHYSICIAN ASSISTANT

## 2021-11-22 PROCEDURE — 2580000003 HC RX 258: Performed by: RADIOLOGY

## 2021-11-22 PROCEDURE — 99233 SBSQ HOSP IP/OBS HIGH 50: CPT | Performed by: INTERNAL MEDICINE

## 2021-11-22 PROCEDURE — 6370000000 HC RX 637 (ALT 250 FOR IP): Performed by: STUDENT IN AN ORGANIZED HEALTH CARE EDUCATION/TRAINING PROGRAM

## 2021-11-22 PROCEDURE — 2580000003 HC RX 258: Performed by: NURSE PRACTITIONER

## 2021-11-22 PROCEDURE — 85027 COMPLETE CBC AUTOMATED: CPT

## 2021-11-22 PROCEDURE — 36558 INSERT TUNNELED CV CATH: CPT

## 2021-11-22 PROCEDURE — 80053 COMPREHEN METABOLIC PANEL: CPT

## 2021-11-22 PROCEDURE — 77001 FLUOROGUIDE FOR VEIN DEVICE: CPT

## 2021-11-22 PROCEDURE — 0JH63XZ INSERTION OF TUNNELED VASCULAR ACCESS DEVICE INTO CHEST SUBCUTANEOUS TISSUE AND FASCIA, PERCUTANEOUS APPROACH: ICD-10-PCS | Performed by: PHYSICIAN ASSISTANT

## 2021-11-22 PROCEDURE — C1750 CATH, HEMODIALYSIS,LONG-TERM: HCPCS

## 2021-11-22 PROCEDURE — C1894 INTRO/SHEATH, NON-LASER: HCPCS

## 2021-11-22 PROCEDURE — 6360000002 HC RX W HCPCS: Performed by: NURSE PRACTITIONER

## 2021-11-22 PROCEDURE — 85610 PROTHROMBIN TIME: CPT

## 2021-11-22 RX ORDER — HEPARIN SODIUM 5000 [USP'U]/ML
INJECTION, SOLUTION INTRAVENOUS; SUBCUTANEOUS
Status: COMPLETED | OUTPATIENT
Start: 2021-11-22 | End: 2021-11-22

## 2021-11-22 RX ORDER — LOPERAMIDE HYDROCHLORIDE 2 MG/1
2 CAPSULE ORAL ONCE
Status: COMPLETED | OUTPATIENT
Start: 2021-11-22 | End: 2021-11-22

## 2021-11-22 RX ADMIN — ACYCLOVIR 400 MG: 200 CAPSULE ORAL at 21:04

## 2021-11-22 RX ADMIN — ACYCLOVIR 400 MG: 200 CAPSULE ORAL at 08:34

## 2021-11-22 RX ADMIN — SODIUM BICARBONATE 650 MG: 648 TABLET ORAL at 08:34

## 2021-11-22 RX ADMIN — INSULIN LISPRO 1 UNITS: 100 INJECTION, SOLUTION INTRAVENOUS; SUBCUTANEOUS at 21:04

## 2021-11-22 RX ADMIN — METOPROLOL TARTRATE 12.5 MG: 25 TABLET ORAL at 08:34

## 2021-11-22 RX ADMIN — METOPROLOL TARTRATE 12.5 MG: 25 TABLET ORAL at 21:04

## 2021-11-22 RX ADMIN — HYDRALAZINE HYDROCHLORIDE 50 MG: 50 TABLET, FILM COATED ORAL at 14:18

## 2021-11-22 RX ADMIN — SODIUM CHLORIDE, PRESERVATIVE FREE 10 ML: 5 INJECTION INTRAVENOUS at 21:03

## 2021-11-22 RX ADMIN — INSULIN GLARGINE 28 UNITS: 100 INJECTION, SOLUTION SUBCUTANEOUS at 21:04

## 2021-11-22 RX ADMIN — HEPARIN SODIUM 2.1 ML: 5000 INJECTION, SOLUTION INTRAVENOUS; SUBCUTANEOUS at 16:30

## 2021-11-22 RX ADMIN — SODIUM CHLORIDE 25 ML: 9 INJECTION, SOLUTION INTRAVENOUS at 10:57

## 2021-11-22 RX ADMIN — BUMETANIDE 2 MG: 0.25 INJECTION INTRAMUSCULAR; INTRAVENOUS at 21:03

## 2021-11-22 RX ADMIN — BUMETANIDE 2 MG: 0.25 INJECTION INTRAMUSCULAR; INTRAVENOUS at 08:34

## 2021-11-22 RX ADMIN — HYDRALAZINE HYDROCHLORIDE 50 MG: 50 TABLET, FILM COATED ORAL at 21:04

## 2021-11-22 RX ADMIN — SODIUM BICARBONATE 650 MG: 648 TABLET ORAL at 21:04

## 2021-11-22 RX ADMIN — INSULIN GLARGINE 28 UNITS: 100 INJECTION, SOLUTION SUBCUTANEOUS at 08:34

## 2021-11-22 RX ADMIN — ONDANSETRON 4 MG: 2 INJECTION INTRAMUSCULAR; INTRAVENOUS at 17:33

## 2021-11-22 RX ADMIN — PREDNISOLONE ACETATE 1 DROP: 10 SUSPENSION/ DROPS OPHTHALMIC at 08:33

## 2021-11-22 RX ADMIN — HEPARIN SODIUM 2.1 ML: 5000 INJECTION, SOLUTION INTRAVENOUS; SUBCUTANEOUS at 16:29

## 2021-11-22 RX ADMIN — SODIUM CHLORIDE, PRESERVATIVE FREE 10 ML: 5 INJECTION INTRAVENOUS at 08:34

## 2021-11-22 RX ADMIN — LOPERAMIDE HYDROCHLORIDE 2 MG: 2 CAPSULE ORAL at 14:17

## 2021-11-22 RX ADMIN — HYDRALAZINE HYDROCHLORIDE 50 MG: 50 TABLET, FILM COATED ORAL at 07:11

## 2021-11-22 RX ADMIN — DEXTROSE MONOHYDRATE 1000 MG: 5 INJECTION INTRAVENOUS at 15:04

## 2021-11-22 ASSESSMENT — PAIN SCALES - GENERAL
PAINLEVEL_OUTOF10: 0
PAINLEVEL_OUTOF10: 0

## 2021-11-22 ASSESSMENT — ENCOUNTER SYMPTOMS
COUGH: 0
NAUSEA: 0
VOMITING: 0
DIARRHEA: 1
ABDOMINAL PAIN: 0
SHORTNESS OF BREATH: 1

## 2021-11-22 NOTE — CARE COORDINATION
Patient/family seen: Yes       Informed patient/family of BPCI-A Medical Bundle Program with potential outreach by either Care Transitions Team or [facility] Team based on hospital admission and location.        BPCI-A Notification Letter given: Yes         Current discharge plan: d/c home with family, awaiting nephro recs for possible dialysis

## 2021-11-22 NOTE — BRIEF OP NOTE
Brief Postoperative Note    Norberto Hannon  YOB: 1945  [id number]    Pre-operative Diagnosis: Acute Renal Failure      Post-operative Diagnosis: Same    Procedure: Tunneled Dialysis Catheter    Medication Given: none    Anesthesia: 1%Lidocaine     Surgeons/Assistants: BRE Agustin and Fernanda Stewart MD    Estimated Blood Loss: Minimal    Complications: none    14 Fr x 28 cm tip to cuff palindrome tunneled HD Catheter placed successfully via the Site:  Left Internal Jugular Vein. Catheter secured to skin, dressing applied. Catheter locked with Heparin. May use HD cath for dialysis.     Electronically signed by BRE Agustin on 11/22/2021 at 4:43 PM

## 2021-11-22 NOTE — PLAN OF CARE
Problem: Pain:  Goal: Pain level will decrease  Description: Pain level will decrease  11/22/2021 0459 by Donta Garcia  Outcome: Met This Shift     Problem: Pain:  Goal: Control of acute pain  Description: Control of acute pain  11/22/2021 0459 by Donta Garcia  Outcome: Met This Shift     Problem: Pain:  Goal: Control of chronic pain  Description: Control of chronic pain  11/22/2021 0459 by Donta Garcia  Outcome: Ongoing     Problem: Falls - Risk of:  Goal: Will remain free from falls  Description: Will remain free from falls  11/22/2021 0459 by Donta Garcia  Outcome: Ongoing     Problem: Falls - Risk of:  Goal: Absence of physical injury  Description: Absence of physical injury  Outcome: Ongoing     Problem: Skin Integrity:  Goal: Will show no infection signs and symptoms  Description: Will show no infection signs and symptoms  11/22/2021 0459 by Donta Garcia  Outcome: Ongoing     Problem: Skin Integrity:  Goal: Absence of new skin breakdown  Description: Absence of new skin breakdown  11/22/2021 0459 by Donta Garcia  Outcome: Ongoing

## 2021-11-22 NOTE — PROGRESS NOTES
Renal Consult Note    Patient :  Mike Heck; 68 y.o. MRN# [id number]  Location:  0913/6638-23  Attending:  Jonathan Purcell DO  Admit Date:  11/21/2021   Hospital Day: 1    Reason for Consult:     Asked by Dr Jonathan Purcell DO to see for VIDAL/Elevated Creatinine. History Obtained From:     patient, electronic medical record    History of Present Illness:     Mike Heck; 68 y.o. male with past medical history as mentioned below. Patient past medical history of chronic kidney disease, heart failure, type 2 diabetes mellitus, coronary artery disease status post HA 3 years ago and recent cardiac cath in October in [place] by Dr. Michael Foss, chronic anemia was transferred from [place] for evaluation of acute renal failure. Patient states that patient has dropping blood counts over the past year and has undergone colonoscopy and EGD. Patient got recent iron transfusion none developed rash with itching which is worse on the lower extremity. 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 The rash development was about 5 to 6 weeks after patient's cardiac cath which was done in October. Patient underwent testing and was called to go to the hospital due to elevated creatinine and worsening kidney function. Patient follows with Dr. Zoran Vega outpatient for chronic kidney disease. Patient states patient does not take any water pill, was taken off diuretics due to dehydration. Patient states that he is experience chest pain, shortness of breath on exertion, dizziness and had a fall because his legs gave way. Patient also states worsening lower extremity edema which started recently. While in [place] patient was found to be anemic with a hemoglobin of 7.5 which dropped to 6.8 and got 1 PRBC transfused. Patient being transferred to [facility] for management of VIDAL on CKD.   Renal ultrasound was done at different hospital showing no medical renal disease, no hydronephrosis. Patient underwent cardiac catheterization 1 month ago. Doesn't report any reduction in the urine output recently. Non report of any obstructive urinary symptoms (urgency, frequency, weak stream, straining while urination). Creatinine back in September of 2021 was 1.9 and was 2.16 early October 2021. It is jump dramatically up to 4.45 today. I discussed with the patient and the family and at the bedside extensively regarding the risks and benefits of native kidney biopsy. They wish to proceed. We also discussed at length the patient's deteriorating renal function and patient's uremic symptoms which include low appetite, change in taste, and some sleep disturbance along with weakness and fatigue. As a result of those symptoms and deteriorating lab data, we will proceed with tunneled dialysis catheter and initiation of dialysis for acute on chronic kidney injury. It is unclear to me whether the patient will recover. Some of the prognostic factors for renal recovery hopefully will be clarified by the renal biopsy results. Past History/Allergies? Social History:     Past Medical History:   Diagnosis Date    Acute MI ([address]     Acute renal failure with tubular necrosis ([address] 10/2/2018    ssecondary to hemodynamic effects of loop diuretics and ace inhibitors, bbaseline 1.2-1.3 which peaked up to 1.8, resolving    Anemia     CAD (coronary artery disease)     Cerebral artery occlusion with cerebral infarction ([address]     CHF (congestive heart failure) (HCC)     Chronic back pain     Closed fracture of lumbar vertebra without mention of spinal cord injury     Displacement of intervertebral disc, site unspecified, without myelopathy     H/O cardiac catheterization 2/19/16    LMCA: Mild irregularities 10-20%. LAD: Lesion on PRox LAD: Mid subsection. 65% stenosis. LCx: Lesion on 1st Ob Valentina: Proximal subesection. 70% steniosis. RCA: Small non-dominant RCA.  Lesion on PRox RCA: Ostial. 50% moderately increased, no definite wall motion abnormalilities, what appears to be a pacer wire is seen w/n the RA and RV, mild-mod TR, mild pulmonary hypertension.  History of Holter monitoring 12/29/2017    Rare PAC's and PVC's.  History of tilt table evaluation 8/12/14    Abnormal    Hyperlipidemia     Hypertension     Non critical Right Renal artery stenosis, native ([facility] 10/2/2018    Non critical Right Renal artery stenosis, based on cath in 2016, Rt RA 30% stenosis    Pacemaker     non-functioning    S/P cardiac cath 04/10/2017    S/P coronary artery stent placement     Successful PTCA - HA Om1    SIRS (systemic inflammatory response syndrome) ([facility] 5/19/2019    Type II or unspecified type diabetes mellitus without mention of complication, not stated as uncontrolled        Allergies   Allergen Reactions    Coreg [Carvedilol] Shortness Of Breath and Nausea And Vomiting    Lipitor [Atorvastatin] Other (See Comments)     Muscle aches and joint pain    Aldactone [Spironolactone] Hives    Crestor [Rosuvastatin Calcium] Other (See Comments)     Muscle aches and joint pain    Lopid [Gemfibrozil]      hyperglycemia    Invokana [Canagliflozin] Rash    Januvia [Sitagliptin] Nausea And Vomiting     Past Surgical History:   Procedure Laterality Date    BACK SURGERY      CARDIAC CATHETERIZATION Left 02/19/2016    via right radial approach/ Melina Vernon/ Dr. Imelda Zhu Left 10/05/2021    Dr Micah Elmore radial-Moderate three vessel coronary artery disease involving a ostial 50-60% stenosis in a small, non-dominant RCA, a 60% mid LAD stenosis and a proximal 50-60% stenosis in the left anterior descending coronary artery. Normal left ventricular end diastolic pressure. Proceed with aggressive maximal medical management as clinically indicated.     CHOLECYSTECTOMY  08/17/2015    Liang/Charles/Saulo/ [facility]    COLONOSCOPY  2010    COLONOSCOPY  02/19/2015 -polyps,diverticulosis,hemorrhoids    COLONOSCOPY  2018    Dr Lisbet Ren    COLONOSCOPY N/A 2018    COLONOSCOPY POLYPECTOMY SNARE/COLD BIOPSY  cold snare  and  hot snare performed by Miles Gore MD at [place] [address]  10/30/2020    with Dr. Nicole Castellon COLONOSCOPY N/A 10/30/2020    COLORECTAL CANCER SCREENING, NOT HIGH RISK performed by Yesi Dee DO at [facility]      left eye    ENDOSCOPY, COLON, DIAGNOSTIC      EYE SURGERY      PACEMAKER INSERTION      PACEMAKER PLACEMENT      UPPER GASTROINTESTINAL ENDOSCOPY  2019    Dr. Lashay Fuentes H-Pylori)duodenal bulbar/antral erythema    UPPER GASTROINTESTINAL ENDOSCOPY N/A 2019    EGD BIOPSY, clotest performed by Miles Gore MD at [facility] N/A 10/30/2020    EGD ESOPHAGOGASTRODUODENOSCOPY performed by Yesi Dee DO at [place] [address] Marital status:      Spouse name: Not on file    Number of children: Not on file    Years of education: Not on file    Highest education level: Not on file   Occupational History    Not on file   Tobacco Use    Smoking status: Former Smoker     Packs/day: 1.50     Years: 8.00     Pack years: 12.00     Types: Cigarettes     Quit date: 3/4/1980     Years since quittin.7    Smokeless tobacco: Never Used   Vaping Use    Vaping Use: Never used   Substance and Sexual Activity    Alcohol use: No    Drug use: No    Sexual activity: Not on file   Other Topics Concern    Not on file   Social History Narrative    Not on file     Social Determinants of Health     Financial Resource Strain: Low Risk     Difficulty of Paying Living Expenses: Not hard at all   Food Insecurity: No Food Insecurity    Worried About [address] in the Last Year: Never true    [address] in the Last Year: Never true   Transportation Needs: No Transportation Needs    Lack of Transportation (Medical): No    Lack of Transportation (Non-Medical):  No   Physical Activity:     Days of Exercise per Week: Not on file    Minutes of Exercise per Session: Not on file   Stress:     Feeling of Stress : Not on file   Social Connections:     Frequency of Communication with Friends and Family: Not on file    Frequency of Social Gatherings with Friends and Family: Not on file    Attends Gnosticist Services: Not on file    Active Member of [address] [place] [address] or Organizations: Not on file    Attends Club or Organization Meetings: Not on file    Marital Status: Not on file   Intimate Partner Violence:     Fear of Current or Ex-Partner: Not on file    Emotionally Abused: Not on file    Physically Abused: Not on file    Sexually Abused: Not on file   Housing Stability:     Unable to Pay for Housing in the Last Year: Not on file    Number of [place] in the Last Year: Not on file    Unstable Housing in the Last Year: Not on file       Family History:        Family History   Problem Relation Age of Onset    Cancer Mother         breast    Cancer Father         lung       Outpatient Medications:     Medications Prior to Admission: cetirizine (ZYRTEC) 10 MG tablet, Take 1 tablet by mouth daily  eplerenone (INSPRA) 25 MG tablet, Take 1 tablet by mouth daily  loratadine (CLARITIN) 10 MG tablet, Take 1 tablet by mouth daily (Patient taking differently: Take 10 mg by mouth daily as needed )  lisinopril (PRINIVIL;ZESTRIL) 20 MG tablet, Take 1 tablet by mouth daily (Patient not taking: Reported on 11/21/2021)  amLODIPine (NORVASC) 10 MG tablet, Take 1 tablet by mouth nightly  hydrALAZINE (APRESOLINE) 100 MG tablet, Take 1 tablet by mouth 3 times daily Patient is taking 100 MG 3 times daily  glipiZIDE (GLUCOTROL XL) 5 MG extended release tablet, Take 2 tablets by mouth 2 times daily  insulin glargine (LANTUS SOLOSTAR) 100 UNIT/ML injection pen, Inject 28 Units into the skin otherwise negative except as mentioned in the [address] Input/Output:       I/O last 3 completed shifts: In: 950 [P.O.:950]  Out: 2400 [Urine:2400]    Vital Signs:   Temperature:  Temp: 99.5 °F (37.5 °C)  TMax:   Temp (24hrs), Av.6 °F (37 °C), Min:97.9 °F (36.6 °C), Max:99.5 °F (37.5 °C)    Respirations:  Resp: 21  Pulse:   Pulse: 65  BP:    BP: (!) 141/55  BP Range: Systolic (52EZV), AYQ:000 , Min:114 , HZK:203       Diastolic (73OEX), DVP:93, Min:42, Max:61      Physical Examination:     General:  AAO x 3  HEENT: Atraumatic, normocephalic, no throat congestion, moist mucosa. Eyes:   Pupils equal, round and pale conjunctiva, EOMI. Neck:   No JVD, midline trachea  Chest:   Good bilateral air entry with diminished breath sounds at the bases  Cardiac:  S1 S2 RR, no murmurs, gallops or rubs, JVP not raised. Abdomen: Soft, non-tender, no masses , BS audible. Neuro:  AAO x 3, No FND. SKIN:  No rashes, good skin turgor.   Extremities:  1+ peripheral edema    Labs:       Recent Labs     21  0905 21  1206 21  0533   WBC 8.4 9.5 7.2   RBC 2.82* 2.95* 2.74*   HGB 8.0* 8.3* 7.9*   HCT 25.5* 27.2* 25.0*   MCV 90.4 92.2 91.2   MCH 28.4 28.1 28.8   MCHC 31.4 30.5 31.6   RDW 17.4* 17.4* 17.2*   * 155 151   MPV 9.9 10.1 10.0      BMP:   Recent Labs     21  0545 21  0905 21  0533    133* 136   K 4.9 5.0 4.1    101 103   CO2 18* 19* 21   BUN 91* 84* 85*   CREATININE 3.72* 3.69* 4.45*   GLUCOSE 156* 250* 98   CALCIUM 7.5* 7.2* 7.7*      Phosphorus:     Recent Labs     21  0905   PHOS 3.5     Magnesium:    Recent Labs     21  0921  0533   MG 1.8 1.9     Albumin:    Recent Labs     21  0905 21  0533   LABALBU 2.7* 2.6*     VALERIE:      Lab Results   Component Value Date    VALERIE NEGATIVE 2021     SPEP:  Lab Results   Component Value Date    PROT 5.4 2021    ALBCAL 3.9 2018    ALBPCT 62 2018    LABALPH 0.2 2018 LABALPH 0.7 09/11/2018    A1PCT 3 09/11/2018    A2PCT 11 09/11/2018    LABBETA 0.7 09/11/2018    BETAPCT 12 09/11/2018    GAMGLOB 0.8 09/11/2018    GGPCT 13 09/11/2018    PATH ELECTRONICALLY SIGNED. Keith Vasquez M.D. 09/11/2018    PATH ELECTRONICALLY SIGNED. Keith Vasquez M.D. 09/11/2018     UPEP:   No results found for: LABPE  C3:     Lab Results   Component Value Date    C3 108 11/21/2021     C4:     Lab Results   Component Value Date    C4 15 11/21/2021     MPO ANCA:     Lab Results   Component Value Date    MPO 15 11/11/2021     PR3 ANCA:     Lab Results   Component Value Date    PR3 1.1 11/11/2021       Urinalysis/Chemistries:      Lab Results   Component Value Date    NITRU NEGATIVE 11/21/2021    COLORU Yellow 11/21/2021    PHUR 5.0 11/21/2021    WBCUA 0 TO 2 11/21/2021    RBCUA 50  11/21/2021    MUCUS NOT REPORTED 11/21/2021    TRICHOMONAS NOT REPORTED 11/21/2021    YEAST NOT REPORTED 11/21/2021    BACTERIA NOT REPORTED 11/21/2021    SPECGRAV 1.013 11/21/2021    LEUKOCYTESUR NEGATIVE 11/21/2021    UROBILINOGEN Normal 11/21/2021    BILIRUBINUR NEGATIVE 11/21/2021    GLUCOSEU 1+ 11/21/2021    KETUA NEGATIVE 11/21/2021    AMORPHOUS NOT REPORTED 11/21/2021     Urine Sodium:     Lab Results   Component Value Date    NIDHI 62 11/21/2021     Urine Potassium:    Lab Results   Component Value Date    KUR 25.3 11/12/2021     Urine Chloride:    Lab Results   Component Value Date    CLUR 54 11/12/2021     Urine Creatinine:     Lab Results   Component Value Date    LABCREA 62.1 11/21/2021     UPC:   0.59      Radiology:     CXR: Reviewed    Assessment:     1. Acute Kidney Injury on top of CKD Stage 4 with baseline creatinine in the 1.9-2.1 range. The acute kidney injury is likely secondary from cholesterol embolization  vs ATN vs AIN. It is noteworthy the patient had cardiac cath 6 weeks ago and intravascular volume depletion due to diarrhea, CHF exacerbation and recent contrast exposure.   Also, the patient did have a rash and eosinophilia as well associated with the AK  2. CKD, follows up with Dr. Naidu Pod, UPC 0.59  3. CAD status post PCI in 2013  4. Type 2 diabetes mellitus with long-term insulin use  5. Anemia of chronic disease  6. Some bimalleolar load, improved with IV Bumex    Plan:   1. Continue Bumex 2 mg IV twice daily. Hold ACE/ARB/eplerenone. 2.  Give the patient Lokelma 10 mg if potassium more than 5   3. Native kidney biopsy tomorrow  4. Follow uremic symptoms and volume status  5. Fluid restriction of 1500 cc/day. 6.  We follow urine protein creatinine ratio  7. High risk of long-term dialysis with risk hopefully to be clarified by native kidney biopsy results  8. Tunneled hemodialysis catheter and initiation of dialysis    Nutrition   Please ensure that patient is on a renal diet/TF. Avoid nephrotoxic drugs/contrast exposure. Thank you for the consultation. Please do not hesitate to contact us for any further questions/concerns. We will continue to follow along with you.      Gayle Esteban MD  [facility]   11/22/2021,10:15 AM

## 2021-11-22 NOTE — PROGRESS NOTES
[facility]  Office: [address], DO, Laquita Briseno, DO, Dayne Hernandez, DO, Kaden Rice Blood, DO, Sil Bassett MD, Lamont Aldana MD, Teetee Henderson MD, Miryam Sharp MD, Vania Howell MD, Nereyda Jackson MD, Christy Burnett MD, Ishaan Mendez, DO, Tim Burnham, DO, Zena Stark MD,  Roger Branch, DO, Shayla Olivera MD, Kelly Joshi MD, Halle Bateman MD, Lieutenant Eh MD, Vaughn Chow MD, Anjali Manzo MD, Sophie Castorena MD, Rudi Pulido [facility], CNP, Beau Butcher, CNP, Rob Barry, CNS, Jesse Grimes, CNP, Ranjith Shearer, CNP, Hamlet Brink, CNP, Bre De Anda, CNP, Jamar Leslie, CNP, Kya Alonzo PA-C, Cate Rosenberg, [facility], Tyler Burton, CNP, Polo Mckeon, CNP, Rosita Kaba, CNP, Renee Urena, CNP, Beto Cervantes, CNP, Michael Mcmahon, CNP, Miri Morejon, [address] [place] [address]    Progress Note    11/22/2021    4:53 PM    Name:   Yaz Cisneros  MRN:     [id number]     Acct:      [de-identified]   Room:   [address] [place] [address] Day:  1  Admit Date:  11/21/2021  1:46 AM    PCP:   Pearl Hathaway MD  Code Status:  Full Code    Subjective:     C/C:   Chest pain    Interval History Status:   Improved  Has had some diarrhea  Less SOB  No chest pain    Data Base Updates:  [id number] High    [place]17pk/dL     BUN85 High mg/dL CREATININE4.45 High mg/dL   Bun/Cre RatioNOT REPORTED    Creatinine still trending up     Ref. Range 11/18/2021 05:28 11/19/2021 05:45 11/20/2021 05:45 11/21/2021 09:05 11/22/2021 05:33   Creatinine Latest Ref Range: 0.70 - 1.20 mg/dL 3.23 (H) 3.48 (H) 3.72 (H) 3.69 (H) 4.45 (H)        Calcium7. 7 Low        Brief History:     As documented in the medical record:  Hector Xavier is a 68 y.o. Non- / non  male who presents with No chief complaint on file. and is admitted to the hospital for the management of Acute kidney injury superimposed on CKD ([facility] [address] acute on chronic heart failure.      Patient was a transfer to [place] [address] from [facility] for acute on chronic renal failure and acute exacerbation of CHF. Patient was originally lzqtcqkc37/12/21 to outlying emergency room secondary to elevated potassium and chest pain. During his initial evaluation in the emergency room he was noted to have a BUN of 86 and a creatinine of 3.64 with a potassium of 6.6 sodium of 128. He was given 30 g of Kayexalate, IV insulin and sodium bicarb. With an improvement of the potassium with a downward trend of 5.4 sodium of 134 and creatinine 3.05 and BUN of 84 he was noted to have a hemoglobin of 7.5 with a repeat of 6.8 and transfused 1 unit packed red blood cells. Patient was seen and evaluated by the nephrology group as well as cardiology. Patient was started on a bicarb drip  Throughout the hospital stay there was somewhat of an improvement in the renal function and heart failure however over time kidney function continued to decline and nephrology recommended transfer to [place] [address] so he can be followed by his personal nephrologist.  Due to the bed availability there was a great delay from the time the request was made to the time that patient arrived to the facility, with that being said BUN 91 creatinine 3.72. Hemogram unremarkable with no acute leukocytosis with a WBC of 9 hemoglobin 8.0 hematocrit 25.8. Patient is not on any chemical anticoagulation given the drop in hemoglobin earlier in the stay as well as the purpuric rash response he received from Lovenox. \"    The intitial assessment/plan included:  \"Consultation nephrology for further evaluation and management of the VIDAL and assistance with diuresis  Diuretics for heart failure per nephrology  Continue with aggressive medical management of risk factors for coronary artery disease management.   Utilize insulin sliding scale and receive home Lantus dose and resume when medications are confirmed  Continue to monitor H&H, patient had a possible  reaction to iron infusion per documentation at outlying facility  EPC cuffs for DVT prophylaxis as we cannot use chemical anticoagulation at this time given the concern for allergy which should be worked up at the discretion of team members, maximize ambulation  GI prophylaxis  Resume home medications once med list is updated in the computer\"    Per prior notes:    \"Patient got recent iron transfusion none developed rash with itching which is worse on the lower extremity. 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 \"      Database has included:  Results for Florencio Vasquez (MRN [id number]) as of 11/22/2021 16:43   Ref. Range 6/25/2020 11:25 9/20/2021 11:50 11/11/2021 17:18   Pro-BNP Latest Ref Range: <300 pg/mL 588 (H) 1,565 (H) 3,702 (H)     Echo 11/12:  Results for Florencio Vasquez (MRN [id number]) as of 11/22/2021 16:43   Ref.  Range 6/25/2020 11:25 9/20/2021 11:50 11/11/2021 17:18   Pro-BNP Latest Ref Range: <300 pg/mL 588 (H) 1,565 (H) 3,702 (H)       Past Medical History:   has a past medical history of Acute MI ([facility] [address] Acute renal failure with tubular necrosis ([facility] [address] Anemia, CAD (coronary artery disease), Cerebral artery occlusion with cerebral infarction [facility]), CHF (congestive heart failure) ([facility] [address] Chronic back pain, Closed fracture of lumbar vertebra without mention of spinal cord injury, Displacement of intervertebral disc, site unspecified, without myelopathy, H/O cardiac catheterization, H/O cardiovascular stress test, H/O tilt table evaluation, H/O tilt table evaluation, History of 24 hour EKG monitoring, History of 24 hour EKG monitoring, History of blood transfusion, History of cardiovascular stress test, History of cardiovascular stress test, History of coronary artery stent placement, History of CVA (cerebrovascular accident) without residual deficits, History of echocardiogram, History of Holter monitoring, History of tilt table evaluation, Hyperlipidemia, Hypertension, Non critical Right Renal artery stenosis, native [facility]), Pacemaker, S/P cardiac cath, S/P coronary artery stent placement, SIRS (systemic inflammatory response syndrome) ([address] and Type II or unspecified type diabetes mellitus without mention of complication, not stated as uncontrolled. Social History:   reports that he quit smoking about 41 years ago. His smoking use included cigarettes. He has a 12.00 pack-year smoking history. He has never used smokeless tobacco. He reports that he does not drink alcohol and does not use drugs. Family History:   Family History   Problem Relation Age of Onset    Cancer Mother         breast    Cancer Father         lung       Review of Systems:     Review of Systems   Constitutional: Positive for activity change (decreased) and fatigue (decreased exercise capacity ). Respiratory: Positive for shortness of breath (GUALLPA). Negative for cough. Cardiovascular: Positive for leg swelling. Negative for chest pain and palpitations. Gastrointestinal: Positive for diarrhea (reporting loose stools ). Negative for abdominal pain, nausea and vomiting. Genitourinary: Negative for flank pain and hematuria. Skin: Positive for rash. Neurological: Positive for weakness (Generalized). Physical Examination:        Vitals  BP (!) 154/60   Pulse 70   Temp 98.4 °F (36.9 °C) (Oral)   Resp 13   Ht 5' 9\" (1.753 m)   Wt 207 lb (93.9 kg)   SpO2 98%   BMI 30.57 kg/m²   Temp (24hrs), Av.7 °F (37.1 °C), Min:98.4 °F (36.9 °C), Max:99.5 °F (37.5 °C)    Recent Labs     21  0533 21  0711 21  0833 21  1119   POCGLU 90 69* 162* 185*       Physical Exam  Vitals reviewed. Constitutional:       General: He is not in acute distress. Appearance: He is not diaphoretic. HENT:      Head: Normocephalic. Nose: Nose normal.   Eyes:      General: No scleral icterus.      Conjunctiva/sclera: Conjunctivae normal. Neck:      Trachea: No tracheal deviation. Cardiovascular:      Rate and Rhythm: Normal rate and regular rhythm. Pulmonary:      Effort: Pulmonary effort is normal. No respiratory distress. Breath sounds: Rales present. No wheezing. Chest:      Chest wall: No tenderness. Abdominal:      General: There is no distension. Palpations: Abdomen is soft. Tenderness: There is no abdominal tenderness. Musculoskeletal:         General: No tenderness. Cervical back: Neck supple. Right lower leg: Edema present. Left lower leg: Edema present. Skin:     General: Skin is warm and dry. Findings: Rash (urticaria? clearing. ..) present. Neurological:      Mental Status: He is alert and oriented to person, place, and time. Medications: Allergies:     Allergies   Allergen Reactions    Coreg [Carvedilol] Shortness Of Breath and Nausea And Vomiting    Lipitor [Atorvastatin] Other (See Comments)     Muscle aches and joint pain    Aldactone [Spironolactone] Hives    Crestor [Rosuvastatin Calcium] Other (See Comments)     Muscle aches and joint pain    Lopid [Gemfibrozil]      hyperglycemia    Invokana [Canagliflozin] Rash    Januvia [Sitagliptin] Nausea And Vomiting       Current Meds:   Scheduled Meds:    sodium chloride flush  5-40 mL IntraVENous 2 times per day    insulin glargine  28 Units SubCUTAneous BID    prednisoLONE acetate  1 drop Left Eye Daily    insulin lispro  0-6 Units SubCUTAneous TID WC    insulin lispro  0-3 Units SubCUTAneous Nightly    [Held by provider] metOLazone  5 mg Oral Daily    acyclovir  400 mg Oral BID    hydrALAZINE  50 mg Oral 3 times per day    sodium bicarbonate  650 mg Oral BID    bumetanide  2 mg IntraVENous BID    metoprolol tartrate  12.5 mg Oral BID     Continuous Infusions:    sodium chloride 25 mL (11/22/21 1057)    dextrose      dextrose       PRN Meds: sodium chloride flush, sodium chloride, ondansetron **OR** ondansetron, acetaminophen **OR** acetaminophen, glucose, dextrose, glucagon (rDNA), dextrose, glucose, dextrose, glucagon (rDNA), dextrose    Data:     I/O (24Hr): Intake/Output Summary (Last 24 hours) at 11/22/2021 1653  Last data filed at 11/22/2021 1236  Gross per 24 hour   Intake 960 ml   Output 2700 ml   Net -1740 ml       Labs:  Hematology:  Recent Labs     11/21/21  0905 11/21/21  1206 11/22/21  0533   WBC 8.4 9.5 7.2   RBC 2.82* 2.95* 2.74*   HGB 8.0* 8.3* 7.9*   HCT 25.5* 27.2* 25.0*   MCV 90.4 92.2 91.2   MCH 28.4 28.1 28.8   MCHC 31.4 30.5 31.6   RDW 17.4* 17.4* 17.2*   * 155 151   MPV 9.9 10.1 10.0   INR  --   --  0.9     Chemistry:  Recent Labs     11/20/21  0545 11/21/21  0905 11/22/21  0533    133* 136   K 4.9 5.0 4.1    101 103   CO2 18* 19* 21   GLUCOSE 156* 250* 98   BUN 91* 84* 85*   CREATININE 3.72* 3.69* 4.45*   MG  --  1.8 1.9   ANIONGAP 12 13 12   LABGLOM 16* 16* 13*   GFRAA 19* 20* 16*   CALCIUM 7.5* 7.2* 7.7*   PHOS  --  3.5  --      Recent Labs     11/21/21  0905 11/21/21  1122 11/21/21  1626 11/21/21  2043 11/22/21  0533 11/22/21  0711 11/22/21  0833 11/22/21  1119   PROT  --   --   --   --  5.4*  --   --   --    LABALBU 2.7*  --   --   --  2.6*  --   --   --    AST  --   --   --   --  14  --   --   --    ALT  --   --   --   --  38  --   --   --    ALKPHOS  --   --   --   --  67  --   --   --    BILITOT  --   --   --   --  0.28*  --   --   --    POCGLU  --    < > 270* 209* 90 69* 162* 185*    < > = values in this interval not displayed.      ABG:No results found for: POCPH, PHART, PH, POCPCO2, LQD6LSP, PCO2, POCPO2, PO2ART, PO2, POCHCO3, EWL5THR, HCO3, NBEA, PBEA, BEART, BE, THGBART, THB, FES8WPB, UIOV6OMI, A8XBMHRL, O2SAT, FIO2  Lab Results   Component Value Date/Time    SPECIAL NOT REPORTED 05/05/2021 11:30 AM     Lab Results   Component Value Date/Time    CULTURE NO SIGNIFICANT GROWTH 05/05/2021 11:30 AM       Radiology:  US RENAL LIMITED    Result Date: 11/16/2021  No hydronephrosis or ultrasound evidence medical renal disease. Renal cortical thinning, greater on the right.      US ABDOMEN LIMITED    Result Date: 11/19/2021  No significant ascites       Assessment:        Primary Problem  Acute kidney injury superimposed on CKD [facility])    Active Hospital Problems    Diagnosis Date Noted    Coronary atherosclerosis due to calcified coronary lesion [I25.10, I25.84] 02/20/2015     Priority: Medium     Class: Chronic    Uncontrolled type 2 diabetes mellitus with hyperglycemia ([facility] [address] [E11.65] 02/20/2015     Priority: Medium     Class: Chronic    Pulmonary hypertension ([address] [I27.20] 11/22/2021    Acute on chronic diastolic (congestive) heart failure (HCC) [I50.33] 11/22/2021    Cardiorenal syndrome with renal failure [I13.10] 11/17/2021    Rash [R21] 11/10/2021    Anemia in stage 4 chronic kidney disease ([facility] [address] [N18.4, D63.1] 05/21/2019    Acute kidney injury superimposed on CKD ([address] [N17.9, N18.9] 10/02/2018    Chronic kidney disease, stage III (moderate) ([address] [N18.30] 02/22/2016         Plan:        Optimize cardio-pulmonary function  Cardiology evaluation  Renal evaluation  Check bun and creatinine    Monitor rash, r/o drug Rx  PT/OT  DVT prophylaxis    IP CONSULT TO NEPHROLOGY  IP CONSULT TO CARDIOLOGY    Shaunna Epley, DO  11/22/2021  4:53 PM

## 2021-11-23 LAB
ALBUMIN SERPL-MCNC: 2.8 G/DL (ref 3.5–5.2)
ANION GAP SERPL CALCULATED.3IONS-SCNC: 17 MMOL/L (ref 9–17)
BUN BLDV-MCNC: 82 MG/DL (ref 8–23)
BUN/CREAT BLD: ABNORMAL (ref 9–20)
CALCIUM IONIZED: 1 MMOL/L (ref 1.13–1.33)
CALCIUM SERPL-MCNC: 7.7 MG/DL (ref 8.6–10.4)
CHLORIDE BLD-SCNC: 105 MMOL/L (ref 98–107)
CO2: 20 MMOL/L (ref 20–31)
CREAT SERPL-MCNC: 4.45 MG/DL (ref 0.7–1.2)
GFR AFRICAN AMERICAN: 16 ML/MIN
GFR NON-AFRICAN AMERICAN: 13 ML/MIN
GFR SERPL CREATININE-BSD FRML MDRD: ABNORMAL ML/MIN/{1.73_M2}
GFR SERPL CREATININE-BSD FRML MDRD: ABNORMAL ML/MIN/{1.73_M2}
GLUCOSE BLD-MCNC: 117 MG/DL (ref 75–110)
GLUCOSE BLD-MCNC: 118 MG/DL (ref 70–99)
GLUCOSE BLD-MCNC: 143 MG/DL (ref 75–110)
GLUCOSE BLD-MCNC: 206 MG/DL (ref 75–110)
GLUCOSE BLD-MCNC: 214 MG/DL (ref 75–110)
GLUCOSE BLD-MCNC: 273 MG/DL (ref 75–110)
GLUCOSE BLD-MCNC: 61 MG/DL (ref 75–110)
GLUCOSE BLD-MCNC: 89 MG/DL (ref 75–110)
GLUCOSE BLD-MCNC: 93 MG/DL (ref 75–110)
HBV SURFACE AB TITR SER: 4.87 MIU/ML
HCT VFR BLD CALC: 27.3 % (ref 40.7–50.3)
HEMOGLOBIN: 8.6 G/DL (ref 13–17)
HEPATITIS B CORE TOTAL ANTIBODY: NONREACTIVE
HEPATITIS B SURFACE ANTIGEN: NONREACTIVE
HEPATITIS C ANTIBODY: NONREACTIVE
MAGNESIUM: 1.8 MG/DL (ref 1.6–2.6)
MCH RBC QN AUTO: 28.6 PG (ref 25.2–33.5)
MCHC RBC AUTO-ENTMCNC: 31.5 G/DL (ref 28.4–34.8)
MCV RBC AUTO: 90.7 FL (ref 82.6–102.9)
NRBC AUTOMATED: 0 PER 100 WBC
PDW BLD-RTO: 16.8 % (ref 11.8–14.4)
PHOSPHORUS: 4.8 MG/DL (ref 2.5–4.5)
PLATELET # BLD: 168 K/UL (ref 138–453)
PMV BLD AUTO: 10 FL (ref 8.1–13.5)
POTASSIUM SERPL-SCNC: 4.1 MMOL/L (ref 3.7–5.3)
RBC # BLD: 3.01 M/UL (ref 4.21–5.77)
SODIUM BLD-SCNC: 142 MMOL/L (ref 135–144)
WBC # BLD: 8.3 K/UL (ref 3.5–11.3)

## 2021-11-23 PROCEDURE — 6370000000 HC RX 637 (ALT 250 FOR IP): Performed by: STUDENT IN AN ORGANIZED HEALTH CARE EDUCATION/TRAINING PROGRAM

## 2021-11-23 PROCEDURE — 6370000000 HC RX 637 (ALT 250 FOR IP): Performed by: INTERNAL MEDICINE

## 2021-11-23 PROCEDURE — 2500000003 HC RX 250 WO HCPCS: Performed by: INTERNAL MEDICINE

## 2021-11-23 PROCEDURE — 87340 HEPATITIS B SURFACE AG IA: CPT

## 2021-11-23 PROCEDURE — 82947 ASSAY GLUCOSE BLOOD QUANT: CPT

## 2021-11-23 PROCEDURE — 85027 COMPLETE CBC AUTOMATED: CPT

## 2021-11-23 PROCEDURE — 99232 SBSQ HOSP IP/OBS MODERATE 35: CPT | Performed by: INTERNAL MEDICINE

## 2021-11-23 PROCEDURE — 83735 ASSAY OF MAGNESIUM: CPT

## 2021-11-23 PROCEDURE — 90935 HEMODIALYSIS ONE EVALUATION: CPT

## 2021-11-23 PROCEDURE — 80069 RENAL FUNCTION PANEL: CPT

## 2021-11-23 PROCEDURE — 2060000000 HC ICU INTERMEDIATE R&B

## 2021-11-23 PROCEDURE — 2580000003 HC RX 258: Performed by: NURSE PRACTITIONER

## 2021-11-23 PROCEDURE — 36415 COLL VENOUS BLD VENIPUNCTURE: CPT

## 2021-11-23 PROCEDURE — 90935 HEMODIALYSIS ONE EVALUATION: CPT | Performed by: INTERNAL MEDICINE

## 2021-11-23 PROCEDURE — 82330 ASSAY OF CALCIUM: CPT

## 2021-11-23 PROCEDURE — 5A1D70Z PERFORMANCE OF URINARY FILTRATION, INTERMITTENT, LESS THAN 6 HOURS PER DAY: ICD-10-PCS | Performed by: INTERNAL MEDICINE

## 2021-11-23 PROCEDURE — 86803 HEPATITIS C AB TEST: CPT

## 2021-11-23 PROCEDURE — 86317 IMMUNOASSAY INFECTIOUS AGENT: CPT

## 2021-11-23 PROCEDURE — 6360000002 HC RX W HCPCS: Performed by: NURSE PRACTITIONER

## 2021-11-23 PROCEDURE — 86704 HEP B CORE ANTIBODY TOTAL: CPT

## 2021-11-23 RX ORDER — HYDRALAZINE HYDROCHLORIDE 20 MG/ML
10 INJECTION INTRAMUSCULAR; INTRAVENOUS EVERY 4 HOURS PRN
Status: DISCONTINUED | OUTPATIENT
Start: 2021-11-23 | End: 2021-11-26 | Stop reason: HOSPADM

## 2021-11-23 RX ORDER — LOPERAMIDE HYDROCHLORIDE 2 MG/1
2 CAPSULE ORAL ONCE
Status: COMPLETED | OUTPATIENT
Start: 2021-11-23 | End: 2021-11-23

## 2021-11-23 RX ADMIN — LOPERAMIDE HYDROCHLORIDE 2 MG: 2 CAPSULE ORAL at 15:10

## 2021-11-23 RX ADMIN — ACYCLOVIR 400 MG: 200 CAPSULE ORAL at 21:04

## 2021-11-23 RX ADMIN — SODIUM CHLORIDE, PRESERVATIVE FREE 10 ML: 5 INJECTION INTRAVENOUS at 09:42

## 2021-11-23 RX ADMIN — BUMETANIDE 2 MG: 0.25 INJECTION INTRAMUSCULAR; INTRAVENOUS at 21:05

## 2021-11-23 RX ADMIN — INSULIN GLARGINE 28 UNITS: 100 INJECTION, SOLUTION SUBCUTANEOUS at 21:04

## 2021-11-23 RX ADMIN — ONDANSETRON 4 MG: 2 INJECTION INTRAMUSCULAR; INTRAVENOUS at 07:57

## 2021-11-23 RX ADMIN — SODIUM BICARBONATE 650 MG: 648 TABLET ORAL at 21:04

## 2021-11-23 RX ADMIN — INSULIN LISPRO 2 UNITS: 100 INJECTION, SOLUTION INTRAVENOUS; SUBCUTANEOUS at 21:04

## 2021-11-23 RX ADMIN — ACYCLOVIR 400 MG: 200 CAPSULE ORAL at 07:47

## 2021-11-23 RX ADMIN — METOPROLOL TARTRATE 12.5 MG: 25 TABLET ORAL at 21:04

## 2021-11-23 RX ADMIN — INSULIN LISPRO 2 UNITS: 100 INJECTION, SOLUTION INTRAVENOUS; SUBCUTANEOUS at 16:34

## 2021-11-23 RX ADMIN — DEXTROSE MONOHYDRATE 12.5 G: 25 INJECTION, SOLUTION INTRAVENOUS at 04:24

## 2021-11-23 RX ADMIN — SODIUM CHLORIDE, PRESERVATIVE FREE 10 ML: 5 INJECTION INTRAVENOUS at 21:05

## 2021-11-23 RX ADMIN — BUMETANIDE 2 MG: 0.25 INJECTION INTRAMUSCULAR; INTRAVENOUS at 07:47

## 2021-11-23 RX ADMIN — PREDNISOLONE ACETATE 1 DROP: 10 SUSPENSION/ DROPS OPHTHALMIC at 07:47

## 2021-11-23 RX ADMIN — INSULIN GLARGINE 28 UNITS: 100 INJECTION, SOLUTION SUBCUTANEOUS at 15:10

## 2021-11-23 RX ADMIN — HYDRALAZINE HYDROCHLORIDE 50 MG: 50 TABLET, FILM COATED ORAL at 15:10

## 2021-11-23 RX ADMIN — HYDRALAZINE HYDROCHLORIDE 50 MG: 50 TABLET, FILM COATED ORAL at 07:22

## 2021-11-23 RX ADMIN — HYDRALAZINE HYDROCHLORIDE 50 MG: 50 TABLET, FILM COATED ORAL at 21:04

## 2021-11-23 RX ADMIN — METOPROLOL TARTRATE 12.5 MG: 25 TABLET ORAL at 07:47

## 2021-11-23 RX ADMIN — SODIUM BICARBONATE 650 MG: 648 TABLET ORAL at 07:47

## 2021-11-23 ASSESSMENT — PAIN SCALES - GENERAL
PAINLEVEL_OUTOF10: 0
PAINLEVEL_OUTOF10: 0

## 2021-11-23 NOTE — PROGRESS NOTES
Dialysis Post Treatment Note  Vitals:    11/23/21 1330   BP: (!) 142/73   Pulse: 78   Resp: 20   Temp: 97.9 °F (36.6 °C)   SpO2:      Pre-Weight = 84.8  Post-weight = Weight: 185 lb 13.6 oz (84.3 kg)  Total Liters Processed = Total Liters Processed (l/min): 29.8 l/min  Rinseback Volume (mL) = Rinseback Volume (ml): 300 ml  Net Removal (mL) = NET Removed (ml): 500 ml  Type of access used=catheter  Length of treatment= 120 minutes    Patient capped and heparinized. Patient did well on treatment no issues.   Patient was transported back to room via wheel chair

## 2021-11-23 NOTE — PROGRESS NOTES
Dialysis Time Out  To be done by RN and tech or 2 RNs  Staff Names yulisa and Lucrecia stack    [x]  Identity of the patient using 2 patient identifiers  [x]  Consent for treatment  [x]  Equipment-proper machine and dialyzer  [x]  B-Hep B status  [x]  Orders- to include bath, blood flow, dialyzer, time and fluid removal  [x]  Access-Correct site and in working order  [x]  Time for patient to ask questions.

## 2021-11-23 NOTE — PROGRESS NOTES
SUBJECTIVE      Patient was seen and examined. He was comfortable. He was receiving dialysis through IJ catheter. This is his first dialysis. We will dialyze him for 2 hours and remove 800 mL of fluid. His creatinine remained essentially unchanged from yesterday. Urine output was close to 3 L in last 24 hours. All serologies negative so far including renal ultrasound      His a skin biopsy results are as follows  SUPERFICIAL PERIVASCULAR AND INTERSTITIAL DERMATITIS WITH   EOSINOPHILS AND EXTRAVASATED RED BLOOD CELLS (SEE COMMENT). Ultrasound of kidneys were unremarkable  Patient was supposed to go for kidney biopsy today however biopsy was postponed due to high systolic blood pressure  His most recent blood pressure is 160/80  -- Diagnosis Comment --   THE FINDINGS COULD BE CONSISTENT WITH EARLY URTICARIAL VASCULITIS (IF   INDIVIDUAL LESIONS LAST MORE THAN 24 HOURS), OR A SECONDARILY   TRAUMATIZED URTICARIAL ERUPTION (IF INDIVIDUAL LESIONS LAST LESS THAN   24 HOURS).      OBJECTIVE      CURRENT TEMPERATURE:  Temp: 97.9 °F (36.6 °C)  MAXIMUM TEMPERATURE OVER 24HRS:  Temp (24hrs), Av.3 °F (36.8 °C), Min:97.7 °F (36.5 °C), Max:99.2 °F (37.3 °C)    CURRENT RESPIRATORY RATE:  Resp: 18  CURRENT PULSE:  Pulse: 65  CURRENT BLOOD PRESSURE:  BP: (!) 161/68  24HR BLOOD PRESSURE RANGE:  Systolic (82GED), ALEKSANDRA:079 , Min:133 , IJE:525   ; Diastolic (61JSC), VVV:45, Min:48, Max:73    24HR INTAKE/OUTPUT:      Intake/Output Summary (Last 24 hours) at 2021 1158  Last data filed at 2021 0309  Gross per 24 hour   Intake 400 ml   Output 2350 ml   Net -1950 ml     WEIGHT :  Patient Vitals for the past 96 hrs (Last 3 readings):   Weight   21 1130 186 lb 15.2 oz (84.8 kg)   21 0600 194 lb 10.7 oz (88.3 kg)   21 0200 207 lb (93.9 kg)     PHYSICAL EXAM      GENERAL APPEARANCE: Awake and alert x3  SKIN: Warm to touch  EYES: Pupils were reactive to light  NECK:   No JVD or carotid 84* 85* 82*   CREATININE 3.69* 4.45* 4.45*   GLUCOSE 250* 98 118*   CALCIUM 7.2* 7.7* 7.7*    PHOSPHORUS:    Recent Labs     11/21/21  0905 11/23/21  0613   PHOS 3.5 4.8*     MAGNESIUM:   Recent Labs     11/21/21  0905 11/22/21  0533 11/23/21  0613   MG 1.8 1.9 1.8     ALBUMIN:   Recent Labs     11/21/21  0905 11/22/21  0533 11/23/21  0613   LABALBU 2.7* 2.6* 2.8*     IRON:    Lab Results   Component Value Date    IRON 48 09/09/2020     IRON SATURATION:    Lab Results   Component Value Date    LABIRON 20 09/09/2020     TIBC:    Lab Results   Component Value Date    TIBC 235 09/09/2020     FERRITIN:    Lab Results   Component Value Date    FERRITIN 199 05/10/2021     VALERIE:   Lab Results   Component Value Date    VALERIE NEGATIVE 11/11/2021       SPEP:   Lab Results   Component Value Date    PROT 5.4 11/22/2021    ALBCAL 3.9 09/11/2018    ALBPCT 62 09/11/2018    LABALPH 0.2 09/11/2018    LABALPH 0.7 09/11/2018    A1PCT 3 09/11/2018    A2PCT 11 09/11/2018    LABBETA 0.7 09/11/2018    BETAPCT 12 09/11/2018    GAMGLOB 0.8 09/11/2018    GGPCT 13 09/11/2018    PATH ELECTRONICALLY SIGNED. James Mcduffie M.D. 09/11/2018    PATH ELECTRONICALLY SIGNED. James Mcduffie M.D. 09/11/2018   C3:   Lab Results   Component Value Date    C3 108 11/21/2021     C4:   Lab Results   Component Value Date    C4 15 11/21/2021     MPO ANCA:   Lab Results   Component Value Date    MPO 15 11/11/2021    .   PR3 ANCA:    Lab Results   Component Value Date    PR3 1.1 11/11/2021     URINE SODIUM:    Lab Results   Component Value Date    NIDHI 62 11/21/2021      URINE POTASSIUM:    Lab Results   Component Value Date    KUR 25.3 11/12/2021   URINE OSMOLARITY:    Lab Results   Component Value Date    OSMOU 421 11/21/2021     URINE CREATININE:    Lab Results   Component Value Date    LABCREA 62.1 11/21/2021   URINALYSIS:  U/A:   Lab Results   Component Value Date    NITRU NEGATIVE 11/21/2021    COLORU Yellow 11/21/2021    PHUR 5.0 11/21/2021    WBCUA 0 TO 2 11/21/2021    RBCUA 50  11/21/2021    MUCUS NOT REPORTED 11/21/2021    TRICHOMONAS NOT REPORTED 11/21/2021    YEAST NOT REPORTED 11/21/2021    BACTERIA NOT REPORTED 11/21/2021    SPECGRAV 1.013 11/21/2021    LEUKOCYTESUR NEGATIVE 11/21/2021    UROBILINOGEN Normal 11/21/2021    BILIRUBINUR NEGATIVE 11/21/2021    GLUCOSEU 1+ 11/21/2021    KETUA NEGATIVE 11/21/2021    AMORPHOUS NOT REPORTED 11/21/2021     ANTIGBM:No results found for: GBMABIGG      RADIOLOGY      ASSESSMENT    1. Acute kidney injury on top of chronic kidney disease stage IV baseline creatinine 1.9-2.1. Creatinine plateaued. He will be receiving 2 hours of dialysis today. And has good urine output  2. Chronic kidney disease under the care of Dr. Dewayne Jones  3. Coronary artery disease status post PCI  4. Longstanding type 2 diabetes  5. Renal biopsy was canceled due to elevated systolic blood pressure. PLAN    1. Continue IV Bumex  2. Places as ordered  3. Remove 800 of fluid today  4. We will follow with you    Please do not hesitate to call with questions.     Electronically signed by Aly Aponte MD on 11/23/2021 at 11:59 AM

## 2021-11-23 NOTE — PROGRESS NOTES
[facility]  Office: [address], DO, Hilario London, DO, Yasir Traylor, DO, Angela Demarco Blood, DO, Ellen Nicole MD, Sony Machado MD, Igor Alatorre MD, Mihai Lyons MD, Link Barrera MD, Trudy Vogel MD, Yo Hodge MD, Nessa Hanson, DO, Li Lowe, DO, Cecilio Salvador MD,  Zara Gottlieb, DO, Gisele Estrella MD, Mikayla Solo MD, Juliette Bateman MD, Kellie Timmons MD, Kael Hargrove MD, Nilesh Fragoso MD, Sergio Montoya MD, Silva Bates, [facility], [facility], CNP, Jos Wilburn, CNP, Chuy Bhatt, CNS, Michael Lawler, CNP, Jean Carlos Wright, CNP, Mainor Kumar, CNP, Nery Anderson, CNP, Gina Sparks, CNP, Abril Perla PA-C, Aditya Schwartz, [facility], Susan Mckeon, CNP, Gordon Shepard, CNP, Naima Louise, CNP, Sunita Rodríguez, CNP, Yue Tellez, CNP, Scott Luna, CNP, Homero Cantrell, [facility]    Progress Note    11/23/2021    4:42 PM    Name:   Joyce Burnham  MRN:     [id number]     [place]:      [de-identified]   Room:   [address] [place] [address] Day:  2  Admit Date:  11/21/2021  1:46 AM    PCP:   Eriberto Vogel MD  Code Status:  Full Code    Subjective:     C/C: Chest pain  Interval History Status:  Patient was supposed to get kidney biopsy today morning by IR, canceled due to high blood pressure  Had tunneled catheter placed yesterday, will be started on dialysis today per nephrology  Had 1-2 loose stools, requesting I Imodium    Brief History:   As documented in the medical record:  \"Janelle Jimenez is a 68 y.o. Non- / non  male who presents with No chief complaint on file.   and is admitted to the hospital for the management of Acute kidney injury superimposed on CKD (HCC) acute on chronic heart failure.     Patient was a transfer to [place] [address] from [facility] for acute on chronic renal failure and acute exacerbation of CHF.   Patient was originally tssyajfg22/12/21 to [facility] emergency room secondary to elevated potassium and chest pain.  During his initial evaluation in the emergency room he was noted to have a BUN of 86 and a creatinine of 3.64 with a potassium of 6.6 sodium of 128.  He was given 30 g of Kayexalate, IV insulin and sodium bicarb.  With an improvement of the potassium with a downward trend of 5.4 sodium of 134 and creatinine 3.05 and BUN of 84 he was noted to have a hemoglobin of 7.5 with a repeat of 6.8 and transfused 1 unit packed red blood cells.  Patient was seen and evaluated by the nephrology group as well as cardiology.  Patient was started on a bicarb drip  Throughout the hospital stay there was somewhat of an improvement in the renal function and heart failure however over time kidney function continued to decline and nephrology recommended transfer to [place] [address] so he can be followed by his personal nephrologist.  Due to the bed availability there was a great delay from the time the request was made to the time that patient arrived to the facility, with that being said BUN 91 creatinine 3.72.  Hemogram unremarkable with no acute leukocytosis with a WBC of 9 hemoglobin 8.0 hematocrit 25.8.  Patient is not on any chemical anticoagulation given the drop in hemoglobin earlier in the stay as well as the purpuric rash response he received from Lovenox. \"     The intitial assessment/plan included:  \"Consultation nephrology for further evaluation and management of the VIDAL and assistance with diuresis  Diuretics for heart failure per nephrology  Continue with aggressive medical management of risk factors for coronary artery disease management.   Utilize insulin sliding scale and receive home Lantus dose and resume when medications are confirmed  Continue to monitor H&H, patient had a possible  reaction to iron infusion per documentation at [facility]  EPC cuffs for DVT prophylaxis as we cannot use chemical anticoagulation at this time given the concern for allergy which should be worked up at the discretion of team members, maximize ambulation  GI prophylaxis  Resume home medications once med list is updated in the computer\"     Per prior notes:    \"Patient got recent iron transfusion none developed rash with itching which is worse on the lower extremity.  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 \"      Database has included:  Results for [facility] (MRN [id number]) as of 11/22/2021 16:43    Ref. Range 6/25/2020 11:25 9/20/2021 11:50 11/11/2021 17:18   Pro-BNP Latest Ref Range: <300 pg/mL 588 (H) 1,565 (H) 3,702 (H)      Echo 11/12:  Results for [facility] (MRN [id number]) as of 11/22/2021 16:43    Ref. Range 6/25/2020 11:25 9/20/2021 11:50 11/11/2021 17:18   Pro-BNP Latest Ref Range: <300 pg/mL 588 (H) 1,565 (H) 3,702 (H)            Review of Systems:     Constitutional:  negative for chills, fevers, sweats,+ fatigue  Respiratory:  negative for cough, +dyspnea on exertion, denies wheezing  Cardiovascular:  negative for chest pain, chest pressure/discomfort, + lower extremity edema, palpitations  Gastrointestinal:  negative for abdominal pain, constipation, diarrhea, nausea, vomiting  Neurological:  negative for dizziness, headache    Medications: Allergies:     Allergies   Allergen Reactions    Coreg [Carvedilol] Shortness Of Breath and Nausea And Vomiting    Lipitor [Atorvastatin] Other (See Comments)     Muscle aches and joint pain    Aldactone [Spironolactone] Hives    Crestor [Rosuvastatin Calcium] Other (See Comments)     Muscle aches and joint pain    Lopid [Gemfibrozil]      hyperglycemia    Invokana [Canagliflozin] Rash    Januvia [Sitagliptin] Nausea And Vomiting       Current Meds:   Scheduled Meds:    sodium chloride flush  5-40 mL IntraVENous 2 times per day    insulin glargine  28 Units SubCUTAneous BID    prednisoLONE acetate  1 drop Left Eye Daily    insulin lispro  0-6 Units SubCUTAneous TID     insulin lispro  0-3 Units SubCUTAneous Nightly    [Held by provider] metOLazone  5 mg Oral Daily    acyclovir  400 mg Oral BID    hydrALAZINE  50 mg Oral 3 times per day    sodium bicarbonate  650 mg Oral BID    bumetanide  2 mg IntraVENous BID    metoprolol tartrate  12.5 mg Oral BID     Continuous Infusions:    sodium chloride 25 mL (11/22/21 1057)    dextrose      dextrose       PRN Meds: hydrALAZINE, sodium chloride flush, sodium chloride, ondansetron **OR** ondansetron, acetaminophen **OR** acetaminophen, glucose, dextrose, glucagon (rDNA), dextrose, glucose, dextrose, glucagon (rDNA), dextrose    Data:     Past Medical History:   has a past medical history of Acute MI ([facility] [address] Acute renal failure with tubular necrosis ([facility] [address] Anemia, CAD (coronary artery disease), Cerebral artery occlusion with cerebral infarction ([facility] [address] CHF (congestive heart failure) ([facility] [address] Chronic back pain, Closed fracture of lumbar vertebra without mention of spinal cord injury, Displacement of intervertebral disc, site unspecified, without myelopathy, H/O cardiac catheterization, H/O cardiovascular stress test, H/O tilt table evaluation, H/O tilt table evaluation, History of 24 hour EKG monitoring, History of 24 hour EKG monitoring, History of blood transfusion, History of cardiovascular stress test, History of cardiovascular stress test, History of coronary artery stent placement, History of CVA (cerebrovascular accident) without residual deficits, History of echocardiogram, History of Holter monitoring, History of tilt table evaluation, Hyperlipidemia, Hypertension, Non critical Right Renal artery stenosis, native ([facility] Pacemaker, S/P cardiac cath, S/P coronary artery stent placement, SIRS (systemic inflammatory response syndrome) ([facility] [address] and Type II or unspecified type diabetes mellitus without mention of complication, not stated as uncontrolled.     Social History:   reports that he quit smoking about 41 years ago. His smoking use included cigarettes. He has a 12.00 pack-year smoking history. He has never used smokeless tobacco. He reports that he does not drink alcohol and does not use drugs. Family History:   Family History   Problem Relation Age of Onset   Tyson Castro Cancer Mother         breast    Cancer Father         lung       Vitals:  BP (!) 149/62   Pulse 69   Temp 97.3 °F (36.3 °C) (Oral)   Resp 18   Ht 5' 9\" (1.753 m)   Wt 185 lb 13.6 oz (84.3 kg)   SpO2 91%   BMI 27.44 kg/m²   Temp (24hrs), Av.1 °F (36.7 °C), Min:97.3 °F (36.3 °C), Max:99.2 °F (37.3 °C)    Recent Labs     21  0641 21  0729 21  1502 21  1624   POCGLU 93 89 143* 214*       I/O (24Hr):     Intake/Output Summary (Last 24 hours) at 2021 1642  Last data filed at 2021 1330  Gross per 24 hour   Intake 700 ml   Output 2950 ml   Net -2250 ml       Labs:  Hematology:  Recent Labs     21  1206 21  0533 21  0613   WBC 9.5 7.2 8.3   RBC 2.95* 2.74* 3.01*   HGB 8.3* 7.9* 8.6*   HCT 27.2* 25.0* 27.3*   MCV 92.2 91.2 90.7   MCH 28.1 28.8 28.6   MCHC 30.5 31.6 31.5   RDW 17.4* 17.2* 16.8*    151 168   MPV 10.1 10.0 10.0   INR  --  0.9  --      Chemistry:  Recent Labs     21  0905 21  0533 21  0613   * 136 142   K 5.0 4.1 4.1    103 105   CO2 19* 21 20   GLUCOSE 250* 98 118*   BUN 84* 85* 82*   CREATININE 3.69* 4.45* 4.45*   MG 1.8 1.9 1.8   ANIONGAP 13 12 17   LABGLOM 16* 13* 13*   GFRAA 20* 16* 16*   CALCIUM 7.2* 7.7* 7.7*   CAION  --   --  1.00*   PHOS 3.5  --  4.8*     Recent Labs     21  0905 21  1122 21  0533 21  0711 21  0429 21  0603 21  0613 21  0641 21  0729 21  1502 21  1624   PROT  --   --  5.4*  --   --   --   --   --   --   --   --    LABALBU 2.7*  --  2.6*  --   --   --  2.8*  --   --   --   --    AST  --   --  14  --   --   --   --   --   --   --   --    ALT  -- --  38  --   --   --   --   --   --   --   --    ALKPHOS  --   --  79  --   --   --   --   --   --   --   --    BILITOT  --   --  0.28*  --   --   --   --   --   --   --   --    POCGLU  --    < > 90   < > 206* 117*  --  93 89 143* 214*    < > = values in this interval not displayed. ABG:No results found for: POCPH, PHART, PH, POCPCO2, ZWG3LTI, PCO2, POCPO2, PO2ART, PO2, POCHCO3, UPG9GRW, HCO3, NBEA, PBEA, BEART, BE, THGBART, THB, KXL9NDS, FRDU9PVB, E9VCFRUW, O2SAT, FIO2  Lab Results   Component Value Date/Time    SPECIAL NOT REPORTED 05/05/2021 11:30 AM     Lab Results   Component Value Date/Time    CULTURE NO SIGNIFICANT GROWTH 05/05/2021 11:30 AM       Radiology:  IR TUNNELED CVC PLACE WO SQ PORT/PUMP > 5 YEARS    Result Date: 11/23/2021  Successful ultrasound and fluoroscopy guided tunneled catheter placement . Okay to use hemodialysis catheter.      US ABDOMEN LIMITED    Result Date: 11/19/2021  No significant ascites       Physical Examination:        General appearance:  alert, cooperative and no distress  Mental Status:  oriented to person, place and time and normal affect  Lungs:  clear to auscultation bilaterally, normal effort  Heart:  regular rate and rhythm, no murmur  Abdomen:  soft, nontender, nondistended, normal bowel sounds, no masses, hepatomegaly, splenomegaly  Extremities:  no edema, redness, tenderness in the calves  Skin:  no gross lesions, rashes, induration    Assessment:        Hospital Problems           Last Modified POA    * (Principal) Acute kidney injury superimposed on CKD ([facility] [address] 11/21/2021 Yes    Overview Signed 10/2/2018  2:43 PM by Chuck Montez MD     ssecondary to hemodynamic effects of loop diuretics and ace inhibitors, bbaseline 1.2-1.3 which peaked up to 1.8, resolving         Uncontrolled type 2 diabetes mellitus with hyperglycemia ([facility] [address] 11/22/2021 Yes    Coronary atherosclerosis due to calcified coronary lesion 11/21/2021 Yes    Chronic kidney disease, stage III (moderate) ([facility] [address] 11/21/2021 Yes    Overview Signed 9/4/2018  1:18 PM by Vale Merlin, MD     Secondary to diabetic nephrosclerosis. Baseline creatinine 1.4-1.6, renal function fluctuate volume status         Anemia in stage 4 chronic kidney disease ([facility] [address] 11/21/2021 Yes    Overview Signed 8/4/2020 10:29 AM by Vale Merlin, MD     Also from suspected blood loss         Rash 11/21/2021 Yes    Cardiorenal syndrome with renal failure 11/17/2021 Yes    Pulmonary hypertension ([facility] [address] 11/22/2021 Yes    Acute on chronic diastolic (congestive) heart failure ([facility] [address] 11/22/2021 Yes                Plan:        1. Hydralazine as needed for controlling high blood pressure  2. Continue oral antihypertensives as before  3. Likely to be started on dialysis today per nephrology  4. Imodium x1 for loose stool  5. Glycemic control  6. Likely to get second dialysis tomorrow  7.  We will plan for discharge tomorrow if cleared by nephrology and dialysis spot is arranged    Timi Elizabeth MD  11/23/2021  4:42 PM

## 2021-11-23 NOTE — PROGRESS NOTES
[facility]  Documentation Note                Admission Dx: Cardiorenal syndrome with renal failure [I13.10]    Past Medical History:   has a past medical history of Acute MI ([facility] [address] Acute renal failure with tubular necrosis ([facility] [address] Anemia, CAD (coronary artery disease), Cerebral artery occlusion with cerebral infarction ([facility] [address] CHF (congestive heart failure) ([facility] [address] Chronic back pain, Closed fracture of lumbar vertebra without mention of spinal cord injury, Displacement of intervertebral disc, site unspecified, without myelopathy, H/O cardiac catheterization, H/O cardiovascular stress test, H/O tilt table evaluation, H/O tilt table evaluation, History of 24 hour EKG monitoring, History of 24 hour EKG monitoring, History of blood transfusion, History of cardiovascular stress test, History of cardiovascular stress test, History of coronary artery stent placement, History of CVA (cerebrovascular accident) without residual deficits, History of echocardiogram, History of Holter monitoring, History of tilt table evaluation, Hyperlipidemia, Hypertension, Non critical Right Renal artery stenosis, native ([facility] [address] Pacemaker, S/P cardiac cath, S/P coronary artery stent placement, SIRS (systemic inflammatory response syndrome) ([facility] [address] and Type II or unspecified type diabetes mellitus without mention of complication, not stated as uncontrolled. Previous Testing:     ECHO 11/12/2021: EF 60%, LA is moderately dilated with a LA volume of 35 ml/m2, mild-moderate TR, moderate PHTN with PAP 42 mmHg, mild DD. CATH 10/5/2021: Moderate three vessel disease involving the proximal LAD, mid LAD and a small, non-domant RCA. Normal LVEDP. STRESS 9/20/2021: Small-moderate perfusion defect of mild intensity in the lateral, inferior and inferoapical regions during stress and rest imaging which is most consistent with artifact. EF 73%.      Previous office/hospital visit:   Dr. Tone Gautam 11/20/2021:   · Acute on chronic renal insufficiency: Unclear etiology but likely multifactorial: Continue current treatment  ? Treatment options discussed extensively with Dr. Sam Holstein, [facility]   ? Lasix on hold per nephrology due to worsening renal funcion  ? Still waiting for a bed at [facility]. I called [facility] myself and they said she was 2nd in line for transfer and they expected transfer in the next 12-24 hours.     · Anemia, unclear etiology: Likely multifactorial including GI losses and acute on chronic renal insufficiency. ? He received 1 unit pRBC transfusion during hospital stay     · Dysautonomia: Mildly to moderately, likely related to and worsened by anemia. Currently stable  ? Diuretics: Not indicated at this time. ? Nonpharmacologic counseling: Because of his condition, I reminded him to try and keep himself well-hydrated and to take extra time when moving from laying to sitting, sitting to standing and standing to walking as well as wearing at least knee high compressions stockings.      · Acute on Chronic Diastolic Heart Failure: EF of 60% via echo on 12/18/2020. Down 3 lbs over last 3 days, up 6 lbs before that.   ? Beta Blocker: Contraindicated due to history of symptomatic bradycardia, intolerance and/or allergy. Patient reports abdominal pain, nausea due to medication. · ACE Inibitor/ARB: Will defer to nephrology with current acute on chronic renal insufficiency   · Diuretics: Lasix On hold due to his BUN and recently worsening kidney function. probably still up 9-12 lbs but down 1 lb since yesterday. · Nonpharmacologic management of Heart Failure: I advised him to try and keep his legs up whenever possible and to limit salt in his diet. · Compression stockings in place  · No fluid noted on ultrasound for paracentesis     · Atherosclerotic Heart Disease: Coronary Artery stent: 4/10/2017.  Cardiac cath done on 10/5/2021 showed Moderate three vessel coronary artery disease involving Levern May 50-60% stenosis in a small, non-dominant RCA, a 60% mid LAD stenosis and a proximal 50-60% stenosis in the left anterior descending coronary artery  · Antiplatelet Agent: Not indicated due to bleeding  · ACE Inibitor/ARB: Will defer to nephrology with current acute on chronic renal insufficiency  · Beta Blocker: Contraindicated due to history of symptomatic bradycardia, intolerance and/or allergy. · Cholesterol Reduction Therapy: Refused by patient. · Laboratory testing: None     · Essential Hypertension: Much better controlled today  · ACE Inibitor/ARB: Will defer to nephrology with current acute on chronic renal insufficiency  § Beta Blocker: Contraindicated due to history of symptomatic bradycardia, intolerance and/or allergy. due to at least perceived side effects of abdominal pain  § Calcium Channel Blocker: Continue amlodipine (Norvasc) 10 mg once daily.    § Agree with increased Clonidine 0.2 mg tid  § Continue Hydralazine 100 mg 3x/day     · Hyperlipidemia: Mixed - Last LDL on 9/21/2021 was 18 mg/dL   · Cholesterol Reduction Therapy: Refused by patient.      · Constipation: Currently better controlled    [facility]

## 2021-11-23 NOTE — CARE COORDINATION
SW met with patient to discuss dialysis arrangements. Patient confirmed he follows Dr. Tawnya Treviño, would like to continue to follow [facility]. Patient is from [facility], would like referral to closest clinic to his home. No preference regarding schedule, patient's wife assists with transportation. Patient utilizes walker and cane for mobility. SW will fax referral to [address] [place] [address]. [facility] [id number] confirmed referral receipt. [facility] has MWF 2nd shift availability. [facility] will fax Hep B labs for review. Referral pending financial/medical clearance.

## 2021-11-23 NOTE — PLAN OF CARE
Problem: Pain:  Goal: Control of acute pain  Description: Control of acute pain  11/23/2021 0226 by Madeleine Mercado RN  Outcome: Ongoing  11/22/2021 1640 by Wilma Brito RN  Outcome: Met This Shift   Pt's pain assessed frequently with hourly rounding; assessed all pain characteristics including level, type, location, frequency, and onset. Pt medicated by RN per PRN orders. Non-pharmacologic interventions offered to pt as well. Pt states pain is tolerable at this time. Will continue to monitor. Problem: Falls - Risk of:  Goal: Will remain free from falls  Description: Will remain free from falls  11/23/2021 0226 by Madeleine Mercado RN  Outcome: Ongoing  11/22/2021 1640 by Wilma Brito RN  Outcome: Met This Shift   Pt remains free from falls at this time. Floor free from obstacles, and bed is locked and in lowest position. Adequate lighting provided. Call light within reach; pt encouraged to call before getting OOB for any need. Will continue to monitor needs during hourly rounding.     Electronically signed by Madeleine Mercado RN on 11/23/2021 at 2:26 AM

## 2021-11-23 NOTE — PLAN OF CARE
decrease  Description: Fatigue will decrease  Outcome: Ongoing     Problem: Coping:  Goal: Ability to adjust to condition or change in health will improve  Description: Ability to adjust to condition or change in health will improve  Outcome: Ongoing  Goal: Ability to cope will improve  Description: Ability to cope will improve  Outcome: Ongoing     Problem: Fluid Volume:  Goal: Ability to maintain a balanced intake and output will improve  Description: Ability to maintain a balanced intake and output will improve  Outcome: Ongoing  Goal: Will show no signs or symptoms of fluid imbalance  Description: Will show no signs or symptoms of fluid imbalance  Outcome: Ongoing     Problem: Health Behavior:  Goal: Ability to identify and utilize available resources and services will improve  Description: Ability to identify and utilize available resources and services will improve  Outcome: Ongoing  Goal: Ability to manage health-related needs will improve  Description: Ability to manage health-related needs will improve  Outcome: Ongoing  Goal: Identification of resources available to assist in meeting health care needs will improve  Description: Identification of resources available to assist in meeting health care needs will improve  Outcome: Ongoing     Problem: Metabolic:  Goal: Ability to maintain appropriate glucose levels will improve  Description: Ability to maintain appropriate glucose levels will improve  Outcome: Ongoing     Problem: Nutritional:  Goal: Maintenance of adequate nutrition will improve  Description: Maintenance of adequate nutrition will improve  Outcome: Ongoing  Goal: Progress toward achieving an optimal weight will improve  Description: Progress toward achieving an optimal weight will improve  Outcome: Ongoing  Goal: Ability to identify appropriate dietary choices will improve  Description: Ability to identify appropriate dietary choices will improve  Outcome: Ongoing     Problem: Physical Regulation:  Goal: Complications related to the disease process, condition or treatment will be avoided or minimized  Description: Complications related to the disease process, condition or treatment will be avoided or minimized  Outcome: Ongoing  Goal: Diagnostic test results will improve  Description: Diagnostic test results will improve  Outcome: Ongoing  Goal: Ability to maintain clinical measurements within normal limits will improve  Outcome: Ongoing     Problem: Tissue Perfusion:  Goal: Adequacy of tissue perfusion will improve  Description: Adequacy of tissue perfusion will improve  Outcome: Ongoing     Problem: Sensory:  Goal: General experience of comfort will improve  Description: General experience of comfort will improve  Outcome: Ongoing

## 2021-11-23 NOTE — CONSULTS
[facility]   Consult Note         Today's Date: 11/23/2021  Patient Name: Mireya Sutherland  Date of admission: 11/21/2021  1:46 AM  Patient's age: 68 y. o., 1945  Admission Dx: Cardiorenal syndrome with renal failure [I13.10]    Reason for Consult:  Cardiac evaluation    Requesting Physician: No admitting provider for patient encounter. REASON FOR CONSULT:  CHF    History Obtained From:  Patient, chart, staff, records    HISTORY OF PRESENT ILLNESS:      The patient is a 68 y.o. male who is admitted to the hospital for         Past Medical History:   has a past medical history of Acute MI ([facility] [address] Acute renal failure with tubular necrosis ([address] Anemia, CAD (coronary artery disease), Cerebral artery occlusion with cerebral infarction ([address] CHF (congestive heart failure) ([address] Chronic back pain, Closed fracture of lumbar vertebra without mention of spinal cord injury, Displacement of intervertebral disc, site unspecified, without myelopathy, H/O cardiac catheterization, H/O cardiovascular stress test, H/O tilt table evaluation, H/O tilt table evaluation, History of 24 hour EKG monitoring, History of 24 hour EKG monitoring, History of blood transfusion, History of cardiovascular stress test, History of cardiovascular stress test, History of coronary artery stent placement, History of CVA (cerebrovascular accident) without residual deficits, History of echocardiogram, History of Holter monitoring, History of tilt table evaluation, Hyperlipidemia, Hypertension, Non critical Right Renal artery stenosis, native ([address] Pacemaker, S/P cardiac cath, S/P coronary artery stent placement, SIRS (systemic inflammatory response syndrome) ([address] and Type II or unspecified type diabetes mellitus without mention of complication, not stated as uncontrolled. Past Surgical History:   has a past surgical history that includes Pacemaker insertion; back surgery; Cholecystectomy (08/17/2015);  Corneal transplant; Cardiac catheterization (Left, 02/19/2016); pacemaker placement; Colonoscopy (2010); Colonoscopy (02/19/2015); Colonoscopy (05/23/2018); Colonoscopy (N/A, 05/23/2018); Upper gastrointestinal endoscopy (05/28/2019); Upper gastrointestinal endoscopy (N/A, 05/28/2019); Colonoscopy (10/30/2020); Colonoscopy (N/A, 10/30/2020); Upper gastrointestinal endoscopy (N/A, 10/30/2020); Endoscopy, colon, diagnostic; eye surgery; Cardiac catheterization (Left, 10/05/2021); and IR TUNNELED CVC PLACE WO SQ PORT/PUMP > 5 YEARS (11/22/2021). Home Medications:    Prior to Admission medications    Medication Sig Start Date End Date Taking?  Authorizing Provider   cetirizine (ZYRTEC) 10 MG tablet Take 1 tablet by mouth daily 11/10/21 12/10/21  Amanda Madden MD   eplerenone (INSPRA) 25 MG tablet Take 1 tablet by mouth daily 11/3/21 2/1/22  Elliot Tsai MD   loratadine (CLARITIN) 10 MG tablet Take 1 tablet by mouth daily  Patient taking differently: Take 10 mg by mouth daily as needed  10/19/21   Mitra Schumacher MD   lisinopril (PRINIVIL;ZESTRIL) 20 MG tablet Take 1 tablet by mouth daily  Patient not taking: Reported on 11/21/2021 10/15/21 1/13/22  Elliot Tsai MD   amLODIPine (NORVASC) 10 MG tablet Take 1 tablet by mouth nightly 9/22/21   Render MD Jeffrey   hydrALAZINE (APRESOLINE) 100 MG tablet Take 1 tablet by mouth 3 times daily Patient is taking 100 MG 3 times daily 8/19/21   Miguel Murphy MD   glipiZIDE (GLUCOTROL XL) 5 MG extended release tablet Take 2 tablets by mouth 2 times daily 7/19/21   Miguel Murphy MD   insulin glargine (LANTUS SOLOSTAR) 100 UNIT/ML injection pen Inject 28 Units into the skin 2 times daily 7/19/21 11/2/21  Miguel Murphy MD   nitroGLYCERIN (NITROSTAT) 0.4 MG SL tablet Place 1 tablet under the tongue every 5 minutes as needed for Chest pain 6/28/21   Mitra Schumacher MD   latanoprost (XALATAN) 0.005 % ophthalmic solution  2/2/21   Historical Provider, MD   acyclovir (ZOVIRAX) 400 MG tablet 400 mg 2 times daily  7/6/20   Historical Provider, MD   CONTOUR TEST strip USE 1 STRIP TO CHECK GLUCOSE TWICE DAILY 10/21/19   Render Remedies, MD   DIANA MICROLET LANCETS [address] [facility] TEST TWICE DAILY 11/16/15   Render RemedMD lexi   Insulin Pen Needle (PEN NEEDLES) 31G X 6 MM MISC 1 each by Does not apply route daily 10/16/15   Render RemedMD lexi   prednisoLONE acetate (PRED FORTE) 1 % ophthalmic suspension Place 1 drop into the left eye daily  3/30/15   Historical Provider, MD       loperamide, 2 mg, Oral, Once    sodium chloride flush, 5-40 mL, IntraVENous, 2 times per day    insulin glargine, 28 Units, SubCUTAneous, BID    prednisoLONE acetate, 1 drop, Left Eye, Daily    insulin lispro, 0-6 Units, SubCUTAneous, TID WC    insulin lispro, 0-3 Units, SubCUTAneous, Nightly    [Held by provider] metOLazone, 5 mg, Oral, Daily    acyclovir, 400 mg, Oral, BID    hydrALAZINE, 50 mg, Oral, 3 times per day    sodium bicarbonate, 650 mg, Oral, BID    bumetanide, 2 mg, IntraVENous, BID    metoprolol tartrate, 12.5 mg, Oral, BID      Allergies:  Coreg [carvedilol], Lipitor [atorvastatin], Aldactone [spironolactone], Crestor [rosuvastatin calcium], Lopid [gemfibrozil], Invokana [canagliflozin], and Januvia [sitagliptin]    Social History:   reports that he quit smoking about 41 years ago. His smoking use included cigarettes. He has a 12.00 pack-year smoking history. He has never used smokeless tobacco. He reports that he does not drink alcohol and does not use drugs. Family History: family history includes Cancer in his father and mother. No h/o sudden cardiac death. REVIEW OF SYSTEMS:    Constitutional: there has been no unanticipated weight loss. There's been No change in energy level, No change in activity level. Eyes: No visual changes or diplopia. No scleral icterus. ENT: No Headaches, hearing loss or vertigo. No mouth sores or sore throat.   Cardiovascular: as above  Respiratory: No previous pulmonary problems  Gastrointestinal: No abdominal pain, appetite loss, blood in stools. No change in bowel or bladder habits. Genitourinary: No dysuria, trouble voiding, or hematuria. Musculoskeletal:  No gait disturbance, No weakness or joint complaints. Integumentary: No rash or pruritis. Neurological: No headache, diplopia, change in muscle strength, numbness or tingling. No change in gait, balance, coordination, mood, affect, memory, mentation, behavior. Psychiatric: No anxiety, or depression. Endocrine: No temperature intolerance. No excessive thirst, fluid intake, or urination. No tremor. Hematologic/Lymphatic: No abnormal bruising or bleeding, blood clots or swollen lymph nodes. Allergic/Immunologic: No nasal congestion or hives. PHYSICAL EXAM:      BP (!) 133/48   Pulse 67   Temp 98.6 °F (37 °C) (Temporal)   Resp 18   Ht 5' 9\" (1.753 m)   Wt 194 lb 10.7 oz (88.3 kg)   SpO2 95%   BMI 28.75 kg/m²    Constitutional and General Appearance: alert, cooperative, no distress and appears stated age  HEENT: Atrumatic and Normal oral mucosa  Respiratory:  Normal excursion and expansion without use of accessory muscles  Resp Auscultation: Good respiratory effort. No for increased work of breathing. On auscultation: clear to auscultation bilaterally  Cardiovascular: The apical impulse is not displaced  Heart tones are crisp and normal. regular S1 and S2.  Jugular venous pulsation Normal  2+ pitting pedal edema, Tunnel catheter in place  Abdomen:   No masses or distention  Bowel sounds present  Extremities:   No Cyanosis or Clubbing   Skin: Warm and dry  Neurological:  Alert and oriented. Moves all extremities well  No abnormalities of mood, affect, memory, mentation, or behavior are noted        ECHO 11/12/2021: EF 60%, LA is moderately dilated with a LA volume of 35 ml/m2, mild-moderate TR, moderate PHTN with PAP 42 mmHg, mild DD. CATH 10/5/2021:    Moderate three vessel disease involving the proximal LAD, mid LAD and a small, non-domant RCA. Normal LVEDP. STRESS 9/20/2021: Small-moderate perfusion defect of mild intensity in the lateral, inferior and inferoapical regions during stress and rest imaging which is most consistent with artifact. EF 73%. Labs:     CBC:   Recent Labs     11/22/21 0533 11/23/21 0613   WBC 7.2 8.3   HGB 7.9* 8.6*   HCT 25.0* 27.3*    168     BMP:   Recent Labs     11/22/21 0533 11/23/21 0613    142   K 4.1 4.1   CO2 21 20   BUN 85* 82*   CREATININE 4.45* 4.45*   LABGLOM 13* 13*   GLUCOSE 98 118*     BNP: No results for input(s): BNP in the last 72 hours. PT/INR:   Recent Labs     11/22/21 0533   PROTIME 10.2   INR 0.9     APTT:No results for input(s): APTT in the last 72 hours. CARDIAC ENZYMES:No results for input(s): CKTOTAL, CKMB, CKMBINDEX, TROPONINI in the last 72 hours.   FASTING LIPID PANEL:  Lab Results   Component Value Date    HDL 14 09/21/2021    LDLDIRECT 44 11/02/2017    TRIG 185 09/21/2021     LIVER PROFILE:  Recent Labs     11/22/21 0533 11/23/21 0613   AST 14  --    ALT 38  --    LABALBU 2.6* 2.8*         Other Current Problems  Patient Active Problem List   Diagnosis    Vertigo, benign paroxysmal    Systolic murmur    History of MI (myocardial infarction)    History of colon polyps    Uncontrolled type 2 diabetes mellitus with hyperglycemia ([facility] [address]    Coronary atherosclerosis due to calcified coronary lesion    Displacement of intervertebral disc, site unspecified, without myelopathy    History of CVA (cerebrovascular accident) without residual deficits    Dysautonomia (HCC)    Syncope, near    Cholecystitis    Chronotropic incompetence with autonomic dysfunction    Episodic lightheadedness    Accelerated hypertension    Ischemic chest pain (HCC)    Abnormal cardiovascular stress test    Chronic kidney disease, stage III (moderate) ([facility])    Controlled type 2 diabetes mellitus with diabetic nephropathy, with long-term current use of insulin ([facility] [address]    Abnormal tilt table test    Cervical stenosis of spinal canal    Coronary artery disease involving native coronary artery of native heart without angina pectoris    S/P drug eluting coronary stent placement-OM1 4/10/17    Hyperlipidemia    Chronic diastolic HF (heart failure), NYHA class 3 (HCC)    Angina, class II (HCC)    Medication side effect, initial encounter    Acute kidney injury superimposed on CKD (HCC)    Non critical Right Renal artery stenosis, native (HCC)    Anemia in stage 4 chronic kidney disease (HCC)    BPH with obstruction/lower urinary tract symptoms    Elevated PSA    Gross hematuria    Chest pain    Chronic anemia    Acute coronary syndrome with high troponin (HCC)    Iron deficiency anemia secondary to inadequate dietary iron intake    Iron malabsorption    Rash    Skin lesion of left lower extremity    Urticaria    Hyperkalemia    Moderate malnutrition (HCC)    Cardiorenal syndrome, stage 1-4 or unspecified chronic kidney disease, with heart failure (HCC)    Cardiorenal syndrome with renal failure    Pulmonary hypertension (HCC)    Acute on chronic diastolic (congestive) heart failure (HCC)           IMPRESSION & Recommendations:    VIDAL on CKD, initiating HD today  Acute on chronic DHF with EF 60%  H/o symptomatic bradycardia s/p pacemaker >10 yr ago per patient, possible battery dead. CAD s/p 2017  HTN  HLD    Plan:  Less likely cardio renal syndrome and VIDAL worsening likely multifactorial with fat embolization, diuretic use, contrast.  On bumex and initiating HD for worsened CKD with VIDAL. Blood pressure management  Pacemaker function check  Rest of management per primary. Final recommendations after Attending's evaluation.     Olga Guzman MD  Resident, Cardiovascular Medicine  Internal Medicine Residency, PGY-3  [facility]  11/23/2021 10:50 AM      Attending Physician Statement  I have discussed the case of Celeste Simone including pertinent history and exam findings with the resident. I agree with the assessment, plan and orders as documented by the resident With changes made to the note.      Electronically signed by Asa Tavarez MD on 11/23/2021 at 4:00 PM.    [facility]      [phone]

## 2021-11-24 ENCOUNTER — APPOINTMENT (OUTPATIENT)
Dept: DIALYSIS | Age: 76
DRG: 673 | End: 2021-11-24
Attending: INTERNAL MEDICINE
Payer: MEDICARE

## 2021-11-24 ENCOUNTER — APPOINTMENT (OUTPATIENT)
Dept: ULTRASOUND IMAGING | Age: 76
DRG: 673 | End: 2021-11-24
Attending: INTERNAL MEDICINE
Payer: MEDICARE

## 2021-11-24 LAB
ALBUMIN SERPL-MCNC: 2.7 G/DL (ref 3.5–5.2)
ANION GAP SERPL CALCULATED.3IONS-SCNC: 16 MMOL/L (ref 9–17)
BUN BLDV-MCNC: 54 MG/DL (ref 8–23)
BUN/CREAT BLD: ABNORMAL (ref 9–20)
CALCIUM SERPL-MCNC: 8.1 MG/DL (ref 8.6–10.4)
CHLORIDE BLD-SCNC: 104 MMOL/L (ref 98–107)
CO2: 23 MMOL/L (ref 20–31)
CREAT SERPL-MCNC: 3.75 MG/DL (ref 0.7–1.2)
GFR AFRICAN AMERICAN: 19 ML/MIN
GFR NON-AFRICAN AMERICAN: 16 ML/MIN
GFR SERPL CREATININE-BSD FRML MDRD: ABNORMAL ML/MIN/{1.73_M2}
GFR SERPL CREATININE-BSD FRML MDRD: ABNORMAL ML/MIN/{1.73_M2}
GLUCOSE BLD-MCNC: 116 MG/DL (ref 75–110)
GLUCOSE BLD-MCNC: 124 MG/DL (ref 75–110)
GLUCOSE BLD-MCNC: 153 MG/DL (ref 75–110)
GLUCOSE BLD-MCNC: 177 MG/DL (ref 75–110)
GLUCOSE BLD-MCNC: 195 MG/DL (ref 75–110)
GLUCOSE BLD-MCNC: 269 MG/DL (ref 75–110)
GLUCOSE BLD-MCNC: 50 MG/DL (ref 70–99)
GLUCOSE BLD-MCNC: 55 MG/DL (ref 75–110)
GLUCOSE BLD-MCNC: 59 MG/DL (ref 75–110)
GLUCOSE BLD-MCNC: 85 MG/DL (ref 75–110)
HCT VFR BLD CALC: 28.3 % (ref 40.7–50.3)
HCT VFR BLD CALC: 29 % (ref 40.7–50.3)
HEMOGLOBIN: 8.9 G/DL (ref 13–17)
HEMOGLOBIN: 9.1 G/DL (ref 13–17)
MAGNESIUM: 1.6 MG/DL (ref 1.6–2.6)
MCH RBC QN AUTO: 28.8 PG (ref 25.2–33.5)
MCHC RBC AUTO-ENTMCNC: 31.4 G/DL (ref 28.4–34.8)
MCV RBC AUTO: 91.8 FL (ref 82.6–102.9)
NRBC AUTOMATED: 0 PER 100 WBC
PDW BLD-RTO: 16.7 % (ref 11.8–14.4)
PHOSPHORUS: 4.6 MG/DL (ref 2.5–4.5)
PLATELET # BLD: 179 K/UL (ref 138–453)
PMV BLD AUTO: 9.8 FL (ref 8.1–13.5)
POTASSIUM SERPL-SCNC: 4 MMOL/L (ref 3.7–5.3)
RBC # BLD: 3.16 M/UL (ref 4.21–5.77)
SODIUM BLD-SCNC: 143 MMOL/L (ref 135–144)
WBC # BLD: 10.5 K/UL (ref 3.5–11.3)

## 2021-11-24 PROCEDURE — 36415 COLL VENOUS BLD VENIPUNCTURE: CPT

## 2021-11-24 PROCEDURE — 85027 COMPLETE CBC AUTOMATED: CPT

## 2021-11-24 PROCEDURE — 85014 HEMATOCRIT: CPT

## 2021-11-24 PROCEDURE — 2580000003 HC RX 258: Performed by: NURSE PRACTITIONER

## 2021-11-24 PROCEDURE — 6370000000 HC RX 637 (ALT 250 FOR IP): Performed by: STUDENT IN AN ORGANIZED HEALTH CARE EDUCATION/TRAINING PROGRAM

## 2021-11-24 PROCEDURE — 2709999900 HC NON-CHARGEABLE SUPPLY

## 2021-11-24 PROCEDURE — 99232 SBSQ HOSP IP/OBS MODERATE 35: CPT | Performed by: INTERNAL MEDICINE

## 2021-11-24 PROCEDURE — 2500000003 HC RX 250 WO HCPCS: Performed by: INTERNAL MEDICINE

## 2021-11-24 PROCEDURE — 5A1D70Z PERFORMANCE OF URINARY FILTRATION, INTERMITTENT, LESS THAN 6 HOURS PER DAY: ICD-10-PCS | Performed by: INTERNAL MEDICINE

## 2021-11-24 PROCEDURE — 0TB14ZX EXCISION OF LEFT KIDNEY, PERCUTANEOUS ENDOSCOPIC APPROACH, DIAGNOSTIC: ICD-10-PCS | Performed by: PHYSICIAN ASSISTANT

## 2021-11-24 PROCEDURE — 6370000000 HC RX 637 (ALT 250 FOR IP): Performed by: INTERNAL MEDICINE

## 2021-11-24 PROCEDURE — 6360000002 HC RX W HCPCS: Performed by: NURSE PRACTITIONER

## 2021-11-24 PROCEDURE — 85018 HEMOGLOBIN: CPT

## 2021-11-24 PROCEDURE — 76942 ECHO GUIDE FOR BIOPSY: CPT

## 2021-11-24 PROCEDURE — 88300 SURGICAL PATH GROSS: CPT

## 2021-11-24 PROCEDURE — 80069 RENAL FUNCTION PANEL: CPT

## 2021-11-24 PROCEDURE — 2700000000 HC OXYGEN THERAPY PER DAY

## 2021-11-24 PROCEDURE — 6370000000 HC RX 637 (ALT 250 FOR IP): Performed by: RADIOLOGY

## 2021-11-24 PROCEDURE — 83735 ASSAY OF MAGNESIUM: CPT

## 2021-11-24 PROCEDURE — 90935 HEMODIALYSIS ONE EVALUATION: CPT | Performed by: INTERNAL MEDICINE

## 2021-11-24 PROCEDURE — 6370000000 HC RX 637 (ALT 250 FOR IP): Performed by: NURSE PRACTITIONER

## 2021-11-24 PROCEDURE — 2060000000 HC ICU INTERMEDIATE R&B

## 2021-11-24 PROCEDURE — 94761 N-INVAS EAR/PLS OXIMETRY MLT: CPT

## 2021-11-24 PROCEDURE — 90935 HEMODIALYSIS ONE EVALUATION: CPT

## 2021-11-24 PROCEDURE — 6360000002 HC RX W HCPCS: Performed by: RADIOLOGY

## 2021-11-24 PROCEDURE — 2709999900 US BIOPSY RENAL LEFT PERC

## 2021-11-24 RX ORDER — FENTANYL CITRATE 50 UG/ML
INJECTION, SOLUTION INTRAMUSCULAR; INTRAVENOUS
Status: COMPLETED | OUTPATIENT
Start: 2021-11-24 | End: 2021-11-24

## 2021-11-24 RX ORDER — ALBUTEROL SULFATE 90 UG/1
2 AEROSOL, METERED RESPIRATORY (INHALATION) PRN
Status: CANCELLED | OUTPATIENT
Start: 2021-11-24 | End: 2021-11-24

## 2021-11-24 RX ORDER — ATROPINE SULFATE 0.1 MG/ML
0.5 INJECTION INTRAVENOUS EVERY 5 MIN PRN
Status: CANCELLED | OUTPATIENT
Start: 2021-11-24 | End: 2021-11-24

## 2021-11-24 RX ORDER — METOPROLOL TARTRATE 5 MG/5ML
5 INJECTION INTRAVENOUS EVERY 5 MIN PRN
Status: CANCELLED | OUTPATIENT
Start: 2021-11-24 | End: 2021-11-24

## 2021-11-24 RX ORDER — AMINOPHYLLINE DIHYDRATE 25 MG/ML
50 INJECTION, SOLUTION INTRAVENOUS PRN
Status: CANCELLED | OUTPATIENT
Start: 2021-11-24 | End: 2021-11-24

## 2021-11-24 RX ORDER — SODIUM CHLORIDE 0.9 % (FLUSH) 0.9 %
5-40 SYRINGE (ML) INJECTION PRN
Status: CANCELLED | OUTPATIENT
Start: 2021-11-24 | End: 2021-11-24

## 2021-11-24 RX ORDER — SODIUM CHLORIDE 9 MG/ML
500 INJECTION, SOLUTION INTRAVENOUS CONTINUOUS PRN
Status: CANCELLED | OUTPATIENT
Start: 2021-11-24 | End: 2021-11-24

## 2021-11-24 RX ORDER — SIMETHICONE 80 MG
80 TABLET,CHEWABLE ORAL ONCE
Status: COMPLETED | OUTPATIENT
Start: 2021-11-24 | End: 2021-11-24

## 2021-11-24 RX ORDER — METOCLOPRAMIDE HYDROCHLORIDE 5 MG/ML
10 INJECTION INTRAMUSCULAR; INTRAVENOUS ONCE
Status: COMPLETED | OUTPATIENT
Start: 2021-11-24 | End: 2021-11-24

## 2021-11-24 RX ORDER — MAGNESIUM SULFATE 1 G/100ML
1000 INJECTION INTRAVENOUS PRN
Status: DISCONTINUED | OUTPATIENT
Start: 2021-11-24 | End: 2021-11-24

## 2021-11-24 RX ORDER — METOCLOPRAMIDE HYDROCHLORIDE 5 MG/ML
10 INJECTION INTRAMUSCULAR; INTRAVENOUS ONCE
Status: DISCONTINUED | OUTPATIENT
Start: 2021-11-24 | End: 2021-11-26 | Stop reason: HOSPADM

## 2021-11-24 RX ORDER — NITROGLYCERIN 0.4 MG/1
0.4 TABLET SUBLINGUAL EVERY 5 MIN PRN
Status: CANCELLED | OUTPATIENT
Start: 2021-11-24 | End: 2021-11-24

## 2021-11-24 RX ADMIN — Medication 2.1 ML: at 18:52

## 2021-11-24 RX ADMIN — MICROFIBRILLAR COLLAGEN HEMOSTAT POWDER 0.5 G: POWDER at 14:37

## 2021-11-24 RX ADMIN — SIMETHICONE 80 MG: 80 TABLET, CHEWABLE ORAL at 06:32

## 2021-11-24 RX ADMIN — ONDANSETRON 4 MG: 2 INJECTION INTRAMUSCULAR; INTRAVENOUS at 04:00

## 2021-11-24 RX ADMIN — HYDRALAZINE HYDROCHLORIDE 50 MG: 50 TABLET, FILM COATED ORAL at 20:41

## 2021-11-24 RX ADMIN — SODIUM BICARBONATE 650 MG: 648 TABLET ORAL at 11:12

## 2021-11-24 RX ADMIN — SODIUM BICARBONATE 650 MG: 648 TABLET ORAL at 20:41

## 2021-11-24 RX ADMIN — PREDNISOLONE ACETATE 1 DROP: 10 SUSPENSION/ DROPS OPHTHALMIC at 09:10

## 2021-11-24 RX ADMIN — METOPROLOL TARTRATE 12.5 MG: 25 TABLET ORAL at 11:11

## 2021-11-24 RX ADMIN — ONDANSETRON 4 MG: 2 INJECTION INTRAMUSCULAR; INTRAVENOUS at 10:05

## 2021-11-24 RX ADMIN — FENTANYL CITRATE 50 MCG: 50 INJECTION, SOLUTION INTRAMUSCULAR; INTRAVENOUS at 14:26

## 2021-11-24 RX ADMIN — HYDRALAZINE HYDROCHLORIDE 50 MG: 50 TABLET, FILM COATED ORAL at 06:32

## 2021-11-24 RX ADMIN — SODIUM CHLORIDE, PRESERVATIVE FREE 10 ML: 5 INJECTION INTRAVENOUS at 06:32

## 2021-11-24 RX ADMIN — BUMETANIDE 2 MG: 0.25 INJECTION INTRAMUSCULAR; INTRAVENOUS at 09:10

## 2021-11-24 RX ADMIN — DEXTROSE MONOHYDRATE 12.5 G: 25 INJECTION, SOLUTION INTRAVENOUS at 08:58

## 2021-11-24 RX ADMIN — METOCLOPRAMIDE 10 MG: 5 INJECTION, SOLUTION INTRAMUSCULAR; INTRAVENOUS at 06:32

## 2021-11-24 RX ADMIN — SODIUM CHLORIDE, PRESERVATIVE FREE 10 ML: 5 INJECTION INTRAVENOUS at 20:41

## 2021-11-24 RX ADMIN — DEXTROSE MONOHYDRATE 12.5 G: 25 INJECTION, SOLUTION INTRAVENOUS at 13:29

## 2021-11-24 RX ADMIN — SODIUM CHLORIDE, PRESERVATIVE FREE 10 ML: 5 INJECTION INTRAVENOUS at 09:10

## 2021-11-24 RX ADMIN — BUMETANIDE 2 MG: 0.25 INJECTION INTRAMUSCULAR; INTRAVENOUS at 20:41

## 2021-11-24 RX ADMIN — METOPROLOL TARTRATE 12.5 MG: 25 TABLET ORAL at 20:40

## 2021-11-24 RX ADMIN — INSULIN LISPRO 2 UNITS: 100 INJECTION, SOLUTION INTRAVENOUS; SUBCUTANEOUS at 20:40

## 2021-11-24 RX ADMIN — ACYCLOVIR 400 MG: 200 CAPSULE ORAL at 11:10

## 2021-11-24 RX ADMIN — ACYCLOVIR 400 MG: 200 CAPSULE ORAL at 20:41

## 2021-11-24 ASSESSMENT — PAIN DESCRIPTION - PAIN TYPE: TYPE: ACUTE PAIN

## 2021-11-24 ASSESSMENT — PAIN SCALES - GENERAL
PAINLEVEL_OUTOF10: 0
PAINLEVEL_OUTOF10: 5
PAINLEVEL_OUTOF10: 0

## 2021-11-24 ASSESSMENT — PAIN DESCRIPTION - ORIENTATION: ORIENTATION: RIGHT;LEFT;UPPER;LOWER

## 2021-11-24 ASSESSMENT — PAIN DESCRIPTION - LOCATION: LOCATION: ABDOMEN

## 2021-11-24 NOTE — PLAN OF CARE
Problem: Pain:  Goal: Pain level will decrease  Description: Pain level will decrease  Outcome: Ongoing  Goal: Control of acute pain  Description: Control of acute pain  Outcome: Ongoing  Goal: Control of chronic pain  Description: Control of chronic pain  Outcome: Ongoing     Problem: Falls - Risk of:  Goal: Will remain free from falls  Description: Will remain free from falls  Outcome: Ongoing  Goal: Absence of physical injury  Description: Absence of physical injury  Outcome: Ongoing     Problem: Skin Integrity:  Goal: Will show no infection signs and symptoms  Description: Will show no infection signs and symptoms  Outcome: Ongoing  Goal: Absence of new skin breakdown  Description: Absence of new skin breakdown  Outcome: Ongoing  Goal: Risk for impaired skin integrity will decrease  Description: Risk for impaired skin integrity will decrease  Outcome: Ongoing  Goal: Status of oral mucous membranes will improve  Description: Status of oral mucous membranes will improve  Outcome: Ongoing  Goal: Skin integrity will be maintained  Description: Skin integrity will be maintained  Outcome: Ongoing     Problem:  Activity:  Goal: Risk for activity intolerance will decrease  Description: Risk for activity intolerance will decrease  Outcome: Ongoing  Goal: Fatigue will decrease  Description: Fatigue will decrease  Outcome: Ongoing     Problem: Coping:  Goal: Ability to adjust to condition or change in health will improve  Description: Ability to adjust to condition or change in health will improve  Outcome: Ongoing  Goal: Ability to cope will improve  Description: Ability to cope will improve  Outcome: Ongoing     Problem: Fluid Volume:  Goal: Ability to maintain a balanced intake and output will improve  Description: Ability to maintain a balanced intake and output will improve  Outcome: Ongoing  Goal: Will show no signs or symptoms of fluid imbalance  Description: Will show no signs or symptoms of fluid imbalance  Outcome: Ongoing     Problem: Health Behavior:  Goal: Ability to identify and utilize available resources and services will improve  Description: Ability to identify and utilize available resources and services will improve  Outcome: Ongoing  Goal: Ability to manage health-related needs will improve  Description: Ability to manage health-related needs will improve  Outcome: Ongoing  Goal: Identification of resources available to assist in meeting health care needs will improve  Description: Identification of resources available to assist in meeting health care needs will improve  Outcome: Ongoing     Problem: Metabolic:  Goal: Ability to maintain appropriate glucose levels will improve  Description: Ability to maintain appropriate glucose levels will improve  Outcome: Ongoing     Problem: Nutritional:  Goal: Maintenance of adequate nutrition will improve  Description: Maintenance of adequate nutrition will improve  Outcome: Ongoing  Goal: Progress toward achieving an optimal weight will improve  Description: Progress toward achieving an optimal weight will improve  Outcome: Ongoing  Goal: Ability to identify appropriate dietary choices will improve  Description: Ability to identify appropriate dietary choices will improve  Outcome: Ongoing     Problem: Physical Regulation:  Goal: Complications related to the disease process, condition or treatment will be avoided or minimized  Description: Complications related to the disease process, condition or treatment will be avoided or minimized  Outcome: Ongoing  Goal: Diagnostic test results will improve  Description: Diagnostic test results will improve  Outcome: Ongoing  Goal: Ability to maintain clinical measurements within normal limits will improve  Outcome: Ongoing     Problem: Tissue Perfusion:  Goal: Adequacy of tissue perfusion will improve  Description: Adequacy of tissue perfusion will improve  Outcome: Ongoing     Problem: Sensory:  Goal: General experience of comfort will improve  Description: General experience of comfort will improve  Outcome: Ongoing

## 2021-11-24 NOTE — PROGRESS NOTES
Dialysis Time Out  To be done by RN and tech or 2 RNs  Staff Names Kaushal Funez RN / Cherry Gracia  [x]  Identity of the patient using 2 patient identifiers  [x]  Consent for treatment  [x]  Equipment-proper machine and dialyzer  [x]  B-Hep B status  [x]  Orders- to include bath, blood flow, dialyzer, time and fluid removal  [x]  Access-Correct site and in working order  [x]  Time for patient to ask questions.

## 2021-11-24 NOTE — PLAN OF CARE
Problem: Skin Integrity:  Goal: Will show no infection signs and symptoms  Description: Will show no infection signs and symptoms  11/24/2021 1644 by Jama Phalen, RN  Outcome: Ongoing  11/24/2021 0255 by Senait Beaulieu RN  Outcome: Ongoing     Problem: Skin Integrity:  Goal: Absence of new skin breakdown  Description: Absence of new skin breakdown  11/24/2021 1644 by Jama Phalen, RN  Outcome: Ongoing  11/24/2021 0255 by Senait Beaulieu RN  Outcome: Ongoing     Problem: Skin Integrity:  Goal: Risk for impaired skin integrity will decrease  Description: Risk for impaired skin integrity will decrease  11/24/2021 1644 by Jama Phalen, RN  Outcome: Ongoing  11/24/2021 0255 by Senait Beaulieu RN  Outcome: Ongoing     Problem: Skin Integrity:  Goal: Status of oral mucous membranes will improve  Description: Status of oral mucous membranes will improve  11/24/2021 1644 by Jama Phalen, RN  Outcome: Ongoing  11/24/2021 0255 by Senait Beaulieu RN  Outcome: Ongoing

## 2021-11-24 NOTE — PROGRESS NOTES
originally lbgilyis68/12/21 to [facility] emergency room secondary to elevated potassium and chest pain.  During his initial evaluation in the emergency room he was noted to have a BUN of 86 and a creatinine of 3.64 with a potassium of 6.6 sodium of 128.  He was given 30 g of Kayexalate, IV insulin and sodium bicarb.  With an improvement of the potassium with a downward trend of 5.4 sodium of 134 and creatinine 3.05 and BUN of 84 he was noted to have a hemoglobin of 7.5 with a repeat of 6.8 and transfused 1 unit packed red blood cells.  Patient was seen and evaluated by the nephrology group as well as cardiology.  Patient was started on a bicarb drip  Throughout the hospital stay there was somewhat of an improvement in the renal function and heart failure however over time kidney function continued to decline and nephrology recommended transfer to [place] [address] so he can be followed by his personal nephrologist.  Due to the bed availability there was a great delay from the time the request was made to the time that patient arrived to the facility, with that being said BUN 91 creatinine 3.72.  Hemogram unremarkable with no acute leukocytosis with a WBC of 9 hemoglobin 8.0 hematocrit 25.8.  Patient is not on any chemical anticoagulation given the drop in hemoglobin earlier in the stay as well as the purpuric rash response he received from Lovenox. \"     The intitial assessment/plan included:  \"Consultation nephrology for further evaluation and management of the VIDAL and assistance with diuresis  Diuretics for heart failure per nephrology  Continue with aggressive medical management of risk factors for coronary artery disease management.   Utilize insulin sliding scale and receive home Lantus dose and resume when medications are confirmed  Continue to monitor H&H, patient had a possible  reaction to iron infusion per documentation at [facility]  EPC cuffs for DVT prophylaxis as we cannot use chemical anticoagulation at this time given the concern for allergy which should be worked up at the discretion of team members, maximize ambulation  GI prophylaxis  Resume home medications once med list is updated in the computer\"     Per prior notes:    \"Patient got recent iron transfusion none developed rash with itching which is worse on the lower extremity.  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 \"      Database has included:  Results for Sonja Linares (MRN [id number]) as of 11/22/2021 16:43    Ref. Range 6/25/2020 11:25 9/20/2021 11:50 11/11/2021 17:18   Pro-BNP Latest Ref Range: <300 pg/mL 588 (H) 1,565 (H) 3,702 (H)      Echo 11/12:  Results for Sonja Linares (MRN [id number]) as of 11/22/2021 16:43    Ref. Range 6/25/2020 11:25 9/20/2021 11:50 11/11/2021 17:18   Pro-BNP Latest Ref Range: <300 pg/mL 588 (H) 1,565 (H) 3,702 (H)            Review of Systems:     Constitutional:  negative for chills, fevers, sweats,+ fatigue  Respiratory:  negative for cough, +dyspnea on exertion, denies wheezing  Cardiovascular:  negative for chest pain, chest pressure/discomfort, + lower extremity edema, palpitations  Gastrointestinal:  negative for abdominal pain, constipation, diarrhea, nausea, vomiting  Neurological:  negative for dizziness, headache    Medications: Allergies:     Allergies   Allergen Reactions    Coreg [Carvedilol] Shortness Of Breath and Nausea And Vomiting    Lipitor [Atorvastatin] Other (See Comments)     Muscle aches and joint pain    Aldactone [Spironolactone] Hives    Crestor [Rosuvastatin Calcium] Other (See Comments)     Muscle aches and joint pain    Lopid [Gemfibrozil]      hyperglycemia    Invokana [Canagliflozin] Rash    Januvia [Sitagliptin] Nausea And Vomiting       Current Meds:   Scheduled Meds:    metoclopramide  10 mg IntraVENous Once    sodium chloride flush  5-40 mL IntraVENous 2 times per day    insulin glargine  28 Units SubCUTAneous BID    prednisoLONE acetate  1 drop Left Eye Daily    insulin lispro  0-6 Units SubCUTAneous TID WC    insulin lispro  0-3 Units SubCUTAneous Nightly    [Held by provider] metOLazone  5 mg Oral Daily    acyclovir  400 mg Oral BID    hydrALAZINE  50 mg Oral 3 times per day    sodium bicarbonate  650 mg Oral BID    bumetanide  2 mg IntraVENous BID    metoprolol tartrate  12.5 mg Oral BID     Continuous Infusions:    sodium chloride 25 mL (11/22/21 1057)    dextrose      dextrose       PRN Meds: hydrALAZINE, sodium chloride flush, sodium chloride, ondansetron **OR** ondansetron, acetaminophen **OR** acetaminophen, glucose, dextrose, glucagon (rDNA), dextrose, glucose, dextrose, glucagon (rDNA), dextrose    Data:     Past Medical History:   has a past medical history of Acute MI ([facility] Anemia, CAD (coronary artery disease), Cerebral artery occlusion with cerebral infarction ([facility] CHF (congestive heart failure) ([facility] Chronic back pain, Closed fracture of lumbar vertebra without mention of spinal cord injury, Displacement of intervertebral disc, site unspecified, without myelopathy, H/O cardiac catheterization, H/O cardiovascular stress test, H/O tilt table evaluation, H/O tilt table evaluation, History of 24 hour EKG monitoring, History of 24 hour EKG monitoring, History of blood transfusion, History of cardiovascular stress test, History of cardiovascular stress test, History of coronary artery stent placement, History of CVA (cerebrovascular accident) without residual deficits, History of echocardiogram, History of Holter monitoring, History of tilt table evaluation, Hyperlipidemia, Hypertension, Non critical Right Renal artery stenosis, native ([facility] Pacemaker, S/P cardiac cath, S/P coronary artery stent placement, SIRS (systemic inflammatory response syndrome) ([facility] and Type II or unspecified type diabetes mellitus without mention of --    AST 14  --   --   --   --   --   --   --   --   --   --    ALT 38  --   --   --   --   --   --   --   --   --   --    ALKPHOS 67  --   --   --   --   --   --   --   --   --   --    BILITOT 0.28*  --   --   --   --   --   --   --   --   --   --    POCGLU 90   < >  --    < > 153*  --  55* 124* 85 59* 116*    < > = values in this interval not displayed. ABG:No results found for: POCPH, PHART, PH, POCPCO2, RNW5YYO, PCO2, POCPO2, PO2ART, PO2, POCHCO3, WTC6HIA, HCO3, NBEA, PBEA, BEART, BE, THGBART, THB, ASP1HCE, JZPS8SUT, N7RIZLGT, O2SAT, FIO2  Lab Results   Component Value Date/Time    SPECIAL NOT REPORTED 05/05/2021 11:30 AM     Lab Results   Component Value Date/Time    CULTURE NO SIGNIFICANT GROWTH 05/05/2021 11:30 AM       Radiology:  IR TUNNELED CVC PLACE WO SQ PORT/PUMP > 5 YEARS    Result Date: 11/23/2021  Successful ultrasound and fluoroscopy guided tunneled catheter placement . Okay to use hemodialysis catheter.      US ABDOMEN LIMITED    Result Date: 11/19/2021  No significant ascites       Physical Examination:        General appearance:  alert, cooperative and no distress  Mental Status:  oriented to person, place and time and normal affect  Lungs:  clear to auscultation bilaterally, normal effort  Heart:  regular rate and rhythm, no murmur  Abdomen:  soft, nontender, nondistended, normal bowel sounds, no masses, hepatomegaly, splenomegaly  Extremities:  no edema, redness, tenderness in the calves  Skin:  no gross lesions, rashes, induration    Assessment:        Hospital Problems           Last Modified POA    * (Principal) Acute kidney injury superimposed on CKD ([facility] 11/21/2021 Yes    Overview Signed 10/2/2018  2:43 PM by Aravind Blandon MD     ssecondary to hemodynamic effects of loop diuretics and ace inhibitors, bbaseline 1.2-1.3 which peaked up to 1.8, resolving         Uncontrolled type 2 diabetes mellitus with hyperglycemia ([facility] 11/22/2021 Yes    Coronary atherosclerosis due to calcified coronary lesion 11/21/2021 Yes    Chronic kidney disease, stage III (moderate) ([address] 11/21/2021 Yes    Overview Signed 9/4/2018  1:18 PM by Germán Dumont MD     Secondary to diabetic nephrosclerosis. Baseline creatinine 1.4-1.6, renal function fluctuate volume status         Anemia in stage 4 chronic kidney disease ([address] 11/21/2021 Yes    Overview Signed 8/4/2020 10:29 AM by Germán Dumont MD     Also from suspected blood loss         Rash 11/21/2021 Yes    Cardiorenal syndrome with renal failure 11/17/2021 Yes    Pulmonary hypertension ([address] 11/22/2021 Yes    Acute on chronic diastolic (congestive) heart failure ([address] 11/22/2021 Yes                Plan:        1. Hydralazine as needed for controlling high blood pressure  2. Continue oral antihypertensives as before  3. Kidney biopsy today and another dialysis today per nephrology  4. Glycemic control, monitor hemoglobin post kidney biopsy  5.  We will plan for discharge tomorrow if cleared by nephrology and dialysis spot is arranged    Farzana Gregory MD  11/24/2021  4:35 PM

## 2021-11-24 NOTE — BRIEF OP NOTE
Brief Postoperative Note    Noy Ley  YOB: 1945  [id number]    Pre-operative Diagnosis: VIDAL     Post-operative Diagnosis: Same    Procedure: Kidney bx    Medication Given: fentanyl    Anesthesia: 1%Lidocaine     Surgeons/Assistants:  Gini Soliz MD    Estimated Blood Loss: Minimal    Complications: none    Technically successful bx of left kidney.   4 core samples were obtained and given to the onsite pathologist.     Electronically signed by Karyle Quirk, PA on 11/24/2021 at 2:53 PM

## 2021-11-24 NOTE — PROGRESS NOTES
SUBJECTIVE      Patient was seen and examined. Patient was seen and examined on hemodialysis. He underwent kidney biopsy this morning. Patient tolerated procedure fairly well. Today his dialysis will be without heparin and will not use heparin in his catheter at the end of dialysis. We will use sodium citrate  Patient continues to have a good urine output and put out close to 2.5 L  We will give him for 3 hours of dialysis without any fluid removal today        His a skin biopsy results are as follows  SUPERFICIAL PERIVASCULAR AND INTERSTITIAL DERMATITIS WITH   EOSINOPHILS AND EXTRAVASATED RED BLOOD CELLS (SEE COMMENT). Ultrasound of kidneys were unremarkable  Patient was supposed to go for kidney biopsy today however biopsy was postponed due to high systolic blood pressure  His most recent blood pressure is 160/80  -- Diagnosis Comment --   THE FINDINGS COULD BE CONSISTENT WITH EARLY URTICARIAL VASCULITIS (IF   INDIVIDUAL LESIONS LAST MORE THAN 24 HOURS), OR A SECONDARILY   TRAUMATIZED URTICARIAL ERUPTION (IF INDIVIDUAL LESIONS LAST LESS THAN   24 HOURS).      OBJECTIVE      CURRENT TEMPERATURE:  Temp: 98 °F (36.7 °C)  MAXIMUM TEMPERATURE OVER 24HRS:  Temp (24hrs), Av.8 °F (36.6 °C), Min:97.3 °F (36.3 °C), Max:98.2 °F (36.8 °C)    CURRENT RESPIRATORY RATE:  Resp: 13  CURRENT PULSE:  Pulse: 72  CURRENT BLOOD PRESSURE:  BP: (!) 157/71  24HR BLOOD PRESSURE RANGE:  Systolic (64LIL), YVZ:457 , Min:131 , LQV:121   ; Diastolic (19GDH), STR:74, Min:53, Max:76    24HR INTAKE/OUTPUT:      Intake/Output Summary (Last 24 hours) at 2021 1504  Last data filed at 2021 0254  Gross per 24 hour   Intake 2120 ml   Output 2050 ml   Net 70 ml     WEIGHT :  Patient Vitals for the past 96 hrs (Last 3 readings):   Weight   21 1330 185 lb 13.6 oz (84.3 kg)   21 1130 186 lb 15.2 oz (84.8 kg)   21 0600 194 lb 10.7 oz (88.3 kg)     PHYSICAL EXAM      GENERAL APPEARANCE: Awake and alert x3  SKIN: Warm to touch and no erythema  EYES: Pupils reactive to light  NECK:   No JVD or carotid bruit  PULMONARY: Bilateral air entry and clear  CADRDIOVASCULAR: S1 and S2 audible no S3   ABDOMEN: Soft and nontender bowel sounds are positive  EXTREMITIES: Trace edema    CURRENT MEDICATIONS      metoclopramide (REGLAN) injection 10 mg, Once  hydrALAZINE (APRESOLINE) injection 10 mg, Q4H PRN  sodium chloride flush 0.9 % injection 5-40 mL, 2 times per day  sodium chloride flush 0.9 % injection 5-40 mL, PRN  0.9 % sodium chloride infusion, PRN  ondansetron (ZOFRAN-ODT) disintegrating tablet 4 mg, Q8H PRN   Or  ondansetron (ZOFRAN) injection 4 mg, Q6H PRN  acetaminophen (TYLENOL) tablet 650 mg, Q6H PRN   Or  acetaminophen (TYLENOL) suppository 650 mg, Q6H PRN  insulin glargine (LANTUS) injection vial 28 Units, BID  prednisoLONE acetate (PRED FORTE) 1 % ophthalmic suspension 1 drop, Daily  glucose (GLUTOSE) 40 % oral gel 15 g, PRN  dextrose 50 % IV solution, PRN  glucagon (rDNA) injection 1 mg, PRN  dextrose 5 % solution, PRN  glucose (GLUTOSE) 40 % oral gel 15 g, PRN  dextrose 50 % IV solution, PRN  glucagon (rDNA) injection 1 mg, PRN  dextrose 5 % solution, PRN  insulin lispro (HUMALOG) injection vial 0-6 Units, TID WC  insulin lispro (HUMALOG) injection vial 0-3 Units, Nightly  [Held by provider] metOLazone (ZAROXOLYN) tablet 5 mg, Daily  acyclovir (ZOVIRAX) capsule 400 mg, BID  hydrALAZINE (APRESOLINE) tablet 50 mg, 3 times per day  sodium bicarbonate tablet 650 mg, BID  bumetanide (BUMEX) injection 2 mg, BID  metoprolol tartrate (LOPRESSOR) tablet 12.5 mg, BID      LABS      CBC:   Recent Labs     11/22/21  0533 11/23/21  0613 11/24/21  0811   WBC 7.2 8.3 10.5   RBC 2.74* 3.01* 3.16*   HGB 7.9* 8.6* 9.1*   HCT 25.0* 27.3* 29.0*   MCV 91.2 90.7 91.8   MCH 28.8 28.6 28.8   MCHC 31.6 31.5 31.4   RDW 17.2* 16.8* 16.7*    168 179   MPV 10.0 10.0 9.8      BMP:   Recent Labs     11/22/21  0533 11/23/21  0613 11/24/21  0832  142 143   K 4.1 4.1 4.0    105 104   CO2 21 20 23   BUN 85* 82* 54*   CREATININE 4.45* 4.45* 3.75*   GLUCOSE 98 118* 50*   CALCIUM 7.7* 7.7* 8.1*    PHOSPHORUS:    Recent Labs     11/23/21  0613 11/24/21  0811   PHOS 4.8* 4.6*     MAGNESIUM:   Recent Labs     11/22/21  0533 11/23/21  0613 11/24/21  0811   MG 1.9 1.8 1.6     ALBUMIN:   Recent Labs     11/22/21  0533 11/23/21  0613 11/24/21  0811   LABALBU 2.6* 2.8* 2.7*     IRON:    Lab Results   Component Value Date    IRON 48 09/09/2020     IRON SATURATION:    Lab Results   Component Value Date    LABIRON 20 09/09/2020     TIBC:    Lab Results   Component Value Date    TIBC 235 09/09/2020     FERRITIN:    Lab Results   Component Value Date    FERRITIN 199 05/10/2021     VALERIE:   Lab Results   Component Value Date    VALERIE NEGATIVE 11/11/2021       SPEP:   Lab Results   Component Value Date    PROT 5.4 11/22/2021    ALBCAL 3.9 09/11/2018    ALBPCT 62 09/11/2018    LABALPH 0.2 09/11/2018    LABALPH 0.7 09/11/2018    A1PCT 3 09/11/2018    A2PCT 11 09/11/2018    LABBETA 0.7 09/11/2018    BETAPCT 12 09/11/2018    GAMGLOB 0.8 09/11/2018    GGPCT 13 09/11/2018    PATH ELECTRONICALLY SIGNED. Rupesh Jessica M.D. 09/11/2018    PATH ELECTRONICALLY SIGNED. Rupesh Jessica M.D. 09/11/2018   C3:   Lab Results   Component Value Date    C3 108 11/21/2021     C4:   Lab Results   Component Value Date    C4 15 11/21/2021     MPO ANCA:   Lab Results   Component Value Date    MPO 15 11/11/2021    .   PR3 ANCA:    Lab Results   Component Value Date    PR3 1.1 11/11/2021     URINE SODIUM:    Lab Results   Component Value Date    NIDHI 62 11/21/2021      URINE POTASSIUM:    Lab Results   Component Value Date    KUR 25.3 11/12/2021   URINE OSMOLARITY:    Lab Results   Component Value Date    OSMOU 421 11/21/2021     URINE CREATININE:    Lab Results   Component Value Date    LABCREA 62.1 11/21/2021   URINALYSIS:  U/A:   Lab Results   Component Value Date    NITRU NEGATIVE 11/21/2021    COLORU Yellow 11/21/2021    PHUR 5.0 11/21/2021    WBCUA 0 TO 2 11/21/2021    RBCUA 50  11/21/2021    MUCUS NOT REPORTED 11/21/2021    TRICHOMONAS NOT REPORTED 11/21/2021    YEAST NOT REPORTED 11/21/2021    BACTERIA NOT REPORTED 11/21/2021    SPECGRAV 1.013 11/21/2021    LEUKOCYTESUR NEGATIVE 11/21/2021    UROBILINOGEN Normal 11/21/2021    BILIRUBINUR NEGATIVE 11/21/2021    GLUCOSEU 1+ 11/21/2021    KETUA NEGATIVE 11/21/2021    AMORPHOUS NOT REPORTED 11/21/2021     ANTIGBM:No results found for: GBMABIGG      RADIOLOGY      ASSESSMENT    1. Acute kidney injury on top of chronic kidney disease. And has good urine output. 2.  Chronic kidney disease under the care of Dr. Gladsy Chery. Baseline creatinine is 1.6 mg/dL  3. Coronary artery disease status post PCI  4. Longstanding type 2 diabetes  5. Status post kidney biopsy  PLAN    1. Continue to monitor blood pressure  2. Repeat hemoglobin and hematocrit every 8 hours for next 24 hours  3. Watch for bleeding  4. We will dialyze him for 3 hours today without any fluid removal  5. Will not use heparin during dialysis or after dialysis. 4.  We will follow with you    Please do not hesitate to call with questions.     Electronically signed by Jessica Mcfarlane MD on 11/24/2021 at 3:04 PM

## 2021-11-24 NOTE — PROGRESS NOTES
PHARMACY NOTE:    The electrolyte replacement protocol for potassium/magnesium has been discontinued per P&T guidelines because the patient has reduced renal function (CrCl < 30 mL/min). The patient's most recent potassium & magnesium levels are:  Recent Labs     11/22/21  0533 11/23/21  0613 11/24/21  0811   K 4.1 4.1 4.0   MG 1.9 1.8 1.6     Estimated Creatinine Clearance: 17 mL/min (A) (based on SCr of 3.75 mg/dL (H)). For patients with decreased renal function (below 30ml/min) needing potassium/magnesium supplementation, please order individual bolus doses with appropriate monitoring. Please contact the inpatient pharmacy with any concerns. Thank you.     Eleanor De La Rosa RPh 11/24/2021 9:24 AM

## 2021-11-24 NOTE — PROGRESS NOTES
[facility]  Progress Note                   Date:   11/24/2021  Patient name: Anirudh Guido  Date of admission:  11/21/2021  1:46 AM  MRN:   [id number]  YOB: 1945  PCP: Krala Pineda MD    Reason for Admission: Cardiorenal syndrome with renal failure [I13.10]    Subjective:       Clinical Changes /Abnormalities: Pt. Seen & examined in room with RN. No acute CV issues/concerns overnight. Labs,vitals, & tele reviewed. Currently NPO for kidney biopsy today at 1pm.    Review of Systems    Medications:   Scheduled Meds:   sodium chloride flush  5-40 mL IntraVENous 2 times per day    insulin glargine  28 Units SubCUTAneous BID    prednisoLONE acetate  1 drop Left Eye Daily    insulin lispro  0-6 Units SubCUTAneous TID WC    insulin lispro  0-3 Units SubCUTAneous Nightly    [Held by provider] metOLazone  5 mg Oral Daily    acyclovir  400 mg Oral BID    hydrALAZINE  50 mg Oral 3 times per day    sodium bicarbonate  650 mg Oral BID    bumetanide  2 mg IntraVENous BID    metoprolol tartrate  12.5 mg Oral BID     Continuous Infusions:   sodium chloride 25 mL (11/22/21 1057)    dextrose      dextrose       CBC:   Recent Labs     11/22/21  0533 11/23/21  0613 11/24/21  0811   WBC 7.2 8.3 10.5   HGB 7.9* 8.6* 9.1*    168 179     BMP:    Recent Labs     11/21/21  0905 11/22/21  0533 11/23/21  0613   * 136 142   K 5.0 4.1 4.1    103 105   CO2 19* 21 20   BUN 84* 85* 82*   CREATININE 3.69* 4.45* 4.45*   GLUCOSE 250* 98 118*     Hepatic:  Recent Labs     11/22/21  0533   AST 14   ALT 38   BILITOT 0.28*   ALKPHOS 67     Troponin: No results for input(s): TROPHS in the last 72 hours. BNP: No results for input(s): BNP in the last 72 hours. Lipids: No results for input(s): CHOL, HDL in the last 72 hours.     Invalid input(s): LDLCALCU  INR:   Recent Labs     11/22/21  0533   INR 0.9       Objective:   Vitals: BP (!) 131/53   Pulse 68   Temp 97.6 °F (36.4 °C) (Oral)   Resp 20   Ht 5' 9\" (1.753 m)   Wt 185 lb 13.6 oz (84.3 kg)   SpO2 95%   BMI 27.44 kg/m²   General appearance: alert and cooperative with exam  HEENT: Head: Normocephalic, no lesions, without obvious abnormality. Neck:no JVD, trachea midline, no adenopathy  Lungs: Clear to auscultation  Heart: Regular rate and rhythm, s1/s2 auscultated, no murmurs  Abdomen: soft, non-tender, bowel sounds active  Extremities: no edema  Neurologic: not done    ECHO 11/12/2021: EF 60%, LA is moderately dilated with a LA volume of 35 ml/m2, mild-moderate TR, moderate PHTN with PAP 42 mmHg, mild DD.      CATH 10/5/2021: Moderate three vessel disease involving the proximal LAD, mid LAD and a small, non-domant RCA. Normal LVEDP.      STRESS 9/20/2021: Small-moderate perfusion defect of mild intensity in the lateral, inferior and inferoapical regions during stress and rest imaging which is most consistent with artifact. EF 73%. Assessment / Acute Cardiac Problems:   1. VIDAL on CKD with worsening of symptoms and initiation of HD  2. Acute on chronic diastolic CHF secondary to above  3. H/O dysautinomia with PPM placed >20 years ago and battery replacement in 1987 per patient. Has not been replaced since and per patient has fractured lead. 4. CAD with recent cath 10/2021 as above  5. HTn  6.  HLP  7. DM2      Patient Active Problem List:     Vertigo, benign paroxysmal     Systolic murmur     History of MI (myocardial infarction)     History of colon polyps     Uncontrolled type 2 diabetes mellitus with hyperglycemia ([facility]     Coronary atherosclerosis due to calcified coronary lesion     Displacement of intervertebral disc, site unspecified, without myelopathy     History of CVA (cerebrovascular accident) without residual deficits     Dysautonomia (HCC)     Syncope, near     Cholecystitis     Chronotropic incompetence with autonomic dysfunction     Episodic lightheadedness     Accelerated hypertension     Ischemic chest BID  3. CAD stable. Recent cath as above. Continue PO BB. No ASA d/t anemia/bleeding issues. 4. Will follow distally. No plans for cardiac testing/procedures at this time.  Will plan to f/u with Dr. Tone Gautam upon discharge    Electronically signed by REY Lopez CNP on 11/24/2021 at 8:54 AM  [id number] [address].  [phone]

## 2021-11-24 NOTE — CARE COORDINATION
SW received notification from [address] [place] [address]. Patient has confirmed chair of MW 12pm beginning 11/26/21. Patient is financially and medically cleared. AVS updated. Will notify patient of information.

## 2021-11-25 LAB
ALBUMIN SERPL-MCNC: 2.7 G/DL (ref 3.5–5.2)
ANION GAP SERPL CALCULATED.3IONS-SCNC: 11 MMOL/L (ref 9–17)
ANION GAP SERPL CALCULATED.3IONS-SCNC: 14 MMOL/L (ref 9–17)
BUN BLDV-MCNC: 31 MG/DL (ref 8–23)
BUN BLDV-MCNC: 33 MG/DL (ref 8–23)
BUN/CREAT BLD: ABNORMAL (ref 9–20)
BUN/CREAT BLD: ABNORMAL (ref 9–20)
CALCIUM SERPL-MCNC: 7.8 MG/DL (ref 8.6–10.4)
CALCIUM SERPL-MCNC: 8 MG/DL (ref 8.6–10.4)
CHLORIDE BLD-SCNC: 101 MMOL/L (ref 98–107)
CHLORIDE BLD-SCNC: 102 MMOL/L (ref 98–107)
CO2: 24 MMOL/L (ref 20–31)
CO2: 25 MMOL/L (ref 20–31)
CREAT SERPL-MCNC: 3.49 MG/DL (ref 0.7–1.2)
CREAT SERPL-MCNC: 3.6 MG/DL (ref 0.7–1.2)
GFR AFRICAN AMERICAN: 20 ML/MIN
GFR AFRICAN AMERICAN: 21 ML/MIN
GFR NON-AFRICAN AMERICAN: 17 ML/MIN
GFR NON-AFRICAN AMERICAN: 17 ML/MIN
GFR SERPL CREATININE-BSD FRML MDRD: ABNORMAL ML/MIN/{1.73_M2}
GLUCOSE BLD-MCNC: 103 MG/DL (ref 75–110)
GLUCOSE BLD-MCNC: 116 MG/DL (ref 70–99)
GLUCOSE BLD-MCNC: 138 MG/DL (ref 75–110)
GLUCOSE BLD-MCNC: 158 MG/DL (ref 75–110)
GLUCOSE BLD-MCNC: 174 MG/DL (ref 70–99)
GLUCOSE BLD-MCNC: 283 MG/DL (ref 75–110)
GLUCOSE BLD-MCNC: 283 MG/DL (ref 75–110)
GLUCOSE BLD-MCNC: 330 MG/DL (ref 75–110)
HCT VFR BLD CALC: 28.6 % (ref 40.7–50.3)
HCT VFR BLD CALC: 29 % (ref 40.7–50.3)
HEMOGLOBIN: 8.5 G/DL (ref 13–17)
HEMOGLOBIN: 8.7 G/DL (ref 13–17)
MAGNESIUM: 1.5 MG/DL (ref 1.6–2.6)
MCH RBC QN AUTO: 28.3 PG (ref 25.2–33.5)
MCHC RBC AUTO-ENTMCNC: 30.4 G/DL (ref 28.4–34.8)
MCV RBC AUTO: 93.2 FL (ref 82.6–102.9)
NRBC AUTOMATED: 0 PER 100 WBC
PDW BLD-RTO: 16.5 % (ref 11.8–14.4)
PHOSPHORUS: 3.9 MG/DL (ref 2.5–4.5)
PLATELET # BLD: 176 K/UL (ref 138–453)
PMV BLD AUTO: 10 FL (ref 8.1–13.5)
POTASSIUM SERPL-SCNC: 4 MMOL/L (ref 3.7–5.3)
POTASSIUM SERPL-SCNC: 4.1 MMOL/L (ref 3.7–5.3)
RBC # BLD: 3.07 M/UL (ref 4.21–5.77)
SODIUM BLD-SCNC: 138 MMOL/L (ref 135–144)
SODIUM BLD-SCNC: 139 MMOL/L (ref 135–144)
WBC # BLD: 9.4 K/UL (ref 3.5–11.3)

## 2021-11-25 PROCEDURE — 82947 ASSAY GLUCOSE BLOOD QUANT: CPT

## 2021-11-25 PROCEDURE — 85027 COMPLETE CBC AUTOMATED: CPT

## 2021-11-25 PROCEDURE — 99232 SBSQ HOSP IP/OBS MODERATE 35: CPT | Performed by: INTERNAL MEDICINE

## 2021-11-25 PROCEDURE — 80069 RENAL FUNCTION PANEL: CPT

## 2021-11-25 PROCEDURE — 2580000003 HC RX 258: Performed by: NURSE PRACTITIONER

## 2021-11-25 PROCEDURE — 2500000003 HC RX 250 WO HCPCS: Performed by: INTERNAL MEDICINE

## 2021-11-25 PROCEDURE — 80048 BASIC METABOLIC PNL TOTAL CA: CPT

## 2021-11-25 PROCEDURE — 6370000000 HC RX 637 (ALT 250 FOR IP): Performed by: INTERNAL MEDICINE

## 2021-11-25 PROCEDURE — 36415 COLL VENOUS BLD VENIPUNCTURE: CPT

## 2021-11-25 PROCEDURE — 6360000002 HC RX W HCPCS: Performed by: NURSE PRACTITIONER

## 2021-11-25 PROCEDURE — 85018 HEMOGLOBIN: CPT

## 2021-11-25 PROCEDURE — 6370000000 HC RX 637 (ALT 250 FOR IP): Performed by: STUDENT IN AN ORGANIZED HEALTH CARE EDUCATION/TRAINING PROGRAM

## 2021-11-25 PROCEDURE — 2060000000 HC ICU INTERMEDIATE R&B

## 2021-11-25 PROCEDURE — 6360000002 HC RX W HCPCS: Performed by: INTERNAL MEDICINE

## 2021-11-25 PROCEDURE — 83735 ASSAY OF MAGNESIUM: CPT

## 2021-11-25 PROCEDURE — 85014 HEMATOCRIT: CPT

## 2021-11-25 RX ORDER — METOCLOPRAMIDE HYDROCHLORIDE 5 MG/ML
10 INJECTION INTRAMUSCULAR; INTRAVENOUS EVERY 6 HOURS PRN
Status: DISCONTINUED | OUTPATIENT
Start: 2021-11-25 | End: 2021-11-26 | Stop reason: HOSPADM

## 2021-11-25 RX ORDER — BUMETANIDE 1 MG/1
2 TABLET ORAL 2 TIMES DAILY
Status: DISCONTINUED | OUTPATIENT
Start: 2021-11-25 | End: 2021-11-26 | Stop reason: HOSPADM

## 2021-11-25 RX ADMIN — HYDRALAZINE HYDROCHLORIDE 50 MG: 50 TABLET, FILM COATED ORAL at 06:07

## 2021-11-25 RX ADMIN — ONDANSETRON 4 MG: 2 INJECTION INTRAMUSCULAR; INTRAVENOUS at 12:34

## 2021-11-25 RX ADMIN — METOCLOPRAMIDE 10 MG: 5 INJECTION, SOLUTION INTRAMUSCULAR; INTRAVENOUS at 14:54

## 2021-11-25 RX ADMIN — SODIUM BICARBONATE 650 MG: 648 TABLET ORAL at 09:47

## 2021-11-25 RX ADMIN — SODIUM BICARBONATE 650 MG: 648 TABLET ORAL at 20:38

## 2021-11-25 RX ADMIN — ACYCLOVIR 400 MG: 200 CAPSULE ORAL at 20:39

## 2021-11-25 RX ADMIN — ONDANSETRON 4 MG: 2 INJECTION INTRAMUSCULAR; INTRAVENOUS at 02:59

## 2021-11-25 RX ADMIN — METOPROLOL TARTRATE 12.5 MG: 25 TABLET ORAL at 20:39

## 2021-11-25 RX ADMIN — BUMETANIDE 2 MG: 1 TABLET ORAL at 20:39

## 2021-11-25 RX ADMIN — PREDNISOLONE ACETATE 1 DROP: 10 SUSPENSION/ DROPS OPHTHALMIC at 09:48

## 2021-11-25 RX ADMIN — SODIUM CHLORIDE, PRESERVATIVE FREE 10 ML: 5 INJECTION INTRAVENOUS at 09:48

## 2021-11-25 RX ADMIN — INSULIN LISPRO 1 UNITS: 100 INJECTION, SOLUTION INTRAVENOUS; SUBCUTANEOUS at 12:34

## 2021-11-25 RX ADMIN — INSULIN LISPRO 2 UNITS: 100 INJECTION, SOLUTION INTRAVENOUS; SUBCUTANEOUS at 22:17

## 2021-11-25 RX ADMIN — HYDRALAZINE HYDROCHLORIDE 50 MG: 50 TABLET, FILM COATED ORAL at 14:54

## 2021-11-25 RX ADMIN — METOPROLOL TARTRATE 12.5 MG: 25 TABLET ORAL at 09:47

## 2021-11-25 RX ADMIN — SODIUM CHLORIDE, PRESERVATIVE FREE 10 ML: 5 INJECTION INTRAVENOUS at 20:39

## 2021-11-25 RX ADMIN — HYDRALAZINE HYDROCHLORIDE 50 MG: 50 TABLET, FILM COATED ORAL at 22:33

## 2021-11-25 RX ADMIN — ACYCLOVIR 400 MG: 200 CAPSULE ORAL at 09:47

## 2021-11-25 RX ADMIN — BUMETANIDE 2 MG: 0.25 INJECTION INTRAMUSCULAR; INTRAVENOUS at 12:04

## 2021-11-25 RX ADMIN — INSULIN LISPRO 4 UNITS: 100 INJECTION, SOLUTION INTRAVENOUS; SUBCUTANEOUS at 16:56

## 2021-11-25 ASSESSMENT — PAIN SCALES - GENERAL: PAINLEVEL_OUTOF10: 0

## 2021-11-25 NOTE — PROGRESS NOTES
Dialysis Post Treatment Note  Vitals:    11/24/21 1818   BP: (!) 152/67   Pulse: 72   Resp: 14   Temp: 97.4 °F (36.3 °C)   SpO2: 100%     Pre-Weight = 82 kg  Post-weight = Weight: 181 lb 3.5 oz (82.2 kg)  Total Liters Processed = Total Liters Processed (l/min): 45.4 l/min  Rinseback Volume (mL) = Rinseback Volume (ml): 300 ml  Net Removal (mL) = NET Removed (ml): 0 ml  Patient's dry weight=  Type of access used= Lt Cath  Length of treatment= 3 hours    Pt tolerated HD tx , pt seen by Nephrologist

## 2021-11-25 NOTE — PROGRESS NOTES
[facility]  Office: [address], DO, Lucero Ethan, DO, [place] Damaris, DO, Gianluca Astorgal Blood, DO, Olive Tafoya MD, Jacy Chao MD, Ascencion Rausch MD, Ragini Waldron MD, Aye Reyes MD, Estela Jim MD, Malu Bustos MD, Darrell Rogers, DO, Raymond Brown, DO, Lobito Triplett MD,  Chante Serra, DO, Maribel Desai MD, Fer Espino MD, Daniel Hanks MD, Marah Palmer MD, Alexis Beltran MD, Beatrice Camacho MD, Vida Pan MD, Neto Cazares [facility], [facility], [facility], Areli Aguilera, [facility], Ida Bro, CNS, Sundar Bacon, CNP, Delmi Rock, CNP, Nash Shirley, CNP, Robert Dempsey, CNP, Hector Anaya, CNP, Domonique Ledesma PA-C, Tammy Brown [facility], Radha Hodge, CNP, Brendan Fitzpatrick, CNP, Mahin Arias, CNP, Zhang Subramanian, CNP, Mya Han, CNP, Hasmukh Zamora, CNP, Ricardo Harvey, [address] [place] [address]    Progress Note    11/25/2021    12:15 PM    Name:   Jama Vargas  MRN:     [id number]     [place]:      [de-identified]   Room:   [address] [place] [address] Day:  4  Admit Date:  11/21/2021  1:46 AM    PCP:   Angeles Salazar MD  Code Status:  Full Code    Subjective:     C/C: Chest pain  Interval History Status:  Patient had kidney biopsy yesterday 11/24/2021 and also had dialysis    Denies hematuria or pain  Still feels little fatigued  No nausea or vomiting      Brief History:   As documented in the medical record:  \"Janelle Jimenez is a 68 y.o. Non- / non  male who presents with No chief complaint on file.   and is admitted to the hospital for the management of Acute kidney injury superimposed on CKD (HCC) acute on chronic heart failure.     Patient was a transfer to [place] [address] from [facility] for acute on chronic renal failure and acute exacerbation of CHF.   Patient was originally [id number]/12/21 to outlying emergency room secondary to elevated potassium and chest pain.  During his initial evaluation in the emergency room he was noted to have a BUN of 86 and a creatinine of 3.64 with a potassium of 6.6 sodium of 128.  He was given 30 g of Kayexalate, IV insulin and sodium bicarb.  With an improvement of the potassium with a downward trend of 5.4 sodium of 134 and creatinine 3.05 and BUN of 84 he was noted to have a hemoglobin of 7.5 with a repeat of 6.8 and transfused 1 unit packed red blood cells.  Patient was seen and evaluated by the nephrology group as well as cardiology.  Patient was started on a bicarb drip  Throughout the hospital stay there was somewhat of an improvement in the renal function and heart failure however over time kidney function continued to decline and nephrology recommended transfer to [facility] so he can be followed by his personal nephrologist.  Due to the bed availability there was a great delay from the time the request was made to the time that patient arrived to the facility, with that being said BUN 91 creatinine 3.72.  Hemogram unremarkable with no acute leukocytosis with a WBC of 9 hemoglobin 8.0 hematocrit 25.8.  Patient is not on any chemical anticoagulation given the drop in hemoglobin earlier in the stay as well as the purpuric rash response he received from Lovenox. \"     The intitial assessment/plan included:  \"Consultation nephrology for further evaluation and management of the VIDAL and assistance with diuresis  Diuretics for heart failure per nephrology  Continue with aggressive medical management of risk factors for coronary artery disease management.   Utilize insulin sliding scale and receive home Lantus dose and resume when medications are confirmed  Continue to monitor H&H, patient had a possible  reaction to iron infusion per documentation at outlying facility  EPC cuffs for DVT prophylaxis as we cannot use chemical anticoagulation at this time given the concern for allergy which should be worked up at the discretion of team members, maximize ambulation  GI prophylaxis  Resume home medications once med list is updated in the computer\"     Per prior notes:    \"Patient got recent iron transfusion none developed rash with itching which is worse on the lower extremity.  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 \"      Database has included:  Results for Klarissa Johnson (MRN [id number]) as of 11/22/2021 16:43    Ref. Range 6/25/2020 11:25 9/20/2021 11:50 11/11/2021 17:18   Pro-BNP Latest Ref Range: <300 pg/mL 588 (H) 1,565 (H) 3,702 (H)      Echo 11/12:  Results for Klarissa Johnson (MRN [id number]) as of 11/22/2021 16:43    Ref. Range 6/25/2020 11:25 9/20/2021 11:50 11/11/2021 17:18   Pro-BNP Latest Ref Range: <300 pg/mL 588 (H) 1,565 (H) 3,702 (H)            Review of Systems:     Constitutional:  negative for chills, fevers, sweats,+ fatigue  Respiratory:  negative for cough, +dyspnea on exertion, denies wheezing  Cardiovascular:  negative for chest pain, chest pressure/discomfort, + lower extremity edema, denies palpitations  Gastrointestinal:  negative for abdominal pain, constipation, diarrhea, nausea, vomiting  Neurological:  negative for dizziness, headache    Medications: Allergies:     Allergies   Allergen Reactions    Coreg [Carvedilol] Shortness Of Breath and Nausea And Vomiting    Lipitor [Atorvastatin] Other (See Comments)     Muscle aches and joint pain    Aldactone [Spironolactone] Hives    Crestor [Rosuvastatin Calcium] Other (See Comments)     Muscle aches and joint pain    Lopid [Gemfibrozil]      hyperglycemia    Invokana [Canagliflozin] Rash    Januvia [Sitagliptin] Nausea And Vomiting       Current Meds:   Scheduled Meds:    metoclopramide  10 mg IntraVENous Once    sodium chloride flush  5-40 mL IntraVENous 2 times per day    insulin glargine  28 Units SubCUTAneous BID    prednisoLONE acetate  1 drop Left Eye Daily    insulin lispro  0-6 Units SubCUTAneous TID WC    insulin lispro  0-3 Units SubCUTAneous Nightly    [Held by provider] metOLazone  5 mg Oral Daily    acyclovir  400 mg Oral BID    hydrALAZINE  50 mg Oral 3 times per day    sodium bicarbonate  650 mg Oral BID    bumetanide  2 mg IntraVENous BID    metoprolol tartrate  12.5 mg Oral BID     Continuous Infusions:    sodium chloride 25 mL (11/22/21 1057)    dextrose      dextrose       PRN Meds: hydrALAZINE, sodium chloride flush, sodium chloride, ondansetron **OR** ondansetron, acetaminophen **OR** acetaminophen, glucose, dextrose, glucagon (rDNA), dextrose, glucose, dextrose, glucagon (rDNA), dextrose    Data:     Past Medical History:   has a past medical history of Acute MI ([facility] Anemia, CAD (coronary artery disease), Cerebral artery occlusion with cerebral infarction ([facility] CHF (congestive heart failure) ([facility] Chronic back pain, Closed fracture of lumbar vertebra without mention of spinal cord injury, Displacement of intervertebral disc, site unspecified, without myelopathy, H/O cardiac catheterization, H/O cardiovascular stress test, H/O tilt table evaluation, H/O tilt table evaluation, History of 24 hour EKG monitoring, History of 24 hour EKG monitoring, History of blood transfusion, History of cardiovascular stress test, History of cardiovascular stress test, History of coronary artery stent placement, History of CVA (cerebrovascular accident) without residual deficits, History of echocardiogram, History of Holter monitoring, History of tilt table evaluation, Hyperlipidemia, Hypertension, Non critical Right Renal artery stenosis, native ([facility] Pacemaker, S/P cardiac cath, S/P coronary artery stent placement, SIRS (systemic inflammatory response syndrome) ([facility] and Type II or unspecified type diabetes mellitus without mention of complication, not stated as uncontrolled. Social History:   reports that he quit smoking about 41 years ago.  His smoking use included cigarettes. He has a 12.00 pack-year smoking history. He has never used smokeless tobacco. He reports that he does not drink alcohol and does not use drugs. Family History:   Family History   Problem Relation Age of Onset    Cancer Mother         breast    Cancer Father         lung       Vitals:  BP (!) 135/55   Pulse 74   Temp 98 °F (36.7 °C) (Oral)   Resp 18   Ht 5' 9\" (1.753 m)   Wt 181 lb 3.5 oz (82.2 kg)   SpO2 95%   BMI 26.76 kg/m²   Temp (24hrs), Av °F (36.7 °C), Min:97.4 °F (36.3 °C), Max:98.5 °F (36.9 °C)    Recent Labs     21  2004 21  0255 21  0800 21  1202   POCGLU 269* 138* 103 158*       I/O (24Hr): Intake/Output Summary (Last 24 hours) at 2021 1215  Last data filed at 2021 3942  Gross per 24 hour   Intake --   Output 500 ml   Net -500 ml       Labs:  Hematology:  Recent Labs     21  6843 21  5865 21  0811 21  0811 21  2241 21  0717 21  1038   WBC 8.3  --  10.5  --   --  9.4  --    RBC 3.01*  --  3.16*  --   --  3.07*  --    HGB 8.6*   < > 9.1*   < > 8.9* 8.7* 8.5*   HCT 27.3*   < > 29.0*   < > 28.3* 28.6* 29.0*   MCV 90.7  --  91.8  --   --  93.2  --    MCH 28.6  --  28.8  --   --  28.3  --    MCHC 31.5  --  31.4  --   --  30.4  --    RDW 16.8*  --  16.7*  --   --  16.5*  --      --  179  --   --  176  --    MPV 10.0  --  9.8  --   --  10.0  --     < > = values in this interval not displayed.      Chemistry:  Recent Labs     21  0613 21  0811 21  0717    143 138   K 4.1 4.0 4.1    104 102   CO2    GLUCOSE 118* 50* 116*   BUN 82* 54* 31*   CREATININE 4.45* 3.75* 3.49*   MG 1.8 1.6 1.5*   ANIONGAP 17 16 11   LABGLOM 13* 16* 17*   GFRAA 16* 19* 21*   CALCIUM 7.7* 8.1* 7.8*   CAION 1.00*  --   --    PHOS 4.8* 4.6* 3.9     Recent Labs     21  9778 21  0641 21  0811 21  0855 21  1701 21  1730 21  2004 11/25/21  0255 11/25/21  0717 11/25/21  0800 11/25/21  1202   LABALBU 2.8*  --  2.7*  --   --   --   --   --  2.7*  --   --    POCGLU  --    < >  --    < > 177* 195* 269* 138*  --  103 158*    < > = values in this interval not displayed. ABG:No results found for: POCPH, PHART, PH, POCPCO2, AJX9PPL, PCO2, POCPO2, PO2ART, PO2, POCHCO3, DFW4VDW, HCO3, NBEA, PBEA, BEART, BE, THGBART, THB, OYM5FSX, IHRC9TOZ, Y8HVJSJS, O2SAT, FIO2  Lab Results   Component Value Date/Time    SPECIAL NOT REPORTED 05/05/2021 11:30 AM     Lab Results   Component Value Date/Time    CULTURE NO SIGNIFICANT GROWTH 05/05/2021 11:30 AM       Radiology:  IR TUNNELED CVC PLACE WO SQ PORT/PUMP > 5 YEARS    Result Date: 11/23/2021  Successful ultrasound and fluoroscopy guided tunneled catheter placement . Okay to use hemodialysis catheter.      US ABDOMEN LIMITED    Result Date: 11/19/2021  No significant ascites       Physical Examination:        General appearance:  alert, cooperative and no distress  Mental Status:  oriented to person, place and time and normal affect  Lungs:  clear to auscultation bilaterally, normal effort  Heart:  regular rate and rhythm, no murmur  Abdomen:  soft, nontender, nondistended, normal bowel sounds, no masses, hepatomegaly, splenomegaly  Extremities:  no edema, redness, tenderness in the calves  Skin:  no gross lesions, rashes, induration    Assessment:        Hospital Problems           Last Modified POA    * (Principal) Acute kidney injury superimposed on CKD ([facility] 11/21/2021 Yes    Overview Signed 10/2/2018  2:43 PM by Ilda Mao MD     ssecondary to hemodynamic effects of loop diuretics and ace inhibitors, bbaseline 1.2-1.3 which peaked up to 1.8, resolving         Uncontrolled type 2 diabetes mellitus with hyperglycemia ([facility] 11/22/2021 Yes    Coronary atherosclerosis due to calcified coronary lesion 11/21/2021 Yes    Chronic kidney disease, stage III (moderate) ([facility] 11/21/2021 Yes    Overview Signed 9/4/2018  1:18 PM by Olivia Fisher MD     Secondary to diabetic nephrosclerosis. Baseline creatinine 1.4-1.6, renal function fluctuate volume status         Anemia in stage 4 chronic kidney disease ([facility] 11/21/2021 Yes    Overview Signed 8/4/2020 10:29 AM by Olivia Fisher MD     Also from suspected blood loss         Rash 11/21/2021 Yes    Cardiorenal syndrome with renal failure 11/17/2021 Yes    Pulmonary hypertension ([facility] 11/22/2021 Yes    Acute on chronic diastolic (congestive) heart failure ([facility] 11/22/2021 Yes                Plan:        1. Hydralazine as needed for controlling high blood pressure  2. Continue oral antihypertensives as before  3.  We will plan for discharge  if cleared by nephrology and dialysis spot is arranged    Rudi Gordillo MD  11/25/2021  12:15 PM

## 2021-11-25 NOTE — PROGRESS NOTES
Nephrology Progress Note    SUBJECTIVE      Patient was seen and examined. No acute issues overnight. This am \"he feels a little rough\", by that he means fatigued, no energy, denies cp/sob, on NC 2L   Blood pressure under good control. Had HD yesterday (second tx)  3 hours tx, no fluid removed per order. He underwent left renal bx yesterday, awaiting path results. Hgb stable no evidence clinically of retroperitoneal bleed post bx. His a skin biopsy results are as follows  SUPERFICIAL PERIVASCULAR AND INTERSTITIAL DERMATITIS WITH   EOSINOPHILS AND EXTRAVASATED RED BLOOD CELLS (SEE COMMENT). Ultrasound of kidneys were unremarkable    -- Diagnosis Comment --   THE FINDINGS COULD BE CONSISTENT WITH EARLY URTICARIAL VASCULITIS (IF   INDIVIDUAL LESIONS LAST MORE THAN 24 HOURS), OR A SECONDARILY   TRAUMATIZED URTICARIAL ERUPTION (IF INDIVIDUAL LESIONS LAST LESS THAN   24 HOURS).      OBJECTIVE      CURRENT TEMPERATURE:  Temp: 97.9 °F (36.6 °C)  MAXIMUM TEMPERATURE OVER 24HRS:  Temp (24hrs), Av °F (36.7 °C), Min:97.4 °F (36.3 °C), Max:98.5 °F (36.9 °C)    CURRENT RESPIRATORY RATE:  Resp: 20  CURRENT PULSE:  Pulse: 73  CURRENT BLOOD PRESSURE:  BP: (!) 146/59  24HR BLOOD PRESSURE RANGE:  Systolic (93AZQ), AIV:428 , Min:132 , WWX:221   ; Diastolic (67ZNI), YQP:85, Min:55, Max:76    24HR INTAKE/OUTPUT:      Intake/Output Summary (Last 24 hours) at 2021 0803  Last data filed at 2021 1027  Gross per 24 hour   Intake    Output 500 ml   Net -500 ml     WEIGHT :  Patient Vitals for the past 96 hrs (Last 3 readings):   Weight   21 1818 181 lb 3.5 oz (82.2 kg)   21 1506 180 lb 12.4 oz (82 kg)   21 1330 185 lb 13.6 oz (84.3 kg)     PHYSICAL EXAM      GENERAL APPEARANCE: Awake and alert x3  SKIN: Warm to touch and no erythema  EYES: Pupils reactive to light  NECK:   No JVD or carotid bruit  PULMONARY: Bilateral air entry and clear on NC 2L  CADRDIOVASCULAR: S1 and S2 audible no S3 ABDOMEN: Soft and nontender +bs  EXTREMITIES: no pitting edema    CURRENT MEDICATIONS      metoclopramide (REGLAN) injection 10 mg, Once  hydrALAZINE (APRESOLINE) injection 10 mg, Q4H PRN  sodium chloride flush 0.9 % injection 5-40 mL, 2 times per day  sodium chloride flush 0.9 % injection 5-40 mL, PRN  0.9 % sodium chloride infusion, PRN  ondansetron (ZOFRAN-ODT) disintegrating tablet 4 mg, Q8H PRN   Or  ondansetron (ZOFRAN) injection 4 mg, Q6H PRN  acetaminophen (TYLENOL) tablet 650 mg, Q6H PRN   Or  acetaminophen (TYLENOL) suppository 650 mg, Q6H PRN  insulin glargine (LANTUS) injection vial 28 Units, BID  prednisoLONE acetate (PRED FORTE) 1 % ophthalmic suspension 1 drop, Daily  glucose (GLUTOSE) 40 % oral gel 15 g, PRN  dextrose 50 % IV solution, PRN  glucagon (rDNA) injection 1 mg, PRN  dextrose 5 % solution, PRN  glucose (GLUTOSE) 40 % oral gel 15 g, PRN  dextrose 50 % IV solution, PRN  glucagon (rDNA) injection 1 mg, PRN  dextrose 5 % solution, PRN  insulin lispro (HUMALOG) injection vial 0-6 Units, TID WC  insulin lispro (HUMALOG) injection vial 0-3 Units, Nightly  [Held by provider] metOLazone (ZAROXOLYN) tablet 5 mg, Daily  acyclovir (ZOVIRAX) capsule 400 mg, BID  hydrALAZINE (APRESOLINE) tablet 50 mg, 3 times per day  sodium bicarbonate tablet 650 mg, BID  bumetanide (BUMEX) injection 2 mg, BID  metoprolol tartrate (LOPRESSOR) tablet 12.5 mg, BID      LABS      CBC:   Recent Labs     11/23/21  0613 11/23/21  0613 11/24/21  0811 11/24/21  2241 11/25/21  0717   WBC 8.3  --  10.5  --  9.4   RBC 3.01*  --  3.16*  --  3.07*   HGB 8.6*   < > 9.1* 8.9* 8.7*   HCT 27.3*   < > 29.0* 28.3* 28.6*   MCV 90.7  --  91.8  --  93.2   MCH 28.6  --  28.8  --  28.3   MCHC 31.5  --  31.4  --  30.4   RDW 16.8*  --  16.7*  --  16.5*     --  179  --  176   MPV 10.0  --  9.8  --  10.0    < > = values in this interval not displayed.       BMP:   Recent Labs     11/23/21  0613 11/24/21  0811    143   K 4.1 4.0   CL 105 104   CO2 20 23   BUN 82* 54*   CREATININE 4.45* 3.75*   GLUCOSE 118* 50*   CALCIUM 7.7* 8.1*    PHOSPHORUS:    Recent Labs     11/23/21  0613 11/24/21  0811   PHOS 4.8* 4.6*     MAGNESIUM:   Recent Labs     11/23/21  0613 11/24/21  0811   MG 1.8 1.6     ALBUMIN:   Recent Labs     11/23/21  0613 11/24/21  0811   LABALBU 2.8* 2.7*     IRON:    Lab Results   Component Value Date    IRON 48 09/09/2020     IRON SATURATION:    Lab Results   Component Value Date    LABIRON 20 09/09/2020     TIBC:    Lab Results   Component Value Date    TIBC 235 09/09/2020     FERRITIN:    Lab Results   Component Value Date    FERRITIN 199 05/10/2021     VALERIE:   Lab Results   Component Value Date    VALERIE NEGATIVE 11/11/2021       SPEP:   Lab Results   Component Value Date    PROT 5.4 11/22/2021    ALBCAL 3.9 09/11/2018    ALBPCT 62 09/11/2018    LABALPH 0.2 09/11/2018    LABALPH 0.7 09/11/2018    A1PCT 3 09/11/2018    A2PCT 11 09/11/2018    LABBETA 0.7 09/11/2018    BETAPCT 12 09/11/2018    GAMGLOB 0.8 09/11/2018    GGPCT 13 09/11/2018    PATH ELECTRONICALLY SIGNED. Roberto Zamora M.D. 09/11/2018    PATH ELECTRONICALLY SIGNED. Roberto Zamora M.D. 09/11/2018   C3:   Lab Results   Component Value Date    C3 108 11/21/2021     C4:   Lab Results   Component Value Date    C4 15 11/21/2021     MPO ANCA:   Lab Results   Component Value Date    MPO 15 11/11/2021    .   PR3 ANCA:    Lab Results   Component Value Date    PR3 1.1 11/11/2021     URINE SODIUM:    Lab Results   Component Value Date    NIDHI 62 11/21/2021      URINE POTASSIUM:    Lab Results   Component Value Date    KUR 25.3 11/12/2021   URINE OSMOLARITY:    Lab Results   Component Value Date    OSMOU 421 11/21/2021     URINE CREATININE:    Lab Results   Component Value Date    LABCREA 62.1 11/21/2021   URINALYSIS:  U/A:   Lab Results   Component Value Date    NITRU NEGATIVE 11/21/2021    COLORU Yellow 11/21/2021    PHUR 5.0 11/21/2021    WBCUA 0 TO 2 11/21/2021    RBCUA 50 TO 100 11/21/2021    MUCUS NOT REPORTED 11/21/2021    TRICHOMONAS NOT REPORTED 11/21/2021    YEAST NOT REPORTED 11/21/2021    BACTERIA NOT REPORTED 11/21/2021    SPECGRAV 1.013 11/21/2021    LEUKOCYTESUR NEGATIVE 11/21/2021    UROBILINOGEN Normal 11/21/2021    BILIRUBINUR NEGATIVE 11/21/2021    GLUCOSEU 1+ 11/21/2021    KETUA NEGATIVE 11/21/2021    AMORPHOUS NOT REPORTED 11/21/2021     ANTIGBM:No results found for: [address] as available  ASSESSMENT    1. Acute kidney injury on top of chronic kidney disease, with good urine output. 2.  Chronic kidney disease under the care of Dr. Celeste Cook. Baseline creatinine is 1.6 mg/dL  3. Coronary artery disease status post PCI  4. Longstanding type 2 diabetes  5. Status post kidney biopsy yesterday, stable hgb post bx   PLAN    1. No need for HD today, will d/c serial h/h post renal bx yesterday. 2.  BMP and CBC ordered for am.   3.  Following. Will discuss with Dr. Beatrice Santacruz. Please do not hesitate to call with questions.     Electronically signed by PONCHO Prabhakar on 11/25/2021 at 8:03 AM

## 2021-11-25 NOTE — PROGRESS NOTES
[facility]  Progress Note                   Date:   11/25/2021  Patient name: Toña Irwin  Date of admission:  11/21/2021  1:46 AM  MRN:   [id number]  YOB: 1945  PCP: Preet Andersen MD    Reason for Admission: Cardiorenal syndrome with renal failure [I13.10]    Subjective:       Clinical Changes /Abnormalities: Pt. Seen & examined in room after discussion with RN . No acute CV issues/concerns overnight or post renal biospy. Labs,vitals, & tele reviewed. Review of Systems    Medications:   Scheduled Meds:   metoclopramide  10 mg IntraVENous Once    sodium chloride flush  5-40 mL IntraVENous 2 times per day    insulin glargine  28 Units SubCUTAneous BID    prednisoLONE acetate  1 drop Left Eye Daily    insulin lispro  0-6 Units SubCUTAneous TID WC    insulin lispro  0-3 Units SubCUTAneous Nightly    [Held by provider] metOLazone  5 mg Oral Daily    acyclovir  400 mg Oral BID    hydrALAZINE  50 mg Oral 3 times per day    sodium bicarbonate  650 mg Oral BID    bumetanide  2 mg IntraVENous BID    metoprolol tartrate  12.5 mg Oral BID     Continuous Infusions:   sodium chloride 25 mL (11/22/21 1057)    dextrose      dextrose       CBC:   Recent Labs     11/23/21  0613 11/23/21  0613 11/24/21  0811 11/24/21  2241 11/25/21  0717   WBC 8.3  --  10.5  --  9.4   HGB 8.6*   < > 9.1* 8.9* 8.7*     --  179  --  176    < > = values in this interval not displayed. BMP:    Recent Labs     11/23/21  0613 11/24/21  0811 11/25/21  0717    143 138   K 4.1 4.0 4.1    104 102   CO2 20 23 25   BUN 82* 54* 31*   CREATININE 4.45* 3.75* 3.49*   GLUCOSE 118* 50* 116*     Hepatic:  No results for input(s): AST, ALT, ALB, BILITOT, ALKPHOS in the last 72 hours. Troponin: No results for input(s): TROPHS in the last 72 hours. BNP: No results for input(s): BNP in the last 72 hours. Lipids: No results for input(s): CHOL, HDL in the last 72 hours.     Invalid input(s): LDLCALCU  INR:   No results for input(s): INR in the last 72 hours. Objective:   Vitals: BP (!) 135/55   Pulse 74   Temp 98 °F (36.7 °C) (Oral)   Resp 18   Ht 5' 9\" (1.753 m)   Wt 181 lb 3.5 oz (82.2 kg)   SpO2 95%   BMI 26.76 kg/m²   General appearance: alert and cooperative with exam  HEENT: Head: Normocephalic, no lesions, without obvious abnormality. Neck:no JVD, trachea midline, no adenopathy  Lungs: Clear to auscultation  Heart: Regular rate and rhythm, s1/s2 auscultated, no murmurs  Abdomen: soft, non-tender, bowel sounds active  Extremities: no edema  Neurologic: not done    ECHO 11/12/2021: EF 60%, LA is moderately dilated with a LA volume of 35 ml/m2, mild-moderate TR, moderate PHTN with PAP 42 mmHg, mild DD.      CATH 10/5/2021: Moderate three vessel disease involving the proximal LAD, mid LAD and a small, non-domant RCA. Normal LVEDP.      STRESS 9/20/2021: Small-moderate perfusion defect of mild intensity in the lateral, inferior and inferoapical regions during stress and rest imaging which is most consistent with artifact. EF 73%. Assessment / Acute Cardiac Problems:   1. VIDAL on CKD with worsening of symptoms and initiation of HD  2. Acute on chronic diastolic CHF secondary to above  3. H/O dysautinomia with PPM placed >20 years ago and battery replacement in 1987 per patient. Has not been replaced since and per patient has fractured lead. 4. CAD with recent cath 10/2021 as above  5. HTn  6.  HLP  7. DM2    Patient Active Problem List:     Vertigo, benign paroxysmal     Systolic murmur     History of MI (myocardial infarction)     History of colon polyps     Uncontrolled type 2 diabetes mellitus with hyperglycemia ([facility] [address]     Coronary atherosclerosis due to calcified coronary lesion     Displacement of intervertebral disc, site unspecified, without myelopathy     History of CVA (cerebrovascular accident) without residual deficits     Dysautonomia (HCC)     Syncope, near Cholecystitis     Chronotropic incompetence with autonomic dysfunction     Episodic lightheadedness     Accelerated hypertension     Ischemic chest pain (HCC)     Abnormal cardiovascular stress test     Chronic kidney disease, stage III (moderate) (HCC)     Controlled type 2 diabetes mellitus with diabetic nephropathy, with long-term current use of insulin (HCC)     Abnormal tilt table test     Cervical stenosis of spinal canal     Coronary artery disease involving native coronary artery of native heart without angina pectoris     S/P drug eluting coronary stent placement-OM1 4/10/17     Hyperlipidemia     Chronic diastolic HF (heart failure), NYHA class 3 (HCC)     Angina, class II (HCC)     Medication side effect, initial encounter     Acute kidney injury superimposed on CKD ([address]     Non critical Right Renal artery stenosis, native (HCC)     Anemia in stage 4 chronic kidney disease (HCC)     BPH with obstruction/lower urinary tract symptoms     Elevated PSA     Gross hematuria     Chest pain     Chronic anemia     Acute coronary syndrome with high troponin (HCC)     Iron deficiency anemia secondary to inadequate dietary iron intake     Iron malabsorption     Rash     Skin lesion of left lower extremity     Urticaria     Hyperkalemia     Moderate malnutrition (HCC)     Cardiorenal syndrome, stage 1-4 or unspecified chronic kidney disease, with heart failure (HCC)     Cardiorenal syndrome with renal failure     Pulmonary hypertension (HCC)     Acute on chronic diastolic (congestive) heart failure ([facility] [address]      Plan of Treatment:   1. Pt. Stable from CV standpoint. All prior notes and history reviewed  2. No plans for further in patient cardiac testings. Continue present medications and f/u with Dr. Ketan Trotter as OP in 1-2 weeks upon discharge.     Electronically signed by REY Carcamo CNP on 11/25/2021 at 10:11 AM  [address]  [phone]

## 2021-11-25 NOTE — PLAN OF CARE
Problem: Pain:  Goal: Pain level will decrease  Description: Pain level will decrease  11/25/2021 1547 by Fan Rivera RN  Outcome: Ongoing     Problem: Falls - Risk of:  Goal: Will remain free from falls  Description: Will remain free from falls  11/25/2021 1547 by Fan Rivera RN  Outcome: Ongoing     Problem: Skin Integrity:  Goal: Absence of new skin breakdown  Description: Absence of new skin breakdown  11/25/2021 1547 by Fan Rivera RN  Outcome: Ongoing     Problem: Fluid Volume:  Goal: Ability to maintain a balanced intake and output will improve  Description: Ability to maintain a balanced intake and output will improve  11/25/2021 1547 by Fan Rivera RN  Outcome: Ongoing

## 2021-11-25 NOTE — PROGRESS NOTES
Nephrology Progress Note    SUBJECTIVE      Patient was seen and examined. No acute issues overnight. This am \"he feels a little rough\", by that he means fatigued, no energy, denies cp/sob, on NC 2L   Blood pressure under good control. Had HD yesterday 3 hours tx, no fluid removed per order. He underwent left renal bx yesterday, awaiting path results. Hgb stable no evidence clinically of retroperitoneal bleed post bx. His a skin biopsy results are as follows  SUPERFICIAL PERIVASCULAR AND INTERSTITIAL DERMATITIS WITH   EOSINOPHILS AND EXTRAVASATED RED BLOOD CELLS (SEE COMMENT). Ultrasound of kidneys were unremarkable    -- Diagnosis Comment --   THE FINDINGS COULD BE CONSISTENT WITH EARLY URTICARIAL VASCULITIS (IF   INDIVIDUAL LESIONS LAST MORE THAN 24 HOURS), OR A SECONDARILY   TRAUMATIZED URTICARIAL ERUPTION (IF INDIVIDUAL LESIONS LAST LESS THAN   24 HOURS).      OBJECTIVE      CURRENT TEMPERATURE:  Temp: 97.9 °F (36.6 °C)  MAXIMUM TEMPERATURE OVER 24HRS:  Temp (24hrs), Av °F (36.7 °C), Min:97.4 °F (36.3 °C), Max:98.5 °F (36.9 °C)    CURRENT RESPIRATORY RATE:  Resp: 20  CURRENT PULSE:  Pulse: 73  CURRENT BLOOD PRESSURE:  BP: (!) 146/59  24HR BLOOD PRESSURE RANGE:  Systolic (97JKN), HFC:618 , Min:132 , HJE:802   ; Diastolic (74KUU), ORP:89, Min:55, Max:76    24HR INTAKE/OUTPUT:      Intake/Output Summary (Last 24 hours) at 2021 0803  Last data filed at 2021 6948  Gross per 24 hour   Intake    Output 500 ml   Net -500 ml     WEIGHT :  Patient Vitals for the past 96 hrs (Last 3 readings):   Weight   21 1818 181 lb 3.5 oz (82.2 kg)   21 1506 180 lb 12.4 oz (82 kg)   21 1330 185 lb 13.6 oz (84.3 kg)     PHYSICAL EXAM      GENERAL APPEARANCE: Awake and alert x3  SKIN: Warm to touch and no erythema  EYES: Pupils reactive to light  NECK:   No JVD or carotid bruit  PULMONARY: Bilateral air entry and clear on NC 2L  CADRDIOVASCULAR: S1 and S2 audible no S3   ABDOMEN: Soft and nontender +bs  EXTREMITIES: no pitting edema    CURRENT MEDICATIONS      metoclopramide (REGLAN) injection 10 mg, Once  hydrALAZINE (APRESOLINE) injection 10 mg, Q4H PRN  sodium chloride flush 0.9 % injection 5-40 mL, 2 times per day  sodium chloride flush 0.9 % injection 5-40 mL, PRN  0.9 % sodium chloride infusion, PRN  ondansetron (ZOFRAN-ODT) disintegrating tablet 4 mg, Q8H PRN   Or  ondansetron (ZOFRAN) injection 4 mg, Q6H PRN  acetaminophen (TYLENOL) tablet 650 mg, Q6H PRN   Or  acetaminophen (TYLENOL) suppository 650 mg, Q6H PRN  insulin glargine (LANTUS) injection vial 28 Units, BID  prednisoLONE acetate (PRED FORTE) 1 % ophthalmic suspension 1 drop, Daily  glucose (GLUTOSE) 40 % oral gel 15 g, PRN  dextrose 50 % IV solution, PRN  glucagon (rDNA) injection 1 mg, PRN  dextrose 5 % solution, PRN  glucose (GLUTOSE) 40 % oral gel 15 g, PRN  dextrose 50 % IV solution, PRN  glucagon (rDNA) injection 1 mg, PRN  dextrose 5 % solution, PRN  insulin lispro (HUMALOG) injection vial 0-6 Units, TID WC  insulin lispro (HUMALOG) injection vial 0-3 Units, Nightly  [Held by provider] metOLazone (ZAROXOLYN) tablet 5 mg, Daily  acyclovir (ZOVIRAX) capsule 400 mg, BID  hydrALAZINE (APRESOLINE) tablet 50 mg, 3 times per day  sodium bicarbonate tablet 650 mg, BID  bumetanide (BUMEX) injection 2 mg, BID  metoprolol tartrate (LOPRESSOR) tablet 12.5 mg, BID      LABS      CBC:   Recent Labs     11/23/21  0613 11/23/21  0613 11/24/21  0811 11/24/21  2241 11/25/21  0717   WBC 8.3  --  10.5  --  9.4   RBC 3.01*  --  3.16*  --  3.07*   HGB 8.6*   < > 9.1* 8.9* 8.7*   HCT 27.3*   < > 29.0* 28.3* 28.6*   MCV 90.7  --  91.8  --  93.2   MCH 28.6  --  28.8  --  28.3   MCHC 31.5  --  31.4  --  30.4   RDW 16.8*  --  16.7*  --  16.5*     --  179  --  176   MPV 10.0  --  9.8  --  10.0    < > = values in this interval not displayed.       BMP:   Recent Labs     11/23/21  0613 11/24/21  0811    143   K 4.1 4.0    104 CO2 20 23   BUN 82* 54*   CREATININE 4.45* 3.75*   GLUCOSE 118* 50*   CALCIUM 7.7* 8.1*    PHOSPHORUS:    Recent Labs     11/23/21  0613 11/24/21  0811   PHOS 4.8* 4.6*     MAGNESIUM:   Recent Labs     11/23/21  0613 11/24/21  0811   MG 1.8 1.6     ALBUMIN:   Recent Labs     11/23/21  0613 11/24/21  0811   LABALBU 2.8* 2.7*     IRON:    Lab Results   Component Value Date    IRON 48 09/09/2020     IRON SATURATION:    Lab Results   Component Value Date    LABIRON 20 09/09/2020     TIBC:    Lab Results   Component Value Date    TIBC 235 09/09/2020     FERRITIN:    Lab Results   Component Value Date    FERRITIN 199 05/10/2021     VALERIE:   Lab Results   Component Value Date    VALERIE NEGATIVE 11/11/2021       SPEP:   Lab Results   Component Value Date    PROT 5.4 11/22/2021    ALBCAL 3.9 09/11/2018    ALBPCT 62 09/11/2018    LABALPH 0.2 09/11/2018    LABALPH 0.7 09/11/2018    A1PCT 3 09/11/2018    A2PCT 11 09/11/2018    LABBETA 0.7 09/11/2018    BETAPCT 12 09/11/2018    GAMGLOB 0.8 09/11/2018    GGPCT 13 09/11/2018    PATH ELECTRONICALLY SIGNED. Jose Hartley M.D. 09/11/2018    PATH ELECTRONICALLY SIGNED. Jose Hartley M.D. 09/11/2018   C3:   Lab Results   Component Value Date    C3 108 11/21/2021     C4:   Lab Results   Component Value Date    C4 15 11/21/2021     MPO ANCA:   Lab Results   Component Value Date    MPO 15 11/11/2021    .   PR3 ANCA:    Lab Results   Component Value Date    PR3 1.1 11/11/2021     URINE SODIUM:    Lab Results   Component Value Date    NIDHI 62 11/21/2021      URINE POTASSIUM:    Lab Results   Component Value Date    KUR 25.3 11/12/2021   URINE OSMOLARITY:    Lab Results   Component Value Date    OSMOU 421 11/21/2021     URINE CREATININE:    Lab Results   Component Value Date    LABCREA 62.1 11/21/2021   URINALYSIS:  U/A:   Lab Results   Component Value Date    NITRU NEGATIVE 11/21/2021    COLORU Yellow 11/21/2021    PHUR 5.0 11/21/2021    WBCUA 0 TO 2 11/21/2021    RBCUA 50  11/21/2021    MUCUS NOT REPORTED 11/21/2021    TRICHOMONAS NOT REPORTED 11/21/2021    YEAST NOT REPORTED 11/21/2021    BACTERIA NOT REPORTED 11/21/2021    SPECGRAV 1.013 11/21/2021    LEUKOCYTESUR NEGATIVE 11/21/2021    UROBILINOGEN Normal 11/21/2021    BILIRUBINUR NEGATIVE 11/21/2021    GLUCOSEU 1+ 11/21/2021    KETUA NEGATIVE 11/21/2021    AMORPHOUS NOT REPORTED 11/21/2021     ANTIGBM:No results found for: [address] as available  ASSESSMENT    1. Acute kidney injury on top of chronic kidney disease, with good urine output. 2.  Chronic kidney disease under the care of Dr. Evangelista Church. Baseline creatinine is 1.6 mg/dL  3. Coronary artery disease status post PCI  4. Longstanding type 2 diabetes  5. Status post kidney biopsy yesterday, stable hgb post bx   PLAN    1. No need for HD today, will d/c serial h/h post renal bx yesterday. 2.  BMP and CBC ordered for am.   3.  Following. Will discuss with Dr. Dimitri Steiner. Please do not hesitate to call with questions.     Electronically signed by PONCHO Montoya on 11/25/2021 at 8:03 AM

## 2021-11-26 VITALS
SYSTOLIC BLOOD PRESSURE: 151 MMHG | BODY MASS INDEX: 26.32 KG/M2 | WEIGHT: 177.69 LBS | DIASTOLIC BLOOD PRESSURE: 62 MMHG | HEIGHT: 69 IN | OXYGEN SATURATION: 93 % | RESPIRATION RATE: 14 BRPM | TEMPERATURE: 97.5 F | HEART RATE: 82 BPM

## 2021-11-26 LAB
ABSOLUTE EOS #: 0.08 K/UL (ref 0–0.44)
ABSOLUTE IMMATURE GRANULOCYTE: 0.07 K/UL (ref 0–0.3)
ABSOLUTE LYMPH #: 1.07 K/UL (ref 1.1–3.7)
ABSOLUTE MONO #: 0.45 K/UL (ref 0.1–1.2)
ANION GAP SERPL CALCULATED.3IONS-SCNC: 13 MMOL/L (ref 9–17)
BASOPHILS # BLD: 0 % (ref 0–2)
BASOPHILS ABSOLUTE: 0.03 K/UL (ref 0–0.2)
BUN BLDV-MCNC: 41 MG/DL (ref 8–23)
BUN/CREAT BLD: ABNORMAL (ref 9–20)
CALCIUM SERPL-MCNC: 7.7 MG/DL (ref 8.6–10.4)
CHLORIDE BLD-SCNC: 100 MMOL/L (ref 98–107)
CO2: 24 MMOL/L (ref 20–31)
CREAT SERPL-MCNC: 4.42 MG/DL (ref 0.7–1.2)
DIFFERENTIAL TYPE: ABNORMAL
EOSINOPHILS RELATIVE PERCENT: 1 % (ref 1–4)
FERRITIN: 1453 UG/L (ref 30–400)
GFR AFRICAN AMERICAN: 16 ML/MIN
GFR NON-AFRICAN AMERICAN: 13 ML/MIN
GFR SERPL CREATININE-BSD FRML MDRD: ABNORMAL ML/MIN/{1.73_M2}
GFR SERPL CREATININE-BSD FRML MDRD: ABNORMAL ML/MIN/{1.73_M2}
GLUCOSE BLD-MCNC: 183 MG/DL (ref 75–110)
GLUCOSE BLD-MCNC: 192 MG/DL (ref 70–99)
GLUCOSE BLD-MCNC: 209 MG/DL (ref 75–110)
HCT VFR BLD CALC: 27.1 % (ref 40.7–50.3)
HEMOGLOBIN: 8.4 G/DL (ref 13–17)
IMMATURE GRANULOCYTES: 1 %
IRON SATURATION: 15 % (ref 20–55)
IRON: 22 UG/DL (ref 59–158)
LYMPHOCYTES # BLD: 12 % (ref 24–43)
MCH RBC QN AUTO: 28.3 PG (ref 25.2–33.5)
MCHC RBC AUTO-ENTMCNC: 31 G/DL (ref 28.4–34.8)
MCV RBC AUTO: 91.2 FL (ref 82.6–102.9)
MONOCYTES # BLD: 5 % (ref 3–12)
NRBC AUTOMATED: 0 PER 100 WBC
PDW BLD-RTO: 16.1 % (ref 11.8–14.4)
PLATELET # BLD: 141 K/UL (ref 138–453)
PLATELET ESTIMATE: ABNORMAL
PMV BLD AUTO: 10.1 FL (ref 8.1–13.5)
POTASSIUM SERPL-SCNC: 4.2 MMOL/L (ref 3.7–5.3)
RBC # BLD: 2.97 M/UL (ref 4.21–5.77)
RBC # BLD: ABNORMAL 10*6/UL
SEG NEUTROPHILS: 81 % (ref 36–65)
SEGMENTED NEUTROPHILS ABSOLUTE COUNT: 6.98 K/UL (ref 1.5–8.1)
SODIUM BLD-SCNC: 137 MMOL/L (ref 135–144)
SURGICAL PATHOLOGY REPORT: NORMAL
TOTAL IRON BINDING CAPACITY: 144 UG/DL (ref 250–450)
UNSATURATED IRON BINDING CAPACITY: 122 UG/DL (ref 112–347)
WBC # BLD: 8.7 K/UL (ref 3.5–11.3)
WBC # BLD: ABNORMAL 10*3/UL

## 2021-11-26 PROCEDURE — 5A1D70Z PERFORMANCE OF URINARY FILTRATION, INTERMITTENT, LESS THAN 6 HOURS PER DAY: ICD-10-PCS | Performed by: INTERNAL MEDICINE

## 2021-11-26 PROCEDURE — 80048 BASIC METABOLIC PNL TOTAL CA: CPT

## 2021-11-26 PROCEDURE — 82728 ASSAY OF FERRITIN: CPT

## 2021-11-26 PROCEDURE — 6370000000 HC RX 637 (ALT 250 FOR IP): Performed by: STUDENT IN AN ORGANIZED HEALTH CARE EDUCATION/TRAINING PROGRAM

## 2021-11-26 PROCEDURE — 83540 ASSAY OF IRON: CPT

## 2021-11-26 PROCEDURE — 90935 HEMODIALYSIS ONE EVALUATION: CPT | Performed by: INTERNAL MEDICINE

## 2021-11-26 PROCEDURE — 90935 HEMODIALYSIS ONE EVALUATION: CPT

## 2021-11-26 PROCEDURE — 99232 SBSQ HOSP IP/OBS MODERATE 35: CPT | Performed by: INTERNAL MEDICINE

## 2021-11-26 PROCEDURE — 6360000002 HC RX W HCPCS: Performed by: INTERNAL MEDICINE

## 2021-11-26 PROCEDURE — 82947 ASSAY GLUCOSE BLOOD QUANT: CPT

## 2021-11-26 PROCEDURE — 6370000000 HC RX 637 (ALT 250 FOR IP): Performed by: NURSE PRACTITIONER

## 2021-11-26 PROCEDURE — 2580000003 HC RX 258: Performed by: NURSE PRACTITIONER

## 2021-11-26 PROCEDURE — 94618 PULMONARY STRESS TESTING: CPT

## 2021-11-26 PROCEDURE — 83550 IRON BINDING TEST: CPT

## 2021-11-26 PROCEDURE — 36415 COLL VENOUS BLD VENIPUNCTURE: CPT

## 2021-11-26 PROCEDURE — 6370000000 HC RX 637 (ALT 250 FOR IP): Performed by: INTERNAL MEDICINE

## 2021-11-26 PROCEDURE — 85025 COMPLETE CBC W/AUTO DIFF WBC: CPT

## 2021-11-26 RX ORDER — BUMETANIDE 2 MG/1
2 TABLET ORAL 2 TIMES DAILY
Qty: 30 TABLET | Refills: 3 | Status: SHIPPED | OUTPATIENT
Start: 2021-11-26 | End: 2021-12-13 | Stop reason: ALTCHOICE

## 2021-11-26 RX ORDER — HYDRALAZINE HYDROCHLORIDE 50 MG/1
50 TABLET, FILM COATED ORAL EVERY 8 HOURS SCHEDULED
Qty: 90 TABLET | Refills: 3 | Status: SHIPPED | OUTPATIENT
Start: 2021-11-26 | End: 2021-12-13

## 2021-11-26 RX ORDER — CETIRIZINE HYDROCHLORIDE 10 MG/1
5 TABLET ORAL DAILY
Qty: 30 TABLET | Refills: 0 | Status: SHIPPED | OUTPATIENT
Start: 2021-11-26 | End: 2021-12-26

## 2021-11-26 RX ORDER — SODIUM BICARBONATE 650 MG/1
650 TABLET ORAL 2 TIMES DAILY
Qty: 60 TABLET | Refills: 1 | Status: SHIPPED | OUTPATIENT
Start: 2021-11-26 | End: 2021-12-26

## 2021-11-26 RX ADMIN — METOCLOPRAMIDE 10 MG: 5 INJECTION, SOLUTION INTRAMUSCULAR; INTRAVENOUS at 02:21

## 2021-11-26 RX ADMIN — PREDNISOLONE ACETATE 1 DROP: 10 SUSPENSION/ DROPS OPHTHALMIC at 08:16

## 2021-11-26 RX ADMIN — INSULIN LISPRO 2 UNITS: 100 INJECTION, SOLUTION INTRAVENOUS; SUBCUTANEOUS at 15:28

## 2021-11-26 RX ADMIN — ONDANSETRON 4 MG: 4 TABLET, ORALLY DISINTEGRATING ORAL at 17:36

## 2021-11-26 RX ADMIN — SODIUM CHLORIDE, PRESERVATIVE FREE 10 ML: 5 INJECTION INTRAVENOUS at 08:16

## 2021-11-26 RX ADMIN — BUMETANIDE 2 MG: 1 TABLET ORAL at 15:22

## 2021-11-26 RX ADMIN — ACYCLOVIR 400 MG: 200 CAPSULE ORAL at 15:21

## 2021-11-26 RX ADMIN — METOPROLOL TARTRATE 12.5 MG: 25 TABLET ORAL at 15:22

## 2021-11-26 RX ADMIN — HYDRALAZINE HYDROCHLORIDE 50 MG: 50 TABLET, FILM COATED ORAL at 05:57

## 2021-11-26 RX ADMIN — HYDRALAZINE HYDROCHLORIDE 50 MG: 50 TABLET, FILM COATED ORAL at 15:22

## 2021-11-26 RX ADMIN — EPOETIN ALFA-EPBX 6000 UNITS: 10000 INJECTION, SOLUTION INTRAVENOUS; SUBCUTANEOUS at 15:45

## 2021-11-26 RX ADMIN — SODIUM BICARBONATE 650 MG: 648 TABLET ORAL at 15:22

## 2021-11-26 ASSESSMENT — PAIN SCALES - GENERAL
PAINLEVEL_OUTOF10: 0
PAINLEVEL_OUTOF10: 0

## 2021-11-26 NOTE — PROGRESS NOTES
CLINICAL PHARMACY NOTE: MEDS TO BEDS    Total # of Prescriptions Filled: 5   The following medications were delivered to the patient:  · Metoprolol tartrate 25 mg tabs  · Bumetanide 2 mg tabs  · Hydralazine 50 mg tabs  · Cetirizine 10 mg tabs  · Sodium Bicarbonate 650 mg tabs    Additional Documentation:

## 2021-11-26 NOTE — CARE COORDINATION
Called [facility] to advise pt still in hospital  Pt can start on Mon 11/29 at [facility]. His chair time is MWF @ 12:00p  Updated pt's wife and son on above, who were at bedside.   Wife advised to have pt there half hour early for first tx to do paperwork

## 2021-11-26 NOTE — PROGRESS NOTES
Home Oxygen Evaluation    Home Oxygen Evaluation completed. Patient is on RA.   Resting SpO2 = 95%  Resting SpO2 on room air = 95%    SpO2 on room air with exercise = 95%  SpO2 on oxygen as above with exercise = 95%      Clarissa Rivera  3:45 PM

## 2021-11-26 NOTE — PROGRESS NOTES
SUBJECTIVE      Patient was seen and examined. The patient had dialysis yesterday and felt reasonnably well, except a little bit tired. He also had a kidney biopsy yesterday. No chest pain or shortness of breath at rest.  He is awaiting his meal.  He has a fair appetite. Tunneled hemodialysis catheter is in place. No nausea or vomiting. Labs are reviewed. .       Electrolytes are normal with BUN 33 and creatinine 3.6 after dialysis yesterday with hemoglobin 8.5 and calcium 8. His skin biopsy results are as follows  SUPERFICIAL PERIVASCULAR AND INTERSTITIAL DERMATITIS WITH   EOSINOPHILS AND EXTRAVASATED RED BLOOD CELLS (SEE COMMENT). Ultrasound of kidneys were unremarkable  Patient was supposed to go for kidney biopsy today however biopsy was postponed due to high systolic blood pressure  His most recent blood pressure is 160/80  -- Diagnosis Comment --   THE FINDINGS COULD BE CONSISTENT WITH EARLY URTICARIAL VASCULITIS (IF   INDIVIDUAL LESIONS LAST MORE THAN 24 HOURS), OR A SECONDARILY   TRAUMATIZED URTICARIAL ERUPTION (IF INDIVIDUAL LESIONS LAST LESS THAN   24 HOURS).      OBJECTIVE      CURRENT TEMPERATURE:  Temp: 98.7 °F (37.1 °C)  MAXIMUM TEMPERATURE OVER 24HRS:  Temp (24hrs), Av.2 °F (36.8 °C), Min:97.8 °F (36.6 °C), Max:98.7 °F (37.1 °C)    CURRENT RESPIRATORY RATE:  Resp: 14  CURRENT PULSE:  Pulse: 74  CURRENT BLOOD PRESSURE:  BP: (!) 149/60  24HR BLOOD PRESSURE RANGE:  Systolic (42QQK), KCL:000 , Min:123 , PVM:228   ; Diastolic (64UON), OOX:13, Min:50, Max:60    24HR INTAKE/OUTPUT:      Intake/Output Summary (Last 24 hours) at 20212  Last data filed at 2021 1758  Gross per 24 hour   Intake 510 ml   Output 1000 ml   Net -490 ml     WEIGHT :  Patient Vitals for the past 96 hrs (Last 3 readings):   Weight   21 1818 181 lb 3.5 oz (82.2 kg)   21 1506 180 lb 12.4 oz (82 kg)   21 1330 185 lb 13.6 oz (84.3 kg)     PHYSICAL EXAM      GENERAL APPEARANCE: Awake 10. 5  --   --  9.4  --    RBC 3.01*  --  3.16*  --   --  3.07*  --    HGB 8.6*   < > 9.1*   < > 8.9* 8.7* 8.5*   HCT 27.3*   < > 29.0*   < > 28.3* 28.6* 29.0*   MCV 90.7  --  91.8  --   --  93.2  --    MCH 28.6  --  28.8  --   --  28.3  --    MCHC 31.5  --  31.4  --   --  30.4  --    RDW 16.8*  --  16.7*  --   --  16.5*  --      --  179  --   --  176  --    MPV 10.0  --  9.8  --   --  10.0  --     < > = values in this interval not displayed. BMP:   Recent Labs     11/24/21  0811 11/25/21  0717 11/25/21  1038    138 139   K 4.0 4.1 4.0    102 101   CO2 23 25 24   BUN 54* 31* 33*   CREATININE 3.75* 3.49* 3.60*   GLUCOSE 50* 116* 174*   CALCIUM 8.1* 7.8* 8.0*    PHOSPHORUS:    Recent Labs     11/23/21  0613 11/24/21  0811 11/25/21  0717   PHOS 4.8* 4.6* 3.9     MAGNESIUM:   Recent Labs     11/23/21  0613 11/24/21  0811 11/25/21  0717   MG 1.8 1.6 1.5*     ALBUMIN:   Recent Labs     11/23/21  0613 11/24/21  0811 11/25/21  0717   LABALBU 2.8* 2.7* 2.7*     IRON:    Lab Results   Component Value Date    IRON 48 09/09/2020     IRON SATURATION:    Lab Results   Component Value Date    LABIRON 20 09/09/2020     TIBC:    Lab Results   Component Value Date    TIBC 235 09/09/2020     FERRITIN:    Lab Results   Component Value Date    FERRITIN 199 05/10/2021     VALERIE:   Lab Results   Component Value Date    VALERIE NEGATIVE 11/11/2021       SPEP:   Lab Results   Component Value Date    PROT 5.4 11/22/2021    ALBCAL 3.9 09/11/2018    ALBPCT 62 09/11/2018    LABALPH 0.2 09/11/2018    LABALPH 0.7 09/11/2018    A1PCT 3 09/11/2018    A2PCT 11 09/11/2018    LABBETA 0.7 09/11/2018    BETAPCT 12 09/11/2018    GAMGLOB 0.8 09/11/2018    GGPCT 13 09/11/2018    PATH ELECTRONICALLY SIGNED. Maria Del Rosario Ellis M.D. 09/11/2018    PATH ELECTRONICALLY SIGNED.  Maria Del Rosario Ellis M.D. 09/11/2018   C3:   Lab Results   Component Value Date    C3 108 11/21/2021     C4:   Lab Results   Component Value Date    C4 15 11/21/2021     MPO ANCA:   Lab Results   Component Value Date    MPO 15 11/11/2021    . PR3 ANCA:    Lab Results   Component Value Date    PR3 1.1 11/11/2021     URINE SODIUM:    Lab Results   Component Value Date    NIDHI 62 11/21/2021      URINE POTASSIUM:    Lab Results   Component Value Date    KUR 25.3 11/12/2021   URINE OSMOLARITY:    Lab Results   Component Value Date    OSMOU 421 11/21/2021     URINE CREATININE:    Lab Results   Component Value Date    LABCREA 62.1 11/21/2021   URINALYSIS:  U/A:   Lab Results   Component Value Date    NITRU NEGATIVE 11/21/2021    COLORU Yellow 11/21/2021    PHUR 5.0 11/21/2021    WBCUA 0 TO 2 11/21/2021    RBCUA 50  11/21/2021    MUCUS NOT REPORTED 11/21/2021    TRICHOMONAS NOT REPORTED 11/21/2021    YEAST NOT REPORTED 11/21/2021    BACTERIA NOT REPORTED 11/21/2021    SPECGRAV 1.013 11/21/2021    LEUKOCYTESUR NEGATIVE 11/21/2021    UROBILINOGEN Normal 11/21/2021    BILIRUBINUR NEGATIVE 11/21/2021    GLUCOSEU 1+ 11/21/2021    KETUA NEGATIVE 11/21/2021    AMORPHOUS NOT REPORTED 11/21/2021     ANTIGBM:No results found for: GBMABIGG      RADIOLOGY      ASSESSMENT    1. Acute kidney injury on top of chronic kidney disease. Fair urine output. It is likely secondary to cholesterol emboli syndrome versus ATN versus AIN versus other  2. Chronic kidney disease under the care of Dr. Donna Camejo. Baseline creatinine is 1.6 mg/dL  3. Coronary artery disease status post PCI  4. Longstanding type 2 diabetes  5. Status post kidney biopsy  PLAN    1. Dialysis likely tomorrow  2. Arrange outpatient hemodialysis spot in [place], [place]  3. Follow labs as ordered    Please do not hesitate to call with questions.     Electronically signed by Asa Oconnor MD on 11/25/2021 at 10:32 PM

## 2021-11-26 NOTE — DISCHARGE SUMMARY
history:  As documented in the medical record:  \"Janelle Jimenez is a 68 y.o. Non- / non  male who presents with No chief complaint on file.   and is admitted to the hospital for the management of Acute kidney injury superimposed on CKD (HCC) acute on chronic heart failure.     Patient was a transfer to [place] [address] from [facility] for acute on chronic renal failure and acute exacerbation of CHF.   Patient was originally qwsaviqd05/12/21 to outlying emergency room secondary to elevated potassium and chest pain.  During his initial evaluation in the emergency room he was noted to have a BUN of 86 and a creatinine of 3.64 with a potassium of 6.6 sodium of 128.  He was given 30 g of Kayexalate, IV insulin and sodium bicarb.  With an improvement of the potassium with a downward trend of 5.4 sodium of 134 and creatinine 3.05 and BUN of 84 he was noted to have a hemoglobin of 7.5 with a repeat of 6.8 and transfused 1 unit packed red blood cells.  Patient was seen and evaluated by the nephrology group as well as cardiology.  Patient was started on a bicarb drip  Throughout the hospital stay there was somewhat of an improvement in the renal function and heart failure however over time kidney function continued to decline and nephrology recommended transfer to [place] [address] so he can be followed by his personal nephrologist.  Due to the bed availability there was a great delay from the time the request was made to the time that patient arrived to the facility, with that being said BUN 91 creatinine 3.72.  Hemogram unremarkable with no acute leukocytosis with a WBC of 9 hemoglobin 8.0 hematocrit 25.8.  Patient is not on any chemical anticoagulation given the drop in hemoglobin earlier in the stay as well as the purpuric rash response he received from Lovenox. \"     The intitial assessment/plan included:  \"Consultation nephrology for further evaluation and management of the VIDAL and assistance with diuresis  Diuretics for heart failure per nephrology  Continue with aggressive medical management of risk factors for coronary artery disease management. Utilize insulin sliding scale and receive home Lantus dose and resume when medications are confirmed  Continue to monitor H&H, patient had a possible  reaction to iron infusion per documentation at outlying facility  EPC cuffs for DVT prophylaxis as we cannot use chemical anticoagulation at this time given the concern for allergy which should be worked up at the discretion of team members, maximize ambulation  GI prophylaxis  Resume home medications once med list is updated in the computer\"     Per prior notes:    \"Patient got recent iron transfusion none developed rash with itching which is worse on the lower extremity.  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 \"      Database has included:  Results for Tano Crumbly (MRN [id number]) as of 11/22/2021 16:43    Ref. Range 6/25/2020 11:25 9/20/2021 11:50 11/11/2021 17:18   Pro-BNP Latest Ref Range: <300 pg/mL 588 (H) 1,565 (H) 3,702 (H)      Echo 11/12:  Results for Tano Crumbly (MRN [id number]) as of 11/22/2021 16:43    Ref. Range 6/25/2020 11:25 9/20/2021 11:50 11/11/2021 17:18   Pro-BNP Latest Ref Range: <300 pg/mL 588 (H) 1,565 (H) 3,702 (H)                Hospital Course:   Patient had kidney biopsy  11/24/2021 and also had dialysis    Denies hematuria or pain  Still feels little fatigued  No nausea or vomiting  Seen today during dialysis  Has been on oxygen in hospital which he was not having at home, and dialysis room saturating 97% on room air, after coming to floor he was evaluated for home oxygen, he did not qualify    Plan:         1. Continue oral antihypertensives as before  2. Dialysis spot has been arranged per nephrology  3.  Discharge home after above, follow-up with nephrology with biopsy results        Significant therapeutic interventions: See above notes    Significant Diagnostic Studies:   Labs / Micro:  CBC:   Lab Results   Component Value Date    WBC 8.7 11/26/2021    RBC 2.97 11/26/2021    HGB 8.4 11/26/2021    HCT 27.1 11/26/2021    MCV 91.2 11/26/2021    MCH 28.3 11/26/2021    MCHC 31.0 11/26/2021    RDW 16.1 11/26/2021     11/26/2021     BMP:    Lab Results   Component Value Date    GLUCOSE 192 11/26/2021    GLUCOSE 148 03/16/2017     11/26/2021    K 4.2 11/26/2021     11/26/2021    CO2 24 11/26/2021    ANIONGAP 13 11/26/2021    BUN 41 11/26/2021    CREATININE 4.42 11/26/2021    BUNCRER NOT REPORTED 11/26/2021    CALCIUM 7.7 11/26/2021    LABGLOM 13 11/26/2021    GFRAA 16 11/26/2021    GFR      11/26/2021    GFR NOT REPORTED 11/26/2021     HFP:    Lab Results   Component Value Date    PROT 5.4 11/22/2021     CMP:    Lab Results   Component Value Date    GLUCOSE 192 11/26/2021    GLUCOSE 148 03/16/2017     11/26/2021    K 4.2 11/26/2021     11/26/2021    CO2 24 11/26/2021    BUN 41 11/26/2021    CREATININE 4.42 11/26/2021    ANIONGAP 13 11/26/2021    ALKPHOS 67 11/22/2021    ALT 38 11/22/2021    AST 14 11/22/2021    BILITOT 0.28 11/22/2021    LABALBU 2.7 11/25/2021    ALBUMIN 0.9 11/22/2021    LABGLOM 13 11/26/2021    GFRAA 16 11/26/2021    GFR      11/26/2021    GFR NOT REPORTED 11/26/2021    PROT 5.4 11/22/2021    CALCIUM 7.7 11/26/2021     PT/INR:    Lab Results   Component Value Date    PROTIME 10.2 11/22/2021    INR 0.9 11/22/2021     PTT:   Lab Results   Component Value Date    APTT 47.9 09/20/2021     FLP:    Lab Results   Component Value Date    CHOL 69 09/21/2021    TRIG 185 09/21/2021    HDL 14 09/21/2021     U/A:    Lab Results   Component Value Date    COLORU Yellow 11/21/2021    TURBIDITY Clear 11/21/2021    SPECGRAV 1.013 11/21/2021    HGBUR LARGE 11/21/2021    PHUR 5.0 11/21/2021    PROTEINU 1+ 11/21/2021    GLUCOSEU 1+ 11/21/2021    KETUA NEGATIVE 11/21/2021 BILIRUBINUR NEGATIVE 11/21/2021    UROBILINOGEN Normal 11/21/2021    NITRU NEGATIVE 11/21/2021    LEUKOCYTESUR NEGATIVE 11/21/2021     TSH:    Lab Results   Component Value Date    TSH 2.26 11/02/2017        Radiology:  IR TUNNELED CVC PLACE WO SQ PORT/PUMP > 5 YEARS    Result Date: 11/23/2021  Successful ultrasound and fluoroscopy guided tunneled catheter placement . Okay to use hemodialysis catheter. US GUIDED NEEDLE PLACEMENT    Result Date: 11/24/2021  Ultrasound-guided core biopsy of the left kidney for cortex evaluation performed successfully. US BIOPSY RENAL LEFT PERC    Result Date: 11/24/2021  Ultrasound-guided core biopsy of the left kidney for cortex evaluation performed successfully. Consultations:    Consults:     Final Specialist Recommendations/Findings:   IP CONSULT TO NEPHROLOGY  IP CONSULT TO CARDIOLOGY      The patient was seen and examined on day of discharge and this discharge summary is in conjunction with any daily progress note from day of discharge. Discharge plan:     Disposition: Home    Physician Follow Up:     [facility]  [address] [place] [address] Monday Wednesday Friday at 12pm. Please arrive 30 minutes early for first treatment. Requiring Further Evaluation/Follow Up POST HOSPITALIZATION/Incidental Findings:  Follow-up with nephrology after biopsy as scheduled    Diet: cardiac diet and diabetic diet    Activity: As tolerated    Instructions to Patient: Monitor blood sugars at home, oral hypoglycemics have been stopped due to renal dysfunction and continued on insulin only since his blood sugars were dropping    Discharge Medications:      Medication List      START taking these medications    bumetanide 2 MG tablet  Commonly known as: BUMEX  Take 1 tablet by mouth 2 times daily     epoetin shannon-epbx 97766 UNIT/ML Soln injection  Commonly known as: RETACRIT  Inject 0.6 mLs into the skin three times a week     metoprolol tartrate 25 MG tablet  Commonly known as: LOPRESSOR  Take 0.5 tablets by mouth 2 times daily     sodium bicarbonate 650 MG tablet  Take 1 tablet by mouth 2 times daily        CHANGE how you take these medications    cetirizine 10 MG tablet  Commonly known as: ZYRTEC  Take 0.5 tablets by mouth daily  What changed: how much to take     hydrALAZINE 50 MG tablet  Commonly known as: APRESOLINE  Take 1 tablet by mouth every 8 hours  What changed:   · medication strength  · how much to take  · when to take this  · additional instructions        CONTINUE taking these medications    acyclovir 400 MG tablet  Commonly known as: ZOVIRAX     Jayy Microlet Lancets Misc  TEST TWICE DAILY     Contour Test strip  Generic drug: blood glucose test strips  USE 1 STRIP TO CHECK GLUCOSE TWICE DAILY     eplerenone 25 MG tablet  Commonly known as: INSPRA  Take 1 tablet by mouth daily     Lantus SoloStar 100 UNIT/ML injection pen  Generic drug: insulin glargine  Inject 28 Units into the skin 2 times daily     latanoprost 0.005 % ophthalmic solution  Commonly known as: XALATAN     nitroGLYCERIN 0.4 MG SL tablet  Commonly known as: NITROSTAT  Place 1 tablet under the tongue every 5 minutes as needed for Chest pain     Pen Needles 31G X 6 MM Misc  1 each by Does not apply route daily     prednisoLONE acetate 1 % ophthalmic suspension  Commonly known as: PRED FORTE        STOP taking these medications    amLODIPine 10 MG tablet  Commonly known as: NORVASC     glipiZIDE 5 MG extended release tablet  Commonly known as: GLUCOTROL XL     lisinopril 20 MG tablet  Commonly known as: PRINIVIL;ZESTRIL     loratadine 10 MG tablet  Commonly known as: Claritin           Where to Get Your Medications      These medications were sent to [facility] [address] [place] [address], [address] [place] [address]  [address], [address] [address]    Phone: [phone]   · bumetanide 2 MG tablet  · cetirizine 10 MG tablet  · epoetin shannon-epbx 86843 UNIT/ML Soln injection  · hydrALAZINE 50 MG tablet  · metoprolol tartrate 25 MG tablet  · sodium bicarbonate 650 MG tablet         Discharge Procedure Orders   Basic Metabolic Panel   Standing Status: Future Standing Exp. Date: 12/26/21   Order Comments: Send results to PCP and nephrology       Time Spent on discharge is  35 mins in patient examination, evaluation, counseling as well as medication reconciliation, prescriptions for required medications, discharge plan and follow up. Electronically signed by   Ashwini Yang MD  11/26/2021  4:41 PM      Thank you Dr. Eriberto Vogel MD for the opportunity to be involved in this patient's care.

## 2021-11-26 NOTE — PLAN OF CARE
Problem: Pain:  Goal: Pain level will decrease  Description: Pain level will decrease  11/26/2021 0124 by Carolina Omalley RN  Outcome: Ongoing     Problem: Pain:  Goal: Control of acute pain  Description: Control of acute pain  Outcome: Ongoing     Problem: Pain:  Goal: Control of chronic pain  Description: Control of chronic pain  Outcome: Ongoing     Problem: Falls - Risk of:  Goal: Will remain free from falls  Description: Will remain free from falls  11/26/2021 0124 by Carolina Omalley RN  Outcome: Ongoing     Problem: Falls - Risk of:  Goal: Absence of physical injury  Description: Absence of physical injury  Outcome: Ongoing     Problem: Skin Integrity:  Goal: Will show no infection signs and symptoms  Description: Will show no infection signs and symptoms  Outcome: Ongoing     Problem: Skin Integrity:  Goal: Absence of new skin breakdown  Description: Absence of new skin breakdown  11/26/2021 0124 by Carolina Omalley RN  Outcome: Ongoing     Problem: Skin Integrity:  Goal: Skin integrity will be maintained  Description: Skin integrity will be maintained  Outcome: Ongoing     Problem:  Activity:  Goal: Risk for activity intolerance will decrease  Description: Risk for activity intolerance will decrease  Outcome: Ongoing     Problem: Coping:  Goal: Ability to adjust to condition or change in health will improve  Description: Ability to adjust to condition or change in health will improve  Outcome: Ongoing     Problem: Fluid Volume:  Goal: Ability to maintain a balanced intake and output will improve  Description: Ability to maintain a balanced intake and output will improve  11/26/2021 0124 by Carolina Omalley RN  Outcome: Ongoing     Problem: Fluid Volume:  Goal: Will show no signs or symptoms of fluid imbalance  Description: Will show no signs or symptoms of fluid imbalance  Outcome: Ongoing     Problem: Health Behavior:  Goal: Ability to identify and utilize available resources and services will improve  Description: Ability to identify and utilize available resources and services will improve  Outcome: Ongoing     Problem: Metabolic:  Goal: Ability to maintain appropriate glucose levels will improve  Description: Ability to maintain appropriate glucose levels will improve  Outcome: Ongoing     Problem: Nutritional:  Goal: Maintenance of adequate nutrition will improve  Description: Maintenance of adequate nutrition will improve  Outcome: Ongoing     Problem: Nutritional:  Goal: Progress toward achieving an optimal weight will improve  Description: Progress toward achieving an optimal weight will improve  Outcome: Ongoing     Problem: Physical Regulation:  Goal: Complications related to the disease process, condition or treatment will be avoided or minimized  Description: Complications related to the disease process, condition or treatment will be avoided or minimized  Outcome: Ongoing     Problem: Physical Regulation:  Goal: Ability to maintain clinical measurements within normal limits will improve  Outcome: Ongoing     Problem: Tissue Perfusion:  Goal: Adequacy of tissue perfusion will improve  Description: Adequacy of tissue perfusion will improve  Outcome: Ongoing     Problem: Sensory:  Goal: General experience of comfort will improve  Description: General experience of comfort will improve  Outcome: Ongoing     Problem: Physical Regulation:  Goal: Diagnostic test results will improve  Description: Diagnostic test results will improve  Outcome: Ongoing

## 2021-11-26 NOTE — PROGRESS NOTES
[facility]  Office: [address], DO, Laly Vazquez, DO, Abiola Alejandre, DO, [facility], DO, Feliciano Odell MD, Jaimie Tinsley MD, Kiana Plascencia MD, Alton Simmons MD, Bryanna Emanuel MD, Chato Arzate MD, Mell Naidu MD, Edwin Ayala, DO, Martha Hinson, DO, Keny Clemens MD,  Lalit Limon DO, Gin Cook MD, Yosvany Hawkins MD, Lucas Santana MD, Sonia Alva MD, Kymberly Seth MD, Estela Hoyos MD, Enio Mccarthy MD, Raina Franco [facility], [facility], CNP, Yobany Tavarez, CNP, Violette Laughlin, CNS, Frances Mcmillan, CNP, Jolie Wiggins, CNP, Janie Weller, CNP, Richard Rosenbaum, CNP, Tasha Du, CNP, Diego Quinonez PA-C, Isacc Rucker, [facility], Netta Merchant, CNP, Lidia Henderson, CNP, Tory Collannie, CNP, Carole Augustin, CNP, Rosa Chirinos, CNP, Mario Alexandre, CNP, Sharri Bullard, [address] [place] [address]    Progress Note    11/26/2021    2:53 PM    Name:   Anirudh Guido  MRN:     [id number]     [place]:      [de-identified]   Room:   [address] [place] [address] Day:  5  Admit Date:  11/21/2021  1:46 AM    PCP:   Karla Pineda MD  Code Status:  Full Code    Subjective:     C/C: Chest pain  Interval History Status:  Patient had kidney biopsy  11/24/2021 and also had dialysis    Denies hematuria or pain  Still feels little fatigued  No nausea or vomiting  Seen today during dialysis  Has been on oxygen in hospital which he was not having at home, and dialysis room saturating 97% on room air  Brief History:   As documented in the medical record:  \"Janelle Jimenez is a 68 y.o. Non- / non  male who presents with No chief complaint on file.   and is admitted to the hospital for the management of Acute kidney injury superimposed on CKD (HCC) acute on chronic heart failure.     Patient was a transfer to [place] [address] from [facility] for acute on chronic renal failure and acute exacerbation of CHF.  Patient was originally [id number]/12/21 to [facility] emergency room secondary to elevated potassium and chest pain.  During his initial evaluation in the emergency room he was noted to have a BUN of 86 and a creatinine of 3.64 with a potassium of 6.6 sodium of 128.  He was given 30 g of Kayexalate, IV insulin and sodium bicarb.  With an improvement of the potassium with a downward trend of 5.4 sodium of 134 and creatinine 3.05 and BUN of 84 he was noted to have a hemoglobin of 7.5 with a repeat of 6.8 and transfused 1 unit packed red blood cells.  Patient was seen and evaluated by the nephrology group as well as cardiology.  Patient was started on a bicarb drip  Throughout the hospital stay there was somewhat of an improvement in the renal function and heart failure however over time kidney function continued to decline and nephrology recommended transfer to [place] [address] so he can be followed by his personal nephrologist.  Due to the bed availability there was a great delay from the time the request was made to the time that patient arrived to the facility, with that being said BUN 91 creatinine 3.72.  Hemogram unremarkable with no acute leukocytosis with a WBC of 9 hemoglobin 8.0 hematocrit 25.8.  Patient is not on any chemical anticoagulation given the drop in hemoglobin earlier in the stay as well as the purpuric rash response he received from Lovenox. \"     The intitial assessment/plan included:  \"Consultation nephrology for further evaluation and management of the VIDAL and assistance with diuresis  Diuretics for heart failure per nephrology  Continue with aggressive medical management of risk factors for coronary artery disease management.   Utilize insulin sliding scale and receive home Lantus dose and resume when medications are confirmed  Continue to monitor H&H, patient had a possible  reaction to iron infusion per documentation at [facility]  EPC cuffs for DVT prophylaxis as we cannot use chemical anticoagulation at this time given the concern for allergy which should be worked up at the discretion of team members, maximize ambulation  GI prophylaxis  Resume home medications once med list is updated in the computer\"     Per prior notes:    \"Patient got recent iron transfusion none developed rash with itching which is worse on the lower extremity.  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 \"      Database has included:  Results for Johnny Jacobsen (MRN [id number]) as of 11/22/2021 16:43    Ref. Range 6/25/2020 11:25 9/20/2021 11:50 11/11/2021 17:18   Pro-BNP Latest Ref Range: <300 pg/mL 588 (H) 1,565 (H) 3,702 (H)      Echo 11/12:  Results for Johnny Jacobsen (MRN [id number]) as of 11/22/2021 16:43    Ref. Range 6/25/2020 11:25 9/20/2021 11:50 11/11/2021 17:18   Pro-BNP Latest Ref Range: <300 pg/mL 588 (H) 1,565 (H) 3,702 (H)            Review of Systems:     Constitutional:  negative for chills, fevers, sweats,+ fatigue-improving  Respiratory:  negative for cough, +dyspnea on exertion, denies wheezing  Cardiovascular:  negative for chest pain, chest pressure/discomfort, + lower extremity edema, denies palpitations  Gastrointestinal:  negative for abdominal pain, constipation, diarrhea, nausea, vomiting  Neurological:  negative for dizziness, headache    Medications: Allergies:     Allergies   Allergen Reactions    Coreg [Carvedilol] Shortness Of Breath and Nausea And Vomiting    Lipitor [Atorvastatin] Other (See Comments)     Muscle aches and joint pain    Aldactone [Spironolactone] Hives    Crestor [Rosuvastatin Calcium] Other (See Comments)     Muscle aches and joint pain    Lopid [Gemfibrozil]      hyperglycemia    Invokana [Canagliflozin] Rash    Januvia [Sitagliptin] Nausea And Vomiting       Current Meds:   Scheduled Meds:    epoetin shannon-epbx  6,000 Units SubCUTAneous Once per day on Mon Wed Fri    bumetanide  2 mg Oral BID    metoclopramide  10 mg IntraVENous Once    sodium chloride flush  5-40 mL IntraVENous 2 times per day    insulin glargine  28 Units SubCUTAneous BID    prednisoLONE acetate  1 drop Left Eye Daily    insulin lispro  0-6 Units SubCUTAneous TID WC    insulin lispro  0-3 Units SubCUTAneous Nightly    [Held by provider] metOLazone  5 mg Oral Daily    acyclovir  400 mg Oral BID    hydrALAZINE  50 mg Oral 3 times per day    sodium bicarbonate  650 mg Oral BID    metoprolol tartrate  12.5 mg Oral BID     Continuous Infusions:    sodium chloride 25 mL (11/22/21 1057)    dextrose      dextrose       PRN Meds: metoclopramide, hydrALAZINE, sodium chloride flush, sodium chloride, ondansetron **OR** ondansetron, acetaminophen **OR** acetaminophen, glucose, dextrose, glucagon (rDNA), dextrose, glucose, dextrose, glucagon (rDNA), dextrose    Data:     Past Medical History:   has a past medical history of Acute MI ([facility] [address] Acute renal failure with tubular necrosis ([facility] [address] [facility] Anemia, CAD (coronary artery disease), Cerebral artery occlusion with cerebral infarction ([facility] [address] [facility] CHF (congestive heart failure) ([facility] [address] Chronic back pain, Closed fracture of lumbar vertebra without mention of spinal cord injury, Displacement of intervertebral disc, site unspecified, without myelopathy, H/O cardiac catheterization, H/O cardiovascular stress test, H/O tilt table evaluation, H/O tilt table evaluation, History of 24 hour EKG monitoring, History of 24 hour EKG monitoring, History of blood transfusion, History of cardiovascular stress test, History of cardiovascular stress test, History of coronary artery stent placement, History of CVA (cerebrovascular accident) without residual deficits, History of echocardiogram, History of Holter monitoring, History of tilt table evaluation, Hyperlipidemia, Hypertension, Non critical Right Renal artery stenosis, native ([facility] [address] [facility] Pacemaker, S/P cardiac cath, S/P coronary artery stent placement, SIRS (systemic inflammatory response syndrome) ([facility] [address] and Type II or unspecified type diabetes mellitus without mention of complication, not stated as uncontrolled. Social History:   reports that he quit smoking about 41 years ago. His smoking use included cigarettes. He has a 12.00 pack-year smoking history. He has never used smokeless tobacco. He reports that he does not drink alcohol and does not use drugs. Family History:   Family History   Problem Relation Age of Onset    Cancer Mother         breast    Cancer Father         lung       Vitals:  BP (!) 156/68   Pulse 79   Temp 97.6 °F (36.4 °C)   Resp 20   Ht 5' 9\" (1.753 m)   Wt 177 lb 11.1 oz (80.6 kg)   SpO2 95%   BMI 26.24 kg/m²   Temp (24hrs), Av.6 °F (37 °C), Min:97.6 °F (36.4 °C), Max:99.5 °F (37.5 °C)    Recent Labs     21  1654 21  2037 21  2217 21  0814   POCGLU 330* 283* 283* 183*       I/O (24Hr): Intake/Output Summary (Last 24 hours) at 2021 1453  Last data filed at 2021 1350  Gross per 24 hour   Intake 510 ml   Output 800 ml   Net -290 ml       Labs:  Hematology:  Recent Labs     21  0811 21  2241 21  0717 21  1038 21  0620   WBC 10.5  --  9.4  --  8.7   RBC 3.16*  --  3.07*  --  2.97*   HGB 9.1*   < > 8.7* 8.5* 8.4*   HCT 29.0*   < > 28.6* 29.0* 27.1*   MCV 91.8  --  93.2  --  91.2   MCH 28.8  --  28.3  --  28.3   MCHC 31.4  --  30.4  --  31.0   RDW 16.7*  --  16.5*  --  16.1*     --  176  --  141   MPV 9.8  --  10.0  --  10.1    < > = values in this interval not displayed.      Chemistry:  Recent Labs     21  0811 21  0811 21  0717 21  1038 21  0620      < > 138 139 137   K 4.0   < > 4.1 4.0 4.2      < > 102 101 100   CO2 23   < > 25 24 24   GLUCOSE 50*   < > 116* 174* 192*   BUN 54*   < > 31* 33* 41*   CREATININE 3.75*   < > 3.49* 3.60* 4.42*   MG 1.6  --  1.5*  --   --    ANIONGAP 16   < > 11 14 13   LABGLOM 16*   < > 17* resolving         Uncontrolled type 2 diabetes mellitus with hyperglycemia ([address] 11/22/2021 Yes    Coronary atherosclerosis due to calcified coronary lesion 11/21/2021 Yes    Chronic kidney disease, stage III (moderate) ([address] 11/21/2021 Yes    Overview Signed 9/4/2018  1:18 PM by Abdiaziz Quiros MD     Secondary to diabetic nephrosclerosis. Baseline creatinine 1.4-1.6, renal function fluctuate volume status         Anemia in stage 4 chronic kidney disease ([address] 11/21/2021 Yes    Overview Signed 8/4/2020 10:29 AM by Abdiaziz Quiros MD     Also from suspected blood loss         Rash 11/21/2021 Yes    Cardiorenal syndrome with renal failure 11/17/2021 Yes    Pulmonary hypertension ([address] 11/22/2021 Yes    Acute on chronic diastolic (congestive) heart failure ([address] 11/22/2021 Yes                Plan:        1. Hydralazine as needed for controlling high blood pressure  2. Continue oral antihypertensives as before  3. Home oxygen evaluation before discharge  4. Dialysis spot has been arranged per nephrology  5.  Discharge home after above    Nora Bennett MD  11/26/2021  2:53 PM

## 2021-11-26 NOTE — PROGRESS NOTES
Nephrology Progress Note        Subjective: The patient is seen and evaluated on dialysis. Patient is stable on dialysis. Access cannulated without problems. No new issues overnite. Stable hemodynamics. Labs and fluid removal and dialysis bath reviewed with the dialysis nurse at the bedside. Preweight today before dialysis is lower than his last postdialysis weight likely secondary to loose bowel movements. We will also begin erythropoietin stimulating agent today. The patient's iron saturation was low at 15% but ferritin was over 1,400 so no IV iron to be administered.         Objective:  CURRENT TEMPERATURE:  Temp: 98 °F (36.7 °C)  MAXIMUM TEMPERATURE OVER 24HRS:  Temp (24hrs), Av.6 °F (37 °C), Min:97.8 °F (36.6 °C), Max:99.5 °F (37.5 °C)    CURRENT RESPIRATORY RATE:  Resp: 20  CURRENT PULSE:  Pulse: 79  CURRENT BLOOD PRESSURE:  BP: (!) 157/65  24HR BLOOD PRESSURE RANGE:  Systolic (02WTW), ZOQ:589 , Min:123 , FFJ:072   ; Diastolic (71GPO), XHN:32, Min:50, Max:69    24HR INTAKE/OUTPUT:      Intake/Output Summary (Last 24 hours) at 2021 1057  Last data filed at 2021 1758  Gross per 24 hour   Intake 510 ml   Output 500 ml   Net 10 ml     Patient Vitals for the past 96 hrs (Last 3 readings):   Weight   21 1017 177 lb 11.1 oz (80.6 kg)   21 0559 181 lb (82.1 kg)   21 1818 181 lb 3.5 oz (82.2 kg)         Physical Exam:  General appearance:Awake, alert, in no acute distress  Skin: warm and dry, no rash or erythema  Eyes: conjunctivae pale and sclera anicteric  ENT:no thrush, moist mucus mmbranes  Neck:  No JVD, midline trachea and no accessory muscle use  Pulmonary: Good bilateral air entry and clear to auscultation bilaterally without wheezes or rales or rhonchi Cardiovascular: normal S1 and S2. NO rubs and NO murmur   Abdomen: soft nontender, bowel sounds present, active bowel sounds, no ascites  Extremities: no cyanosis or edema     Access:  previous permacath    Current Medications:    bumetanide (BUMEX) tablet 2 mg, BID  metoclopramide (REGLAN) injection 10 mg, Q6H PRN  metoclopramide (REGLAN) injection 10 mg, Once  hydrALAZINE (APRESOLINE) injection 10 mg, Q4H PRN  sodium chloride flush 0.9 % injection 5-40 mL, 2 times per day  sodium chloride flush 0.9 % injection 5-40 mL, PRN  0.9 % sodium chloride infusion, PRN  ondansetron (ZOFRAN-ODT) disintegrating tablet 4 mg, Q8H PRN   Or  ondansetron (ZOFRAN) injection 4 mg, Q6H PRN  acetaminophen (TYLENOL) tablet 650 mg, Q6H PRN   Or  acetaminophen (TYLENOL) suppository 650 mg, Q6H PRN  insulin glargine (LANTUS) injection vial 28 Units, BID  prednisoLONE acetate (PRED FORTE) 1 % ophthalmic suspension 1 drop, Daily  glucose (GLUTOSE) 40 % oral gel 15 g, PRN  dextrose 50 % IV solution, PRN  glucagon (rDNA) injection 1 mg, PRN  dextrose 5 % solution, PRN  glucose (GLUTOSE) 40 % oral gel 15 g, PRN  dextrose 50 % IV solution, PRN  glucagon (rDNA) injection 1 mg, PRN  dextrose 5 % solution, PRN  insulin lispro (HUMALOG) injection vial 0-6 Units, TID WC  insulin lispro (HUMALOG) injection vial 0-3 Units, Nightly  [Held by provider] metOLazone (ZAROXOLYN) tablet 5 mg, Daily  acyclovir (ZOVIRAX) capsule 400 mg, BID  hydrALAZINE (APRESOLINE) tablet 50 mg, 3 times per day  sodium bicarbonate tablet 650 mg, BID  metoprolol tartrate (LOPRESSOR) tablet 12.5 mg, BID      Labs:   CBC:   Recent Labs     11/24/21  0811 11/24/21  2241 11/25/21  0717 11/25/21  1038 11/26/21  0620   WBC 10.5  --  9.4  --  8.7   RBC 3.16*  --  3.07*  --  2.97*   HGB 9.1*   < > 8.7* 8.5* 8.4*   HCT 29.0*   < > 28.6* 29.0* 27.1*     --  176  --  141   MPV 9.8  --  10.0  --  10.1    < > = values in this interval not displayed.       BMP:   Recent Labs     11/25/21  0717 11/25/21  1038 11/26/21  0620    139 137   K 4.1 4.0 4.2    101 100   CO2 25 24 24   BUN 31* 33* 41*   CREATININE 3.49* 3.60* 4.42*   GLUCOSE 116* 174* 192*   CALCIUM 7.8* 8.0* 7.7* Phosphorus:    Recent Labs     11/24/21  0811 11/25/21  0717   PHOS 4.6* 3.9     Magnesium:   Recent Labs     11/24/21  0811 11/25/21  0717   MG 1.6 1.5*     Albumin:   Recent Labs     11/24/21  0811 11/25/21  0717   LABALBU 2.7* 2.7*       Dialysis bath: Dialysis Bath  K+ (Potassium): 3  Ca+ (Calcium): 3  Na+ (Sodium): 138  HCO3 (Bicarb): 35    Radiology:  Reviewed as available. Assessment:  1. Dialysis dependent acute kidney injury :dialysis bath reviewed with nurse. 2. Essential hypertension with fair control  3. No fluid overload  4. Status post native kidney biopsy earlier this week, awaiting results  5. ANEMIA OF CHRONIC DISEASE: Iron studies indicate inflammation so we will not give intravenous Venofer but will begin erythropoietin stimulating agent today  6.we will set the outpatient dry weight estimated at 82 kg    Plan:  1. Weight removal and dialysis orders reviewed. 2.  Epogen dose today  3. Patient is stable for discharge after dialysis today  4. Begin dialysis at [facility] dialysis in [place], [place] on Monday, 11/29/2021      Please do not hesitate to call with questions.     Electronically signed by Katheryne Dakin, MD on 11/26/2021 at 10:57 AM

## 2021-11-29 ENCOUNTER — CARE COORDINATION (OUTPATIENT)
Dept: CASE MANAGEMENT | Age: 76
End: 2021-11-29

## 2021-11-29 ENCOUNTER — APPOINTMENT (OUTPATIENT)
Dept: GENERAL RADIOLOGY | Age: 76
End: 2021-11-29
Payer: MEDICARE

## 2021-11-29 ENCOUNTER — HOSPITAL ENCOUNTER (EMERGENCY)
Age: 76
Discharge: HOME | End: 2021-12-01
Attending: EMERGENCY MEDICINE
Payer: MEDICARE

## 2021-11-29 DIAGNOSIS — I13.10 CARDIORENAL SYNDROME WITH RENAL FAILURE: Primary | ICD-10-CM

## 2021-11-29 DIAGNOSIS — R07.9 CHEST PAIN, UNSPECIFIED TYPE: Primary | ICD-10-CM

## 2021-11-29 LAB
ABSOLUTE EOS #: 0.09 K/UL (ref 0–0.44)
ABSOLUTE IMMATURE GRANULOCYTE: 0.16 K/UL (ref 0–0.3)
ABSOLUTE LYMPH #: 0.76 K/UL (ref 1.1–3.7)
ABSOLUTE MONO #: 0.43 K/UL (ref 0.1–1.2)
ANION GAP SERPL CALCULATED.3IONS-SCNC: 10 MMOL/L (ref 9–17)
BASOPHILS # BLD: 1 % (ref 0–2)
BASOPHILS ABSOLUTE: 0.04 K/UL (ref 0–0.2)
BNP INTERPRETATION: ABNORMAL
BUN BLDV-MCNC: 23 MG/DL (ref 8–23)
BUN/CREAT BLD: 9 (ref 9–20)
CALCIUM SERPL-MCNC: 8.2 MG/DL (ref 8.6–10.4)
CHLORIDE BLD-SCNC: 99 MMOL/L (ref 98–107)
CO2: 26 MMOL/L (ref 20–31)
CREAT SERPL-MCNC: 2.49 MG/DL (ref 0.7–1.2)
DIFFERENTIAL TYPE: ABNORMAL
EOSINOPHILS RELATIVE PERCENT: 1 % (ref 1–4)
GFR AFRICAN AMERICAN: 31 ML/MIN
GFR NON-AFRICAN AMERICAN: 25 ML/MIN
GFR SERPL CREATININE-BSD FRML MDRD: ABNORMAL ML/MIN/{1.73_M2}
GFR SERPL CREATININE-BSD FRML MDRD: ABNORMAL ML/MIN/{1.73_M2}
GLUCOSE BLD-MCNC: 180 MG/DL (ref 70–99)
HCT VFR BLD CALC: 27.5 % (ref 40.7–50.3)
HEMOGLOBIN: 8.8 G/DL (ref 13–17)
IMMATURE GRANULOCYTES: 2 %
INR BLD: 1.2
LYMPHOCYTES # BLD: 12 % (ref 24–43)
MCH RBC QN AUTO: 28.4 PG (ref 25.2–33.5)
MCHC RBC AUTO-ENTMCNC: 32 G/DL (ref 28.4–34.8)
MCV RBC AUTO: 88.7 FL (ref 82.6–102.9)
MONOCYTES # BLD: 7 % (ref 3–12)
NRBC AUTOMATED: 0 PER 100 WBC
PARTIAL THROMBOPLASTIN TIME: 34.8 SEC (ref 23.9–33.8)
PDW BLD-RTO: 15.7 % (ref 11.8–14.4)
PLATELET # BLD: 186 K/UL (ref 138–453)
PLATELET ESTIMATE: ABNORMAL
PMV BLD AUTO: 9.7 FL (ref 8.1–13.5)
POTASSIUM SERPL-SCNC: 4.1 MMOL/L (ref 3.7–5.3)
PRO-BNP: 4377 PG/ML
PROTHROMBIN TIME: 14.5 SEC (ref 11.5–14.2)
RBC # BLD: 3.1 M/UL (ref 4.21–5.77)
RBC # BLD: ABNORMAL 10*6/UL
SARS-COV-2, RAPID: NOT DETECTED
SEG NEUTROPHILS: 77 % (ref 36–65)
SEGMENTED NEUTROPHILS ABSOLUTE COUNT: 5.14 K/UL (ref 1.5–8.1)
SODIUM BLD-SCNC: 135 MMOL/L (ref 135–144)
SPECIMEN DESCRIPTION: NORMAL
TROPONIN INTERP: ABNORMAL
TROPONIN INTERP: ABNORMAL
TROPONIN T: ABNORMAL NG/ML
TROPONIN T: ABNORMAL NG/ML
TROPONIN, HIGH SENSITIVITY: 90 NG/L (ref 0–22)
TROPONIN, HIGH SENSITIVITY: 94 NG/L (ref 0–22)
WBC # BLD: 6.6 K/UL (ref 3.5–11.3)
WBC # BLD: ABNORMAL 10*3/UL

## 2021-11-29 PROCEDURE — 87635 SARS-COV-2 COVID-19 AMP PRB: CPT

## 2021-11-29 PROCEDURE — 80048 BASIC METABOLIC PNL TOTAL CA: CPT

## 2021-11-29 PROCEDURE — C9803 HOPD COVID-19 SPEC COLLECT: HCPCS

## 2021-11-29 PROCEDURE — 85610 PROTHROMBIN TIME: CPT

## 2021-11-29 PROCEDURE — 36415 COLL VENOUS BLD VENIPUNCTURE: CPT

## 2021-11-29 PROCEDURE — 83880 ASSAY OF NATRIURETIC PEPTIDE: CPT

## 2021-11-29 PROCEDURE — 85730 THROMBOPLASTIN TIME PARTIAL: CPT

## 2021-11-29 PROCEDURE — 1111F DSCHRG MED/CURRENT MED MERGE: CPT | Performed by: FAMILY MEDICINE

## 2021-11-29 PROCEDURE — 71045 X-RAY EXAM CHEST 1 VIEW: CPT

## 2021-11-29 PROCEDURE — 85025 COMPLETE CBC W/AUTO DIFF WBC: CPT

## 2021-11-29 PROCEDURE — 99285 EMERGENCY DEPT VISIT HI MDM: CPT

## 2021-11-29 PROCEDURE — 93005 ELECTROCARDIOGRAM TRACING: CPT | Performed by: EMERGENCY MEDICINE

## 2021-11-29 PROCEDURE — 84484 ASSAY OF TROPONIN QUANT: CPT

## 2021-11-29 RX ORDER — HEPARIN SODIUM 10000 [USP'U]/100ML
12 INJECTION, SOLUTION INTRAVENOUS CONTINUOUS
Status: DISCONTINUED | OUTPATIENT
Start: 2021-11-29 | End: 2021-11-30

## 2021-11-29 RX ORDER — HEPARIN SODIUM 1000 [USP'U]/ML
4000 INJECTION, SOLUTION INTRAVENOUS; SUBCUTANEOUS ONCE
Status: COMPLETED | OUTPATIENT
Start: 2021-11-29 | End: 2021-11-30

## 2021-11-29 RX ORDER — HEPARIN SODIUM 1000 [USP'U]/ML
2000 INJECTION, SOLUTION INTRAVENOUS; SUBCUTANEOUS PRN
Status: DISCONTINUED | OUTPATIENT
Start: 2021-11-29 | End: 2021-11-30

## 2021-11-29 RX ORDER — HEPARIN SODIUM 1000 [USP'U]/ML
4000 INJECTION, SOLUTION INTRAVENOUS; SUBCUTANEOUS PRN
Status: DISCONTINUED | OUTPATIENT
Start: 2021-11-29 | End: 2021-11-30

## 2021-11-29 ASSESSMENT — PAIN DESCRIPTION - DESCRIPTORS
DESCRIPTORS: ACHING;DISCOMFORT
DESCRIPTORS: DULL;DISCOMFORT

## 2021-11-29 ASSESSMENT — PAIN DESCRIPTION - FREQUENCY
FREQUENCY: CONTINUOUS
FREQUENCY: CONTINUOUS

## 2021-11-29 ASSESSMENT — PAIN DESCRIPTION - PAIN TYPE
TYPE: ACUTE PAIN
TYPE: ACUTE PAIN

## 2021-11-29 ASSESSMENT — PAIN SCALES - GENERAL
PAINLEVEL_OUTOF10: 3
PAINLEVEL_OUTOF10: 2

## 2021-11-29 ASSESSMENT — PAIN DESCRIPTION - ORIENTATION
ORIENTATION: LEFT;MID
ORIENTATION: MID;LEFT

## 2021-11-29 ASSESSMENT — PAIN DESCRIPTION - LOCATION
LOCATION: CHEST
LOCATION: CHEST

## 2021-11-29 NOTE — DISCHARGE SUMMARY
Discharge Summary    Lalito Huff  :    MRN:  [id number]    Admit date:  2021      Discharge date:  2021     Admitting Physician:  No admitting provider for patient encounter. Discharge Diagnoses:      Principal Problem:    Cardiorenal syndrome, stage 1-4 or unspecified chronic kidney disease, with heart failure (HCC)  Active Problems: Moderate malnutrition ([address]    Coronary artery disease involving native coronary artery of native heart without angina pectoris    Chronic diastolic HF (heart failure), NYHA class 3 (HCC)    Acute kidney injury superimposed on CKD (HCC)    Chronic anemia    Rash    Hyperkalemia  Resolved Problems:    * No resolved hospital problems.  *      Active Hospital Problems    Diagnosis Date Noted    Moderate malnutrition ([address] [E44.0] 2021     Priority: Medium     Class: Present on Admission    Cardiorenal syndrome, stage 1-4 or unspecified chronic kidney disease, with heart failure ([address] [I13.0] 2021    Hyperkalemia [E87.5] 2021    Rash [R21] 11/10/2021    Chronic anemia [D64.9] 2021    Acute kidney injury superimposed on CKD ([address] [N17.9, N18.9] 10/02/2018    Chronic diastolic HF (heart failure), NYHA class 3 ([address] [I50.32] 2017    Coronary artery disease involving native coronary artery of native heart without angina pectoris [I25.10] 2016       Discharge Medications:         Medication List      ASK your doctor about these medications    acyclovir 400 MG tablet  Commonly known as: ZOVIRAX     amLODIPine 10 MG tablet  Commonly known as: NORVASC  Take 1 tablet by mouth nightly     Jayy Microlet Lancets Misc  TEST TWICE DAILY     Contour Test strip  Generic drug: blood glucose test strips  USE 1 STRIP TO CHECK GLUCOSE TWICE DAILY     eplerenone 25 MG tablet  Commonly known as: INSPRA  Take 1 tablet by mouth daily     glipiZIDE 5 MG extended release tablet  Commonly known as: GLUCOTROL XL  Take 2 tablets by mouth 2 times daily     hydrALAZINE 100 MG tablet  Commonly known as: APRESOLINE  Take 1 tablet by mouth 3 times daily Patient is taking 100 MG 3 times daily     Lantus SoloStar 100 UNIT/ML injection pen  Generic drug: insulin glargine  Inject 28 Units into the skin 2 times daily     latanoprost 0.005 % ophthalmic solution  Commonly known as: XALATAN     lisinopril 20 MG tablet  Commonly known as: PRINIVIL;ZESTRIL  Take 1 tablet by mouth daily     loratadine 10 MG tablet  Commonly known as: Claritin  Take 1 tablet by mouth daily     nitroGLYCERIN 0.4 MG SL tablet  Commonly known as: NITROSTAT  Place 1 tablet under the tongue every 5 minutes as needed for Chest pain     Pen Needles 31G X 6 MM Misc  1 each by Does not apply route daily     prednisoLONE acetate 1 % ophthalmic suspension  Commonly known as: PRED FORTE     predniSONE 20 MG tablet  Commonly known as: DELTASONE  Take 1 tablet by mouth 2 times daily for 5 days  Ask about: Should I take this medication? Consultants:  cardiology     Hospital Course:   Rosenda Julien is a 68 y.o. male admitted with  Cardiorenal syndrome with shortness of breath and heart failure with kidney injury. Exam:   Regular heart rate. Diminished breath sounds. Edema noted.     Condition:   Poor    Disposition:    Transfer to tertiary care facility    DC summary time : 35 minutes    Patient will be followed by Janeen Young MD in 1-2 weeks    Signed:  Maykel Tiwari MD  11/29/2021, 5:10 PM

## 2021-11-29 NOTE — CARE COORDINATION
[facility] Transitions Initial Follow Up Call    Call within 2 business days of discharge: Yes    Patient: Toby Anthony Patient : 1945   MRN: [id number]  Reason for Admission: Acute kidney injury superimposed on CKD  Discharge Date: 21 RARS: Readmission Risk Score: 21.8 ( )  BPCI-A Medical Bundle Patient:  Working DR Renal failure - NEW DIALYSIS  Admitting Location: [facility]  Adm Date: 21  Date of Discharge: 21     Bundle End Date: 22    Last Discharge [facility]       Complaint Diagnosis Description Type Department Provider    21  Acute kidney injury superimposed on CKD ([facility] [address] . .. Admission (Discharged) ST[facility] 4B Burnette Frankel, MD           Spoke with: [address]: [facility]    Attempted to contact Venice Brown for initial [employer], but his wife, Val Villanueva answered the phone. She stated that Venice Brown was not doing to good. She said that ever since he started dialysis, he is sick to his stomach and vomits. She said that when he was discharged he vomited on the way home. She said that he received dialysis prior to discharge. She said that he is weak and does not eat much. She was hoping to go to dialysis with him and talk with the nurses at the dialysis center. Medications reviewed and all medications in the home. She is aware of the discontinued medications. 1111F order completed. Reviewed lab work and encouraged her to ask if dialysis center could draw. No home care at discharge and she will be calling the PCP office tomorrow to make follow up. She will also be asking for a shower chair. Explained BPCI program and she was receptive to further outreach.     Non-face-to-face services provided:  Obtained and reviewed discharge summary and/or continuity of care documents  Reviewed and followed up on pending diagnostic tests and treatments-[facility] 12/3/21  Assessment and support for treatment adherence and medication management-reviewed     Transitions of Care Initial Call    Was this an external facility discharge? No Discharge Facility: [facility]    Challenges to be reviewed by the provider   Additional needs identified to be addressed with provider: Yes  home health care-possible home care and shower chair             Method of communication with provider : chart routing      Advance Care Planning:   Does patient have an Advance Directive: decision maker verified and not on file; education provided. Was this a readmission? No  Patient stated reason for admission: chest pain  Patients top risk factors for readmission: functional physical ability, lack of knowledge about disease and medical condition-Renal failure    Care Transition Nurse (CTN) contacted the family by telephone to perform post hospital discharge assessment. Verified name and  with family as identifiers. Provided introduction to self, and explanation of the CTN role. CTN reviewed discharge instructions, medical action plan and red flags with family who verbalized understanding. Family given an opportunity to ask questions and does not have any further questions or concerns at this time. Were discharge instructions available to patient? Yes. Reviewed appropriate site of care based on symptoms and resources available to patient including: PCP, Specialist and When to call 911. The family agrees to contact the PCP office for questions related to their healthcare. Medication reconciliation was performed with family, who verbalizes understanding of administration of home medications. Covid Risk Education     Educated patient about risk for severe COVID-19 due to risk factors according to CDC guidelines. CTN reviewed discharge instructions, medical action plan and red flag symptoms with the family who verbalized understanding. Discussed COVID vaccination status: Yes and pt has had vaccine, has not received the booster . Education provided on COVID-19 vaccination as appropriate.  Discussed exposure protocols and quarantine with CDC Guidelines. Family was given an opportunity to verbalize any questions and concerns and agrees to contact CTN or health care provider for questions related to their healthcare. Reviewed and educated family on any new and changed medications related to discharge diagnosis. Was patient discharged with a pulse oximeter? No     CTN provided contact information. Plan for follow-up call in 1-2 days based on severity of symptoms and risk factors. Plan for next call: follow up on visit to dialysis        Care Transitions 24 Hour Call    Do you have any ongoing symptoms?: Yes  Patient-reported symptoms: Vomiting, Nausea, Weakness  Do you have a copy of your discharge instructions?: Yes  Do you have all of your prescriptions and are they filled?: Yes  Have you been contacted by a [facility] Pharmacist?: No  Have you scheduled your follow up appointment?: No  Were you discharged with any Home Care or Post Acute Services: Yes  Post Acute Services:  Outpatient/Community Services (Comment: dialysis center)  Do you feel like you have everything you need to keep you well at home?: Yes  Care Transitions Interventions         Follow Up  Future Appointments   Date Time Provider Isadora Younger   12/13/2021 10:00 AM Courtney Baca MD [facility]   2/1/2022 12:00 PM Parisa Macias MD [facility] None   2/2/2022 10:30 AM Tunde Guzman MD tiff onc spe TPP       Marva Chi RN

## 2021-11-29 NOTE — ED NOTES
Bed: 09  Expected date: 11/29/21  Expected time: 5:29 PM  Means of arrival: EMS  Comments:  carissa García RN  11/29/21 0217

## 2021-11-30 PROBLEM — I21.4 NSTEMI (NON-ST ELEVATED MYOCARDIAL INFARCTION) (HCC): Status: ACTIVE | Noted: 2021-11-30

## 2021-11-30 LAB
EKG ATRIAL RATE: 65 BPM
EKG P AXIS: 32 DEGREES
EKG P-R INTERVAL: 194 MS
EKG Q-T INTERVAL: 450 MS
EKG QRS DURATION: 86 MS
EKG QTC CALCULATION (BAZETT): 468 MS
EKG R AXIS: -17 DEGREES
EKG T AXIS: 27 DEGREES
EKG VENTRICULAR RATE: 65 BPM
GLUCOSE BLD-MCNC: 249 MG/DL (ref 74–100)
GLUCOSE BLD-MCNC: 297 MG/DL (ref 74–100)
HCT VFR BLD CALC: 25 % (ref 40.7–50.3)
HCT VFR BLD CALC: 25.1 % (ref 40.7–50.3)
HCT VFR BLD CALC: 25.3 % (ref 40.7–50.3)
HCT VFR BLD CALC: 27.8 % (ref 40.7–50.3)
HEMOGLOBIN: 7.8 G/DL (ref 13–17)
HEMOGLOBIN: 7.8 G/DL (ref 13–17)
HEMOGLOBIN: 8 G/DL (ref 13–17)
HEMOGLOBIN: 8.8 G/DL (ref 13–17)
TROPONIN INTERP: ABNORMAL
TROPONIN T: ABNORMAL NG/ML
TROPONIN, HIGH SENSITIVITY: 98 NG/L (ref 0–22)

## 2021-11-30 PROCEDURE — 96366 THER/PROPH/DIAG IV INF ADDON: CPT

## 2021-11-30 PROCEDURE — 82947 ASSAY GLUCOSE BLOOD QUANT: CPT

## 2021-11-30 PROCEDURE — 96365 THER/PROPH/DIAG IV INF INIT: CPT

## 2021-11-30 PROCEDURE — 6370000000 HC RX 637 (ALT 250 FOR IP): Performed by: EMERGENCY MEDICINE

## 2021-11-30 PROCEDURE — 84484 ASSAY OF TROPONIN QUANT: CPT

## 2021-11-30 PROCEDURE — 96375 TX/PRO/DX INJ NEW DRUG ADDON: CPT

## 2021-11-30 PROCEDURE — 93010 ELECTROCARDIOGRAM REPORT: CPT | Performed by: FAMILY MEDICINE

## 2021-11-30 PROCEDURE — 85014 HEMATOCRIT: CPT

## 2021-11-30 PROCEDURE — 96372 THER/PROPH/DIAG INJ SC/IM: CPT

## 2021-11-30 PROCEDURE — 85018 HEMOGLOBIN: CPT

## 2021-11-30 PROCEDURE — 36415 COLL VENOUS BLD VENIPUNCTURE: CPT

## 2021-11-30 PROCEDURE — 6360000002 HC RX W HCPCS: Performed by: EMERGENCY MEDICINE

## 2021-11-30 RX ORDER — EPLERENONE 25 MG/1
25 TABLET, FILM COATED ORAL DAILY
Status: DISCONTINUED | OUTPATIENT
Start: 2021-11-30 | End: 2021-12-01 | Stop reason: HOSPADM

## 2021-11-30 RX ORDER — ASPIRIN 81 MG/1
324 TABLET, CHEWABLE ORAL ONCE
Status: COMPLETED | OUTPATIENT
Start: 2021-11-30 | End: 2021-11-30

## 2021-11-30 RX ORDER — SODIUM BICARBONATE 650 MG/1
650 TABLET ORAL 2 TIMES DAILY
Status: DISCONTINUED | OUTPATIENT
Start: 2021-11-30 | End: 2021-12-01 | Stop reason: HOSPADM

## 2021-11-30 RX ORDER — LATANOPROST 50 UG/ML
1 SOLUTION/ DROPS OPHTHALMIC NIGHTLY
Status: DISCONTINUED | OUTPATIENT
Start: 2021-11-30 | End: 2021-12-01 | Stop reason: HOSPADM

## 2021-11-30 RX ORDER — INSULIN GLARGINE 100 [IU]/ML
25 INJECTION, SOLUTION SUBCUTANEOUS 2 TIMES DAILY
Status: DISCONTINUED | OUTPATIENT
Start: 2021-11-30 | End: 2021-12-01 | Stop reason: HOSPADM

## 2021-11-30 RX ORDER — HYDRALAZINE HYDROCHLORIDE 50 MG/1
50 TABLET, FILM COATED ORAL EVERY 8 HOURS SCHEDULED
Status: DISCONTINUED | OUTPATIENT
Start: 2021-11-30 | End: 2021-12-01 | Stop reason: HOSPADM

## 2021-11-30 RX ORDER — INSULIN GLARGINE 100 [IU]/ML
25 INJECTION, SOLUTION SUBCUTANEOUS 2 TIMES DAILY
Status: DISCONTINUED | OUTPATIENT
Start: 2021-11-30 | End: 2021-11-30 | Stop reason: SDUPTHER

## 2021-11-30 RX ORDER — ACYCLOVIR 200 MG/1
400 CAPSULE ORAL 2 TIMES DAILY
Status: DISCONTINUED | OUTPATIENT
Start: 2021-11-30 | End: 2021-12-01 | Stop reason: HOSPADM

## 2021-11-30 RX ORDER — CETIRIZINE HYDROCHLORIDE 10 MG/1
5 TABLET ORAL DAILY
Status: DISCONTINUED | OUTPATIENT
Start: 2021-11-30 | End: 2021-12-01 | Stop reason: HOSPADM

## 2021-11-30 RX ORDER — PREDNISOLONE ACETATE 10 MG/ML
1 SUSPENSION/ DROPS OPHTHALMIC NIGHTLY
Status: DISCONTINUED | OUTPATIENT
Start: 2021-11-30 | End: 2021-12-01 | Stop reason: HOSPADM

## 2021-11-30 RX ORDER — BUMETANIDE 1 MG/1
2 TABLET ORAL 2 TIMES DAILY
Status: DISCONTINUED | OUTPATIENT
Start: 2021-11-30 | End: 2021-12-01 | Stop reason: HOSPADM

## 2021-11-30 RX ADMIN — ASPIRIN 324 MG: 81 TABLET, CHEWABLE ORAL at 08:01

## 2021-11-30 RX ADMIN — SODIUM BICARBONATE TAB 650 MG 650 MG: 650 TAB at 20:03

## 2021-11-30 RX ADMIN — HYDRALAZINE HYDROCHLORIDE 50 MG: 50 TABLET, FILM COATED ORAL at 15:01

## 2021-11-30 RX ADMIN — INSULIN GLARGINE 25 UNITS: 100 INJECTION, SOLUTION SUBCUTANEOUS at 15:09

## 2021-11-30 RX ADMIN — HEPARIN SODIUM AND DEXTROSE 12 UNITS/KG/HR: 10000; 5 INJECTION INTRAVENOUS at 06:11

## 2021-11-30 RX ADMIN — PREDNISOLONE ACETATE 1 DROP: 10 SUSPENSION/ DROPS OPHTHALMIC at 20:03

## 2021-11-30 RX ADMIN — INSULIN GLARGINE 25 UNITS: 100 INJECTION, SOLUTION SUBCUTANEOUS at 20:39

## 2021-11-30 RX ADMIN — CETIRIZINE HYDROCHLORIDE 5 MG: 10 TABLET, FILM COATED ORAL at 15:02

## 2021-11-30 RX ADMIN — HYDRALAZINE HYDROCHLORIDE 50 MG: 50 TABLET, FILM COATED ORAL at 20:03

## 2021-11-30 RX ADMIN — SODIUM BICARBONATE TAB 650 MG 650 MG: 650 TAB at 15:00

## 2021-11-30 RX ADMIN — METOPROLOL TARTRATE 12.5 MG: 25 TABLET, FILM COATED ORAL at 20:40

## 2021-11-30 RX ADMIN — LATANOPROST 1 DROP: 50 SOLUTION OPHTHALMIC at 20:03

## 2021-11-30 RX ADMIN — ACYCLOVIR 400 MG: 200 CAPSULE ORAL at 15:08

## 2021-11-30 RX ADMIN — BUMETANIDE 2 MG: 1 TABLET ORAL at 14:59

## 2021-11-30 RX ADMIN — HEPARIN SODIUM 4000 UNITS: 1000 INJECTION INTRAVENOUS; SUBCUTANEOUS at 06:08

## 2021-11-30 RX ADMIN — ACYCLOVIR 400 MG: 200 CAPSULE ORAL at 20:25

## 2021-11-30 NOTE — ED NOTES
[facility] to [facility].  They will page hospitalist. Waiting for call back     Leroy Aguila  11/30/21 3656

## 2021-11-30 NOTE — ED NOTES
Attempted to assist patient to the bedside to use urinal. Patient became dizzy and was assisted back into a laying position. Vitals remained stable. Patient states he does have problems with dizziness but states it has been worse of the last 24 hours.       Alexandre Roland RN  11/30/21 0462

## 2021-11-30 NOTE — ED NOTES
[facility] called back with Kaylen Olivarez CNP from [facility] on the line.  Connected call to Dr Cathie Epps  11/30/21 6811

## 2021-11-30 NOTE — ED NOTES
Writer at bedside. Patient updated on plan of care. Lights lowered. Patient denies needs.       Lalito Menard RN  11/30/21 9120

## 2021-11-30 NOTE — ED PROVIDER NOTES
Shannan Álvarez performed a history and physical examination of the patient and discussed management with the resident. I have reviewed and agree with the residents findings including all diagnostic interpretations, and treatment plans as written. Any areas of disagreement are noted on the chart. I was personally present for the key portions of any procedures. I have documented in the chart those procedures where I was not present during the key portions. I have reviewed the emergency nurses triage note. I agree with the chief complaint, past medical history, past surgical history, allergies, medications, social and family history as documented unless otherwise noted below. Documentation of the HPI, Physical Exam and Medical Decision Making performed by scribjanelle is based on my personal performance of the HPI, PE and MDM. For Physician Assistant/ Nurse Practitioner cases/documentation I have personally evaluated this patient and have completed at least one if not all key elements of the E/M (history, physical exam, and MDM). Additional findings are as noted. Ibeth DO Felix    With recent initiation of dialysis. Was just discharged from [address] [place] [address]. Any Was at dialysis today where he developed an episode of chest pain associated with nausea, as well as lightheadedness. Was brought to the ER. Continued to have chest pain. Initial Trope was 90 up to 94. His baseline troponins appear to be in the 50s. His creatinine has improved. Patient has had a total of 4 dialysis treatments. He did not have any issues with previous dialysis. He does have a recent heart catheterization that was done on October 5 that showed moderate three-vessel disease involving the proximal LAD mid LAD and small nondominant right coronary artery. Patient was recommended medical management. He did then had a complicated course with cardiorenal syndrome developing. And initiation of dialysis was started.   Patient is not on any antiplatelets or anticoagulants at this time due to some anemia. And blood transfusions required. Patient is chest pain-free at this time. His ekg normal sinus rhythm with occasional PVCs noted. Left axis deviation with LVH noted. Ventricular rate of 65 MN interval of 194 QRS of 86 and a QT corrected of 468. He does have poor R wave progression. With Q waves in the lateral leads which do appear new on today's EKG is compared to November 11 of 2021. Concern for NSTEMI. And also concern for anticoagulating this patient I did speak with nurse practitioner for hospitalist at [facility] who did accept patient but will speak with cardiology regarding anticoagulation. 1. Chest pain, unspecified type    2.  NSTEMI (non-ST elevated myocardial infarction) [facility])           Daniel De Leon DO  11/30/21 0539    5:55 AM EST  Spoke with Dr. Diana William with cardiology to discuss starting [facility], and reviewed labs and give h/h stable for past week will start low intensity heparin, and monitor hg closely     Daniel De Leon DO  11/30/21 9531

## 2021-11-30 NOTE — ED NOTES
Writer at bedside to discuss plan of care with patient and wife. Through discussions, wife mentioned that patient had to receive blood transfusions over the last couple of months for \"bleeding. \" Wife and patient state it was not discovered where he was bleeding from. Dr. Adele Vázquez notified and will speak with patient and review chart. Hold heparin at this time.       Ajith Hawley RN  11/29/21 7819

## 2021-11-30 NOTE — ED PROVIDER NOTES
[facility] ED  EMERGENCY DEPARTMENT ENCOUNTER      Pt Name: Jaz Rodriguez  MRN: [id number]  [place] 1945  Date of evaluation: 11/29/2021  Provider: CORETTA Alva PA-C    CHIEF COMPLAINT     Chief Complaint   Patient presents with    Chest Pain     chest pain during diaylsis today, rates pain 3, left chest. Today was first dialysis treatment in [place] , pt just released from [facility]. [facility] this past friday    Dizziness     new onset with dialysis         HISTORY OF PRESENT ILLNESS   (Location/Symptom, Timing/Onset, Context/Setting,Quality, Duration, Modifying Factors, Severity)  Note limiting factors. Jaz Rodriguez is a72 y.o. male who presents to the emergency department      49-year-old male presented here with chief complaint of chest pain. He was receiving his fourth session of dialysis and began having chest pain. The pain comes and goes. Is centralized. He was recently discharged from [facility] and was receiving his first outpatient dialysis today when this occurred. Nursing Notes werereviewed. REVIEW OF SYSTEMS    (2-9 systems for level 4, 10 or more for level 5)     Review of Systems   Constitutional: Negative. HENT: Negative. Eyes: Negative. Respiratory: Negative. Cardiovascular: Negative. Gastrointestinal: Negative. Endocrine: Negative. Genitourinary: Negative. Musculoskeletal: Negative. Neurological: Negative. Psychiatric/Behavioral: Negative. All other systems reviewed and are negative. Except as noted above the remainder of the review of systems was reviewed and negative.        PAST MEDICAL HISTORY     Past Medical History:   Diagnosis Date    Acute kidney injury ([address]     Acute MI ([address]     Acute renal failure with tubular necrosis ([address] 10/2/2018    ssecondary to hemodynamic effects of loop diuretics and ace inhibitors, bbaseline 1.2-1.3 which peaked up to 1.8, resolving    Anemia     CAD (coronary artery disease)     Cerebral artery occlusion with [facility]     CHF (congestive heart failure) (HCC)     Chronic back pain     Closed fracture of lumbar vertebra without mention of spinal cord injury     Displacement of intervertebral disc, site unspecified, without myelopathy     H/O cardiac catheterization 2/19/16    LMCA: Mild irregularities 10-20%. LAD: Lesion on PRox LAD: Mid subsection. 65% stenosis. LCx: Lesion on 1st Ob Valentina: Proximal subesection. 70% steniosis. RCA: Small non-dominant RCA. Lesion on PRox RCA: Ostial. 50% stenosis. EF:55%.  H/O cardiovascular stress test 2/19/16    Abnormal. Moderate perfusion defect of mild intensity in the inferior, inferoseptal adn inferoapical regions during stress imaging, which most consistent with ischemia. Global LV systolic function normal without regional wall motion abnormalities. OVerall these results are most consistent with an intermediate risk for signficant CAD. Additional testing including cardiac cath may be indicated.  H/O tilt table evaluation 12/26/2017    Abnormal. Patients HR, BP response and symptoms were most consistent with dysautonomia. Combined with viligant maintenance of euvolemia and maintaining a moderate salft intake, pharmacologic treatment with SSRI such as lexapro and or mestinon among other treatments have shown some effectiveness in treatment of this condition    H/O tilt table evaluation 12/26/2017    Abnormal head upright tilt table study. The pt heart rate, blood pressure response and symptoms were most consistent with dysautonomia.  History of 24 hour EKG monitoring 8/14/14    Occasional PAC's and PVC's which appear to be at least moderately symptomatic.  History of 24 hour EKG monitoring 11/19/14    Event Monitor. Sinus rhythm and sinus bradycardia.   Infrequent isolated PVC's    History of blood transfusion     History of cardiovascular stress test 2/19/14    Relatively NL    History of cardiovascular stress test 03/21/2018    Normal myocardial perfusion. Global left ventricular systolic function was normal with an EF of 68%. Overall, these results are most consistent with a low risk scan.  History of coronary artery stent placement 04/2017    PTCA / Drug Eluting Stent:, CX and / or branches    History of CVA (cerebrovascular accident) without residual deficits 2014    Incidentally found on CT head. No known impairment now or in the past.    History of echocardiogram 2/18/14    LA mildly dilated, EF 55%, LV wall thickness is moderately increased, no definite wall motion abnormalilities, what appears to be a pacer wire is seen w/n the RA and RV, mild-mod TR, mild pulmonary hypertension.  History of Holter monitoring 12/29/2017    Rare PAC's and PVC's.  History of tilt table evaluation 8/12/14    Abnormal    Hyperlipidemia     Hypertension     Non critical Right Renal artery stenosis, native ([address] 10/2/2018    Non critical Right Renal artery stenosis, based on cath in 2016, Rt RA 30% stenosis    Pacemaker     non-functioning    S/P cardiac cath 04/10/2017    S/P coronary artery stent placement     Successful PTCA - HA Om1    SIRS (systemic inflammatory response syndrome) ([address] 5/19/2019    Type II or unspecified type diabetes mellitus without mention of complication, not stated as uncontrolled          SURGICALHISTORY       Past Surgical History:   Procedure Laterality Date    BACK SURGERY      CARDIAC CATHETERIZATION Left 02/19/2016    via right radial approach/ Melina Vernon/ Dr. Silas Covarrubias Left 10/05/2021    Dr [place] radial-Moderate three vessel coronary artery disease involving a ostial 50-60% stenosis in a small, non-dominant RCA, a 60% mid LAD stenosis and a proximal 50-60% stenosis in the left anterior descending coronary artery. Normal left ventricular end diastolic pressure.  Proceed with aggressive maximal medical management as clinically indicated.     CHOLECYSTECTOMY  08/17/2015    [place]/ [facility]    COLONOSCOPY  2010    COLONOSCOPY  02/19/2015    -polyps,diverticulosis,hemorrhoids    COLONOSCOPY  05/23/2018    Dr Ange May    COLONOSCOPY N/A 05/23/2018    COLONOSCOPY POLYPECTOMY SNARE/COLD BIOPSY  cold snare  and  hot snare performed by Alfonzo Cabot, MD at [facility]  10/30/2020    with Dr. Dannielle Oshea 10/30/2020    Antonino Gonzalez, NOT HIGH RISK performed by Portia Osborne DO at [facility]      left eye    ENDOSCOPY, COLON, DIAGNOSTIC      EYE SURGERY      IR TUNNELED CATHETER PLACEMENT GREATER THAN 5 YEARS  11/22/2021    IR TUNNELED CATHETER PLACEMENT GREATER THAN 5 YEARS 11/22/2021 [facility]    PACEMAKER INSERTION      PACEMAKER PLACEMENT      UPPER GASTROINTESTINAL ENDOSCOPY  05/28/2019    Dr. Jennifer Bundy H-Pylori)duodenal bulbar/antral erythema    UPPER GASTROINTESTINAL ENDOSCOPY N/A 05/28/2019    EGD BIOPSY, clotest performed by Alfonzo Cabot, MD at [facility] 10/30/2020    EGD ESOPHAGOGASTRODUODENOSCOPY performed by Portia Osborne DO at [address], LT  11/24/2021    US PERCUT RENAL BIOPSY, LT 11/24/2021 [facility]         CURRENT MEDICATIONS       Discharge Medication List as of 12/1/2021  7:14 PM      CONTINUE these medications which have NOT CHANGED    Details   sodium bicarbonate 650 MG tablet Take 1 tablet by mouth 2 times daily, Disp-60 tablet, R-1Normal      cetirizine (ZYRTEC) 10 MG tablet Take 0.5 tablets by mouth daily, Disp-30 tablet, R-0Normal      hydrALAZINE (APRESOLINE) 50 MG tablet Take 1 tablet by mouth every 8 hours, Disp-90 tablet, R-3Normal      bumetanide (BUMEX) 2 MG tablet Take 1 tablet by mouth 2 times daily, Disp-30 tablet, R-3Normal      eplerenone (INSPRA) 25 MG tablet Take 1 tablet by mouth daily, Disp-90 tablet, R-3Normal      insulin glargine (LANTUS SOLOSTAR) 100 UNIT/ML injection pen Inject 28 Units into the skin 2 times daily, Disp-50.4 mL, R-0Normal      latanoprost (XALATAN) 0.005 % ophthalmic solution Place 1 drop into both eyes nightly Historical Med      acyclovir (ZOVIRAX) 400 MG tablet 400 mg 2 times daily Historical Med      prednisoLONE acetate (PRED FORTE) 1 % ophthalmic suspension Place 1 drop into the left eye daily Historical Med      epoetin shannon-epbx (RETACRIT) 64584 UNIT/ML SOLN injection Inject 0.6 mLs into the skin three times a week, Disp-6.6 mL, R-1Normal      nitroGLYCERIN (NITROSTAT) 0.4 MG SL tablet Place 1 tablet under the tongue every 5 minutes as needed for Chest pain, Disp-25 tablet, R-3Normal      CONTOUR TEST strip USE 1 STRIP TO CHECK GLUCOSE TWICE DAILY, Disp-100 strip,R-11Please consider 90 day supplies to promote better adherenceNormal      DIANA MICROLET LANCETS MISC Disp-100 each,R-2, NormalTEST TWICE DAILY      Insulin Pen Needle (PEN NEEDLES) 31G X 6 MM MISC DAILY Starting Fri 10/16/2015, Disp-100 each,R-3, Normal               ALLERGIES   Coreg [carvedilol], Lipitor [atorvastatin], Aldactone [spironolactone], Crestor [rosuvastatin calcium], Lopid [gemfibrozil], Invokana [canagliflozin], and Januvia [sitagliptin]    FAMILY HISTORY       Family History   Problem Relation Age of Onset    Cancer Mother         breast    Cancer Father         lung          SOCIAL HISTORY       Social History     Socioeconomic History    Marital status:      Spouse name: None    Number of children: None    Years of education: None    Highest education level: None   Occupational History    None   Tobacco Use    Smoking status: Former Smoker     Packs/day: 1.50     Years: 8.00     Pack years: 12.00     Types: Cigarettes     Quit date: 3/4/1980     Years since quittin.7    Smokeless tobacco: Never Used   Vaping Use    Vaping Use: Never used Substance and Sexual Activity    Alcohol use: No    Drug use: No    Sexual activity: None   Other Topics Concern    None   Social History Narrative    None     Social Determinants of Health     Financial Resource Strain: Low Risk     Difficulty of Paying Living Expenses: Not hard at all   Food Insecurity: No Food Insecurity    Worried About Running Out of Food in the Last Year: Never true    Genna of Food in the Last Year: Never true   Transportation Needs: No Transportation Needs    Lack of Transportation (Medical): No    Lack of Transportation (Non-Medical): No   Physical Activity:     Days of Exercise per Week: Not on file    Minutes of Exercise per Session: Not on file   Stress:     Feeling of Stress : Not on file   Social Connections:     Frequency of Communication with Friends and Family: Not on file    Frequency of Social Gatherings with Friends and Family: Not on file    Attends Catholic Services: Not on file    Active Member of [address] [place] [address] [employer] or Organizations: Not on file    Attends Club or Organization Meetings: Not on file    Marital Status: Not on file   Intimate Partner Violence:     Fear of Current or Ex-Partner: Not on file    Emotionally Abused: Not on file    Physically Abused: Not on file    Sexually Abused: Not on file   Housing Stability:     Unable to Pay for Housing in the Last Year: Not on file    Number of [place] in the Last Year: Not on file    Unstable Housing in the Last Year: Not on file       SCREENINGS    Mahad Coma Scale  Eye Opening: Spontaneous  Best Verbal Response: Oriented  Best Motor Response: Obeys commands  [place] Coma Scale Score: 15        PHYSICAL EXAM    (up to 7 for level 4, 8 or more for level 5)     ED Triage Vitals [11/29/21 1739]   BP Temp Temp Source Pulse Resp SpO2 Height Weight   (!) 129/51 97.6 °F (36.4 °C) Oral 64 20 99 % -- 179 lb (81.2 kg)       Physical Exam  Vitals and nursing note reviewed.    Constitutional:       Appearance: Normal appearance. HENT:      Head: Normocephalic and atraumatic. Right Ear: Tympanic membrane and ear canal normal.      Left Ear: Tympanic membrane and ear canal normal.      Nose: Nose normal.      Mouth/Throat:      Mouth: Mucous membranes are dry. Pharynx: Oropharynx is clear. Eyes:      Extraocular Movements: Extraocular movements intact. Conjunctiva/sclera: Conjunctivae normal.      Pupils: Pupils are equal, round, and reactive to light. Cardiovascular:      Rate and Rhythm: Normal rate and regular rhythm. Pulmonary:      Effort: Pulmonary effort is normal.      Breath sounds: Normal breath sounds. Abdominal:      General: Abdomen is flat. Bowel sounds are normal.      Palpations: Abdomen is soft. Musculoskeletal:         General: Normal range of motion. Cervical back: Normal range of motion and neck supple. Skin:     General: Skin is warm and dry. Capillary Refill: Capillary refill takes 2 to 3 seconds. Neurological:      General: No focal deficit present. Mental Status: He is alert and oriented to person, place, and time. Mental status is at baseline. Psychiatric:         Mood and Affect: Mood normal.         Behavior: Behavior normal.         Thought Content: Thought content normal.         Judgment: Judgment normal.         DIAGNOSTIC RESULTS     EKG: All EKG's are interpreted by the Emergency Department Physician who either signs orCo-signs this chart in the absence of a cardiologist.      RADIOLOGY:   Non-plainfilm images such as CT, Ultrasound and MRI are read by the radiologist. Plain radiographic images are visualized and preliminarily interpreted by the emergency physician with the below findings:      Interpretationper the Radiologist below, if available at the time of this note:    XR CHEST PORTABLE   Final Result   No acute process. Left internal jugular hemodialysis catheter tip projects over the right   atrium.                ED BEDSIDE ULTRASOUND:   Performed by ED Physician - none    LABS:  Labs Reviewed   APTT - Abnormal; Notable for the following components:       Result Value    PTT 34.8 (*)     All other components within normal limits   BASIC METABOLIC PANEL - Abnormal; Notable for the following components:    Glucose 180 (*)     CREATININE 2.49 (*)     Calcium 8.2 (*)     GFR Non- 25 (*)     GFR  31 (*)     All other components within normal limits   BRAIN NATRIURETIC PEPTIDE - Abnormal; Notable for the following components:    Pro-BNP 4,377 (*)     All other components within normal limits   CBC WITH AUTO DIFFERENTIAL - Abnormal; Notable for the following components:    RBC 3.10 (*)     Hemoglobin 8.8 (*)     Hematocrit 27.5 (*)     RDW 15.7 (*)     Seg Neutrophils 77 (*)     Lymphocytes 12 (*)     Immature Granulocytes 2 (*)     Absolute Lymph # 0.76 (*)     All other components within normal limits   PROTIME-INR - Abnormal; Notable for the following components:    Protime 14.5 (*)     All other components within normal limits   TROPONIN - Abnormal; Notable for the following components:    Troponin, High Sensitivity 90 (*)     All other components within normal limits   TROPONIN - Abnormal; Notable for the following components:    Troponin, High Sensitivity 94 (*)     All other components within normal limits   TROPONIN - Abnormal; Notable for the following components:    Troponin, High Sensitivity 98 (*)     All other components within normal limits   HEMOGLOBIN AND HEMATOCRIT, BLOOD - Abnormal; Notable for the following components:    Hemoglobin 7.8 (*)     Hematocrit 25.0 (*)     All other components within normal limits   HEMOGLOBIN AND HEMATOCRIT, BLOOD - Abnormal; Notable for the following components:    Hemoglobin 8.0 (*)     Hematocrit 25.3 (*)     All other components within normal limits   HEMOGLOBIN AND HEMATOCRIT, BLOOD - Abnormal; Notable for the following components:    Hemoglobin 7.8 (*) Hematocrit 25.1 (*)     All other components within normal limits   HEMOGLOBIN AND HEMATOCRIT, BLOOD - Abnormal; Notable for the following components:    Hemoglobin 8.8 (*)     Hematocrit 27.8 (*)     All other components within normal limits   GLUCOSE, WHOLE BLOOD - Abnormal; Notable for the following components:    POC Glucose 249 (*)     All other components within normal limits   GLUCOSE, WHOLE BLOOD - Abnormal; Notable for the following components:    POC Glucose 297 (*)     All other components within normal limits   CBC WITH AUTO DIFFERENTIAL - Abnormal; Notable for the following components:    RBC 2.77 (*)     Hemoglobin 7.8 (*)     Hematocrit 24.2 (*)     RDW 15.1 (*)     Seg Neutrophils 80 (*)     Lymphocytes 12 (*)     Immature Granulocytes 1 (*)     Absolute Lymph # 1.02 (*)     All other components within normal limits   BASIC METABOLIC PANEL - Abnormal; Notable for the following components:    Glucose 114 (*)     BUN 51 (*)     CREATININE 4.48 (*)     GFR Non- 13 (*)     GFR  16 (*)     All other components within normal limits   TROPONIN - Abnormal; Notable for the following components:    Troponin, High Sensitivity 112 (*)     All other components within normal limits   COVID-19, RAPID       All other labs were within normal range or not returned as of this dictation. EMERGENCY DEPARTMENT COURSE and DIFFERENTIAL DIAGNOSIS/MDM:   Vitals:    Vitals:    12/01/21 1600 12/01/21 1700 12/01/21 1800 12/01/21 1900   BP: (!) 150/58 (!) 157/58     Pulse: 76 72 90 77   Resp: 22 17 23 21   Temp:       TempSrc:       SpO2: 94% 94% 96% 94%   Weight:             MDM  Number of Diagnoses or Management Options  Chest pain, unspecified type  Diagnosis management comments: Patient's troponin was elevated. Patient has no chest pain at this time. He did complete most of his dialysis. Patient will be transferred back to [place] when a bed is available.   He is clinically stable at this time receiving heparin. Patient is alert oriented no acute distress family members are at bedside. Repeat troponin noted. Amount and/or Complexity of Data Reviewed  Clinical lab tests: reviewed  Tests in the radiology section of CPT®: reviewed  Tests in the medicine section of CPT®: reviewed  Decide to obtain previous medical records or to obtain history from someone other than the patient: yes        ED Course as of 12/03/21 1143   Tue Nov 30, 2021   0716 CP during dialysis; none now  Elev troponin--higher than previous, new qwaves  Hgb8.8 stable from previous; watch H&H q2hrs as he is on Heparin drip  Accepted to [facility] [AB]   0903 New Hgb noted  8.8-->8.2-->7.8   Dr Manolo Key (cardiology paged) [AB]   [phone] with Dr Manolo Key regarding presentation, troponins, etc and he asked that we discontinue the Heparin drip [AB]   1547 Remains hemodynamically stable. Due to  [AB]      ED Course User Index  [AB] Marcy Parra, DO        Procedures    FINAL IMPRESSION      1. Chest pain, unspecified type        DISPOSITION/PLAN   DISPOSITION        PATIENT REFERRED TO:  Giuseppe Damon MD  [address] [place] [address], [address]  [facility] [address]    Call   Please call tomorrow to discuss dialysis. Please bring patient to dialysis tomorrow. DISCHARGE MEDICATIONS:  Discharge Medication List as of 12/1/2021  7:14 PM                 Summation      Patient Course:      ED Medications administered this visit:    Medications   heparin (porcine) injection 4,000 Units (4,000 Units IntraVENous Given 11/30/21 0608)   aspirin chewable tablet 324 mg (324 mg Oral Given 11/30/21 0801)       New Prescriptions from this visit:    Discharge Medication List as of 12/1/2021  7:14 PM          Follow-up:  Giuseppe Damon MD  [address] [place] [address] Drive, [address] [address]    Call   Please call tomorrow to discuss dialysis. Please bring patient to dialysis tomorrow. Final Impression:   1.  Chest pain, unspecified type (Please note that portions of this note were completed with a voice recognition program.  Efforts were made to edit the dictations but occasionally words are mis-transcribed.)         Ning Mcdonald PA-C  12/03/21 1149

## 2021-12-01 VITALS
SYSTOLIC BLOOD PRESSURE: 157 MMHG | WEIGHT: 179 LBS | DIASTOLIC BLOOD PRESSURE: 58 MMHG | TEMPERATURE: 97.6 F | RESPIRATION RATE: 21 BRPM | OXYGEN SATURATION: 94 % | HEART RATE: 77 BPM | BODY MASS INDEX: 26.43 KG/M2

## 2021-12-01 LAB
ABSOLUTE EOS #: 0.11 K/UL (ref 0–0.44)
ABSOLUTE IMMATURE GRANULOCYTE: 0.09 K/UL (ref 0–0.3)
ABSOLUTE LYMPH #: 1.02 K/UL (ref 1.1–3.7)
ABSOLUTE MONO #: 0.51 K/UL (ref 0.1–1.2)
ANION GAP SERPL CALCULATED.3IONS-SCNC: 15 MMOL/L (ref 9–17)
BASOPHILS # BLD: 0 % (ref 0–2)
BASOPHILS ABSOLUTE: 0.03 K/UL (ref 0–0.2)
BUN BLDV-MCNC: 51 MG/DL (ref 8–23)
BUN/CREAT BLD: 11 (ref 9–20)
CALCIUM SERPL-MCNC: 8.6 MG/DL (ref 8.6–10.4)
CHLORIDE BLD-SCNC: 98 MMOL/L (ref 98–107)
CO2: 23 MMOL/L (ref 20–31)
CREAT SERPL-MCNC: 4.48 MG/DL (ref 0.7–1.2)
DIFFERENTIAL TYPE: ABNORMAL
EOSINOPHILS RELATIVE PERCENT: 1 % (ref 1–4)
GFR AFRICAN AMERICAN: 16 ML/MIN
GFR NON-AFRICAN AMERICAN: 13 ML/MIN
GFR SERPL CREATININE-BSD FRML MDRD: ABNORMAL ML/MIN/{1.73_M2}
GFR SERPL CREATININE-BSD FRML MDRD: ABNORMAL ML/MIN/{1.73_M2}
GLUCOSE BLD-MCNC: 114 MG/DL (ref 70–99)
HCT VFR BLD CALC: 24.2 % (ref 40.7–50.3)
HEMOGLOBIN: 7.8 G/DL (ref 13–17)
IMMATURE GRANULOCYTES: 1 %
LYMPHOCYTES # BLD: 12 % (ref 24–43)
MCH RBC QN AUTO: 28.2 PG (ref 25.2–33.5)
MCHC RBC AUTO-ENTMCNC: 32.2 G/DL (ref 28.4–34.8)
MCV RBC AUTO: 87.4 FL (ref 82.6–102.9)
MONOCYTES # BLD: 6 % (ref 3–12)
NRBC AUTOMATED: 0 PER 100 WBC
PDW BLD-RTO: 15.1 % (ref 11.8–14.4)
PLATELET # BLD: 220 K/UL (ref 138–453)
PLATELET ESTIMATE: ABNORMAL
PMV BLD AUTO: 10 FL (ref 8.1–13.5)
POTASSIUM SERPL-SCNC: 4 MMOL/L (ref 3.7–5.3)
RBC # BLD: 2.77 M/UL (ref 4.21–5.77)
RBC # BLD: ABNORMAL 10*6/UL
SEG NEUTROPHILS: 80 % (ref 36–65)
SEGMENTED NEUTROPHILS ABSOLUTE COUNT: 6.9 K/UL (ref 1.5–8.1)
SODIUM BLD-SCNC: 136 MMOL/L (ref 135–144)
TROPONIN INTERP: ABNORMAL
TROPONIN T: ABNORMAL NG/ML
TROPONIN, HIGH SENSITIVITY: 112 NG/L (ref 0–22)
WBC # BLD: 8.7 K/UL (ref 3.5–11.3)
WBC # BLD: ABNORMAL 10*3/UL

## 2021-12-01 PROCEDURE — 6370000000 HC RX 637 (ALT 250 FOR IP): Performed by: EMERGENCY MEDICINE

## 2021-12-01 PROCEDURE — 84484 ASSAY OF TROPONIN QUANT: CPT

## 2021-12-01 PROCEDURE — 80048 BASIC METABOLIC PNL TOTAL CA: CPT

## 2021-12-01 PROCEDURE — 99223 1ST HOSP IP/OBS HIGH 75: CPT | Performed by: FAMILY MEDICINE

## 2021-12-01 PROCEDURE — 96372 THER/PROPH/DIAG INJ SC/IM: CPT

## 2021-12-01 PROCEDURE — 85025 COMPLETE CBC W/AUTO DIFF WBC: CPT

## 2021-12-01 PROCEDURE — 93005 ELECTROCARDIOGRAM TRACING: CPT | Performed by: EMERGENCY MEDICINE

## 2021-12-01 RX ADMIN — METOPROLOL TARTRATE 12.5 MG: 25 TABLET, FILM COATED ORAL at 10:08

## 2021-12-01 RX ADMIN — CETIRIZINE HYDROCHLORIDE 5 MG: 10 TABLET, FILM COATED ORAL at 09:54

## 2021-12-01 RX ADMIN — HYDRALAZINE HYDROCHLORIDE 50 MG: 50 TABLET, FILM COATED ORAL at 09:53

## 2021-12-01 RX ADMIN — INSULIN GLARGINE 25 UNITS: 100 INJECTION, SOLUTION SUBCUTANEOUS at 09:52

## 2021-12-01 RX ADMIN — LATANOPROST 1 DROP: 50 SOLUTION OPHTHALMIC at 09:53

## 2021-12-01 RX ADMIN — SODIUM BICARBONATE TAB 650 MG 650 MG: 650 TAB at 09:53

## 2021-12-01 RX ADMIN — ACYCLOVIR 400 MG: 200 CAPSULE ORAL at 09:53

## 2021-12-01 RX ADMIN — BUMETANIDE 2 MG: 1 TABLET ORAL at 09:52

## 2021-12-01 ASSESSMENT — PAIN SCALES - GENERAL: PAINLEVEL_OUTOF10: 0

## 2021-12-01 NOTE — ED NOTES
Pt's wife called out stating that pt was having chest pain. This writer entered room -pt states that his chest pain in his back near his shoulder blade.  EKG obtained and Dr Allie Seay made aware     Patricia Connelly RN  12/01/21 0779

## 2021-12-01 NOTE — ED NOTES
Patient alert and oriented, ambulated to bathroom with walker and writer at side. He tolerated well and verbalized understanding of need to use call light when finished for assistance back to bed.       Eduard Tabor RN  12/01/21 3839

## 2021-12-01 NOTE — ED NOTES
Megan from [facility] called and stated no bed at this time per bed board     Martha Turner, RN  12/01/21 9150

## 2021-12-02 ENCOUNTER — TELEPHONE (OUTPATIENT)
Dept: CARDIOLOGY | Age: 76
End: 2021-12-02

## 2021-12-02 ENCOUNTER — CARE COORDINATION (OUTPATIENT)
Dept: CASE MANAGEMENT | Age: 76
End: 2021-12-02

## 2021-12-02 ENCOUNTER — TELEPHONE (OUTPATIENT)
Dept: PRIMARY CARE CLINIC | Age: 76
End: 2021-12-02

## 2021-12-02 PROBLEM — I21.A1 TYPE 2 MI (MYOCARDIAL INFARCTION) (HCC): Status: ACTIVE | Noted: 2021-11-30

## 2021-12-02 PROBLEM — N18.4 ACUTE RENAL FAILURE SUPERIMPOSED ON STAGE 4 CHRONIC KIDNEY DISEASE (HCC): Status: ACTIVE | Noted: 2018-10-02

## 2021-12-02 LAB
EKG ATRIAL RATE: 81 BPM
EKG ATRIAL RATE: 86 BPM
EKG P AXIS: 39 DEGREES
EKG P AXIS: 48 DEGREES
EKG P-R INTERVAL: 182 MS
EKG P-R INTERVAL: 184 MS
EKG Q-T INTERVAL: 390 MS
EKG Q-T INTERVAL: 408 MS
EKG QRS DURATION: 96 MS
EKG QRS DURATION: 98 MS
EKG QTC CALCULATION (BAZETT): 466 MS
EKG QTC CALCULATION (BAZETT): 473 MS
EKG R AXIS: -10 DEGREES
EKG R AXIS: -9 DEGREES
EKG T AXIS: 42 DEGREES
EKG T AXIS: 49 DEGREES
EKG VENTRICULAR RATE: 81 BPM
EKG VENTRICULAR RATE: 86 BPM

## 2021-12-02 PROCEDURE — 93010 ELECTROCARDIOGRAM REPORT: CPT | Performed by: FAMILY MEDICINE

## 2021-12-02 NOTE — CARE COORDINATION
[facility] Transitions Follow Up Call    2021    Patient: Maria R Mendoza  Patient : 1945   MRN: [id number]  Reason for Admission: Acute kidney injury superimposed on CKD  Discharge Date: 21 RARS: Readmission Risk Score: 21.8 ( )      Discharge Date: 21     RARS: Readmission Risk Score: 21.8 ( )  BPCI-A Medical Bundle Patient:  Working DR Renal failure -  Admitting Location: [facility]  Adm Date: 21  Date of Discharge: 21     Bundle End Date: 22     Spoke with: Nolan Oliveira    Attempted to contact Venice Brown for ED follow up, but his wife answered the phone. She stated that he was tired, but was denying chest pain. She said that the N/V has resolved and he is eating some. She said that he will not be getting dialysis to day, but tomorrow and Saturday. She said that the cardiologist said that Venice Brown will have to have dialysis possibly 4 times a week instead of 3. She was told that they he had to have his dialysis time decreased because of the strain on his heart caused the chest pain. Reviewed the new medication change and she state that they obtained it. She said that Venice Brown has a cardio appointment and she will be calling the PCP for a follow up  Declined assistance. She had no further questions or concerns. Care Transitions Follow Up Call    Needs to be reviewed by the provider   Additional needs identified to be addressed with provider: No  none             Method of communication with provider : none      Care Transition Nurse (CTN) contacted the family by telephone to follow up after admission on 21. Verified name and  with family as identifiers. Addressed changes since last contact: none  Discussed follow-up appointments. If no appointment was previously scheduled, appointment scheduling offered: Yes and wife declined. Is follow up appointment scheduled within 7 days of discharge?  No.      CTN reviewed discharge instructions, medical action plan and red flags with family and discussed any barriers to care and/or understanding of plan of care after discharge. Discussed appropriate site of care based on symptoms and resources available to patient including: PCP, Specialist and When to call 911. The family agrees to contact the PCP office for questions related to their healthcare. Patients top risk factors for readmission: medical condition-Renal failure  Interventions to address risk factors: Obtained and reviewed discharge summary and/or continuity of care documents      Non-[facility] follow up appointment(s):     CTN provided contact information for future needs. Plan for follow-up call in 5-7 days based on severity of symptoms and risk factors. Plan for next call: follow up      ]      Care Transitions Subsequent and Final Call    Subsequent and Final Calls  Do you have any ongoing symptoms?: Yes  Onset of Patient-reported symptoms: In the past 7 days  Patient-reported symptoms: Fatigue  Have your medications changed?: Yes  Patient Reports: increased Lopressor dose  Do you have any questions related to your medications?: No  Do you currently have any active services?: Yes  Are you currently active with any services?: Outpatient/Community Services  Do you have any needs or concerns that I can assist you with?: No  Care Transitions Interventions  Other Interventions:            Follow Up  Future Appointments   Date Time Provider Isadora Younger   12/13/2021 10:00 AM Sabine Zavaleta MD [facility]   2/1/2022 12:00 PM MD Merari Magañaf None   2/2/2022 10:30 AM MD dena Bowmanf onc spe [facility]       Ifeanyi Oro, RN

## 2021-12-02 NOTE — CONSULTS
Patient: Maria R Mendoza  : 1945  Date of Admission: 2021  Primary Care Physician: Yosi Red  Today's Date: 2021    REASON FOR CONSULTATION: Chest Pain (chest pain during diaylsis today, rates pain 3, left chest. Today was first dialysis treatment in [place] , pt just released from [facility] this past friday) and Dizziness (new onset with dialysis)      HPI: Mr. Lenny Reynoso is a 68 y.o. male well known to me with a known history of dysautonomia where on tilt table testing his blood pressure dropped from 395 systolic to 78 systolic with only a 21-16 HR response. More recently, a CT of he head in the past showed evidence of at least 2 old CVA's and a heart catheterization showed severe single vessel disease in the ostium of a moderate sized OM1 branch of the circumflex. However, maximal guidelines directed medical management was opted for an place of stenting partly due to the risk associated with stenting this vessel. Recently he has had a coronary angiography procedure with stenting of his HA Om1. As you know, Mr. Lenny Reynoso  is a 76 y.o. male with a history of recent onset substernal chest discomfort that led to a stress test and a subsequent heart catheterization which showed severe single vessel coronary artery disease of the HA Om1 with successful angioplasty and stenting of that vessel that was done by Dr. Leelee Palacios on 4/10/17. More recently he has had problems with balance problems when he is in his feet and says that a Neurologist has told him in the past this is because of his previous strokes. Most recently he underwent a cardiovascular stress test which fortunately had shown to be normal on 3/21/2018. Mr. Lenny Reynoso has significant normal stress test and echocardiogram on 2020. Holter monitor done on 2020: The rhythm was sinus. Average daily heart rate 58 ranging from 47 to 81 bpm. Bradycardia for 72% test duration.  Rare premature supraventricular ectopic beats consisting his own food would likely be the best thing for him. He denied any abdominal pain, bleeding problems, problems with his medications or any other concerns at this time. No nausea or vomiting. No cough, fever or chills. Past Medical History:   Diagnosis Date    Acute kidney [facility] ([facility]     Acute MI ([facility]     Acute renal failure with tubular necrosis ([facility] 10/2/2018    ssecondary to hemodynamic effects of loop diuretics and ace inhibitors, bbaseline 1.2-1.3 which peaked up to 1.8, resolving    Anemia     CAD (coronary artery disease)     Cerebral artery occlusion with cerebral infarction (HCC)     CHF (congestive heart failure) (HCC)     Chronic back pain     Closed fracture of lumbar vertebra without mention of spinal cord injury     Displacement of intervertebral disc, site unspecified, without myelopathy     H/O cardiac catheterization 2/19/16    LMCA: Mild irregularities 10-20%. LAD: Lesion on PRox LAD: Mid subsection. 65% stenosis. LCx: Lesion on 1st Ob Valentina: Proximal subesection. 70% steniosis. RCA: Small non-dominant RCA. Lesion on PRox RCA: Ostial. 50% stenosis. EF:55%.  H/O cardiovascular stress test 2/19/16    Abnormal. Moderate perfusion defect of mild intensity in the inferior, inferoseptal adn inferoapical regions during stress imaging, which most consistent with ischemia. Global LV systolic function normal without regional wall motion abnormalities. OVerall these results are most consistent with an intermediate risk for signficant CAD. Additional testing including cardiac cath may be indicated.  H/O tilt table evaluation 12/26/2017    Abnormal. Patients HR, BP response and symptoms were most consistent with dysautonomia.  Combined with viligant maintenance of euvolemia and maintaining a moderate salft intake, pharmacologic treatment with SSRI such as lexapro and or mestinon among other treatments have shown some effectiveness in treatment of this condition    H/O tilt table evaluation 12/26/2017    Abnormal head upright tilt table study. The pt heart rate, blood pressure response and symptoms were most consistent with dysautonomia.  History of 24 hour EKG monitoring 8/14/14    Occasional PAC's and PVC's which appear to be at least moderately symptomatic.  History of 24 hour EKG monitoring 11/19/14    Event Monitor. Sinus rhythm and sinus bradycardia. Infrequent isolated PVC's    History of blood transfusion     History of cardiovascular stress test 2/19/14    Relatively NL    History of cardiovascular stress test 03/21/2018    Normal myocardial perfusion. Global left ventricular systolic function was normal with an EF of 68%. Overall, these results are most consistent with a low risk scan.  History of coronary artery stent placement 04/2017    PTCA / Drug Eluting Stent:, CX and / or branches    History of CVA (cerebrovascular accident) without residual deficits 2014    Incidentally found on CT head. No known impairment now or in the past.    History of echocardiogram 2/18/14    LA mildly dilated, EF 55%, LV wall thickness is moderately increased, no definite wall motion abnormalilities, what appears to be a pacer wire is seen w/n the RA and RV, mild-mod TR, mild pulmonary hypertension.  History of Holter monitoring 12/29/2017    Rare PAC's and PVC's.      History of tilt table evaluation 8/12/14    Abnormal    Hyperlipidemia     Hypertension     Non critical Right Renal artery stenosis, native ([address] 10/2/2018    Non critical Right Renal artery stenosis, based on cath in 2016, Rt RA 30% stenosis    Pacemaker     non-functioning    S/P cardiac cath 04/10/2017    S/P coronary artery stent placement     Successful PTCA - HA Om1    SIRS (systemic inflammatory response syndrome) ([address] 5/19/2019    Type II or unspecified type diabetes mellitus without mention of complication, not stated as uncontrolled        CURRENT ALLERGIES: Coreg [carvedilol], Lipitor [atorvastatin], Aldactone [spironolactone], Crestor [rosuvastatin calcium], Lopid [gemfibrozil], Invokana [canagliflozin], and Januvia [sitagliptin] REVIEW OF SYSTEMS: 14 systems were reviewed. Pertinent positives and negatives as above, all else negative. Past Surgical History:   Procedure Laterality Date    BACK SURGERY      CARDIAC CATHETERIZATION Left 02/19/2016    via right radial approach/ [facility]/ Dr. Darrel Goins Left 10/05/2021    Dr Brooklynn Toribio radial-Moderate three vessel coronary artery disease involving a ostial 50-60% stenosis in a small, non-dominant RCA, a 60% mid LAD stenosis and a proximal 50-60% stenosis in the left anterior descending coronary artery. Normal left ventricular end diastolic pressure. Proceed with aggressive maximal medical management as clinically indicated.     CHOLECYSTECTOMY  08/17/2015    Liang/Charles/Saulo/ Lap    COLONOSCOPY  2010    COLONOSCOPY  02/19/2015    -polyps,diverticulosis,hemorrhoids    COLONOSCOPY  05/23/2018    Dr Zina Berger    COLONOSCOPY N/A 05/23/2018    COLONOSCOPY POLYPECTOMY SNARE/COLD BIOPSY  cold snare  and  hot snare performed by Chaya Ferrer MD at [facility]  10/30/2020    with Dr. Jean-Paul Orona 10/30/2020    Fatuma Ortiz, NOT HIGH RISK performed by Damon Martini DO at [facility]      left eye    ENDOSCOPY, COLON, DIAGNOSTIC      EYE SURGERY      IR TUNNELED CATHETER PLACEMENT GREATER THAN 5 YEARS  11/22/2021    IR TUNNELED CATHETER PLACEMENT GREATER THAN 5 YEARS 11/22/2021 [facility]    PACEMAKER INSERTION      PACEMAKER PLACEMENT      UPPER GASTROINTESTINAL ENDOSCOPY  05/28/2019    Dr. Fab Kuhn H-Pylori)duodenal bulbar/antral erythema    UPPER GASTROINTESTINAL ENDOSCOPY N/A 05/28/2019    EGD BIOPSY, clotest performed by Chaya Ferrer MD at [address] GASTROINTESTINAL ENDOSCOPY N/A 10/30/2020    EGD ESOPHAG[facility]  [facility]    US PERCUT RENAL BIOPSY, LT 2021 [facility]    Social History:  Social History     Tobacco Use    Smoking status: Former Smoker     Packs/day: 1.50     Years: 8.00     Pack years: 12.00     Types: Cigarettes     Quit date: 3/4/1980     Years since quittin.7    Smokeless tobacco: Never Used   Vaping Use    Vaping Use: Never used   Substance Use Topics    Alcohol use: No    Drug use: No        CURRENT MEDICATIONS:  Outpatient Medications Marked as Taking for the 21 encounter [facility] Encounter)   Medication Sig Dispense Refill    metoprolol tartrate (LOPRESSOR) 25 MG tablet Take 2 tablets by mouth 2 times daily 60 tablet 3    sodium bicarbonate 650 MG tablet Take 1 tablet by mouth 2 times daily 60 tablet 1    cetirizine (ZYRTEC) 10 MG tablet Take 0.5 tablets by mouth daily 30 tablet 0    hydrALAZINE (APRESOLINE) 50 MG tablet Take 1 tablet by mouth every 8 hours 90 tablet 3    bumetanide (BUMEX) 2 MG tablet Take 1 tablet by mouth 2 times daily 30 tablet 3    eplerenone (INSPRA) 25 MG tablet Take 1 tablet by mouth daily 90 tablet 3    insulin glargine (LANTUS SOLOSTAR) 100 UNIT/ML injection pen Inject 28 Units into the skin 2 times daily (Patient taking differently: Inject 25 Units into the skin 2 times daily ) 50.4 mL 0    latanoprost (XALATAN) 0.005 % ophthalmic solution Place 1 drop into both eyes nightly       acyclovir (ZOVIRAX) 400 MG tablet 400 mg 2 times daily       prednisoLONE acetate (PRED FORTE) 1 % ophthalmic suspension Place 1 drop into the left eye daily        No current facility-administered medications for this encounter. FAMILY HISTORY: family history includes Cancer in his father and mother.    Physical Examination:     BP (!) 157/58   Pulse 77   Temp 97.6 °F (36.4 °C) (Oral)   Resp 21   Wt 179 lb (81.2 kg)   SpO2 94%   BMI 26.43 kg/m²  Body mass index is 26.43 kg/m². [ INSTRUCTIONS:  \"[x]\" Indicates a positive item  \"[]\" Indicates a negative item   Vital Signs: (As obtained by patient/caregiver or practitioner observation)  No flowsheet data found. Constitutional: [x] Appears well-developed and well-nourished [x] No apparent distress      [] Abnormal-   Mental status  [x] Alert and awake  [x] Oriented to person/place/time [x]Able to follow commands      Eyes:  EOM    [x]  Normal  [] Abnormal-  Sclera  [x]  Normal  [] Abnormal -         Discharge [x]  None visible  [] Abnormal -    HENT:   [x] Normocephalic, atraumatic.   [] Abnormal   [] Mouth/Throat: Mucous membranes are moist.     External Ears [x] Normal  [] Abnormal-     Neck: [x] No visualized mass     Pulmonary/Chest: [x] Respiratory effort normal.  [x] No visualized signs of difficulty breathing or respiratory distress        [] Abnormal-     Neurological:        [x] No Facial Asymmetry (Cranial nerve 7 motor function) (limited exam to video visit)          [] No gaze palsy        [] Abnormal-         Skin:        [x] No significant exanthematous lesions or discoloration noted on facial skin         [] Abnormal-            Psychiatric:       [x] Normal Affect [] No Hallucinations        [] Abnormal-     Other pertinent observable physical exam findings: None         MOST RECENT LABS ON RECORD:   Lab Results   Component Value Date    WBC 8.7 12/01/2021    HGB 7.8 (L) 12/01/2021    HCT 24.2 (L) 12/01/2021     12/01/2021    CHOL 69 09/21/2021    TRIG 185 (H) 09/21/2021    HDL 14 (L) 09/21/2021    LDLCHOLESTEROL 18 09/21/2021    ALT 38 11/22/2021    AST 14 11/22/2021     12/01/2021    K 4.0 12/01/2021    CL 98 12/01/2021    CREATININE 4.48 (H) 12/01/2021    BUN 51 (H) 12/01/2021    CO2 23 12/01/2021    TSH 2.26 11/02/2017    PSA 3.68 04/30/2021    INR 1.2 11/29/2021    LABA1C 7.3 08/19/2021    LABMICR 30 10/17/2015 ASSESSMENT:  Patient Active Problem List    Diagnosis Date Noted    S/P drug eluting coronary stent placement-OM1 4/10/17 04/10/2017    Abnormal tilt table test 02/23/2016    Abnormal cardiovascular stress test 02/18/2016    Moderate malnutrition ([address] 11/12/2021    Uncontrolled type 2 diabetes mellitus with hyperglycemia ([address] 02/20/2015    Coronary atherosclerosis due to calcified coronary lesion 02/20/2015    NSTEMI (non-ST elevated myocardial infarction) ([address] 11/30/2021    Pulmonary hypertension ([address] 11/22/2021    Acute on chronic diastolic (congestive) heart failure ([address] 11/22/2021    Cardiorenal syndrome, stage 1-4 or unspecified chronic kidney disease, with heart failure ([address] 11/17/2021    Cardiorenal syndrome with renal failure 11/17/2021    Hyperkalemia 11/11/2021    Rash 11/10/2021    Skin lesion of left lower extremity 11/10/2021    Urticaria 11/10/2021    Iron deficiency anemia secondary to inadequate dietary iron intake 10/21/2021    Iron malabsorption 10/21/2021    Chronic anemia 09/21/2021    Acute coronary syndrome with high troponin ([address] 09/21/2021    Chest pain 09/20/2021    Gross hematuria 05/05/2021    BPH with obstruction/lower urinary tract symptoms 12/10/2019    Elevated PSA 12/10/2019    Anemia in stage 4 chronic kidney disease ([address] 05/21/2019    Acute kidney injury superimposed on CKD ([address] 10/02/2018    Non critical Right Renal artery stenosis, native ([address] 10/02/2018    Medication side effect, initial encounter 03/23/2018    Angina, class II ([address] 01/08/2018    Hyperlipidemia 04/24/2017    Chronic diastolic HF (heart failure), NYHA class 3 ([address] 04/24/2017    Coronary artery disease involving native coronary artery of native heart without angina pectoris 05/02/2016    Cervical stenosis of spinal canal 02/29/2016    Chronic kidney disease, stage III (moderate) ([address] 02/22/2016    Controlled type 2 diabetes mellitus with diabetic nephropathy, with long-term current use of insulin ([facility] 02/22/2016    Ischemic chest pain ([facility] 02/17/2016    Accelerated hypertension 10/16/2015    Chronotropic incompetence with autonomic dysfunction 09/02/2015    Episodic lightheadedness 09/02/2015    Cholecystitis 08/17/2015    Syncope, near 08/05/2015    Dysautonomia ([facility] 06/29/2015    History of CVA (cerebrovascular accident) without residual deficits     Displacement of intervertebral disc, site unspecified, without myelopathy 03/04/2015    History of colon polyps 69/01/3317    Systolic murmur 44/12/5325    History of MI (myocardial infarction) 02/12/2014    Vertigo, benign paroxysmal 10/17/2012     PLAN:    · Acute on chronic renal insufficiency: Renal function continues to worsen and Creatinine >4 since he missed his dialysis next door while here in the ER. I do think this is a big part of his worsening fatigue, tiredness, poor appetite etc. Furthermore, with no likely bed in the near future, expect at least 2-3 more days without dialysis I genuinely fear that without dialysis his condition may become critical and even worsen his cardiac condition. · We had a very long discussion with Mr. Aylin Garnica, his wife and about 4 other family members about risks and benefits of wating here for a bed at [place] vs discharge home and getting him dialysis tomorrow. I told them I did NOT believe that he had a heart attack but rather to believe that his chest pain was angina related to what sounded to be perhaps overly aggressive dialysis. I explained that since this was his first full treatment and that he previously had more limited treatments that likely by reducing the rate, and/or duration of his dialysis may be all that is necessary to reduce his angina. Furthermore I told him that I would increase his metoprolol to reduce his risk of recurrent angina with dialysis.   Furthermore there I told him that I with highly doubtful that anyone will repeat heart catheterization on him if indeed he was ever transferred to [facility] because he had just had 1 about 6 weeks ago and with his troponins relatively flat and in many ways consistent with the patient's with end-stage dialysis and known moderate three-vessel coronary artery disease, heart catheterization would likely only worsen his current condition. Mr. Franc Graham as well as his wife were in complete agreement with plans to be discharged from the hospital as soon as possible with plans to call the dialysis center tomorrow morning asking to be added on urgent dialysis since he missed his Wednesday treatment. I told him to call my office should he have any difficulty getting this set up and I will be happy to intervene in any way necessary. I also discussed this with the emergency room providers who were also in complete agreement with this plan. · Anemia, unclear etiology: Likely multifactorial including GI losses and acute on chronic renal insufficiency. · He received 1 unit pRBC transfusion during hospital stay    Dysautonomia: Mildly to moderately, likely related to and worsened by anemia. Currently stable  · Diuretics: Not indicated at this time. · Nonpharmacologic counseling: Because of his condition, I reminded him to try and keep himself well-hydrated and to take extra time when moving from laying to sitting, sitting to standing and standing to walking as well as wearing at least knee high compressions stockings. Chronic Diastolic Heart Failure: EF of 60% via echo on 12/18/2020. Down 3 lbs over last 3 days, up 6 lbs before that. · Beta Blocker: INCREASE to Metoprolol tartrate (Lopressor) 50 mg bid. I also discussed the potential side effects of this medication including lightheadedness and dizziness and instructed them to stop the medication of this occurs and call our office if this occurs. Patient reports abdominal pain, nausea due to medication.    ACE Inibitor/ARB: Will defer to nephrology with current acute on chronic renal insufficiency   Diuretics: Lasix On hold due to his BUN and recently worsening kidney function. probably still up 9-12 lbs but down 1 lb since yesterday. Nonpharmacologic management of Heart Failure: I advised him to try and keep his legs up whenever possible and to limit salt in his diet. Compression stockings in place  No fluid noted on ultrasound for paracentesis    Atherosclerotic Heart Disease with stable Angina: Coronary Artery stent: 4/10/2017. Cardiac cath done on 10/5/2021 showed Moderate three vessel coronary artery disease involving a ostial 50-60% stenosis in a small, non-dominant RCA, a 60% mid LAD stenosis and a proximal 50-60% stenosis in the left anterior descending coronary artery  Antiplatelet Agent: Not indicated due to bleeding  ACE Inibitor/ARB: Will defer to nephrology with current acute on chronic renal insufficiency  Beta Blocker: INCREASE to Metoprolol tartrate (Lopressor) 50 mg bid. I also discussed the potential side effects of this medication including lightheadedness and dizziness and instructed them to stop the medication of this occurs and call our office if this occurs. Cholesterol Reduction Therapy: Refused by patient. Laboratory testing: None    Essential Hypertension: Much better controlled today  ACE Inibitor/ARB: Will defer to nephrology with current acute on chronic renal insufficiency  · Beta Blocker: Contraindicated due to history of symptomatic bradycardia, intolerance and/or allergy. due to at least perceived side effects of abdominal pain  · Calcium Channel Blocker: Continue amlodipine (Norvasc) 10 mg once daily. · Agree with increased Clonidine 0.2 mg tid  · Continue Hydralazine 100 mg 3x/day    Hyperlipidemia: Mixed - Last LDL on 9/21/2021 was 18 mg/dL   · Cholesterol Reduction Therapy: Refused by patient.      · Type 2 Myocardial Infarction: Mr. Christophe Limon meets criteria for a Type 2 MI defined by a rise and fall of troponin with at least one value above the 99th percentile and evidence of an imbalance between myocardial oxygen supply and demand unrelated to coronary thrombosis, evidenced by symptoms of acute myocardial ischemia which has manifested as the development of chest pressure which occurred during dialysis as above. Once again, thank you for allowing me to participate in this patients care. Please do not hesitate to contact me if I could be of any further assistance. Sincerely,  Spring Benítez MD, MS, F.A.C.C. [facility] Cardiology Specialist    [address] [place], [place] [address], [address] [place] [address]  Phone: [phone], Fax: [phone]     I believe that the risk of significant morbidity and mortality related to the patient's current medical conditions are: high. >60 minutes were spent during prep work, discussion and exam of the patient, and follow up documentation and all of their questions were answered. Yaz Cisneros is a 68 y.o. male being evaluated by a Virtual Visit (video visit) encounter to address concerns as mentioned above. A caregiver was present when appropriate. Due to this being a TeleHealth encounter (During ZGRJC-56 public health emergency), evaluation of the following organ systems was limited: Vitals/Constitutional/EENT/Resp/CV/GI//MS/Neuro/Skin/Heme-Lymph-Imm. Pursuant to the emergency declaration under the [facility], [address] [place] [address] authority and the [employer] and [employer] General Act, this Virtual Visit was conducted with patient's (and/or legal guardian's) consent, to reduce the patient's risk of exposure to COVID-19 and provide necessary medical care. The patient (and/or legal guardian) has also been advised to contact this office for worsening conditions or problems, and seek emergency medical treatment and/or call 911 if deemed necessary.     Services were provided through a video synchronous discussion virtually to substitute for in-person

## 2021-12-02 NOTE — TELEPHONE ENCOUNTER
Spoke with pt who states he is feeling much better,denies any CP,SOB,Pals,and lightheaded/dizziness. BP is 125/41  80. Pt was unable to get into dialysis today due to no beds but does have an ap pt  tomorrow for dialysis.

## 2021-12-02 NOTE — ED PROVIDER NOTES
Patient has been boarding in the emergency department. I did speak with cardiology in person who states that the patient does not need to be transferred to [place] and does not need to be admitted. The patient is very well-known to the cardiologist and is on dialysis. Initially there was thoughts that the patient needed to go to [place] for an elevated troponin for possible NSTEMI however the cardiologist believes that there is no need for catheterization or intervention at this time and does not believe that this is an NSTEMI. He believes that his symptoms are likely due to his rate of dialysis. He did speak with the family extensively about going to dialysis tomorrow and speaking with Dr. Medina Dickerson about adjusting the rate of dialysis for his heart. Patient is clear for discharge at this time.     Kevin Mcbride DO  Emergency medicine physician  7:11 PM          Luis Huff DO  12/01/21 1911

## 2021-12-03 ENCOUNTER — HOSPITAL ENCOUNTER (EMERGENCY)
Age: 76
Discharge: HOME | End: 2021-12-03
Attending: EMERGENCY MEDICINE
Payer: MEDICARE

## 2021-12-03 ENCOUNTER — TELEPHONE (OUTPATIENT)
Dept: OTHER | Facility: CLINIC | Age: 76
End: 2021-12-03

## 2021-12-03 VITALS
HEART RATE: 77 BPM | WEIGHT: 179 LBS | SYSTOLIC BLOOD PRESSURE: 152 MMHG | BODY MASS INDEX: 26.51 KG/M2 | DIASTOLIC BLOOD PRESSURE: 52 MMHG | OXYGEN SATURATION: 97 % | HEIGHT: 69 IN

## 2021-12-03 DIAGNOSIS — R33.9 URINARY RETENTION: Primary | ICD-10-CM

## 2021-12-03 LAB
-: NORMAL
AMORPHOUS: NORMAL
BACTERIA: NORMAL
BILIRUBIN URINE: NEGATIVE
CASTS UA: NORMAL /LPF
COLOR: YELLOW
COMMENT UA: ABNORMAL
CRYSTALS, UA: NORMAL /HPF
EPITHELIAL CELLS UA: NORMAL /HPF (ref 0–5)
GLUCOSE URINE: ABNORMAL
KETONES, URINE: NEGATIVE
LEUKOCYTE ESTERASE, URINE: NEGATIVE
MUCUS: NORMAL
NITRITE, URINE: NEGATIVE
OTHER OBSERVATIONS UA: NORMAL
PH UA: 6 (ref 5–9)
PROTEIN UA: ABNORMAL
RBC UA: NORMAL /HPF (ref 0–2)
RENAL EPITHELIAL, UA: NORMAL /HPF
SPECIFIC GRAVITY UA: 1.02 (ref 1.01–1.02)
TRICHOMONAS: NORMAL
TURBIDITY: CLEAR
URINE HGB: ABNORMAL
UROBILINOGEN, URINE: NORMAL
WBC UA: NORMAL /HPF (ref 0–5)
YEAST: NORMAL

## 2021-12-03 PROCEDURE — 99283 EMERGENCY DEPT VISIT LOW MDM: CPT

## 2021-12-03 PROCEDURE — 51702 INSERT TEMP BLADDER CATH: CPT

## 2021-12-03 PROCEDURE — 51798 US URINE CAPACITY MEASURE: CPT

## 2021-12-03 PROCEDURE — 81001 URINALYSIS AUTO W/SCOPE: CPT

## 2021-12-03 ASSESSMENT — PAIN DESCRIPTION - PAIN TYPE: TYPE: ACUTE PAIN

## 2021-12-03 ASSESSMENT — PAIN SCALES - GENERAL: PAINLEVEL_OUTOF10: 9

## 2021-12-03 ASSESSMENT — ENCOUNTER SYMPTOMS
EYES NEGATIVE: 1
RESPIRATORY NEGATIVE: 1
GASTROINTESTINAL NEGATIVE: 1

## 2021-12-03 NOTE — TELEPHONE ENCOUNTER
Writer contacted Dr. Stefany Fox to inform of 30 day readmission risk. Dr. Stefany Fox informed writer of intention to discharge and recommended follow up with pcp.

## 2021-12-03 NOTE — ED PROVIDER NOTES
[facility]  [address]   Chief Complaint   Patient presents with    Urinary Retention     Pt states last urination was last night. Pt has >730 on bladder scan in triage. Pt recieved Dialysis today. HPI   Maria R Mendoza is a 68 y.o. male who presents with  abdominal pain, onset was last night after he stopped being able to pee. He has been on dialysis recently due to acute on chronic kidney injury after a drawnout medical history which occurred after acute coronary syndrome and cath. He has not been able to pee since last night. No other current complaints. REVIEW OF SYSTEMS   GI: Patient complains of abdominal pain  Cardiac: No chest pain or syncope  Pulmonary: No difficulty breathing or new cough  General: No fevers  : No hematuria or dysuria  See HPI for further details. All other review of systems otherwise negative. PAST MEDICAL & SURGICAL HISTORY   Past Medical History:   Diagnosis Date    [facility]     Acute MI ([facility]     Acute renal failure with tubular necrosis ([facility] 10/2/2018    ssecondary to hemodynamic effects of loop diuretics and ace inhibitors, bbaseline 1.2-1.3 which peaked up to 1.8, resolving    Anemia     CAD (coronary artery disease)     Cerebral artery occlusion with cerebral infarction (HCC)     CH[facility]congestive heart failure) [facility]     Chronic back pain     Closed fracture of lumbar vertebra without mention of spinal cord injury     Displacement of intervertebral disc, site unspecified, without myelopathy     H/O cardiac catheterization 2/19/16    LMCA: Mild irregularities 10-20%. LAD: Lesion on PRox LAD: Mid subsection. 65% stenosis. LCx: Lesion on 1st Ob Valentina: Proximal subesection. 70% steniosis. RCA: Small non-dominant RCA. Lesion on PRox RCA: Ostial. 50% stenosis. EF:55%.      H/O cardiovascular stress test 2/19/16    Abnormal. Moderate perfusion defect of mild intensity in the inferior, inferoseptal adn inferoapical regions during stress imaging, which most consistent with ischemia. Global LV systolic function normal without regional wall motion abnormalities. OVerall these results are most consistent with an intermediate risk for signficant CAD. Additional testing including cardiac cath may be indicated.  H/O tilt table evaluation 12/26/2017    Abnormal. Patients HR, BP response and symptoms were most consistent with dysautonomia. Combined with viligant maintenance of euvolemia and maintaining a moderate salft intake, pharmacologic treatment with SSRI such as lexapro and or mestinon among other treatments have shown some effectiveness in treatment of this condition    H/O tilt table evaluation 12/26/2017    Abnormal head upright tilt table study. The pt heart rate, blood pressure response and symptoms were most consistent with dysautonomia.  History of 24 hour EKG monitoring 8/14/14    Occasional PAC's and PVC's which appear to be at least moderately symptomatic.  History of 24 hour EKG monitoring 11/19/14    Event Monitor. Sinus rhythm and sinus bradycardia. Infrequent isolated PVC's    History of blood transfusion     History of cardiovascular stress test 2/19/14    Relatively NL    History of cardiovascular stress test 03/21/2018    Normal myocardial perfusion. Global left ventricular systolic function was normal with an EF of 68%. Overall, these results are most consistent with a low risk scan.  History of coronary artery stent placement 04/2017    PTCA / Drug Eluting Stent:, CX and / or branches    History of CVA (cerebrovascular accident) without residual deficits 2014    Incidentally found on CT head.  No known impairment now or in the past.    History of echocardiogram 2/18/14    LA mildly dilated, EF 55%, LV wall thickness is moderately increased, no definite wall motion abnormalilities, what appears to be a pacer wire is seen w/n the RA and RV, mild-mod TR, mild pulmonary hypertension.  History of Holter monitoring 12/29/2017    Rare PAC's and PVC's.  History of tilt table evaluation 8/12/14    Abnormal    Hyperlipidemia     Hypertension     Non critical Right Renal artery stenosis, native ([facility] 10/2/2018    Non critical Right Renal artery stenosis, based on cath in 2016, Rt RA 30% stenosis    Pacemaker     non-functioning    S/P cardiac cath 04/10/2017    S/P coronary artery stent placement     Successful PTCA - HA Om1    SIRS (systemic inflammatory response syndrome) ([facility] 5/19/2019    Type II or unspecified type diabetes mellitus without mention of complication, not stated as uncontrolled      Past Surgical History:   Procedure Laterality Date    BACK SURGERY      CARDIAC CATHETERIZATION Left 02/19/2016    via right radial approach/ Melina Vernon/ Dr. Sharon Butler Left 10/05/2021    Dr Sunni Matos radial-Moderate three vessel coronary artery disease involving a ostial 50-60% stenosis in a small, non-dominant RCA, a 60% mid LAD stenosis and a proximal 50-60% stenosis in the left anterior descending coronary artery. Normal left ventricular end diastolic pressure. Proceed with aggressive maximal medical management as clinically indicated.     CHOLECYSTECTOMY  08/17/2015    Laing/Charles/Saulo/ Lap    COLONOSCOPY  2010    COLONOSCOPY  02/19/2015    -polyps,diverticulosis,hemorrhoids    COLONOSCOPY  05/23/2018    Dr Peace Jackman    COLONOSCOPY N/A 05/23/2018    COLONOSCOPY POLYPECTOMY SNARE/COLD BIOPSY  cold snare  and  hot snare performed by Hector Arnold MD at [facility]  10/30/2020    with Dr. Liana Arguello 10/30/2020    [facility], NOT HIGH RISK performed by Karina Miranda DO at [address] [place] [address]      left eye    ENDOSCOPY, COLON, DIAGNOSTIC      EYE SURGERY      IR TUNNELED CATHETER PLACEMENT GREATER THAN Communication with Friends and Family: Not on file    Frequency of Social Gatherings with Friends and Family: Not on file    Attends Latter-day Services: Not on file    Active Member of Clubs or Organizations: Not on file    Attends Club or Organization Meetings: Not on file    Marital Status: Not on file   Intimate Partner Violence:     Fear of Current or Ex-Partner: Not on file    Emotionally Abused: Not on file    Physically Abused: Not on file    Sexually Abused: Not on file   Housing Stability:     Unable to Pay for Housing in the Last Year: Not on file    Number of [place] in the Last Year: Not on file    Unstable Housing in the Last Year: Not on file     Family History   Problem Relation Age of Onset    Cancer Mother         breast    Cancer Father         lung        PHYSICAL EXAM   VITAL SIGNS: BP (!) 152/52   Pulse 77   Ht 5' 9\" (1.753 m)   Wt 179 lb (81.2 kg)   SpO2 97%   BMI 26.43 kg/m²   Constitutional: Well developed, well nourished  Eyes: Sclera nonicteric, conjunctiva moist  HENT: Atraumatic, nose normal  Neck: Supple, no JVD  Respiratory: No retractions, normal breath sounds   Cardiovascular: Normal rate, normal rhythm, no murmurs  GI: Soft, no abdominal tenderness ( examined after whipple placed)   Musculoskeletal: No edema, no deformity   Integument: No rash, dry skin  Neurologic: Alert & oriented, normal speech  Psychiatric: Cooperative, pleasant affect     RADIOLOGY/PROCEDURES   No orders to display     ED COURSE & MEDICAL DECISION MAKING   Pertinent Labs & Imaging studies reviewed and interpreted. (See chart for details)   See EMR for medications prescribed  Vitals:    12/03/21 1730   BP: (!) 152/52   Pulse:    SpO2: 97%     Differential diagnosis: Abdominal Aortic Aneurysm, Ischemic Bowel, Bowel Obstruction, Acute Cholecystitis, Acute Appendicitis, urinary retention    MDM: Patient has dialysis scheduled for tomorrow where they can recheck his electrolytes.   Today a Whipple was placed and the leg bag was placed and his urine looks like he does not have any infections. He felt much improvement after placing the Brandon and drained over a liter of urine and then seem to taper off around 1400. He is going to go to dialysis tomorrow and additionally nurse access called and they will arrange for follow-up with his primary doctor. FINAL IMPRESSION   1.  Urinary retention        PLAN  Home with follow up  Electronically signed by: Nataly Hendrix MD, 12/3/2021 5:59 PM  (This note was completed with a voice recognition program)            Nataly Hendrix MD  12/03/21 [id number]

## 2021-12-06 ENCOUNTER — CLINICAL DOCUMENTATION (OUTPATIENT)
Dept: NEPHROLOGY | Age: 76
End: 2021-12-06

## 2021-12-06 ENCOUNTER — TELEPHONE (OUTPATIENT)
Dept: OTHER | Facility: CLINIC | Age: 76
End: 2021-12-06

## 2021-12-06 ENCOUNTER — TELEPHONE (OUTPATIENT)
Dept: UROLOGY | Age: 76
End: 2021-12-06

## 2021-12-06 NOTE — TELEPHONE ENCOUNTER
Patient seen in ER on Friday and had indwelling catheter placed. Patient is on dialysis Monday, Wednesday and Friday - just starting.       See when

## 2021-12-07 ENCOUNTER — CLINICAL DOCUMENTATION (OUTPATIENT)
Dept: NEPHROLOGY | Age: 76
End: 2021-12-07

## 2021-12-08 ENCOUNTER — CARE COORDINATION (OUTPATIENT)
Dept: CASE MANAGEMENT | Age: 76
End: 2021-12-08

## 2021-12-08 NOTE — CARE COORDINATION
[facility] Transitions Follow Up Call    2021    Patient: Sabino Cook  Patient : 1945   MRN: [id number]  Reason for Admission: Acute kidney injury superimposed on CKD  Discharge Date: 12/3/21 RARS: Readmission Risk Score: 21.8 ( )    BPCI-A Medical Bundle Patient:  Working DR Renal failure -  Admitting Location: [facility]  Adm Date: 21  Date of Discharge: 21     Spoke with: Sabino Cook    Received call from Heribertoannie. He thought he was calling Dr Silva Joseph office. He stated that he was doing well. He said that he continues with the whipple and will be seeing the urologist tomorrow. He denied any problems with the whipple and no changes in his urine. He said that his nausea was better and he was eating more. He said that his strength was improving some and he is walking to the bathroom and back. He did say that he experienced some chest pain last night and he took a nitro. He said that it resolved after the nitro. He said that he is still just going to the dialysis center 3 days a week and not 4 and will be seeing the nephrologist on . He had no further questions or concerns. He will be calling PCP this morning to attempt to get an appointment tomorrow. Care Transitions Follow Up Call    Needs to be reviewed by the provider   Additional needs identified to be addressed with provider: No  none             Method of communication with provider : none      Care Transition Nurse (CTN) contacted the patient by telephone to follow up after admission on 21. Verified name and  with patient as identifiers. Addressed changes since last contact: none  Discussed follow-up appointments. CTN reviewed discharge instructions, medical action plan and red flags with patient and discussed any barriers to care and/or understanding of plan of care after discharge.  Discussed appropriate site of care based on symptoms and resources available to patient including: PCP, Specialist and When to call 911. The patient agrees to contact the PCP office for questions related to their healthcare. Patients top risk factors for readmission: functional physical ability  lack of knowledge about disease  medical condition-CRD  Interventions to address risk factors: Obtained and reviewed discharge summary and/or continuity of care documents      Non-[facility] follow up appointment(s): 1/2 nephro Mary    CTN provided contact information for future needs. Plan for follow-up call in 7-10 days based on severity of symptoms and risk factors. Plan for next call: follow up            Care Transitions Subsequent and Final Call    Subsequent and Final Calls  Do you have any ongoing symptoms?: Yes  Onset of Patient-reported symptoms: In the past 7 days  Patient-reported symptoms: Weakness  Have your medications changed?: No  Do you have any questions related to your medications?: No  Do you currently have any active services?: Yes  Are you currently active with any services?: Outpatient/Community Services  Do you have any needs or concerns that I can assist you with?: No  Care Transitions Interventions  Other Interventions:            Follow Up  Future Appointments   Date Time Provider Isadora Younger   12/9/2021  8:15 AM Christian Solis MD [place] [facility]   12/13/2021 10:00 AM Marko Ellsworth MD [place] [facility]   2/1/2022 12:00 PM Jered Patten MD [facility] [place] None   2/2/2022 10:30 AM Cierra Figueroa MD [place] [facility] spe [facility]       Bharathi Thomas RN

## 2021-12-09 ENCOUNTER — OFFICE VISIT (OUTPATIENT)
Dept: UROLOGY | Age: 76
End: 2021-12-09
Payer: MEDICARE

## 2021-12-09 ENCOUNTER — OFFICE VISIT (OUTPATIENT)
Dept: PRIMARY CARE CLINIC | Age: 76
End: 2021-12-09
Payer: MEDICARE

## 2021-12-09 VITALS
WEIGHT: 178 LBS | DIASTOLIC BLOOD PRESSURE: 74 MMHG | HEART RATE: 65 BPM | TEMPERATURE: 97.3 F | BODY MASS INDEX: 26.29 KG/M2 | SYSTOLIC BLOOD PRESSURE: 157 MMHG

## 2021-12-09 VITALS
WEIGHT: 177.2 LBS | BODY MASS INDEX: 26.17 KG/M2 | SYSTOLIC BLOOD PRESSURE: 138 MMHG | DIASTOLIC BLOOD PRESSURE: 67 MMHG | HEART RATE: 68 BPM

## 2021-12-09 DIAGNOSIS — K59.00 CONSTIPATION, UNSPECIFIED CONSTIPATION TYPE: ICD-10-CM

## 2021-12-09 DIAGNOSIS — N18.6 ESRD (END STAGE RENAL DISEASE) (HCC): Primary | ICD-10-CM

## 2021-12-09 DIAGNOSIS — I10 ESSENTIAL HYPERTENSION: ICD-10-CM

## 2021-12-09 DIAGNOSIS — I13.10 CARDIORENAL SYNDROME WITH RENAL FAILURE: ICD-10-CM

## 2021-12-09 DIAGNOSIS — N40.1 BPH WITH OBSTRUCTION/LOWER URINARY TRACT SYMPTOMS: ICD-10-CM

## 2021-12-09 DIAGNOSIS — E11.65 TYPE 2 DIABETES MELLITUS WITH HYPERGLYCEMIA, WITH LONG-TERM CURRENT USE OF INSULIN (HCC): ICD-10-CM

## 2021-12-09 DIAGNOSIS — R33.9 URINARY RETENTION: Primary | ICD-10-CM

## 2021-12-09 DIAGNOSIS — I50.32 CHRONIC DIASTOLIC HEART FAILURE (HCC): ICD-10-CM

## 2021-12-09 DIAGNOSIS — Z79.4 TYPE 2 DIABETES MELLITUS WITH HYPERGLYCEMIA, WITH LONG-TERM CURRENT USE OF INSULIN (HCC): ICD-10-CM

## 2021-12-09 DIAGNOSIS — N13.8 BPH WITH OBSTRUCTION/LOWER URINARY TRACT SYMPTOMS: ICD-10-CM

## 2021-12-09 PROCEDURE — 99214 OFFICE O/P EST MOD 30 MIN: CPT | Performed by: NURSE PRACTITIONER

## 2021-12-09 PROCEDURE — G8417 CALC BMI ABV UP PARAM F/U: HCPCS | Performed by: NURSE PRACTITIONER

## 2021-12-09 PROCEDURE — G8427 DOCREV CUR MEDS BY ELIG CLIN: HCPCS | Performed by: NURSE PRACTITIONER

## 2021-12-09 PROCEDURE — 4040F PNEUMOC VAC/ADMIN/RCVD: CPT | Performed by: NURSE PRACTITIONER

## 2021-12-09 PROCEDURE — 99211 OFF/OP EST MAY X REQ PHY/QHP: CPT

## 2021-12-09 PROCEDURE — 1036F TOBACCO NON-USER: CPT | Performed by: NURSE PRACTITIONER

## 2021-12-09 PROCEDURE — 1111F DSCHRG MED/CURRENT MED MERGE: CPT | Performed by: FAMILY MEDICINE

## 2021-12-09 PROCEDURE — 1123F ACP DISCUSS/DSCN MKR DOCD: CPT | Performed by: NURSE PRACTITIONER

## 2021-12-09 PROCEDURE — G8482 FLU IMMUNIZE ORDER/ADMIN: HCPCS | Performed by: NURSE PRACTITIONER

## 2021-12-09 PROCEDURE — 1111F DSCHRG MED/CURRENT MED MERGE: CPT | Performed by: NURSE PRACTITIONER

## 2021-12-09 PROCEDURE — 99214 OFFICE O/P EST MOD 30 MIN: CPT | Performed by: FAMILY MEDICINE

## 2021-12-09 RX ORDER — POLYETHYLENE GLYCOL 3350 17 G/17G
17 POWDER, FOR SOLUTION ORAL DAILY
Qty: 1530 G | Refills: 1 | Status: SHIPPED | OUTPATIENT
Start: 2021-12-09 | End: 2022-01-08

## 2021-12-09 RX ORDER — CARVEDILOL 25 MG/1
TABLET, FILM COATED ORAL
Qty: 100 STRIP | Refills: 0 | Status: SHIPPED | OUTPATIENT
Start: 2021-12-09 | End: 2022-02-04

## 2021-12-09 RX ORDER — DOCUSATE SODIUM 100 MG/1
100 CAPSULE, LIQUID FILLED ORAL 3 TIMES DAILY PRN
Qty: 90 CAPSULE | Refills: 0 | Status: SHIPPED | OUTPATIENT
Start: 2021-12-09 | End: 2022-05-26

## 2021-12-09 RX ORDER — INSULIN GLARGINE 100 [IU]/ML
22 INJECTION, SOLUTION SUBCUTANEOUS 2 TIMES DAILY
Qty: 39.6 ML | Refills: 0 | Status: SHIPPED | OUTPATIENT
Start: 2021-12-09 | End: 2022-03-15

## 2021-12-09 RX ORDER — TAMSULOSIN HYDROCHLORIDE 0.4 MG/1
0.4 CAPSULE ORAL DAILY
Qty: 30 CAPSULE | Refills: 3 | Status: SHIPPED | OUTPATIENT
Start: 2021-12-09 | End: 2022-04-07

## 2021-12-09 ASSESSMENT — ENCOUNTER SYMPTOMS
ABDOMINAL PAIN: 0
BACK PAIN: 0
NAUSEA: 0
WHEEZING: 0
SHORTNESS OF BREATH: 0
COLOR CHANGE: 0
CONSTIPATION: 1
EYE REDNESS: 0
VOMITING: 0
COUGH: 0

## 2021-12-09 NOTE — PROGRESS NOTES
Patient is here today for follow-up on hospitalization for CHF and Kidney Failure  . Symptoms have - Patient states about the same  Current complaints- Patient state he just hopes he gets better that's all. Medication change-Patient states Flomax and says that's what they put him on today  Follow-up with specialists-Patient states Dr. Cecy Oneill Monday of next week. Special needs-Patient states none      Patient's wife states he has a catheter in and says they can't take it out until the 24 st of this month when they go back. Patient states Dr Vinicius Stover office put him on Flomax to help the swelling so they could take the catheter out. Patient states he is taking Bumex and they put on Flomax today and says he is wondering if they are going to be ok together. RECHECK B/P 165/78       Post-Discharge Transitional Care Management Services or Hospital Follow Up      Laureljuanito Woodward   YOB: 1945    Date of Office Visit:  12/9/2021  Date of Hospital Admission: 12/3/21  Date of Hospital Discharge: 12/3/21  Readmission Risk Score(high >=14%.  Medium >=10%):Readmission Risk Score: 21.8 ( )      Care management risk score Rising risk (score 2-5) and Complex Care (Scores >=6): 12     Non face to face  following discharge, date last encounter closed (first attempt may have been earlier): 11/29/2021 10:08 AM 11/29/2021 10:08 AM    Call initiated 2 business days of discharge: Yes     Patient Active Problem List   Diagnosis    Vertigo, benign paroxysmal    Systolic murmur    History of MI (myocardial infarction)    History of colon polyps    Uncontrolled type 2 diabetes mellitus with hyperglycemia ([address]    Coronary atherosclerosis due to calcified coronary lesion    Displacement of intervertebral disc, site unspecified, without myelopathy    History of CVA (cerebrovascular accident) without residual deficits    Dysautonomia ([address]    Syncope, near    Cholecystitis    Chronotropic incompetence with autonomic dysfunction    Episodic lightheadedness    Accelerated hypertension    Ischemic chest pain (HCC)    Abnormal cardiovascular stress test    Chronic kidney disease, stage III (moderate) ([facility]    Controlled type 2 diabetes mellitus with diabetic nephropathy, with long-term current use of insulin (HCC)    Abnormal tilt table test    Cervical stenosis of spinal canal    ASHD (arteriosclerotic heart disease)    S/P drug eluting coronary stent placement-OM1 4/10/17    Hyperlipidemia    Chronic diastolic HF (heart failure), NYHA class 3 (HCC)    Angina, class II (HCC)    Medication side effect, initial encounter    Acute renal failure superimposed on stage 4 chronic kidney disease (HCC)    Non critical Right Renal artery stenosis, native (HCC)    Anemia in stage 4 chronic kidney disease ([address]    BPH with obstruction/lower urinary tract symptoms    Elevated PSA    Gross hematuria    Chest pain    Chronic anemia    Acute coronary syndrome with high troponin ([facility]    Iron deficiency anemia secondary to inadequate dietary iron intake    Iron malabsorption    Rash    Skin lesion of left lower extremity    Urticaria    Hyperkalemia    Moderate malnutrition (HCC)    Cardiorenal syndrome, stage 1-4 or unspecified chronic kidney disease, with heart failure (HCC)    Cardiorenal syndrome with renal failure    Pulmonary hypertension (HCC)    Acute on chronic diastolic (congestive) heart failure (HCC)    Type 2 MI (myocardial infarction) ([facility]       Allergies   Allergen Reactions    Coreg [Carvedilol] Shortness Of Breath and Nausea And Vomiting    Lipitor [Atorvastatin] Other (See Comments)     Muscle aches and joint pain    Aldactone [Spironolactone] Hives    Crestor [Rosuvastatin Calcium] Other (See Comments)     Muscle aches and joint pain    Lopid [Gemfibrozil]      hyperglycemia    Invokana [Canagliflozin] Rash    Januvia [Sitagliptin] Nausea And Vomiting       Medications listed as ordered at the time of discharge from hospital  Current Outpatient Medications   Medication Sig Dispense Refill    tamsulosin (FLOMAX) 0.4 MG capsule Take 1 capsule by mouth daily 30 capsule 3    polyethylene glycol (GLYCOLAX) 17 GM/SCOOP powder Take 17 g by mouth daily 1530 g 1    docusate sodium (COLACE) 100 MG capsule Take 1 capsule by mouth 3 times daily as needed for Constipation 90 capsule 0    insulin glargine (LANTUS SOLOSTAR) 100 UNIT/ML injection pen Inject 22 Units into the skin 2 times daily 39.6 mL 0    blood glucose test strips (CONTOUR TEST) strip USE 1 STRIP TO CHECK GLUCOSE TWICE DAILY 100 strip 0    metoprolol tartrate (LOPRESSOR) 25 MG tablet Take 2 tablets by mouth 2 times daily 60 tablet 3    sodium bicarbonate 650 MG tablet Take 1 tablet by mouth 2 times daily 60 tablet 1    cetirizine (ZYRTEC) 10 MG tablet Take 0.5 tablets by mouth daily 30 tablet 0    bumetanide (BUMEX) 2 MG tablet Take 1 tablet by mouth 2 times daily 30 tablet 3    eplerenone (INSPRA) 25 MG tablet Take 1 tablet by mouth daily 90 tablet 3    nitroGLYCERIN (NITROSTAT) 0.4 MG SL tablet Place 1 tablet under the tongue every 5 minutes as needed for Chest pain 25 tablet 3    latanoprost (XALATAN) 0.005 % ophthalmic solution Place 1 drop into both eyes nightly       acyclovir (ZOVIRAX) 400 MG tablet 400 mg 2 times daily       DIANA MICROLET LANCETS MISC TEST TWICE DAILY 100 each 2    Insulin Pen Needle (PEN NEEDLES) 31G X 6 MM MISC 1 each by Does not apply route daily 100 each 3    prednisoLONE acetate (PRED FORTE) 1 % ophthalmic suspension Place 1 drop into the left eye daily       hydrALAZINE (APRESOLINE) 50 MG tablet Take 1 tablet by mouth every 8 hours 90 tablet 3    epoetin shannon-epbx (RETACRIT) 55312 UNIT/ML SOLN injection Inject 0.6 mLs into the skin three times a week (Patient not taking: Reported on 12/9/2021) 6.6 mL 1     No current facility-administered medications for this visit.      Medications marked \"taking\" at this time  Outpatient Medications Marked as Taking for the 12/9/21 encounter (Office Visit) with Yosi Red MD   Medication Sig Dispense Refill    tamsulosin (FLOMAX) 0.4 MG capsule Take 1 capsule by mouth daily 30 capsule 3    polyethylene glycol (GLYCOLAX) 17 GM/SCOOP powder Take 17 g by mouth daily 1530 g 1    docusate sodium (COLACE) 100 MG capsule Take 1 capsule by mouth 3 times daily as needed for Constipation 90 capsule 0    insulin glargine (LANTUS SOLOSTAR) 100 UNIT/ML injection pen Inject 22 Units into the skin 2 times daily 39.6 mL 0    blood glucose test strips (CONTOUR TEST) strip USE 1 STRIP TO CHECK GLUCOSE TWICE DAILY 100 strip 0    metoprolol tartrate (LOPRESSOR) 25 MG tablet Take 2 tablets by mouth 2 times daily 60 tablet 3    sodium bicarbonate 650 MG tablet Take 1 tablet by mouth 2 times daily 60 tablet 1    cetirizine (ZYRTEC) 10 MG tablet Take 0.5 tablets by mouth daily 30 tablet 0    bumetanide (BUMEX) 2 MG tablet Take 1 tablet by mouth 2 times daily 30 tablet 3    eplerenone (INSPRA) 25 MG tablet Take 1 tablet by mouth daily 90 tablet 3    nitroGLYCERIN (NITROSTAT) 0.4 MG SL tablet Place 1 tablet under the tongue every 5 minutes as needed for Chest pain 25 tablet 3    latanoprost (XALATAN) 0.005 % ophthalmic solution Place 1 drop into both eyes nightly       acyclovir (ZOVIRAX) 400 MG tablet 400 mg 2 times daily       DIANA MICROLET LANCETS MISC TEST TWICE DAILY 100 each 2    Insulin Pen Needle (PEN NEEDLES) 31G X 6 MM MISC 1 each by Does not apply route daily 100 each 3    prednisoLONE acetate (PRED FORTE) 1 % ophthalmic suspension Place 1 drop into the left eye daily           Medications patient taking as of now reconciled against medications ordered at time of hospital discharge: Yes    Chief Complaint   Patient presents with    Follow-Up from Hospital       HPI    Inpatient course: Discharge summary reviewed- see chart.     Interval history/Current status: has low bp after dialysis. Has fatigue. Has indwelling catheter for tetention    Review of Systems    Vitals:    12/09/21 1609   BP: 138/67   Site: Right Upper Arm   Position: Sitting   Pulse: 68   Weight: 177 lb 3.2 oz (80.4 kg)     Body mass index is 26.17 kg/m². Wt Readings from Last 3 Encounters:   12/09/21 177 lb 3.2 oz (80.4 kg)   12/09/21 178 lb (80.7 kg)   12/03/21 179 lb (81.2 kg)     BP Readings from Last 3 Encounters:   12/09/21 138/67   12/09/21 (!) 157/74   12/03/21 (!) 152/52       Physical Exam    Exam:  GEN:   A & O x3, no apparent distress  EYES: No gross abnormalities. ENT:ENT exam normal, no neck nodes or sinus tenderness  NECK: normal, supple, no lymphadenopathy,  no carotid bruits  PULM: clear to auscultation bilaterally- no wheezes, rales or rhonchi, normal air movement, no respiratory distress  COR: regular rate & rhythm, no gallops and 2/6 murmer  ABD:  soft, non-tender, non-distended, normal bowel sounds, no masses or organomegaly  : deferred  EXT: Extremities: + 2 pedal pulses, no edema or calf tenderness, and warm to touch. Normal nails without lesions  NEURO: Motor and sensory grossly intact  SKIN:  No skin lesions or rashes          Assessment/Plan:  1. ESRD (end stage renal disease) ([facility]  Tolerating dialysis,monitor bp closely    2. Type 2 diabetes mellitus with hyperglycemia, with long-term current use of insulin (HCC)  Decrease lantus to 22 units due to morning hypoglycemia  - blood glucose test strips (CONTOUR TEST) strip; USE 1 STRIP TO CHECK GLUCOSE TWICE DAILY  Dispense: 100 strip; Refill: 0    3. Essential hypertension  Monitor closely,well controlled at present with metoprolol,hydralazine    4.  Chronic diastolic heart failure (HCC)  Improvedon bumex,fluid balance ok        Medical Decision Making: high complexity

## 2021-12-09 NOTE — PATIENT INSTRUCTIONS
Drink 64-72 ounces of water every day    It is very important to have regular, daily, bowel movements because this will improve urinary symptoms. Take Miralax (or generic equivalent) 17 g once daily (one capful). Do not skip days. If 1 dose daily causes loose stools/diarrhea, decrease to 1/2 dose or 1/4 dose but be sure to continue taking it daily (it is not the type of medication that you can just use \"as needed,\" must be taken daily to be effective). If Miralax 1 dose daily is not sufficient to produce soft stools which are easy to pass, you may also add an over-the-counter stool softener capsule.

## 2021-12-09 NOTE — PROGRESS NOTES
HPI:          Patient is a 68 y.o. male in no acute distress. He is alert and oriented to person, place, and time. History  12/2019 Referral from Dr. Michelle Reyes for elevated PSA of 6.13. He was a previous patient of Dr. Rosa Judge for elevated PSA. Several brothers with prostate cancer.      PSA  4/2021 - 3.68  10/2020 - 5.43  4/2020 - 4.42  1/2020 - 5.00  11/2019 - 6.13  11/2018 - 3.11  11/2017 - 3.26  11/2016 - 2.43  10/2015 - 2.70  10/2014 - 2.35  10/2013 - 2.15     11/2020 Prostate biopsy for PSA of 5.43. Pathology: benign prostate tissue in all 12 cores    5/2021 gross hematuria. CT urogram showed a nonobstructing 3 mm left renal stone. Refused cystoscopy    Today  Here today to follow-up for urinary retention. He was evaluated in the ER on 12/3/2021. He had significant constipation and was unable to urinate. Brandon catheter was placed for greater than 1400 mL. He was given a bowel regimen and is now having a daily bowel movement. He is tolerating the Brandon catheter with minimal complaints. He denies fevers, nausea or vomiting. He does go to dialysis 3 days/week. Past Medical History:   Diagnosis Date    Acute kidney injury ([address]     Acute [facility]     Acute renal failure with tubular necrosis ([facility] [address] 10/2/2018    ssecondary to hemodynamic effects of loop diuretics and ace inhibitors, bbaseline 1.2-1.3 which peaked up to 1.8, resolving    Anemia     CAD (coronary artery disease)     Cerebral artery occlusion with cerebral infarction (HCC)     CHF (congestive heart failure) (HCC)     Chronic back pain     Closed fracture of lumbar vertebra without mention of spinal cord injury     Displacement of intervertebral disc, site unspecified, without myelopathy     H/O cardiac catheterization 2/19/16    LMCA: Mild irregularities 10-20%. LAD: Lesion on PRox LAD: Mid subsection. 65% stenosis. LCx: Lesion on 1st Ob Valentina: Proximal subesection. 70% steniosis. RCA: Small non-dominant RCA.  Lesion on PRox RCA: Ostial. 50% stenosis. EF:55%.  H/O cardiovascular stress test 2/19/16    Abnormal. Moderate perfusion defect of mild intensity in the inferior, inferoseptal adn inferoapical regions during stress imaging, which most consistent with ischemia. Global LV systolic function normal without regional wall motion abnormalities. OVerall these results are most consistent with an intermediate risk for signficant CAD. Additional testing including cardiac cath may be indicated.  H/O tilt table evaluation 12/26/2017    Abnormal. Patients HR, BP response and symptoms were most consistent with dysautonomia. Combined with viligant maintenance of euvolemia and maintaining a moderate salft intake, pharmacologic treatment with SSRI such as lexapro and or mestinon among other treatments have shown some effectiveness in treatment of this condition    H/O tilt table evaluation 12/26/2017    Abnormal head upright tilt table study. The pt heart rate, blood pressure response and symptoms were most consistent with dysautonomia.  History of 24 hour EKG monitoring 8/14/14    Occasional PAC's and PVC's which appear to be at least moderately symptomatic.  History of 24 hour EKG monitoring 11/19/14    Event Monitor. Sinus rhythm and sinus bradycardia. Infrequent isolated PVC's    History of blood transfusion     History of cardiovascular stress test 2/19/14    Relatively NL    History of cardiovascular stress test 03/21/2018    Normal myocardial perfusion. Global left ventricular systolic function was normal with an EF of 68%. Overall, these results are most consistent with a low risk scan.  History of coronary artery stent placement 04/2017    PTCA / Drug Eluting Stent:, CX and / or branches    History of CVA (cerebrovascular accident) without residual deficits 2014    Incidentally found on CT head.  No known impairment now or in the past.    History of echocardiogram 2/18/14    LA mildly dilated, EF 55%, LV wall thickness is moderately increased, no definite wall motion abnormalilities, what appears to be a pacer wire is seen w/n the RA and RV, mild-mod TR, mild pulmonary hypertension.  History of Holter monitoring 12/29/2017    Rare PAC's and PVC's.  History of tilt table evaluation 8/12/14    Abnormal    Hyperlipidemia     Hypertension     Non critical Right Renal artery stenosis, native ([address] 10/2/2018    Non critical Right Renal artery stenosis, based on cath in 2016, Rt RA 30% stenosis    Pacemaker     non-functioning    S/P cardiac cath 04/10/2017    S/P coronary artery stent placement     Successful PTCA - HA Om1    SIRS (systemic inflammatory response syndrome) ([facility] 5/19/2019    Type II or unspecified type diabetes mellitus without mention of complication, not stated as uncontrolled      Past Surgical History:   Procedure Laterality Date    BACK SURGERY      CARDIAC CATHETERIZATION Left 02/19/2016    via right radial approach/ Melina Vernon/ Dr. Sobia Henson Left 10/05/2021    Dr Toshia Burton radial-Moderate three vessel coronary artery disease involving a ostial 50-60% stenosis in a small, non-dominant RCA, a 60% mid LAD stenosis and a proximal 50-60% stenosis in the left anterior descending coronary artery. Normal left ventricular end diastolic pressure. Proceed with aggressive maximal medical management as clinically indicated.     CHOLECYSTECTOMY  08/17/2015    Liang/Charles/Saulo/ Mill[facility]    COLONOSCOPY  2010    COLONOSCOPY  02/19/2015    -polyps,diverticulosis,hemorrhoids    COLONOSCOPY  05/23/2018    Dr Eh Sexton    COLONOSCOPY N/A 05/23/2018    COLONOSCOPY POLYPECTOMY SNARE/COLD BIOPSY  cold snare  and  hot snare performed by Nelida Caldwell MD at [address]  10/30/2020    with Dr. Dalila Dobbs COLONOSCOPY N/A 10/30/2020    [facility] NOT HIGH RISK performed by Yana Bates Meir Moser DO at [address] [facility]      left eye    ENDOSCOPY, COLON, DIAGNOSTIC      EYE SURGERY      IR TUNNELED CATHETER PLACEMENT GREATER THAN 5 YEARS  11/22/2021    IR TUNNELED CATHETER PLACEMENT GREATER THAN 5 YEARS 11/22/2021 [facility] SPECIAL PROCEDURES    PACEMAKER INSERTION      PACEMAKER PLACEMENT      UPPER GASTROINTESTINAL ENDOSCOPY  05/28/2019    Dr. Jennifer Bundy H-Pylori)duodenal bulbar/antral erythema    UPPER GASTROINTESTINAL ENDOSCOPY N/A 05/28/2019    EGD BIOPSY, clotest performed by Alfonzo Cabot, MD at [address] [facility] 10/30/2020    EGD ESOPHAGOGASTRODUODENOSCOPY performed by Portia Osborne DO at [address], LT  11/24/2021     PERCUT RENAL BIOPSY, LT 11/24/2021 [facility] ULTRASOUND     Outpatient Encounter Medications as of 12/9/2021   Medication Sig Dispense Refill    tamsulosin (FLOMAX) 0.4 MG capsule Take 1 capsule by mouth daily 30 capsule 3    polyethylene glycol (GLYCOLAX) 17 GM/SCOOP powder Take 17 g by mouth daily 1530 g 1    docusate sodium (COLACE) 100 MG capsule Take 1 capsule by mouth 3 times daily as needed for Constipation 90 capsule 0    metoprolol tartrate (LOPRESSOR) 25 MG tablet Take 2 tablets by mouth 2 times daily 60 tablet 3    sodium bicarbonate 650 MG tablet Take 1 tablet by mouth 2 times daily 60 tablet 1    cetirizine (ZYRTEC) 10 MG tablet Take 0.5 tablets by mouth daily 30 tablet 0    hydrALAZINE (APRESOLINE) 50 MG tablet Take 1 tablet by mouth every 8 hours 90 tablet 3    bumetanide (BUMEX) 2 MG tablet Take 1 tablet by mouth 2 times daily 30 tablet 3    epoetin shannon-epbx (RETACRIT) 65353 UNIT/ML SOLN injection Inject 0.6 mLs into the skin three times a week 6.6 mL 1    eplerenone (INSPRA) 25 MG tablet Take 1 tablet by mouth daily 90 tablet 3    nitroGLYCERIN (NITROSTAT) 0.4 MG SL tablet Place 1 tablet under the tongue every 5 minutes as needed for Chest pain 25 tablet 3    latanoprost MM MISC 1 each by Does not apply route daily 100 each 3    prednisoLONE acetate (PRED FORTE) 1 % ophthalmic suspension Place 1 drop into the left eye daily       insulin glargine (LANTUS SOLOSTAR) 100 UNIT/ML injection pen Inject 28 Units into the skin 2 times daily (Patient taking differently: Inject 25 Units into the skin 2 times daily ) 50.4 mL 0     No current facility-administered medications on file prior to visit. Coreg [carvedilol], Lipitor [atorvastatin], Aldactone [spironolactone], Crestor [rosuvastatin calcium], Lopid [gemfibrozil], Invokana [canagliflozin], and Januvia [sitagliptin]  Family History   Problem Relation Age of Onset    Cancer Mother         breast    Cancer Father         lung     Social History     Tobacco Use   Smoking Status Former Smoker    Packs/day: 1.50    Years: 8.00    Pack years: 12.00    Types: Cigarettes    Quit date: 3/4/1980    Years since quittin.7   Smokeless Tobacco Never Used       Social History     Substance and Sexual Activity   Alcohol Use No       Review of Systems   Constitutional: Negative for appetite change, chills and fever. Eyes: Negative for redness and visual disturbance. Respiratory: Negative for cough, shortness of breath and wheezing. Cardiovascular: Negative for chest pain and leg swelling. Gastrointestinal: Positive for constipation. Negative for abdominal pain, nausea and vomiting. Genitourinary: Positive for difficulty urinating. Negative for decreased urine volume, dysuria, enuresis, flank pain, frequency, hematuria, penile discharge, penile pain, scrotal swelling, testicular pain and urgency. Musculoskeletal: Negative for back pain, joint swelling and myalgias. Skin: Negative for color change, rash and wound. Neurological: Negative for dizziness, tremors and numbness. Hematological: Negative for adenopathy. Does not bruise/bleed easily.        BP (!) 157/74 (Site: Right Upper Arm, Position: Sitting, Cuff Size: Medium Adult)   Pulse 65   Temp 97.3 °F (36.3 °C)   Wt 178 lb (80.7 kg)   BMI 26.29 kg/m²       PHYSICAL EXAM:  Constitutional: Patient in no acute distress; Neuro: alert and oriented to person place and time. Psych: Mood and affect normal.  Skin: Normal  Lungs: Respiratory effort normal  Cardiovascular:  Normal peripheral pulses  Abdomen: Soft, non-tender, non-distended with no CVA, flank pain  Bladder non-tender and not distended. Lab Results   Component Value Date    BUN 51 (H) 12/01/2021     Lab Results   Component Value Date    CREATININE 4.48 (H) 12/01/2021     Lab Results   Component Value Date    PSA 3.68 04/30/2021    PSA 5.43 (H) 10/15/2020    PSA 4.42 (H) 04/06/2020       ASSESSMENT:   Diagnosis Orders   1. Urinary retention     2. Constipation, unspecified constipation type     3. BPH with obstruction/lower urinary tract symptoms     4.  Cardiorenal syndrome with renal failure           PLAN:  Start Flomax daily    Continue MiraLAX daily    Take Colace 3 times a day if needed    Return in 10-14 days for catheter removal

## 2021-12-13 ENCOUNTER — OFFICE VISIT (OUTPATIENT)
Dept: CARDIOLOGY | Age: 76
End: 2021-12-13
Payer: MEDICARE

## 2021-12-13 VITALS
HEIGHT: 69 IN | OXYGEN SATURATION: 98 % | SYSTOLIC BLOOD PRESSURE: 123 MMHG | BODY MASS INDEX: 26.04 KG/M2 | HEART RATE: 58 BPM | DIASTOLIC BLOOD PRESSURE: 63 MMHG | WEIGHT: 175.8 LBS | RESPIRATION RATE: 18 BRPM

## 2021-12-13 DIAGNOSIS — I50.32 CHRONIC DIASTOLIC HEART FAILURE (HCC): ICD-10-CM

## 2021-12-13 DIAGNOSIS — I10 ESSENTIAL HYPERTENSION: ICD-10-CM

## 2021-12-13 DIAGNOSIS — I25.10 ASHD (ARTERIOSCLEROTIC HEART DISEASE): ICD-10-CM

## 2021-12-13 DIAGNOSIS — I20.8 STABLE ANGINA (HCC): Primary | ICD-10-CM

## 2021-12-13 DIAGNOSIS — R06.02 SHORTNESS OF BREATH: ICD-10-CM

## 2021-12-13 DIAGNOSIS — G90.1 DYSAUTONOMIA (HCC): ICD-10-CM

## 2021-12-13 DIAGNOSIS — E78.2 MIXED HYPERLIPIDEMIA: ICD-10-CM

## 2021-12-13 PROCEDURE — 99214 OFFICE O/P EST MOD 30 MIN: CPT | Performed by: FAMILY MEDICINE

## 2021-12-13 PROCEDURE — G8417 CALC BMI ABV UP PARAM F/U: HCPCS | Performed by: FAMILY MEDICINE

## 2021-12-13 PROCEDURE — 1111F DSCHRG MED/CURRENT MED MERGE: CPT | Performed by: FAMILY MEDICINE

## 2021-12-13 PROCEDURE — G8427 DOCREV CUR MEDS BY ELIG CLIN: HCPCS | Performed by: FAMILY MEDICINE

## 2021-12-13 PROCEDURE — 1123F ACP DISCUSS/DSCN MKR DOCD: CPT | Performed by: FAMILY MEDICINE

## 2021-12-13 PROCEDURE — G8482 FLU IMMUNIZE ORDER/ADMIN: HCPCS | Performed by: FAMILY MEDICINE

## 2021-12-13 PROCEDURE — 4040F PNEUMOC VAC/ADMIN/RCVD: CPT | Performed by: FAMILY MEDICINE

## 2021-12-13 PROCEDURE — 1036F TOBACCO NON-USER: CPT | Performed by: FAMILY MEDICINE

## 2021-12-13 PROCEDURE — 99211 OFF/OP EST MAY X REQ PHY/QHP: CPT | Performed by: FAMILY MEDICINE

## 2021-12-13 NOTE — PROGRESS NOTES
Ashely García am scribing for and in the presence of Kortney John. Jackelin BLOUNT, MS, F.A.C.C. Patient: Belkys Peña  : 1945  Date of Visit: 2021    REASON FOR VISIT / CONSULTATION: Follow-up (Hx: SOB, anemia, dysautonomia, CHF, ASHD, HTN, HLD. Pt is here for 6-8 week f/u. not doing very well.  heart is racing, feels like its doing hulu hoops when he walks. doing this since ED. had an episode of CP, took nitro and resolved. felt tight. he was laying on the cough when it started. does have dizziness everytime he stands. ) and Other (has been having bad headaches)    Dear Corina Cardoso MD,    As you know, Mr. Merlin Ko is a 70 y.o. male with a known history of dysautonomia where on tilt table testing his blood pressure dropped from 692 systolic to 78 systolic with only a 27-38 HR response. More recently, a CT of he head in the past showed evidence of at least 2 old CVA's and a heart catheterization showed severe single vessel disease in the ostium of a moderate sized OM1 branch of the circumflex. However, maximal guidelines directed medical management was opted for an place of stenting partly due to the risk associated with stenting this vessel. Recently he has had a coronary angiography procedure with stenting of his HA Om1. As you know, Mr. Merlin Ko  is a 68 y.o. male with a history of recent onset substernal chest discomfort that led to a stress test and a subsequent heart catheterization which showed severe single vessel coronary artery disease of the HA Om1 with successful angioplasty and stenting of that vessel that was done by Dr. Satish Bangura on 4/10/17. More recently he has had problems with balance problems when he is in his feet and says that a Neurologist has told him in the past this is because of his previous strokes. Most recently he underwent a cardiovascular stress test which fortunately had shown to be normal on 3/21/2018.  Mr. Merlin Ko has significant normal stress test and echocardiogram on 7/8/2020. Holter monitor done on 12/16/2020: The rhythm was sinus. Average daily heart rate 58 ranging from 47 to 81 bpm. Bradycardia for 72% test duration. Rare premature supraventricular ectopic beats consisting of 33 isolated PACs. Rare premature ventricular ectopic beats total 75 consisting of 73 isolatedPVCs and a ventricular couplet. Echocardiogram done on 12/18/2020: EF of 60%. Moderately increased LV wall thickness. Borderline pulmonary hypertension. Mild tricuspid regurgitation. Mild diastolic dysfunction is seen/ Aortic root is mildly dilated. Stress test done on 9/20/2021 was equivocal, There is a small/moderate perfusion defect of mild intensity in the lateral, inferior and inferoapical regions. Cardiac cath done on 10/5/2021 showed Moderate three vessel coronary artery disease involving a ostial 50-60% stenosis in a small, non-dominant RCA, a 60% mid LAD stenosis and a proximal 50-60% stenosis in the left anterior descending coronary artery. Echo done on 11/11/2021 showed an EF 60%, The left atrium is moderately dilated (34-39) with a left atrial volume index of 35 ml/m2. Mild to moderate tricuspid regurgitation. Moderate pulmonary hypertension with estimated pulmonary artery systolic pressure of 42 mmHg. Mild diastolic dysfunction. Mr. Jefferson Hooper is here today for a follow up. He had one episode of chest pressure. It happened after dialysis and he was laying down when it happened. He did take a Nitro and it did resolve after half hour. He has been having worsening headaches. His breathing has been okay. He continues to have dizziness. He has lost 20 pounds in the last eight weeks. He is having vein mapping done on Thursday but is unsure what vascular surgeon provider he will be seeing following this. He still has a urinary catheter in but is hopeful that this can be removed soon.  He denied any abdominal pain, bleeding problems, bowel issues, problems with his medications or any other concerns at this time. No nausea or vomiting. No cough, fever or chills. He has not had much of an appetite lately but his wife says he has been restricting his salt intake. Past Medical History:   Diagnosis Date    [facility]     Acute MI ([facility]     Acute renal failure with tubular necrosis ([facility] [facility] 10/2/2018    ssecondary to hemodynamic effects of loop diuretics and ace inhibitors, bbaseline 1.2-1.3 which peaked up to 1.8, resolving    Anemia     CAD (coronary artery disease)     Cerebral artery occlusion with cerebral infarction (HCC)     [facility]     Chronic back pain     Closed fracture of lumbar vertebra without mention of spinal cord injury     Displacement of intervertebral disc, site unspecified, without myelopathy     H/O cardiac catheterization 2/19/16    LMCA: Mild irregularities 10-20%. LAD: Lesion on PRox LAD: Mid subsection. 65% stenosis. LCx: Lesion on 1st Ob Valentina: Proximal subesection. 70% steniosis. RCA: Small non-dominant RCA. Lesion on PRox RCA: Ostial. 50% stenosis. EF:55%.  H/O cardiovascular stress test 2/19/16    Abnormal. Moderate perfusion defect of mild intensity in the inferior, inferoseptal adn inferoapical regions during stress imaging, which most consistent with ischemia. Global LV systolic function normal without regional wall motion abnormalities. OVerall these results are most consistent with an intermediate risk for signficant CAD. Additional testing including cardiac cath may be indicated.  H/O tilt table evaluation 12/26/2017    Abnormal. Patients HR, BP response and symptoms were most consistent with dysautonomia.  Combined with viligant maintenance of euvolemia and maintaining a moderate salft intake, pharmacologic treatment with SSRI such as lexapro and or mestinon among other treatments have shown some effectiveness in treatment of this condition    H/O tilt table evaluation 12/26/2017    Abnormal head upright tilt table study. The pt heart rate, blood pressure response and symptoms were most consistent with dysautonomia.  History of 24 hour EKG monitoring 8/14/14    Occasional PAC's and PVC's which appear to be at least moderately symptomatic.  History of 24 hour EKG monitoring 11/19/14    Event Monitor. Sinus rhythm and sinus bradycardia. Infrequent isolated PVC's    History of blood transfusion     History of cardiovascular stress test 2/19/14    Relatively NL    History of cardiovascular stress test 03/21/2018    Normal myocardial perfusion. Global left ventricular systolic function was normal with an EF of 68%. Overall, these results are most consistent with a low risk scan.  History of coronary artery stent placement 04/2017    PTCA / Drug Eluting Stent:, CX and / or branches    History of CVA (cerebrovascular accident) without residual deficits 2014    Incidentally found on CT head. No known impairment now or in the past.    History of echocardiogram 2/18/14    LA mildly dilated, EF 55%, LV wall thickness is moderately increased, no definite wall motion abnormalilities, what appears to be a pacer wire is seen w/n the RA and RV, mild-mod TR, mild pulmonary hypertension.  History of Holter monitoring 12/29/2017    Rare PAC's and PVC's.      History of tilt table evaluation 8/12/14    Abnormal    Hyperlipidemia     Hypertension     Non critical Right Renal artery stenosis, native ([address] 10/2/2018    Non critical Right Renal artery stenosis, based on cath in 2016, Rt RA 30% stenosis    Pacemaker     non-functioning    S/P cardiac cath 04/10/2017    S/P coronary artery stent placement     Successful PTCA - HA Om1    SIRS (systemic inflammatory response syndrome) ([address] 5/19/2019    Type II or unspecified type diabetes mellitus without mention of complication, not stated as uncontrolled        CURRENT ALLERGIES: Coreg [carvedilol], Lipitor [atorvastatin], Aldactone [spironolactone], Crestor [rosuvastatin calcium], Lopid [gemfibrozil], Invokana [canagliflozin], and Januvia [sitagliptin] REVIEW OF SYSTEMS: 14 systems were reviewed. Pertinent positives and negatives as above, all else negative. Past Surgical History:   Procedure Laterality Date    BACK SURGERY      CARDIAC CATHETERIZATION Left 02/19/2016    via right radial approach/ [facility] [place]/ Dr. Piedra Ards Left 10/05/2021    Dr Leong Me radial-Moderate three vessel coronary artery disease involving a ostial 50-60% stenosis in a small, non-dominant RCA, a 60% mid LAD stenosis and a proximal 50-60% stenosis in the left anterior descending coronary artery. Normal left ventricular end diastolic pressure. Proceed with aggressive maximal medical management as clinically indicated.     CHOLECYSTECTOMY  08/17/2015    Liang/Charles/Saulo/ Lap    COLONOSCOPY  2010    COLONOSCOPY  02/19/2015    -polyps,diverticulosis,hemorrhoids    COLONOSCOPY  05/23/2018    Dr [facility]    COLONOSCOPY N/A 05/23/2018    COLONOSCOPY POLYPECTOMY SNARE/COLD BIOPSY  cold snare  and  hot snare performed by Jenae Hernández MD at [facility]  10/30/2020    with Dr. Ami Becerra 10/30/2020    Leonette Citizen of the [place], NOT HIGH RISK performed by Kristin Headley DO at [facility]      left eye    ENDOSCOPY, COLON, DIAGNOSTIC      EYE SURGERY      IR TUNNELED CATHETER PLACEMENT GREATER THAN 5 YEARS  11/22/2021    IR TUNNELED CATHETER PLACEMENT GREATER THAN 5 YEARS 11/22/2021 [facility]    PACEMAKER INSERTION      PACEMAKER PLACEMENT      UPPER GASTROINTESTINAL ENDOSCOPY  05/28/2019    Dr. Laurie Trotter H-Pylori)duodenal bulbar/antral erythema    UPPER GASTROINTESTINAL ENDOSCOPY N/A 05/28/2019    EGD BIOPSY, clotest performed by Jenae Hernández MD at [address] [facility] 10/30/2020    EGD ESOPHAGOGASTRODUODENOSCOPY performed by Archer Castleman, DO at [facility] [address] RENAL BIOPSY, LT  2021     PERCUT RENAL BIOPSY, LT 2021 [facility] ULTRASOUND    Social History:  Social History     Tobacco Use    Smoking status: Former Smoker     Packs/day: 1.50     Years: 8.00     Pack years: 12.00     Types: Cigarettes     Quit date: 3/4/1980     Years since quittin.8    Smokeless tobacco: Never Used   Vaping Use    Vaping Use: Never used   Substance Use Topics    Alcohol use: No    Drug use: No        CURRENT MEDICATIONS:  Outpatient Medications Marked as Taking for the 21 encounter (Office Visit) with Jazmyn Echeverria MD   Medication Sig Dispense Refill    tamsulosin (FLOMAX) 0.4 MG capsule Take 1 capsule by mouth daily 30 capsule 3    polyethylene glycol (GLYCOLAX) 17 GM/SCOOP powder Take 17 g by mouth daily 1530 g 1    docusate sodium (COLACE) 100 MG capsule Take 1 capsule by mouth 3 times daily as needed for Constipation 90 capsule 0    insulin glargine (LANTUS SOLOSTAR) 100 UNIT/ML injection pen Inject 22 Units into the skin 2 times daily 39.6 mL 0    blood glucose test strips (CONTOUR TEST) strip USE 1 STRIP TO CHECK GLUCOSE TWICE DAILY 100 strip 0    metoprolol tartrate (LOPRESSOR) 25 MG tablet Take 2 tablets by mouth 2 times daily 60 tablet 3    sodium bicarbonate 650 MG tablet Take 1 tablet by mouth 2 times daily 60 tablet 1    cetirizine (ZYRTEC) 10 MG tablet Take 0.5 tablets by mouth daily 30 tablet 0    bumetanide (BUMEX) 2 MG tablet Take 1 tablet by mouth 2 times daily 30 tablet 3    eplerenone (INSPRA) 25 MG tablet Take 1 tablet by mouth daily 90 tablet 3    nitroGLYCERIN (NITROSTAT) 0.4 MG SL tablet Place 1 tablet under the tongue every 5 minutes as needed for Chest pain 25 tablet 3    latanoprost (XALATAN) 0.005 % ophthalmic solution Place 1 drop into both eyes nightly       acyclovir (ZOVIRAX) 400 MG tablet 400 mg 2 times daily       DIANA MICROLET LANCETS MISC TEST TWICE DAILY 100 each 2    Insulin Pen Needle (PEN NEEDLES) 31G X 6 MM MISC 1 each by Does not apply route daily 100 each 3    prednisoLONE acetate (PRED FORTE) 1 % ophthalmic suspension Place 1 drop into the left eye daily        FAMILY HISTORY: family history includes Cancer in his father and mother. PHYSICAL EXAM:   /63 (Site: Right Upper Arm, Position: Sitting, Cuff Size: Large Adult)   Pulse 58   Resp 18   Ht 5' 9.02\" (1.753 m)   Wt 175 lb 12.8 oz (79.7 kg)   SpO2 98%   BMI 25.95 kg/m²  Body mass index is 25.95 kg/m². Constitutional: He is oriented to person, place, and time. He appears well-developed and well-nourished. In no acute distress. HEENT: Normocephalic and atraumatic. No JVD present. Carotid bruit is not present. No mass and no thyromegaly present. No lymphadenopathy present. Cardiovascular: Normal rate, regular rhythm, normal heart sounds. Exam reveals no gallop and no friction rubs. 1/6 systolic murmur, 5th intercostal space on the LEFT in the mid-clavicular line (cardiac apex). Pulmonary/Chest: Effort normal and breath sounds normal. No respiratory distress. He has no wheezes, rhonchi or rales. Abdominal: Soft, non-tender. Bowel sounds and aorta are normal. He exhibits no organomegaly, mass or bruit. Extremities: None. No cyanosis or clubbing. 2+ radial and carotid pulses. Distal extremity pulses: 2+ bilaterally. Neurological: He is alert and oriented to person, place, and time. No evidence of gross cranial nerve deficit. Coordination appeared normal.   Skin: Skin is warm and dry. There is no rash or diaphoresis. Psychiatric: He has a normal mood and affect.  His speech is normal and behavior is normal.      MOST RECENT LABS ON RECORD:   Lab Results   Component Value Date    WBC 8.7 12/01/2021    HGB 7.8 (L) 12/01/2021    HCT 24.2 (L) 12/01/2021     12/01/2021    CHOL 69 09/21/2021    TRIG 185 (H) 09/21/2021    HDL 14 (L) 09/21/2021    LDLDIRECT 44 11/02/2017    ALT 38 11/22/2021    AST 14 11/22/2021     12/01/2021    K 4.0 12/01/2021    CL 98 12/01/2021    CREATININE 4.48 (H) 12/01/2021    BUN 51 (H) 12/01/2021    CO2 23 12/01/2021    TSH 2.26 11/02/2017    PSA 3.68 04/30/2021    INR 1.2 11/29/2021    LABA1C 7.3 08/19/2021    LABMICR 30 10/17/2015       ASSESSMENT:  1. Stable angina ([address]    2. Shortness of breath    3. Dysautonomia ([address]    4. Chronic diastolic heart failure ([address]    5. ASHD (arteriosclerotic heart disease)    6. Essential hypertension    7. Mixed hyperlipidemia       PLAN:  · Angina, Class II-III but stable:   Antiplatelet Agent: Not indicated at this time.  Beta Blocker: Continue Metoprolol tartrate (Lopressor) 25 mg bid.  Anti-anginal medications: Not indicated at this time. Recurrent lightheadedness and dizziness. May need to reconsider if symptoms worsen   Cholesterol Reduction Therapy: Contraindicated due to history of intolerance/allergy.  Additional Testing List: None   Additional counseling: I advised them to call our office or go to the emergency room if they developed worsening or persistent chest pain or increased shortness of breath as this could be life threatening. · Shortness of breath with mild exertion: Stable and improving with dialysis/heart failure control as well as rising Hgb. · Follow up with Hematology and nephrology    · Anemia, unclear etiology but probably due to renal failure but improving: hemoglobin last week was 8.9    Dysautonomia: Mildly to moderately, likely related to and worsened by anemia  · Diuretics: STOP bumetinide (Bumex) , STOP Eplerenone  · Beta Blocker: Continue Metoprolol tartrate (Lopressor) 25 mg bid. ACE Inibitor/ARB: Not indicated at this time.    · Nonpharmacologic counseling: Because of his condition, I reminded him to try and keep himself well-hydrated and to take extra time when moving from laying to sitting, sitting to standing and standing to walking as well as wearing at least knee high compressions stockings. Chronic Diastolic Heart Failure: EF of 60% via echo on 11/11/2021. Currently well controlled. · Beta Blocker: Continue Metoprolol tartrate (Lopressor) 25 mg bid. Diuretics: STOP bumetinide (Bumex), STOP eplerenone  Nonpharmacologic management of Heart Failure: I advised him to try and keep his legs up whenever possible and to limit salt in his diet. Atherosclerotic Heart Disease: Coronary Artery stent: 4/10/2017. Cardiac cath done on 10/5/2021 showed Moderate three vessel coronary artery disease involving a ostial 50-60% stenosis in a small, non-dominant RCA, a 60% mid LAD stenosis and a proximal 50-60% stenosis in the left anterior descending coronary artery  Antiplatelet Agent: Not indicated due to bleeding  ACE Inibitor/ARB: Continue lisinopril 40 mg daily. Beta Blocker: Continue Metoprolol tartrate (Lopressor) 25 mg bid. Cholesterol Reduction Therapy: Refused by patient. Laboratory testing: None    Essential Hypertension: Controlled   ACE Inibitor/ARB: Not indicated at this time. · Beta Blocker: Continue Metoprolol tartrate (Lopressor) 25 mg bid. · Calcium Channel Blocker: Not indicated at this time. Hyperlipidemia: Mixed - Last LDL on 9/21/2021 was 18 mg/dL   · Cholesterol Reduction Therapy: Refused by patient. Finally, I recommended that he continue his other medications and follow up with you as previously scheduled. FOLLOW UP:   I told Mr. Luis A Cortez  to call my office if he had any problems, but otherwise told him to Return in about 6 weeks (around 1/24/2022). However, as always I would be happy to see him sooner should the need arise. Once again, thank you for allowing me to participate in this patients care. Please do not hesitate to contact me could I be of further assistance. Sincerely,  Asa Yoder. Jackelin BLOUNT, MS, F.A.C.C.   [facility] Cardiology Specialist  [address]  [facility], [address] [place] [address]  Phone: [phone], Fax: [phone]     I believe that the risk of significant morbidity and mortality related to the patient's current medical conditions are: Intermediate. >30 minutes were spent during prep work, discussion and exam of the patient, and follow up documentation and all of their questions were answered. The documentation recorded by the scribe, accurately and completely reflects the services I personally performed and the decisions made by me. Stephen Mayorga MD, MS, F.A.C.C.  December 13, 2021

## 2021-12-13 NOTE — PATIENT INSTRUCTIONS
SURVEY:    You may be receiving a survey from [employer] regarding your visit today. Please complete the survey to enable us to provide the highest quality of care to you and your family. If you cannot score us a very good on any question, please call the office to discuss how we could have made your experience a very good one. Thank you.

## 2021-12-15 ENCOUNTER — CARE COORDINATION (OUTPATIENT)
Dept: CASE MANAGEMENT | Age: 76
End: 2021-12-15

## 2021-12-15 NOTE — CARE COORDINATION
[facility] Transitions Follow Up Call    12/15/2021    Patient: Latisha Richard  Patient : 1945   MRN: [id number]  Reason for Admission: Acute kidney injury superimposed on CKD  Discharge Date: 12/3/21 RARS: Readmission Risk Score: 21.8 ( )         Spoke with: Vaishnavi Ibarra    Attempted to contact [facility] for transitional outreach, but his wife answered the phone. She said that they were getting ready to go to dialysis. She said that Carri Philip was doing \"pretty good\". She said that he was still weak, having dizziness and was been having the \"floppy\" heart feeling. She said that the cardiologist was aware and he stopped some medications. She feels that the dizziness was from some of his medications. He saw the urologist and Flomax was started and they will attempt to remove the whipple on . She did deny Luster having any shortness of breath, swelling or constipation. No further questions or concerns. Pt was a BPCI pt, but DRG changed to a non qualifying code. Will continue to follow for the 30 days post hospital discharge. Care Transitions Follow Up Call          Needs to be reviewed by the provider    Additional needs identified to be addressed with provider: No  none                 Method of communication with provider : none        Care Transition Nurse (CTN) contacted the patient by telephone to follow up after admission on 21. Verified name and  with patient as identifiers.     Addressed changes since last contact: none  Discussed follow-up appointments.      CTN reviewed discharge instructions, medical action plan and red flags with patient and discussed any barriers to care and/or understanding of plan of care after discharge. Discussed appropriate site of care based on symptoms and resources available to patient including: PCP, Specialist and When to call 911.  The patient agrees to contact the PCP office for questions related to their healthcare.      Patients top risk factors for readmission: functional physical ability  lack of knowledge about disease  medical condition-CRD  Interventions to address risk factors: Obtained and reviewed discharge summary and/or continuity of care documents        Non-[facility] follow up appointment(s):     CTN provided contact information for future needs. Plan for follow-up call in 7-10 days based on severity of symptoms and risk factors. Plan for next call: follow up      Care Transitions Subsequent and Final Call    Subsequent and Final Calls  Do you have any ongoing symptoms?: Yes  Onset of Patient-reported symptoms: In the past 7 days  Patient-reported symptoms: Weakness  Have your medications changed?: Yes  Patient Reports: stopped hydralazine, bumex, eplerenone  stared Flomax, changed Lantus dose to 22 units and increased colace to 3 x day  Do you have any questions related to your medications?: No  Do you currently have any active services?: Yes  Are you currently active with any services?: Outpatient/Community Services  Do you have any needs or concerns that I can assist you with?: No  Care Transitions Interventions  Other Interventions:            Follow Up  Future Appointments   Date Time Provider Isadora Younger   12/21/2021  9:15 AM Ti Pickering PA-C [place]   12/23/2021 10:30 AM [facility] VASCULAR IMAGING ROOM [facility] VAS [place] [facility] Rad   1/11/2022  1:00 PM Janeen Young MD [facility]   1/26/2022 11:20 AM Tova Lopez MD [facility] CARD [facility]   2/1/2022 12:00 PM Prasad Son MD [facility]   2/2/2022 10:30 AM Mehnaz Cage MD [facility]       Melissa Sal RN

## 2021-12-21 ENCOUNTER — OFFICE VISIT (OUTPATIENT)
Dept: UROLOGY | Age: 76
End: 2021-12-21
Payer: MEDICARE

## 2021-12-21 ENCOUNTER — TELEPHONE (OUTPATIENT)
Dept: UROLOGY | Age: 76
End: 2021-12-21

## 2021-12-21 VITALS
TEMPERATURE: 96.8 F | WEIGHT: 177 LBS | DIASTOLIC BLOOD PRESSURE: 60 MMHG | SYSTOLIC BLOOD PRESSURE: 130 MMHG | BODY MASS INDEX: 26.13 KG/M2

## 2021-12-21 DIAGNOSIS — N40.1 BPH WITH OBSTRUCTION/LOWER URINARY TRACT SYMPTOMS: ICD-10-CM

## 2021-12-21 DIAGNOSIS — K59.00 CONSTIPATION, UNSPECIFIED CONSTIPATION TYPE: ICD-10-CM

## 2021-12-21 DIAGNOSIS — R33.9 URINARY RETENTION: Primary | ICD-10-CM

## 2021-12-21 DIAGNOSIS — N13.8 BPH WITH OBSTRUCTION/LOWER URINARY TRACT SYMPTOMS: ICD-10-CM

## 2021-12-21 PROCEDURE — 1123F ACP DISCUSS/DSCN MKR DOCD: CPT | Performed by: PHYSICIAN ASSISTANT

## 2021-12-21 PROCEDURE — 4040F PNEUMOC VAC/ADMIN/RCVD: CPT | Performed by: PHYSICIAN ASSISTANT

## 2021-12-21 PROCEDURE — G8482 FLU IMMUNIZE ORDER/ADMIN: HCPCS | Performed by: PHYSICIAN ASSISTANT

## 2021-12-21 PROCEDURE — 1036F TOBACCO NON-USER: CPT | Performed by: PHYSICIAN ASSISTANT

## 2021-12-21 PROCEDURE — G8417 CALC BMI ABV UP PARAM F/U: HCPCS | Performed by: PHYSICIAN ASSISTANT

## 2021-12-21 PROCEDURE — 99211 OFF/OP EST MAY X REQ PHY/QHP: CPT

## 2021-12-21 PROCEDURE — 99213 OFFICE O/P EST LOW 20 MIN: CPT | Performed by: PHYSICIAN ASSISTANT

## 2021-12-21 PROCEDURE — G8428 CUR MEDS NOT DOCUMENT: HCPCS | Performed by: PHYSICIAN ASSISTANT

## 2021-12-21 PROCEDURE — 1111F DSCHRG MED/CURRENT MED MERGE: CPT | Performed by: PHYSICIAN ASSISTANT

## 2021-12-21 ASSESSMENT — ENCOUNTER SYMPTOMS
COUGH: 0
SHORTNESS OF BREATH: 0
CONSTIPATION: 0
COLOR CHANGE: 0
NAUSEA: 0
ABDOMINAL PAIN: 0
VOMITING: 0
WHEEZING: 0
EYE REDNESS: 0
BACK PAIN: 0

## 2021-12-21 NOTE — TELEPHONE ENCOUNTER
Patient was in the office this morning and had catheter removed. He called in this afternoon and stated that has urinated well, multiple times since leaving the office. Did not give any amounts. He is feeling well and denies any abdominal/kidney discomfort.    (he is on dialysis also)

## 2021-12-21 NOTE — PATIENT INSTRUCTIONS
· Drink plenty of fluids, aim for 80 oz daily for the next few days. · It is normal to feel a little discomfort the next few times you void. · Try to urinate every 2-3 hours while awake (even if you don't feel like you have to void). · If you DO urinate, please record the amount. · If you do NOT urinate within about 6 hours OR if you feel the strong uncomfortable urge to void but are not able - you'll need to come back to the office to get the catheter replaced. · Either way, call our office around 3:30 pm and let us know the void amounts (if you are urinating) or let us know that you're on your way back to the office (if you are not urinating). FOR CONSTIPATION    It is very important to have regular, soft, daily bowel movements, because it WILL improve your urinary symptoms. Take Miralax (or generic equivalent) 17g once daily (one capful). Take every day, DO NOT skip days, as it must be taken daily in order to be effective. DO NOT take just \"as needed\". It is safe to take long term and is recommended for your symptoms. If one dose daily causes loose stools/diarrhea, decrease to 1/2 capful or 1/4 capful. If you cannot tolerate this medication, please notify our office. If one dose daily of Miralax is not sufficient to produce a soft, easy to pass daily stool, you may also add an over-the-counter stool softener capsule. For example: colace (docusate). For Miralax to have maximal effectiveness, be sure to increase your water intake - aim for 80 oz daily unless you are on a fluid-restriction from another provider.

## 2021-12-21 NOTE — PROGRESS NOTES
HPI:      Patient is a 68 y.o. male in no acute distress. He is alert and oriented to person, place, and time. History  12/2019 Referral from Dr. Donovan Preston for elevated PSA of 6.13. He was a previous patient of Dr. Ladan Tejada for elevated PSA. Several brothers with prostate cancer.      PSA  4/2021 - 3.68  10/2020 - 5.43  4/2020 - 4.42  1/2020 - 5.00  11/2019 - 6.13  11/2018 - 3.11  11/2017 - 3.26  11/2016 - 2.43  10/2015 - 2.70  10/2014 - 2.35  10/2013 - 2.15     11/2020 Prostate biopsy for PSA of 5.43. Pathology: benign prostate tissue in all 12 cores    5/2021 gross hematuria. CT urogram showed a nonobstructing 3 mm left renal stone. Refused cystoscopy    12/2021 -urinary retention/constipation -Flomax started    On HD or ESRD    Today  Patient is here today for follow-up urinary retention, BPH and constipation. Patient is here for Brandon catheter removal.  At last visit we did start him on Flomax. Patient continues to have a daily bowel movement, but not taking MiraLAX daily or Colace. He states that prior to recent events he was having good soft daily bowel movements and sometimes would not make it in time to have a BM. He denies any fever, chills, gross hematuria complaint pain.     Patient is on dialysis, prior to recent issues patient was urinating 4-5 times a day and had nocturia x2    Past Medical History:   Diagnosis Date    Acute kidney injury ([address]     Acute MI ([address]     Acute renal failure with tubular necrosis ([address] 10/2/2018    ssecondary to hemodynamic effects of loop diuretics and ace inhibitors, bbaseline 1.2-1.3 which peaked up to 1.8, resolving    Anemia     CAD (coronary artery disease)     Cerebral artery occlusion with cerebral infarction ([address]     CHF (congestive heart failure) (HCC)     Chronic back pain     Closed fracture of lumbar vertebra without mention of spinal cord injury     Displacement of intervertebral disc, site unspecified, without myelopathy     H/O cardiac catheterization 2/19/16    LMCA: Mild irregularities 10-20%. LAD: Lesion on PRox LAD: Mid subsection. 65% stenosis. LCx: Lesion on 1st Ob Valentina: Proximal subesection. 70% steniosis. RCA: Small non-dominant RCA. Lesion on PRox RCA: Ostial. 50% stenosis. EF:55%.  H/O cardiovascular stress test 2/19/16    Abnormal. Moderate perfusion defect of mild intensity in the inferior, inferoseptal adn inferoapical regions during stress imaging, which most consistent with ischemia. Global LV systolic function normal without regional wall motion abnormalities. OVerall these results are most consistent with an intermediate risk for signficant CAD. Additional testing including cardiac cath may be indicated.  H/O tilt table evaluation 12/26/2017    Abnormal. Patients HR, BP response and symptoms were most consistent with dysautonomia. Combined with viligant maintenance of euvolemia and maintaining a moderate salft intake, pharmacologic treatment with SSRI such as lexapro and or mestinon among other treatments have shown some effectiveness in treatment of this condition    H/O tilt table evaluation 12/26/2017    Abnormal head upright tilt table study. The pt heart rate, blood pressure response and symptoms were most consistent with dysautonomia.  History of 24 hour EKG monitoring 8/14/14    Occasional PAC's and PVC's which appear to be at least moderately symptomatic.  History of 24 hour EKG monitoring 11/19/14    Event Monitor. Sinus rhythm and sinus bradycardia. Infrequent isolated PVC's    History of blood transfusion     History of cardiovascular stress test 2/19/14    Relatively NL    History of cardiovascular stress test 03/21/2018    Normal myocardial perfusion. Global left ventricular systolic function was normal with an EF of 68%. Overall, these results are most consistent with a low risk scan.     History of coronary artery stent placement 04/2017    PTCA / Drug Eluting Stent:, CX and / or branches    History of CVA (cerebrovascular accident) without residual deficits 2014    Incidentally found on CT head. No known impairment now or in the past.    History of echocardiogram 2/18/14    LA mildly dilated, EF 55%, LV wall thickness is moderately increased, no definite wall motion abnormalilities, what appears to be a pacer wire is seen w/n the RA and RV, mild-mod TR, mild pulmonary hypertension.  History of Holter monitoring 12/29/2017    Rare PAC's and PVC's.  History of tilt table evaluation 8/12/14    Abnormal    Hyperlipidemia     Hypertension     Non critical Right Renal artery stenosis, native ([address] 10/2/2018    Non critical Right Renal artery stenosis, based on cath in 2016, Rt RA 30% stenosis    Pacemaker     non-functioning    S/P cardiac cath 04/10/2017    S/P coronary artery stent placement     Successful PTCA - HA Om1    SIRS (systemic inflammatory response syndrome) ([address] 5/19/2019    Type II or unspecified type diabetes mellitus without mention of complication, not stated as uncontrolled      Past Surgical History:   Procedure Laterality Date    BACK SURGERY      CARDIAC CATHETERIZATION Left 02/19/2016    via right radial approach/ Melina Vernon/ Dr. Chambers Mood Left 10/05/2021    Dr Angeline Uriostegui radial-Moderate three vessel coronary artery disease involving a ostial 50-60% stenosis in a small, non-dominant RCA, a 60% mid LAD stenosis and a proximal 50-60% stenosis in the left anterior descending coronary artery. Normal left ventricular end diastolic pressure. Proceed with aggressive maximal medical management as clinically indicated.     CHOLECYSTECTOMY  08/17/2015    Liang/Charles/Saulo/ Millicent    COLONOSCOPY  2010    COLONOSCOPY  02/19/2015    -polyps,diverticulosis,hemorrhoids    COLONOSCOPY  05/23/2018    Dr Yue Leonard    COLONOSCOPY N/A 05/23/2018    COLONOSCOPY POLYPECTOMY SNARE/COLD BIOPSY cold snare  and  hot snare performed by Todd Kimble MD at [address]  10/30/2020    with Dr. Jonathan Hairston 10/30/2020    Jo-Ann Shanks, NOT HIGH RISK performed by Ann Mccarthy DO at [address]      left eye    ENDOSCOPY, COLON, DIAGNOSTIC      EYE SURGERY      IR TUNNELED CATHETER PLACEMENT GREATER THAN 5 YEARS  11/22/2021    IR TUNNELED CATHETER PLACEMENT GREATER THAN 5 YEARS 11/22/2021 [facility]    PACEMAKER INSERTION      PACEMAKER PLACEMENT      UPPER GASTROINTESTINAL ENDOSCOPY  05/28/2019    Dr. Phi Alvarado H-Pylori)duodenal bulbar/antral erythema    UPPER GASTROINTESTINAL ENDOSCOPY N/A 05/28/2019    EGD BIOPSY, clotest performed by Todd Kimble MD at [address] [facility] 10/30/2020    EGD ESOPHAGOGA[facility]OS[facility]  11/24/2021    US PERCUT RENAL BIOPSY, LT 11/24/2021 [facility]     Outpatient Encounter Medications as of 12/21/2021   Medication Sig Dispense Refill    tamsulosin (FLOMAX) 0.4 MG capsule Take 1 capsule by mouth daily 30 capsule 3    insulin glargine (LANTUS SOLOSTAR) 100 UNIT/ML injection pen Inject 22 Units into the skin 2 times daily 39.6 mL 0    blood glucose test strips (CONTOUR TEST) strip USE 1 STRIP TO CHECK GLUCOSE TWICE DAILY 100 strip 0    metoprolol tartrate (LOPRESSOR) 25 MG tablet Take 2 tablets by mouth 2 times daily 60 tablet 3    sodium bicarbonate 650 MG tablet Take 1 tablet by mouth 2 times daily 60 tablet 1    cetirizine (ZYRTEC) 10 MG tablet Take 0.5 tablets by mouth daily 30 tablet 0    epoetin shannon-epbx (RETACRIT) 93050 UNIT/ML SOLN injection Inject 0.6 mLs into the skin three times a week 6.6 mL 1    nitroGLYCERIN (NITROSTAT) 0.4 MG SL tablet Place 1 tablet under the tongue every 5 minutes as needed for Chest pain 25 tablet 3    latanoprost (XALATAN) 0.005 % ophthalmic solution Place 1 drop into both eyes nightly       acyclovir (ZOVIRAX) 400 MG tablet 400 mg 2 times daily       DIANA MICROLET LANCETS MISC TEST TWICE DAILY 100 each 2    Insulin Pen Needle (PEN NEEDLES) 31G X 6 MM MISC 1 each by Does not apply route daily 100 each 3    prednisoLONE acetate (PRED FORTE) 1 % ophthalmic suspension Place 1 drop into the left eye daily       polyethylene glycol (GLYCOLAX) 17 GM/SCOOP powder Take 17 g by mouth daily (Patient not taking: Reported on 12/21/2021) 1530 g 1    docusate sodium (COLACE) 100 MG capsule Take 1 capsule by mouth 3 times daily as needed for Constipation (Patient not taking: Reported on 12/21/2021) 90 capsule 0     No facility-administered encounter medications on file as of 12/21/2021.       Current Outpatient Medications on File Prior to Visit   Medication Sig Dispense Refill    tamsulosin (FLOMAX) 0.4 MG capsule Take 1 capsule by mouth daily 30 capsule 3    insulin glargine (LANTUS SOLOSTAR) 100 UNIT/ML injection pen Inject 22 Units into the skin 2 times daily 39.6 mL 0    blood glucose test strips (CONTOUR TEST) strip USE 1 STRIP TO CHECK GLUCOSE TWICE DAILY 100 strip 0    metoprolol tartrate (LOPRESSOR) 25 MG tablet Take 2 tablets by mouth 2 times daily 60 tablet 3    sodium bicarbonate 650 MG tablet Take 1 tablet by mouth 2 times daily 60 tablet 1    cetirizine (ZYRTEC) 10 MG tablet Take 0.5 tablets by mouth daily 30 tablet 0    epoetin shannon-epbx (RETACRIT) 62107 UNIT/ML SOLN injection Inject 0.6 mLs into the skin three times a week 6.6 mL 1    nitroGLYCERIN (NITROSTAT) 0.4 MG SL tablet Place 1 tablet under the tongue every 5 minutes as needed for Chest pain 25 tablet 3    latanoprost (XALATAN) 0.005 % ophthalmic solution Place 1 drop into both eyes nightly       acyclovir (ZOVIRAX) 400 MG tablet 400 mg 2 times daily       DIANA MICROLET LANCETS MISC TEST TWICE DAILY 100 each 2    Insulin Pen Needle (PEN NEEDLES) 31G X 6 MM MISC 1 each by Does not apply route daily 100 each 3    prednisoLONE acetate (PRED FORTE) 1 % ophthalmic suspension Place 1 drop into the left eye daily       polyethylene glycol (GLYCOLAX) 17 GM/SCOOP powder Take 17 g by mouth daily (Patient not taking: Reported on 2021) 1530 g 1    docusate sodium (COLACE) 100 MG capsule Take 1 capsule by mouth 3 times daily as needed for Constipation (Patient not taking: Reported on 2021) 90 capsule 0     No current facility-administered medications on file prior to visit. Coreg [carvedilol], Lipitor [atorvastatin], Aldactone [spironolactone], Crestor [rosuvastatin calcium], Lopid [gemfibrozil], Invokana [canagliflozin], and Januvia [sitagliptin]  Family History   Problem Relation Age of Onset    Cancer Mother         breast    Cancer Father         lung     Social History     Tobacco Use   Smoking Status Former Smoker    Packs/day: 1.50    Years: 8.00    Pack years: 12.00    Types: Cigarettes    Quit date: 3/4/1980    Years since quittin.8   Smokeless Tobacco Never Used       Social History     Substance and Sexual Activity   Alcohol Use No       Review of Systems   Constitutional: Negative for appetite change, chills and fever. Eyes: Negative for redness and visual disturbance. Respiratory: Negative for cough, shortness of breath and wheezing. Cardiovascular: Negative for chest pain and leg swelling. Gastrointestinal: Negative for abdominal pain, constipation, nausea and vomiting. Genitourinary: Positive for difficulty urinating. Negative for decreased urine volume, dysuria, enuresis, flank pain, frequency, hematuria, penile discharge, penile pain, scrotal swelling, testicular pain and urgency. Musculoskeletal: Negative for back pain, joint swelling and myalgias. Skin: Negative for color change, rash and wound. Neurological: Negative for dizziness, tremors and numbness. Hematological: Negative for adenopathy.  Does not bruise/bleed easily. /60 (Site: Right Upper Arm, Position: Sitting, Cuff Size: Medium Adult)   Temp 96.8 °F (36 °C)   Wt 177 lb (80.3 kg)   BMI 26.13 kg/m²       PHYSICAL EXAM:  Constitutional: Patient in no acute distress; Neuro: alert and oriented to person place and time. Psych: Mood and affect normal.  Lungs: Respiratory effort normal  Abdomen: Soft, non-tender, non-distended  Rectal: deferred       Lab Results   Component Value Date    BUN 51 (H) 12/01/2021     Lab Results   Component Value Date    CREATININE 4.48 (H) 12/01/2021     Lab Results   Component Value Date    PSA 3.68 04/30/2021    PSA 5.43 (H) 10/15/2020    PSA 4.42 (H) 04/06/2020       ASSESSMENT:   Diagnosis Orders   1. Urinary retention     2. BPH with obstruction/lower urinary tract symptoms     3. Constipation, unspecified constipation type       PLAN:  Continue Flomax    Resume daily MiraLAX if he develops constipation- instructions given    Colace as needed    · Drink plenty of fluids, aim for 80 oz daily for the next few days. · It is normal to feel a little discomfort the next few times you void. · Try to urinate every 2-3 hours while awake (even if you don't feel like you have to void). · If you DO urinate, please record the amount. · If you do NOT urinate within about 6 hours OR if you feel the strong uncomfortable urge to void but are not able - you'll need to come back to the office to get the catheter replaced. · Either way, call our office around 3:30 pm and let us know the void amounts (if you are urinating) or let us know that you're on your way back to the office (if you are not urinating).      Patient is on dialysis, prior to recent issues patient was urinating 4-5 times a day and had nocturia x2    Follow-up in 2 weeks with P[facility]

## 2021-12-23 ENCOUNTER — HOSPITAL ENCOUNTER (OUTPATIENT)
Dept: VASCULAR LAB | Age: 76
Discharge: HOME | End: 2021-12-25
Payer: MEDICARE

## 2021-12-23 ENCOUNTER — CARE COORDINATION (OUTPATIENT)
Dept: CASE MANAGEMENT | Age: 76
End: 2021-12-23

## 2021-12-23 DIAGNOSIS — Z01.818 PRE-OPERATIVE CLEARANCE: ICD-10-CM

## 2021-12-23 PROCEDURE — 93970 EXTREMITY STUDY: CPT

## 2021-12-23 NOTE — CARE COORDINATION
[facility] Transitions Follow Up Call    2021    Patient: Abbe Kirkpatrick  Patient : 1945   MRN: [id number]  Reason for Admission: Acute kidney injury superimposed on CKD  Discharge Date: 12/3/21 RARS: Readmission Risk Score: 21.8 ( )       Attempt to reach patient for Care Transitions. [facility] requesting return call. Contact information provided. [phone]    Care Transitions Subsequent and Final Call    Subsequent and Final Calls  Are you currently active with any services?: Outpatient/Community Services  Care Transitions Interventions  Other Interventions:            Follow Up  Future Appointments   Date Time Provider Isadora Younger   2022 10:00 AM REY Tavarez - CNP [place] [facility]   [facility]00 [facility]   [facility] 11:20 AM Natalee Robins MD [place] [facility]   2022 12:00 PM [facility]idd [facility]   2022 10:30 AM [facility]       Nato Back RN

## 2021-12-29 ENCOUNTER — CARE COORDINATION (OUTPATIENT)
Dept: CASE MANAGEMENT | Age: 76
End: 2021-12-29

## 2021-12-29 NOTE — CARE COORDINATION
[facility] Transitions Follow Up Call    2021    Patient: Celine Cifuentes  Patient : 1945   MRN: [id number]  Reason for Admission: CKD  Discharge Date: 12/3/21 RARS: Readmission Risk Score: 21.8 ( )  Message left in compliance with HIPPA. Stated who I was, representing the [facility], Care Transitions Team. Requested to place a call to their Physician with any immediate health needs/questions/concerns. Care Transitions Episode Closed. Sent to [facility]  for review of continued case management need.     Future Appointments   Date Time Provider Isadora Younger   2022 10:00 AM REY Lakhani - CNP [place] [facility]   2022  1:00 PM Jeff Titus MD [facility]   2022 11:20 AM Isauro Johnson MD [place] [facility]   2022 12:00 PM Chuck Burgess MD [facility] [place] None   2022 10:30 AM Sahra Oneal MD [place] onc spe [facility]       Nayely Tang, PIO

## 2022-01-11 ENCOUNTER — OFFICE VISIT (OUTPATIENT)
Dept: PRIMARY CARE CLINIC | Age: 77
End: 2022-01-11
Payer: MEDICARE

## 2022-01-11 VITALS
RESPIRATION RATE: 16 BRPM | SYSTOLIC BLOOD PRESSURE: 148 MMHG | BODY MASS INDEX: 26.98 KG/M2 | DIASTOLIC BLOOD PRESSURE: 69 MMHG | HEART RATE: 76 BPM | WEIGHT: 182.8 LBS

## 2022-01-11 DIAGNOSIS — Z79.4 TYPE 2 DIABETES MELLITUS WITH HYPERGLYCEMIA, WITH LONG-TERM CURRENT USE OF INSULIN (HCC): ICD-10-CM

## 2022-01-11 DIAGNOSIS — I50.32 CHRONIC DIASTOLIC HEART FAILURE (HCC): ICD-10-CM

## 2022-01-11 DIAGNOSIS — D50.8 OTHER IRON DEFICIENCY ANEMIA: Primary | ICD-10-CM

## 2022-01-11 DIAGNOSIS — I10 ESSENTIAL HYPERTENSION: ICD-10-CM

## 2022-01-11 DIAGNOSIS — E11.65 TYPE 2 DIABETES MELLITUS WITH HYPERGLYCEMIA, WITH LONG-TERM CURRENT USE OF INSULIN (HCC): ICD-10-CM

## 2022-01-11 PROCEDURE — 99211 OFF/OP EST MAY X REQ PHY/QHP: CPT | Performed by: FAMILY MEDICINE

## 2022-01-11 PROCEDURE — 1111F DSCHRG MED/CURRENT MED MERGE: CPT | Performed by: FAMILY MEDICINE

## 2022-01-11 PROCEDURE — 99215 OFFICE O/P EST HI 40 MIN: CPT | Performed by: FAMILY MEDICINE

## 2022-01-11 RX ORDER — ASCORBIC ACID, THIAMINE, RIBOFLAVIN, NIACINAMIDE, PYRIDOXINE, FOLIC ACID, COBALAMIN, BIOTIN, PANTOTHENIC ACID, ZINC 100; 1.5; 1.7; 20; 10; 1; 6; 300; 10; 5 MG/1; MG/1; MG/1; MG/1; MG/1; MG/1; UG/1; UG/1; MG/1; MG/1
TABLET, COATED ORAL
COMMUNITY
Start: 2021-12-10 | End: 2022-04-28

## 2022-01-11 RX ORDER — PANTOPRAZOLE SODIUM 40 MG/1
40 TABLET, DELAYED RELEASE ORAL
Qty: 90 TABLET | Refills: 1 | Status: SHIPPED | OUTPATIENT
Start: 2022-01-11 | End: 2022-02-08

## 2022-01-11 RX ORDER — OMEPRAZOLE 40 MG/1
40 CAPSULE, DELAYED RELEASE ORAL
COMMUNITY
Start: 2021-10-26 | End: 2022-01-11 | Stop reason: ALTCHOICE

## 2022-01-11 RX ORDER — LANOLIN ALCOHOL/MO/W.PET/CERES
325 CREAM (GRAM) TOPICAL
COMMUNITY
Start: 2022-01-08 | End: 2022-02-07

## 2022-01-11 NOTE — PATIENT INSTRUCTIONS
SURVEY:    You may be receiving a survey from [employer] regarding your visit today. You may get this in the mail, through your MyChart, or in your email. Please complete the survey to enable us to provide the highest quality of care to you and your family. If you cannot score us a very good (5 Stars) on any question, please call the office to discuss how we could of made your experience exceptional.    Thank you!     Dr. Kenzie Lee, ARIANE Vega, PIO Manjarrez, [address] [place] [address] [place]    Phone: [phone]  Fax: [phone]    Office Hours:   [facility], F: 8-5 Wednesday: 9-11

## 2022-01-11 NOTE — PROGRESS NOTES
Patient is here for a one month follow up. He said that he was in [facility] in [facility] from Wednesday to Saturday. During his time there, they did a colonoscopy and an upper GI. He originally went there because his hemoglobin was low. His wife also said before going there that he was vomiting a lot, and the one time he vomited up blood. He also did have a UTI while being in the hospital he has one dose of his medication left. He said that since he went to dialysis yesterday he has been sick ever since. He said the Prilosec is not helping, and he was hoping that he could be switched to Protonix because that helped him while he was in the hospital. He said that he is having nausea and dizziness. Post-Discharge Transitional Care Management Services or Hospital Follow Up      Wing Mendoza   YOB: 1945    Date of Office Visit:  1/11/2022  Date of Hospital Admission: 12/3/21  Date of Hospital Discharge: 12/3/21  Readmission Risk Score(high >=14%.  Medium >=10%):Readmission Risk Score: 21.8 ( )      Care management risk score Rising risk (score 2-5) and Complex Care (Scores >=6): 12     Non face to face  following discharge, date last encounter closed (first attempt may have been earlier): *No documented post hospital discharge outreach found in the last 14 days *No documented post hospital discharge outreach found in the last 14 days    Call initiated 2 business days of discharge: *No response recorded in the last 14 days     Patient Active Problem List   Diagnosis    Vertigo, benign paroxysmal    Systolic murmur    History of MI (myocardial infarction)    History of colon polyps    Uncontrolled type 2 diabetes mellitus with hyperglycemia ([address]    Coronary atherosclerosis due to calcified coronary lesion    Displacement of intervertebral disc, site unspecified, without myelopathy    History of CVA (cerebrovascular accident) without residual deficits    Dysautonomia ([address]    Syncope, near    Cholecystitis    Chronotropic incompetence with autonomic dysfunction    Episodic lightheadedness    Accelerated hypertension    Ischemic chest pain (HCC)    Abnormal cardiovascular stress test    Chronic kidney disease, stage III (moderate) ([facility])    Controlled type 2 diabetes mellitus with diabetic nephropathy, with long-term current use of insulin (HCC)    Abnormal tilt table test    Cervical stenosis of spinal canal    ASHD (arteriosclerotic heart disease)    S/P drug eluting coronary stent placement-OM1 4/10/17    Hyperlipidemia    Chronic diastolic HF (heart failure), NYHA class 3 (HCC)    Angina, class II (HCC)    Medication side effect, initial encounter    Acute renal failure superimposed on stage 4 chronic kidney disease (HCC)    Non critical Right Renal artery stenosis, native (HCC)    Anemia in stage 4 chronic kidney disease ([facility]    BPH with obstruction/lower urinary tract symptoms    Elevated PSA    Gross hematuria    Chest pain    Chronic anemia    Acute coronary syndrome with high troponin ([facility])    Iron deficiency anemia secondary to inadequate dietary iron intake    Iron malabsorption    Rash    Skin lesion of left lower extremity    Urticaria    Hyperkalemia    Moderate malnutrition (HCC)    Cardiorenal syndrome, stage 1-4 or unspecified chronic kidney disease, with heart failure (HCC)    Cardiorenal syndrome with renal failure    Pulmonary hypertension (HCC)    Acute on chronic diastolic (congestive) heart failure (HCC)    Type 2 MI (myocardial infarction) (HCC)       Allergies   Allergen Reactions    Coreg [Carvedilol] Shortness Of Breath and Nausea And Vomiting    Lipitor [Atorvastatin] Other (See Comments)     Muscle aches and joint pain    Aldactone [Spironolactone] Hives    Crestor [Rosuvastatin Calcium] Other (See Comments)     Muscle aches and joint pain    Lopid [Gemfibrozil]      hyperglycemia    Invokana [Canagliflozin] Rash    Januvia [Sitagliptin] Nausea And Vomiting       Medications listed as ordered at the time of discharge from hospital     Medication List          Accurate as of January 11, 2022  2:48 PM. If you have any questions, ask your nurse or doctor.             START taking these medications    pantoprazole 40 MG tablet  Commonly known as: PROTONIX  Take 1 tablet by mouth every morning (before breakfast)  Started by: Cortney Thornton MD        CONTINUE taking these medications    acyclovir 400 MG tablet  Commonly known as: ZOVIRAX     Jayy Microlet Lancets Misc  TEST TWICE DAILY     Contour Test strip  Generic drug: blood glucose test strips  USE 1 STRIP TO CHECK GLUCOSE TWICE DAILY     Dialyvite/Zinc Tabs     docusate sodium 100 MG capsule  Commonly known as: COLACE  Take 1 capsule by mouth 3 times daily as needed for Constipation     epoetin shannon-epbx 59454 UNIT/ML Soln injection  Commonly known as: RETACRIT  Inject 0.6 mLs into the skin three times a week     ferrous sulfate 325 (65 Fe) MG EC tablet  Commonly known as: FE TABS 325     Lantus SoloStar 100 UNIT/ML injection pen  Generic drug: insulin glargine  Inject 22 Units into the skin 2 times daily     latanoprost 0.005 % ophthalmic solution  Commonly known as: XALATAN     metoprolol tartrate 25 MG tablet  Commonly known as: LOPRESSOR  Take 2 tablets by mouth 2 times daily     nitroGLYCERIN 0.4 MG SL tablet  Commonly known as: NITROSTAT  Place 1 tablet under the tongue every 5 minutes as needed for Chest pain     Pen Needles 31G X 6 MM Misc  1 each by Does not apply route daily     prednisoLONE acetate 1 % ophthalmic suspension  Commonly known as: PRED FORTE     tamsulosin 0.4 MG capsule  Commonly known as: FLOMAX  Take 1 capsule by mouth daily        STOP taking these medications    omeprazole 40 MG delayed release capsule  Commonly known as: PRILOSEC  Stopped by: Cortney Thornton MD           Where to Get Your Medications      These medications were sent to [facility] [address] [place], [place] [address]  [address] [place] [address]    Phone: [phone]   · pantoprazole 40 MG tablet           Medications marked \"taking\" at this time  Outpatient Medications Marked as Taking for the 1/11/22 encounter (Office Visit) with Ning Bullard MD   Medication Sig Dispense Refill    ferrous sulfate (FE TABS 325) 325 (65 Fe) MG EC tablet 325 mg      pantoprazole (PROTONIX) 40 MG tablet Take 1 tablet by mouth every morning (before breakfast) 90 tablet 1        Medications patient taking as of now reconciled against medications ordered at time of hospital discharge: Yes    Chief Complaint   Patient presents with    Follow-Up from Hospital    Nausea    Dizziness       HPI    Inpatient course: Discharge summary reviewed- see chart. Interval history/Current status: has weakness after dialysis and has fallen twice    Review of Systems    Vitals:    01/11/22 1426   BP: (!) 148/69   Site: Right Upper Arm   Position: Sitting   Pulse: 76   Resp: 16   Weight: 182 lb 12.8 oz (82.9 kg)     Body mass index is 26.98 kg/m². Wt Readings from Last 3 Encounters:   01/11/22 182 lb 12.8 oz (82.9 kg)   12/21/21 177 lb (80.3 kg)   12/13/21 175 lb 12.8 oz (79.7 kg)     BP Readings from Last 3 Encounters:   01/11/22 (!) 148/69   12/21/21 130/60   12/13/21 123/63       Physical Exam      Exam:  GEN:   A & O x3, no apparent distress  EYES: No gross abnormalities. ENT:ENT exam normal, no neck nodes or sinus tenderness  NECK: normal, supple, no lymphadenopathy,  no carotid bruits  PULM: clear to auscultation bilaterally- no wheezes, rales or rhonchi, normal air movement, no respiratory distress  COR: regular rate & rhythm, no gallops and 2/6 murmer  ABD:  soft, non-tender  : deferred  EXT: Extremities: + 2 pedal pulses, no edema or calf tenderness, and warm to touch.  Normal nails without lesions  Skeletomuscular: has deformed joints  NEURO: Motor and sensory grossly intact  SKIN:  No skin lesions or rashes        Assessment/Plan:  1. Other iron deficiency anemia  Continue ferrous sulphate      2. Essential hypertension  Fluctuates,keep close monitoring,dalton hose toprevent orthostatic drop    3. Type 2 diabetes mellitus with hyperglycemia, with long-term current use of insulin (HCC)  Blood sugars well controlled    4.  Chronic diastolic heart failure (HCC)  Volume status acceptable        Medical Decision Making: high complexity

## 2022-01-18 ENCOUNTER — HOSPITAL ENCOUNTER (OUTPATIENT)
Age: 77
Discharge: HOME | End: 2022-01-18
Payer: MEDICARE

## 2022-01-18 DIAGNOSIS — D63.1 ANEMIA IN STAGE 3 CHRONIC KIDNEY DISEASE, UNSPECIFIED WHETHER STAGE 3A OR 3B CKD (HCC): ICD-10-CM

## 2022-01-18 DIAGNOSIS — D50.8 IRON DEFICIENCY ANEMIA SECONDARY TO INADEQUATE DIETARY IRON INTAKE: ICD-10-CM

## 2022-01-18 DIAGNOSIS — K90.9 IRON MALABSORPTION: ICD-10-CM

## 2022-01-18 DIAGNOSIS — D50.0 IRON DEFICIENCY ANEMIA DUE TO CHRONIC BLOOD LOSS: ICD-10-CM

## 2022-01-18 DIAGNOSIS — N18.30 ANEMIA IN STAGE 3 CHRONIC KIDNEY DISEASE, UNSPECIFIED WHETHER STAGE 3A OR 3B CKD (HCC): ICD-10-CM

## 2022-01-18 DIAGNOSIS — R97.20 ELEVATED PSA: ICD-10-CM

## 2022-01-18 LAB
ABSOLUTE EOS #: 0.59 K/UL (ref 0–0.44)
ABSOLUTE IMMATURE GRANULOCYTE: 0.09 K/UL (ref 0–0.3)
ABSOLUTE LYMPH #: 2.5 K/UL (ref 1.1–3.7)
ABSOLUTE MONO #: 0.77 K/UL (ref 0.1–1.2)
ALBUMIN SERPL-MCNC: 3.3 G/DL (ref 3.5–5.2)
ALBUMIN/GLOBULIN RATIO: 1 (ref 1–2.5)
ALP BLD-CCNC: 97 U/L (ref 40–129)
ALT SERPL-CCNC: 35 U/L (ref 5–41)
ANION GAP SERPL CALCULATED.3IONS-SCNC: 16 MMOL/L (ref 9–17)
AST SERPL-CCNC: 30 U/L
BASOPHILS # BLD: 1 % (ref 0–2)
BASOPHILS ABSOLUTE: 0.1 K/UL (ref 0–0.2)
BILIRUB SERPL-MCNC: 0.31 MG/DL (ref 0.3–1.2)
BUN BLDV-MCNC: 30 MG/DL (ref 8–23)
BUN/CREAT BLD: 13 (ref 9–20)
CALCIUM SERPL-MCNC: 9.1 MG/DL (ref 8.6–10.4)
CHLORIDE BLD-SCNC: 100 MMOL/L (ref 98–107)
CO2: 22 MMOL/L (ref 20–31)
CREAT SERPL-MCNC: 2.35 MG/DL (ref 0.7–1.2)
DIFFERENTIAL TYPE: ABNORMAL
EOSINOPHILS RELATIVE PERCENT: 6 % (ref 1–4)
GFR AFRICAN AMERICAN: 33 ML/MIN
GFR NON-AFRICAN AMERICAN: 27 ML/MIN
GFR SERPL CREATININE-BSD FRML MDRD: ABNORMAL ML/MIN/{1.73_M2}
GFR SERPL CREATININE-BSD FRML MDRD: ABNORMAL ML/MIN/{1.73_M2}
GLUCOSE BLD-MCNC: 238 MG/DL (ref 70–99)
HCT VFR BLD CALC: 28.8 % (ref 40.7–50.3)
HEMOGLOBIN: 8.6 G/DL (ref 13–17)
IMMATURE GRANULOCYTES: 1 %
LYMPHOCYTES # BLD: 25 % (ref 24–43)
MCH RBC QN AUTO: 26.8 PG (ref 25.2–33.5)
MCHC RBC AUTO-ENTMCNC: 29.9 G/DL (ref 28.4–34.8)
MCV RBC AUTO: 89.7 FL (ref 82.6–102.9)
MONOCYTES # BLD: 8 % (ref 3–12)
NRBC AUTOMATED: 0 PER 100 WBC
PDW BLD-RTO: 14.8 % (ref 11.8–14.4)
PLATELET # BLD: 293 K/UL (ref 138–453)
PLATELET ESTIMATE: ABNORMAL
PMV BLD AUTO: 10 FL (ref 8.1–13.5)
POTASSIUM SERPL-SCNC: 4.1 MMOL/L (ref 3.7–5.3)
RBC # BLD: 3.21 M/UL (ref 4.21–5.77)
RBC # BLD: ABNORMAL 10*6/UL
SEG NEUTROPHILS: 59 % (ref 36–65)
SEGMENTED NEUTROPHILS ABSOLUTE COUNT: 5.92 K/UL (ref 1.5–8.1)
SODIUM BLD-SCNC: 138 MMOL/L (ref 135–144)
TOTAL PROTEIN: 6.7 G/DL (ref 6.4–8.3)
WBC # BLD: 10 K/UL (ref 3.5–11.3)
WBC # BLD: ABNORMAL 10*3/UL

## 2022-01-18 PROCEDURE — 80053 COMPREHEN METABOLIC PANEL: CPT

## 2022-01-18 PROCEDURE — 84153 ASSAY OF PSA TOTAL: CPT

## 2022-01-18 PROCEDURE — 83550 IRON BINDING TEST: CPT

## 2022-01-18 PROCEDURE — 85025 COMPLETE CBC W/AUTO DIFF WBC: CPT

## 2022-01-18 PROCEDURE — 82728 ASSAY OF FERRITIN: CPT

## 2022-01-18 PROCEDURE — 83540 ASSAY OF IRON: CPT

## 2022-01-18 PROCEDURE — 36415 COLL VENOUS BLD VENIPUNCTURE: CPT

## 2022-01-19 LAB
FERRITIN: 1967 UG/L (ref 30–400)
IRON SATURATION: 18 % (ref 20–55)
IRON: 28 UG/DL (ref 59–158)
PROSTATE SPECIFIC ANTIGEN: 3.36 UG/L
TOTAL IRON BINDING CAPACITY: 154 UG/DL (ref 250–450)
UNSATURATED IRON BINDING CAPACITY: 126 UG/DL (ref 112–347)

## 2022-01-24 ENCOUNTER — OFFICE VISIT (OUTPATIENT)
Dept: UROLOGY | Age: 77
End: 2022-01-24
Payer: MEDICARE

## 2022-01-24 VITALS
WEIGHT: 182 LBS | BODY MASS INDEX: 26.86 KG/M2 | DIASTOLIC BLOOD PRESSURE: 100 MMHG | SYSTOLIC BLOOD PRESSURE: 180 MMHG | TEMPERATURE: 98.4 F

## 2022-01-24 DIAGNOSIS — N40.1 BPH WITH OBSTRUCTION/LOWER URINARY TRACT SYMPTOMS: Primary | ICD-10-CM

## 2022-01-24 DIAGNOSIS — R97.20 ELEVATED PSA: ICD-10-CM

## 2022-01-24 DIAGNOSIS — R33.9 URINARY RETENTION: ICD-10-CM

## 2022-01-24 DIAGNOSIS — K59.00 CONSTIPATION, UNSPECIFIED CONSTIPATION TYPE: ICD-10-CM

## 2022-01-24 DIAGNOSIS — N13.8 BPH WITH OBSTRUCTION/LOWER URINARY TRACT SYMPTOMS: Primary | ICD-10-CM

## 2022-01-24 PROCEDURE — 99213 OFFICE O/P EST LOW 20 MIN: CPT | Performed by: PHYSICIAN ASSISTANT

## 2022-01-24 PROCEDURE — G8427 DOCREV CUR MEDS BY ELIG CLIN: HCPCS | Performed by: PHYSICIAN ASSISTANT

## 2022-01-24 PROCEDURE — 99211 OFF/OP EST MAY X REQ PHY/QHP: CPT

## 2022-01-24 PROCEDURE — 4040F PNEUMOC VAC/ADMIN/RCVD: CPT | Performed by: PHYSICIAN ASSISTANT

## 2022-01-24 PROCEDURE — G8417 CALC BMI ABV UP PARAM F/U: HCPCS | Performed by: PHYSICIAN ASSISTANT

## 2022-01-24 PROCEDURE — G8482 FLU IMMUNIZE ORDER/ADMIN: HCPCS | Performed by: PHYSICIAN ASSISTANT

## 2022-01-24 PROCEDURE — PBSHW PBB SHADOW CHARGE: Performed by: PHYSICIAN ASSISTANT

## 2022-01-24 PROCEDURE — 51798 US URINE CAPACITY MEASURE: CPT | Performed by: PHYSICIAN ASSISTANT

## 2022-01-24 PROCEDURE — 1123F ACP DISCUSS/DSCN MKR DOCD: CPT | Performed by: PHYSICIAN ASSISTANT

## 2022-01-24 PROCEDURE — 1036F TOBACCO NON-USER: CPT | Performed by: PHYSICIAN ASSISTANT

## 2022-01-24 ASSESSMENT — ENCOUNTER SYMPTOMS
EYE REDNESS: 0
BACK PAIN: 0
WHEEZING: 0
CONSTIPATION: 1
ABDOMINAL PAIN: 0
SHORTNESS OF BREATH: 0
COLOR CHANGE: 0
VOMITING: 0
COUGH: 0
NAUSEA: 0

## 2022-01-24 NOTE — PATIENT INSTRUCTIONS
SURVEY:    You may be receiving a survey from [employer] regarding your visit today. Please complete the survey to enable us to provide the highest quality of care to you and your family. If you cannot score us a very good on any question, please call the office to discuss how we could of made your experience a very good one. Thank you.

## 2022-01-24 NOTE — PROGRESS NOTES
Random bladderscan performed in office today:  Pt last voided  630 am today, scan = 120 mL    He was able to void 100cc

## 2022-01-24 NOTE — PROGRESS NOTES
HPI:      Patient is a 68 y.o. male in no acute distress. He is alert and oriented to person, place, and time. History  12/2019 Referral from Dr. Mynor Feldman for elevated PSA of 6.13. He was a previous patient of Dr. Sherlyn Bautista for elevated PSA. Several brothers with prostate cancer.      PSA  1/2022 - 3.36  4/2021 - 3.68  10/2020 - 5.43  4/2020 - 4.42  1/2020 - 5.00  11/2019 - 6.13  11/2018 - 3.11  11/2017 - 3.26  11/2016 - 2.43  10/2015 - 2.70  10/2014 - 2.35  10/2013 - 2.15     11/2020 Prostate biopsy for PSA of 5.43. Pathology: benign prostate tissue in all 12 cores    5/2021 gross hematuria. CT urogram showed a nonobstructing 3 mm left renal stone. Refused cystoscopy    12/2021 - urinary retention/constipation -Flomax started    On HD or ESRD    Today:  Patient is here today for follow-up urinary retention, BPH, elevated PSA and constipation. Patient continues to take Flomax. At last visit we recommended that he resumes MiraLAX. Patient is continuing on dialysis for end-stage renal disease. He states that he only urinates once a day. He states that recently he did have a urinary tract infection and was at [facility].  Patient did have a PSA prior to visit a which was 3.36. This is slightly lower than approximately 9 months ago which was 3.68. He denies any fever, chills, gross hematuria complaint pain. He denies unintentional weight loss, decreased energy or appetite, new or worsening bone/hip/back pain. He denies any lower extremity numbness and tingling. He denies new or worsening LUTS. He denies gross hematuria or dysuria. Random bladderscan performed in office today: Patient last voided  630 am today, scan = 120 mL. He was able to void 100ml.     Past Medical History:   Diagnosis Date    Acute kidney [facility]     Acute MI ([facility]     Acute renal failure with tubular necrosis ([facility] 10/2/2018    ssecondary to hemodynamic effects of loop diuretics and ace inhibitors, bbaseline 1.2-1.3 which peaked up to 1.8, resolving    Anemia     CAD (coronary artery disease)     Cerebral artery occlusion with cerebral infarction (HCC)     CHF (congestive heart failure) (HCC)     Chronic back pain     Closed fracture of lumbar vertebra without mention of spinal cord injury     Displacement of intervertebral disc, site unspecified, without myelopathy     H/O cardiac catheterization 2/19/16    LMCA: Mild irregularities 10-20%. LAD: Lesion on PRox LAD: Mid subsection. 65% stenosis. LCx: Lesion on 1st Ob Valentina: Proximal subesection. 70% steniosis. RCA: Small non-dominant RCA. Lesion on PRox RCA: Ostial. 50% stenosis. EF:55%.  H/O cardiovascular stress test 2/19/16    Abnormal. Moderate perfusion defect of mild intensity in the inferior, inferoseptal adn inferoapical regions during stress imaging, which most consistent with ischemia. Global LV systolic function normal without regional wall motion abnormalities. OVerall these results are most consistent with an intermediate risk for signficant CAD. Additional testing including cardiac cath may be indicated.  H/O tilt table evaluation 12/26/2017    Abnormal. Patients HR, BP response and symptoms were most consistent with dysautonomia. Combined with viligant maintenance of euvolemia and maintaining a moderate salft intake, pharmacologic treatment with SSRI such as lexapro and or mestinon among other treatments have shown some effectiveness in treatment of this condition    H/O tilt table evaluation 12/26/2017    Abnormal head upright tilt table study. The pt heart rate, blood pressure response and symptoms were most consistent with dysautonomia.  History of 24 hour EKG monitoring 8/14/14    Occasional PAC's and PVC's which appear to be at least moderately symptomatic.  History of 24 hour EKG monitoring 11/19/14    Event Monitor. Sinus rhythm and sinus bradycardia.   Infrequent isolated PVC's    History of blood transfusion     History of cardiovascular stress test 2/19/14    Relatively NL    History of cardiovascular stress test 03/21/2018    Normal myocardial perfusion. Global left ventricular systolic function was normal with an EF of 68%. Overall, these results are most consistent with a low risk scan.  History of coronary artery stent placement 04/2017    PTCA / Drug Eluting Stent:, CX and / or branches    History of CVA (cerebrovascular accident) without residual deficits 2014    Incidentally found on CT head. No known impairment now or in the past.    History of echocardiogram 2/18/14    LA mildly dilated, EF 55%, LV wall thickness is moderately increased, no definite wall motion abnormalilities, what appears to be a pacer wire is seen w/n the RA and RV, mild-mod TR, mild pulmonary hypertension.  History of Holter monitoring 12/29/2017    Rare PAC's and PVC's.  History of tilt table evaluation 8/12/14    Abnormal    Hyperlipidemia     Hypertension     Non critical Right Renal artery stenosis, native ([address] 10/2/2018    Non critical Right Renal artery stenosis, based on cath in 2016, Rt RA 30% stenosis    Pacemaker     non-functioning    S/P cardiac cath 04/10/2017    S/P coronary artery stent placement     Successful PTCA - HA Om1    SIRS (systemic inflammatory response syndrome) ([address] 5/19/2019    Type II or unspecified type diabetes mellitus without mention of complication, not stated as uncontrolled      Past Surgical History:   Procedure Laterality Date    BACK SURGERY      CARDIAC CATHETERIZATION Left 02/19/2016    via right radial approach/ Melina Vernon/ Dr. Jelly Barrett Left 10/05/2021    Dr Marc Floyd radial-Moderate three vessel coronary artery disease involving a ostial 50-60% stenosis in a small, non-dominant RCA, a 60% mid LAD stenosis and a proximal 50-60% stenosis in the left anterior descending coronary artery. Normal left ventricular end diastolic pressure. Proceed with aggressive maximal medical management as clinically indicated.     CHOLECYSTECTOMY  08/17/2015    Liang/[place]/ [facility]    COLONOSCOPY  2010    COLONOSCOPY  02/19/2015    -polyps,diverticulosis,hemorrhoids    COLONOSCOPY  05/23/2018    Dr Olu Blair    COLONOSCOPY N/A 05/23/2018    COLONOSCOPY POLYPECTOMY SNARE/COLD BIOPSY  cold snare  and  hot snare performed by Janes Angeles MD at [facility]  10/30/2020    with Dr. Mini Murguia 10/30/2020    Lili Garrett NOT HIGH RISK performed by En Fields DO at [address]      left eye    ENDOSCOPY, COLON, DIAGNOSTIC      EYE SURGERY      IR TUNNELED CATHETER PLACEMENT GREATER THAN 5 YEARS  11/22/2021    IR TUNNELED CATHETER PLACEMENT GREATER THAN 5 YEARS 11/22/2021 [facility]    PACEMAKER INSERTION      PACEMAKER PLACEMENT      UPPER GASTROINTESTINAL ENDOSCOPY  05/28/2019    Dr. Cosby Link H-Pylori)duodenal bulbar/antral erythema    UPPER GASTROINTESTINAL ENDOSCOPY N/A 05/28/2019    EGD BIOPSY, clotest performed by Janes Angeles MD at [address] [facility] 10/30/2020    EGD ESOPHAGOGASTRODUODENOSCOPY performed by En Fields DO at [address], [place]  11/24/2021    US PERCUT RENAL BIOPSY, LT 11/24/2021 V ULTRASOUND     Outpatient Encounter Medications as of 1/24/2022   Medication Sig Dispense Refill    ferrous sulfate (FE TABS 325) 325 (65 Fe) MG EC tablet 325 mg      B Complex-C-Zn-Folic Acid (DIALYVITE/ZINC) TABS TAKE 1 TABLET BY MOUTH ONCE DAILY AFTER DIALYSIS      pantoprazole (PROTONIX) 40 MG tablet Take 1 tablet by mouth every morning (before breakfast) 90 tablet 1    tamsulosin (FLOMAX) 0.4 MG capsule Take 1 capsule by mouth daily 30 capsule 3    insulin glargine (LANTUS SOLOSTAR) 100 UNIT/ML injection pen Inject 22 Units into the skin 2 times daily 39.6 mL 0    blood glucose test strips (CONTOUR TEST) strip USE 1 STRIP TO CHECK GLUCOSE TWICE DAILY 100 strip 0    metoprolol tartrate (LOPRESSOR) 25 MG tablet Take 2 tablets by mouth 2 times daily 60 tablet 3    epoetin shannon-epbx (RETACRIT) 15545 UNIT/ML SOLN injection Inject 0.6 mLs into the skin three times a week 6.6 mL 1    nitroGLYCERIN (NITROSTAT) 0.4 MG SL tablet Place 1 tablet under the tongue every 5 minutes as needed for Chest pain 25 tablet 3    latanoprost (XALATAN) 0.005 % ophthalmic solution Place 1 drop into both eyes nightly       acyclovir (ZOVIRAX) 400 MG tablet 400 mg 2 times daily       DIANA MICROLET LANCETS MISC TEST TWICE DAILY 100 each 2    Insulin Pen Needle (PEN NEEDLES) 31G X 6 MM MISC 1 each by Does not apply route daily 100 each 3    prednisoLONE acetate (PRED FORTE) 1 % ophthalmic suspension Place 1 drop into the left eye daily       docusate sodium (COLACE) 100 MG capsule Take 1 capsule by mouth 3 times daily as needed for Constipation (Patient not taking: Reported on 12/21/2021) 90 capsule 0     No facility-administered encounter medications on file as of 1/24/2022.       Current Outpatient Medications on File Prior to Visit   Medication Sig Dispense Refill    ferrous sulfate (FE TABS 325) 325 (65 Fe) MG EC tablet 325 mg      B Complex-C-Zn-Folic Acid (DIALYVITE/ZINC) TABS TAKE 1 TABLET BY MOUTH ONCE DAILY AFTER DIALYSIS      pantoprazole (PROTONIX) 40 MG tablet Take 1 tablet by mouth every morning (before breakfast) 90 tablet 1    tamsulosin (FLOMAX) 0.4 MG capsule Take 1 capsule by mouth daily 30 capsule 3    insulin glargine (LANTUS SOLOSTAR) 100 UNIT/ML injection pen Inject 22 Units into the skin 2 times daily 39.6 mL 0    blood glucose test strips (CONTOUR TEST) strip USE 1 STRIP TO CHECK GLUCOSE TWICE DAILY 100 strip 0    metoprolol tartrate (LOPRESSOR) 25 MG tablet Take 2 tablets by mouth 2 times daily 60 tablet 3    epoetin shannon-epbx (RETACRIT) 24324 UNIT/ML SOLN injection Inject 0.6 mLs into the skin three times a week 6.6 mL 1    nitroGLYCERIN (NITROSTAT) 0.4 MG SL tablet Place 1 tablet under the tongue every 5 minutes as needed for Chest pain 25 tablet 3    latanoprost (XALATAN) 0.005 % ophthalmic solution Place 1 drop into both eyes nightly       acyclovir (ZOVIRAX) 400 MG tablet 400 mg 2 times daily       DIANA MICROLET LANCETS MISC TEST TWICE DAILY 100 each 2    Insulin Pen Needle (PEN NEEDLES) 31G X 6 MM MISC 1 each by Does not apply route daily 100 each 3    prednisoLONE acetate (PRED FORTE) 1 % ophthalmic suspension Place 1 drop into the left eye daily       docusate sodium (COLACE) 100 MG capsule Take 1 capsule by mouth 3 times daily as needed for Constipation (Patient not taking: Reported on 2021) 90 capsule 0     No current facility-administered medications on file prior to visit. Coreg [carvedilol], Lipitor [atorvastatin], Aldactone [spironolactone], Crestor [rosuvastatin calcium], Lopid [gemfibrozil], Invokana [canagliflozin], and Januvia [sitagliptin]  Family History   Problem Relation Age of Onset    Cancer Mother         breast    Cancer Father         lung     Social History     Tobacco Use   Smoking Status Former Smoker    Packs/day: 1.50    Years: 8.00    Pack years: 12.00    Types: Cigarettes    Quit date: 3/4/1980    Years since quittin.9   Smokeless Tobacco Never Used       Social History     Substance and Sexual Activity   Alcohol Use No       Review of Systems   Constitutional: Negative for appetite change, chills and fever. Eyes: Negative for redness and visual disturbance. Respiratory: Negative for cough, shortness of breath and wheezing. Cardiovascular: Negative for chest pain and leg swelling. Gastrointestinal: Positive for constipation. Negative for abdominal pain, nausea and vomiting. Genitourinary: Positive for difficulty urinating.  Negative for decreased urine volume, dysuria, enuresis, flank pain, frequency, hematuria, penile discharge, penile pain, scrotal swelling, testicular pain and urgency. Musculoskeletal: Negative for back pain, joint swelling and myalgias. Skin: Negative for color change, rash and wound. Neurological: Negative for dizziness, tremors and numbness. Hematological: Negative for adenopathy. Does not bruise/bleed easily. BP (!) 180/100 (Site: Right Upper Arm, Position: Sitting, Cuff Size: Large Adult)   Temp 98.4 °F (36.9 °C) (Temporal)   Wt 182 lb (82.6 kg)   BMI 26.86 kg/m²       PHYSICAL EXAM:  Constitutional: Patient in no acute distress; Neuro: alert and oriented to person place and time. Psych: Mood and affect normal.  Lungs: Respiratory effort normal  Abdomen: Soft, non-tender, non-distended  Rectal: Deferred      Lab Results   Component Value Date    BUN 30 (H) 01/18/2022     Lab Results   Component Value Date    CREATININE 2.35 (H) 01/18/2022     Lab Results   Component Value Date    PSA 3.36 01/18/2022    PSA 3.68 04/30/2021    PSA 5.43 (H) 10/15/2020       ASSESSMENT:   Diagnosis Orders   1. BPH with obstruction/lower urinary tract symptoms  FL MEASUREMENT,POST-VOID RESIDUAL VOLUME BY US,NON-IMAGING   2. Constipation, unspecified constipation type     3. Urinary retention     4.  Elevated PSA  PSA, Diagnostic       PLAN:  PSA is stable    Continue Flomax daily    Continue MiraLAX daily    Follow-up in 3 months with [facility]    PSA in 6 months

## 2022-02-04 DIAGNOSIS — E11.65 TYPE 2 DIABETES MELLITUS WITH HYPERGLYCEMIA, WITH LONG-TERM CURRENT USE OF INSULIN (HCC): ICD-10-CM

## 2022-02-04 DIAGNOSIS — Z79.4 TYPE 2 DIABETES MELLITUS WITH HYPERGLYCEMIA, WITH LONG-TERM CURRENT USE OF INSULIN (HCC): ICD-10-CM

## 2022-02-04 RX ORDER — CARVEDILOL 25 MG/1
TABLET, FILM COATED ORAL
Qty: 100 EACH | Refills: 0 | Status: SHIPPED | OUTPATIENT
Start: 2022-02-04 | End: 2022-03-24

## 2022-02-08 ENCOUNTER — OFFICE VISIT (OUTPATIENT)
Dept: CARDIOLOGY | Age: 77
End: 2022-02-08
Payer: MEDICARE

## 2022-02-08 VITALS
HEIGHT: 69 IN | RESPIRATION RATE: 17 BRPM | BODY MASS INDEX: 26.96 KG/M2 | DIASTOLIC BLOOD PRESSURE: 84 MMHG | HEART RATE: 75 BPM | WEIGHT: 182 LBS | SYSTOLIC BLOOD PRESSURE: 168 MMHG | OXYGEN SATURATION: 98 %

## 2022-02-08 DIAGNOSIS — G90.1 DYSAUTONOMIA (HCC): ICD-10-CM

## 2022-02-08 DIAGNOSIS — Z95.820 S/P ANGIOPLASTY WITH STENT: ICD-10-CM

## 2022-02-08 DIAGNOSIS — I25.10 ASHD (ARTERIOSCLEROTIC HEART DISEASE): ICD-10-CM

## 2022-02-08 DIAGNOSIS — R06.02 SOB (SHORTNESS OF BREATH): ICD-10-CM

## 2022-02-08 DIAGNOSIS — I50.32 CHRONIC DIASTOLIC CONGESTIVE HEART FAILURE (HCC): ICD-10-CM

## 2022-02-08 DIAGNOSIS — Z98.890 S/P CARDIAC CATH: ICD-10-CM

## 2022-02-08 DIAGNOSIS — I10 PRIMARY HYPERTENSION: ICD-10-CM

## 2022-02-08 DIAGNOSIS — E78.2 MIXED HYPERLIPIDEMIA: ICD-10-CM

## 2022-02-08 DIAGNOSIS — I20.9 ANGINA, CLASS II (HCC): Primary | ICD-10-CM

## 2022-02-08 DIAGNOSIS — D64.9 ANEMIA, UNSPECIFIED TYPE: ICD-10-CM

## 2022-02-08 PROCEDURE — G8427 DOCREV CUR MEDS BY ELIG CLIN: HCPCS | Performed by: FAMILY MEDICINE

## 2022-02-08 PROCEDURE — 99211 OFF/OP EST MAY X REQ PHY/QHP: CPT | Performed by: FAMILY MEDICINE

## 2022-02-08 PROCEDURE — G8482 FLU IMMUNIZE ORDER/ADMIN: HCPCS | Performed by: FAMILY MEDICINE

## 2022-02-08 PROCEDURE — G8417 CALC BMI ABV UP PARAM F/U: HCPCS | Performed by: FAMILY MEDICINE

## 2022-02-08 PROCEDURE — 4040F PNEUMOC VAC/ADMIN/RCVD: CPT | Performed by: FAMILY MEDICINE

## 2022-02-08 PROCEDURE — 99214 OFFICE O/P EST MOD 30 MIN: CPT | Performed by: FAMILY MEDICINE

## 2022-02-08 PROCEDURE — 1123F ACP DISCUSS/DSCN MKR DOCD: CPT | Performed by: FAMILY MEDICINE

## 2022-02-08 PROCEDURE — 1036F TOBACCO NON-USER: CPT | Performed by: FAMILY MEDICINE

## 2022-02-08 RX ORDER — OMEPRAZOLE 10 MG/1
10 CAPSULE, DELAYED RELEASE ORAL DAILY
COMMUNITY

## 2022-02-08 RX ORDER — HYDRALAZINE HYDROCHLORIDE 100 MG/1
100 TABLET, FILM COATED ORAL DAILY
COMMUNITY
End: 2022-02-08

## 2022-02-08 RX ORDER — HYDRALAZINE HYDROCHLORIDE 100 MG/1
100 TABLET, FILM COATED ORAL 2 TIMES DAILY
Qty: 180 TABLET | Refills: 3 | Status: SHIPPED | OUTPATIENT
Start: 2022-02-08 | End: 2022-04-12 | Stop reason: SDUPTHER

## 2022-02-08 NOTE — PATIENT INSTRUCTIONS
SURVEY:    You may be receiving a survey from [employer] regarding your visit today. Please complete the survey to enable us to provide the highest quality of care to you and your family. If you cannot score us a very good on any question, please call the office to discuss how we could have made your experience a very good one. Thank you.

## 2022-02-08 NOTE — PROGRESS NOTES
Katlyn Hampton am scribing for and in the presence of Beka Benítez MD, MS, F.A.C.C. Patient: Trent Lord  : 1945  Date of Visit: 2022    REASON FOR VISIT / CONSULTATION: Follow-up (Hx: Angina,SOB, Dysautonomia,CHF,ASHD,HTN,HLD. He has not been doing the best. Ended up in [facility] due to Hemoglobin dropping. some episodes of SOB. Lightheaded/dizziness all the time. Has fallen a couple times. Palpitations after dialysis. Denies: CP. )    Dear Michael Peña MD,    As you know, Mr. Ousmane Banuelos is a 70 y.o. male with a known history of dysautonomia where on tilt table testing his blood pressure dropped from 424 systolic to 78 systolic with only a 78-99 HR response. More recently, a CT of he head in the past showed evidence of at least 2 old CVA's and a heart catheterization showed severe single vessel disease in the ostium of a moderate sized OM1 branch of the circumflex. However, maximal guidelines directed medical management was opted for an place of stenting partly due to the risk associated with stenting this vessel. Recently he has had a coronary angiography procedure with stenting of his HA Om1. As you know, Mr. Ousmane Banuelos  is a 68 y.o. male with a history of recent onset substernal chest discomfort that led to a stress test and a subsequent heart catheterization which showed severe single vessel coronary artery disease of the HA Om1 with successful angioplasty and stenting of that vessel that was done by Dr. Katelyn Hyde on 4/10/17. More recently he has had problems with balance problems when he is in his feet and says that a Neurologist has told him in the past this is because of his previous strokes. Most recently he underwent a cardiovascular stress test which fortunately had shown to be normal on 3/21/2018. Mr. Ousmane Banuelos has significant normal stress test and echocardiogram on 2020. Holter monitor done on 2020: The rhythm was sinus.  Average daily heart rate 58 ranging from 47 to 81 bpm. Bradycardia for 72% test duration. Rare premature supraventricular ectopic beats consisting of 33 isolated PACs. Rare premature ventricular ectopic beats total 75 consisting of 73 isolatedPVCs and a ventricular couplet. Echocardiogram done on 12/18/2020: EF of 60%. Moderately increased LV wall thickness. Borderline pulmonary hypertension. Mild tricuspid regurgitation. Mild diastolic dysfunction is seen/ Aortic root is mildly dilated. Stress test done on 9/20/2021 was equivocal, There is a small/moderate perfusion defect of mild intensity in the lateral, inferior and inferoapical regions. Cardiac cath done on 10/5/2021 showed Moderate three vessel coronary artery disease involving a ostial 50-60% stenosis in a small, non-dominant RCA, a 60% mid LAD stenosis and a proximal 50-60% stenosis in the left anterior descending coronary artery. Echo done on 11/11/2021 showed an EF 60%, The left atrium is moderately dilated (34-39) with a left atrial volume index of 35 ml/m2. Mild to moderate tricuspid regurgitation. Moderate pulmonary hypertension with estimated pulmonary artery systolic pressure of 42 mmHg. Mild diastolic dysfunction. Since I last saw Mr. Maradiaga Dry he reports he ended up at [facility] due to low hemoglobin. He has had some episodes of shortness of breath and he has lightheaded/dizziness all the time which has caused him to fall twice. He does feel palpitations with doing dialysis 3 times a week. No abdominal pain, bleeding problems, bowel issues, problems with his medications or any other concerns at this time. No nausea or vomiting. No cough, fever or chills.      Past Medical History:   Diagnosis Date    Acute kidney injury ([address]     Acute MI [facility]     Acute renal failure with tubular necrosis ([facility] [address] [facility] 10/2/2018    ssecondary to hemodynamic effects of loop diuretics and ace inhibitors, bbaseline 1.2-1.3 which peaked up to 1.8, resolving    Anemia     CAD (coronary artery disease)     Cerebral artery occlusion with cerebral infarction [facility]     CHF (congestive heart failure) (HCC)     Chronic back pain     Closed fracture of lumbar vertebra without mention of spinal cord injury     Displacement of intervertebral disc, site unspecified, without myelopathy     H/O cardiac catheterization 2/19/16    LMCA: Mild irregularities 10-20%. LAD: Lesion on PRox LAD: Mid subsection. 65% stenosis. LCx: Lesion on 1st Ob Valentina: Proximal subesection. 70% steniosis. RCA: Small non-dominant RCA. Lesion on PRox RCA: Ostial. 50% stenosis. EF:55%.  H/O cardiovascular stress test 2/19/16    Abnormal. Moderate perfusion defect of mild intensity in the inferior, inferoseptal adn inferoapical regions during stress imaging, which most consistent with ischemia. Global LV systolic function normal without regional wall motion abnormalities. OVerall these results are most consistent with an intermediate risk for signficant CAD. Additional testing including cardiac cath may be indicated.  H/O tilt table evaluation 12/26/2017    Abnormal. Patients HR, BP response and symptoms were most consistent with dysautonomia. Combined with viligant maintenance of euvolemia and maintaining a moderate salft intake, pharmacologic treatment with SSRI such as lexapro and or mestinon among other treatments have shown some effectiveness in treatment of this condition    H/O tilt table evaluation 12/26/2017    Abnormal head upright tilt table study. The pt heart rate, blood pressure response and symptoms were most consistent with dysautonomia.  History of 24 hour EKG monitoring 8/14/14    Occasional PAC's and PVC's which appear to be at least moderately symptomatic.  History of 24 hour EKG monitoring 11/19/14    Event Monitor. Sinus rhythm and sinus bradycardia.   Infrequent isolated PVC's    History of blood transfusion     History of cardiovascular stress test 2/19/14    Relatively NL    History of cardiovascular stress test 03/21/2018    Normal myocardial perfusion. Global left ventricular systolic function was normal with an EF of 68%. Overall, these results are most consistent with a low risk scan.  History of coronary artery stent placement 04/2017    PTCA / Drug Eluting Stent:, CX and / or branches    History of CVA (cerebrovascular accident) without residual deficits 2014    Incidentally found on CT head. No known impairment now or in the past.    History of echocardiogram 2/18/14    LA mildly dilated, EF 55%, LV wall thickness is moderately increased, no definite wall motion abnormalilities, what appears to be a pacer wire is seen w/n the RA and RV, mild-mod TR, mild pulmonary hypertension.  History of Holter monitoring 12/29/2017    Rare PAC's and PVC's.  History of tilt table evaluation 8/12/14    Abnormal    Hyperlipidemia     Hypertension     Non critical Right Renal artery stenosis, native ([facility] [address] 10/2/2018    Non critical Right Renal artery stenosis, based on cath in 2016, Rt RA 30% stenosis    Pacemaker     non-functioning    S/P cardiac cath 04/10/2017    S/P coronary artery stent placement     Successful PTCA - HA Om1    SIRS (systemic inflammatory response syndrome) ([facility] [address] 5/19/2019    Type II or unspecified type diabetes mellitus without mention of complication, not stated as uncontrolled        CURRENT ALLERGIES: Coreg [carvedilol], Lipitor [atorvastatin], Aldactone [spironolactone], Crestor [rosuvastatin calcium], Lopid [gemfibrozil], Invokana [canagliflozin], and Januvia [sitagliptin] REVIEW OF SYSTEMS: 14 systems were reviewed. Pertinent positives and negatives as above, all else negative.      Past Surgical History:   Procedure Laterality Date    BACK SURGERY      CARDIAC CATHETERIZATION Left 02/19/2016    via right radial approach/ Melina Vernon/ Dr. Kiana Fang Left 10/05/2021    Dr Harman Erazo radial-Moderate three vessel coronary artery disease involving a ostial 50-60% stenosis in a small, non-dominant RCA, a 60% mid LAD stenosis and a proximal 50-60% stenosis in the left anterior descending coronary artery. Normal left ventricular end diastolic pressure. Proceed with aggressive maximal medical management as clinically indicated.     CHOLECYSTECTOMY  2015    Liang/[place]/ Lap    COLONOSCOPY      COLONOSCOPY  2015    -polyps,diverticulosis,hemorrhoids    COLONOSCOPY  2018    Dr Flores Bautista    COLONOSCOPY N/A 2018    COLONOSCOPY POLYPECTOMY SNARE/COLD BIOPSY  cold snare  and  hot snare performed by Chasidy Mullins MD at [address]  10/30/2020    with Dr. Marquez Session 10/30/2020    Ralene Sin, NOT HIGH RISK performed by Kamaljit Boudreaux DO at [address] [facility]      left eye    ENDOSCOPY, COLON, DIAGNOSTIC      EYE SURGERY      IR TUNNELED CATHETER PLACEMENT GREATER THAN 5 YEARS  2021    IR TUNNELED CATHETER PLACEMENT GREATER THAN 5 YEARS 2021 [facility]    PACEMAKER INSERTION      PACEMAKER PLACEMENT      UPPER GASTROINTESTINAL ENDOSCOPY  2019    Dr. Shannon Ibarra H-Pylori)duodenal bulbar/antral erythema    UPPER GASTROINTESTINAL ENDOSCOPY N/A 2019    EGD BIOPSY, clotest performed by Chasidy Mullins MD at [address] 10/30/2020    EGD ESOPHAGOGASTRODUODENOSCOPY performed by Kamaljit Boudreaux DO at [address], LT  2021    US PERCUT RENAL BIOPSY, LT 2021 [facility]    Social History:  Social History     Tobacco Use    Smoking status: Former Smoker     Packs/day: 1.50     Years: 8.00     Pack years: 12.00     Types: Cigarettes     Quit date: 3/4/1980     Years since quittin.9    Smokeless tobacco: Never Used   Vaping Use    Vaping Use: Never used   Substance Use Topics    Alcohol use: No    Drug use: No        CURRENT MEDICATIONS:  Outpatient Medications Marked as Taking for the 2/8/22 encounter (Office Visit) with Foreign Hirsch MD   Medication Sig Dispense Refill    omeprazole (PRILOSEC) 10 MG delayed release capsule Take 10 mg by mouth daily      hydrALAZINE (APRESOLINE) 100 MG tablet Take 100 mg by mouth daily      CONTOUR TEST strip USE 1 STRIP TO CHECK GLUCOSE TWICE DAILY 100 each 0    B Complex-C-Zn-Folic Acid (DIALYVITE/ZINC) TABS TAKE 1 TABLET BY MOUTH ONCE DAILY AFTER DIALYSIS      tamsulosin (FLOMAX) 0.4 MG capsule Take 1 capsule by mouth daily 30 capsule 3    docusate sodium (COLACE) 100 MG capsule Take 1 capsule by mouth 3 times daily as needed for Constipation 90 capsule 0    insulin glargine (LANTUS SOLOSTAR) 100 UNIT/ML injection pen Inject 22 Units into the skin 2 times daily 39.6 mL 0    metoprolol tartrate (LOPRESSOR) 25 MG tablet Take 2 tablets by mouth 2 times daily (Patient taking differently: Take 25 mg by mouth 2 times daily ) 60 tablet 3    nitroGLYCERIN (NITROSTAT) 0.4 MG SL tablet Place 1 tablet under the tongue every 5 minutes as needed for Chest pain 25 tablet 3    latanoprost (XALATAN) 0.005 % ophthalmic solution Place 1 drop into both eyes nightly       acyclovir (ZOVIRAX) 400 MG tablet 400 mg 2 times daily       DIANA MICROLET LANCETS MISC TEST TWICE DAILY 100 each 2    Insulin Pen Needle (PEN NEEDLES) 31G X 6 MM MISC 1 each by Does not apply route daily 100 each 3    prednisoLONE acetate (PRED FORTE) 1 % ophthalmic suspension Place 1 drop into the left eye daily        FAMILY HISTORY: family history includes Cancer in his father and mother. PHYSICAL EXAM:   BP (!) 168/84 (Site: Left Upper Arm, Position: Sitting, Cuff Size: Large Adult)   Pulse 75   Resp 17   Ht 5' 9\" (1.753 m)   Wt 182 lb (82.6 kg)   SpO2 98%   BMI 26.88 kg/m²  Body mass index is 26.88 kg/m².     Constitutional: He is oriented to person, place, and time. He appears well-developed and well-nourished. In no acute distress. HEENT: Normocephalic and atraumatic. No JVD present. Carotid bruit is not present. No mass and no thyromegaly present. No lymphadenopathy present. Cardiovascular: Normal rate, regular rhythm, normal heart sounds. Exam reveals no gallop and no friction rubs. 1/6 systolic murmur, 2nd intercostal space on the RIGHT just lateral to the sternum. Pulmonary/Chest: Effort normal and breath sounds normal. No respiratory distress. He has no wheezes, rhonchi or rales. Abdominal: Soft, non-tender. Bowel sounds and aorta are normal. He exhibits no organomegaly, mass or bruit. Extremities: None. No cyanosis or clubbing. 2+ radial and carotid pulses. Distal extremity pulses: 2+ bilaterally. Neurological: He is alert and oriented to person, place, and time. No evidence of gross cranial nerve deficit. Coordination appeared normal.   Skin: Skin is warm and dry. There is no rash or diaphoresis. Psychiatric: He has a normal mood and affect. His speech is normal and behavior is normal.      MOST RECENT LABS ON RECORD:   Lab Results   Component Value Date    WBC 10.0 01/18/2022    HGB 8.6 (L) 01/18/2022    HCT 28.8 (L) 01/18/2022     01/18/2022    CHOL 69 09/21/2021    TRIG 185 (H) 09/21/2021    HDL 14 (L) 09/21/2021    LDLDIRECT 44 11/02/2017    ALT 35 01/18/2022    AST 30 01/18/2022     01/18/2022    K 4.1 01/18/2022     01/18/2022    CREATININE 2.35 (H) 01/18/2022    BUN 30 (H) 01/18/2022    CO2 22 01/18/2022    TSH 2.26 11/02/2017    PSA 3.36 01/18/2022    INR 1.2 11/29/2021    LABA1C 7.3 08/19/2021    LABMICR 30 10/17/2015       ASSESSMENT:  1. Angina, class II ([address]    2. SOB (shortness of breath)    3. Anemia, unspecified type    4. Dysautonomia ([address]    5. Chronic diastolic congestive heart failure ([address]    6. ASHD (arteriosclerotic heart disease)    7. S/P angioplasty with stent    8. S/P cardiac cath    9.  Primary hypertension    10. Mixed hyperlipidemia       PLAN:    · Angina, Class II but stable:  · Antiplatelet Agent: Not indicated at this time. · Beta Blocker: Continue Metoprolol tartrate (Lopressor) 25 mg bid. · Anti-anginal medications: Not indicated at this time. Recurrent lightheadedness and dizziness. May need to reconsider if symptoms worsen  · Cholesterol Reduction Therapy: Contraindicated due to history of intolerance/allergy. · Additional counseling: I advised them to call our office or go to the emergency room if they developed worsening or persistent chest pain or increased shortness of breath as this could be life threatening. · Shortness of breath with mild exertion: Improving with dialysis/heart failure control as well as rising Hgb. · Follow up with Hematology and nephrology    · Anemia: Unclear Etiology   · Last hemoglobin was 8.6    Dysautonomia: Mildly to moderately, likely related to and worsened by anemia but Hgb improving and appears fairly well controlled. · Diuretics: Not indicated at this time. · Beta Blocker: Continue Metoprolol tartrate (Lopressor) 25 mg bid. ACE Inibitor/ARB: Not indicated at this time. · Nonpharmacologic counseling: Because of his condition, I reminded him to try and keep himself well-hydrated and to take extra time when moving from laying to sitting, sitting to standing and standing to walking as well as wearing at least knee high compressions stockings. Chronic Diastolic Heart Failure: EF of 60% via echo on 11/11/2021. Currently well controlled. Kidney function improving   · Beta Blocker: Continue Metoprolol tartrate (Lopressor) 25 mg bid. Diuretics: Not indicated at this time. Nonpharmacologic management of Heart Failure: I advised him to try and keep his legs up whenever possible and to limit salt in his diet. Atherosclerotic Heart Disease: Coronary Artery stent: 4/10/2017.  Cardiac cath done on 10/5/2021 showed Moderate three vessel coronary artery disease involving a ostial 50-60% stenosis in a small, non-dominant RCA, a 60% mid LAD stenosis and a proximal 50-60% stenosis in the left anterior descending coronary artery  Antiplatelet Agent: Not indicated due to bleeding  ACE Inibitor/ARB: Continue lisinopril 40 mg daily. Beta Blocker: Continue Metoprolol tartrate (Lopressor) 25 mg bid. Cholesterol Reduction Therapy: Refused by patient. Laboratory testing: None    Essential Hypertension: Uncontrolled   ACE Inibitor/ARB: Not indicated at this time. Increase Hydralazine 100 mg twice daily, except the mornings of dialysis   · Beta Blocker: Continue Metoprolol tartrate (Lopressor) 25 mg bid. · Calcium Channel Blocker: Not indicated at this time. Hyperlipidemia: Mixed - Last LDL on 9/21/2021 was 18 mg/dL   · Cholesterol Reduction Therapy: Refused by patient. Finally, I recommended that he continue his other medications and follow up with you as previously scheduled. FOLLOW UP:   I told Mr. Eunice Floyd  to call my office if he had any problems, but otherwise told him to Return in about 3 months (around 5/8/2022). However, as always I would be happy to see him sooner should the need arise. Once again, thank you for allowing me to participate in this patients care. Please do not hesitate to contact me could I be of further assistance. Sincerely,  Tonya Chaudhari. Jackelin BLOUNT, MS, F.A.C.C. [facility] Cardiology Specialist  [address]  Phone: [phone], Fax: [phone]     I believe that the risk of significant morbidity and mortality related to the patient's current medical conditions are: Intermediate. The documentation recorded by the scribe, accurately and completely reflects the services I personally performed and the decisions made by me. Kailey Willoughby MD, MS, F.A.C.C.  February 8, 2022

## 2022-02-28 RX ORDER — AMLODIPINE BESYLATE 5 MG/1
5 TABLET ORAL DAILY
Qty: 90 TABLET | Refills: 3 | Status: SHIPPED | OUTPATIENT
Start: 2022-02-28 | End: 2022-03-03 | Stop reason: DRUGHIGH

## 2022-02-28 NOTE — TELEPHONE ENCOUNTER
Patient stated BP has been in 200's last night and 170's this morning. He says that he is taking Hydralazine 100 mg twice daily and lopressor 25 mg BID. Per Dr Conor Brown he would like him to increase hydralazine to 100 mg three times daily and start amlodipine 5 mg once daily and see Rin within the week. Patient reports he did increase his hydralazine to 100 mg three times daily as of yesterday on his own but did not seem to help. But will start the amlodpine 5 mg once daily. Appointment made for him Thursday.

## 2022-03-03 ENCOUNTER — TELEPHONE (OUTPATIENT)
Dept: CARDIOLOGY | Age: 77
End: 2022-03-03

## 2022-03-03 ENCOUNTER — OFFICE VISIT (OUTPATIENT)
Dept: CARDIOLOGY | Age: 77
End: 2022-03-03
Payer: MEDICARE

## 2022-03-03 ENCOUNTER — HOSPITAL ENCOUNTER (OUTPATIENT)
Age: 77
Discharge: HOME | End: 2022-03-03
Payer: MEDICARE

## 2022-03-03 VITALS
DIASTOLIC BLOOD PRESSURE: 62 MMHG | BODY MASS INDEX: 27.49 KG/M2 | WEIGHT: 185.6 LBS | HEIGHT: 69 IN | SYSTOLIC BLOOD PRESSURE: 195 MMHG | RESPIRATION RATE: 18 BRPM | HEART RATE: 71 BPM | OXYGEN SATURATION: 97 %

## 2022-03-03 DIAGNOSIS — I50.32 CHRONIC DIASTOLIC CONGESTIVE HEART FAILURE (HCC): ICD-10-CM

## 2022-03-03 DIAGNOSIS — I70.1 RENAL ARTERY STENOSIS, NATIVE (HCC): ICD-10-CM

## 2022-03-03 DIAGNOSIS — I10 ESSENTIAL HYPERTENSION: ICD-10-CM

## 2022-03-03 DIAGNOSIS — I20.9 ANGINA, CLASS II (HCC): ICD-10-CM

## 2022-03-03 DIAGNOSIS — I20.9 ANGINA, CLASS II (HCC): Primary | ICD-10-CM

## 2022-03-03 DIAGNOSIS — Z95.820 S/P ANGIOPLASTY WITH STENT: ICD-10-CM

## 2022-03-03 DIAGNOSIS — R06.02 SOB (SHORTNESS OF BREATH): ICD-10-CM

## 2022-03-03 DIAGNOSIS — D64.9 ANEMIA, UNSPECIFIED TYPE: ICD-10-CM

## 2022-03-03 DIAGNOSIS — I10 UNCONTROLLED HYPERTENSION: ICD-10-CM

## 2022-03-03 DIAGNOSIS — I25.10 ASHD (ARTERIOSCLEROTIC HEART DISEASE): ICD-10-CM

## 2022-03-03 DIAGNOSIS — G90.1 DYSAUTONOMIA (HCC): ICD-10-CM

## 2022-03-03 DIAGNOSIS — E78.2 MIXED HYPERLIPIDEMIA: ICD-10-CM

## 2022-03-03 LAB
ANION GAP SERPL CALCULATED.3IONS-SCNC: 12 MMOL/L (ref 9–17)
BUN BLDV-MCNC: 42 MG/DL (ref 8–23)
BUN/CREAT BLD: 16 (ref 9–20)
CALCIUM SERPL-MCNC: 9.5 MG/DL (ref 8.6–10.4)
CHLORIDE BLD-SCNC: 104 MMOL/L (ref 98–107)
CO2: 22 MMOL/L (ref 20–31)
CREAT SERPL-MCNC: 2.67 MG/DL (ref 0.7–1.2)
GFR AFRICAN AMERICAN: 28 ML/MIN
GFR NON-AFRICAN AMERICAN: 23 ML/MIN
GFR SERPL CREATININE-BSD FRML MDRD: ABNORMAL ML/MIN/{1.73_M2}
GFR SERPL CREATININE-BSD FRML MDRD: ABNORMAL ML/MIN/{1.73_M2}
GLUCOSE BLD-MCNC: 275 MG/DL (ref 70–99)
POTASSIUM SERPL-SCNC: 3.9 MMOL/L (ref 3.7–5.3)
SODIUM BLD-SCNC: 138 MMOL/L (ref 135–144)

## 2022-03-03 PROCEDURE — 36415 COLL VENOUS BLD VENIPUNCTURE: CPT

## 2022-03-03 PROCEDURE — 4040F PNEUMOC VAC/ADMIN/RCVD: CPT | Performed by: PHYSICIAN ASSISTANT

## 2022-03-03 PROCEDURE — 99214 OFFICE O/P EST MOD 30 MIN: CPT | Performed by: PHYSICIAN ASSISTANT

## 2022-03-03 PROCEDURE — G8417 CALC BMI ABV UP PARAM F/U: HCPCS | Performed by: PHYSICIAN ASSISTANT

## 2022-03-03 PROCEDURE — 99211 OFF/OP EST MAY X REQ PHY/QHP: CPT | Performed by: PHYSICIAN ASSISTANT

## 2022-03-03 PROCEDURE — 80048 BASIC METABOLIC PNL TOTAL CA: CPT

## 2022-03-03 PROCEDURE — G8427 DOCREV CUR MEDS BY ELIG CLIN: HCPCS | Performed by: PHYSICIAN ASSISTANT

## 2022-03-03 PROCEDURE — 1036F TOBACCO NON-USER: CPT | Performed by: PHYSICIAN ASSISTANT

## 2022-03-03 PROCEDURE — 1123F ACP DISCUSS/DSCN MKR DOCD: CPT | Performed by: PHYSICIAN ASSISTANT

## 2022-03-03 PROCEDURE — G8482 FLU IMMUNIZE ORDER/ADMIN: HCPCS | Performed by: PHYSICIAN ASSISTANT

## 2022-03-03 RX ORDER — METOPROLOL TARTRATE 50 MG/1
50 TABLET, FILM COATED ORAL 2 TIMES DAILY
Qty: 90 TABLET | Refills: 3 | Status: SHIPPED | OUTPATIENT
Start: 2022-03-03 | End: 2022-03-17

## 2022-03-03 RX ORDER — METOPROLOL TARTRATE 50 MG/1
50 TABLET, FILM COATED ORAL 2 TIMES DAILY
Qty: 90 TABLET | Refills: 3 | Status: SHIPPED | OUTPATIENT
Start: 2022-03-03 | End: 2022-03-03 | Stop reason: DRUGHIGH

## 2022-03-03 RX ORDER — AMLODIPINE BESYLATE 5 MG/1
10 TABLET ORAL DAILY
Qty: 90 TABLET | Refills: 3 | Status: SHIPPED
Start: 2022-03-03 | End: 2022-05-05 | Stop reason: SDUPTHER

## 2022-03-03 RX ORDER — FUROSEMIDE 40 MG/1
40 TABLET ORAL DAILY
Qty: 90 TABLET | Refills: 3 | Status: SHIPPED | OUTPATIENT
Start: 2022-03-03 | End: 2022-05-26 | Stop reason: DRUGHIGH

## 2022-03-03 NOTE — PROGRESS NOTES
Ibeth Ortez am scribing for and in the presence of [place], [place]    Patient: Roland Alvarado  : 1945  Date of Visit: March 3, 2022    REASON FOR VISIT / CONSULTATION: Follow-up (HX:angina, sob, anemia, dysautonomia, CHF, ASHD, s/p stent, HTN PT is here for BP issues today it was high 167/?. He is supposed to go to Dialysis 3 times a week he refuses to go. He does have lightheaded/dizziness,palp Denies:CP,SOB,)    Dear Hernandez Stroud MD,    As you know, Mr. Negar Draper is a 70 y.o. male with a known history of dysautonomia where on tilt table testing his blood pressure dropped from 770 systolic to 78 systolic with only a 91-23 HR response. More recently, a CT of he head in the past showed evidence of at least 2 old CVA's and a heart catheterization showed severe single vessel disease in the ostium of a moderate sized OM1 branch of the circumflex. However, maximal guidelines directed medical management was opted for an place of stenting partly due to the risk associated with stenting this vessel. Recently he has had a coronary angiography procedure with stenting of his HA Om1. As you know, Mr. Negar Draper  is a 68 y.o. male with a history of recent onset substernal chest discomfort that led to a stress test and a subsequent heart catheterization which showed severe single vessel coronary artery disease of the HA Om1 with successful angioplasty and stenting of that vessel that was done by Dr. Virginia Oconnor on 4/10/17. More recently he has had problems with balance problems when he is in his feet and says that a Neurologist has told him in the past this is because of his previous strokes. Most recently he underwent a cardiovascular stress test which fortunately had shown to be normal on 3/21/2018. Mr. Negar Draper has significant normal stress test and echocardiogram on 2020. Holter monitor done on 2020: The rhythm was sinus.  Average daily heart rate 58 ranging from 47 to 81 bpm. Bradycardia for 72% test duration. Rare premature supraventricular ectopic beats consisting of 33 isolated PACs. Rare premature ventricular ectopic beats total 75 consisting of 73 isolatedPVCs and a ventricular couplet. Echocardiogram done on 12/18/2020: EF of 60%. Moderately increased LV wall thickness. Borderline pulmonary hypertension. Mild tricuspid regurgitation. Mild diastolic dysfunction is seen/ Aortic root is mildly dilated. Stress test done on 9/20/2021 was equivocal, There is a small/moderate perfusion defect of mild intensity in the lateral, inferior and inferoapical regions. Cardiac cath done on 10/5/2021 showed Moderate three vessel coronary artery disease involving a ostial 50-60% stenosis in a small, non-dominant RCA, a 60% mid LAD stenosis and a proximal 50-60% stenosis in the left anterior descending coronary artery. Echo done on 11/11/2021 showed an EF 60%, The left atrium is moderately dilated (34-39) with a left atrial volume index of 35 ml/m2. Mild to moderate tricuspid regurgitation. Moderate pulmonary hypertension with estimated pulmonary artery systolic pressure of 42 mmHg. Mild diastolic dysfunction. Since I last saw Mr. Morgan Corral he reports his blood pressure has been elevated still. He does feel palpitations they lasts a couple minutes. He is doing dialysis 3 times a week. His last time he went was Monday. He feels sick to stomach afterwards, Zofran helps a little. He does have lightheaded/dizziness and is seeing Neurology on 3/21/22 and is worse when he lays down. He has had some falls although they think it is more related to weakness from dialysis, he has not had any recent falls. He does have right flank pain intermittently. He denies swelling in legs although in his stomach he notices some mild swelling. He is refusing to go back to dialysis for 2 weeks. He is using restroom three times a day and not having much out put. He is still tired and fatigue.      No abdominal pain, bleeding problems, bowel issues, problems with his medications or any other concerns at this time. No nausea or vomiting. No cough, fever or chills. Past Medical History:   Diagnosis Date    [facility] [address]     Acute MI [facility] [address]     Acute renal failure with tubular necrosis ([facility] [address] 10/2/2018    ssecondary to hemodynamic effects of loop diuretics and ace inhibitors, bbaseline 1.2-1.3 which peaked up to 1.8, resolving    Anemia     CAD (coronary artery disease)     Cerebral artery occlusion with cerebral infarction (HCC)     CHF (congestive heart failure) ([facility]     Chronic back pain     Closed fracture of lumbar vertebra without mention of spinal cord injury     Displacement of intervertebral disc, site unspecified, without myelopathy     H/O cardiac catheterization 2/19/16    LMCA: Mild irregularities 10-20%. LAD: Lesion on PRox LAD: Mid subsection. 65% stenosis. LCx: Lesion on 1st Ob Valentina: Proximal subesection. 70% steniosis. RCA: Small non-dominant RCA. Lesion on PRox RCA: Ostial. 50% stenosis. EF:55%.  H/O cardiovascular stress test 2/19/16    Abnormal. Moderate perfusion defect of mild intensity in the inferior, inferoseptal adn inferoapical regions during stress imaging, which most consistent with ischemia. Global LV systolic function normal without regional wall motion abnormalities. OVerall these results are most consistent with an intermediate risk for signficant CAD. Additional testing including cardiac cath may be indicated.  H/O tilt table evaluation 12/26/2017    Abnormal. Patients HR, BP response and symptoms were most consistent with dysautonomia. Combined with viligant maintenance of euvolemia and maintaining a moderate salft intake, pharmacologic treatment with SSRI such as lexapro and or mestinon among other treatments have shown some effectiveness in treatment of this condition    H/O tilt table evaluation 12/26/2017    Abnormal head upright tilt table study.  The pt heart rate, blood pressure response and symptoms were most consistent with dysautonomia.  History of 24 hour EKG monitoring 8/14/14    Occasional PAC's and PVC's which appear to be at least moderately symptomatic.  History of 24 hour EKG monitoring 11/19/14    Event Monitor. Sinus rhythm and sinus bradycardia. Infrequent isolated PVC's    History of blood transfusion     History of cardiovascular stress test 2/19/14    Relatively NL    History of cardiovascular stress test 03/21/2018    Normal myocardial perfusion. Global left ventricular systolic function was normal with an EF of 68%. Overall, these results are most consistent with a low risk scan.  History of coronary artery stent placement 04/2017    PTCA / Drug Eluting Stent:, CX and / or branches    History of CVA (cerebrovascular accident) without residual deficits 2014    Incidentally found on CT head. No known impairment now or in the past.    History of echocardiogram 2/18/14    LA mildly dilated, EF 55%, LV wall thickness is moderately increased, no definite wall motion abnormalilities, what appears to be a pacer wire is seen w/n the RA and RV, mild-mod TR, mild pulmonary hypertension.  History of Holter monitoring 12/29/2017    Rare PAC's and PVC's.      History of tilt table evaluation 8/12/14    Abnormal    Hyperlipidemia     Hypertension     Non critical Right Renal artery stenosis, native ([address] 10/2/2018    Non critical Right Renal artery stenosis, based on cath in 2016, Rt RA 30% stenosis    Pacemaker     non-functioning    S/P cardiac cath 04/10/2017    S/P coronary artery stent placement     Successful PTCA - HA Om1    SIRS (systemic inflammatory response syndrome) ([facility] [address] 5/19/2019    Type II or unspecified type diabetes mellitus without mention of complication, not stated as uncontrolled        CURRENT ALLERGIES: Coreg [carvedilol], Lipitor [atorvastatin], Aldactone [spironolactone], Crestor [rosuvastatin calcium], Lopid [gemfibrozil], Invokana [canagliflozin], and Januvia [sitagliptin] REVIEW OF SYSTEMS: 14 systems were reviewed. Pertinent positives and negatives as above, all else negative. Past Surgical History:   Procedure Laterality Date    BACK SURGERY      CARDIAC CATHETERIZATION Left 02/19/2016    via right radial approach/ [facility]/ Dr. Ewing Pen Left 10/05/2021    Dr Kearney Merlin radial-Moderate three vessel coronary artery disease involving a ostial 50-60% stenosis in a small, non-dominant RCA, a 60% mid LAD stenosis and a proximal 50-60% stenosis in the left anterior descending coronary artery. Normal left ventricular end diastolic pressure. Proceed with aggressive maximal medical management as clinically indicated.     CHOLECYSTECTOMY  08/17/2015    Liang/Charles/Saulo/ Mill[facility]    COLONOSCOPY  2010    COLONOSCOPY  02/19/2015    -polyps,diverticulosis,hemorrhoids    COLONOSCOPY  05/23/2018    Dr Woodward Dance    COLONOSCOPY N/A 05/23/2018    COLONOSCOPY POLYPECTOMY SNARE/COLD BIOPSY  cold snare  and  hot snare performed by Zelalem River MD at [address] [facility]  10/30/2020    with Dr. José Manuel Recinos 10/30/2020    Emperatriz Eagles, NOT HIGH RISK performed by Lennie Park DO at [address] [facility]      left eye    ENDOSCOPY, COLON, DIAGNOSTIC      EYE SURGERY      IR TUNNELED CATHETER PLACEMENT GREATER THAN 5 YEARS  11/22/2021    IR TUNNELED CATHETER PLACEMENT GREATER THAN 5 YEARS 11/22/2021 [facility]    PACEMAKER INSERTION      PACEMAKER PLACEMENT      UPPER GASTROINTESTINAL ENDOSCOPY  05/28/2019    Dr. Alexis Jimenez H-Pylori)duodenal bulbar/antral erythema    UPPER GASTROINTESTINAL ENDOSCOPY N/A 05/28/2019    EGD BIOPSY, clotest performed by Zelalem River MD at [address] [facility] 10/30/2020    EGD ESOPHAGOGASTRODUODENOSCOPY performed by Frances Vee Lisa Estrada DO [place] [facility] [place] [address] RENAL BIOPSY, LT  2021     PERCUT RENAL BIOPSY, LT 2021 [facility]    Social History:  Social History     Tobacco Use    Smoking status: Former Smoker     Packs/day: 1.50     Years: 8.00     Pack years: 12.00     Types: Cigarettes     Quit date: 3/4/1980     Years since quittin.0    Smokeless tobacco: Never Used   Vaping Use    Vaping Use: Never used   Substance Use Topics    Alcohol use: No    Drug use: No        CURRENT MEDICATIONS:  Outpatient Medications Marked as Taking for the 3/3/22 encounter (Office Visit) with Pratik Joe PA-C   Medication Sig Dispense Refill    amLODIPine (NORVASC) 5 MG tablet Take 2 tablets by mouth daily 90 tablet 3    furosemide (LASIX) 40 MG tablet Take 1 tablet by mouth daily 90 tablet 3    metoprolol tartrate (LOPRESSOR) 50 MG tablet Take 1 tablet by mouth 2 times daily 90 tablet 3    omeprazole (PRILOSEC) 10 MG delayed release capsule Take 10 mg by mouth daily      hydrALAZINE (APRESOLINE) 100 MG tablet Take 1 tablet by mouth 2 times daily Except on the morning's of dialysis (Patient taking differently: Take 100 mg by mouth 3 times daily Except on the morning's of dialysis) 180 tablet 3    CONTOUR TEST strip USE 1 STRIP TO CHECK GLUCOSE TWICE DAILY 100 each 0    B Complex-C-Zn-Folic Acid (DIALYVITE/ZINC) TABS TAKE 1 TABLET BY MOUTH ONCE DAILY AFTER DIALYSIS      tamsulosin (FLOMAX) 0.4 MG capsule Take 1 capsule by mouth daily 30 capsule 3    docusate sodium (COLACE) 100 MG capsule Take 1 capsule by mouth 3 times daily as needed for Constipation 90 capsule 0    insulin glargine (LANTUS SOLOSTAR) 100 UNIT/ML injection pen Inject 22 Units into the skin 2 times daily 39.6 mL 0    nitroGLYCERIN (NITROSTAT) 0.4 MG SL tablet Place 1 tablet under the tongue every 5 minutes as needed for Chest pain 25 tablet 3    latanoprost (XALATAN) 0.005 % ophthalmic solution Place 1 drop into both eyes nightly       acyclovir (ZOVIRAX) 400 MG tablet 400 mg 2 times daily       DIANA MICROLET LANCETS MISC TEST TWICE DAILY 100 each 2    Insulin Pen Needle (PEN NEEDLES) 31G X 6 MM MISC 1 each by Does not apply route daily 100 each 3    prednisoLONE acetate (PRED FORTE) 1 % ophthalmic suspension Place 1 drop into the left eye daily        FAMILY HISTORY: family history includes Cancer in his father and mother. PHYSICAL EXAM:   BP (!) 195/62 (Site: Right Upper Arm, Position: Sitting, Cuff Size: Medium Adult)   Pulse 71   Resp 18   Ht 5' 9\" (1.753 m)   Wt 185 lb 9.6 oz (84.2 kg)   SpO2 97%   BMI 27.41 kg/m²  Body mass index is 27.41 kg/m². Constitutional: He is oriented to person, place, and time. He appears well-developed and well-nourished. In no acute distress. HEENT: Normocephalic and atraumatic. No JVD present. Carotid bruit is not present. No mass and no thyromegaly present. No lymphadenopathy present. Cardiovascular: Normal rate, regular rhythm, normal heart sounds. Exam reveals no gallop and no friction rubs. 1/6 systolic murmur, 2nd intercostal space on the RIGHT just lateral to the sternum. Pulmonary/Chest: Effort normal and breath sounds normal. No respiratory distress. He has no wheezes, rhonchi or rales. Abdominal: Soft, non-tender. Bowel sounds and aorta are normal. He exhibits no organomegaly, mass or bruit. Extremities: Trace-1+ 1/2 up to the knees bilaterally. No cyanosis or clubbing. 2+ radial and carotid pulses. Distal extremity pulses: 2+ bilaterally. Neurological: He is alert and oriented to person, place, and time. No evidence of gross cranial nerve deficit. Coordination appeared normal.   Skin: Skin is warm and dry. There is no rash or diaphoresis. Psychiatric: He has a normal mood and affect.  His speech is normal and behavior is normal.      MOST RECENT LABS ON RECORD:   Lab Results   Component Value Date    WBC 10.0 01/18/2022    HGB 8.6 (L) 01/18/2022    HCT 28.8 (L) 01/18/2022    PLT 293 01/18/2022    CHOL 69 09/21/2021    TRIG 185 (H) 09/21/2021    HDL 14 (L) 09/21/2021    LDLDIRECT 44 11/02/2017    ALT 35 01/18/2022    AST 30 01/18/2022     01/18/2022    K 4.1 01/18/2022     01/18/2022    CREATININE 2.35 (H) 01/18/2022    BUN 30 (H) 01/18/2022    CO2 22 01/18/2022    TSH 2.26 11/02/2017    PSA 3.36 01/18/2022    INR 1.2 11/29/2021    LABA1C 7.3 08/19/2021    LABMICR 30 10/17/2015       ASSESSMENT:  1. Angina, class II ([address]    2. SOB (shortness of breath)    3. Anemia, unspecified type    4. Dysautonomia ([address]    5. Chronic diastolic congestive heart failure ([address]    6. ASHD (arteriosclerotic heart disease)    7. S/P angioplasty with stent    8. Essential hypertension    9. Uncontrolled hypertension    10. Renal artery stenosis, native ([address]    11. Mixed hyperlipidemia       PLAN:  · Angina, Class II but stable:  · Antiplatelet Agent: Not indicated at this time. · Beta Blocker: INCREASE to Metoprolol tartrate (Lopressor) 50 mg bid. I also discussed the potential side effects of this medication including lightheadedness and dizziness and instructed them to stop the medication of this occurs and call our office if this occurs. · Anti-anginal medications: Not indicated at this time. Recurrent lightheadedness and dizziness. May need to reconsider if symptoms worsen  · Cholesterol Reduction Therapy: Contraindicated due to history of intolerance/allergy. · Additional counseling: I advised them to call our office or go to the emergency room if they developed worsening or persistent chest pain or increased shortness of breath as this could be life threatening. · Shortness of breath with mild exertion: Improving with dialysis/heart failure control as well as rising Hgb.   · Follow up with Hematology and nephrology    · Anemia: Unclear Etiology   · Last hemoglobin was 8.6 on 1/18/2022    Dysautonomia: Mildly to moderately, likely related to and worsened by anemia but Hgb improving and appears fairly well controlled. · Diuretics: Not indicated at this time. · Beta Blocker: Continue Metoprolol tartrate (Lopressor) 25 mg bid. ACE Inibitor/ARB: Not indicated at this time. · Nonpharmacologic counseling: Because of his condition, I reminded him to try and keep himself well-hydrated and to take extra time when moving from laying to sitting, sitting to standing and standing to walking as well as wearing at least knee high compressions stockings. Chronic Diastolic Heart Failure: EF of 60% via echo on 11/11/2021. Currently well controlled. Kidney function improving   · Beta Blocker: INCREASE to Metoprolol tartrate (Lopressor) 50 mg bid. Diuretics: START furosemide (Lasix) 40 mg every morning. Nonpharmacologic management of Heart Failure: I advised him to try and keep his legs up whenever possible and to limit salt in his diet. Atherosclerotic Heart Disease: Coronary Artery stent: 4/10/2017. Cardiac cath done on 10/5/2021 showed Moderate three vessel coronary artery disease involving a ostial 50-60% stenosis in a small, non-dominant RCA, a 60% mid LAD stenosis and a proximal 50-60% stenosis in the left anterior descending coronary artery  Antiplatelet Agent: Not indicated due to bleeding  ACE Inibitor/ARB: Not indicated at this time. Beta Blocker: INCREASE to Metoprolol tartrate (Lopressor) 50 mg bid. Cholesterol Reduction Therapy: Refused by patient. Laboratory testing: None    Essential Hypertension: Uncontrolled   ACE Inibitor/ARB: Not indicated at this time. Continue Hydralazine 100 mg three daily, except the mornings of dialysis   · Beta Blocker: INCREASE to Metoprolol tartrate (Lopressor) 50 mg bid. · Calcium Channel Blocker: INCREASE to amlodipine (Norvasc) 10 mg once daily.  I also discussed the potential side effects of this medication including lightheadedness and dizziness and instructed them to stop the medication of this occurs and call our office if this occurs. · Diuretics: START furosemide (Lasix) 40 mg every morning. · I took the liberty of ordering a BMP for today to assess their potassium and renal function. I told them that they could get their lab work performed at the location of their choosing, unfortunately, if the lab work was not performed at a [facility]) facility I could not guarantee my ability to follow up with them on their results. and  I took the liberty of ordering a CBC. I told them that they could get their lab work performed at the location of their choosing, unfortunately, if the lab work was not performed at a [facility]) facility I could not guarantee my ability to follow up with them on their results. · I also asked them to call Monday with a blood pressure log. · We had a long discussion about the potential risks of not going to dialysis. They states they are very aware, he was told if he did not come to dialysis he would end up in the hospital. I did talk to them in great detail about possibly considering peritoneal dialysis if he is a good candidate and about pursuing other options, but again we discussed the seriousness and extreme danger of not having dialysis. I discussed with them that we would do STAT blood work today and if there were any abnormalities we would have him go to the ER. They are in agreement with this plan. Hyperlipidemia: Mixed - Last LDL on 9/21/2021 was 18 mg/dL   · Cholesterol Reduction Therapy: Refused by patient. Finally, I recommended that he continue his other medications and follow up with you as previously scheduled. I discussed patient's symptoms and treatment plan with Dr Freddie Stern, he was in agreement with the plan and follow up. FOLLOW UP:   I told Mr. Trice Mathews  to call my office if he had any problems, but otherwise told him to Return in about 2 weeks (around 3/17/2022). However, as always I would be happy to see him sooner should the need arise.  Once again, thank you for allowing me to participate in this patients care. Please do not hesitate to contact me could I be of further assistance. Sincerely,  Himanshu Dawson   [address] [facility] Cardiology Specialist  [address] [place] [address], [address] [facility]  Phone: [phone], Fax: [phone]     I believe that the risk of significant morbidity and mortality related to the patient's current medical conditions are: high. Between 45-59 minutes were spent during prep work, discussion and exam of the patient, and follow up documentation and all of their questions were answered. The documentation recorded by the scribe, accurately and completely reflects the services I personally performed and the decisions made by me.  Carmelina Melendez PA-C March 3, 2022

## 2022-03-03 NOTE — PATIENT INSTRUCTIONS
SURVEY:    You may be receiving a survey from [employer] regarding your visit today. Please complete the survey to enable us to provide the highest quality of care to you and your family. If you cannot score us a very good on any question, please call the office to discuss how we could have made your experience a very good one. Thank you.

## 2022-03-03 NOTE — TELEPHONE ENCOUNTER
----- Message from Carmen Saez PA-C sent at 3/3/2022  3:34 PM EST -----  Please notify patient that their lab results appear okay. Please have them monitor his weight daily and call if he starts gaining weight. Dr Carmen Mccarthy- he was seen today for hypertension 195/62. He went to dialysis Monday but states he is not going to go to dialysis for the next 2 weeks. I increased Norvasc to 10 mg, increased lopressor to 50 mg BID, and started lasix 40 mg daily.

## 2022-03-10 ENCOUNTER — OFFICE VISIT (OUTPATIENT)
Dept: ONCOLOGY | Age: 77
End: 2022-03-10
Payer: MEDICARE

## 2022-03-10 ENCOUNTER — HOSPITAL ENCOUNTER (OUTPATIENT)
Age: 77
Discharge: HOME | End: 2022-03-10
Payer: MEDICARE

## 2022-03-10 VITALS
HEART RATE: 66 BPM | WEIGHT: 183 LBS | RESPIRATION RATE: 20 BRPM | SYSTOLIC BLOOD PRESSURE: 142 MMHG | BODY MASS INDEX: 27.11 KG/M2 | HEIGHT: 69 IN | DIASTOLIC BLOOD PRESSURE: 58 MMHG | TEMPERATURE: 97.3 F

## 2022-03-10 DIAGNOSIS — D50.8 IRON DEFICIENCY ANEMIA SECONDARY TO INADEQUATE DIETARY IRON INTAKE: ICD-10-CM

## 2022-03-10 DIAGNOSIS — N18.4 ANEMIA IN STAGE 4 CHRONIC KIDNEY DISEASE (HCC): ICD-10-CM

## 2022-03-10 DIAGNOSIS — K90.9 IRON MALABSORPTION: ICD-10-CM

## 2022-03-10 DIAGNOSIS — D63.1 ANEMIA IN STAGE 4 CHRONIC KIDNEY DISEASE (HCC): ICD-10-CM

## 2022-03-10 DIAGNOSIS — D50.8 IRON DEFICIENCY ANEMIA SECONDARY TO INADEQUATE DIETARY IRON INTAKE: Primary | ICD-10-CM

## 2022-03-10 LAB
ABSOLUTE EOS #: 0.55 K/UL (ref 0–0.44)
ABSOLUTE IMMATURE GRANULOCYTE: 0.05 K/UL (ref 0–0.3)
ABSOLUTE LYMPH #: 2.11 K/UL (ref 1.1–3.7)
ABSOLUTE MONO #: 0.62 K/UL (ref 0.1–1.2)
ABSOLUTE RETIC #: 0.05 M/UL (ref 0.03–0.08)
ALBUMIN SERPL-MCNC: 4.2 G/DL (ref 3.5–5.2)
ALBUMIN/GLOBULIN RATIO: 1.5 (ref 1–2.5)
ALP BLD-CCNC: 105 U/L (ref 40–129)
ALT SERPL-CCNC: 8 U/L (ref 5–41)
ANION GAP SERPL CALCULATED.3IONS-SCNC: 16 MMOL/L (ref 9–17)
AST SERPL-CCNC: 13 U/L
BASOPHILS # BLD: 0 % (ref 0–2)
BASOPHILS ABSOLUTE: 0.03 K/UL (ref 0–0.2)
BILIRUB SERPL-MCNC: 0.38 MG/DL (ref 0.3–1.2)
BUN BLDV-MCNC: 60 MG/DL (ref 8–23)
BUN/CREAT BLD: 19 (ref 9–20)
CALCIUM SERPL-MCNC: 9.2 MG/DL (ref 8.6–10.4)
CHLORIDE BLD-SCNC: 102 MMOL/L (ref 98–107)
CO2: 20 MMOL/L (ref 20–31)
CREAT SERPL-MCNC: 3.17 MG/DL (ref 0.7–1.2)
EOSINOPHILS RELATIVE PERCENT: 6 % (ref 1–4)
FERRITIN: 1052 UG/L (ref 30–400)
GFR AFRICAN AMERICAN: 23 ML/MIN
GFR NON-AFRICAN AMERICAN: 19 ML/MIN
GFR SERPL CREATININE-BSD FRML MDRD: ABNORMAL ML/MIN/{1.73_M2}
GFR SERPL CREATININE-BSD FRML MDRD: ABNORMAL ML/MIN/{1.73_M2}
GLUCOSE BLD-MCNC: 177 MG/DL (ref 70–99)
HCT VFR BLD CALC: 37.6 % (ref 40.7–50.3)
HEMOGLOBIN: 11.7 G/DL (ref 13–17)
IMMATURE GRANULOCYTES: 1 %
IMMATURE RETIC FRACT: 7.3 % (ref 2.7–18.3)
IRON SATURATION: 28 % (ref 20–55)
IRON: 56 UG/DL (ref 59–158)
LACTATE DEHYDROGENASE: 202 U/L (ref 135–225)
LYMPHOCYTES # BLD: 21 % (ref 24–43)
MCH RBC QN AUTO: 28.2 PG (ref 25.2–33.5)
MCHC RBC AUTO-ENTMCNC: 31.1 G/DL (ref 28.4–34.8)
MCV RBC AUTO: 90.6 FL (ref 82.6–102.9)
MONOCYTES # BLD: 6 % (ref 3–12)
NRBC AUTOMATED: 0 PER 100 WBC
PDW BLD-RTO: 15.5 % (ref 11.8–14.4)
PLATELET # BLD: 236 K/UL (ref 138–453)
PMV BLD AUTO: 10.3 FL (ref 8.1–13.5)
POTASSIUM SERPL-SCNC: 3.7 MMOL/L (ref 3.7–5.3)
RBC # BLD: 4.15 M/UL (ref 4.21–5.77)
RETIC %: 1.1 % (ref 0.5–1.9)
RETIC HEMOGLOBIN: 33.6 PG (ref 28.2–35.7)
SEG NEUTROPHILS: 66 % (ref 36–65)
SEGMENTED NEUTROPHILS ABSOLUTE COUNT: 6.66 K/UL (ref 1.5–8.1)
SODIUM BLD-SCNC: 138 MMOL/L (ref 135–144)
TOTAL IRON BINDING CAPACITY: 203 UG/DL (ref 250–450)
TOTAL PROTEIN: 7 G/DL (ref 6.4–8.3)
UNSATURATED IRON BINDING CAPACITY: 147 UG/DL (ref 112–347)
WBC # BLD: 10 K/UL (ref 3.5–11.3)

## 2022-03-10 PROCEDURE — 1123F ACP DISCUSS/DSCN MKR DOCD: CPT | Performed by: INTERNAL MEDICINE

## 2022-03-10 PROCEDURE — G8482 FLU IMMUNIZE ORDER/ADMIN: HCPCS | Performed by: INTERNAL MEDICINE

## 2022-03-10 PROCEDURE — G8427 DOCREV CUR MEDS BY ELIG CLIN: HCPCS | Performed by: INTERNAL MEDICINE

## 2022-03-10 PROCEDURE — 99214 OFFICE O/P EST MOD 30 MIN: CPT | Performed by: INTERNAL MEDICINE

## 2022-03-10 PROCEDURE — 83550 IRON BINDING TEST: CPT

## 2022-03-10 PROCEDURE — 83540 ASSAY OF IRON: CPT

## 2022-03-10 PROCEDURE — 82746 ASSAY OF FOLIC ACID SERUM: CPT

## 2022-03-10 PROCEDURE — 1036F TOBACCO NON-USER: CPT | Performed by: INTERNAL MEDICINE

## 2022-03-10 PROCEDURE — 4040F PNEUMOC VAC/ADMIN/RCVD: CPT | Performed by: INTERNAL MEDICINE

## 2022-03-10 PROCEDURE — G8417 CALC BMI ABV UP PARAM F/U: HCPCS | Performed by: INTERNAL MEDICINE

## 2022-03-10 PROCEDURE — 80053 COMPREHEN METABOLIC PANEL: CPT

## 2022-03-10 PROCEDURE — 85025 COMPLETE CBC W/AUTO DIFF WBC: CPT

## 2022-03-10 PROCEDURE — 85045 AUTOMATED RETICULOCYTE COUNT: CPT

## 2022-03-10 PROCEDURE — 83615 LACTATE (LD) (LDH) ENZYME: CPT

## 2022-03-10 PROCEDURE — 83010 ASSAY OF HAPTOGLOBIN QUANT: CPT

## 2022-03-10 PROCEDURE — 82607 VITAMIN B-12: CPT

## 2022-03-10 PROCEDURE — 36415 COLL VENOUS BLD VENIPUNCTURE: CPT

## 2022-03-10 PROCEDURE — 82728 ASSAY OF FERRITIN: CPT

## 2022-03-10 NOTE — PATIENT INSTRUCTIONS
Refer to GI Dr. Fountain First for capsule endoscopy(unless was seen someone else before)  Iron panel CBC CMP today

## 2022-03-10 NOTE — PROGRESS NOTES
_               Mr. Fernandez Bowie is a very pleasant 68 y.o. male with history of multiple co morbidities as listed. The patient is referred for further management of anemia. Patient has episodes of black tarry stool. He had upper and lower endoscopy in October 2020 which were negative. He is scheduled for follow-up with gastroenterology in [facility] next week for capsule camera evaluation. Patient is having symptomatic anemia. Is having weakness and fatigue. Feels tired. He has shortness of breath on exertion. He is having epigastric pain. No hematochezia. No hematemesis. Patient denies smoking or alcohol drinking. .     Interim history:  Patient required no more dialysis  No weakness or fatigue and no GI bleed  According to the patient he got IV iron/question Procrit with dialysis has been discontinued      PAST MEDICAL HISTORY: has a past medical history of Acute kidney injury ([address] Acute MI ([address] Acute renal failure with tubular necrosis ([address] Anemia, CAD (coronary artery disease), Cerebral artery occlusion with cerebral infarction ([address] CHF (congestive heart failure) ([address] Chronic back pain, Closed fracture of lumbar vertebra without mention of spinal cord injury, Displacement of intervertebral disc, site unspecified, without myelopathy, H/O cardiac catheterization, H/O cardiovascular stress test, H/O tilt table evaluation, H/O tilt table evaluation, History of 24 hour EKG monitoring, History of 24 hour EKG monitoring, History of blood transfusion, History of cardiovascular stress test, History of cardiovascular stress test, History of coronary artery stent placement, History of CVA (cerebrovascular accident) without residual deficits, History of echocardiogram, History of Holter monitoring, History of tilt table evaluation, Hyperlipidemia, Hypertension, Non critical Right Renal artery stenosis, native ([address] Pacemaker, S/P cardiac cath, S/P coronary artery stent placement, SIRS (systemic inflammatory response syndrome) ([facility] and Type II or unspecified type diabetes mellitus without mention of complication, not stated as uncontrolled. PAST SURGICAL HISTORY: has a past surgical history that includes Pacemaker insertion; back surgery; Cholecystectomy (08/17/2015); Corneal transplant; Cardiac catheterization (Left, 02/19/2016); pacemaker placement; Colonoscopy (2010); Colonoscopy (02/19/2015); Colonoscopy (05/23/2018); Colonoscopy (N/A, 05/23/2018); Upper gastrointestinal endoscopy (05/28/2019); Upper gastrointestinal endoscopy (N/A, 05/28/2019); Colonoscopy (10/30/2020); Colonoscopy (N/A, 10/30/2020); Upper gastrointestinal endoscopy (N/A, 10/30/2020); Endoscopy, colon, diagnostic; eye surgery; Cardiac catheterization (Left, 10/05/2021); IR TUNNELED CVC PLACE WO SQ PORT/PUMP > 5 YEARS (11/22/2021); and US BIOPSY RENAL LEFT PERC (11/24/2021). CURRENT MEDICATIONS:  has a current medication list which includes the following prescription(s): amlodipine, furosemide, metoprolol tartrate, omeprazole, hydralazine, contour test, dialyvite/zinc, tamsulosin, docusate sodium, nitroglycerin, latanoprost, acyclovir, susan microlet lancets, pen needles, prednisolone acetate, and lantus solostar. ALLERGIES:  is allergic to coreg [carvedilol], lipitor [atorvastatin], aldactone [spironolactone], crestor [rosuvastatin calcium], lopid [gemfibrozil], invokana [canagliflozin], and januvia [sitagliptin]. FAMILY HISTORY: Negative for any hematological or oncological conditions. SOCIAL HISTORY:  reports that he quit smoking about 42 years ago. His smoking use included cigarettes. He has a 12.00 pack-year smoking history. He has never used smokeless tobacco. He reports that he does not drink alcohol and does not use drugs. REVIEW OF SYSTEMS:     · General: Positive for weakness and fatigue.  No unanticipated weight loss or decreased appetite. No fever or chills. · Eyes: No blurred vision, eye pain or double vision. · Ears: No hearing problems or drainage. No tinnitus. · Throat: No sore throat, problems with swallowing or dysphagia. · Respiratory: No cough, sputum or hemoptysis. No shortness of breath. No pleuritic chest pain. · Cardiovascular: No chest pain, orthopnea or PND. No lower extremity edema. No palpitation. · Gastrointestinal: As above. · Genitourinary: No dysuria, hematuria, frequency or urgency. · Musculoskeletal: No muscle aches or pains. No limitation of movement. No back pain. No gait disturbance, No joint complaints. · Dermatologic: No skin rashes or pruritus. No skin lesions or discolorations. · Psychiatric: No depression, anxiety, or stress or signs of schizophrenia. No change in mood or affect. · Hematologic: No history of bleeding tendency. No bruises or ecchymosis. No history of clotting problems. · Infectious disease: No fever, chills or frequent infections. · Endocrine: No polydipsia or polyuria. No temperature intolerance. · Neurologic: No headaches or dizziness. No weakness or numbness of the extremities. No changes in balance, coordination,  memory, mentation, behavior. · Allergic/Immunologic: No nasal congestion or hives. No repeated infections. PHYSICAL EXAM:  The patient is not in acute distress. Vital signs: Blood pressure (!) 142/58, pulse 66, temperature 97.3 °F (36.3 °C), temperature source Temporal, resp. rate 20, height 5' 9\" (1.753 m), weight 183 lb (83 kg).      General appearance - well appearing, not in pain or distress  Mental status - good mood, alert and oriented  Eyes - pupils equal and reactive, extraocular eye movements intact  Ears - bilateral TM's and external ear canals normal  Nose - normal and patent, no erythema, discharge or polyps  Mouth - mucous membranes moist, pharynx normal without lesions  Neck - supple, no significant adenopathy  Lymphatics - no palpable lymphadenopathy, no hepatosplenomegaly  Chest - clear to auscultation, no wheezes, rales or rhonchi, symmetric air entry  Heart - normal rate, regular rhythm, normal S1, S2, no murmurs, rubs, clicks or gallops  Abdomen - soft, nontender, nondistended, no masses or organomegaly  Neurological - alert, oriented, normal speech, no focal findings or movement disorder noted  Musculoskeletal - no joint tenderness, deformity or swelling  Extremities - peripheral pulses normal, no pedal edema, no clubbing or cyanosis  Skin - normal coloration and turgor, no rashes, no suspicious skin lesions noted     Review of Diagnostic data:   Lab Results   Component Value Date    WBC 10.0 01/18/2022    HGB 8.6 (L) 01/18/2022    HCT 28.8 (L) 01/18/2022    MCV 89.7 01/18/2022     01/18/2022       Chemistry        Component Value Date/Time     03/03/2022 1453    K 3.9 03/03/2022 1453     03/03/2022 1453    CO2 22 03/03/2022 1453    BUN 42 (H) 03/03/2022 1453    CREATININE 2.67 (H) 03/03/2022 1453        Component Value Date/Time    CALCIUM 9.5 03/03/2022 1453    ALKPHOS 97 01/18/2022 1100    AST 30 01/18/2022 1100    ALT 35 01/18/2022 1100    BILITOT 0.31 01/18/2022 1100            IMPRESSION:   Severe normocytic anemia likely combination of blood loss anemia and anemia of chronic disease  Status post IV iron and Procrit with dialysis which has been discontinued  Episodes of GI bleeding  Chronic renal insufficiency  Coronary artery disease  Multiple comorbidities as listed    PLAN: I reviewed the labs available to me and discussed with the patient. For more than 35 minutes of face to face discussion, I explained to the patient the nature of this hematologic problem. I explained the significance of these abnormalities in layman language.   Will refer patient for capsule endoscopy to be seen by GI he already have upper and lower endoscopy before  Repeat anemia work-up  Myeloma work-up  Based on above work-up may recommend IV iron/Procrit   Patient's questions were answered to the best of his satisfaction and he verbalized full understanding and agreement.     Angie Mario MD

## 2022-03-11 LAB
FOLATE: >20 NG/ML
HAPTOGLOBIN: 231 MG/DL (ref 30–200)
VITAMIN B-12: 593 PG/ML (ref 232–1245)

## 2022-03-15 RX ORDER — INSULIN GLARGINE 100 [IU]/ML
INJECTION, SOLUTION SUBCUTANEOUS
Qty: 30 ML | Refills: 0 | Status: ON HOLD | OUTPATIENT
Start: 2022-03-15 | End: 2022-04-21 | Stop reason: HOSPADM

## 2022-03-17 ENCOUNTER — OFFICE VISIT (OUTPATIENT)
Dept: CARDIOLOGY | Age: 77
End: 2022-03-17
Payer: MEDICARE

## 2022-03-17 VITALS
RESPIRATION RATE: 20 BRPM | HEART RATE: 61 BPM | OXYGEN SATURATION: 97 % | DIASTOLIC BLOOD PRESSURE: 66 MMHG | HEIGHT: 69 IN | BODY MASS INDEX: 27.7 KG/M2 | WEIGHT: 187 LBS | SYSTOLIC BLOOD PRESSURE: 138 MMHG

## 2022-03-17 DIAGNOSIS — E78.2 MIXED HYPERLIPIDEMIA: ICD-10-CM

## 2022-03-17 DIAGNOSIS — D64.9 ANEMIA, UNSPECIFIED TYPE: ICD-10-CM

## 2022-03-17 DIAGNOSIS — I25.10 CORONARY ARTERY DISEASE INVOLVING NATIVE CORONARY ARTERY OF NATIVE HEART WITHOUT ANGINA PECTORIS: ICD-10-CM

## 2022-03-17 DIAGNOSIS — I10 ESSENTIAL HYPERTENSION: ICD-10-CM

## 2022-03-17 DIAGNOSIS — G90.1 DYSAUTONOMIA (HCC): Primary | ICD-10-CM

## 2022-03-17 DIAGNOSIS — N18.4 STAGE 4 CHRONIC KIDNEY DISEASE (HCC): ICD-10-CM

## 2022-03-17 DIAGNOSIS — I50.32 CHRONIC DIASTOLIC CONGESTIVE HEART FAILURE (HCC): ICD-10-CM

## 2022-03-17 PROCEDURE — G8417 CALC BMI ABV UP PARAM F/U: HCPCS | Performed by: PHYSICIAN ASSISTANT

## 2022-03-17 PROCEDURE — 1123F ACP DISCUSS/DSCN MKR DOCD: CPT | Performed by: PHYSICIAN ASSISTANT

## 2022-03-17 PROCEDURE — 99211 OFF/OP EST MAY X REQ PHY/QHP: CPT | Performed by: PHYSICIAN ASSISTANT

## 2022-03-17 PROCEDURE — 99214 OFFICE O/P EST MOD 30 MIN: CPT | Performed by: PHYSICIAN ASSISTANT

## 2022-03-17 PROCEDURE — G8482 FLU IMMUNIZE ORDER/ADMIN: HCPCS | Performed by: PHYSICIAN ASSISTANT

## 2022-03-17 PROCEDURE — G8427 DOCREV CUR MEDS BY ELIG CLIN: HCPCS | Performed by: PHYSICIAN ASSISTANT

## 2022-03-17 PROCEDURE — 4040F PNEUMOC VAC/ADMIN/RCVD: CPT | Performed by: PHYSICIAN ASSISTANT

## 2022-03-17 PROCEDURE — 1036F TOBACCO NON-USER: CPT | Performed by: PHYSICIAN ASSISTANT

## 2022-03-17 NOTE — PATIENT INSTRUCTIONS
SURVEY:    You may be receiving a survey from [facility] regarding your visit today. Please complete the survey to enable us to provide the highest quality of care to you and your family. If you cannot score us a very good on any question, please call the office to discuss how we could have made your experience a very good one. Thank you.

## 2022-03-17 NOTE — PROGRESS NOTES
Cherise Feldman am scribing for and in the presence of Danielle [place]wood [place]    Patient: Forrest Mortensen  : 1945  Date of Visit: 2022    REASON FOR VISIT / CONSULTATION: Follow-up (Hx: angina, SOB, anemia, CHF, ASHD, dysauto. C/o: Lighetaded/dizzienss same. No falls. Palpitations same. Denies: CP, SOB.)    Dear [address], [place],    As you know, Mr. Rosario Valdez is a 70 y.o. male with a known history of dysautonomia where on tilt table testing his blood pressure dropped from 647 systolic to 78 systolic with only a 43-38 HR response. More recently, a CT of he head in the past showed evidence of at least 2 old CVA's and a heart catheterization showed severe single vessel disease in the ostium of a moderate sized OM1 branch of the circumflex. However, maximal guidelines directed medical management was opted for an place of stenting partly due to the risk associated with stenting this vessel. Recently he has had a coronary angiography procedure with stenting of his HA Om1. As you know, Mr. Rosario Valdez  is a 68 y.o. male with a history of recent onset substernal chest discomfort that led to a stress test and a subsequent heart catheterization which showed severe single vessel coronary artery disease of the HA Om1 with successful angioplasty and stenting of that vessel that was done by Dr. Omar Morejon on 4/10/17. More recently he has had problems with balance problems when he is in his feet and says that a Neurologist has told him in the past this is because of his previous strokes. Most recently he underwent a cardiovascular stress test which fortunately had shown to be normal on 3/21/2018. Mr. Rosario Valdez has significant normal stress test and echocardiogram on 2020. Holter monitor done on 2020: The rhythm was sinus. Average daily heart rate 58 ranging from 47 to 81 bpm. Bradycardia for 72% test duration. Rare premature supraventricular ectopic beats consisting of 33 isolated PACs.  Rare premature ventricular ectopic beats total 75 consisting of 73 isolatedPVCs and a ventricular couplet. Echocardiogram done on 12/18/2020: EF of 60%. Moderately increased LV wall thickness. Borderline pulmonary hypertension. Mild tricuspid regurgitation. Mild diastolic dysfunction is seen/ Aortic root is mildly dilated. Stress test done on 9/20/2021 was equivocal, There is a small/moderate perfusion defect of mild intensity in the lateral, inferior and inferoapical regions. Cardiac cath done on 10/5/2021 showed Moderate three vessel coronary artery disease involving a ostial 50-60% stenosis in a small, non-dominant RCA, a 60% mid LAD stenosis and a proximal 50-60% stenosis in the left anterior descending coronary artery. Echo done on 11/11/2021 showed an EF 60%, The left atrium is moderately dilated (34-39) with a left atrial volume index of 35 ml/m2. Mild to moderate tricuspid regurgitation. Moderate pulmonary hypertension with estimated pulmonary artery systolic pressure of 42 mmHg. Mild diastolic dysfunction. Since I last saw Mr. Edwige Pitt he reports he has been doing well since last being seen. He reports his blood pressure has been well controlled at home running in the 130's over 80's. He has felt much better since being off of dialysis. He has been off of dialysis now for about three weeks. He is still having some lightheaded and dizziness, he has an appointment with neurology coming up. He is making urine at this time. He had to use the restroom four times last night. No falls or near falls. He does feel like he drinks enough fluid through out his day. He drinks about four bottles of water a day. He also does drink decaffeinated tea in addition. He denies any changes in his breathing at this time. No chest pain, pressure or tightness. No abdominal pain, bleeding problems, bowel issues, problems with his medications or any other concerns at this time. No nausea or vomiting. No cough, fever or chills.  He also has a scope scheduled in [place] in the next week for evaluate for causes of his chronic anemia per hematology/oncology. Bleeding Risks: Mr. Héctor Hernández denies any current or recent bleeding problems including a history of a GI bleed, ulcers, recent or upcoming surgeries, blood in his stool or black tarry stools or blood in his urine. Past Medical History:   Diagnosis Date    [facility]     Acute MI ([facility]     Acute renal failure with tubular necrosis ([facility] 10/2/2018    ssecondary to hemodynamic effects of loop diuretics and ace inhibitors, bbaseline 1.2-1.3 which peaked up to 1.8, resolving    Anemia     CAD (coronary artery disease)     Cerebral artery occlusion with cerebral infarction (HCC)     [facility]     Chronic back pain     Closed fracture of lumbar vertebra without mention of spinal cord injury     Displacement of intervertebral disc, site unspecified, without myelopathy     H/O cardiac catheterization 2/19/16    LMCA: Mild irregularities 10-20%. LAD: Lesion on PRox LAD: Mid subsection. 65% stenosis. LCx: Lesion on 1st Ob Valentina: Proximal subesection. 70% steniosis. RCA: Small non-dominant RCA. Lesion on PRox RCA: Ostial. 50% stenosis. EF:55%.  H/O cardiovascular stress test 2/19/16    Abnormal. Moderate perfusion defect of mild intensity in the inferior, inferoseptal adn inferoapical regions during stress imaging, which most consistent with ischemia. Global LV systolic function normal without regional wall motion abnormalities. OVerall these results are most consistent with an intermediate risk for signficant CAD. Additional testing including cardiac cath may be indicated.  H/O tilt table evaluation 12/26/2017    Abnormal. Patients HR, BP response and symptoms were most consistent with dysautonomia.  Combined with viligant maintenance of euvolemia and maintaining a moderate salft intake, pharmacologic treatment with SSRI such as lexapro and or mestinon among other treatments have shown some effectiveness in treatment of this condition    H/O tilt table evaluation 12/26/2017    Abnormal head upright tilt table study. The pt heart rate, blood pressure response and symptoms were most consistent with dysautonomia.  History of 24 hour EKG monitoring 8/14/14    Occasional PAC's and PVC's which appear to be at least moderately symptomatic.  History of 24 hour EKG monitoring 11/19/14    Event Monitor. Sinus rhythm and sinus bradycardia. Infrequent isolated PVC's    History of blood transfusion     History of cardiovascular stress test 2/19/14    Relatively NL    History of cardiovascular stress test 03/21/2018    Normal myocardial perfusion. Global left ventricular systolic function was normal with an EF of 68%. Overall, these results are most consistent with a low risk scan.  History of coronary artery stent placement 04/2017    PTCA / Drug Eluting Stent:, CX and / or branches    History of CVA (cerebrovascular accident) without residual deficits 2014    Incidentally found on CT head. No known impairment now or in the past.    History of echocardiogram 2/18/14    LA mildly dilated, EF 55%, LV wall thickness is moderately increased, no definite wall motion abnormalilities, what appears to be a pacer wire is seen w/n the RA and RV, mild-mod TR, mild pulmonary hypertension.  History of Holter monitoring 12/29/2017    Rare PAC's and PVC's.      History of tilt table evaluation 8/12/14    Abnormal    Hyperlipidemia     Hypertension     Non critical Right Renal artery stenosis, native ([address] 10/2/2018    Non critical Right Renal artery stenosis, based on cath in 2016, Rt RA 30% stenosis    Pacemaker     non-functioning    S/P cardiac cath 04/10/2017    S/P coronary artery stent placement     Successful PTCA - HA Om1    SIRS (systemic inflammatory response syndrome) ([address] 5/19/2019    Type II or unspecified type diabetes mellitus without mention of complication, not stated as uncontrolled        CURRENT ALLERGIES: Coreg [carvedilol], Lipitor [atorvastatin], Aldactone [spironolactone], Crestor [rosuvastatin calcium], Lopid [gemfibrozil], Invokana [canagliflozin], and Januvia [sitagliptin] REVIEW OF SYSTEMS: 14 systems were reviewed. Pertinent positives and negatives as above, all else negative. Past Surgical History:   Procedure Laterality Date    BACK SURGERY      CARDIAC CATHETERIZATION Left 02/19/2016    via right radial approach/ [facility]/ Dr. Louis Rizo Left 10/05/2021    Dr Marina Pool radial-Moderate three vessel coronary artery disease involving a ostial 50-60% stenosis in a small, non-dominant RCA, a 60% mid LAD stenosis and a proximal 50-60% stenosis in the left anterior descending coronary artery. Normal left ventricular end diastolic pressure. Proceed with aggressive maximal medical management as clinically indicated.     CHOLECYSTECTOMY  08/17/2015    Liang/Charles/[facility]/ [facility]    COLONOSCOPY  2010    COLONOSCOPY  02/19/2015    -polyps,diverticulosis,hemorrhoids    COLONOSCOPY  05/23/2018    Dr Jesus Jimenez    COLONOSCOPY N/A 05/23/2018    COLONOSCOPY POLYPECTOMY SNARE/COLD BIOPSY  cold snare  and  hot snare performed by Tiera North MD at [address]  10/30/2020    with Dr. Igor Aly 10/30/2020    Enrrique Smith, DANA HIGH RISK performed by Refugia Scheuermann, DO at [address] [facility]      left eye    ENDOSCOPY, COLON, DIAGNOSTIC      EYE SURGERY      IR TUNNELED CATHETER PLACEMENT GREATER THAN 5 YEARS  11/22/2021    IR TUNNELED CATHETER PLACEMENT GREATER THAN 5 YEARS 11/22/2021 [facility]    PACEMAKER INSERTION      PACEMAKER PLACEMENT      UPPER GASTROINTESTINAL ENDOSCOPY  05/28/2019    Dr. Merced Neves H-Pylori)duodenal bulbar/antral erythema    UPPER GASTROINTESTINAL ENDOSCOPY N/A 2019    EGD BIOPSY, clotest performed by Rylie Ventura MD at [address] 10/30/2020    EGD ESOPHAGOGASTRODUODENOSCOPY performed by Prema Olivera DO at [facility], LT  2021    US PERCUT RENAL BIOPSY, LT 2021 [facility]    Social History:  Social History     Tobacco Use    Smoking status: Former Smoker     Packs/day: 1.50     Years: 8.00     Pack years: 12.00     Types: Cigarettes     Quit date: 3/4/1980     Years since quittin.0    Smokeless tobacco: Never Used   Vaping Use    Vaping Use: Never used   Substance Use Topics    Alcohol use: No    Drug use: No        CURRENT MEDICATIONS:  Outpatient Medications Marked as Taking for the 3/17/22 encounter (Office Visit) with Sammy Wagner PA-C   Medication Sig Dispense Refill    LANTUS SOLOSTAR 100 UNIT/ML injection pen INJECT 22 UNITS SUBCUTANEOUSLY TWICE DAILY 30 mL 0    amLODIPine (NORVASC) 5 MG tablet Take 2 tablets by mouth daily 90 tablet 3    furosemide (LASIX) 40 MG tablet Take 1 tablet by mouth daily 90 tablet 3    metoprolol tartrate (LOPRESSOR) 50 MG tablet Take 1 tablet by mouth 2 times daily (Patient taking differently: Take 50 mg by mouth 2 times daily 25 in the AM and 25 in the PM) 90 tablet 3    omeprazole (PRILOSEC) 10 MG delayed release capsule Take 10 mg by mouth daily      hydrALAZINE (APRESOLINE) 100 MG tablet Take 1 tablet by mouth 2 times daily Except on the morning's of dialysis (Patient taking differently: Take 100 mg by mouth 3 times daily Except on the morning's of dialysis) 180 tablet 3    CONTOUR TEST strip USE 1 STRIP TO CHECK GLUCOSE TWICE DAILY 100 each 0    B Complex-C-Zn-Folic Acid (DIALYVITE/ZINC) TABS TAKE 1 TABLET BY MOUTH ONCE DAILY AFTER DIALYSIS      tamsulosin (FLOMAX) 0.4 MG capsule Take 1 capsule by mouth daily 30 capsule 3    docusate sodium (COLACE) 100 MG capsule Take 1 capsule by mouth 3 times daily as needed for Constipation 90 capsule 0    nitroGLYCERIN (NITROSTAT) 0.4 MG SL tablet Place 1 tablet under the tongue every 5 minutes as needed for Chest pain 25 tablet 3    latanoprost (XALATAN) 0.005 % ophthalmic solution Place 1 drop into both eyes nightly       acyclovir (ZOVIRAX) 400 MG tablet 400 mg 2 times daily       DIANA MICROLET LANCETS MISC TEST TWICE DAILY 100 each 2    Insulin Pen Needle (PEN NEEDLES) 31G X 6 MM MISC 1 each by Does not apply route daily 100 each 3    prednisoLONE acetate (PRED FORTE) 1 % ophthalmic suspension Place 1 drop into the left eye daily        FAMILY HISTORY: family history includes Cancer in his father and mother. PHYSICAL EXAM:   /66 (Site: Left Upper Arm, Position: Standing, Cuff Size: Medium Adult)   Pulse 61   Resp 20   Ht 5' 9\" (1.753 m)   Wt 187 lb (84.8 kg)   SpO2 97%   BMI 27.62 kg/m²  Body mass index is 27.62 kg/m². Constitutional: He is oriented to person, place, and time. He appears well-developed and well-nourished. In no acute distress. HEENT: Normocephalic and atraumatic. No JVD present. Carotid bruit is not present. No mass and no thyromegaly present. No lymphadenopathy present. Cardiovascular: Normal rate, regular rhythm, normal heart sounds. Exam reveals no gallop and no friction rubs. 1/6 systolic murmur, 2nd intercostal space on the RIGHT just lateral to the sternum. Pulmonary/Chest: Effort normal and breath sounds normal. No respiratory distress. He has no wheezes, rhonchi or rales. Abdominal: Soft, non-tender. Bowel sounds and aorta are normal. He exhibits no organomegaly, mass or bruit. Extremities: Trace. on his right leg, 1/2+ on his left leg up to his knee. No cyanosis or clubbing. 2+ radial and carotid pulses. Distal extremity pulses: 2+ bilaterally. Neurological: He is alert and oriented to person, place, and time. No evidence of gross cranial nerve deficit.  Coordination appeared normal.   Skin: Skin is warm and dry. There is no rash or diaphoresis. Psychiatric: He has a normal mood and affect. His speech is normal and behavior is normal.      MOST RECENT LABS ON RECORD:   Lab Results   Component Value Date    WBC 10.0 03/10/2022    HGB 11.7 (L) 03/10/2022    HCT 37.6 (L) 03/10/2022     03/10/2022    CHOL 69 09/21/2021    TRIG 185 (H) 09/21/2021    HDL 14 (L) 09/21/2021    LDLDIRECT 44 11/02/2017    ALT 8 03/10/2022    AST 13 03/10/2022     03/10/2022    K 3.7 03/10/2022     03/10/2022    CREATININE 3.17 (H) 03/10/2022    BUN 60 (H) 03/10/2022    CO2 20 03/10/2022    TSH 2.26 11/02/2017    PSA 3.36 01/18/2022    INR 1.2 11/29/2021    LABA1C 7.3 08/19/2021    LABMICR 30 10/17/2015       ASSESSMENT:  1. Dysautonomia ([address]    2. Chronic diastolic congestive heart failure ([address]    3. Coronary artery disease involving native coronary artery of native heart without angina pectoris    4. Essential hypertension    5. Mixed hyperlipidemia    6. Anemia, unspecified type    7. Stage 4 chronic kidney disease (HCC)       PLAN:  Dysautonomia: Mildly to moderately, likely related to and worsened by anemia but Hgb improving and appears fairly well controlled. He does have a follow up with neurology for further evaluation of his lightheaded and dizziness. · Diuretics: Continue furosemide (Lasix) 40 mg every morning. · Beta Blocker: Continue Metoprolol tartrate (Lopressor) 25 mg bid. I do think this dose is appropriate for him at this time despite our last note increasing the dose, heart rate is 61 in office today. · Nonpharmacologic counseling: Because of his condition, I reminded him to try and keep himself well-hydrated and to take extra time when moving from laying to sitting, sitting to standing and standing to walking as well as wearing at least knee high compressions stockings. Chronic Diastolic Heart Failure: EF of 60% via echo on 11/11/2021. Currently well controlled.  Kidney function had improved, appears to be trending back up since stopping dialysis. · Beta Blocker: Continue Metoprolol tartrate (Lopressor) 25 mg bid. Diuretics: Continue furosemide (Lasix) 40 mg every morning. Nonpharmacologic management of Heart Failure: I advised him to try and keep his legs up whenever possible and to limit salt in his diet. I also referred him to nephrology, he states since stopping dialysis he does not have any continued follow up. Atherosclerotic Heart Disease: Coronary Artery stent: 4/10/2017. Cardiac cath done on 10/5/2021 showed Moderate three vessel coronary artery disease involving a ostial 50-60% stenosis in a small, non-dominant RCA, a 60% mid LAD stenosis and a proximal 50-60% stenosis in the left anterior descending coronary artery  Antiplatelet Agent: Not indicated due to bleeding. Beta Blocker: Continue Metoprolol tartrate (Lopressor) 25 mg bid. Cholesterol Reduction Therapy: Refused by patient. Essential Hypertension: Controlled   Continue Hydralazine 100 mg three daily. · Beta Blocker: Continue Metoprolol tartrate (Lopressor) 25 mg bid. · Calcium Channel Blocker: Continue amlodipine (Norvasc) 10 mg once daily. · Diuretics: Continue furosemide (Lasix) 40 mg every morning. Hyperlipidemia: Mixed - Last LDL on 9/21/2021 was 18 mg/dL   · Cholesterol Reduction Therapy: Refused by patient. · Anemia: Unclear Etiology, last hemoglobin was 11.7 g/dL on 3/10/2022  · He does have a scope scheduled to be done next week to better assess his reason for bleeding. · Chronic Kidney Disease: Stable   · He is currently off of dialysis and he is making his urine. However I did send a referral to continue to follow up with Dr. Leanna Milian at this time. He states he had seen him in the past but was told to stop following up with him once starting dialysis. Finally, I recommended that he continue his other medications and follow up with you as previously scheduled.      FOLLOW UP:   I told . Kymberly Cameron  to call my office if he had any problems, but otherwise told him to Return in about 2 months (around 5/17/2022). However, as always I would be happy to see him sooner should the need arise. Once again, thank you for allowing me to participate in this patients care. Please do not hesitate to contact me could I be of further assistance. Sincerely,  [facility] Cardiology Specialist   [facility]  [facility], [address] [place] [address]  Phone: [phone], Fax: [phone]     I believe that the risk of significant morbidity and mortality related to the patient's current medical conditions are: low-intermediate. Between 30 and 44 minutes were spent during prep work, discussion and exam of the patient, and follow up documentation and all of their questions were answered. The documentation recorded by the scribe, accurately and completely reflects the services I personally performed and the decisions made by me.  Olivia Warren PA-C March 17, 2022

## 2022-03-21 ENCOUNTER — OFFICE VISIT (OUTPATIENT)
Dept: NEUROLOGY | Age: 77
End: 2022-03-21
Payer: MEDICARE

## 2022-03-21 VITALS
HEART RATE: 67 BPM | TEMPERATURE: 97.6 F | BODY MASS INDEX: 27.53 KG/M2 | RESPIRATION RATE: 18 BRPM | DIASTOLIC BLOOD PRESSURE: 59 MMHG | HEIGHT: 69 IN | SYSTOLIC BLOOD PRESSURE: 143 MMHG | WEIGHT: 185.9 LBS

## 2022-03-21 DIAGNOSIS — R42 SPELL OF DIZZINESS: Primary | ICD-10-CM

## 2022-03-21 PROCEDURE — G8417 CALC BMI ABV UP PARAM F/U: HCPCS | Performed by: NEUROMUSCULOSKELETAL MEDICINE, SPORTS MEDICINE

## 2022-03-21 PROCEDURE — G8427 DOCREV CUR MEDS BY ELIG CLIN: HCPCS | Performed by: NEUROMUSCULOSKELETAL MEDICINE, SPORTS MEDICINE

## 2022-03-21 PROCEDURE — 99213 OFFICE O/P EST LOW 20 MIN: CPT | Performed by: NEUROMUSCULOSKELETAL MEDICINE, SPORTS MEDICINE

## 2022-03-21 PROCEDURE — 99214 OFFICE O/P EST MOD 30 MIN: CPT

## 2022-03-21 PROCEDURE — G8482 FLU IMMUNIZE ORDER/ADMIN: HCPCS | Performed by: NEUROMUSCULOSKELETAL MEDICINE, SPORTS MEDICINE

## 2022-03-21 PROCEDURE — 4040F PNEUMOC VAC/ADMIN/RCVD: CPT | Performed by: NEUROMUSCULOSKELETAL MEDICINE, SPORTS MEDICINE

## 2022-03-21 PROCEDURE — 1036F TOBACCO NON-USER: CPT | Performed by: NEUROMUSCULOSKELETAL MEDICINE, SPORTS MEDICINE

## 2022-03-21 PROCEDURE — 1123F ACP DISCUSS/DSCN MKR DOCD: CPT | Performed by: NEUROMUSCULOSKELETAL MEDICINE, SPORTS MEDICINE

## 2022-03-21 NOTE — PATIENT INSTRUCTIONS
SURVEY:    You may be receiving a survey from [employer] regarding your visit today. Please complete the survey to enable us to provide the highest quality of care to you and your family. If you cannot score us a very good on any question, please call the office to discuss how we could have made your experience a very good one. Thank you.

## 2022-03-21 NOTE — PROGRESS NOTES
NEUROLOGY Follow up    Patient Name:  Roland Alvarado  :   1945  Clinic Visit Date: 3/21/2022    I saw Mr. Roland Alvarado  in the neurology clinic today for symptoms of persistent intermittent dizzy spells whenever he stands up. Symptoms of dizziness lasts a few minutes and gets better after he sits back again. No vertigo, double vision ,blurred, facial numbness slurred speech or any other neurological symptoms when he has the dizzy spells. Has multiple other medical problems including history of CAD, status post coronary stents [], chronic diastolic heart failure, permanent pacemaker, diabetes with diabetic neuropathy, dysautonomia, hypertension, anemia,    REVIEW OF SYSTEMS    Constitutional Weight changes: absent, change in appetite: absent Fatigue: absent; Fevers : absent, Any recent hospitalizations:  absent   HEENT Ears: normal,  Visual disturbance: absent   Respiratory Shortness of breath: absent, choking:  absent, Cough: absent, Snoring : absent   Cardiovascular Chest pain: absent, Leg swelling :absent, palpitations : absent, fainting : absent   GI Constipation: absent, Diarrhea: absent, Swallowing change: absent    Urinary frequency: absent, Urinary urgency: absent, Urinary incontinence: absent   Musculoskeletal Neck pain: absent, Back pain: present, Stiffness: present, Muscle pain: present, Joint pain: present, restless leg : absent   Dermatological Hair loss: absent, Skin changes: absent   Neurological Confusion: absent, Trouble concentrating: absent, Seizures: absent;  Memory loss: absent, balance problem: present, Dizziness: present, vertigo: absent, Weakness: present, Numbness absent, Tremor: absent, Spasm: absent, involuntary movement: present, Speech difficulty: absent, Headache: absent, Light sensitivity: absent   Psychiatric Anxiety: absent, Depression  absent, drug abuse: absent, Hallucination: absent, mood disorder: absent, Suicidal ideations absent   Hematologic Abnormal bleeding: absent, Anemia: absent, Lymph gland changes: absent Clotting disorder: absent     Past Medical History:   Diagnosis Date    Acute kidney injury ([facility]     Acute MI ([facility]     Acute renal failure with tubular necrosis ([facility] 10/2/2018    ssecondary to hemodynamic effects of loop diuretics and ace inhibitors, bbaseline 1.2-1.3 which peaked up to 1.8, resolving    Anemia     CAD (coronary artery disease)     Cerebral artery occlusion with cerebral infarction ([facility]     CHF (congestive heart failure) (HCC)     Chronic back pain     Closed fracture of lumbar vertebra without mention of spinal cord injury     Displacement of intervertebral disc, site unspecified, without myelopathy     H/O cardiac catheterization 2/19/16    LMCA: Mild irregularities 10-20%. LAD: Lesion on PRox LAD: Mid subsection. 65% stenosis. LCx: Lesion on 1st Ob Valentina: Proximal subesection. 70% steniosis. RCA: Small non-dominant RCA. Lesion on PRox RCA: Ostial. 50% stenosis. EF:55%.  H/O cardiovascular stress test 2/19/16    Abnormal. Moderate perfusion defect of mild intensity in the inferior, inferoseptal adn inferoapical regions during stress imaging, which most consistent with ischemia. Global LV systolic function normal without regional wall motion abnormalities. OVerall these results are most consistent with an intermediate risk for signficant CAD. Additional testing including cardiac cath may be indicated.  H/O tilt table evaluation 12/26/2017    Abnormal. Patients HR, BP response and symptoms were most consistent with dysautonomia. Combined with viligant maintenance of euvolemia and maintaining a moderate salft intake, pharmacologic treatment with SSRI such as lexapro and or mestinon among other treatments have shown some effectiveness in treatment of this condition    H/O tilt table evaluation 12/26/2017    Abnormal head upright tilt table study.  The pt heart rate, blood pressure response and symptoms were most consistent with dysautonomia.  History of 24 hour EKG monitoring 8/14/14    Occasional PAC's and PVC's which appear to be at least moderately symptomatic.  History of 24 hour EKG monitoring 11/19/14    Event Monitor. Sinus rhythm and sinus bradycardia. Infrequent isolated PVC's    History of blood transfusion     History of cardiovascular stress test 2/19/14    Relatively NL    History of cardiovascular stress test 03/21/2018    Normal myocardial perfusion. Global left ventricular systolic function was normal with an EF of 68%. Overall, these results are most consistent with a low risk scan.  History of coronary artery stent placement 04/2017    PTCA / Drug Eluting Stent:, CX and / or branches    History of CVA (cerebrovascular accident) without residual deficits 2014    Incidentally found on CT head. No known impairment now or in the past.    History of echocardiogram 2/18/14    LA mildly dilated, EF 55%, LV wall thickness is moderately increased, no definite wall motion abnormalilities, what appears to be a pacer wire is seen w/n the RA and RV, mild-mod TR, mild pulmonary hypertension.  History of Holter monitoring 12/29/2017    Rare PAC's and PVC's.      History of tilt table evaluation 8/12/14    Abnormal    Hyperlipidemia     Hypertension     Non critical Right Renal artery stenosis, native ([address] 10/2/2018    Non critical Right Renal artery stenosis, based on cath in 2016, Rt RA 30% stenosis    Pacemaker     non-functioning    S/P cardiac cath 04/10/2017    S/P coronary artery stent placement     Successful PTCA - HA Om1    SIRS (systemic inflammatory response syndrome) ([address] 5/19/2019    Type II or unspecified type diabetes mellitus without mention of complication, not stated as uncontrolled        Past Surgical History:   Procedure Laterality Date    BACK SURGERY      CARDIAC CATHETERIZATION Left 02/19/2016    via right radial approach/ Melina Vernon/ Dr. Martha Esquivel CATHETERIZATION Left 10/05/2021    Dr Kearney Merlin radial-Moderate three vessel coronary artery disease involving a ostial 50-60% stenosis in a small, non-dominant RCA, a 60% mid LAD stenosis and a proximal 50-60% stenosis in the left anterior descending coronary artery. Normal left ventricular end diastolic pressure. Proceed with aggressive maximal medical management as clinically indicated.     CHOLECYSTECTOMY  08/17/2015    [facility]ang/[facility]/ Lap    COLONOSCOPY  2010    COLONOSCOPY  02/19/2015    -polyps,diverticulosis,hemorrhoids    COLONOSCOPY  05/23/2018    Dr Woodward Dance    COLONOSCOPY N/A 05/23/2018    COLONOSCOPY POLYPECTOMY SNARE/COLD BIOPSY  cold snare  and  hot snare performed by Zelalem River MD at [address]  10/30/2020    with Dr. José Manuel Recinos 10/30/2020    Emperatriz Morton, NOT HIGH RISK performed by Lennie Park DO at [address] [facility]      left eye    ENDOSCOPY, COLON, DIAGNOSTIC      EYE SURGERY      IR TUNNELED CATHETER PLACEMENT GREATER THAN 5 YEARS  11/22/2021    IR TUNNELED CATHETER PLACEMENT GREATER THAN 5 YEARS 11/22/2021 [facility]    PACEMAKER INSERTION      PACEMAKER PLACEMENT      UPPER GASTROINTESTINAL ENDOSCOPY  05/28/2019    Dr. Alexis Jimenez H-Pylori)duodenal bulbar/antral erythema    UPPER GASTROINTESTINAL ENDOSCOPY N/A 05/28/2019    EGD BIOPSY, clotest performed by Zelalem River MD at [address] N/A 10/30/2020    EGD ESOPHAGOGASTRODUODENOSCOPY performed by Lennie Park DO at [address], LT  11/24/2021    US PERCUT RENAL BIOPSY, LT 11/24/2021 [facility]       Social History     Socioeconomic History    Marital status:      Spouse name: Not on file    Number of children: Not on file    Years of education: Not on file    Highest education level: Not on file Occupational History    Not on file   Tobacco Use    Smoking status: Former Smoker     Packs/day: 1.50     Years: 8.00     Pack years: 12.00     Types: Cigarettes     Quit date: 3/4/1980     Years since quittin.0    Smokeless tobacco: Never Used   Vaping Use    Vaping Use: Never used   Substance and Sexual Activity    Alcohol use: No    Drug use: No    Sexual activity: Not on file   Other Topics Concern    Not on file   Social History Narrative    Not on file     Social Determinants of Health     Financial Resource Strain: Low Risk     Difficulty of Paying Living Expenses: Not hard at all   Food Insecurity: No Food Insecurity    Worried About Running Out of Food in the Last Year: Never true    Genna of Food in the Last Year: Never true   Transportation Needs: No Transportation Needs    Lack of Transportation (Medical): No    Lack of Transportation (Non-Medical):  No   Physical Activity:     Days of Exercise per Week: Not on file    Minutes of Exercise per Session: Not on file   Stress:     Feeling of Stress : Not on file   Social Connections:     Frequency of Communication with Friends and Family: Not on file    Frequency of Social Gatherings with Friends and Family: Not on file    Attends Rastafari Services: Not on file    Active Member of [address] [place] [address] [employer] or Organizations: Not on file    Attends Club or Organization Meetings: Not on file    Marital Status: Not on file   Intimate Partner Violence:     Fear of Current or Ex-Partner: Not on file    Emotionally Abused: Not on file    Physically Abused: Not on file    Sexually Abused: Not on file   Housing Stability:     Unable to Pay for Housing in the Last Year: Not on file    Number of [place] in the Last Year: Not on file    Unstable Housing in the Last Year: Not on file       Family History   Problem Relation Age of Onset    Cancer Mother         breast    Cancer Father         lung       Current Outpatient Medications Medication Sig Dispense Refill    metoprolol tartrate (LOPRESSOR) 25 MG tablet Take 1 tablet by mouth 2 times daily 180 tablet 3    LANTUS SOLOSTAR 100 UNIT/ML injection pen INJECT 22 UNITS SUBCUTANEOUSLY TWICE DAILY 30 mL 0    amLODIPine (NORVASC) 5 MG tablet Take 2 tablets by mouth daily 90 tablet 3    furosemide (LASIX) 40 MG tablet Take 1 tablet by mouth daily 90 tablet 3    omeprazole (PRILOSEC) 10 MG delayed release capsule Take 10 mg by mouth daily      hydrALAZINE (APRESOLINE) 100 MG tablet Take 1 tablet by mouth 2 times daily Except on the morning's of dialysis (Patient taking differently: Take 100 mg by mouth 3 times daily Except on the morning's of dialysis) 180 tablet 3    CONTOUR TEST strip USE 1 STRIP TO CHECK GLUCOSE TWICE DAILY 100 each 0    B Complex-C-Zn-Folic Acid (DIALYVITE/ZINC) TABS TAKE 1 TABLET BY MOUTH ONCE DAILY AFTER DIALYSIS      tamsulosin (FLOMAX) 0.4 MG capsule Take 1 capsule by mouth daily 30 capsule 3    docusate sodium (COLACE) 100 MG capsule Take 1 capsule by mouth 3 times daily as needed for Constipation 90 capsule 0    latanoprost (XALATAN) 0.005 % ophthalmic solution Place 1 drop into both eyes nightly       acyclovir (ZOVIRAX) 400 MG tablet 400 mg 2 times daily       DIAAN MICROLET LANCETS MISC TEST TWICE DAILY 100 each 2    Insulin Pen Needle (PEN NEEDLES) 31G X 6 MM MISC 1 each by Does not apply route daily 100 each 3    prednisoLONE acetate (PRED FORTE) 1 % ophthalmic suspension Place 1 drop into the left eye daily       nitroGLYCERIN (NITROSTAT) 0.4 MG SL tablet Place 1 tablet under the tongue every 5 minutes as needed for Chest pain (Patient not taking: Reported on 3/21/2022) 25 tablet 3     No current facility-administered medications for this visit.       DATA:  Lab Results   Component Value Date    WBC 10.0 03/10/2022    HGB 11.7 (L) 03/10/2022     03/10/2022    CHOL 69 09/21/2021    TRIG 185 (H) 09/21/2021    HDL 14 (L) 09/21/2021    LDLDIRECT 44 11/02/2017    ALT 8 03/10/2022    AST 13 03/10/2022     03/10/2022    K 3.7 03/10/2022     03/10/2022    CREATININE 3.17 (H) 03/10/2022    BUN 60 (H) 03/10/2022    CO2 20 03/10/2022    TSH 2.26 11/02/2017    INR 1.2 11/29/2021    LABA1C 7.3 08/19/2021    LABMICR 30 10/17/2015       BP (!) 143/59 (Site: Right Upper Arm, Position: Sitting, Cuff Size: Medium Adult)   Pulse 67   Temp 97.6 °F (36.4 °C) (Temporal)   Resp 18   Ht 5' 9\" (1.753 m)   Wt 185 lb 14.4 oz (84.3 kg)   BMI 27.45 kg/m²     NEUROLOGICAL EXAMINATION:     MENTAL STATUS: Patient is alert and oriented. There is no confusion or aphasia. Memory is normal.     CRANIAL NERVES: Pupils are equal and reactive. EOMS are equal in all directions. Mild left eye lid droop, which apparently  is chronic , after corneal surgery. .  No  nystagmus or any other abnormal eye movements. Facial sensation is normal.  No facial weakness. Hearing is normal.  Palate and tongue movements are normal.     MOTOR EXAMINATION:  Muscle tone is normal in all the limbs. No cogwheel rigidity. There is wasting of the interossei muscles bilaterally along with weakness of these muscles. Otherwise muscle strength is 5/5 in both upper and lower limbs. No tremors, or any other abnormal limb movements. SENSORY EXAMINATION:  Mild distal decrease sensation in a glove and stocking manner. Vibration and position sensations are intact in both upper and lower limbs. STRETCH REFLEXES: Symmetrical in both the upper and lower limbs. GAIT:.  Slightly unsteady. Uses a walker    IMPRESSION:    1. Symptoms of postural headache and dizzy spells could be related to underlying dysautonomia from diabetes. 2.  Diabetic peripheral neuropathy  3. Bilateral ulnar neuropathies across the elbow, per clinical examination  4. CAD. PLAN:  1.   Continue measures such as increasing fluid intake, regular exercises of the lower extremities, fall precautions to be taken while getting out of a chair or bed  2. Follow-up in this office as needed        NOTE: This neurology evaluation is part of outpatient coverage at [facility]  1-2 days per week. Patients requiring frequent evaluations or uncomfortable with potential 3-4 day turnaround on questions or calls  may be better served by a neurologist in the area full time. [facility] at [facility]. Efraín is available for outpatient visits and procedures including EMG/NCS. Non-[facility] neurologists also practice in [facility] (Dr. Jeni Ennis) and [facility] (Mei Lara).        Francisco Dahl MD   3/21/2022  5:36 PM

## 2022-03-24 ENCOUNTER — OFFICE VISIT (OUTPATIENT)
Dept: GASTROENTEROLOGY | Age: 77
End: 2022-03-24
Payer: MEDICARE

## 2022-03-24 VITALS
HEART RATE: 66 BPM | BODY MASS INDEX: 27.62 KG/M2 | WEIGHT: 187 LBS | DIASTOLIC BLOOD PRESSURE: 60 MMHG | TEMPERATURE: 97.3 F | SYSTOLIC BLOOD PRESSURE: 138 MMHG

## 2022-03-24 DIAGNOSIS — Z79.4 TYPE 2 DIABETES MELLITUS WITH HYPERGLYCEMIA, WITH LONG-TERM CURRENT USE OF INSULIN (HCC): ICD-10-CM

## 2022-03-24 DIAGNOSIS — Z90.49 S/P CHOLE: ICD-10-CM

## 2022-03-24 DIAGNOSIS — R19.7 DIARRHEA, UNSPECIFIED TYPE: ICD-10-CM

## 2022-03-24 DIAGNOSIS — E11.65 TYPE 2 DIABETES MELLITUS WITH HYPERGLYCEMIA, WITH LONG-TERM CURRENT USE OF INSULIN (HCC): ICD-10-CM

## 2022-03-24 DIAGNOSIS — D50.0 IRON DEFICIENCY ANEMIA DUE TO CHRONIC BLOOD LOSS: Primary | ICD-10-CM

## 2022-03-24 DIAGNOSIS — E44.0 MODERATE MALNUTRITION (HCC): ICD-10-CM

## 2022-03-24 PROCEDURE — 1036F TOBACCO NON-USER: CPT | Performed by: INTERNAL MEDICINE

## 2022-03-24 PROCEDURE — 4040F PNEUMOC VAC/ADMIN/RCVD: CPT | Performed by: INTERNAL MEDICINE

## 2022-03-24 PROCEDURE — 1123F ACP DISCUSS/DSCN MKR DOCD: CPT | Performed by: INTERNAL MEDICINE

## 2022-03-24 PROCEDURE — 99204 OFFICE O/P NEW MOD 45 MIN: CPT | Performed by: INTERNAL MEDICINE

## 2022-03-24 PROCEDURE — G8417 CALC BMI ABV UP PARAM F/U: HCPCS | Performed by: INTERNAL MEDICINE

## 2022-03-24 PROCEDURE — G8427 DOCREV CUR MEDS BY ELIG CLIN: HCPCS | Performed by: INTERNAL MEDICINE

## 2022-03-24 PROCEDURE — G8482 FLU IMMUNIZE ORDER/ADMIN: HCPCS | Performed by: INTERNAL MEDICINE

## 2022-03-24 RX ORDER — CARVEDILOL 25 MG/1
TABLET, FILM COATED ORAL
Qty: 100 EACH | Refills: 0 | Status: SHIPPED | OUTPATIENT
Start: 2022-03-24 | End: 2022-04-01 | Stop reason: SDUPTHER

## 2022-03-24 RX ORDER — DIPHENOXYLATE HYDROCHLORIDE AND ATROPINE SULFATE 2.5; .025 MG/1; MG/1
1 TABLET ORAL 4 TIMES DAILY PRN
Qty: 30 TABLET | Refills: 0 | Status: SHIPPED | OUTPATIENT
Start: 2022-03-24 | End: 2022-04-03

## 2022-03-24 RX ORDER — MONTELUKAST SODIUM 4 MG/1
1 TABLET, CHEWABLE ORAL DAILY
Qty: 60 TABLET | Refills: 3 | Status: SHIPPED | OUTPATIENT
Start: 2022-03-24 | End: 2022-03-30

## 2022-03-24 ASSESSMENT — ENCOUNTER SYMPTOMS
BLOOD IN STOOL: 1
RECTAL PAIN: 0
DIARRHEA: 1
CONSTIPATION: 0
TROUBLE SWALLOWING: 0
ABDOMINAL DISTENTION: 0
NAUSEA: 0
WHEEZING: 0
VOMITING: 0
ABDOMINAL PAIN: 0
CHOKING: 0
ANAL BLEEDING: 0
COUGH: 0
SHORTNESS OF BREATH: 0

## 2022-03-24 NOTE — PROGRESS NOTES
Reason for Referral:   Patrice Morataya MD  [address] [facility],  [facility] [address]    Chief Complaint   Patient presents with    Anemia     Patientis new, referred for anemia. He states at times he gets very dark stool            HISTORY OF PRESENT ILLNESS: Jane Schwartz is a 68 y.o. male , referred for evaluation of* anemia, GIB    here for the first   Was admitted at [facility]  because of low HG on routine labs    egd/colon were done by   Dr Yovani Pitts, diverticulosis, was told negative EGD : jareth is not available       Now on small does iron , and getting iron infusion   No sign of bleeding    was told bleeding in SB for which she was referred to us to do studies. HGb  2 weeks ago was up to 11  No AC/AP  Denied any other GI symptoms there is no abdominal pain no nausea no vomiting no fever no chills  But he does have significant diarrhea and sometimes he loses control      Past Medical,Family, and Social History reviewed and does contribute to the patient presentingcondition. Patient's PMH/PSH,SH,PSYCH Hx, MEDs, ALLERGIES, and ROS were all reviewed and updated in the appropriate sections. PAST MEDICAL HISTORY:  Past Medical History:   Diagnosis Date    Acute kidney injury ([facility] [address]     Acute MI ([facility] [address]     Acute renal failure with tubular necrosis ([facility] [address] 10/2/2018    ssecondary to hemodynamic effects of loop diuretics and ace inhibitors, bbaseline 1.2-1.3 which peaked up to 1.8, resolving    Anemia     CAD (coronary artery disease)     Cerebral artery occlusion with cerebral infarction (HCC)     CHF (congestive heart failure) (HCC)     Chronic back pain     Closed fracture of lumbar vertebra without mention of spinal cord injury     Displacement of intervertebral disc, site unspecified, without myelopathy     H/O cardiac catheterization 2/19/16    LMCA: Mild irregularities 10-20%. LAD: Lesion on PRox LAD: Mid subsection. 65% stenosis.  LCx: Lesion on 1st Ob Valentina: Proximal subesection. 70% steniosis. RCA: Small non-dominant RCA. Lesion on PRox RCA: Ostial. 50% stenosis. EF:55%.  H/O cardiovascular stress test 2/19/16    Abnormal. Moderate perfusion defect of mild intensity in the inferior, inferoseptal adn inferoapical regions during stress imaging, which most consistent with ischemia. Global LV systolic function normal without regional wall motion abnormalities. OVerall these results are most consistent with an intermediate risk for signficant CAD. Additional testing including cardiac cath may be indicated.  H/O tilt table evaluation 12/26/2017    Abnormal. Patients HR, BP response and symptoms were most consistent with dysautonomia. Combined with viligant maintenance of euvolemia and maintaining a moderate salft intake, pharmacologic treatment with SSRI such as lexapro and or mestinon among other treatments have shown some effectiveness in treatment of this condition    H/O tilt table evaluation 12/26/2017    Abnormal head upright tilt table study. The pt heart rate, blood pressure response and symptoms were most consistent with dysautonomia.  History of 24 hour EKG monitoring 8/14/14    Occasional PAC's and PVC's which appear to be at least moderately symptomatic.  History of 24 hour EKG monitoring 11/19/14    Event Monitor. Sinus rhythm and sinus bradycardia. Infrequent isolated PVC's    History of blood transfusion     History of cardiovascular stress test 2/19/14    Relatively NL    History of cardiovascular stress test 03/21/2018    Normal myocardial perfusion. Global left ventricular systolic function was normal with an EF of 68%. Overall, these results are most consistent with a low risk scan.  History of coronary artery stent placement 04/2017    PTCA / Drug Eluting Stent:, CX and / or branches    History of CVA (cerebrovascular accident) without residual deficits 2014    Incidentally found on CT head.  No known impairment now or in the past.    History of echocardiogram 2/18/14    LA mildly dilated, EF 55%, LV wall thickness is moderately increased, no definite wall motion abnormalilities, what appears to be a pacer wire is seen w/n the RA and RV, mild-mod TR, mild pulmonary hypertension.  History of Holter monitoring 12/29/2017    Rare PAC's and PVC's.  History of tilt table evaluation 8/12/14    Abnormal    Hyperlipidemia     Hypertension     Non critical Right Renal artery stenosis, native ([address] 10/2/2018    Non critical Right Renal artery stenosis, based on cath in 2016, Rt RA 30% stenosis    Pacemaker     non-functioning    S/P cardiac cath 04/10/2017    S/P coronary artery stent placement     Successful PTCA - HA Om1    SIRS (systemic inflammatory response syndrome) ([facility] [address] 5/19/2019    Type II or unspecified type diabetes mellitus without mention of complication, not stated as uncontrolled        Past Surgical History:   Procedure Laterality Date    BACK SURGERY      CARDIAC CATHETERIZATION Left 02/19/2016    via right radial approach/ Melina Vernon/ Dr. Roxanna Gomez Left 10/05/2021    Dr Kenzie Choudhary radial-Moderate three vessel coronary artery disease involving a ostial 50-60% stenosis in a small, non-dominant RCA, a 60% mid LAD stenosis and a proximal 50-60% stenosis in the left anterior descending coronary artery. Normal left ventricular end diastolic pressure. Proceed with aggressive maximal medical management as clinically indicated.     CHOLECYSTECTOMY  08/17/2015    Liang/Charles/Saulo/ Millicent    COLONOSCOPY  2010    COLONOSCOPY  02/19/2015    -polyps,diverticulosis,hemorrhoids    COLONOSCOPY  05/23/2018    Dr Camelia Rico    COLONOSCOPY N/A 05/23/2018    COLONOSCOPY POLYPECTOMY SNARE/COLD BIOPSY  cold snare  and  hot snare performed by Cammie Rivas MD at [address] [facility]  10/30/2020    with Dr. Og Woody 10/30/2020    COLORECTAL CANCER SCREENING, NOT HIGH RISK performed by Dread Nice DO at [address]      left eye    ENDOSCOPY, COLON, DIAGNOSTIC      EYE SURGERY      IR TUNNELED CATHETER PLACEMENT GREATER THAN 5 YEARS  11/22/2021    IR TUNNELED CATHETER PLACEMENT GREATER THAN 5 YEARS 11/22/2021 [facility] SPECIAL PROCEDURES    PACEMAKER INSERTION      PACEMAKER PLACEMENT      UPPER GASTROINTESTINAL ENDOSCOPY  05/28/2019    Dr. Ana Diez H-Pylori)duodenal bulbar/antral erythema    UPPER GASTROINTESTINAL ENDOSCOPY N/A 05/28/2019    EGD BIOPSY, clotest performed by Basia Grigsby MD at [address] [facility] 10/30/2020    EGD ESOPHAGOGASTRODUODENOSCOPY performed by Dread Nice DO at [address], [place]  11/24/2021    US PERCUT RENAL BIOPSY, LT 11/24/2021 [facility] ULTRASOUND       CURRENT MEDICATIONS:    Current Outpatient Medications:     colestipol (COLESTID) 1 g tablet, Take 1 tablet by mouth daily, Disp: 60 tablet, Rfl: 3    diphenoxylate-atropine (DIPHENATOL) 2.5-0.025 MG per tablet, Take 1 tablet by mouth 4 times daily as needed for Diarrhea for up to 10 days. , Disp: 30 tablet, Rfl: 0    CONTOUR TEST strip, USE 1 STRIP TO CHECK GLUCOSE TWICE DAILY, Disp: 100 each, Rfl: 0    metoprolol tartrate (LOPRESSOR) 25 MG tablet, Take 1 tablet by mouth 2 times daily, Disp: 180 tablet, Rfl: 3    LANTUS SOLOSTAR 100 UNIT/ML injection pen, INJECT 22 UNITS SUBCUTANEOUSLY TWICE DAILY, Disp: 30 mL, Rfl: 0    amLODIPine (NORVASC) 5 MG tablet, Take 2 tablets by mouth daily, Disp: 90 tablet, Rfl: 3    furosemide (LASIX) 40 MG tablet, Take 1 tablet by mouth daily, Disp: 90 tablet, Rfl: 3    omeprazole (PRILOSEC) 10 MG delayed release capsule, Take 10 mg by mouth daily, Disp: , Rfl:     hydrALAZINE (APRESOLINE) 100 MG tablet, Take 1 tablet by mouth 2 times daily Except on the morning's of dialysis (Patient taking differently: Take 100 mg by mouth 3 times daily Except on the morning's of dialysis), Disp: 180 tablet, Rfl: 3    B Complex-C-Zn-Folic Acid (DIALYVITE/ZINC) TABS, TAKE 1 TABLET BY MOUTH ONCE DAILY AFTER DIALYSIS, Disp: , Rfl:     tamsulosin (FLOMAX) 0.4 MG capsule, Take 1 capsule by mouth daily, Disp: 30 capsule, Rfl: 3    docusate sodium (COLACE) 100 MG capsule, Take 1 capsule by mouth 3 times daily as needed for Constipation, Disp: 90 capsule, Rfl: 0    nitroGLYCERIN (NITROSTAT) 0.4 MG SL tablet, Place 1 tablet under the tongue every 5 minutes as needed for Chest pain (Patient not taking: Reported on 3/21/2022), Disp: 25 tablet, Rfl: 3    latanoprost (XALATAN) 0.005 % ophthalmic solution, Place 1 drop into both eyes nightly , Disp: , Rfl:     acyclovir (ZOVIRAX) 400 MG tablet, 400 mg 2 times daily , Disp: , Rfl:     DIANA MICROLET LANCETS MISC, TEST TWICE DAILY, Disp: 100 each, Rfl: 2    Insulin Pen Needle (PEN NEEDLES) 31G X 6 MM MISC, 1 each by Does not apply route daily, Disp: 100 each, Rfl: 3    prednisoLONE acetate (PRED FORTE) 1 % ophthalmic suspension, Place 1 drop into the left eye daily , Disp: , Rfl:     ALLERGIES:   Allergies   Allergen Reactions    Coreg [Carvedilol] Shortness Of Breath and Nausea And Vomiting    Lipitor [Atorvastatin] Other (See Comments)     Muscle aches and joint pain    Aldactone [Spironolactone] Hives    Crestor [Rosuvastatin Calcium] Other (See Comments)     Muscle aches and joint pain    Lopid [Gemfibrozil]      hyperglycemia    Invokana [Canagliflozin] Rash    Januvia [Sitagliptin] Nausea And Vomiting       FAMILY HISTORY:       Problem Relation Age of Onset    Cancer Mother         breast    Cancer Father         lung         SOCIAL HISTORY:   Social History     Socioeconomic History    Marital status:      Spouse name: Not on file    Number of children: Not on file    Years of education: Not on file    Highest education level: Not on file   Occupational History    Not on file Tobacco Use    Smoking status: Former Smoker     Packs/day: 1.50     Years: 8.00     Pack years: 12.00     Types: Cigarettes     Quit date: 3/4/1980     Years since quittin.0    Smokeless tobacco: Never Used   Vaping Use    Vaping Use: Never used   Substance and Sexual Activity    Alcohol use: No    Drug use: No    Sexual activity: Not on file   Other Topics Concern    Not on file   Social History Narrative    Not on file     Social Determinants of Health     Financial Resource Strain: Low Risk     Difficulty of Paying Living Expenses: Not hard at all   Food Insecurity: No Food Insecurity    Worried About Running Out of Food in the Last Year: Never true    Genna of Food in the Last Year: Never true   Transportation Needs: No Transportation Needs    Lack of Transportation (Medical): No    Lack of Transportation (Non-Medical): No   Physical Activity:     Days of Exercise per Week: Not on file    Minutes of Exercise per Session: Not on file   Stress:     Feeling of Stress : Not on file   Social Connections:     Frequency of Communication with Friends and Family: Not on file    Frequency of Social Gatherings with Friends and Family: Not on file    Attends Congregational Services: Not on file    Active Member of [address] [place] [address] or Organizations: Not on file    Attends Club or Organization Meetings: Not on file    Marital Status: Not on file   Intimate Partner Violence:     Fear of Current or Ex-Partner: Not on file    Emotionally Abused: Not on file    Physically Abused: Not on file    Sexually Abused: Not on file   Housing Stability:     Unable to Pay for Housing in the Last Year: Not on file    Number of [place] in the Last Year: Not on file    Unstable Housing in the Last Year: Not on file       REVIEW OF SYSTEMS: A 12-point review of systemswas obtained and pertinent positives and negatives were enumerated above in the history of present illness.  All other reviewed systems / symptoms were negative. Review of Systems   Constitutional: Positive for fatigue. Negative for appetite change and unexpected weight change. HENT: Negative for trouble swallowing. Respiratory: Negative for cough, choking, shortness of breath and wheezing. Cardiovascular: Negative for chest pain, palpitations and leg swelling. Gastrointestinal: Positive for blood in stool and diarrhea. Negative for abdominal distention, abdominal pain, anal bleeding, constipation, nausea, rectal pain and vomiting. Genitourinary: Negative for difficulty urinating. Allergic/Immunologic: Negative for environmental allergies and food allergies. Neurological: Negative for dizziness, weakness, light-headedness, numbness and headaches. Hematological: Bruises/bleeds easily. Psychiatric/Behavioral: Negative for sleep disturbance. The patient is not nervous/anxious. LABORATORY DATA: Reviewed  Lab Results   Component Value Date    WBC 10.0 03/10/2022    HGB 11.7 (L) 03/10/2022    HCT 37.6 (L) 03/10/2022    MCV 90.6 03/10/2022     03/10/2022     03/10/2022    K 3.7 03/10/2022     03/10/2022    CO2 20 03/10/2022    BUN 60 (H) 03/10/2022    CREATININE 3.17 (H) 03/10/2022    LABPROT 6.9 10/18/2012    LABALBU 4.2 03/10/2022    BILITOT 0.38 03/10/2022    ALKPHOS 105 03/10/2022    AST 13 03/10/2022    ALT 8 03/10/2022    INR 1.2 11/29/2021         Lab Results   Component Value Date    RBC 4.15 (L) 03/10/2022    HGB 11.7 (L) 03/10/2022    MCV 90.6 03/10/2022    MCH 28.2 03/10/2022    MCHC 31.1 03/10/2022    RDW 15.5 (H) 03/10/2022    MPV 10.3 03/10/2022    BASOPCT 0 03/10/2022    LYMPHSABS 2.11 03/10/2022    MONOSABS 0.62 03/10/2022    NEUTROABS 6.66 03/10/2022    EOSABS 0.55 (H) 03/10/2022    BASOSABS 0.03 03/10/2022         DIAGNOSTIC TESTING:     No results found.      /60   Pulse 66   Temp 97.3 °F (36.3 °C)   Wt 187 lb (84.8 kg)   BMI 27.62 kg/m²     PHYSICAL EXAMINATION: Vital signs reviewed per the nursing documentation. Body mass index is 27.62 kg/m². Physical Exam  Vitals and nursing note reviewed. Constitutional:       General: He is not in acute distress. Appearance: He is well-developed. He is not diaphoretic. HENT:      Head: Normocephalic and atraumatic. Eyes:      General: No scleral icterus. Pupils: Pupils are equal, round, and reactive to light. Neck:      Thyroid: No thyromegaly. Vascular: No JVD. Trachea: No tracheal deviation. Cardiovascular:      Rate and Rhythm: Normal rate and regular rhythm. Heart sounds: Normal heart sounds. No murmur heard. Pulmonary:      Effort: Pulmonary effort is normal. No respiratory distress. Breath sounds: Normal breath sounds. No wheezing. Abdominal:      General: Bowel sounds are normal. There is no distension. Palpations: Abdomen is soft. There is no mass. Tenderness: There is no abdominal tenderness. There is no guarding or rebound. Musculoskeletal:         General: No tenderness. Normal range of motion. Cervical back: Normal range of motion and neck supple. Skin:     General: Skin is warm. Coloration: Skin is not pale. Findings: No erythema or rash. Comments: He is not diaphoretic   Neurological:      Mental Status: He is alert and oriented to person, place, and time. Deep Tendon Reflexes: Reflexes are normal and symmetric. Psychiatric:         Behavior: Behavior normal.         Thought Content: Thought content normal.         Judgment: Judgment normal.         IMPRESSION: Mr. Rance Riedel is a 68 y.o. male with      Diagnosis Orders   1. Iron deficiency anemia due to chronic blood loss  ID GI IMAG INTRALUMINAL ESOPHAGUS-ILEUM W/I&R   2. Moderate malnutrition ([address]     3. S/P etta     4.  Diarrhea, unspecified type  diphenoxylate-atropine (DIPHENATOL) 2.5-0.025 MG per tablet     Proceed with capsule endoscopy of the small bowel to look for the cause of his severe iron deficiency anemia  Continue with the iron infusion  His diarrhea could be related to the postcholecystectomy status for which I given Colestid and I gave him Lomotil for emergencies      Diet/life style/natural hx /complication of the dx were all explained in details   Past medical, past surgical, social history, psychiatric history, medications or allergies, all reviewed and  updated        Thank you for allowing me to participate in the care of Mr. Greg Tabor. For any further questions please do not hesitate to contact me. I have reviewed and agree with the MA/RN ROS. Note is dictated utilizing voice recognition software. Unfortunately this leads to occasional typographical errors. Please contact our office if you have any questions.     Dee Joel MD  [facility]  O: #[phone]

## 2022-03-30 ENCOUNTER — TELEPHONE (OUTPATIENT)
Dept: GASTROENTEROLOGY | Age: 77
End: 2022-03-30

## 2022-03-30 RX ORDER — CHOLESTYRAMINE 4 G/9G
1 POWDER, FOR SUSPENSION ORAL DAILY
Qty: 90 PACKET | Refills: 3 | Status: SHIPPED | OUTPATIENT
Start: 2022-03-30 | End: 2022-04-28

## 2022-03-30 NOTE — TELEPHONE ENCOUNTER
Colestid not covered by insurance. Writer prepared and signed script per Dr Iva Ceja.
24-Mar-2020 12:39

## 2022-03-31 ENCOUNTER — HOSPITAL ENCOUNTER (OUTPATIENT)
Age: 77
Discharge: HOME | End: 2022-03-31
Payer: MEDICARE

## 2022-03-31 ENCOUNTER — OFFICE VISIT (OUTPATIENT)
Dept: ONCOLOGY | Age: 77
End: 2022-03-31
Payer: MEDICARE

## 2022-03-31 VITALS
RESPIRATION RATE: 18 BRPM | TEMPERATURE: 98.3 F | HEART RATE: 72 BPM | DIASTOLIC BLOOD PRESSURE: 56 MMHG | WEIGHT: 187 LBS | BODY MASS INDEX: 27.62 KG/M2 | SYSTOLIC BLOOD PRESSURE: 137 MMHG

## 2022-03-31 DIAGNOSIS — D50.8 IRON DEFICIENCY ANEMIA SECONDARY TO INADEQUATE DIETARY IRON INTAKE: ICD-10-CM

## 2022-03-31 DIAGNOSIS — D50.8 IRON DEFICIENCY ANEMIA SECONDARY TO INADEQUATE DIETARY IRON INTAKE: Primary | ICD-10-CM

## 2022-03-31 DIAGNOSIS — K90.9 IRON MALABSORPTION: ICD-10-CM

## 2022-03-31 LAB
ABSOLUTE EOS #: 0.38 K/UL (ref 0–0.44)
ABSOLUTE IMMATURE GRANULOCYTE: 0.07 K/UL (ref 0–0.3)
ABSOLUTE LYMPH #: 1.69 K/UL (ref 1.1–3.7)
ABSOLUTE MONO #: 0.66 K/UL (ref 0.1–1.2)
BASOPHILS # BLD: 1 % (ref 0–2)
BASOPHILS ABSOLUTE: 0.06 K/UL (ref 0–0.2)
EOSINOPHILS RELATIVE PERCENT: 4 % (ref 1–4)
HCT VFR BLD CALC: 30.8 % (ref 40.7–50.3)
HEMOGLOBIN: 10.2 G/DL (ref 13–17)
IMMATURE GRANULOCYTES: 1 %
LYMPHOCYTES # BLD: 19 % (ref 24–43)
MCH RBC QN AUTO: 29.5 PG (ref 25.2–33.5)
MCHC RBC AUTO-ENTMCNC: 33.1 G/DL (ref 28.4–34.8)
MCV RBC AUTO: 89 FL (ref 82.6–102.9)
MONOCYTES # BLD: 7 % (ref 3–12)
NRBC AUTOMATED: 0 PER 100 WBC
PDW BLD-RTO: 15.1 % (ref 11.8–14.4)
PLATELET # BLD: 189 K/UL (ref 138–453)
PMV BLD AUTO: 9.5 FL (ref 8.1–13.5)
RBC # BLD: 3.46 M/UL (ref 4.21–5.77)
SEG NEUTROPHILS: 68 % (ref 36–65)
SEGMENTED NEUTROPHILS ABSOLUTE COUNT: 6.06 K/UL (ref 1.5–8.1)
WBC # BLD: 8.9 K/UL (ref 3.5–11.3)

## 2022-03-31 PROCEDURE — 1036F TOBACCO NON-USER: CPT | Performed by: INTERNAL MEDICINE

## 2022-03-31 PROCEDURE — 4040F PNEUMOC VAC/ADMIN/RCVD: CPT | Performed by: INTERNAL MEDICINE

## 2022-03-31 PROCEDURE — 1123F ACP DISCUSS/DSCN MKR DOCD: CPT | Performed by: INTERNAL MEDICINE

## 2022-03-31 PROCEDURE — 99211 OFF/OP EST MAY X REQ PHY/QHP: CPT

## 2022-03-31 PROCEDURE — 36415 COLL VENOUS BLD VENIPUNCTURE: CPT

## 2022-03-31 PROCEDURE — 99214 OFFICE O/P EST MOD 30 MIN: CPT | Performed by: INTERNAL MEDICINE

## 2022-03-31 PROCEDURE — G8482 FLU IMMUNIZE ORDER/ADMIN: HCPCS | Performed by: INTERNAL MEDICINE

## 2022-03-31 PROCEDURE — G8417 CALC BMI ABV UP PARAM F/U: HCPCS | Performed by: INTERNAL MEDICINE

## 2022-03-31 PROCEDURE — 85025 COMPLETE CBC W/AUTO DIFF WBC: CPT

## 2022-03-31 PROCEDURE — G8427 DOCREV CUR MEDS BY ELIG CLIN: HCPCS | Performed by: INTERNAL MEDICINE

## 2022-03-31 NOTE — PROGRESS NOTES
_               Mr. Jake Ramos is a very pleasant 68 y.o. male with history of multiple co morbidities as listed. The patient is referred for further management of anemia. Patient has episodes of black tarry stool. He had upper and lower endoscopy in October 2020 which were negative. He is scheduled for follow-up with gastroenterology in [facility] (formerly [facility] next week for capsule camera evaluation. Patient is having symptomatic anemia. Is having weakness and fatigue. Feels tired. He has shortness of breath on exertion. He is having epigastric pain. No hematochezia. No hematemesis. Patient denies smoking or alcohol drinking. .     Interim history:  Patient required no more dialysis  No weakness or fatigue and no GI bleed  Repeated hemoglobin up to 11.7  Patient had episodes of GI bleed few weeks ago  Was seen by GI and pending capsule endoscopy      PAST MEDICAL HISTORY: has a past medical history of Acute kidney injury ([address] Acute MI ([address] Acute renal failure with tubular necrosis ([address] Anemia, CAD (coronary artery disease), Cerebral artery occlusion with cerebral infarction ([address] CHF (congestive heart failure) ([address] Chronic back pain, Closed fracture of lumbar vertebra without mention of spinal cord injury, Displacement of intervertebral disc, site unspecified, without myelopathy, H/O cardiac catheterization, H/O cardiovascular stress test, H/O tilt table evaluation, H/O tilt table evaluation, History of 24 hour EKG monitoring, History of 24 hour EKG monitoring, History of blood transfusion, History of cardiovascular stress test, History of cardiovascular stress test, History of coronary artery stent placement, History of CVA (cerebrovascular accident) without residual deficits, History of echocardiogram, History of Holter monitoring, History of tilt table evaluation, Hyperlipidemia, Hypertension, Non critical Right Renal artery stenosis, native [facility]), Pacemaker, S/P cardiac cath, S/P coronary artery stent placement, SIRS (systemic inflammatory response syndrome) ([facility] and Type II or unspecified type diabetes mellitus without mention of complication, not stated as uncontrolled. PAST SURGICAL HISTORY: has a past surgical history that includes Pacemaker insertion; back surgery; Cholecystectomy (08/17/2015); Corneal transplant; Cardiac catheterization (Left, 02/19/2016); pacemaker placement; Colonoscopy (2010); Colonoscopy (02/19/2015); Colonoscopy (05/23/2018); Colonoscopy (N/A, 05/23/2018); Upper gastrointestinal endoscopy (05/28/2019); Upper gastrointestinal endoscopy (N/A, 05/28/2019); Colonoscopy (10/30/2020); Colonoscopy (N/A, 10/30/2020); Upper gastrointestinal endoscopy (N/A, 10/30/2020); Endoscopy, colon, diagnostic; eye surgery; Cardiac catheterization (Left, 10/05/2021); IR TUNNELED CVC PLACE WO SQ PORT/PUMP > 5 YEARS (11/22/2021); and US BIOPSY RENAL LEFT PERC (11/24/2021). CURRENT MEDICATIONS:  has a current medication list which includes the following prescription(s): cholestyramine, contour test, diphenoxylate-atropine, metoprolol tartrate, lantus solostar, amlodipine, furosemide, omeprazole, hydralazine, dialyvite/zinc, tamsulosin, docusate sodium, nitroglycerin, latanoprost, acyclovir, susan microlet lancets, pen needles, and prednisolone acetate. ALLERGIES:  is allergic to coreg [carvedilol], lipitor [atorvastatin], aldactone [spironolactone], crestor [rosuvastatin calcium], lopid [gemfibrozil], invokana [canagliflozin], and januvia [sitagliptin]. FAMILY HISTORY: Negative for any hematological or oncological conditions. SOCIAL HISTORY:  reports that he quit smoking about 42 years ago. His smoking use included cigarettes. He has a 12.00 pack-year smoking history. He has never used smokeless tobacco. He reports that he does not drink alcohol and does not use drugs.     REVIEW OF SYSTEMS:     · General: Positive for weakness and fatigue. No unanticipated weight loss or decreased appetite. No fever or chills. · Eyes: No blurred vision, eye pain or double vision. · Ears: No hearing problems or drainage. No tinnitus. · Throat: No sore throat, problems with swallowing or dysphagia. · Respiratory: No cough, sputum or hemoptysis. No shortness of breath. No pleuritic chest pain. · Cardiovascular: No chest pain, orthopnea or PND. No lower extremity edema. No palpitation. · Gastrointestinal: As above. · Genitourinary: No dysuria, hematuria, frequency or urgency. · Musculoskeletal: No muscle aches or pains. No limitation of movement. No back pain. No gait disturbance, No joint complaints. · Dermatologic: No skin rashes or pruritus. No skin lesions or discolorations. · Psychiatric: No depression, anxiety, or stress or signs of schizophrenia. No change in mood or affect. · Hematologic: No history of bleeding tendency. No bruises or ecchymosis. No history of clotting problems. · Infectious disease: No fever, chills or frequent infections. · Endocrine: No polydipsia or polyuria. No temperature intolerance. · Neurologic: No headaches or dizziness. No weakness or numbness of the extremities. No changes in balance, coordination,  memory, mentation, behavior. · Allergic/Immunologic: No nasal congestion or hives. No repeated infections. PHYSICAL EXAM:  The patient is not in acute distress. Vital signs: Blood pressure (!) 137/56, pulse 72, temperature 98.3 °F (36.8 °C), temperature source Temporal, resp. rate 18, weight 187 lb (84.8 kg).      General appearance - well appearing, not in pain or distress  Mental status - good mood, alert and oriented  Eyes - pupils equal and reactive, extraocular eye movements intact  Ears - bilateral TM's and external ear canals normal  Nose - normal and patent, no erythema, discharge or polyps  Mouth - mucous membranes moist, pharynx normal without lesions  Neck - supple, no significant adenopathy  Lymphatics - no palpable lymphadenopathy, no hepatosplenomegaly  Chest - clear to auscultation, no wheezes, rales or rhonchi, symmetric air entry  Heart - normal rate, regular rhythm, normal S1, S2, no murmurs, rubs, clicks or gallops  Abdomen - soft, nontender, nondistended, no masses or organomegaly  Neurological - alert, oriented, normal speech, no focal findings or movement disorder noted  Musculoskeletal - no joint tenderness, deformity or swelling  Extremities - peripheral pulses normal, no pedal edema, no clubbing or cyanosis  Skin - normal coloration and turgor, no rashes, no suspicious skin lesions noted     Review of Diagnostic data:   Lab Results   Component Value Date    WBC 10.0 03/10/2022    HGB 11.7 (L) 03/10/2022    HCT 37.6 (L) 03/10/2022    MCV 90.6 03/10/2022     03/10/2022       Chemistry        Component Value Date/Time     03/10/2022 1226    K 3.7 03/10/2022 1226     03/10/2022 1226    CO2 20 03/10/2022 1226    BUN 60 (H) 03/10/2022 1226    CREATININE 3.17 (H) 03/10/2022 1226        Component Value Date/Time    CALCIUM 9.2 03/10/2022 1226    ALKPHOS 105 03/10/2022 1226    AST 13 03/10/2022 1226    ALT 8 03/10/2022 1226    BILITOT 0.38 03/10/2022 1226            IMPRESSION:   Severe normocytic anemia likely combination of blood loss anemia and anemia of chronic disease  Status post IV iron and Procrit with dialysis which has been discontinued  Episodes of GI bleeding which has been recurrent  Chronic renal insufficiency  Coronary artery disease  Multiple comorbidities as listed    PLAN: I reviewed the labs available to me and discussed with the patient. I explained to the patient the nature of this hematologic problem. I explained the significance of these abnormalities in layman language.   Patient was seen by GI and pending capsule endoscopy  Anemia work-up did show improvement with hemoglobin up to 11.7  With recent bleed will repeat CBC today if stable will see patient in 8 weeks  Based on the most recent CBC no need for IV iron or Procrit with very high ferritin  Keep follow-up with GI and nephrology  Patient's questions were answered to the best of his satisfaction and he verbalized full understanding and agreement. [facility]                            This note is created with the assistance of a speech recognition program.  While intending to generate a document that actually reflects the content of the visit, the document can still have some errors including those of syntax and sound a like substitutions which may escape proof reading. It such instances, actual meaning can be extrapolated by contextual diversion. I spent more than 30 minutes examining, evaluating, reviewing data, counseling the patient and coordinating care. Greater than 50% of time was spent face-to-face with the patient this note is created with the assistance of a speech recognition program.  While intending to generate a document that actually reflects the content of the visit, the document can still have some errors including those of syntax and sound a like substitutions which may escape proof reading. It such instances, actual meaning can be extrapolated by contextual diversion.

## 2022-04-01 DIAGNOSIS — Z79.4 TYPE 2 DIABETES MELLITUS WITH HYPERGLYCEMIA, WITH LONG-TERM CURRENT USE OF INSULIN (HCC): ICD-10-CM

## 2022-04-01 DIAGNOSIS — E11.65 TYPE 2 DIABETES MELLITUS WITH HYPERGLYCEMIA, WITH LONG-TERM CURRENT USE OF INSULIN (HCC): ICD-10-CM

## 2022-04-01 RX ORDER — CARVEDILOL 25 MG/1
TABLET, FILM COATED ORAL
Qty: 100 EACH | Refills: 0 | Status: SHIPPED | OUTPATIENT
Start: 2022-04-01 | End: 2022-04-04 | Stop reason: SDUPTHER

## 2022-04-04 ENCOUNTER — TELEPHONE (OUTPATIENT)
Dept: PRIMARY CARE CLINIC | Age: 77
End: 2022-04-04

## 2022-04-04 DIAGNOSIS — Z79.4 TYPE 2 DIABETES MELLITUS WITH HYPERGLYCEMIA, WITH LONG-TERM CURRENT USE OF INSULIN (HCC): ICD-10-CM

## 2022-04-04 DIAGNOSIS — E11.65 TYPE 2 DIABETES MELLITUS WITH HYPERGLYCEMIA, WITH LONG-TERM CURRENT USE OF INSULIN (HCC): ICD-10-CM

## 2022-04-04 RX ORDER — CARVEDILOL 25 MG/1
1 TABLET, FILM COATED ORAL 2 TIMES DAILY
Qty: 200 EACH | Refills: 3 | Status: SHIPPED | OUTPATIENT
Start: 2022-04-04 | End: 2022-04-28

## 2022-04-07 RX ORDER — TAMSULOSIN HYDROCHLORIDE 0.4 MG/1
CAPSULE ORAL
Qty: 30 CAPSULE | Refills: 5 | Status: SHIPPED | OUTPATIENT
Start: 2022-04-07 | End: 2022-08-25 | Stop reason: SDUPTHER

## 2022-04-11 ENCOUNTER — TELEPHONE (OUTPATIENT)
Dept: GASTROENTEROLOGY | Age: 77
End: 2022-04-11

## 2022-04-11 RX ORDER — POLYETHYLENE GLYCOL 3350, SODIUM SULFATE ANHYDROUS, SODIUM BICARBONATE, SODIUM CHLORIDE, POTASSIUM CHLORIDE 236; 22.74; 6.74; 5.86; 2.97 G/4L; G/4L; G/4L; G/4L; G/4L
2 POWDER, FOR SOLUTION ORAL ONCE
Qty: 2000 ML | Refills: 0 | Status: SHIPPED | OUTPATIENT
Start: 2022-04-11 | End: 2022-04-11

## 2022-04-11 NOTE — TELEPHONE ENCOUNTER
Pt has non-functioning pacemaker. Per Dr Patricia King, OK to procedure with procedure. Writer called pt and scheduled for 4/21. All instructions are reviewed.    Instructions are also sent to pt via [employer]

## 2022-04-12 ENCOUNTER — OFFICE VISIT (OUTPATIENT)
Dept: PRIMARY CARE CLINIC | Age: 77
End: 2022-04-12
Payer: MEDICARE

## 2022-04-12 ENCOUNTER — HOSPITAL ENCOUNTER (OUTPATIENT)
Age: 77
Discharge: HOME | End: 2022-04-12
Payer: MEDICARE

## 2022-04-12 VITALS
WEIGHT: 182.6 LBS | SYSTOLIC BLOOD PRESSURE: 122 MMHG | RESPIRATION RATE: 18 BRPM | DIASTOLIC BLOOD PRESSURE: 64 MMHG | BODY MASS INDEX: 26.97 KG/M2

## 2022-04-12 DIAGNOSIS — I10 ESSENTIAL HYPERTENSION: ICD-10-CM

## 2022-04-12 DIAGNOSIS — Z79.4 TYPE 2 DIABETES MELLITUS WITH HYPERGLYCEMIA, WITH LONG-TERM CURRENT USE OF INSULIN (HCC): Primary | ICD-10-CM

## 2022-04-12 DIAGNOSIS — I50.32 CHRONIC DIASTOLIC HEART FAILURE (HCC): ICD-10-CM

## 2022-04-12 DIAGNOSIS — E11.65 TYPE 2 DIABETES MELLITUS WITH HYPERGLYCEMIA, WITH LONG-TERM CURRENT USE OF INSULIN (HCC): ICD-10-CM

## 2022-04-12 DIAGNOSIS — D50.8 OTHER IRON DEFICIENCY ANEMIA: ICD-10-CM

## 2022-04-12 DIAGNOSIS — E11.65 TYPE 2 DIABETES MELLITUS WITH HYPERGLYCEMIA, WITH LONG-TERM CURRENT USE OF INSULIN (HCC): Primary | ICD-10-CM

## 2022-04-12 DIAGNOSIS — Z79.4 TYPE 2 DIABETES MELLITUS WITH HYPERGLYCEMIA, WITH LONG-TERM CURRENT USE OF INSULIN (HCC): ICD-10-CM

## 2022-04-12 LAB
ABSOLUTE EOS #: 0.2 K/UL (ref 0–0.44)
ABSOLUTE IMMATURE GRANULOCYTE: 0.14 K/UL (ref 0–0.3)
ABSOLUTE LYMPH #: 1.29 K/UL (ref 1.1–3.7)
ABSOLUTE MONO #: 0.47 K/UL (ref 0.1–1.2)
ANION GAP SERPL CALCULATED.3IONS-SCNC: 16 MMOL/L (ref 9–17)
BASOPHILS # BLD: 1 % (ref 0–2)
BASOPHILS ABSOLUTE: 0.05 K/UL (ref 0–0.2)
BUN BLDV-MCNC: 78 MG/DL (ref 8–23)
BUN/CREAT BLD: 19 (ref 9–20)
CALCIUM SERPL-MCNC: 9.4 MG/DL (ref 8.6–10.4)
CHLORIDE BLD-SCNC: 102 MMOL/L (ref 98–107)
CO2: 19 MMOL/L (ref 20–31)
CREAT SERPL-MCNC: 4.04 MG/DL (ref 0.7–1.2)
EOSINOPHILS RELATIVE PERCENT: 3 % (ref 1–4)
GFR AFRICAN AMERICAN: 18 ML/MIN
GFR NON-AFRICAN AMERICAN: 15 ML/MIN
GFR SERPL CREATININE-BSD FRML MDRD: ABNORMAL ML/MIN/{1.73_M2}
GFR SERPL CREATININE-BSD FRML MDRD: ABNORMAL ML/MIN/{1.73_M2}
GLUCOSE BLD-MCNC: 172 MG/DL (ref 70–99)
HBA1C MFR BLD: 7.6 %
HCT VFR BLD CALC: 28.9 % (ref 40.7–50.3)
HEMOGLOBIN: 9.4 G/DL (ref 13–17)
IMMATURE GRANULOCYTES: 2 %
LYMPHOCYTES # BLD: 18 % (ref 24–43)
MCH RBC QN AUTO: 28.7 PG (ref 25.2–33.5)
MCHC RBC AUTO-ENTMCNC: 32.5 G/DL (ref 28.4–34.8)
MCV RBC AUTO: 88.1 FL (ref 82.6–102.9)
MONOCYTES # BLD: 6 % (ref 3–12)
NRBC AUTOMATED: 0 PER 100 WBC
PDW BLD-RTO: 14.9 % (ref 11.8–14.4)
PLATELET # BLD: 224 K/UL (ref 138–453)
PMV BLD AUTO: 9.2 FL (ref 8.1–13.5)
POTASSIUM SERPL-SCNC: 3.6 MMOL/L (ref 3.7–5.3)
RBC # BLD: 3.28 M/UL (ref 4.21–5.77)
SEG NEUTROPHILS: 70 % (ref 36–65)
SEGMENTED NEUTROPHILS ABSOLUTE COUNT: 5.14 K/UL (ref 1.5–8.1)
SODIUM BLD-SCNC: 137 MMOL/L (ref 135–144)
WBC # BLD: 7.3 K/UL (ref 3.5–11.3)

## 2022-04-12 PROCEDURE — 80048 BASIC METABOLIC PNL TOTAL CA: CPT

## 2022-04-12 PROCEDURE — 3051F HG A1C>EQUAL 7.0%<8.0%: CPT | Performed by: FAMILY MEDICINE

## 2022-04-12 PROCEDURE — 1036F TOBACCO NON-USER: CPT | Performed by: FAMILY MEDICINE

## 2022-04-12 PROCEDURE — 99214 OFFICE O/P EST MOD 30 MIN: CPT | Performed by: FAMILY MEDICINE

## 2022-04-12 PROCEDURE — 85025 COMPLETE CBC W/AUTO DIFF WBC: CPT

## 2022-04-12 PROCEDURE — PBSHW POCT GLYCOSYLATED HEMOGLOBIN (HGB A1C): Performed by: FAMILY MEDICINE

## 2022-04-12 PROCEDURE — G8427 DOCREV CUR MEDS BY ELIG CLIN: HCPCS | Performed by: FAMILY MEDICINE

## 2022-04-12 PROCEDURE — 1123F ACP DISCUSS/DSCN MKR DOCD: CPT | Performed by: FAMILY MEDICINE

## 2022-04-12 PROCEDURE — G8417 CALC BMI ABV UP PARAM F/U: HCPCS | Performed by: FAMILY MEDICINE

## 2022-04-12 PROCEDURE — 4040F PNEUMOC VAC/ADMIN/RCVD: CPT | Performed by: FAMILY MEDICINE

## 2022-04-12 PROCEDURE — 99211 OFF/OP EST MAY X REQ PHY/QHP: CPT | Performed by: FAMILY MEDICINE

## 2022-04-12 PROCEDURE — 83036 HEMOGLOBIN GLYCOSYLATED A1C: CPT | Performed by: FAMILY MEDICINE

## 2022-04-12 PROCEDURE — 36415 COLL VENOUS BLD VENIPUNCTURE: CPT

## 2022-04-12 RX ORDER — HYDRALAZINE HYDROCHLORIDE 100 MG/1
100 TABLET, FILM COATED ORAL 3 TIMES DAILY
Qty: 90 TABLET | Refills: 2 | Status: SHIPPED | OUTPATIENT
Start: 2022-04-12 | End: 2022-09-22

## 2022-04-12 ASSESSMENT — PATIENT HEALTH QUESTIONNAIRE - PHQ9
6. FEELING BAD ABOUT YOURSELF - OR THAT YOU ARE A FAILURE OR HAVE LET YOURSELF OR YOUR FAMILY DOWN: 0
3. TROUBLE FALLING OR STAYING ASLEEP: 0
SUM OF ALL RESPONSES TO PHQ QUESTIONS 1-9: 0
SUM OF ALL RESPONSES TO PHQ9 QUESTIONS 1 & 2: 0
5. POOR APPETITE OR OVEREATING: 0
9. THOUGHTS THAT YOU WOULD BE BETTER OFF DEAD, OR OF HURTING YOURSELF: 0
7. TROUBLE CONCENTRATING ON THINGS, SUCH AS READING THE NEWSPAPER OR WATCHING TELEVISION: 0
4. FEELING TIRED OR HAVING LITTLE ENERGY: 0
SUM OF ALL RESPONSES TO PHQ QUESTIONS 1-9: 0
8. MOVING OR SPEAKING SO SLOWLY THAT OTHER PEOPLE COULD HAVE NOTICED. OR THE OPPOSITE, BEING SO FIGETY OR RESTLESS THAT YOU HAVE BEEN MOVING AROUND A LOT MORE THAN USUAL: 0
1. LITTLE INTEREST OR PLEASURE IN DOING THINGS: 0
10. IF YOU CHECKED OFF ANY PROBLEMS, HOW DIFFICULT HAVE THESE PROBLEMS MADE IT FOR YOU TO DO YOUR WORK, TAKE CARE OF THINGS AT HOME, OR GET ALONG WITH OTHER PEOPLE: 0
2. FEELING DOWN, DEPRESSED OR HOPELESS: 0

## 2022-04-12 NOTE — PROGRESS NOTES
Hypertension: Patient here for follow-up of elevated blood pressure. He is not exercising and is adherent to low salt diet. Blood pressure is well controlled at home. Cardiac symptoms none. Patient denies chest pain, chest pressure/discomfort and palpitations. Cardiovascular risk factors: advanced age (older than 54 for men, 72 for women), diabetes mellitus, dyslipidemia, hypertension and male gender. Use of agents associated with hypertension: none. Congestive Heart Failure  Patient presents for re-evaluation of congestive heart failure. Patient's current complaints are none. He denies chest pain, chest pressure/discomfort and palpitations. He states he is compliant all of the time with his medications. He states he is compliant all of the time with his diet. Diabetes  He has been checking  his blood glucose levels regularly. He denies numbness and tingling in the hands and feet. He has been compliant with diet and exercise. He has been compliant with his medications. He is tolerating medications. Patient states he needs to have his dialysis port taken out but he isn't sure where to go for that. Patient states that he is also having back pain. Patient also states he wants his Hemoglobin levels, Creatinine and BUN.          Allergies:  Coreg [carvedilol], Lipitor [atorvastatin], Aldactone [spironolactone], Crestor [rosuvastatin calcium], Lopid [gemfibrozil], Invokana [canagliflozin], and Januvia [sitagliptin]    Past Medical History:    Past Medical History:   Diagnosis Date    Acute kidney [facility] ([facility]     Acute MI ([facility]     Acute renal failure with tubular necrosis ([facility] 10/2/2018    ssecondary to hemodynamic effects of loop diuretics and ace inhibitors, bbaseline 1.2-1.3 which peaked up to 1.8, resolving    Anemia     CAD (coronary artery disease)     Cerebral artery occlusion with cerebral infarction ([address]     CHF (congestive heart failure) (HCC)     Chronic back pain     Closed fracture of lumbar vertebra without mention of spinal cord injury     Displacement of intervertebral disc, site unspecified, without myelopathy     H/O cardiac catheterization 2/19/16    LMCA: Mild irregularities 10-20%. LAD: Lesion on PRox LAD: Mid subsection. 65% stenosis. LCx: Lesion on 1st Ob Valentina: Proximal subesection. 70% steniosis. RCA: Small non-dominant RCA. Lesion on PRox RCA: Ostial. 50% stenosis. EF:55%.  H/O cardiovascular stress test 2/19/16    Abnormal. Moderate perfusion defect of mild intensity in the inferior, inferoseptal adn inferoapical regions during stress imaging, which most consistent with ischemia. Global LV systolic function normal without regional wall motion abnormalities. OVerall these results are most consistent with an intermediate risk for signficant CAD. Additional testing including cardiac cath may be indicated.  H/O tilt table evaluation 12/26/2017    Abnormal. Patients HR, BP response and symptoms were most consistent with dysautonomia. Combined with viligant maintenance of euvolemia and maintaining a moderate salft intake, pharmacologic treatment with SSRI such as lexapro and or mestinon among other treatments have shown some effectiveness in treatment of this condition    H/O tilt table evaluation 12/26/2017    Abnormal head upright tilt table study. The pt heart rate, blood pressure response and symptoms were most consistent with dysautonomia.  History of 24 hour EKG monitoring 8/14/14    Occasional PAC's and PVC's which appear to be at least moderately symptomatic.  History of 24 hour EKG monitoring 11/19/14    Event Monitor. Sinus rhythm and sinus bradycardia. Infrequent isolated PVC's    History of blood transfusion     History of cardiovascular stress test 2/19/14    Relatively NL    History of cardiovascular stress test 03/21/2018    Normal myocardial perfusion. Global left ventricular systolic function was normal with an EF of 68%.  Overall, these results are most consistent with a low risk scan.  History of coronary artery stent placement 04/2017    PTCA / Drug Eluting Stent:, CX and / or branches    History of CVA (cerebrovascular accident) without residual deficits 2014    Incidentally found on CT head. No known impairment now or in the past.    History of echocardiogram 2/18/14    LA mildly dilated, EF 55%, LV wall thickness is moderately increased, no definite wall motion abnormalilities, what appears to be a pacer wire is seen w/n the RA and RV, mild-mod TR, mild pulmonary hypertension.  History of Holter monitoring 12/29/2017    Rare PAC's and PVC's.  History of tilt table evaluation 8/12/14    Abnormal    Hyperlipidemia     Hypertension     Non critical Right Renal artery stenosis, native ([address] 10/2/2018    Non critical Right Renal artery stenosis, based on cath in 2016, Rt RA 30% stenosis    Pacemaker     non-functioning    S/P cardiac cath 04/10/2017    S/P coronary artery stent placement     Successful PTCA - HA Om1    SIRS (systemic inflammatory response syndrome) ([address] 5/19/2019    Type II or unspecified type diabetes mellitus without mention of complication, not stated as uncontrolled        Past Surgical History:    Past Surgical History:   Procedure Laterality Date    BACK SURGERY      CARDIAC CATHETERIZATION Left 02/19/2016    via right radial approach/ Melina Vernon/ Dr. Gonzalez Read Left 10/05/2021    Dr Annika Lin radial-Moderate three vessel coronary artery disease involving a ostial 50-60% stenosis in a small, non-dominant RCA, a 60% mid LAD stenosis and a proximal 50-60% stenosis in the left anterior descending coronary artery. Normal left ventricular end diastolic pressure. Proceed with aggressive maximal medical management as clinically indicated.     CHOLECYSTECTOMY  08/17/2015    Liang/Charles/Saulo/ [facility]    COLONOSCOPY  2010    COLONOSCOPY  02/19/2015 -polyps,diverticulosis,hemorrhoids    COLONOSCOPY  2018    Dr Josue Hung    COLONOSCOPY N/A 2018    COLONOSCOPY POLYPECTOMY SNARE/COLD BIOPSY  cold snare  and  hot snare performed by Senia Bro MD at [facility]  10/30/2020    with Dr. Halina Kennedy 10/30/2020    Dyana Ryder, NOT HIGH RISK performed by Yi Matos DO at [address] [place] [address]      left eye    ENDOSCOPY, COLON, DIAGNOSTIC      EYE SURGERY      IR TUNNELED CATHETER PLACEMENT GREATER THAN 5 YEARS  2021    IR TUNNELED CATHETER PLACEMENT GREATER THAN 5 YEARS 2021 [facility]    PACEMAKER INSERTION      PACEMAKER PLACEMENT      UPPER GASTROINTESTINAL ENDOSCOPY  2019    Dr. Micheal Best H-Pylori)duodenal bulbar/antral erythema    UPPER GASTROINTESTINAL ENDOSCOPY N/A 2019    EGD BIOPSY, clotest performed by Senia Bro MD at [place] [address] N/A 10/30/2020    EGD ESOPHAGOGASTRODUODENOSCOPY performed by Yi Matos DO at [place] [address], LT  2021    US PERCUT RENAL BIOPSY, LT 2021 [facility]       Social History:   Social History     Tobacco Use    Smoking status: Former Smoker     Packs/day: 1.50     Years: 8.00     Pack years: 12.00     Types: Cigarettes     Quit date: 3/4/1980     Years since quittin.1    Smokeless tobacco: Never Used   Substance Use Topics    Alcohol use: No       Family History:   Family History   Problem Relation Age of Onset    Cancer Mother         breast    Cancer Father         lung         Review of Systems:  Constitutional: negative for fever or chills  Eyes: negative for visual disturbance   ENT: negative for sore throat or nasal congestion  Respiratory: negative for cough, shortness of breath and sputum  Cardiovascular: negative for chest pain,pnd,claudication or Differential    Basic Metabolic Panel   2. Essential hypertension wellcontrolled Basic Metabolic Panel   3. Chronic diastolic heart failure (HCC) - has some edema,weight stable    4. Other iron deficiency anemia - check cbc        · Check BS 2 times per day  · Encouraged low fat, low sodium and diabetic, 1800 calorie diet.   · Goal for blood pressure control is 130/80  · Recommended regular exercise as tolerated, 5 times per week  · Labs reviewed 7.6  · Labs ordered: cbc,bmp  · He has decided not to have hemodialysis and wants to have his catheter removed- order will be sent to out patient  ascivd risk  Orders Placed This Encounter   Procedures    CBC with Auto Differential     Standing Status:   Future     Standing Expiration Date:   4/12/2023    Basic Metabolic Panel     Standing Status:   Future     Standing Expiration Date:   4/12/2023       Current Outpatient Medications   Medication Sig Dispense Refill    hydrALAZINE (APRESOLINE) 100 MG tablet Take 1 tablet by mouth 3 times daily Except on the morning's of dialysis 90 tablet 2    tamsulosin (FLOMAX) 0.4 MG capsule Take 1 capsule by mouth once daily 30 capsule 5    CONTOUR TEST strip Inject 1 each into the skin 2 times daily 200 each 3    cholestyramine (QUESTRAN) 4 g packet Take 1 packet by mouth daily 90 packet 3    metoprolol tartrate (LOPRESSOR) 25 MG tablet Take 1 tablet by mouth 2 times daily 180 tablet 3    LANTUS SOLOSTAR 100 UNIT/ML injection pen INJECT 22 UNITS SUBCUTANEOUSLY TWICE DAILY 30 mL 0    amLODIPine (NORVASC) 5 MG tablet Take 2 tablets by mouth daily 90 tablet 3    furosemide (LASIX) 40 MG tablet Take 1 tablet by mouth daily 90 tablet 3    omeprazole (PRILOSEC) 10 MG delayed release capsule Take 10 mg by mouth daily      B Complex-C-Zn-Folic Acid (DIALYVITE/ZINC) TABS TAKE 1 TABLET BY MOUTH ONCE DAILY AFTER DIALYSIS      docusate sodium (COLACE) 100 MG capsule Take 1 capsule by mouth 3 times daily as needed for Constipation 90 capsule 0    nitroGLYCERIN (NITROSTAT) 0.4 MG SL tablet Place 1 tablet under the tongue every 5 minutes as needed for Chest pain 25 tablet 3    latanoprost (XALATAN) 0.005 % ophthalmic solution Place 1 drop into both eyes nightly       acyclovir (ZOVIRAX) 400 MG tablet 400 mg 2 times daily       [employer] MICROLET LANCETS MISC TEST TWICE DAILY 100 each 2    Insulin Pen Needle (PEN NEEDLES) 31G X 6 MM MISC 1 each by Does not apply route daily 100 each 3    prednisoLONE acetate (PRED FORTE) 1 % ophthalmic suspension Place 1 drop into the left eye daily        No current facility-administered medications for this visit. No follow-ups on file.       Electronically signed by Luis M Heller MD on 4/12/2022 at 1:55 PM

## 2022-04-18 ENCOUNTER — HOSPITAL ENCOUNTER (EMERGENCY)
Age: 77
Discharge: ANOTHER HOSPITAL | End: 2022-04-18
Attending: STUDENT IN AN ORGANIZED HEALTH CARE EDUCATION/TRAINING PROGRAM
Payer: MEDICARE

## 2022-04-18 ENCOUNTER — APPOINTMENT (OUTPATIENT)
Dept: CT IMAGING | Age: 77
End: 2022-04-18
Payer: MEDICARE

## 2022-04-18 VITALS
OXYGEN SATURATION: 94 % | DIASTOLIC BLOOD PRESSURE: 54 MMHG | WEIGHT: 185 LBS | SYSTOLIC BLOOD PRESSURE: 157 MMHG | TEMPERATURE: 96.7 F | HEART RATE: 65 BPM | RESPIRATION RATE: 16 BRPM | BODY MASS INDEX: 27.4 KG/M2 | HEIGHT: 69 IN

## 2022-04-18 DIAGNOSIS — G89.29 ACUTE EXACERBATION OF CHRONIC LOW BACK PAIN: ICD-10-CM

## 2022-04-18 DIAGNOSIS — R82.71 ASYMPTOMATIC BACTERIURIA: ICD-10-CM

## 2022-04-18 DIAGNOSIS — M54.50 ACUTE EXACERBATION OF CHRONIC LOW BACK PAIN: ICD-10-CM

## 2022-04-18 DIAGNOSIS — J90 PLEURAL EFFUSION, RIGHT: Primary | ICD-10-CM

## 2022-04-18 LAB
-: ABNORMAL
ABSOLUTE EOS #: 0.24 K/UL (ref 0–0.44)
ABSOLUTE IMMATURE GRANULOCYTE: 0.15 K/UL (ref 0–0.3)
ABSOLUTE LYMPH #: 1.13 K/UL (ref 1.1–3.7)
ABSOLUTE MONO #: 0.51 K/UL (ref 0.1–1.2)
ALBUMIN SERPL-MCNC: 3.7 G/DL (ref 3.5–5.2)
ALBUMIN/GLOBULIN RATIO: 1.2 (ref 1–2.5)
ALP BLD-CCNC: 91 U/L (ref 40–129)
ALT SERPL-CCNC: 12 U/L (ref 5–41)
ANION GAP SERPL CALCULATED.3IONS-SCNC: 14 MMOL/L (ref 9–17)
AST SERPL-CCNC: 13 U/L
BACTERIA: ABNORMAL
BASOPHILS # BLD: 1 % (ref 0–2)
BASOPHILS ABSOLUTE: 0.04 K/UL (ref 0–0.2)
BILIRUB SERPL-MCNC: 0.31 MG/DL (ref 0.3–1.2)
BILIRUBIN URINE: NEGATIVE
BUN BLDV-MCNC: 75 MG/DL (ref 8–23)
BUN/CREAT BLD: 21 (ref 9–20)
CALCIUM SERPL-MCNC: 9.1 MG/DL (ref 8.6–10.4)
CHLORIDE BLD-SCNC: 103 MMOL/L (ref 98–107)
CO2: 18 MMOL/L (ref 20–31)
COLOR: YELLOW
CREAT SERPL-MCNC: 3.59 MG/DL (ref 0.7–1.2)
EKG ATRIAL RATE: 59 BPM
EKG P AXIS: 16 DEGREES
EKG P-R INTERVAL: 224 MS
EKG Q-T INTERVAL: 454 MS
EKG QRS DURATION: 104 MS
EKG QTC CALCULATION (BAZETT): 449 MS
EKG R AXIS: -7 DEGREES
EKG T AXIS: 6 DEGREES
EKG VENTRICULAR RATE: 59 BPM
EOSINOPHILS RELATIVE PERCENT: 3 % (ref 1–4)
EPITHELIAL CELLS UA: ABNORMAL /HPF (ref 0–5)
GFR AFRICAN AMERICAN: 20 ML/MIN
GFR NON-AFRICAN AMERICAN: 17 ML/MIN
GFR SERPL CREATININE-BSD FRML MDRD: ABNORMAL ML/MIN/{1.73_M2}
GFR SERPL CREATININE-BSD FRML MDRD: ABNORMAL ML/MIN/{1.73_M2}
GLUCOSE BLD-MCNC: 243 MG/DL (ref 70–99)
GLUCOSE URINE: NEGATIVE
HCT VFR BLD CALC: 26.5 % (ref 40.7–50.3)
HEMOGLOBIN: 8.7 G/DL (ref 13–17)
IMMATURE GRANULOCYTES: 2 %
KETONES, URINE: NEGATIVE
LEUKOCYTE ESTERASE, URINE: NEGATIVE
LYMPHOCYTES # BLD: 14 % (ref 24–43)
MCH RBC QN AUTO: 29 PG (ref 25.2–33.5)
MCHC RBC AUTO-ENTMCNC: 32.8 G/DL (ref 28.4–34.8)
MCV RBC AUTO: 88.3 FL (ref 82.6–102.9)
MONOCYTES # BLD: 6 % (ref 3–12)
NITRITE, URINE: NEGATIVE
NRBC AUTOMATED: 0 PER 100 WBC
PDW BLD-RTO: 14.8 % (ref 11.8–14.4)
PH UA: 5.5 (ref 5–9)
PLATELET # BLD: 182 K/UL (ref 138–453)
PMV BLD AUTO: 9.5 FL (ref 8.1–13.5)
POTASSIUM SERPL-SCNC: 3.7 MMOL/L (ref 3.7–5.3)
PRO-BNP: 4330 PG/ML
PROTEIN UA: ABNORMAL
RBC # BLD: 3 M/UL (ref 4.21–5.77)
RBC UA: ABNORMAL /HPF (ref 0–2)
SEG NEUTROPHILS: 74 % (ref 36–65)
SEGMENTED NEUTROPHILS ABSOLUTE COUNT: 5.98 K/UL (ref 1.5–8.1)
SODIUM BLD-SCNC: 135 MMOL/L (ref 135–144)
SPECIFIC GRAVITY UA: 1.02 (ref 1.01–1.02)
TOTAL PROTEIN: 6.9 G/DL (ref 6.4–8.3)
TROPONIN, HIGH SENSITIVITY: 79 NG/L (ref 0–22)
TROPONIN, HIGH SENSITIVITY: 80 NG/L (ref 0–22)
TURBIDITY: CLEAR
URINE HGB: ABNORMAL
UROBILINOGEN, URINE: NORMAL
WBC # BLD: 8.1 K/UL (ref 3.5–11.3)
WBC UA: ABNORMAL /HPF (ref 0–5)

## 2022-04-18 PROCEDURE — 83880 ASSAY OF NATRIURETIC PEPTIDE: CPT

## 2022-04-18 PROCEDURE — 80053 COMPREHEN METABOLIC PANEL: CPT

## 2022-04-18 PROCEDURE — 96376 TX/PRO/DX INJ SAME DRUG ADON: CPT

## 2022-04-18 PROCEDURE — 85025 COMPLETE CBC W/AUTO DIFF WBC: CPT

## 2022-04-18 PROCEDURE — 51798 US URINE CAPACITY MEASURE: CPT

## 2022-04-18 PROCEDURE — 96375 TX/PRO/DX INJ NEW DRUG ADDON: CPT

## 2022-04-18 PROCEDURE — 6360000002 HC RX W HCPCS: Performed by: STUDENT IN AN ORGANIZED HEALTH CARE EDUCATION/TRAINING PROGRAM

## 2022-04-18 PROCEDURE — 74176 CT ABD & PELVIS W/O CONTRAST: CPT

## 2022-04-18 PROCEDURE — 93010 ELECTROCARDIOGRAM REPORT: CPT | Performed by: INTERNAL MEDICINE

## 2022-04-18 PROCEDURE — 3209999900 CT LUMBAR SPINE TRAUMA RECONSTRUCTION

## 2022-04-18 PROCEDURE — 6370000000 HC RX 637 (ALT 250 FOR IP): Performed by: STUDENT IN AN ORGANIZED HEALTH CARE EDUCATION/TRAINING PROGRAM

## 2022-04-18 PROCEDURE — 93005 ELECTROCARDIOGRAM TRACING: CPT | Performed by: STUDENT IN AN ORGANIZED HEALTH CARE EDUCATION/TRAINING PROGRAM

## 2022-04-18 PROCEDURE — 84484 ASSAY OF TROPONIN QUANT: CPT

## 2022-04-18 PROCEDURE — 96374 THER/PROPH/DIAG INJ IV PUSH: CPT

## 2022-04-18 PROCEDURE — 36415 COLL VENOUS BLD VENIPUNCTURE: CPT

## 2022-04-18 PROCEDURE — 99285 EMERGENCY DEPT VISIT HI MDM: CPT

## 2022-04-18 PROCEDURE — 81001 URINALYSIS AUTO W/SCOPE: CPT

## 2022-04-18 PROCEDURE — 72128 CT CHEST SPINE W/O DYE: CPT

## 2022-04-18 RX ORDER — ORPHENADRINE CITRATE 30 MG/ML
30 INJECTION INTRAMUSCULAR; INTRAVENOUS ONCE
Status: COMPLETED | OUTPATIENT
Start: 2022-04-18 | End: 2022-04-18

## 2022-04-18 RX ORDER — LIDOCAINE 4 G/G
1 PATCH TOPICAL DAILY
Status: DISCONTINUED | OUTPATIENT
Start: 2022-04-18 | End: 2022-04-19 | Stop reason: HOSPADM

## 2022-04-18 RX ORDER — FENTANYL CITRATE 50 UG/ML
50 INJECTION, SOLUTION INTRAMUSCULAR; INTRAVENOUS ONCE
Status: COMPLETED | OUTPATIENT
Start: 2022-04-18 | End: 2022-04-18

## 2022-04-18 RX ORDER — ACETAMINOPHEN 325 MG/1
650 TABLET ORAL EVERY 6 HOURS PRN
COMMUNITY

## 2022-04-18 RX ORDER — ASPIRIN 81 MG/1
324 TABLET, CHEWABLE ORAL ONCE
Status: COMPLETED | OUTPATIENT
Start: 2022-04-18 | End: 2022-04-18

## 2022-04-18 RX ORDER — HYDROCODONE BITARTRATE AND ACETAMINOPHEN 5; 325 MG/1; MG/1
1 TABLET ORAL ONCE
Status: COMPLETED | OUTPATIENT
Start: 2022-04-18 | End: 2022-04-18

## 2022-04-18 RX ADMIN — FENTANYL CITRATE 50 MCG: 50 INJECTION, SOLUTION INTRAMUSCULAR; INTRAVENOUS at 09:38

## 2022-04-18 RX ADMIN — HYDROCODONE BITARTRATE AND ACETAMINOPHEN 1 TABLET: 5; 325 TABLET ORAL at 12:44

## 2022-04-18 RX ADMIN — ORPHENADRINE CITRATE 30 MG: 60 INJECTION INTRAMUSCULAR; INTRAVENOUS at 23:15

## 2022-04-18 RX ADMIN — ASPIRIN 324 MG: 81 TABLET, CHEWABLE ORAL at 09:37

## 2022-04-18 RX ADMIN — HYDROMORPHONE HYDROCHLORIDE 0.5 MG: 1 INJECTION, SOLUTION INTRAMUSCULAR; INTRAVENOUS; SUBCUTANEOUS at 23:14

## 2022-04-18 RX ADMIN — HYDROMORPHONE HYDROCHLORIDE 0.5 MG: 1 INJECTION, SOLUTION INTRAMUSCULAR; INTRAVENOUS; SUBCUTANEOUS at 15:30

## 2022-04-18 ASSESSMENT — PAIN DESCRIPTION - LOCATION
LOCATION: BACK
LOCATION: BACK

## 2022-04-18 ASSESSMENT — PAIN DESCRIPTION - PAIN TYPE
TYPE: ACUTE PAIN
TYPE: ACUTE PAIN

## 2022-04-18 ASSESSMENT — ENCOUNTER SYMPTOMS
VOMITING: 0
BACK PAIN: 1
SHORTNESS OF BREATH: 0
PHOTOPHOBIA: 0
NAUSEA: 0
ABDOMINAL PAIN: 0

## 2022-04-18 ASSESSMENT — PAIN DESCRIPTION - DESCRIPTORS: DESCRIPTORS: DISCOMFORT

## 2022-04-18 ASSESSMENT — PAIN SCALES - GENERAL
PAINLEVEL_OUTOF10: 10
PAINLEVEL_OUTOF10: 9
PAINLEVEL_OUTOF10: 6
PAINLEVEL_OUTOF10: 10

## 2022-04-18 ASSESSMENT — PAIN DESCRIPTION - ORIENTATION: ORIENTATION: LOWER

## 2022-04-18 NOTE — ED NOTES
Pt ambulated to the bathroom and instructed on how to obtain a clean catch urine. Pt will take specimen back to room for collection and labeling. Pt will pull call light if assistance is needed.       Essie Mcclain RN  04/18/22 [id number]

## 2022-04-18 NOTE — ED NOTES
Lexi Dozier from lab called with a critical troponin of 80.  Dr. Kelly Gooden made aware     Marilee Trammell, RN  04/18/22 [id number]

## 2022-04-18 NOTE — ED NOTES
Radiology in department to take patient for CT scans. Pt made aware that a scan was being done with contrast. Pt refused to have contrast stating \"That's what messed up my kidneys\".   Dr Kelly Gooden made aware and changed scan to without contrast     Marilee Trammell RN  04/18/22 [id number]

## 2022-04-18 NOTE — ED NOTES
Dr Mark Ch made aware that pt's pain is still at a [facility], RN  04/18/22 [id number]

## 2022-04-18 NOTE — ED PROVIDER NOTES
[facility]  Emergency Department Encounter  EmergencyMedicine      Pt Cynthia Aj  MRN: [id number]  [place] 1945  Date of evaluation: 4/18/22  PCP:  Lucian Dumont MD    [address] [place] [address]       Chief Complaint   Patient presents with    Back Pain     C/o lower back pain that radiates down BLE. Pt seen PCP for this last week, not yet able to f/u with pain management       HISTORY OF PRESENT ILLNESS  (Location/Symptom, Timing/Onset, Context/Setting, Quality, Duration, Modifying Factors, Severity.)      Ella Dennis is a 68 y.o. male who presents with plaint of progressively worsening low back pain along the distribution of his belt. Patient denies any trauma. Patient has a history of end-stage renal disease he elected to stop dialysis which is being provided through an indwelling catheter in his left anterior chest wall approximately 6 weeks ago secondary to feeling nauseous during dialysis. Patient does make urine. He is complaining of bilateral lower extremity swelling denies any chest pain shortness of breath. Denies any abdominal pain nausea or vomiting. Patient states he has not followed up with his nephrologist since electing to stop dialysis. Onset: weeks  Provocation: unclear at this time  Quality: ache  Radiation: to b/l le from knees distally  Severity: mod/severe  Timing: constant  Interventions: apap without improvement    PAST MEDICAL / SURGICAL / SOCIAL / FAMILY HISTORY     Past medical history, surgical history, social history, family history as well as patient's allergies and home medications were reviewed.      has a past medical history of Acute kidney injury ([address] Acute MI ([address] Acute renal failure with tubular necrosis ([address] Anemia, CAD (coronary artery disease), Cerebral artery occlusion with cerebral infarction [facility]), CHF (congestive heart failure) ([address] Chronic back pain, Closed fracture of lumbar vertebra without mention of spinal cord injury, Displacement of intervertebral disc, site unspecified, without myelopathy, H/O cardiac catheterization, H/O cardiovascular stress test, H/O tilt table evaluation, H/O tilt table evaluation, History of 24 hour EKG monitoring, History of 24 hour EKG monitoring, History of blood transfusion, History of cardiovascular stress test, History of cardiovascular stress test, History of coronary artery stent placement, History of CVA (cerebrovascular accident) without residual deficits, History of echocardiogram, History of Holter monitoring, History of tilt table evaluation, Hyperlipidemia, Hypertension, Non critical Right Renal artery stenosis, native ([facility] Pacemaker, S/P cardiac cath, S/P coronary artery stent placement, SIRS (systemic inflammatory response syndrome) ([facility] and Type II or unspecified type diabetes mellitus without mention of complication, not stated as uncontrolled. has a past surgical history that includes Pacemaker insertion; back surgery; Cholecystectomy (08/17/2015); Corneal transplant; Cardiac catheterization (Left, 02/19/2016); pacemaker placement; Colonoscopy (2010); Colonoscopy (02/19/2015); Colonoscopy (05/23/2018); Colonoscopy (N/A, 05/23/2018); Upper gastrointestinal endoscopy (05/28/2019); Upper gastrointestinal endoscopy (N/A, 05/28/2019); Colonoscopy (10/30/2020); Colonoscopy (N/A, 10/30/2020); Upper gastrointestinal endoscopy (N/A, 10/30/2020); Endoscopy, colon, diagnostic; eye surgery; Cardiac catheterization (Left, 10/05/2021); IR TUNNELED CVC PLACE WO SQ PORT/PUMP > 5 YEARS (11/22/2021); and US BIOPSY RENAL LEFT PERC (11/24/2021).   CAROTID STENT          EYE SURGERY 04/22/2015 Left PKP [facility]     CATARACT EXTRACTION W/ INTRAOCULAR LENS IMPLANT 01/10/2018 Left phaco w/PC IOL w/[place] Dr. Archie Lennox          Social History     Socioeconomic History    Marital status:      Spouse name: Not on file    Number of children: Not on file    Years of education: Not on file    Highest education level: Not on file   Occupational History    Not on file   Tobacco Use    Smoking status: Former Smoker     Packs/day: 1.50     Years: 8.00     Pack years: 12.00     Types: Cigarettes     Quit date: 3/4/1980     Years since quittin.1    Smokeless tobacco: Never Used   Vaping Use    Vaping Use: Never used   Substance and Sexual Activity    Alcohol use: No    Drug use: No    Sexual activity: Not on file   Other Topics Concern    Not on file   Social History Narrative    Not on file     Social Determinants of Health     Financial Resource Strain: Low Risk     Difficulty of Paying Living Expenses: Not hard at all   Food Insecurity: No Food Insecurity    Worried About Running Out of Food in the Last Year: Never true    Genna of Food in the Last Year: Never true   Transportation Needs: No Transportation Needs    Lack of Transportation (Medical): No    Lack of Transportation (Non-Medical):  No   Physical Activity:     Days of Exercise per Week: Not on file    Minutes of Exercise per Session: Not on file   Stress:     Feeling of Stress : Not on file   Social Connections:     Frequency of Communication with Friends and Family: Not on file    Frequency of Social Gatherings with Friends and Family: Not on file    Attends Caodaism Services: Not on file    Active Member of [address] [place] [address] [employer] or Organizations: Not on file    Attends Club or Organization Meetings: Not on file    Marital Status: Not on file   Intimate Partner Violence:     Fear of Current or Ex-Partner: Not on file    Emotionally Abused: Not on file    Physically Abused: Not on file    Sexually Abused: Not on file   Housing Stability:     Unable to Pay for Housing in the Last Year: Not on file    Number of [place] in the Last Year: Not on file    Unstable Housing in the Last Year: Not on file       Family History   Problem Relation Age of Onset    Cancer Mother         breast    Cancer Father         lung Allergies:  Coreg [carvedilol], Lipitor [atorvastatin], Aldactone [spironolactone], Crestor [rosuvastatin calcium], Lopid [gemfibrozil], Invokana [canagliflozin], and Januvia [sitagliptin]    Home Medications:  Prior to Admission medications    Medication Sig Start Date End Date Taking?  Authorizing Provider   acetaminophen (TYLENOL) 325 MG tablet Take 650 mg by mouth every 6 hours as needed for Pain   Yes Historical Provider, MD   hydrALAZINE (APRESOLINE) 100 MG tablet Take 1 tablet by mouth 3 times daily Except on the morning's of dialysis 4/12/22   Angelo Burks MD   tamsulosin (FLOMAX) 0.4 MG capsule Take 1 capsule by mouth once daily 4/7/22   Pamela Pickering PA-C   CONTOUR TEST strip Inject 1 each into the skin 2 times daily  Patient not taking: Reported on 4/18/2022 4/4/22   Angelo Burks MD   cholestyramine Brandondaniele Alanis) 4 g packet Take 1 packet by mouth daily  Patient not taking: Reported on 4/18/2022 3/30/22   Alexa Hercules MD   metoprolol tartrate (LOPRESSOR) 25 MG tablet Take 1 tablet by mouth 2 times daily 3/17/22   Brenda Park PA-C   LANTUS SOLOSTAR 100 UNIT/ML injection pen INJECT 22 UNITS SUBCUTANEOUSLY TWICE DAILY 3/15/22   Angelo Burks MD   amLODIPine (NORVASC) 5 MG tablet Take 2 tablets by mouth daily 3/3/22   Miguel Park PA-C   furosemide (LASIX) 40 MG tablet Take 1 tablet by mouth daily 3/3/22 6/1/22  Catherine Rodriguez PA-C   omeprazole (PRILOSEC) 10 MG delayed release capsule Take 10 mg by mouth daily    Historical Provider, MD   B Complex-MAGO-Zn-Folic Acid (DIALYVITE/ZINC) TABS TAKE 1 TABLET BY MOUTH ONCE DAILY AFTER DIALYSIS  Patient not taking: Reported on 4/18/2022 12/10/21   Historical Provider, MD   docusate sodium (COLACE) 100 MG capsule Take 1 capsule by mouth 3 times daily as needed for Constipation  Patient not taking: Reported on 4/18/2022 12/9/21   Leva Baron, APRN - CNP   nitroGLYCERIN (NITROSTAT) 0.4 MG SL tablet Place 1 tablet under the tongue every 5 minutes as needed for Chest pain 6/28/21   Lien Morel MD   latanoprost (XALATAN) 0.005 % ophthalmic solution Place 1 drop into both eyes nightly  2/2/21   Historical Provider, MD   acyclovir (ZOVIRAX) 400 MG tablet 400 mg 2 times daily  7/6/20   Historical Provider, MD [address] [place]   Patient not taking: Reported on 4/18/2022 11/16/15   Angelo Burks MD   Insulin Pen Needle (PEN NEEDLES) 31G X 6 MM MISC 1 each by Does not apply route daily 10/16/15   Angelo Burks MD   prednisoLONE acetate (PRED FORTE) 1 % ophthalmic suspension Place 1 drop into the left eye daily  3/30/15   Historical Provider, MD       REVIEW OF SYSTEMS    (2-9 systems for level 4, 10 or more for level 5)      Review of Systems   Constitutional: Negative for fatigue and fever. HENT: Negative for congestion. Eyes: Negative for photophobia. Respiratory: Negative for shortness of breath. Cardiovascular: Negative for chest pain. Gastrointestinal: Negative for abdominal pain, nausea and vomiting. Genitourinary: Negative for flank pain and hematuria. Musculoskeletal: Positive for back pain. Skin: Negative for pallor, rash and wound. Allergic/Immunologic: Negative for food allergies. Neurological: Negative for weakness and numbness. Psychiatric/Behavioral: Negative for agitation and confusion. PHYSICAL EXAM   (up to 7 for level 4, 8 or more for level 5)      INITIAL VITALS:   BP (!) 159/55   Pulse 64   Temp 96.7 °F (35.9 °C) (Tympanic)   Resp 14   Ht 5' 9\" (1.753 m)   Wt 185 lb (83.9 kg)   SpO2 97%   BMI 27.32 kg/m²     Physical Exam  Vitals and nursing note reviewed. Constitutional:       Appearance: He is not toxic-appearing. HENT:      Head: Normocephalic. Right Ear: External ear normal.      Left Ear: External ear normal.      Nose: Nose normal.      Mouth/Throat:      Pharynx: Oropharynx is clear.    Eyes:      Conjunctiva/sclera: Conjunctivae normal. performed during the hospital encounter of 04/18/22   CBC with Auto Differential   Result Value Ref Range    WBC 8.1 3.5 - 11.3 k/uL    RBC 3.00 (L) 4.21 - 5.77 m/uL    Hemoglobin 8.7 (L) 13.0 - 17.0 g/dL    Hematocrit 26.5 (L) 40.7 - 50.3 %    MCV 88.3 82.6 - 102.9 fL    MCH 29.0 25.2 - 33.5 pg    MCHC 32.8 28.4 - 34.8 g/dL    RDW 14.8 (H) 11.8 - 14.4 %    Platelets 507 487 - 070 k/uL    MPV 9.5 8.1 - 13.5 fL    NRBC Automated 0.0 0.0 per 100 WBC    Seg Neutrophils 74 (H) 36 - 65 %    Lymphocytes 14 (L) 24 - 43 %    Monocytes 6 3 - 12 %    Eosinophils % 3 1 - 4 %    Basophils 1 0 - 2 %    Immature Granulocytes 2 (H) 0 %    Segs Absolute 5.98 1.50 - 8.10 k/uL    Absolute Lymph # 1.13 1.10 - 3.70 k/uL    Absolute Mono # 0.51 0.10 - 1.20 k/uL    Absolute Eos # 0.24 0.00 - 0.44 k/uL    Basophils Absolute 0.04 0.00 - 0.20 k/uL    Absolute Immature Granulocyte 0.15 0.00 - 0.30 k/uL   Comprehensive Metabolic Panel   Result Value Ref Range    Glucose 243 (H) 70 - 99 mg/dL    BUN 75 (H) 8 - 23 mg/dL    CREATININE 3.59 (H) 0.70 - 1.20 mg/dL    Bun/Cre Ratio 21 (H) 9 - 20    Calcium 9.1 8.6 - 10.4 mg/dL    Sodium 135 135 - 144 mmol/L    Potassium 3.7 3.7 - 5.3 mmol/L    Chloride 103 98 - 107 mmol/L    CO2 18 (L) 20 - 31 mmol/L    Anion Gap 14 9 - 17 mmol/L    Alkaline Phosphatase 91 40 - 129 U/L    ALT 12 5 - 41 U/L    AST 13 <40 U/L    Total Bilirubin 0.31 0.3 - 1.2 mg/dL    Total Protein 6.9 6.4 - 8.3 g/dL    Albumin 3.7 3.5 - 5.2 g/dL    Albumin/Globulin Ratio 1.2 1.0 - 2.5    GFR Non-African American 17 (L) >60 mL/min    GFR African American 20 (L) >60 mL/min    GFR Comment          GFR Staging         Urinalysis with Reflex to Culture    Specimen: Urine   Result Value Ref Range    Color, UA Yellow Yellow    Turbidity UA Clear Clear    Glucose, Ur NEGATIVE NEGATIVE    Bilirubin Urine NEGATIVE NEGATIVE    Ketones, Urine NEGATIVE NEGATIVE    Specific Gravity, UA 1.025 (H) 1.010 - 1.020    Urine Hgb TRACE (A) NEGATIVE    pH, UA 5.5 5.0 - 9.0    Protein, UA 2+ (A) NEGATIVE    Urobilinogen, Urine Normal Normal    Nitrite, Urine NEGATIVE NEGATIVE    Leukocyte Esterase, Urine NEGATIVE NEGATIVE   Brain Natriuretic Peptide   Result Value Ref Range    Pro-BNP 4,330 (H) <300 pg/mL   Troponin   Result Value Ref Range    Troponin, High Sensitivity 80 (HH) 0 - 22 ng/L   Microscopic Urinalysis   Result Value Ref Range    -          WBC, UA 0 TO 2 0 - 5 /HPF    RBC, UA 5 TO 10 0 - 2 /HPF    Epithelial Cells UA 2 TO 5 0 - 5 /HPF    Bacteria, UA 1+ (A) None   Troponin   Result Value Ref Range    Troponin, High Sensitivity 79 (HH) 0 - 22 ng/L   EKG 12 Lead   Result Value Ref Range    Ventricular Rate 59 BPM    Atrial Rate 59 BPM    P-R Interval 224 ms    QRS Duration 104 ms    Q-T Interval 454 ms    QTc Calculation (Bazett) 449 ms    P Axis 16 degrees    R Axis -7 degrees    T Axis 6 degrees       IMPRESSION: Patient is a 59-year-old male history of end-stage renal disease elected to stop dialysis 6 weeks ago presenting with lower back pain as well as bilateral lower extremity pain and swelling there is pitting edema bilaterally 2+ from the mid shins distally lungs are clear to auscultation no rales no jvd denying shortness of breath or chest pain. Denies any trauma. Concern for the above differential diagnosis will begin work-up labs imaging will provide fentanyl, Lidoderm patch, obtain bladder scan for postvoid residual urinalysis twelve-lead EKG reevaluation    RADIOLOGY:  CT ABDOMEN PELVIS WO CONTRAST Additional Contrast? None    Result Date: 4/18/2022  EXAMINATION: CT OF THE ABDOMEN AND PELVIS WITHOUT CONTRAST; CT OF THE THORACIC SPINE WITHOUT CONTRAST; CT OF THE LUMBAR SPINE WITHOUT CONTRAST 4/18/2022 9:48 am TECHNIQUE: CT of the abdomen and pelvis was performed without the administration of intravenous contrast. Multiplanar reformatted images are provided for review.  Dose modulation, iterative reconstruction, and/or weight based adjustment of the mA/kV was utilized to reduce the radiation dose to as low as reasonably achievable.; CT of the thoracic spine was performed without the administration of intravenous contrast. Multiplanar reformatted images are provided for review. Dose modulation, iterative reconstruction, and/or weight based adjustment of the mA/kV was utilized to reduce the radiation dose to as low as reasonably achievable.; CT of the lumbar spine was performed without the administration of intravenous contrast. Multiplanar reformatted images are provided for review. Adjustment of mA and/or kV according to patient size was utilized. Dose modulation, iterative reconstruction, and/or weight based adjustment of the mA/kV was utilized to reduce the radiation dose to as low as reasonably achievable. COMPARISON: None. HISTORY: ORDERING SYSTEM PROVIDED HISTORY: eval dissection back pain with b/l le pain vasculopath TECHNOLOGIST PROVIDED HISTORY: eval dissection back pain with b/l le pain vasculopath FINDINGS: Lower Chest: Small bilateral pleural effusions, partially loculated on the left. Trace pericardial effusion. Organs: Unenhanced liver, spleen, pancreas, and adrenal glands appear unremarkable. Gallbladder surgically absent. Stable punctate nonobstructing stone lower left kidney. No obstructive uropathy. Right kidney is grossly unremarkable. GI/Bowel: No bowel obstruction. Normal appendix. Colonic diverticulosis without diverticulitis. Pelvis: Urinary bladder unremarkable. Enlarged prostate stable. Peritoneum/Retroperitoneum: No free air, fluid, or lymphadenopathy. Bones/Soft Tissues: No acute osseous abnormality in the abdomen or pelvis. Thoracic and lumbar spine: No acute fracture or dislocation involving thoracic or lumbar spine. Postoperative changes in the lower lumbar spine from posterior fusion at L4-L5. Degenerative disc disease at T12-L1. Normal curvature of thoracic and lumbar spine.      No CT evidence of acute intra-abdominal process. Small bilateral pleural effusions, partially loculated on the left. Trace pericardial effusion. No acute fracture or dislocation involving thoracic or lumbar spine. CT THORACIC SPINE WO CONTRAST    Result Date: 4/18/2022  EXAMINATION: CT OF THE ABDOMEN AND PELVIS WITHOUT CONTRAST; CT OF THE THORACIC SPINE WITHOUT CONTRAST; CT OF THE LUMBAR SPINE WITHOUT CONTRAST 4/18/2022 9:48 am TECHNIQUE: CT of the abdomen and pelvis was performed without the administration of intravenous contrast. Multiplanar reformatted images are provided for review. Dose modulation, iterative reconstruction, and/or weight based adjustment of the mA/kV was utilized to reduce the radiation dose to as low as reasonably achievable.; CT of the thoracic spine was performed without the administration of intravenous contrast. Multiplanar reformatted images are provided for review. Dose modulation, iterative reconstruction, and/or weight based adjustment of the mA/kV was utilized to reduce the radiation dose to as low as reasonably achievable.; CT of the lumbar spine was performed without the administration of intravenous contrast. Multiplanar reformatted images are provided for review. Adjustment of mA and/or kV according to patient size was utilized. Dose modulation, iterative reconstruction, and/or weight based adjustment of the mA/kV was utilized to reduce the radiation dose to as low as reasonably achievable. COMPARISON: None. HISTORY: ORDERING SYSTEM PROVIDED HISTORY: eval dissection back pain with b/l le pain vasculopath TECHNOLOGIST PROVIDED HISTORY: eval dissection back pain with b/l le pain vasculopath FINDINGS: Lower Chest: Small bilateral pleural effusions, partially loculated on the left. Trace pericardial effusion. Organs: Unenhanced liver, spleen, pancreas, and adrenal glands appear unremarkable. Gallbladder surgically absent. Stable punctate nonobstructing stone lower left kidney.   No obstructive uropathy. Right kidney is grossly unremarkable. GI/Bowel: No bowel obstruction. Normal appendix. Colonic diverticulosis without diverticulitis. Pelvis: Urinary bladder unremarkable. Enlarged prostate stable. Peritoneum/Retroperitoneum: No free air, fluid, or lymphadenopathy. Bones/Soft Tissues: No acute osseous abnormality in the abdomen or pelvis. Thoracic and lumbar spine: No acute fracture or dislocation involving thoracic or lumbar spine. Postoperative changes in the lower lumbar spine from posterior fusion at L4-L5. Degenerative disc disease at T12-L1. Normal curvature of thoracic and lumbar spine. No CT evidence of acute intra-abdominal process. Small bilateral pleural effusions, partially loculated on the left. Trace pericardial effusion. No acute fracture or dislocation involving thoracic or lumbar spine. CT LUMBAR SPINE TRAUMA RECONSTRUCTION    Result Date: 4/18/2022  EXAMINATION: CT OF THE ABDOMEN AND PELVIS WITHOUT CONTRAST; CT OF THE THORACIC SPINE WITHOUT CONTRAST; CT OF THE LUMBAR SPINE WITHOUT CONTRAST 4/18/2022 9:48 am TECHNIQUE: CT of the abdomen and pelvis was performed without the administration of intravenous contrast. Multiplanar reformatted images are provided for review. Dose modulation, iterative reconstruction, and/or weight based adjustment of the mA/kV was utilized to reduce the radiation dose to as low as reasonably achievable.; CT of the thoracic spine was performed without the administration of intravenous contrast. Multiplanar reformatted images are provided for review. Dose modulation, iterative reconstruction, and/or weight based adjustment of the mA/kV was utilized to reduce the radiation dose to as low as reasonably achievable.; CT of the lumbar spine was performed without the administration of intravenous contrast. Multiplanar reformatted images are provided for review. Adjustment of mA and/or kV according to patient size was utilized.   Dose modulation, iterative reconstruction, and/or weight based adjustment of the mA/kV was utilized to reduce the radiation dose to as low as reasonably achievable. COMPARISON: None. HISTORY: ORDERING SYSTEM PROVIDED HISTORY: eval dissection back pain with b/l le pain vasculopath TECHNOLOGIST PROVIDED HISTORY: eval dissection back pain with b/l le pain vasculopath FINDINGS: Lower Chest: Small bilateral pleural effusions, partially loculated on the left. Trace pericardial effusion. Organs: Unenhanced liver, spleen, pancreas, and adrenal glands appear unremarkable. Gallbladder surgically absent. Stable punctate nonobstructing stone lower left kidney. No obstructive uropathy. Right kidney is grossly unremarkable. GI/Bowel: No bowel obstruction. Normal appendix. Colonic diverticulosis without diverticulitis. Pelvis: Urinary bladder unremarkable. Enlarged prostate stable. Peritoneum/Retroperitoneum: No free air, fluid, or lymphadenopathy. Bones/Soft Tissues: No acute osseous abnormality in the abdomen or pelvis. Thoracic and lumbar spine: No acute fracture or dislocation involving thoracic or lumbar spine. Postoperative changes in the lower lumbar spine from posterior fusion at L4-L5. Degenerative disc disease at T12-L1. Normal curvature of thoracic and lumbar spine. No CT evidence of acute intra-abdominal process. Small bilateral pleural effusions, partially loculated on the left. Trace pericardial effusion. No acute fracture or dislocation involving thoracic or lumbar spine.        EKG  Sinus bradycardia at a rate of 59 there is leftward axis  AZ interval prolonged at 224 QRS duration is 104 is poor R wave progression T wave inversions in V4 V5 V6 compared with prior from 01-DEC-2021 09:07:12 these are new    All EKG's are interpreted by the Emergency Department Physician who either signs or Co-signs this chart in the absence of a cardiologist.    EMERGENCY DEPARTMENT COURSE:  ED Course as of 04/18/22 1653   Mon Apr 18, 2022 0901 New lateral T wave inversions on EKG that are not seen on previous electrocardiogram will provide aspirin and send troponin. [BG]   0941 Pt refusing contrast will change to noncontrasted study [BG]   0950 Ct images reviewed no aortic dilation or apparent dissection on this noncontrasted study [BG]   0956 Bnp stable, pleural effusion seen on imaging [BG]   1008 Post void residual <100cc [BG]   1026 CBC with Auto Differential(!):    WBC 8.1   RBC 3.00(!)   Hemoglobin Quant 8.7(!)   Hematocrit 26.5(!)   MCV 88.3   MCH 29.0   MCHC 32.8   RDW 14.8(!)   Platelet Count 905   MPV 9.5   NRBC Automated 0.0   Seg Neutrophils 74(!)   Lymphocytes 14(!)   Monocytes 6   Eosinophils % 3   Basophils 1   Immature Granulocytes 2(!)   Segs Absolute 5.98   Absolute Lymph # 1.13   Absolute Mono # 0.51   Absolute Eos # 0.24   Basophils Absolute 0.04   Absolute Immature Granulocyte 0.15 [BG]   1026 Urinalysis with Reflex to Culture(!):    Color, UA Yellow   Turbidity UA Clear   Glucose, UA NEGATIVE   Bilirubin, Urine NEGATIVE   Ketones, Urine NEGATIVE   Specific [place], UA 1.025(!)   Urine Hgb TRACE(!)   pH, UA 5.5   Protein, UA 2+(!)   Urobilinogen, Urine Normal   Nitrite, Urine NEGATIVE   Leukocyte Esterase, Urine NEGATIVE [BG]   1026 Microscopic Urinalysis(!):    -        WBC, UA 0 TO 2   RBC, UA 5 TO 10   Epithelial Cells, UA 2 TO 5   Bacteria, UA 1+(!) [BG]   1026 Brain Natriuretic Peptide(!):    Pro-BNP 4,330(!) [BG]   1026 Comprehensive Metabolic Panel(!):    GLUCOSE, FASTING,(!)   BUN,BUNPL 75(!)   Creatinine 3.59(!)   Bun/Cre Ratio 21(!)   CALCIUM, SERUM, 763741 9.1   Sodium 135   Potassium 3.7   Chloride 103   CO2 18(!)   Anion Gap 14   Alk Phos 91   ALT 12   AST 13   Bilirubin 0.31   Total Protein 6.9   Albumin 3.7   ALBUMIN/GLOBULIN RATIO 1.2   GFR Non- 17(!)   GFR  20(!)   GFR Comment        GFR Staging      [BG]   1040 Elevated trop but lower than prior will trend [BG]   1056 Echo 11/11/21: Summary  Global left ventricular systolic function appears preserved with an  estimated ejection fraction of 60%. The left ventricular cavity size is within normal limits and the left  ventricular wall thickness is moderately increased. No definite specific wall motion abnormalities were identified. The left atrium is moderately dilated (34-39) with a left atrial volume  index of 35 ml/m2. Mild to moderate tricuspid regurgitation. Moderate pulmonary hypertension with estimated pulmonary artery systolic  pressure of 42 mmHg. Mild diastolic dysfunction.     Compared to the previous study of 12/18/2020, Pulmonary artery systolic  increased from 30 to 42 mmHg.    [BG]   1101 IMPRESSION:  No CT evidence of acute intra-abdominal process.     Small bilateral pleural effusions, partially loculated on the left. Trace  pericardial effusion.     No acute fracture or dislocation involving thoracic or lumbar spine. [BG]   1148 Trop stable. [BG]   Louise with dr. Susan Hogue patients pcp recommending transfer to [facility] for evaluation for peritoneal dialysis as well as management of pleural effusion and pain management. pt/family in agreement [BG]      ED Course User Index  [BG] Ragini Downing DO           PROCEDURES:      CONSULTS:  None    CRITICAL CARE:        FINAL IMPRESSION      1. Pleural effusion, right    2. Acute exacerbation of chronic low back pain    3. Asymptomatic bacteriuria          DISPOSITION / PLAN     DISPOSITION  Pending transfer      PATIENT REFERRED TO:  No follow-up provider specified.     DISCHARGE MEDICATIONS:  New Prescriptions    No medications on file       Mario Joe DO, MS, RD  Emergency Medicine        (Please note that portions of thisnote were completed with a voice recognition program.  Efforts were made to edit the dictations but occasionally words are mis-transcribed.)       Ragini Downing DO  Resident  04/18/22 4464

## 2022-04-19 ENCOUNTER — APPOINTMENT (OUTPATIENT)
Dept: GENERAL RADIOLOGY | Age: 77
DRG: 280 | End: 2022-04-19
Payer: MEDICARE

## 2022-04-19 ENCOUNTER — HOSPITAL ENCOUNTER (INPATIENT)
Age: 77
LOS: 3 days | Discharge: HOME | DRG: 280 | End: 2022-04-22
Attending: EMERGENCY MEDICINE
Payer: MEDICARE

## 2022-04-19 DIAGNOSIS — J90 PLEURAL EFFUSION: Primary | ICD-10-CM

## 2022-04-19 PROBLEM — I20.9 ANGINA, CLASS II (HCC): Status: RESOLVED | Noted: 2018-01-08 | Resolved: 2022-04-19

## 2022-04-19 PROBLEM — Z91.158 DIALYSIS PATIENT, NONCOMPLIANT: Status: ACTIVE | Noted: 2022-04-19

## 2022-04-19 PROBLEM — N18.4 ACUTE RENAL FAILURE SUPERIMPOSED ON STAGE 4 CHRONIC KIDNEY DISEASE (HCC): Status: RESOLVED | Noted: 2018-10-02 | Resolved: 2022-04-19

## 2022-04-19 PROBLEM — I13.10 CARDIORENAL SYNDROME WITH RENAL FAILURE: Status: RESOLVED | Noted: 2021-11-17 | Resolved: 2022-04-19

## 2022-04-19 PROBLEM — I50.32 CHRONIC DIASTOLIC HF (HEART FAILURE), NYHA CLASS 3 (HCC): Status: RESOLVED | Noted: 2017-04-24 | Resolved: 2022-04-19

## 2022-04-19 PROBLEM — N18.4 ANEMIA IN STAGE 4 CHRONIC KIDNEY DISEASE (HCC): Status: RESOLVED | Noted: 2019-05-21 | Resolved: 2022-04-19

## 2022-04-19 PROBLEM — R53.81 DEBILITY: Status: ACTIVE | Noted: 2022-04-19

## 2022-04-19 PROBLEM — T50.905A MEDICATION SIDE EFFECT, INITIAL ENCOUNTER: Status: RESOLVED | Noted: 2018-03-23 | Resolved: 2022-04-19

## 2022-04-19 PROBLEM — R31.29 MICROSCOPIC HEMATURIA: Status: ACTIVE | Noted: 2022-04-19

## 2022-04-19 PROBLEM — G89.29 CHRONIC BILATERAL LOW BACK PAIN WITH BILATERAL SCIATICA: Status: ACTIVE | Noted: 2022-04-19

## 2022-04-19 PROBLEM — D63.1 ANEMIA IN STAGE 4 CHRONIC KIDNEY DISEASE (HCC): Status: RESOLVED | Noted: 2019-05-21 | Resolved: 2022-04-19

## 2022-04-19 PROBLEM — I13.0 CARDIORENAL SYNDROME, STAGE 1-4 OR UNSPECIFIED CHRONIC KIDNEY DISEASE, WITH HEART FAILURE (HCC): Status: RESOLVED | Noted: 2021-11-17 | Resolved: 2022-04-19

## 2022-04-19 PROBLEM — M54.42 CHRONIC BILATERAL LOW BACK PAIN WITH BILATERAL SCIATICA: Status: ACTIVE | Noted: 2022-04-19

## 2022-04-19 PROBLEM — M54.41 CHRONIC BILATERAL LOW BACK PAIN WITH BILATERAL SCIATICA: Status: ACTIVE | Noted: 2022-04-19

## 2022-04-19 PROBLEM — N18.6 END STAGE RENAL DISEASE (HCC): Status: ACTIVE | Noted: 2022-04-19

## 2022-04-19 PROBLEM — N17.9 ACUTE RENAL FAILURE SUPERIMPOSED ON STAGE 4 CHRONIC KIDNEY DISEASE (HCC): Status: RESOLVED | Noted: 2018-10-02 | Resolved: 2022-04-19

## 2022-04-19 PROBLEM — Z91.15 DIALYSIS PATIENT, NONCOMPLIANT (HCC): Status: ACTIVE | Noted: 2022-04-19

## 2022-04-19 LAB
ALBUMIN SERPL-MCNC: 3.6 G/DL (ref 3.5–5.2)
ALBUMIN/GLOBULIN RATIO: 1.1 (ref 1–2.5)
ALP BLD-CCNC: 86 U/L (ref 40–129)
ALT SERPL-CCNC: 10 U/L (ref 5–41)
ANION GAP SERPL CALCULATED.3IONS-SCNC: 13 MMOL/L (ref 9–17)
ANION GAP SERPL CALCULATED.3IONS-SCNC: 14 MMOL/L (ref 9–17)
AST SERPL-CCNC: 14 U/L
BILIRUB SERPL-MCNC: 0.27 MG/DL (ref 0.3–1.2)
BILIRUBIN DIRECT: 0.09 MG/DL
BILIRUBIN, INDIRECT: 0.18 MG/DL (ref 0–1)
BUN BLDV-MCNC: 77 MG/DL (ref 8–23)
BUN BLDV-MCNC: 79 MG/DL (ref 8–23)
CALCIUM SERPL-MCNC: 8.9 MG/DL (ref 8.6–10.4)
CALCIUM SERPL-MCNC: 9.2 MG/DL (ref 8.6–10.4)
CHLORIDE BLD-SCNC: 107 MMOL/L (ref 98–107)
CHLORIDE BLD-SCNC: 108 MMOL/L (ref 98–107)
CO2: 19 MMOL/L (ref 20–31)
CO2: 19 MMOL/L (ref 20–31)
CREAT SERPL-MCNC: 3.76 MG/DL (ref 0.7–1.2)
CREAT SERPL-MCNC: 3.82 MG/DL (ref 0.7–1.2)
ESTIMATED AVERAGE GLUCOSE: 163 MG/DL
GFR AFRICAN AMERICAN: 19 ML/MIN
GFR AFRICAN AMERICAN: 19 ML/MIN
GFR NON-AFRICAN AMERICAN: 15 ML/MIN
GFR NON-AFRICAN AMERICAN: 16 ML/MIN
GFR SERPL CREATININE-BSD FRML MDRD: ABNORMAL ML/MIN/{1.73_M2}
GFR SERPL CREATININE-BSD FRML MDRD: ABNORMAL ML/MIN/{1.73_M2}
GLUCOSE BLD-MCNC: 117 MG/DL (ref 75–110)
GLUCOSE BLD-MCNC: 124 MG/DL (ref 70–99)
GLUCOSE BLD-MCNC: 132 MG/DL (ref 75–110)
GLUCOSE BLD-MCNC: 163 MG/DL (ref 75–110)
GLUCOSE BLD-MCNC: 192 MG/DL (ref 70–99)
GLUCOSE BLD-MCNC: 263 MG/DL (ref 75–110)
HBA1C MFR BLD: 7.3 % (ref 4–6)
HCT VFR BLD CALC: 28 % (ref 40.7–50.3)
HEMOGLOBIN: 9.2 G/DL (ref 13–17)
INR BLD: 1
LACTATE DEHYDROGENASE: 178 U/L (ref 135–225)
MCH RBC QN AUTO: 29.1 PG (ref 25.2–33.5)
MCHC RBC AUTO-ENTMCNC: 32.9 G/DL (ref 28.4–34.8)
MCV RBC AUTO: 88.6 FL (ref 82.6–102.9)
NRBC AUTOMATED: 0 PER 100 WBC
PARTIAL THROMBOPLASTIN TIME: 32.2 SEC (ref 20.5–30.5)
PDW BLD-RTO: 15.1 % (ref 11.8–14.4)
PLATELET # BLD: 203 K/UL (ref 138–453)
PMV BLD AUTO: 9.5 FL (ref 8.1–13.5)
POTASSIUM SERPL-SCNC: 3.7 MMOL/L (ref 3.7–5.3)
POTASSIUM SERPL-SCNC: 3.9 MMOL/L (ref 3.7–5.3)
PROTHROMBIN TIME: 10.3 SEC (ref 9.1–12.3)
RBC # BLD: 3.16 M/UL (ref 4.21–5.77)
SODIUM BLD-SCNC: 139 MMOL/L (ref 135–144)
SODIUM BLD-SCNC: 141 MMOL/L (ref 135–144)
TOTAL PROTEIN: 6.9 G/DL (ref 6.4–8.3)
WBC # BLD: 6.8 K/UL (ref 3.5–11.3)

## 2022-04-19 PROCEDURE — 2060000000 HC ICU INTERMEDIATE R&B

## 2022-04-19 PROCEDURE — 85730 THROMBOPLASTIN TIME PARTIAL: CPT

## 2022-04-19 PROCEDURE — 90935 HEMODIALYSIS ONE EVALUATION: CPT

## 2022-04-19 PROCEDURE — 2700000000 HC OXYGEN THERAPY PER DAY

## 2022-04-19 PROCEDURE — 6370000000 HC RX 637 (ALT 250 FOR IP): Performed by: NURSE PRACTITIONER

## 2022-04-19 PROCEDURE — 87075 CULTR BACTERIA EXCEPT BLOOD: CPT

## 2022-04-19 PROCEDURE — 85610 PROTHROMBIN TIME: CPT

## 2022-04-19 PROCEDURE — 80076 HEPATIC FUNCTION PANEL: CPT

## 2022-04-19 PROCEDURE — 99285 EMERGENCY DEPT VISIT HI MDM: CPT

## 2022-04-19 PROCEDURE — 5A1D70Z PERFORMANCE OF URINARY FILTRATION, INTERMITTENT, LESS THAN 6 HOURS PER DAY: ICD-10-PCS | Performed by: INTERNAL MEDICINE

## 2022-04-19 PROCEDURE — 6360000002 HC RX W HCPCS: Performed by: NURSE PRACTITIONER

## 2022-04-19 PROCEDURE — 82945 GLUCOSE OTHER FLUID: CPT

## 2022-04-19 PROCEDURE — 85027 COMPLETE CBC AUTOMATED: CPT

## 2022-04-19 PROCEDURE — 93005 ELECTROCARDIOGRAM TRACING: CPT | Performed by: STUDENT IN AN ORGANIZED HEALTH CARE EDUCATION/TRAINING PROGRAM

## 2022-04-19 PROCEDURE — 82947 ASSAY GLUCOSE BLOOD QUANT: CPT

## 2022-04-19 PROCEDURE — APPSS45 APP SPLIT SHARED TIME 31-45 MINUTES: Performed by: NURSE PRACTITIONER

## 2022-04-19 PROCEDURE — 87070 CULTURE OTHR SPECIMN AEROBIC: CPT

## 2022-04-19 PROCEDURE — 84157 ASSAY OF PROTEIN OTHER: CPT

## 2022-04-19 PROCEDURE — 83615 LACTATE (LD) (LDH) ENZYME: CPT

## 2022-04-19 PROCEDURE — 71045 X-RAY EXAM CHEST 1 VIEW: CPT

## 2022-04-19 PROCEDURE — 80048 BASIC METABOLIC PNL TOTAL CA: CPT

## 2022-04-19 PROCEDURE — 6360000002 HC RX W HCPCS: Performed by: INTERNAL MEDICINE

## 2022-04-19 PROCEDURE — 36415 COLL VENOUS BLD VENIPUNCTURE: CPT

## 2022-04-19 PROCEDURE — 6370000000 HC RX 637 (ALT 250 FOR IP): Performed by: INTERNAL MEDICINE

## 2022-04-19 PROCEDURE — 99222 1ST HOSP IP/OBS MODERATE 55: CPT | Performed by: INTERNAL MEDICINE

## 2022-04-19 PROCEDURE — 99233 SBSQ HOSP IP/OBS HIGH 50: CPT | Performed by: INTERNAL MEDICINE

## 2022-04-19 PROCEDURE — 82150 ASSAY OF AMYLASE: CPT

## 2022-04-19 PROCEDURE — 82042 OTHER SOURCE ALBUMIN QUAN EA: CPT

## 2022-04-19 PROCEDURE — 94761 N-INVAS EAR/PLS OXIMETRY MLT: CPT

## 2022-04-19 PROCEDURE — 87205 SMEAR GRAM STAIN: CPT

## 2022-04-19 PROCEDURE — 89051 BODY FLUID CELL COUNT: CPT

## 2022-04-19 PROCEDURE — 83036 HEMOGLOBIN GLYCOSYLATED A1C: CPT

## 2022-04-19 PROCEDURE — 2580000003 HC RX 258: Performed by: NURSE PRACTITIONER

## 2022-04-19 PROCEDURE — 6370000000 HC RX 637 (ALT 250 FOR IP): Performed by: STUDENT IN AN ORGANIZED HEALTH CARE EDUCATION/TRAINING PROGRAM

## 2022-04-19 RX ORDER — ACETAMINOPHEN 650 MG/1
650 SUPPOSITORY RECTAL EVERY 6 HOURS PRN
Status: DISCONTINUED | OUTPATIENT
Start: 2022-04-19 | End: 2022-04-22 | Stop reason: HOSPADM

## 2022-04-19 RX ORDER — OXYCODONE HYDROCHLORIDE 5 MG/1
10 TABLET ORAL EVERY 6 HOURS PRN
Status: DISCONTINUED | OUTPATIENT
Start: 2022-04-19 | End: 2022-04-22 | Stop reason: HOSPADM

## 2022-04-19 RX ORDER — POLYETHYLENE GLYCOL 3350 17 G/17G
17 POWDER, FOR SOLUTION ORAL DAILY PRN
Status: DISCONTINUED | OUTPATIENT
Start: 2022-04-19 | End: 2022-04-22 | Stop reason: HOSPADM

## 2022-04-19 RX ORDER — PANTOPRAZOLE SODIUM 40 MG/1
40 TABLET, DELAYED RELEASE ORAL
Status: DISCONTINUED | OUTPATIENT
Start: 2022-04-19 | End: 2022-04-22 | Stop reason: HOSPADM

## 2022-04-19 RX ORDER — AMLODIPINE BESYLATE 10 MG/1
10 TABLET ORAL DAILY
Status: DISCONTINUED | OUTPATIENT
Start: 2022-04-19 | End: 2022-04-22 | Stop reason: HOSPADM

## 2022-04-19 RX ORDER — ACETAMINOPHEN 325 MG/1
650 TABLET ORAL EVERY 6 HOURS PRN
Status: DISCONTINUED | OUTPATIENT
Start: 2022-04-19 | End: 2022-04-22 | Stop reason: HOSPADM

## 2022-04-19 RX ORDER — DEXTROSE MONOHYDRATE 50 MG/ML
100 INJECTION, SOLUTION INTRAVENOUS PRN
Status: DISCONTINUED | OUTPATIENT
Start: 2022-04-19 | End: 2022-04-22 | Stop reason: HOSPADM

## 2022-04-19 RX ORDER — HEPARIN SODIUM 5000 [USP'U]/ML
5000 INJECTION, SOLUTION INTRAVENOUS; SUBCUTANEOUS EVERY 8 HOURS SCHEDULED
Status: DISCONTINUED | OUTPATIENT
Start: 2022-04-19 | End: 2022-04-22 | Stop reason: HOSPADM

## 2022-04-19 RX ORDER — SODIUM CHLORIDE 9 MG/ML
INJECTION, SOLUTION INTRAVENOUS PRN
Status: DISCONTINUED | OUTPATIENT
Start: 2022-04-19 | End: 2022-04-22 | Stop reason: HOSPADM

## 2022-04-19 RX ORDER — LATANOPROST 50 UG/ML
1 SOLUTION/ DROPS OPHTHALMIC NIGHTLY
Status: DISCONTINUED | OUTPATIENT
Start: 2022-04-19 | End: 2022-04-22 | Stop reason: HOSPADM

## 2022-04-19 RX ORDER — INSULIN GLARGINE 100 [IU]/ML
22 INJECTION, SOLUTION SUBCUTANEOUS 2 TIMES DAILY
Status: DISCONTINUED | OUTPATIENT
Start: 2022-04-19 | End: 2022-04-20

## 2022-04-19 RX ORDER — NICOTINE POLACRILEX 4 MG
15 LOZENGE BUCCAL PRN
Status: DISCONTINUED | OUTPATIENT
Start: 2022-04-19 | End: 2022-04-22 | Stop reason: HOSPADM

## 2022-04-19 RX ORDER — FUROSEMIDE 40 MG/1
40 TABLET ORAL DAILY
Status: DISCONTINUED | OUTPATIENT
Start: 2022-04-19 | End: 2022-04-22 | Stop reason: HOSPADM

## 2022-04-19 RX ORDER — SODIUM CHLORIDE 0.9 % (FLUSH) 0.9 %
10 SYRINGE (ML) INJECTION PRN
Status: DISCONTINUED | OUTPATIENT
Start: 2022-04-19 | End: 2022-04-22 | Stop reason: HOSPADM

## 2022-04-19 RX ORDER — ACETAMINOPHEN 500 MG
1000 TABLET ORAL ONCE
Status: COMPLETED | OUTPATIENT
Start: 2022-04-19 | End: 2022-04-19

## 2022-04-19 RX ORDER — ACYCLOVIR 400 MG/1
400 TABLET ORAL 2 TIMES DAILY
Status: DISCONTINUED | OUTPATIENT
Start: 2022-04-19 | End: 2022-04-22 | Stop reason: HOSPADM

## 2022-04-19 RX ORDER — HYDRALAZINE HYDROCHLORIDE 50 MG/1
100 TABLET, FILM COATED ORAL 3 TIMES DAILY
Status: DISCONTINUED | OUTPATIENT
Start: 2022-04-19 | End: 2022-04-22 | Stop reason: HOSPADM

## 2022-04-19 RX ORDER — ONDANSETRON 2 MG/ML
4 INJECTION INTRAMUSCULAR; INTRAVENOUS EVERY 6 HOURS PRN
Status: DISCONTINUED | OUTPATIENT
Start: 2022-04-19 | End: 2022-04-22 | Stop reason: HOSPADM

## 2022-04-19 RX ORDER — TAMSULOSIN HYDROCHLORIDE 0.4 MG/1
0.4 CAPSULE ORAL DAILY
Status: DISCONTINUED | OUTPATIENT
Start: 2022-04-19 | End: 2022-04-22 | Stop reason: HOSPADM

## 2022-04-19 RX ORDER — CHOLESTYRAMINE LIGHT 4 G/5.7G
1 POWDER, FOR SUSPENSION ORAL DAILY
Status: DISCONTINUED | OUTPATIENT
Start: 2022-04-19 | End: 2022-04-22 | Stop reason: HOSPADM

## 2022-04-19 RX ORDER — PREDNISOLONE ACETATE 10 MG/ML
1 SUSPENSION/ DROPS OPHTHALMIC DAILY
Status: DISCONTINUED | OUTPATIENT
Start: 2022-04-19 | End: 2022-04-22 | Stop reason: HOSPADM

## 2022-04-19 RX ORDER — OXYCODONE HYDROCHLORIDE 5 MG/1
5 TABLET ORAL EVERY 6 HOURS PRN
Status: DISCONTINUED | OUTPATIENT
Start: 2022-04-19 | End: 2022-04-22 | Stop reason: HOSPADM

## 2022-04-19 RX ORDER — DEXTROSE MONOHYDRATE 25 G/50ML
12.5 INJECTION, SOLUTION INTRAVENOUS PRN
Status: DISCONTINUED | OUTPATIENT
Start: 2022-04-19 | End: 2022-04-22 | Stop reason: HOSPADM

## 2022-04-19 RX ORDER — DOCUSATE SODIUM 100 MG/1
100 CAPSULE, LIQUID FILLED ORAL 3 TIMES DAILY PRN
Status: DISCONTINUED | OUTPATIENT
Start: 2022-04-19 | End: 2022-04-22 | Stop reason: HOSPADM

## 2022-04-19 RX ORDER — ONDANSETRON 4 MG/1
4 TABLET, ORALLY DISINTEGRATING ORAL EVERY 8 HOURS PRN
Status: DISCONTINUED | OUTPATIENT
Start: 2022-04-19 | End: 2022-04-22 | Stop reason: HOSPADM

## 2022-04-19 RX ORDER — NITROGLYCERIN 0.4 MG/1
0.4 TABLET SUBLINGUAL EVERY 5 MIN PRN
Status: DISCONTINUED | OUTPATIENT
Start: 2022-04-19 | End: 2022-04-22 | Stop reason: HOSPADM

## 2022-04-19 RX ORDER — SODIUM CHLORIDE 0.9 % (FLUSH) 0.9 %
5-40 SYRINGE (ML) INJECTION EVERY 12 HOURS SCHEDULED
Status: DISCONTINUED | OUTPATIENT
Start: 2022-04-19 | End: 2022-04-22 | Stop reason: HOSPADM

## 2022-04-19 RX ADMIN — INSULIN GLARGINE 22 UNITS: 100 INJECTION, SOLUTION SUBCUTANEOUS at 08:39

## 2022-04-19 RX ADMIN — ALTEPLASE 1 MG: 2.2 INJECTION, POWDER, LYOPHILIZED, FOR SOLUTION INTRAVENOUS at 16:01

## 2022-04-19 RX ADMIN — INSULIN LISPRO 3 UNITS: 100 INJECTION, SOLUTION INTRAVENOUS; SUBCUTANEOUS at 13:15

## 2022-04-19 RX ADMIN — PANTOPRAZOLE SODIUM 40 MG: 40 TABLET, DELAYED RELEASE ORAL at 05:31

## 2022-04-19 RX ADMIN — OXYCODONE 10 MG: 5 TABLET ORAL at 19:12

## 2022-04-19 RX ADMIN — METOPROLOL TARTRATE 25 MG: 25 TABLET ORAL at 20:55

## 2022-04-19 RX ADMIN — HYDRALAZINE HYDROCHLORIDE 100 MG: 50 TABLET ORAL at 20:55

## 2022-04-19 RX ADMIN — HYDRALAZINE HYDROCHLORIDE 100 MG: 50 TABLET ORAL at 08:21

## 2022-04-19 RX ADMIN — METOPROLOL TARTRATE 25 MG: 25 TABLET ORAL at 08:21

## 2022-04-19 RX ADMIN — OXYCODONE 10 MG: 5 TABLET ORAL at 04:09

## 2022-04-19 RX ADMIN — HEPARIN SODIUM 5000 UNITS: 5000 INJECTION INTRAVENOUS; SUBCUTANEOUS at 20:56

## 2022-04-19 RX ADMIN — SODIUM CHLORIDE, PRESERVATIVE FREE 10 ML: 5 INJECTION INTRAVENOUS at 08:21

## 2022-04-19 RX ADMIN — FUROSEMIDE 40 MG: 40 TABLET ORAL at 08:21

## 2022-04-19 RX ADMIN — ACETAMINOPHEN 1000 MG: 500 TABLET ORAL at 03:10

## 2022-04-19 RX ADMIN — CHOLESTYRAMINE 4 G: 4 POWDER, FOR SUSPENSION ORAL at 17:30

## 2022-04-19 RX ADMIN — INSULIN LISPRO 1 UNITS: 100 INJECTION, SOLUTION INTRAVENOUS; SUBCUTANEOUS at 21:08

## 2022-04-19 RX ADMIN — AMLODIPINE BESYLATE 10 MG: 10 TABLET ORAL at 08:20

## 2022-04-19 RX ADMIN — ALTEPLASE 1 MG: 2.2 INJECTION, POWDER, LYOPHILIZED, FOR SOLUTION INTRAVENOUS at 16:02

## 2022-04-19 RX ADMIN — PREDNISOLONE ACETATE 1 DROP: 10 SUSPENSION/ DROPS OPHTHALMIC at 10:51

## 2022-04-19 RX ADMIN — ACYCLOVIR 400 MG: 400 TABLET ORAL at 20:54

## 2022-04-19 RX ADMIN — SODIUM CHLORIDE, PRESERVATIVE FREE 10 ML: 5 INJECTION INTRAVENOUS at 21:17

## 2022-04-19 RX ADMIN — HEPARIN SODIUM 5000 UNITS: 5000 INJECTION INTRAVENOUS; SUBCUTANEOUS at 05:31

## 2022-04-19 RX ADMIN — HEPARIN SODIUM 5000 UNITS: 5000 INJECTION INTRAVENOUS; SUBCUTANEOUS at 17:33

## 2022-04-19 RX ADMIN — HYDRALAZINE HYDROCHLORIDE 100 MG: 50 TABLET ORAL at 17:26

## 2022-04-19 RX ADMIN — TAMSULOSIN HYDROCHLORIDE 0.4 MG: 0.4 CAPSULE ORAL at 08:21

## 2022-04-19 RX ADMIN — INSULIN GLARGINE 22 UNITS: 100 INJECTION, SOLUTION SUBCUTANEOUS at 21:08

## 2022-04-19 ASSESSMENT — PAIN SCALES - GENERAL
PAINLEVEL_OUTOF10: 3
PAINLEVEL_OUTOF10: 7
PAINLEVEL_OUTOF10: 4
PAINLEVEL_OUTOF10: 8
PAINLEVEL_OUTOF10: 8
PAINLEVEL_OUTOF10: 0
PAINLEVEL_OUTOF10: 10
PAINLEVEL_OUTOF10: 10

## 2022-04-19 ASSESSMENT — PAIN DESCRIPTION - ORIENTATION
ORIENTATION: RIGHT;LEFT;LOWER;MID;UPPER
ORIENTATION: MID;LOWER;UPPER

## 2022-04-19 ASSESSMENT — PAIN DESCRIPTION - PAIN TYPE
TYPE: ACUTE PAIN
TYPE: CHRONIC PAIN

## 2022-04-19 ASSESSMENT — PAIN DESCRIPTION - LOCATION
LOCATION: BACK;LEG
LOCATION: BACK

## 2022-04-19 ASSESSMENT — ENCOUNTER SYMPTOMS
BACK PAIN: 1
FACIAL SWELLING: 0
ABDOMINAL PAIN: 0
VOMITING: 0
TROUBLE SWALLOWING: 0
ABDOMINAL DISTENTION: 0
COUGH: 0
CHEST TIGHTNESS: 0
NAUSEA: 0
SHORTNESS OF BREATH: 0

## 2022-04-19 ASSESSMENT — PAIN DESCRIPTION - ONSET: ONSET: ON-GOING

## 2022-04-19 ASSESSMENT — PAIN DESCRIPTION - DESCRIPTORS
DESCRIPTORS: CONSTANT;ACHING
DESCRIPTORS: ACHING;BURNING

## 2022-04-19 ASSESSMENT — PAIN DESCRIPTION - FREQUENCY: FREQUENCY: CONTINUOUS

## 2022-04-19 NOTE — ED NOTES
Pt ambulated to bathroom with cane per self, pt updated on transfer time, denies need, informed pain medication was ordered for transport.       En Nazario RN  04/18/22 8944

## 2022-04-19 NOTE — CARE COORDINATION
Case Management Initial Discharge Plan  Cookie Hoist,             Met with: patient and spouse  to discuss discharge plans. Information verified: address, contacts, phone number, , insurance Yes  Insurance Provider: medicare    Emergency Contact/Next of Kin name & number: Kate Herring (spouse) [phone]  Soo Fenton (daughter) [phone]  Who are involved in patient's support system? Wife and daughter    PCP: Shruti Sanchez MD  Date of last visit: week      Discharge Planning    Living Arrangements:  Spouse/Significant Other     Home has 1 stories  1 stairs to climb to get into front door    Patient able to perform ADL's:Independent    Current Services (outpatient & in home) none  DME equipment: walker, rollator  DME provider: na    Is patient receiving oral anticoagulation therapy? Yes      Does patient have any issues/concerns obtaining medications? No  If yes, what are patient's concerns? Is there a preferred Pharmacy after hours or on weekends? Yes    If yes, which pharmacy? [facility] in [place]    Potential Assistance Needed:  Durable Medical Equipment - requesting possible shower chair    Patient agreeable to home care: No  [place] of choice provided:  n/a    Prior SNF/Rehab Placement and Facility: no  Agreeable to SNF/Rehab: No  [place] of choice provided: n/a     Evaluation: no    Expected Discharge date:       Patient expects to be discharged to:   home    If home: is the family and/or caregiver wiling & able to provide support at home? yes  Who will be providing this support? Spouse and daughter    Follow Up Appointment: Best Day/ Time:      Transportation provider: family  Transportation arrangements needed for discharge: No    Readmission Risk              Risk of Unplanned Readmission:  32             Does patient have a readmission risk score greater than 14?: Yes  If yes, follow-up appointment must be made within 7 days of discharge.      Goals of Care: breathe easier      Educated patient on transitional options, provided freedom of choice and are agreeable with plan      Discharge Plan: home          Electronically signed by Jose Arias RN on 4/19/22 at 9:22 AM EDT      Verified that shower chair is not covered by medicare as it is considered a convenience item and patient will need to purchase out of pocket.

## 2022-04-19 NOTE — ED PROVIDER NOTES
[address] [facility]  Emergency Department  Faculty Attestation       I performed a history and physical examination of the patient and discussed management with the resident. I reviewed the residents note and agree with the documented findings including all diagnostic interpretations and plan of care. Any areas of disagreement are noted on the chart. I was personally present for the key portions of any procedures. I have documented in the chart those procedures where I was not present during the key portions. I have reviewed the emergency nurses triage note. I agree with the chief complaint, past medical history, past surgical history, allergies, medications, social and family history as documented unless otherwise noted below. Documentation of the HPI, Physical Exam and Medical Decision Making performed by scribes is based on my personal performance of the HPI, PE and MDM. For Physician Assistant/ Nurse Practitioner cases/documentation I have personally evaluated this patient and have completed at least one if not all key elements of the E/M (history, physical exam, and MDM). Additional findings are as noted. Pertinent Comments     Primary Care Physician: Vane Kumar MD    ED Triage Vitals [04/19/22 0032]   BP Temp Temp Source Pulse Resp SpO2 Height Weight   (!) 164/65 98.9 °F (37.2 °C) Oral 69 21 96 % -- --        History/Physical: This is a 68 y.o. male who presents to the Emergency Department as a transfer from [facility].  Patient initially presented to outlying facility with lower back pain and when CT scan was performed was found to have bilateral small pleural effusions and trace pericardial effusion. Is also noted that patient had not been to dialysis in approximately 6 weeks as it was making him feel ill anytime that he went. He does have catheter still in place.   Per initial transfer documentation and conversation patient was transferred for possible evaluation for peritoneal dialysis given that he is developing pleural effusions and unable to tolerate hemodialysis. Patient states his lower back still has some aching, but denies any bowel or bladder incontinence. Denies any new weakness. On exam well-appearing in no acute distress. Normocephalic atraumatic. Heart sounds are regular with no murmurs rubs or gallops. Dialysis catheter in chest wall. Does have some decreased air entry in bilateral lung bases, but no respiratory distress. Abdomen is soft nontender nondistended. Does not have any midline thoracic or lumbar tenderness, but does have some mild bilateral lower lumbar tenderness. Does have bilateral pitting edema of the legs, but sensation and strength is intact. Alert and oriented x4. No rash or jaundice on exposed skin. MDM/Plan:   Patient transferred from outlying facility due to bilateral pleural effusions and inability to tolerate hemodialysis. Physician outlying facility had been concerned for possible need for peritoneal dialysis. However patient does not acutely need emergent treatment at this time due to stable electrolytes. Decision was made to go ahead and obtain repeat BMP and EKG. Normal potassium. EKG Interpretation    Interpreted by emergency department physician    Clinical Impression: Sinus rhythm with first-degree AV block. Nonspecific findings.     Ella Ro MD      CRITICAL CARE: None     Ella Ro MD  Attending Emergency Physician         Ella Ro MD  04/19/22 5509

## 2022-04-19 NOTE — CONSULTS
Renal Consult Note    Patient :  Sarah Corral; 68 y.o. MRN# [id number]  Location:  7533/7003-47  Attending:  Dorcas Childs DO  Admit Date:  4/19/2022   Hospital Day: 0    Reason for Consult:     Asked by Dr Dorcas Childs DO to see for Acute Kidney Injury/Elevated Creatinine. History Obtained From:     Patient chart review     History of Present Illness:     Sarah Corral; 68 y.o. male with past medical history of ESRD on HD on MWF schedule at [facility] via tunnel catheter under Dr. Karan Maya, CAD, type 2 diabetes, hypertension, ACD. Patient with above past medical history actually presented to [place] for lower back pain and bilateral pedal edema. Patient was started on dialysis in November 2021. However the last session was done on February 28. According to patient dialysis make him sick, his quality of life has decreased, postdialysis patient has nausea vomiting. According to patient he would not want dialysis if that is what it does to him in the long run. Patient did state that initially the dialysis unit he was getting about 4 L removed which was later decreased. I did try to call patient's hemodialysis unit but they were apparently closed. Patient went to PCP office last week, apparently had a discussion regarding hemodialysis versus peritoneal dialysis, patient was convinced to go to the pain management however he could not go because of bilateral edema and back pain. Patient mentioned then he later came to ED upon his PCPs request to get further evaluated and possibly reinstated in dialysis. In the ED, CT lumbar thoracic spine was done showing no acute intra-abdominal process, fracture, dislocation. However small bilateral pleural effusion with loculation on the left were noted  Blood pressure 141/65, pulse 59, respiratory 16, afebrile. BUN 79, Creatinine 3.76    Hemoglobin 9.2 anemia of chronic disease  On nasal cannula 2 l     Past History/Allergies? Social History:     Past Medical History:   Diagnosis Date    Abnormal tilt table test 2/23/2016    Acute kidney injury ([address]     Acute MI ([address]     Acute renal failure superimposed on stage 4 chronic kidney disease ([address] 10/2/2018    ssecondary to hemodynamic effects of loop diuretics and ace inhibitors, bbaseline 1.2-1.3 which peaked up to 1.8, resolving    Acute renal failure with tubular necrosis ([address] 10/2/2018    ssecondary to hemodynamic effects of loop diuretics and ace inhibitors, bbaseline 1.2-1.3 which peaked up to 1.8, resolving    Anemia     Anemia in stage 4 chronic kidney disease ([address] 5/21/2019    Also from suspected blood loss    Angina, class II ([address] 1/8/2018    CAD (coronary artery disease)     Cerebral artery occlusion with cerebral infarction (HCC)     CHF (congestive heart failure) ([address]     Cholecystitis 8/17/2015    Chronic back pain     Chronic diastolic HF (heart failure), NYHA class 3 ([address] 4/24/2017    Chronic kidney disease, stage III (moderate) ([address] 2/22/2016    Secondary to diabetic nephrosclerosis. Baseline creatinine 1.4-1.6, renal function fluctuate volume status    Closed fracture of lumbar vertebra without mention of spinal cord injury     Displacement of intervertebral disc, site unspecified, without myelopathy     H/O cardiac catheterization 2/19/16    LMCA: Mild irregularities 10-20%. LAD: Lesion on PRox LAD: Mid subsection. 65% stenosis. LCx: Lesion on 1st Ob Valentina: Proximal subesection. 70% steniosis. RCA: Small non-dominant RCA. Lesion on PRox RCA: Ostial. 50% stenosis. EF:55%.  H/O cardiovascular stress test 2/19/16    Abnormal. Moderate perfusion defect of mild intensity in the inferior, inferoseptal adn inferoapical regions during stress imaging, which most consistent with ischemia. Global LV systolic function normal without regional wall motion abnormalities. OVerall these results are most consistent with an intermediate risk for signficant CAD.  Additional testing including cardiac cath may be indicated.  H/O tilt table evaluation 12/26/2017    Abnormal. Patients HR, BP response and symptoms were most consistent with dysautonomia. Combined with viligant maintenance of euvolemia and maintaining a moderate salft intake, pharmacologic treatment with SSRI such as lexapro and or mestinon among other treatments have shown some effectiveness in treatment of this condition    H/O tilt table evaluation 12/26/2017    Abnormal head upright tilt table study. The pt heart rate, blood pressure response and symptoms were most consistent with dysautonomia.  History of 24 hour EKG monitoring 8/14/14    Occasional PAC's and PVC's which appear to be at least moderately symptomatic.  History of 24 hour EKG monitoring 11/19/14    Event Monitor. Sinus rhythm and sinus bradycardia. Infrequent isolated PVC's    History of blood transfusion     History of cardiovascular stress test 2/19/14    Relatively NL    History of cardiovascular stress test 03/21/2018    Normal myocardial perfusion. Global left ventricular systolic function was normal with an EF of 68%. Overall, these results are most consistent with a low risk scan.  History of coronary artery stent placement 04/2017    PTCA / Drug Eluting Stent:, CX and / or branches    History of CVA (cerebrovascular accident) without residual deficits 2014    Incidentally found on CT head. No known impairment now or in the past.    History of echocardiogram 2/18/14    LA mildly dilated, EF 55%, LV wall thickness is moderately increased, no definite wall motion abnormalilities, what appears to be a pacer wire is seen w/n the RA and RV, mild-mod TR, mild pulmonary hypertension.  History of Holter monitoring 12/29/2017    Rare PAC's and PVC's.      History of tilt table evaluation 8/12/14    Abnormal    Hyperlipidemia     Hypertension     Medication side effect, initial encounter 3/23/2018    Non critical Right Renal artery stenosis, [facility]) 10/2/2018    Non critical Right Renal artery stenosis, based on cath in 2016, Rt RA 30% stenosis    Pacemaker     non-functioning    S/P cardiac cath 04/10/2017    S/P coronary artery stent placement     Successful PTCA - HA Om1    SIRS (systemic inflammatory response syndrome) ([facility] 5/19/2019    Type II or unspecified type diabetes mellitus without mention of complication, not stated as uncontrolled      Past Surgical History:   Procedure Laterality Date    BACK SURGERY      CARDIAC CATHETERIZATION Left 02/19/2016    via right radial approach/ Melina Vernon/ Dr. Nat Ramirez Left 10/05/2021    Dr Adriano Nolan radial-Moderate three vessel coronary artery disease involving a ostial 50-60% stenosis in a small, non-dominant RCA, a 60% mid LAD stenosis and a proximal 50-60% stenosis in the left anterior descending coronary artery. Normal left ventricular end diastolic pressure. Proceed with aggressive maximal medical management as clinically indicated.     CHOLECYSTECTOMY  08/17/2015    Liang/Charles/Saulo/ Millicent    COLONOSCOPY  2010    COLONOSCOPY  02/19/2015    -polyps,diverticulosis,hemorrhoids    COLONOSCOPY  05/23/2018    Dr Dayna Lyon    COLONOSCOPY N/A 05/23/2018    COLONOSCOPY POLYPECTOMY SNARE/COLD BIOPSY  cold snare  and  hot snare performed by Agnes Kaur MD at [address]  10/30/2020    with Dr. Jarrell Smith 10/30/2020    [address] NOT HIGH RISK performed by Debra Elias DO at [address]      left eye    ENDOSCOPY, COLON, DIAGNOSTIC      EYE SURGERY      IR TUNNELED CATHETER PLACEMENT GREATER THAN 5 YEARS  11/22/2021    IR TUNNELED CATHETER PLACEMENT GREATER THAN 5 YEARS 11/22/2021 [facility]    PACEMAKER INSERTION      PACEMAKER PLACEMENT      UPPER GASTROINTESTINAL ENDOSCOPY  05/28/2019     Zheng-bx(neg H-Pylori)duodenal bulbar/antral erythema    UPPER GASTROINTESTINAL ENDOSCOPY N/A 2019    EGD BIOPSY, clotest performed by Kiana Mccoy MD at [facility] N/A 10/30/2020    EGD ESOPHAGOGASTRODUODENOSCOPY performed by Abbe Adames DO at [facility] [address] RENAL BIOPSY, LT  2021    US PERCUT RENAL BIOPSY, LT 2021 [facility]         Allergies   Allergen Reactions    Coreg [Carvedilol] Shortness Of Breath and Nausea And Vomiting    Lipitor [Atorvastatin] Other (See Comments)     Muscle aches and joint pain    Aldactone [Spironolactone] Hives    Crestor [Rosuvastatin Calcium] Other (See Comments)     Muscle aches and joint pain    Lopid [Gemfibrozil]      hyperglycemia    Invokana [Canagliflozin] Rash    Januvia [Sitagliptin] Nausea And Vomiting       Social History     Socioeconomic History    Marital status:      Spouse name: Not on file    Number of children: Not on file    Years of education: Not on file    Highest education level: Not on file   Occupational History    Not on file   Tobacco Use    Smoking status: Former Smoker     Packs/day: 1.50     Years: 8.00     Pack years: 12.00     Types: Cigarettes     Quit date: 3/4/1980     Years since quittin.1    Smokeless tobacco: Never Used   Vaping Use    Vaping Use: Never used   Substance and Sexual Activity    Alcohol use: No    Drug use: No    Sexual activity: Not on file   Other Topics Concern    Not on file   Social History Narrative    Not on file     Social Determinants of Health     Financial Resource Strain: Low Risk     Difficulty of Paying Living Expenses: Not hard at all   Food Insecurity: No Food Insecurity    Worried About Running Out of Food in the Last Year: Never true    Genna of Food in the Last Year: Never true   Transportation Needs: No Transportation Needs    Lack of Transportation (Medical):  No    Lack of Transportation (Non-Medical):  No   Physical Activity:     Days of Exercise per Week: Not on file    Minutes of Exercise per Session: Not on file   Stress:     Feeling of Stress : Not on file   Social Connections:     Frequency of Communication with Friends and Family: Not on file    Frequency of Social Gatherings with Friends and Family: Not on file    Attends Alevism Services: Not on file    Active Member of [address] [place] [address] or Organizations: Not on file    Attends Club or Organization Meetings: Not on file    Marital Status: Not on file   Intimate Partner Violence:     Fear of Current or Ex-Partner: Not on file    Emotionally Abused: Not on file    Physically Abused: Not on file    Sexually Abused: Not on file   Housing Stability:     Unable to Pay for Housing in the Last Year: Not on file    Number of [place] in the Last Year: Not on file    Unstable Housing in the Last Year: Not on file       Family History:        Family History   Problem Relation Age of Onset    Cancer Mother         breast    Cancer Father         lung       Outpatient Medications:     Medications Prior to Admission: acetaminophen (TYLENOL) 325 MG tablet, Take 650 mg by mouth every 6 hours as needed for Pain  hydrALAZINE (APRESOLINE) 100 MG tablet, Take 1 tablet by mouth 3 times daily Except on the morning's of dialysis  tamsulosin (FLOMAX) 0.4 MG capsule, Take 1 capsule by mouth once daily  CONTOUR TEST strip, Inject 1 each into the skin 2 times daily (Patient not taking: Reported on 4/18/2022)  cholestyramine (QUESTRAN) 4 g packet, Take 1 packet by mouth daily (Patient not taking: Reported on 4/18/2022)  metoprolol tartrate (LOPRESSOR) 25 MG tablet, Take 1 tablet by mouth 2 times daily  LANTUS SOLOSTAR 100 UNIT/ML injection pen, INJECT 22 UNITS SUBCUTANEOUSLY TWICE DAILY  amLODIPine (NORVASC) 5 MG tablet, Take 2 tablets by mouth daily  furosemide (LASIX) 40 MG tablet, Take 1 tablet by mouth daily  omeprazole (PRILOSEC) 10 MG delayed release capsule, Take 10 mg by mouth daily  B Complex-C-Zn-Folic Acid (DIALYVITE/ZINC) TABS, TAKE 1 TABLET BY MOUTH ONCE DAILY AFTER DIALYSIS (Patient not taking: Reported on 4/18/2022)  docusate sodium (COLACE) 100 MG capsule, Take 1 capsule by mouth 3 times daily as needed for Constipation (Patient not taking: Reported on 4/18/2022)  nitroGLYCERIN (NITROSTAT) 0.4 MG SL tablet, Place 1 tablet under the tongue every 5 minutes as needed for Chest pain  latanoprost (XALATAN) 0.005 % ophthalmic solution, Place 1 drop into both eyes nightly   acyclovir (ZOVIRAX) 400 MG tablet, 400 mg 2 times daily   DIANA MICROLET LANCETS MISC, TEST TWICE DAILY (Patient not taking: Reported on 4/18/2022)  Insulin Pen Needle (PEN NEEDLES) 31G X 6 MM MISC, 1 each by Does not apply route daily  prednisoLONE acetate (PRED FORTE) 1 % ophthalmic suspension, Place 1 drop into the left eye daily     Current Medications:       Scheduled Meds:    prednisoLONE acetate  1 drop Left Eye Daily    pantoprazole  40 mg Oral QAM AC    amLODIPine  10 mg Oral Daily    tamsulosin  0.4 mg Oral Daily    hydrALAZINE  100 mg Oral TID    furosemide  40 mg Oral Daily    latanoprost  1 drop Both Eyes Nightly    insulin glargine  22 Units SubCUTAneous BID    sodium chloride flush  5-40 mL IntraVENous 2 times per day    metoprolol tartrate  25 mg Oral BID    heparin (porcine)  5,000 Units SubCUTAneous 3 times per day    insulin lispro  0-6 Units SubCUTAneous TID WC    insulin lispro  0-3 Units SubCUTAneous Nightly     Continuous Infusions:    sodium chloride      dextrose       PRN Meds:  nitroGLYCERIN, sodium chloride flush, sodium chloride, ondansetron **OR** ondansetron, polyethylene glycol, acetaminophen **OR** acetaminophen, oxyCODONE **OR** oxyCODONE, glucose, dextrose, glucagon (rDNA), dextrose    Review of Systems:       Constitutional: no fever, no chills or weight loss  Eyes: no eye pain or blurred vision  ENT: no hearing loss, congestion, or difficulty swallowing   Respiratory: no wheezing, chest tightness, or shortness of breath   Cardiovascular: no chest pain or pressure, no palpitations, no diaphoresis   Gastrointestinal: Positive nausea and vomiting with dialysis, no abdominal pain, diarrhea or constipation, no melena   Genitourinary: no dysuria, frequency,hematuria , or nocturia   Musculoskeletal: no myalgias or arthralgias, no back pain   Skin: no rashes or sores   Neurological: no focal weakness, numbness, tingling, or headache, no seizures      Input/Output:       No intake/output data recorded. Patient Vitals for the past 96 hrs (Last 3 readings):   Weight   22 0415 183 lb 10.3 oz (83.3 kg)        Vital Signs:     Temperature:  Temp: 97.7 °F (36.5 °C)  TMax:   Temp (24hrs), Av.2 °F (36.8 °C), Min:97.6 °F (36.4 °C), Max:98.9 °F (37.2 °C)    Respirations:  Resp: 16  Pulse:   Pulse: 59  BP:    BP: (!) 141/65  BP Range: Systolic (51FEM), KHJ:259 , Min:141 , OHX:766       Diastolic (52AWI), ZVC:11, Min:51, Max:74      Physical Examination:       Physical Exam  Constitutional:       Appearance: Normal appearance. HENT:      Head: Normocephalic and atraumatic. Eyes:      Extraocular Movements: Extraocular movements intact. Cardiovascular:      Rate and Rhythm: Normal rate. Abdominal:      General: There is distension. Palpations: Abdomen is soft. Musculoskeletal:      Left lower leg: No edema. Neurological:      General: No focal deficit present. Mental Status: He is alert and oriented to person, place, and time.           Labs:       Recent Labs     22  0855 22  0839   WBC 8.1 6.8   RBC 3.00* 3.16*   HGB 8.7* 9.2*   HCT 26.5* 28.0*   MCV 88.3 88.6   MCH 29.0 29.1   MCHC 32.8 32.9   RDW 14.8* 15.1*    203   MPV 9.5 9.5      BMP:   Recent Labs     22  0855 22  0057 22  0839    141 139   K 3.7 3.9 3.7    108* 107   CO2 18* 19* 19*   BUN 75* 77* 79*   CREATININE 3.59* 3.82* 3.76*   GLUCOSE 243* 192* 124*   CALCIUM 9.1 8.9 9.2      Albumin:    Recent Labs     04/18/22  0855 04/19/22  0839   LABALBU 3.7 3.6     VALERIE:      Lab Results   Component Value Date    VALERIE NEGATIVE 11/11/2021     SPEP:     Lab Results   Component Value Date    PROT 6.9 04/19/2022    ALBCAL 3.9 09/11/2018    ALBPCT 62 09/11/2018    LABALPH 0.2 09/11/2018    LABALPH 0.7 09/11/2018    A1PCT 3 09/11/2018    A2PCT 11 09/11/2018    LABBETA 0.7 09/11/2018    BETAPCT 12 09/11/2018    GAMGLOB 0.8 09/11/2018    GGPCT 13 09/11/2018    PATH ELECTRONICALLY SIGNED. Nataliya Elmore M.D. 09/11/2018    PATH ELECTRONICALLY SIGNED.  Nataliya Elmore M.D. 09/11/2018     C3:     Lab Results   Component Value Date    C3 108 11/21/2021     C4:     Lab Results   Component Value Date    C4 15 11/21/2021     MPO ANCA:     Lab Results   Component Value Date    MPO 15 11/11/2021     PR3 ANCA:     Lab Results   Component Value Date    PR3 1.1 11/11/2021     Hep BsAg:          Lab Results   Component Value Date    HEPBSAG NONREACTIVE 11/23/2021     Hep C AB:          Lab Results   Component Value Date    HEPCAB NONREACTIVE 11/23/2021     Urinalysis/Chemistries:      Lab Results   Component Value Date    NITRU NEGATIVE 04/18/2022    COLORU Yellow 04/18/2022    PHUR 5.5 04/18/2022    WBCUA 0 TO 2 04/18/2022    RBCUA 5 TO 10 04/18/2022    MUCUS NOT REPORTED 12/03/2021    TRICHOMONAS NOT REPORTED 12/03/2021    YEAST NOT REPORTED 12/03/2021    BACTERIA 1+ 04/18/2022    SPECGRAV 1.025 04/18/2022    LEUKOCYTESUR NEGATIVE 04/18/2022    UROBILINOGEN Normal 04/18/2022    BILIRUBINUR NEGATIVE 04/18/2022    GLUCOSEU NEGATIVE 04/18/2022    KETUA NEGATIVE 04/18/2022    AMORPHOUS NOT REPORTED 12/03/2021     Urine Sodium:     Lab Results   Component Value Date    NIDHI 62 11/21/2021     Urine Potassium:    Lab Results   Component Value Date    KUR 25.3 11/12/2021     Urine Chloride:    Lab Results   Component Value Date    CLUR 54 11/12/2021     Urine Osmolarity:   Lab Results   Component Value Date    OSMOU 421 11/21/2021     Urine Creatinine:     Lab Results   Component Value Date    LABCREA 62.1 11/21/2021     Radiology:       CT ABDOMEN PELVIS WO CONTRAST Additional Contrast? None    Result Date: 4/18/2022  No CT evidence of acute intra-abdominal process. Small bilateral pleural effusions, partially loculated on the left. Trace pericardial effusion. No acute fracture or dislocation involving thoracic or lumbar spine. CT THORACIC SPINE WO CONTRAST    Result Date: 4/18/2022  No CT evidence of acute intra-abdominal process. Small bilateral pleural effusions, partially loculated on the left. Trace pericardial effusion. No acute fracture or dislocation involving thoracic or lumbar spine. XR CHEST PORTABLE    Result Date: 4/19/2022  Trace effusions. Stable left internal jugular catheter. CT LUMBAR SPINE TRAUMA RECONSTRUCTION    Result Date: 4/18/2022  No CT evidence of acute intra-abdominal process. Small bilateral pleural effusions, partially loculated on the left. Trace pericardial effusion. No acute fracture or dislocation involving thoracic or lumbar spine. Assessment:       1. ESRD on HD on [facility] schedule at [address] [facility] via tunnel catheter under Dr. Shahida Kulkarni. 2. Missed multiple sessions of dialysis. 3. Complaint of nausea and vomiting postdialysis. 4. Hypertension  5. Type 2 diabetes  6. Chronic diastolic heart failure  7. S/p angioplasty with stent 4/10/2017.  8. 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 9. Dyslipidemia  10. Metabolic acidosis. Plan:      Patient was seen and examined on HD at bedside. Orders were confirmed with the HD nurse.  Will be running him on a low blood flow rate of 250 with no UF and see if that helps his symptoms.  Patient's tunneled catheter is currently not working, Activase ordered.    If catheter does not work tonight, will get hemodialysis done in a.m. after IR to replace tunneled catheter.  Patient was counseled about trying to be regular with his dialysis treatments.  This was all discussed with the patient and his family at bedside and they are in agreement with the plan.  BMP in AM.   Will follow. Nutrition    Please ensure that patient is on a renal diet/TF.  Avoid nephrotoxic drugs/contrast exposure. Thank you for the consultation. I will discuss the care of this patient with the attending physician. Please do not hesitate to contact us for any further questions/concerns. We will continue to follow this patient along with you. Jose Sanchez MD  Internal Medicine Resident, PGY2  [address] [place] [address], [address]  4/19/2022,2:24 PM     Attending Physician Statement  I have discussed the care of this patient, including pertinent history and exam findings, with the Resident/CNP. I have reviewed and edited the key elements of all parts of the encounter with the Resident/CNP. I agree with the assessment, plan and orders as documented by the Resident/CNP. Pavel Clement MD   Nephrology [address] [place] [address]    This note is created with the assistance of a speech-recognition program. While intending to generate a document that actually reflects the content of the visit, no guarantees can be provided that every mistake has been identified and corrected by editing.

## 2022-04-19 NOTE — ED PROVIDER NOTES
[facility]  Emergency Department Encounter  EmergencyMedicine Resident     Pt Chandra Mccord  MRN: [id number]  [place] 1945  Date of evaluation: 4/19/22  PCP:  Jose Angel Tate MD    This patient was evaluated in the Emergency Department for symptoms described in the history of present illness. The patient was evaluated in the context of the global COVID-19 pandemic, which necessitated consideration that the patient might be at risk for infection with the SARS-CoV-2 virus that causes COVID-19. Institutional protocols and algorithms that pertain to the evaluation of patients at risk for COVID-19 are in a state of rapid change based on information released by regulatory bodies including the CDC and federal and state organizations. These policies and algorithms were followed during the patient's care in the ED. CHIEF COMPLAINT       Chief Complaint   Patient presents with    Leg Swelling     Initial complaint was lower back pan and lower leg pain and swelling. On dialysis. Found bilateral pleural effusions at origin. Denies CP and SOB       HISTORY OF PRESENT ILLNESS  (Location/Symptom, Timing/Onset, Context/Setting, Quality, Duration, Modifying Factors, Severity.)      Cesar Credit is a 68 y.o. male who presents as a transfer from [facility].  Presented there due to low back pain. On evaluation there patient was found to have bilateral pleural effusions, trace pericardial effusion. Patient's labs were unremarkable and at baseline. Patient's PCP requested the patient be transferred here for possible peritoneal dialysis and monitoring of his pleural effusions. On arrival patient is in no acute distress and cooperative with exam.  He states that he has been compliant with all his medications. Denies any chest pain, shortness of breath. Does state that he has had increased leg swelling for about a week now.   Patient states that he stopped doing dialysis about 6 weeks ago due to not feeling well during the treatments, he would get nauseous. PAST MEDICAL / SURGICAL / SOCIAL / FAMILY HISTORY   Past medical and surgical history reviewed with patient. has a past medical history of Acute kidney injury ([facility] [address] Acute MI ([facility] [address] Acute renal failure with tubular necrosis ([facility] [address] Anemia, CAD (coronary artery disease), Cerebral artery occlusion with cerebral infarction [facility]), CHF (congestive heart failure) ([facility] [address] Chronic back pain, Closed fracture of lumbar vertebra without mention of spinal cord injury, Displacement of intervertebral disc, site unspecified, without myelopathy, H/O cardiac catheterization, H/O cardiovascular stress test, H/O tilt table evaluation, H/O tilt table evaluation, History of 24 hour EKG monitoring, History of 24 hour EKG monitoring, History of blood transfusion, History of cardiovascular stress test, History of cardiovascular stress test, History of coronary artery stent placement, History of CVA (cerebrovascular accident) without residual deficits, History of echocardiogram, History of Holter monitoring, History of tilt table evaluation, Hyperlipidemia, Hypertension, Non critical Right Renal artery stenosis, native ([address] Pacemaker, S/P cardiac cath, S/P coronary artery stent placement, SIRS (systemic inflammatory response syndrome) ([facility] [address] and Type II or unspecified type diabetes mellitus without mention of complication, not stated as uncontrolled. has a past surgical history that includes Pacemaker insertion; back surgery; Cholecystectomy (08/17/2015); Corneal transplant; Cardiac catheterization (Left, 02/19/2016); pacemaker placement; Colonoscopy (2010); Colonoscopy (02/19/2015); Colonoscopy (05/23/2018); Colonoscopy (N/A, 05/23/2018); Upper gastrointestinal endoscopy (05/28/2019); Upper gastrointestinal endoscopy (N/A, 05/28/2019); Colonoscopy (10/30/2020); Colonoscopy (N/A, 10/30/2020); Upper gastrointestinal endoscopy (N/A, 10/30/2020);  Endoscopy, colon, diagnostic; eye surgery; Cardiac catheterization (Left, 10/05/2021); IR TUNNELED CVC PLACE WO SQ PORT/PUMP > 5 YEARS (2021); and US BIOPSY RENAL LEFT PERC (2021). Social History     Socioeconomic History    Marital status:      Spouse name: Not on file    Number of children: Not on file    Years of education: Not on file    Highest education level: Not on file   Occupational History    Not on file   Tobacco Use    Smoking status: Former Smoker     Packs/day: 1.50     Years: 8.00     Pack years: 12.00     Types: Cigarettes     Quit date: 3/4/1980     Years since quittin.1    Smokeless tobacco: Never Used   Vaping Use    Vaping Use: Never used   Substance and Sexual Activity    Alcohol use: No    Drug use: No    Sexual activity: Not on file   Other Topics Concern    Not on file   Social History Narrative    Not on file     Social Determinants of Health     Financial Resource Strain: Low Risk     Difficulty of Paying Living Expenses: Not hard at all   Food Insecurity: No Food Insecurity    Worried About [address] [employer] in the Last Year: Never true    [facility] in the Last Year: Never true   Transportation Needs: No Transportation Needs    Lack of Transportation (Medical): No    Lack of Transportation (Non-Medical):  No   Physical Activity:     Days of Exercise per Week: Not on file    Minutes of Exercise per Session: Not on file   Stress:     Feeling of Stress : Not on file   Social Connections:     Frequency of Communication with Friends and Family: Not on file    Frequency of Social Gatherings with Friends and Family: Not on file    Attends Protestant Services: Not on file    Active Member of Clubs or Organizations: Not on file    Attends Club or Organization Meetings: Not on file    Marital Status: Not on file   Intimate Partner Violence:     Fear of Current or Ex-Partner: Not on file    Emotionally Abused: Not on file    Physically Abused: Not on file    Sexually Abused: Not on file   Housing Stability:     Unable to Pay for Housing in the Last Year: Not on file    Number of Places Lived in the Last Year: Not on file    Unstable Housing in the Last Year: Not on file       Family History   Problem Relation Age of Onset    Cancer Mother         breast    Cancer Father         lung       Allergies:  Coreg [carvedilol], Lipitor [atorvastatin], Aldactone [spironolactone], Crestor [rosuvastatin calcium], Lopid [gemfibrozil], Invokana [canagliflozin], and Januvia [sitagliptin]    Home Medications:  Prior to Admission medications    Medication Sig Start Date End Date Taking?  Authorizing Provider   acetaminophen (TYLENOL) 325 MG tablet Take 650 mg by mouth every 6 hours as needed for Pain    Historical Provider, MD   hydrALAZINE (APRESOLINE) 100 MG tablet Take 1 tablet by mouth 3 times daily Except on the morning's of dialysis 4/12/22   Zaira Collazo MD   tamsulosin (FLOMAX) 0.4 MG capsule Take 1 capsule by mouth once daily 4/7/22   Deanna Pickering PA-C   CONTOUR TEST strip Inject 1 each into the skin 2 times daily  Patient not taking: Reported on 4/18/2022 4/4/22   Zaira Collazo MD   cholestyramine Alva Moyacny) 4 g packet Take 1 packet by mouth daily  Patient not taking: Reported on 4/18/2022 3/30/22   Nicole Howell MD   metoprolol tartrate (LOPRESSOR) 25 MG tablet Take 1 tablet by mouth 2 times daily 3/17/22   Brenda Park PA-C   LANTUS SOLOSTAR 100 UNIT/ML injection pen INJECT 22 UNITS SUBCUTANEOUSLY TWICE DAILY 3/15/22   Zaira Collazo MD   amLODIPine (NORVASC) 5 MG tablet Take 2 tablets by mouth daily 3/3/22   Jovani Park PA-C   furosemide (LASIX) 40 MG tablet Take 1 tablet by mouth daily 3/3/22 6/1/22  Brenda Park PA-C   omeprazole (PRILOSEC) 10 MG delayed release capsule Take 10 mg by mouth daily    Historical Provider, MD   B Complex-MAGO-Zn-Folic Acid (DIALYVITE/ZINC) TABS TAKE 1 TABLET BY MOUTH ONCE DAILY AFTER DIALYSIS  Patient not taking: Reported on 4/18/2022 12/10/21   Historical Provider, MD   docusate sodium (COLACE) 100 MG capsule Take 1 capsule by mouth 3 times daily as needed for Constipation  Patient not taking: Reported on 4/18/2022 12/9/21   REY Macdonald CNP   nitroGLYCERIN (NITROSTAT) 0.4 MG SL tablet Place 1 tablet under the tongue every 5 minutes as needed for Chest pain 6/28/21   Michelle Saldivar MD   latanoprost (XALATAN) 0.005 % ophthalmic solution Place 1 drop into both eyes nightly  2/2/21   Historical Provider, MD   acyclovir (ZOVIRAX) 400 MG tablet 400 mg 2 times daily  7/6/20   Historical Provider, MD [address] [place] [address]   Patient not taking: Reported on 4/18/2022 11/16/15   Zaira Collazo MD   Insulin Pen Needle (PEN NEEDLES) 31G X 6 MM MISC 1 each by Does not apply route daily 10/16/15   Zaira Collazo MD   prednisoLONE acetate (PRED FORTE) 1 % ophthalmic suspension Place 1 drop into the left eye daily  3/30/15   Historical Provider, MD       REVIEW OF SYSTEMS    (2-9 systems for level 4, 10 or more for level 5)      Review of Systems   Constitutional: Negative for chills and fever. HENT: Negative for facial swelling and trouble swallowing. Eyes: Negative for visual disturbance. Respiratory: Negative for cough, chest tightness and shortness of breath. Cardiovascular: Positive for leg swelling. Negative for chest pain. Gastrointestinal: Negative for abdominal distention, abdominal pain, nausea and vomiting. Genitourinary: Negative for difficulty urinating. Musculoskeletal: Positive for back pain. Negative for neck stiffness. Neurological: Negative for headaches. Psychiatric/Behavioral: Negative for agitation and hallucinations.        PHYSICAL EXAM   (up to 7 for level 4, 8 or more for level 5)      INITIAL VITALS:   BP (!) 170/64   Pulse 63   Temp 98.7 °F (37.1 °C) (Oral)   Resp 16   Wt 183 lb 10.3 oz (83.3 kg)   SpO2 96%   BMI 27.12 kg/m²     Physical Exam  Constitutional:       General: He is not in acute distress. HENT:      Head: Normocephalic and atraumatic. Right Ear: External ear normal.      Left Ear: External ear normal.      Mouth/Throat:      Mouth: Mucous membranes are moist.   Eyes:      Extraocular Movements: Extraocular movements intact. Pupils: Pupils are equal, round, and reactive to light. Cardiovascular:      Rate and Rhythm: Normal rate and regular rhythm. Pulses: Normal pulses. Heart sounds: Normal heart sounds. Comments: Left-sided temporary dialysis catheter in place, no erythema or tenderness to the area. Pulmonary:      Effort: Pulmonary effort is normal. No respiratory distress. Breath sounds: No wheezing or rales. Abdominal:      Palpations: Abdomen is soft. Tenderness: There is no abdominal tenderness. Musculoskeletal:      Right lower leg: Edema (1) present. Comments: 1+ pitting edema to the right lower extremity, 2+ edema to lower extremity. does have an old biopsy site to the left lower shin. Lower extremities are nontender with no erythema. Strength and sensation is intact in all extremities. Skin:     Capillary Refill: Capillary refill takes less than 2 seconds. Neurological:      General: No focal deficit present. Mental Status: He is alert. DIFFERENTIAL  DIAGNOSIS     PLAN (LABS / IMAGING / EKG):  Orders Placed This Encounter   Procedures    Basic Metabolic Panel    Basic Metabolic Panel w/ Reflex to MG    Protime-INR    ADULT DIET; Regular; Low Fat/Low Chol/High Fiber/ANDREW; Low Potassium (Less than 3000 mg/day);  Low Phosphorus (Less than 1000 mg)    Vital signs per unit routine    Notify physician    Up with assistance    Daily weights    Intake and output    Place intermittent pneumatic compression device    Telemetry monitoring - continuous duration    Full Code    Inpatient consult to Hospitalist    Consult to Nephrology  OT eval and treat    PT evaluation and treat    Initiate Oxygen Therapy Protocol    Pulse Oximetry Spot Check    Initiate Oxygen Therapy Protocol    EKG 12 Lead    ADMIT TO INPATIENT       MEDICATIONS ORDERED:  Orders Placed This Encounter   Medications    acetaminophen (TYLENOL) tablet 1,000 mg    prednisoLONE acetate (PRED FORTE) 1 % ophthalmic suspension 1 drop    nitroGLYCERIN (NITROSTAT) SL tablet 0.4 mg    pantoprazole (PROTONIX) tablet 40 mg    amLODIPine (NORVASC) tablet 10 mg    tamsulosin (FLOMAX) capsule 0.4 mg    hydrALAZINE (APRESOLINE) tablet 100 mg    furosemide (LASIX) tablet 40 mg    latanoprost (XALATAN) 0.005 % ophthalmic solution 1 drop    insulin glargine (LANTUS) injection vial 22 Units    sodium chloride flush 0.9 % injection 5-40 mL    sodium chloride flush 0.9 % injection 10 mL    0.9 % sodium chloride infusion    DISCONTD: enoxaparin (LOVENOX) injection 30 mg    OR Linked Order Group     ondansetron (ZOFRAN-ODT) disintegrating tablet 4 mg     ondansetron (ZOFRAN) injection 4 mg    polyethylene glycol (GLYCOLAX) packet 17 g    OR Linked Order Group     acetaminophen (TYLENOL) tablet 650 mg     acetaminophen (TYLENOL) suppository 650 mg    metoprolol tartrate (LOPRESSOR) tablet 25 mg    OR Linked Order Group     oxyCODONE (ROXICODONE) immediate release tablet 5 mg     oxyCODONE (ROXICODONE) immediate release tablet 10 mg    heparin (porcine) injection 5,000 Units       DDX: Pleural effusion, ESRD    MDM: 68 y.o. male presents today as a transfer from [place] for possible peritoneal dialysis and pleural effusions. Repeat BMP is ordered. Electrolytes are within normal limits and creatinine is at baseline. [facility] is consulted for admission. Patient is given Tylenol for his back pain.   Patient is admitted to [facility]   LAB RESULTS:  Results for orders placed or performed during the hospital encounter of 52/55/19   Basic Metabolic Panel   Result Value Ref Range    Glucose 192 (H) 70 - 99 mg/dL    BUN 77 (H) 8 - 23 mg/dL    CREATININE 3.82 (H) 0.70 - 1.20 mg/dL    Calcium 8.9 8.6 - 10.4 mg/dL    Sodium 141 135 - 144 mmol/L    Potassium 3.9 3.7 - 5.3 mmol/L    Chloride 108 (H) 98 - 107 mmol/L    CO2 19 (L) 20 - 31 mmol/L    Anion Gap 14 9 - 17 mmol/L    GFR Non-African American 15 (L) >60 mL/min    GFR  19 (L) >60 mL/min    GFR Comment                 RADIOLOGY:  No orders to display        EKG  EKG Interpretation    Interpreted by emergency department physician    Rhythm: normal sinus   Rate: normal  Axis: left  Ectopy: none  Conduction: 1st degree AV block  ST Segments: no acute change  T Waves: no acute change  Q Waves: none and aVf    Clinical Impression: no acute changes and non-specific EKG    Chidi Weiss MD     All EKG's are interpreted by the Emergency Department Physician who either signs or Co-signs this chart in the absence of a cardiologist.      CONSULTS:  IP CONSULT TO HOSPITALIST  IP CONSULT TO NEPHROLOGY        FINAL IMPRESSION      1. Pleural effusion          DISPOSITION / PLAN     DISPOSITION Admitted 04/19/2022 01:36:25 AM      PATIENT REFERRED TO:  No follow-up provider specified.     DISCHARGE MEDICATIONS:  Current Discharge Medication List          Chidi Weiss MD  Emergency Medicine Resident    (Please note that portions of thisnote were completed with a voice recognition program.  Efforts were made to edit the dictations but occasionally words are mis-transcribed.)        Chidi Weiss MD  Resident  04/19/22 [address])  Pediatrics  [address] [place] [address]  Phone: [phone]  Fax: [phone]  Follow Up Time: 1-3 Days

## 2022-04-19 NOTE — PROGRESS NOTES
Dialysis Time Out  To be done by RN and tech or 2 RNs  Staff Names Ed RN, Jamial RN    [x]  Identity of the patient using 2 patient identifiers  [x]  Consent for treatment  [x]  Equipment-proper machine and dialyzer  [x]  B-Hep B status  [x]  Orders- to include bath, blood flow, dialyzer, time and fluid removal  [x]  Access-Correct site and in working order  [x]  Time for patient to ask questions.

## 2022-04-19 NOTE — PROGRESS NOTES
Physical Therapy        Physical Therapy Cancel Note      DATE: 2022    NAME: John Grullon  MRN: [id number]   : 1945      Patient not seen this date for Physical Therapy due to:    Hemodialysis:      Electronically signed by Bryce Peace PT on 2022 at 2:37 PM

## 2022-04-19 NOTE — H&P
[facility]  Office: [address], DO, Shelia Garcia, DO, Alysha Shipley, DO, Danielmauri Cordova Blood, DO, Dickson Stafford MD, Gladys Esteban MD, Queen Benjie MD, Fara Bhatti MD, Bobbi Sweet MD, Alexandra Neville MD, Tiera Keene MD, Jovita Alexander, DO, Portillo Valiente, DO, Guanako Bolton MD,  Lan Wright DO, Yoli Villagran MD, Isaiah Dominguez MD, Aniyah Hurst MD, Arin Rodríguez DO, Amina Babcock MD, Deepa Palacios MD, Keagan Aragon, [facility], [facility] Gabbie, CNP, Martin Oneil, CNP, Jonas Damon, CNP, Darrel Simms, CNP, Alee Drummond, CNP, Bharat Patel PAJUANY, Glenroy Herbert, DNP, Marianela Yoon, [facility], Fab White, CNP, Bethany Pa, CNP, Radhames Mckeon, CNS, Ellen Rodriguez, [facility], Fabby Henning, CNP, Kylie Havecarl, CNP, Braulio Prim, [facility]    HISTORY AND PHYSICAL EXAMINATION            Date:   4/19/2022  Patient name:  Aracely Finn  Date of admission:  4/19/2022 12:27 AM  MRN:   [id number]  Account:  [de-identified]  YOB: 1945  PCP:    Edson Kumari MD  Room:   [address] [place] [address]  Code Status:    Full Code    Chief Complaint:     Chief Complaint   Patient presents with    Leg Swelling     Initial complaint was lower back pan and lower leg pain and swelling. On dialysis. Found bilateral pleural effusions at origin. Denies CP and SOB       History Obtained From:     patient, spouse, family member - daughter, electronic medical record    History of Present Illness:     Aracely Finn is a 68 y.o. Non- / non  male who presents with Leg Swelling (Initial complaint was lower back pan and lower leg pain and swelling. On dialysis. Found bilateral pleural effusions at origin. Denies CP and SOB)   and is admitted to the hospital for the management of Bilateral pleural effusion.     This is a 80-year-old male who presents to the emergency department for evaluation of worsening lower extremity pain.  Per patient he was recently at his primary care doctor's office last week and was referred to pain management, despite this he has not been to the appointment yet because of worsening bilateral lower extremity pain complicating his evaluation is a 6-week history of missing hemodialysis. Patient states that hemodialysis has made him excessively ill with nausea and emesis and that it just makes him miserable. There was some concern for fluid overload and bilateral effusions initially at [facility]. Patient was transferred to [facility] for further nephrology evaluation and evaluation of potential effusions    On assessment patient is awake and alert sitting in bed. Wife and daughter in room at time of assessment. Patient's biggest concern at this time appears to be his bilateral lower extremity pain and back pain. He denies any chest pain, shortness of breath, nausea, vomiting, diarrhea, constipation, fever, chills, urinary symptoms. States he has been taking his diuretics and goes to the bathroom approximately every 2-3 hours. He is 2 LLFNC without difficulty. There is +2 bilateral lower extremity pitting edema noted at with posterior crackles bilaterally. Diagnostics at [facility] included CT lumbar and thoracic spine as well as CT abdomen pelvis to rule out dissection. Small bilateral pleural effusions were noted on CT abdomen pelvis, no fracture or dislocation on thoracic or lumbar imaging. Follow-up chest x-ray was ordered on admission to further evaluate need for possible thoracentesis. Creatinine was 3.59 with a BUN of 75 on admission CO2 18, GFR 17.  Glucose 243 on admission. proBNP 4330 with a troponin of 79. Hemoglobin was 8.7 with hematocrit of 26.5. Urine specific gravity was elevated at 1.025 with 2+ proteinuria with trace hemoglobin and 1+ bacteria. He is admitted for further evaluation and work-up.     Past Medical History:     Past Medical History:   Diagnosis Date    Abnormal tilt table test 2/23/2016    Acute kidney [facility]     Acute MI ([facility]     Acute renal failure superimposed on stage 4 chronic kidney disease ([address] 10/2/2018    ssecondary to hemodynamic effects of loop diuretics and ace inhibitors, bbaseline 1.2-1.3 which peaked up to 1.8, resolving    Acute renal failure with tubular necrosis (HCC) 10/2/2018    ssecondary to hemodynamic effects of loop diuretics and ace inhibitors, bbaseline 1.2-1.3 which peaked up to 1.8, resolving    Anemia     Anemia in stage 4 chronic kidney disease ([facility] 5/21/2019    Also from suspected blood loss    Angina, class II ([address] 1/8/2018    CAD (coronary artery disease)     Cerebral artery occlusion with cerebral infarction (HCC)     CHF (congestive heart failure) ([facility]     Cholecystitis 8/17/2015    Chronic back pain     Chronic diastolic HF (heart failure), NYHA class 3 ([facility] 4/24/2017    Chronic kidney disease, stage III (moderate) ([facility] 2/22/2016    Secondary to diabetic nephrosclerosis. Baseline creatinine 1.4-1.6, renal function fluctuate volume status    Closed fracture of lumbar vertebra without mention of spinal cord injury     Displacement of intervertebral disc, site unspecified, without myelopathy     H/O cardiac catheterization 2/19/16    LMCA: Mild irregularities 10-20%. LAD: Lesion on PRox LAD: Mid subsection. 65% stenosis. LCx: Lesion on 1st Ob Valentina: Proximal subesection. 70% steniosis. RCA: Small non-dominant RCA. Lesion on PRox RCA: Ostial. 50% stenosis. EF:55%.  H/O cardiovascular stress test 2/19/16    Abnormal. Moderate perfusion defect of mild intensity in the inferior, inferoseptal adn inferoapical regions during stress imaging, which most consistent with ischemia. Global LV systolic function normal without regional wall motion abnormalities. OVerall these results are most consistent with an intermediate risk for signficant CAD.  Additional testing including cardiac cath may be indicated.  H/O tilt table evaluation 12/26/2017    Abnormal. Patients HR, BP response and symptoms were most consistent with dysautonomia. Combined with viligant maintenance of euvolemia and maintaining a moderate salft intake, pharmacologic treatment with SSRI such as lexapro and or mestinon among other treatments have shown some effectiveness in treatment of this condition    H/O tilt table evaluation 12/26/2017    Abnormal head upright tilt table study. The pt heart rate, blood pressure response and symptoms were most consistent with dysautonomia.  History of 24 hour EKG monitoring 8/14/14    Occasional PAC's and PVC's which appear to be at least moderately symptomatic.  History of 24 hour EKG monitoring 11/19/14    Event Monitor. Sinus rhythm and sinus bradycardia. Infrequent isolated PVC's    History of blood transfusion     History of cardiovascular stress test 2/19/14    Relatively NL    History of cardiovascular stress test 03/21/2018    Normal myocardial perfusion. Global left ventricular systolic function was normal with an EF of 68%. Overall, these results are most consistent with a low risk scan.  History of coronary artery stent placement 04/2017    PTCA / Drug Eluting Stent:, CX and / or branches    History of CVA (cerebrovascular accident) without residual deficits 2014    Incidentally found on CT head. No known impairment now or in the past.    History of echocardiogram 2/18/14    LA mildly dilated, EF 55%, LV wall thickness is moderately increased, no definite wall motion abnormalilities, what appears to be a pacer wire is seen w/n the RA and RV, mild-mod TR, mild pulmonary hypertension.  History of Holter monitoring 12/29/2017    Rare PAC's and PVC's.      History of tilt table evaluation 8/12/14    Abnormal    Hyperlipidemia     Hypertension     Medication side effect, initial encounter 3/23/2018    Non critical Right Renal artery stenosis, native [facility] 10/2/2018    Non critical Right Renal artery stenosis, based on cath in 2016, Rt RA 30% stenosis    Pacemaker     non-functioning    S/P cardiac cath 04/10/2017    S/P coronary artery stent placement     Successful PTCA - HA Om1    SIRS (systemic inflammatory response syndrome) ([facility] 5/19/2019    Type II or unspecified type diabetes mellitus without mention of complication, not stated as uncontrolled         Past Surgical History:     Past Surgical History:   Procedure Laterality Date    BACK SURGERY      CARDIAC CATHETERIZATION Left 02/19/2016    via right radial approach/ Melina Vernon/ Dr. Snyder Abed Left 10/05/2021    Dr Clau Nagel radial-Moderate three vessel coronary artery disease involving a ostial 50-60% stenosis in a small, non-dominant RCA, a 60% mid LAD stenosis and a proximal 50-60% stenosis in the left anterior descending coronary artery. Normal left ventricular end diastolic pressure. Proceed with aggressive maximal medical management as clinically indicated.     CHOLECYSTECTOMY  08/17/2015    Liang/Charles/Saulo/ Millicent    COLONOSCOPY  2010    COLONOSCOPY  02/19/2015    -polyps,diverticulosis,hemorrhoids    COLONOSCOPY  05/23/2018    Dr Rakan Mei    COLONOSCOPY N/A 05/23/2018    COLONOSCOPY POLYPECTOMY SNARE/COLD BIOPSY  cold snare  and  hot snare performed by Mary Mccann MD at [facility]  10/30/2020    with Dr. Oz Bejarano 10/30/2020    Macarena Rasp, NOT HIGH RISK performed by Chai Meza DO at [facility]      left eye    ENDOSCOPY, COLON, DIAGNOSTIC      EYE SURGERY      IR TUNNELED CATHETER PLACEMENT GREATER THAN 5 YEARS  11/22/2021    IR TUNNELED CATHETER PLACEMENT GREATER THAN 5 YEARS 11/22/2021 [facility]    PACEMAKER INSERTION      PACEMAKER PLACEMENT      UPPER GASTROINTESTINAL ENDOSCOPY  05/28/2019 Dr. Lynette Meeks H-Pylori)duodenal bulbar/antral erythema    UPPER GASTROINTESTINAL ENDOSCOPY N/A 05/28/2019    EGD BIOPSY, clotest performed by Abel Luna MD at [address] [facility] 10/30/2020    EGD ESOPHAGOGASTRODUODENOSCOPY performed by Sherlyn Leavitt DO at [address], LT  11/24/2021    US PERCUT RENAL BIOPSY, LT 11/24/2021 [facility]        Medications Prior to Admission:     Prior to Admission medications    Medication Sig Start Date End Date Taking?  Authorizing Provider   acetaminophen (TYLENOL) 325 MG tablet Take 650 mg by mouth every 6 hours as needed for Pain    Historical Provider, MD   hydrALAZINE (APRESOLINE) 100 MG tablet Take 1 tablet by mouth 3 times daily Except on the morning's of dialysis 4/12/22   Jose Angel Tate MD   tamsulosin (FLOMAX) 0.4 MG capsule Take 1 capsule by mouth once daily 4/7/22   Marques Pickering PA-C   CONTOUR TEST strip Inject 1 each into the skin 2 times daily  Patient not taking: Reported on 4/18/2022 4/4/22   Jose Angel Tate MD   cholestyramine Michaeldeuce Terry) 4 g packet Take 1 packet by mouth daily  Patient not taking: Reported on 4/18/2022 3/30/22   Sherrie Cyr MD   metoprolol tartrate (LOPRESSOR) 25 MG tablet Take 1 tablet by mouth 2 times daily 3/17/22   Brenda Park PA-C   LANTUS SOLOSTAR 100 UNIT/ML injection pen INJECT 22 UNITS SUBCUTANEOUSLY TWICE DAILY 3/15/22   Jose Angel Tate MD   amLODIPine (NORVASC) 5 MG tablet Take 2 tablets by mouth daily 3/3/22   Agnes Park PA-C   furosemide (LASIX) 40 MG tablet Take 1 tablet by mouth daily 3/3/22 6/1/22  Letitia Mesa PA-C   omeprazole (PRILOSEC) 10 MG delayed release capsule Take 10 mg by mouth daily    Historical Provider, MD   B Complex-C-Zn-Folic Acid (DIALYVITE/ZINC) TABS TAKE 1 TABLET BY MOUTH ONCE DAILY AFTER DIALYSIS  Patient not taking: Reported on 4/18/2022 12/10/21   Historical Provider, MD   docusate sodium (COLACE) 100 MG capsule Take 1 capsule by mouth 3 times daily as needed for Constipation  Patient not taking: Reported on 4/18/2022 12/9/21   REY Lockett CNP   nitroGLYCERIN (NITROSTAT) 0.4 MG SL tablet Place 1 tablet under the tongue every 5 minutes as needed for Chest pain 6/28/21   Ulises Hallman MD   latanoprost (XALATAN) 0.005 % ophthalmic solution Place 1 drop into both eyes nightly  2/2/21   Historical Provider, MD   acyclovir (ZOVIRAX) 400 MG tablet 400 mg 2 times daily  7/6/20   Historical Provider, MD   [address] [place] [address]   Patient not taking: Reported on 4/18/2022 11/16/15   Cata Santacruz MD   Insulin Pen Needle (PEN NEEDLES) 31G X 6 MM MISC 1 each by Does not apply route daily 10/16/15   Cata Santacruz MD   prednisoLONE acetate (PRED FORTE) 1 % ophthalmic suspension Place 1 drop into the left eye daily  3/30/15   Historical Provider, MD        Allergies:     Coreg [carvedilol], Lipitor [atorvastatin], Aldactone [spironolactone], Crestor [rosuvastatin calcium], Lopid [gemfibrozil], Invokana [canagliflozin], and Januvia [sitagliptin]    Social History:     Tobacco:    reports that he quit smoking about 42 years ago. His smoking use included cigarettes. He has a 12.00 pack-year smoking history. He has never used smokeless tobacco.  Alcohol:      reports no history of alcohol use. Drug Use:  reports no history of drug use. Family History:     Family History   Problem Relation Age of Onset    Cancer Mother         breast    Cancer Father         lung       Review of Systems:     Positive and Negative as described in HPI.     CONSTITUTIONAL:  negative for fevers, chills, sweats, fatigue, weight loss  HEENT:  negative for vision, hearing changes, runny nose, throat pain  RESPIRATORY:  +shortness of breath, cough, congestion, wheezing  CARDIOVASCULAR:  negative for chest pain, palpitations  GASTROINTESTINAL:  negative for nausea, vomiting, diarrhea, constipation, change in bowel habits, + improving abdominal fullness  GENITOURINARY:  negative for difficulty of urination, burning with urination, frequency   INTEGUMENT:  negative for rash, skin lesions, easy bruising   HEMATOLOGIC/LYMPHATIC:  negative for swelling/edema   ALLERGIC/IMMUNOLOGIC:  negative for urticaria , itching  ENDOCRINE:  negative increase in drinking, increase in urination, hot or cold intolerance  MUSCULOSKELETAL: Positive back pain lumbosacral with bilateral lower extremity pain and weakness as well as +2 bilateral lower extremity   NEUROLOGICAL:  negative for headaches, dizziness, lightheadedness, numbness, pain, tingling extremities  BEHAVIOR/PSYCH:  negative for depression, anxiety    Physical Exam:   BP (!) 141/65   Pulse 59   Temp 97.7 °F (36.5 °C) (Oral)   Resp 16   Wt 183 lb 10.3 oz (83.3 kg)   SpO2 96%   BMI 27.12 kg/m²   Temp (24hrs), Av.2 °F (36.8 °C), Min:97.6 °F (36.4 °C), Max:98.9 °F (37.2 °C)    Recent Labs     22  0819 22  1140   POCGLU 117* 263*     No intake or output data in the 24 hours ending 22 1230    General Appearance: alert, well appearing, and in no acute distress  Mental status: oriented to person, place, and time  Head: normocephalic, atraumatic  Eye: no icterus, redness, pupils equal and reactive, extraocular eye movements intact, conjunctiva clear  Ear: normal external ear, no discharge, hearing intact  Nose: no drainage noted  Mouth: mucous membranes moist  Neck: supple, no carotid bruits, thyroid not palpable  Lungs: Clear to auscultation anteriorly left and right, posterior crackles bilaterally, on 2 L low flow nasal cannula  Cardiovascular: normal rate, regular rhythm, + murmur, gallop, rub  Abdomen: Soft, nontender, nondistended, normal bowel sounds  Neurologic: There are no new focal motor or sensory deficits, normal muscle tone and bulk, no abnormal sensation, normal speech, cranial nerves II through XII grossly intact  Skin: No gross lesions, rashes, bruising or bleeding on exposed skin area  Extremities: peripheral pulses palpable, +2 bilateral lower extremity pitting edema or calf pain with palpation  Psych: normal affect    Investigations:      Laboratory Testing:  Recent Results (from the past 24 hour(s))   Basic Metabolic Panel    Collection Time: 04/19/22 12:57 AM   Result Value Ref Range    Glucose 192 (H) 70 - 99 mg/dL    BUN 77 (H) 8 - 23 mg/dL    CREATININE 3.82 (H) 0.70 - 1.20 mg/dL    Calcium 8.9 8.6 - 10.4 mg/dL    Sodium 141 135 - 144 mmol/L    Potassium 3.9 3.7 - 5.3 mmol/L    Chloride 108 (H) 98 - 107 mmol/L    CO2 19 (L) 20 - 31 mmol/L    Anion Gap 14 9 - 17 mmol/L    GFR Non-African American 15 (L) >60 mL/min    GFR  19 (L) >60 mL/min    GFR Comment         POC Glucose Fingerstick    Collection Time: 04/19/22  8:19 AM   Result Value Ref Range    POC Glucose 117 (H) 75 - 110 mg/dL   Hemoglobin A1C    Collection Time: 04/19/22  8:39 AM   Result Value Ref Range    Hemoglobin A1C 7.3 (H) 4.0 - 6.0 %    Estimated Avg Glucose 163 mg/dL   CBC    Collection Time: 04/19/22  8:39 AM   Result Value Ref Range    WBC 6.8 3.5 - 11.3 k/uL    RBC 3.16 (L) 4.21 - 5.77 m/uL    Hemoglobin 9.2 (L) 13.0 - 17.0 g/dL    Hematocrit 28.0 (L) 40.7 - 50.3 %    MCV 88.6 82.6 - 102.9 fL    MCH 29.1 25.2 - 33.5 pg    MCHC 32.9 28.4 - 34.8 g/dL    RDW 15.1 (H) 11.8 - 14.4 %    Platelets 753 916 - 607 k/uL    MPV 9.5 8.1 - 13.5 fL    NRBC Automated 0.0 0.0 per 100 WBC   Basic Metabolic Panel w/ Reflex to MG    Collection Time: 04/19/22  8:39 AM   Result Value Ref Range    Glucose 124 (H) 70 - 99 mg/dL    BUN 79 (H) 8 - 23 mg/dL    CREATININE 3.76 (H) 0.70 - 1.20 mg/dL    Calcium 9.2 8.6 - 10.4 mg/dL    Sodium 139 135 - 144 mmol/L    Potassium 3.7 3.7 - 5.3 mmol/L    Chloride 107 98 - 107 mmol/L    CO2 19 (L) 20 - 31 mmol/L    Anion Gap 13 9 - 17 mmol/L    GFR Non-African American 16 (L) >60 mL/min    GFR  19 (L) >60 mL/min    GFR Comment POC Glucose Fingerstick    Collection Time: 04/19/22 11:40 AM   Result Value Ref Range    POC Glucose 263 (H) 75 - 110 mg/dL       Imaging/Diagnostics:  CT ABDOMEN PELVIS WO CONTRAST Additional Contrast? None    Result Date: 4/18/2022  No CT evidence of acute intra-abdominal process. Small bilateral pleural effusions, partially loculated on the left. Trace pericardial effusion. No acute fracture or dislocation involving thoracic or lumbar spine. CT THORACIC SPINE WO CONTRAST    Result Date: 4/18/2022  No CT evidence of acute intra-abdominal process. Small bilateral pleural effusions, partially loculated on the left. Trace pericardial effusion. No acute fracture or dislocation involving thoracic or lumbar spine. CT LUMBAR SPINE TRAUMA RECONSTRUCTION    Result Date: 4/18/2022  No CT evidence of acute intra-abdominal process. Small bilateral pleural effusions, partially loculated on the left. Trace pericardial effusion. No acute fracture or dislocation involving thoracic or lumbar spine. Assessment :      Hospital Problems           Last Modified POA    * (Principal) Bilateral pleural effusion 4/19/2022 Yes    Primary hypertension 4/19/2022 Yes    Controlled type 2 diabetes mellitus with diabetic nephropathy, with long-term current use of insulin ([address] 4/19/2022 Yes    Hyperlipidemia 4/19/2022 Yes    BPH with obstruction/lower urinary tract symptoms 4/19/2022 Yes    Normocytic normochromic anemia 4/19/2022 Yes    Pulmonary hypertension ([address] 4/19/2022 Yes    Acute on chronic diastolic (congestive) heart failure ([address] 4/19/2022 Yes    End stage renal disease ([address] 4/19/2022 Yes    Microscopic hematuria 4/19/2022 Yes    Chronic bilateral low back pain with bilateral sciatica 4/19/2022 Yes    Debility 4/19/2022 Yes          Plan:     Patient status inpatient in the Med/Surge    1. Bilateral pleural effusions: Did appear to be small on presenting imaging but full scan not completed. Will check chest x-ray. IR guided thoracentesis with diagnostic panel ordered    2. End-stage renal disease: With noncompliance and history of right renal artery stenosis. Patient presented to his primary care office on 4/12 stating that he wants his dialysis port taken out because hemodialysis makes him feel poorly. CRT 3.59 on admission/BUN 75/CO2 18/GFR 17.  Apparently patient's PCP Linda Cowan wanted patient sent here for evaluation for peritoneal dialysis. Further plan of care pending input    3. Primary hypertension/CAD: Blood pressure slightly elevated, resume home antihypertensives. Will make adjustments accordingly if warranted    4. Chronic diastolic CHF with moderate pulmonary hypertension: With exacerbation. BNP 4330 on admission with a troponin of 79 (within patient normal baseline). Continue beta-blocker, Lasix 40 mg daily, supplement potassium levels as needed. Most recent echo from 11/11/2021 showing an ejection fraction of 60% with moderate pulmonary hypertension with an RVSP of 42 mmHg and coordinating atrial dilation with an LA dimension of 4.2 cm    5. Diabetes mellitus type 2: A1c 7.3. Resume home dose of Lantus 22 units twice daily. Start LI-ISS. Monitor patient for hyper/hypoglycemic event    6. Normocytic normochromic anemia we will check iron studies. Hemoglobin 8.7 on admission. It would appear patient has had a history of iron deficiency anemia in the past and has required iron infusion    7. Chronic back pain: Recently referred to pain management by his primary care provider, unable to attend appointment as of yet. Had previous back surgery. Continue Roxicodone for pain management    8. History of BPH/elevated PSA with microscopic hematuria: Currently without urinary symptoms. Hemoglobin noted on UA.  continue Flomax    9. GI/DVT prophylaxis: Protonix, heparin     10.  Debility: Patient states he has been walker bound for the last 6-months    11. PT, OT      On this date 04/19/22 I have spent 45 mins in direct patient care, reviewing notes, test results and diagnosis and plan of care discussions with the patient, as well as coordination of care. Plan of care made with Dwayne Haas DO       Consultations:   IP CONSULT TO HOSPITALIST  IP CONSULT TO NEPHROLOGY  IP CONSULT TO [address] [place] [address]    Patient is admitted as inpatient status because of co-morbidities listed above, severity of signs and symptoms as outlined, requirement for current medical therapies and most importantly because of direct risk to patient if care not provided in a hospital setting. Expected length of stay > 48 hours.     REY Berrios NP  4/19/2022  12:30 PM    Copy sent to Dr. Justina Crump MD

## 2022-04-19 NOTE — ACP (ADVANCE CARE PLANNING)
..Advance Care Planning     Advance Care Planning Activator (Inpatient)  Conversation Note      Date of ACP Conversation: 4/19/2022     Conversation Conducted with: Patient with Decision Making Capacity    ACP Activator: Joe Leigh Ann, [address] [facility] Decision Maker:  Tyson Palmer -Spouse. Secondary Jaymie Blake- Child    Current Designated Health Care Decision Maker:     Primary Decision Maker: Francisco Cisneros - [phone]    Secondary Decision Maker: Kaylen Valderrama - Hakeem - [phone]  Click here to complete Healthcare Decision Makers including section of the Healthcare Decision Maker Relationship (ie \"Primary\")  Today we documented Decision Maker(s) consistent with Legal Next of Kin hierarchy. Care Preferences    Ventilation: \"If you were in your present state of health and suddenly became very ill and were unable to breathe on your own, what would your preference be about the use of a ventilator (breathing machine) if it were available to you? \"      Would the patient desire the use of ventilator (breathing machine)?: no    \"If your health worsens and it becomes clear that your chance of recovery is unlikely, what would your preference be about the use of a ventilator (breathing machine) if it were available to you? \"     Would the patient desire the use of ventilator (breathing machine)?: No      Resuscitation  \"CPR works best to restart the heart when there is a sudden event, like a heart attack, in someone who is otherwise healthy. Unfortunately, CPR does not typically restart the heart for people who have serious health conditions or who are very sick. \"    \"In the event your heart stopped as a result of an underlying serious health condition, would you want attempts to be made to restart your heart (answer \"yes\" for attempt to resuscitate) or would you prefer a natural death (answer \"no\" for do not attempt to resuscitate)? \" yes       [] Yes   [x] No   Educated Patient / Decision Maker regarding differences between Advance Directives and portable DNR orders.     Length of ACP Conversation in minutes:  5    Conversation Outcomes:  [x] ACP discussion completed  [] Existing advance directive reviewed with patient; no changes to patient's previously recorded wishes  [] New Advance Directive completed  [] Portable Do Not Rescitate prepared for Provider review and signature  [] POLST/POST/MOLST/MOST prepared for Provider review and signature      Follow-up plan:    [] Schedule follow-up conversation to continue planning  [x] Referred individual to Provider for additional questions/concerns   [] Advised patient/agent/surrogate to review completed ACP document and update if needed with changes in condition, patient preferences or care setting    [] This note routed to one or more involved healthcare providers

## 2022-04-19 NOTE — TELEPHONE ENCOUNTER
Pt's daughter Sherwin Jo called to cancel pt's appt for capsule. Pt is currently admitted at [facility]. Writer cancelled nurse visit.  Clinical please call pt to reschedule

## 2022-04-20 ENCOUNTER — APPOINTMENT (OUTPATIENT)
Dept: INTERVENTIONAL RADIOLOGY/VASCULAR | Age: 77
DRG: 280 | End: 2022-04-20
Payer: MEDICARE

## 2022-04-20 ENCOUNTER — APPOINTMENT (OUTPATIENT)
Dept: ULTRASOUND IMAGING | Age: 77
DRG: 280 | End: 2022-04-20
Payer: MEDICARE

## 2022-04-20 LAB
ALBUMIN FLUID: 2.4 G/DL
AMYLASE FLUID: 23 U/L
ANION GAP SERPL CALCULATED.3IONS-SCNC: 14 MMOL/L (ref 9–17)
ANION GAP SERPL CALCULATED.3IONS-SCNC: 14 MMOL/L (ref 9–17)
BUN BLDV-MCNC: 66 MG/DL (ref 8–23)
BUN BLDV-MCNC: 67 MG/DL (ref 8–23)
CALCIUM SERPL-MCNC: 8.6 MG/DL (ref 8.6–10.4)
CALCIUM SERPL-MCNC: 8.7 MG/DL (ref 8.6–10.4)
CHLORIDE BLD-SCNC: 103 MMOL/L (ref 98–107)
CHLORIDE BLD-SCNC: 108 MMOL/L (ref 98–107)
CO2: 19 MMOL/L (ref 20–31)
CO2: 21 MMOL/L (ref 20–31)
CREAT SERPL-MCNC: 3.56 MG/DL (ref 0.7–1.2)
CREAT SERPL-MCNC: 3.74 MG/DL (ref 0.7–1.2)
EKG ATRIAL RATE: 65 BPM
EKG P AXIS: 41 DEGREES
EKG P-R INTERVAL: 240 MS
EKG Q-T INTERVAL: 398 MS
EKG QRS DURATION: 102 MS
EKG QTC CALCULATION (BAZETT): 413 MS
EKG R AXIS: -12 DEGREES
EKG T AXIS: 25 DEGREES
EKG VENTRICULAR RATE: 65 BPM
GFR AFRICAN AMERICAN: 19 ML/MIN
GFR AFRICAN AMERICAN: 20 ML/MIN
GFR NON-AFRICAN AMERICAN: 16 ML/MIN
GFR NON-AFRICAN AMERICAN: 17 ML/MIN
GFR SERPL CREATININE-BSD FRML MDRD: ABNORMAL ML/MIN/{1.73_M2}
GFR SERPL CREATININE-BSD FRML MDRD: ABNORMAL ML/MIN/{1.73_M2}
GLUCOSE BLD-MCNC: 102 MG/DL (ref 75–110)
GLUCOSE BLD-MCNC: 119 MG/DL (ref 75–110)
GLUCOSE BLD-MCNC: 157 MG/DL (ref 70–99)
GLUCOSE BLD-MCNC: 170 MG/DL (ref 75–110)
GLUCOSE BLD-MCNC: 219 MG/DL (ref 75–110)
GLUCOSE BLD-MCNC: 40 MG/DL (ref 75–110)
GLUCOSE BLD-MCNC: 44 MG/DL (ref 70–99)
GLUCOSE BLD-MCNC: 63 MG/DL (ref 75–110)
GLUCOSE BLD-MCNC: 92 MG/DL (ref 75–110)
GLUCOSE, FLUID: 118 MG/DL
HBV SURFACE AB TITR SER: <3.5 MIU/ML
HEPATITIS B CORE TOTAL ANTIBODY: NONREACTIVE
HEPATITIS B SURFACE ANTIGEN: NONREACTIVE
HEPATITIS C ANTIBODY: NONREACTIVE
INR BLD: 1
LACTATE DEHYDROGENASE, FLUID: 344 U/L
POTASSIUM SERPL-SCNC: 3.6 MMOL/L (ref 3.7–5.3)
POTASSIUM SERPL-SCNC: 3.8 MMOL/L (ref 3.7–5.3)
PRO-BNP: 3330 PG/ML
PROTHROMBIN TIME: 10.3 SEC (ref 9.1–12.3)
SODIUM BLD-SCNC: 136 MMOL/L (ref 135–144)
SODIUM BLD-SCNC: 143 MMOL/L (ref 135–144)
SPECIMEN TYPE: NORMAL
TOTAL PROTEIN, BODY FLUID: 3.9 G/DL

## 2022-04-20 PROCEDURE — 6360000002 HC RX W HCPCS: Performed by: NURSE PRACTITIONER

## 2022-04-20 PROCEDURE — 36581 REPLACE TUNNELED CV CATH: CPT

## 2022-04-20 PROCEDURE — 2709999900 HC NON-CHARGEABLE SUPPLY

## 2022-04-20 PROCEDURE — 0JH63XZ INSERTION OF TUNNELED VASCULAR ACCESS DEVICE INTO CHEST SUBCUTANEOUS TISSUE AND FASCIA, PERCUTANEOUS APPROACH: ICD-10-PCS | Performed by: PHYSICIAN ASSISTANT

## 2022-04-20 PROCEDURE — C1769 GUIDE WIRE: HCPCS

## 2022-04-20 PROCEDURE — 6370000000 HC RX 637 (ALT 250 FOR IP): Performed by: NURSE PRACTITIONER

## 2022-04-20 PROCEDURE — 77001 FLUOROGUIDE FOR VEIN DEVICE: CPT

## 2022-04-20 PROCEDURE — 0JH60XZ INSERTION OF TUNNELED VASCULAR ACCESS DEVICE INTO CHEST SUBCUTANEOUS TISSUE AND FASCIA, OPEN APPROACH: ICD-10-PCS | Performed by: PHYSICIAN ASSISTANT

## 2022-04-20 PROCEDURE — C1881 DIALYSIS ACCESS SYSTEM: HCPCS

## 2022-04-20 PROCEDURE — 2580000003 HC RX 258: Performed by: NURSE PRACTITIONER

## 2022-04-20 PROCEDURE — 6360000002 HC RX W HCPCS: Performed by: INTERNAL MEDICINE

## 2022-04-20 PROCEDURE — 2060000000 HC ICU INTERMEDIATE R&B

## 2022-04-20 PROCEDURE — 99232 SBSQ HOSP IP/OBS MODERATE 35: CPT | Performed by: FAMILY MEDICINE

## 2022-04-20 PROCEDURE — 5A1D70Z PERFORMANCE OF URINARY FILTRATION, INTERMITTENT, LESS THAN 6 HOURS PER DAY: ICD-10-PCS | Performed by: INTERNAL MEDICINE

## 2022-04-20 PROCEDURE — 85610 PROTHROMBIN TIME: CPT

## 2022-04-20 PROCEDURE — 32555 ASPIRATE PLEURA W/ IMAGING: CPT

## 2022-04-20 PROCEDURE — 88305 TISSUE EXAM BY PATHOLOGIST: CPT

## 2022-04-20 PROCEDURE — 05HY33Z INSERTION OF INFUSION DEVICE INTO UPPER VEIN, PERCUTANEOUS APPROACH: ICD-10-PCS | Performed by: PHYSICIAN ASSISTANT

## 2022-04-20 PROCEDURE — 6360000002 HC RX W HCPCS: Performed by: RADIOLOGY

## 2022-04-20 PROCEDURE — 86704 HEP B CORE ANTIBODY TOTAL: CPT

## 2022-04-20 PROCEDURE — 6370000000 HC RX 637 (ALT 250 FOR IP): Performed by: INTERNAL MEDICINE

## 2022-04-20 PROCEDURE — 83880 ASSAY OF NATRIURETIC PEPTIDE: CPT

## 2022-04-20 PROCEDURE — 90935 HEMODIALYSIS ONE EVALUATION: CPT | Performed by: INTERNAL MEDICINE

## 2022-04-20 PROCEDURE — 6370000000 HC RX 637 (ALT 250 FOR IP): Performed by: FAMILY MEDICINE

## 2022-04-20 PROCEDURE — 6360000004 HC RX CONTRAST MEDICATION: Performed by: FAMILY MEDICINE

## 2022-04-20 PROCEDURE — 87340 HEPATITIS B SURFACE AG IA: CPT

## 2022-04-20 PROCEDURE — 90935 HEMODIALYSIS ONE EVALUATION: CPT

## 2022-04-20 PROCEDURE — 80048 BASIC METABOLIC PNL TOTAL CA: CPT

## 2022-04-20 PROCEDURE — 36415 COLL VENOUS BLD VENIPUNCTURE: CPT

## 2022-04-20 PROCEDURE — 0JPT3XZ REMOVAL OF TUNNELED VASCULAR ACCESS DEVICE FROM TRUNK SUBCUTANEOUS TISSUE AND FASCIA, PERCUTANEOUS APPROACH: ICD-10-PCS | Performed by: PHYSICIAN ASSISTANT

## 2022-04-20 PROCEDURE — 86317 IMMUNOASSAY INFECTIOUS AGENT: CPT

## 2022-04-20 PROCEDURE — 05PYX3Z REMOVAL OF INFUSION DEVICE FROM UPPER VEIN, EXTERNAL APPROACH: ICD-10-PCS | Performed by: PHYSICIAN ASSISTANT

## 2022-04-20 PROCEDURE — 0W993ZZ DRAINAGE OF RIGHT PLEURAL CAVITY, PERCUTANEOUS APPROACH: ICD-10-PCS | Performed by: RADIOLOGY

## 2022-04-20 PROCEDURE — 86803 HEPATITIS C AB TEST: CPT

## 2022-04-20 PROCEDURE — 0JPT0XZ REMOVAL OF TUNNELED VASCULAR ACCESS DEVICE FROM TRUNK SUBCUTANEOUS TISSUE AND FASCIA, OPEN APPROACH: ICD-10-PCS | Performed by: PHYSICIAN ASSISTANT

## 2022-04-20 PROCEDURE — 88112 CYTOPATH CELL ENHANCE TECH: CPT

## 2022-04-20 PROCEDURE — 82947 ASSAY GLUCOSE BLOOD QUANT: CPT

## 2022-04-20 RX ORDER — INSULIN LISPRO 100 [IU]/ML
0-6 INJECTION, SOLUTION INTRAVENOUS; SUBCUTANEOUS
Status: DISCONTINUED | OUTPATIENT
Start: 2022-04-20 | End: 2022-04-22 | Stop reason: HOSPADM

## 2022-04-20 RX ORDER — CEFAZOLIN SODIUM 1 G/50ML
1000 INJECTION, SOLUTION INTRAVENOUS ONCE
Status: COMPLETED | OUTPATIENT
Start: 2022-04-20 | End: 2022-04-20

## 2022-04-20 RX ORDER — INSULIN GLARGINE 100 [IU]/ML
22 INJECTION, SOLUTION SUBCUTANEOUS NIGHTLY
Status: DISCONTINUED | OUTPATIENT
Start: 2022-04-20 | End: 2022-04-21

## 2022-04-20 RX ORDER — HEPARIN SODIUM 1000 [USP'U]/ML
2100 INJECTION, SOLUTION INTRAVENOUS; SUBCUTANEOUS PRN
Status: DISCONTINUED | OUTPATIENT
Start: 2022-04-20 | End: 2022-04-22 | Stop reason: HOSPADM

## 2022-04-20 RX ORDER — INSULIN LISPRO 100 [IU]/ML
0-3 INJECTION, SOLUTION INTRAVENOUS; SUBCUTANEOUS NIGHTLY
Status: DISCONTINUED | OUTPATIENT
Start: 2022-04-20 | End: 2022-04-22 | Stop reason: HOSPADM

## 2022-04-20 RX ADMIN — HEPARIN SODIUM 5000 UNITS: 5000 INJECTION INTRAVENOUS; SUBCUTANEOUS at 16:46

## 2022-04-20 RX ADMIN — ACYCLOVIR 400 MG: 400 TABLET ORAL at 11:21

## 2022-04-20 RX ADMIN — HEPARIN SODIUM 2100 UNITS: 1000 INJECTION INTRAVENOUS; SUBCUTANEOUS at 15:48

## 2022-04-20 RX ADMIN — HYDRALAZINE HYDROCHLORIDE 100 MG: 50 TABLET ORAL at 21:34

## 2022-04-20 RX ADMIN — INSULIN GLARGINE 22 UNITS: 100 INJECTION, SOLUTION SUBCUTANEOUS at 21:47

## 2022-04-20 RX ADMIN — IOPAMIDOL 7 ML: 755 INJECTION, SOLUTION INTRAVENOUS at 11:10

## 2022-04-20 RX ADMIN — HEPARIN SODIUM 2100 UNITS: 1000 INJECTION INTRAVENOUS; SUBCUTANEOUS at 15:49

## 2022-04-20 RX ADMIN — HYDRALAZINE HYDROCHLORIDE 100 MG: 50 TABLET ORAL at 16:46

## 2022-04-20 RX ADMIN — METOPROLOL TARTRATE 25 MG: 25 TABLET ORAL at 08:52

## 2022-04-20 RX ADMIN — OXYCODONE 10 MG: 5 TABLET ORAL at 16:51

## 2022-04-20 RX ADMIN — ACYCLOVIR 400 MG: 400 TABLET ORAL at 21:33

## 2022-04-20 RX ADMIN — HEPARIN SODIUM 5000 UNITS: 5000 INJECTION INTRAVENOUS; SUBCUTANEOUS at 21:33

## 2022-04-20 RX ADMIN — HEPARIN SODIUM 5000 UNITS: 5000 INJECTION INTRAVENOUS; SUBCUTANEOUS at 05:35

## 2022-04-20 RX ADMIN — AMLODIPINE BESYLATE 10 MG: 10 TABLET ORAL at 08:51

## 2022-04-20 RX ADMIN — PANTOPRAZOLE SODIUM 40 MG: 40 TABLET, DELAYED RELEASE ORAL at 06:33

## 2022-04-20 RX ADMIN — PREDNISOLONE ACETATE 1 DROP: 10 SUSPENSION/ DROPS OPHTHALMIC at 08:52

## 2022-04-20 RX ADMIN — CHOLESTYRAMINE 4 G: 4 POWDER, FOR SUSPENSION ORAL at 11:21

## 2022-04-20 RX ADMIN — SODIUM CHLORIDE, PRESERVATIVE FREE 10 ML: 5 INJECTION INTRAVENOUS at 09:00

## 2022-04-20 RX ADMIN — INSULIN LISPRO 1 UNITS: 100 INJECTION, SOLUTION INTRAVENOUS; SUBCUTANEOUS at 21:47

## 2022-04-20 RX ADMIN — FUROSEMIDE 40 MG: 40 TABLET ORAL at 08:51

## 2022-04-20 RX ADMIN — HYDRALAZINE HYDROCHLORIDE 100 MG: 50 TABLET ORAL at 08:51

## 2022-04-20 RX ADMIN — OXYCODONE 10 MG: 5 TABLET ORAL at 01:21

## 2022-04-20 RX ADMIN — METOPROLOL TARTRATE 25 MG: 25 TABLET ORAL at 21:34

## 2022-04-20 RX ADMIN — TAMSULOSIN HYDROCHLORIDE 0.4 MG: 0.4 CAPSULE ORAL at 08:51

## 2022-04-20 RX ADMIN — SODIUM CHLORIDE, PRESERVATIVE FREE 10 ML: 5 INJECTION INTRAVENOUS at 21:34

## 2022-04-20 RX ADMIN — CEFAZOLIN SODIUM 1000 MG: 1 INJECTION, SOLUTION INTRAVENOUS at 10:01

## 2022-04-20 ASSESSMENT — PAIN DESCRIPTION - DESCRIPTORS
DESCRIPTORS: DISCOMFORT

## 2022-04-20 ASSESSMENT — PAIN DESCRIPTION - ORIENTATION
ORIENTATION: LOWER

## 2022-04-20 ASSESSMENT — PAIN SCALES - GENERAL
PAINLEVEL_OUTOF10: 9
PAINLEVEL_OUTOF10: 7
PAINLEVEL_OUTOF10: 9
PAINLEVEL_OUTOF10: 8
PAINLEVEL_OUTOF10: 0
PAINLEVEL_OUTOF10: 9

## 2022-04-20 ASSESSMENT — PAIN DESCRIPTION - LOCATION
LOCATION: BACK

## 2022-04-20 ASSESSMENT — PAIN - FUNCTIONAL ASSESSMENT: PAIN_FUNCTIONAL_ASSESSMENT: ACTIVITIES ARE NOT PREVENTED

## 2022-04-20 NOTE — BRIEF OP NOTE
Brief Postoperative Note for Thoracentesis    Sherwin Clark  YOB: 1945  [id number]    Pre-operative Diagnosis: right Pleural effusion      Post-operative Diagnosis: Same    Procedure: Ultrasound guided Thoracentesis     Anesthesia: 1% Lidocaine     Surgeons/Assistants: Phyllis Villa PA-C    Complications: none    EBL: Minimal    Specimens: were obtained    Ultrasound guided right thoracentesis performed. 600 ml darrell fluid obtained. Dressing applied.       Electronically signed by BRE Rain on 4/20/2022 at 10:01 AM

## 2022-04-20 NOTE — PROGRESS NOTES
Dialysis Time Out  To be done by RN and tech or 2 RNs  Staff Names RN Lata Bo and PIO shook    [x]  Identity of the patient using 2 patient identifiers  [x]  Consent for treatment  [x]  Equipment-proper machine and dialyzer  [x]  B-Hep B status  [x]  Orders- to include bath, blood flow, dialyzer, time and fluid removal  [x]  Access-Correct site and in working order  [x]  Time for patient to ask questions.

## 2022-04-20 NOTE — PROGRESS NOTES
Dialysis Post Treatment Note  Patient tolerated treatment well. Denies complaints at time of discharge. Vitals:    04/20/22 1550   BP: (!) 148/62   Pulse: 79   Resp:    Temp: 99 °F (37.2 °C)   SpO2:      Pre-Weight = 80.3kg  Post-weight = Weight: 176 lb 5.9 oz (80 kg)  Total Liters Processed = Total Liters Processed (l/min): 46.2 l/min  Rinseback Volume (mL) = Rinseback Volume (ml): 300 ml  Net Removal (mL) = 0 ml  Length of treatment=180  Access: Left IJ catheter     RR is 20bpm      Treatment completed, tolerated well, no untoward signs and symptoms noted.

## 2022-04-20 NOTE — PROGRESS NOTES
Occupational [employer]  Occupational Therapy Not Seen Note    DATE: 2022    NAME: Kana Wright  MRN: [id number]   : 1945      Patient not seen this date for Occupational Therapy due to:    Surgery/Procedure:  Off floor at IR for \"Tunneled HD Catheter Exchange\"      Electronically signed by Addy Parry OT on 2022 at 11:13 AM

## 2022-04-20 NOTE — SEDATION DOCUMENTATION
PT TOLERATED THORACENTESIS WELL. 600 ML OF PLEURAL FLUID COLLECTED AND SENT TO LAB. 2X2 GAUZE AND TEGADERM TO CATHETER SITE. CD&I.

## 2022-04-20 NOTE — CARE COORDINATION
SW following for HD needs. Patient has not been to dialysis in 6 weeks, was discharged from [address] [place] [address]. Patient did not like symptoms after dialysis such as nausea and dizziness. Patient will need new referral if he wishes to continue HD. Patient currently sleeping, SW attempted to contact wife who had spoken with Dialysis RN Manager and Nephrology. Left voicemail requesting return call. [phone]  SW received return call from patient's wife Louie. Wife confirmed that at this time plan is to continue dialysis. Patient and patient's wife would like to explore a Monday and Friday schedule, only two treatments per week. SW explained that this would need to be discussed with Nephrologist at clinic. SW faxed referral to [facility] Admissions to Marcelina Vernon.

## 2022-04-20 NOTE — PROGRESS NOTES
[facility]  Office: [address], DO, Yecenia Mayberry, DO, Bryan Patee, DO, Yolette Gurrola Blood, DO, Gallo Wong MD, Quyen Hogan MD, Gonzalez Salvador MD, Clover Alicia MD, Antony Lockwood MD, Melina Zamora MD, Jaziel Brooks MD, Jayjay Morton, DO, Brittany Scarce, DO, Francis Reyes MD,  Svetlana Rota, DO, Alise De Luna MD, Mustapha Meraz MD, Darrian Kennedy MD, Ricci Lundborg, DO, Misa Martinez MD, Catherine Cody MD, Albert Hernández, CNP, [facility] Ze, CNP, Karis Cortez, CNP, Alicia Gunter, CNP, Bret Fritz, CNP, Claudine Stern, CNP, Bindu Gonzalez PAKaranC, Keith Julio, DNP, Edward Patel, CNP, Leonid Clark, CNP, Kitty Dunn, CNP, Ebenezer Lujan, CNS, Lesli Gautam, DNP, Ines Linda, CNP, Shannan Denise, CNP, Ella Ellsworth, CNP         [facility] [address]    Progress Note    4/20/2022    10:12 AM    Name:   Devin Keating  MRN:     [id number]     Acct:      [de-identified]   Room:   [address] [place] [address] Day:  1  Admit Date:  4/19/2022 12:27 AM    PCP:   Angel Cardoso MD  Code Status:  Full Code    Subjective:     C/C:   Chief Complaint   Patient presents with    Leg Swelling     Initial complaint was lower back pan and lower leg pain and swelling. On dialysis. Found bilateral pleural effusions at origin. Denies CP and SOB      Interval History Status: improved. Pt was seen and examined this morning  Family at bedside  Pt states that he is feeling better this mroning  No acute events overnight  No new complaints       Review of Systems:     12 point ROS performed and negative for anything other than what was stated in subjective     Medications: Allergies:     Allergies   Allergen Reactions    Coreg [Carvedilol] Shortness Of Breath and Nausea And Vomiting    Lipitor [Atorvastatin] Other (See Comments)     Muscle aches and joint pain    Aldactone [Spironolactone] Hives    Crestor [Rosuvastatin Calcium] Other (See Comments)     Muscle aches and joint pain    Lopid [Gemfibrozil]      hyperglycemia    Invokana [Canagliflozin] Rash    Januvia [Sitagliptin] Nausea And Vomiting       Current Meds:   Scheduled Meds:    insulin glargine  22 Units SubCUTAneous Nightly    ceFAZolin  1,000 mg IntraVENous Once    prednisoLONE acetate  1 drop Left Eye Daily    pantoprazole  40 mg Oral QAM AC    amLODIPine  10 mg Oral Daily    tamsulosin  0.4 mg Oral Daily    hydrALAZINE  100 mg Oral TID    furosemide  40 mg Oral Daily    latanoprost  1 drop Both Eyes Nightly    sodium chloride flush  5-40 mL IntraVENous 2 times per day    metoprolol tartrate  25 mg Oral BID    heparin (porcine)  5,000 Units SubCUTAneous 3 times per day    insulin lispro  0-6 Units SubCUTAneous TID WC    insulin lispro  0-3 Units SubCUTAneous Nightly    acyclovir  400 mg Oral BID    cholestyramine light  1 packet Oral Daily     Continuous Infusions:    sodium chloride      dextrose       PRN Meds: nitroGLYCERIN, sodium chloride flush, sodium chloride, ondansetron **OR** ondansetron, polyethylene glycol, acetaminophen **OR** acetaminophen, oxyCODONE **OR** oxyCODONE, glucose, dextrose, glucagon (rDNA), dextrose, acetaminophen, docusate sodium    Data:     Past Medical History:   has a past medical history of Abnormal tilt table test, Acute kidney injury ([facility] Acute MI ([facility] [facility] Acute renal failure superimposed on stage 4 chronic kidney disease (HCC), Acute renal failure with tubular necrosis ([facility] [facility] Anemia, Anemia in stage 4 chronic kidney disease ([facility] [facility] Angina, class II ([facility] [facility] CAD (coronary artery disease), Cerebral artery occlusion with cerebral infarction ([facility] CHF (congestive heart failure) ([facility] [facility] Cholecystitis, Chronic back pain, Chronic diastolic HF (heart failure), NYHA class 3 ([address] Chronic kidney disease, stage III (moderate) ([address] Closed fracture of lumbar vertebra without mention of spinal cord injury, Displacement of intervertebral disc, site unspecified, without myelopathy, H/O cardiac catheterization, H/O cardiovascular stress test, H/O tilt table evaluation, H/O tilt table evaluation, History of 24 hour EKG monitoring, History of 24 hour EKG monitoring, History of blood transfusion, History of cardiovascular stress test, History of cardiovascular stress test, History of coronary artery stent placement, History of CVA (cerebrovascular accident) without residual deficits, History of echocardiogram, History of Holter monitoring, History of tilt table evaluation, Hyperlipidemia, Hypertension, Medication side effect, initial encounter, Non critical Right Renal artery stenosis, native ([facility] [address] Pacemaker, S/P cardiac cath, S/P coronary artery stent placement, SIRS (systemic inflammatory response syndrome) ([facility] [address] and Type II or unspecified type diabetes mellitus without mention of complication, not stated as uncontrolled. Social History:   reports that he quit smoking about 42 years ago. His smoking use included cigarettes. He has a 12.00 pack-year smoking history. He has never used smokeless tobacco. He reports that he does not drink alcohol and does not use drugs. Family History:   Family History   Problem Relation Age of Onset    Cancer Mother         breast    Cancer Father         lung       Vitals:  BP (!) 127/52   Pulse 75   Temp 98.2 °F (36.8 °C) (Oral)   Resp 14   Wt 177 lb 14.6 oz (80.7 kg)   SpO2 98%   BMI 26.27 kg/m²   Temp (24hrs), Av.9 °F (36.6 °C), Min:97.5 °F (36.4 °C), Max:98.4 °F (36.9 °C)    Recent Labs     22  0623 22  0634 22  0648 22  0745   POCGLU 40* 63* 102 119*       I/O (24Hr):     Intake/Output Summary (Last 24 hours) at 2022 1012  Last data filed at 2022 2100  Gross per 24 hour   Intake 0 ml   Output 650 ml   Net -650 ml       Labs:  Hematology:  Recent Labs     22  0855 22  0839 22  1248 22  0446   WBC 8.1 6.8  --   --    RBC 3.00* 3.16*  --   -- Results   Component Value Date/Time    CULTURE NO SIGNIFICANT GROWTH 05/05/2021 11:30 AM       Radiology:  CT ABDOMEN PELVIS WO CONTRAST Additional Contrast? None    Result Date: 4/18/2022  No CT evidence of acute intra-abdominal process. Small bilateral pleural effusions, partially loculated on the left. Trace pericardial effusion. No acute fracture or dislocation involving thoracic or lumbar spine. CT THORACIC SPINE WO CONTRAST    Result Date: 4/18/2022  No CT evidence of acute intra-abdominal process. Small bilateral pleural effusions, partially loculated on the left. Trace pericardial effusion. No acute fracture or dislocation involving thoracic or lumbar spine. XR CHEST PORTABLE    Result Date: 4/19/2022  Trace effusions. Stable left internal jugular catheter. CT LUMBAR SPINE TRAUMA RECONSTRUCTION    Result Date: 4/18/2022  No CT evidence of acute intra-abdominal process. Small bilateral pleural effusions, partially loculated on the left. Trace pericardial effusion. No acute fracture or dislocation involving thoracic or lumbar spine.        Physical Examination:        General appearance:  alert, cooperative and no distress  Mental Status:  oriented to person, place and time and normal affect  Lungs:  clear to auscultation bilaterally, normal effort  Heart:  regular rate and rhythm, no murmur  Abdomen:  soft, nontender, nondistended, normal bowel sounds  Extremities:  no edema, redness, tenderness in the calves  Skin:  no gross lesions, rashes, induration    Assessment:        Hospital Problems           Last Modified POA    * (Principal) Acute on chronic diastolic (congestive) heart failure ([address] 4/19/2022 Yes    S/P drug eluting coronary stent placement-OM1 4/10/17 4/19/2022 Yes    Uncontrolled type 2 diabetes mellitus with hyperglycemia ([address] 4/19/2022 Yes    Primary hypertension 4/19/2022 Yes    Hyperlipidemia 4/19/2022 Yes    BPH with obstruction/lower urinary tract symptoms 4/19/2022 Yes Normocytic normochromic anemia 4/19/2022 Yes    Pulmonary hypertension ([facility] 4/19/2022 Yes    NSTEMI (non-ST elevated myocardial infarction) ([facility] [address] 4/19/2022 Yes    Bilateral pleural effusion 4/19/2022 Yes    End stage renal disease ([facility] [address] 4/19/2022 Yes    Microscopic hematuria 4/19/2022 Yes    Chronic bilateral low back pain with bilateral sciatica 4/19/2022 Yes    Debility 4/19/2022 Yes    Dialysis patient, noncompliant ([facility] [address] 4/19/2022 Yes          Plan:        1. Bilateral pleural effusions:   - s/p IR u/s guided thoracentesis this morning with 600 ml fluid removed    2. End-stage renal disease:   - nephrology on board   - plan for HD this morning   - renally dose all meds  - avoid nephrotoxic agents   - hx of noncompliance and history of right renal artery stenosis.        3. Primary hypertension  4. CAD:   - continue home anti hypertensive regimen   - v/s per unit protocol      5. Chronic diastolic CHF with moderate pulmonary hypertension:   - continue home BB and lasix   - monitor Is/Os   - Most recent echo from 11/11/2021 showing an ejection fraction of 60% with moderate pulmonary hypertension with an RVSP of 42 mmHg and coordinating atrial dilation with an LA dimension of 4.2 cm     6. Diabetes mellitus type 2:   - A1c 7.3.   - change home lantus to qhs given episodes of hypoglycemia   - accu checks ac/hs   - hypoglycemia protocol on board       7. Normocytic normochromic anemia   - we will check iron studies.    - Hemoglobin 8.7 on admission.    - It would appear patient has had a history of iron deficiency anemia in the past and has required iron infusion     8. Chronic back pain:   - Recently referred to pain management by his primary care provider, unable to attend appointment as of yet. - Had previous back surgery. - Continue Roxicodone for pain management     9. History of BPH/elevated PSA with microscopic hematuria:   - Currently without urinary symptoms.    - Hemoglobin noted on UA.    - continue Flomax    10.  Debility:  - Patient states he has been walker bound for the last 6-months  - PT/OT      Juan Carlos Yao MD  4/20/2022  10:12 AM

## 2022-04-20 NOTE — PROGRESS NOTES
Notified by lab of critical glucose of 44. Checked at bedside with glucometer and confirmed 40. Patient stated he was slightly dizzy. Given orange juice and lora crackers, recheck after 15 minutes was 63. Repeated after another 15 minutes reading 102. Patient states he is feeling. Breakfast to arrive soon.

## 2022-04-20 NOTE — BRIEF OP NOTE
Brief Postoperative Note    Clara Benson  YOB: 1945  [id number]    Pre-operative Diagnosis: Chronic Renal Failure/Malfunctioning HD Catheter    Post-operative Diagnosis: Same    Procedure: Tunneled HD Catheter Exchange    Anesthesia: Local 2% Lidocaine    Medications Given: none    Surgeons/Assistants: BRE Negrete PA-C and Priscilla Mcburney, MD    Estimated Blood Loss: Minimal    Complications: none    Specimens: were not obtained    Tunneled HD catheter exchanged for new 14Fr x 28cm tip to cuff Palindrome HD catheter. Dressing applied. May use HD catheter for dialysis. Catheter locked with heparin. Vitals signs were reviewed and were stable after the procedure.       Electronically signed by BRE Negrete on 4/20/2022 at 10:35 AM

## 2022-04-20 NOTE — PROGRESS NOTES
SUBJECTIVE      Pt was seen and examined during HD. Stable hemodynamics and no complaints at time of assessment. Tunnel cath clotted and unable to completed HD yesterday. He had 200mL removed in 1 hour of Tx. Activase given but cath remained clotted. S/p Tunnel HD cath exchange , due to nonfunctioning prior cath  S/p R thoracentesis  w/ 600mL darrell fluid removed. OBJECTIVE      CURRENT TEMPERATURE:  Temp: 98.2 °F (36.8 °C)  MAXIMUM TEMPERATURE OVER 24HRS:  Temp (24hrs), Av.9 °F (36.6 °C), Min:97.5 °F (36.4 °C), Max:98.4 °F (36.9 °C)    CURRENT RESPIRATORY RATE:  Resp: 15  CURRENT PULSE:  Pulse: 69  CURRENT BLOOD PRESSURE:  BP: (!) 135/57  24HR BLOOD PRESSURE RANGE:  Systolic (04WHR), AKQ:856 , Min:120 , GOP:125   ; Diastolic (95GVM), TGV:89, Min:49, Max:68    24HR INTAKE/OUTPUT:      Intake/Output Summary (Last 24 hours) at 2022 1123  Last data filed at 2022 1041  Gross per 24 hour   Intake 0 ml   Output 1250 ml   Net -1250 ml     WEIGHT :  Patient Vitals for the past 96 hrs (Last 3 readings):   Weight   22 0400 177 lb 14.6 oz (80.7 kg)   22 1700 177 lb 0.5 oz (80.3 kg)   22 1435 177 lb 7.5 oz (80.5 kg)     PHYSICAL EXAM      GENERAL APPEARANCE:Conscious alert and oriented during HD  SKIN: pink warm dry, no visible rashes  EYES: conjunctivae normal and sclera anicteric  NECK:   No JVD. No tracheal deviation, no lymphadenopathy  ENT: Mouth moist w/o erythema or exudates  PULMONARY: Breath sounds are clear to auscultation. No wheezing, no rales. CADRDIOVASCULAR: S1 and S2 normally heard NO S3  ABDOMEN: soft nontender, bowel sounds present, No distention  MSK: Pt c/o pain during deep inspiration at R ana axillary chest at the 7th rib.  No visible defects on anterior chest.  EXTREMITIES: no cyanosis or edema     CURRENT MEDICATIONS      insulin glargine (LANTUS) injection vial 22 Units, Nightly  insulin lispro (HUMALOG) injection vial 0-6 Units, TID   insulin lispro (HUMALOG) injection vial 0-3 Units, Nightly  prednisoLONE acetate (PRED FORTE) 1 % ophthalmic suspension 1 drop, Daily  nitroGLYCERIN (NITROSTAT) SL tablet 0.4 mg, Q5 Min PRN  pantoprazole (PROTONIX) tablet 40 mg, QAM AC  amLODIPine (NORVASC) tablet 10 mg, Daily  tamsulosin (FLOMAX) capsule 0.4 mg, Daily  hydrALAZINE (APRESOLINE) tablet 100 mg, TID  furosemide (LASIX) tablet 40 mg, Daily  latanoprost (XALATAN) 0.005 % ophthalmic solution 1 drop, Nightly  sodium chloride flush 0.9 % injection 5-40 mL, 2 times per day  sodium chloride flush 0.9 % injection 10 mL, PRN  0.9 % sodium chloride infusion, PRN  ondansetron (ZOFRAN-ODT) disintegrating tablet 4 mg, Q8H PRN   Or  ondansetron (ZOFRAN) injection 4 mg, Q6H PRN  polyethylene glycol (GLYCOLAX) packet 17 g, Daily PRN  acetaminophen (TYLENOL) tablet 650 mg, Q6H PRN   Or  acetaminophen (TYLENOL) suppository 650 mg, Q6H PRN  metoprolol tartrate (LOPRESSOR) tablet 25 mg, BID  oxyCODONE (ROXICODONE) immediate release tablet 5 mg, Q6H PRN   Or  oxyCODONE (ROXICODONE) immediate release tablet 10 mg, Q6H PRN  heparin (porcine) injection 5,000 Units, 3 times per day  glucose (GLUTOSE) 40 % oral gel 15 g, PRN  dextrose 50 % IV solution, PRN  glucagon (rDNA) injection 1 mg, PRN  dextrose 5 % solution, PRN  acetaminophen (TYLENOL) tablet 650 mg, Q6H PRN  acyclovir (ZOVIRAX) tablet 400 mg, BID  cholestyramine light packet 4 g, Daily  docusate sodium (COLACE) capsule 100 mg, TID PRN          LABS      CBC:   Recent Labs     04/18/22  0855 04/19/22  0839   WBC 8.1 6.8   RBC 3.00* 3.16*   HGB 8.7* 9.2*   HCT 26.5* 28.0*   MCV 88.3 88.6   MCH 29.0 29.1   MCHC 32.8 32.9   RDW 14.8* 15.1*    203   MPV 9.5 9.5      BMP:   Recent Labs     04/19/22  0839 04/20/22  0446 04/20/22  0837    143 136   K 3.7 3.6* 3.8    108* 103   CO2 19* 21 19*   BUN 79* 67* 66*   CREATININE 3.76* 3.74* 3.56*   GLUCOSE 124* 44* 157*   CALCIUM 9.2 8.6 8.7      BNP:No results found for: BNP  PHOSPHORUS:  No results for input(s): PHOS in the last 72 hours. MAGNESIUM: No results for input(s): MG in the last 72 hours. ALBUMIN:   Recent Labs     04/18/22  0855 04/19/22  0839   LABALBU 3.7 3.6     IRON:    Lab Results   Component Value Date    IRON 56 03/10/2022     IRON SATURATION:    Lab Results   Component Value Date    LABIRON 28 03/10/2022     TIBC:    Lab Results   Component Value Date    TIBC 203 03/10/2022     FERRITIN:    Lab Results   Component Value Date    FERRITIN 1,052 03/10/2022     VALERIE:   Lab Results   Component Value Date    VALERIE NEGATIVE 11/11/2021       SPEP:   Lab Results   Component Value Date    PROT 6.9 04/19/2022    ALBCAL 3.9 09/11/2018    ALBPCT 62 09/11/2018    LABALPH 0.2 09/11/2018    LABALPH 0.7 09/11/2018    A1PCT 3 09/11/2018    A2PCT 11 09/11/2018    LABBETA 0.7 09/11/2018    BETAPCT 12 09/11/2018    GAMGLOB 0.8 09/11/2018    GGPCT 13 09/11/2018    PATH ELECTRONICALLY SIGNED. Lexie Tilley M.D. 09/11/2018    PATH ELECTRONICALLY SIGNED. Lexie Tilley M.D. 09/11/2018     UPEP: No results found for: TPU   HEPBSAG:  Lab Results   Component Value Date    HEPBSAG NONREACTIVE 11/23/2021     HEPCAB:  Lab Results   Component Value Date    HEPCAB NONREACTIVE 11/23/2021     C3:   Lab Results   Component Value Date    C3 108 11/21/2021     C4:   Lab Results   Component Value Date    C4 15 11/21/2021     MPO ANCA:   Lab Results   Component Value Date    MPO 15 11/11/2021    .   PR3 ANCA:    Lab Results   Component Value Date    PR3 1.1 11/11/2021     URINE SODIUM:    Lab Results   Component Value Date    NIDHI 62 11/21/2021      URINE POTASSIUM:    Lab Results   Component Value Date    KUR 25.3 11/12/2021     URINE CHLORIDE:  No results found for: CLU  URINE PH:  No components found for: PO4U  URINE OSMOLARITY:    Lab Results   Component Value Date    OSMOU 421 11/21/2021     URINE CREATININE:    Lab Results   Component Value Date    LABCREA 62.1 11/21/2021     URINE EOSINOPHILS: No components found for: EOSU  URINE PROTEIN:  No results found for: TPU  URINALYSIS:  U/A:   Lab Results   Component Value Date    NITRU NEGATIVE 04/18/2022    COLORU Yellow 04/18/2022    PHUR 5.5 04/18/2022    WBCUA 0 TO 2 04/18/2022    RBCUA 5 TO 10 04/18/2022    MUCUS NOT REPORTED 12/03/2021    TRICHOMONAS NOT REPORTED 12/03/2021    YEAST NOT REPORTED 12/03/2021    BACTERIA 1+ 04/18/2022    SPECGRAV 1.025 04/18/2022    LEUKOCYTESUR NEGATIVE 04/18/2022    UROBILINOGEN Normal 04/18/2022    BILIRUBINUR NEGATIVE 04/18/2022    GLUCOSEU NEGATIVE 04/18/2022    KETUA NEGATIVE 04/18/2022    AMORPHOUS NOT REPORTED 12/03/2021       RADIOLOGY      CT ABDOMEN PELVIS WO CONTRAST Additional Contrast? None    Result Date: 4/18/2022  No CT evidence of acute intra-abdominal process. Small bilateral pleural effusions, partially loculated on the left. Trace pericardial effusion. No acute fracture or dislocation involving thoracic or lumbar spine. CT THORACIC SPINE WO CONTRAST    Result Date: 4/18/2022  No CT evidence of acute intra-abdominal process. Small bilateral pleural effusions, partially loculated on the left. Trace pericardial effusion. No acute fracture or dislocation involving thoracic or lumbar spine. XR CHEST PORTABLE    Result Date: 4/19/2022  Trace effusions. Stable left internal jugular catheter. CT LUMBAR SPINE TRAUMA RECONSTRUCTION    Result Date: 4/18/2022  No CT evidence of acute intra-abdominal process. Small bilateral pleural effusions, partially loculated on the left. Trace pericardial effusion. No acute fracture or dislocation involving thoracic or lumbar spine. ASSESSMENT        1. ESRD on HD on MWF schedule at [address] via tunnel catheter under Dr. Jack Kovacs. 2. Missed multiple sessions of dialysis. 3. Complaint of nausea and vomiting postdialysis. 4. Hypertension  5. Type 2 diabetes  6. Chronic diastolic heart failure  7.  S/p angioplasty with stent 4/10/2017.  8. 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 9. Dyslipidemia  10. Metabolic acidosis. PLAN      · Patient was seen and examined on HD at bedside. Orders were confirmed with the HD nurse. · Will be running him on a low blood flow rate of 250 with no UF and see if that helps his symptoms. · Patient's tunneled catheter was exchanged by IR today. · Patient was counseled about trying to be regular with his dialysis treatments. · This was all discussed with the patient and his family at bedside and they are in agreement with the plan. · BMP in AM.  · Will follow. Thank you for the consultation. I will discuss the care of this patient  with the attending physician. Please do not hesitate to contact us f or any further questions/concerns. We will continue to follow this  patient along with you.      Emmanuel Vargas MS4

## 2022-04-20 NOTE — PROGRESS NOTES
Renal Progress Note    Patient :  Jake Ramos; 68 y.o. MRN# [id number]  Location:  [id number]/[id number]  Attending:  Gonzalo Teixeira MD  Admit Date:  4/19/2022   Hospital Day: 1      Subjective:     Patient was seen and examined on HD. Tolerating the procedure well. No new issues reported overnight. Currently patient does not complain of any nausea vomiting diarrhea, no chest pain, no shortness of breath reported. Patient did get his tunneled catheter exchange done today morning by IR. Patient also got thoracentesis done today and got about 600 cc of darrell fluid removed.     Outpatient Medications:     Medications Prior to Admission: acetaminophen (TYLENOL) 325 MG tablet, Take 650 mg by mouth every 6 hours as needed for Pain  hydrALAZINE (APRESOLINE) 100 MG tablet, Take 1 tablet by mouth 3 times daily Except on the morning's of dialysis  tamsulosin (FLOMAX) 0.4 MG capsule, Take 1 capsule by mouth once daily  CONTOUR TEST strip, Inject 1 each into the skin 2 times daily (Patient not taking: Reported on 4/18/2022)  cholestyramine (QUESTRAN) 4 g packet, Take 1 packet by mouth daily (Patient not taking: Reported on 4/18/2022)  metoprolol tartrate (LOPRESSOR) 25 MG tablet, Take 1 tablet by mouth 2 times daily  LANTUS SOLOSTAR 100 UNIT/ML injection pen, INJECT 22 UNITS SUBCUTANEOUSLY TWICE DAILY  amLODIPine (NORVASC) 5 MG tablet, Take 2 tablets by mouth daily  furosemide (LASIX) 40 MG tablet, Take 1 tablet by mouth daily  omeprazole (PRILOSEC) 10 MG delayed release capsule, Take 10 mg by mouth daily  B Complex-C-Zn-Folic Acid (DIALYVITE/ZINC) TABS, TAKE 1 TABLET BY MOUTH ONCE DAILY AFTER DIALYSIS (Patient not taking: Reported on 4/18/2022)  docusate sodium (COLACE) 100 MG capsule, Take 1 capsule by mouth 3 times daily as needed for Constipation (Patient not taking: Reported on 4/18/2022)  nitroGLYCERIN (NITROSTAT) 0.4 MG SL tablet, Place 1 tablet under the tongue every 5 minutes as needed for Chest pain  latanoprost (XALATAN) 0.005 % ophthalmic solution, Place 1 drop into both eyes nightly   acyclovir (ZOVIRAX) 400 MG tablet, 400 mg 2 times daily   DIANA MICROLET LANCETS MISC, TEST TWICE DAILY (Patient not taking: Reported on 2022)  Insulin Pen Needle (PEN NEEDLES) 31G X 6 MM MISC, 1 each by Does not apply route daily  prednisoLONE acetate (PRED FORTE) 1 % ophthalmic suspension, Place 1 drop into the left eye daily     Current Medications:     Scheduled Meds:    insulin glargine  22 Units SubCUTAneous Nightly    insulin lispro  0-6 Units SubCUTAneous TID WC    insulin lispro  0-3 Units SubCUTAneous Nightly    prednisoLONE acetate  1 drop Left Eye Daily    pantoprazole  40 mg Oral QAM AC    amLODIPine  10 mg Oral Daily    tamsulosin  0.4 mg Oral Daily    hydrALAZINE  100 mg Oral TID    furosemide  40 mg Oral Daily    latanoprost  1 drop Both Eyes Nightly    sodium chloride flush  5-40 mL IntraVENous 2 times per day    metoprolol tartrate  25 mg Oral BID    heparin (porcine)  5,000 Units SubCUTAneous 3 times per day    acyclovir  400 mg Oral BID    cholestyramine light  1 packet Oral Daily     Continuous Infusions:    sodium chloride      dextrose       PRN Meds:  nitroGLYCERIN, sodium chloride flush, sodium chloride, ondansetron **OR** ondansetron, polyethylene glycol, acetaminophen **OR** acetaminophen, oxyCODONE **OR** oxyCODONE, glucose, dextrose, glucagon (rDNA), dextrose, acetaminophen, docusate sodium    Input/Output:       I/O last 3 completed shifts: In: 0   Out: 650 [Urine:150].       Patient Vitals for the past 96 hrs (Last 3 readings):   Weight   22 1225 177 lb 0.5 oz (80.3 kg)   22 0400 177 lb 14.6 oz (80.7 kg)   22 1700 177 lb 0.5 oz (80.3 kg)       Vital Signs:   Temperature:  Temp: 96.8 °F (36 °C)  TMax:   Temp (24hrs), Av.9 °F (36.6 °C), Min:96.8 °F (36 °C), Max:98.4 °F (36.9 °C)    Respirations:  Resp: 18  Pulse:   Pulse: 72  BP:    BP: 139/61  BP Range: Systolic (25YLU), Av , Min:120 , XFP:096       Diastolic (70ENZ), JPF:85, Min:49, Max:68      Physical Examination:     General:  AAO x 3, speaking in full sentences, no accessory muscle use. HEENT: Atraumatic, normocephalic, no throat congestion, moist mucosa. Eyes:   Pupils equal, round and reactive to light, EOMI. Neck:   Supple  Chest:   Bilateral vesicular breath sounds, no rales or wheezes. Cardiac:  S1 S2 RR, no murmurs, gallops or rubs. Abdomen: Soft, non-tender, no masses or organomegaly, BS audible. :   No suprapubic or flank tenderness. Neuro:  AAO x 3, No FND. SKIN:  No rashes, good skin turgor. Extremities:  No edema. Labs:       Recent Labs     22  0855 22  0839   WBC 8.1 6.8   RBC 3.00* 3.16*   HGB 8.7* 9.2*   HCT 26.5* 28.0*   MCV 88.3 88.6   MCH 29.0 29.1   MCHC 32.8 32.9   RDW 14.8* 15.1*    203   MPV 9.5 9.5      BMP:   Recent Labs     22  0839 22  0446 22  0837    143 136   K 3.7 3.6* 3.8    108* 103   CO2 19* 21 19*   BUN 79* 67* 66*   CREATININE 3.76* 3.74* 3.56*   GLUCOSE 124* 44* 157*   CALCIUM 9.2 8.6 8.7      Albumin:    Recent Labs     22  0855 22  0839   LABALBU 3.7 3.6     VALERIE:      Lab Results   Component Value Date    VALERIE NEGATIVE 2021     SPEP:  Lab Results   Component Value Date    PROT 6.9 2022    ALBCAL 3.9 2018    ALBPCT 62 2018    LABALPH 0.2 2018    LABALPH 0.7 2018    A1PCT 3 2018    A2PCT 11 2018    LABBETA 0.7 2018    BETAPCT 12 2018    GAMGLOB 0.8 2018    GGPCT 13 2018    PATH ELECTRONICALLY SIGNED. Lluvia Tinsley M.D. 2018    PATH ELECTRONICALLY SIGNED.  Lluvia Tinsley M.D. 2018     C3:     Lab Results   Component Value Date    C3 108 2021     C4:     Lab Results   Component Value Date    C4 15 2021     MPO ANCA:     Lab Results   Component Value Date    MPO 15 2021     PR3 ANCA:     Lab Results Component Value Date    PR3 1.1 11/11/2021     Hep BsAg:         Lab Results   Component Value Date    HEPBSAG NONREACTIVE 11/23/2021     Hep C AB:          Lab Results   Component Value Date    HEPCAB NONREACTIVE 11/23/2021     Urinalysis/Chemistries:      Lab Results   Component Value Date    NITRU NEGATIVE 04/18/2022    COLORU Yellow 04/18/2022    PHUR 5.5 04/18/2022    WBCUA 0 TO 2 04/18/2022    RBCUA 5 TO 10 04/18/2022    MUCUS NOT REPORTED 12/03/2021    TRICHOMONAS NOT REPORTED 12/03/2021    YEAST NOT REPORTED 12/03/2021    BACTERIA 1+ 04/18/2022    SPECGRAV 1.025 04/18/2022    LEUKOCYTESUR NEGATIVE 04/18/2022    UROBILINOGEN Normal 04/18/2022    BILIRUBINUR NEGATIVE 04/18/2022    GLUCOSEU NEGATIVE 04/18/2022    KETUA NEGATIVE 04/18/2022    AMORPHOUS NOT REPORTED 12/03/2021     Urine Sodium:     Lab Results   Component Value Date    NIDHI 62 11/21/2021     Urine Potassium:    Lab Results   Component Value Date    KUR 25.3 11/12/2021     Urine Chloride:    Lab Results   Component Value Date    CLUR 54 11/12/2021     Urine Osmolarity:   Lab Results   Component Value Date    OSMOU 421 11/21/2021      Urine Creatinine:     Lab Results   Component Value Date    LABCREA 62.1 11/21/2021     Radiology:     Reviewed. Assessment:     1. ESRD on HD on MWF schedule at [address] via tunnel catheter under Dr. Mychal Barros. 2. Missed multiple sessions of dialysis. 3. Complaint of nausea and vomiting postdialysis. 4. Hypertension  5. Type 2 diabetes  6. Chronic diastolic heart failure  7. S/p angioplasty with stent 4/10/2017.  8. 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 9. Dyslipidemia  10. Metabolic acidosis. Plan:   1. Patient was seen and examined on HD at bedside. Orders were confirmed with the HD nurse. 2. Keep renal diet. 3. BMP in AM.  4. Will follow. Nutrition   Please ensure that patient is on a renal diet/TF. Avoid nephrotoxic drugs/contrast exposure. Pavel Lazo MD  [facility]     This note is created with the assistance of a speech-recognition program. While intending to generate a document that actually reflects the content of the visit, no guarantees can be provided that every mistake has been identified and corrected by editing.

## 2022-04-21 LAB
ANION GAP SERPL CALCULATED.3IONS-SCNC: 12 MMOL/L (ref 9–17)
BUN BLDV-MCNC: 36 MG/DL (ref 8–23)
CALCIUM SERPL-MCNC: 8.2 MG/DL (ref 8.6–10.4)
CASE NUMBER:: NORMAL
CHLORIDE BLD-SCNC: 99 MMOL/L (ref 98–107)
CO2: 25 MMOL/L (ref 20–31)
CREAT SERPL-MCNC: 2.94 MG/DL (ref 0.7–1.2)
GFR AFRICAN AMERICAN: 25 ML/MIN
GFR NON-AFRICAN AMERICAN: 21 ML/MIN
GFR SERPL CREATININE-BSD FRML MDRD: ABNORMAL ML/MIN/{1.73_M2}
GLUCOSE BLD-MCNC: 164 MG/DL (ref 75–110)
GLUCOSE BLD-MCNC: 174 MG/DL (ref 75–110)
GLUCOSE BLD-MCNC: 283 MG/DL (ref 75–110)
GLUCOSE BLD-MCNC: 39 MG/DL (ref 75–110)
GLUCOSE BLD-MCNC: 64 MG/DL (ref 75–110)
GLUCOSE BLD-MCNC: 88 MG/DL (ref 75–110)
GLUCOSE BLD-MCNC: 91 MG/DL (ref 75–110)
GLUCOSE BLD-MCNC: 97 MG/DL (ref 70–99)
LYMPHOCYTES, BODY FLUID: 27 %
NEUTROPHIL, FLUID: 30 %
OTHER CELLS FLUID: NORMAL %
POTASSIUM SERPL-SCNC: 3.8 MMOL/L (ref 3.7–5.3)
RBC FLUID: 7000 /MM3
SODIUM BLD-SCNC: 136 MMOL/L (ref 135–144)
SPECIMEN TYPE: NORMAL
WBC FLUID: 1297 /MM3

## 2022-04-21 PROCEDURE — 97166 OT EVAL MOD COMPLEX 45 MIN: CPT

## 2022-04-21 PROCEDURE — 2580000003 HC RX 258: Performed by: NURSE PRACTITIONER

## 2022-04-21 PROCEDURE — 82947 ASSAY GLUCOSE BLOOD QUANT: CPT

## 2022-04-21 PROCEDURE — 6360000002 HC RX W HCPCS: Performed by: NURSE PRACTITIONER

## 2022-04-21 PROCEDURE — 6370000000 HC RX 637 (ALT 250 FOR IP): Performed by: NURSE PRACTITIONER

## 2022-04-21 PROCEDURE — 99232 SBSQ HOSP IP/OBS MODERATE 35: CPT | Performed by: INTERNAL MEDICINE

## 2022-04-21 PROCEDURE — 97535 SELF CARE MNGMENT TRAINING: CPT

## 2022-04-21 PROCEDURE — 99232 SBSQ HOSP IP/OBS MODERATE 35: CPT | Performed by: FAMILY MEDICINE

## 2022-04-21 PROCEDURE — 80048 BASIC METABOLIC PNL TOTAL CA: CPT

## 2022-04-21 PROCEDURE — 36415 COLL VENOUS BLD VENIPUNCTURE: CPT

## 2022-04-21 PROCEDURE — 6370000000 HC RX 637 (ALT 250 FOR IP): Performed by: INTERNAL MEDICINE

## 2022-04-21 PROCEDURE — 97162 PT EVAL MOD COMPLEX 30 MIN: CPT

## 2022-04-21 PROCEDURE — 97530 THERAPEUTIC ACTIVITIES: CPT

## 2022-04-21 PROCEDURE — 94761 N-INVAS EAR/PLS OXIMETRY MLT: CPT

## 2022-04-21 PROCEDURE — 2060000000 HC ICU INTERMEDIATE R&B

## 2022-04-21 RX ORDER — INSULIN GLARGINE 100 [IU]/ML
10 INJECTION, SOLUTION SUBCUTANEOUS NIGHTLY
Qty: 5 PEN | Refills: 0 | Status: SHIPPED | OUTPATIENT
Start: 2022-04-21 | End: 2022-04-28 | Stop reason: DRUGHIGH

## 2022-04-21 RX ORDER — INSULIN GLARGINE 100 [IU]/ML
10 INJECTION, SOLUTION SUBCUTANEOUS NIGHTLY
Status: DISCONTINUED | OUTPATIENT
Start: 2022-04-21 | End: 2022-04-22 | Stop reason: HOSPADM

## 2022-04-21 RX ADMIN — SODIUM CHLORIDE, PRESERVATIVE FREE 10 ML: 5 INJECTION INTRAVENOUS at 08:36

## 2022-04-21 RX ADMIN — CHOLESTYRAMINE 4 G: 4 POWDER, FOR SUSPENSION ORAL at 08:36

## 2022-04-21 RX ADMIN — METOPROLOL TARTRATE 25 MG: 25 TABLET ORAL at 08:36

## 2022-04-21 RX ADMIN — PANTOPRAZOLE SODIUM 40 MG: 40 TABLET, DELAYED RELEASE ORAL at 05:26

## 2022-04-21 RX ADMIN — HEPARIN SODIUM 5000 UNITS: 5000 INJECTION INTRAVENOUS; SUBCUTANEOUS at 13:31

## 2022-04-21 RX ADMIN — LATANOPROST 1 DROP: 50 SOLUTION OPHTHALMIC at 23:41

## 2022-04-21 RX ADMIN — INSULIN LISPRO 3 UNITS: 100 INJECTION, SOLUTION INTRAVENOUS; SUBCUTANEOUS at 13:00

## 2022-04-21 RX ADMIN — HYDRALAZINE HYDROCHLORIDE 100 MG: 50 TABLET ORAL at 08:36

## 2022-04-21 RX ADMIN — FUROSEMIDE 40 MG: 40 TABLET ORAL at 08:36

## 2022-04-21 RX ADMIN — AMLODIPINE BESYLATE 10 MG: 10 TABLET ORAL at 08:36

## 2022-04-21 RX ADMIN — TAMSULOSIN HYDROCHLORIDE 0.4 MG: 0.4 CAPSULE ORAL at 11:06

## 2022-04-21 RX ADMIN — METOPROLOL TARTRATE 25 MG: 25 TABLET ORAL at 22:20

## 2022-04-21 RX ADMIN — OXYCODONE 10 MG: 5 TABLET ORAL at 22:20

## 2022-04-21 RX ADMIN — INSULIN LISPRO 1 UNITS: 100 INJECTION, SOLUTION INTRAVENOUS; SUBCUTANEOUS at 17:36

## 2022-04-21 RX ADMIN — HEPARIN SODIUM 5000 UNITS: 5000 INJECTION INTRAVENOUS; SUBCUTANEOUS at 22:27

## 2022-04-21 RX ADMIN — INSULIN LISPRO 1 UNITS: 100 INJECTION, SOLUTION INTRAVENOUS; SUBCUTANEOUS at 22:28

## 2022-04-21 RX ADMIN — ACETAMINOPHEN 650 MG: 325 TABLET ORAL at 11:06

## 2022-04-21 RX ADMIN — ONDANSETRON 4 MG: 2 INJECTION INTRAMUSCULAR; INTRAVENOUS at 14:02

## 2022-04-21 RX ADMIN — HYDRALAZINE HYDROCHLORIDE 100 MG: 50 TABLET ORAL at 22:20

## 2022-04-21 RX ADMIN — HYDRALAZINE HYDROCHLORIDE 100 MG: 50 TABLET ORAL at 13:31

## 2022-04-21 RX ADMIN — PREDNISOLONE ACETATE 1 DROP: 10 SUSPENSION/ DROPS OPHTHALMIC at 08:36

## 2022-04-21 RX ADMIN — SODIUM CHLORIDE, PRESERVATIVE FREE 10 ML: 5 INJECTION INTRAVENOUS at 23:43

## 2022-04-21 RX ADMIN — ACYCLOVIR 400 MG: 400 TABLET ORAL at 08:36

## 2022-04-21 RX ADMIN — OXYCODONE 10 MG: 5 TABLET ORAL at 13:50

## 2022-04-21 RX ADMIN — HEPARIN SODIUM 5000 UNITS: 5000 INJECTION INTRAVENOUS; SUBCUTANEOUS at 05:26

## 2022-04-21 RX ADMIN — ACYCLOVIR 400 MG: 400 TABLET ORAL at 23:41

## 2022-04-21 ASSESSMENT — PAIN DESCRIPTION - PAIN TYPE
TYPE: CHRONIC PAIN

## 2022-04-21 ASSESSMENT — PAIN DESCRIPTION - ONSET
ONSET: ON-GOING
ONSET: ON-GOING

## 2022-04-21 ASSESSMENT — PAIN DESCRIPTION - DESCRIPTORS
DESCRIPTORS: BURNING
DESCRIPTORS: DISCOMFORT

## 2022-04-21 ASSESSMENT — PAIN - FUNCTIONAL ASSESSMENT
PAIN_FUNCTIONAL_ASSESSMENT: ACTIVITIES ARE NOT PREVENTED

## 2022-04-21 ASSESSMENT — PAIN DESCRIPTION - FREQUENCY
FREQUENCY: CONTINUOUS

## 2022-04-21 ASSESSMENT — PAIN SCALES - GENERAL
PAINLEVEL_OUTOF10: 8
PAINLEVEL_OUTOF10: 6
PAINLEVEL_OUTOF10: 8
PAINLEVEL_OUTOF10: 6
PAINLEVEL_OUTOF10: 5
PAINLEVEL_OUTOF10: 6
PAINLEVEL_OUTOF10: 5
PAINLEVEL_OUTOF10: 8
PAINLEVEL_OUTOF10: 8

## 2022-04-21 ASSESSMENT — PAIN DESCRIPTION - ORIENTATION
ORIENTATION: LOWER

## 2022-04-21 ASSESSMENT — PAIN DESCRIPTION - LOCATION
LOCATION: BACK
LOCATION: BACK;FOOT
LOCATION: BACK

## 2022-04-21 NOTE — PROGRESS NOTES
BG checked 39; orange juice and crackers given; repeat check was 88 pt alert and oriented ; educated pt to call with any further symptoms and repeat before breakfast unless needed sooner pt verbalized understanding

## 2022-04-21 NOTE — PROGRESS NOTES
Occupational Therapy  Facility/Department: [facility]  Occupational Therapy Initial Assessment    Name: Jake Ramos  : 1945  MRN: [id number]  Date of Service: 2022    Discharge Recommendations:  Patient would benefit from continued therapy after discharge Pt is currently unsafe to return to their prior living arrangements without  assist/supervision to safely engage in all aspects of ADLs, IADLs and functional mobility tasks. OT Equipment Recommendations  Equipment Needed: Yes  Mobility Devices: ADL Assistive Devices  ADL Assistive Devices: Shower Chair with back;Grab Bars - shower; Toileting - 3-in-1 Commode       Patient Diagnosis(es): The encounter diagnosis was Pleural effusion.   Past Medical History:  has a past medical history of Abnormal tilt table test, Acute kidney injury ([address] Acute MI ([address] Acute renal failure superimposed on stage 4 chronic kidney disease ([address] Acute renal failure with tubular necrosis ([address] Anemia, Anemia in stage 4 chronic kidney disease ([address] Angina, class II ([address] CAD (coronary artery disease), Cerebral artery occlusion with cerebral infarction ([address] CHF (congestive heart failure) ([address] Cholecystitis, Chronic back pain, Chronic diastolic HF (heart failure), NYHA class 3 ([address] Chronic kidney disease, stage III (moderate) (HCC), Closed fracture of lumbar vertebra without mention of spinal cord injury, Displacement of intervertebral disc, site unspecified, without myelopathy, H/O cardiac catheterization, H/O cardiovascular stress test, H/O tilt table evaluation, H/O tilt table evaluation, History of 24 hour EKG monitoring, History of 24 hour EKG monitoring, History of blood transfusion, History of cardiovascular stress test, History of cardiovascular stress test, History of coronary artery stent placement, History of CVA (cerebrovascular accident) without residual deficits, History of echocardiogram, History of Holter monitoring, History of tilt table evaluation, Hyperlipidemia, Hypertension, Medication side effect, initial encounter, Non critical Right Renal artery stenosis, native [facility]), Pacemaker, S/P cardiac cath, S/P coronary artery stent placement, SIRS (systemic inflammatory response syndrome) ([facility] and Type II or unspecified type diabetes mellitus without mention of complication, not stated as uncontrolled. Past Surgical History:  has a past surgical history that includes Pacemaker insertion; back surgery; Cholecystectomy (08/17/2015); Corneal transplant; Cardiac catheterization (Left, 02/19/2016); pacemaker placement; Colonoscopy (2010); Colonoscopy (02/19/2015); Colonoscopy (05/23/2018); Colonoscopy (N/A, 05/23/2018); Upper gastrointestinal endoscopy (05/28/2019); Upper gastrointestinal endoscopy (N/A, 05/28/2019); Colonoscopy (10/30/2020); Colonoscopy (N/A, 10/30/2020); Upper gastrointestinal endoscopy (N/A, 10/30/2020); Endoscopy, colon, diagnostic; eye surgery; Cardiac catheterization (Left, 10/05/2021); IR TUNNELED CVC PLACE WO SQ PORT/PUMP > 5 YEARS (11/22/2021); and US BIOPSY RENAL LEFT PERC (11/24/2021). Assessment   Performance deficits / Impairments: Decreased ADL status; Decreased endurance;Decreased high-level IADLs;Decreased safe awareness;Decreased functional mobility ; Decreased balance;Decreased cognition  Assessment: Pt demonstrated supint<>sit transfer with CGA, functional mobility with CGA and functional transfers with CGA. Use of RW. Pt on toilet upon arrival and completed mobiltiy with SPC back to EOB. Pt limited by dizziness and feeling sick, pt reports nausea. Required brief restbreak supine before further activity. Pt then willing to engage in functional mobility with RW to promote increased safety/balance. Upon standing 2x, pt experienced significant dizziness that did not subside, limiting further mobility and ADLs.  Pt is expected to require skilled OT services during their acute hospitalization stay to address the shower  Bathroom Toilet: Standard  Bathroom Equipment:  (reports none)  Home Equipment: Cane,Walker, 4 wheeled,Walker, rolling (pt reports using RW for household distances, rollator for community mobility.)  Receives Help From: Family  ADL Assistance: Independent  Homemaking Assistance: Independent  Homemaking Responsibilities: Yes (shares with wife.)  Meal Prep Responsibility: Secondary  Laundry Responsibility: Secondary  Cleaning Responsibility: Primary  Shopping Responsibility: Secondary  Ambulation Assistance: Independent  Transfer Assistance: Independent  Active : Yes  Mode of Transportation: Car  Occupation: Retired  Type of Occupation: machine repair  Leisure & Hobbies: fishing  Additional Comments: Pt reports that his spouse is able to provide 24/7 assistance as needed. Objective   Vision Exceptions: Wears glasses for reading  Hearing: Within functional limits       Observation/Palpation  Posture: Fair     Balance  Sitting Balance: Stand by assistance (Seated on toilet upon arrival, seated EOB unsupported for education, ROM/MMT and rest breaks ~16 minutes total)  Standing Balance: Contact guard assistance  Standing Balance  Time: 3 minutes  Activity: static standing EOB  Comment: Pt limited by onset dizziness upon both stands. Pt reports this happens frequently. Dizziness not subsiding. BP taken seated at 126/55 and BP taken standing 114/46. Functional Mobility  Functional - Mobility Device: Cane  Activity: Other  Assist Level: Contact guard assistance  Functional Mobility Comments: Pt upon arrival was using toilet alone. Completed functional mobility toilet to EOB with CGA and use of SPC. Pt presents with decreased balance, reaching for an unlocked recliner chair for support. Pt educated on benefit of using RW with more stability vs.cane for safety. Educated at end of session on importance of having staff complete activity with him OOB. Pt took brief break supine in bed after mobility.  Was then willing to stand and try with walker, but limited to mobility d/t dizziness not decreasing while standing and pt not able to tolerate further per pt report. Toilet Transfers  Toilet - Technique: Ambulating  Equipment Used: Standard toilet  Toilet Transfer: Contact guard assistance  Toilet Transfers Comments: SPC    ADL  Feeding: Independent  Grooming: Modified independent ; Increased time to complete  UE Bathing: Stand by assistance; Increased time to complete;Setup  LE Bathing: Contact guard assistance;Setup; Increased time to complete  UE Dressing: Stand by assistance  LE Dressing: Contact guard assistance;Setup; Increased time to complete  Toileting: Contact guard assistance; Increased time to complete  Additional Comments: Pt seated on toilet upon arrival requesting privacy. Pt was unwilling to complete any further ADL activity d/t fatiuge, nausea, and dizziness. Activity Tolerance  Activity Tolerance: Patient limited by fatigue;Treatment limited secondary to medical complications  Activity Tolerance Comments: Pt reports severe dizziness, nausea upon performing transfers, unable to tolerate further ambulation this date     Bed mobility  Supine to Sit: Contact guard assistance (Initially in bathroom, but did return supine during session for break and completed supine to sit with CGA to resume activity)  Sit to Supine: Contact guard assistance  Scooting: Contact guard assistance     Transfers  Sit to stand: Contact guard assistance  Stand to sit: Contact guard assistance  Transfer Comments: 1 stand to sit transfer with SPC; Use of RW for second  sit<>stand transfer     Cognition  Overall Cognitive Status: Exceptions  Arousal/Alertness: Appropriate responses to stimuli  Following Commands:  Follows one step commands consistently  Attention Span: Appears intact  Memory: Appears intact  Safety Judgement: Decreased awareness of need for safety;Decreased awareness of need for assistance  Insights: Decreased awareness of deficits  Initiation: Requires cues for some  Sequencing: Does not require cues  Cognition Comment: Pt demonstrates decreased awareness of safety and deficits, upon arrival pt began to ambulate from toilet to bed, nearly falling reaching for unlocked chair to support.                   LUE AROM (degrees)  LUE AROM : WFL  Left Hand AROM (degrees)  Left Hand AROM: WFL  RUE AROM (degrees)  RUE AROM : WFL  Right Hand AROM (degrees)  Right Hand AROM: WFL        Hand Dominance  Hand Dominance: Right       AM-PAC Score        AM-PAC Inpatient Daily Activity Raw Score: 20 (04/21/22 1525)  AM-PAC Inpatient ADL T-Scale Score : 42.03 (04/21/22 1525)  ADL Inpatient CMS 0-100% Score: 38.32 (04/21/22 1525)  ADL Inpatient CMS G-Code Modifier : CJ (04/21/22 1525)    Goals  Short Term Goals  Time Frame for Short term goals: By discharge, pt will:  Short Term Goal 1: Demo functional sit<>stand transfers and functional mobility with SBA, LRD PRN and 0 LOB  Short Term Goal 2: Demo 8 minutes of dynamic standing balance, reaching outside [place] in multiple planes, with CGA to promote increased IND in ADLs  Short Term Goal 3: Demo UB ADLs with Mod IND  Short Term Goal 4: Demo LB ADLs with SBA and use of DME PRN  Short Term Goal 5: Demo 30 minute of activity tolerance to promote increased independence throughout ADLs/IADLs  Short Term Goal 6: Demo good safety awareness IND throughout all functional activities with 0 VCs       Therapy Time   Individual Concurrent Group Co-treatment   Time In 0859         Time Out 0926         Minutes 27         Timed Code Treatment Minutes: 8 Minutes       Pau Salazar OTR/L

## 2022-04-21 NOTE — CARE COORDINATION
[place] [facility] Flow/Interdisciplinary Rounds Progress Note    Quality Flow Rounds held on April 21, 2022 at [address] Attending:  Bedside Nurse, ,  and Nursing Unit Leadership    Barriers to Discharge: Establish OP HD treatments    Anticipated Discharge Date:       Anticipated Discharge Disposition: Home    Readmission Risk              Risk of Unplanned Readmission:  32           Discussed patient goal for the day, patient clinical progression, and barriers to discharge. The following Goal(s) of the Day/Commitment(s) have been identified:  Activity Progression  Transitional Care Plan     Per review of SW notes, patient intends to resume his HD treatments as an OP, SW working with [facility] in [place] to establish chair time, as patient is requests treatments be limited to 2x/week.   Plan remains home with family once HD treatment times established, has jason and PCP,       Ty Grimm RN  April 21, 2022

## 2022-04-21 NOTE — PROGRESS NOTES
[facility]  Office: [address], DO, Yecenia Mayberry, DO, Bryan Patee, DO, Yolette Gurrola Blood, DO, Gallo Wong MD, Quyen Hogan MD, Gonzalez Salvador MD, Clover Alicia MD, Antony Lockwood MD, Melina Zamora MD, Jaziel Brooks MD, Jayjay Morton, DO, Brittany Membreno, DO, Francis Reyes MD,  Svetlana Rota, DO, Alise De Luna MD, Mustapha Meraz MD, Darrian Kennedy MD, Ricci Lundborg, DO, Misa Martinez MD, Catherine Cody MD, Albert Hernández, CNP, [facility], CNP, Karis Cortez, CNP, Alicia Gunter, CNP, Bret Fritz, CNP, Claudien Stern, CNP, Bindu Gonzalez PAKaranC, Keith Julio, DNP, Edward Patel, CNP, Leonid Clark, CNP, Kitty Dunn, CNP, Ebenezer Lujan, CNS, Lesli Gautam, DNP, Ines Linda, CNP, Shannan Denise, CNP, Ella Ellsworth, CNP         [facility] [address]    Progress Note    4/21/2022    10:32 AM    Name:   Devin Keaitng  MRN:     [id number]     Acct:      [de-identified]   Room:   [address] [place] [address] Day:  2  Admit Date:  4/19/2022 12:27 AM    PCP:   Angel Cardoso MD  Code Status:  Full Code    Subjective:     C/C:   Chief Complaint   Patient presents with    Leg Swelling     Initial complaint was lower back pan and lower leg pain and swelling. On dialysis. Found bilateral pleural effusions at origin. Denies CP and SOB      Interval History Status: not changed. Pt was seen and examined this morning  Feeling dizzy and tired  Serum glucose noted to be 64 earlier this morning  No other events overnight   No other complaints     Review of Systems:     12 point ROS performed and negative for anything other than what was stated in subjective     Medications: Allergies:     Allergies   Allergen Reactions    Coreg [Carvedilol] Shortness Of Breath and Nausea And Vomiting    Lipitor [Atorvastatin] Other (See Comments)     Muscle aches and joint pain    Aldactone [Spironolactone] Hives    Crestor [Rosuvastatin Calcium] Other (See Comments)     Muscle aches and joint pain    Lopid [Gemfibrozil]      hyperglycemia    Invokana [Canagliflozin] Rash    Januvia [Sitagliptin] Nausea And Vomiting       Current Meds:   Scheduled Meds:    insulin glargine  10 Units SubCUTAneous Nightly    insulin lispro  0-6 Units SubCUTAneous TID WC    insulin lispro  0-3 Units SubCUTAneous Nightly    prednisoLONE acetate  1 drop Left Eye Daily    pantoprazole  40 mg Oral QAM AC    amLODIPine  10 mg Oral Daily    tamsulosin  0.4 mg Oral Daily    hydrALAZINE  100 mg Oral TID    furosemide  40 mg Oral Daily    latanoprost  1 drop Both Eyes Nightly    sodium chloride flush  5-40 mL IntraVENous 2 times per day    metoprolol tartrate  25 mg Oral BID    heparin (porcine)  5,000 Units SubCUTAneous 3 times per day    acyclovir  400 mg Oral BID    cholestyramine light  1 packet Oral Daily     Continuous Infusions:    sodium chloride      dextrose       PRN Meds: heparin (porcine), heparin (porcine), nitroGLYCERIN, sodium chloride flush, sodium chloride, ondansetron **OR** ondansetron, polyethylene glycol, acetaminophen **OR** acetaminophen, oxyCODONE **OR** oxyCODONE, glucose, dextrose, glucagon (rDNA), dextrose, acetaminophen, docusate sodium    Data:     Past Medical History:   has a past medical history of Abnormal tilt table test, Acute kidney injury ([address] Acute MI ([address] Acute renal failure superimposed on stage 4 chronic kidney disease (HCC), Acute renal failure with tubular necrosis ([address] Anemia, Anemia in stage 4 chronic kidney disease ([address] Angina, class II ([address] CAD (coronary artery disease), Cerebral artery occlusion with cerebral infarction ([address] CHF (congestive heart failure) ([address] Cholecystitis, Chronic back pain, Chronic diastolic HF (heart failure), NYHA class 3 ([address] Chronic kidney disease, stage III (moderate) ([address] Closed fracture of lumbar vertebra without mention of spinal cord injury, Displacement of intervertebral disc, site unspecified, without myelopathy, H/O cardiac catheterization, H/O cardiovascular stress test, H/O tilt table evaluation, H/O tilt table evaluation, History of 24 hour EKG monitoring, History of 24 hour EKG monitoring, History of blood transfusion, History of cardiovascular stress test, History of cardiovascular stress test, History of coronary artery stent placement, History of CVA (cerebrovascular accident) without residual deficits, History of echocardiogram, History of Holter monitoring, History of tilt table evaluation, Hyperlipidemia, Hypertension, Medication side effect, initial encounter, Non critical Right Renal artery stenosis, native ([facility] Pacemaker, S/P cardiac cath, S/P coronary artery stent placement, SIRS (systemic inflammatory response syndrome) ([facility] and Type II or unspecified type diabetes mellitus without mention of complication, not stated as uncontrolled. Social History:   reports that he quit smoking about 42 years ago. His smoking use included cigarettes. He has a 12.00 pack-year smoking history. He has never used smokeless tobacco. He reports that he does not drink alcohol and does not use drugs. Family History:   Family History   Problem Relation Age of Onset    Cancer Mother         breast    Cancer Father         lung       Vitals:  BP (!) 154/56   Pulse 71   Temp 98.6 °F (37 °C) (Oral)   Resp 16   Wt 176 lb 5.9 oz (80 kg)   SpO2 95%   BMI 26.05 kg/m²   Temp (24hrs), Av.9 °F (36.6 °C), Min:96.8 °F (36 °C), Max:100 °F (37.8 °C)    Recent Labs     22  0451 22  0525 22  0807 22  0829   POCGLU 39* 88 64* 91       I/O (24Hr):     Intake/Output Summary (Last 24 hours) at 2022 1032  Last data filed at 2022 2326  Gross per 24 hour   Intake 500 ml   Output 900 ml   Net -400 ml       Labs:  Hematology:  Recent Labs     22  0839 22  1248 22  0446   WBC 6.8  --   --    RBC 3.16*  --   --    HGB 9.2*  --   --    HCT 28.0* --   --    MCV 88.6  --   --    MCH 29.1  --   --    MCHC 32.9  --   --    RDW 15.1*  --   --      --   --    MPV 9.5  --   --    INR  --  1.0 1.0     Chemistry:  Recent Labs     04/18/22  1100 04/19/22  0057 04/20/22  0446 04/20/22  0837 04/21/22  0833   NA  --    < > 143 136 136   K  --    < > 3.6* 3.8 3.8   CL  --    < > 108* 103 99   CO2  --    < > 21 19* 25   GLUCOSE  --    < > 44* 157* 97   BUN  --    < > 67* 66* 36*   CREATININE  --    < > 3.74* 3.56* 2.94*   ANIONGAP  --    < > 14 14 12   LABGLOM  --    < > 16* 17* 21*   GFRAA  --    < > 19* 20* 25*   CALCIUM  --    < > 8.6 8.7 8.2*   PROBNP  --   --   --  3,330*  --    TROPHS 79*  --   --   --   --     < > = values in this interval not displayed. Recent Labs     04/19/22  0839 04/19/22  1140 04/20/22  1637 04/20/22 2010 04/21/22  0451 04/21/22  0525 04/21/22  0807 04/21/22  0829   PROT 6.9  --   --   --   --   --   --   --    LABALBU 3.6  --   --   --   --   --   --   --    LABA1C 7.3*  --   --   --   --   --   --   --    AST 14  --   --   --   --   --   --   --    ALT 10  --   --   --   --   --   --   --      --   --   --   --   --   --   --    ALKPHOS 86  --   --   --   --   --   --   --    BILITOT 0.27*  --   --   --   --   --   --   --    BILIDIR 0.09  --   --   --   --   --   --   --    POCGLU  --    < > 92 170* 39* 88 64* 91    < > = values in this interval not displayed. ABG:No results found for: POCPH, PHART, PH, POCPCO2, QRF3HPR, PCO2, POCPO2, PO2ART, PO2, POCHCO3, YKC5XZS, HCO3, NBEA, PBEA, BEART, BE, THGBART, THB, PEY6SDD, BIPC3AHA, X8BAXBIY, O2SAT, FIO2  Lab Results   Component Value Date/Time    SPECIAL NOT REPORTED 05/05/2021 11:30 AM     Lab Results   Component Value Date/Time    CULTURE NO GROWTH 13 HOURS 04/19/2022 04:48 PM       Radiology:  CT ABDOMEN PELVIS WO CONTRAST Additional Contrast? None    Result Date: 4/18/2022  No CT evidence of acute intra-abdominal process.  Small bilateral pleural effusions, partially loculated on the left. Trace pericardial effusion. No acute fracture or dislocation involving thoracic or lumbar spine. CT THORACIC SPINE WO CONTRAST    Result Date: 4/18/2022  No CT evidence of acute intra-abdominal process. Small bilateral pleural effusions, partially loculated on the left. Trace pericardial effusion. No acute fracture or dislocation involving thoracic or lumbar spine. XR CHEST PORTABLE    Result Date: 4/19/2022  Trace effusions. Stable left internal jugular catheter. US THORACENTESIS Which side should the procedure be performed? Radiologist Recommendation    Result Date: 4/20/2022  Successful ultrasound guided thoracentesis. IR REPLACE TUNNELED CVC W SQ PORT SAME ACCESS    Result Date: 4/20/2022  Successful fluoroscopic guided exchange of a tunneled dialysis catheter . Okay to use. CT LUMBAR SPINE TRAUMA RECONSTRUCTION    Result Date: 4/18/2022  No CT evidence of acute intra-abdominal process. Small bilateral pleural effusions, partially loculated on the left. Trace pericardial effusion. No acute fracture or dislocation involving thoracic or lumbar spine.        Physical Examination:        General appearance:  alert, cooperative and no distress  Mental Status:  oriented to person, place and time and normal affect  Lungs:  clear to auscultation bilaterally, normal effort  Heart:  regular rate and rhythm, no murmur  Abdomen:  soft, nontender, nondistended, normal bowel sounds  Extremities:  no edema, redness, tenderness in the calves  Skin:  no gross lesions, rashes, induration    Assessment:        Hospital Problems           Last Modified POA    * (Principal) Acute on chronic diastolic (congestive) heart failure ([address] 4/19/2022 Yes    S/P drug eluting coronary stent placement-OM1 4/10/17 4/19/2022 Yes    Uncontrolled type 2 diabetes mellitus with hyperglycemia ([address] 4/19/2022 Yes    Primary hypertension 4/19/2022 Yes    Hyperlipidemia 4/19/2022 Yes    BPH with appointment as of yet.   -Alejandrina Khan previous back surgery.    - Continue Roxicodone for pain management     9. History of BPH/elevated PSA with microscopic hematuria:   - Currently without urinary symptoms.    - Hemoglobin noted on UA.   - continue Flomax     10.  Debility:  - Patient states he has been walker bound for the last 6-months  - PT/OT    Adelfo Grossman MD  4/21/2022  10:32 AM

## 2022-04-21 NOTE — PROGRESS NOTES
Physical Therapy  Facility/Department: [facility] NEURO  Physical Therapy Initial Assessment    Name: Malena Funk  : 1945  MRN: [id number]  Date of Service: 2022  Chief Complaint   Patient presents with    Leg Swelling     Initial complaint was lower back pan and lower leg pain and swelling. On dialysis. Found bilateral pleural effusions at origin. Denies CP and SOB      Discharge Recommendations:  Patient would benefit from continued therapy after discharge   PT Equipment Recommendations  Equipment Needed: No  Other: Pt reports owning a rollator, RW, and SPC. Writer recommends  use of RW. Patient Diagnosis(es): The encounter diagnosis was Pleural effusion.   Past Medical History:  has a past medical history of Abnormal tilt table test, Acute kidney injury ([address] Acute MI ([address] Acute renal failure superimposed on stage 4 chronic kidney disease ([address] Acute renal failure with tubular necrosis ([address] Anemia, Anemia in stage 4 chronic kidney disease ([address] Angina, class II ([address] CAD (coronary artery disease), Cerebral artery occlusion with cerebral infarction ([address] CHF (congestive heart failure) ([address] Cholecystitis, Chronic back pain, Chronic diastolic HF (heart failure), NYHA class 3 ([address] Chronic kidney disease, stage III (moderate) (HCC), Closed fracture of lumbar vertebra without mention of spinal cord injury, Displacement of intervertebral disc, site unspecified, without myelopathy, H/O cardiac catheterization, H/O cardiovascular stress test, H/O tilt table evaluation, H/O tilt table evaluation, History of 24 hour EKG monitoring, History of 24 hour EKG monitoring, History of blood transfusion, History of cardiovascular stress test, History of cardiovascular stress test, History of coronary artery stent placement, History of CVA (cerebrovascular accident) without residual deficits, History of echocardiogram, History of Holter monitoring, History of tilt table evaluation, Hyperlipidemia, Hypertension, Medication side effect, initial encounter, Non critical Right Renal artery stenosis, native [facility]), Pacemaker, S/P cardiac cath, S/P coronary artery stent placement, SIRS (systemic inflammatory response syndrome) ([facility] [address] and Type II or unspecified type diabetes mellitus without mention of complication, not stated as uncontrolled. Past Surgical History:  has a past surgical history that includes Pacemaker insertion; back surgery; Cholecystectomy (08/17/2015); Corneal transplant; Cardiac catheterization (Left, 02/19/2016); pacemaker placement; Colonoscopy (2010); Colonoscopy (02/19/2015); Colonoscopy (05/23/2018); Colonoscopy (N/A, 05/23/2018); Upper gastrointestinal endoscopy (05/28/2019); Upper gastrointestinal endoscopy (N/A, 05/28/2019); Colonoscopy (10/30/2020); Colonoscopy (N/A, 10/30/2020); Upper gastrointestinal endoscopy (N/A, 10/30/2020); Endoscopy, colon, diagnostic; eye surgery; Cardiac catheterization (Left, 10/05/2021); IR TUNNELED CVC PLACE WO SQ PORT/PUMP > 5 YEARS (11/22/2021); and US BIOPSY RENAL LEFT PERC (11/24/2021). Assessment   Body Structures, Functions, Activity Limitations Requiring Skilled Therapeutic Intervention: Decreased functional mobility ; Decreased tolerance to work activity; Decreased strength;Decreased endurance;Decreased balance; Increased pain;Decreased posture  Assessment: Pt with mobility deficits requiring min-A to ambulate 8 feet with a SPC, CGA to perform sit<>stand transfer. Pt is a high fall risk this date secondary to impaired safety awareness, decreased standing balance, decreased BLE strength and endurance. Pt would be unsafe to return to prior living arrangements based on today's session. Pt would benefit from additional therapy upon discharge.   Therapy Prognosis: Good  Decision Making: Medium Complexity  Requires PT Follow-Up: Yes  Activity Tolerance  Activity Tolerance: Patient limited by fatigue;Treatment limited secondary to medical complications  Activity Tolerance Comments: Pt reports severe dizziness, nausea upon performing transfers, unable to tolerate further ambulation this date     Plan   Plan  Plan: 5-7 times per week  Current Treatment Recommendations: Strengthening,Balance training,Functional mobility training,Transfer training,Stair training,Gait training,Endurance training,Safety education & training,Home exercise program,Equipment evaluation, education, & procurement,Therapeutic activities  Safety Devices  Type of Devices: Gait belt,Bed alarm in place,Call light within reach,Patient at risk for falls,All fall risk precautions in place,Left in bed  Restraints  Restraints Initially in Place: No     Restrictions  Restrictions/Precautions  Restrictions/Precautions: Fall Risk  Required Braces or Orthoses?: No  Position Activity Restriction  Other position/activity restrictions: Up with assistance     Subjective   General  Patient assessed for rehabilitation services?: Yes  Response To Previous Treatment: Not applicable  Family / Caregiver Present: Yes (wife)  Follows Commands: Within Functional Limits  Subjective  Subjective: Pt seated on the toilet upon therapist arrival, reporting 8/10 low back pain that radiates into the legs but is agreeable to therapy, RN agreeable to therapy. Pt pleasant and cooperative throughout today's session.          Social/Functional History  Social/Functional History  Lives With: Spouse  Type of Home: Condo  Home Layout: One level  Home Access: Stairs to enter with rails  Entrance Stairs - Number of Steps: 2  Entrance Stairs - Rails: Both  Bathroom Shower/Tub: Walk-in shower  Bathroom Toilet: Standard  Bathroom Equipment:  (reports none)  Home Equipment: Cane,Walker, 4 wheeled,Walker, rolling (pt reports using RW for household distances, rollator for community mobility.)  Receives Help From: Family  ADL Assistance: Independent  Homemaking Assistance: Independent  Homemaking Responsibilities: Yes (shares assistance  Transfers  Sit to Stand: Contact guard assistance  Stand to sit: Contact guard assistance  Comment: Transfers performed 3x this date. Ambulation  Surface: level tile  Device: Single point cane  Assistance: Minimal assistance  Quality of Gait: unsteady, decreased stride length, increased YOLY. 1x LOB requiring min-a  Gait Deviations: Slow Linda; Increased YOLY; Decreased step length;Staggers  Distance: 8 feet  Comments: Upon therapist arrival, pt attempting to ambulate from toilet to bed despite verbal cues to wait for assistance, pt attempts to reach out for support from unlocked chair and nearly fell. More Ambulation?: No (unable to tolerate further ambulation secondary to dizziness, nausea with transfers.)  Stairs/Curb  Stairs?: No     Balance  Posture: Fair  Sitting - Static: Good  Sitting - Dynamic: Good;-  Standing - Static: Fair  Standing - Dynamic: Fair;-  Comments: standing balance assessed while using a SPC. AM-PAC Score  AM-PAC Inpatient Mobility Raw Score : 18 (04/21/22 1333)  AM-PAC Inpatient T-Scale Score : 43.63 (04/21/22 1333)  Mobility Inpatient CMS 0-100% Score: 46.58 (04/21/22 1333)  Mobility Inpatient CMS G-Code Modifier : CK (04/21/22 1333)          Goals  Short Term Goals  Time Frame for Short term goals: 14 visits  Short term goal 1: Pt will ambulate 200 feet with least restrictive device and supervision to increase functional independence. Short term goal 2: Pt will negotiate 2 stairs with bilateral handrails and SBA to allow the pt to enter prior living arrangements. Short term goal 3: Pt will demonstrate good- standing balance to decrease fall risk. Short term goal 4: Pt will tolerate a 35 minute therapy session to promote increased endurance  Short term goal 5: Pt will perform sit<>stand transfer independently to increase functional independence.   Additional Goals?: No       Therapy Time   Individual Concurrent Group Co-treatment   Time In 0859         Time Out 0925         Minutes 26         Timed Code Treatment Minutes: 200 Michael Iglesias, PT

## 2022-04-21 NOTE — PROGRESS NOTES
Renal Progress Note    Patient :  Terrie Chapman; 68 y.o. MRN# [id number]  Location:  [facility]/1351-55  Attending:  Brenda Menchaca MD  Admit Date:  4/19/2022   Hospital Day: 2      Subjective:     Patient was seen and examined on HD. Patient is hemodynamically stable. He is feeling better after receiving to dialysis probably correction of uremia  His blood sugar remains under good control  With a long discussion with the family. Patient is probably not tolerating hemodialysis and having a washed out phenomena. He feels extremely tired and exhausted after dialysis. He has no appetite. He started feeling better after discontinuation of dialysis then he started going downhill again with nausea feeling tired and shortness of breath.   Option of peritoneal dialysis was given to the patient but he refused    Outpatient Medications:     Medications Prior to Admission: acetaminophen (TYLENOL) 325 MG tablet, Take 650 mg by mouth every 6 hours as needed for Pain  hydrALAZINE (APRESOLINE) 100 MG tablet, Take 1 tablet by mouth 3 times daily Except on the morning's of dialysis  tamsulosin (FLOMAX) 0.4 MG capsule, Take 1 capsule by mouth once daily  CONTOUR TEST strip, Inject 1 each into the skin 2 times daily (Patient not taking: Reported on 4/18/2022)  cholestyramine (QUESTRAN) 4 g packet, Take 1 packet by mouth daily (Patient not taking: Reported on 4/18/2022)  metoprolol tartrate (LOPRESSOR) 25 MG tablet, Take 1 tablet by mouth 2 times daily  [DISCONTINUED] LANTUS SOLOSTAR 100 UNIT/ML injection pen, INJECT 22 UNITS SUBCUTANEOUSLY TWICE DAILY  amLODIPine (NORVASC) 5 MG tablet, Take 2 tablets by mouth daily  furosemide (LASIX) 40 MG tablet, Take 1 tablet by mouth daily  omeprazole (PRILOSEC) 10 MG delayed release capsule, Take 10 mg by mouth daily  B Complex-C-Zn-Folic Acid (DIALYVITE/ZINC) TABS, TAKE 1 TABLET BY MOUTH ONCE DAILY AFTER DIALYSIS (Patient not taking: Reported on 4/18/2022)  docusate sodium (COLACE) 100 MG capsule, Take 1 capsule by mouth 3 times daily as needed for Constipation (Patient not taking: Reported on 2022)  nitroGLYCERIN (NITROSTAT) 0.4 MG SL tablet, Place 1 tablet under the tongue every 5 minutes as needed for Chest pain  latanoprost (XALATAN) 0.005 % ophthalmic solution, Place 1 drop into both eyes nightly   acyclovir (ZOVIRAX) 400 MG tablet, 400 mg 2 times daily   DIANA MICROLET LANCETS MISC, TEST TWICE DAILY (Patient not taking: Reported on 2022)  Insulin Pen Needle (PEN NEEDLES) 31G X 6 MM MISC, 1 each by Does not apply route daily  prednisoLONE acetate (PRED FORTE) 1 % ophthalmic suspension, Place 1 drop into the left eye daily     Current Medications:     Scheduled Meds:    insulin glargine  10 Units SubCUTAneous Nightly    insulin lispro  0-6 Units SubCUTAneous TID WC    insulin lispro  0-3 Units SubCUTAneous Nightly    prednisoLONE acetate  1 drop Left Eye Daily    pantoprazole  40 mg Oral QAM AC    amLODIPine  10 mg Oral Daily    tamsulosin  0.4 mg Oral Daily    hydrALAZINE  100 mg Oral TID    furosemide  40 mg Oral Daily    latanoprost  1 drop Both Eyes Nightly    sodium chloride flush  5-40 mL IntraVENous 2 times per day    metoprolol tartrate  25 mg Oral BID    heparin (porcine)  5,000 Units SubCUTAneous 3 times per day    acyclovir  400 mg Oral BID    cholestyramine light  1 packet Oral Daily     Input/Output:       I/O last 3 completed shifts: In: 500 [P.O.:200]  Out: 1050 [Urine:150; Other:600].       Patient Vitals for the past 96 hrs (Last 3 readings):   Weight   22 1550 176 lb 5.9 oz (80 kg)   22 1225 177 lb 0.5 oz (80.3 kg)   22 0400 177 lb 14.6 oz (80.7 kg)       Vital Signs:   Temperature:  Temp: 98.4 °F (36.9 °C)  TMax:   Temp (24hrs), Av.3 °F (36.8 °C), Min:96.8 °F (36 °C), Max:100 °F (37.8 °C)    Respirations:  Resp: 16  Pulse:   Pulse: 71  BP:    BP: (!) 160/90  BP Range: Systolic (89LMH), RPD:570 , Min:133 , VDK:666       Diastolic (63EAH), Av, Min:51, Max:90      Physical Examination:     General:  Alert and oriented x3. HEENT: Atraumatic and normocephalic. Eyes:   Pupils reactive to light  Neck:   Supple  Chest:   Bilateral air entry and decreased at the bases   cardiac:  S1 S2 RR, no murmurs, gallops or rubs. Abdomen: Soft, non-tender, no masses or organomegaly, BS audible. :   No suprapubic or flank tenderness. Neuro:  AAO x 3, No FND. SKIN:  No rashes, good skin turgor. Extremities:  No edema. Labs:       Recent Labs     22  0839   WBC 6.8   RBC 3.16*   HGB 9.2*   HCT 28.0*   MCV 88.6   MCH 29.1   MCHC 32.9   RDW 15.1*      MPV 9.5      BMP:   Recent Labs     22  0446 22  0837 22  0833    136 136   K 3.6* 3.8 3.8   * 103 99   CO2 21 19* 25   BUN 67* 66* 36*   CREATININE 3.74* 3.56* 2.94*   GLUCOSE 44* 157* 97   CALCIUM 8.6 8.7 8.2*      Albumin:    Recent Labs     22  0839   LABALBU 3.6     VALERIE:      Lab Results   Component Value Date    VALERIE NEGATIVE 2021     Radiology:     Reviewed. Assessment:     1. ESRD on HD on MWF schedule at [address] via tunnel catheter under Dr. Carmen Cerna. Patient is not tolerating hemodialysis. He is feeling extremely tired and exhausted after dialysis and unable to recover till next dialysis. I had a long discussion with the family. Family wants dialysis to be twice a week at this point. That will give him some time to make long-term plans. I provide him the options of peritoneal dialysis but patient is not agreeable. Discussed with Dr. Beatriz Forman and he will continue this discussion with the family. 2. Complaint of nausea and vomiting postdialysis. 3. Hypertension: Blood pressure is under good current  4. Type 2 diabetes  5. Chronic diastolic heart failure  6. S/p angioplasty with stent 4/10/2017.  7. 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 8. Dyslipidemia  9. Metabolic acidosis. Due to missed dialysis    Plan:   1.  Change dialysis to twice a week. 2. 2.  Next dialysis in a.m.  3. 3.  If nausea and vomiting persist please have GI to evaluate the patient for gastritis or esophagitis  4. Nutrition   Please ensure that patient is on a renal diet/TF. Avoid nephrotoxic drugs/contrast exposure. Anant Carrion MD  [facility]     This note is created with the assistance of a speech-recognition program. While intending to generate a document that actually reflects the content of the visit, no guarantees can be provided that every mistake has been identified and corrected by editing.

## 2022-04-22 VITALS
HEART RATE: 69 BPM | OXYGEN SATURATION: 98 % | DIASTOLIC BLOOD PRESSURE: 64 MMHG | SYSTOLIC BLOOD PRESSURE: 188 MMHG | TEMPERATURE: 98.1 F | BODY MASS INDEX: 26.14 KG/M2 | WEIGHT: 177.03 LBS | RESPIRATION RATE: 17 BRPM

## 2022-04-22 LAB
ANION GAP SERPL CALCULATED.3IONS-SCNC: 11 MMOL/L (ref 9–17)
BUN BLDV-MCNC: 44 MG/DL (ref 8–23)
CALCIUM SERPL-MCNC: 8.5 MG/DL (ref 8.6–10.4)
CHLORIDE BLD-SCNC: 99 MMOL/L (ref 98–107)
CO2: 26 MMOL/L (ref 20–31)
CREAT SERPL-MCNC: 3.58 MG/DL (ref 0.7–1.2)
GFR AFRICAN AMERICAN: 20 ML/MIN
GFR NON-AFRICAN AMERICAN: 17 ML/MIN
GFR SERPL CREATININE-BSD FRML MDRD: ABNORMAL ML/MIN/{1.73_M2}
GLUCOSE BLD-MCNC: 164 MG/DL (ref 75–110)
GLUCOSE BLD-MCNC: 264 MG/DL (ref 70–99)
POTASSIUM SERPL-SCNC: 3.9 MMOL/L (ref 3.7–5.3)
SODIUM BLD-SCNC: 136 MMOL/L (ref 135–144)
SURGICAL PATHOLOGY REPORT: NORMAL

## 2022-04-22 PROCEDURE — 94761 N-INVAS EAR/PLS OXIMETRY MLT: CPT

## 2022-04-22 PROCEDURE — 6370000000 HC RX 637 (ALT 250 FOR IP): Performed by: NURSE PRACTITIONER

## 2022-04-22 PROCEDURE — 6360000002 HC RX W HCPCS: Performed by: NURSE PRACTITIONER

## 2022-04-22 PROCEDURE — 80048 BASIC METABOLIC PNL TOTAL CA: CPT

## 2022-04-22 PROCEDURE — 90935 HEMODIALYSIS ONE EVALUATION: CPT

## 2022-04-22 PROCEDURE — 90935 HEMODIALYSIS ONE EVALUATION: CPT | Performed by: INTERNAL MEDICINE

## 2022-04-22 PROCEDURE — 99239 HOSP IP/OBS DSCHRG MGMT >30: CPT | Performed by: FAMILY MEDICINE

## 2022-04-22 PROCEDURE — 2580000003 HC RX 258: Performed by: NURSE PRACTITIONER

## 2022-04-22 PROCEDURE — 6360000002 HC RX W HCPCS: Performed by: INTERNAL MEDICINE

## 2022-04-22 PROCEDURE — 82947 ASSAY GLUCOSE BLOOD QUANT: CPT

## 2022-04-22 PROCEDURE — 5A1D70Z PERFORMANCE OF URINARY FILTRATION, INTERMITTENT, LESS THAN 6 HOURS PER DAY: ICD-10-PCS | Performed by: INTERNAL MEDICINE

## 2022-04-22 RX ADMIN — HYDRALAZINE HYDROCHLORIDE 100 MG: 50 TABLET ORAL at 15:43

## 2022-04-22 RX ADMIN — OXYCODONE 5 MG: 5 TABLET ORAL at 09:59

## 2022-04-22 RX ADMIN — HEPARIN SODIUM 5000 UNITS: 5000 INJECTION INTRAVENOUS; SUBCUTANEOUS at 06:13

## 2022-04-22 RX ADMIN — SODIUM CHLORIDE, PRESERVATIVE FREE 10 ML: 5 INJECTION INTRAVENOUS at 08:48

## 2022-04-22 RX ADMIN — PREDNISOLONE ACETATE 1 DROP: 10 SUSPENSION/ DROPS OPHTHALMIC at 08:50

## 2022-04-22 RX ADMIN — INSULIN LISPRO 1 UNITS: 100 INJECTION, SOLUTION INTRAVENOUS; SUBCUTANEOUS at 08:26

## 2022-04-22 RX ADMIN — HEPARIN SODIUM 2100 UNITS: 1000 INJECTION INTRAVENOUS; SUBCUTANEOUS at 12:41

## 2022-04-22 RX ADMIN — ONDANSETRON 4 MG: 2 INJECTION INTRAMUSCULAR; INTRAVENOUS at 08:45

## 2022-04-22 RX ADMIN — PANTOPRAZOLE SODIUM 40 MG: 40 TABLET, DELAYED RELEASE ORAL at 15:44

## 2022-04-22 ASSESSMENT — PAIN DESCRIPTION - DESCRIPTORS
DESCRIPTORS: BURNING;ACHING
DESCRIPTORS: ACHING
DESCRIPTORS: ACHING

## 2022-04-22 ASSESSMENT — PAIN DESCRIPTION - LOCATION
LOCATION: BACK

## 2022-04-22 ASSESSMENT — PAIN DESCRIPTION - FREQUENCY
FREQUENCY: CONTINUOUS
FREQUENCY: CONTINUOUS

## 2022-04-22 ASSESSMENT — PAIN DESCRIPTION - PAIN TYPE
TYPE: CHRONIC PAIN
TYPE: CHRONIC PAIN

## 2022-04-22 ASSESSMENT — PAIN DESCRIPTION - ORIENTATION
ORIENTATION: LOWER

## 2022-04-22 ASSESSMENT — PAIN DESCRIPTION - ONSET
ONSET: ON-GOING
ONSET: ON-GOING

## 2022-04-22 ASSESSMENT — PAIN SCALES - GENERAL
PAINLEVEL_OUTOF10: 6

## 2022-04-22 ASSESSMENT — PAIN DESCRIPTION - PROGRESSION: CLINICAL_PROGRESSION: GRADUALLY IMPROVING

## 2022-04-22 NOTE — PLAN OF CARE
Problem: Pain:  Goal: Pain level will decrease  Description: Pain level will decrease  Outcome: Progressing  Goal: Control of acute pain  Description: Control of acute pain  Outcome: Progressing  Goal: Control of chronic pain  Description: Control of chronic pain  Outcome: Progressing     Problem: Falls - Risk of:  Goal: Will remain free from falls  Description: Will remain free from falls  Outcome: Progressing  Goal: Absence of physical injury  Description: Absence of physical injury  Outcome: Progressing     Problem: Tissue Perfusion - Renal, Altered:  Goal: Electrolytes within specified parameters  Description: Electrolytes within specified parameters  Outcome: Progressing  Goal: Urine creatinine clearance will be within specified parameters  Description: Urine creatinine clearance will be within specified parameters  Outcome: Progressing  Goal: Serum creatinine will be within specified parameters  Description: Serum creatinine will be within specified parameters  Outcome: Progressing  Goal: Ability to achieve a balanced intake and output will improve  Description: Ability to achieve a balanced intake and output will improve  Outcome: Progressing     Problem: Discharge Planning  Goal: Discharge to home or other facility with appropriate resources  Outcome: Progressing     Problem: Pain  Goal: Verbalizes/displays adequate comfort level or baseline comfort level  Outcome: Progressing     Problem: Chronic Conditions and Co-morbidities  Goal: Patient's chronic conditions and co-morbidity symptoms are monitored and maintained or improved  Outcome: Progressing     Problem: Safety - Adult  Goal: Free from fall injury  Outcome: Progressing     Problem: ABCDS Injury Assessment  Goal: Absence of physical injury  Outcome: Progressing

## 2022-04-22 NOTE — DISCHARGE INSTR - DIET

## 2022-04-22 NOTE — DISCHARGE SUMMARY
[facility]  Office: [address], DO, William Jai, DO, Rosemarie Chatterjeeaver, DO, Jeff Kelly, DO, Dannielle Morales MD, Nathan Riggs MD, Aziza Moreau MD, Aston Peterson MD, Stone Price MD, Vanesa Gray MD, Jimmy Garcia MD, Pauline Juarez, DO, Sean Henson, DO, Timmy Moreau MD,  Reggie Stiles DO, Contreras Bae MD, Nisreen Mandel MD, Zaida Allen MD, Rashad Stacy DO, Anabel Gutierrez MD, Gretchen Olson MD, Alicia Fairchild [facility], [facility], CNP, Daija Mckay, CNP, Contreras Grain, CNP, Lan Guerra, CNP, Salvador Hopson, CNP, Mindy Thomas PAKaranC, Stan Donald, DNP, Rizwan Johnson, CNP, Thony Mena, CNP, Akil Pearson, CNP, Thais Avitia, CNS, Sophie Licona, [facility], Reggie Wooten, CNP, Gonzalo Baker, CNP, James Benitez, [address]    Discharge Summary     Patient ID: Cesar Torres  :     MRN: [id number]     ACCOUNT:  [de-identified]   Patient's PCP: Jose Angel Tate MD  Admit Date: 2022   Discharge Date: 2022      Length of Stay: 3  Code Status:  Full Code  Admitting Physician: No admitting provider for patient encounter.   Discharge Physician: Contreras Bae MD     Active Discharge Diagnoses:     Hospital Problem Lists:  Principal Problem:    Acute on chronic diastolic (congestive) heart failure (HCC)  Active Problems:    S/P drug eluting coronary stent placement-OM1 4/10/17    Uncontrolled type 2 diabetes mellitus with hyperglycemia ([facility]    Primary hypertension    Hyperlipidemia    BPH with obstruction/lower urinary tract symptoms    Normocytic normochromic anemia    Pulmonary hypertension (HCC)    NSTEMI (non-ST elevated myocardial infarction) (HCC)    Bilateral pleural effusion    End stage renal disease ([facility]    Microscopic hematuria    Chronic bilateral low back pain with bilateral sciatica    Debility    Dialysis patient, noncompliant ([facility]  Resolved Problems:    * No resolved hospital problems. *      Admission Condition:  stable     Discharged Condition: stable    Hospital Stay:     Hospital Course:  Ella Dennis is a 68 y.o. male who was admitted for the management of    Acute on chronic diastolic (congestive) heart failure (HCC) , presented to ER with   Leg Swelling (Initial complaint was lower back pan and lower leg pain and swelling. On dialysis. Found bilateral pleural effusions at origin. Denies CP and SOB)    1. Bilateral pleural effusions:   - s/p IR u/s guided thoracentesis this morning with 600 ml fluid removed     2. End-stage renal disease:   - nephrology on board   - plan for HD this morning   - renally dose all meds  - avoid nephrotoxic agents   - hx of noncompliance and history of right renal artery stenosis.        3. Primary hypertension  4.  CAD:   - continue home anti hypertensive regimen   - v/s per unit protocol      5. Chronic diastolic CHF with moderate pulmonary hypertension:   - continue home BB and lasix   - monitor Is/Os   - Most recent echo from 11/11/2021 showing an ejection fraction of 60% with moderate pulmonary hypertension with an RVSP of 42 mmHg and coordinating atrial dilation with an LA dimension of 4.2 cm     6. Diabetes mellitus type 2:   - A1c 7.3.   - change home lantus to qhs given episodes of hypoglycemia and will lower dose to 10 units QHS  - pt continued to have episodes of hypoglycemia thus above changes made  - will monitor overnight ot ensure patients serum glucose remains stable   - accu checks ac/hs   - hypoglycemia protocol on board       7. Normocytic normochromic anemia   - we will check iron studies.     - Hemoglobin 8.7 on admission.    - It would appear patient has had a history of iron deficiency anemia in the past and has required iron infusion     8. Chronic back pain:   - Recently referred to pain management by his primary care provider, unable to attend appointment as of yet.   - Had previous back surgery.    - Continue Roxicodone for pain management     9. History of BPH/elevated PSA with microscopic hematuria:   - Currently without urinary symptoms.    - Hemoglobin noted on UA.   - continue Flomax     10.  Debility:  - Patient states he has been walker bound for the last 6-months  - PT/OT    Pt was seen and examined on the day of d/c  Doing well   Tolerated changed to lantus well   No complaints at this time     Significant therapeutic interventions:      Significant Diagnostic Studies:   Labs / Micro:  CBC:   Lab Results   Component Value Date    WBC 6.8 04/19/2022    RBC 3.16 04/19/2022    HGB 9.2 04/19/2022    HCT 28.0 04/19/2022    MCV 88.6 04/19/2022    MCH 29.1 04/19/2022    MCHC 32.9 04/19/2022    RDW 15.1 04/19/2022     04/19/2022     BMP:    Lab Results   Component Value Date    GLUCOSE 264 04/22/2022    GLUCOSE 148 03/16/2017     04/22/2022    K 3.9 04/22/2022    CL 99 04/22/2022    CO2 26 04/22/2022    ANIONGAP 11 04/22/2022    BUN 44 04/22/2022    CREATININE 3.58 04/22/2022    BUNCRER 21 04/18/2022    CALCIUM 8.5 04/22/2022    LABGLOM 17 04/22/2022    GFRAA 20 04/22/2022    GFR      04/22/2022     HFP:    Lab Results   Component Value Date    PROT 6.9 04/19/2022     CMP:    Lab Results   Component Value Date    GLUCOSE 264 04/22/2022    GLUCOSE 148 03/16/2017     04/22/2022    K 3.9 04/22/2022    CL 99 04/22/2022    CO2 26 04/22/2022    BUN 44 04/22/2022    CREATININE 3.58 04/22/2022    ANIONGAP 11 04/22/2022    ALKPHOS 86 04/19/2022    ALT 10 04/19/2022    AST 14 04/19/2022    BILITOT 0.27 04/19/2022    LABALBU 3.6 04/19/2022    ALBUMIN 1.1 04/19/2022    LABGLOM 17 04/22/2022    GFRAA 20 04/22/2022    GFR      04/22/2022    PROT 6.9 04/19/2022    CALCIUM 8.5 04/22/2022     PT/INR:    Lab Results   Component Value Date    PROTIME 10.3 04/20/2022    INR 1.0 04/20/2022     PTT:   Lab Results   Component Value Date    APTT 32.2 04/19/2022     FLP:    Lab Results   Component Value Date    CHOL 69 09/21/2021    TRIG 185 09/21/2021    HDL 14 09/21/2021     U/A:    Lab Results   Component Value Date    COLORU Yellow 04/18/2022    TURBIDITY Clear 04/18/2022    SPECGRAV 1.025 04/18/2022    HGBUR TRACE 04/18/2022    PHUR 5.5 04/18/2022    PROTEINU 2+ 04/18/2022    GLUCOSEU NEGATIVE 04/18/2022    KETUA NEGATIVE 04/18/2022    BILIRUBINUR NEGATIVE 04/18/2022    UROBILINOGEN Normal 04/18/2022    NITRU NEGATIVE 04/18/2022    LEUKOCYTESUR NEGATIVE 04/18/2022     TSH:    Lab Results   Component Value Date    TSH 2.26 11/02/2017         Radiology:  CT ABDOMEN PELVIS WO CONTRAST Additional Contrast? None    Result Date: 4/18/2022  No CT evidence of acute intra-abdominal process. Small bilateral pleural effusions, partially loculated on the left. Trace pericardial effusion. No acute fracture or dislocation involving thoracic or lumbar spine. CT THORACIC SPINE WO CONTRAST    Result Date: 4/18/2022  No CT evidence of acute intra-abdominal process. Small bilateral pleural effusions, partially loculated on the left. Trace pericardial effusion. No acute fracture or dislocation involving thoracic or lumbar spine. XR CHEST PORTABLE    Result Date: 4/19/2022  Trace effusions. Stable left internal jugular catheter. US THORACENTESIS Which side should the procedure be performed? Radiologist Recommendation    Result Date: 4/20/2022  Successful ultrasound guided thoracentesis. IR REPLACE TUNNELED CVC W SQ PORT SAME ACCESS    Result Date: 4/20/2022  Successful fluoroscopic guided exchange of a tunneled dialysis catheter . Okay to use. CT LUMBAR SPINE TRAUMA RECONSTRUCTION    Result Date: 4/18/2022  No CT evidence of acute intra-abdominal process. Small bilateral pleural effusions, partially loculated on the left. Trace pericardial effusion. No acute fracture or dislocation involving thoracic or lumbar spine.        Consultations:    Consults:     Final Specialist Recommendations/Findings:   IP CONSULT TO HOSPITALIST  IP CONSULT TO NEPHROLOGY      The patient was seen and examined on day of discharge and this discharge summary is in conjunction with any daily progress note from day of discharge. Discharge plan:     Disposition: Home    Physician Follow Up:      Nathalia Rodrigues MD  [address] [facility]  [address] [place] [address]  [address] [place] [address]  [phone]    Schedule an appointment as soon as possible for a visit in 1 week         Requiring Further Evaluation/Follow Up POST HOSPITALIZATION/Incidental Findings:      Diet: diabetic diet    Activity: As tolerated     Instructions to Patient:      Discharge Medications:      Medication List      CHANGE how you take these medications    Lantus SoloStar 100 UNIT/ML injection pen  Generic drug: insulin glargine  Inject 10 Units into the skin nightly  What changed: See the new instructions.         CONTINUE taking these medications    acetaminophen 325 MG tablet  Commonly known as: TYLENOL     acyclovir 400 MG tablet  Commonly known as: ZOVIRAX     amLODIPine 5 MG tablet  Commonly known as: NORVASC  Take 2 tablets by mouth daily     Jayy Microlet Lancets Misc  TEST TWICE DAILY     cholestyramine 4 g packet  Commonly known as: Questran  Take 1 packet by mouth daily     Contour Test strip  Generic drug: blood glucose test strips  Inject 1 each into the skin 2 times daily     Dialyvite/Zinc Tabs     docusate sodium 100 MG capsule  Commonly known as: COLACE  Take 1 capsule by mouth 3 times daily as needed for Constipation     furosemide 40 MG tablet  Commonly known as: Lasix  Take 1 tablet by mouth daily     hydrALAZINE 100 MG tablet  Commonly known as: APRESOLINE  Take 1 tablet by mouth 3 times daily Except on the morning's of dialysis     latanoprost 0.005 % ophthalmic solution  Commonly known as: XALATAN     metoprolol tartrate 25 MG tablet  Commonly known as: LOPRESSOR  Take 1 tablet by mouth 2 times daily     nitroGLYCERIN 0.4 MG SL tablet  Commonly known as: NITROSTAT  Place 1

## 2022-04-22 NOTE — PROGRESS NOTES
Dialysis Time Out  To be done by RN and tech or 2 RNs  Staff Names: Luis Enrique Lizarraga RN    [x]  Identity of the patient using 2 patient identifiers  [x]  Consent for treatment  [x]  Equipment-proper machine and dialyzer  [x]  B-Hep B status  [x]  Orders- to include bath, blood flow, dialyzer, time and fluid removal  [x]  Access-Correct site and in working order  [x]  Time for patient to ask questions.

## 2022-04-22 NOTE — PROGRESS NOTES
Occupational [address]  Occupational Therapy Not Seen Note    DATE: 2022    NAME: Nhung Eaton  MRN: [id number]   : 1945      Patient not seen this date for Occupational Therapy due to:      Pt off floor to dialysis. Will continue as able.     Electronically signed by JULIANNE Rodriguez on 2022 at 1:12 PM

## 2022-04-22 NOTE — CARE COORDINATION
[facility] following for OP HD. Patient has confirmed chair of 12pm Mondays and Fridays with [address] ([place]) beginning 4/25/22. [facility] Admissions discussed need for secondary insurance information. SW faxed copy of card to Admissions.

## 2022-04-22 NOTE — CARE COORDINATION
Transitional planning-talked with patient and wife. Plan is to go home. Not wanting any home care at this time. Has ride. OP dialysis set up.

## 2022-04-22 NOTE — PROGRESS NOTES
Renal Progress Note    Patient :  Clara Benson; 68 y.o. MRN# [id number]  Location:  42/2415-14  Attending:  Arturo Galaviz MD  Admit Date:  4/19/2022   Hospital Day: 3      Subjective:     Patient was seen and examined on HD. No new issues overnight. No new issues reported overnight. States he feels better today, does not complain of any nausea or vomiting or any abdomen upset after last dialysis. He did run on a low blood flow rate on Wednesday. Patient will not be running on Mondays and Fridays upon discharge at [facility].     Outpatient Medications:     Medications Prior to Admission: acetaminophen (TYLENOL) 325 MG tablet, Take 650 mg by mouth every 6 hours as needed for Pain  hydrALAZINE (APRESOLINE) 100 MG tablet, Take 1 tablet by mouth 3 times daily Except on the morning's of dialysis  tamsulosin (FLOMAX) 0.4 MG capsule, Take 1 capsule by mouth once daily  CONTOUR TEST strip, Inject 1 each into the skin 2 times daily (Patient not taking: Reported on 4/18/2022)  cholestyramine (QUESTRAN) 4 g packet, Take 1 packet by mouth daily (Patient not taking: Reported on 4/18/2022)  metoprolol tartrate (LOPRESSOR) 25 MG tablet, Take 1 tablet by mouth 2 times daily  [DISCONTINUED] LANTUS SOLOSTAR 100 UNIT/ML injection pen, INJECT 22 UNITS SUBCUTANEOUSLY TWICE DAILY  amLODIPine (NORVASC) 5 MG tablet, Take 2 tablets by mouth daily  furosemide (LASIX) 40 MG tablet, Take 1 tablet by mouth daily  omeprazole (PRILOSEC) 10 MG delayed release capsule, Take 10 mg by mouth daily  B Complex-C-Zn-Folic Acid (DIALYVITE/ZINC) TABS, TAKE 1 TABLET BY MOUTH ONCE DAILY AFTER DIALYSIS (Patient not taking: Reported on 4/18/2022)  docusate sodium (COLACE) 100 MG capsule, Take 1 capsule by mouth 3 times daily as needed for Constipation (Patient not taking: Reported on 4/18/2022)  nitroGLYCERIN (NITROSTAT) 0.4 MG SL tablet, Place 1 tablet under the tongue every 5 minutes as needed for Chest pain  latanoprost (XALATAN) 0.005 % ophthalmic solution, Place 1 drop into both eyes nightly   acyclovir (ZOVIRAX) 400 MG tablet, 400 mg 2 times daily   DIANA MICROLET LANCETS MISC, TEST TWICE DAILY (Patient not taking: Reported on 2022)  Insulin Pen Needle (PEN NEEDLES) 31G X 6 MM MISC, 1 each by Does not apply route daily  prednisoLONE acetate (PRED FORTE) 1 % ophthalmic suspension, Place 1 drop into the left eye daily     Current Medications:     Scheduled Meds:    insulin glargine  10 Units SubCUTAneous Nightly    insulin lispro  0-6 Units SubCUTAneous TID WC    insulin lispro  0-3 Units SubCUTAneous Nightly    prednisoLONE acetate  1 drop Left Eye Daily    pantoprazole  40 mg Oral QAM AC    amLODIPine  10 mg Oral Daily    tamsulosin  0.4 mg Oral Daily    hydrALAZINE  100 mg Oral TID    furosemide  40 mg Oral Daily    latanoprost  1 drop Both Eyes Nightly    sodium chloride flush  5-40 mL IntraVENous 2 times per day    metoprolol tartrate  25 mg Oral BID    heparin (porcine)  5,000 Units SubCUTAneous 3 times per day    acyclovir  400 mg Oral BID    cholestyramine light  1 packet Oral Daily     Continuous Infusions:    sodium chloride      dextrose       PRN Meds:  heparin (porcine), heparin (porcine), nitroGLYCERIN, sodium chloride flush, sodium chloride, ondansetron **OR** ondansetron, polyethylene glycol, acetaminophen **OR** acetaminophen, oxyCODONE **OR** oxyCODONE, glucose, dextrose, glucagon (rDNA), dextrose, acetaminophen, docusate sodium    Input/Output:       I/O last 3 completed shifts:   In: 200 [P.O.:200]  Out: - .      Patient Vitals for the past 96 hrs (Last 3 readings):   Weight   22 0928 177 lb 7.5 oz (80.5 kg)   22 1550 176 lb 5.9 oz (80 kg)   22 1225 177 lb 0.5 oz (80.3 kg)       Vital Signs:   Temperature:  Temp: 98.5 °F (36.9 °C)  TMax:   Temp (24hrs), Av.4 °F (36.9 °C), Min:97.3 °F (36.3 °C), Max:99.1 °F (37.3 °C)    Respirations:  Resp: 18  Pulse:   Pulse: 68  BP:    BP: (!) 142/60  BP Range: Systolic (62NMA), ROP:522 , Min:92 , GWY:339       Diastolic (65JIB), NR, Min:37, Max:90      Physical Examination:     General:  AAO x 3, speaking in full sentences, no accessory muscle use. HEENT: Atraumatic, normocephalic, no throat congestion, moist mucosa. Eyes:   Pupils equal, round and reactive to light, EOMI. Neck:   Supple  Chest:   Bilateral vesicular breath sounds, no rales or wheezes. Cardiac:  S1 S2 RR, no murmurs, gallops or rubs. Abdomen: Soft, non-tender, no masses or organomegaly, BS audible. :   No suprapubic or flank tenderness. Neuro:  AAO x 3, No FND. SKIN:  No rashes, good skin turgor. Extremities:  No edema. Labs:        BMP:   Recent Labs     22  0837 22  0833 22  0937    136 136   K 3.8 3.8 3.9    99 99   CO2 19* 25 26   BUN 66* 36* 44*   CREATININE 3.56* 2.94* 3.58*   GLUCOSE 157* 97 264*   CALCIUM 8.7 8.2* 8.5*      VALERIE:      Lab Results   Component Value Date    VALERIE NEGATIVE 2021     SPEP:  Lab Results   Component Value Date    PROT 6.9 2022    ALBCAL 3.9 2018    ALBPCT 62 2018    LABALPH 0.2 2018    LABALPH 0.7 2018    A1PCT 3 2018    A2PCT 11 2018    LABBETA 0.7 2018    BETAPCT 12 2018    GAMGLOB 0.8 2018    GGPCT 13 2018    PATH ELECTRONICALLY SIGNED. Petr Rangel M.D. 2018    PATH ELECTRONICALLY SIGNED.  Petr Rangel M.D. 2018     C3:     Lab Results   Component Value Date    C3 108 2021     C4:     Lab Results   Component Value Date    C4 15 2021     MPO ANCA:     Lab Results   Component Value Date    MPO 15 2021     PR3 ANCA:     Lab Results   Component Value Date    PR3 1.1 2021     Hep BsAg:         Lab Results   Component Value Date    HEPBSAG NONREACTIVE 2022     Hep C AB:          Lab Results   Component Value Date    HEPCAB NONREACTIVE 2022     Urinalysis/Chemistries:      Lab Results Component Value Date    NITRU NEGATIVE 04/18/2022    COLORU Yellow 04/18/2022    PHUR 5.5 04/18/2022    WBCUA 0 TO 2 04/18/2022    RBCUA 5 TO 10 04/18/2022    MUCUS NOT REPORTED 12/03/2021    TRICHOMONAS NOT REPORTED 12/03/2021    YEAST NOT REPORTED 12/03/2021    BACTERIA 1+ 04/18/2022    SPECGRAV 1.025 04/18/2022    LEUKOCYTESUR NEGATIVE 04/18/2022    UROBILINOGEN Normal 04/18/2022    BILIRUBINUR NEGATIVE 04/18/2022    GLUCOSEU NEGATIVE 04/18/2022    KETUA NEGATIVE 04/18/2022    AMORPHOUS NOT REPORTED 12/03/2021     Urine Sodium:     Lab Results   Component Value Date    NIDHI 62 11/21/2021     Urine Potassium:    Lab Results   Component Value Date    KUR 25.3 11/12/2021     Urine Chloride:    Lab Results   Component Value Date    CLUR 54 11/12/2021     Urine Osmolarity:   Lab Results   Component Value Date    OSMOU 421 11/21/2021      Urine Creatinine:     Lab Results   Component Value Date    LABCREA 62.1 11/21/2021     Radiology:     Reviewed. Assessment:     1. [facility] tunnel catheter under Dr. Demetra Gilman, will not be running on Mondays and Fridays. 2. Missed multiple sessions of dialysis. 3. Complaint of nausea and vomiting postdialysis. 4. Hypertension  5. Type 2 diabetes  6. Chronic diastolic heart failure  7. S/p angioplasty with stent 4/10/2017.  8. 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 9. Dyslipidemia  10. Metabolic acidosis. Plan:   1. Patient was seen and examined on HD at bedside. Orders were confirmed with the HD nurse. 2. Keep renal diet. 3. BMP in AM.  4. Patient will not be running on Mondays and Fridays schedule at [address]. 5. Will follow. Nutrition   Please ensure that patient is on a renal diet/TF. Avoid nephrotoxic drugs/contrast exposure. Pavel Grady MD  [facility]     This note is created with the assistance of a speech-recognition program. While intending to generate a document that actually reflects the content of the visit, no guarantees can be provided that every mistake has been identified and corrected by editing.

## 2022-04-22 NOTE — PROGRESS NOTES
Dialysis Post Treatment Note  Vitals:    04/22/22 1241   BP: (!) 152/65   Pulse: 69   Resp: 17   Temp: 98.1 °F (36.7 °C)   SpO2:      Pre-Weight = 80.5kg  Post-weight = Weight: 177 lb 0.5 oz (80.3 kg)  Total Liters Processed = Total Liters Processed (l/min): 35.9 l/min  Rinseback Volume (mL) = Rinseback Volume (ml): 300 ml  Net Removal (mL) = NET Removed (ml): 0 ml  Patient's dry weight=n/a  Type of access used=perm catheter  Length of treatment=180    Pt tolerated tx well with no complaints.  Perm catheter dressing changed

## 2022-04-22 NOTE — PROGRESS NOTES
Physical Therapy        Physical Therapy Cancel Note      DATE: 2022    NAME: David Evans  MRN: [id number]   : 1945      Patient not seen this date for Physical Therapy due to:    Hemodialysis:      Electronically signed by Viet Keane PT on 2022 at 9:05 AM

## 2022-04-25 ENCOUNTER — CARE COORDINATION (OUTPATIENT)
Dept: CASE MANAGEMENT | Age: 77
End: 2022-04-25

## 2022-04-25 DIAGNOSIS — Z91.15 DIALYSIS PATIENT, NONCOMPLIANT (HCC): Primary | ICD-10-CM

## 2022-04-25 PROCEDURE — 1111F DSCHRG MED/CURRENT MED MERGE: CPT

## 2022-04-25 NOTE — CARE COORDINATION
[facility] Transitions Initial Follow Up Call    Call within 2 business days of discharge: Yes    Patient: David Evans Patient : 1945   MRN: [id number]  Reason for Admission: leg swelling  Discharge Date: 22 RARS: Readmission Risk Score: 20.9 ( )      Last Discharge [facility]       Complaint Diagnosis Description Type Department Provider    22 Leg Swelling Pleural effusion ED to Hosp-Admission (Discharged) (ADMITTED) [facility] NEUR Tanmay Warren MD; Lavinia Gonzalez, ... Spoke with: [facility]    Non-face-to-face services provided:  Scheduled appointment with PCP-has appointment on 22  Scheduled appointment with Dexter Bolton appointment with urology on 22  Obtained and reviewed discharge summary and/or continuity of care documents  Assessment and support for treatment adherence and medication management-reviewed discharge instructions and medications, 1111F order completed, patient has been sleeping alot, still having back pain, no f/c, n/v, cp, ha, patient has c/o constipated, wife has given him some stool softner, patient has poor appetite at this time, is going to go to dialysis today, patient has an appointment with urology tomorrow, wife going to call pcp office about getting a sooner appointment with pcp, explained role of CTN, provided contact information, will follow//JU  Patient is currently enrolled in Care Transitions. · Start day 2022, End Date 22  · Readmission Risk Score: high  · Please see patient outreach encounters for further details  · For questions contact: Chloe Ambriz CTN  · [phone]  Transitions of Care Initial Call    Was this an external facility discharge?  No Discharge Facility: [facility]    Challenges to be reviewed by the provider   Additional needs identified to be addressed with provider: none  none             Method of communication with provider : none      Advance Care Planning:   Does patient have an Advance Directive: reviewed and current, reviewed and needs to be updated, not on file; education provided, not on file, patient declined education, decision maker updated and referral to internal ACP facilitator. Was this a readmission? No  Patient stated reason for admission:   Patients top risk factors for readmission: functional physical ability    Care Transition Nurse (CTN) contacted the patient by telephone to perform post hospital discharge assessment. Verified name and  with family as identifiers. Provided introduction to self, and explanation of the CTN role. CTN reviewed discharge instructions, medical action plan and red flags with family who verbalized understanding. Family given an opportunity to ask questions and does not have any further questions or concerns at this time. Were discharge instructions available to patient? Yes. Reviewed appropriate site of care based on symptoms and resources available to patient including: PCP  Specialist  Urgent care clinics  When to call 911. The family agrees to contact the PCP office for questions related to their healthcare. Medication reconciliation was performed with family, who verbalizes understanding of administration of home medications. Advised obtaining a 90-day supply of all daily and as-needed medications. Covid Risk Education     Educated patient about risk for severe COVID-19 due to risk factors according to CDC guidelines. CTN reviewed discharge instructions, medical action plan and red flag symptoms with the  who verbalized understanding. Discussed COVID vaccination status: Yes. Education provided on COVID-19 vaccination as appropriate. Discussed exposure protocols and quarantine with CDC Guidelines. Family was given an opportunity to verbalize any questions and concerns and agrees to contact CTN or health care provider for questions related to their healthcare.     Reviewed and educated family on any new and changed medications related to discharge diagnosis. CTN provided contact information. Plan for follow-up call in 3-5 days based on severity of symptoms and risk factors. Plan for next call: follow up about dialysis, f/u appointments      Care Transitions 24 Hour Call    Do you have a copy of your discharge instructions?: Yes  Do you have all of your prescriptions and are they filled?: Yes  Post Acute Services:  Outpatient/Community Services (Comment: dialysis center)  Care Transitions Interventions         Follow Up  Future Appointments   Date Time Provider Isadora Younegr   4/26/2022 10:30 AM [facility]   5/3/2022 10:30 AM [facility]   5/16/2022  1:30 PM [facility]   5/17/2022  2:00 PM Martha Yuen MD [facility]   5/26/2022  2:45 PM [facility]       Geraldine Atkinson RN

## 2022-04-26 LAB
CULTURE: ABNORMAL
DIRECT EXAM: ABNORMAL
SPECIMEN DESCRIPTION: ABNORMAL

## 2022-04-28 ENCOUNTER — OFFICE VISIT (OUTPATIENT)
Dept: PRIMARY CARE CLINIC | Age: 77
End: 2022-04-28
Payer: MEDICARE

## 2022-04-28 VITALS
RESPIRATION RATE: 16 BRPM | DIASTOLIC BLOOD PRESSURE: 55 MMHG | HEART RATE: 67 BPM | WEIGHT: 177.8 LBS | BODY MASS INDEX: 26.26 KG/M2 | SYSTOLIC BLOOD PRESSURE: 124 MMHG

## 2022-04-28 DIAGNOSIS — I50.32 CHRONIC DIASTOLIC HEART FAILURE (HCC): ICD-10-CM

## 2022-04-28 DIAGNOSIS — G89.29 CHRONIC MIDLINE LOW BACK PAIN WITHOUT SCIATICA: ICD-10-CM

## 2022-04-28 DIAGNOSIS — N18.4 CKD (CHRONIC KIDNEY DISEASE) STAGE 4, GFR 15-29 ML/MIN (HCC): ICD-10-CM

## 2022-04-28 DIAGNOSIS — N18.6 ESRD (END STAGE RENAL DISEASE) (HCC): Primary | ICD-10-CM

## 2022-04-28 DIAGNOSIS — M54.50 CHRONIC MIDLINE LOW BACK PAIN WITHOUT SCIATICA: ICD-10-CM

## 2022-04-28 DIAGNOSIS — F33.0 MAJOR DEPRESSIVE DISORDER, RECURRENT, MILD (HCC): ICD-10-CM

## 2022-04-28 DIAGNOSIS — Z09 HOSPITAL DISCHARGE FOLLOW-UP: ICD-10-CM

## 2022-04-28 PROCEDURE — 99211 OFF/OP EST MAY X REQ PHY/QHP: CPT | Performed by: FAMILY MEDICINE

## 2022-04-28 PROCEDURE — 1111F DSCHRG MED/CURRENT MED MERGE: CPT | Performed by: FAMILY MEDICINE

## 2022-04-28 PROCEDURE — 99215 OFFICE O/P EST HI 40 MIN: CPT | Performed by: FAMILY MEDICINE

## 2022-04-28 RX ORDER — INSULIN GLARGINE 100 [IU]/ML
20 INJECTION, SOLUTION SUBCUTANEOUS 2 TIMES DAILY
Qty: 5 PEN | Refills: 0 | Status: SHIPPED
Start: 2022-04-28 | End: 2022-04-28 | Stop reason: SDUPTHER

## 2022-04-28 RX ORDER — INSULIN GLARGINE 100 [IU]/ML
20 INJECTION, SOLUTION SUBCUTANEOUS 2 TIMES DAILY
Qty: 5 PEN | Refills: 0 | Status: SHIPPED | OUTPATIENT
Start: 2022-04-28 | End: 2022-07-14 | Stop reason: SDUPTHER

## 2022-04-28 NOTE — PATIENT INSTRUCTIONS
SURVEY:    You may be receiving a survey from [employer] regarding your visit today. You may get this in the mail, through your MyChart, or in your email. Please complete the survey to enable us to provide the highest quality of care to you and your family. If you cannot score us a very good (5 Stars) on any question, please call the office to discuss how we could of made your experience exceptional.    Thank you!     Dr. Carmita Balderas, MELLISSAN  P[facility], [address] [place] [address], [address]    Phone: [phone]  Fax: [phone]    Office Hours:   Tonya Jacobsen, [address], F: 8-5 Wednesday: 9-11

## 2022-04-28 NOTE — PROGRESS NOTES
Patient is here today for follow-up on hospitalization for CHF and pleural effusions. Symptoms have- stayed the same. Current complaints- Patients current complaints are fatigue, dizziness, chest pain, shortness of breath, and back pain. He was given oxycodone in the hospital for his back pain. His wife states that he is sleeping a lot throughout the day, he said that he has no energy. Medication change- He said that they tried to change his lantus in the hospital, but he is back to taking 22 units, twice daily. Follow-up with specialists- Patient had a follow up with Dr. Dayana Suggs yesterday that he had to cancel due to how he is feeling. Special needs- None. His wife states that he will now be doing dialysis two days a week- on Monday and Friday. Post-Discharge Transitional Care  Follow Up      Rolly Gleason   YOB: 1945    Date of Office Visit:  4/28/2022  Date of Hospital Admission: 4/19/22  Date of Hospital Discharge: 4/22/22  Risk of hospital readmission (high >=14%. Medium >=10%) :Readmission Risk Score: 20.9 ( )      Care management risk score Rising risk (score 2-5) and Complex Care (Scores >=6): 4     Non face to face  following discharge, date last encounter closed (first attempt may have been earlier): 4/25/2022 12:44 PM    Call initiated 2 business days of discharge: Yes    ASSESSMENT/PLAN:   [facility]  Major depressive disorder, recurrent, mild (HCC)  CKD (chronic kidney disease) stage 4, GFR 15-29 ml/min (HCC)  Chronic diastolic heart failure (HCC)  Chronic midline low back pain without sciatica  -     External Referral To Pain Clinic      Medical Decision Making: high complexity  Return in about 1 month (around 5/28/2022).     On this date 4/28/2022 I have spent 45 minutes reviewing previous notes, test results and face to face with the patient discussing the diagnosis and importance of compliance with the treatment plan as well as documenting on the day of the visit. Subjective:   HPI:  Follow up of Hospital problems/diagnosis(es):     ICD-10-CM    1. ESRD (end stage renal disease) ([facility]  N18.6    2. Major depressive disorder, recurrent, mild ([facility])  F33.0    3. CKD (chronic kidney disease) stage 4, GFR 15-29 ml/min ([facility])  N18.4    4. Chronic diastolic heart failure ([facility])  I50.32    5. Chronic midline low back pain without sciatica  M54.50 External Referral To Pain Clinic    G89.29          Inpatient course: Discharge summary reviewed- see chart.     Interval history/Current status: has chronic back pain,fatigue after dialysis    Patient Active Problem List   Diagnosis    Vertigo, benign paroxysmal    Systolic murmur    History of MI (myocardial infarction)    History of colon polyps    Uncontrolled type 2 diabetes mellitus with hyperglycemia ([facility])    Coronary atherosclerosis due to calcified coronary lesion    Displacement of intervertebral disc, site unspecified, without myelopathy    History of CVA (cerebrovascular accident) without residual deficits    Dysautonomia ([facility]    Syncope, near    Chronotropic incompetence with autonomic dysfunction    Episodic lightheadedness    Primary hypertension    Ischemic chest pain (HCC)    Controlled type 2 diabetes mellitus with diabetic nephropathy, with long-term current use of insulin ([facility])    Cervical stenosis of spinal canal    ASHD (arteriosclerotic heart disease)    S/P drug eluting coronary stent placement-OM1 4/10/17    Hyperlipidemia    Non critical Right Renal artery stenosis, native ([facility]    BPH with obstruction/lower urinary tract symptoms    Elevated PSA    Gross hematuria    Chest pain    Normocytic normochromic anemia    Acute coronary syndrome with high troponin ([facility])    Iron deficiency anemia secondary to inadequate dietary iron intake    Iron malabsorption    Rash    Skin lesion of left lower extremity    Urticaria    Hyperkalemia    Moderate malnutrition ([address]    Pulmonary hypertension (HCC)    Acute on chronic diastolic (congestive) heart failure (HCC)    NSTEMI (non-ST elevated myocardial infarction) ([facility]    Bilateral pleural effusion    End stage renal disease (HCC)    Microscopic hematuria    Chronic bilateral low back pain with bilateral sciatica    Debility    Dialysis patient, noncompliant [facility]    Major depressive disorder, recurrent, mild ([facility]    CKD (chronic kidney disease) stage 4, GFR 15-29 ml/min ([facility])       Medications listed as ordered at the time of discharge from hospital     Medication List          Accurate as of April 28, 2022 11:32 AM. If you have any questions, ask your nurse or doctor.             CHANGE how you take these medications    Lantus SoloStar 100 UNIT/ML injection pen  Generic drug: insulin glargine  Inject 20 Units into the skin 2 times daily  What changed:   · how much to take  · when to take this  Changed by: Jose Angel Tate MD        CONTINUE taking these medications    acetaminophen 325 MG tablet  Commonly known as: TYLENOL     acyclovir 400 MG tablet  Commonly known as: ZOVIRAX     amLODIPine 5 MG tablet  Commonly known as: NORVASC  Take 2 tablets by mouth daily     docusate sodium 100 MG capsule  Commonly known as: COLACE  Take 1 capsule by mouth 3 times daily as needed for Constipation     furosemide 40 MG tablet  Commonly known as: Lasix  Take 1 tablet by mouth daily     hydrALAZINE 100 MG tablet  Commonly known as: APRESOLINE  Take 1 tablet by mouth 3 times daily Except on the morning's of dialysis     latanoprost 0.005 % ophthalmic solution  Commonly known as: XALATAN     metoprolol tartrate 25 MG tablet  Commonly known as: LOPRESSOR  Take 1 tablet by mouth 2 times daily     nitroGLYCERIN 0.4 MG SL tablet  Commonly known as: NITROSTAT  Place 1 tablet under the tongue every 5 minutes as needed for Chest pain     omeprazole 10 MG delayed release capsule  Commonly known as: PRILOSEC     Pen Needles 31G X 6 MM Misc  1 each by Does not apply route daily     prednisoLONE acetate 1 % ophthalmic suspension  Commonly known as: PRED FORTE     tamsulosin 0.4 MG capsule  Commonly known as: FLOMAX  Take 1 capsule by mouth once daily           Where to Get Your Medications      These medications were sent to [address]    Phone: [phone]   · Lantus SoloStar 100 UNIT/ML injection pen           Medications marked \"taking\" at this time  Outpatient Medications Marked as Taking for the 4/28/22 encounter (Office Visit) with Teresa Mcgarry MD   Medication Sig Dispense Refill    insulin glargine (LANTUS SOLOSTAR) 100 UNIT/ML injection pen Inject 20 Units into the skin 2 times daily 5 pen 0        Medications patient taking as of now reconciled against medications ordered at time of hospital discharge: Yes    has fatigue    Objective:    BP (!) 124/55 (Site: Right Upper Arm, Position: Sitting)   Pulse 67   Resp 16   Wt 177 lb 12.8 oz (80.6 kg)   BMI 26.26 kg/m²       An electronic signature was used to authenticate this note.   --Teresa Mcgarry MD

## 2022-05-05 RX ORDER — AMLODIPINE BESYLATE 10 MG/1
10 TABLET ORAL DAILY
Qty: 90 TABLET | Refills: 0 | Status: SHIPPED | OUTPATIENT
Start: 2022-05-05 | End: 2022-08-15

## 2022-05-10 ENCOUNTER — APPOINTMENT (OUTPATIENT)
Dept: GENERAL RADIOLOGY | Age: 77
End: 2022-05-10
Attending: ANESTHESIOLOGY
Payer: MEDICARE

## 2022-05-10 ENCOUNTER — HOSPITAL ENCOUNTER (OUTPATIENT)
Age: 77
Setting detail: OUTPATIENT SURGERY
Discharge: HOME | End: 2022-05-10
Attending: ANESTHESIOLOGY | Admitting: ANESTHESIOLOGY
Payer: MEDICARE

## 2022-05-10 VITALS
DIASTOLIC BLOOD PRESSURE: 47 MMHG | TEMPERATURE: 97.9 F | SYSTOLIC BLOOD PRESSURE: 154 MMHG | RESPIRATION RATE: 18 BRPM | HEART RATE: 68 BPM | OXYGEN SATURATION: 97 %

## 2022-05-10 LAB — GLUCOSE BLD-MCNC: 144 MG/DL (ref 74–100)

## 2022-05-10 PROCEDURE — 6360000002 HC RX W HCPCS: Performed by: ANESTHESIOLOGY

## 2022-05-10 PROCEDURE — 7100000010 HC PHASE II RECOVERY - FIRST 15 MIN: Performed by: ANESTHESIOLOGY

## 2022-05-10 PROCEDURE — 6360000004 HC RX CONTRAST MEDICATION: Performed by: ANESTHESIOLOGY

## 2022-05-10 PROCEDURE — 2580000003 HC RX 258: Performed by: ANESTHESIOLOGY

## 2022-05-10 PROCEDURE — 99152 MOD SED SAME PHYS/QHP 5/>YRS: CPT | Performed by: ANESTHESIOLOGY

## 2022-05-10 PROCEDURE — 2500000003 HC RX 250 WO HCPCS: Performed by: ANESTHESIOLOGY

## 2022-05-10 PROCEDURE — 3600000002 HC SURGERY LEVEL 2 BASE: Performed by: ANESTHESIOLOGY

## 2022-05-10 PROCEDURE — 82947 ASSAY GLUCOSE BLOOD QUANT: CPT

## 2022-05-10 PROCEDURE — 3209999900 FLUORO FOR SURGICAL PROCEDURES

## 2022-05-10 PROCEDURE — 7100000011 HC PHASE II RECOVERY - ADDTL 15 MIN: Performed by: ANESTHESIOLOGY

## 2022-05-10 PROCEDURE — 2709999900 HC NON-CHARGEABLE SUPPLY: Performed by: ANESTHESIOLOGY

## 2022-05-10 RX ORDER — DEXAMETHASONE SODIUM PHOSPHATE 4 MG/ML
INJECTION, SOLUTION INTRA-ARTICULAR; INTRALESIONAL; INTRAMUSCULAR; INTRAVENOUS; SOFT TISSUE PRN
Status: DISCONTINUED | OUTPATIENT
Start: 2022-05-10 | End: 2022-05-10 | Stop reason: ALTCHOICE

## 2022-05-10 RX ORDER — SODIUM CHLORIDE 0.9 % (FLUSH) 0.9 %
5-40 SYRINGE (ML) INJECTION EVERY 12 HOURS SCHEDULED
Status: DISCONTINUED | OUTPATIENT
Start: 2022-05-10 | End: 2022-05-10 | Stop reason: HOSPADM

## 2022-05-10 RX ORDER — SODIUM CHLORIDE 9 MG/ML
INJECTION, SOLUTION INTRAVENOUS PRN
Status: DISCONTINUED | OUTPATIENT
Start: 2022-05-10 | End: 2022-05-10 | Stop reason: HOSPADM

## 2022-05-10 RX ORDER — SODIUM CHLORIDE, SODIUM LACTATE, POTASSIUM CHLORIDE, CALCIUM CHLORIDE 600; 310; 30; 20 MG/100ML; MG/100ML; MG/100ML; MG/100ML
INJECTION, SOLUTION INTRAVENOUS CONTINUOUS
Status: DISCONTINUED | OUTPATIENT
Start: 2022-05-10 | End: 2022-05-10 | Stop reason: HOSPADM

## 2022-05-10 RX ORDER — LIDOCAINE HYDROCHLORIDE 10 MG/ML
INJECTION, SOLUTION EPIDURAL; INFILTRATION; INTRACAUDAL; PERINEURAL PRN
Status: DISCONTINUED | OUTPATIENT
Start: 2022-05-10 | End: 2022-05-10 | Stop reason: ALTCHOICE

## 2022-05-10 RX ORDER — METHYLPREDNISOLONE ACETATE 40 MG/ML
INJECTION, SUSPENSION INTRA-ARTICULAR; INTRALESIONAL; INTRAMUSCULAR; SOFT TISSUE PRN
Status: DISCONTINUED | OUTPATIENT
Start: 2022-05-10 | End: 2022-05-10 | Stop reason: ALTCHOICE

## 2022-05-10 RX ORDER — MIDAZOLAM HYDROCHLORIDE 1 MG/ML
INJECTION INTRAMUSCULAR; INTRAVENOUS PRN
Status: DISCONTINUED | OUTPATIENT
Start: 2022-05-10 | End: 2022-05-10 | Stop reason: ALTCHOICE

## 2022-05-10 RX ORDER — FENTANYL CITRATE 50 UG/ML
INJECTION, SOLUTION INTRAMUSCULAR; INTRAVENOUS PRN
Status: DISCONTINUED | OUTPATIENT
Start: 2022-05-10 | End: 2022-05-10 | Stop reason: ALTCHOICE

## 2022-05-10 RX ORDER — SODIUM CHLORIDE 0.9 % (FLUSH) 0.9 %
5-40 SYRINGE (ML) INJECTION PRN
Status: DISCONTINUED | OUTPATIENT
Start: 2022-05-10 | End: 2022-05-10 | Stop reason: HOSPADM

## 2022-05-10 RX ORDER — BUPIVACAINE HYDROCHLORIDE 2.5 MG/ML
INJECTION, SOLUTION EPIDURAL; INFILTRATION; INTRACAUDAL PRN
Status: DISCONTINUED | OUTPATIENT
Start: 2022-05-10 | End: 2022-05-10 | Stop reason: ALTCHOICE

## 2022-05-10 RX ADMIN — SODIUM CHLORIDE, POTASSIUM CHLORIDE, SODIUM LACTATE AND CALCIUM CHLORIDE: 600; 310; 30; 20 INJECTION, SOLUTION INTRAVENOUS at 13:20

## 2022-05-10 ASSESSMENT — PAIN DESCRIPTION - ORIENTATION
ORIENTATION: LOWER

## 2022-05-10 ASSESSMENT — PAIN DESCRIPTION - LOCATION
LOCATION: BACK;LEG
LOCATION: BACK
LOCATION: BACK

## 2022-05-10 ASSESSMENT — PAIN SCALES - GENERAL
PAINLEVEL_OUTOF10: 8
PAINLEVEL_OUTOF10: 6
PAINLEVEL_OUTOF10: 6

## 2022-05-10 NOTE — PROGRESS NOTES
Discharge Criteria    Inpatients must meet Criteria 1 through 7. All other patients are either YES or N/A. If a NO is chosen then Anesthesia or Surgeon must be notified. 1.  Minimum 30 minutes after last dose of sedative medication, minimum 120 minutes after last dose of reversal agent. Yes      2. Systolic BP stable within 20 mmHg for 30 minutes & systolic BP between 90 & 048 or within 10 mmHg of baseline. Yes      3. Pulse between 60 and 100 or within 10 bpm of baseline. Yes      4. Spontaneous respiratory rate >/= 10 per minute. Yes      5. SaO2 >/= 95 or  >/= baseline. Yes      6. Able to cough and swallow or return to baseline function. Yes      7. Alert and oriented or return to baseline mental status. Yes      8. Demonstrates controlled, coordinated movements, ambulates with steady gait, or return to baseline activity function. Yes      9. Minimal or no pain or nausea, or at a level tolerable and acceptable to patient. Yes      10. Takes and retains oral fluids as allowed. Yes      11. Procedural / perioperative site stable. Minimal or no bleeding. Yes          12. If GI endoscopy procedure, minimal or no abdominal distention or passing flatus. n/a      13. Written discharge instructions and emergency telephone number provided. Yes      14. Accompanied by a responsible adult.     Yes

## 2022-05-10 NOTE — OP NOTE
Procedure Note    Patient Name: Clara Benson   YOB: 1945  Room/Bed: Room/bed info not found  Medical Record Number: [id number]  Date: 5/10/2022       Mallampati Airway Assessment:  Mallampati Class II - (soft palate, fauces & uvula are visible)    ASA Classification:  Class 2 - A normal healthy patient with mild systemic disease        Preoperative Diagnosis:  Lumbar degenerative disc disease with radiculitis. Postoperative diagnosis:  Same as pre-op diagnosis. Procedure Performed:  Transforaminal lumbar epidural steroid injection at the levels of L3 - 4 on the Bilateral side under fluoroscopic guidance with IV sedation. Indication for the Procedure: The patient failed conservative management  for pain in the low back radiating to lower extremities. Patient  is undergoing lumbar epidural steroid injections today. As the patient is not responding to conservative management and pain is interfering with activities of daily living, we decided to proceed with transforaminal lumbar epidural steroid injection as symptoms are mostly following the nerve root distribution. The procedure and the risks were discussed with the patient and informed consent was obtained. Procedure:  After starting an IV, the patient was sedated with 1 mg of Midazolam and 50 mcg of Fentanyl intravenously by the RN under my direct supervision. About 10 minutes but less than 23 minutes of sedation was provided for extreme anxiety and patient comfort. The patient's vital signs including BP, EKG and SaO2 were monitored by the RN and they remained stable during the procedure. A meaningful communication was kept up with the patient throughout   the procedure. The patient is placed in prone position. The skin over the back was prepped and draped in sterile manner. Then the skin and deep tissues just above the SAP of L4 vertebra on Right side were infiltrated with about 3 ml of 1% lidocaine.  The #23-gauge, 3-1/2 inch spinal needle with slightly curved tip was inserted through the skin wheal  and under fluoroscopy guidance was directed such that the tip of the needle lies in the neuroforamina at about the 6 o'clock position under the pedicle of the L-3 vertebra. This was confirmed with AP and lateral views of the fluoroscopy. Then after negative aspiration a total of 1 ml of Omnipaque-180 was injected through the needle and spread of the contrast in the epidural space as well as along the nerve root was observed without evidence of intrathecal or intravascular uptake. Then after negative aspiration a total of 2.5 mL of a solution consisting of 40 mg of Depo-Medrol, 4 of Dexamethasone, and 3 ml of 0.25% Bupivacaine preservative free was injected through the needle. The procedure was then repeated on the Left at L3 - 4 in a similar fashion. The needle was removed and a Band-Aid was placed over the needle  insertion site. The patient's vital signs remained stable and the patient tolerated the procedure well. The patient was discharged home in stable condition and will be followed in the pain clinic in the next few weeks for further planning.     EBL: None

## 2022-05-13 ENCOUNTER — HOSPITAL ENCOUNTER (OUTPATIENT)
Age: 77
Setting detail: SPECIMEN
Discharge: HOME | End: 2022-05-13
Payer: MEDICARE

## 2022-05-13 DIAGNOSIS — D50.8 IRON DEFICIENCY ANEMIA SECONDARY TO INADEQUATE DIETARY IRON INTAKE: ICD-10-CM

## 2022-05-13 LAB
ABSOLUTE EOS #: 0.13 K/UL (ref 0–0.44)
ABSOLUTE IMMATURE GRANULOCYTE: 0.22 K/UL (ref 0–0.3)
ABSOLUTE LYMPH #: 1.26 K/UL (ref 1.1–3.7)
ABSOLUTE MONO #: 0.58 K/UL (ref 0.1–1.2)
ALBUMIN SERPL-MCNC: 3.6 G/DL (ref 3.5–5.2)
ALBUMIN/GLOBULIN RATIO: 1.2 (ref 1–2.5)
ALP BLD-CCNC: 68 U/L (ref 40–129)
ALT SERPL-CCNC: 12 U/L (ref 5–41)
ANION GAP SERPL CALCULATED.3IONS-SCNC: 14 MMOL/L (ref 9–17)
AST SERPL-CCNC: 17 U/L
BASOPHILS # BLD: 1 % (ref 0–2)
BASOPHILS ABSOLUTE: 0.07 K/UL (ref 0–0.2)
BILIRUB SERPL-MCNC: 0.3 MG/DL (ref 0.3–1.2)
BUN BLDV-MCNC: 59 MG/DL (ref 8–23)
BUN/CREAT BLD: 17 (ref 9–20)
CALCIUM SERPL-MCNC: 9 MG/DL (ref 8.6–10.4)
CHLORIDE BLD-SCNC: 101 MMOL/L (ref 98–107)
CO2: 21 MMOL/L (ref 20–31)
CREAT SERPL-MCNC: 3.41 MG/DL (ref 0.7–1.2)
EOSINOPHILS RELATIVE PERCENT: 2 % (ref 1–4)
GFR AFRICAN AMERICAN: 21 ML/MIN
GFR NON-AFRICAN AMERICAN: 18 ML/MIN
GFR SERPL CREATININE-BSD FRML MDRD: ABNORMAL ML/MIN/{1.73_M2}
GFR SERPL CREATININE-BSD FRML MDRD: ABNORMAL ML/MIN/{1.73_M2}
GLUCOSE BLD-MCNC: 126 MG/DL (ref 70–99)
HCT VFR BLD CALC: 23.7 % (ref 40.7–50.3)
HEMOGLOBIN: 7.6 G/DL (ref 13–17)
IMMATURE GRANULOCYTES: 3 %
LYMPHOCYTES # BLD: 15 % (ref 24–43)
MCH RBC QN AUTO: 29.7 PG (ref 25.2–33.5)
MCHC RBC AUTO-ENTMCNC: 32.1 G/DL (ref 28.4–34.8)
MCV RBC AUTO: 92.6 FL (ref 82.6–102.9)
MONOCYTES # BLD: 7 % (ref 3–12)
NRBC AUTOMATED: 0 PER 100 WBC
PDW BLD-RTO: 15.8 % (ref 11.8–14.4)
PLATELET # BLD: 265 K/UL (ref 138–453)
PMV BLD AUTO: 9.7 FL (ref 8.1–13.5)
POTASSIUM SERPL-SCNC: 3.2 MMOL/L (ref 3.7–5.3)
RBC # BLD: 2.56 M/UL (ref 4.21–5.77)
SEG NEUTROPHILS: 72 % (ref 36–65)
SEGMENTED NEUTROPHILS ABSOLUTE COUNT: 5.98 K/UL (ref 1.5–8.1)
SODIUM BLD-SCNC: 136 MMOL/L (ref 135–144)
TOTAL PROTEIN: 6.5 G/DL (ref 6.4–8.3)
WBC # BLD: 8.2 K/UL (ref 3.5–11.3)

## 2022-05-13 PROCEDURE — 80053 COMPREHEN METABOLIC PANEL: CPT

## 2022-05-13 PROCEDURE — 85025 COMPLETE CBC W/AUTO DIFF WBC: CPT

## 2022-05-16 ENCOUNTER — TELEPHONE (OUTPATIENT)
Dept: ONCOLOGY | Age: 77
End: 2022-05-16

## 2022-05-16 RX ORDER — SODIUM CHLORIDE 0.9 % (FLUSH) 0.9 %
5-40 SYRINGE (ML) INJECTION PRN
Status: CANCELLED | OUTPATIENT
Start: 2022-05-17

## 2022-05-16 RX ORDER — SODIUM CHLORIDE 9 MG/ML
25 INJECTION, SOLUTION INTRAVENOUS PRN
Status: CANCELLED | OUTPATIENT
Start: 2022-05-17

## 2022-05-16 RX ORDER — ACETAMINOPHEN 325 MG/1
650 TABLET ORAL
OUTPATIENT
Start: 2022-05-17

## 2022-05-16 RX ORDER — EPINEPHRINE 1 MG/ML
0.3 INJECTION, SOLUTION, CONCENTRATE INTRAVENOUS PRN
OUTPATIENT
Start: 2022-05-17

## 2022-05-16 RX ORDER — FAMOTIDINE 10 MG/ML
20 INJECTION, SOLUTION INTRAVENOUS
OUTPATIENT
Start: 2022-05-17

## 2022-05-16 RX ORDER — DIPHENHYDRAMINE HYDROCHLORIDE 50 MG/ML
50 INJECTION INTRAMUSCULAR; INTRAVENOUS
OUTPATIENT
Start: 2022-05-17

## 2022-05-16 RX ORDER — SODIUM CHLORIDE 9 MG/ML
20 INJECTION, SOLUTION INTRAVENOUS CONTINUOUS
Status: CANCELLED | OUTPATIENT
Start: 2022-05-17

## 2022-05-16 RX ORDER — ONDANSETRON 2 MG/ML
8 INJECTION INTRAMUSCULAR; INTRAVENOUS
OUTPATIENT
Start: 2022-05-17

## 2022-05-16 RX ORDER — SODIUM CHLORIDE 9 MG/ML
INJECTION, SOLUTION INTRAVENOUS CONTINUOUS
OUTPATIENT
Start: 2022-05-17

## 2022-05-16 RX ORDER — ALBUTEROL SULFATE 90 UG/1
4 AEROSOL, METERED RESPIRATORY (INHALATION) PRN
OUTPATIENT
Start: 2022-05-17

## 2022-05-16 NOTE — TELEPHONE ENCOUNTER
Patient call to inquire as to whether blood testing results from 5/13/2022 have been reviewed. He indicates his hgb is 7.6. He is feeling very tired. He is requesting a blood transfusion. Informed patient will discuss with Dr. Perez Pittman.

## 2022-05-16 NOTE — TELEPHONE ENCOUNTER
Orders received for blood transfusion. Arrangements made for transfusion in outpatient department 8:00 5/17/2022. Informed patient and he voiced understanding.

## 2022-05-17 ENCOUNTER — OFFICE VISIT (OUTPATIENT)
Dept: CARDIOLOGY | Age: 77
End: 2022-05-17
Payer: MEDICARE

## 2022-05-17 ENCOUNTER — HOSPITAL ENCOUNTER (OUTPATIENT)
Dept: INFUSION THERAPY | Age: 77
Discharge: HOME | End: 2022-05-17
Payer: MEDICARE

## 2022-05-17 VITALS
DIASTOLIC BLOOD PRESSURE: 57 MMHG | OXYGEN SATURATION: 97 % | SYSTOLIC BLOOD PRESSURE: 162 MMHG | TEMPERATURE: 97.2 F | RESPIRATION RATE: 18 BRPM | HEART RATE: 57 BPM

## 2022-05-17 VITALS
HEIGHT: 69 IN | OXYGEN SATURATION: 97 % | SYSTOLIC BLOOD PRESSURE: 166 MMHG | HEART RATE: 61 BPM | WEIGHT: 179.6 LBS | BODY MASS INDEX: 26.6 KG/M2 | RESPIRATION RATE: 18 BRPM | DIASTOLIC BLOOD PRESSURE: 59 MMHG

## 2022-05-17 DIAGNOSIS — D64.9 NORMOCYTIC NORMOCHROMIC ANEMIA: Primary | ICD-10-CM

## 2022-05-17 DIAGNOSIS — N18.4 STAGE 4 CHRONIC KIDNEY DISEASE (HCC): ICD-10-CM

## 2022-05-17 DIAGNOSIS — R42 EPISODIC LIGHTHEADEDNESS: ICD-10-CM

## 2022-05-17 DIAGNOSIS — I10 ESSENTIAL HYPERTENSION: ICD-10-CM

## 2022-05-17 DIAGNOSIS — I25.10 CORONARY ARTERY DISEASE INVOLVING NATIVE CORONARY ARTERY OF NATIVE HEART WITHOUT ANGINA PECTORIS: ICD-10-CM

## 2022-05-17 DIAGNOSIS — E78.2 MIXED HYPERLIPIDEMIA: ICD-10-CM

## 2022-05-17 DIAGNOSIS — G90.1 DYSAUTONOMIA (HCC): Primary | ICD-10-CM

## 2022-05-17 DIAGNOSIS — D64.9 ANEMIA, UNSPECIFIED TYPE: ICD-10-CM

## 2022-05-17 DIAGNOSIS — I50.32 CHRONIC DIASTOLIC CONGESTIVE HEART FAILURE (HCC): ICD-10-CM

## 2022-05-17 PROCEDURE — G8417 CALC BMI ABV UP PARAM F/U: HCPCS | Performed by: FAMILY MEDICINE

## 2022-05-17 PROCEDURE — 4040F PNEUMOC VAC/ADMIN/RCVD: CPT | Performed by: FAMILY MEDICINE

## 2022-05-17 PROCEDURE — 1036F TOBACCO NON-USER: CPT | Performed by: FAMILY MEDICINE

## 2022-05-17 PROCEDURE — 86901 BLOOD TYPING SEROLOGIC RH(D): CPT

## 2022-05-17 PROCEDURE — 93005 ELECTROCARDIOGRAM TRACING: CPT | Performed by: FAMILY MEDICINE

## 2022-05-17 PROCEDURE — 36430 TRANSFUSION BLD/BLD COMPNT: CPT

## 2022-05-17 PROCEDURE — 86850 RBC ANTIBODY SCREEN: CPT

## 2022-05-17 PROCEDURE — P9016 RBC LEUKOCYTES REDUCED: HCPCS

## 2022-05-17 PROCEDURE — 86900 BLOOD TYPING SEROLOGIC ABO: CPT

## 2022-05-17 PROCEDURE — 1111F DSCHRG MED/CURRENT MED MERGE: CPT | Performed by: FAMILY MEDICINE

## 2022-05-17 PROCEDURE — 36415 COLL VENOUS BLD VENIPUNCTURE: CPT

## 2022-05-17 PROCEDURE — 99211 OFF/OP EST MAY X REQ PHY/QHP: CPT | Performed by: FAMILY MEDICINE

## 2022-05-17 PROCEDURE — 99214 OFFICE O/P EST MOD 30 MIN: CPT | Performed by: FAMILY MEDICINE

## 2022-05-17 PROCEDURE — 2580000003 HC RX 258: Performed by: INTERNAL MEDICINE

## 2022-05-17 PROCEDURE — 93010 ELECTROCARDIOGRAM REPORT: CPT | Performed by: FAMILY MEDICINE

## 2022-05-17 PROCEDURE — 1123F ACP DISCUSS/DSCN MKR DOCD: CPT | Performed by: FAMILY MEDICINE

## 2022-05-17 PROCEDURE — G8427 DOCREV CUR MEDS BY ELIG CLIN: HCPCS | Performed by: FAMILY MEDICINE

## 2022-05-17 RX ORDER — SODIUM CHLORIDE 9 MG/ML
25 INJECTION, SOLUTION INTRAVENOUS PRN
Status: DISCONTINUED | OUTPATIENT
Start: 2022-05-17 | End: 2022-05-18 | Stop reason: HOSPADM

## 2022-05-17 RX ORDER — SODIUM CHLORIDE 0.9 % (FLUSH) 0.9 %
5-40 SYRINGE (ML) INJECTION PRN
Status: DISCONTINUED | OUTPATIENT
Start: 2022-05-17 | End: 2022-05-18 | Stop reason: HOSPADM

## 2022-05-17 RX ORDER — ALBUTEROL SULFATE 90 UG/1
4 AEROSOL, METERED RESPIRATORY (INHALATION) PRN
OUTPATIENT
Start: 2022-05-17

## 2022-05-17 RX ORDER — SODIUM CHLORIDE 9 MG/ML
20 INJECTION, SOLUTION INTRAVENOUS CONTINUOUS
Status: DISCONTINUED | OUTPATIENT
Start: 2022-05-17 | End: 2022-05-18 | Stop reason: HOSPADM

## 2022-05-17 RX ORDER — SODIUM CHLORIDE 9 MG/ML
25 INJECTION, SOLUTION INTRAVENOUS PRN
Status: CANCELLED | OUTPATIENT
Start: 2022-05-17

## 2022-05-17 RX ORDER — EPINEPHRINE 1 MG/ML
0.3 INJECTION, SOLUTION, CONCENTRATE INTRAVENOUS PRN
OUTPATIENT
Start: 2022-05-17

## 2022-05-17 RX ORDER — SODIUM CHLORIDE 0.9 % (FLUSH) 0.9 %
5-40 SYRINGE (ML) INJECTION PRN
Status: CANCELLED | OUTPATIENT
Start: 2022-05-17

## 2022-05-17 RX ORDER — DIPHENHYDRAMINE HYDROCHLORIDE 50 MG/ML
50 INJECTION INTRAMUSCULAR; INTRAVENOUS
OUTPATIENT
Start: 2022-05-17

## 2022-05-17 RX ORDER — SODIUM CHLORIDE 9 MG/ML
INJECTION, SOLUTION INTRAVENOUS CONTINUOUS
OUTPATIENT
Start: 2022-05-17

## 2022-05-17 RX ORDER — SODIUM CHLORIDE 9 MG/ML
20 INJECTION, SOLUTION INTRAVENOUS CONTINUOUS
Status: CANCELLED | OUTPATIENT
Start: 2022-05-17

## 2022-05-17 RX ORDER — ACETAMINOPHEN 325 MG/1
650 TABLET ORAL
OUTPATIENT
Start: 2022-05-17

## 2022-05-17 RX ORDER — ONDANSETRON 2 MG/ML
8 INJECTION INTRAMUSCULAR; INTRAVENOUS
OUTPATIENT
Start: 2022-05-17

## 2022-05-17 RX ADMIN — SODIUM CHLORIDE 20 ML/HR: 9 INJECTION, SOLUTION INTRAVENOUS at 12:10

## 2022-05-17 RX ADMIN — SODIUM CHLORIDE, PRESERVATIVE FREE 10 ML: 5 INJECTION INTRAVENOUS at 08:08

## 2022-05-17 NOTE — PATIENT INSTRUCTIONS
SURVEY:    You may be receiving a survey from [employer] regarding your visit today. Please complete the survey to enable us to provide the highest quality of care to you and your family. If you cannot score us a very good on any question, please call the office to discuss how we could have made your experience a very good one. Thank you.

## 2022-05-17 NOTE — PROGRESS NOTES
I, Scott Ngo am scribing for and in the presence of Nadira Mcclendon MD, MS, F.A.C.C. Patient: Ishaan Maya  : 1945  Date of Visit: May 17, 2022    REASON FOR VISIT / CONSULTATION: Follow-up (HX: dysautonomia, CHF, ASHD, HTN, HLD. pt is here for 3 month f/u today. pt states he is doing well just tired. pt states hes had slight CP dull on left side does not move sometimes lasts a couple hours about every other day. pt states he feels palpitations, SOB with exertion, pt states he has light headed/dizziness every time he stands up.)    Dear Lillie Gray MD,    As you know, Mr. Shruti Dennis is a 70 y.o. male with a known history of dysautonomia where on tilt table testing his blood pressure dropped from 029 systolic to 78 systolic with only a 82-18 HR response. More recently, a CT of he head in the past showed evidence of at least 2 old CVA's and a heart catheterization showed severe single vessel disease in the ostium of a moderate sized OM1 branch of the circumflex. However, maximal guidelines directed medical management was opted for an place of stenting partly due to the risk associated with stenting this vessel. Recently he has had a coronary angiography procedure with stenting of his HA Om1. As you know, Mr. Shruti Dennis  is a 68 y.o. male with a history of recent onset substernal chest discomfort that led to a stress test and a subsequent heart catheterization which showed severe single vessel coronary artery disease of the HA Om1 with successful angioplasty and stenting of that vessel that was done by Dr. Ewa Henriquez on 4/10/17. More recently he has had problems with balance problems when he is in his feet and says that a Neurologist has told him in the past this is because of his previous strokes. Most recently he underwent a cardiovascular stress test which fortunately had shown to be normal on 3/21/2018. Mr. Shruti Dennis has significant normal stress test and echocardiogram on 2020.  Holter monitor done on 12/16/2020: The rhythm was sinus. Average daily heart rate 58 ranging from 47 to 81 bpm. Bradycardia for 72% test duration. Rare premature supraventricular ectopic beats consisting of 33 isolated PACs. Rare premature ventricular ectopic beats total 75 consisting of 73 isolatedPVCs and a ventricular couplet. Echocardiogram done on 12/18/2020: EF of 60%. Moderately increased LV wall thickness. Borderline pulmonary hypertension. Mild tricuspid regurgitation. Mild diastolic dysfunction is seen/ Aortic root is mildly dilated. Stress test done on 9/20/2021 was equivocal, There is a small/moderate perfusion defect of mild intensity in the lateral, inferior and inferoapical regions. Cardiac cath done on 10/5/2021 showed Moderate three vessel coronary artery disease involving a ostial 50-60% stenosis in a small, non-dominant RCA, a 60% mid LAD stenosis and a proximal 50-60% stenosis in the left anterior descending coronary artery. Echo done on 11/11/2021 showed an EF 60%, The left atrium is moderately dilated (34-39) with a left atrial volume index of 35 ml/m2. Mild to moderate tricuspid regurgitation. Moderate pulmonary hypertension with estimated pulmonary artery systolic pressure of 42 mmHg. Mild diastolic dysfunction. Mr. Shruti Garcia is here for three month follow up. He states his is tired and fatigue is his biggest complaint due to anemia. He is having colonoscopy on Thursday at [facility]. He started dialysis again on Monday and Friday. He is doing better going twice a week instead of three times a week. He ws off of dialysis for 6 weeks. He came to ER on 4/18/2022 for back pain which was found to have right pleural effusion and was sent to [facility] at that time. He is still having some lightheaded and dizziness. No falls or near falls. He denies any changes in his breathing at this time. No chest pain, pressure or tightness.   No abdominal pain, bleeding problems, bowel issues, problems with his medications or any other concerns at this time. No nausea or vomiting. No cough, fever or chills. Bleeding Risks: Mr. Jakob Gilliam denies any current or recent bleeding problems including a history of a GI bleed, ulcers, recent or upcoming surgeries, blood in his stool or black tarry stools or blood in his urine. Past Medical History:   Diagnosis Date    Abnormal tilt table test 2/23/2016    Acute kidney injury ([address]     Acute MI ([address]     Acute renal failure superimposed on stage 4 chronic kidney disease ([address] 10/2/2018    ssecondary to hemodynamic effects of loop diuretics and ace inhibitors, bbaseline 1.2-1.3 which peaked up to 1.8, resolving    Acute renal failure with tubular necrosis ([address] 10/2/2018    ssecondary to hemodynamic effects of loop diuretics and ace inhibitors, bbaseline 1.2-1.3 which peaked up to 1.8, resolving    Anemia     Anemia in stage 4 chronic kidney disease ([address] 5/21/2019    Also from suspected blood loss    Angina, class II ([address] 1/8/2018    CAD (coronary artery disease)     Cerebral artery occlusion with cerebral infarction (HCC)     CHF (congestive heart failure) ([address]     Cholecystitis 8/17/2015    Chronic back pain     Chronic diastolic HF (heart failure), NYHA class 3 ([address] 4/24/2017    Chronic kidney disease, stage III (moderate) ([address] 2/22/2016    Secondary to diabetic nephrosclerosis. Baseline creatinine 1.4-1.6, renal function fluctuate volume status    Closed fracture of lumbar vertebra without mention of spinal cord injury     Displacement of intervertebral disc, site unspecified, without myelopathy     H/O cardiac catheterization 2/19/16    LMCA: Mild irregularities 10-20%. LAD: Lesion on PRox LAD: Mid subsection. 65% stenosis. LCx: Lesion on 1st Ob Valentina: Proximal subesection. 70% steniosis. RCA: Small non-dominant RCA. Lesion on PRox RCA: Ostial. 50% stenosis. EF:55%.      H/O cardiovascular stress test 2/19/16    Abnormal. Moderate perfusion defect of and RV, mild-mod TR, mild pulmonary hypertension.  History of Holter monitoring 12/29/2017    Rare PAC's and PVC's.  History of tilt table evaluation 8/12/14    Abnormal    Hyperlipidemia     Hypertension     Medication side effect, initial encounter 3/23/2018    Non critical Right Renal artery stenosis, native ([facility] 10/2/2018    Non critical Right Renal artery stenosis, based on cath in 2016, Rt RA 30% stenosis    Pacemaker     non-functioning    S/P cardiac cath 04/10/2017    S/P coronary artery stent placement     Successful PTCA - HA Om1    SIRS (systemic inflammatory response syndrome) ([facility] 5/19/2019    Type II or unspecified type diabetes mellitus without mention of complication, not stated as uncontrolled        CURRENT ALLERGIES: Coreg [carvedilol], Lipitor [atorvastatin], Aldactone [spironolactone], Crestor [rosuvastatin calcium], Lopid [gemfibrozil], Invokana [canagliflozin], and Januvia [sitagliptin] REVIEW OF SYSTEMS: 14 systems were reviewed. Pertinent positives and negatives as above, all else negative. Past Surgical History:   Procedure Laterality Date    BACK SURGERY      CARDIAC CATHETERIZATION Left 02/19/2016    via right radial approach/ [facility] [place]/ Dr. Víctor Galaviz Left 10/05/2021    Dr Laci Lopez radial-Moderate three vessel coronary artery disease involving a ostial 50-60% stenosis in a small, non-dominant RCA, a 60% mid LAD stenosis and a proximal 50-60% stenosis in the left anterior descending coronary artery. Normal left ventricular end diastolic pressure. Proceed with aggressive maximal medical management as clinically indicated.     CHOLECYSTECTOMY  08/17/2015    Liang/Charles/Saulo/ Millicent    COLONOSCOPY  2010    COLONOSCOPY  02/19/2015    -polyps,diverticulosis,hemorrhoids    COLONOSCOPY  05/23/2018    Dr Ramona Herrera    COLONOSCOPY N/A 05/23/2018    COLONOSCOPY POLYPECTOMY SNARE/COLD BIOPSY  cold snare  and  hot snare performed by Leonides Mckeon MD at [address] [facility]  10/30/2020    with Dr. Odilia Mancini COLONOSCOPY N/A 10/30/2020    Brando Abu, NOT HIGH RISK performed by Sherwin Flaherty DO at [address] [facility]      left eye    ENDOSCOPY, COLON, DIAGNOSTIC      EYE SURGERY      IR TUNNELED CATHETER PLACEMENT GREATER THAN 5 YEARS  2021    IR TUNNELED CATHETER PLACEMENT GREATER THAN 5 YEARS 2021 [facility]    PACEMAKER INSERTION      PACEMAKER PLACEMENT      PAIN MANAGEMENT PROCEDURE Bilateral 5/10/2022    LUMBAR TRANSFORAMINAL - L3-4 performed by Lou Camacho MD at [address] [facility]  2019    Dr. Joyce Osborn H-Pylori)duodenal bulbar/antral erythema    UPPER GASTROINTESTINAL ENDOSCOPY N/A 2019    EGD BIOPSY, clotest performed by Leonides Mckeon MD at [facility] 10/30/2020    EGD ESOPHAGOGASTRODUODENOSCOPY performed by Sherwin Flaherty DO at [facility], LT  2021    US PERCUT RENAL BIOPSY, LT 2021 [facility]    Social History:  Social History     Tobacco Use    Smoking status: Former Smoker     Packs/day: 1.50     Years: 8.00     Pack years: 12.00     Types: Cigarettes     Quit date: 3/4/1980     Years since quittin.2    Smokeless tobacco: Never Used   Vaping Use    Vaping Use: Never used   Substance Use Topics    Alcohol use: No    Drug use: No        CURRENT MEDICATIONS:  Outpatient Medications Marked as Taking for the 22 encounter (Office Visit) with Blanca Cooper MD   Medication Sig Dispense Refill    amLODIPine (NORVASC) 10 MG tablet Take 1 tablet by mouth daily 90 tablet 0    insulin glargine (LANTUS SOLOSTAR) 100 UNIT/ML injection pen Inject 20 Units into the skin 2 times daily 5 pen 0    acetaminophen (TYLENOL) 325 MG tablet Take 650 mg by mouth every 6 hours as needed for Pain      hydrALAZINE (APRESOLINE) 100 MG tablet Take 1 tablet by mouth 3 times daily Except on the morning's of dialysis 90 tablet 2    tamsulosin (FLOMAX) 0.4 MG capsule Take 1 capsule by mouth once daily 30 capsule 5    metoprolol tartrate (LOPRESSOR) 25 MG tablet Take 1 tablet by mouth 2 times daily 180 tablet 3    furosemide (LASIX) 40 MG tablet Take 1 tablet by mouth daily 90 tablet 3    omeprazole (PRILOSEC) 10 MG delayed release capsule Take 10 mg by mouth daily      nitroGLYCERIN (NITROSTAT) 0.4 MG SL tablet Place 1 tablet under the tongue every 5 minutes as needed for Chest pain 25 tablet 3    latanoprost (XALATAN) 0.005 % ophthalmic solution Place 1 drop into both eyes nightly       acyclovir (ZOVIRAX) 400 MG tablet 400 mg 2 times daily       Insulin Pen Needle (PEN NEEDLES) 31G X 6 MM MISC 1 each by Does not apply route daily 100 each 3    prednisoLONE acetate (PRED FORTE) 1 % ophthalmic suspension Place 1 drop into the left eye daily        FAMILY HISTORY: family history includes Cancer in his father and mother. PHYSICAL EXAM:   BP (!) 166/59 (Site: Right Upper Arm, Position: Sitting, Cuff Size: Large Adult)   Pulse 61   Resp 18   Ht 5' 9\" (1.753 m)   Wt 179 lb 9.6 oz (81.5 kg)   SpO2 97%   BMI 26.52 kg/m²  Body mass index is 26.52 kg/m². Constitutional: He is oriented to person, place, and time. He appears well-developed and well-nourished. In no acute distress. HEENT: Normocephalic and atraumatic. No JVD present. Carotid bruit is not present. No mass and no thyromegaly present. No lymphadenopathy present. Cardiovascular: Normal rate, regular rhythm, normal heart sounds. Exam reveals no gallop and no friction rubs. 2/6 systolic murmur, 5th intercostal space on the LEFT in the mid-clavicular line (cardiac apex). Pulmonary/Chest: Effort normal and breath sounds normal. No respiratory distress. He has no wheezes, rhonchi or rales. Abdominal: Soft, non-tender.  Bowel sounds and aorta are normal. He exhibits no organomegaly, mass or bruit. Extremities: 1+ 1/2 up to the knees left worse than right. No cyanosis or clubbing. 2+ radial and carotid pulses. Distal extremity pulses: 2+ bilaterally. Neurological: He is alert and oriented to person, place, and time. No evidence of gross cranial nerve deficit. Coordination appeared normal.   Skin: Skin is warm and dry. There is no rash or diaphoresis. Psychiatric: He has a normal mood and affect. His speech is normal and behavior is normal.      MOST RECENT LABS ON RECORD:   Lab Results   Component Value Date    WBC 8.2 05/13/2022    HGB 7.6 (L) 05/13/2022    HCT 23.7 (L) 05/13/2022     05/13/2022    CHOL 69 09/21/2021    TRIG 185 (H) 09/21/2021    HDL 14 (L) 09/21/2021    LDLDIRECT 44 11/02/2017    ALT 12 05/13/2022    AST 17 05/13/2022     05/13/2022    K 3.2 (L) 05/13/2022     05/13/2022    CREATININE 3.41 (H) 05/13/2022    BUN 59 (H) 05/13/2022    CO2 21 05/13/2022    TSH 2.26 11/02/2017    PSA 3.36 01/18/2022    INR 1.0 04/20/2022    LABA1C 7.3 (H) 04/19/2022    LABMICR 30 10/17/2015       ASSESSMENT:  1. Dysautonomia ([address]    2. Chronic diastolic congestive heart failure ([address]    3. Coronary artery disease involving native coronary artery of native heart without angina pectoris    4. Essential hypertension    5. Mixed hyperlipidemia    6. Anemia, unspecified type    7. Stage 4 chronic kidney disease (HCC)    8. Episodic lightheadedness       PLAN:  Dysautonomia: Mildly to moderately, likely related to and worsened by anemia but Hgb improving and appears fairly well controlled. He does have a follow up with neurology for further evaluation of his lightheaded and dizziness. · Diuretics: Continue furosemide (Lasix) 40 mg every morning. · Beta Blocker: Continue Metoprolol tartrate (Lopressor) 25 mg bid.    · Nonpharmacologic counseling: Because of his condition, I reminded him to try and keep himself well-hydrated and to take extra time when moving from laying to sitting, sitting to standing and standing to walking as well as wearing at least knee high compressions stockings. · Additional Testing: I ordered a tilt table test to try and better assess the etiology of Mr. Jimenez's recent symptoms he can have done in a couple of weeks. Chronic Diastolic Heart Failure: EF of 60% via echo on 11/11/2021. Currently well controlled. Kidney function had improved, appears to be trending back up since stopping dialysis. · Beta Blocker: Continue Metoprolol tartrate (Lopressor) 25 mg bid. Diuretics: Continue furosemide (Lasix) 40 mg every morning. Nonpharmacologic management of Heart Failure: I advised him to try and keep his legs up whenever possible and to limit salt in his diet. Atherosclerotic Heart Disease: Coronary Artery stent: 4/10/2017. Cardiac cath done on 10/5/2021 showed Moderate three vessel coronary artery disease involving a ostial 50-60% stenosis in a small, non-dominant RCA, a 60% mid LAD stenosis and a proximal 50-60% stenosis in the left anterior descending coronary artery  Antiplatelet Agent: Not indicated due to bleeding. Beta Blocker: Continue Metoprolol tartrate (Lopressor) 25 mg bid. Cholesterol Reduction Therapy: Refused by patient. Essential Hypertension: Controlled   Continue Hydralazine 100 mg three daily. · Beta Blocker: Continue Metoprolol tartrate (Lopressor) 25 mg bid. · Calcium Channel Blocker: Continue amlodipine (Norvasc) 10 mg once daily. · Diuretics: Continue furosemide (Lasix) 40 mg every morning. Hyperlipidemia: Mixed - Last LDL on 9/21/2021 was 18 mg/dL   · Cholesterol Reduction Therapy: Refused by patient. · Anemia: Unclear Etiology, last hemoglobin was 7.6 g/dL on 5/13/2022  · He does have a scope scheduled to be done next week to better assess his reason for bleeding.       · Chronic Kidney Disease: Stable   · Continue dialysis and follow up with nephrology as scheduled. Finally, I recommended that he continue his other medications and follow up with you as previously scheduled. FOLLOW UP:   I told . Shruti Garcia  to call my office if he had any problems, but otherwise told him to Return in about 3 months (around 8/17/2022). However, as always I would be happy to see him sooner should the need arise. Once again, thank you for allowing me to participate in this patients care. Please do not hesitate to contact me could I be of further assistance. Sincerely,  Yessy Tanner. Jackelin BLOUNT, MS, F.A.C.C. [facility] [place] [place]) [facility], [address] [place] [address], [address]  Phone: [phone], Fax: [phone]    I believe that the risk of significant morbidity and mortality related to the patient's current medical conditions are: Intermediate. The documentation recorded by the scribe, accurately and completely reflects the services I personally performed and the decisions made by me. Babar Morris MD, MS, F.A.C.C.  May 17, 2022

## 2022-05-18 LAB
ABO/RH: NORMAL
ANTIBODY SCREEN: NEGATIVE
ARM BAND NUMBER: NORMAL
BLD PROD TYP BPU: NORMAL
BLOOD BANK BLOOD PRODUCT EXPIRATION DATE: NORMAL
BLOOD BANK ISBT PRODUCT BLOOD TYPE: 5100
BLOOD BANK PRODUCT CODE: NORMAL
BLOOD BANK UNIT TYPE AND RH: NORMAL
BPU ID: NORMAL
CROSSMATCH RESULT: NORMAL
DISPENSE STATUS BLOOD BANK: NORMAL
EXPIRATION DATE: NORMAL
TRANSFUSION STATUS: NORMAL
UNIT DIVISION: 0
UNIT ISSUE DATE/TIME: NORMAL

## 2022-05-19 ENCOUNTER — NURSE ONLY (OUTPATIENT)
Dept: GASTROENTEROLOGY | Age: 77
End: 2022-05-19
Payer: MEDICARE

## 2022-05-19 VITALS — HEART RATE: 68 BPM | TEMPERATURE: 97.2 F | DIASTOLIC BLOOD PRESSURE: 69 MMHG | SYSTOLIC BLOOD PRESSURE: 134 MMHG

## 2022-05-19 DIAGNOSIS — D50.0 IRON DEFICIENCY ANEMIA DUE TO CHRONIC BLOOD LOSS: ICD-10-CM

## 2022-05-19 PROCEDURE — 91110 GI TRC IMG INTRAL ESOPH-ILE: CPT | Performed by: INTERNAL MEDICINE

## 2022-05-26 ENCOUNTER — OFFICE VISIT (OUTPATIENT)
Dept: ONCOLOGY | Age: 77
End: 2022-05-26
Payer: MEDICARE

## 2022-05-26 ENCOUNTER — OFFICE VISIT (OUTPATIENT)
Dept: PRIMARY CARE CLINIC | Age: 77
End: 2022-05-26
Payer: MEDICARE

## 2022-05-26 VITALS — RESPIRATION RATE: 18 BRPM | HEART RATE: 72 BPM | WEIGHT: 181.2 LBS | BODY MASS INDEX: 26.76 KG/M2

## 2022-05-26 DIAGNOSIS — I10 ESSENTIAL HYPERTENSION: ICD-10-CM

## 2022-05-26 DIAGNOSIS — Z79.4 TYPE 2 DIABETES MELLITUS WITH HYPERGLYCEMIA, WITH LONG-TERM CURRENT USE OF INSULIN (HCC): ICD-10-CM

## 2022-05-26 DIAGNOSIS — E11.65 TYPE 2 DIABETES MELLITUS WITH HYPERGLYCEMIA, WITH LONG-TERM CURRENT USE OF INSULIN (HCC): ICD-10-CM

## 2022-05-26 DIAGNOSIS — R21 RASH: ICD-10-CM

## 2022-05-26 DIAGNOSIS — K90.9 IRON MALABSORPTION: Primary | ICD-10-CM

## 2022-05-26 DIAGNOSIS — I50.32 CHRONIC DIASTOLIC HEART FAILURE (HCC): ICD-10-CM

## 2022-05-26 DIAGNOSIS — R21 RASH: Primary | ICD-10-CM

## 2022-05-26 DIAGNOSIS — N18.4 CKD (CHRONIC KIDNEY DISEASE) STAGE 4, GFR 15-29 ML/MIN (HCC): ICD-10-CM

## 2022-05-26 DIAGNOSIS — D50.8 IRON DEFICIENCY ANEMIA SECONDARY TO INADEQUATE DIETARY IRON INTAKE: ICD-10-CM

## 2022-05-26 PROCEDURE — 1124F ACP DISCUSS-NO DSCNMKR DOCD: CPT | Performed by: FAMILY MEDICINE

## 2022-05-26 PROCEDURE — G8417 CALC BMI ABV UP PARAM F/U: HCPCS | Performed by: INTERNAL MEDICINE

## 2022-05-26 PROCEDURE — 99211 OFF/OP EST MAY X REQ PHY/QHP: CPT

## 2022-05-26 PROCEDURE — G8417 CALC BMI ABV UP PARAM F/U: HCPCS | Performed by: FAMILY MEDICINE

## 2022-05-26 PROCEDURE — 99214 OFFICE O/P EST MOD 30 MIN: CPT | Performed by: FAMILY MEDICINE

## 2022-05-26 PROCEDURE — 1036F TOBACCO NON-USER: CPT | Performed by: INTERNAL MEDICINE

## 2022-05-26 PROCEDURE — 1036F TOBACCO NON-USER: CPT | Performed by: FAMILY MEDICINE

## 2022-05-26 PROCEDURE — 99214 OFFICE O/P EST MOD 30 MIN: CPT | Performed by: INTERNAL MEDICINE

## 2022-05-26 PROCEDURE — 99211 OFF/OP EST MAY X REQ PHY/QHP: CPT | Performed by: FAMILY MEDICINE

## 2022-05-26 PROCEDURE — 3051F HG A1C>EQUAL 7.0%<8.0%: CPT | Performed by: FAMILY MEDICINE

## 2022-05-26 PROCEDURE — G8427 DOCREV CUR MEDS BY ELIG CLIN: HCPCS | Performed by: FAMILY MEDICINE

## 2022-05-26 PROCEDURE — G8427 DOCREV CUR MEDS BY ELIG CLIN: HCPCS | Performed by: INTERNAL MEDICINE

## 2022-05-26 PROCEDURE — 1124F ACP DISCUSS-NO DSCNMKR DOCD: CPT | Performed by: INTERNAL MEDICINE

## 2022-05-26 RX ORDER — FUROSEMIDE 40 MG/1
60 TABLET ORAL DAILY
Qty: 45 TABLET | Refills: 2 | Status: SHIPPED
Start: 2022-05-26 | End: 2022-05-27 | Stop reason: SDUPTHER

## 2022-05-26 RX ORDER — PREDNISONE 10 MG/1
10 TABLET ORAL DAILY
Qty: 10 TABLET | Refills: 0 | Status: SHIPPED | OUTPATIENT
Start: 2022-05-26 | End: 2022-06-06

## 2022-05-26 NOTE — PATIENT INSTRUCTIONS
SURVEY:    You may be receiving a survey from [employer] regarding your visit today. You may get this in the mail, through your MyChart, or in your email. Please complete the survey to enable us to provide the highest quality of care to you and your family. If you cannot score us a very good (5 Stars) on any question, please call the office to discuss how we could of made your experience exceptional.    Thank you!     Dr. Pili Santiago, [facility] [place], [address] [place] [address], [address]    Phone: [phone]  Fax: [phone]    Office Hours:   Maritza Faust, F: 8-5 Wednesday: 9-11

## 2022-05-26 NOTE — PROGRESS NOTES
David Evans is a 68 y.o. male here for routine follow up of diabetes. He has been checking  his blood glucose levels regularly. Patient states his blood sugar was 75 yesterday morning and 133 this morning. He said that he had a steroid shot about two weeks ago and they have been up and down since that time. He denies numbness and tingling in the hands and feet. He has been compliant with diet and exercise. He said that he is trying to ride the stationary bike. He has been compliant with his medications. He is tolerating medications. Hypertension: Patient here for follow-up of elevated blood pressure. He is exercising by riding the stationary bike and is adherent to low salt diet. Blood pressure is well controlled at home. Cardiac symptoms irregular heart beat. Patient denies chest pain. Cardiovascular risk factors: advanced age (older than 54 for men, 72 for women), diabetes mellitus and hypertension. Use of agents associated with hypertension: none. Congestive Heart Failure  Patient presents for re-evaluation of congestive heart failure. Patient's current complaints are irregular heart beat. He denies chest pain. He states he is compliant all of the time with his medications. He states he is compliant most of the time with his diet. Patient has a rash that is spreading on his legs. He said that it is itching him up to his waist.     He also has had an endoscopy done since his last visit with us. Patients BP was 154/72, will recheck.          Allergies:  Coreg [carvedilol], Lipitor [atorvastatin], Aldactone [spironolactone], Crestor [rosuvastatin calcium], Lopid [gemfibrozil], Invokana [canagliflozin], and Januvia [sitagliptin]    Past Medical History:    Past Medical History:   Diagnosis Date    Abnormal tilt table test 2/23/2016    Acute kidney [facility] ([facility]     Acute MI ([facility]     Acute renal failure superimposed on stage 4 chronic kidney disease ([facility] 10/2/2018    ssecondary to hemodynamic effects of loop diuretics and ace inhibitors, bbaseline 1.2-1.3 which peaked up to 1.8, resolving    Acute renal failure with tubular necrosis (HCC) 10/2/2018    ssecondary to hemodynamic effects of loop diuretics and ace inhibitors, bbaseline 1.2-1.3 which peaked up to 1.8, resolving    Anemia     Anemia in stage 4 chronic kidney disease ([facility] 5/21/2019    Also from suspected blood loss    Angina, class II ([address] 1/8/2018    CAD (coronary artery disease)     Cerebral artery occlusion with cerebral infarction (HCC)     CHF (congestive heart failure) ([address]     Cholecystitis 8/17/2015    Chronic back pain     Chronic diastolic HF (heart failure), NYHA class 3 ([address] 4/24/2017    Chronic kidney disease, stage III (moderate) ([facility] [address] [facility] 2/22/2016    Secondary to diabetic nephrosclerosis. Baseline creatinine 1.4-1.6, renal function fluctuate volume status    Closed fracture of lumbar vertebra without mention of spinal cord injury     Displacement of intervertebral disc, site unspecified, without myelopathy     H/O cardiac catheterization 2/19/16    LMCA: Mild irregularities 10-20%. LAD: Lesion on PRox LAD: Mid subsection. 65% stenosis. LCx: Lesion on 1st Ob Valentina: Proximal subesection. 70% steniosis. RCA: Small non-dominant RCA. Lesion on PRox RCA: Ostial. 50% stenosis. EF:55%.  H/O cardiovascular stress test 2/19/16    Abnormal. Moderate perfusion defect of mild intensity in the inferior, inferoseptal adn inferoapical regions during stress imaging, which most consistent with ischemia. Global LV systolic function normal without regional wall motion abnormalities. OVerall these results are most consistent with an intermediate risk for signficant CAD. Additional testing including cardiac cath may be indicated.  H/O tilt table evaluation 12/26/2017    Abnormal. Patients HR, BP response and symptoms were most consistent with dysautonomia.  Combined with viligant maintenance of euvolemia and maintaining a moderate salft intake, pharmacologic treatment with SSRI such as lexapro and or mestinon among other treatments have shown some effectiveness in treatment of this condition    H/O tilt table evaluation 12/26/2017    Abnormal head upright tilt table study. The pt heart rate, blood pressure response and symptoms were most consistent with dysautonomia.  History of 24 hour EKG monitoring 8/14/14    Occasional PAC's and PVC's which appear to be at least moderately symptomatic.  History of 24 hour EKG monitoring 11/19/14    Event Monitor. Sinus rhythm and sinus bradycardia. Infrequent isolated PVC's    History of blood transfusion     History of cardiovascular stress test 2/19/14    Relatively NL    History of cardiovascular stress test 03/21/2018    Normal myocardial perfusion. Global left ventricular systolic function was normal with an EF of 68%. Overall, these results are most consistent with a low risk scan.  History of coronary artery stent placement 04/2017    PTCA / Drug Eluting Stent:, CX and / or branches    History of CVA (cerebrovascular accident) without residual deficits 2014    Incidentally found on CT head. No known impairment now or in the past.    History of echocardiogram 2/18/14    LA mildly dilated, EF 55%, LV wall thickness is moderately increased, no definite wall motion abnormalilities, what appears to be a pacer wire is seen w/n the RA and RV, mild-mod TR, mild pulmonary hypertension.  History of Holter monitoring 12/29/2017    Rare PAC's and PVC's.      History of tilt table evaluation 8/12/14    Abnormal    Hyperlipidemia     Hypertension     Medication side effect, initial encounter 3/23/2018    Non critical Right Renal artery stenosis, native ([address] 10/2/2018    Non critical Right Renal artery stenosis, based on cath in 2016, Rt RA 30% stenosis    Pacemaker     non-functioning    S/P cardiac cath 04/10/2017    S/P coronary artery stent placement Successful PTCA - HA Om1    SIRS (systemic inflammatory response syndrome) (HCC) 5/19/2019    Type II or unspecified type diabetes mellitus without mention of complication, not stated as uncontrolled        Past Surgical History:    Past Surgical History:   Procedure Laterality Date    BACK SURGERY      CARDIAC CATHETERIZATION Left 02/19/2016    via right radial approach/ [facility]/ Dr. Mira Hogue Left 10/05/2021    Dr Jasiel Michel radial-Moderate three vessel coronary artery disease involving a ostial 50-60% stenosis in a small, non-dominant RCA, a 60% mid LAD stenosis and a proximal 50-60% stenosis in the left anterior descending coronary artery. Normal left ventricular end diastolic pressure. Proceed with aggressive maximal medical management as clinically indicated.     CHOLECYSTECTOMY  08/17/2015    Liang/Charles/Sa[facility]/ [facility]    COLONOSCOPY  2010    COLONOSCOPY  02/19/2015    -polyps,diverticulosis,hemorrhoids    COLONOSCOPY  05/23/2018    Dr Quinones [facility]    COLONOSCOPY N/A 05/23/2018    COLONOSCOPY POLYPECTOMY SNARE/COLD BIOPSY  cold snare  and  hot snare performed by Radha Mueller MD at [address] [facility]  10/30/2020    with Dr. Jacques Avila 10/30/2020    Oneita Chris, NOT HIGH RISK performed by Thu Guerrero DO at [address] [facility]      left eye    ENDOSCOPY, COLON, DIAGNOSTIC      EYE SURGERY      IR TUNNELED CATHETER PLACEMENT GREATER THAN 5 YEARS  11/22/2021    IR TUNNELED CATHETER PLACEMENT GREATER THAN 5 YEARS 11/22/2021 [facility]    PACEMAKER INSERTION      PACEMAKER PLACEMENT      PAIN MANAGEMENT PROCEDURE Bilateral 5/10/2022    LUMBAR TRANSFORAMINAL - L3-4 performed by Cy Gaurdado MD at [facility]  05/28/2019    Dr. Servando Haley H-Pylori)duodenal bulbar/antral erythema    UPPER GASTROINTESTINAL ENDOSCOPY N/A 2019    EGD BIOPSY, clotest performed by Senia Bro MD at [address] N/A 10/30/2020    EGD ESOPHAGOGASTRODUODENOSCOPY performed by Yi Matos DO at [facility], LT  2021    US PERCUT RENAL BIOPSY, LT 2021 [facility]       Social History:   Social History     Tobacco Use    Smoking status: Former Smoker     Packs/day: 1.50     Years: 8.00     Pack years: 12.00     Types: Cigarettes     Quit date: 3/4/1980     Years since quittin.2    Smokeless tobacco: Never Used   Substance Use Topics    Alcohol use: No       Family History:   Family History   Problem Relation Age of Onset    Cancer Mother         breast    Cancer Father         lung         Review of Systems:  Constitutional: negative for fever or chills  Eyes: negative for visual disturbance   ENT: negative for sore throat or nasal congestion  Respiratory: negative for cough, shortness of breath and sputum  Cardiovascular: negative for chest pain,pnd,claudication or palpitations  Gastrointestinal: negative for abd pain, dion,nausea, vomiting, diarrhea or constipation  Genitourinary: negative for dysuria,,hematuria urgency or frequency  Musculoskeletal:negative for arthralgias, muscle weakness and stiff joints   Integument/breast:positive for skin rash with itching  Neurological: positive for   numbness and tingling. Psych: negative for anxiety, depression, suicidial ideation and suicidal attempt. Objective:  Physical Exam:  Pulse 72   Resp 18   Wt 181 lb 3.2 oz (82.2 kg)   BMI 26.76 kg/m²   GEN:   He is alert and oriented  ENT:    ENT exam normal, no neck nodes or sinus tenderness  NECK:   neck supple and non tender without mass, no thyromegaly or thyroid nodules, no cervical lymphadenopathy  EYES:   No gross abnormalities.   CVS:     CVS exam BP noted to be well controlled today in office, S1, S2 normal, no gallop, 2/6 murmur, chest clear, no JVD, no HSM, 1 plus edema  PULM:   chest clear, no wheezing, rales, normal symmetric air entry, no tachypnea, retractions or cyanosis  ABD:   exam deferred  MUSC: no joint tenderness, deformity or swelling  Skin : has   rash both lower legs with itching  EXT: {EXTREMITIES EXAM:20816::\"Extremities: + 2 pedal pulses, no edema or calf tenderness, and warm to touch. FEET:  no open sores are present bilaterally  NEURO: monofilament normal bilaterally DTR -diminished        Diagnostic Data:  Lab Results   Component Value Date    GLUCOSE 126 (H) 05/13/2022    LABA1C 7.3 (H) 04/19/2022    BUN 59 (H) 05/13/2022     05/13/2022    K 3.2 (L) 05/13/2022    CALCIUM 9.0 05/13/2022     05/13/2022    CO2 21 05/13/2022       Assessment:  1. Rash    2. Chronic diastolic heart failure ([address]    3. Type 2 diabetes mellitus with hyperglycemia, with long-term current use of insulin ([address]    4. Essential hypertension      Plan   Diagnosis Orders   1. Rash - prednisone 10 mg daily for 10 days,this may be related to his iron infusion    2. Chronic diastolic heart failure ([address]     3. Type 2 diabetes mellitus with hyperglycemia, with long-term current use of insulin ([facility]     4. Essential hypertension         · Check BS 2 times per day due ESRD and elevated blood sugars. Patient is consistently testing blood sugars 2 times daily and reports blood sugar log as needed to this PCP. · Medication change: increase lasix to 60 mg daily  · Encouraged low fat, low sodium and diabetic, 1800 calorie diet. · Goal for blood pressure control is 130/80  · Recommended regular exercise as tolerated, 5 times per week  ·   ascivd risk  No orders of the defined types were placed in this encounter.       Current Outpatient Medications   Medication Sig Dispense Refill    predniSONE (DELTASONE) 10 MG tablet Take 1 tablet by mouth daily for 10 days 10 tablet 0    furosemide (LASIX) 40 MG tablet Take 1.5 tablets by mouth daily 45 tablet 2  amLODIPine (NORVASC) 10 MG tablet Take 1 tablet by mouth daily 90 tablet 0    insulin glargine (LANTUS SOLOSTAR) 100 UNIT/ML injection pen Inject 20 Units into the skin 2 times daily 5 pen 0    acetaminophen (TYLENOL) 325 MG tablet Take 650 mg by mouth every 6 hours as needed for Pain      hydrALAZINE (APRESOLINE) 100 MG tablet Take 1 tablet by mouth 3 times daily Except on the morning's of dialysis 90 tablet 2    tamsulosin (FLOMAX) 0.4 MG capsule Take 1 capsule by mouth once daily 30 capsule 5    metoprolol tartrate (LOPRESSOR) 25 MG tablet Take 1 tablet by mouth 2 times daily 180 tablet 3    omeprazole (PRILOSEC) 10 MG delayed release capsule Take 10 mg by mouth daily      nitroGLYCERIN (NITROSTAT) 0.4 MG SL tablet Place 1 tablet under the tongue every 5 minutes as needed for Chest pain 25 tablet 3    latanoprost (XALATAN) 0.005 % ophthalmic solution Place 1 drop into both eyes nightly       acyclovir (ZOVIRAX) 400 MG tablet 400 mg 2 times daily       Insulin Pen Needle (PEN NEEDLES) 31G X 6 MM MISC 1 each by Does not apply route daily 100 each 3    prednisoLONE acetate (PRED FORTE) 1 % ophthalmic suspension Place 1 drop into the left eye daily        No current facility-administered medications for this visit. No follow-ups on file.       Electronically signed by Angel Cardoso MD on 5/26/2022 at 2:50 PM

## 2022-05-26 NOTE — TELEPHONE ENCOUNTER
Writer called and spoke with Myesha Ortiz about capsule results. She will call patient with results as well.

## 2022-05-27 RX ORDER — FUROSEMIDE 40 MG/1
60 TABLET ORAL DAILY
Qty: 45 TABLET | Refills: 2 | Status: SHIPPED | OUTPATIENT
Start: 2022-05-27 | End: 2022-09-13

## 2022-06-01 ENCOUNTER — HOSPITAL ENCOUNTER (OUTPATIENT)
Age: 77
Setting detail: SPECIMEN
Discharge: HOME | End: 2022-06-01
Payer: MEDICARE

## 2022-06-01 ENCOUNTER — HOSPITAL ENCOUNTER (OUTPATIENT)
Dept: CT IMAGING | Age: 77
Discharge: HOME | End: 2022-06-03
Payer: MEDICARE

## 2022-06-01 ENCOUNTER — HOSPITAL ENCOUNTER (OUTPATIENT)
Age: 77
End: 2022-06-01
Payer: MEDICARE

## 2022-06-01 VITALS
HEIGHT: 69 IN | BODY MASS INDEX: 25.48 KG/M2 | HEART RATE: 57 BPM | OXYGEN SATURATION: 97 % | TEMPERATURE: 96.8 F | RESPIRATION RATE: 16 BRPM | SYSTOLIC BLOOD PRESSURE: 154 MMHG | WEIGHT: 172 LBS | DIASTOLIC BLOOD PRESSURE: 53 MMHG

## 2022-06-01 DIAGNOSIS — K90.9 IRON MALABSORPTION: ICD-10-CM

## 2022-06-01 DIAGNOSIS — K90.9 MALABSORPTION OF IRON: ICD-10-CM

## 2022-06-01 LAB
ABSOLUTE EOS #: 0.16 K/UL (ref 0–0.44)
ABSOLUTE EOS #: 0.16 K/UL (ref 0–0.44)
ABSOLUTE IMMATURE GRANULOCYTE: 0.11 K/UL (ref 0–0.3)
ABSOLUTE IMMATURE GRANULOCYTE: 0.12 K/UL (ref 0–0.3)
ABSOLUTE LYMPH #: 0.85 K/UL (ref 1.1–3.7)
ABSOLUTE LYMPH #: 0.88 K/UL (ref 1.1–3.7)
ABSOLUTE MONO #: 0.32 K/UL (ref 0.1–1.2)
ABSOLUTE MONO #: 0.32 K/UL (ref 0.1–1.2)
ABSOLUTE RETIC #: 0.14 M/UL (ref 0.03–0.08)
BASOPHILS # BLD: 1 % (ref 0–2)
BASOPHILS # BLD: 1 % (ref 0–2)
BASOPHILS ABSOLUTE: 0.06 K/UL (ref 0–0.2)
BASOPHILS ABSOLUTE: 0.06 K/UL (ref 0–0.2)
EOSINOPHILS RELATIVE PERCENT: 2 % (ref 1–4)
EOSINOPHILS RELATIVE PERCENT: 2 % (ref 1–4)
FERRITIN: 1339 NG/ML (ref 30–400)
HCT VFR BLD CALC: 33.4 % (ref 40.7–50.3)
HCT VFR BLD CALC: 33.8 % (ref 40.7–50.3)
HEMOGLOBIN: 10.8 G/DL (ref 13–17)
HEMOGLOBIN: 10.9 G/DL (ref 13–17)
IMMATURE GRANULOCYTES: 1 %
IMMATURE GRANULOCYTES: 1 %
IMMATURE RETIC FRACT: 10.9 % (ref 2.7–18.3)
INR BLD: 1
IRON SATURATION: 28 % (ref 20–55)
IRON: 56 UG/DL (ref 59–158)
LYMPHOCYTES # BLD: 11 % (ref 24–43)
LYMPHOCYTES # BLD: 11 % (ref 24–43)
MCH RBC QN AUTO: 29.5 PG (ref 25.2–33.5)
MCH RBC QN AUTO: 29.8 PG (ref 25.2–33.5)
MCHC RBC AUTO-ENTMCNC: 32.2 G/DL (ref 28.4–34.8)
MCHC RBC AUTO-ENTMCNC: 32.3 G/DL (ref 28.4–34.8)
MCV RBC AUTO: 91.4 FL (ref 82.6–102.9)
MCV RBC AUTO: 92.3 FL (ref 82.6–102.9)
MONOCYTES # BLD: 4 % (ref 3–12)
MONOCYTES # BLD: 4 % (ref 3–12)
NRBC AUTOMATED: 0 PER 100 WBC
NRBC AUTOMATED: 0.2 PER 100 WBC
PARTIAL THROMBOPLASTIN TIME: 41 SEC (ref 26.8–34.8)
PDW BLD-RTO: 15.9 % (ref 11.8–14.4)
PDW BLD-RTO: 16 % (ref 11.8–14.4)
PLATELET # BLD: 191 K/UL (ref 138–453)
PLATELET # BLD: 202 K/UL (ref 138–453)
PMV BLD AUTO: 9.2 FL (ref 8.1–13.5)
PMV BLD AUTO: 9.7 FL (ref 8.1–13.5)
PROTHROMBIN TIME: 13.4 SEC (ref 11.5–14.2)
RBC # BLD: 3.62 M/UL (ref 4.21–5.77)
RBC # BLD: 3.7 M/UL (ref 4.21–5.77)
RETIC %: 3.8 % (ref 0.5–1.9)
RETIC HEMOGLOBIN: 36.9 PG (ref 28.2–35.7)
SEG NEUTROPHILS: 81 % (ref 36–65)
SEG NEUTROPHILS: 81 % (ref 36–65)
SEGMENTED NEUTROPHILS ABSOLUTE COUNT: 6.63 K/UL (ref 1.5–8.1)
SEGMENTED NEUTROPHILS ABSOLUTE COUNT: 6.77 K/UL (ref 1.5–8.1)
TOTAL IRON BINDING CAPACITY: 201 UG/DL (ref 250–450)
UNSATURATED IRON BINDING CAPACITY: 145 UG/DL (ref 112–347)
WBC # BLD: 8.1 K/UL (ref 3.5–11.3)
WBC # BLD: 8.3 K/UL (ref 3.5–11.3)

## 2022-06-01 PROCEDURE — 84155 ASSAY OF PROTEIN SERUM: CPT

## 2022-06-01 PROCEDURE — 84165 PROTEIN E-PHORESIS SERUM: CPT

## 2022-06-01 PROCEDURE — 88237 TISSUE CULTURE BONE MARROW: CPT

## 2022-06-01 PROCEDURE — 36415 COLL VENOUS BLD VENIPUNCTURE: CPT

## 2022-06-01 PROCEDURE — 20220 BONE BIOPSY TROCAR/NDL SUPFC: CPT

## 2022-06-01 PROCEDURE — 85025 COMPLETE CBC W/AUTO DIFF WBC: CPT

## 2022-06-01 PROCEDURE — 88300 SURGICAL PATH GROSS: CPT

## 2022-06-01 PROCEDURE — 83550 IRON BINDING TEST: CPT

## 2022-06-01 PROCEDURE — 83540 ASSAY OF IRON: CPT

## 2022-06-01 PROCEDURE — 88184 FLOWCYTOMETRY/ TC 1 MARKER: CPT

## 2022-06-01 PROCEDURE — 2500000003 HC RX 250 WO HCPCS: Performed by: RADIOLOGY

## 2022-06-01 PROCEDURE — 6360000002 HC RX W HCPCS: Performed by: RADIOLOGY

## 2022-06-01 PROCEDURE — 88264 CHROMOSOME ANALYSIS 20-25: CPT

## 2022-06-01 PROCEDURE — 85045 AUTOMATED RETICULOCYTE COUNT: CPT

## 2022-06-01 PROCEDURE — 83883 ASSAY NEPHELOMETRY NOT SPEC: CPT

## 2022-06-01 PROCEDURE — 85610 PROTHROMBIN TIME: CPT

## 2022-06-01 PROCEDURE — 77012 CT SCAN FOR NEEDLE BIOPSY: CPT

## 2022-06-01 PROCEDURE — 88280 CHROMOSOME KARYOTYPE STUDY: CPT

## 2022-06-01 PROCEDURE — 88185 FLOWCYTOMETRY/TC ADD-ON: CPT

## 2022-06-01 PROCEDURE — 85730 THROMBOPLASTIN TIME PARTIAL: CPT

## 2022-06-01 PROCEDURE — 82728 ASSAY OF FERRITIN: CPT

## 2022-06-01 RX ORDER — LIDOCAINE HYDROCHLORIDE 10 MG/ML
INJECTION, SOLUTION EPIDURAL; INFILTRATION; INTRACAUDAL; PERINEURAL
Status: COMPLETED | OUTPATIENT
Start: 2022-06-01 | End: 2022-06-01

## 2022-06-01 RX ORDER — FENTANYL CITRATE 50 UG/ML
INJECTION, SOLUTION INTRAMUSCULAR; INTRAVENOUS
Status: COMPLETED | OUTPATIENT
Start: 2022-06-01 | End: 2022-06-01

## 2022-06-01 RX ORDER — MIDAZOLAM HYDROCHLORIDE 2 MG/2ML
INJECTION, SOLUTION INTRAMUSCULAR; INTRAVENOUS
Status: COMPLETED | OUTPATIENT
Start: 2022-06-01 | End: 2022-06-01

## 2022-06-01 RX ADMIN — MIDAZOLAM HYDROCHLORIDE 1 MG: 2 INJECTION, SOLUTION INTRAMUSCULAR; INTRAVENOUS at 14:40

## 2022-06-01 RX ADMIN — LIDOCAINE HYDROCHLORIDE 8 ML: 10 INJECTION, SOLUTION EPIDURAL; INFILTRATION; INTRACAUDAL; PERINEURAL at 14:40

## 2022-06-01 RX ADMIN — FENTANYL CITRATE 50 MCG: 50 INJECTION, SOLUTION INTRAMUSCULAR; INTRAVENOUS at 14:40

## 2022-06-01 ASSESSMENT — PAIN - FUNCTIONAL ASSESSMENT: PAIN_FUNCTIONAL_ASSESSMENT: NONE - DENIES PAIN

## 2022-06-01 NOTE — BRIEF OP NOTE
Brief Postoperative Note    Rell Marcano  YOB: 1945  [id number]    Pre-operative Diagnosis: iron malabsorption    Post-operative Diagnosis: Same    Procedure: bone marrow biopsy    Anesthesia: Local and Moderate Sedation    Surgeons/Assistants: Yarelis Dove    Estimated Blood Loss: 0    Complications: None    Specimens: Was Obtained: 10 cc marrow, 2 cm core    Findings: as above    Electronically signed by Tiffany Oscar MD on 6/1/2022 at 3:12 PM

## 2022-06-01 NOTE — PROGRESS NOTES
Inpatients must meet criteria 1 through 7.   1. Minimum 30 minutes after last dose of sedative medication, minimum 120 minutes after last dose of reversal agent. Yes   2. Systolic BP stable within 20 mmHg for 30 minutes & systolic BP between 90 & 747 or within 10 mmHg of baseline. Yes   3. Pulse between 60 and 100 or within 10 bpm of baseline. Yes   4. Spontaneous respiratory rate >/= 10 per minute. Yes   5. SaO2 >/= 95 or >/= baseline. Yes   6. Able to cough and swallow or return to baseline function. Yes   7. Alert and oriented or return to baseline mental status. Yes   8. Demonstrates controlled, coordinated movements, ambulates with steady gait, or return to baseline activity function. Yes   9. Minimal or no pain or nausea, or at a level tolerable and acceptable to patient. Yes   10. Takes and retains oral fluids as allowed. Yes   11. Procedural / perioperative site stable. Minimal or no bleeding. Yes   12. If GI endoscopy procedure, minimal or no abdominal distention or passing flatus. Yes   13. Written discharge instructions and emergency telephone number provided. Yes   14. Accompanied by a responsible adult. Yes   Adult patient discharged from facility without responsible person meets above criteria plus the following:   a) remains awake without stimulus for 30 minutes   b) oriented appropriate for age   c) all vital signs stable   d) no significant risk of losing protective reflexes   e) able to maintain pre-procedure mobility without assistance   f) no nausea or dizziness   g) transportation arrangements that do not require patient to operate motor Vehicle.    N/A

## 2022-06-01 NOTE — PROGRESS NOTES
Patient returned to pre/post area. Alert and oriented, denies pain. Tip stop to lower back clean/dry/intact.

## 2022-06-02 LAB
ALBUMIN (CALCULATED): 4.2 G/DL (ref 3.2–5.2)
ALBUMIN PERCENT: 65 % (ref 45–65)
ALPHA 1 PERCENT: 3 % (ref 3–6)
ALPHA 2 PERCENT: 10 % (ref 6–13)
ALPHA-1-GLOBULIN: 0.2 G/DL (ref 0.1–0.4)
ALPHA-2-GLOBULIN: 0.6 G/DL (ref 0.5–0.9)
BETA GLOBULIN: 0.5 G/DL (ref 0.5–1.1)
BETA PERCENT: 8 % (ref 11–19)
FREE KAPPA/LAMBDA RATIO: 0.48 (ref 0.26–1.65)
GAMMA GLOBULIN %: 15 % (ref 9–20)
GAMMA GLOBULIN: 1 G/DL (ref 0.5–1.5)
KAPPA FREE LIGHT CHAINS QNT: 6.92 MG/DL (ref 0.37–1.94)
LAMBDA FREE LIGHT CHAINS QNT: 14.49 MG/DL (ref 0.57–2.63)
PATHOLOGIST: ABNORMAL
PROTEIN ELECTROPHORESIS, SERUM: ABNORMAL
TOTAL PROT. SUM,%: 101 % (ref 98–102)
TOTAL PROT. SUM: 6.5 G/DL (ref 6.3–8.2)
TOTAL PROTEIN: 6.5 G/DL (ref 6.4–8.3)

## 2022-06-03 LAB — BONE MARROW REPORT: NORMAL

## 2022-06-05 LAB
MISCELLANEOUS LAB TEST RESULT: NORMAL
TEST NAME: NORMAL

## 2022-06-06 RX ORDER — PREDNISONE 10 MG/1
TABLET ORAL
Qty: 10 TABLET | Refills: 0 | Status: SHIPPED | OUTPATIENT
Start: 2022-06-06 | End: 2022-06-16

## 2022-06-07 ENCOUNTER — HOSPITAL ENCOUNTER (OUTPATIENT)
Dept: NON INVASIVE DIAGNOSTICS | Age: 77
Discharge: HOME | End: 2022-06-07
Payer: MEDICARE

## 2022-06-07 DIAGNOSIS — R42 EPISODIC LIGHTHEADEDNESS: ICD-10-CM

## 2022-06-07 DIAGNOSIS — G90.1 DYSAUTONOMIA (HCC): ICD-10-CM

## 2022-06-07 PROCEDURE — 93660 TILT TABLE EVALUATION: CPT

## 2022-06-07 PROCEDURE — 6370000000 HC RX 637 (ALT 250 FOR IP): Performed by: FAMILY MEDICINE

## 2022-06-07 RX ORDER — NITROGLYCERIN 0.3 MG/1
0.3 TABLET SUBLINGUAL ONCE
Status: COMPLETED | OUTPATIENT
Start: 2022-06-07 | End: 2022-06-07

## 2022-06-07 RX ADMIN — NITROGLYCERIN 0.3 MG: 0.3 TABLET SUBLINGUAL at 15:01

## 2022-06-08 LAB — CHROMOSOME STUDY: NORMAL

## 2022-06-08 NOTE — PROCEDURES
[address]                                TILT TABLE TEST    PATIENT NAME: Ariel Bowers                     :        1945  MED REC NO:   [id number]                              ROOM:  ACCOUNT NO:   [de-identified]                           ADMIT DATE: 2022  PROVIDER:     Rochelle Osler, MD    Cardiovascular Diagnostics Department    DATE OF PROCEDURE:  2022    ORDERING PROVIDER:  Rochelle Osler, MD    PRIMARY CARE PROVIDER:  Kamilah Watson MD    INTERPRETING PHYSICIAN:  Rochelle Osler, MD    Diagnosis:  Lightheadedness, dysautonomia    PROCEDURE SUMMARY:  After explaining the risk, benefits and alternatives  to the procedure, informed written consent was obtained. The patient  was brought to the tilt table laboratory in a fasting and resting state. The patient was placed on the tilt table in the supine position, ECG  patches were applied and an IV was placed. The patient's baseline blood  pressure was 182/56 mmHg with a heart rate of 63/minute. The patient  was then raised to the 70 degree head upright tilt position with and  pulse rate, blood pressure and cardiac rhythm were monitored and  recorded each minute of the study for a maximum of 30 minutes. During the initial 20 minutes of the study, the patient's blood pressure  ranged from a high of 151/45 mmHg to a low of 110/44 mmHg, while their  heart rate ranged from a low of 66/minute to a high of 68/minute. During this period, the patient reported moderate lightheadedness,  general fatigue. During the last 10 minutes of the study, nitroglycerin 0.3 mg was give  sublingually. During this time, the patient's blood pressure ranged  from a high of 118/49 mmHg to a low of 78/42 mmHg, while their heart  rate ranged from a low of 68/minute to a high of 71/minute.   During this  period, the patient reported severe lightheadedness, general fatigue. At this point, the patient was returned to the supine position and  monitored for an additional ten minutes. Once the patient felt well  enough to be discharged home, they were discharged home with  instructions to follow up with their primary care physician and/or  cardiologist as previously scheduled. STUDY CONCLUSIONS:  Abnormal head upright tilt table study. The patient's heart rate, blood  pressure response and symptoms were most consistent with dysautonomia. Combined with vigilant maintenance of euvolemia and maintaining a  moderate salt intake, pharmacologic treatment with Florinef or serotonin  selective reuptake inhibitor (SSRI) such as Lexapro, and/or Mestinon  among other treatments have shown some effectiveness in the treatment of  this condition.                   Nataly Parra MD    D: 06/08/2022 11:15:37       T: 06/08/2022 11:16:46     JAVIER/JUAN C_LEONARD  Job#: [id number]     Doc#: Unknown    CC:  Jose Palacios

## 2022-06-15 ENCOUNTER — TELEPHONE (OUTPATIENT)
Dept: CARDIOLOGY | Age: 77
End: 2022-06-15

## 2022-06-16 RX ORDER — PREDNISONE 10 MG/1
TABLET ORAL
Qty: 10 TABLET | Refills: 0 | Status: SHIPPED | OUTPATIENT
Start: 2022-06-16 | End: 2022-08-25 | Stop reason: ALTCHOICE

## 2022-06-28 ENCOUNTER — OFFICE VISIT (OUTPATIENT)
Dept: PRIMARY CARE CLINIC | Age: 77
End: 2022-06-28
Payer: MEDICARE

## 2022-06-28 VITALS
BODY MASS INDEX: 26.81 KG/M2 | DIASTOLIC BLOOD PRESSURE: 76 MMHG | HEIGHT: 69 IN | RESPIRATION RATE: 18 BRPM | WEIGHT: 181 LBS | SYSTOLIC BLOOD PRESSURE: 132 MMHG

## 2022-06-28 DIAGNOSIS — E11.65 TYPE 2 DIABETES MELLITUS WITH HYPERGLYCEMIA, WITH LONG-TERM CURRENT USE OF INSULIN (HCC): ICD-10-CM

## 2022-06-28 DIAGNOSIS — Z00.00 MEDICARE ANNUAL WELLNESS VISIT, SUBSEQUENT: ICD-10-CM

## 2022-06-28 DIAGNOSIS — I10 ESSENTIAL HYPERTENSION: ICD-10-CM

## 2022-06-28 DIAGNOSIS — I50.32 CHRONIC DIASTOLIC HEART FAILURE (HCC): ICD-10-CM

## 2022-06-28 DIAGNOSIS — Z00.00 ENCOUNTER FOR MEDICARE ANNUAL WELLNESS EXAM: Primary | ICD-10-CM

## 2022-06-28 DIAGNOSIS — Z79.4 TYPE 2 DIABETES MELLITUS WITH HYPERGLYCEMIA, WITH LONG-TERM CURRENT USE OF INSULIN (HCC): ICD-10-CM

## 2022-06-28 PROCEDURE — 1124F ACP DISCUSS-NO DSCNMKR DOCD: CPT | Performed by: FAMILY MEDICINE

## 2022-06-28 PROCEDURE — G0439 PPPS, SUBSEQ VISIT: HCPCS | Performed by: FAMILY MEDICINE

## 2022-06-28 PROCEDURE — 3051F HG A1C>EQUAL 7.0%<8.0%: CPT | Performed by: FAMILY MEDICINE

## 2022-06-28 SDOH — ECONOMIC STABILITY: FOOD INSECURITY: WITHIN THE PAST 12 MONTHS, YOU WORRIED THAT YOUR FOOD WOULD RUN OUT BEFORE YOU GOT MONEY TO BUY MORE.: NEVER TRUE

## 2022-06-28 SDOH — ECONOMIC STABILITY: FOOD INSECURITY: WITHIN THE PAST 12 MONTHS, THE FOOD YOU BOUGHT JUST DIDN'T LAST AND YOU DIDN'T HAVE MONEY TO GET MORE.: NEVER TRUE

## 2022-06-28 ASSESSMENT — PATIENT HEALTH QUESTIONNAIRE - PHQ9
7. TROUBLE CONCENTRATING ON THINGS, SUCH AS READING THE NEWSPAPER OR WATCHING TELEVISION: 0
10. IF YOU CHECKED OFF ANY PROBLEMS, HOW DIFFICULT HAVE THESE PROBLEMS MADE IT FOR YOU TO DO YOUR WORK, TAKE CARE OF THINGS AT HOME, OR GET ALONG WITH OTHER PEOPLE: 0
SUM OF ALL RESPONSES TO PHQ QUESTIONS 1-9: 0
3. TROUBLE FALLING OR STAYING ASLEEP: 0
SUM OF ALL RESPONSES TO PHQ QUESTIONS 1-9: 0
4. FEELING TIRED OR HAVING LITTLE ENERGY: 0
1. LITTLE INTEREST OR PLEASURE IN DOING THINGS: 0
2. FEELING DOWN, DEPRESSED OR HOPELESS: 0
SUM OF ALL RESPONSES TO PHQ9 QUESTIONS 1 & 2: 0
5. POOR APPETITE OR OVEREATING: 0
6. FEELING BAD ABOUT YOURSELF - OR THAT YOU ARE A FAILURE OR HAVE LET YOURSELF OR YOUR FAMILY DOWN: 0
8. MOVING OR SPEAKING SO SLOWLY THAT OTHER PEOPLE COULD HAVE NOTICED. OR THE OPPOSITE, BEING SO FIGETY OR RESTLESS THAT YOU HAVE BEEN MOVING AROUND A LOT MORE THAN USUAL: 0
SUM OF ALL RESPONSES TO PHQ QUESTIONS 1-9: 0
SUM OF ALL RESPONSES TO PHQ QUESTIONS 1-9: 0
9. THOUGHTS THAT YOU WOULD BE BETTER OFF DEAD, OR OF HURTING YOURSELF: 0

## 2022-06-28 ASSESSMENT — LIFESTYLE VARIABLES
HOW MANY STANDARD DRINKS CONTAINING ALCOHOL DO YOU HAVE ON A TYPICAL DAY: 1 OR 2
HOW OFTEN DO YOU HAVE A DRINK CONTAINING ALCOHOL: NEVER

## 2022-06-28 ASSESSMENT — SOCIAL DETERMINANTS OF HEALTH (SDOH): HOW HARD IS IT FOR YOU TO PAY FOR THE VERY BASICS LIKE FOOD, HOUSING, MEDICAL CARE, AND HEATING?: NOT HARD AT ALL

## 2022-06-28 NOTE — PROGRESS NOTES
Medicare Annual Wellness Visit    Leo Orozco is here for Medicare AWV    Assessment & Plan   Encounter for Medicare annual wellness exam  Chronic diastolic heart failure ([facility] [address]  Type 2 diabetes mellitus with hyperglycemia, with long-term current use of insulin ([facility] [address]  Essential hypertension      Recommendations for Preventive Services Due: see orders and patient instructions/AVS.  Recommended screening schedule for the next 5-10 years is provided to the patient in written form: see Patient Instructions/AVS.     No follow-ups on file. Subjective   The following acute and/or chronic problems were also addressed today:    ICD-10-CM    1. Encounter for Medicare annual wellness exam  Z00.00    2. Chronic diastolic heart failure (HCC)  I50.32    3. Type 2 diabetes mellitus with hyperglycemia, with long-term current use of insulin (HCC)  E11.65     Z79.4    4. Essential hypertension  I10    D/c iron pills and monitor rash      Patient's complete Health Risk Assessment and screening values have been reviewed and are found in Flowsheets. The following problems were reviewed today and where indicated follow up appointments were made and/or referrals ordered.     Positive Risk Factor Screenings with Interventions:    Fall Risk:  Do you feel unsteady or are you worried about falling? : (!) yes  2 or more falls in past year?: (!) yes  Fall with injury in past year?: no     Fall Risk Interventions:    · Home safety tips provided  · home exercise discussed            General Health and ACP:  General  In general, how would you say your health is?: Fair  In the past 7 days, have you experienced any of the following: New or Increased Pain, New or Increased Fatigue, Loneliness, Social Isolation, Stress or Anger?: No  Do you get the social and emotional support that you need?: Yes  Do you have a Living Will?: (!) No    Advance Directives     Power of  Living Will ACP-Advance Directive ACP-Power of     Not on File Not on File Not on File Not on File      General Health Risk Interventions:  · No Living Will: Advance Care Planning addressed with patient today    Health Habits/Nutrition:     Physical Activity: Insufficiently Active    Days of Exercise per Week: 2 days    Minutes of Exercise per Session: 20 min     Have you lost any weight without trying in the past 3 months?: (!) Yes  Body mass index: (!) 26.73  Have you seen the dentist within the past year?: Yes    Health Habits/Nutrition Interventions:  · Inadequate physical activity:  patient agrees to exercise for at least 150 minutes/week     Safety:  Do you have working smoke detectors?: Yes  Do you have any tripping hazards - loose or unsecured carpets or rugs?: No  Do you have any tripping hazards - clutter in doorways, halls, or stairs?: No  Do you have either shower bars, grab bars, non-slip mats or non-slip surfaces in your shower or bathtub?: (!) No  Do all of your stairways have a railing or banister?: Yes  Do you always fasten your seatbelt when you are in a car?: Yes    Safety Interventions:  · Home safety tips provided           Objective   Vitals:    06/28/22 1051   BP: 132/76   Site: Left Upper Arm   Position: Sitting   Cuff Size: Medium Adult   Resp: 18   Weight: 181 lb (82.1 kg)   Height: 5' 9\" (1.753 m)      Body mass index is 26.73 kg/m².         General Appearance: alert and oriented to person, place and time, well developed and well- nourished, in no acute distress  Skin: warm and dry, no rash or erythema  Head: normocephalic and atraumatic  Eyes: pupils equal, round, and reactive to light, extraocular eye movements intact, conjunctivae normal  ENT: tympanic membrane, external ear and ear canal normal bilaterally, nose without deformity, nasal mucosa and turbinates normal without polyps  Neck: supple and non-tender without mass, no thyromegaly or thyroid nodules, no cervical lymphadenopathy  Pulmonary/Chest: clear to auscultation bilaterally- no wheezes, rales or rhonchi, normal air movement, no respiratory distress  Cardiovascular: normal rate, regular rhythm, normal S1 and S2, 2/6 murmur,no  rubs, clicks, or gallops, distal pulses intact, no carotid bruits  Abdomen: soft, non-tender, non-distended, normal bowel sounds, no masses or organomegaly  Extremities: no cyanosis, clubbing , minimal edema  Musculoskeletal: normal range of motion, no joint swelling, deformity or tenderness  Neurologic: reflexes -diminished and symmetric, no cranial nerve deficit, gait, coordination and speech normal     skin-has rash  Allergies   Allergen Reactions    Coreg [Carvedilol] Shortness Of Breath and Nausea And Vomiting    Lipitor [Atorvastatin] Other (See Comments)     Muscle aches and joint pain    Aldactone [Spironolactone] Hives    Crestor [Rosuvastatin Calcium] Other (See Comments)     Muscle aches and joint pain    Lopid [Gemfibrozil]      hyperglycemia    Invokana [Canagliflozin] Rash    Januvia [Sitagliptin] Nausea And Vomiting     Prior to Visit Medications    Medication Sig Taking?  Authorizing Provider   predniSONE (DELTASONE) 10 MG tablet TAKE 1 TABLET BY MOUTH ONCE DAILY FOR 10 DAYS Yes Kelly Blanco MD   furosemide (LASIX) 40 MG tablet Take 1.5 tablets by mouth daily Yes Kelly Blanco MD   amLODIPine (NORVASC) 10 MG tablet Take 1 tablet by mouth daily Yes Kelly Blanco MD   insulin glargine (LANTUS SOLOSTAR) 100 UNIT/ML injection pen Inject 20 Units into the skin 2 times daily Yes Kelly Blanco MD   acetaminophen (TYLENOL) 325 MG tablet Take 650 mg by mouth every 6 hours as needed for Pain Yes Historical Provider, MD   hydrALAZINE (APRESOLINE) 100 MG tablet Take 1 tablet by mouth 3 times daily Except on the morning's of dialysis Yes Kelly Blanco MD   tamsulosin (FLOMAX) 0.4 MG capsule Take 1 capsule by mouth once daily Yes Rose Pickering PA-C   metoprolol tartrate (LOPRESSOR) 25 MG tablet Take 1 tablet by mouth 2 times daily Yes Severa Jing, PA-C   omeprazole (PRILOSEC) 10 MG delayed release capsule Take 10 mg by mouth daily Yes Historical Provider, MD   nitroGLYCERIN (NITROSTAT) 0.4 MG SL tablet Place 1 tablet under the tongue every 5 minutes as needed for Chest pain Yes Cecilio Rodriguez MD   latanoprost (XALATAN) 0.005 % ophthalmic solution Place 1 drop into both eyes nightly  Yes Historical Provider, MD   acyclovir (ZOVIRAX) 400 MG tablet 400 mg 2 times daily  Yes Historical Provider, MD   Insulin Pen Needle (PEN NEEDLES) 31G X 6 MM MISC 1 each by Does not apply route daily Yes Cecil Mo MD   prednisoLONE acetate (PRED FORTE) 1 % ophthalmic suspension Place 1 drop into the left eye daily  Yes Historical Provider, MD [place] (Including outside providers/suppliers regularly involved in providing care):   Patient Care Team:  Cecil Mo MD as PCP - Ofelia Aguilera MD as PCP - [facility] Empaneled Provider  Pawan Talley MD as Consulting Physician (Gastroenterology)  Dr Vijay Mosquera- cardiology  Oncology- Dr Vernon Martinez and updated this visit:  Allergies  Meds

## 2022-06-28 NOTE — PATIENT INSTRUCTIONS
Personalized Preventive Plan for Danica Gonzalez - 6/28/2022  Medicare offers a range of preventive health benefits. Some of the tests and screenings are paid in full while other may be subject to a deductible, co-insurance, and/or copay. Some of these benefits include a comprehensive review of your medical history including lifestyle, illnesses that may run in your family, and various assessments and screenings as appropriate. After reviewing your medical record and screening and assessments performed today your provider may have ordered immunizations, labs, imaging, and/or referrals for you. A list of these orders (if applicable) as well as your Preventive Care list are included within your After Visit Summary for your review. Other Preventive Recommendations:    · A preventive eye exam performed by an eye specialist is recommended every 1-2 years to screen for glaucoma; cataracts, macular degeneration, and other eye disorders. · A preventive dental visit is recommended every 6 months. · Try to get at least 150 minutes of exercise per week or 10,000 steps per day on a pedometer . · Order or download the FREE \"Exercise & Physical Activity: Your Everyday Guide\" from [employer]. Call 8-783.319.7015 or search [employer] online. · You need 7757-3002 mg of calcium and 8527-5347 IU of vitamin D per day. It is possible to meet your calcium requirement with diet alone, but a vitamin D supplement is usually necessary to meet this goal.  · When exposed to the sun, use a sunscreen that protects against both UVA and UVB radiation with an SPF of 30 or greater. Reapply every 2 to 3 hours or after sweating, drying off with a towel, or swimming. · Always wear a seat belt when traveling in a car. Always wear a helmet when riding a bicycle or motorcycle.

## 2022-06-30 ENCOUNTER — OFFICE VISIT (OUTPATIENT)
Dept: ONCOLOGY | Age: 77
End: 2022-06-30
Payer: MEDICARE

## 2022-06-30 VITALS
DIASTOLIC BLOOD PRESSURE: 53 MMHG | WEIGHT: 183 LBS | TEMPERATURE: 96.8 F | SYSTOLIC BLOOD PRESSURE: 124 MMHG | RESPIRATION RATE: 18 BRPM | BODY MASS INDEX: 27.02 KG/M2 | HEART RATE: 56 BPM

## 2022-06-30 DIAGNOSIS — K90.9 IRON MALABSORPTION: Primary | ICD-10-CM

## 2022-06-30 DIAGNOSIS — D63.8 ANEMIA OF CHRONIC DISEASE: ICD-10-CM

## 2022-06-30 DIAGNOSIS — D50.8 IRON DEFICIENCY ANEMIA SECONDARY TO INADEQUATE DIETARY IRON INTAKE: ICD-10-CM

## 2022-06-30 PROCEDURE — 99214 OFFICE O/P EST MOD 30 MIN: CPT | Performed by: INTERNAL MEDICINE

## 2022-06-30 PROCEDURE — 1036F TOBACCO NON-USER: CPT | Performed by: INTERNAL MEDICINE

## 2022-06-30 PROCEDURE — 99211 OFF/OP EST MAY X REQ PHY/QHP: CPT

## 2022-06-30 PROCEDURE — 1124F ACP DISCUSS-NO DSCNMKR DOCD: CPT | Performed by: INTERNAL MEDICINE

## 2022-06-30 PROCEDURE — G8428 CUR MEDS NOT DOCUMENT: HCPCS | Performed by: INTERNAL MEDICINE

## 2022-06-30 PROCEDURE — G8417 CALC BMI ABV UP PARAM F/U: HCPCS | Performed by: INTERNAL MEDICINE

## 2022-06-30 NOTE — PROGRESS NOTES
_               Mr. Jose Cruz Henriquez is a very pleasant 68 y.o. male with history of multiple co morbidities as listed. The patient is referred for further management of anemia. Patient has episodes of black tarry stool. He had upper and lower endoscopy in October 2020 which were negative. He is scheduled for follow-up with gastroenterology in [facility] next week for capsule camera evaluation. Patient is having symptomatic anemia. Is having weakness and fatigue. Feels tired. He has shortness of breath on exertion. He is having epigastric pain. No hematochezia. No hematemesis.   Patient denies smoking or alcohol drinking,   Started on dialysis which is ongoing and getting EPO and iron per nephrology      Interim history:  Hemoglobin above 12.3 per patient and in our system 10.8 on June 1, 2022  Bone marrow biopsy and aspirate and myeloma work-up ordered negative  Still follow-up with GI> capsule endoscopy          PAST MEDICAL HISTORY: has a past medical history of Abnormal tilt table test, Acute kidney injury ([address] Acute MI ([address] Acute renal failure superimposed on stage 4 chronic kidney disease ([address] Acute renal failure with tubular necrosis ([address] Anemia, Anemia in stage 4 chronic kidney disease ([address] Angina, class II ([address] CAD (coronary artery disease), Cerebral artery occlusion with cerebral infarction ([address] CHF (congestive heart failure) ([address] Cholecystitis, Chronic back pain, Chronic diastolic HF (heart failure), NYHA class 3 ([address] Chronic kidney disease, stage III (moderate) ([address] Closed fracture of lumbar vertebra without mention of spinal cord injury, Displacement of intervertebral disc, site unspecified, without myelopathy, H/O cardiac catheterization, H/O cardiovascular stress test, H/O tilt table evaluation, H/O tilt table evaluation, History of 24 hour EKG monitoring, History of 24 hour EKG monitoring, History of blood transfusion, History of cardiovascular stress test, History of cardiovascular stress test, History of coronary artery stent placement, History of CVA (cerebrovascular accident) without residual deficits, History of echocardiogram, History of Holter monitoring, History of tilt table evaluation, Hyperlipidemia, Hypertension, Medication side effect, initial encounter, Non critical Right Renal artery stenosis, native [facility]), Pacemaker, S/P cardiac cath, S/P coronary artery stent placement, SIRS (systemic inflammatory response syndrome) ([facility] [address] and Type II or unspecified type diabetes mellitus without mention of complication, not stated as uncontrolled. PAST SURGICAL HISTORY: has a past surgical history that includes Pacemaker insertion; back surgery; Cholecystectomy (08/17/2015); Corneal transplant; Cardiac catheterization (Left, 02/19/2016); pacemaker placement; Colonoscopy (2010); Colonoscopy (02/19/2015); Colonoscopy (05/23/2018); Colonoscopy (N/A, 05/23/2018); Upper gastrointestinal endoscopy (05/28/2019); Upper gastrointestinal endoscopy (N/A, 05/28/2019); Colonoscopy (10/30/2020); Colonoscopy (N/A, 10/30/2020); Upper gastrointestinal endoscopy (N/A, 10/30/2020); Endoscopy, colon, diagnostic; eye surgery; Cardiac catheterization (Left, 10/05/2021); IR TUNNELED CVC PLACE WO SQ PORT/PUMP > 5 YEARS (11/22/2021); US BIOPSY RENAL LEFT PERC (11/24/2021); Pain management procedure (Bilateral, 5/10/2022); and CT BIOPSY SUPERFICIAL BONE PERCUTANEOUS (6/1/2022). CURRENT MEDICATIONS:  has a current medication list which includes the following prescription(s): prednisone, furosemide, amlodipine, lantus solostar, acetaminophen, hydralazine, tamsulosin, metoprolol tartrate, omeprazole, nitroglycerin, latanoprost, acyclovir, pen needles, and prednisolone acetate.     ALLERGIES:  is allergic to coreg [carvedilol], lipitor [atorvastatin], aldactone [spironolactone], crestor [rosuvastatin calcium], lopid [gemfibrozil], invokana [canagliflozin], and januvia [sitagliptin]. FAMILY HISTORY: Negative for any hematological or oncological conditions. SOCIAL HISTORY:  reports that he quit smoking about 42 years ago. His smoking use included cigarettes. He has a 12.00 pack-year smoking history. He has never used smokeless tobacco. He reports that he does not drink alcohol and does not use drugs. REVIEW OF SYSTEMS:     · General: Positive for weakness and fatigue. No unanticipated weight loss or decreased appetite. No fever or chills. · Eyes: No blurred vision, eye pain or double vision. · Ears: No hearing problems or drainage. No tinnitus. · Throat: No sore throat, problems with swallowing or dysphagia. · Respiratory: No cough, sputum or hemoptysis. No shortness of breath. No pleuritic chest pain. · Cardiovascular: No chest pain, orthopnea or PND. No lower extremity edema. No palpitation. · Gastrointestinal: As above. · Genitourinary: No dysuria, hematuria, frequency or urgency. · Musculoskeletal: No muscle aches or pains. No limitation of movement. No back pain. No gait disturbance, No joint complaints. · Dermatologic: No skin rashes or pruritus. No skin lesions or discolorations. · Psychiatric: No depression, anxiety, or stress or signs of schizophrenia. No change in mood or affect. · Hematologic: No history of bleeding tendency. No bruises or ecchymosis. No history of clotting problems. · Infectious disease: No fever, chills or frequent infections. · Endocrine: No polydipsia or polyuria. No temperature intolerance. · Neurologic: No headaches or dizziness. No weakness or numbness of the extremities. No changes in balance, coordination,  memory, mentation, behavior. · Allergic/Immunologic: No nasal congestion or hives. No repeated infections. PHYSICAL EXAM:  The patient is not in acute distress.   Vital signs: Blood pressure (!) 124/53, pulse 56, temperature 96.8 °F (36 °C), temperature source Temporal, resp. rate 18, weight 183 lb (83 kg).      General appearance - well appearing, not in pain or distress  Mental status - good mood, alert and oriented  Eyes - pupils equal and reactive, extraocular eye movements intact  Ears - bilateral TM's and external ear canals normal  Nose - normal and patent, no erythema, discharge or polyps  Mouth - mucous membranes moist, pharynx normal without lesions  Neck - supple, no significant adenopathy  Lymphatics - no palpable lymphadenopathy, no hepatosplenomegaly  Chest - clear to auscultation, no wheezes, rales or rhonchi, symmetric air entry  Heart - normal rate, regular rhythm, normal S1, S2, no murmurs, rubs, clicks or gallops  Abdomen - soft, nontender, nondistended, no masses or organomegaly  Neurological - alert, oriented, normal speech, no focal findings or movement disorder noted  Musculoskeletal - no joint tenderness, deformity or swelling  Extremities - peripheral pulses normal, no pedal edema, no clubbing or cyanosis  Skin - normal coloration and turgor, no rashes, no suspicious skin lesions noted     Review of Diagnostic data:   Lab Results   Component Value Date    WBC 8.1 06/01/2022    HGB 10.8 (L) 06/01/2022    HCT 33.4 (L) 06/01/2022    MCV 92.3 06/01/2022     06/01/2022       Chemistry        Component Value Date/Time     05/13/2022 1621    K 3.2 (L) 05/13/2022 1621     05/13/2022 1621    CO2 21 05/13/2022 1621    BUN 59 (H) 05/13/2022 1621    CREATININE 3.41 (H) 05/13/2022 1621        Component Value Date/Time    CALCIUM 9.0 05/13/2022 1621    ALKPHOS 68 05/13/2022 1621    AST 17 05/13/2022 1621    ALT 12 05/13/2022 1621    BILITOT 0.30 05/13/2022 1621            IMPRESSION:   Severe normocytic anemia likely combination of blood loss anemia and anemia of chronic disease/chronic kidney disease> resolved  On IV iron and Procrit with dialysis per nephrology  Episodes of GI bleeding which resolved  Chronic renal insufficiency  Coronary artery disease  Multiple comorbidities as listed  New onset rash on the lower extremity could be related to iron currently on prednisone per PCP    PLAN: I reviewed the labs available to me and discussed with the patient. I explained to the patient the nature of this hematologic problem. I explained the significance of these abnormalities in layman language. Patient was seen by GI and pending capsule endoscopy however at this time no bleed  Patient feeling better bone marrow biopsy negative for MDS and myeloma work-up is negative  At this time hemoglobin around 12 and no evidence of a bleed   to continue Procrit and iron per nephrology(I called dialysis center as his ferritin very high and they have protocol in the kidney patient for IV ferritin0    RTC in 3 months with repeated labs                                          [facility]                            This note is created with the assistance of a speech recognition program.  While intending to generate a document that actually reflects the content of the visit, the document can still have some errors including those of syntax and sound a like substitutions which may escape proof reading. It such instances, actual meaning can be extrapolated by contextual diversion. I spent more than 30 minutes examining, evaluating, reviewing data, counseling the patient and coordinating care. Greater than 50% of time was spent face-to-face with the patient this note is created with the assistance of a speech recognition program.  While intending to generate a document that actually reflects the content of the visit, the document can still have some errors including those of syntax and sound a like substitutions which may escape proof reading. It such instances, actual meaning can be extrapolated by contextual diversion.

## 2022-07-14 DIAGNOSIS — Z79.4 TYPE 2 DIABETES MELLITUS WITH HYPERGLYCEMIA, WITH LONG-TERM CURRENT USE OF INSULIN (HCC): Primary | ICD-10-CM

## 2022-07-14 DIAGNOSIS — E11.65 TYPE 2 DIABETES MELLITUS WITH HYPERGLYCEMIA, WITH LONG-TERM CURRENT USE OF INSULIN (HCC): Primary | ICD-10-CM

## 2022-07-14 RX ORDER — INSULIN GLARGINE 100 [IU]/ML
20 INJECTION, SOLUTION SUBCUTANEOUS 2 TIMES DAILY
Qty: 5 PEN | Refills: 0 | Status: SHIPPED | OUTPATIENT
Start: 2022-07-14 | End: 2022-08-29

## 2022-08-15 RX ORDER — AMLODIPINE BESYLATE 10 MG/1
TABLET ORAL
Qty: 90 TABLET | Refills: 0 | Status: SHIPPED | OUTPATIENT
Start: 2022-08-15 | End: 2022-09-22

## 2022-08-16 ENCOUNTER — HOSPITAL ENCOUNTER (OUTPATIENT)
Age: 77
Discharge: HOME | End: 2022-08-16
Payer: MEDICARE

## 2022-08-16 DIAGNOSIS — R97.20 ELEVATED PSA: ICD-10-CM

## 2022-08-16 LAB — PROSTATE SPECIFIC ANTIGEN: 3.28 NG/ML

## 2022-08-16 PROCEDURE — 84153 ASSAY OF PSA TOTAL: CPT

## 2022-08-16 PROCEDURE — 36415 COLL VENOUS BLD VENIPUNCTURE: CPT

## 2022-08-25 ENCOUNTER — OFFICE VISIT (OUTPATIENT)
Dept: UROLOGY | Age: 77
End: 2022-08-25
Payer: MEDICARE

## 2022-08-25 VITALS — DIASTOLIC BLOOD PRESSURE: 50 MMHG | WEIGHT: 192 LBS | SYSTOLIC BLOOD PRESSURE: 122 MMHG | BODY MASS INDEX: 28.35 KG/M2

## 2022-08-25 DIAGNOSIS — Z12.5 SCREENING PSA (PROSTATE SPECIFIC ANTIGEN): ICD-10-CM

## 2022-08-25 DIAGNOSIS — N13.8 BPH WITH OBSTRUCTION/LOWER URINARY TRACT SYMPTOMS: Primary | ICD-10-CM

## 2022-08-25 DIAGNOSIS — N20.0 RENAL CALCULUS: ICD-10-CM

## 2022-08-25 DIAGNOSIS — N18.6 END STAGE RENAL DISEASE (HCC): ICD-10-CM

## 2022-08-25 DIAGNOSIS — R31.0 GROSS HEMATURIA: ICD-10-CM

## 2022-08-25 DIAGNOSIS — N40.1 BPH WITH OBSTRUCTION/LOWER URINARY TRACT SYMPTOMS: Primary | ICD-10-CM

## 2022-08-25 PROCEDURE — G8417 CALC BMI ABV UP PARAM F/U: HCPCS | Performed by: NURSE PRACTITIONER

## 2022-08-25 PROCEDURE — 51798 US URINE CAPACITY MEASURE: CPT | Performed by: NURSE PRACTITIONER

## 2022-08-25 PROCEDURE — 1036F TOBACCO NON-USER: CPT | Performed by: NURSE PRACTITIONER

## 2022-08-25 PROCEDURE — 99213 OFFICE O/P EST LOW 20 MIN: CPT | Performed by: NURSE PRACTITIONER

## 2022-08-25 PROCEDURE — 1124F ACP DISCUSS-NO DSCNMKR DOCD: CPT | Performed by: NURSE PRACTITIONER

## 2022-08-25 PROCEDURE — G8427 DOCREV CUR MEDS BY ELIG CLIN: HCPCS | Performed by: NURSE PRACTITIONER

## 2022-08-25 PROCEDURE — PBSHW PR MEASUREMENT,POST-VOID RESIDUAL VOLUME BY US,NON-IMAGING: Performed by: NURSE PRACTITIONER

## 2022-08-25 RX ORDER — TAMSULOSIN HYDROCHLORIDE 0.4 MG/1
CAPSULE ORAL
Qty: 90 CAPSULE | Refills: 3 | Status: SHIPPED | OUTPATIENT
Start: 2022-08-25

## 2022-08-25 ASSESSMENT — ENCOUNTER SYMPTOMS
NAUSEA: 0
CONSTIPATION: 0
EYE REDNESS: 0
COLOR CHANGE: 0
COUGH: 0
WHEEZING: 0
ABDOMINAL PAIN: 0
VOMITING: 0
BACK PAIN: 0
SHORTNESS OF BREATH: 0

## 2022-08-25 NOTE — PROGRESS NOTES
HPI:          Patient is a 68 y.o. male in no acute distress. He is alert and oriented to person, place, and time. History  12/2019 Referral from Dr. Duke Le for elevated PSA of 6.13. He was a previous patient of Dr. Radha Garrison for elevated PSA. Several brothers with prostate cancer. PSA  8/2022 - 3.28  1/2022 - 3.36  4/2021 - 3.68  10/2020 - 5.43  4/2020 - 4.42  1/2020 - 5.00  11/2019 - 6.13  11/2018 - 3.11  11/2017 - 3.26  11/2016 - 2.43  10/2015 - 2.70  10/2014 - 2.35  10/2013 - 2.15     11/2020 Prostate biopsy for PSA of 5.43. Pathology: benign prostate tissue in all 12 cores    5/2021 gross hematuria. CT urogram showed a nonobstructing 3 mm left renal stone. Refused cystoscopy    12/2021 - urinary retention/constipation -Flomax started    On HD for ESRD    Today  Here today to follow-up for elevated PSA, hematuria, and BPH. Most recent PSA is 3.28. This has declined from prior and is overall stable for patient. He denies unintentional weight loss, decreased energy or appetite, new or worsening bone/hip/back pain. He denies any lower extremity numbness and tingling. He denies new or worsening LUTS. PVR is low. He denies gross hematuria or dysuria. He did have a CT scan in 4/2022. This film was independently reviewed and shows an unchanged small left renal stone. There is no stones on the right.     Past Medical History:   Diagnosis Date    Abnormal tilt table test 2/23/2016    Acute kidney injury [facility])     Acute MI ([facility]     Acute renal failure superimposed on stage 4 chronic kidney disease ([facility] 10/2/2018    ssecondary to hemodynamic effects of loop diuretics and ace inhibitors, bbaseline 1.2-1.3 which peaked up to 1.8, resolving    Acute renal failure with tubular necrosis ([facility] 10/2/2018    ssecondary to hemodynamic effects of loop diuretics and ace inhibitors, bbaseline 1.2-1.3 which peaked up to 1.8, resolving    Anemia     Anemia in stage 4 chronic kidney disease ([facility] [facility] 5/21/2019    Also from suspected blood loss    Angina, class II ([facility] 1/8/2018    CAD (coronary artery disease)     Cerebral artery occlusion with cerebral infarction [facility])     CHF (congestive heart failure) (HCC)     Cholecystitis 8/17/2015    Chronic back pain     Chronic diastolic HF (heart failure), NYHA class 3 ([facility] [facility] 4/24/2017    Chronic kidney disease, stage III (moderate) ([facility] [facility] 2/22/2016    Secondary to diabetic nephrosclerosis. Baseline creatinine 1.4-1.6, renal function fluctuate volume status    Closed fracture of lumbar vertebra without mention of spinal cord injury     Displacement of intervertebral disc, site unspecified, without myelopathy     H/O cardiac catheterization 2/19/16    LMCA: Mild irregularities 10-20%. LAD: Lesion on PRox LAD: Mid subsection. 65% stenosis. LCx: Lesion on 1st Ob Valentina: Proximal subesection. 70% steniosis. RCA: Small non-dominant RCA. Lesion on PRox RCA: Ostial. 50% stenosis. EF:55%. H/O cardiovascular stress test 2/19/16    Abnormal. Moderate perfusion defect of mild intensity in the inferior, inferoseptal adn inferoapical regions during stress imaging, which most consistent with ischemia. Global LV systolic function normal without regional wall motion abnormalities. OVerall these results are most consistent with an intermediate risk for signficant CAD. Additional testing including cardiac cath may be indicated. H/O tilt table evaluation 12/26/2017    Abnormal. Patients HR, BP response and symptoms were most consistent with dysautonomia. Combined with viligant maintenance of euvolemia and maintaining a moderate salft intake, pharmacologic treatment with SSRI such as lexapro and or mestinon among other treatments have shown some effectiveness in treatment of this condition    H/O tilt table evaluation 12/26/2017    Abnormal head upright tilt table study. The pt heart rate, blood pressure response and symptoms were most consistent with dysautonomia.      History of 24 hour EKG monitoring 8/14/14    Occasional PAC's and PVC's which appear to be at least moderately symptomatic. History of 24 hour EKG monitoring 11/19/14    Event Monitor. Sinus rhythm and sinus bradycardia. Infrequent isolated PVC's    History of blood transfusion     History of cardiovascular stress test 2/19/14    Relatively NL    History of cardiovascular stress test 03/21/2018    Normal myocardial perfusion. Global left ventricular systolic function was normal with an EF of 68%. Overall, these results are most consistent with a low risk scan. History of coronary artery stent placement 04/2017    PTCA / Drug Eluting Stent:, CX and / or branches    History of CVA (cerebrovascular accident) without residual deficits 2014    Incidentally found on CT head. No known impairment now or in the past.    History of echocardiogram 2/18/14    LA mildly dilated, EF 55%, LV wall thickness is moderately increased, no definite wall motion abnormalilities, what appears to be a pacer wire is seen w/n the RA and RV, mild-mod TR, mild pulmonary hypertension. History of Holter monitoring 12/29/2017    Rare PAC's and PVC's.      History of tilt table evaluation 8/12/14    Abnormal    Hyperlipidemia     Hypertension     Medication side effect, initial encounter 3/23/2018    Non critical Right Renal artery stenosis, native ([address] 10/2/2018    Non critical Right Renal artery stenosis, based on cath in 2016, Rt RA 30% stenosis    Pacemaker     non-functioning    S/P cardiac cath 04/10/2017    S/P coronary artery stent placement     Successful PTCA - HA Om1    SIRS (systemic inflammatory response syndrome) ([address] 5/19/2019    Type II or unspecified type diabetes mellitus without mention of complication, not stated as uncontrolled      Past Surgical History:   Procedure Laterality Date    BACK SURGERY      CARDIAC CATHETERIZATION Left 02/19/2016    via right radial approach/ Melina Vernon/ Dr. Deirdre Chávez Left 10/05/2021 Dr [place] radial-Moderate three vessel coronary artery disease involving a ostial 50-60% stenosis in a small, non-dominant RCA, a 60% mid LAD stenosis and a proximal 50-60% stenosis in the left anterior descending coronary artery. Normal left ventricular end diastolic pressure. Proceed with aggressive maximal medical management as clinically indicated.     CHOLECYSTECTOMY  08/17/2015    [facility]    COLONOSCOPY  2010    COLONOSCOPY  02/19/2015    -polyps,diverticulosis,hemorrhoids    COLONOSCOPY  05/23/2018    Dr Omid Aguillon    COLONOSCOPY N/A 05/23/2018    COLONOSCOPY POLYPECTOMY SNARE/COLD BIOPSY  cold snare  and  hot snare performed by Kaylen Billingsley MD at [address] [place] [address]  10/30/2020    with Dr. Anthony Jimenez N/A 10/30/2020    Estel Micheline, NOT HIGH RISK performed by Neema Roblero DO at [address] [place] [address]      left eye    CT BIOPSY PERCUTANEOUS SUPERFICIAL BONE  6/1/2022    CT BIOPSY PERCUTANEOUS SUPERFICIAL BONE 6/1/2022 [facility] CT SCAN    ENDOSCOPY, COLON, DIAGNOSTIC      EYE SURGERY      IR TUNNELED CATHETER PLACEMENT GREATER THAN 5 YEARS  11/22/2021    IR TUNNELED CATHETER PLACEMENT GREATER THAN 5 YEARS 11/22/2021 [facility]    PACEMAKER INSERTION      PACEMAKER PLACEMENT      PAIN MANAGEMENT PROCEDURE Bilateral 5/10/2022    LUMBAR TRANSFORAMINAL - L3-4 performed by Zara Gonsalves MD at [facility]  05/28/2019    Dr. Santana Remigio H-Pylori)duodenal bulbar/antral erythema    UPPER GASTROINTESTINAL ENDOSCOPY N/A 05/28/2019    EGD BIOPSY, clotest performed by Kaylen Billingsley MD at [facility]. 10/30/2020    EGD ESOPHAGOGASTRODUODENOSCOPY performed by Neema Roblero DO at [facility], LT  11/24/2021     PERCUT RENAL BIOPSY, LT 11/24/2021 [facility]     Outpatient Encounter Medications as of 8/25/2022   Medication Sig Dispense Refill    tamsulosin (FLOMAX) 0.4 MG capsule Take 1 capsule by mouth once daily 90 capsule 3    amLODIPine (NORVASC) 10 MG tablet Take 1 tablet by mouth once daily 90 tablet 0    insulin glargine (LANTUS SOLOSTAR) 100 UNIT/ML injection pen Inject 20 Units into the skin 2 times daily 5 pen 0    furosemide (LASIX) 40 MG tablet Take 1.5 tablets by mouth daily 45 tablet 2    acetaminophen (TYLENOL) 325 MG tablet Take 650 mg by mouth every 6 hours as needed for Pain      hydrALAZINE (APRESOLINE) 100 MG tablet Take 1 tablet by mouth 3 times daily Except on the morning's of dialysis 90 tablet 2    metoprolol tartrate (LOPRESSOR) 25 MG tablet Take 1 tablet by mouth 2 times daily 180 tablet 3    omeprazole (PRILOSEC) 10 MG delayed release capsule Take 10 mg by mouth daily      nitroGLYCERIN (NITROSTAT) 0.4 MG SL tablet Place 1 tablet under the tongue every 5 minutes as needed for Chest pain 25 tablet 3    latanoprost (XALATAN) 0.005 % ophthalmic solution Place 1 drop into both eyes nightly       acyclovir (ZOVIRAX) 400 MG tablet 400 mg 2 times daily       Insulin Pen Needle (PEN NEEDLES) 31G X 6 MM MISC 1 each by Does not apply route daily 100 each 3    prednisoLONE acetate (PRED FORTE) 1 % ophthalmic suspension Place 1 drop into the left eye daily       [DISCONTINUED] predniSONE (DELTASONE) 10 MG tablet TAKE 1 TABLET BY MOUTH ONCE DAILY FOR 10 DAYS (Patient not taking: Reported on 8/25/2022) 10 tablet 0    [DISCONTINUED] tamsulosin (FLOMAX) 0.4 MG capsule Take 1 capsule by mouth once daily 30 capsule 5     No facility-administered encounter medications on file as of 8/25/2022.       Current Outpatient Medications on File Prior to Visit   Medication Sig Dispense Refill    amLODIPine (NORVASC) 10 MG tablet Take 1 tablet by mouth once daily 90 tablet 0    insulin glargine (LANTUS SOLOSTAR) 100 UNIT/ML injection pen Inject 20 Units into the skin 2 times daily 5 pen 0 furosemide (LASIX) 40 MG tablet Take 1.5 tablets by mouth daily 45 tablet 2    acetaminophen (TYLENOL) 325 MG tablet Take 650 mg by mouth every 6 hours as needed for Pain      hydrALAZINE (APRESOLINE) 100 MG tablet Take 1 tablet by mouth 3 times daily Except on the morning's of dialysis 90 tablet 2    metoprolol tartrate (LOPRESSOR) 25 MG tablet Take 1 tablet by mouth 2 times daily 180 tablet 3    omeprazole (PRILOSEC) 10 MG delayed release capsule Take 10 mg by mouth daily      nitroGLYCERIN (NITROSTAT) 0.4 MG SL tablet Place 1 tablet under the tongue every 5 minutes as needed for Chest pain 25 tablet 3    latanoprost (XALATAN) 0.005 % ophthalmic solution Place 1 drop into both eyes nightly       acyclovir (ZOVIRAX) 400 MG tablet 400 mg 2 times daily       Insulin Pen Needle (PEN NEEDLES) 31G X 6 MM MISC 1 each by Does not apply route daily 100 each 3    prednisoLONE acetate (PRED FORTE) 1 % ophthalmic suspension Place 1 drop into the left eye daily        No current facility-administered medications on file prior to visit. Coreg [carvedilol], Lipitor [atorvastatin], Aldactone [spironolactone], Crestor [rosuvastatin calcium], Lopid [gemfibrozil], Invokana [canagliflozin], and Januvia [sitagliptin]  Family History   Problem Relation Age of Onset    Cancer Mother         breast    Cancer Father         lung     Social History     Tobacco Use   Smoking Status Former    Packs/day: 1.50    Years: 8.00    Pack years: 12.00    Types: Cigarettes    Quit date: 3/4/1980    Years since quittin.5   Smokeless Tobacco Never       Social History     Substance and Sexual Activity   Alcohol Use No       Review of Systems   Constitutional:  Negative for appetite change, chills and fever. Eyes:  Negative for redness and visual disturbance. Respiratory:  Negative for cough, shortness of breath and wheezing. Cardiovascular:  Negative for chest pain and leg swelling.    Gastrointestinal:  Negative for abdominal pain, constipation, nausea and vomiting. Genitourinary:  Negative for decreased urine volume, difficulty urinating, dysuria, enuresis, flank pain, frequency, hematuria, penile discharge, penile pain, scrotal swelling, testicular pain and urgency. Musculoskeletal:  Negative for back pain, joint swelling and myalgias. Skin:  Negative for color change, rash and wound. Neurological:  Negative for dizziness, tremors and numbness. Hematological:  Negative for adenopathy. Does not bruise/bleed easily. BP (!) 122/50 (Site: Right Upper Arm, Position: Sitting, Cuff Size: Medium Adult)   Wt 192 lb (87.1 kg)   BMI 28.35 kg/m²       PHYSICAL EXAM:  Constitutional: Patient in no acute distress; Neuro: alert and oriented to person place and time. Psych: Mood and affect normal.  Skin: Normal  Lungs: Respiratory effort normal  Cardiovascular:  Normal peripheral pulses  Abdomen: Soft, non-tender, non-distended with no CVA, flank pain  Bladder non-tender and not distended. Lab Results   Component Value Date    BUN 59 (H) 05/13/2022     Lab Results   Component Value Date    CREATININE 3.41 (H) 05/13/2022     Lab Results   Component Value Date    PSA 3.28 08/16/2022    PSA 3.36 01/18/2022    PSA 3.68 04/30/2021       ASSESSMENT:   Diagnosis Orders   1. BPH with obstruction/lower urinary tract symptoms  RI MEASUREMENT,POST-VOID RESIDUAL VOLUME BY US,NON-IMAGING      2. Renal calculus  XR ABDOMEN (KUB) (SINGLE AP VIEW)      3. Gross hematuria        4. End stage renal disease ([facility]        5.  Screening PSA (prostate specific antigen)  PSA Screening            PLAN:  Continue flomax daily    F/U in 6 months to monitor PVR    KUB and PSA due in one year

## 2022-08-28 DIAGNOSIS — E11.65 TYPE 2 DIABETES MELLITUS WITH HYPERGLYCEMIA, WITH LONG-TERM CURRENT USE OF INSULIN (HCC): ICD-10-CM

## 2022-08-28 DIAGNOSIS — Z79.4 TYPE 2 DIABETES MELLITUS WITH HYPERGLYCEMIA, WITH LONG-TERM CURRENT USE OF INSULIN (HCC): ICD-10-CM

## 2022-08-29 RX ORDER — INSULIN GLARGINE 100 [IU]/ML
INJECTION, SOLUTION SUBCUTANEOUS
Qty: 15 ML | Refills: 0 | Status: SHIPPED | OUTPATIENT
Start: 2022-08-29 | End: 2022-09-29

## 2022-08-31 ENCOUNTER — OFFICE VISIT (OUTPATIENT)
Dept: CARDIOLOGY | Age: 77
End: 2022-08-31
Payer: MEDICARE

## 2022-08-31 VITALS
HEIGHT: 69 IN | OXYGEN SATURATION: 97 % | SYSTOLIC BLOOD PRESSURE: 120 MMHG | WEIGHT: 192 LBS | RESPIRATION RATE: 18 BRPM | BODY MASS INDEX: 28.44 KG/M2 | DIASTOLIC BLOOD PRESSURE: 60 MMHG | HEART RATE: 61 BPM

## 2022-08-31 DIAGNOSIS — E78.2 MIXED HYPERLIPIDEMIA: ICD-10-CM

## 2022-08-31 DIAGNOSIS — I25.10 CORONARY ARTERY DISEASE INVOLVING NATIVE CORONARY ARTERY OF NATIVE HEART WITHOUT ANGINA PECTORIS: ICD-10-CM

## 2022-08-31 DIAGNOSIS — I10 ESSENTIAL HYPERTENSION: ICD-10-CM

## 2022-08-31 DIAGNOSIS — D64.9 ANEMIA, UNSPECIFIED TYPE: ICD-10-CM

## 2022-08-31 DIAGNOSIS — N18.4 STAGE 4 CHRONIC KIDNEY DISEASE (HCC): ICD-10-CM

## 2022-08-31 DIAGNOSIS — G90.1 DYSAUTONOMIA (HCC): Primary | ICD-10-CM

## 2022-08-31 DIAGNOSIS — I50.32 CHRONIC DIASTOLIC CONGESTIVE HEART FAILURE (HCC): ICD-10-CM

## 2022-08-31 PROCEDURE — 99214 OFFICE O/P EST MOD 30 MIN: CPT | Performed by: FAMILY MEDICINE

## 2022-08-31 PROCEDURE — 1036F TOBACCO NON-USER: CPT | Performed by: FAMILY MEDICINE

## 2022-08-31 PROCEDURE — G8417 CALC BMI ABV UP PARAM F/U: HCPCS | Performed by: FAMILY MEDICINE

## 2022-08-31 PROCEDURE — G8427 DOCREV CUR MEDS BY ELIG CLIN: HCPCS | Performed by: FAMILY MEDICINE

## 2022-08-31 PROCEDURE — 1124F ACP DISCUSS-NO DSCNMKR DOCD: CPT | Performed by: FAMILY MEDICINE

## 2022-08-31 RX ORDER — DROXIDOPA 100 MG/1
100 CAPSULE ORAL 3 TIMES DAILY
Qty: 240 CAPSULE | Refills: 3 | Status: SHIPPED | OUTPATIENT
Start: 2022-08-31 | End: 2022-09-22 | Stop reason: DRUGHIGH

## 2022-08-31 NOTE — PROGRESS NOTES
I, Davina Rudolph am scribing for and in the presence of Radha Butt MD, MS, F.A.C.C. Patient: Tamar Rossi  : 1945  Date of Visit: 2022    REASON FOR VISIT / CONSULTATION: Follow-up (HX:dysautonomia, CHF, ASHD, HTN,HLD, anemia Pt is here for 3 month follow up he states he is feeling better he is going dialysis Mon and Friday. Lightheaded/dizziness daily falls once this summer occasional palp. Had fistula placed last week in left wrist Denies:CP, SOB, )    Dear Tricia Fields MD,    As you know, Mr. Franc Graham is a 70 y.o. male with a known history of dysautonomia where on tilt table testing his blood pressure dropped from 972 systolic to 78 systolic with only a 98-34 HR response. More recently, a CT of he head in the past showed evidence of at least 2 old CVA's and a heart catheterization showed severe single vessel disease in the ostium of a moderate sized OM1 branch of the circumflex. However, maximal guidelines directed medical management was opted for an place of stenting partly due to the risk associated with stenting this vessel. Recently he has had a coronary angiography procedure with stenting of his HA Om1. As you know, Mr. Franc Graham  is a 68 y.o. male with a history of recent onset substernal chest discomfort that led to a stress test and a subsequent heart catheterization which showed severe single vessel coronary artery disease of the HA Om1 with successful angioplasty and stenting of that vessel that was done by Dr. Abdulkadir River on 4/10/17. More recently he has had problems with balance problems when he is in his feet and says that a Neurologist has told him in the past this is because of his previous strokes. Most recently he underwent a cardiovascular stress test which fortunately had shown to be normal on 3/21/2018. Mr. Franc Graham has significant normal stress test and echocardiogram on 2020. Holter monitor done on 2020: The rhythm was sinus.  Average daily heart rate 58 ranging from 47 to 81 bpm. Bradycardia for 72% test duration. Rare premature supraventricular ectopic beats consisting of 33 isolated PACs. Rare premature ventricular ectopic beats total 75 consisting of 73 isolatedPVCs and a ventricular couplet. Echocardiogram done on 12/18/2020: EF of 60%. Moderately increased LV wall thickness. Borderline pulmonary hypertension. Mild tricuspid regurgitation. Mild diastolic dysfunction is seen/ Aortic root is mildly dilated. Stress test done on 9/20/2021 was equivocal, There is a small/moderate perfusion defect of mild intensity in the lateral, inferior and inferoapical regions. Cardiac cath done on 10/5/2021 showed Moderate three vessel coronary artery disease involving a ostial 50-60% stenosis in a small, non-dominant RCA, a 60% mid LAD stenosis and a proximal 50-60% stenosis in the left anterior descending coronary artery. Echo done on 11/11/2021 showed an EF 60%, The left atrium is moderately dilated (34-39) with a left atrial volume index of 35 ml/m2. Mild to moderate tricuspid regurgitation. Moderate pulmonary hypertension with estimated pulmonary artery systolic pressure of 42 mmHg. Mild diastolic dysfunction. He had abnormal Tilt table test done on 6/7/2022. Mr. Joseph Mcgarry is here for three month follow up. He states he is doing better since last visit. He is still having a lot lightheaded and dizziness daily, he did have one fall this summer. He uses a cane to ambulate and walker at times. He is staying hydrated. He also had fistula placed in left forearm. He denies any changes in his breathing at this time. No chest pain, pressure or tightness. No abdominal pain, bleeding problems, bowel issues, problems with his medications or any other concerns at this time. No nausea or vomiting. No cough, fever or chills.      Bleeding Risks: Mr. Joseph Mcgarry denies any current or recent bleeding problems including a history of a GI bleed, ulcers, recent or upcoming surgeries, blood in his stool or black tarry stools or blood in his urine. Past Medical History:   Diagnosis Date    Abnormal tilt table test 2/23/2016    Acute kidney injury [facility])     Acute MI ([facility]     Acute renal failure superimposed on stage 4 chronic kidney disease ([facility] 10/2/2018    ssecondary to hemodynamic effects of loop diuretics and ace inhibitors, bbaseline 1.2-1.3 which peaked up to 1.8, resolving    Acute renal failure with tubular necrosis ([facility] [facility] 10/2/2018    ssecondary to hemodynamic effects of loop diuretics and ace inhibitors, bbaseline 1.2-1.3 which peaked up to 1.8, resolving    Anemia     Anemia in stage 4 chronic kidney disease ([facility] [facility] 5/21/2019    Also from suspected blood loss    Angina, class II ([facility] 1/8/2018    CAD (coronary artery disease)     Cerebral artery occlusion with cerebral infarction [facility])     CHF (congestive heart failure) ([facility]     Cholecystitis 8/17/2015    Chronic back pain     Chronic diastolic HF (heart failure), NYHA class 3 ([facility] 4/24/2017    Chronic kidney disease, stage III (moderate) ([facility] 2/22/2016    Secondary to diabetic nephrosclerosis. Baseline creatinine 1.4-1.6, renal function fluctuate volume status    Closed fracture of lumbar vertebra without mention of spinal cord injury     Displacement of intervertebral disc, site unspecified, without myelopathy     H/O cardiac catheterization 2/19/16    LMCA: Mild irregularities 10-20%. LAD: Lesion on PRox LAD: Mid subsection. 65% stenosis. LCx: Lesion on 1st Ob Valentina: Proximal subesection. 70% steniosis. RCA: Small non-dominant RCA. Lesion on PRox RCA: Ostial. 50% stenosis. EF:55%. H/O cardiovascular stress test 2/19/16    Abnormal. Moderate perfusion defect of mild intensity in the inferior, inferoseptal adn inferoapical regions during stress imaging, which most consistent with ischemia. Global LV systolic function normal without regional wall motion abnormalities.  OVerall these results are most consistent with an intermediate risk for signficant CAD. Additional testing including cardiac cath may be indicated. H/O tilt table evaluation 12/26/2017    Abnormal. Patients HR, BP response and symptoms were most consistent with dysautonomia. Combined with viligant maintenance of euvolemia and maintaining a moderate salft intake, pharmacologic treatment with SSRI such as lexapro and or mestinon among other treatments have shown some effectiveness in treatment of this condition    H/O tilt table evaluation 12/26/2017    Abnormal head upright tilt table study. The pt heart rate, blood pressure response and symptoms were most consistent with dysautonomia. History of 24 hour EKG monitoring 8/14/14    Occasional PAC's and PVC's which appear to be at least moderately symptomatic. History of 24 hour EKG monitoring 11/19/14    Event Monitor. Sinus rhythm and sinus bradycardia. Infrequent isolated PVC's    History of blood transfusion     History of cardiovascular stress test 2/19/14    Relatively NL    History of cardiovascular stress test 03/21/2018    Normal myocardial perfusion. Global left ventricular systolic function was normal with an EF of 68%. Overall, these results are most consistent with a low risk scan. History of coronary artery stent placement 04/2017    PTCA / Drug Eluting Stent:, CX and / or branches    History of CVA (cerebrovascular accident) without residual deficits 2014    Incidentally found on CT head. No known impairment now or in the past.    History of echocardiogram 2/18/14    LA mildly dilated, EF 55%, LV wall thickness is moderately increased, no definite wall motion abnormalilities, what appears to be a pacer wire is seen w/n the RA and RV, mild-mod TR, mild pulmonary hypertension. History of Holter monitoring 12/29/2017    Rare PAC's and PVC's.      History of tilt table evaluation 8/12/14    Abnormal    Hyperlipidemia     Hypertension     Medication side effect, initial encounter 3/23/2018 Non critical Right Renal artery stenosis, native ([facility] 10/2/2018    Non critical Right Renal artery stenosis, based on cath in 2016, Rt RA 30% stenosis    Pacemaker     non-functioning    S/P cardiac cath 04/10/2017    S/P coronary artery stent placement     Successful PTCA - HA Om1    SIRS (systemic inflammatory response syndrome) ([facility] 5/19/2019    Type II or unspecified type diabetes mellitus without mention of complication, not stated as uncontrolled        CURRENT ALLERGIES: Coreg [carvedilol], Lipitor [atorvastatin], Aldactone [spironolactone], Crestor [rosuvastatin calcium], Lopid [gemfibrozil], Invokana [canagliflozin], and Januvia [sitagliptin] REVIEW OF SYSTEMS: 14 systems were reviewed. Pertinent positives and negatives as above, all else negative. Past Surgical History:   Procedure Laterality Date    BACK SURGERY      CARDIAC CATHETERIZATION Left 02/19/2016    via right radial approach/ Melina Vernon/ Dr. Ghazal Carpenter Left 10/05/2021    Dr Krystle Melchor radial-Moderate three vessel coronary artery disease involving a ostial 50-60% stenosis in a small, non-dominant RCA, a 60% mid LAD stenosis and a proximal 50-60% stenosis in the left anterior descending coronary artery. Normal left ventricular end diastolic pressure. Proceed with aggressive maximal medical management as clinically indicated.     CHOLECYSTECTOMY  08/17/2015    Liang/Charles/Saulo/ Lap    COLONOSCOPY  2010    COLONOSCOPY  02/19/2015    -polyps,diverticulosis,hemorrhoids    COLONOSCOPY  05/23/2018    Dr Alan Terry    COLONOSCOPY N/A 05/23/2018    COLONOSCOPY POLYPECTOMY SNARE/COLD BIOPSY  cold snare  and  hot snare performed by Tim Salazar MD at [address]  10/30/2020    with Dr. Lydia Delgado N/A 10/30/2020    Dylan Hernandez, NOT HIGH RISK performed by Drea Rowe DO at [address] [facility] eye    CT BIOPSY PERCUTANEOUS SUPERFICIAL BONE  2022    CT BIOPSY PERCUTANEOUS SUPERFICIAL BONE 2022 [facility] CT SCAN    ENDOSCOPY, COLON, DIAGNOSTIC      EYE SURGERY      IR TUNNELED CATHETER PLACEMENT GREATER THAN 5 YEARS  2021    IR TUNNELED CATHETER PLACEMENT GREATER THAN 5 YEARS 2021 [facility]    PACEMAKER INSERTION      PACEMAKER PLACEMENT      PAIN MANAGEMENT PROCEDURE Bilateral 5/10/2022    LUMBAR TRANSFORAMINAL - L3-4 performed by Jonas Hernandez MD at [address] [facility]  2019    Dr. Bret Bruno H-Pylori)duodenal bulbar/antral erythema    UPPER GASTROINTESTINAL ENDOSCOPY N/A 2019    EGD BIOPSY, clotest performed by Emerita De La Cruz MD at [address] [facility] 10/30/2020    EGD ESOPHAGOGASTRODUODENOSCOPY performed by Irene Nielson DO at [place] [address],   2021    US PERCUT RENAL BIOPSY, LT 2021 [facility]    Social History:  Social History     Tobacco Use    Smoking status: Former     Packs/day: 1.50     Years: 8.00     Pack years: 12.00     Types: Cigarettes     Quit date: 3/4/1980     Years since quittin.5    Smokeless tobacco: Never   Vaping Use    Vaping Use: Never used   Substance Use Topics    Alcohol use: No    Drug use: No        CURRENT MEDICATIONS:  Outpatient Medications Marked as Taking for the 22 encounter (Office Visit) with Barry Young MD   Medication Sig Dispense Refill    LANTUS SOLOSTAR 100 UNIT/ML injection pen INJECT 20 UNITS SUBCUTANEOUSLY TWICE DAILY 15 mL 0    tamsulosin (FLOMAX) 0.4 MG capsule Take 1 capsule by mouth once daily 90 capsule 3    amLODIPine (NORVASC) 10 MG tablet Take 1 tablet by mouth once daily 90 tablet 0    furosemide (LASIX) 40 MG tablet Take 1.5 tablets by mouth daily 45 tablet 2    acetaminophen (TYLENOL) 325 MG tablet Take 650 mg by mouth every 6 hours as needed for Pain      hydrALAZINE (APRESOLINE) 100 MG tablet Take 1 He is alert and oriented to person, place, and time. No evidence of gross cranial nerve deficit. Coordination appeared normal.   Skin: Skin is warm and dry. There is no rash or diaphoresis. Psychiatric: He has a normal mood and affect. His speech is normal and behavior is normal.      MOST RECENT LABS ON RECORD:   Lab Results   Component Value Date    WBC 8.1 06/01/2022    HGB 10.8 (L) 06/01/2022    HCT 33.4 (L) 06/01/2022     06/01/2022    CHOL 69 09/21/2021    TRIG 185 (H) 09/21/2021    HDL 14 (L) 09/21/2021    LDLDIRECT 44 11/02/2017    ALT 12 05/13/2022    AST 17 05/13/2022     05/13/2022    K 3.2 (L) 05/13/2022     05/13/2022    CREATININE 3.41 (H) 05/13/2022    BUN 59 (H) 05/13/2022    CO2 21 05/13/2022    TSH 2.26 11/02/2017    PSA 3.28 08/16/2022    INR 1.0 06/01/2022    LABA1C 7.3 (H) 04/19/2022    LABMICR 30 10/17/2015       ASSESSMENT:  1. Dysautonomia ([address]    2. Chronic diastolic congestive heart failure ([address]    3. Coronary artery disease involving native coronary artery of native heart without angina pectoris    4. Essential hypertension    5. Mixed hyperlipidemia    6. Anemia, unspecified type    7. Stage 4 chronic kidney disease (HCC)      PLAN:  Dysautonomia: Mildly to moderately, likely related to and worsened by anemia but Hgb improving and appears fairly well controlled. He does have a follow up with neurology for further evaluation of his lightheaded and dizziness. Diuretics: Continue furosemide (Lasix) 60 mg every morning. Beta Blocker: Continue Metoprolol tartrate (Lopressor) 25 mg bid. Nonpharmacologic counseling: Because of his condition, I reminded him to try and keep himself well-hydrated and to take extra time when moving from laying to sitting, sitting to standing and standing to walking as well as wearing at least knee high compressions stockings.    Therefore, I took the liberty of starting her on a newer medication, Droxydopa (Northera), 100 mg three times

## 2022-08-31 NOTE — PATIENT INSTRUCTIONS
SURVEY:    You may be receiving a survey from [employer] regarding your visit today. Please complete the survey to enable us to provide the highest quality of care to you and your family. If you cannot score us a very good on any question, please call the office to discuss how we could have made your experience a very good one. Thank you.

## 2022-09-13 RX ORDER — FUROSEMIDE 40 MG/1
TABLET ORAL
Qty: 45 TABLET | Refills: 0 | Status: SHIPPED | OUTPATIENT
Start: 2022-09-13 | End: 2022-10-13

## 2022-09-22 ENCOUNTER — OFFICE VISIT (OUTPATIENT)
Dept: CARDIOLOGY | Age: 77
End: 2022-09-22
Payer: MEDICARE

## 2022-09-22 VITALS
RESPIRATION RATE: 20 BRPM | DIASTOLIC BLOOD PRESSURE: 59 MMHG | WEIGHT: 194 LBS | HEIGHT: 69 IN | SYSTOLIC BLOOD PRESSURE: 145 MMHG | OXYGEN SATURATION: 98 % | HEART RATE: 60 BPM | BODY MASS INDEX: 28.73 KG/M2

## 2022-09-22 DIAGNOSIS — G90.1 DYSAUTONOMIA (HCC): ICD-10-CM

## 2022-09-22 DIAGNOSIS — G90.3 NEUROGENIC ORTHOSTATIC HYPOTENSION (HCC): ICD-10-CM

## 2022-09-22 DIAGNOSIS — R42 LIGHTHEADED: ICD-10-CM

## 2022-09-22 DIAGNOSIS — N18.6 END STAGE RENAL DISEASE (HCC): ICD-10-CM

## 2022-09-22 DIAGNOSIS — Z95.820 S/P ANGIOPLASTY WITH STENT: ICD-10-CM

## 2022-09-22 DIAGNOSIS — E78.2 MIXED HYPERLIPIDEMIA: ICD-10-CM

## 2022-09-22 DIAGNOSIS — I10 ESSENTIAL HYPERTENSION: ICD-10-CM

## 2022-09-22 DIAGNOSIS — D64.9 ANEMIA, UNSPECIFIED TYPE: ICD-10-CM

## 2022-09-22 DIAGNOSIS — R42 DIZZY: ICD-10-CM

## 2022-09-22 DIAGNOSIS — I50.32 CHRONIC DIASTOLIC CONGESTIVE HEART FAILURE (HCC): ICD-10-CM

## 2022-09-22 DIAGNOSIS — I25.10 CORONARY ARTERY DISEASE INVOLVING NATIVE CORONARY ARTERY OF NATIVE HEART WITHOUT ANGINA PECTORIS: ICD-10-CM

## 2022-09-22 PROCEDURE — G8427 DOCREV CUR MEDS BY ELIG CLIN: HCPCS | Performed by: PHYSICIAN ASSISTANT

## 2022-09-22 PROCEDURE — 1124F ACP DISCUSS-NO DSCNMKR DOCD: CPT | Performed by: PHYSICIAN ASSISTANT

## 2022-09-22 PROCEDURE — G8417 CALC BMI ABV UP PARAM F/U: HCPCS | Performed by: PHYSICIAN ASSISTANT

## 2022-09-22 PROCEDURE — 1036F TOBACCO NON-USER: CPT | Performed by: PHYSICIAN ASSISTANT

## 2022-09-22 PROCEDURE — 99211 OFF/OP EST MAY X REQ PHY/QHP: CPT | Performed by: PHYSICIAN ASSISTANT

## 2022-09-22 PROCEDURE — 99214 OFFICE O/P EST MOD 30 MIN: CPT | Performed by: PHYSICIAN ASSISTANT

## 2022-09-22 RX ORDER — AMLODIPINE BESYLATE 10 MG/1
10 TABLET ORAL DAILY
Qty: 90 TABLET | Refills: 3
Start: 2022-09-22

## 2022-09-22 RX ORDER — DROXIDOPA 200 MG/1
200 CAPSULE ORAL 3 TIMES DAILY
Qty: 240 CAPSULE | Refills: 1
Start: 2022-09-22

## 2022-09-22 RX ORDER — HYDRALAZINE HYDROCHLORIDE 100 MG/1
100 TABLET, FILM COATED ORAL PRN
Qty: 90 TABLET | Refills: 2
Start: 2022-09-22

## 2022-09-22 NOTE — PROGRESS NOTES
consisting of 33 isolated PACs. Rare premature ventricular ectopic beats total 75 consisting of 73 isolatedPVCs and a ventricular couplet. Echocardiogram done on 12/18/2020: EF of 60%. Moderately increased LV wall thickness. Borderline pulmonary hypertension. Mild tricuspid regurgitation. Mild diastolic dysfunction is seen/ Aortic root is mildly dilated. Stress test done on 9/20/2021 was equivocal, There is a small/moderate perfusion defect of mild intensity in the lateral, inferior and inferoapical regions. Cardiac cath done on 10/5/2021 showed Moderate three vessel coronary artery disease involving a ostial 50-60% stenosis in a small, non-dominant RCA, a 60% mid LAD stenosis and a proximal 50-60% stenosis in the left anterior descending coronary artery. Echo done on 11/11/2021 showed an EF 60%, The left atrium is moderately dilated (34-39) with a left atrial volume index of 35 ml/m2. Mild to moderate tricuspid regurgitation. Moderate pulmonary hypertension with estimated pulmonary artery systolic pressure of 42 mmHg. Mild diastolic dysfunction. He had abnormal Tilt table test done on 6/7/2022 [place] was consist with dysautonomia. Mr. Joi Gibson is here for follow up today. He reports that he picked up his new medication from last visit the Droxidopa and he had to pay $952 dollars. He has been taking it as prescribed three times a day however he has seen no improvement at all in his lightheaded and dizziness. He can get lightheaded and dizziness every time he stands up from a sitting position. He has falling in the past due to this. His last fall was in August. He does feel this way at dialysis as well on Mondays and Fridays. His dizziness stays the same before and after his treatment as well. His systolic number before dialysis is 135 mmHg range and after dialysis it is in the 90 mmHg range. He also states he still has some heart palpations as well that come and go.  He denied any chest pain, pressure or tightness. Also denied any worsening shortness of breath since his last visit. No abdominal pain, bleeding problems, bowel issues, problems with his medications or any other concerns at this time. No nausea or vomiting. No cough, fever or chills. Past Medical History:   Diagnosis Date    Abnormal tilt table test 2/23/2016    Acute kidney injury [facility])     Acute MI ([facility]     Acute renal failure superimposed on stage 4 chronic kidney disease ([facility] 10/2/2018    ssecondary to hemodynamic effects of loop diuretics and ace inhibitors, bbaseline 1.2-1.3 which peaked up to 1.8, resolving    Acute renal failure with tubular necrosis ([facility] 10/2/2018    ssecondary to hemodynamic effects of loop diuretics and ace inhibitors, bbaseline 1.2-1.3 which peaked up to 1.8, resolving    Anemia     Anemia in stage 4 chronic kidney disease ([facility] 5/21/2019    Also from suspected blood loss    Angina, class II ([facility] 1/8/2018    CAD (coronary artery disease)     Cerebral artery occlusion with cerebral infarction [facility])     CHF (congestive heart failure) ([facility]     Cholecystitis 8/17/2015    Chronic back pain     Chronic diastolic HF (heart failure), NYHA class 3 ([facility] 4/24/2017    Chronic kidney disease, stage III (moderate) ([facility] 2/22/2016    Secondary to diabetic nephrosclerosis. Baseline creatinine 1.4-1.6, renal function fluctuate volume status    Closed fracture of lumbar vertebra without mention of spinal cord injury     Displacement of intervertebral disc, site unspecified, without myelopathy     H/O cardiac catheterization 2/19/16    LMCA: Mild irregularities 10-20%. LAD: Lesion on PRox LAD: Mid subsection. 65% stenosis. LCx: Lesion on 1st Ob Valentina: Proximal subesection. 70% steniosis. RCA: Small non-dominant RCA. Lesion on PRox RCA: Ostial. 50% stenosis. EF:55%.      H/O cardiovascular stress test 2/19/16    Abnormal. Moderate perfusion defect of mild intensity in the inferior, inferoseptal adn inferoapical regions during stress Rare PAC's and PVC's. History of tilt table evaluation 8/12/14    Abnormal    Hyperlipidemia     Hypertension     Medication side effect, initial encounter 3/23/2018    Non critical Right Renal artery stenosis, native ([facility] 10/2/2018    Non critical Right Renal artery stenosis, based on cath in 2016, Rt RA 30% stenosis    Pacemaker     non-functioning    S/P cardiac cath 04/10/2017    S/P coronary artery stent placement     Successful PTCA - HA Om1    SIRS (systemic inflammatory response syndrome) ([facility] 5/19/2019    Type II or unspecified type diabetes mellitus without mention of complication, not stated as uncontrolled        CURRENT ALLERGIES: Coreg [carvedilol], Lipitor [atorvastatin], Aldactone [spironolactone], Crestor [rosuvastatin calcium], Lopid [gemfibrozil], Invokana [canagliflozin], and Januvia [sitagliptin] REVIEW OF SYSTEMS: 14 systems were reviewed. Pertinent positives and negatives as above, all else negative. Past Surgical History:   Procedure Laterality Date    BACK SURGERY      CARDIAC CATHETERIZATION Left 02/19/2016    via right radial approach/ Melina Vernon/ Dr. Warren Pack Left 10/05/2021    Dr Magdiel Mathews radial-Moderate three vessel coronary artery disease involving a ostial 50-60% stenosis in a small, non-dominant RCA, a 60% mid LAD stenosis and a proximal 50-60% stenosis in the left anterior descending coronary artery. Normal left ventricular end diastolic pressure. Proceed with aggressive maximal medical management as clinically indicated.     CHOLECYSTECTOMY  08/17/2015    Liang/Charles/Saulo/ Millicent    COLONOSCOPY  2010    COLONOSCOPY  02/19/2015    -polyps,diverticulosis,hemorrhoids    COLONOSCOPY  05/23/2018    Dr Felix Leyva    COLONOSCOPY N/A 05/23/2018    COLONOSCOPY POLYPECTOMY SNARE/COLD BIOPSY  cold snare  and  hot snare performed by Ke Harper MD at [address]  10/30/2020 with Dr. Gerald Diane    COLONOSCOPY N/A 10/30/2020    COLORECTAL CANCER SCREENING, NOT HIGH RISK performed by Etienne Beckham DO at [address] [facility]      left eye    CT BIOPSY PERCUTANEOUS SUPERFICIAL BONE  2022    CT BIOPSY PERCUTANEOUS SUPERFICIAL BONE 2022 [facility] CT SCAN    ENDOSCOPY, COLON, DIAGNOSTIC      EYE SURGERY      IR TUNNELED CATHETER PLACEMENT GREATER THAN 5 YEARS  2021    IR TUNNELED CATHETER PLACEMENT GREATER THAN 5 YEARS 2021 [facility]    PACEMAKER INSERTION      PACEMAKER PLACEMENT      PAIN MANAGEMENT PROCEDURE Bilateral 5/10/2022    LUMBAR TRANSFORAMINAL - L3-4 performed by Carri Stahl MD at [address]  2019    Dr. Sara Villalobos H-Pylori)duodenal bulbar/antral erythema    UPPER GASTROINTESTINAL ENDOSCOPY N/A 2019    EGD BIOPSY, clotest performed by Stone Madden MD at [facility] 10/30/2020    EGD ESOPHAGOGASTRODUODENOSCOPY performed by Etienne Beckham DO at [place] [id number], LT  2021    US PERCUT RENAL BIOPSY, LT 2021 [facility]    Social History:  Social History     Tobacco Use    Smoking status: Former     Packs/day: 1.50     Years: 8.00     Pack years: 12.00     Types: Cigarettes     Quit date: 3/4/1980     Years since quittin.5    Smokeless tobacco: Never   Vaping Use    Vaping Use: Never used   Substance Use Topics    Alcohol use: No    Drug use: No        CURRENT MEDICATIONS:  Outpatient Medications Marked as Taking for the 22 encounter (Office Visit) with [employer] PA-C   Medication Sig Dispense Refill    hydrALAZINE (APRESOLINE) 100 MG tablet Take 1 tablet by mouth as needed (PRN) I would like him to only take this medication as needed if his systolic number is >208 mmHg.  90 tablet 2    amLODIPine (NORVASC) 10 MG tablet Take 1 tablet by mouth daily Hold on Monday and Friday due to him having dialysis on these days. 90 tablet 3    Droxidopa (NORTHERA) 200 MG CAPS Take 200 mg by mouth in the morning, at noon, and at bedtime 240 capsule 1    furosemide (LASIX) 40 MG tablet TAKE 1 & 1/2 (ONE & ONE-HALF) TABLETS BY MOUTH ONCE DAILY 45 tablet 0    LANTUS SOLOSTAR 100 UNIT/ML injection pen INJECT 20 UNITS SUBCUTANEOUSLY TWICE DAILY 15 mL 0    tamsulosin (FLOMAX) 0.4 MG capsule Take 1 capsule by mouth once daily 90 capsule 3    acetaminophen (TYLENOL) 325 MG tablet Take 650 mg by mouth every 6 hours as needed for Pain      metoprolol tartrate (LOPRESSOR) 25 MG tablet Take 1 tablet by mouth 2 times daily 180 tablet 3    omeprazole (PRILOSEC) 10 MG delayed release capsule Take 10 mg by mouth daily      nitroGLYCERIN (NITROSTAT) 0.4 MG SL tablet Place 1 tablet under the tongue every 5 minutes as needed for Chest pain 25 tablet 3    latanoprost (XALATAN) 0.005 % ophthalmic solution Place 1 drop into both eyes nightly       acyclovir (ZOVIRAX) 400 MG tablet 400 mg 2 times daily       Insulin Pen Needle (PEN NEEDLES) 31G X 6 MM MISC 1 each by Does not apply route daily 100 each 3    prednisoLONE acetate (PRED FORTE) 1 % ophthalmic suspension Place 1 drop into the left eye daily        FAMILY HISTORY: family history includes Cancer in his father and mother. PHYSICAL EXAM:   BP (!) 145/59 (Site: Left Upper Arm, Position: Sitting, Cuff Size: Medium Adult)   Pulse 60   Resp 20   Ht 5' 9\" (1.753 m)   Wt 194 lb (88 kg)   SpO2 98%   BMI 28.65 kg/m²  Body mass index is 28.65 kg/m². Constitutional: He is oriented to person, place, and time. He appears well-developed and well-nourished. In no acute distress. HEENT: Normocephalic and atraumatic. No JVD present. Carotid bruit is not present. No mass and no thyromegaly present. No lymphadenopathy present. Cardiovascular: Normal rate, regular rhythm, normal heart sounds. Exam reveals no gallop and no friction rubs.  2/6 systolic murmur, 5th intercostal space on the LEFT in the mid-clavicular line (cardiac apex). Pulmonary/Chest: Effort normal and breath sounds normal. No respiratory distress. He has no wheezes, rhonchi or rales. Abdominal: Soft, non-tender. Bowel sounds and aorta are normal. He exhibits no organomegaly, mass or bruit. Extremities: 1+ 1/2 up to the knees left worse than right. L>R. No cyanosis or clubbing. 2+ radial and carotid pulses. Distal extremity pulses: 2+ bilaterally. Neurological: He is alert and oriented to person, place, and time. No evidence of gross cranial nerve deficit. Coordination appeared normal.   Skin: Skin is warm and dry. There is no rash or diaphoresis. Psychiatric: He has a normal mood and affect. His speech is normal and behavior is normal.      MOST RECENT LABS ON RECORD:   Lab Results   Component Value Date    WBC 8.1 06/01/2022    HGB 10.8 (L) 06/01/2022    HCT 33.4 (L) 06/01/2022     06/01/2022    CHOL 69 09/21/2021    TRIG 185 (H) 09/21/2021    HDL 14 (L) 09/21/2021    LDLDIRECT 44 11/02/2017    ALT 12 05/13/2022    AST 17 05/13/2022     05/13/2022    K 3.2 (L) 05/13/2022     05/13/2022    CREATININE 3.41 (H) 05/13/2022    BUN 59 (H) 05/13/2022    CO2 21 05/13/2022    TSH 2.26 11/02/2017    PSA 3.28 08/16/2022    INR 1.0 06/01/2022    LABA1C 7.3 (H) 04/19/2022    LABMICR 30 10/17/2015       ASSESSMENT:  1. Dysautonomia ([address]    2. Neurogenic orthostatic hypotension (HCC)    3. Lightheaded    4. Dizzy    5. Anemia, unspecified type    6. Chronic diastolic congestive heart failure ([address]    7. Coronary artery disease involving native coronary artery of native heart without angina pectoris    8. S/P angioplasty with stent    9. Essential hypertension    10. Mixed hyperlipidemia    11. End stage renal disease ([address]      PLAN:  Dysautonomia: Mildly to moderate at this time. Dizziness with every time he stands up from a sitting position. Has had falls in the past due to this dizziness feeling.  Last fall was in August. Diuretics: Continue furosemide (Lasix) 40 mg every morning. Beta Blocker: Continue Metoprolol tartrate (Lopressor) 25 mg bid. Calcium Channel Blocker: CHANGE amlodipine (Norvasc) 10 mg daily except for the days he has dialysis which is on Mondays and Fridays. I would like to hold his Norvasc on those days to help with his dizziness and lower blood pressures after his treatments. Also I will have him to take his Hydralazine as needed if his systolic number is >861 mmHg. INCREASE Norethera to 200 mg three times a day. We did discuss the cost of this medication in detail today. At this time we will go up on the dose and if he still has no improvement in his symptoms then we may stop this medication. In the mean time we will try to figure out some form of patient assistance forms to see if we can get him approved for this medication. Nonpharmacologic counseling: Because of his condition, I reminded him to try and keep himself well-hydrated and to take extra time when moving from laying to sitting, sitting to standing and standing to walking as well as wearing at least knee high compressions stockings. Anemia: CBC done on 6/1/2022 was 10.8 g/dL   Continue to monitor this via blood work. Chronic Diastolic Heart Failure: EF of 60% via echo on 11/11/2021. Currently well controlled. Continue dialysis. Beta Blocker: Continue Metoprolol tartrate (Lopressor) 25 mg bid. Diuretics: Continue furosemide (Lasix) 40 mg every morning. Nonpharmacologic management of Heart Failure: I advised him to try and keep his legs up whenever possible and to limit salt in his diet. Atherosclerotic Heart Disease: Coronary Artery stent: 4/10/2017.  Cardiac cath done on 10/5/2021 showed Moderate three vessel coronary artery disease involving a ostial 50-60% stenosis in a small, non-dominant RCA, a 60% mid LAD stenosis and a proximal 50-60% stenosis in the left anterior descending coronary artery  Antiplatelet Agent: Not indicated due to anemia issues as above. Beta Blocker: Continue Metoprolol tartrate (Lopressor) 25 mg bid. Cholesterol Reduction Therapy: Refused by patient. Essential Hypertension: Controlled   Beta Blocker: Continue Metoprolol tartrate (Lopressor) 25 mg bid. Calcium Channel Blocker: Change (Norvasc) as above. Diuretics: Continue furosemide (Lasix) 40 mg every morning. Hyperlipidemia: Mixed - Last LDL on 9/21/2021 was 18 mg/dL   Cholesterol Reduction Therapy: Refused by patient. End Stage Renal Disease: Currently goes to dialysis Mon and Fri    Continue dialysis and follow up with nephrology as scheduled. Hold Norvasc on dialysis days. Finally, I recommended that he continue his other medications and follow up with you as previously scheduled. FOLLOW UP:   I told Mr. Chato Sloan  to call my office if he had any problems, but otherwise told him to Return in about 6 weeks (around 11/3/2022). However, as always I would be happy to see him sooner should the need arise. Once again, thank you for allowing me to participate in this patients care. Please do not hesitate to contact me could I be of further assistance. Sincerely,  [facility]) [facility], [address] [facility], [address]  Phone: [phone], Fax: [phone]    I believe that the risk of significant morbidity and mortality related to the patient's current medical conditions are: Intermediate. Approximately 30 minutes were spent during prep work, discussion and exam of the patient, and follow up documentation and all of their questions were answered. The documentation recorded by the scribe, accurately and completely reflects the services I personally performed and the decisions made by me.  Melida Blair PA-C September 22, 2022

## 2022-09-26 ENCOUNTER — HOSPITAL ENCOUNTER (OUTPATIENT)
Age: 77
Setting detail: SPECIMEN
Discharge: HOME | End: 2022-09-26
Payer: MEDICARE

## 2022-09-26 LAB
ABSOLUTE EOS #: 0.7 K/UL (ref 0–0.44)
ABSOLUTE IMMATURE GRANULOCYTE: 0.08 K/UL (ref 0–0.3)
ABSOLUTE LYMPH #: 1.67 K/UL (ref 1.1–3.7)
ABSOLUTE MONO #: 0.48 K/UL (ref 0.1–1.2)
BASOPHILS # BLD: 1 % (ref 0–2)
BASOPHILS ABSOLUTE: 0.09 K/UL (ref 0–0.2)
EOSINOPHILS RELATIVE PERCENT: 9 % (ref 1–4)
HCT VFR BLD CALC: 30 % (ref 40.7–50.3)
HEMOGLOBIN: 10.3 G/DL (ref 13–17)
IMMATURE GRANULOCYTES: 1 %
LYMPHOCYTES # BLD: 22 % (ref 24–43)
MCH RBC QN AUTO: 33 PG (ref 25.2–33.5)
MCHC RBC AUTO-ENTMCNC: 34.3 G/DL (ref 28.4–34.8)
MCV RBC AUTO: 96.2 FL (ref 82.6–102.9)
MONOCYTES # BLD: 6 % (ref 3–12)
NRBC AUTOMATED: 0 PER 100 WBC
PDW BLD-RTO: 14.6 % (ref 11.8–14.4)
PLATELET # BLD: 161 K/UL (ref 138–453)
PMV BLD AUTO: 10.2 FL (ref 8.1–13.5)
RBC # BLD: 3.12 M/UL (ref 4.21–5.77)
SEG NEUTROPHILS: 61 % (ref 36–65)
SEGMENTED NEUTROPHILS ABSOLUTE COUNT: 4.44 K/UL (ref 1.5–8.1)
WBC # BLD: 7.5 K/UL (ref 3.5–11.3)

## 2022-09-26 PROCEDURE — 83540 ASSAY OF IRON: CPT

## 2022-09-26 PROCEDURE — 85025 COMPLETE CBC W/AUTO DIFF WBC: CPT

## 2022-09-26 PROCEDURE — 82746 ASSAY OF FOLIC ACID SERUM: CPT

## 2022-09-26 PROCEDURE — 82607 VITAMIN B-12: CPT

## 2022-09-26 PROCEDURE — 83550 IRON BINDING TEST: CPT

## 2022-09-26 PROCEDURE — 82728 ASSAY OF FERRITIN: CPT

## 2022-09-27 LAB
FERRITIN: 1559 NG/ML (ref 30–400)
FOLATE: 10.8 NG/ML
IRON SATURATION: 34 % (ref 20–55)
IRON: 71 UG/DL (ref 59–158)
TOTAL IRON BINDING CAPACITY: 207 UG/DL (ref 250–450)
UNSATURATED IRON BINDING CAPACITY: 136 UG/DL (ref 112–347)
VITAMIN B-12: 507 PG/ML (ref 232–1245)

## 2022-09-29 ENCOUNTER — OFFICE VISIT (OUTPATIENT)
Dept: PRIMARY CARE CLINIC | Age: 77
End: 2022-09-29
Payer: MEDICARE

## 2022-09-29 ENCOUNTER — TELEPHONE (OUTPATIENT)
Dept: CARDIOLOGY | Age: 77
End: 2022-09-29

## 2022-09-29 VITALS
DIASTOLIC BLOOD PRESSURE: 64 MMHG | SYSTOLIC BLOOD PRESSURE: 134 MMHG | WEIGHT: 193.6 LBS | BODY MASS INDEX: 28.68 KG/M2 | RESPIRATION RATE: 18 BRPM | HEIGHT: 69 IN | HEART RATE: 59 BPM

## 2022-09-29 DIAGNOSIS — I50.32 CHRONIC DIASTOLIC HEART FAILURE (HCC): ICD-10-CM

## 2022-09-29 DIAGNOSIS — E11.22 TYPE 2 DIABETES MELLITUS WITH ESRD (END-STAGE RENAL DISEASE) (HCC): Primary | ICD-10-CM

## 2022-09-29 DIAGNOSIS — N18.6 TYPE 2 DIABETES MELLITUS WITH ESRD (END-STAGE RENAL DISEASE) (HCC): Primary | ICD-10-CM

## 2022-09-29 DIAGNOSIS — I10 ESSENTIAL HYPERTENSION: ICD-10-CM

## 2022-09-29 PROCEDURE — 99211 OFF/OP EST MAY X REQ PHY/QHP: CPT | Performed by: FAMILY MEDICINE

## 2022-09-29 PROCEDURE — G8417 CALC BMI ABV UP PARAM F/U: HCPCS | Performed by: FAMILY MEDICINE

## 2022-09-29 PROCEDURE — 1036F TOBACCO NON-USER: CPT | Performed by: FAMILY MEDICINE

## 2022-09-29 PROCEDURE — 1124F ACP DISCUSS-NO DSCNMKR DOCD: CPT | Performed by: FAMILY MEDICINE

## 2022-09-29 PROCEDURE — G8427 DOCREV CUR MEDS BY ELIG CLIN: HCPCS | Performed by: FAMILY MEDICINE

## 2022-09-29 PROCEDURE — 99214 OFFICE O/P EST MOD 30 MIN: CPT | Performed by: FAMILY MEDICINE

## 2022-09-29 PROCEDURE — 3051F HG A1C>EQUAL 7.0%<8.0%: CPT | Performed by: FAMILY MEDICINE

## 2022-09-29 RX ORDER — INSULIN GLARGINE 100 [IU]/ML
24 INJECTION, SOLUTION SUBCUTANEOUS 2 TIMES DAILY
Qty: 5 ML | Refills: 2 | Status: SHIPPED | OUTPATIENT
Start: 2022-09-29

## 2022-09-29 NOTE — PROGRESS NOTES
Mireya Sutherland is a 68 y.o. male here for routine follow up of diabetes. He has been checking  his blood glucose levels regularly. He denies numbness and tingling in the hands and feet. He has been compliant with diet and exercise. He has been compliant with his medications. He is tolerating medications. Patient wants to up his lantus due to fasting BS in the mornings being about 150-200. His lst HbA1c at dialysis was 7.9 in July  He is on Droxidopa for hypotension but states he can not afford it and states it is not even working well,his bp drops a lot at dialysis. Allergies:  Coreg [carvedilol], Lipitor [atorvastatin], Aldactone [spironolactone], Crestor [rosuvastatin calcium], Enoxaparin, Lopid [gemfibrozil], Rosuvastatin, Invokana [canagliflozin], and Januvia [sitagliptin]    Past Medical History:    Past Medical History:   Diagnosis Date    Abnormal tilt table test 2/23/2016    Acute kidney injury ([address]     Acute MI ([address]     Acute renal failure superimposed on stage 4 chronic kidney disease ([address] 10/2/2018    ssecondary to hemodynamic effects of loop diuretics and ace inhibitors, bbaseline 1.2-1.3 which peaked up to 1.8, resolving    Acute renal failure with tubular necrosis ([address] 10/2/2018    ssecondary to hemodynamic effects of loop diuretics and ace inhibitors, bbaseline 1.2-1.3 which peaked up to 1.8, resolving    Anemia     Anemia in stage 4 chronic kidney disease ([address] 5/21/2019    Also from suspected blood loss    Angina, class II ([address] 1/8/2018    CAD (coronary artery disease)     Cerebral artery occlusion with cerebral infarction [facility])     CHF (congestive heart failure) ([address]     Cholecystitis 8/17/2015    Chronic back pain     Chronic diastolic HF (heart failure), NYHA class 3 ([address] 4/24/2017    Chronic kidney disease, stage III (moderate) ([address] 2/22/2016    Secondary to diabetic nephrosclerosis.   Baseline creatinine 1.4-1.6, renal function fluctuate volume status    Closed fracture of lumbar vertebra without mention of spinal cord injury     Displacement of intervertebral disc, site unspecified, without myelopathy     H/O cardiac catheterization 2/19/16    LMCA: Mild irregularities 10-20%. LAD: Lesion on PRox LAD: Mid subsection. 65% stenosis. LCx: Lesion on 1st Ob Valentina: Proximal subesection. 70% steniosis. RCA: Small non-dominant RCA. Lesion on PRox RCA: Ostial. 50% stenosis. EF:55%. H/O cardiovascular stress test 2/19/16    Abnormal. Moderate perfusion defect of mild intensity in the inferior, inferoseptal adn inferoapical regions during stress imaging, which most consistent with ischemia. Global LV systolic function normal without regional wall motion abnormalities. OVerall these results are most consistent with an intermediate risk for signficant CAD. Additional testing including cardiac cath may be indicated. H/O tilt table evaluation 12/26/2017    Abnormal. Patients HR, BP response and symptoms were most consistent with dysautonomia. Combined with viligant maintenance of euvolemia and maintaining a moderate salft intake, pharmacologic treatment with SSRI such as lexapro and or mestinon among other treatments have shown some effectiveness in treatment of this condition    H/O tilt table evaluation 12/26/2017    Abnormal head upright tilt table study. The pt heart rate, blood pressure response and symptoms were most consistent with dysautonomia. History of 24 hour EKG monitoring 8/14/14    Occasional PAC's and PVC's which appear to be at least moderately symptomatic. History of 24 hour EKG monitoring 11/19/14    Event Monitor. Sinus rhythm and sinus bradycardia. Infrequent isolated PVC's    History of blood transfusion     History of cardiovascular stress test 2/19/14    Relatively NL    History of cardiovascular stress test 03/21/2018    Normal myocardial perfusion. Global left ventricular systolic function was normal with an EF of 68%.  Overall, these results are 2015    -polyps,diverticulosis,hemorrhoids    COLONOSCOPY  2018    Dr Moshe Smalls    COLONOSCOPY N/A 2018    COLONOSCOPY POLYPECTOMY SNARE/COLD BIOPSY  cold snare  and  hot snare performed by Erica Talbot MD at [address] [facility]  10/30/2020    with Dr. Ambika Jc 10/30/2020    Nunez Rands, NOT HIGH RISK performed by Jodi Renee DO at [address] [facility]      left eye    CT BIOPSY PERCUTANEOUS SUPERFICIAL BONE  2022    CT BIOPSY PERCUTANEOUS SUPERFICIAL BONE 2022 [facility] CT SCAN    ENDOSCOPY, COLON, DIAGNOSTIC      EYE SURGERY      IR TUNNELED CATHETER PLACEMENT GREATER THAN 5 YEARS  2021    IR TUNNELED CATHETER PLACEMENT GREATER THAN 5 YEARS 2021 [facility]    PACEMAKER INSERTION      PACEMAKER PLACEMENT      PAIN MANAGEMENT PROCEDURE Bilateral 5/10/2022    LUMBAR TRANSFORAMINAL - L3-4 performed by Baron Daily MD at [place] [address]  2019    Dr. Hiren Barnes H-Pylori)duodenal bulbar/antral erythema    UPPER GASTROINTESTINAL ENDOSCOPY N/A 2019    EGD BIOPSY, clotest performed by Erica Talbot MD at [facility]. 10/30/2020    EGD ESOPHAGOGASTRODUODENOSCOPY performed by Jodi Renee DO at [facility], LT  2021    US PERCUT RENAL BIOPSY, LT 2021 [facility]       Social History:   Social History     Tobacco Use    Smoking status: Former     Packs/day: 1.50     Years: 8.00     Pack years: 12.00     Types: Cigarettes     Quit date: 3/4/1980     Years since quittin.6    Smokeless tobacco: Never   Substance Use Topics    Alcohol use: No       Family History:   Family History   Problem Relation Age of Onset    Cancer Mother         breast    Cancer Father         lung         Review of Systems:  Constitutional: negative for fever or chills  Eyes: negative for visual disturbance   ENT: negative for sore throat or nasal congestion  Respiratory: negative for cough, shortness of breath and sputum  Cardiovascular: negative for chest pain,pnd,claudication or palpitations  Gastrointestinal: negative for abd pain, dion,nausea, vomiting, diarrhea or constipation  Genitourinary: negative for dysuria,,hematuria urgency or frequency  Musculoskeletal:negative for arthralgias, muscle weakness and stiff joints   Integument/breast: negative for skin rash or lesions  Neurological: positive for   numbness and tingling of feet  Psych: negative for anxiety, depression, suicidial ideation and suicidal attempt. Objective:  Physical Exam:  /64 (Site: Right Upper Arm, Position: Sitting, Cuff Size: Medium Adult)   Pulse 59   Resp 18   Ht 5' 9\" (1.753 m)   Wt 193 lb 9.6 oz (87.8 kg)   BMI 28.59 kg/m²   GEN:   He is alert and oriented  ENT:    ENT exam normal, no neck nodes or sinus tenderness  NECK:   neck supple and non tender without mass, no thyromegaly or thyroid nodules, no cervical lymphadenopathy  EYES:   No gross abnormalities. and PERRL  CVS:     S1,s2,2/6 murmer,no gallop  PULM:   chest clear, no wheezing, rales, normal symmetric air entry  ABD:   Abdomen soft, non-tender. BS normal. No masses,  No organomegaly  MUSC: no joint tenderness, deformity or swelling  Skin : no  rash or lesions  EXT: {EXTREMITIES EXAM:60227::\"Extremities: + 2 pedal pulses, no edema or calf tenderness, and warm to touch. NEURO:  DTR -diminished bilat        Diagnostic Data:  Lab Results   Component Value Date    GLUCOSE 126 (H) 05/13/2022    LABA1C 7.3 (H) 04/19/2022    BUN 59 (H) 05/13/2022     05/13/2022    K 3.2 (L) 05/13/2022    CALCIUM 9.0 05/13/2022     05/13/2022    CO2 21 05/13/2022       Assessment:  1. 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 Chronic diastolic heart failure ([facility]    3.  Essential hypertension      Plan   Diagnosis Orders   1. 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 Chronic diastolic heart failure ([address]        3. Essential hypertension            Check BS 3 times per day  Medication change: increase lantus to 24 units bid,monitor for hypoglycemia  Encouraged 1800 steph diet. Goal for blood pressure control is 130/80  Recommended regular exercise as tolerated, 5 times per week  Discussed his hypotension- to hold meds before dialysis to prevent drop,wear support stockings  Flu vaccine at dialysis  ascivd risk  No orders of the defined types were placed in this encounter. Current Outpatient Medications   Medication Sig Dispense Refill    LANTUS SOLOSTAR 100 UNIT/ML injection pen Inject 24 Units into the skin 2 times daily 5 mL 2    hydrALAZINE (APRESOLINE) 100 MG tablet Take 1 tablet by mouth as needed (PRN) I would like him to only take this medication as needed if his systolic number is >755 mmHg. 90 tablet 2    amLODIPine (NORVASC) 10 MG tablet Take 1 tablet by mouth daily Hold on Monday and Friday due to him having dialysis on these days.  90 tablet 3    Droxidopa (NORTHERA) 200 MG CAPS Take 200 mg by mouth in the morning, at noon, and at bedtime 240 capsule 1    furosemide (LASIX) 40 MG tablet TAKE 1 & 1/2 (ONE & ONE-HALF) TABLETS BY MOUTH ONCE DAILY 45 tablet 0    tamsulosin (FLOMAX) 0.4 MG capsule Take 1 capsule by mouth once daily 90 capsule 3    acetaminophen (TYLENOL) 325 MG tablet Take 650 mg by mouth every 6 hours as needed for Pain      metoprolol tartrate (LOPRESSOR) 25 MG tablet Take 1 tablet by mouth 2 times daily 180 tablet 3    omeprazole (PRILOSEC) 10 MG delayed release capsule Take 10 mg by mouth daily      nitroGLYCERIN (NITROSTAT) 0.4 MG SL tablet Place 1 tablet under the tongue every 5 minutes as needed for Chest pain 25 tablet 3    latanoprost (XALATAN) 0.005 % ophthalmic solution Place 1 drop into both eyes nightly acyclovir (ZOVIRAX) 400 MG tablet 400 mg 2 times daily       Insulin Pen Needle (PEN NEEDLES) 31G X 6 MM MISC 1 each by Does not apply route daily 100 each 3    prednisoLONE acetate (PRED FORTE) 1 % ophthalmic suspension Place 1 drop into the left eye daily        No current facility-administered medications for this visit. Return in about 3 months (around 12/29/2022) for diabetes, hypertension.       Electronically signed by Bel Messina MD on 9/29/2022 at 11:43 AM

## 2022-09-29 NOTE — TELEPHONE ENCOUNTER
Pt called and states since increasing his droxidopa he has had no improvement in his symptoms and he cannot afford this medication. You mentioned in your last note you may stop this. Please advise. There is also no patient assistance available for this.

## 2022-10-03 ENCOUNTER — OFFICE VISIT (OUTPATIENT)
Dept: ONCOLOGY | Age: 77
End: 2022-10-03
Payer: MEDICARE

## 2022-10-03 VITALS
HEART RATE: 72 BPM | BODY MASS INDEX: 28.69 KG/M2 | RESPIRATION RATE: 16 BRPM | DIASTOLIC BLOOD PRESSURE: 61 MMHG | WEIGHT: 194.3 LBS | SYSTOLIC BLOOD PRESSURE: 140 MMHG | TEMPERATURE: 96.7 F

## 2022-10-03 DIAGNOSIS — N18.6 END STAGE RENAL DISEASE (HCC): ICD-10-CM

## 2022-10-03 DIAGNOSIS — N18.4 CKD (CHRONIC KIDNEY DISEASE) STAGE 4, GFR 15-29 ML/MIN (HCC): ICD-10-CM

## 2022-10-03 DIAGNOSIS — K90.9 IRON MALABSORPTION: Primary | ICD-10-CM

## 2022-10-03 DIAGNOSIS — R97.20 ELEVATED PSA: ICD-10-CM

## 2022-10-03 DIAGNOSIS — D64.9 NORMOCYTIC NORMOCHROMIC ANEMIA: ICD-10-CM

## 2022-10-03 PROCEDURE — G8484 FLU IMMUNIZE NO ADMIN: HCPCS | Performed by: INTERNAL MEDICINE

## 2022-10-03 PROCEDURE — 1036F TOBACCO NON-USER: CPT | Performed by: INTERNAL MEDICINE

## 2022-10-03 PROCEDURE — G8427 DOCREV CUR MEDS BY ELIG CLIN: HCPCS | Performed by: INTERNAL MEDICINE

## 2022-10-03 PROCEDURE — 99211 OFF/OP EST MAY X REQ PHY/QHP: CPT

## 2022-10-03 PROCEDURE — 99214 OFFICE O/P EST MOD 30 MIN: CPT | Performed by: INTERNAL MEDICINE

## 2022-10-03 PROCEDURE — G8417 CALC BMI ABV UP PARAM F/U: HCPCS | Performed by: INTERNAL MEDICINE

## 2022-10-03 PROCEDURE — 1124F ACP DISCUSS-NO DSCNMKR DOCD: CPT | Performed by: INTERNAL MEDICINE

## 2022-10-03 NOTE — PROGRESS NOTES
[FreeTextEntry1] : This is an initial visit to allow for this 71-year-old patient who has been on a variety of medications for urinary frequency\par He undergone a TUR is ago and had a revision of the bladder neck in traction did well for years\par He is a history of having been treated with antibiotics for prostatitis with good results but recently has been placed on Proscar and had a trial of cancellous which resulted in a fainting episode\par I have advised the patient to trial Bactrim since he worked for him in the past and if it is effective he may stop his Proscar\par On physical exam this is a left hydrocele and no other abnormality _               Mr. Kalpana Quintana is a very pleasant 68 y.o. male with history of multiple co morbidities as listed. The patient is referred for further management of anemia. Patient has episodes of black tarry stool. He had upper and lower endoscopy in October 2020 which were negative. He is scheduled for follow-up with gastroenterology in [facility] next week for capsule camera evaluation. Patient is having symptomatic anemia. Is having weakness and fatigue. Feels tired. He has shortness of breath on exertion. He is having epigastric pain. No hematochezia. No hematemesis. Patient denies smoking or alcohol drinking,   Started on dialysis which is ongoing and getting EPO and iron per nephrology however ferritin high and has been off iron and as needed EPO  Bone marrow biopsy and aspirate and myeloma work-up ordered negative        Interim history:  Patient presents to the clinic for a follow-up visit and to discuss results of his lab work-up and other relevant clinical data. Overall patient has been tolerating treatment without unexpected or severe side effects. During this visit patient's allergy, social, medical, surgical history and medications were reviewed and updated.                  PAST MEDICAL HISTORY: has a past medical history of Abnormal tilt table test, Acute kidney injury ([address] Acute MI ([address] Acute renal failure superimposed on stage 4 chronic kidney disease ([address] Acute renal failure with tubular necrosis ([address] Anemia, Anemia in stage 4 chronic kidney disease ([address] Angina, class II ([address] CAD (coronary artery disease), Cerebral artery occlusion with cerebral infarction ([address] CHF (congestive heart failure) ([address] Cholecystitis, Chronic back pain, Chronic diastolic HF (heart failure), NYHA class 3 ([address] Chronic kidney disease, stage III (moderate) ([address] Closed fracture of lumbar vertebra without mention of spinal cord injury, Displacement of intervertebral disc, site unspecified, without myelopathy, H/O cardiac catheterization, H/O cardiovascular stress test, H/O tilt table evaluation, H/O tilt table evaluation, History of 24 hour EKG monitoring, History of 24 hour EKG monitoring, History of blood transfusion, History of cardiovascular stress test, History of cardiovascular stress test, History of coronary artery stent placement, History of CVA (cerebrovascular accident) without residual deficits, History of echocardiogram, History of Holter monitoring, History of tilt table evaluation, Hyperlipidemia, Hypertension, Medication side effect, initial encounter, Non critical Right Renal artery stenosis, native ([facility] Pacemaker, S/P cardiac cath, S/P coronary artery stent placement, SIRS (systemic inflammatory response syndrome) ([facility] and Type II or unspecified type diabetes mellitus without mention of complication, not stated as uncontrolled. PAST SURGICAL HISTORY: has a past surgical history that includes Pacemaker insertion; back surgery; Cholecystectomy (08/17/2015); Corneal transplant; Cardiac catheterization (Left, 02/19/2016); pacemaker placement; Colonoscopy (2010); Colonoscopy (02/19/2015); Colonoscopy (05/23/2018); Colonoscopy (N/A, 05/23/2018); Upper gastrointestinal endoscopy (05/28/2019); Upper gastrointestinal endoscopy (N/A, 05/28/2019); Colonoscopy (10/30/2020); Colonoscopy (N/A, 10/30/2020); Upper gastrointestinal endoscopy (N/A, 10/30/2020); Endoscopy, colon, diagnostic; eye surgery; Cardiac catheterization (Left, 10/05/2021); IR TUNNELED CVC PLACE WO SQ PORT/PUMP > 5 YEARS (11/22/2021); US BIOPSY RENAL LEFT PERC (11/24/2021); Pain management procedure (Bilateral, 5/10/2022); and CT BIOPSY SUPERFICIAL BONE PERCUTANEOUS (6/1/2022).      CURRENT MEDICATIONS:  has a current medication list which includes the following prescription(s): lantus solostar, hydralazine, amlodipine, furosemide, tamsulosin, acetaminophen, metoprolol tartrate, omeprazole, nitroglycerin, latanoprost, acyclovir, pen needles, prednisolone acetate, and droxidopa. ALLERGIES:  is allergic to coreg [carvedilol], lipitor [atorvastatin], aldactone [spironolactone], crestor [rosuvastatin calcium], enoxaparin, lopid [gemfibrozil], rosuvastatin, invokana [canagliflozin], and januvia [sitagliptin]. FAMILY HISTORY: Negative for any hematological or oncological conditions. SOCIAL HISTORY:  reports that he quit smoking about 42 years ago. His smoking use included cigarettes. He has a 12.00 pack-year smoking history. He has never used smokeless tobacco. He reports that he does not drink alcohol and does not use drugs. REVIEW OF SYSTEMS:     General: Positive for weakness and fatigue. No unanticipated weight loss or decreased appetite. No fever or chills. Eyes: No blurred vision, eye pain or double vision. Ears: No hearing problems or drainage. No tinnitus. Throat: No sore throat, problems with swallowing or dysphagia. Respiratory: No cough, sputum or hemoptysis. No shortness of breath. No pleuritic chest pain. Cardiovascular: No chest pain, orthopnea or PND. No lower extremity edema. No palpitation. Gastrointestinal: As above. Genitourinary: No dysuria, hematuria, frequency or urgency. Musculoskeletal: No muscle aches or pains. No limitation of movement. No back pain. No gait disturbance, No joint complaints. Dermatologic: No skin rashes or pruritus. No skin lesions or discolorations. Psychiatric: No depression, anxiety, or stress or signs of schizophrenia. No change in mood or affect. Hematologic: No history of bleeding tendency. No bruises or ecchymosis. No history of clotting problems. Infectious disease: No fever, chills or frequent infections. Endocrine: No polydipsia or polyuria. No temperature intolerance. Neurologic: No headaches or dizziness. No weakness or numbness of the extremities.  No changes in balance, coordination,  memory, mentation, behavior. Allergic/Immunologic: No nasal congestion or hives. No repeated infections. PHYSICAL EXAM:  The patient is not in acute distress. Vital signs: Blood pressure (!) 140/61, pulse 72, temperature (!) 96.7 °F (35.9 °C), resp. rate 16, weight 194 lb 4.8 oz (88.1 kg).      General appearance - well appearing, not in pain or distress  Mental status - good mood, alert and oriented  Eyes - pupils equal and reactive, extraocular eye movements intact  Ears - bilateral TM's and external ear canals normal  Nose - normal and patent, no erythema, discharge or polyps  Mouth - mucous membranes moist, pharynx normal without lesions  Neck - supple, no significant adenopathy  Lymphatics - no palpable lymphadenopathy, no hepatosplenomegaly  Chest - clear to auscultation, no wheezes, rales or rhonchi, symmetric air entry  Heart - normal rate, regular rhythm, normal S1, S2, no murmurs, rubs, clicks or gallops  Abdomen - soft, nontender, nondistended, no masses or organomegaly  Neurological - alert, oriented, normal speech, no focal findings or movement disorder noted  Musculoskeletal - no joint tenderness, deformity or swelling  Extremities - peripheral pulses normal, no pedal edema, no clubbing or cyanosis  Skin - normal coloration and turgor, no rashes, no suspicious skin lesions noted     Review of Diagnostic data:   Lab Results   Component Value Date    WBC 7.5 09/26/2022    HGB 10.3 (L) 09/26/2022    HCT 30.0 (L) 09/26/2022    MCV 96.2 09/26/2022     09/26/2022       Chemistry        Component Value Date/Time     05/13/2022 1621    K 3.2 (L) 05/13/2022 1621     05/13/2022 1621    CO2 21 05/13/2022 1621    BUN 59 (H) 05/13/2022 1621    CREATININE 3.41 (H) 05/13/2022 1621        Component Value Date/Time    CALCIUM 9.0 05/13/2022 1621    ALKPHOS 68 05/13/2022 1621    AST 17 05/13/2022 1621    ALT 12 05/13/2022 1621    BILITOT 0.30 05/13/2022 1621 IMPRESSION:   Severe normocytic anemia likely combination of blood loss anemia and anemia of chronic disease/chronic kidney disease> resolved  On IV iron and Procrit with dialysis per nephrology  Episodes of GI bleeding which resolved  Chronic renal insufficiency  Coronary artery disease  Multiple comorbidities as listed  New onset rash on the lower extremity could be related to iron currently on prednisone per PCP  Hypotension with dialysis    PLAN: I reviewed the labs available to me and discussed with the patient. I explained to the patient the nature of this hematologic problem. I explained the significance of these abnormalities in layman language. Patient was seen by GI and pending capsule endoscopy however at this time no bleed  bone marrow biopsy negative for MDS and myeloma work-up is negative  At this time hemoglobin around 10 and no evidence of a bleed   to continue Procrit for hemoglobin below 10 (per dialysis center as his ferritin very high and they have protocol for end-stage kidney patient regarding iron and EPO)    RTC in 3 months with repeated labs                                          [facility]                            This note is created with the assistance of a speech recognition program.  While intending to generate a document that actually reflects the content of the visit, the document can still have some errors including those of syntax and sound a like substitutions which may escape proof reading. It such instances, actual meaning can be extrapolated by contextual diversion. I spent more than 30 minutes examining, evaluating, reviewing data, counseling the patient and coordinating care.   Greater than 50% of time was spent face-to-face with the patient this note is created with the assistance of a speech recognition program.  While intending to generate a document that actually reflects the content of the visit, the document can still have some errors including those of syntax and sound a like substitutions which may escape proof reading. It such instances, actual meaning can be extrapolated by contextual diversion.

## 2022-10-13 RX ORDER — FUROSEMIDE 40 MG/1
TABLET ORAL
Qty: 45 TABLET | Refills: 0 | Status: SHIPPED | OUTPATIENT
Start: 2022-10-13

## 2022-11-02 ENCOUNTER — OFFICE VISIT (OUTPATIENT)
Dept: CARDIOLOGY | Age: 77
End: 2022-11-02
Payer: MEDICARE

## 2022-11-02 VITALS
OXYGEN SATURATION: 100 % | SYSTOLIC BLOOD PRESSURE: 125 MMHG | WEIGHT: 193.4 LBS | RESPIRATION RATE: 18 BRPM | BODY MASS INDEX: 28.64 KG/M2 | HEART RATE: 64 BPM | DIASTOLIC BLOOD PRESSURE: 55 MMHG | HEIGHT: 69 IN

## 2022-11-02 DIAGNOSIS — G90.1 DYSAUTONOMIA (HCC): Primary | ICD-10-CM

## 2022-11-02 DIAGNOSIS — I25.10 CORONARY ARTERY DISEASE INVOLVING NATIVE CORONARY ARTERY OF NATIVE HEART WITHOUT ANGINA PECTORIS: ICD-10-CM

## 2022-11-02 DIAGNOSIS — I50.32 CHRONIC DIASTOLIC CONGESTIVE HEART FAILURE (HCC): ICD-10-CM

## 2022-11-02 DIAGNOSIS — I10 ESSENTIAL HYPERTENSION: ICD-10-CM

## 2022-11-02 DIAGNOSIS — N18.6 END STAGE RENAL DISEASE (HCC): ICD-10-CM

## 2022-11-02 DIAGNOSIS — R42 DIZZY: ICD-10-CM

## 2022-11-02 DIAGNOSIS — Z95.820 S/P ANGIOPLASTY WITH STENT: ICD-10-CM

## 2022-11-02 DIAGNOSIS — E78.2 MIXED HYPERLIPIDEMIA: ICD-10-CM

## 2022-11-02 DIAGNOSIS — R42 LIGHTHEADED: ICD-10-CM

## 2022-11-02 DIAGNOSIS — D64.9 ANEMIA, UNSPECIFIED TYPE: ICD-10-CM

## 2022-11-02 PROCEDURE — G8417 CALC BMI ABV UP PARAM F/U: HCPCS | Performed by: FAMILY MEDICINE

## 2022-11-02 PROCEDURE — 99214 OFFICE O/P EST MOD 30 MIN: CPT | Performed by: FAMILY MEDICINE

## 2022-11-02 PROCEDURE — 3074F SYST BP LT 130 MM HG: CPT | Performed by: FAMILY MEDICINE

## 2022-11-02 PROCEDURE — 3078F DIAST BP <80 MM HG: CPT | Performed by: FAMILY MEDICINE

## 2022-11-02 PROCEDURE — 1036F TOBACCO NON-USER: CPT | Performed by: FAMILY MEDICINE

## 2022-11-02 PROCEDURE — G8484 FLU IMMUNIZE NO ADMIN: HCPCS | Performed by: FAMILY MEDICINE

## 2022-11-02 PROCEDURE — 1124F ACP DISCUSS-NO DSCNMKR DOCD: CPT | Performed by: FAMILY MEDICINE

## 2022-11-02 PROCEDURE — G8427 DOCREV CUR MEDS BY ELIG CLIN: HCPCS | Performed by: FAMILY MEDICINE

## 2022-11-02 PROCEDURE — 99211 OFF/OP EST MAY X REQ PHY/QHP: CPT | Performed by: FAMILY MEDICINE

## 2022-11-02 NOTE — PATIENT INSTRUCTIONS
SURVEY:    You may be receiving a survey from [employer] regarding your visit today. Please complete the survey to enable us to provide the highest quality of care to you and your family. If you cannot score us a very good on any question, please call the office to discuss how we could have made your experience a very good one. Thank you.

## 2022-11-02 NOTE — PROGRESS NOTES
I, Shelley Alexandra am scribing for and in the presence of Jonathon Kim MD, MS, F.A.C.C. Patient: Nancie Boas  : 1945  Date of Visit: 2022    REASON FOR VISIT / CONSULTATION: Follow-up (HX:Dysautonomia, CHF, CAD, HTN,HLD, anemia CKD PT is here for 6 week follow up he states he is doing ok he does have lightheaded/dizziness daily denies any falls, palp Denies:CP, SOB, )    Dear Sarbjit Ortega MD,    As you know, Mr. Charla Perez is a 70 y.o. male with a known history of dysautonomia where on tilt table testing his blood pressure dropped from 993 systolic to 78 systolic with only a 57-44 HR response. More recently, a CT of he head in the past showed evidence of at least 2 old CVA's and a heart catheterization showed severe single vessel disease in the ostium of a moderate sized OM1 branch of the circumflex. However, maximal guidelines directed medical management was opted for an place of stenting partly due to the risk associated with stenting this vessel. Recently he has had a coronary angiography procedure with stenting of his HA Om1. As you know, Mr. Charla Perez  is a 68 y.o. male with a history of recent onset substernal chest discomfort that led to a stress test and a subsequent heart catheterization which showed severe single vessel coronary artery disease of the HA Om1 with successful angioplasty and stenting of that vessel that was done by Dr. Frantz Lewis on 4/10/17. More recently he has had problems with balance problems when he is in his feet and says that a Neurologist has told him in the past this is because of his previous strokes. Most recently he underwent a cardiovascular stress test which fortunately had shown to be normal on 3/21/2018. Mr. Charla Perez has significant normal stress test and echocardiogram on 2020. Holter monitor done on 2020: The rhythm was sinus. Average daily heart rate 58 ranging from 47 to 81 bpm. Bradycardia for 72% test duration.  Rare premature supraventricular ectopic beats consisting of 33 isolated PACs. Rare premature ventricular ectopic beats total 75 consisting of 73 isolatedPVCs and a ventricular couplet. Echocardiogram done on 12/18/2020: EF of 60%. Moderately increased LV wall thickness. Borderline pulmonary hypertension. Mild tricuspid regurgitation. Mild diastolic dysfunction is seen/ Aortic root is mildly dilated. Stress test done on 9/20/2021 was equivocal, There is a small/moderate perfusion defect of mild intensity in the lateral, inferior and inferoapical regions. Cardiac cath done on 10/5/2021 showed Moderate three vessel coronary artery disease involving a ostial 50-60% stenosis in a small, non-dominant RCA, a 60% mid LAD stenosis and a proximal 50-60% stenosis in the left anterior descending coronary artery. Echo done on 11/11/2021 showed an EF 60%, The left atrium is moderately dilated (34-39) with a left atrial volume index of 35 ml/m2. Mild to moderate tricuspid regurgitation. Moderate pulmonary hypertension with estimated pulmonary artery systolic pressure of 42 mmHg. Mild diastolic dysfunction. He had abnormal Tilt table test done on 6/7/2022 ind[place] was consist with dysautonomia. Mr. Lainey Agudelo is here for follow up today. He states he is doing ok since last visit. He continues to have lightheaded/dizziness although denies any recent falls or near falls. He is doing well in dialysis twice a week. He is still producing urine and is only getting up once a night. He denied any chest pain, pressure or tightness. Also denied any worsening shortness of breath since his last visit. No abdominal pain, bleeding problems, bowel issues, problems with his medications or any other concerns at this time. No nausea or vomiting. No cough, fever or chills. Exercise Tolerance: Mr. Lainey Agudelo reports that he has a fairly poor exercise tolerance.  His says that he could walk about 1/2 block without developing chest discomfort or significant shortness of breath. Past Medical History:   Diagnosis Date    Abnormal tilt table test 2/23/2016    Acute kidney injury [facility])     Acute MI ([facility]     Acute renal failure superimposed on stage 4 chronic kidney disease ([facility] 10/2/2018    ssecondary to hemodynamic effects of loop diuretics and ace inhibitors, bbaseline 1.2-1.3 which peaked up to 1.8, resolving    Acute renal failure with tubular necrosis ([facility] 10/2/2018    ssecondary to hemodynamic effects of loop diuretics and ace inhibitors, bbaseline 1.2-1.3 which peaked up to 1.8, resolving    Anemia     Anemia in stage 4 chronic kidney disease ([facility] 5/21/2019    Also from suspected blood loss    Angina, class II ([facility] 1/8/2018    CAD (coronary artery disease)     Cerebral artery occlusion with cerebral infarction [facility])     CHF (congestive heart failure) ([facility]     Cholecystitis 8/17/2015    Chronic back pain     Chronic diastolic HF (heart failure), NYHA class 3 ([facility] 4/24/2017    Chronic kidney disease, stage III (moderate) ([facility] 2/22/2016    Secondary to diabetic nephrosclerosis. Baseline creatinine 1.4-1.6, renal function fluctuate volume status    Closed fracture of lumbar vertebra without mention of spinal cord injury     Displacement of intervertebral disc, site unspecified, without myelopathy     H/O cardiac catheterization 2/19/16    LMCA: Mild irregularities 10-20%. LAD: Lesion on PRox LAD: Mid subsection. 65% stenosis. LCx: Lesion on 1st Ob Valentina: Proximal subesection. 70% steniosis. RCA: Small non-dominant RCA. Lesion on PRox RCA: Ostial. 50% stenosis. EF:55%. H/O cardiovascular stress test 2/19/16    Abnormal. Moderate perfusion defect of mild intensity in the inferior, inferoseptal adn inferoapical regions during stress imaging, which most consistent with ischemia. Global LV systolic function normal without regional wall motion abnormalities. OVerall these results are most consistent with an intermediate risk for signficant CAD.  Additional testing including cardiac cath may be indicated. H/O tilt table evaluation 12/26/2017    Abnormal. Patients HR, BP response and symptoms were most consistent with dysautonomia. Combined with viligant maintenance of euvolemia and maintaining a moderate salft intake, pharmacologic treatment with SSRI such as lexapro and or mestinon among other treatments have shown some effectiveness in treatment of this condition    H/O tilt table evaluation 12/26/2017    Abnormal head upright tilt table study. The pt heart rate, blood pressure response and symptoms were most consistent with dysautonomia. History of 24 hour EKG monitoring 8/14/14    Occasional PAC's and PVC's which appear to be at least moderately symptomatic. History of 24 hour EKG monitoring 11/19/14    Event Monitor. Sinus rhythm and sinus bradycardia. Infrequent isolated PVC's    History of blood transfusion     History of cardiovascular stress test 2/19/14    Relatively NL    History of cardiovascular stress test 03/21/2018    Normal myocardial perfusion. Global left ventricular systolic function was normal with an EF of 68%. Overall, these results are most consistent with a low risk scan. History of coronary artery stent placement 04/2017    PTCA / Drug Eluting Stent:, CX and / or branches    History of CVA (cerebrovascular accident) without residual deficits 2014    Incidentally found on CT head. No known impairment now or in the past.    History of echocardiogram 2/18/14    LA mildly dilated, EF 55%, LV wall thickness is moderately increased, no definite wall motion abnormalilities, what appears to be a pacer wire is seen w/n the RA and RV, mild-mod TR, mild pulmonary hypertension. History of Holter monitoring 12/29/2017    Rare PAC's and PVC's.      History of tilt table evaluation 8/12/14    Abnormal    Hyperlipidemia     Hypertension     Medication side effect, initial encounter 3/23/2018    Non critical Right Renal artery stenosis, native ([facility] [address] 10/2/2018    Non critical Right Renal artery stenosis, based on cath in 2016, Rt RA 30% stenosis    Pacemaker     non-functioning    S/P cardiac cath 04/10/2017    S/P coronary artery stent placement     Successful PTCA - HA Om1    SIRS (systemic inflammatory response syndrome) ([facility] 5/19/2019    Type II or unspecified type diabetes mellitus without mention of complication, not stated as uncontrolled      CURRENT ALLERGIES: Coreg [carvedilol], Lipitor [atorvastatin], Aldactone [spironolactone], Crestor [rosuvastatin calcium], Enoxaparin, Lopid [gemfibrozil], Rosuvastatin, Invokana [canagliflozin], and Januvia [sitagliptin] REVIEW OF SYSTEMS: 14 systems were reviewed. Pertinent positives and negatives as above, all else negative. Past Surgical History:   Procedure Laterality Date    BACK SURGERY      CARDIAC CATHETERIZATION Left 02/19/2016    via right radial approach/ Melina Vernon/ Dr. Huerta Brooklet Left 10/05/2021    Dr Valarie Dai radial-Moderate three vessel coronary artery disease involving a ostial 50-60% stenosis in a small, non-dominant RCA, a 60% mid LAD stenosis and a proximal 50-60% stenosis in the left anterior descending coronary artery. Normal left ventricular end diastolic pressure. Proceed with aggressive maximal medical management as clinically indicated.     CHOLECYSTECTOMY  08/17/2015    Liang/Charles/Saulo/ Millicent    COLONOSCOPY  2010    COLONOSCOPY  02/19/2015    -polyps,diverticulosis,hemorrhoids    COLONOSCOPY  05/23/2018    Dr Louis Marie    COLONOSCOPY N/A 05/23/2018    COLONOSCOPY POLYPECTOMY SNARE/COLD BIOPSY  cold snare  and  hot snare performed by Rosie Maurice MD at [address] [place] [address]  10/30/2020    with Dr. Tyrel Flores N/A 10/30/2020    Halina Moritz, NOT HIGH RISK performed by Mariposa Becerra DO at [id number] [place] [address]      left eye    CT BIOPSY PERCUTANEOUS SUPERFICIAL BONE  2022    CT BIOPSY PERCUTANEOUS SUPERFICIAL BONE 2022 [facility] CT SCAN    ENDOSCOPY, COLON, DIAGNOSTIC      EYE SURGERY      IR TUNNELED CATHETER PLACEMENT GREATER THAN 5 YEARS  2021    IR TUNNELED CATHETER PLACEMENT GREATER THAN 5 YEARS 2021 [facility]    PACEMAKER INSERTION      PACEMAKER PLACEMENT      PAIN MANAGEMENT PROCEDURE Bilateral 5/10/2022    LUMBAR TRANSFORAMINAL - L3-4 performed by Tere Cosme MD at [address]  2019    Dr. Jackye Bernheim H-Pylori)duodenal bulbar/antral erythema    UPPER GASTROINTESTINAL ENDOSCOPY N/A 2019    EGD BIOPSY, clotest performed by Suri Hensley MD at [address] [place] 10/30/2020    EGD ESOPHAGOGASTRODUODENOSCOPY performed by Carlyle Bautista DO at [facility], LT  2021    US PERCUT RENAL BIOPSY, LT 2021 [facility]    Social History:  Social History     Tobacco Use    Smoking status: Former     Packs/day: 1.50     Years: 8.00     Pack years: 12.00     Types: Cigarettes     Quit date: 3/4/1980     Years since quittin.6    Smokeless tobacco: Never   Vaping Use    Vaping Use: Never used   Substance Use Topics    Alcohol use: No    Drug use: No        CURRENT MEDICATIONS:  Outpatient Medications Marked as Taking for the 22 encounter (Office Visit) with Yumiko Perales MD   Medication Sig Dispense Refill    furosemide (LASIX) 40 MG tablet TAKE 1 & 1/2 (ONE & ONE-HALF) TABLETS BY MOUTH ONCE DAILY 45 tablet 0    LANTUS SOLOSTAR 100 UNIT/ML injection pen Inject 24 Units into the skin 2 times daily 5 mL 2    hydrALAZINE (APRESOLINE) 100 MG tablet Take 1 tablet by mouth as needed (PRN) I would like him to only take this medication as needed if his systolic number is >779 mmHg.  90 tablet 2    amLODIPine (NORVASC) 10 MG tablet Take 1 tablet by mouth daily Hold on Monday and Friday due to him having dialysis on these days. 90 tablet 3    tamsulosin (FLOMAX) 0.4 MG capsule Take 1 capsule by mouth once daily 90 capsule 3    acetaminophen (TYLENOL) 325 MG tablet Take 650 mg by mouth every 6 hours as needed for Pain      metoprolol tartrate (LOPRESSOR) 25 MG tablet Take 1 tablet by mouth 2 times daily 180 tablet 3    omeprazole (PRILOSEC) 10 MG delayed release capsule Take 10 mg by mouth daily      nitroGLYCERIN (NITROSTAT) 0.4 MG SL tablet Place 1 tablet under the tongue every 5 minutes as needed for Chest pain 25 tablet 3    latanoprost (XALATAN) 0.005 % ophthalmic solution Place 1 drop into both eyes nightly       acyclovir (ZOVIRAX) 400 MG tablet 400 mg 2 times daily       Insulin Pen Needle (PEN NEEDLES) 31G X 6 MM MISC 1 each by Does not apply route daily 100 each 3    prednisoLONE acetate (PRED FORTE) 1 % ophthalmic suspension Place 1 drop into the left eye daily        FAMILY HISTORY: family history includes Cancer in his father and mother. PHYSICAL EXAM:   BP (!) 125/55 (Site: Right Upper Arm, Position: Sitting, Cuff Size: Large Adult)   Pulse 64   Resp 18   Ht 5' 9\" (1.753 m)   Wt 193 lb 6.4 oz (87.7 kg)   SpO2 100%   BMI 28.56 kg/m²  Body mass index is 28.56 kg/m². Constitutional: He is oriented to person, place, and time. He appears well-developed and well-nourished. In no acute distress. HEENT: Normocephalic and atraumatic. No JVD present. Carotid bruit is not present. No mass and no thyromegaly present. No lymphadenopathy present. Cardiovascular: Normal rate, regular rhythm, normal heart sounds. Exam reveals no gallop and no friction rubs. 2/6 systolic murmur, 5th intercostal space on the LEFT in the mid-clavicular line (cardiac apex). Pulmonary/Chest: Effort normal and breath sounds normal. No respiratory distress. He has no wheezes, rhonchi or rales. Abdominal: Soft, non-tender. Bowel sounds and aorta are normal. He exhibits no organomegaly, mass or bruit. Extremities: Trace.    No cyanosis or clubbing. 2+ radial and carotid pulses. Distal extremity pulses: 2+ bilaterally. Neurological: He is alert and oriented to person, place, and time. No evidence of gross cranial nerve deficit. Coordination appeared normal.   Skin: Skin is warm and dry. There is no rash or diaphoresis. Thrill on left arm. Psychiatric: He has a normal mood and affect. His speech is normal and behavior is normal.      MOST RECENT LABS ON RECORD:   Lab Results   Component Value Date    WBC 7.5 09/26/2022    HGB 10.3 (L) 09/26/2022    HCT 30.0 (L) 09/26/2022     09/26/2022    CHOL 69 09/21/2021    TRIG 185 (H) 09/21/2021    HDL 14 (L) 09/21/2021    LDLDIRECT 44 11/02/2017    ALT 12 05/13/2022    AST 17 05/13/2022     05/13/2022    K 3.2 (L) 05/13/2022     05/13/2022    CREATININE 3.41 (H) 05/13/2022    BUN 59 (H) 05/13/2022    CO2 21 05/13/2022    TSH 2.26 11/02/2017    PSA 3.28 08/16/2022    INR 1.0 06/01/2022    LABA1C 7.3 (H) 04/19/2022    LABMICR 30 10/17/2015     ASSESSMENT:  1. Dysautonomia ([facility]    2. Anemia, unspecified type    3. Chronic diastolic congestive heart failure ([facility]    4. Coronary artery disease involving native coronary artery of native heart without angina pectoris    5. S/P angioplasty with stent    6. Essential hypertension    7. Mixed hyperlipidemia    8. End stage renal disease ([facility]    9. Lightheaded    10. Dizzy        PLAN:  Dysautonomia: Mildly to moderate at this time. Dizziness with every time he stands up from a sitting position. Has had falls in the past due to this dizziness feeling. Last fall was in August.   Diuretics: Continue furosemide (Lasix) 60 mg every morning. Beta Blocker: Continue Metoprolol tartrate (Lopressor) 25 mg bid. Calcium Channel Blocker: Continue amlodipine (Norvasc) 10 mg daily except for the days he has dialysis which is on Mondays and Fridays.  I would like to hold his Norvasc on those days to help with his dizziness and lower blood pressures after his dialysis Mon and Fri    Continue dialysis and follow up with nephrology as scheduled. Hold Norvasc on dialysis days. Finally, I recommended that he continue his other medications and follow up with you as previously scheduled. FOLLOW UP:   I told Mr. Caterina Elmore  to call my office if he had any problems, but otherwise told him to Return in about 4 months (around 3/2/2023). However, as always I would be happy to see him sooner should the need arise. Once again, thank you for allowing me to participate in this patients care. Please do not hesitate to contact me could I be of further assistance. Sincerely,  Bibiana Kimball MD, MS, F.A.C.C. [facility], [address] [place] [address], [address]  Phone: [phone], Fax: [phone]      I believe that the risk of significant morbidity and mortality related to the patient's current medical conditions are: Intermediate. The documentation recorded by the scribe, accurately and completely reflects the services I personally performed and the decisions made by me. Javier Alvarado MD, MS, F.A.C.C.  November 2, 2022

## 2022-11-15 DIAGNOSIS — E11.65 TYPE 2 DIABETES MELLITUS WITH HYPERGLYCEMIA, WITH LONG-TERM CURRENT USE OF INSULIN (HCC): ICD-10-CM

## 2022-11-15 DIAGNOSIS — Z79.4 TYPE 2 DIABETES MELLITUS WITH HYPERGLYCEMIA, WITH LONG-TERM CURRENT USE OF INSULIN (HCC): ICD-10-CM

## 2022-11-16 RX ORDER — CARVEDILOL 25 MG/1
1 TABLET, FILM COATED ORAL 2 TIMES DAILY
Qty: 200 EACH | Refills: 3 | Status: SHIPPED | OUTPATIENT
Start: 2022-11-16

## 2022-11-16 RX ORDER — FUROSEMIDE 40 MG/1
TABLET ORAL
Qty: 45 TABLET | Refills: 0 | Status: SHIPPED | OUTPATIENT
Start: 2022-11-16

## 2022-11-23 ENCOUNTER — HOSPITAL ENCOUNTER (OUTPATIENT)
Dept: NON INVASIVE DIAGNOSTICS | Age: 77
Discharge: HOME | End: 2022-11-23
Payer: MEDICARE

## 2022-11-23 DIAGNOSIS — G90.1 DYSAUTONOMIA (HCC): ICD-10-CM

## 2022-11-23 DIAGNOSIS — Z95.820 S/P ANGIOPLASTY WITH STENT: ICD-10-CM

## 2022-11-23 DIAGNOSIS — I10 ESSENTIAL HYPERTENSION: ICD-10-CM

## 2022-11-23 DIAGNOSIS — I25.10 CORONARY ARTERY DISEASE INVOLVING NATIVE CORONARY ARTERY OF NATIVE HEART WITHOUT ANGINA PECTORIS: ICD-10-CM

## 2022-11-23 DIAGNOSIS — D64.9 ANEMIA, UNSPECIFIED TYPE: ICD-10-CM

## 2022-11-23 DIAGNOSIS — R42 DIZZY: ICD-10-CM

## 2022-11-23 DIAGNOSIS — N18.6 END STAGE RENAL DISEASE (HCC): ICD-10-CM

## 2022-11-23 DIAGNOSIS — R42 LIGHTHEADED: ICD-10-CM

## 2022-11-23 DIAGNOSIS — E78.2 MIXED HYPERLIPIDEMIA: ICD-10-CM

## 2022-11-23 DIAGNOSIS — I50.32 CHRONIC DIASTOLIC CONGESTIVE HEART FAILURE (HCC): ICD-10-CM

## 2022-11-23 PROCEDURE — 93306 TTE W/DOPPLER COMPLETE: CPT

## 2022-11-23 RX ORDER — HYDRALAZINE HYDROCHLORIDE 100 MG/1
TABLET, FILM COATED ORAL
Qty: 90 TABLET | Refills: 0 | Status: SHIPPED | OUTPATIENT
Start: 2022-11-23

## 2022-11-25 ENCOUNTER — TELEPHONE (OUTPATIENT)
Dept: CARDIOLOGY | Age: 77
End: 2022-11-25

## 2022-11-25 NOTE — TELEPHONE ENCOUNTER
----- Message from Veronica Rasmussen MD sent at 11/24/2022 12:09 AM EST -----  Let Mr. Ze Higginbotham know their test result was ok. Will discuss at next visit. Thanks.

## 2022-11-30 DIAGNOSIS — E78.2 MIXED HYPERLIPIDEMIA: ICD-10-CM

## 2022-11-30 DIAGNOSIS — R42 LIGHTHEADED: ICD-10-CM

## 2022-11-30 DIAGNOSIS — N18.6 END STAGE RENAL DISEASE (HCC): ICD-10-CM

## 2022-11-30 DIAGNOSIS — R42 DIZZY: ICD-10-CM

## 2022-11-30 DIAGNOSIS — I25.10 CORONARY ARTERY DISEASE INVOLVING NATIVE CORONARY ARTERY OF NATIVE HEART WITHOUT ANGINA PECTORIS: ICD-10-CM

## 2022-11-30 DIAGNOSIS — I50.32 CHRONIC DIASTOLIC CONGESTIVE HEART FAILURE (HCC): ICD-10-CM

## 2022-11-30 DIAGNOSIS — G90.1 DYSAUTONOMIA (HCC): ICD-10-CM

## 2022-11-30 DIAGNOSIS — I10 ESSENTIAL HYPERTENSION: ICD-10-CM

## 2022-11-30 RX ORDER — AMLODIPINE BESYLATE 10 MG/1
TABLET ORAL
Qty: 90 TABLET | Refills: 0 | Status: SHIPPED | OUTPATIENT
Start: 2022-11-30

## 2022-12-29 ENCOUNTER — OFFICE VISIT (OUTPATIENT)
Dept: PRIMARY CARE CLINIC | Age: 77
End: 2022-12-29
Payer: MEDICARE

## 2022-12-29 VITALS
SYSTOLIC BLOOD PRESSURE: 144 MMHG | HEIGHT: 69 IN | OXYGEN SATURATION: 98 % | HEART RATE: 59 BPM | BODY MASS INDEX: 28.88 KG/M2 | RESPIRATION RATE: 18 BRPM | DIASTOLIC BLOOD PRESSURE: 62 MMHG | WEIGHT: 195 LBS

## 2022-12-29 DIAGNOSIS — I50.32 CHRONIC DIASTOLIC CONGESTIVE HEART FAILURE (HCC): ICD-10-CM

## 2022-12-29 DIAGNOSIS — Z79.4 TYPE 2 DIABETES MELLITUS WITH HYPERGLYCEMIA, WITH LONG-TERM CURRENT USE OF INSULIN (HCC): Primary | ICD-10-CM

## 2022-12-29 DIAGNOSIS — G90.1 DYSAUTONOMIA (HCC): ICD-10-CM

## 2022-12-29 DIAGNOSIS — E11.65 TYPE 2 DIABETES MELLITUS WITH HYPERGLYCEMIA, WITH LONG-TERM CURRENT USE OF INSULIN (HCC): Primary | ICD-10-CM

## 2022-12-29 PROBLEM — D64.9 ANEMIA: Status: ACTIVE | Noted: 2022-12-29

## 2022-12-29 PROBLEM — R10.13 EPIGASTRIC PAIN: Status: ACTIVE | Noted: 2022-12-29

## 2022-12-29 PROCEDURE — G8427 DOCREV CUR MEDS BY ELIG CLIN: HCPCS | Performed by: FAMILY MEDICINE

## 2022-12-29 PROCEDURE — 99211 OFF/OP EST MAY X REQ PHY/QHP: CPT | Performed by: FAMILY MEDICINE

## 2022-12-29 PROCEDURE — G8484 FLU IMMUNIZE NO ADMIN: HCPCS | Performed by: FAMILY MEDICINE

## 2022-12-29 PROCEDURE — 3078F DIAST BP <80 MM HG: CPT | Performed by: FAMILY MEDICINE

## 2022-12-29 PROCEDURE — 1124F ACP DISCUSS-NO DSCNMKR DOCD: CPT | Performed by: FAMILY MEDICINE

## 2022-12-29 PROCEDURE — 3051F HG A1C>EQUAL 7.0%<8.0%: CPT | Performed by: FAMILY MEDICINE

## 2022-12-29 PROCEDURE — 99213 OFFICE O/P EST LOW 20 MIN: CPT | Performed by: FAMILY MEDICINE

## 2022-12-29 PROCEDURE — 3074F SYST BP LT 130 MM HG: CPT | Performed by: FAMILY MEDICINE

## 2022-12-29 PROCEDURE — G8417 CALC BMI ABV UP PARAM F/U: HCPCS | Performed by: FAMILY MEDICINE

## 2022-12-29 PROCEDURE — 1036F TOBACCO NON-USER: CPT | Performed by: FAMILY MEDICINE

## 2022-12-29 RX ORDER — ASCORBIC ACID, THIAMINE, RIBOFLAVIN, NIACINAMIDE, PYRIDOXINE, FOLIC ACID, COBALAMIN, BIOTIN, PANTOTHENIC ACID, ZINC 100; 1.5; 1.7; 20; 10; 1; 6; 300; 10; 5 MG/1; MG/1; MG/1; MG/1; MG/1; MG/1; UG/1; UG/1; MG/1; MG/1
TABLET, COATED ORAL
COMMUNITY
Start: 2022-12-16

## 2022-12-29 NOTE — PROGRESS NOTES
Dominic Garcia is a 68 y.o. male here for routine follow up of diabetes. He has been checking  his blood glucose levels regularly. Patient reports his BGL today was 145   He denies numbness and tingling in the hands and feet. He has been compliant with diet and exercise. He has been compliant with his medications. He is tolerating medications. Hypertension: Patient here for follow-up of elevated blood pressure. He is exercising and is adherent to low salt diet. Blood pressure is well monitored at home. Cardiac symptoms none. Patient denies chest pain, dyspnea, fatigue, and palpitations. Cardiovascular risk factors: advanced age (older than 54 for men, 72 for women), diabetes mellitus, hypertension, and male gender. Use of agents associated with hypertension: none. Patient reports BP gets very low on dialysis days. Patient report BP of 88/50 and he feels very weak and lethargic on those days.         Allergies:  Carvedilol, Lipitor [atorvastatin], Crestor [rosuvastatin calcium], Enoxaparin, Gemfibrozil, Rosuvastatin, Spironolactone, Canagliflozin, and Sitagliptin    Past Medical History:    Past Medical History:   Diagnosis Date    Abnormal tilt table test 2/23/2016    Acute kidney injury ([address]     Acute MI ([facility]     Acute renal failure superimposed on stage 4 chronic kidney disease ([facility] 10/2/2018    ssecondary to hemodynamic effects of loop diuretics and ace inhibitors, bbaseline 1.2-1.3 which peaked up to 1.8, resolving    Acute renal failure with tubular necrosis ([address] 10/2/2018    ssecondary to hemodynamic effects of loop diuretics and ace inhibitors, bbaseline 1.2-1.3 which peaked up to 1.8, resolving    Anemia     Anemia in stage 4 chronic kidney disease ([address] 5/21/2019    Also from suspected blood loss    Angina, class II ([address] 1/8/2018    CAD (coronary artery disease)     Cerebral artery occlusion with cerebral infarction [facility])     CHF (congestive heart failure) ([address] Cholecystitis 8/17/2015    Chronic back pain     Chronic diastolic HF (heart failure), NYHA class 3 ([facility] 4/24/2017    Chronic kidney disease, stage III (moderate) ([facility] 2/22/2016    Secondary to diabetic nephrosclerosis. Baseline creatinine 1.4-1.6, renal function fluctuate volume status    Closed fracture of lumbar vertebra without mention of spinal cord injury     Displacement of intervertebral disc, site unspecified, without myelopathy     H/O cardiac catheterization 2/19/16    LMCA: Mild irregularities 10-20%. LAD: Lesion on PRox LAD: Mid subsection. 65% stenosis. LCx: Lesion on 1st Ob Valentina: Proximal subesection. 70% steniosis. RCA: Small non-dominant RCA. Lesion on PRox RCA: Ostial. 50% stenosis. EF:55%. H/O cardiovascular stress test 2/19/16    Abnormal. Moderate perfusion defect of mild intensity in the inferior, inferoseptal adn inferoapical regions during stress imaging, which most consistent with ischemia. Global LV systolic function normal without regional wall motion abnormalities. OVerall these results are most consistent with an intermediate risk for signficant CAD. Additional testing including cardiac cath may be indicated. H/O tilt table evaluation 12/26/2017    Abnormal. Patients HR, BP response and symptoms were most consistent with dysautonomia. Combined with viligant maintenance of euvolemia and maintaining a moderate salft intake, pharmacologic treatment with SSRI such as lexapro and or mestinon among other treatments have shown some effectiveness in treatment of this condition    H/O tilt table evaluation 12/26/2017    Abnormal head upright tilt table study. The pt heart rate, blood pressure response and symptoms were most consistent with dysautonomia. History of 24 hour EKG monitoring 8/14/14    Occasional PAC's and PVC's which appear to be at least moderately symptomatic. History of 24 hour EKG monitoring 11/19/14    Event Monitor. Sinus rhythm and sinus bradycardia. Infrequent isolated PVC's    History of blood transfusion     History of cardiovascular stress test 2/19/14    Relatively NL    History of cardiovascular stress test 03/21/2018    Normal myocardial perfusion. Global left ventricular systolic function was normal with an EF of 68%. Overall, these results are most consistent with a low risk scan. History of coronary artery stent placement 04/2017    PTCA / Drug Eluting Stent:, CX and / or branches    History of CVA (cerebrovascular accident) without residual deficits 2014    Incidentally found on CT head. No known impairment now or in the past.    History of echocardiogram 2/18/14    LA mildly dilated, EF 55%, LV wall thickness is moderately increased, no definite wall motion abnormalilities, what appears to be a pacer wire is seen w/n the RA and RV, mild-mod TR, mild pulmonary hypertension. History of Holter monitoring 12/29/2017    Rare PAC's and PVC's.      History of tilt table evaluation 8/12/14    Abnormal    Hyperlipidemia     Hypertension     Medication side effect, initial encounter 3/23/2018    Non critical Right Renal artery stenosis, native ([address] 10/2/2018    Non critical Right Renal artery stenosis, based on cath in 2016, Rt RA 30% stenosis    Pacemaker     non-functioning    S/P cardiac cath 04/10/2017    S/P coronary artery stent placement     Successful PTCA - HA Om1    SIRS (systemic inflammatory response syndrome) ([address] 5/19/2019    Type II or unspecified type diabetes mellitus without mention of complication, not stated as uncontrolled        Past Surgical History:    Past Surgical History:   Procedure Laterality Date    BACK SURGERY      CARDIAC CATHETERIZATION Left 02/19/2016    via right radial approach/ Melina Vernon/ Dr. Huerta Hamilton Left 10/05/2021    Dr Valarie Dai radial-Moderate three vessel coronary artery disease involving a ostial 50-60% stenosis in a small, non-dominant RCA, a 60% mid LAD stenosis and a proximal 50-60% stenosis in the left anterior descending coronary artery. Normal left ventricular end diastolic pressure. Proceed with aggressive maximal medical management as clinically indicated.     CHOLECYSTECTOMY  08/17/2015    Liang/Charles/Saulo/ Lap    COLONOSCOPY  2010    COLONOSCOPY  02/19/2015    -polyps,diverticulosis,hemorrhoids    COLONOSCOPY  05/23/2018    Dr Edward Quarles    COLONOSCOPY N/A 05/23/2018    COLONOSCOPY POLYPECTOMY SNARE/COLD BIOPSY  cold snare  and  hot snare performed by Alethea Philippe MD at [facility]  10/30/2020    with Dr. Key Funk    COLONOSCOPY N/A 10/30/2020    Todd Mason, NOT HIGH RISK performed by Chu Dover DO at [id number] [place] [address]      left eye    CT BIOPSY PERCUTANEOUS SUPERFICIAL BONE  6/1/2022    CT BIOPSY PERCUTANEOUS SUPERFICIAL BONE 6/1/2022 [facility] CT SCAN    ENDOSCOPY, COLON, DIAGNOSTIC      EYE SURGERY      IR TUNNELED CATHETER PLACEMENT GREATER THAN 5 YEARS  11/22/2021    IR TUNNELED CATHETER PLACEMENT GREATER THAN 5 YEARS 11/22/2021 [facility]    PACEMAKER INSERTION      PACEMAKER PLACEMENT      PAIN MANAGEMENT PROCEDURE Bilateral 5/10/2022    LUMBAR TRANSFORAMINAL - L3-4 performed by Edinson Zuleta MD at [address] [place] [address]  05/28/2019    Dr. Calos Aguilar H-Pylori)duodenal bulbar/antral erythema    UPPER GASTROINTESTINAL ENDOSCOPY N/A 05/28/2019    EGD BIOPSY, clotest performed by Alethea Philippe MD at [address] [place] [address] 10/30/2020    EGD ESOPHAGOGASTRODUODENOSCOPY performed by Chu Dover DO at [place] [address], LT  11/24/2021    US PERCUT RENAL BIOPSY, LT 11/24/2021 [facility]       Social History:   Social History     Tobacco Use    Smoking status: Former     Packs/day: 1.50     Years: 8.00     Pack years: 12.00     Types: Cigarettes     Quit date: 3/4/1980     Years since quittin.8    Smokeless tobacco: Never   Substance Use Topics    Alcohol use: No       Family History:   Family History   Problem Relation Age of Onset    Cancer Mother         breast    Cancer Father         lung         Review of Systems:  Constitutional: negative for fever or chills,has low bp after dialysis  Eyes: negative for visual disturbance   ENT: negative for sore throat or nasal congestion  Respiratory: negative for cough, shortness of breath and sputum  Cardiovascular: negative for chest pain,pnd,claudication or palpitations  Gastrointestinal: negative for abd pain, dion,nausea, vomiting, diarrhea or constipation  Genitourinary: negative for dysuria,,hematuria urgency or frequency  Musculoskeletal:negative for arthralgias, muscle weakness and stiff joints   Integument/breast: negative for skin rash or lesions  Neurological: positive for   numbness and tingling. Psych: negative for anxiety, depression, suicidial ideation and suicidal attempt. Objective:  Physical Exam:  BP (!) 144/62 (Site: Right Upper Arm, Position: Sitting, Cuff Size: Medium Adult)   Pulse 59   Resp 18   Ht 5' 9\" (1.753 m)   Wt 195 lb (88.5 kg)   SpO2 98%   BMI 28.80 kg/m²   GEN:   He is alert and oriented  ENT:    ENT exam normal, no neck nodes or sinus tenderness  NECK:   neck supple and non tender without mass, no thyromegaly or thyroid nodules, no cervical lymphadenopathy  EYES:   No gross abnormalities. CVS:     CVS exam BP noted to be well controlled today in office, S1, S2 normal, no gallop, 2/6 murmur, chest clear, no JVD, no HSM, no edema  PULM:   chest clear, no wheezing, rales, normal symmetric air entry, no tachypnea, retractions or cyanosis  ABD:   Abdomen soft, non-tender. BS normal. No masses,  No organomegaly  Skin : no  rash or lesions  EXT: {EXTREMITIES EXAM:25194::\"Extremities: + 2 pedal pulses, no edema or calf tenderness, and warm to touch.   NEURO:  DTR -diminished        Diagnostic Data:  Lab Results   Component Value Date    GLUCOSE 126 (H) 05/13/2022    LABA1C 7.3 (H) 04/19/2022    BUN 59 (H) 05/13/2022     05/13/2022    K 3.2 (L) 05/13/2022    CALCIUM 9.0 05/13/2022     05/13/2022    CO2 21 05/13/2022       Assessment:  1. Type 2 diabetes mellitus with hyperglycemia, with long-term current use of insulin ([address]    2. Chronic diastolic congestive heart failure ([address]    3. Dysautonomia (HCC)      Plan   Diagnosis Orders   1. Type 2 diabetes mellitus with hyperglycemia, with long-term current use of insulin ([address]        2. Chronic diastolic congestive heart failure (HCC)        3. Dysautonomia (HCC)            Check BS 2 times per day  Medication change: to discus with nephrology about his hypotension after dialysis  Encouraged diabetic, 1800 calorie diet. Goal for blood pressure control is 120/80  Recommended regular exercise as tolerated, 5 times per week  Labs reviewed -hba1c  ascivd risk  No orders of the defined types were placed in this encounter. Current Outpatient Medications   Medication Sig Dispense Refill    B Complex-C-Zn-Folic Acid (DIALYVITE/ZINC) TABS TAKE 1 TABLET BY MOUTH ONCE DAILY      amLODIPine (NORVASC) 10 MG tablet Take 1 tablet by mouth once daily 90 tablet 0    hydrALAZINE (APRESOLINE) 100 MG tablet TAKE 1 TABLET BY MOUTH THREE TIMES DAILY EXCEPT  ON  THE  MORNINGS  OF  DIALYSIS.  90 tablet 0    furosemide (LASIX) 40 MG tablet TAKE 1 & 1/2 (ONE & ONE-HALF) TABLETS BY MOUTH ONCE DAILY 45 tablet 0    CONTOUR TEST strip Inject 1 each into the skin 2 times daily 200 each 3    LANTUS SOLOSTAR 100 UNIT/ML injection pen Inject 24 Units into the skin 2 times daily 5 mL 2    tamsulosin (FLOMAX) 0.4 MG capsule Take 1 capsule by mouth once daily 90 capsule 3    acetaminophen (TYLENOL) 325 MG tablet Take 650 mg by mouth every 6 hours as needed for Pain      metoprolol tartrate (LOPRESSOR) 25 MG tablet Take 1 tablet by mouth 2 times daily 180 tablet 3    omeprazole (PRILOSEC) 10 MG delayed release capsule Take 10 mg by mouth daily      nitroGLYCERIN (NITROSTAT) 0.4 MG SL tablet Place 1 tablet under the tongue every 5 minutes as needed for Chest pain 25 tablet 3    latanoprost (XALATAN) 0.005 % ophthalmic solution Place 1 drop into both eyes nightly       acyclovir (ZOVIRAX) 400 MG tablet 400 mg 2 times daily       Insulin Pen Needle (PEN NEEDLES) 31G X 6 MM MISC 1 each by Does not apply route daily 100 each 3    prednisoLONE acetate (PRED FORTE) 1 % ophthalmic suspension Place 1 drop into the left eye daily       Droxidopa (NORTHERA) 200 MG CAPS Take 200 mg by mouth in the morning, at noon, and at bedtime (Patient not taking: No sig reported) 240 capsule 1     No current facility-administered medications for this visit. No follow-ups on file.       Electronically signed by Mary Howe MD on 12/29/2022 at 11:14 AM

## 2022-12-29 NOTE — PATIENT INSTRUCTIONS
SURVEY:    You may be receiving a survey from [employer] regarding your visit today. You may get this in the mail, through your MyChart, or in your email. Please complete the survey to enable us to provide the highest quality of care to you and your family. If you cannot score us a very good (5 Stars) on any question, please call the office to discuss how we could of made your experience exceptional.    Thank you!     Dr. Doe Molina, LPN Lacie Krabbe, [facility], [address] [place] [address]    Phone: [phone]  Fax: [phone]    Office Hours:   Daphnie Tabor, [address], F: 8-5

## 2023-01-08 DIAGNOSIS — N18.6 TYPE 2 DIABETES MELLITUS WITH ESRD (END-STAGE RENAL DISEASE) (HCC): ICD-10-CM

## 2023-01-08 DIAGNOSIS — E11.22 TYPE 2 DIABETES MELLITUS WITH ESRD (END-STAGE RENAL DISEASE) (HCC): ICD-10-CM

## 2023-01-09 ENCOUNTER — HOSPITAL ENCOUNTER (OUTPATIENT)
Age: 78
Setting detail: SPECIMEN
Discharge: HOME | End: 2023-01-09
Payer: MEDICARE

## 2023-01-09 DIAGNOSIS — K90.9 IRON MALABSORPTION: ICD-10-CM

## 2023-01-09 DIAGNOSIS — E11.22 TYPE 2 DIABETES MELLITUS WITH ESRD (END-STAGE RENAL DISEASE) (HCC): ICD-10-CM

## 2023-01-09 DIAGNOSIS — N18.6 TYPE 2 DIABETES MELLITUS WITH ESRD (END-STAGE RENAL DISEASE) (HCC): ICD-10-CM

## 2023-01-09 LAB
ABSOLUTE EOS #: 0.69 K/UL (ref 0–0.44)
ABSOLUTE IMMATURE GRANULOCYTE: 0.14 K/UL (ref 0–0.3)
ABSOLUTE LYMPH #: 1.87 K/UL (ref 1.1–3.7)
ABSOLUTE MONO #: 0.56 K/UL (ref 0.1–1.2)
BASOPHILS # BLD: 1 % (ref 0–2)
BASOPHILS ABSOLUTE: 0.08 K/UL (ref 0–0.2)
EOSINOPHILS RELATIVE PERCENT: 8 % (ref 1–4)
FERRITIN: 1835 NG/ML (ref 30–400)
HCT VFR BLD CALC: 30.8 % (ref 40.7–50.3)
HEMOGLOBIN: 10.4 G/DL (ref 13–17)
IMMATURE GRANULOCYTES: 2 %
IRON SATURATION: 35 % (ref 20–55)
IRON: 80 UG/DL (ref 59–158)
LYMPHOCYTES # BLD: 22 % (ref 24–43)
MCH RBC QN AUTO: 32.7 PG (ref 25.2–33.5)
MCHC RBC AUTO-ENTMCNC: 33.8 G/DL (ref 28.4–34.8)
MCV RBC AUTO: 96.9 FL (ref 82.6–102.9)
MONOCYTES # BLD: 7 % (ref 3–12)
NRBC AUTOMATED: 0 PER 100 WBC
PDW BLD-RTO: 13.2 % (ref 11.8–14.4)
PLATELET # BLD: 174 K/UL (ref 138–453)
PMV BLD AUTO: 10.6 FL (ref 8.1–13.5)
RBC # BLD: 3.18 M/UL (ref 4.21–5.77)
SEG NEUTROPHILS: 60 % (ref 36–65)
SEGMENTED NEUTROPHILS ABSOLUTE COUNT: 5.28 K/UL (ref 1.5–8.1)
TOTAL IRON BINDING CAPACITY: 227 UG/DL (ref 250–450)
UNSATURATED IRON BINDING CAPACITY: 147 UG/DL (ref 112–347)
WBC # BLD: 8.6 K/UL (ref 3.5–11.3)

## 2023-01-09 PROCEDURE — 83540 ASSAY OF IRON: CPT

## 2023-01-09 PROCEDURE — 82728 ASSAY OF FERRITIN: CPT

## 2023-01-09 PROCEDURE — 85025 COMPLETE CBC W/AUTO DIFF WBC: CPT

## 2023-01-09 PROCEDURE — 83550 IRON BINDING TEST: CPT

## 2023-01-09 RX ORDER — INSULIN GLARGINE 100 [IU]/ML
INJECTION, SOLUTION SUBCUTANEOUS
Qty: 15 ML | Refills: 0 | Status: SHIPPED | OUTPATIENT
Start: 2023-01-09 | End: 2023-01-09 | Stop reason: SDUPTHER

## 2023-01-09 RX ORDER — INSULIN GLARGINE 100 [IU]/ML
24 INJECTION, SOLUTION SUBCUTANEOUS 2 TIMES DAILY
Qty: 28.8 ML | Refills: 0 | Status: SHIPPED | OUTPATIENT
Start: 2023-01-09 | End: 2023-03-10

## 2023-01-09 NOTE — TELEPHONE ENCOUNTER
----- Message from Ruben Aceves sent at 1/9/2023  3:00 PM EST -----  Subject: Medication Problem    Medication: Other - Insulin  Dosage: As needed  Ordering Provider: Dr. Chilango Amanda     Question/Problem: Patient states that insurance would like a 60 day supply   called in and not 32 day supply due to the cost      Pharmacy: [address] [facility], [address] [facility]    ---------------------------------------------------------------------------  --------------  Duc Langford INFO  [phone]; OK to leave message on voicemail  ---------------------------------------------------------------------------  --------------    SCRIPT ANSWERS  Relationship to Patient: Self

## 2023-01-11 DIAGNOSIS — I50.32 CHRONIC DIASTOLIC CONGESTIVE HEART FAILURE (HCC): ICD-10-CM

## 2023-01-11 DIAGNOSIS — D64.9 ANEMIA, UNSPECIFIED TYPE: Primary | ICD-10-CM

## 2023-01-20 ENCOUNTER — HOSPITAL ENCOUNTER (OUTPATIENT)
Age: 78
Setting detail: SPECIMEN
Discharge: HOME | End: 2023-01-20
Payer: MEDICARE

## 2023-01-20 DIAGNOSIS — I50.32 CHRONIC DIASTOLIC CONGESTIVE HEART FAILURE (HCC): ICD-10-CM

## 2023-01-20 DIAGNOSIS — D64.9 ANEMIA, UNSPECIFIED TYPE: ICD-10-CM

## 2023-01-20 LAB
ABSOLUTE EOS #: 0.82 K/UL (ref 0–0.44)
ABSOLUTE IMMATURE GRANULOCYTE: 0.06 K/UL (ref 0–0.3)
ABSOLUTE LYMPH #: 2.12 K/UL (ref 1.1–3.7)
ABSOLUTE MONO #: 0.55 K/UL (ref 0.1–1.2)
ANION GAP SERPL CALCULATED.3IONS-SCNC: 11 MMOL/L (ref 9–17)
BASOPHILS # BLD: 1 % (ref 0–2)
BASOPHILS ABSOLUTE: 0.08 K/UL (ref 0–0.2)
BUN BLDV-MCNC: 61 MG/DL (ref 8–23)
BUN/CREAT BLD: 11 (ref 9–20)
CALCIUM SERPL-MCNC: 9.2 MG/DL (ref 8.6–10.4)
CHLORIDE BLD-SCNC: 106 MMOL/L (ref 98–107)
CO2: 25 MMOL/L (ref 20–31)
CREAT SERPL-MCNC: 5.52 MG/DL (ref 0.7–1.2)
EOSINOPHILS RELATIVE PERCENT: 9 % (ref 1–4)
GFR SERPL CREATININE-BSD FRML MDRD: 10 ML/MIN/1.73M2
GLUCOSE BLD-MCNC: 164 MG/DL (ref 70–99)
HCT VFR BLD CALC: 30 % (ref 40.7–50.3)
HEMOGLOBIN: 9.9 G/DL (ref 13–17)
IMMATURE GRANULOCYTES: 1 %
LYMPHOCYTES # BLD: 23 % (ref 24–43)
MCH RBC QN AUTO: 33.4 PG (ref 25.2–33.5)
MCHC RBC AUTO-ENTMCNC: 33 G/DL (ref 28.4–34.8)
MCV RBC AUTO: 101.4 FL (ref 82.6–102.9)
MONOCYTES # BLD: 6 % (ref 3–12)
NRBC AUTOMATED: 0 PER 100 WBC
PDW BLD-RTO: 13.7 % (ref 11.8–14.4)
PLATELET # BLD: 191 K/UL (ref 138–453)
PMV BLD AUTO: 10.3 FL (ref 8.1–13.5)
POTASSIUM SERPL-SCNC: 4.5 MMOL/L (ref 3.7–5.3)
RBC # BLD: 2.96 M/UL (ref 4.21–5.77)
SEG NEUTROPHILS: 60 % (ref 36–65)
SEGMENTED NEUTROPHILS ABSOLUTE COUNT: 5.72 K/UL (ref 1.5–8.1)
SODIUM BLD-SCNC: 142 MMOL/L (ref 135–144)
WBC # BLD: 9.4 K/UL (ref 3.5–11.3)

## 2023-01-20 PROCEDURE — 85025 COMPLETE CBC W/AUTO DIFF WBC: CPT

## 2023-01-20 PROCEDURE — 80048 BASIC METABOLIC PNL TOTAL CA: CPT

## 2023-01-23 ENCOUNTER — TELEPHONE (OUTPATIENT)
Dept: CARDIOLOGY | Age: 78
End: 2023-01-23

## 2023-01-23 NOTE — TELEPHONE ENCOUNTER
----- Message from Radha Butt MD sent at 1/22/2023 11:55 PM EST -----  Let Mr. Franc Graham know their test result was ok. Will discuss at next visit. Thanks.

## 2023-01-26 ENCOUNTER — OFFICE VISIT (OUTPATIENT)
Dept: ONCOLOGY | Age: 78
End: 2023-01-26
Payer: MEDICARE

## 2023-01-26 VITALS
BODY MASS INDEX: 29.53 KG/M2 | WEIGHT: 200 LBS | HEART RATE: 67 BPM | DIASTOLIC BLOOD PRESSURE: 56 MMHG | SYSTOLIC BLOOD PRESSURE: 131 MMHG | TEMPERATURE: 96.1 F | RESPIRATION RATE: 20 BRPM

## 2023-01-26 DIAGNOSIS — D64.9 NORMOCYTIC NORMOCHROMIC ANEMIA: Primary | ICD-10-CM

## 2023-01-26 DIAGNOSIS — D63.8 ANEMIA OF CHRONIC DISEASE: ICD-10-CM

## 2023-01-26 DIAGNOSIS — I27.20 PULMONARY HYPERTENSION (HCC): ICD-10-CM

## 2023-01-26 DIAGNOSIS — N18.4 CKD (CHRONIC KIDNEY DISEASE) STAGE 4, GFR 15-29 ML/MIN (HCC): ICD-10-CM

## 2023-01-26 PROCEDURE — 99211 OFF/OP EST MAY X REQ PHY/QHP: CPT | Performed by: INTERNAL MEDICINE

## 2023-01-26 NOTE — PROGRESS NOTES
_               Mr. Abbe Kirkpatrick is a very pleasant 68 y.o. male with history of multiple co morbidities as listed. The patient is referred for further management of anemia. Patient has episodes of black tarry stool. He had upper and lower endoscopy in October 2020 which were negative. He is scheduled for follow-up with gastroenterology in [facility] next week for capsule camera evaluation. Patient is having symptomatic anemia. Is having weakness and fatigue. Feels tired. He has shortness of breath on exertion. He is having epigastric pain. No hematochezia. No hematemesis. Patient denies smoking or alcohol drinking,   Started on dialysis which is ongoing and getting EPO and iron per nephrology however ferritin high and has been off iron and as needed EPO  Bone marrow biopsy and aspirate and myeloma work-up ordered negative        Interim history:  Patient presents to the clinic for a follow-up visit and to discuss results of his lab work-up and other relevant clinical data. Overall patient has been tolerating treatment without unexpected or severe side effects. During this visit patient's allergy, social, medical, surgical history and medications were reviewed and updated.     On RAVI and hemoglobin around 10 currently on dialysis twice a week  Capsule endoscopy negative  And iron panel compatible of anemia of chronic disease      PAST MEDICAL HISTORY: has a past medical history of Abnormal tilt table test, Acute kidney injury ([address] Acute MI ([address] Acute renal failure superimposed on stage 4 chronic kidney disease ([address] Acute renal failure with tubular necrosis ([address] Anemia, Anemia in stage 4 chronic kidney disease ([address] Angina, class II ([address] CAD (coronary artery disease), Cerebral artery occlusion with cerebral infarction ([address] CHF (congestive heart failure) ([address] Cholecystitis, Chronic back pain, Chronic diastolic HF (heart failure), NYHA class 3 ([address] Chronic kidney disease, stage III (moderate) ([facility]), Closed fracture of lumbar vertebra without mention of spinal cord injury, Displacement of intervertebral disc, site unspecified, without myelopathy, H/O cardiac catheterization, H/O cardiovascular stress test, H/O tilt table evaluation, H/O tilt table evaluation, History of 24 hour EKG monitoring, History of 24 hour EKG monitoring, History of blood transfusion, History of cardiovascular stress test, History of cardiovascular stress test, History of coronary artery stent placement, History of CVA (cerebrovascular accident) without residual deficits, History of echocardiogram, History of Holter monitoring, History of tilt table evaluation, Hyperlipidemia, Hypertension, Medication side effect, initial encounter, Non critical Right Renal artery stenosis, native ([address] Pacemaker, S/P cardiac cath, S/P coronary artery stent placement, SIRS (systemic inflammatory response syndrome) ([facility] and Type II or unspecified type diabetes mellitus without mention of complication, not stated as uncontrolled. PAST SURGICAL HISTORY: has a past surgical history that includes Pacemaker insertion; back surgery; Cholecystectomy (08/17/2015); Corneal transplant; Cardiac catheterization (Left, 02/19/2016); pacemaker placement; Colonoscopy (2010); Colonoscopy (02/19/2015); Colonoscopy (05/23/2018); Colonoscopy (N/A, 05/23/2018); Upper gastrointestinal endoscopy (05/28/2019); Upper gastrointestinal endoscopy (N/A, 05/28/2019); Colonoscopy (10/30/2020); Colonoscopy (N/A, 10/30/2020); Upper gastrointestinal endoscopy (N/A, 10/30/2020); Endoscopy, colon, diagnostic; eye surgery; Cardiac catheterization (Left, 10/05/2021); IR TUNNELED CVC PLACE WO SQ PORT/PUMP > 5 YEARS (11/22/2021); US BIOPSY RENAL LEFT PERC (11/24/2021); Pain management procedure (Bilateral, 5/10/2022); and CT BIOPSY SUPERFICIAL BONE PERCUTANEOUS (6/1/2022).      CURRENT MEDICATIONS:  has a current medication list which includes the following prescription(s): lantus solostar, dialyvite/zinc, amlodipine, hydralazine, furosemide, contour test, tamsulosin, acetaminophen, metoprolol tartrate, latanoprost, pen needles, prednisolone acetate, droxidopa, and nitroglycerin. ALLERGIES:  is allergic to carvedilol, lipitor [atorvastatin], crestor [rosuvastatin calcium], enoxaparin, gemfibrozil, rosuvastatin, spironolactone, canagliflozin, and sitagliptin. FAMILY HISTORY: Negative for any hematological or oncological conditions. SOCIAL HISTORY:  reports that he quit smoking about 42 years ago. His smoking use included cigarettes. He has a 12.00 pack-year smoking history. He has never used smokeless tobacco. He reports that he does not drink alcohol and does not use drugs. REVIEW OF SYSTEMS:     General: Positive for weakness and fatigue. No unanticipated weight loss or decreased appetite. No fever or chills. Eyes: No blurred vision, eye pain or double vision. Ears: No hearing problems or drainage. No tinnitus. Throat: No sore throat, problems with swallowing or dysphagia. Respiratory: No cough, sputum or hemoptysis. No shortness of breath. No pleuritic chest pain. Cardiovascular: No chest pain, orthopnea or PND. No lower extremity edema. No palpitation. Gastrointestinal: As above. Genitourinary: No dysuria, hematuria, frequency or urgency. Musculoskeletal: No muscle aches or pains. No limitation of movement. No back pain. No gait disturbance, No joint complaints. Dermatologic: No skin rashes or pruritus. No skin lesions or discolorations. Psychiatric: No depression, anxiety, or stress or signs of schizophrenia. No change in mood or affect. Hematologic: No history of bleeding tendency. No bruises or ecchymosis. No history of clotting problems. Infectious disease: No fever, chills or frequent infections. Endocrine: No polydipsia or polyuria.  No temperature intolerance. Neurologic: No headaches or dizziness. No weakness or numbness of the extremities. No changes in balance, coordination,  memory, mentation, behavior. Allergic/Immunologic: No nasal congestion or hives. No repeated infections. PHYSICAL EXAM:  The patient is not in acute distress. Vital signs: Blood pressure (!) 131/56, pulse 67, temperature (!) 96.1 °F (35.6 °C), temperature source Temporal, resp. rate 20, weight 200 lb (90.7 kg).      General appearance - well appearing, not in pain or distress  Mental status - good mood, alert and oriented  Eyes - pupils equal and reactive, extraocular eye movements intact  Ears - bilateral TM's and external ear canals normal  Nose - normal and patent, no erythema, discharge or polyps  Mouth - mucous membranes moist, pharynx normal without lesions  Neck - supple, no significant adenopathy  Lymphatics - no palpable lymphadenopathy, no hepatosplenomegaly  Chest - clear to auscultation, no wheezes, rales or rhonchi, symmetric air entry  Heart - normal rate, regular rhythm, normal S1, S2, no murmurs, rubs, clicks or gallops  Abdomen - soft, nontender, nondistended, no masses or organomegaly  Neurological - alert, oriented, normal speech, no focal findings or movement disorder noted  Musculoskeletal - no joint tenderness, deformity or swelling  Extremities - peripheral pulses normal, no pedal edema, no clubbing or cyanosis  Skin - normal coloration and turgor, no rashes, no suspicious skin lesions noted     Review of Diagnostic data:   Lab Results   Component Value Date    WBC 9.4 01/20/2023    HGB 9.9 (L) 01/20/2023    HCT 30.0 (L) 01/20/2023    .4 01/20/2023     01/20/2023       Chemistry        Component Value Date/Time     01/20/2023 1020    K 4.5 01/20/2023 1020     01/20/2023 1020    CO2 25 01/20/2023 1020    BUN 61 (H) 01/20/2023 1020    CREATININE 5.52 (HH) 01/20/2023 1020        Component Value Date/Time    CALCIUM 9.2 01/20/2023 1020    ALKPHOS 68 05/13/2022 1621    AST 17 05/13/2022 1621    ALT 12 05/13/2022 1621    BILITOT 0.30 05/13/2022 1621            IMPRESSION:   Severe normocytic anemia likely combination of blood loss anemia and anemia of chronic disease/chronic kidney disease> work-up for bleeding negative  On Procrit with dialysis per nephrology  Episodes of GI bleeding which resolved  Chronic renal insufficiency  Coronary artery disease  Multiple comorbidities as listed  Hypotension with dialysis    PLAN: I reviewed the labs available to me and discussed with the patient. I explained to the patient the nature of this hematologic problem. I explained the significance of these abnormalities in layman language. Patient was seen by GI and had capsule endoscopy which was negative for bleed> no evidence of a bleed  bone marrow biopsy negative for MDS and myeloma work-up is negative  At this time hemoglobin around 10 and iron panel compatible of anemia of chronic disease with high ferritin with no evidence of iron deficiency anemia  to continue Procrit for hemoglobin below 10 (per dialysis center as his ferritin very high and they have protocol for end-stage kidney patient regarding iron and EPO)    RTC in 4 months with repeated labs                                          [facility]                            This note is created with the assistance of a speech recognition program.  While intending to generate a document that actually reflects the content of the visit, the document can still have some errors including those of syntax and sound a like substitutions which may escape proof reading. It such instances, actual meaning can be extrapolated by contextual diversion. I spent more than 30 minutes examining, evaluating, reviewing data, counseling the patient and coordinating care.   Greater than 50% of time was spent face-to-face with the patient this note is created with the assistance of a speech recognition program.  While intending to generate a document that actually reflects the content of the visit, the document can still have some errors including those of syntax and sound a like substitutions which may escape proof reading. It such instances, actual meaning can be extrapolated by contextual diversion.

## 2023-02-03 ENCOUNTER — OFFICE VISIT (OUTPATIENT)
Dept: CARDIOLOGY | Age: 78
End: 2023-02-03
Payer: MEDICARE

## 2023-02-03 VITALS
OXYGEN SATURATION: 99 % | SYSTOLIC BLOOD PRESSURE: 146 MMHG | HEART RATE: 64 BPM | HEIGHT: 69 IN | BODY MASS INDEX: 29.86 KG/M2 | WEIGHT: 201.6 LBS | DIASTOLIC BLOOD PRESSURE: 69 MMHG

## 2023-02-03 DIAGNOSIS — I25.10 CORONARY ARTERY DISEASE INVOLVING NATIVE CORONARY ARTERY OF NATIVE HEART WITHOUT ANGINA PECTORIS: ICD-10-CM

## 2023-02-03 DIAGNOSIS — I10 ESSENTIAL HYPERTENSION: ICD-10-CM

## 2023-02-03 DIAGNOSIS — I20.9 ANGINA, CLASS II (HCC): ICD-10-CM

## 2023-02-03 DIAGNOSIS — E78.2 MIXED HYPERLIPIDEMIA: ICD-10-CM

## 2023-02-03 DIAGNOSIS — G90.1 DYSAUTONOMIA (HCC): ICD-10-CM

## 2023-02-03 DIAGNOSIS — I50.32 CHRONIC DIASTOLIC CONGESTIVE HEART FAILURE (HCC): ICD-10-CM

## 2023-02-03 DIAGNOSIS — I70.1 RENAL ARTERY STENOSIS, NATIVE (HCC): ICD-10-CM

## 2023-02-03 DIAGNOSIS — N18.4 STAGE 4 CHRONIC KIDNEY DISEASE (HCC): ICD-10-CM

## 2023-02-03 DIAGNOSIS — D64.9 ANEMIA, UNSPECIFIED TYPE: ICD-10-CM

## 2023-02-03 PROCEDURE — 99211 OFF/OP EST MAY X REQ PHY/QHP: CPT | Performed by: PHYSICIAN ASSISTANT

## 2023-02-03 NOTE — PROGRESS NOTES
Patient: Janelle Jimenez  : 1945  Date of Visit: February 3, 2023    REASON FOR VISIT / CONSULTATION: Follow-up (Patient states feeling ok. SOB when walking. Palp, lasting half hour feeling fluttering. Lightheaded,dizziness standing.When sitting feels fine. Denies CP.   )    Dear Monae Arce MD,    As you know, Mr. Jimenez is a 71 y.o. male with a known history of dysautonomia where on tilt table testing his blood pressure dropped from 169 systolic to 78 systolic with only a 53-67 HR response. A CT of he head in the past showed evidence of at least 2 old CVA's and a heart catheterization showed severe single vessel disease in the ostium of a moderate sized OM1 branch of the circumflex. However, maximal guidelines directed medical management was opted for an place of stenting partly due to the risk associated with stenting this vessel. He has had a coronary angiography procedure with stenting of his HA Om1. As you know, Mr. Jimenez  is a 77 y.o. male with a history of recent onset substernal chest discomfort that led to a stress test and a subsequent heart catheterization which showed severe single vessel coronary artery disease of the HA Om1 with successful angioplasty and stenting of that vessel that was done by Dr. Curry on 4/10/17. He has had problems with balance problems when he is in his feet and says that a Neurologist has told him in the past this is because of his previous strokes. He underwent a cardiovascular stress test which fortunately had shown to be normal on 3/21/2018. Mr. Jimenez has significant normal stress test and echocardiogram on 2020. Holter monitor done on 2020: The rhythm was sinus. Average daily heart rate 58 ranging from 47 to 81 bpm. Bradycardia for 72% test duration. Rare premature supraventricular ectopic beats consisting of 33 isolated PACs. Rare premature ventricular ectopic beats total 75 consisting of 73 isolatedPVCs and a ventricular couplet.  Echocardiogram done on 12/18/2020: EF of 60%. Moderately increased LV wall thickness. Borderline pulmonary hypertension. Mild tricuspid regurgitation. Mild diastolic dysfunction is seen/ Aortic root is mildly dilated. Stress test done on 9/20/2021 was equivocal, There is a small/moderate perfusion defect of mild intensity in the lateral, inferior and inferoapical regions. Cardiac cath done on 10/5/2021 showed Moderate three vessel coronary artery disease involving a ostial 50-60% stenosis in a small, non-dominant RCA, a 60% mid LAD stenosis and a proximal 50-60% stenosis in the left anterior descending coronary artery. Echo done on 11/11/2021 showed an EF 60%, The left atrium is moderately dilated (34-39) with a left atrial volume index of 35 ml/m2. Mild to moderate tricuspid regurgitation. Moderate pulmonary hypertension with estimated pulmonary artery systolic pressure of 42 mmHg. Mild diastolic dysfunction. He had abnormal Tilt table test done on 6/7/2022 whish was consist with dysautonomia. Mr. Hector Jacome is here today for lightheaded and dizziness. In addition he has also been experiencing shortness of breath while walking. Today he reports feeling overall very well. He continues to have dialysis on Monday's and Friday's. He is also following with hematology for his anemia. He has been having iron infusions and reports he has been given erythropetin. He has had 2 falls since after dialysis, both were over 3 weeks ago. When he went to sit in recliner fell 1 month ago then a couple days later at 4 am got up to go to the bathroom and he fell into the bath tub. He reports his dizziness is about the same. He is no longer able to afford Droxidopa due to cost. Monday at dialysis his diastolic BP was 90 bpm, they removed 6 pounds of fluid. He was advised to stop taking his lopressor, however he continues to take it because his heart rate was running in the 90's when he held his medication.  He is still producing urine and is only getting up once a night. He denied any chest pain, pressure or tightness. Also denied any worsening shortness of breath since his last visit. No abdominal pain, bleeding problems, bowel issues, problems with his medications or any other concerns at this time. No nausea or vomiting. No cough, fever or chills. Exercise Tolerance: Mr. Candido aKye reports that he has a fairly poor exercise tolerance. His says that he could walk about 1/2 block without developing chest discomfort or significant shortness of breath. Past Medical History:   Diagnosis Date    Abnormal tilt table test 2/23/2016    Acute kidney injury [facility])     Acute MI ([facility]     Acute renal failure superimposed on stage 4 chronic kidney disease ([facility] 10/2/2018    ssecondary to hemodynamic effects of loop diuretics and ace inhibitors, bbaseline 1.2-1.3 which peaked up to 1.8, resolving    Acute renal failure with tubular necrosis ([facility] 10/2/2018    ssecondary to hemodynamic effects of loop diuretics and ace inhibitors, bbaseline 1.2-1.3 which peaked up to 1.8, resolving    Anemia     Anemia in stage 4 chronic kidney disease ([facility] 5/21/2019    Also from suspected blood loss    Angina, class II ([facility] 1/8/2018    CAD (coronary artery disease)     Cerebral artery occlusion with cerebral infarction [facility])     CHF (congestive heart failure) ([facility]     Cholecystitis 8/17/2015    Chronic back pain     Chronic diastolic HF (heart failure), NYHA class 3 ([facility] 4/24/2017    Chronic kidney disease, stage III (moderate) ([facility] 2/22/2016    Secondary to diabetic nephrosclerosis. Baseline creatinine 1.4-1.6, renal function fluctuate volume status    Closed fracture of lumbar vertebra without mention of spinal cord injury     Displacement of intervertebral disc, site unspecified, without myelopathy     H/O cardiac catheterization 2/19/16    LMCA: Mild irregularities 10-20%. LAD: Lesion on PRox LAD: Mid subsection. 65% stenosis.  LCx: Lesion on 1st Ob Valentina: Proximal subesection. 70% steniosis. RCA: Small non-dominant RCA. Lesion on PRox RCA: Ostial. 50% stenosis. EF:55%. H/O cardiovascular stress test 2/19/16    Abnormal. Moderate perfusion defect of mild intensity in the inferior, inferoseptal adn inferoapical regions during stress imaging, which most consistent with ischemia. Global LV systolic function normal without regional wall motion abnormalities. OVerall these results are most consistent with an intermediate risk for signficant CAD. Additional testing including cardiac cath may be indicated. H/O tilt table evaluation 12/26/2017    Abnormal. Patients HR, BP response and symptoms were most consistent with dysautonomia. Combined with viligant maintenance of euvolemia and maintaining a moderate salft intake, pharmacologic treatment with SSRI such as lexapro and or mestinon among other treatments have shown some effectiveness in treatment of this condition    H/O tilt table evaluation 12/26/2017    Abnormal head upright tilt table study. The pt heart rate, blood pressure response and symptoms were most consistent with dysautonomia. History of 24 hour EKG monitoring 8/14/14    Occasional PAC's and PVC's which appear to be at least moderately symptomatic. History of 24 hour EKG monitoring 11/19/14    Event Monitor. Sinus rhythm and sinus bradycardia. Infrequent isolated PVC's    History of blood transfusion     History of cardiovascular stress test 2/19/14    Relatively NL    History of cardiovascular stress test 03/21/2018    Normal myocardial perfusion. Global left ventricular systolic function was normal with an EF of 68%. Overall, these results are most consistent with a low risk scan. History of coronary artery stent placement 04/2017    PTCA / Drug Eluting Stent:, CX and / or branches    History of CVA (cerebrovascular accident) without residual deficits 2014    Incidentally found on CT head.  No known impairment now or in the past. History of echocardiogram 2/18/14    LA mildly dilated, EF 55%, LV wall thickness is moderately increased, no definite wall motion abnormalilities, what appears to be a pacer wire is seen w/n the RA and RV, mild-mod TR, mild pulmonary hypertension. History of Holter monitoring 12/29/2017    Rare PAC's and PVC's. History of tilt table evaluation 8/12/14    Abnormal    Hyperlipidemia     Hypertension     Medication side effect, initial encounter 3/23/2018    Non critical Right Renal artery stenosis, native ([facility] 10/2/2018    Non critical Right Renal artery stenosis, based on cath in 2016, Rt RA 30% stenosis    Pacemaker     non-functioning    S/P cardiac cath 04/10/2017    S/P coronary artery stent placement     Successful PTCA - HA Om1    SIRS (systemic inflammatory response syndrome) ([facility] 5/19/2019    Type II or unspecified type diabetes mellitus without mention of complication, not stated as uncontrolled      CURRENT ALLERGIES: Carvedilol, Lipitor [atorvastatin], Crestor [rosuvastatin calcium], Enoxaparin, Gemfibrozil, Rosuvastatin, Spironolactone, Canagliflozin, and Sitagliptin REVIEW OF SYSTEMS: 14 systems were reviewed. Pertinent positives and negatives as above, all else negative. Past Surgical History:   Procedure Laterality Date    BACK SURGERY      CARDIAC CATHETERIZATION Left 02/19/2016    via right radial approach/ Melina Vernon/ Dr. Grant Bedolla Left 10/05/2021    Dr Crockett Cea radial-Moderate three vessel coronary artery disease involving a ostial 50-60% stenosis in a small, non-dominant RCA, a 60% mid LAD stenosis and a proximal 50-60% stenosis in the left anterior descending coronary artery. Normal left ventricular end diastolic pressure. Proceed with aggressive maximal medical management as clinically indicated.     CHOLECYSTECTOMY  08/17/2015    Liang/Charles/Saulo/ Millicent    COLONOSCOPY  2010    COLONOSCOPY  02/19/2015 -polyps,diverticulosis,hemorrhoids    COLONOSCOPY  2018    Dr Ky Keenan    COLONOSCOPY N/A 2018    COLONOSCOPY POLYPECTOMY SNARE/COLD BIOPSY  cold snare  and  hot snare performed by Dusty Jackson MD at [address] [facility]  10/30/2020    with Dr. Benita Kang 10/30/2020    Chelsy Viriayne, NOT HIGH RISK performed by Ivonne Clarke DO at [address] [facility]      left eye    CT BIOPSY PERCUTANEOUS SUPERFICIAL BONE  2022    CT BIOPSY PERCUTANEOUS SUPERFICIAL BONE 2022 [facility] CT SCAN    ENDOSCOPY, COLON, DIAGNOSTIC      EYE SURGERY      IR TUNNELED CATHETER PLACEMENT GREATER THAN 5 YEARS  2021    IR TUNNELED CATHETER PLACEMENT GREATER THAN 5 YEARS 2021 [facility]    PACEMAKER INSERTION      PACEMAKER PLACEMENT      PAIN MANAGEMENT PROCEDURE Bilateral 5/10/2022    LUMBAR TRANSFORAMINAL - L3-4 performed by Reddy Hayes MD at [address] [facility]  2019    Dr. Dave Mo H-Pylori)duodenal bulbar/antral erythema    UPPER GASTROINTESTINAL ENDOSCOPY N/A 2019    EGD BIOPSY, clotest performed by Dusty Jackson MD at [place] [id number] 10/30/2020    EGD ESOPHAGOGASTRODUODENOSCOPY performed by Ivonne Clarke DO at [place] [address],   2021    US PERCUT RENAL BIOPSY, LT 2021 [facility] ULTRASOUND    Social History:  Social History     Tobacco Use    Smoking status: Former     Packs/day: 1.50     Years: 8.00     Pack years: 12.00     Types: Cigarettes     Quit date: 3/4/1980     Years since quittin.9    Smokeless tobacco: Never   Vaping Use    Vaping Use: Never used   Substance Use Topics    Alcohol use: No    Drug use: No        CURRENT MEDICATIONS:  Outpatient Medications Marked as Taking for the 2/3/23 encounter (Office Visit) with Vincenzo Hodgkin, PA-C   Medication Sig Dispense Refill    metoprolol tartrate (LOPRESSOR) 25 MG tablet Take 1 tablet by mouth 2 times daily 180 tablet 3    LANTUS SOLOSTAR 100 UNIT/ML injection pen Inject 24 Units into the skin 2 times daily 28.8 mL 0    B Complex-C-Zn-Folic Acid (DIALYVITE/ZINC) TABS TAKE 1 TABLET BY MOUTH ONCE DAILY      hydrALAZINE (APRESOLINE) 100 MG tablet TAKE 1 TABLET BY MOUTH THREE TIMES DAILY EXCEPT  ON  THE  MORNINGS  OF  DIALYSIS. 90 tablet 0    furosemide (LASIX) 40 MG tablet TAKE 1 & 1/2 (ONE & ONE-HALF) TABLETS BY MOUTH ONCE DAILY 45 tablet 0    CONTOUR TEST strip Inject 1 each into the skin 2 times daily 200 each 3    tamsulosin (FLOMAX) 0.4 MG capsule Take 1 capsule by mouth once daily 90 capsule 3    acetaminophen (TYLENOL) 325 MG tablet Take 650 mg by mouth every 6 hours as needed for Pain      nitroGLYCERIN (NITROSTAT) 0.4 MG SL tablet Place 1 tablet under the tongue every 5 minutes as needed for Chest pain 25 tablet 3    latanoprost (XALATAN) 0.005 % ophthalmic solution Place 1 drop into both eyes nightly       Insulin Pen Needle (PEN NEEDLES) 31G X 6 MM MISC 1 each by Does not apply route daily 100 each 3    prednisoLONE acetate (PRED FORTE) 1 % ophthalmic suspension Place 1 drop into the left eye daily        FAMILY HISTORY: family history includes Cancer in his father and mother. PHYSICAL EXAM:   BP (!) 146/69 (Site: Right Upper Arm, Position: Standing, Cuff Size: Large Adult)   Pulse 64   Ht 5' 9\" (1.753 m)   Wt 201 lb 9.6 oz (91.4 kg)   SpO2 99%   BMI 29.77 kg/m²  Body mass index is 29.77 kg/m². Constitutional: He is oriented to person, place, and time. He appears well-developed and well-nourished. In no acute distress. HEENT: Normocephalic and atraumatic. No JVD present. Carotid bruit is not present. No mass and no thyromegaly present. No lymphadenopathy present. Cardiovascular: Normal rate, regular rhythm, normal heart sounds. Exam reveals no gallop and no friction rubs.  2/6 systolic murmur, 5th intercostal space on the LEFT in the mid-clavicular line (cardiac apex). Pulmonary/Chest: Effort normal and breath sounds normal. No respiratory distress. He has no wheezes, rhonchi or rales. Abdominal: Soft, non-tender. Bowel sounds and aorta are normal. He exhibits no organomegaly, mass or bruit. Extremities: Trace. No cyanosis or clubbing. 2+ radial and carotid pulses. Distal extremity pulses: 2+ bilaterally. Neurological: He is alert and oriented to person, place, and time. No evidence of gross cranial nerve deficit. Coordination appeared normal.   Skin: Skin is warm and dry. There is no rash or diaphoresis. Thrill on left arm. Psychiatric: He has a normal mood and affect. His speech is normal and behavior is normal.      MOST RECENT LABS ON RECORD:   Lab Results   Component Value Date    WBC 9.4 01/20/2023    HGB 9.9 (L) 01/20/2023    HCT 30.0 (L) 01/20/2023     01/20/2023    CHOL 69 09/21/2021    TRIG 185 (H) 09/21/2021    HDL 14 (L) 09/21/2021    LDLDIRECT 44 11/02/2017    ALT 12 05/13/2022    AST 17 05/13/2022     01/20/2023    K 4.5 01/20/2023     01/20/2023    CREATININE 5.52 (HH) 01/20/2023    BUN 61 (H) 01/20/2023    CO2 25 01/20/2023    TSH 2.26 11/02/2017    PSA 3.28 08/16/2022    INR 1.0 06/01/2022    LABA1C 7.3 (H) 04/19/2022    LABMICR 30 10/17/2015     ASSESSMENT:  1. Dysautonomia ([address]    2. Anemia, unspecified type    3. Chronic diastolic congestive heart failure ([address]    4. Coronary artery disease involving native coronary artery of native heart without angina pectoris    5. Angina, class II ([address]    6. Essential hypertension    7. Mixed hyperlipidemia    8. Stage 4 chronic kidney disease ([facility]    9. Renal artery stenosis, native [facility])          PLAN:  Dysautonomia: Mildly to moderate at this time. Dizziness occurring primarily after dialysis. Diuretics: Continue furosemide (Lasix) 60 mg every morning. Beta Blocker: Continue Metoprolol tartrate (Lopressor) 25 mg bid.  Hold on dialysis mornings, check blood pressure prior to evening dose, if SBP > 150 then take evening pill. Calcium Channel Blocker: Continue amlodipine (Norvasc) 10 mg daily except for the days he has dialysis which is on Mondays and Fridays. I would like to hold his Norvasc on those days to help with his dizziness and lower blood pressures after his treatments. Also I will have him to take his Hydralazine as needed if his systolic number is >464 mmHg. Nonpharmacologic counseling: Because of his condition, I reminded him to try and keep himself well-hydrated and to take extra time when moving from laying to sitting, sitting to standing and standing to walking as well as wearing at least knee high compressions stockings. Anemia of chronic disease: CBC done on 1/20/2023 was 9.9 g/dL. Following with hematology. Continue to monitor this via blood work. Procrit per nephrology. Iron and EPO per dialysis protocol. Hold aspirin for now, will consider restarting once hemoglobin is greater than 10 g/dL. Chronic Diastolic Heart Failure: EF of 60% via echo on 11/11/2021. Currently well controlled. Continue dialysis. Beta Blocker: Continue Metoprolol tartrate (Lopressor) 25 mg bid. Hold on dialysis morning dose, take evening dose PRN SBP > 150. Diuretics: Continue furosemide (Lasix) 60 mg every morning. Nonpharmacologic management of Heart Failure: I advised him to try and keep his legs up whenever possible and to limit salt in his diet. Atherosclerotic Heart Disease: Coronary Artery stent: 4/10/2017. Cardiac cath done on 10/5/2021 showed Moderate three vessel coronary artery disease involving a ostial 50-60% stenosis in a small, non-dominant RCA, a 60% mid LAD stenosis and a proximal 50-60% stenosis in the left anterior descending coronary artery  Antiplatelet Agent: Not indicated due to anemia issues as above. Beta Blocker: Continue Metoprolol tartrate (Lopressor) 25 mg bid. Hold on dialysis days as above.   Cholesterol Reduction Therapy: Refused by patient.     Essential Hypertension: Controlled   Beta Blocker: Continue Metoprolol tartrate (Lopressor) 25 mg bid. Hold on dialysis days as above.  Calcium Channel Blocker: Change (Norvasc) as above.    Diuretics: Continue furosemide (Lasix) 60 mg every morning.     Hyperlipidemia: Mixed - Last LDL on 9/21/2021 was 18 mg/dL   Cholesterol Reduction Therapy: Refused by patient.     End Stage Renal Disease: Currently goes to dialysis Mon and Fri    Continue dialysis and follow up with nephrology as scheduled.  Hold Norvasc on dialysis days.  Hold morning lopressor dose on dialysis days and hold evening dose if SBP is not greater than 150.    Finally, I recommended that he continue his other medications and follow up with you as previously scheduled. I discussed patient's symptoms and treatment plan with Dr Benítez, he was in agreement with the plan and follow up.      FOLLOW UP:   I told Mr. Jimenez  to call my office if he had any problems, but otherwise told him to Return in about 6 weeks (around 3/15/2023). However, as always I would be happy to see him sooner should the need arise. Once again, thank you for allowing me to participate in this patients care. Please do not hesitate to contact me could I be of further assistance.    Sincerely,  Brenda Park PA-C   [facility], [place] [id number]  Phone: [phone], Fax: [phone]      I believe that the risk of significant morbidity and mortality related to the patient's current medical conditions are: Intermediate. Approximately 30 minutes were spent during prep work, discussion and exam of the patient, and follow up documentation and all of their questions were answered.      February 3, 2023

## 2023-02-03 NOTE — PATIENT INSTRUCTIONS
SURVEY:    You may be receiving a survey from [employer] regarding your visit today. Please complete the survey to enable us to provide the highest quality of care to you and your family. If you cannot score us a very good on any question, please call the office to discuss how we could have made your experience a very good one. Thank you.

## 2023-02-22 ENCOUNTER — HOSPITAL ENCOUNTER (OUTPATIENT)
Dept: INTERVENTIONAL RADIOLOGY/VASCULAR | Age: 78
Discharge: HOME | End: 2023-02-22
Attending: RADIOLOGY | Admitting: RADIOLOGY
Payer: MEDICARE

## 2023-02-22 VITALS
WEIGHT: 193 LBS | OXYGEN SATURATION: 98 % | RESPIRATION RATE: 18 BRPM | SYSTOLIC BLOOD PRESSURE: 185 MMHG | BODY MASS INDEX: 28.58 KG/M2 | DIASTOLIC BLOOD PRESSURE: 58 MMHG | HEART RATE: 60 BPM | TEMPERATURE: 97.6 F | HEIGHT: 69 IN

## 2023-02-22 PROCEDURE — 77001 FLUOROGUIDE FOR VEIN DEVICE: CPT

## 2023-02-22 PROCEDURE — 36589 REMOVAL TUNNELED CV CATH: CPT

## 2023-02-22 PROCEDURE — 2709999900 IR REMOVE TUNNELED CVAD WO SQ PORT/PUMP

## 2023-02-22 PROCEDURE — 2500000003 HC RX 250 WO HCPCS: Performed by: RADIOLOGY

## 2023-02-22 RX ORDER — LIDOCAINE HYDROCHLORIDE 10 MG/ML
INJECTION, SOLUTION EPIDURAL; INFILTRATION; INTRACAUDAL; PERINEURAL
Status: COMPLETED | OUTPATIENT
Start: 2023-02-22 | End: 2023-02-22

## 2023-02-22 RX ORDER — SEVELAMER CARBONATE 800 MG/1
1 TABLET, FILM COATED ORAL
COMMUNITY

## 2023-02-22 RX ADMIN — LIDOCAINE HYDROCHLORIDE 4 ML: 10 INJECTION, SOLUTION EPIDURAL; INFILTRATION; INTRACAUDAL; PERINEURAL at 15:20

## 2023-02-22 ASSESSMENT — PAIN - FUNCTIONAL ASSESSMENT: PAIN_FUNCTIONAL_ASSESSMENT: NONE - DENIES PAIN

## 2023-02-22 NOTE — PROGRESS NOTES
Patient returned to pre/post area. Alert and oriented, denies pain. Tip stop to left chest clean/dry/intact. Will continue to monitor.

## 2023-02-22 NOTE — DISCHARGE INSTRUCTIONS
DISCHARGE INSTRUCTIONS FOR Dialysis Catheter Removal    WOUND CARE:  The clear bandage may be changed after 24 hours and daily until healed. You may shower after 24 hours but keep site covered with clear dressing to keep dry. No baths or swimming for 5-7 days or until site is healed. Some patients are allergic to the dressing and notice a rash; if this happens notify your physician. After showering pat incision area dry. DIET:  Resume your normal diet. ACTIVITY:  You may resume your normal activity after 24 hours. No exercising, lifting heavy objects or strenuous activity for 7 days. PAIN:  You may use Tylenol for pain or apply ice to the site on for 20 min each hour x 24 hours. WHEN TO CALL PHYSICIAN:  Fever greater than 101.5 and chills. Swelling or severe pain in arm/leg on side of port. Bleeding, redness, drainage or swelling at or around the port. Call 911 for any shortness of breath, severe bleeding or chest pain.

## 2023-02-28 ENCOUNTER — OFFICE VISIT (OUTPATIENT)
Dept: UROLOGY | Age: 78
End: 2023-02-28
Payer: MEDICARE

## 2023-02-28 VITALS
TEMPERATURE: 97.5 F | HEIGHT: 69 IN | BODY MASS INDEX: 29.18 KG/M2 | WEIGHT: 197 LBS | HEART RATE: 56 BPM | SYSTOLIC BLOOD PRESSURE: 157 MMHG | DIASTOLIC BLOOD PRESSURE: 58 MMHG

## 2023-02-28 DIAGNOSIS — N13.8 BPH WITH OBSTRUCTION/LOWER URINARY TRACT SYMPTOMS: Primary | ICD-10-CM

## 2023-02-28 DIAGNOSIS — N40.1 BPH WITH OBSTRUCTION/LOWER URINARY TRACT SYMPTOMS: Primary | ICD-10-CM

## 2023-02-28 DIAGNOSIS — K59.00 CONSTIPATION, UNSPECIFIED CONSTIPATION TYPE: ICD-10-CM

## 2023-02-28 PROCEDURE — G8428 CUR MEDS NOT DOCUMENT: HCPCS | Performed by: NURSE PRACTITIONER

## 2023-02-28 PROCEDURE — 3077F SYST BP >= 140 MM HG: CPT | Performed by: NURSE PRACTITIONER

## 2023-02-28 PROCEDURE — 3078F DIAST BP <80 MM HG: CPT | Performed by: NURSE PRACTITIONER

## 2023-02-28 PROCEDURE — 51798 US URINE CAPACITY MEASURE: CPT | Performed by: NURSE PRACTITIONER

## 2023-02-28 PROCEDURE — 1036F TOBACCO NON-USER: CPT | Performed by: NURSE PRACTITIONER

## 2023-02-28 PROCEDURE — 1124F ACP DISCUSS-NO DSCNMKR DOCD: CPT | Performed by: NURSE PRACTITIONER

## 2023-02-28 PROCEDURE — G8417 CALC BMI ABV UP PARAM F/U: HCPCS | Performed by: NURSE PRACTITIONER

## 2023-02-28 PROCEDURE — 99213 OFFICE O/P EST LOW 20 MIN: CPT | Performed by: NURSE PRACTITIONER

## 2023-02-28 PROCEDURE — G8484 FLU IMMUNIZE NO ADMIN: HCPCS | Performed by: NURSE PRACTITIONER

## 2023-02-28 PROCEDURE — PBSHW PR MEAS POST-VOIDING RESIDUAL URINE&/BLADDER CAP: Performed by: NURSE PRACTITIONER

## 2023-02-28 ASSESSMENT — ENCOUNTER SYMPTOMS
BACK PAIN: 0
SHORTNESS OF BREATH: 0
WHEEZING: 0
COLOR CHANGE: 0
ABDOMINAL PAIN: 0
CONSTIPATION: 0
VOMITING: 0
NAUSEA: 0
COUGH: 0
EYE REDNESS: 0

## 2023-02-28 NOTE — PROGRESS NOTES
HPI:          Patient is a 68 y.o. male in no acute distress. He is alert and oriented to person, place, and time. History  12/2019 Referral from Dr. Terry Alcantar for elevated PSA of 6.13. He was a previous patient of Dr. Desirae Price for elevated PSA. Several brothers with prostate cancer. PSA  8/2022 - 3.28  1/2022 - 3.36  4/2021 - 3.68  10/2020 - 5.43  4/2020 - 4.42  1/2020 - 5.00  11/2019 - 6.13  11/2018 - 3.11  11/2017 - 3.26  11/2016 - 2.43  10/2015 - 2.70  10/2014 - 2.35  10/2013 - 2.15     11/2020 Prostate biopsy for PSA of 5.43. Pathology: benign prostate tissue in all 12 cores    5/2021 gross hematuria. CT urogram showed a nonobstructing 3 mm left renal stone. Refused cystoscopy    12/2021 - urinary retention/constipation -Flomax started    On HD for ESRD    Today  Here today to follow-up for BPH and constipation. He denies new or worsening LUTS. PVR is low. He denies gross hematuria or dysuria. Bowels are moving daily.     Past Medical History:   Diagnosis Date    Abnormal tilt table test 2/23/2016    Acute kidney injury [facility])     Acute MI ([facility]     Acute renal failure superimposed on stage 4 chronic kidney disease ([facility] 10/2/2018    ssecondary to hemodynamic effects of loop diuretics and ace inhibitors, bbaseline 1.2-1.3 which peaked up to 1.8, resolving    Acute renal failure with tubular necrosis ([facility] 10/2/2018    ssecondary to hemodynamic effects of loop diuretics and ace inhibitors, bbaseline 1.2-1.3 which peaked up to 1.8, resolving    Anemia     Anemia in stage 4 chronic kidney disease ([facility] 5/21/2019    Also from suspected blood loss    Angina, class II ([facility] 1/8/2018    CAD (coronary artery disease)     Cerebral artery occlusion with cerebral infarction [facility])     CHF (congestive heart failure) ([facility]     Cholecystitis 8/17/2015    Chronic back pain     Chronic diastolic HF (heart failure), NYHA class 3 ([facility] 4/24/2017    Chronic kidney disease, stage III (moderate) ([facility] 2/22/2016 Secondary to diabetic nephrosclerosis. Baseline creatinine 1.4-1.6, renal function fluctuate volume status    Closed fracture of lumbar vertebra without mention of spinal cord injury     Displacement of intervertebral disc, site unspecified, without myelopathy     H/O cardiac catheterization 2/19/16    LMCA: Mild irregularities 10-20%. LAD: Lesion on PRox LAD: Mid subsection. 65% stenosis. LCx: Lesion on 1st Ob Valentina: Proximal subesection. 70% steniosis. RCA: Small non-dominant RCA. Lesion on PRox RCA: Ostial. 50% stenosis. EF:55%. H/O cardiovascular stress test 2/19/16    Abnormal. Moderate perfusion defect of mild intensity in the inferior, inferoseptal adn inferoapical regions during stress imaging, which most consistent with ischemia. Global LV systolic function normal without regional wall motion abnormalities. OVerall these results are most consistent with an intermediate risk for signficant CAD. Additional testing including cardiac cath may be indicated. H/O tilt table evaluation 12/26/2017    Abnormal. Patients HR, BP response and symptoms were most consistent with dysautonomia. Combined with viligant maintenance of euvolemia and maintaining a moderate salft intake, pharmacologic treatment with SSRI such as lexapro and or mestinon among other treatments have shown some effectiveness in treatment of this condition    H/O tilt table evaluation 12/26/2017    Abnormal head upright tilt table study. The pt heart rate, blood pressure response and symptoms were most consistent with dysautonomia. History of 24 hour EKG monitoring 8/14/14    Occasional PAC's and PVC's which appear to be at least moderately symptomatic. History of 24 hour EKG monitoring 11/19/14    Event Monitor. Sinus rhythm and sinus bradycardia.   Infrequent isolated PVC's    History of blood transfusion     History of cardiovascular stress test 2/19/14    Relatively NL    History of cardiovascular stress test 03/21/2018    Normal myocardial perfusion. Global left ventricular systolic function was normal with an EF of 68%. Overall, these results are most consistent with a low risk scan. History of coronary artery stent placement 04/2017    PTCA / Drug Eluting Stent:, CX and / or branches    History of CVA (cerebrovascular accident) without residual deficits 2014    Incidentally found on CT head. No known impairment now or in the past.    History of echocardiogram 2/18/14    LA mildly dilated, EF 55%, LV wall thickness is moderately increased, no definite wall motion abnormalilities, what appears to be a pacer wire is seen w/n the RA and RV, mild-mod TR, mild pulmonary hypertension. History of Holter monitoring 12/29/2017    Rare PAC's and PVC's. History of tilt table evaluation 8/12/14    Abnormal    Hyperlipidemia     Hypertension     Medication side effect, initial encounter 3/23/2018    Non critical Right Renal artery stenosis, native ([facility] 10/2/2018    Non critical Right Renal artery stenosis, based on cath in 2016, Rt RA 30% stenosis    Pacemaker     non-functioning    S/P cardiac cath 04/10/2017    S/P coronary artery stent placement     Successful PTCA - HA Om1    SIRS (systemic inflammatory response syndrome) ([address] 5/19/2019    Type II or unspecified type diabetes mellitus without mention of complication, not stated as uncontrolled      Past Surgical History:   Procedure Laterality Date    BACK SURGERY      CARDIAC CATHETERIZATION Left 02/19/2016    via right radial approach/ Melina Vernon/ Dr. Vannesa Unger Left 10/05/2021    Dr Trilby Dakin radial-Moderate three vessel coronary artery disease involving a ostial 50-60% stenosis in a small, non-dominant RCA, a 60% mid LAD stenosis and a proximal 50-60% stenosis in the left anterior descending coronary artery. Normal left ventricular end diastolic pressure. Proceed with aggressive maximal medical management as clinically indicated. CHOLECYSTECTOMY  08/17/2015    Liang/[facility]/[place]/ Lap    COLONOSCOPY  2010    COLONOSCOPY  02/19/2015    -polyps,diverticulosis,hemorrhoids    COLONOSCOPY  05/23/2018    Dr Oscar Urrutia    COLONOSCOPY N/A 05/23/2018    COLONOSCOPY POLYPECTOMY SNARE/COLD BIOPSY  cold snare  and  hot snare performed by Vu Garcia MD at [address] [facility] [address]  10/30/2020    with Dr. Pippa Maharaj N/A 10/30/2020    Carly Michel, NOT HIGH RISK performed by Valeria Young DO at [address] [facility]      left eye    CT BIOPSY PERCUTANEOUS SUPERFICIAL BONE  6/1/2022    CT BIOPSY PERCUTANEOUS SUPERFICIAL BONE 6/1/2022 [facility] CT SCAN    ENDOSCOPY, COLON, DIAGNOSTIC      EYE SURGERY      IR TUNNELED CATHETER PLACEMENT GREATER THAN 5 YEARS  11/22/2021    IR TUNNELED CATHETER PLACEMENT GREATER THAN 5 YEARS 11/22/2021 [facility]    PACEMAKER INSERTION      PACEMAKER PLACEMENT      PAIN MANAGEMENT PROCEDURE Bilateral 5/10/2022    LUMBAR TRANSFORAMINAL - L3-4 performed by Alexandre Martinez MD at [address] [facility]  05/28/2019    Dr. Nagy Lank H-Pylori)duodenal bulbar/antral erythema    UPPER GASTROINTESTINAL ENDOSCOPY N/A 05/28/2019    EGD BIOPSY, clotest performed by Vu Garcia MD at [address] [facility] 10/30/2020    EGD ESOPHAGOGASTRODUODENOSCOPY performed by Valeria Young DO at [facility], LT  11/24/2021    US PERCUT RENAL BIOPSY, LT 11/24/2021 [facility] ULTRASOUND     Outpatient Encounter Medications as of 2/28/2023   Medication Sig Dispense Refill    sevelamer (RENVELA) 800 MG tablet Take 1 tablet by mouth 3 times daily (with meals)      metoprolol tartrate (LOPRESSOR) 25 MG tablet Take 1 tablet by mouth 2 times daily 180 tablet 3    LANTUS SOLOSTAR 100 UNIT/ML injection pen Inject 24 Units into the skin 2 times daily 28.8 mL 0    hydrALAZINE (APRESOLINE) 100 MG tablet TAKE 1 TABLET BY MOUTH THREE TIMES DAILY EXCEPT  ON  THE  MORNINGS  OF  DIALYSIS. 90 tablet 0    furosemide (LASIX) 40 MG tablet TAKE 1 & 1/2 (ONE & ONE-HALF) TABLETS BY MOUTH ONCE DAILY 45 tablet 0    CONTOUR TEST strip Inject 1 each into the skin 2 times daily 200 each 3    tamsulosin (FLOMAX) 0.4 MG capsule Take 1 capsule by mouth once daily 90 capsule 3    acetaminophen (TYLENOL) 325 MG tablet Take 650 mg by mouth every 6 hours as needed for Pain      nitroGLYCERIN (NITROSTAT) 0.4 MG SL tablet Place 1 tablet under the tongue every 5 minutes as needed for Chest pain 25 tablet 3    latanoprost (XALATAN) 0.005 % ophthalmic solution Place 1 drop into both eyes nightly       Insulin Pen Needle (PEN NEEDLES) 31G X 6 MM MISC 1 each by Does not apply route daily 100 each 3    prednisoLONE acetate (PRED FORTE) 1 % ophthalmic suspension Place 1 drop into the left eye daily       [DISCONTINUED] B Complex-C-Zn-Folic Acid (DIALYVITE/ZINC) TABS TAKE 1 TABLET BY MOUTH ONCE DAILY (Patient not taking: No sig reported)      [DISCONTINUED] Droxidopa (NORTHERA) 200 MG CAPS Take 200 mg by mouth in the morning, at noon, and at bedtime (Patient not taking: No sig reported) 240 capsule 1     No facility-administered encounter medications on file as of 2/28/2023. Current Outpatient Medications on File Prior to Visit   Medication Sig Dispense Refill    sevelamer (RENVELA) 800 MG tablet Take 1 tablet by mouth 3 times daily (with meals)      metoprolol tartrate (LOPRESSOR) 25 MG tablet Take 1 tablet by mouth 2 times daily 180 tablet 3    LANTUS SOLOSTAR 100 UNIT/ML injection pen Inject 24 Units into the skin 2 times daily 28.8 mL 0    hydrALAZINE (APRESOLINE) 100 MG tablet TAKE 1 TABLET BY MOUTH THREE TIMES DAILY EXCEPT  ON  THE  MORNINGS  OF  DIALYSIS.  90 tablet 0    furosemide (LASIX) 40 MG tablet TAKE 1 & 1/2 (ONE & ONE-HALF) TABLETS BY MOUTH ONCE DAILY 45 tablet 0    CONTOUR TEST strip Inject 1 each into the skin 2 times daily 200 each 3    tamsulosin (FLOMAX) 0.4 MG capsule Take 1 capsule by mouth once daily 90 capsule 3    acetaminophen (TYLENOL) 325 MG tablet Take 650 mg by mouth every 6 hours as needed for Pain      nitroGLYCERIN (NITROSTAT) 0.4 MG SL tablet Place 1 tablet under the tongue every 5 minutes as needed for Chest pain 25 tablet 3    latanoprost (XALATAN) 0.005 % ophthalmic solution Place 1 drop into both eyes nightly       Insulin Pen Needle (PEN NEEDLES) 31G X 6 MM MISC 1 each by Does not apply route daily 100 each 3    prednisoLONE acetate (PRED FORTE) 1 % ophthalmic suspension Place 1 drop into the left eye daily        No current facility-administered medications on file prior to visit. Carvedilol, Lipitor [atorvastatin], Crestor [rosuvastatin calcium], Enoxaparin, Gemfibrozil, Rosuvastatin, Spironolactone, Canagliflozin, and Sitagliptin  Family History   Problem Relation Age of Onset    Cancer Mother         breast    Cancer Father         lung     Social History     Tobacco Use   Smoking Status Former    Packs/day: 1.50    Years: 8.00    Pack years: 12.00    Types: Cigarettes    Quit date: 3/4/1980    Years since quittin.0   Smokeless Tobacco Never       Social History     Substance and Sexual Activity   Alcohol Use No       Review of Systems   Constitutional:  Negative for appetite change, chills and fever. Eyes:  Negative for redness and visual disturbance. Respiratory:  Negative for cough, shortness of breath and wheezing. Cardiovascular:  Negative for chest pain and leg swelling. Gastrointestinal:  Negative for abdominal pain, constipation, nausea and vomiting. Genitourinary:  Negative for decreased urine volume, difficulty urinating, dysuria, enuresis, flank pain, frequency, hematuria, penile discharge, penile pain, scrotal swelling, testicular pain and urgency. Musculoskeletal:  Negative for back pain, joint swelling and myalgias. Skin:  Negative for color change, rash and wound. Neurological:  Negative for dizziness, tremors and numbness. Hematological:  Negative for adenopathy. Does not bruise/bleed easily. Temp 97.5 °F (36.4 °C)   Ht 5' 9\" (1.753 m)   Wt 197 lb (89.4 kg)   BMI 29.09 kg/m²       PHYSICAL EXAM:  Constitutional: Patient in no acute distress; Neuro: alert and oriented to person place and time. Psych: Mood and affect normal.  Skin: Normal  Lungs: Respiratory effort normal  Cardiovascular:  Normal peripheral pulses  Abdomen: Soft, non-tender, non-distended with no CVA, flank pain  Bladder non-tender and not distended. Lab Results   Component Value Date    BUN 61 (H) 01/20/2023     Lab Results   Component Value Date    CREATININE 5.52 ([facility]) 01/20/2023     Lab Results   Component Value Date    PSA 3.28 08/16/2022    PSA 3.36 01/18/2022    PSA 3.68 04/30/2021       ASSESSMENT:   Diagnosis Orders   1. BPH with obstruction/lower urinary tract symptoms  IL JOSE M POST-VOIDING RESIDUAL URINE&/BLADDER CAP      2.  Constipation, unspecified constipation type              PLAN:  Continue flomax      F/U in 6 months with a KUB and PSA prior

## 2023-02-28 NOTE — PATIENT INSTRUCTIONS
SURVEY:    You may be receiving a survey from [employer] regarding your visit today. Please complete the survey to enable us to provide the highest quality of care to you and your family. If you cannot score us a very good on any question, please call the office to discuss how we could have made your experience a very good one. Thank you.

## 2023-03-14 DIAGNOSIS — E11.22 TYPE 2 DIABETES MELLITUS WITH ESRD (END-STAGE RENAL DISEASE) (HCC): ICD-10-CM

## 2023-03-14 DIAGNOSIS — N18.6 TYPE 2 DIABETES MELLITUS WITH ESRD (END-STAGE RENAL DISEASE) (HCC): ICD-10-CM

## 2023-03-14 DIAGNOSIS — Z79.4 TYPE 2 DIABETES MELLITUS WITH HYPERGLYCEMIA, WITH LONG-TERM CURRENT USE OF INSULIN (HCC): ICD-10-CM

## 2023-03-14 DIAGNOSIS — E11.65 TYPE 2 DIABETES MELLITUS WITH HYPERGLYCEMIA, WITH LONG-TERM CURRENT USE OF INSULIN (HCC): ICD-10-CM

## 2023-03-14 RX ORDER — CARVEDILOL 25 MG/1
1 TABLET, FILM COATED ORAL 2 TIMES DAILY
Qty: 200 EACH | Refills: 3 | Status: SHIPPED | OUTPATIENT
Start: 2023-03-14 | End: 2023-06-12

## 2023-03-14 RX ORDER — INSULIN GLARGINE 100 [IU]/ML
24 INJECTION, SOLUTION SUBCUTANEOUS 2 TIMES DAILY
Qty: 43.2 ML | Refills: 0 | Status: SHIPPED | OUTPATIENT
Start: 2023-03-14 | End: 2023-06-12

## 2023-03-15 ENCOUNTER — OFFICE VISIT (OUTPATIENT)
Dept: CARDIOLOGY | Age: 78
End: 2023-03-15
Payer: MEDICARE

## 2023-03-15 VITALS
OXYGEN SATURATION: 97 % | SYSTOLIC BLOOD PRESSURE: 127 MMHG | RESPIRATION RATE: 18 BRPM | DIASTOLIC BLOOD PRESSURE: 70 MMHG | WEIGHT: 199 LBS | HEIGHT: 69 IN | BODY MASS INDEX: 29.47 KG/M2 | HEART RATE: 66 BPM

## 2023-03-15 DIAGNOSIS — I50.32 CHRONIC DIASTOLIC CONGESTIVE HEART FAILURE (HCC): ICD-10-CM

## 2023-03-15 DIAGNOSIS — E78.2 MIXED HYPERLIPIDEMIA: ICD-10-CM

## 2023-03-15 DIAGNOSIS — N18.4 STAGE 4 CHRONIC KIDNEY DISEASE (HCC): ICD-10-CM

## 2023-03-15 DIAGNOSIS — G90.1 DYSAUTONOMIA (HCC): ICD-10-CM

## 2023-03-15 DIAGNOSIS — I25.10 CORONARY ARTERY DISEASE INVOLVING NATIVE CORONARY ARTERY OF NATIVE HEART WITHOUT ANGINA PECTORIS: ICD-10-CM

## 2023-03-15 DIAGNOSIS — R42 LIGHTHEADED: ICD-10-CM

## 2023-03-15 DIAGNOSIS — I10 ESSENTIAL HYPERTENSION: ICD-10-CM

## 2023-03-15 DIAGNOSIS — I25.10 ASHD (ARTERIOSCLEROTIC HEART DISEASE): ICD-10-CM

## 2023-03-15 DIAGNOSIS — H81.10 BPV (BENIGN POSITIONAL VERTIGO), UNSPECIFIED LATERALITY: Primary | ICD-10-CM

## 2023-03-15 DIAGNOSIS — R42 DIZZY: ICD-10-CM

## 2023-03-15 DIAGNOSIS — I10 PRIMARY HYPERTENSION: ICD-10-CM

## 2023-03-15 DIAGNOSIS — D64.9 ANEMIA, UNSPECIFIED TYPE: ICD-10-CM

## 2023-03-15 DIAGNOSIS — N18.6 END STAGE RENAL DISEASE (HCC): ICD-10-CM

## 2023-03-15 PROCEDURE — 99214 OFFICE O/P EST MOD 30 MIN: CPT | Performed by: FAMILY MEDICINE

## 2023-03-15 PROCEDURE — G8484 FLU IMMUNIZE NO ADMIN: HCPCS | Performed by: FAMILY MEDICINE

## 2023-03-15 PROCEDURE — 3078F DIAST BP <80 MM HG: CPT | Performed by: FAMILY MEDICINE

## 2023-03-15 PROCEDURE — 1124F ACP DISCUSS-NO DSCNMKR DOCD: CPT | Performed by: FAMILY MEDICINE

## 2023-03-15 PROCEDURE — 3074F SYST BP LT 130 MM HG: CPT | Performed by: FAMILY MEDICINE

## 2023-03-15 PROCEDURE — G8427 DOCREV CUR MEDS BY ELIG CLIN: HCPCS | Performed by: FAMILY MEDICINE

## 2023-03-15 PROCEDURE — G8417 CALC BMI ABV UP PARAM F/U: HCPCS | Performed by: FAMILY MEDICINE

## 2023-03-15 PROCEDURE — 1036F TOBACCO NON-USER: CPT | Performed by: FAMILY MEDICINE

## 2023-03-15 PROCEDURE — 99211 OFF/OP EST MAY X REQ PHY/QHP: CPT | Performed by: FAMILY MEDICINE

## 2023-03-15 RX ORDER — HYDRALAZINE HYDROCHLORIDE 100 MG/1
TABLET, FILM COATED ORAL
Qty: 90 TABLET | Refills: 0 | Status: SHIPPED | OUTPATIENT
Start: 2023-03-15

## 2023-03-15 NOTE — PROGRESS NOTES
Maxwell Price am scribing for and in the presence of Hugo Chairez MD, MS, F.A.C.C..    Patient: Jacquelyn Lopez  : 1945  Date of Visit: March 15, 2023    REASON FOR VISIT / CONSULTATION: Follow-up (HX: dysautonomia, anemia, CHF, CAD, angina, HTN, HLD, stage 4 CKD, renal artery stenosis. Pt is here for a 4 month follow up. Pt says he is feeling okay. He has some occasional SOB. Light headedness when he gets up daily. He thinks he also has some fluid in his ears that contributes to his dizziness. Denies CP or palps. )    Dear Vic Monge MD,    As you know, Mr. Anette Lakhani is a 70 y.o. male with a known history of dysautonomia where on tilt table testing his blood pressure dropped from 364 systolic to 78 systolic with only a 93-28 HR response. A CT of he head in the past showed evidence of at least 2 old CVA's and a heart catheterization showed severe single vessel disease in the ostium of a moderate sized OM1 branch of the circumflex. However, maximal guidelines directed medical management was opted for an place of stenting partly due to the risk associated with stenting this vessel. He has had a coronary angiography procedure with stenting of his HA Om1. As you know, Mr. Anette Lakhani  is a 68 y.o. male with a history of recent onset substernal chest discomfort that led to a stress test and a subsequent heart catheterization which showed severe single vessel coronary artery disease of the HA Om1 with successful angioplasty and stenting of that vessel that was done by Dr. Caitlyn Weber on 4/10/17. He has had problems with balance problems when he is in his feet and says that a Neurologist has told him in the past this is because of his previous strokes. He underwent a cardiovascular stress test which fortunately had shown to be normal on 3/21/2018. Mr. Anette Lakhani has significant normal stress test and echocardiogram on 2020. Holter monitor done on 2020: The rhythm was sinus.  Average daily heart rate 58 ranging from 47 to 81 bpm. Bradycardia for 72% test duration. Rare premature supraventricular ectopic beats consisting of 33 isolated PACs. Rare premature ventricular ectopic beats total 75 consisting of 73 isolatedPVCs and a ventricular couplet. Echocardiogram done on 12/18/2020: EF of 60%. Moderately increased LV wall thickness. Borderline pulmonary hypertension. Mild tricuspid regurgitation. Mild diastolic dysfunction is seen/ Aortic root is mildly dilated. Stress test done on 9/20/2021 was equivocal, There is a small/moderate perfusion defect of mild intensity in the lateral, inferior and inferoapical regions. Cardiac cath done on 10/5/2021 showed Moderate three vessel coronary artery disease involving a ostial 50-60% stenosis in a small, non-dominant RCA, a 60% mid LAD stenosis and a proximal 50-60% stenosis in the left anterior descending coronary artery. Echo done on 11/11/2021 showed an EF 60%, The left atrium is moderately dilated (34-39) with a left atrial volume index of 35 ml/m2. Mild to moderate tricuspid regurgitation. Moderate pulmonary hypertension with estimated pulmonary artery systolic pressure of 42 mmHg. Mild diastolic dysfunction. He had abnormal Tilt table test done on 6/7/2022 whish was consist with dysautonomia. Mr. Gladys Becerra is here today for a for month follow up. He does have light headed/dizziness daily, he says he thinks he has fluid around in his ears that contributes to his dizziness which causes dizziness when he is laying down and turns his head. He has occasional shortness of breath with exertion. No palpitations. No chest pain, pressure, or tightness. He does say that his blood pressure bottoms out when he goes to dialysis and he is never allowed to drive afterwards. He is thinking of switching nephrologists. No blood in his urine or stool. He is also following with hematology for his anemia. He has been having iron infusions and reports he has been given erythropetin.  He denied any chest pain, pressure or tightness. Also denied any worsening shortness of breath since his last visit. No abdominal pain, bleeding problems, bowel issues, problems with his medications or any other concerns at this time. No nausea or vomiting. No cough, fever or chills. Exercise Tolerance: Mr. Alexandr Perry reports that he has a fairly poor exercise tolerance. His says that he could walk about 1/2 block without developing chest discomfort or significant shortness of breath. Past Medical History:   Diagnosis Date    Abnormal tilt table test 2/23/2016    Acute kidney injury [facility])     Acute MI ([facility]     Acute renal failure superimposed on stage 4 chronic kidney disease ([facility] [facility] 10/2/2018    ssecondary to hemodynamic effects of loop diuretics and ace inhibitors, bbaseline 1.2-1.3 which peaked up to 1.8, resolving    Acute renal failure with tubular necrosis ([facility] [address] 10/2/2018    ssecondary to hemodynamic effects of loop diuretics and ace inhibitors, bbaseline 1.2-1.3 which peaked up to 1.8, resolving    Anemia     Anemia in stage 4 chronic kidney disease ([facility] [facility] 5/21/2019    Also from suspected blood loss    Angina, class II ([facility] [facility] 1/8/2018    CAD (coronary artery disease)     Cerebral artery occlusion with cerebral infarction [facility])     CHF (congestive heart failure) ([facility] [facility]     Cholecystitis 8/17/2015    Chronic back pain     Chronic diastolic HF (heart failure), NYHA class 3 ([facility] [address] 4/24/2017    Chronic kidney disease, stage III (moderate) ([facility] [address] 2/22/2016    Secondary to diabetic nephrosclerosis. Baseline creatinine 1.4-1.6, renal function fluctuate volume status    Closed fracture of lumbar vertebra without mention of spinal cord injury     Displacement of intervertebral disc, site unspecified, without myelopathy     H/O cardiac catheterization 2/19/16    LMCA: Mild irregularities 10-20%. LAD: Lesion on PRox LAD: Mid subsection. 65% stenosis. LCx: Lesion on 1st Ob Valentina: Proximal subesection. 70% steniosis.  RCA: Small non-dominant RCA. Lesion on PRox RCA: Ostial. 50% stenosis. EF:55%. H/O cardiovascular stress test 2/19/16    Abnormal. Moderate perfusion defect of mild intensity in the inferior, inferoseptal adn inferoapical regions during stress imaging, which most consistent with ischemia. Global LV systolic function normal without regional wall motion abnormalities. OVerall these results are most consistent with an intermediate risk for signficant CAD. Additional testing including cardiac cath may be indicated. H/O tilt table evaluation 12/26/2017    Abnormal. Patients HR, BP response and symptoms were most consistent with dysautonomia. Combined with viligant maintenance of euvolemia and maintaining a moderate salft intake, pharmacologic treatment with SSRI such as lexapro and or mestinon among other treatments have shown some effectiveness in treatment of this condition    H/O tilt table evaluation 12/26/2017    Abnormal head upright tilt table study. The pt heart rate, blood pressure response and symptoms were most consistent with dysautonomia. History of 24 hour EKG monitoring 8/14/14    Occasional PAC's and PVC's which appear to be at least moderately symptomatic. History of 24 hour EKG monitoring 11/19/14    Event Monitor. Sinus rhythm and sinus bradycardia. Infrequent isolated PVC's    History of blood transfusion     History of cardiovascular stress test 2/19/14    Relatively NL    History of cardiovascular stress test 03/21/2018    Normal myocardial perfusion. Global left ventricular systolic function was normal with an EF of 68%. Overall, these results are most consistent with a low risk scan. History of coronary artery stent placement 04/2017    PTCA / Drug Eluting Stent:, CX and / or branches    History of CVA (cerebrovascular accident) without residual deficits 2014    Incidentally found on CT head.  No known impairment now or in the past.    History of echocardiogram 2/18/14    LA mildly dilated, EF 55%, LV wall thickness is moderately increased, no definite wall motion abnormalilities, what appears to be a pacer wire is seen w/n the RA and RV, mild-mod TR, mild pulmonary hypertension. History of Holter monitoring 12/29/2017    Rare PAC's and PVC's. History of tilt table evaluation 8/12/14    Abnormal    Hyperlipidemia     Hypertension     Medication side effect, initial encounter 3/23/2018    Non critical Right Renal artery stenosis, native ([facility] 10/2/2018    Non critical Right Renal artery stenosis, based on cath in 2016, Rt RA 30% stenosis    Pacemaker     non-functioning    S/P cardiac cath 04/10/2017    S/P coronary artery stent placement     Successful PTCA - HA Om1    SIRS (systemic inflammatory response syndrome) ([facility] [address] 5/19/2019    Type II or unspecified type diabetes mellitus without mention of complication, not stated as uncontrolled      CURRENT ALLERGIES: Carvedilol, Lipitor [atorvastatin], Crestor [rosuvastatin calcium], Enoxaparin, Gemfibrozil, Rosuvastatin, Spironolactone, Canagliflozin, and Sitagliptin REVIEW OF SYSTEMS: 14 systems were reviewed. Pertinent positives and negatives as above, all else negative. Past Surgical History:   Procedure Laterality Date    BACK SURGERY      CARDIAC CATHETERIZATION Left 02/19/2016    via right radial approach/ Melina Vernon/ Dr. Hodge [facility] Left 10/05/2021    Dr Carlin Fotser radial-Moderate three vessel coronary artery disease involving a ostial 50-60% stenosis in a small, non-dominant RCA, a 60% mid LAD stenosis and a proximal 50-60% stenosis in the left anterior descending coronary artery. Normal left ventricular end diastolic pressure. Proceed with aggressive maximal medical management as clinically indicated.     CHOLECYSTECTOMY  08/17/2015    Liang/Charles/Saulo/ Millicent    COLONOSCOPY  2010    COLONOSCOPY  02/19/2015    -polyps,diverticulosis,hemorrhoids    COLONOSCOPY  05/23/2018 Dr Silvia Mendiola    COLONOSCOPY N/A 2018    COLONOSCOPY POLYPECTOMY SNARE/COLD BIOPSY  cold snare  and  hot snare performed by Germania Barcenas MD at [address] [facility] [address]  10/30/2020    with Dr. Que Gonzalez    COLONOSCOPY N/A 10/30/2020    Geryl Mortensen, NOT HIGH RISK performed by Katy Colindres DO at [address] [facility]      left eye    CT BIOPSY PERCUTANEOUS SUPERFICIAL BONE  2022    CT BIOPSY PERCUTANEOUS SUPERFICIAL BONE 2022 [facility] CT SCAN    ENDOSCOPY, COLON, DIAGNOSTIC      EYE SURGERY      IR TUNNELED CATHETER PLACEMENT GREATER THAN 5 YEARS  2021    IR TUNNELED CATHETER PLACEMENT GREATER THAN 5 YEARS 2021 [facility]    PACEMAKER INSERTION      PACEMAKER PLACEMENT      PAIN MANAGEMENT PROCEDURE Bilateral 5/10/2022    LUMBAR TRANSFORAMINAL - L3-4 performed by Eriberto Leija MD at [address] [facility]  2019    Dr. Jimenez Areas H-Pylori)duodenal bulbar/antral erythema    UPPER GASTROINTESTINAL ENDOSCOPY N/A 2019    EGD BIOPSY, clotest performed by Germania Barcenas MD at [address] [facility] 10/30/2020    EGD ESOPHAGOGASTRODUODENOSCOPY performed by Katy Colindres DO at [place] [id number],   2021    US PERCUT RENAL BIOPSY, LT 2021 [facility]    Social History:  Social History     Tobacco Use    Smoking status: Former     Packs/day: 1.50     Years: 8.00     Pack years: 12.00     Types: Cigarettes     Quit date: 3/4/1980     Years since quittin.0    Smokeless tobacco: Never   Vaping Use    Vaping Use: Never used   Substance Use Topics    Alcohol use: No    Drug use: No        CURRENT MEDICATIONS:  Outpatient Medications Marked as Taking for the 3/15/23 encounter (Office Visit) with Jeanette Donahue MD   Medication Sig Dispense Refill    LANTUS SOLOSTAR 100 UNIT/ML injection pen Inject 24 Units into the skin 2 times daily 43.2 mL 0    CONTOUR TEST strip Inject 1 each into the skin 2 times daily 200 each 3    sevelamer (RENVELA) 800 MG tablet Take 1 tablet by mouth 3 times daily (with meals)      metoprolol tartrate (LOPRESSOR) 25 MG tablet Take 1 tablet by mouth 2 times daily 180 tablet 3    hydrALAZINE (APRESOLINE) 100 MG tablet TAKE 1 TABLET BY MOUTH THREE TIMES DAILY EXCEPT  ON  THE  MORNINGS  OF  DIALYSIS. 90 tablet 0    furosemide (LASIX) 40 MG tablet TAKE 1 & 1/2 (ONE & ONE-HALF) TABLETS BY MOUTH ONCE DAILY 45 tablet 0    tamsulosin (FLOMAX) 0.4 MG capsule Take 1 capsule by mouth once daily 90 capsule 3    acetaminophen (TYLENOL) 325 MG tablet Take 650 mg by mouth every 6 hours as needed for Pain      nitroGLYCERIN (NITROSTAT) 0.4 MG SL tablet Place 1 tablet under the tongue every 5 minutes as needed for Chest pain 25 tablet 3    latanoprost (XALATAN) 0.005 % ophthalmic solution Place 1 drop into both eyes nightly       Insulin Pen Needle (PEN NEEDLES) 31G X 6 MM MISC 1 each by Does not apply route daily 100 each 3    prednisoLONE acetate (PRED FORTE) 1 % ophthalmic suspension Place 1 drop into the left eye daily        FAMILY HISTORY: family history includes Cancer in his father and mother. PHYSICAL EXAM:   /70 (Site: Right Upper Arm, Position: Standing, Cuff Size: Large Adult)   Pulse 66   Resp 18   Ht 5' 9\" (1.753 m)   Wt 199 lb (90.3 kg)   SpO2 97%   BMI 29.39 kg/m²  Body mass index is 29.39 kg/m². Constitutional: He is oriented to person, place, and time. He appears well-developed and well-nourished. In no acute distress. HEENT: Normocephalic and atraumatic. No JVD present. Carotid bruit is not present. No mass and no thyromegaly present. No lymphadenopathy present. Cardiovascular: Normal rate, regular rhythm, normal heart sounds. Exam reveals no gallop and no friction rubs. 2/6 systolic murmur, 5th intercostal space on the LEFT in the mid-clavicular line (cardiac apex).   Pulmonary/Chest: Effort normal and breath sounds normal. No respiratory distress. He has no wheezes, rhonchi or rales. Abdominal: Soft, non-tender. Bowel sounds and aorta are normal. He exhibits no organomegaly, mass or bruit. Extremities: None. Compression stockings in place. No cyanosis or clubbing. 2+ radial and carotid pulses. Distal extremity pulses: 2+ bilaterally. Neurological: He is alert and oriented to person, place, and time. No evidence of gross cranial nerve deficit. Coordination appeared normal.   Skin: Skin is warm and dry. There is no rash or diaphoresis. Thrill on left arm. Psychiatric: He has a normal mood and affect. His speech is normal and behavior is normal.      MOST RECENT LABS ON RECORD:   Lab Results   Component Value Date    WBC 9.4 01/20/2023    HGB 9.9 (L) 01/20/2023    HCT 30.0 (L) 01/20/2023     01/20/2023    CHOL 69 09/21/2021    TRIG 185 (H) 09/21/2021    HDL 14 (L) 09/21/2021    LDLDIRECT 44 11/02/2017    ALT 12 05/13/2022    AST 17 05/13/2022     01/20/2023    K 4.5 01/20/2023     01/20/2023    CREATININE 5.52 (HH) 01/20/2023    BUN 61 (H) 01/20/2023    CO2 25 01/20/2023    TSH 2.26 11/02/2017    PSA 3.28 08/16/2022    INR 1.0 06/01/2022    LABA1C 7.3 (H) 04/19/2022    LABMICR 30 10/17/2015     ASSESSMENT:  1. BPV (benign positional vertigo), unspecified laterality    2. Dysautonomia ([address]    3. Anemia, unspecified type    4. Chronic diastolic congestive heart failure ([address]    5. ASHD (arteriosclerotic heart disease)    6. Primary hypertension    7. Mixed hyperlipidemia    8. End stage renal disease ([address]    9. Stage 4 chronic kidney disease ([address]    10. Coronary artery disease involving native coronary artery of native heart without angina pectoris    11. Lightheaded    12. Dizzy    13. Essential hypertension      PLAN:    Benign Positional Vertigo: In regards to Mr. Jimenez's intermittent dizziness.  I believe that this sounds suggestive of an inner ear problem such as benign positional vertigo, rather than a blood pressure or heart rate problem. Therefore, I took the liberty of referring him to physical therapy to have this evaluated and treated. Dysautonomia: Mildly to moderate at this time. Dizziness occurring primarily after dialysis. Diuretics: Continue furosemide (Lasix) 60 mg every morning. Beta Blocker: Continue Metoprolol tartrate (Lopressor) 25 mg bid. Hold on dialysis mornings, check blood pressure prior to evening dose, if SBP > 150 then take evening pill. Calcium Channel Blocker: None at this time. Also I will have him to take his Hydralazine as needed if his systolic number is >293 mmHg. Nonpharmacologic counseling: Because of his condition, I reminded him to try and keep himself well-hydrated and to take extra time when moving from laying to sitting, sitting to standing and standing to walking as well as wearing at least knee high compressions stockings. Anemia of chronic disease: CBC done on 1/20/2023 was 9.9 g/dL. Following with hematology. Continue to monitor this via blood work. Procrit per nephrology. Iron and EPO per dialysis protocol. Hold aspirin for now, will consider restarting once hemoglobin is greater than 10 g/dL. Chronic Diastolic Heart Failure: EF of 60% via echo on 11/11/2021. Currently well controlled. Continue dialysis. Beta Blocker: Continue Metoprolol tartrate (Lopressor) 25 mg bid. Hold on dialysis morning dose, take evening dose PRN SBP > 150. Diuretics: Continue furosemide (Lasix) 60 mg every morning. Nonpharmacologic management of Heart Failure: I advised him to try and keep his legs up whenever possible and to limit salt in his diet. Atherosclerotic Heart Disease: Coronary Artery stent: 4/10/2017.  Cardiac cath done on 10/5/2021 showed Moderate three vessel coronary artery disease involving a ostial 50-60% stenosis in a small, non-dominant RCA, a 60% mid LAD stenosis and a proximal 50-60% stenosis in the left anterior descending coronary artery  Antiplatelet Agent: Not indicated due to anemia issues as above. Beta Blocker: Continue Metoprolol tartrate (Lopressor) 25 mg bid. Hold on dialysis days as above. Cholesterol Reduction Therapy: Refused by patient. Essential Hypertension: Controlled   Beta Blocker: Continue Metoprolol tartrate (Lopressor) 25 mg bid. Hold on dialysis days as above. Hydralazine: 100 mg q 8 hours, hold on Dialysis days. Calcium Channel Blocker: None at this time. Diuretics: Continue furosemide (Lasix) 60 mg every morning. Hyperlipidemia: Mixed - Last LDL on 9/21/2021 was 18 mg/dL   Cholesterol Reduction Therapy: Refused by patient. End Stage Renal Disease: Currently goes to dialysis Mon and Fri    Continue dialysis and follow up with nephrology as scheduled. Hold morning lopressor dose on dialysis days and hold evening dose if SBP is not greater than 150. Finally, I recommended that he continue his other medications and follow up with you as previously scheduled. FOLLOW UP:   I told Mr. Kunz Love  to call my office if he had any problems, but otherwise told him to Return in about 6 months (around 9/15/2023). However, as always I would be happy to see him sooner should the need arise. Once again, thank you for allowing me to participate in this patients care. Please do not hesitate to contact me could I be of further assistance. Sincerely,  Alba Branham. Jackelin BLOUNT, MS, F.A.C.C. [facility]) [facility], [address] [place] [address]  Phone: [phone], Fax: [phone]    I believe that the risk of significant morbidity and mortality related to the patient's current medical conditions are: Intermediate. Approximately 30 minutes were spent during prep work, discussion and exam of the patient, and follow up documentation and all of their questions were answered.     The documentation recorded by the scribe, accurately and completely reflects the services I personally performed and the decisions made by me. Vinita Nick MD, MS, F.A.C.C.  March 15, 2023

## 2023-03-15 NOTE — PATIENT INSTRUCTIONS
SURVEY:    You may be receiving a survey from [employer] regarding your visit today. Please complete the survey to enable us to provide the highest quality of care to you and your family. If you cannot score us a very good on any question, please call the office to discuss how we could have made your experience a very good one. Thank you.

## 2023-03-16 ENCOUNTER — HOSPITAL ENCOUNTER (OUTPATIENT)
Dept: PHYSICAL THERAPY | Age: 78
Setting detail: THERAPIES SERIES
Discharge: HOME | End: 2023-03-16
Payer: MEDICARE

## 2023-03-16 PROCEDURE — 97162 PT EVAL MOD COMPLEX 30 MIN: CPT

## 2023-03-16 PROCEDURE — 97112 NEUROMUSCULAR REEDUCATION: CPT

## 2023-03-16 PROCEDURE — 97110 THERAPEUTIC EXERCISES: CPT

## 2023-03-16 NOTE — PROGRESS NOTES
Phone: [address]          Fax: [phone]                      Outpatient Physical Therapy                                                                      Evaluation  Date: 3/16/2023  Patient: Nicci Romero  : 1945  [facility] #: [id number]    Referring Physician: Roslyn Arvizu MD     Medical Diagnosis: Dysautonomia, G90.1; Anemia, D64.9; CHF, I50.32; Dizziness, R42, ESRD, CKD    Treatment Diagnosis: Dizziness, falls  PT Insurance Information: Medicare  Total # of Visits Approved: 9   Total # of Visits to Date: 1  No Show: 0  Canceled Appointment: 0     Subjective  Subjective: Patient reports dizziness started a few years ago since he went fishing on a boat with 4 foot waves. Ever since then he gets dizzy every time he stands up. Dr. Payam Ya said it isn't vertigo; it's dysautonomia; pt had an abnormal tilt table test, but patient insisted on trying therapy as well. Pt has been using a cane for about year at least and also uses a walker and he has trouble going up and down stairs. His most recent fall was a month ago and he needed help getting back up. He also has some dizziness with turning his head and turning over in bed. Pt reports over just the last 2 weeks he's noticed a full feeling in the L ear and also feels like things are moving when he lays down. Additional Pertinent Hx: pacemaker, hypotension, DM, CKD, ESRD, dysautonomia, CHF    Observations:   General Observations  Description: Eye movements smooth pursuits with difficulty tracking and increased dizziness and nausea; no nystagmus noted; Increased dizziness with gaze stabilization, no nystagmus; (-) B VBI testing; good B shoulder strength; CROM is WFL's, possible (+) R [employer] with increased dizziness, nausea and mild torsional nystagmus; pt unable to stay in test position for treatment d/t nausea.       Objective    Strength       Strength LLE  L Hip Flexion: 4-/5  L Hip ABduction: 4/5  L Hip ADduction: 4/5  L Knee Flexion: 4-/5  L Knee Extension: 4/5  L Ankle Dorsiflexion: 4-/5     Special Tests:     Strength RLE  R Hip Flexion: 4-/5  R Hip ABduction: 4/5  R Hip ADduction: 4/5  R Knee Flexion: 4-/5  R Knee Extension: 4/5  R Ankle Dorsiflexion: 4-/5  Strength LLE  L Hip Flexion: 4-/5  L Hip ABduction: 4/5  L Hip ADduction: 4/5  L Knee Flexion: 4-/5  L Knee Extension: 4/5  L Ankle Dorsiflexion: 4-/5      Exercises:  Exercise 1: HEP: eye exercises: smooth pursuits, saccades, gaze stabilization with small ROM and slow pace per pt tolerance. Functional Outcome Measures  Sitting Balance: Steady, safe  Arises: Able, uses arms to help  Attempts to Arise: Able to arise, one attempt  Immediate Standing Balance (First 5 Seconds): Unsteady (swaggers, moves feet, trunk sway)  Standing Balance: Unsteady  Nudged: Staggers, grabs, catches self  Eyes Closed: Unsteady  Turned 360 Degrees: Steadiness: Unsteady (grabs, staggers)  Turned 360 Degrees: Continuity of Steps: Discontinuous steps  Sitting Down: Uses arms or not a smooth motion  Initiation of Gait: Any hesitancy or multiple attempts to start  Step Height: R Swing Foot: Right foot does not clear floor completely with step  Step Length: R Swing Foot: Does not pass left stance foot with step  Step Height: L Swing Foot: Left foot does not clear floor completely with step  Step Length: L Swing Foot: Does not pass right stance foot with step  Step Symmetry: Right and left step appear equal  Step Continuity: Stopping or discontinuity between steps  Path: Mild/moderate deviation or uses walking aid  Trunk: Marked sway or uses walking aid  Walking Time: Heels apart  Gait Score: 2  Tinetti Total Score: 8      Assessment  Assessment: Patient is 68year old male with dx of dizziness and sig hx of dysautonomia/BP issues who reports recent onset of feeling of fullness in the ears and increased dizziness with turning the head or turning over in bed the past few weeks.  Also has hx of dizziness since boat trip a few years ago. Eye movements smooth pursuits with difficulty tracking and increased dizziness and nausea; no nystagmus noted; Increased dizziness with gaze stabilization, no nystagmus; (-) B VBI testing; good B shoulder strength; CROM is WFL's, possible (+) R [employer] with increased dizziness, nausea and mild torsional nystagmus; pt unable to stay in test position for treatment d/t nausea. Patient educated on HEP eye exercises and on plan of care moving forward. Pt to benefit from physical therapy to improve eye movements/habituation exercises and to be treated for possible R BPPV as tolerated to decrease dizziness and risk of falls. Therapy Prognosis: Good        Decision Making: Medium Complexity    Patient Education  PT eval, POC, HEP   Pt verbalized/demonstrated good understanding:     [X] Yes         [] No, pt required further clarification. Goals  Short Term Goals  Time Frame for Short Term Goals: 3 weeks  Short Term Goal 1: Patient to initiate HEP for improved balance and head/eye movements. Short Term Goal 2: Patient to be re-tested with [employer] for possible R BPPV and treated as tolerated to decrease dizziness. Short Term Goal 3: Patient to be instructed in dynamic standing balance activities to decrease fall risk. Long Term Goals  Time Frame for Long Term Goals : 6 weeks  Long Term Goal 1: Patient to be independent and compliant with HEP. Long Term Goal 2: Patient to be (-) for positional vertigo testing with no nystagmus or increased dizziness noted to return to PLOF. Long Term Goal 3: Patient to have improved Tinetti balance score >/=20/28 for decreased fall risk. Long Term Goal 4: Patient to be able to complete smooth pursuits, saccades and gaze stabilization exercises with no increase in dizziness or nausea. Long Term Goal 5: Patient to report >/=50% improvement in symptoms for improved QOL.         Minutes Tracking:  Time In: 0108  Time Out: 1403  Minutes: 48  Timed Code Treatment Minutes: Divya PT, DPT    3/16/2023

## 2023-03-16 NOTE — PLAN OF CARE
[facility]           Phone: [phone]             Outpatient Physical Therapy  Fax: [phone]                                           Date: 3/16/2023  Patient: Jayson Goodman : 1945 CSN #: [id number]   Referring Physician: Jenae Estrada MD      [x] Plan of Care   [] Updated Plan of Care    Dates of Service to Include: 3/16/2023 to 23    Diagnosis:  Dysautonomia, G90.1; Anemia, D64.9; CHF, I50.32; Dizziness, R42, ESRD, CKD    Rehab (Treatment) Diagnosis:  Dizziness, falls             Onset Date:       Attendance  Total # of Visits to Date: 1 No Show: 0 Canceled Appointment: 0    Assessment  Assessment: Patient is 68year old male with dx of dizziness and sig hx of dysautonomia/BP issues who reports recent onset of feeling of fullness in the ears and increased dizziness with turning the head or turning over in bed the past few weeks. Also has hx of dizziness since boat trip a few years ago. Eye movements smooth pursuits with difficulty tracking and increased dizziness and nausea; no nystagmus noted; Increased dizziness with gaze stabilization, no nystagmus; (-) B VBI testing; good B shoulder strength; CROM is WFL's, possible (+) R [employer] with increased dizziness, nausea and mild torsional nystagmus; pt unable to stay in test position for treatment d/t nausea. Patient educated on HEP eye exercises and on plan of care moving forward. Pt to benefit from physical therapy to improve eye movements/habituation exercises and to be treated for possible R BPPV as tolerated to decrease dizziness and risk of falls. Goals  Short Term Goals  Time Frame for Short Term Goals: 3 weeks  Short Term Goal 1: Patient to initiate HEP for improved balance and head/eye movements. Short Term Goal 2: Patient to be re-tested with [employer] for possible R BPPV and treated as tolerated to decrease dizziness.   Short Term Goal 3: Patient to be instructed in dynamic standing balance activities to decrease fall risk. Long Term Goals  Time Frame for Long Term Goals : 6 weeks  Long Term Goal 1: Patient to be independent and compliant with HEP. Long Term Goal 2: Patient to be (-) for positional vertigo testing with no nystagmus or increased dizziness noted to return to PLOF. Long Term Goal 3: Patient to have improved Tinetti balance score >/=20/28 for decreased fall risk. Long Term Goal 4: Patient to be able to complete smooth pursuits, saccades and gaze stabilization exercises with no increase in dizziness or nausea. Long Term Goal 5: Patient to report >/=50% improvement in symptoms for improved QOL.      Prognosis  Therapy Prognosis: Good    Treatment Plan   Plan Frequency: 2  Plan weeks: 4  [x] HP/CP      [] Electrical Stim   [x] Therapeutic Exercise      [x] Gait Training  [] Aquatics   [] Ultrasound         [x] Patient Education/HEP   [x] Manual Therapy  [] Traction    [x] Neuro-kateryna        [x] Soft Tissue Mobs            [] Home TENS  [] Iontophoresis    [] Orthotic casting/fitting      [] Dry Needling  [] Blood Flow Restriction             Electronically signed by: Avila Abdi PT, DPT    Date: 3/16/2023      ______________________________________ Date: 3/16/2023   Physician Signature

## 2023-03-21 ENCOUNTER — HOSPITAL ENCOUNTER (OUTPATIENT)
Dept: PHYSICAL THERAPY | Age: 78
Setting detail: THERAPIES SERIES
Discharge: HOME | End: 2023-03-21
Payer: MEDICARE

## 2023-03-21 PROCEDURE — 97112 NEUROMUSCULAR REEDUCATION: CPT

## 2023-03-22 NOTE — PROGRESS NOTES
tolerated to decrease dizziness. Short Term Goal 3: Patient to be instructed in dynamic standing balance activities to decrease fall risk. Long Term Goals  Time Frame for Long Term Goals : 6 weeks  Long Term Goal 1: Patient to be independent and compliant with HEP. Long Term Goal 2: Patient to be (-) for positional vertigo testing with no nystagmus or increased dizziness noted to return to PLOF. Long Term Goal 3: Patient to have improved Tinetti balance score >/=20/28 for decreased fall risk. Long Term Goal 4: Patient to be able to complete smooth pursuits, saccades and gaze stabilization exercises with no increase in dizziness or nausea. Long Term Goal 5: Patient to report >/=50% improvement in symptoms for improved QOL.     Minutes Tracking:  Time In: 4415  Time Out: 7969  Minutes: 34  Timed Code Treatment Minutes: 33 Minutes        Akira Bonner     Date: 3/21/2023

## 2023-03-23 ENCOUNTER — HOSPITAL ENCOUNTER (OUTPATIENT)
Dept: PHYSICAL THERAPY | Age: 78
Setting detail: THERAPIES SERIES
Discharge: HOME | End: 2023-03-23
Payer: MEDICARE

## 2023-03-23 PROCEDURE — 97112 NEUROMUSCULAR REEDUCATION: CPT

## 2023-03-23 NOTE — PROGRESS NOTES
Pt verbalized/demonstrated good understanding:     [x] Yes         [] No, pt required further clarification. Post Treatment Pain:  0/10      Plan  Plan Frequency: 2  Plan weeks: 4       Goals  (Total # of Visits to Date: 3)      Short Term Goals  Time Frame for Short Term Goals: 3 weeks  Short Term Goal 1: Patient to initiate HEP for improved balance and head/eye movements. -progressing  Short Term Goal 2: Patient to be re-tested with Orlie Renay for possible R BPPV and treated as tolerated to decrease dizziness.-met  Short Term Goal 3: Patient to be instructed in dynamic standing balance activities to decrease fall risk. Long Term Goals  Time Frame for Long Term Goals : 6 weeks  Long Term Goal 1: Patient to be independent and compliant with HEP. Long Term Goal 2: Patient to be (-) for positional vertigo testing with no nystagmus or increased dizziness noted to return to PLOF. Long Term Goal 3: Patient to have improved Tinetti balance score >/=20/28 for decreased fall risk. Long Term Goal 4: Patient to be able to complete smooth pursuits, saccades and gaze stabilization exercises with no increase in dizziness or nausea. Long Term Goal 5: Patient to report >/=50% improvement in symptoms for improved QOL.     Minutes Tracking:  Time In: 200 Quique Garcia  Time Out: 8451  Minutes: 44  Timed Code Treatment Minutes: [facility], PT, DPT     Date: 3/23/2023

## 2023-03-28 ENCOUNTER — HOSPITAL ENCOUNTER (OUTPATIENT)
Dept: PHYSICAL THERAPY | Age: 78
Setting detail: THERAPIES SERIES
Discharge: HOME | End: 2023-03-28
Payer: MEDICARE

## 2023-03-28 PROCEDURE — 97112 NEUROMUSCULAR REEDUCATION: CPT

## 2023-03-28 NOTE — PROGRESS NOTES
Phone: Diann           Fax: [phone]                           Outpatient Physical Therapy                                                                            Daily Note    Patient: Maria R Mendoza : 1945  [facility] #: [id number]   Referring Physician: Sharmaine Thornton MD    Date: 3/28/2023     Treatment Diagnosis: Dizziness, falls    PT Insurance Information: Medicare  Total # of Visits Approved: 9 Per Physician Order  Total # of Visits to Date: 4  No Show: 0  Canceled Appointment: 0    Pre-Treatment Pain:  0/10  Subjective: Pt reports he has been a little rough today. Pt states hes been sick to his stomach all morning. Exercises:  Exercise 1: HEP: eye exercises: smooth pursuits, saccades, gaze stabilization with small ROM and slow pace per pt tolerance. Exercise 2: Epleys maneuver x2 for R post canal BPPV    Assessment  Assessment: Pt conitnues to report dizzy and upset stomach symptoms with Dubois hallpike and epley. Epley completed today to treat R post canal.  Pt continues to report \"cloudy/ dark\" feeling with transitions. Will continue. Activity Tolerance  Activity Tolerance: Other (comment)    Patient Education  Patient Education: HEP as tolerated  Pt verbalized/demonstrated good understanding:     [x] Yes         [] No, pt required further clarification. Post Treatment Pain:  0/10    Plan  Plan Frequency: 2  Plan weeks: 4     Goals  (Total # of Visits to Date: 4)      Short Term Goals  Time Frame for Short Term Goals: 3 weeks  Short Term Goal 2: Patient to be re-tested with Aide [employer] for possible R BPPV and treated as tolerated to decrease dizziness.-met  Short Term Goal 3: Patient to be instructed in dynamic standing balance activities to decrease fall risk. Long Term Goals  Time Frame for Long Term Goals : 6 weeks  Long Term Goal 1: Patient to be independent and compliant with HEP.   Long Term Goal 2: Patient to be (-) for positional

## 2023-03-30 ENCOUNTER — OFFICE VISIT (OUTPATIENT)
Dept: PRIMARY CARE CLINIC | Age: 78
End: 2023-03-30
Payer: MEDICARE

## 2023-03-30 ENCOUNTER — HOSPITAL ENCOUNTER (OUTPATIENT)
Dept: PHYSICAL THERAPY | Age: 78
Setting detail: THERAPIES SERIES
Discharge: HOME | End: 2023-03-30
Payer: MEDICARE

## 2023-03-30 DIAGNOSIS — N18.6 TYPE 2 DIABETES MELLITUS WITH ESRD (END-STAGE RENAL DISEASE) (HCC): Primary | ICD-10-CM

## 2023-03-30 DIAGNOSIS — G90.3 NEUROGENIC ORTHOSTATIC HYPOTENSION (HCC): ICD-10-CM

## 2023-03-30 DIAGNOSIS — F33.0 MAJOR DEPRESSIVE DISORDER, RECURRENT, MILD (HCC): ICD-10-CM

## 2023-03-30 DIAGNOSIS — D12.6 ADENOMATOUS POLYP OF COLON, UNSPECIFIED PART OF COLON: ICD-10-CM

## 2023-03-30 DIAGNOSIS — E11.22 TYPE 2 DIABETES MELLITUS WITH ESRD (END-STAGE RENAL DISEASE) (HCC): Primary | ICD-10-CM

## 2023-03-30 DIAGNOSIS — Z99.2 DEPENDENCE ON RENAL DIALYSIS (HCC): ICD-10-CM

## 2023-03-30 PROBLEM — E44.0 MODERATE MALNUTRITION (HCC): Status: RESOLVED | Noted: 2021-11-12 | Resolved: 2023-03-30

## 2023-03-30 PROCEDURE — 1036F TOBACCO NON-USER: CPT | Performed by: FAMILY MEDICINE

## 2023-03-30 PROCEDURE — G8417 CALC BMI ABV UP PARAM F/U: HCPCS | Performed by: FAMILY MEDICINE

## 2023-03-30 PROCEDURE — 99214 OFFICE O/P EST MOD 30 MIN: CPT | Performed by: FAMILY MEDICINE

## 2023-03-30 PROCEDURE — 97112 NEUROMUSCULAR REEDUCATION: CPT

## 2023-03-30 PROCEDURE — 99211 OFF/OP EST MAY X REQ PHY/QHP: CPT | Performed by: FAMILY MEDICINE

## 2023-03-30 PROCEDURE — G8427 DOCREV CUR MEDS BY ELIG CLIN: HCPCS | Performed by: FAMILY MEDICINE

## 2023-03-30 PROCEDURE — G8484 FLU IMMUNIZE NO ADMIN: HCPCS | Performed by: FAMILY MEDICINE

## 2023-03-30 PROCEDURE — 1124F ACP DISCUSS-NO DSCNMKR DOCD: CPT | Performed by: FAMILY MEDICINE

## 2023-03-30 SDOH — ECONOMIC STABILITY: INCOME INSECURITY: HOW HARD IS IT FOR YOU TO PAY FOR THE VERY BASICS LIKE FOOD, HOUSING, MEDICAL CARE, AND HEATING?: NOT HARD AT ALL

## 2023-03-30 SDOH — ECONOMIC STABILITY: FOOD INSECURITY: WITHIN THE PAST 12 MONTHS, THE FOOD YOU BOUGHT JUST DIDN'T LAST AND YOU DIDN'T HAVE MONEY TO GET MORE.: NEVER TRUE

## 2023-03-30 SDOH — ECONOMIC STABILITY: FOOD INSECURITY: WITHIN THE PAST 12 MONTHS, YOU WORRIED THAT YOUR FOOD WOULD RUN OUT BEFORE YOU GOT MONEY TO BUY MORE.: NEVER TRUE

## 2023-03-30 SDOH — ECONOMIC STABILITY: HOUSING INSECURITY
IN THE LAST 12 MONTHS, WAS THERE A TIME WHEN YOU DID NOT HAVE A STEADY PLACE TO SLEEP OR SLEPT IN A SHELTER (INCLUDING NOW)?: NO

## 2023-03-30 ASSESSMENT — PATIENT HEALTH QUESTIONNAIRE - PHQ9
9. THOUGHTS THAT YOU WOULD BE BETTER OFF DEAD, OR OF HURTING YOURSELF: 0
SUM OF ALL RESPONSES TO PHQ QUESTIONS 1-9: 0
SUM OF ALL RESPONSES TO PHQ QUESTIONS 1-9: 0
4. FEELING TIRED OR HAVING LITTLE ENERGY: 0
8. MOVING OR SPEAKING SO SLOWLY THAT OTHER PEOPLE COULD HAVE NOTICED. OR THE OPPOSITE, BEING SO FIGETY OR RESTLESS THAT YOU HAVE BEEN MOVING AROUND A LOT MORE THAN USUAL: 0
SUM OF ALL RESPONSES TO PHQ QUESTIONS 1-9: 0
SUM OF ALL RESPONSES TO PHQ QUESTIONS 1-9: 0
6. FEELING BAD ABOUT YOURSELF - OR THAT YOU ARE A FAILURE OR HAVE LET YOURSELF OR YOUR FAMILY DOWN: 0
10. IF YOU CHECKED OFF ANY PROBLEMS, HOW DIFFICULT HAVE THESE PROBLEMS MADE IT FOR YOU TO DO YOUR WORK, TAKE CARE OF THINGS AT HOME, OR GET ALONG WITH OTHER PEOPLE: 0
SUM OF ALL RESPONSES TO PHQ9 QUESTIONS 1 & 2: 0
1. LITTLE INTEREST OR PLEASURE IN DOING THINGS: 0
5. POOR APPETITE OR OVEREATING: 0
3. TROUBLE FALLING OR STAYING ASLEEP: 0
2. FEELING DOWN, DEPRESSED OR HOPELESS: 0
7. TROUBLE CONCENTRATING ON THINGS, SUCH AS READING THE NEWSPAPER OR WATCHING TELEVISION: 0

## 2023-03-30 NOTE — PROGRESS NOTES
status: Former     Packs/day: 1.50     Years: 8.00     Pack years: 12.00     Types: Cigarettes     Quit date: 3/4/1980     Years since quittin.0    Smokeless tobacco: Never   Substance Use Topics    Alcohol use: No       Family History:   Family History   Problem Relation Age of Onset    Cancer Mother         breast    Cancer Father         lung         Review of Systems:  Constitutional: negative for fever or chills  Eyes: negative for visual disturbance   ENT: negative for sore throat or nasal congestion  Respiratory: negative for cough, shortness of breath and sputum  Cardiovascular: negative for chest pain,pnd,claudication or palpitations  Gastrointestinal: negative for abd pain, dion,nausea, vomiting, diarrhea or constipation  Genitourinary: negative for dysuria,,hematuria urgency or frequency  Musculoskeletal:negative for arthralgias, muscle weakness and stiff joints   Integument/breast: negative for skin rash or lesions  Neurological: positive for   bilat numbness and tingling. Psych: negative for anxiety, depression, suicidial ideation and suicidal attempt. Objective:  Physical Exam:  There were no vitals taken for this visit. GEN:   He is alert and oriented  ENT:    ENT exam normal, no neck nodes or sinus tenderness  NECK:   neck supple and non tender without mass, no thyromegaly or thyroid nodules, no cervical lymphadenopathy  EYES:   No gross abnormalities. CVS:     CVS exam BP noted to be well controlled today in office, S1, S2 normal, no gallop, 2/6 murmur, chest clear, no JVD, no HSM, no edema  PULM:   chest clear, no wheezing, rales, normal symmetric air entry, no tachypnea, retractions or cyanosis  ABD:   Abdomen soft, non-tender.  BS normal. No masses,  No organomegaly  MUSC: No joint tenderness  Skin : no  rash or lesions  EXT: {EXTREMITIES EXAM:42366::\"Extremities: + 2 pedalpulses, no edema or calf tenderness, and warm to touch   NEURO:  DTR -diminished        Diagnostic

## 2023-03-30 NOTE — PATIENT INSTRUCTIONS
SURVEY:    You may be receiving a survey from [employer] regarding your visit today. You may get this in the mail, through your MyChart, or in your email. Please complete the survey to enable us to provide the highest quality of care to you and your family. If you cannot score us a very good (5 Stars) on any question, please call the office to discuss how we could of made your experience exceptional.    Thank you!     ARIANE Cline, PIO Vargas, [address] [place] [address]    Phone: [phone]  Fax: [phone]    Office Hours:   Ang Landry, [address] [facility], F: 8-5

## 2023-03-30 NOTE — PROGRESS NOTES
activities to decrease fall risk. Long Term Goals  Time Frame for Long Term Goals : 6 weeks  Long Term Goal 1: Patient to be independent and compliant with HEP. Long Term Goal 2: Patient to be (-) for positional vertigo testing with no nystagmus or increased dizziness noted to return to PLOF. Long Term Goal 3: Patient to have improved Tinetti balance score >/=20/28 for decreased fall risk. Long Term Goal 4: Patient to be able to complete smooth pursuits, saccades and gaze stabilization exercises with no increase in dizziness or nausea.     Minutes Tracking:  Time In: 1340  Time Out: 2729  Minutes: 32  Timed Code Treatment Minutes: 30 Minutes        Lanora Sprain     Date: 3/30/2023

## 2023-04-04 ENCOUNTER — HOSPITAL ENCOUNTER (OUTPATIENT)
Dept: PHYSICAL THERAPY | Age: 78
Setting detail: THERAPIES SERIES
Discharge: HOME | End: 2023-04-04
Payer: MEDICARE

## 2023-04-04 PROCEDURE — 97112 NEUROMUSCULAR REEDUCATION: CPT

## 2023-04-04 NOTE — PROGRESS NOTES
Phone: Diann           Fax: [phone]                           Outpatient Physical Therapy                                                                            Daily Note    Patient: Godwin Stevens : 1945  [facility] #: [id number]   Referring Physician: Geovanna Trejo MD    Date: 2023     Treatment Diagnosis: Dizziness, falls    PT Insurance Information: Medicare  Total # of Visits Approved: 9 Per Physician Order  Total # of Visits to Date: 6  No Show: 0  Canceled Appointment: 0    Pre-Treatment Pain:  0/10  Subjective: Pt reports he is still getting alotof the same dizzy feelings currently. Pt denies current pain. Exercises:  Exercise 1: HEP: eye exercises: smooth pursuits, saccades, gaze stabilization with small ROM and slow pace per pt tolerance. Exercise 2: Epleys maneuver x2 for R post canal BPPV  - one to R one to L today    Assessment  Assessment: Pt reported increased symptoms today on L side with testing. Gave Pt Cruz daroff exercises today for HEP. Will continue. Activity Tolerance  Activity Tolerance: Patient tolerated treatment well    Patient Education  Patient Education: HEP  Pt verbalized/demonstrated good understanding:     [x] Yes         [] No, pt required further clarification. Post Treatment Pain:  0/10    Plan  Plan Frequency: 2  Plan weeks: 4     Goals  (Total # of Visits to Date: 6)      Short Term Goals  Short Term Goal 1: Patient to initiate HEP for improved balance and head/eye movements. -progressing  Short Term Goal 2: Patient to be re-tested with Bakari Shital for possible R BPPV and treated as tolerated to decrease dizziness.-met  Short Term Goal 3: Patient to be instructed in dynamic standing balance activities to decrease fall risk. Long Term Goals  Time Frame for Long Term Goals : 6 weeks  Long Term Goal 1: Patient to be independent and compliant with HEP.   Long Term Goal 2: Patient to be (-) for

## 2023-04-06 ENCOUNTER — HOSPITAL ENCOUNTER (OUTPATIENT)
Dept: PHYSICAL THERAPY | Age: 78
Setting detail: THERAPIES SERIES
Discharge: HOME | End: 2023-04-06
Payer: MEDICARE

## 2023-04-06 PROCEDURE — 97112 NEUROMUSCULAR REEDUCATION: CPT

## 2023-04-18 ENCOUNTER — APPOINTMENT (OUTPATIENT)
Dept: CT IMAGING | Age: 78
End: 2023-04-18
Payer: MEDICARE

## 2023-04-18 ENCOUNTER — HOSPITAL ENCOUNTER (EMERGENCY)
Age: 78
Discharge: HOME | End: 2023-04-18
Attending: EMERGENCY MEDICINE
Payer: MEDICARE

## 2023-04-18 VITALS
TEMPERATURE: 97.5 F | RESPIRATION RATE: 21 BRPM | DIASTOLIC BLOOD PRESSURE: 57 MMHG | SYSTOLIC BLOOD PRESSURE: 174 MMHG | HEART RATE: 60 BPM | OXYGEN SATURATION: 96 %

## 2023-04-18 DIAGNOSIS — G51.0 BELL'S PALSY: Primary | ICD-10-CM

## 2023-04-18 LAB
ABSOLUTE EOS #: 0.41 K/UL (ref 0–0.44)
ABSOLUTE IMMATURE GRANULOCYTE: 0.16 K/UL (ref 0–0.3)
ABSOLUTE LYMPH #: 2.82 K/UL (ref 1.1–3.7)
ABSOLUTE MONO #: 0.67 K/UL (ref 0.1–1.2)
ALBUMIN SERPL-MCNC: 4.4 G/DL (ref 3.5–5.2)
ALBUMIN/GLOBULIN RATIO: 1.4 (ref 1–2.5)
ALP SERPL-CCNC: 86 U/L (ref 40–129)
ALT SERPL-CCNC: 12 U/L (ref 5–41)
ANION GAP SERPL CALCULATED.3IONS-SCNC: 13 MMOL/L (ref 9–17)
AST SERPL-CCNC: 15 U/L
BACTERIA: ABNORMAL
BASOPHILS # BLD: 1 % (ref 0–2)
BASOPHILS ABSOLUTE: 0.08 K/UL (ref 0–0.2)
BILIRUB SERPL-MCNC: 0.4 MG/DL (ref 0.3–1.2)
BILIRUBIN URINE: NEGATIVE
BUN SERPL-MCNC: 39 MG/DL (ref 8–23)
BUN/CREAT BLD: 8 (ref 9–20)
CALCIUM SERPL-MCNC: 9.5 MG/DL (ref 8.6–10.4)
CASTS UA: ABNORMAL /LPF
CHLORIDE SERPL-SCNC: 100 MMOL/L (ref 98–107)
CO2 SERPL-SCNC: 25 MMOL/L (ref 20–31)
COLOR: YELLOW
CREAT SERPL-MCNC: 4.63 MG/DL (ref 0.7–1.2)
EKG ATRIAL RATE: 67 BPM
EKG P AXIS: 46 DEGREES
EKG P-R INTERVAL: 218 MS
EKG Q-T INTERVAL: 456 MS
EKG QRS DURATION: 88 MS
EKG QTC CALCULATION (BAZETT): 481 MS
EKG R AXIS: -2 DEGREES
EKG T AXIS: 41 DEGREES
EKG VENTRICULAR RATE: 67 BPM
EOSINOPHILS RELATIVE PERCENT: 4 % (ref 1–4)
EPITHELIAL CELLS UA: ABNORMAL /HPF (ref 0–5)
GFR SERPL CREATININE-BSD FRML MDRD: 12 ML/MIN/1.73M2
GLUCOSE BLD-MCNC: 147 MG/DL (ref 74–100)
GLUCOSE SERPL-MCNC: 170 MG/DL (ref 70–99)
GLUCOSE UR STRIP.AUTO-MCNC: NEGATIVE MG/DL
HCT VFR BLD AUTO: 30.7 % (ref 40.7–50.3)
HGB BLD-MCNC: 10.5 G/DL (ref 13–17)
IMMATURE GRANULOCYTES: 2 %
INR PPP: 0.9
KETONES UR STRIP.AUTO-MCNC: NEGATIVE MG/DL
LEUKOCYTE ESTERASE UR QL STRIP.AUTO: NEGATIVE
LYMPHOCYTES # BLD: 28 % (ref 24–43)
MCH RBC QN AUTO: 31.8 PG (ref 25.2–33.5)
MCHC RBC AUTO-ENTMCNC: 34.2 G/DL (ref 28.4–34.8)
MCV RBC AUTO: 93 FL (ref 82.6–102.9)
MONOCYTES # BLD: 7 % (ref 3–12)
NITRITE UR QL STRIP.AUTO: NEGATIVE
NRBC AUTOMATED: 0 PER 100 WBC
PARTIAL THROMBOPLASTIN TIME: 45 SEC (ref 26.8–34.8)
PDW BLD-RTO: 14.6 % (ref 11.8–14.4)
PLATELET # BLD AUTO: 211 K/UL (ref 138–453)
PMV BLD AUTO: 9.6 FL (ref 8.1–13.5)
POTASSIUM SERPL-SCNC: 4.2 MMOL/L (ref 3.7–5.3)
PROT SERPL-MCNC: 7.5 G/DL (ref 6.4–8.3)
PROT UR STRIP.AUTO-MCNC: 6 MG/DL (ref 5–9)
PROT UR STRIP.AUTO-MCNC: ABNORMAL MG/DL
PROTHROMBIN TIME: 12.6 SEC (ref 11.9–14.8)
RBC # BLD: 3.3 M/UL (ref 4.21–5.77)
RBC CLUMPS #/AREA URNS AUTO: ABNORMAL /HPF (ref 0–2)
SEG NEUTROPHILS: 58 % (ref 36–65)
SEGMENTED NEUTROPHILS ABSOLUTE COUNT: 5.92 K/UL (ref 1.5–8.1)
SODIUM SERPL-SCNC: 138 MMOL/L (ref 135–144)
SPECIFIC GRAVITY UA: 1.02 (ref 1.01–1.02)
TURBIDITY: CLEAR
URINE HGB: NEGATIVE
UROBILINOGEN, URINE: NORMAL
WBC # BLD AUTO: 10.1 K/UL (ref 3.5–11.3)
WBC UA: ABNORMAL /HPF (ref 0–5)

## 2023-04-18 PROCEDURE — 85025 COMPLETE CBC W/AUTO DIFF WBC: CPT

## 2023-04-18 PROCEDURE — 85730 THROMBOPLASTIN TIME PARTIAL: CPT

## 2023-04-18 PROCEDURE — 93010 ELECTROCARDIOGRAM REPORT: CPT | Performed by: INTERNAL MEDICINE

## 2023-04-18 PROCEDURE — 96360 HYDRATION IV INFUSION INIT: CPT

## 2023-04-18 PROCEDURE — 96361 HYDRATE IV INFUSION ADD-ON: CPT

## 2023-04-18 PROCEDURE — 99284 EMERGENCY DEPT VISIT MOD MDM: CPT

## 2023-04-18 PROCEDURE — 70450 CT HEAD/BRAIN W/O DYE: CPT

## 2023-04-18 PROCEDURE — 80053 COMPREHEN METABOLIC PANEL: CPT

## 2023-04-18 PROCEDURE — 82947 ASSAY GLUCOSE BLOOD QUANT: CPT

## 2023-04-18 PROCEDURE — 93005 ELECTROCARDIOGRAM TRACING: CPT | Performed by: EMERGENCY MEDICINE

## 2023-04-18 PROCEDURE — 85610 PROTHROMBIN TIME: CPT

## 2023-04-18 PROCEDURE — 36415 COLL VENOUS BLD VENIPUNCTURE: CPT

## 2023-04-18 PROCEDURE — 81001 URINALYSIS AUTO W/SCOPE: CPT

## 2023-04-18 PROCEDURE — 2580000003 HC RX 258: Performed by: EMERGENCY MEDICINE

## 2023-04-18 RX ORDER — MINERAL OIL, PETROLATUM 425; 568 MG/G; MG/G
1 OINTMENT OPHTHALMIC NIGHTLY PRN
Qty: 1 EACH | Refills: 0 | Status: SHIPPED | OUTPATIENT
Start: 2023-04-18

## 2023-04-18 RX ORDER — PREDNISONE 50 MG/1
TABLET ORAL
Qty: 5 TABLET | Refills: 0 | Status: SHIPPED | OUTPATIENT
Start: 2023-04-18

## 2023-04-18 RX ORDER — 0.9 % SODIUM CHLORIDE 0.9 %
500 INTRAVENOUS SOLUTION INTRAVENOUS ONCE
Status: COMPLETED | OUTPATIENT
Start: 2023-04-18 | End: 2023-04-18

## 2023-04-18 RX ADMIN — SODIUM CHLORIDE 500 ML: 9 INJECTION, SOLUTION INTRAVENOUS at 12:15

## 2023-04-18 ASSESSMENT — PAIN - FUNCTIONAL ASSESSMENT: PAIN_FUNCTIONAL_ASSESSMENT: NONE - DENIES PAIN

## 2023-04-18 NOTE — ED NOTES
[facility] called back with neurology from [facility], connected call to Dr Sagar Jensen  04/18/23 4210

## 2023-04-18 NOTE — ED PROVIDER NOTES
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 Laurell Cranker neurology from [place] who agrees that this is probably Bell's palsy and patient not candidate for MRI because he is an old 36 pacemaker therefore I will send him home with prednisone and Lacri-Lube    Counseling: The emergency provider has spoken with the patient and discussed todays results, in addition to providing specific details for the plan of care and counseling regarding the diagnosis and prognosis. Questions are answered at this time and they are agreeable with the plan.      --------------------------------- IMPRESSION AND DISPOSITION ---------------------------------    IMPRESSION  1.  Bell's palsy Stable       DISPOSITION  Disposition: Discharge to home  Patient condition is stable                  Rani Gonsalves MD  04/18/23 9468

## 2023-04-18 NOTE — ED NOTES
Called [facility] for consult with neurology at [facility]. They will page the on call.  Waiting for call back     Render Ketan  04/18/23 8559

## 2023-04-20 ENCOUNTER — OFFICE VISIT (OUTPATIENT)
Dept: NEUROLOGY | Age: 78
End: 2023-04-20
Payer: MEDICARE

## 2023-04-20 VITALS
RESPIRATION RATE: 18 BRPM | DIASTOLIC BLOOD PRESSURE: 72 MMHG | HEART RATE: 61 BPM | HEIGHT: 69 IN | BODY MASS INDEX: 29.55 KG/M2 | WEIGHT: 199.5 LBS | TEMPERATURE: 97.6 F | SYSTOLIC BLOOD PRESSURE: 138 MMHG

## 2023-04-20 DIAGNOSIS — G51.0 LEFT-SIDED BELL'S PALSY: Primary | ICD-10-CM

## 2023-04-20 PROCEDURE — 1036F TOBACCO NON-USER: CPT | Performed by: NEUROMUSCULOSKELETAL MEDICINE, SPORTS MEDICINE

## 2023-04-20 PROCEDURE — G8417 CALC BMI ABV UP PARAM F/U: HCPCS | Performed by: NEUROMUSCULOSKELETAL MEDICINE, SPORTS MEDICINE

## 2023-04-20 PROCEDURE — 99213 OFFICE O/P EST LOW 20 MIN: CPT | Performed by: NEUROMUSCULOSKELETAL MEDICINE, SPORTS MEDICINE

## 2023-04-20 PROCEDURE — G8427 DOCREV CUR MEDS BY ELIG CLIN: HCPCS | Performed by: NEUROMUSCULOSKELETAL MEDICINE, SPORTS MEDICINE

## 2023-04-20 PROCEDURE — 1124F ACP DISCUSS-NO DSCNMKR DOCD: CPT | Performed by: NEUROMUSCULOSKELETAL MEDICINE, SPORTS MEDICINE

## 2023-04-20 PROCEDURE — 3075F SYST BP GE 130 - 139MM HG: CPT | Performed by: NEUROMUSCULOSKELETAL MEDICINE, SPORTS MEDICINE

## 2023-04-20 PROCEDURE — 99214 OFFICE O/P EST MOD 30 MIN: CPT

## 2023-04-20 PROCEDURE — 3078F DIAST BP <80 MM HG: CPT | Performed by: NEUROMUSCULOSKELETAL MEDICINE, SPORTS MEDICINE

## 2023-04-20 NOTE — PROGRESS NOTES
BIOPSY PERCUTANEOUS SUPERFICIAL BONE  2022    CT BIOPSY PERCUTANEOUS SUPERFICIAL BONE 2022 [facility] CT SCAN    ENDOSCOPY, COLON, DIAGNOSTIC      EYE SURGERY      IR TUNNELED CATHETER PLACEMENT GREATER THAN 5 YEARS  2021    IR TUNNELED CATHETER PLACEMENT GREATER THAN 5 YEARS 2021 [facility] SPECIAL PROCEDURES    PACEMAKER INSERTION      PACEMAKER PLACEMENT      PAIN MANAGEMENT PROCEDURE Bilateral 5/10/2022    LUMBAR TRANSFORAMINAL - L3-4 performed by Marcelino Garza MD at [address] [place] [address]  2019    Dr. Carmen Chavez H-Pylori)duodenal bulbar/antral erythema    UPPER GASTROINTESTINAL ENDOSCOPY N/A 2019    EGD BIOPSY, clotest performed by Anna Salgado MD at [address] [place] [address] N/A 10/30/2020    EGD ESOPHAGOGASTRODUODENOSCOPY performed by Suzi Shaw DO at [place] [address],   2021    US PERCUT RENAL BIOPSY, LT 2021 [facility] ULTRASOUND       Social History     Socioeconomic History    Marital status:      Spouse name: Not on file    Number of children: Not on file    Years of education: Not on file    Highest education level: Not on file   Occupational History    Not on file   Tobacco Use    Smoking status: Former     Packs/day: 1.50     Years: 8.00     Pack years: 12.00     Types: Cigarettes     Quit date: 3/4/1980     Years since quittin.1    Smokeless tobacco: Never   Vaping Use    Vaping Use: Never used   Substance and Sexual Activity    Alcohol use: No    Drug use: No    Sexual activity: Not on file   Other Topics Concern    Not on file   Social History Narrative    Not on file     Social Determinants of Health     Financial Resource Strain: Low Risk     Difficulty of Paying Living Expenses: Not hard at all   Food Insecurity: No Food Insecurity    Worried About Running Out of Food in the Last Year: Never true    [address] in the Last Year: Never true   Transportation Needs: No Transportation

## 2023-04-24 ENCOUNTER — OFFICE VISIT (OUTPATIENT)
Dept: PRIMARY CARE CLINIC | Age: 78
End: 2023-04-24
Payer: MEDICARE

## 2023-04-24 VITALS
DIASTOLIC BLOOD PRESSURE: 52 MMHG | SYSTOLIC BLOOD PRESSURE: 148 MMHG | OXYGEN SATURATION: 98 % | HEIGHT: 69 IN | BODY MASS INDEX: 28.44 KG/M2 | WEIGHT: 192 LBS | HEART RATE: 68 BPM

## 2023-04-24 DIAGNOSIS — N18.6 TYPE 2 DIABETES MELLITUS WITH ESRD (END-STAGE RENAL DISEASE) (HCC): Primary | ICD-10-CM

## 2023-04-24 DIAGNOSIS — I10 ESSENTIAL HYPERTENSION: ICD-10-CM

## 2023-04-24 DIAGNOSIS — E11.22 TYPE 2 DIABETES MELLITUS WITH ESRD (END-STAGE RENAL DISEASE) (HCC): Primary | ICD-10-CM

## 2023-04-24 PROCEDURE — G8417 CALC BMI ABV UP PARAM F/U: HCPCS | Performed by: STUDENT IN AN ORGANIZED HEALTH CARE EDUCATION/TRAINING PROGRAM

## 2023-04-24 PROCEDURE — 1036F TOBACCO NON-USER: CPT | Performed by: STUDENT IN AN ORGANIZED HEALTH CARE EDUCATION/TRAINING PROGRAM

## 2023-04-24 PROCEDURE — G8427 DOCREV CUR MEDS BY ELIG CLIN: HCPCS | Performed by: STUDENT IN AN ORGANIZED HEALTH CARE EDUCATION/TRAINING PROGRAM

## 2023-04-24 PROCEDURE — 3078F DIAST BP <80 MM HG: CPT | Performed by: STUDENT IN AN ORGANIZED HEALTH CARE EDUCATION/TRAINING PROGRAM

## 2023-04-24 PROCEDURE — 99214 OFFICE O/P EST MOD 30 MIN: CPT | Performed by: STUDENT IN AN ORGANIZED HEALTH CARE EDUCATION/TRAINING PROGRAM

## 2023-04-24 PROCEDURE — 3077F SYST BP >= 140 MM HG: CPT | Performed by: STUDENT IN AN ORGANIZED HEALTH CARE EDUCATION/TRAINING PROGRAM

## 2023-04-24 PROCEDURE — 1124F ACP DISCUSS-NO DSCNMKR DOCD: CPT | Performed by: STUDENT IN AN ORGANIZED HEALTH CARE EDUCATION/TRAINING PROGRAM

## 2023-04-24 NOTE — PROGRESS NOTES
[address], [address], [facility], [address] is a 68 y.o. male with  has a past medical history of Abnormal tilt table test, Acute kidney injury ([address] Acute MI ([address] Acute renal failure superimposed on stage 4 chronic kidney disease ([address] Acute renal failure with tubular necrosis ([address] Anemia, Anemia in stage 4 chronic kidney disease ([address] Angina, class II ([address] CAD (coronary artery disease), Cerebral artery occlusion with cerebral infarction ([address] CHF (congestive heart failure) ([address] Cholecystitis, Chronic back pain, Chronic diastolic HF (heart failure), NYHA class 3 ([address] Chronic kidney disease, stage III (moderate) ([address] Closed fracture of lumbar vertebra without mention of spinal cord injury, Displacement of intervertebral disc, site unspecified, without myelopathy, H/O cardiac catheterization, H/O cardiovascular stress test, H/O tilt table evaluation, H/O tilt table evaluation, History of 24 hour EKG monitoring, History of 24 hour EKG monitoring, History of blood transfusion, History of cardiovascular stress test, History of cardiovascular stress test, History of coronary artery stent placement, History of CVA (cerebrovascular accident) without residual deficits, History of echocardiogram, History of Holter monitoring, History of tilt table evaluation, Hyperlipidemia, Hypertension, Medication side effect, initial encounter, Non critical Right Renal artery stenosis, native ([address] Pacemaker, S/P cardiac cath, S/P coronary artery stent placement, SIRS (systemic inflammatory response syndrome) ([address] and Type II or unspecified type diabetes mellitus without mention of complication, not stated as uncontrolled. Presented to the office today for:  Chief Complaint   Patient presents with    Discuss Labs    Diabetes       Assessment/Plan   1. Type 2 diabetes mellitus with ESRD (end-stage renal disease) ([address]  2.  Essential

## 2023-04-25 DIAGNOSIS — I10 ESSENTIAL HYPERTENSION: ICD-10-CM

## 2023-04-25 DIAGNOSIS — N18.6 END STAGE RENAL DISEASE (HCC): ICD-10-CM

## 2023-04-25 DIAGNOSIS — R42 LIGHTHEADED: ICD-10-CM

## 2023-04-25 DIAGNOSIS — G90.1 DYSAUTONOMIA (HCC): ICD-10-CM

## 2023-04-25 DIAGNOSIS — I25.10 CORONARY ARTERY DISEASE INVOLVING NATIVE CORONARY ARTERY OF NATIVE HEART WITHOUT ANGINA PECTORIS: ICD-10-CM

## 2023-04-25 DIAGNOSIS — R42 DIZZY: ICD-10-CM

## 2023-04-25 DIAGNOSIS — E78.2 MIXED HYPERLIPIDEMIA: ICD-10-CM

## 2023-04-25 DIAGNOSIS — I50.32 CHRONIC DIASTOLIC CONGESTIVE HEART FAILURE (HCC): ICD-10-CM

## 2023-04-26 RX ORDER — HYDRALAZINE HYDROCHLORIDE 100 MG/1
TABLET, FILM COATED ORAL
Qty: 90 TABLET | Refills: 3 | Status: SHIPPED | OUTPATIENT
Start: 2023-04-26

## 2023-05-16 ENCOUNTER — OFFICE VISIT (OUTPATIENT)
Dept: GASTROENTEROLOGY | Age: 78
End: 2023-05-16

## 2023-05-16 VITALS
RESPIRATION RATE: 18 BRPM | HEIGHT: 69 IN | DIASTOLIC BLOOD PRESSURE: 63 MMHG | HEART RATE: 60 BPM | BODY MASS INDEX: 29.64 KG/M2 | SYSTOLIC BLOOD PRESSURE: 142 MMHG | OXYGEN SATURATION: 98 % | WEIGHT: 200.1 LBS

## 2023-05-16 DIAGNOSIS — Z80.0 FAMILY HISTORY OF COLON CANCER IN MOTHER: ICD-10-CM

## 2023-05-16 DIAGNOSIS — D50.0 IRON DEFICIENCY ANEMIA DUE TO CHRONIC BLOOD LOSS: Primary | ICD-10-CM

## 2023-05-16 DIAGNOSIS — N18.6 CKD (CHRONIC KIDNEY DISEASE) REQUIRING CHRONIC DIALYSIS (HCC): ICD-10-CM

## 2023-05-16 DIAGNOSIS — Z99.2 CKD (CHRONIC KIDNEY DISEASE) REQUIRING CHRONIC DIALYSIS (HCC): ICD-10-CM

## 2023-05-16 DIAGNOSIS — Z80.0 FAMILY HISTORY OF MALIGNANT NEOPLASM OF COLON IN RELATIVE DIAGNOSED WHEN YOUNGER THAN 50 YEARS OF AGE: ICD-10-CM

## 2023-05-16 PROCEDURE — 99999 PR OFFICE/OUTPT VISIT,PROCEDURE ONLY: CPT | Performed by: NURSE PRACTITIONER

## 2023-05-16 ASSESSMENT — ENCOUNTER SYMPTOMS
ALLERGIC/IMMUNOLOGIC NEGATIVE: 1
RESPIRATORY NEGATIVE: 1
GASTROINTESTINAL NEGATIVE: 1

## 2023-05-16 NOTE — PROGRESS NOTES
[address] [facility]    Chief Complaint   Patient presents with    New Patient     Patient is new patient being referred by PCP to discuss colonoscopy screening. Patient has had colonoscopy/polypectomy in the past.  Patient denies family history of colon cancer, Patient reports daily bowel movements. Patient is on dialysis x 2 weekly and states he has trouble with BM's on those days but all other days stools are normal formed stools. MARICRUZ Gonzales is a 68 y.o. old male who has a past medical history of congested heart failure, MI, status post drug-eluting coronary stent placed April 10, 2017, diabetes, hypertension, hyperlipidemia, end-stage renal disease with twice a week dialysis, GI bleed and anemia presents with wife with concern for repeat colonoscopy. According to Mr. Tasia Limon and wife, his last colonoscopy was completed in [facility]. Wife reports that he was admitted to the hospital after experiencing a GI bleed, requiring blood transfusions; he as well had a colonoscopy. Post colonoscopy he required dialysis. Mr. Tasia Limon voices concern regarding the drive to [facility] and was hoping to find a closer option for his repeat colonoscopy. Interval history:   Dialysis: Twice a week, every Monday and Friday  Labs April 18, 2023:  Creatinine: 4.63   Estimated glomerular filtration rate: 12   Hemoglobin: 10.5   Platelets: 434   Last admission for GI bleed: January 2022  Last known left ventricular ejection fraction at 60% per echo dated 11/23/2022 April 18, 2023 EKG: Sinus rhythm with first-degree AV block (not new), prolonged QT    March 24, 2022: New patient to [facility] gastroenterology for anemia. Capsule endoscopy of the small bowel was ordered. Complaints of diarrhea with concern for post cholecystectomy status, was prescribed Colestid and Lomotil for emergency uses.     April 2022 Capsule Endoscopy:  - Findings: Minor small bowel erosions, rare not

## 2023-05-16 NOTE — PATIENT INSTRUCTIONS
SURVEY:    You may be receiving a survey from [employer] regarding your visit today. Please complete the survey to enable us to provide the highest quality of care to you and your family. If you cannot score us a very good on any question, please call the office to discuss how we could have made your experience a very good one. Thank you.   Fab Martinez LPN

## 2023-05-19 ENCOUNTER — HOSPITAL ENCOUNTER (OUTPATIENT)
Age: 78
End: 2023-05-19
Payer: MEDICARE

## 2023-05-19 ENCOUNTER — HOSPITAL ENCOUNTER (OUTPATIENT)
Dept: GENERAL RADIOLOGY | Age: 78
End: 2023-05-19
Payer: MEDICARE

## 2023-05-19 DIAGNOSIS — R06.02 SHORTNESS OF BREATH: ICD-10-CM

## 2023-05-19 PROCEDURE — 71046 X-RAY EXAM CHEST 2 VIEWS: CPT

## 2023-05-19 NOTE — PROGRESS NOTES
_               Mr. Rell Marcano is a very pleasant 68 y.o. male with history of multiple co morbidities as listed. The patient is referred for further management of anemia. Patient has episodes of black tarry stool. He had upper and lower endoscopy in October 2020 which were negative. He is scheduled for follow-up with gastroenterology in [facility] next week for capsule camera evaluation. Patient is having symptomatic anemia. Is having weakness and fatigue. Feels tired. He has shortness of breath on exertion. He is having epigastric pain. No hematochezia. No hematemesis.   Patient denies smoking or alcohol drinking, patient required dialysis during hospitalization for short period of time which was discontinue and had anemia and seen by GI    Interim history:  Patient back to dialysis  Hemoglobin below 8 required a blood transfusion due to progressive weakness and fatigue  Still follow-up with GI> capsule endoscopy          PAST MEDICAL HISTORY: has a past medical history of Abnormal tilt table test, Acute kidney injury ([address] Acute MI ([address] Acute renal failure superimposed on stage 4 chronic kidney disease ([address] Acute renal failure with tubular necrosis ([address] Anemia, Anemia in stage 4 chronic kidney disease ([address] Angina, class II ([address] CAD (coronary artery disease), Cerebral artery occlusion with cerebral infarction ([address] CHF (congestive heart failure) ([address] Cholecystitis, Chronic back pain, Chronic diastolic HF (heart failure), NYHA class 3 ([address] Chronic kidney disease, stage III (moderate) ([address] Closed fracture of lumbar vertebra without mention of spinal cord injury, Displacement of intervertebral disc, site unspecified, without myelopathy, H/O cardiac catheterization, H/O cardiovascular stress test, H/O tilt table evaluation, H/O tilt table evaluation, History of 24 hour EKG monitoring, History of 24 hour EKG monitoring, History of blood transfusion, History of cardiovascular stress test, History of cardiovascular stress test, History of coronary artery stent placement, History of CVA (cerebrovascular accident) without residual deficits, History of echocardiogram, History of Holter monitoring, History of tilt table evaluation, Hyperlipidemia, Hypertension, Medication side effect, initial encounter, Non critical Right Renal artery stenosis, native [facility]), Pacemaker, S/P cardiac cath, S/P coronary artery stent placement, SIRS (systemic inflammatory response syndrome) ([facility] and Type II or unspecified type diabetes mellitus without mention of complication, not stated as uncontrolled. PAST SURGICAL HISTORY: has a past surgical history that includes Pacemaker insertion; back surgery; Cholecystectomy (08/17/2015); Corneal transplant; Cardiac catheterization (Left, 02/19/2016); pacemaker placement; Colonoscopy (2010); Colonoscopy (02/19/2015); Colonoscopy (05/23/2018); Colonoscopy (N/A, 05/23/2018); Upper gastrointestinal endoscopy (05/28/2019); Upper gastrointestinal endoscopy (N/A, 05/28/2019); Colonoscopy (10/30/2020); Colonoscopy (N/A, 10/30/2020); Upper gastrointestinal endoscopy (N/A, 10/30/2020); Endoscopy, colon, diagnostic; eye surgery; Cardiac catheterization (Left, 10/05/2021); IR TUNNELED CVC PLACE WO SQ PORT/PUMP > 5 YEARS (11/22/2021); US BIOPSY RENAL LEFT PERC (11/24/2021); and Pain management procedure (Bilateral, 5/10/2022). CURRENT MEDICATIONS:  has a current medication list which includes the following prescription(s): prednisone, furosemide, amlodipine, lantus solostar, acetaminophen, hydralazine, tamsulosin, metoprolol tartrate, omeprazole, nitroglycerin, latanoprost, acyclovir, pen needles, and prednisolone acetate.     ALLERGIES:  is allergic to coreg [carvedilol], lipitor [atorvastatin], aldactone [spironolactone], crestor [rosuvastatin calcium], lopid [gemfibrozil], invokana [canagliflozin], and [place] [sitagliptin]. FAMILY HISTORY: Negative for any hematological or oncological conditions. SOCIAL HISTORY:  reports that he quit smoking about 42 years ago. His smoking use included cigarettes. He has a 12.00 pack-year smoking history. He has never used smokeless tobacco. He reports that he does not drink alcohol and does not use drugs. REVIEW OF SYSTEMS:     · General: Positive for weakness and fatigue. No unanticipated weight loss or decreased appetite. No fever or chills. · Eyes: No blurred vision, eye pain or double vision. · Ears: No hearing problems or drainage. No tinnitus. · Throat: No sore throat, problems with swallowing or dysphagia. · Respiratory: No cough, sputum or hemoptysis. No shortness of breath. No pleuritic chest pain. · Cardiovascular: No chest pain, orthopnea or PND. No lower extremity edema. No palpitation. · Gastrointestinal: As above. · Genitourinary: No dysuria, hematuria, frequency or urgency. · Musculoskeletal: No muscle aches or pains. No limitation of movement. No back pain. No gait disturbance, No joint complaints. · Dermatologic: No skin rashes or pruritus. No skin lesions or discolorations. · Psychiatric: No depression, anxiety, or stress or signs of schizophrenia. No change in mood or affect. · Hematologic: No history of bleeding tendency. No bruises or ecchymosis. No history of clotting problems. · Infectious disease: No fever, chills or frequent infections. · Endocrine: No polydipsia or polyuria. No temperature intolerance. · Neurologic: No headaches or dizziness. No weakness or numbness of the extremities. No changes in balance, coordination,  memory, mentation, behavior. · Allergic/Immunologic: No nasal congestion or hives. No repeated infections. PHYSICAL EXAM:  The patient is not in acute distress. Vital signs: There were no vitals taken for this visit.      General appearance - well appearing, not in pain or distress  Mental status - good mood, alert and oriented  Eyes - pupils equal and reactive, extraocular eye movements intact  Ears - bilateral TM's and external ear canals normal  Nose - normal and patent, no erythema, discharge or polyps  Mouth - mucous membranes moist, pharynx normal without lesions  Neck - supple, no significant adenopathy  Lymphatics - no palpable lymphadenopathy, no hepatosplenomegaly  Chest - clear to auscultation, no wheezes, rales or rhonchi, symmetric air entry  Heart - normal rate, regular rhythm, normal S1, S2, no murmurs, rubs, clicks or gallops  Abdomen - soft, nontender, nondistended, no masses or organomegaly  Neurological - alert, oriented, normal speech, no focal findings or movement disorder noted  Musculoskeletal - no joint tenderness, deformity or swelling  Extremities - peripheral pulses normal, no pedal edema, no clubbing or cyanosis  Skin - normal coloration and turgor, no rashes, no suspicious skin lesions noted     Review of Diagnostic data:   Lab Results   Component Value Date    WBC 8.2 05/13/2022    HGB 7.6 (L) 05/13/2022    HCT 23.7 (L) 05/13/2022    MCV 92.6 05/13/2022     05/13/2022       Chemistry        Component Value Date/Time     05/13/2022 1621    K 3.2 (L) 05/13/2022 1621     05/13/2022 1621    CO2 21 05/13/2022 1621    BUN 59 (H) 05/13/2022 1621    CREATININE 3.41 (H) 05/13/2022 1621        Component Value Date/Time    CALCIUM 9.0 05/13/2022 1621    ALKPHOS 68 05/13/2022 1621    AST 17 05/13/2022 1621    ALT 12 05/13/2022 1621    BILITOT 0.30 05/13/2022 1621            IMPRESSION:   Severe normocytic anemia likely combination of blood loss anemia and anemia of chronic disease/chronic kidney disease  Status post IV iron and Procrit with dialysis which has been discontinued  Episodes of GI bleeding which has been recurrent  Chronic renal insufficiency  Coronary artery disease  Multiple comorbidities as listed  New onset rash on the lower extremity could be related to iron currently on prednisone per PCP    PLAN: I reviewed the labs available to me and discussed with the patient. I explained to the patient the nature of this hematologic problem. I explained the significance of these abnormalities in layman language. Patient was seen by GI and pending capsule endoscopy  The worsening fatigue related to anemia which looks related to chronic kidney disease blood loss anemia also need to be ruled out  Patient back on dialysis and he is getting IV iron and Procrit(per patient) will request records from dialysis that given by nephrology  Will obtain bone marrow biopsy and aspirate to rule out myelodysplastic syndrome  Myeloma work-up and repeat CBC and iron panel work-up    RTC after above                                          [facility]                            This note is created with the assistance of a speech recognition program.  While intending to generate a document that actually reflects the content of the visit, the document can still have some errors including those of syntax and sound a like substitutions which may escape proof reading. It such instances, actual meaning can be extrapolated by contextual diversion. I spent more than 30 minutes examining, evaluating, reviewing data, counseling the patient and coordinating care. Greater than 50% of time was spent face-to-face with the patient this note is created with the assistance of a speech recognition program.  While intending to generate a document that actually reflects the content of the visit, the document can still have some errors including those of syntax and sound a like substitutions which may escape proof reading. It such instances, actual meaning can be extrapolated by contextual diversion. Consent (Ear)/Introductory Paragraph: The rationale for Mohs was explained to the patient and consent was obtained. The risks, benefits and alternatives to therapy were discussed in detail. Specifically, the risks of ear deformity, infection, scarring, bleeding, prolonged wound healing, incomplete removal, allergy to anesthesia, nerve injury and recurrence were addressed. Prior to the procedure, the treatment site was clearly identified and confirmed by the patient. All components of Universal Protocol/PAUSE Rule completed.

## 2023-05-23 ENCOUNTER — HOSPITAL ENCOUNTER (EMERGENCY)
Age: 78
Discharge: ANOTHER HOSPITAL | End: 2023-05-23
Payer: MEDICARE

## 2023-05-23 ENCOUNTER — APPOINTMENT (OUTPATIENT)
Dept: GENERAL RADIOLOGY | Age: 78
End: 2023-05-23
Payer: MEDICARE

## 2023-05-23 ENCOUNTER — OFFICE VISIT (OUTPATIENT)
Dept: CARDIOLOGY | Age: 78
End: 2023-05-23
Payer: MEDICARE

## 2023-05-23 ENCOUNTER — HOSPITAL ENCOUNTER (INPATIENT)
Age: 78
LOS: 4 days | Discharge: HOME | DRG: 280 | End: 2023-05-27
Attending: INTERNAL MEDICINE
Payer: MEDICARE

## 2023-05-23 VITALS
WEIGHT: 199 LBS | HEART RATE: 68 BPM | RESPIRATION RATE: 18 BRPM | BODY MASS INDEX: 29.47 KG/M2 | HEIGHT: 69 IN | SYSTOLIC BLOOD PRESSURE: 142 MMHG | DIASTOLIC BLOOD PRESSURE: 58 MMHG | OXYGEN SATURATION: 98 %

## 2023-05-23 VITALS
WEIGHT: 199 LBS | HEART RATE: 70 BPM | BODY MASS INDEX: 29.39 KG/M2 | DIASTOLIC BLOOD PRESSURE: 59 MMHG | OXYGEN SATURATION: 97 % | RESPIRATION RATE: 26 BRPM | TEMPERATURE: 97.7 F | SYSTOLIC BLOOD PRESSURE: 193 MMHG

## 2023-05-23 DIAGNOSIS — I10 ESSENTIAL HYPERTENSION: ICD-10-CM

## 2023-05-23 DIAGNOSIS — G90.1 DYSAUTONOMIA (HCC): ICD-10-CM

## 2023-05-23 DIAGNOSIS — N18.4 STAGE 4 CHRONIC KIDNEY DISEASE (HCC): ICD-10-CM

## 2023-05-23 DIAGNOSIS — R07.9 CHEST PAIN, UNSPECIFIED TYPE: Primary | ICD-10-CM

## 2023-05-23 DIAGNOSIS — E78.2 MIXED HYPERLIPIDEMIA: ICD-10-CM

## 2023-05-23 DIAGNOSIS — R07.9 CHEST PAIN, UNSPECIFIED TYPE: ICD-10-CM

## 2023-05-23 DIAGNOSIS — I25.10 CORONARY ARTERY DISEASE INVOLVING NATIVE CORONARY ARTERY OF NATIVE HEART WITHOUT ANGINA PECTORIS: ICD-10-CM

## 2023-05-23 DIAGNOSIS — D64.9 ANEMIA, UNSPECIFIED TYPE: ICD-10-CM

## 2023-05-23 DIAGNOSIS — I20.0 UNSTABLE ANGINA (HCC): ICD-10-CM

## 2023-05-23 DIAGNOSIS — I50.32 CHRONIC DIASTOLIC CONGESTIVE HEART FAILURE (HCC): ICD-10-CM

## 2023-05-23 DIAGNOSIS — R06.02 SHORTNESS OF BREATH: ICD-10-CM

## 2023-05-23 LAB
ALBUMIN SERPL-MCNC: 4.4 G/DL (ref 3.5–5.2)
ALBUMIN/GLOB SERPL: 1.7 {RATIO} (ref 1–2.5)
ALP SERPL-CCNC: 94 U/L (ref 40–129)
ALT SERPL-CCNC: 13 U/L (ref 5–41)
ANION GAP SERPL CALCULATED.3IONS-SCNC: 13 MMOL/L (ref 9–17)
AST SERPL-CCNC: 22 U/L
BASOPHILS # BLD: 0.07 K/UL (ref 0–0.2)
BASOPHILS NFR BLD: 1 % (ref 0–2)
BILIRUB SERPL-MCNC: 0.4 MG/DL (ref 0.3–1.2)
BNP SERPL-MCNC: 1811 PG/ML
BUN SERPL-MCNC: 33 MG/DL (ref 8–23)
BUN/CREAT SERPL: 8 (ref 9–20)
CALCIUM SERPL-MCNC: 8.9 MG/DL (ref 8.6–10.4)
CHLORIDE SERPL-SCNC: 100 MMOL/L (ref 98–107)
CO2 SERPL-SCNC: 24 MMOL/L (ref 20–31)
CREAT SERPL-MCNC: 4.22 MG/DL (ref 0.7–1.2)
EKG ATRIAL RATE: 66 BPM
EKG P AXIS: 32 DEGREES
EKG P-R INTERVAL: 220 MS
EKG Q-T INTERVAL: 458 MS
EKG QRS DURATION: 102 MS
EKG QTC CALCULATION (BAZETT): 480 MS
EKG R AXIS: -6 DEGREES
EKG T AXIS: 34 DEGREES
EKG VENTRICULAR RATE: 66 BPM
EOSINOPHIL # BLD: 0.2 K/UL (ref 0–0.44)
EOSINOPHILS RELATIVE PERCENT: 3 % (ref 1–4)
ERYTHROCYTE [DISTWIDTH] IN BLOOD BY AUTOMATED COUNT: 17 % (ref 11.8–14.4)
GFR SERPL CREATININE-BSD FRML MDRD: 14 ML/MIN/1.73M2
GLUCOSE BLD-MCNC: 185 MG/DL (ref 75–110)
GLUCOSE SERPL-MCNC: 256 MG/DL (ref 70–99)
HCT VFR BLD AUTO: 30.1 % (ref 40.7–50.3)
HGB BLD-MCNC: 10.1 G/DL (ref 13–17)
IMM GRANULOCYTES # BLD AUTO: 0.13 K/UL (ref 0–0.3)
IMM GRANULOCYTES NFR BLD: 2 %
LYMPHOCYTES # BLD: 25 % (ref 24–43)
LYMPHOCYTES NFR BLD: 1.68 K/UL (ref 1.1–3.7)
MAGNESIUM SERPL-MCNC: 2 MG/DL (ref 1.6–2.6)
MCH RBC QN AUTO: 32.1 PG (ref 25.2–33.5)
MCHC RBC AUTO-ENTMCNC: 33.6 G/DL (ref 28.4–34.8)
MCV RBC AUTO: 95.6 FL (ref 82.6–102.9)
MONOCYTES NFR BLD: 0.54 K/UL (ref 0.1–1.2)
MONOCYTES NFR BLD: 8 % (ref 3–12)
MORPHOLOGY: ABNORMAL
NEUTROPHILS NFR BLD: 61 % (ref 36–65)
NEUTS SEG NFR BLD: 4.08 K/UL (ref 1.5–8.1)
NRBC AUTOMATED: 0 PER 100 WBC
PLATELET # BLD AUTO: ABNORMAL K/UL (ref 138–453)
PLATELET, FLUORESCENCE: 148 K/UL (ref 138–453)
PLATELETS.RETICULATED NFR BLD AUTO: 3.3 % (ref 1.1–10.3)
POTASSIUM SERPL-SCNC: 4.5 MMOL/L (ref 3.7–5.3)
PROT SERPL-MCNC: 7 G/DL (ref 6.4–8.3)
RBC # BLD AUTO: 3.15 M/UL (ref 4.21–5.77)
SODIUM SERPL-SCNC: 137 MMOL/L (ref 135–144)
TROPONIN I SERPL HS-MCNC: 66 NG/L (ref 0–22)
TROPONIN I SERPL HS-MCNC: 78 NG/L (ref 0–22)
WBC OTHER # BLD: 6.7 K/UL (ref 3.5–11.3)

## 2023-05-23 PROCEDURE — 36415 COLL VENOUS BLD VENIPUNCTURE: CPT

## 2023-05-23 PROCEDURE — 6370000000 HC RX 637 (ALT 250 FOR IP): Performed by: NURSE PRACTITIONER

## 2023-05-23 PROCEDURE — 83735 ASSAY OF MAGNESIUM: CPT

## 2023-05-23 PROCEDURE — 1124F ACP DISCUSS-NO DSCNMKR DOCD: CPT | Performed by: PHYSICIAN ASSISTANT

## 2023-05-23 PROCEDURE — 93005 ELECTROCARDIOGRAM TRACING: CPT | Performed by: FAMILY MEDICINE

## 2023-05-23 PROCEDURE — G8427 DOCREV CUR MEDS BY ELIG CLIN: HCPCS | Performed by: PHYSICIAN ASSISTANT

## 2023-05-23 PROCEDURE — 84484 ASSAY OF TROPONIN QUANT: CPT

## 2023-05-23 PROCEDURE — 3078F DIAST BP <80 MM HG: CPT | Performed by: PHYSICIAN ASSISTANT

## 2023-05-23 PROCEDURE — 83880 ASSAY OF NATRIURETIC PEPTIDE: CPT

## 2023-05-23 PROCEDURE — 85025 COMPLETE CBC W/AUTO DIFF WBC: CPT

## 2023-05-23 PROCEDURE — 99285 EMERGENCY DEPT VISIT HI MDM: CPT

## 2023-05-23 PROCEDURE — 3077F SYST BP >= 140 MM HG: CPT | Performed by: PHYSICIAN ASSISTANT

## 2023-05-23 PROCEDURE — 1036F TOBACCO NON-USER: CPT | Performed by: PHYSICIAN ASSISTANT

## 2023-05-23 PROCEDURE — 2580000003 HC RX 258: Performed by: NURSE PRACTITIONER

## 2023-05-23 PROCEDURE — 93005 ELECTROCARDIOGRAM TRACING: CPT | Performed by: PHYSICIAN ASSISTANT

## 2023-05-23 PROCEDURE — 71045 X-RAY EXAM CHEST 1 VIEW: CPT

## 2023-05-23 PROCEDURE — 80053 COMPREHEN METABOLIC PANEL: CPT

## 2023-05-23 PROCEDURE — 93010 ELECTROCARDIOGRAM REPORT: CPT | Performed by: FAMILY MEDICINE

## 2023-05-23 PROCEDURE — 93010 ELECTROCARDIOGRAM REPORT: CPT | Performed by: INTERNAL MEDICINE

## 2023-05-23 PROCEDURE — 99211 OFF/OP EST MAY X REQ PHY/QHP: CPT | Performed by: PHYSICIAN ASSISTANT

## 2023-05-23 PROCEDURE — 82947 ASSAY GLUCOSE BLOOD QUANT: CPT

## 2023-05-23 PROCEDURE — 6370000000 HC RX 637 (ALT 250 FOR IP): Performed by: PHYSICIAN ASSISTANT

## 2023-05-23 PROCEDURE — 1200000000 HC SEMI PRIVATE

## 2023-05-23 PROCEDURE — 6360000002 HC RX W HCPCS: Performed by: STUDENT IN AN ORGANIZED HEALTH CARE EDUCATION/TRAINING PROGRAM

## 2023-05-23 PROCEDURE — 99214 OFFICE O/P EST MOD 30 MIN: CPT | Performed by: PHYSICIAN ASSISTANT

## 2023-05-23 PROCEDURE — G8417 CALC BMI ABV UP PARAM F/U: HCPCS | Performed by: PHYSICIAN ASSISTANT

## 2023-05-23 PROCEDURE — 96374 THER/PROPH/DIAG INJ IV PUSH: CPT

## 2023-05-23 RX ORDER — HYDRALAZINE HYDROCHLORIDE 50 MG/1
100 TABLET, FILM COATED ORAL EVERY 8 HOURS SCHEDULED
Status: DISCONTINUED | OUTPATIENT
Start: 2023-05-23 | End: 2023-05-27 | Stop reason: HOSPADM

## 2023-05-23 RX ORDER — LATANOPROST 50 UG/ML
1 SOLUTION/ DROPS OPHTHALMIC NIGHTLY
Status: DISCONTINUED | OUTPATIENT
Start: 2023-05-23 | End: 2023-05-27 | Stop reason: HOSPADM

## 2023-05-23 RX ORDER — INSULIN LISPRO 100 [IU]/ML
0-4 INJECTION, SOLUTION INTRAVENOUS; SUBCUTANEOUS NIGHTLY
Status: DISCONTINUED | OUTPATIENT
Start: 2023-05-23 | End: 2023-05-27 | Stop reason: HOSPADM

## 2023-05-23 RX ORDER — POTASSIUM CHLORIDE 7.45 MG/ML
10 INJECTION INTRAVENOUS PRN
Status: DISCONTINUED | OUTPATIENT
Start: 2023-05-23 | End: 2023-05-23

## 2023-05-23 RX ORDER — INSULIN LISPRO 100 [IU]/ML
0-4 INJECTION, SOLUTION INTRAVENOUS; SUBCUTANEOUS
Status: DISCONTINUED | OUTPATIENT
Start: 2023-05-24 | End: 2023-05-27 | Stop reason: HOSPADM

## 2023-05-23 RX ORDER — SEVELAMER CARBONATE 800 MG/1
800 TABLET, FILM COATED ORAL
Status: DISCONTINUED | OUTPATIENT
Start: 2023-05-24 | End: 2023-05-27 | Stop reason: HOSPADM

## 2023-05-23 RX ORDER — SODIUM CHLORIDE 0.9 % (FLUSH) 0.9 %
10 SYRINGE (ML) INJECTION PRN
Status: DISCONTINUED | OUTPATIENT
Start: 2023-05-23 | End: 2023-05-27 | Stop reason: HOSPADM

## 2023-05-23 RX ORDER — ONDANSETRON 4 MG/1
4 TABLET, ORALLY DISINTEGRATING ORAL EVERY 8 HOURS PRN
Status: DISCONTINUED | OUTPATIENT
Start: 2023-05-23 | End: 2023-05-27 | Stop reason: HOSPADM

## 2023-05-23 RX ORDER — SODIUM CHLORIDE 0.9 % (FLUSH) 0.9 %
5-40 SYRINGE (ML) INJECTION EVERY 12 HOURS SCHEDULED
Status: DISCONTINUED | OUTPATIENT
Start: 2023-05-23 | End: 2023-05-27 | Stop reason: HOSPADM

## 2023-05-23 RX ORDER — ACETAMINOPHEN 650 MG/1
650 SUPPOSITORY RECTAL EVERY 6 HOURS PRN
Status: DISCONTINUED | OUTPATIENT
Start: 2023-05-23 | End: 2023-05-27 | Stop reason: HOSPADM

## 2023-05-23 RX ORDER — SODIUM CHLORIDE 9 MG/ML
INJECTION, SOLUTION INTRAVENOUS PRN
Status: DISCONTINUED | OUTPATIENT
Start: 2023-05-23 | End: 2023-05-27 | Stop reason: HOSPADM

## 2023-05-23 RX ORDER — ASPIRIN 325 MG
325 TABLET ORAL ONCE
Status: COMPLETED | OUTPATIENT
Start: 2023-05-23 | End: 2023-05-23

## 2023-05-23 RX ORDER — NITROGLYCERIN 0.4 MG/1
0.4 TABLET SUBLINGUAL EVERY 5 MIN PRN
Status: DISCONTINUED | OUTPATIENT
Start: 2023-05-23 | End: 2023-05-27 | Stop reason: HOSPADM

## 2023-05-23 RX ORDER — PREDNISOLONE ACETATE 10 MG/ML
1 SUSPENSION/ DROPS OPHTHALMIC DAILY
Status: DISCONTINUED | OUTPATIENT
Start: 2023-05-24 | End: 2023-05-27 | Stop reason: HOSPADM

## 2023-05-23 RX ORDER — POTASSIUM CHLORIDE 20 MEQ/1
40 TABLET, EXTENDED RELEASE ORAL PRN
Status: DISCONTINUED | OUTPATIENT
Start: 2023-05-23 | End: 2023-05-23

## 2023-05-23 RX ORDER — NITROGLYCERIN 0.4 MG/1
0.4 TABLET SUBLINGUAL EVERY 5 MIN PRN
Status: DISCONTINUED | OUTPATIENT
Start: 2023-05-23 | End: 2023-05-23 | Stop reason: SDUPTHER

## 2023-05-23 RX ORDER — AMLODIPINE BESYLATE 10 MG/1
10 TABLET ORAL NIGHTLY
Status: DISCONTINUED | OUTPATIENT
Start: 2023-05-23 | End: 2023-05-27 | Stop reason: HOSPADM

## 2023-05-23 RX ORDER — AMLODIPINE BESYLATE 10 MG/1
10 TABLET ORAL NIGHTLY
COMMUNITY

## 2023-05-23 RX ORDER — MAGNESIUM SULFATE 1 G/100ML
1000 INJECTION INTRAVENOUS PRN
Status: DISCONTINUED | OUTPATIENT
Start: 2023-05-23 | End: 2023-05-23

## 2023-05-23 RX ORDER — ACETAMINOPHEN 325 MG/1
650 TABLET ORAL EVERY 6 HOURS PRN
Status: DISCONTINUED | OUTPATIENT
Start: 2023-05-23 | End: 2023-05-27 | Stop reason: HOSPADM

## 2023-05-23 RX ORDER — DEXTROSE MONOHYDRATE 100 MG/ML
INJECTION, SOLUTION INTRAVENOUS CONTINUOUS PRN
Status: DISCONTINUED | OUTPATIENT
Start: 2023-05-23 | End: 2023-05-27 | Stop reason: HOSPADM

## 2023-05-23 RX ORDER — HYDRALAZINE HYDROCHLORIDE 20 MG/ML
20 INJECTION INTRAMUSCULAR; INTRAVENOUS ONCE
Status: COMPLETED | OUTPATIENT
Start: 2023-05-23 | End: 2023-05-23

## 2023-05-23 RX ORDER — INSULIN GLARGINE 100 [IU]/ML
24 INJECTION, SOLUTION SUBCUTANEOUS 2 TIMES DAILY
Status: DISCONTINUED | OUTPATIENT
Start: 2023-05-23 | End: 2023-05-27 | Stop reason: HOSPADM

## 2023-05-23 RX ORDER — ONDANSETRON 2 MG/ML
4 INJECTION INTRAMUSCULAR; INTRAVENOUS EVERY 6 HOURS PRN
Status: DISCONTINUED | OUTPATIENT
Start: 2023-05-23 | End: 2023-05-27 | Stop reason: HOSPADM

## 2023-05-23 RX ADMIN — AMLODIPINE BESYLATE 10 MG: 10 TABLET ORAL at 23:54

## 2023-05-23 RX ADMIN — METOPROLOL TARTRATE 25 MG: 25 TABLET ORAL at 23:54

## 2023-05-23 RX ADMIN — LATANOPROST 1 DROP: 50 SOLUTION/ DROPS OPHTHALMIC at 23:54

## 2023-05-23 RX ADMIN — ASPIRIN 325 MG: 325 TABLET, COATED ORAL at 15:17

## 2023-05-23 RX ADMIN — SODIUM CHLORIDE, PRESERVATIVE FREE 10 ML: 5 INJECTION INTRAVENOUS at 23:54

## 2023-05-23 RX ADMIN — INSULIN GLARGINE 24 UNITS: 100 INJECTION, SOLUTION SUBCUTANEOUS at 23:54

## 2023-05-23 RX ADMIN — HYDRALAZINE HYDROCHLORIDE 20 MG: 20 INJECTION INTRAMUSCULAR; INTRAVENOUS at 21:03

## 2023-05-23 ASSESSMENT — PAIN SCALES - GENERAL
PAINLEVEL_OUTOF10: 0
PAINLEVEL_OUTOF10: 0

## 2023-05-23 ASSESSMENT — PAIN - FUNCTIONAL ASSESSMENT
PAIN_FUNCTIONAL_ASSESSMENT: 0-10
PAIN_FUNCTIONAL_ASSESSMENT: NONE - DENIES PAIN
PAIN_FUNCTIONAL_ASSESSMENT: NONE - DENIES PAIN

## 2023-05-23 ASSESSMENT — ENCOUNTER SYMPTOMS
GASTROINTESTINAL NEGATIVE: 1
SHORTNESS OF BREATH: 1

## 2023-05-23 ASSESSMENT — PAIN DESCRIPTION - ORIENTATION: ORIENTATION: MID

## 2023-05-23 ASSESSMENT — PAIN DESCRIPTION - DESCRIPTORS: DESCRIPTORS: ACHING

## 2023-05-23 ASSESSMENT — PAIN DESCRIPTION - LOCATION: LOCATION: CHEST

## 2023-05-23 NOTE — ED PROVIDER NOTES
Abnormal; Notable for the following components:    RBC 3.15 (*)     Hemoglobin 10.1 (*)     Hematocrit 30.1 (*)     RDW 17.0 (*)     Immature Granulocytes 2 (*)     All other components within normal limits   MAGNESIUM   IMMATURE PLATELET FRACTION       All other labs were within normal range or not returned as of this dictation. EMERGENCY DEPARTMENT COURSE and DIFFERENTIAL DIAGNOSIS/MDM:   Vitals:    Vitals:    05/23/23 1347 05/23/23 1626   BP: (!) 172/55 (!) 177/52   Pulse: 67 60   Resp: 19 20   Temp: 97.7 °F (36.5 °C)    SpO2: 98% 97%   Weight: 199 lb (90.3 kg)            Medical Decision Making  Amount and/or Complexity of Data Reviewed  Labs: ordered. Radiology: ordered. ECG/medicine tests: ordered. Risk  OTC drugs. 66-year-old elderly-appearing male presents emergency department for worsening chest pain and shortness of breath. Patient was sent from cardiologist office here to emergency department. Exam demonstrates patient is not in fulminant CHF noting he last dialyzed yesterday full session. EKG along with laboratory studies to include cardiac markers and chest x-ray will be obtained to rule out acute underlying cardiopulmonary abnormality. Patient denies any pain at time of exam.    Laboratory studies reviewed and interpreted along with chest x-ray results reviewed by ER clinician that demonstrates cardiomegaly without acute cardiopulmonary process    REASSESSMENT      Patient has had uncomplicated ER course patient reevaluated 1534. Patient continues to deny any chest pain at this time. First phone call to [facility] for acute hospital transfer initiated. CRITICAL CARE TIME   Total Critical Care time was  minutes, excluding separately reportable procedures. There was a high probability of clinically significant/life threatening deterioration in the patient's condition which required my urgent intervention. CONSULTS:  I discussed case with cardiologist 1600 hrs.  .
Patrick prn  currently w/o any exacerbation.

## 2023-05-23 NOTE — PROGRESS NOTES
Patient: Kishan Ann  : 1945  Date of Visit: May 23, 2023    REASON FOR VISIT / CONSULTATION: Dizziness (HX: dysautonomia, anemia, CHF, CAD, angina, HTN, HLD, stage 4 CKD, renal artery stenosis Pt is here for on and off several symptoms he has weakness after shower had to sit down, sob, lightheaded/dizziness, palp and CP when he can not breath )      Dear Radha Figueroa MD,    As you know, Mr. Adriana Reardon is a 70 y.o. male with a known history of dysautonomia where on tilt table testing his blood pressure dropped from 429 systolic to 78 systolic with only a 01-87 HR response. A CT of he head in the past showed evidence of at least 2 old CVA's and a heart catheterization showed severe single vessel disease in the ostium of a moderate sized OM1 branch of the circumflex. However, maximal guidelines directed medical management was opted for an place of stenting partly due to the risk associated with stenting this vessel. He has had a coronary angiography procedure with stenting of his HA Om1. As you know, Mr. Adriana Reardon  is a 68 y.o. male with a history of recent onset substernal chest discomfort that led to a stress test and a subsequent heart catheterization which showed severe single vessel coronary artery disease of the HA Om1 with successful angioplasty and stenting of that vessel that was done by Dr. Yaquelin River on 4/10/17. He has had problems with balance problems when he is in his feet and says that a Neurologist has told him in the past this is because of his previous strokes. He underwent a cardiovascular stress test which fortunately had shown to be normal on 3/21/2018. Mr. Adriana Reardon has significant normal stress test and echocardiogram on 2020. Holter monitor done on 2020: The rhythm was sinus. Average daily heart rate 58 ranging from 47 to 81 bpm. Bradycardia for 72% test duration. Rare premature supraventricular ectopic beats consisting of 33 isolated PACs.  Rare premature ventricular ectopic

## 2023-05-23 NOTE — ED NOTES
JIAN called back with cardiology from [facility].  Call transferred to [place], [place]  05/23/23 2402

## 2023-05-23 NOTE — PATIENT INSTRUCTIONS
SURVEY:    You may be receiving a survey from [employer] regarding your visit today. Please complete the survey to enable us to provide the highest quality of care to you and your family. If you cannot score us a very good on any question, please call the office to discuss how we could have made your experience a very good one. Thank you.

## 2023-05-23 NOTE — ED NOTES
Called [facility] for transfer to [facility]'s. They will page cardiology.  Waiting for call back      Lucero Manjarrez  05/23/23 0738

## 2023-05-24 LAB
CHOLEST SERPL-MCNC: 112 MG/DL
CHOLESTEROL/HDL RATIO: 4.5
ERYTHROCYTE [DISTWIDTH] IN BLOOD BY AUTOMATED COUNT: 16.9 % (ref 11.8–14.4)
GLUCOSE BLD-MCNC: 106 MG/DL (ref 75–110)
GLUCOSE BLD-MCNC: 120 MG/DL (ref 75–110)
GLUCOSE BLD-MCNC: 251 MG/DL (ref 75–110)
GLUCOSE BLD-MCNC: 76 MG/DL (ref 75–110)
GLUCOSE BLD-MCNC: 78 MG/DL (ref 75–110)
HCT VFR BLD AUTO: 29.4 % (ref 40.7–50.3)
HDLC SERPL-MCNC: 25 MG/DL
HGB BLD-MCNC: 9.4 G/DL (ref 13–17)
LDLC SERPL CALC-MCNC: 44 MG/DL (ref 0–130)
MAGNESIUM SERPL-MCNC: 2.1 MG/DL (ref 1.6–2.6)
MCH RBC QN AUTO: 30.7 PG (ref 25.2–33.5)
MCHC RBC AUTO-ENTMCNC: 32 G/DL (ref 28.4–34.8)
MCV RBC AUTO: 96.1 FL (ref 82.6–102.9)
NRBC AUTOMATED: 0 PER 100 WBC
PLATELET # BLD AUTO: 150 K/UL (ref 138–453)
PMV BLD AUTO: 9.7 FL (ref 8.1–13.5)
RBC # BLD AUTO: 3.06 M/UL (ref 4.21–5.77)
TRIGL SERPL-MCNC: 216 MG/DL
TROPONIN I SERPL HS-MCNC: 71 NG/L (ref 0–22)
WBC OTHER # BLD: 6.4 K/UL (ref 3.5–11.3)

## 2023-05-24 PROCEDURE — 1200000000 HC SEMI PRIVATE

## 2023-05-24 PROCEDURE — 83735 ASSAY OF MAGNESIUM: CPT

## 2023-05-24 PROCEDURE — 2580000003 HC RX 258: Performed by: NURSE PRACTITIONER

## 2023-05-24 PROCEDURE — 85027 COMPLETE CBC AUTOMATED: CPT

## 2023-05-24 PROCEDURE — 82947 ASSAY GLUCOSE BLOOD QUANT: CPT

## 2023-05-24 PROCEDURE — 99254 IP/OBS CNSLTJ NEW/EST MOD 60: CPT | Performed by: INTERNAL MEDICINE

## 2023-05-24 PROCEDURE — 80061 LIPID PANEL: CPT

## 2023-05-24 PROCEDURE — 84484 ASSAY OF TROPONIN QUANT: CPT

## 2023-05-24 PROCEDURE — 99222 1ST HOSP IP/OBS MODERATE 55: CPT | Performed by: STUDENT IN AN ORGANIZED HEALTH CARE EDUCATION/TRAINING PROGRAM

## 2023-05-24 PROCEDURE — 6370000000 HC RX 637 (ALT 250 FOR IP): Performed by: NURSE PRACTITIONER

## 2023-05-24 PROCEDURE — 36415 COLL VENOUS BLD VENIPUNCTURE: CPT

## 2023-05-24 RX ADMIN — METOPROLOL TARTRATE 25 MG: 25 TABLET ORAL at 08:45

## 2023-05-24 RX ADMIN — AMLODIPINE BESYLATE 10 MG: 10 TABLET ORAL at 21:05

## 2023-05-24 RX ADMIN — SODIUM CHLORIDE, PRESERVATIVE FREE 10 ML: 5 INJECTION INTRAVENOUS at 21:11

## 2023-05-24 RX ADMIN — SODIUM CHLORIDE, PRESERVATIVE FREE 10 ML: 5 INJECTION INTRAVENOUS at 21:13

## 2023-05-24 RX ADMIN — SODIUM CHLORIDE, PRESERVATIVE FREE 10 ML: 5 INJECTION INTRAVENOUS at 08:45

## 2023-05-24 RX ADMIN — SEVELAMER CARBONATE 800 MG: 800 TABLET, FILM COATED ORAL at 08:02

## 2023-05-24 RX ADMIN — PREDNISOLONE ACETATE 1 DROP: 10 SUSPENSION/ DROPS OPHTHALMIC at 08:45

## 2023-05-24 RX ADMIN — INSULIN GLARGINE 24 UNITS: 100 INJECTION, SOLUTION SUBCUTANEOUS at 09:01

## 2023-05-24 RX ADMIN — SODIUM CHLORIDE, PRESERVATIVE FREE 10 ML: 5 INJECTION INTRAVENOUS at 09:01

## 2023-05-24 RX ADMIN — METOPROLOL TARTRATE 25 MG: 25 TABLET ORAL at 21:05

## 2023-05-24 ASSESSMENT — PAIN SCALES - GENERAL
PAINLEVEL_OUTOF10: 0
PAINLEVEL_OUTOF10: 0

## 2023-05-24 NOTE — H&P
per 100 WBC    Seg Neutrophils 61 36 - 65 %    Lymphocytes 25 24 - 43 %    Monocytes 8 3 - 12 %    Eosinophils % 3 1 - 4 %    Immature Granulocytes 2 (H) 0 %    Basophils 1 0 - 2 %    Segs Absolute 4.08 1.50 - 8.10 k/uL    Absolute Lymph # 1.68 1.10 - 3.70 k/uL    Absolute Mono # 0.54 0.10 - 1.20 k/uL    Absolute Eos # 0.20 0.00 - 0.44 k/uL    Absolute Immature Granulocyte 0.13 0.00 - 0.30 k/uL    Basophils Absolute 0.07 0.0 - 0.2 k/uL    Morphology Platelet scan shows Normal Platelets    Immature Platelet Fraction    Collection Time: 05/23/23  2:15 PM   Result Value Ref Range    Platelet, Immature Fraction 3.3 1.1 - 10.3 %    Platelet, Fluorescence 148 138 - 453 k/uL   Troponin    Collection Time: 05/23/23 10:57 PM   Result Value Ref Range    Troponin, High Sensitivity 66 (HH) 0 - 22 ng/L   POC Glucose Fingerstick    Collection Time: 05/23/23 11:01 PM   Result Value Ref Range    POC Glucose 185 (H) 75 - 110 mg/dL   Troponin    Collection Time: 05/24/23 12:19 AM   Result Value Ref Range    Troponin, High Sensitivity 71 (HH) 0 - 22 ng/L       Imaging/Diagnostics:  XR CHEST (2 VW)    Result Date: 5/19/2023  No acute process. XR CHEST PORTABLE    Result Date: 5/23/2023  No acute abnormality identified. Assessment :      Hospital Problems             Last Modified POA    * (Principal) NSTEMI (non-ST elevated myocardial infarction) ([address] 5/23/2023 Yes    Chest pain 5/23/2023 Yes       Plan:     Patient status inpatient in the Med/Surge    Unstable angina-troponin 78, 66, and 71. Patient is a history CAD status post.  Continue beta-blocker, nitroglycerin as needed. Patient currently asymptomatic. Patient likely need stress test prior to discharge. Unsure if patient received aspirin at [facility] ED. cardiac consultation. Patient on antiplatelet agents secondary to anemia per cardiology note  Essential hypertension continue beta-blocker and hydralazine.   Continue Lasix  Hyperlipidemia-patient refused

## 2023-05-24 NOTE — CARE COORDINATION
Case Management Assessment  Initial Evaluation    Date/Time of Evaluation: 5/24/2023 11:25 AM  Assessment Completed by: Stephenie Chowdhury RN    If patient is discharged prior to next notation, then this note serves as note for discharge by case management. Patient Name: Claudetta Bad                   YOB: 1945  Diagnosis: NSTEMI (non-ST elevated myocardial infarction) [facility]) [I21.4]  Chest pain [R07.9]                   Date / Time: 5/23/2023 10:20 PM    Patient Admission Status: Inpatient   Readmission Risk (Low < 19, Mod (19-27), High > 27): Readmission Risk Score: 20.1    Current PCP: Johna Hodgkin, MD  PCP verified by CM? Yes Johna Hodgkin Md)    Chart Reviewed: Yes      History Provided by: Patient  Patient Orientation: Alert and Oriented    Patient Cognition: Alert    Hospitalization in the last 30 days (Readmission):  No    If yes, Readmission Assessment in CM Navigator will be completed. Advance Directives:      Code Status: Full Code   Patient's Primary Decision Maker is:      Primary Decision MakerRedbird Melendez  Spouse - [phone]    Secondary Decision Maker: Robe Valderramaky - Child - [phone]    Discharge Planning:    Patient lives with: Spouse/Significant Other Type of Home: House  Primary Care Giver: Self  Patient Support Systems include: Spouse/Significant Other   Current Financial resources: Medicare  Current community resources: Other (Comment) (HD Kaylie Abdoulaye ernandez,f @ 7)  Current services prior to admission: Other (Comment), Durable Medical Equipment (op HD)            Current DME: Scarlet Cabrera            Type of Home Care services:  None    ADLS  Prior functional level: Independent in ADLs/IADLs  Current functional level: Independent in ADLs/IADLs    PT AM-PAC:   /24  OT AM-PAC:   /24    Family can provide assistance at DC: Yes  Would you like Case Management to discuss the discharge plan with any other family members/significant others, and if so, who?  Yes (wife

## 2023-05-24 NOTE — ACP (ADVANCE CARE PLANNING)
Advance Care Planning     Advance Care Planning Activator (Inpatient)  Conversation Note      Date of ACP Conversation: 5/24/2023     Conversation Conducted with: Patient with Decision Making Capacity    ACP Activator: Stephany Alvarado RN      Health Care Decision Maker:     Current Designated Health Care Decision Maker:     Primary Decision Maker: Dagmar Fritz - [phone]    Secondary Decision Maker: Kaylen Valderrama - Child - [phone]        Care Preferences    Ventilation: \"If you were in your present state of health and suddenly became very ill and were unable to breathe on your own, what would your preference be about the use of a ventilator (breathing machine) if it were available to you? \"      Would the patient desire the use of ventilator (breathing machine)?: yes    \"If your health worsens and it becomes clear that your chance of recovery is unlikely, what would your preference be about the use of a ventilator (breathing machine) if it were available to you? \"     Would the patient desire the use of ventilator (breathing machine)?: Yes      Resuscitation  \"CPR works best to restart the heart when there is a sudden event, like a heart attack, in someone who is otherwise healthy. Unfortunately, CPR does not typically restart the heart for people who have serious health conditions or who are very sick. \"    \"In the event your heart stopped as a result of an underlying serious health condition, would you want attempts to be made to restart your heart (answer \"yes\" for attempt to resuscitate) or would you prefer a natural death (answer \"no\" for do not attempt to resuscitate)? \" yes

## 2023-05-24 NOTE — ED NOTES
Noted to be hypertensive with blood pressures with systolics in the 503J. Takes hydralazine 100 mg 3 times daily at home. Given a dose of hydralazine 20 mg IV push x1.      Vaishnavi Ro MD  05/23/23 4201

## 2023-05-24 NOTE — PLAN OF CARE
Problem: Chronic Conditions and Co-morbidities  Goal: Patient's chronic conditions and co-morbidity symptoms are monitored and maintained or improved  Outcome: Progressing     Problem: Discharge Planning  Goal: Discharge to home or other facility with appropriate resources  Outcome: Progressing     Problem: Safety - Adult  Goal: Free from fall injury  Outcome: Progressing     Problem: Cardiovascular - Adult  Goal: Maintains optimal cardiac output and hemodynamic stability  Outcome: Progressing  Goal: Absence of cardiac dysrhythmias or at baseline  Outcome: Progressing     Problem: Skin/Tissue Integrity  Goal: Absence of new skin breakdown  Description: 1. Monitor for areas of redness and/or skin breakdown  2. Assess vascular access sites hourly  3. Every 4-6 hours minimum:  Change oxygen saturation probe site  4. Every 4-6 hours:  If on nasal continuous positive airway pressure, respiratory therapy assess nares and determine need for appliance change or resting period.   Outcome: Progressing     Problem: Pain  Goal: Verbalizes/displays adequate comfort level or baseline comfort level  Outcome: Progressing     Problem: Nutrition Deficit:  Goal: Optimize nutritional status  Outcome: Progressing     Problem: Cognitive:  Goal: Understands risk factors for wounds  Description: Understands risk factors for wounds  Outcome: Progressing

## 2023-05-25 ENCOUNTER — APPOINTMENT (OUTPATIENT)
Dept: CARDIAC CATH/INVASIVE PROCEDURES | Age: 78
DRG: 280 | End: 2023-05-25
Attending: INTERNAL MEDICINE
Payer: MEDICARE

## 2023-05-25 LAB
ANION GAP SERPL CALCULATED.3IONS-SCNC: 16 MMOL/L (ref 9–17)
BNP SERPL-MCNC: 3215 PG/ML
BUN SERPL-MCNC: 44 MG/DL (ref 8–23)
CALCIUM SERPL-MCNC: 8.4 MG/DL (ref 8.6–10.4)
CHLORIDE SERPL-SCNC: 101 MMOL/L (ref 98–107)
CO2 SERPL-SCNC: 20 MMOL/L (ref 20–31)
CREAT SERPL-MCNC: 4.58 MG/DL (ref 0.7–1.2)
GFR SERPL CREATININE-BSD FRML MDRD: 12 ML/MIN/1.73M2
GLUCOSE BLD-MCNC: 156 MG/DL (ref 75–110)
GLUCOSE BLD-MCNC: 177 MG/DL (ref 75–110)
GLUCOSE BLD-MCNC: 231 MG/DL (ref 75–110)
GLUCOSE BLD-MCNC: 245 MG/DL (ref 75–110)
GLUCOSE SERPL-MCNC: 245 MG/DL (ref 70–99)
POTASSIUM SERPL-SCNC: 4.2 MMOL/L (ref 3.7–5.3)
SODIUM SERPL-SCNC: 137 MMOL/L (ref 135–144)

## 2023-05-25 PROCEDURE — 6370000000 HC RX 637 (ALT 250 FOR IP): Performed by: STUDENT IN AN ORGANIZED HEALTH CARE EDUCATION/TRAINING PROGRAM

## 2023-05-25 PROCEDURE — 6370000000 HC RX 637 (ALT 250 FOR IP): Performed by: INTERNAL MEDICINE

## 2023-05-25 PROCEDURE — 2060000000 HC ICU INTERMEDIATE R&B

## 2023-05-25 PROCEDURE — 80048 BASIC METABOLIC PNL TOTAL CA: CPT

## 2023-05-25 PROCEDURE — 2580000003 HC RX 258: Performed by: NURSE PRACTITIONER

## 2023-05-25 PROCEDURE — C1892 INTRO/SHEATH,FIXED,PEEL-AWAY: HCPCS

## 2023-05-25 PROCEDURE — 93454 CORONARY ARTERY ANGIO S&I: CPT

## 2023-05-25 PROCEDURE — 4A023N7 MEASUREMENT OF CARDIAC SAMPLING AND PRESSURE, LEFT HEART, PERCUTANEOUS APPROACH: ICD-10-PCS | Performed by: INTERNAL MEDICINE

## 2023-05-25 PROCEDURE — B2111ZZ FLUOROSCOPY OF MULTIPLE CORONARY ARTERIES USING LOW OSMOLAR CONTRAST: ICD-10-PCS | Performed by: INTERNAL MEDICINE

## 2023-05-25 PROCEDURE — 7100000011 HC PHASE II RECOVERY - ADDTL 15 MIN

## 2023-05-25 PROCEDURE — 6360000002 HC RX W HCPCS

## 2023-05-25 PROCEDURE — 7100000010 HC PHASE II RECOVERY - FIRST 15 MIN

## 2023-05-25 PROCEDURE — 94761 N-INVAS EAR/PLS OXIMETRY MLT: CPT

## 2023-05-25 PROCEDURE — C1894 INTRO/SHEATH, NON-LASER: HCPCS

## 2023-05-25 PROCEDURE — 99152 MOD SED SAME PHYS/QHP 5/>YRS: CPT

## 2023-05-25 PROCEDURE — 6370000000 HC RX 637 (ALT 250 FOR IP): Performed by: NURSE PRACTITIONER

## 2023-05-25 PROCEDURE — 2580000003 HC RX 258: Performed by: STUDENT IN AN ORGANIZED HEALTH CARE EDUCATION/TRAINING PROGRAM

## 2023-05-25 PROCEDURE — 2500000003 HC RX 250 WO HCPCS

## 2023-05-25 PROCEDURE — 2580000003 HC RX 258: Performed by: INTERNAL MEDICINE

## 2023-05-25 PROCEDURE — 2709999900 HC NON-CHARGEABLE SUPPLY

## 2023-05-25 PROCEDURE — C1760 CLOSURE DEV, VASC: HCPCS

## 2023-05-25 PROCEDURE — 99232 SBSQ HOSP IP/OBS MODERATE 35: CPT | Performed by: STUDENT IN AN ORGANIZED HEALTH CARE EDUCATION/TRAINING PROGRAM

## 2023-05-25 PROCEDURE — 6360000004 HC RX CONTRAST MEDICATION

## 2023-05-25 PROCEDURE — 82947 ASSAY GLUCOSE BLOOD QUANT: CPT

## 2023-05-25 PROCEDURE — 36415 COLL VENOUS BLD VENIPUNCTURE: CPT

## 2023-05-25 PROCEDURE — 83880 ASSAY OF NATRIURETIC PEPTIDE: CPT

## 2023-05-25 RX ORDER — ASPIRIN 81 MG/1
81 TABLET, CHEWABLE ORAL DAILY
Status: DISCONTINUED | OUTPATIENT
Start: 2023-05-25 | End: 2023-05-27 | Stop reason: HOSPADM

## 2023-05-25 RX ORDER — SODIUM CHLORIDE 9 MG/ML
INJECTION, SOLUTION INTRAVENOUS CONTINUOUS
Status: DISCONTINUED | OUTPATIENT
Start: 2023-05-25 | End: 2023-05-26

## 2023-05-25 RX ADMIN — HYDRALAZINE HYDROCHLORIDE 100 MG: 50 TABLET, FILM COATED ORAL at 22:15

## 2023-05-25 RX ADMIN — ASPIRIN 81 MG: 81 TABLET, CHEWABLE ORAL at 19:10

## 2023-05-25 RX ADMIN — HYDRALAZINE HYDROCHLORIDE 100 MG: 50 TABLET, FILM COATED ORAL at 07:45

## 2023-05-25 RX ADMIN — SODIUM CHLORIDE, PRESERVATIVE FREE 10 ML: 5 INJECTION INTRAVENOUS at 21:00

## 2023-05-25 RX ADMIN — SODIUM CHLORIDE, PRESERVATIVE FREE 10 ML: 5 INJECTION INTRAVENOUS at 07:48

## 2023-05-25 RX ADMIN — LATANOPROST 1 DROP: 50 SOLUTION/ DROPS OPHTHALMIC at 21:01

## 2023-05-25 RX ADMIN — METOPROLOL TARTRATE 25 MG: 25 TABLET ORAL at 07:45

## 2023-05-25 RX ADMIN — INSULIN LISPRO 1 UNITS: 100 INJECTION, SOLUTION INTRAVENOUS; SUBCUTANEOUS at 18:18

## 2023-05-25 RX ADMIN — INSULIN GLARGINE 24 UNITS: 100 INJECTION, SOLUTION SUBCUTANEOUS at 22:14

## 2023-05-25 RX ADMIN — AMLODIPINE BESYLATE 10 MG: 10 TABLET ORAL at 21:00

## 2023-05-25 RX ADMIN — HYDRALAZINE HYDROCHLORIDE 100 MG: 50 TABLET, FILM COATED ORAL at 00:17

## 2023-05-25 RX ADMIN — METOPROLOL TARTRATE 25 MG: 25 TABLET ORAL at 21:00

## 2023-05-25 RX ADMIN — PREDNISOLONE ACETATE 1 DROP: 10 SUSPENSION/ DROPS OPHTHALMIC at 07:47

## 2023-05-25 RX ADMIN — SODIUM CHLORIDE: 9 INJECTION, SOLUTION INTRAVENOUS at 08:28

## 2023-05-25 RX ADMIN — SEVELAMER CARBONATE 800 MG: 800 TABLET, FILM COATED ORAL at 18:18

## 2023-05-25 NOTE — OP NOTE
[facility]    CARDIAC CATHETERIZATION    Date:   5/25/2023  Patient name:  Maribel Lyman  Date of admission:  5/23/2023 10:20 PM  MRN:   [id number]  YOB: 1945    Operators:  Primary:   Tomy Essex, MD (Attending Physician)    Assistant/CV fellow:  Sharon Jean MD      Procedure performed:     [x] Left Heart Catheterization. [] Graft Angiography. [x] Left Ventriculography. [] Right Heart Catheterization. [x] Coronary Angiography. [x] Aortic Valve Studies. [] PCI:      [x] Other:       Pre Procedure Conscious Sedation Data:  ASA Class:    [] I [] II [x] III [] IV    Mallampati Class:  [x] I [] II [] III [] IV      Indication:  [] STEMI      [] + Stress test  [] ACS      [] + EKG Changes  [] Non Q MI       [] Significant Risk Factors  [] Recurrent Angina             [] Diabetes Mellitus    [] New LBBB      [] Uncontrolled HTN. [] CHF / Low EF changes     [] Abnormal CTA / Ca Score      Procedure:  Access:  [x] Femoral  [] Radial  artery       [x] Right  [] Left    [x] Ultrasound used    Procedure: After informed consent was obtained with explanation of the risks and benefits, patient was brought to the cath lab. The access area was prepped and draped in sterile fashion. 1% lidocaine was used for local block. The artery was cannulated with 6  Fr sheath with brisk arterial blood return. The side port was frequently flushed and aspirated with normal saline. Findings:   Angiographic Findings        Cardiac Arteries and Lesion Findings       LMCA: Mild irregularities 10-20%. LAD: 70% long proximal stenosis. 80% mid stenosis. LCx: 70% ostial and 70% mid stenosis. Large OM1 with 80% ostial stenosis. OM2 patent proximal stent with 80% stenosis after the stent. RCA: nondominant with 50% mid stenosis. Coronary Tree        Dominance: Left        Procedure Summary        Severe multivessel CAD.         Recommendations        Currently chest pain free and

## 2023-05-25 NOTE — PLAN OF CARE
Problem: Chronic Conditions and Co-morbidities  Goal: Patient's chronic conditions and co-morbidity symptoms are monitored and maintained or improved  5/25/2023 0618 by Julio Villa RN  Outcome: Progressing  5/24/2023 1657 by Tone Somers RN  Outcome: Progressing     Problem: Discharge Planning  Goal: Discharge to home or other facility with appropriate resources  5/25/2023 0618 by Julio Villa RN  Outcome: Progressing  5/24/2023 1657 by Tone Somers RN  Outcome: Progressing     Problem: Safety - Adult  Goal: Free from fall injury  5/25/2023 0618 by Julio Villa RN  Outcome: Progressing  5/24/2023 1657 by Tone Somers RN  Outcome: Progressing     Problem: Cardiovascular - Adult  Goal: Maintains optimal cardiac output and hemodynamic stability  5/25/2023 0618 by Julio Villa RN  Outcome: Progressing  5/24/2023 1657 by Tone Somers RN  Outcome: Progressing  Goal: Absence of cardiac dysrhythmias or at baseline  5/25/2023 0618 by Julio Villa RN  Outcome: Progressing  5/24/2023 1657 by Tone Somers RN  Outcome: Progressing     Problem: Skin/Tissue Integrity  Goal: Absence of new skin breakdown  Description: 1. Monitor for areas of redness and/or skin breakdown  2. Assess vascular access sites hourly  3. Every 4-6 hours minimum:  Change oxygen saturation probe site  4. Every 4-6 hours:  If on nasal continuous positive airway pressure, respiratory therapy assess nares and determine need for appliance change or resting period.   5/25/2023 0618 by Julio Villa RN  Outcome: Progressing  5/24/2023 1657 by Tone Somers RN  Outcome: Progressing     Problem: Pain  Goal: Verbalizes/displays adequate comfort level or baseline comfort level  5/24/2023 1657 by Tone Somers RN  Outcome: Progressing     Problem: Nutrition Deficit:  Goal: Optimize nutritional status  5/24/2023 1657 by Tone Somers RN  Outcome: Progressing     Problem: Cognitive:  Goal: Understands risk factors for

## 2023-05-26 ENCOUNTER — APPOINTMENT (OUTPATIENT)
Dept: CT IMAGING | Age: 78
DRG: 280 | End: 2023-05-26
Attending: INTERNAL MEDICINE
Payer: MEDICARE

## 2023-05-26 PROBLEM — I20.0 UNSTABLE ANGINA (HCC): Status: ACTIVE | Noted: 2021-11-30

## 2023-05-26 PROBLEM — Z99.2 ESRD (END STAGE RENAL DISEASE) ON DIALYSIS (HCC): Status: ACTIVE | Noted: 2022-04-19

## 2023-05-26 LAB
GLUCOSE BLD-MCNC: 138 MG/DL (ref 75–110)
GLUCOSE BLD-MCNC: 178 MG/DL (ref 75–110)
GLUCOSE BLD-MCNC: 294 MG/DL (ref 75–110)
GLUCOSE BLD-MCNC: 299 MG/DL (ref 75–110)

## 2023-05-26 PROCEDURE — 6370000000 HC RX 637 (ALT 250 FOR IP): Performed by: STUDENT IN AN ORGANIZED HEALTH CARE EDUCATION/TRAINING PROGRAM

## 2023-05-26 PROCEDURE — 5A1D70Z PERFORMANCE OF URINARY FILTRATION, INTERMITTENT, LESS THAN 6 HOURS PER DAY: ICD-10-PCS | Performed by: INTERNAL MEDICINE

## 2023-05-26 PROCEDURE — 2580000003 HC RX 258: Performed by: STUDENT IN AN ORGANIZED HEALTH CARE EDUCATION/TRAINING PROGRAM

## 2023-05-26 PROCEDURE — 99222 1ST HOSP IP/OBS MODERATE 55: CPT | Performed by: NURSE PRACTITIONER

## 2023-05-26 PROCEDURE — 90935 HEMODIALYSIS ONE EVALUATION: CPT

## 2023-05-26 PROCEDURE — 6360000002 HC RX W HCPCS: Performed by: STUDENT IN AN ORGANIZED HEALTH CARE EDUCATION/TRAINING PROGRAM

## 2023-05-26 PROCEDURE — 2060000000 HC ICU INTERMEDIATE R&B

## 2023-05-26 PROCEDURE — 90935 HEMODIALYSIS ONE EVALUATION: CPT | Performed by: INTERNAL MEDICINE

## 2023-05-26 PROCEDURE — 82947 ASSAY GLUCOSE BLOOD QUANT: CPT

## 2023-05-26 PROCEDURE — 71250 CT THORAX DX C-: CPT

## 2023-05-26 PROCEDURE — 99232 SBSQ HOSP IP/OBS MODERATE 35: CPT | Performed by: STUDENT IN AN ORGANIZED HEALTH CARE EDUCATION/TRAINING PROGRAM

## 2023-05-26 RX ORDER — HEPARIN SODIUM 5000 [USP'U]/ML
5000 INJECTION, SOLUTION INTRAVENOUS; SUBCUTANEOUS EVERY 8 HOURS SCHEDULED
Status: DISCONTINUED | OUTPATIENT
Start: 2023-05-26 | End: 2023-05-27 | Stop reason: HOSPADM

## 2023-05-26 RX ADMIN — SEVELAMER CARBONATE 800 MG: 800 TABLET, FILM COATED ORAL at 11:50

## 2023-05-26 RX ADMIN — AMLODIPINE BESYLATE 10 MG: 10 TABLET ORAL at 21:01

## 2023-05-26 RX ADMIN — ONDANSETRON 4 MG: 2 INJECTION INTRAMUSCULAR; INTRAVENOUS at 13:40

## 2023-05-26 RX ADMIN — SODIUM CHLORIDE, PRESERVATIVE FREE 10 ML: 5 INJECTION INTRAVENOUS at 08:36

## 2023-05-26 RX ADMIN — SODIUM CHLORIDE, PRESERVATIVE FREE 10 ML: 5 INJECTION INTRAVENOUS at 21:02

## 2023-05-26 RX ADMIN — LATANOPROST 1 DROP: 50 SOLUTION/ DROPS OPHTHALMIC at 21:01

## 2023-05-26 RX ADMIN — PREDNISOLONE ACETATE 1 DROP: 10 SUSPENSION/ DROPS OPHTHALMIC at 08:35

## 2023-05-26 RX ADMIN — INSULIN GLARGINE 24 UNITS: 100 INJECTION, SOLUTION SUBCUTANEOUS at 08:35

## 2023-05-26 RX ADMIN — INSULIN LISPRO 2 UNITS: 100 INJECTION, SOLUTION INTRAVENOUS; SUBCUTANEOUS at 11:47

## 2023-05-26 RX ADMIN — METOPROLOL TARTRATE 25 MG: 25 TABLET ORAL at 21:01

## 2023-05-26 RX ADMIN — HYDRALAZINE HYDROCHLORIDE 100 MG: 50 TABLET, FILM COATED ORAL at 21:56

## 2023-05-26 RX ADMIN — ASPIRIN 81 MG: 81 TABLET, CHEWABLE ORAL at 08:35

## 2023-05-26 RX ADMIN — SEVELAMER CARBONATE 800 MG: 800 TABLET, FILM COATED ORAL at 17:56

## 2023-05-26 RX ADMIN — INSULIN GLARGINE 24 UNITS: 100 INJECTION, SOLUTION SUBCUTANEOUS at 21:05

## 2023-05-26 RX ADMIN — HEPARIN SODIUM 5000 UNITS: 5000 INJECTION INTRAVENOUS; SUBCUTANEOUS at 21:56

## 2023-05-26 RX ADMIN — HYDRALAZINE HYDROCHLORIDE 100 MG: 50 TABLET, FILM COATED ORAL at 04:38

## 2023-05-26 RX ADMIN — SEVELAMER CARBONATE 800 MG: 800 TABLET, FILM COATED ORAL at 08:35

## 2023-05-26 RX ADMIN — METOPROLOL TARTRATE 25 MG: 25 TABLET ORAL at 08:35

## 2023-05-26 ASSESSMENT — PAIN SCALES - GENERAL
PAINLEVEL_OUTOF10: 0

## 2023-05-26 NOTE — FLOWSHEET NOTE
Still waiting for transport, [employer]/pt transport called and they stated that they have a transport dropping off a pt in the other building and they will send them straight to us.

## 2023-05-26 NOTE — PLAN OF CARE
Problem: Chronic Conditions and Co-morbidities  Goal: Patient's chronic conditions and co-morbidity symptoms are monitored and maintained or improved  5/26/2023 1728 by Maria G Harper RN  Outcome: Progressing  5/26/2023 0412 by Jenelle Jung RN  Outcome: Progressing     Problem: Discharge Planning  Goal: Discharge to home or other facility with appropriate resources  5/26/2023 1728 by Maria G Harper RN  Outcome: Progressing  5/26/2023 0412 by Jenelle Jung RN  Outcome: Progressing     Problem: Safety - Adult  Goal: Free from fall injury  5/26/2023 1728 by Maria G Harper RN  Outcome: Progressing  5/26/2023 0412 by Jenelle Jung RN  Outcome: Progressing     Problem: Cardiovascular - Adult  Goal: Maintains optimal cardiac output and hemodynamic stability  5/26/2023 1728 by Maria G Harper RN  Outcome: Progressing  5/26/2023 0412 by Jenelle Jung RN  Outcome: Progressing  Goal: Absence of cardiac dysrhythmias or at baseline  5/26/2023 1728 by Maria G Harper RN  Outcome: Progressing  5/26/2023 0412 by Jenelle Jung RN  Outcome: Progressing     Problem: Skin/Tissue Integrity  Goal: Absence of new skin breakdown  Description: 1. Monitor for areas of redness and/or skin breakdown  2. Assess vascular access sites hourly  3. Every 4-6 hours minimum:  Change oxygen saturation probe site  4. Every 4-6 hours:  If on nasal continuous positive airway pressure, respiratory therapy assess nares and determine need for appliance change or resting period.   5/26/2023 1728 by Maria G Harper RN  Outcome: Progressing  5/26/2023 0412 by Jeenlle Jung RN  Outcome: Progressing     Problem: Pain  Goal: Verbalizes/displays adequate comfort level or baseline comfort level  5/26/2023 1728 by Maria G Harper RN  Outcome: Progressing  5/26/2023 0412 by Jenelle Jung RN  Outcome: Progressing     Problem: Nutrition Deficit:  Goal: Optimize nutritional status  5/26/2023 1728 by Maria G Harper RN  Outcome: Progressing  5/26/2023 0412 by Wally Lopez

## 2023-05-26 NOTE — CARE COORDINATION
Transitional planning    Writer to room , family at bedside discussed d/c plan,  plan is home, dialysis patient , no current needs.

## 2023-05-26 NOTE — CONSULTS
Final Anesthesia Post-op Assessment    Patient: Torrey Pacheco  Procedure(s) Performed: EXCISION OF PENILE SKIN BRIDGE  Anesthesia type: General    Vitals Value Taken Time   Temp 36.6 °C (97.9 °F) 07/30/21 1504   Pulse 60 07/30/21 1504   Resp 20 07/30/21 1504   SpO2 100 % 07/30/21 1504   /70 07/30/21 1504         Patient Location: PACU Phase 1  Post-op Vital Signs:stable  Level of Consciousness: awake  Respiratory Status: spontaneous ventilation  Cardiovascular stable  Hydration: euvolemic  Pain Management: adequately controlled  Handoff: Handoff to receiving nurse was performed and questions were answered  Vomiting: none  Nausea: None  Airway Patency:patent  Comments: Patient did well. No complications. Dental exam unchanged. Report given.    IVF: 200  UO: 0  EBL: 0      No complications documented.   
[facility]   Consult Note                 Date:   5/24/2023  Patient name: Jaspreet Clayton  Date of admission:  5/23/2023 10:20 PM  MRN:   [id number]  YOB: 1945    Reason for Consult:  chest pain    CHIEF COMPLAINT:  chest pain and shortness of breath    History Obtained From:  Patient and EMR    HISTORY OF PRESENT ILLNESS:    The patient is a 68 y.o. male with significant medical history of CAD s/p 1 stent in OM1 done in 2017, HTN, ESRD on dialysis, hyperlipidemia who presents with increasing shortness of breath and chest pain. Describes the pain as worse with walking. Sitting relieved the pain within 15-30 minutes. Radiates to the back. Worsening over the last few months, does not wake him from sleep. Quit smoking 45 years ago. Review of vitals show BP elevated 156/66. Review of labs show creatinine of 4.22, proBNP elevated 1811, trop 78, 66, 71 which is per baseline, CXR unremarkable.   Patient has been compliant with dialysis has a history of 1 stent in 2017, recent cardiac cath in 2021, echo in 2020 showed normal ejection fraction 60%      Past Medical History:   has a past medical history of Abnormal tilt table test, Acute kidney injury ([address] Acute MI ([address] Acute renal failure superimposed on stage 4 chronic kidney disease ([address] Acute renal failure with tubular necrosis ([address] Anemia, Anemia in stage 4 chronic kidney disease ([address] Angina, class II ([address] CAD (coronary artery disease), Cerebral artery occlusion with cerebral infarction ([address] CHF (congestive heart failure) ([address] Cholecystitis, Chronic back pain, Chronic diastolic HF (heart failure), NYHA class 3 ([address] Chronic kidney disease, stage III (moderate) ([address] Closed fracture of lumbar vertebra without mention of spinal cord injury, Displacement of intervertebral disc, site unspecified, without myelopathy, H/O cardiac catheterization, H/O cardiovascular stress test, H/O tilt table evaluation, H/O tilt table
dialysis nurse at the bedside in the dialysis unit today. The patient is resting comfortably. I did speak with the patient's family members on the medical floor prior to him coming down to dialysis today. History was obtained from the patient and family members in the room today. Access cannulated without problems. No new issues overnite. Stable hemodynamics.          Objective:  CURRENT TEMPERATURE:  Temp: 97.6 °F (36.4 °C)  MAXIMUM TEMPERATURE OVER 24HRS:  Temp (24hrs), Av.9 °F (36.6 °C), Min:97.6 °F (36.4 °C), Max:98.1 °F (36.7 °C)    CURRENT RESPIRATORY RATE:  Respirations: 18  CURRENT PULSE:  Pulse: 63  CURRENT BLOOD PRESSURE:  BP: (!) 146/62  24HR BLOOD PRESSURE RANGE:  Systolic (71BEB), IZZ:800 , Min:143 , QRU:551   ; Diastolic (04XFR), VXJ:02, Min:49, Max:85    24HR INTAKE/OUTPUT:    Intake/Output Summary (Last 24 hours) at 2023 1413  Last data filed at 2023 0900  Gross per 24 hour   Intake 540 ml   Output --   Net 540 ml     Patient Vitals for the past 96 hrs (Last 3 readings):   Weight   23 0600 200 lb 2.8 oz (90.8 kg)   23 2215 197 lb (89.4 kg)         Physical Exam:  General appearance:Awake, alert, in no acute distress  Skin: warm and dry, no rash or erythema  Eyes: conjunctivae normal and sclera anicteric  ENT:no thrush, moist mucus membranes  Neck:  No JVD, midline trachea no accessory muscle use  Pulmonary: clear to auscultation and no wheezing or rhonchi   Cardiovascular: Regular rate and rhythm with positive S1 and S2 and no rubs or gallops  Abdomen: soft nontender, bowel sounds present,  not distended, no ascites   Extremities: No significant pitting peripheral edema    Access:  previous  Left AV fistula    Current Medications:    0.9 % sodium chloride infusion, Continuous  aspirin chewable tablet 81 mg, Daily  amLODIPine (NORVASC) tablet 10 mg, Nightly  hydrALAZINE (APRESOLINE) tablet 100 mg, 3 times per day  insulin glargine (LANTUS) injection vial 24 Units,
the stent. RCA: nondominant with 50% mid stenosis. Echo:  Global left ventricular systolic function appears preserved with an  estimated ejection fraction of 60%. The left ventricular cavity size is within normal limits and the left  ventricular wall thickness is mildly increased. The left atrium is mildly dilated (29-33) with a left atrial volume index of  30 ml/m2. Normal tricuspid valve structure with mild tricuspid regurgitation. Evidence of mild diastolic dysfunction is seen. The aortic root is considered to be at the upper limits of normal in size  when corrected for body surface area. Compared to the previous study of 11/12/21, no significant change was seen. CT:pending    Prior Labs reviewed:   Labs reflective of end-stage renal disease. Elevated BNP. Slightly elevated troponins normal CBC.   Anemia likely reflective of chronic disease      Assessment   Patient Active Problem List   Diagnosis    BPV (benign positional vertigo), unspecified laterality    Systolic murmur    History of MI (myocardial infarction)    History of colon polyps    Uncontrolled type 2 diabetes mellitus with hyperglycemia ([address]    Coronary atherosclerosis due to calcified coronary lesion    Displacement of intervertebral disc, site unspecified, without myelopathy    History of CVA (cerebrovascular accident) without residual deficits    Neurogenic orthostatic hypotension (HCC)    Syncope, near    Chronotropic incompetence with autonomic dysfunction    Episodic lightheadedness    Primary hypertension    Ischemic chest pain ([address]    Controlled type 2 diabetes mellitus with diabetic nephropathy, with long-term current use of insulin (HCC)    Cervical stenosis of spinal canal    ASHD (arteriosclerotic heart disease)    S/P drug eluting coronary stent placement-OM1 4/10/17    Hyperlipidemia    Non critical Right Renal artery stenosis, native [facility])    BPH with obstruction/lower urinary tract symptoms    Elevated

## 2023-05-26 NOTE — FLOWSHEET NOTE
Transport here to get 510 for CT, still waiting for transport to come for this pt. Called pt escort again and they will assign this pt to the transporter that took 511. CT aware.  Pt is also to go to dialysis after CT

## 2023-05-26 NOTE — PLAN OF CARE
Problem: Chronic Conditions and Co-morbidities  Goal: Patient's chronic conditions and co-morbidity symptoms are monitored and maintained or improved  5/26/2023 1806 by Maryse Rene RN  Outcome: Progressing  5/26/2023 1728 by Maryse Rene RN  Outcome: Progressing  5/26/2023 0412 by Sammi Ravi RN  Outcome: Progressing     Problem: Discharge Planning  Goal: Discharge to home or other facility with appropriate resources  5/26/2023 1806 by Maryse Rene RN  Outcome: Progressing  5/26/2023 1728 by Maryse Rene RN  Outcome: Progressing  5/26/2023 0412 by Sammi Ravi RN  Outcome: Progressing     Problem: Safety - Adult  Goal: Free from fall injury  5/26/2023 1806 by Maryse Rene RN  Outcome: Progressing  5/26/2023 1728 by Maryse Rene RN  Outcome: Progressing  5/26/2023 0412 by Sammi Ravi RN  Outcome: Progressing     Problem: Cardiovascular - Adult  Goal: Maintains optimal cardiac output and hemodynamic stability  5/26/2023 1806 by Maryse Rene RN  Outcome: Progressing  5/26/2023 1728 by Maryse Rene RN  Outcome: Progressing  5/26/2023 0412 by Sammi Ravi RN  Outcome: Progressing  Goal: Absence of cardiac dysrhythmias or at baseline  5/26/2023 1806 by Maryse Rene RN  Outcome: Progressing  5/26/2023 1728 by Maryse Rene RN  Outcome: Progressing  5/26/2023 0412 by Sammi Ravi RN  Outcome: Progressing     Problem: Skin/Tissue Integrity  Goal: Absence of new skin breakdown  Description: 1. Monitor for areas of redness and/or skin breakdown  2. Assess vascular access sites hourly  3. Every 4-6 hours minimum:  Change oxygen saturation probe site  4. Every 4-6 hours:  If on nasal continuous positive airway pressure, respiratory therapy assess nares and determine need for appliance change or resting period.   5/26/2023 1806 by Maryse Rene RN  Outcome: Progressing  5/26/2023 1728 by Maryse Rene RN  Outcome: Progressing  5/26/2023 0412 by Sammi Ravi RN  Outcome: Progressing     Problem: Pain  Goal:

## 2023-05-27 VITALS
HEIGHT: 69 IN | WEIGHT: 197.97 LBS | HEART RATE: 62 BPM | SYSTOLIC BLOOD PRESSURE: 141 MMHG | TEMPERATURE: 97.2 F | RESPIRATION RATE: 16 BRPM | OXYGEN SATURATION: 96 % | BODY MASS INDEX: 29.32 KG/M2 | DIASTOLIC BLOOD PRESSURE: 58 MMHG

## 2023-05-27 PROBLEM — I21.4 NSTEMI (NON-ST ELEVATED MYOCARDIAL INFARCTION) (HCC): Status: ACTIVE | Noted: 2023-05-27

## 2023-05-27 LAB
GLUCOSE BLD-MCNC: 178 MG/DL (ref 75–110)
GLUCOSE BLD-MCNC: 81 MG/DL (ref 75–110)
LV EF: 53 %
LVEF MODALITY: NORMAL

## 2023-05-27 PROCEDURE — 93970 EXTREMITY STUDY: CPT

## 2023-05-27 PROCEDURE — 99232 SBSQ HOSP IP/OBS MODERATE 35: CPT | Performed by: INTERNAL MEDICINE

## 2023-05-27 PROCEDURE — 6360000002 HC RX W HCPCS: Performed by: STUDENT IN AN ORGANIZED HEALTH CARE EDUCATION/TRAINING PROGRAM

## 2023-05-27 PROCEDURE — 99232 SBSQ HOSP IP/OBS MODERATE 35: CPT | Performed by: SURGERY

## 2023-05-27 PROCEDURE — 82947 ASSAY GLUCOSE BLOOD QUANT: CPT

## 2023-05-27 PROCEDURE — 93306 TTE W/DOPPLER COMPLETE: CPT

## 2023-05-27 PROCEDURE — 6370000000 HC RX 637 (ALT 250 FOR IP): Performed by: STUDENT IN AN ORGANIZED HEALTH CARE EDUCATION/TRAINING PROGRAM

## 2023-05-27 PROCEDURE — 99238 HOSP IP/OBS DSCHRG MGMT 30/<: CPT | Performed by: STUDENT IN AN ORGANIZED HEALTH CARE EDUCATION/TRAINING PROGRAM

## 2023-05-27 PROCEDURE — 93880 EXTRACRANIAL BILAT STUDY: CPT

## 2023-05-27 PROCEDURE — 2580000003 HC RX 258: Performed by: STUDENT IN AN ORGANIZED HEALTH CARE EDUCATION/TRAINING PROGRAM

## 2023-05-27 RX ADMIN — SODIUM CHLORIDE, PRESERVATIVE FREE 10 ML: 5 INJECTION INTRAVENOUS at 09:00

## 2023-05-27 RX ADMIN — HYDRALAZINE HYDROCHLORIDE 100 MG: 50 TABLET, FILM COATED ORAL at 13:36

## 2023-05-27 RX ADMIN — METOPROLOL TARTRATE 25 MG: 25 TABLET ORAL at 09:00

## 2023-05-27 RX ADMIN — HEPARIN SODIUM 5000 UNITS: 5000 INJECTION INTRAVENOUS; SUBCUTANEOUS at 13:36

## 2023-05-27 RX ADMIN — INSULIN GLARGINE 24 UNITS: 100 INJECTION, SOLUTION SUBCUTANEOUS at 09:01

## 2023-05-27 RX ADMIN — SEVELAMER CARBONATE 800 MG: 800 TABLET, FILM COATED ORAL at 09:00

## 2023-05-27 RX ADMIN — SEVELAMER CARBONATE 800 MG: 800 TABLET, FILM COATED ORAL at 12:08

## 2023-05-27 RX ADMIN — ASPIRIN 81 MG: 81 TABLET, CHEWABLE ORAL at 09:00

## 2023-05-27 RX ADMIN — HEPARIN SODIUM 5000 UNITS: 5000 INJECTION INTRAVENOUS; SUBCUTANEOUS at 06:15

## 2023-05-27 RX ADMIN — PREDNISOLONE ACETATE 1 DROP: 10 SUSPENSION/ DROPS OPHTHALMIC at 09:00

## 2023-05-27 RX ADMIN — HYDRALAZINE HYDROCHLORIDE 100 MG: 50 TABLET, FILM COATED ORAL at 06:16

## 2023-05-27 NOTE — PLAN OF CARE
Problem: Chronic Conditions and Co-morbidities  Goal: Patient's chronic conditions and co-morbidity symptoms are monitored and maintained or improved  5/27/2023 1439 by Jearldine Cranker, RN  Outcome: Completed  5/27/2023 0230 by Ranjith Worrell RN  Outcome: Progressing     Problem: Discharge Planning  Goal: Discharge to home or other facility with appropriate resources  5/27/2023 1439 by Jearldine Cranker, RN  Outcome: Completed  5/27/2023 0230 by Ranjith Worrell RN  Outcome: Progressing     Problem: Safety - Adult  Goal: Free from fall injury  5/27/2023 1439 by Jearldine Cranker, RN  Outcome: Completed  5/27/2023 0230 by Ranjith Worrell RN  Outcome: Progressing     Problem: Cardiovascular - Adult  Goal: Maintains optimal cardiac output and hemodynamic stability  5/27/2023 1439 by Jearldine Cranker, RN  Outcome: Completed  5/27/2023 0230 by Ranjith Worrell RN  Outcome: Progressing  Goal: Absence of cardiac dysrhythmias or at baseline  5/27/2023 1439 by Jearldine Cranker, RN  Outcome: Completed  5/27/2023 0230 by Ranjith Worrell RN  Outcome: Progressing     Problem: Skin/Tissue Integrity  Goal: Absence of new skin breakdown  Description: 1. Monitor for areas of redness and/or skin breakdown  2. Assess vascular access sites hourly  3. Every 4-6 hours minimum:  Change oxygen saturation probe site  4. Every 4-6 hours:  If on nasal continuous positive airway pressure, respiratory therapy assess nares and determine need for appliance change or resting period.   5/27/2023 1439 by Jearldine Cranker, RN  Outcome: Completed  5/27/2023 0230 by Ranjith Worrell RN  Outcome: Progressing     Problem: Pain  Goal: Verbalizes/displays adequate comfort level or baseline comfort level  5/27/2023 1439 by Jearldine Cranker, RN  Outcome: Completed  5/27/2023 0230 by Ranjith Worrell RN  Outcome: Progressing     Problem: Nutrition Deficit:  Goal: Optimize nutritional status  5/27/2023 1439 by Jearldine Cranker, RN  Outcome: Completed  5/27/2023 0230 by Ranjith Worrell RN  Outcome:

## 2023-05-27 NOTE — FLOWSHEET NOTE
Pt/wife given discharge instructions, Iv's removed and intact. Pt also given chlorhexidine wash to use night prior to surgery-instructed to use on arms/chest and legs and not on private areas.

## 2023-05-27 NOTE — PROGRESS NOTES
Ambulated to bathroom and in halls. Gait steady. . Right groin puncture site and right pedal pulse assessment unchanged. Uses walker.  Tolerates well
Dialysis Post Treatment Note  Vitals:    05/26/23 1712   BP: (!) 154/60   Pulse: 60   Resp: 18   Temp: 97.5 °F (36.4 °C)   SpO2: 96 %     Pre-Weight = 91.5 kg  Post-weight = Weight - Scale: 198 lb 6.6 oz (90 kg)  Total Liters Processed = Blood Volume Processed (Liters): 75.72 l/min  Rinseback Volume (mL) = Rinseback Volume (ml): 290 ml  Net Removal (mL) =  1500 ml  Patient's dry weight= TBD  Type of access used= AVF left  Length of treatment=210 minutes     Treatment completed and the patient tolerated it well with 1.5 L taken off. All blood returned. No adverse event pre, intra, and post HD. Report given to Floor Nurse Luli Boogie RN. Return to bed per stretcher.
Dialysis Time Out  To be done by RN and tech or 2 RNs  Staff Names Haim/Colton    [x]  Identity of the patient using 2 patient identifiers  [x]  Consent for treatment  [x]  Equipment-proper machine and dialyzer  [x]  B-Hep B status  [x]  Orders- to include bath, blood flow, dialyzer, time and fluid removal  [x]  Access-Correct site and in working order  [x]  Time for patient to ask questions.
Echo completed in the echo lab
[facility]  Progress Note                   Date:   5/27/2023  Patient name: Dallas Mc  Date of admission:  5/23/2023 10:20 PM  MRN:   [id number]  YOB: 1945  PCP: Petr Jones MD    Reason for Admission: NSTEMI (non-ST elevated myocardial infarction) [facility]) [I21.4]  Chest pain [R07.9]    Subjective:       Clinical Changes /Abnormalities:Patient seen and examined. Denies chest pain or shortness of breath. Tele/vitals/labs reviewed . Discussed case with RN. Review of Systems    Medications:   Scheduled Meds:   heparin (porcine)  5,000 Units SubCUTAneous 3 times per day    aspirin  81 mg Oral Daily    amLODIPine  10 mg Oral Nightly    hydrALAZINE  100 mg Oral 3 times per day    insulin glargine  24 Units SubCUTAneous BID    latanoprost  1 drop Both Eyes Nightly    metoprolol tartrate  25 mg Oral BID    prednisoLONE acetate  1 drop Left Eye Daily    sevelamer  800 mg Oral TID WC    sodium chloride flush  5-40 mL IntraVENous 2 times per day    insulin lispro  0-4 Units SubCUTAneous TID WC    insulin lispro  0-4 Units SubCUTAneous Nightly     Continuous Infusions:   sodium chloride      dextrose       CBC: No results for input(s): WBC, HGB, PLT in the last 72 hours. BMP:    Recent Labs     05/25/23  2109      K 4.2      CO2 20   BUN 44*   CREATININE 4.58*   GLUCOSE 245*     Hepatic:No results for input(s): AST, ALT, ALB, BILITOT, ALKPHOS in the last 72 hours. Troponin: No results for input(s): TROPHS in the last 72 hours. BNP: No results for input(s): BNP in the last 72 hours. Lipids: No results for input(s): CHOL, HDL in the last 72 hours. Invalid input(s): LDLCALCU  INR: No results for input(s): INR in the last 72 hours.     DATA:    Diagnostics:       EKG- sinus rhythm with 1st degree AV block     Cath 2017   Procedure Summary   40% stenosis in LAD   80% in Om1 required HA   Overall preserved LV function      Cath 2021   Moderate three vessel disease
Nephrology Progress Note        Subjective:     Patient seen and examined, happy likely be discharged today with outpatient CABG on Tuesday vs Wednesday. Having pre op testing today. Preop testing completed  Wife and son aware of plans. Tolerated dialysis well yesterday as per his schedule, removed 1.5L  Left radiocephalic AV fistula works well    In Summary:  The patient is a 66-year-old  male who sees Dr. Zhang Sykes, for his hemodialysis care at [facility] dialysis unit. The patient is on a Monday and Friday, 2 days only, dialysis schedule. The patient has a left forearm AV fistula which is quite functional and was created by Dr. Edward Myers. Patient did require balloon maturation of the fistula initially. Patient states he continues to have a significant amount of urine output. He does not have high fluid gains between dialysis treatments. The 2-day a week dialysis schedule helps him maintain a reasonable energy level.     Objective:  CURRENT TEMPERATURE:  Temp: 97.7 °F (36.5 °C)  MAXIMUM TEMPERATURE OVER 24HRS:  Temp (24hrs), Av.8 °F (36.6 °C), Min:97.5 °F (36.4 °C), Max:98.2 °F (36.8 °C)    CURRENT RESPIRATORY RATE:  Respirations: 16  CURRENT PULSE:  Pulse: 63  CURRENT BLOOD PRESSURE:  BP: (!) 129/53  24HR BLOOD PRESSURE RANGE:  Systolic (56KFV), MJQ:144 , Min:129 , GXI:997   ; Diastolic (62MJR), ZBB:46, Min:42, Max:85    24HR INTAKE/OUTPUT:    Intake/Output Summary (Last 24 hours) at 2023 1029  Last data filed at 2023 0400  Gross per 24 hour   Intake 1060 ml   Output 1800 ml   Net -740 ml     Patient Vitals for the past 96 hrs (Last 3 readings):   Weight   23 0600 197 lb 15.6 oz (89.8 kg)   23 1712 198 lb 6.6 oz (90 kg)   23 1313 201 lb 11.5 oz (91.5 kg)         Physical Exam:  General appearance:Awake, alert, in no acute distress  Skin: warm and dry, no rash or erythema  Eyes: conjunctivae normal and sclera anicteric  ENT:no thrush, moist mucus membranes  Neck:
PHARMACY NOTE:    The electrolyte replacement protocol for potassium/magnesium has been discontinued per P&T guidelines because the patient has reduced renal function (CrCl < 30 mL/min). The patient's most recent potassium & magnesium levels are:  Recent Labs     05/23/23  1415   K 4.5   MG 2.0     Estimated Creatinine Clearance: 16 mL/min (A) (based on SCr of 4.22 mg/dL (H)). For patients with decreased renal function (below 30ml/min) needing potassium/magnesium supplementation, please order individual bolus doses with appropriate monitoring. Please contact the inpatient pharmacy with any concerns. Thank you.
Pts family concerned that pt was told he would have dialysis after cath. Dialysis notified and stated he has no order. Dr notified that pt needs consult to nephrology (Dr. Ethan Paz).
Received post procedure to [facility] to room 2. Assessment obtained. Restrictions reviewed with patient. Post procedure pathway initiated. Right groin site soft, Dressing dry and intact. No hematoma noted. Family at side. Patient without complaints. Head of bed flat with right leg straight.
Reort to 5a
Spoke with family and offered updates.  CT surgery (NP) here and will see patient
Anemia, Anemia in stage 4 chronic kidney disease ([facility] [address] Angina, class II ([facility] [address] CAD (coronary artery disease), Cerebral artery occlusion with cerebral infarction ([facility] [address] CHF (congestive heart failure) ([facility] [address] Cholecystitis, Chronic back pain, Chronic diastolic HF (heart failure), NYHA class 3 ([facility] [address] Chronic kidney disease, stage III (moderate) ([facility]), Closed fracture of lumbar vertebra without mention of spinal cord injury, Displacement of intervertebral disc, site unspecified, without myelopathy, H/O cardiac catheterization, H/O cardiovascular stress test, H/O tilt table evaluation, H/O tilt table evaluation, History of 24 hour EKG monitoring, History of 24 hour EKG monitoring, History of blood transfusion, History of cardiovascular stress test, History of cardiovascular stress test, History of coronary artery stent placement, History of CVA (cerebrovascular accident) without residual deficits, History of echocardiogram, History of Holter monitoring, History of tilt table evaluation, Hyperlipidemia, Hypertension, Medication side effect, initial encounter, Non critical Right Renal artery stenosis, native ([facility] [address] Pacemaker, S/P cardiac cath, S/P coronary artery stent placement, S[facility]ic inflammatory response syndrome[facility] and Type II or unspecified type diabetes mellitus without mention of complication, not stated as uncontrolled. Social History:   reports that he quit smoking about 43 years ago. His smoking use included cigarettes. He has a 12.00 pack-year smoking history. He has never used smokeless tobacco. He reports that he does not drink alcohol and does not use drugs.      Family History:   Family History   Problem Relation Age of Onset    Cancer Mother         breast    Cancer Father         lung       Vitals:  BP (!) 129/53   Pulse 63   Temp 97.7 °F (36.5 °C) (Oral)   Resp 16   Ht 5' 9\" (1.753 m)   Wt 197 lb 15.6 oz (89.8 kg)   SpO2 96%   BMI 29.24 kg/m²   Temp (24hrs), Av.8 °F (36.6 °C),
05/25/23 1817 05/25/23 2128 05/26/23  0733 05/26/23  1138   POCGLU 245* 231* 138* 294*       I/O (24Hr): Intake/Output Summary (Last 24 hours) at 5/26/2023 1749  Last data filed at 5/26/2023 1712  Gross per 24 hour   Intake 1060 ml   Output 1800 ml   Net -740 ml       Labs:  Hematology:  Recent Labs     05/24/23 0711   WBC 6.4   RBC 3.06*   HGB 9.4*   HCT 29.4*   MCV 96.1   MCH 30.7   MCHC 32.0   RDW 16.9*      MPV 9.7     Chemistry:  Recent Labs     05/23/23 2257 05/24/23  0019 05/24/23 0711 05/25/23  1845 05/25/23 2109   NA  --   --   --   --  137   K  --   --   --   --  4.2   CL  --   --   --   --  101   CO2  --   --   --   --  20   GLUCOSE  --   --   --   --  245*   BUN  --   --   --   --  44*   CREATININE  --   --   --   --  4.58*   MG  --   --  2.1  --   --    ANIONGAP  --   --   --   --  16   LABGLOM  --   --   --   --  12*   CALCIUM  --   --   --   --  8.4*   PROBNP  --   --   --  3,215*  --    TROPHS 66* 71*  --   --   --      Recent Labs     05/24/23 0711 05/24/23  1106 05/25/23 0227 05/25/23  0659 05/25/23 1817 05/25/23 2128 05/26/23  0733 05/26/23  1138   CHOL 112  --   --   --   --   --   --   --    HDL 25*  --   --   --   --   --   --   --    LDLCHOLESTEROL 44  --   --   --   --   --   --   --    CHOLHDLRATIO 4.5  --   --   --   --   --   --   --    TRIG 216*  --   --   --   --   --   --   --    POCGLU  --    < > 177* 156* 245* 231* 138* 294*    < > = values in this interval not displayed. ABG:No results found for: POCPH, PHART, PH, POCPCO2, GQC2YGA, PCO2, POCPO2, PO2ART, PO2, POCHCO3, NPO0IIS, HCO3, NBEA, PBEA, BEART, BE, THGBART, THB, XYE9WOQ, XCMR3GHX, X6SOFEBO, O2SAT, FIO2  Lab Results   Component Value Date/Time    SPECIAL NOT REPORTED 05/05/2021 11:30 AM     Lab Results   Component Value Date/Time    CULTURE NO GROWTH 6 DAYS 04/19/2022 04:48 PM       Radiology:  CT CHEST WO CONTRAST    Result Date: 5/26/2023  1. No acute intrathoracic abnormality.   No acute pulmonary
Family History:   Family History   Problem Relation Age of Onset    Cancer Mother         breast    Cancer Father         lung       Vitals:  BP (!) 154/53   Pulse 63   Temp 98.1 °F (36.7 °C) (Oral)   Resp 12   Ht 5' 9\" (1.753 m)   Wt 197 lb (89.4 kg)   SpO2 97%   BMI 29.09 kg/m²   Temp (24hrs), Av.8 °F (36.6 °C), Min:97.6 °F (36.4 °C), Max:98.1 °F (36.7 °C)    Recent Labs     23  [id number] 23  0659   POCGLU 78 251* 177* 156*       I/O (24Hr):   No intake or output data in the 24 hours ending 23 1158    Labs:  Hematology:  Recent Labs     23  14123  0711   WBC 6.7 6.4   RBC 3.15* 3.06*   HGB 10.1* 9.4*   HCT 30.1* 29.4*   MCV 95.6 96.1   MCH 32.1 30.7   MCHC 33.6 32.0   RDW 17.0* 16.9*   PLT See Reflexed IPF Result 150   MPV  --  9.7     Chemistry:  Recent Labs     23  14123  2257 23  0019 23  0711     --   --   --    K 4.5  --   --   --      --   --   --    CO2 24  --   --   --    GLUCOSE 256*  --   --   --    BUN 33*  --   --   --    CREATININE 4.22*  --   --   --    MG 2.0  --   --  2.1   ANIONGAP 13  --   --   --    LABGLOM 14*  --   --   --    CALCIUM 8.9  --   --   --    PROBNP 1,811*  --   --   --    TROPHS 78* 66* 71*  --      Recent Labs     23  1415 23  2301 23  0711 23  1106 23  1548 23  1727 23  0227 05/25/23  0659   PROT 7.0  --   --   --   --   --   --   --   --    LABALBU 4.4  --   --   --   --   --   --   --   --    AST 22  --   --   --   --   --   --   --   --    ALT 13  --   --   --   --   --   --   --   --    ALKPHOS 94  --   --   --   --   --   --   --   --    BILITOT 0.4  --   --   --   --   --   --   --   --    CHOL  --   --  112  --   --   --   --   --   --    HDL  --   --  25*  --   --   --   --   --   --    LDLCHOLESTEROL  --   --  44  --   --   --   --   --   --    CHOLHDLRATIO  --   --  4.5  --   --   --   --   --   --    TRIG

## 2023-05-27 NOTE — DISCHARGE SUMMARY
furosemide 40 MG tablet  Commonly known as: LASIX  TAKE 1 & 1/2 (ONE & ONE-HALF) TABLETS BY MOUTH ONCE DAILY     Lantus SoloStar 100 UNIT/ML injection pen  Generic drug: insulin glargine  Inject 24 Units into the skin 2 times daily     latanoprost 0.005 % ophthalmic solution  Commonly known as: XALATAN     metoprolol tartrate 25 MG tablet  Commonly known as: LOPRESSOR  Take 1 tablet by mouth 2 times daily     nitroGLYCERIN 0.4 MG SL tablet  Commonly known as: NITROSTAT  Place 1 tablet under the tongue every 5 minutes as needed for Chest pain     Pen Needles 31G X 6 MM Misc  1 each by Does not apply route daily     prednisoLONE acetate 1 % ophthalmic suspension  Commonly known as: PRED FORTE     Refresh Lacri-Lube Oint ointment  Apply 0.5 inches to eye nightly as needed (Dry eyes)     sevelamer 800 MG tablet  Commonly known as: RENVELA     tamsulosin 0.4 MG capsule  Commonly known as: FLOMAX  Take 1 capsule by mouth once daily              No discharge procedures on file. Time Spent on discharge is  20 mins in patient examination, evaluation, counseling as well as medication reconciliation, prescriptions for required medications, discharge plan and follow up. Electronically signed by   Kael Bateman MD  5/27/2023  2:07 PM      Thank you Dr. Joselyn Marroquin MD for the opportunity to be involved in this patient's care.

## 2023-05-27 NOTE — CARE COORDINATION
Discharge [facility] Case Management Department  Written by: Bello Gregory RN    Patient Name: Papito Dale  Attending Provider: Kush Cervantes MD  Admit Date: 2023 10:20 PM  MRN: [id number]  Account: [de-identified]                     : 1945  Discharge Date:       Disposition: home    Bello Gregory RN

## 2023-05-27 NOTE — PLAN OF CARE
Problem: Chronic Conditions and Co-morbidities  Goal: Patient's chronic conditions and co-morbidity symptoms are monitored and maintained or improved  5/27/2023 0230 by Sari Walls RN  Outcome: Progressing     Problem: Discharge Planning  Goal: Discharge to home or other facility with appropriate resources  5/27/2023 0230 by Sari Walls RN  Outcome: Progressing     Problem: Safety - Adult  Goal: Free from fall injury  5/27/2023 0230 by Sari Walls RN  Outcome: Progressing     Problem: Cardiovascular - Adult  Goal: Maintains optimal cardiac output and hemodynamic stability  5/27/2023 0230 by Sari Walls RN  Outcome: Progressing  Goal: Absence of cardiac dysrhythmias or at baseline  5/27/2023 0230 by Sari Walls RN  Outcome: Progressing     Problem: Skin/Tissue Integrity  Goal: Absence of new skin breakdown  Description: 1. Monitor for areas of redness and/or skin breakdown  2. Assess vascular access sites hourly  3. Every 4-6 hours minimum:  Change oxygen saturation probe site  4. Every 4-6 hours:  If on nasal continuous positive airway pressure, respiratory therapy assess nares and determine need for appliance change or resting period.   5/27/2023 0230 by Sari Walls RN  Outcome: Progressing     Problem: Pain  Goal: Verbalizes/displays adequate comfort level or baseline comfort level  5/27/2023 0230 by Sari Walls RN  Outcome: Progressing     Problem: Nutrition Deficit:  Goal: Optimize nutritional status  5/27/2023 0230 by Sari Walls RN  Outcome: Progressing     Problem: Cognitive:  Goal: Understands risk factors for wounds  Description: Understands risk factors for wounds  5/27/2023 0230 by Sari Walls RN  Outcome: Progressing

## 2023-05-31 ENCOUNTER — TELEPHONE (OUTPATIENT)
Dept: CARDIOTHORACIC SURGERY | Age: 78
End: 2023-05-31

## 2023-05-31 NOTE — TELEPHONE ENCOUNTER
William Grossman called and stated she spoke with Russell Mccurdy yesterday and states Emilee insured her she would call her yesterday before the office closed and she is very upset she did not get a call back. She states her father was transferred here and she was told he needed to have open heart surgery. She would like for the  to give her a call today. I told her we were in clinic today and not to expect a phone call until after 1 pm today.  She verbalized understanding

## 2023-06-01 ENCOUNTER — TELEPHONE (OUTPATIENT)
Dept: VASCULAR SURGERY | Age: 78
End: 2023-06-01

## 2023-06-05 ENCOUNTER — TELEPHONE (OUTPATIENT)
Dept: CARDIOTHORACIC SURGERY | Age: 78
End: 2023-06-05

## 2023-06-05 NOTE — TELEPHONE ENCOUNTER
----- Message from REY Fields NP sent at 6/5/2023 10:57 AM EDT -----  Regarding: FW: needs consult with Naseem Mcclain lets make sure we see this patient Wednesday in clinic with Mati  ----- Message -----  From: REY Fields NP  Sent: 5/31/2023   3:09 PM EDT  To: [facility] Clinical Staff  Subject: needs consult with Dibar                         Please place on consult list with Gonzalezar for Wednesday sometime.    Please call and let patient know

## 2023-06-07 ENCOUNTER — OFFICE VISIT (OUTPATIENT)
Dept: CARDIOTHORACIC SURGERY | Age: 78
End: 2023-06-07
Payer: MEDICARE

## 2023-06-07 VITALS
HEART RATE: 63 BPM | OXYGEN SATURATION: 98 % | WEIGHT: 202.16 LBS | TEMPERATURE: 97.9 F | SYSTOLIC BLOOD PRESSURE: 167 MMHG | DIASTOLIC BLOOD PRESSURE: 63 MMHG | BODY MASS INDEX: 29.85 KG/M2

## 2023-06-07 DIAGNOSIS — I25.10 CAD, MULTIPLE VESSEL: Primary | ICD-10-CM

## 2023-06-07 PROCEDURE — 99214 OFFICE O/P EST MOD 30 MIN: CPT | Performed by: NURSE PRACTITIONER

## 2023-06-07 RX ORDER — ACETAMINOPHEN 325 MG/1
TABLET ORAL
COMMUNITY

## 2023-06-09 ENCOUNTER — HOSPITAL ENCOUNTER (OUTPATIENT)
Age: 78
Setting detail: SPECIMEN
Discharge: HOME | End: 2023-06-09
Payer: MEDICARE

## 2023-06-09 LAB
BASOPHILS # BLD: 0.07 K/UL (ref 0–0.2)
BASOPHILS NFR BLD: 1 % (ref 0–2)
EOSINOPHIL # BLD: 0.32 K/UL (ref 0–0.44)
EOSINOPHILS RELATIVE PERCENT: 5 % (ref 1–4)
ERYTHROCYTE [DISTWIDTH] IN BLOOD BY AUTOMATED COUNT: 14.8 % (ref 11.8–14.4)
FERRITIN SERPL-MCNC: 1667 NG/ML (ref 30–400)
HCT VFR BLD AUTO: 27.9 % (ref 40.7–50.3)
HGB BLD-MCNC: 9.2 G/DL (ref 13–17)
IMM GRANULOCYTES # BLD AUTO: 0.14 K/UL (ref 0–0.3)
IMM GRANULOCYTES NFR BLD: 2 %
IRON SATN MFR SERPL: 39 % (ref 20–55)
IRON SERPL-MCNC: 76 UG/DL (ref 59–158)
LYMPHOCYTES # BLD: 16 % (ref 24–43)
LYMPHOCYTES NFR BLD: 1.13 K/UL (ref 1.1–3.7)
MCH RBC QN AUTO: 31.4 PG (ref 25.2–33.5)
MCHC RBC AUTO-ENTMCNC: 33 G/DL (ref 28.4–34.8)
MCV RBC AUTO: 95.2 FL (ref 82.6–102.9)
MONOCYTES NFR BLD: 0.47 K/UL (ref 0.1–1.2)
MONOCYTES NFR BLD: 7 % (ref 3–12)
NEUTROPHILS NFR BLD: 69 % (ref 36–65)
NEUTS SEG NFR BLD: 5.02 K/UL (ref 1.5–8.1)
NRBC AUTOMATED: 0 PER 100 WBC
PLATELET # BLD AUTO: 181 K/UL (ref 138–453)
PMV BLD AUTO: 9.8 FL (ref 8.1–13.5)
RBC # BLD AUTO: 2.93 M/UL (ref 4.21–5.77)
TIBC SERPL-MCNC: 193 UG/DL (ref 250–450)
UNSATURATED IRON BINDING CAPACITY: 117 UG/DL (ref 112–347)
WBC OTHER # BLD: 7.2 K/UL (ref 3.5–11.3)

## 2023-06-09 PROCEDURE — 82728 ASSAY OF FERRITIN: CPT

## 2023-06-09 PROCEDURE — 83550 IRON BINDING TEST: CPT

## 2023-06-09 PROCEDURE — 83540 ASSAY OF IRON: CPT

## 2023-06-09 PROCEDURE — 85027 COMPLETE CBC AUTOMATED: CPT

## 2023-06-13 PROCEDURE — 86920 COMPATIBILITY TEST SPIN: CPT

## 2023-06-15 ENCOUNTER — OFFICE VISIT (OUTPATIENT)
Dept: ONCOLOGY | Age: 78
End: 2023-06-15
Payer: MEDICARE

## 2023-06-15 VITALS
RESPIRATION RATE: 16 BRPM | DIASTOLIC BLOOD PRESSURE: 54 MMHG | HEART RATE: 58 BPM | BODY MASS INDEX: 29.84 KG/M2 | TEMPERATURE: 96.3 F | SYSTOLIC BLOOD PRESSURE: 151 MMHG | WEIGHT: 202.1 LBS

## 2023-06-15 DIAGNOSIS — D64.9 NORMOCYTIC NORMOCHROMIC ANEMIA: ICD-10-CM

## 2023-06-15 DIAGNOSIS — E04.1 THYROID NODULE: ICD-10-CM

## 2023-06-15 DIAGNOSIS — D63.8 ANEMIA OF CHRONIC DISEASE: ICD-10-CM

## 2023-06-15 DIAGNOSIS — Z99.2 ESRD (END STAGE RENAL DISEASE) ON DIALYSIS (HCC): Primary | ICD-10-CM

## 2023-06-15 DIAGNOSIS — N18.6 ESRD (END STAGE RENAL DISEASE) ON DIALYSIS (HCC): Primary | ICD-10-CM

## 2023-06-15 DIAGNOSIS — D50.8 IRON DEFICIENCY ANEMIA SECONDARY TO INADEQUATE DIETARY IRON INTAKE: ICD-10-CM

## 2023-06-15 PROCEDURE — 3074F SYST BP LT 130 MM HG: CPT | Performed by: INTERNAL MEDICINE

## 2023-06-15 PROCEDURE — 99214 OFFICE O/P EST MOD 30 MIN: CPT | Performed by: INTERNAL MEDICINE

## 2023-06-15 PROCEDURE — 1111F DSCHRG MED/CURRENT MED MERGE: CPT | Performed by: INTERNAL MEDICINE

## 2023-06-15 PROCEDURE — 3078F DIAST BP <80 MM HG: CPT | Performed by: INTERNAL MEDICINE

## 2023-06-15 PROCEDURE — G8417 CALC BMI ABV UP PARAM F/U: HCPCS | Performed by: INTERNAL MEDICINE

## 2023-06-15 PROCEDURE — 1124F ACP DISCUSS-NO DSCNMKR DOCD: CPT | Performed by: INTERNAL MEDICINE

## 2023-06-15 PROCEDURE — G8427 DOCREV CUR MEDS BY ELIG CLIN: HCPCS | Performed by: INTERNAL MEDICINE

## 2023-06-15 PROCEDURE — 1036F TOBACCO NON-USER: CPT | Performed by: INTERNAL MEDICINE

## 2023-06-19 ENCOUNTER — TELEPHONE (OUTPATIENT)
Dept: VASCULAR SURGERY | Age: 78
End: 2023-06-19

## 2023-06-23 ENCOUNTER — ANESTHESIA (OUTPATIENT)
Dept: OPERATING ROOM | Age: 78
End: 2023-06-23
Payer: MEDICARE

## 2023-06-23 ENCOUNTER — ANESTHESIA EVENT (OUTPATIENT)
Dept: OPERATING ROOM | Age: 78
End: 2023-06-23
Payer: MEDICARE

## 2023-06-23 ENCOUNTER — HOSPITAL ENCOUNTER (INPATIENT)
Age: 78
LOS: 4 days | Discharge: NURSING, REHAB OR LONG-TERM CARE | DRG: 235 | End: 2023-06-27
Attending: THORACIC SURGERY (CARDIOTHORACIC VASCULAR SURGERY) | Admitting: THORACIC SURGERY (CARDIOTHORACIC VASCULAR SURGERY)
Payer: MEDICARE

## 2023-06-23 ENCOUNTER — APPOINTMENT (OUTPATIENT)
Dept: GENERAL RADIOLOGY | Age: 78
DRG: 235 | End: 2023-06-23
Attending: THORACIC SURGERY (CARDIOTHORACIC VASCULAR SURGERY)
Payer: MEDICARE

## 2023-06-23 DIAGNOSIS — Z95.1 S/P CABG X 3: Primary | ICD-10-CM

## 2023-06-23 PROBLEM — I25.10 CAD, MULTIPLE VESSEL: Status: ACTIVE | Noted: 2023-06-23

## 2023-06-23 LAB
ALLEN TEST: ABNORMAL
ALLEN TEST: ABNORMAL
ANION GAP SERPL CALCULATED.3IONS-SCNC: 13 MMOL/L (ref 9–17)
ANION GAP: 12 MMOL/L (ref 7–16)
ANION GAP: 13 MMOL/L (ref 7–16)
BUN BLD-MCNC: 21 MG/DL (ref 8–26)
BUN SERPL-MCNC: 22 MG/DL (ref 8–23)
CA-I BLD-SCNC: 1.05 MMOL/L (ref 1.15–1.33)
CA-I BLD-SCNC: 1.11 MMOL/L (ref 1.13–1.33)
CALCIUM SERPL-MCNC: 7.4 MG/DL (ref 8.6–10.4)
CHLORIDE BLD-SCNC: 115 MMOL/L (ref 98–107)
CHLORIDE SERPL-SCNC: 109 MMOL/L (ref 98–107)
CLOT ANGLE.KAOLIN INDUCED BLD RES TEG: 66.8 DEG (ref 63–78)
CLOT ANGLE.KAOLIN INDUCED BLD RES TEG: 68.1 DEG (ref 63–78)
CO2 BLD CALC-SCNC: 18 MMOL/L (ref 22–30)
CO2 SERPL-SCNC: 19 MMOL/L (ref 20–31)
CREAT SERPL-MCNC: 3.21 MG/DL (ref 0.7–1.2)
EGFR, POC: 20 ML/MIN/1.73M2
EGFR, POC: 25 ML/MIN/1.73M2
ERYTHROCYTE [DISTWIDTH] IN BLOOD BY AUTOMATED COUNT: 15.4 % (ref 11.8–14.4)
FIBRINOGEN, FUNCTIONAL TEG: 18.7 MM (ref 15–32)
FIBRINOGEN, FUNCTIONAL TEG: 24.4 MM (ref 15–32)
FIO2: 100
FIO2: 40
FIO2: 40
FIO2: 50
GFR SERPL CREATININE-BSD FRML MDRD: 19 ML/MIN/1.73M2
GLUCOSE BLD-MCNC: 100 MG/DL (ref 74–100)
GLUCOSE BLD-MCNC: 128 MG/DL (ref 74–100)
GLUCOSE BLD-MCNC: 129 MG/DL (ref 74–100)
GLUCOSE BLD-MCNC: 136 MG/DL (ref 74–100)
GLUCOSE BLD-MCNC: 165 MG/DL (ref 74–100)
GLUCOSE BLD-MCNC: 95 MG/DL (ref 74–100)
GLUCOSE BLD-MCNC: 96 MG/DL (ref 74–100)
GLUCOSE BLD-MCNC: 96 MG/DL (ref 74–100)
GLUCOSE BLD-MCNC: 97 MG/DL (ref 74–100)
GLUCOSE SERPL-MCNC: 133 MG/DL (ref 70–99)
HCT VFR BLD AUTO: 18 % (ref 41–53)
HCT VFR BLD AUTO: 19 % (ref 41–53)
HCT VFR BLD AUTO: 21 % (ref 41–53)
HCT VFR BLD AUTO: 24 % (ref 41–53)
HCT VFR BLD AUTO: 24 % (ref 41–53)
HCT VFR BLD AUTO: 25 % (ref 41–53)
HCT VFR BLD AUTO: 32.8 % (ref 40.7–50.3)
HCT VFR BLD AUTO: 33 % (ref 41–53)
HGB BLD-MCNC: 10.8 G/DL (ref 13–17)
INR PPP: 1.2
KINETICS TEG: 1.7 MIN (ref 0.8–2.1)
KINETICS TEG: 2 MIN (ref 0.8–2.1)
MA (MAX CLOT) TEG: 56.7 MM (ref 52–69)
MA (MAX CLOT) TEG: 64.1 MM (ref 52–69)
MA(MAX CLOT) RAPID TEG: 58.7 MM (ref 52–70)
MA(MAX CLOT) RAPID TEG: 65.7 MM (ref 52–70)
MAGNESIUM SERPL-MCNC: 2.9 MG/DL (ref 1.6–2.6)
MCH RBC QN AUTO: 30.7 PG (ref 25.2–33.5)
MCHC RBC AUTO-ENTMCNC: 32.9 G/DL (ref 28.4–34.8)
MCV RBC AUTO: 93.2 FL (ref 82.6–102.9)
MODE: ABNORMAL
NEGATIVE BASE EXCESS, ART: 1 (ref 0–2)
NEGATIVE BASE EXCESS, ART: 3 (ref 0–2)
NEGATIVE BASE EXCESS, ART: 3 (ref 0–2)
NEGATIVE BASE EXCESS, ART: 5 (ref 0–2)
NEGATIVE BASE EXCESS, ART: 6 (ref 0–2)
NEGATIVE BASE EXCESS, ART: 6 (ref 0–2)
NRBC AUTOMATED: 0 PER 100 WBC
O2 DEVICE/FLOW/%: ABNORMAL
PARTIAL THROMBOPLASTIN TIME: 36.4 SEC (ref 23–36.5)
PERFORMING LOCATION: ABNORMAL
PERFORMING LOCATION: ABNORMAL
PLATELET # BLD AUTO: 100 K/UL (ref 138–453)
PMV BLD AUTO: 9.6 FL (ref 8.1–13.5)
POC BUN: 22 MG/DL (ref 8–26)
POC CHLORIDE: 109 MMOL/L (ref 98–107)
POC CREATININE: 2.61 MG/DL (ref 0.51–1.19)
POC CREATININE: 3.1 MG/DL (ref 0.51–1.19)
POC HCO3: 18.1 MMOL/L (ref 21–28)
POC HCO3: 18.8 MMOL/L (ref 21–28)
POC HCO3: 20 MMOL/L (ref 21–28)
POC HCO3: 20.3 MMOL/L (ref 21–28)
POC HCO3: 20.6 MMOL/L (ref 21–28)
POC HCO3: 20.6 MMOL/L (ref 21–28)
POC HCO3: 21.8 MMOL/L (ref 21–28)
POC HCO3: 21.8 MMOL/L (ref 21–28)
POC HCO3: 24 MMOL/L (ref 21–28)
POC HCO3: 24.3 MMOL/L (ref 21–28)
POC HEMOGLOBIN: 11.1 G/DL (ref 13.5–17.5)
POC HEMOGLOBIN: 6.1 G/DL (ref 13.5–17.5)
POC HEMOGLOBIN: 6.3 G/DL (ref 13.5–17.5)
POC HEMOGLOBIN: 7 G/DL (ref 13.5–17.5)
POC HEMOGLOBIN: 8 G/DL (ref 13.5–17.5)
POC HEMOGLOBIN: 8.3 G/DL (ref 13.5–17.5)
POC HEMOGLOBIN: 8.5 G/DL (ref 13.5–17.5)
POC IONIZED CALCIUM: 0.92 MMOL/L (ref 1.15–1.33)
POC IONIZED CALCIUM: 1.03 MMOL/L (ref 1.15–1.33)
POC IONIZED CALCIUM: 1.11 MMOL/L (ref 1.15–1.33)
POC IONIZED CALCIUM: 1.17 MMOL/L (ref 1.15–1.33)
POC IONIZED CALCIUM: 1.18 MMOL/L (ref 1.15–1.33)
POC IONIZED CALCIUM: 2.78 MMOL/L (ref 1.15–1.33)
POC LACTIC ACID: 1.9 MMOL/L (ref 0.56–1.39)
POC LACTIC ACID: 2.63 MMOL/L (ref 0.56–1.39)
POC O2 SATURATION: 100 % (ref 94–98)
POC O2 SATURATION: 96 % (ref 94–98)
POC O2 SATURATION: 98 % (ref 94–98)
POC O2 SATURATION: 98 % (ref 94–98)
POC O2 SATURATION: 99 % (ref 94–98)
POC PCO2: 28.3 MM HG (ref 35–48)
POC PCO2: 30.7 MM HG (ref 35–48)
POC PCO2: 31.3 MM HG (ref 35–48)
POC PCO2: 33.9 MM HG (ref 35–48)
POC PCO2: 34.6 MM HG (ref 35–48)
POC PCO2: 35.1 MM HG (ref 35–48)
POC PCO2: 35.7 MM HG (ref 35–48)
POC PCO2: 36.4 MM HG (ref 35–48)
POC PCO2: 37.7 MM HG (ref 35–48)
POC PCO2: 38.4 MM HG (ref 35–48)
POC PH: 7.34 (ref 7.35–7.45)
POC PH: 7.34 (ref 7.35–7.45)
POC PH: 7.36 (ref 7.35–7.45)
POC PH: 7.37 (ref 7.35–7.45)
POC PH: 7.43 (ref 7.35–7.45)
POC PH: 7.45 (ref 7.35–7.45)
POC PH: 7.46 (ref 7.35–7.45)
POC PH: 7.49 (ref 7.35–7.45)
POC PO2: 103.5 MM HG (ref 83–108)
POC PO2: 103.8 MM HG (ref 83–108)
POC PO2: 122.4 MM HG (ref 83–108)
POC PO2: 235.5 MM HG (ref 83–108)
POC PO2: 340.4 MM HG (ref 83–108)
POC PO2: 426.3 MM HG (ref 83–108)
POC PO2: 472.3 MM HG (ref 83–108)
POC PO2: 591.3 MM HG (ref 83–108)
POC PO2: 606 MM HG (ref 83–108)
POC PO2: 85.8 MM HG (ref 83–108)
POC SODIUM: 139 MMOL/L (ref 138–146)
POC TCO2: 19 MMOL/L (ref 22–30)
POSITIVE BASE EXCESS, ART: 0 (ref 0–3)
POSITIVE BASE EXCESS, ART: 0 (ref 0–3)
POTASSIUM BLD-SCNC: 3.4 MMOL/L (ref 3.5–4.5)
POTASSIUM BLD-SCNC: 3.9 MMOL/L (ref 3.5–4.5)
POTASSIUM BLD-SCNC: 3.9 MMOL/L (ref 3.5–4.5)
POTASSIUM BLD-SCNC: 4 MMOL/L (ref 3.5–4.5)
POTASSIUM BLD-SCNC: 4.3 MMOL/L (ref 3.5–4.5)
POTASSIUM BLD-SCNC: 4.5 MMOL/L (ref 3.5–4.5)
POTASSIUM BLD-SCNC: 4.9 MMOL/L (ref 3.5–4.5)
POTASSIUM SERPL-SCNC: 4 MMOL/L (ref 3.7–5.3)
PROTHROMBIN TIME: 15.3 SEC (ref 11.7–14.9)
RBC # BLD AUTO: 3.52 M/UL (ref 4.21–5.77)
REACTION TIME TEG W HEPARIN: 10.2 MIN (ref 4.3–8.3)
REACTION TIME TEG W HEPARIN: 9.2 MIN (ref 4.3–8.3)
REACTION TIME TEG: 12.2 MIN (ref 4.6–9.1)
REACTION TIME TEG: 12.5 MIN (ref 4.6–9.1)
SAMPLE SITE: ABNORMAL
SODIUM BLD-SCNC: 145 MMOL/L (ref 138–146)
SODIUM SERPL-SCNC: 141 MMOL/L (ref 135–144)
WBC OTHER # BLD: 9.5 K/UL (ref 3.5–11.3)

## 2023-06-23 PROCEDURE — P9016 RBC LEUKOCYTES REDUCED: HCPCS

## 2023-06-23 PROCEDURE — 3600000018 HC SURGERY OHS ADDTL 15MIN: Performed by: THORACIC SURGERY (CARDIOTHORACIC VASCULAR SURGERY)

## 2023-06-23 PROCEDURE — 2500000003 HC RX 250 WO HCPCS: Performed by: PHYSICIAN ASSISTANT

## 2023-06-23 PROCEDURE — 84132 ASSAY OF SERUM POTASSIUM: CPT

## 2023-06-23 PROCEDURE — 82803 BLOOD GASES ANY COMBINATION: CPT

## 2023-06-23 PROCEDURE — 6360000002 HC RX W HCPCS: Performed by: NURSE PRACTITIONER

## 2023-06-23 PROCEDURE — 94761 N-INVAS EAR/PLS OXIMETRY MLT: CPT

## 2023-06-23 PROCEDURE — 02100Z9 BYPASS CORONARY ARTERY, ONE ARTERY FROM LEFT INTERNAL MAMMARY, OPEN APPROACH: ICD-10-PCS | Performed by: THORACIC SURGERY (CARDIOTHORACIC VASCULAR SURGERY)

## 2023-06-23 PROCEDURE — 021109W BYPASS CORONARY ARTERY, TWO ARTERIES FROM AORTA WITH AUTOLOGOUS VENOUS TISSUE, OPEN APPROACH: ICD-10-PCS | Performed by: THORACIC SURGERY (CARDIOTHORACIC VASCULAR SURGERY)

## 2023-06-23 PROCEDURE — P9041 ALBUMIN (HUMAN),5%, 50ML: HCPCS | Performed by: PHYSICIAN ASSISTANT

## 2023-06-23 PROCEDURE — 80051 ELECTROLYTE PANEL: CPT

## 2023-06-23 PROCEDURE — 93005 ELECTROCARDIOGRAM TRACING: CPT | Performed by: PHYSICIAN ASSISTANT

## 2023-06-23 PROCEDURE — 6360000002 HC RX W HCPCS: Performed by: NURSE ANESTHETIST, CERTIFIED REGISTERED

## 2023-06-23 PROCEDURE — 2580000003 HC RX 258: Performed by: THORACIC SURGERY (CARDIOTHORACIC VASCULAR SURGERY)

## 2023-06-23 PROCEDURE — 85347 COAGULATION TIME ACTIVATED: CPT

## 2023-06-23 PROCEDURE — 6360000002 HC RX W HCPCS: Performed by: PHYSICIAN ASSISTANT

## 2023-06-23 PROCEDURE — 2700000000 HC OXYGEN THERAPY PER DAY

## 2023-06-23 PROCEDURE — 71045 X-RAY EXAM CHEST 1 VIEW: CPT

## 2023-06-23 PROCEDURE — 06BP0ZZ EXCISION OF RIGHT SAPHENOUS VEIN, OPEN APPROACH: ICD-10-PCS | Performed by: THORACIC SURGERY (CARDIOTHORACIC VASCULAR SURGERY)

## 2023-06-23 PROCEDURE — 4A133BC MONITORING OF ARTERIAL PRESSURE, CORONARY, PERCUTANEOUS APPROACH: ICD-10-PCS | Performed by: ANESTHESIOLOGY

## 2023-06-23 PROCEDURE — C1713 ANCHOR/SCREW BN/BN,TIS/BN: HCPCS | Performed by: THORACIC SURGERY (CARDIOTHORACIC VASCULAR SURGERY)

## 2023-06-23 PROCEDURE — 6360000002 HC RX W HCPCS: Performed by: THORACIC SURGERY (CARDIOTHORACIC VASCULAR SURGERY)

## 2023-06-23 PROCEDURE — 6370000000 HC RX 637 (ALT 250 FOR IP): Performed by: NURSE ANESTHETIST, CERTIFIED REGISTERED

## 2023-06-23 PROCEDURE — 2500000003 HC RX 250 WO HCPCS: Performed by: NURSE ANESTHETIST, CERTIFIED REGISTERED

## 2023-06-23 PROCEDURE — 36430 TRANSFUSION BLD/BLD COMPNT: CPT

## 2023-06-23 PROCEDURE — 2709999900 HC NON-CHARGEABLE SUPPLY: Performed by: THORACIC SURGERY (CARDIOTHORACIC VASCULAR SURGERY)

## 2023-06-23 PROCEDURE — 2580000003 HC RX 258: Performed by: NURSE ANESTHETIST, CERTIFIED REGISTERED

## 2023-06-23 PROCEDURE — 94002 VENT MGMT INPAT INIT DAY: CPT

## 2023-06-23 PROCEDURE — 83605 ASSAY OF LACTIC ACID: CPT

## 2023-06-23 PROCEDURE — 5A1D70Z PERFORMANCE OF URINARY FILTRATION, INTERMITTENT, LESS THAN 6 HOURS PER DAY: ICD-10-PCS | Performed by: INTERNAL MEDICINE

## 2023-06-23 PROCEDURE — 7100000001 HC PACU RECOVERY - ADDTL 15 MIN

## 2023-06-23 PROCEDURE — 3700000001 HC ADD 15 MINUTES (ANESTHESIA): Performed by: THORACIC SURGERY (CARDIOTHORACIC VASCULAR SURGERY)

## 2023-06-23 PROCEDURE — 82947 ASSAY GLUCOSE BLOOD QUANT: CPT

## 2023-06-23 PROCEDURE — A4217 STERILE WATER/SALINE, 500 ML: HCPCS | Performed by: THORACIC SURGERY (CARDIOTHORACIC VASCULAR SURGERY)

## 2023-06-23 PROCEDURE — 6370000000 HC RX 637 (ALT 250 FOR IP): Performed by: NURSE PRACTITIONER

## 2023-06-23 PROCEDURE — 85027 COMPLETE CBC AUTOMATED: CPT

## 2023-06-23 PROCEDURE — 3600000008 HC SURGERY OHS BASE: Performed by: THORACIC SURGERY (CARDIOTHORACIC VASCULAR SURGERY)

## 2023-06-23 PROCEDURE — 84520 ASSAY OF UREA NITROGEN: CPT

## 2023-06-23 PROCEDURE — 85730 THROMBOPLASTIN TIME PARTIAL: CPT

## 2023-06-23 PROCEDURE — 85014 HEMATOCRIT: CPT

## 2023-06-23 PROCEDURE — 85610 PROTHROMBIN TIME: CPT

## 2023-06-23 PROCEDURE — 2580000003 HC RX 258: Performed by: PHYSICIAN ASSISTANT

## 2023-06-23 PROCEDURE — 2100000001 HC CVICU R&B

## 2023-06-23 PROCEDURE — 37799 UNLISTED PX VASCULAR SURGERY: CPT

## 2023-06-23 PROCEDURE — 86900 BLOOD TYPING SEROLOGIC ABO: CPT

## 2023-06-23 PROCEDURE — B24BZZ4 ULTRASONOGRAPHY OF HEART WITH AORTA, TRANSESOPHAGEAL: ICD-10-PCS | Performed by: ANESTHESIOLOGY

## 2023-06-23 PROCEDURE — C1729 CATH, DRAINAGE: HCPCS | Performed by: THORACIC SURGERY (CARDIOTHORACIC VASCULAR SURGERY)

## 2023-06-23 PROCEDURE — 2580000003 HC RX 258: Performed by: ANESTHESIOLOGY

## 2023-06-23 PROCEDURE — 82565 ASSAY OF CREATININE: CPT

## 2023-06-23 PROCEDURE — 5A1221Z PERFORMANCE OF CARDIAC OUTPUT, CONTINUOUS: ICD-10-PCS | Performed by: THORACIC SURGERY (CARDIOTHORACIC VASCULAR SURGERY)

## 2023-06-23 PROCEDURE — 2720000010 HC SURG SUPPLY STERILE: Performed by: THORACIC SURGERY (CARDIOTHORACIC VASCULAR SURGERY)

## 2023-06-23 PROCEDURE — 83735 ASSAY OF MAGNESIUM: CPT

## 2023-06-23 PROCEDURE — 2000000000 HC ICU R&B

## 2023-06-23 PROCEDURE — 3700000000 HC ANESTHESIA ATTENDED CARE: Performed by: THORACIC SURGERY (CARDIOTHORACIC VASCULAR SURGERY)

## 2023-06-23 PROCEDURE — 82330 ASSAY OF CALCIUM: CPT

## 2023-06-23 PROCEDURE — 6370000000 HC RX 637 (ALT 250 FOR IP): Performed by: PHYSICIAN ASSISTANT

## 2023-06-23 PROCEDURE — 85576 BLOOD PLATELET AGGREGATION: CPT

## 2023-06-23 PROCEDURE — 7100000000 HC PACU RECOVERY - FIRST 15 MIN

## 2023-06-23 PROCEDURE — 85384 FIBRINOGEN ACTIVITY: CPT

## 2023-06-23 PROCEDURE — 80048 BASIC METABOLIC PNL TOTAL CA: CPT

## 2023-06-23 DEVICE — IMPLANTABLE DEVICE: Type: IMPLANTABLE DEVICE | Site: HEART | Status: FUNCTIONAL

## 2023-06-23 DEVICE — IMPLANTABLE DEVICE: Type: IMPLANTABLE DEVICE | Site: STERNUM | Status: FUNCTIONAL

## 2023-06-23 DEVICE — DEVICE STRNL CLSR MULTIIMPLANT STRNLOCK 360: Type: IMPLANTABLE DEVICE | Site: STERNUM | Status: FUNCTIONAL

## 2023-06-23 RX ORDER — FENTANYL CITRATE 0.05 MG/ML
INJECTION, SOLUTION INTRAMUSCULAR; INTRAVENOUS PRN
Status: DISCONTINUED | OUTPATIENT
Start: 2023-06-23 | End: 2023-06-23 | Stop reason: SDUPTHER

## 2023-06-23 RX ORDER — POTASSIUM CHLORIDE 20 MEQ/1
40 TABLET, EXTENDED RELEASE ORAL PRN
Status: DISCONTINUED | OUTPATIENT
Start: 2023-06-23 | End: 2023-06-23

## 2023-06-23 RX ORDER — AMIODARONE HYDROCHLORIDE 200 MG/1
200 TABLET ORAL 3 TIMES DAILY
Status: DISCONTINUED | OUTPATIENT
Start: 2023-06-23 | End: 2023-06-27 | Stop reason: HOSPADM

## 2023-06-23 RX ORDER — SODIUM CHLORIDE 0.9 % (FLUSH) 0.9 %
5-40 SYRINGE (ML) INJECTION EVERY 12 HOURS SCHEDULED
Status: DISCONTINUED | OUTPATIENT
Start: 2023-06-23 | End: 2023-06-23

## 2023-06-23 RX ORDER — CLOPIDOGREL BISULFATE 75 MG/1
75 TABLET ORAL DAILY
Status: DISCONTINUED | OUTPATIENT
Start: 2023-06-24 | End: 2023-06-27 | Stop reason: HOSPADM

## 2023-06-23 RX ORDER — VANCOMYCIN HYDROCHLORIDE 1 G/20ML
INJECTION, POWDER, LYOPHILIZED, FOR SOLUTION INTRAVENOUS
Status: DISCONTINUED
Start: 2023-06-23 | End: 2023-06-23

## 2023-06-23 RX ORDER — SODIUM CHLORIDE 9 MG/ML
INJECTION, SOLUTION INTRAVENOUS PRN
Status: DISCONTINUED | OUTPATIENT
Start: 2023-06-23 | End: 2023-06-27 | Stop reason: HOSPADM

## 2023-06-23 RX ORDER — IPRATROPIUM BROMIDE AND ALBUTEROL SULFATE 2.5; .5 MG/3ML; MG/3ML
1 SOLUTION RESPIRATORY (INHALATION) EVERY 4 HOURS PRN
Status: DISCONTINUED | OUTPATIENT
Start: 2023-06-23 | End: 2023-06-27 | Stop reason: HOSPADM

## 2023-06-23 RX ORDER — OXYCODONE HYDROCHLORIDE AND ACETAMINOPHEN 5; 325 MG/1; MG/1
1 TABLET ORAL EVERY 4 HOURS PRN
Status: DISCONTINUED | OUTPATIENT
Start: 2023-06-23 | End: 2023-06-27 | Stop reason: HOSPADM

## 2023-06-23 RX ORDER — PROTAMINE SULFATE 10 MG/ML
INJECTION, SOLUTION INTRAVENOUS
Status: COMPLETED
Start: 2023-06-23 | End: 2023-06-23

## 2023-06-23 RX ORDER — POTASSIUM CHLORIDE 29.8 MG/ML
20 INJECTION INTRAVENOUS PRN
Status: DISCONTINUED | OUTPATIENT
Start: 2023-06-23 | End: 2023-06-23

## 2023-06-23 RX ORDER — WATER 1000 ML/1000ML
INJECTION, SOLUTION INTRAVENOUS
Status: DISCONTINUED
Start: 2023-06-23 | End: 2023-06-23

## 2023-06-23 RX ORDER — CEFAZOLIN SODIUM 1 G/3ML
INJECTION, POWDER, FOR SOLUTION INTRAMUSCULAR; INTRAVENOUS PRN
Status: DISCONTINUED | OUTPATIENT
Start: 2023-06-23 | End: 2023-06-23 | Stop reason: SDUPTHER

## 2023-06-23 RX ORDER — SODIUM CHLORIDE, SODIUM LACTATE, POTASSIUM CHLORIDE, CALCIUM CHLORIDE 600; 310; 30; 20 MG/100ML; MG/100ML; MG/100ML; MG/100ML
INJECTION, SOLUTION INTRAVENOUS CONTINUOUS PRN
Status: DISCONTINUED | OUTPATIENT
Start: 2023-06-23 | End: 2023-06-23 | Stop reason: SDUPTHER

## 2023-06-23 RX ORDER — SODIUM CHLORIDE 0.9 % (FLUSH) 0.9 %
10 SYRINGE (ML) INJECTION PRN
Status: DISCONTINUED | OUTPATIENT
Start: 2023-06-23 | End: 2023-06-23

## 2023-06-23 RX ORDER — VANCOMYCIN HYDROCHLORIDE 1 G/20ML
INJECTION, POWDER, LYOPHILIZED, FOR SOLUTION INTRAVENOUS PRN
Status: DISCONTINUED | OUTPATIENT
Start: 2023-06-23 | End: 2023-06-23 | Stop reason: ALTCHOICE

## 2023-06-23 RX ORDER — PROPOFOL 10 MG/ML
INJECTION, EMULSION INTRAVENOUS CONTINUOUS PRN
Status: DISCONTINUED | OUTPATIENT
Start: 2023-06-23 | End: 2023-06-23 | Stop reason: SDUPTHER

## 2023-06-23 RX ORDER — ONDANSETRON 2 MG/ML
4 INJECTION INTRAMUSCULAR; INTRAVENOUS EVERY 6 HOURS PRN
Status: DISCONTINUED | OUTPATIENT
Start: 2023-06-23 | End: 2023-06-27 | Stop reason: HOSPADM

## 2023-06-23 RX ORDER — SODIUM CHLORIDE 9 MG/ML
INJECTION, SOLUTION INTRAVENOUS PRN
Status: DISCONTINUED | OUTPATIENT
Start: 2023-06-23 | End: 2023-06-23

## 2023-06-23 RX ORDER — NOREPINEPHRINE BITARTRATE 0.06 MG/ML
.01-.08 INJECTION, SOLUTION INTRAVENOUS CONTINUOUS PRN
Status: DISCONTINUED | OUTPATIENT
Start: 2023-06-23 | End: 2023-06-27 | Stop reason: HOSPADM

## 2023-06-23 RX ORDER — HYDRALAZINE HYDROCHLORIDE 20 MG/ML
5 INJECTION INTRAMUSCULAR; INTRAVENOUS EVERY 5 MIN PRN
Status: DISCONTINUED | OUTPATIENT
Start: 2023-06-23 | End: 2023-06-27 | Stop reason: HOSPADM

## 2023-06-23 RX ORDER — BISACODYL 10 MG
10 SUPPOSITORY, RECTAL RECTAL DAILY PRN
Status: DISCONTINUED | OUTPATIENT
Start: 2023-06-23 | End: 2023-06-27 | Stop reason: HOSPADM

## 2023-06-23 RX ORDER — LIDOCAINE HYDROCHLORIDE 10 MG/ML
INJECTION, SOLUTION EPIDURAL; INFILTRATION; INTRACAUDAL; PERINEURAL PRN
Status: DISCONTINUED | OUTPATIENT
Start: 2023-06-23 | End: 2023-06-23 | Stop reason: SDUPTHER

## 2023-06-23 RX ORDER — AMIODARONE HYDROCHLORIDE 200 MG/1
200 TABLET ORAL 3 TIMES DAILY
Status: DISCONTINUED | OUTPATIENT
Start: 2023-06-23 | End: 2023-06-23

## 2023-06-23 RX ORDER — INSULIN GLARGINE 100 [IU]/ML
0.15 INJECTION, SOLUTION SUBCUTANEOUS NIGHTLY
Status: DISCONTINUED | OUTPATIENT
Start: 2023-06-24 | End: 2023-06-27 | Stop reason: HOSPADM

## 2023-06-23 RX ORDER — ROCURONIUM BROMIDE 10 MG/ML
INJECTION, SOLUTION INTRAVENOUS PRN
Status: DISCONTINUED | OUTPATIENT
Start: 2023-06-23 | End: 2023-06-23 | Stop reason: SDUPTHER

## 2023-06-23 RX ORDER — CHLORHEXIDINE GLUCONATE 0.12 MG/ML
15 RINSE ORAL ONCE
Status: COMPLETED | OUTPATIENT
Start: 2023-06-23 | End: 2023-06-23

## 2023-06-23 RX ORDER — CHLORHEXIDINE GLUCONATE 4 G/100ML
SOLUTION TOPICAL SEE ADMIN INSTRUCTIONS
Status: DISCONTINUED | OUTPATIENT
Start: 2023-06-23 | End: 2023-06-23 | Stop reason: HOSPADM

## 2023-06-23 RX ORDER — MAGNESIUM SULFATE IN WATER 40 MG/ML
2000 INJECTION, SOLUTION INTRAVENOUS PRN
Status: DISCONTINUED | OUTPATIENT
Start: 2023-06-23 | End: 2023-06-23

## 2023-06-23 RX ORDER — HEPARIN SODIUM 1000 [USP'U]/ML
INJECTION, SOLUTION INTRAVENOUS; SUBCUTANEOUS PRN
Status: DISCONTINUED | OUTPATIENT
Start: 2023-06-23 | End: 2023-06-23 | Stop reason: SDUPTHER

## 2023-06-23 RX ORDER — PAPAVERINE HYDROCHLORIDE 30 MG/ML
INJECTION INTRAMUSCULAR; INTRAVENOUS
Status: DISCONTINUED
Start: 2023-06-23 | End: 2023-06-23

## 2023-06-23 RX ORDER — POTASSIUM CHLORIDE 7.45 MG/ML
10 INJECTION INTRAVENOUS PRN
Status: DISCONTINUED | OUTPATIENT
Start: 2023-06-23 | End: 2023-06-23

## 2023-06-23 RX ORDER — ASPIRIN 81 MG/1
81 TABLET ORAL
Status: DISCONTINUED | OUTPATIENT
Start: 2023-06-23 | End: 2023-06-23

## 2023-06-23 RX ORDER — MIDAZOLAM HYDROCHLORIDE 1 MG/ML
INJECTION INTRAMUSCULAR; INTRAVENOUS PRN
Status: DISCONTINUED | OUTPATIENT
Start: 2023-06-23 | End: 2023-06-23 | Stop reason: SDUPTHER

## 2023-06-23 RX ORDER — VASOPRESSIN 20 [USP'U]/ML
INJECTION, SOLUTION INTRAMUSCULAR; SUBCUTANEOUS PRN
Status: DISCONTINUED | OUTPATIENT
Start: 2023-06-23 | End: 2023-06-23 | Stop reason: SDUPTHER

## 2023-06-23 RX ORDER — POLYETHYLENE GLYCOL 3350 17 G/17G
17 POWDER, FOR SOLUTION ORAL DAILY
Status: DISCONTINUED | OUTPATIENT
Start: 2023-06-23 | End: 2023-06-27 | Stop reason: HOSPADM

## 2023-06-23 RX ORDER — MEPERIDINE HYDROCHLORIDE 50 MG/ML
25 INJECTION INTRAMUSCULAR; INTRAVENOUS; SUBCUTANEOUS
Status: ACTIVE | OUTPATIENT
Start: 2023-06-23 | End: 2023-06-24

## 2023-06-23 RX ORDER — FENTANYL CITRATE 50 UG/ML
50 INJECTION, SOLUTION INTRAMUSCULAR; INTRAVENOUS
Status: DISCONTINUED | OUTPATIENT
Start: 2023-06-23 | End: 2023-06-27 | Stop reason: HOSPADM

## 2023-06-23 RX ORDER — SODIUM CHLORIDE 0.9 % (FLUSH) 0.9 %
5-40 SYRINGE (ML) INJECTION PRN
Status: DISCONTINUED | OUTPATIENT
Start: 2023-06-23 | End: 2023-06-27 | Stop reason: HOSPADM

## 2023-06-23 RX ORDER — ALBUMIN, HUMAN INJ 5% 5 %
25 SOLUTION INTRAVENOUS PRN
Status: DISCONTINUED | OUTPATIENT
Start: 2023-06-23 | End: 2023-06-27 | Stop reason: HOSPADM

## 2023-06-23 RX ORDER — PROPOFOL 10 MG/ML
10 INJECTION, EMULSION INTRAVENOUS CONTINUOUS
Status: DISCONTINUED | OUTPATIENT
Start: 2023-06-23 | End: 2023-06-26

## 2023-06-23 RX ORDER — FENTANYL CITRATE 50 UG/ML
25 INJECTION, SOLUTION INTRAMUSCULAR; INTRAVENOUS
Status: DISCONTINUED | OUTPATIENT
Start: 2023-06-23 | End: 2023-06-27 | Stop reason: HOSPADM

## 2023-06-23 RX ORDER — SODIUM CHLORIDE 9 MG/ML
INJECTION, SOLUTION INTRAVENOUS CONTINUOUS PRN
Status: DISCONTINUED | OUTPATIENT
Start: 2023-06-23 | End: 2023-06-23 | Stop reason: SDUPTHER

## 2023-06-23 RX ORDER — PHENYLEPHRINE HCL IN 0.9% NACL 1 MG/10 ML
SYRINGE (ML) INTRAVENOUS PRN
Status: DISCONTINUED | OUTPATIENT
Start: 2023-06-23 | End: 2023-06-23 | Stop reason: SDUPTHER

## 2023-06-23 RX ORDER — DEXTROSE MONOHYDRATE 100 MG/ML
INJECTION, SOLUTION INTRAVENOUS CONTINUOUS PRN
Status: DISCONTINUED | OUTPATIENT
Start: 2023-06-23 | End: 2023-06-27 | Stop reason: HOSPADM

## 2023-06-23 RX ORDER — GLYCOPYRROLATE 0.2 MG/ML
INJECTION INTRAMUSCULAR; INTRAVENOUS PRN
Status: DISCONTINUED | OUTPATIENT
Start: 2023-06-23 | End: 2023-06-23 | Stop reason: SDUPTHER

## 2023-06-23 RX ORDER — PROPOFOL 10 MG/ML
INJECTION, EMULSION INTRAVENOUS
Status: COMPLETED
Start: 2023-06-23 | End: 2023-06-23

## 2023-06-23 RX ORDER — ASPIRIN 81 MG/1
81 TABLET ORAL DAILY
Status: DISCONTINUED | OUTPATIENT
Start: 2023-06-23 | End: 2023-06-27 | Stop reason: HOSPADM

## 2023-06-23 RX ORDER — SENNA AND DOCUSATE SODIUM 50; 8.6 MG/1; MG/1
1 TABLET, FILM COATED ORAL 2 TIMES DAILY
Status: DISCONTINUED | OUTPATIENT
Start: 2023-06-23 | End: 2023-06-27 | Stop reason: HOSPADM

## 2023-06-23 RX ORDER — ALBUMIN (HUMAN) 12.5 G/50ML
25 SOLUTION INTRAVENOUS PRN
Status: DISCONTINUED | OUTPATIENT
Start: 2023-06-23 | End: 2023-06-27 | Stop reason: HOSPADM

## 2023-06-23 RX ORDER — PROPOFOL 10 MG/ML
INJECTION, EMULSION INTRAVENOUS PRN
Status: DISCONTINUED | OUTPATIENT
Start: 2023-06-23 | End: 2023-06-23 | Stop reason: SDUPTHER

## 2023-06-23 RX ORDER — INSULIN LISPRO 100 [IU]/ML
0-6 INJECTION, SOLUTION INTRAVENOUS; SUBCUTANEOUS
Status: DISCONTINUED | OUTPATIENT
Start: 2023-06-23 | End: 2023-06-27 | Stop reason: HOSPADM

## 2023-06-23 RX ORDER — SODIUM CHLORIDE, SODIUM LACTATE, POTASSIUM CHLORIDE, CALCIUM CHLORIDE 600; 310; 30; 20 MG/100ML; MG/100ML; MG/100ML; MG/100ML
INJECTION, SOLUTION INTRAVENOUS CONTINUOUS
Status: DISCONTINUED | OUTPATIENT
Start: 2023-06-23 | End: 2023-06-23

## 2023-06-23 RX ORDER — DIPHENHYDRAMINE HCL 25 MG
25 TABLET ORAL NIGHTLY PRN
Status: DISCONTINUED | OUTPATIENT
Start: 2023-06-24 | End: 2023-06-27 | Stop reason: HOSPADM

## 2023-06-23 RX ORDER — SODIUM CHLORIDE 0.9 % (FLUSH) 0.9 %
5-40 SYRINGE (ML) INJECTION EVERY 12 HOURS SCHEDULED
Status: DISCONTINUED | OUTPATIENT
Start: 2023-06-23 | End: 2023-06-27 | Stop reason: HOSPADM

## 2023-06-23 RX ORDER — INSULIN LISPRO 100 [IU]/ML
0-3 INJECTION, SOLUTION INTRAVENOUS; SUBCUTANEOUS NIGHTLY
Status: DISCONTINUED | OUTPATIENT
Start: 2023-06-23 | End: 2023-06-27 | Stop reason: HOSPADM

## 2023-06-23 RX ORDER — OXYCODONE HYDROCHLORIDE AND ACETAMINOPHEN 5; 325 MG/1; MG/1
2 TABLET ORAL EVERY 4 HOURS PRN
Status: DISCONTINUED | OUTPATIENT
Start: 2023-06-23 | End: 2023-06-27 | Stop reason: HOSPADM

## 2023-06-23 RX ORDER — ONDANSETRON 4 MG/1
4 TABLET, ORALLY DISINTEGRATING ORAL EVERY 8 HOURS PRN
Status: DISCONTINUED | OUTPATIENT
Start: 2023-06-23 | End: 2023-06-27 | Stop reason: HOSPADM

## 2023-06-23 RX ORDER — PROTAMINE SULFATE 10 MG/ML
INJECTION, SOLUTION INTRAVENOUS PRN
Status: DISCONTINUED | OUTPATIENT
Start: 2023-06-23 | End: 2023-06-23 | Stop reason: SDUPTHER

## 2023-06-23 RX ADMIN — SODIUM BICARBONATE 50 MEQ: 84 INJECTION, SOLUTION INTRAVENOUS at 20:47

## 2023-06-23 RX ADMIN — FENTANYL CITRATE 150 MCG: 50 INJECTION INTRAVENOUS at 10:27

## 2023-06-23 RX ADMIN — VASOPRESSIN 1 UNITS: 20 INJECTION INTRAVENOUS at 12:50

## 2023-06-23 RX ADMIN — FENTANYL CITRATE 100 MCG: 50 INJECTION INTRAVENOUS at 10:37

## 2023-06-23 RX ADMIN — FENTANYL CITRATE 100 MCG: 50 INJECTION INTRAVENOUS at 10:08

## 2023-06-23 RX ADMIN — PROTAMINE SULFATE 250 MG: 10 INJECTION, SOLUTION INTRAVENOUS at 12:55

## 2023-06-23 RX ADMIN — LIDOCAINE HYDROCHLORIDE 50 MG: 10 INJECTION, SOLUTION EPIDURAL; INFILTRATION; INTRACAUDAL; PERINEURAL at 09:18

## 2023-06-23 RX ADMIN — FENTANYL CITRATE 100 MCG: 50 INJECTION INTRAVENOUS at 11:21

## 2023-06-23 RX ADMIN — OXYCODONE HYDROCHLORIDE AND ACETAMINOPHEN 2 TABLET: 5; 325 TABLET ORAL at 17:30

## 2023-06-23 RX ADMIN — FENTANYL CITRATE 150 MCG: 50 INJECTION INTRAVENOUS at 09:28

## 2023-06-23 RX ADMIN — PROTAMINE SULFATE 50 MG: 10 INJECTION, SOLUTION INTRAVENOUS at 12:56

## 2023-06-23 RX ADMIN — ALBUMIN (HUMAN) 25 G: 12.5 INJECTION, SOLUTION INTRAVENOUS at 15:25

## 2023-06-23 RX ADMIN — OXYCODONE HYDROCHLORIDE AND ACETAMINOPHEN 2 TABLET: 5; 325 TABLET ORAL at 23:55

## 2023-06-23 RX ADMIN — METOPROLOL TARTRATE 12.5 MG: 25 TABLET, FILM COATED ORAL at 23:55

## 2023-06-23 RX ADMIN — VASOPRESSIN 0.05 UNITS/MIN: 20 INJECTION INTRAVENOUS at 12:29

## 2023-06-23 RX ADMIN — SODIUM CHLORIDE 1 UNITS/HR: 9 INJECTION, SOLUTION INTRAVENOUS at 10:20

## 2023-06-23 RX ADMIN — Medication 100 MCG: at 12:59

## 2023-06-23 RX ADMIN — SENNOSIDES AND DOCUSATE SODIUM 1 TABLET: 50; 8.6 TABLET ORAL at 17:30

## 2023-06-23 RX ADMIN — HEPARIN SODIUM 32000 UNITS: 1000 INJECTION INTRAVENOUS; SUBCUTANEOUS at 11:06

## 2023-06-23 RX ADMIN — AMIODARONE HYDROCHLORIDE 200 MG: 200 TABLET ORAL at 08:24

## 2023-06-23 RX ADMIN — SODIUM CHLORIDE: 9 INJECTION, SOLUTION INTRAVENOUS at 09:41

## 2023-06-23 RX ADMIN — ALBUMIN (HUMAN) 25 G: 12.5 INJECTION, SOLUTION INTRAVENOUS at 18:33

## 2023-06-23 RX ADMIN — SODIUM CHLORIDE, PRESERVATIVE FREE 10 ML: 5 INJECTION INTRAVENOUS at 20:54

## 2023-06-23 RX ADMIN — ROCURONIUM BROMIDE 40 MG: 10 INJECTION, SOLUTION INTRAVENOUS at 13:00

## 2023-06-23 RX ADMIN — FENTANYL CITRATE 100 MCG: 50 INJECTION INTRAVENOUS at 10:38

## 2023-06-23 RX ADMIN — AMIODARONE HYDROCHLORIDE 200 MG: 200 TABLET ORAL at 23:55

## 2023-06-23 RX ADMIN — SODIUM BICARBONATE 50 MEQ: 84 INJECTION, SOLUTION INTRAVENOUS at 20:46

## 2023-06-23 RX ADMIN — POLYETHYLENE GLYCOL 3350 17 G: 17 POWDER, FOR SOLUTION ORAL at 17:31

## 2023-06-23 RX ADMIN — ASPIRIN 81 MG: 81 TABLET, COATED ORAL at 08:24

## 2023-06-23 RX ADMIN — ROCURONIUM BROMIDE 100 MG: 10 INJECTION, SOLUTION INTRAVENOUS at 09:18

## 2023-06-23 RX ADMIN — METOPROLOL TARTRATE 12.5 MG: 25 TABLET, FILM COATED ORAL at 08:24

## 2023-06-23 RX ADMIN — SODIUM CHLORIDE, POTASSIUM CHLORIDE, SODIUM LACTATE AND CALCIUM CHLORIDE: 600; 310; 30; 20 INJECTION, SOLUTION INTRAVENOUS at 12:58

## 2023-06-23 RX ADMIN — FENTANYL CITRATE 100 MCG: 50 INJECTION INTRAVENOUS at 09:18

## 2023-06-23 RX ADMIN — FENTANYL CITRATE 50 MCG: 50 INJECTION, SOLUTION INTRAMUSCULAR; INTRAVENOUS at 19:30

## 2023-06-23 RX ADMIN — ROCURONIUM BROMIDE 30 MG: 10 INJECTION, SOLUTION INTRAVENOUS at 10:27

## 2023-06-23 RX ADMIN — ROCURONIUM BROMIDE 30 MG: 10 INJECTION, SOLUTION INTRAVENOUS at 11:21

## 2023-06-23 RX ADMIN — MUPIROCIN: 20 OINTMENT TOPICAL at 08:30

## 2023-06-23 RX ADMIN — AMINOCAPROIC ACID 10 G/HR: 250 INJECTION, SOLUTION INTRAVENOUS at 10:15

## 2023-06-23 RX ADMIN — Medication 200 MCG: at 13:12

## 2023-06-23 RX ADMIN — SODIUM BICARBONATE 50 MEQ: 84 INJECTION, SOLUTION INTRAVENOUS at 14:39

## 2023-06-23 RX ADMIN — SODIUM CHLORIDE, POTASSIUM CHLORIDE, SODIUM LACTATE AND CALCIUM CHLORIDE: 600; 310; 30; 20 INJECTION, SOLUTION INTRAVENOUS at 06:48

## 2023-06-23 RX ADMIN — PROPOFOL 20 MCG/KG/MIN: 10 INJECTION, EMULSION INTRAVENOUS at 15:12

## 2023-06-23 RX ADMIN — FENTANYL CITRATE 50 MCG: 50 INJECTION, SOLUTION INTRAMUSCULAR; INTRAVENOUS at 22:25

## 2023-06-23 RX ADMIN — GLYCOPYRROLATE 0.2 MG: 0.2 INJECTION INTRAMUSCULAR; INTRAVENOUS at 12:50

## 2023-06-23 RX ADMIN — SODIUM CHLORIDE: 9 INJECTION, SOLUTION INTRAVENOUS at 14:23

## 2023-06-23 RX ADMIN — PROPOFOL 150 MG: 10 INJECTION, EMULSION INTRAVENOUS at 09:18

## 2023-06-23 RX ADMIN — SODIUM CHLORIDE, POTASSIUM CHLORIDE, SODIUM LACTATE AND CALCIUM CHLORIDE: 600; 310; 30; 20 INJECTION, SOLUTION INTRAVENOUS at 09:10

## 2023-06-23 RX ADMIN — SENNOSIDES AND DOCUSATE SODIUM 1 TABLET: 50; 8.6 TABLET ORAL at 23:55

## 2023-06-23 RX ADMIN — MUPIROCIN: 20 OINTMENT TOPICAL at 20:54

## 2023-06-23 RX ADMIN — MUPIROCIN: 20 OINTMENT TOPICAL at 17:31

## 2023-06-23 RX ADMIN — MIDAZOLAM 2 MG: 1 INJECTION INTRAMUSCULAR; INTRAVENOUS at 09:14

## 2023-06-23 RX ADMIN — Medication 2000 MG: at 10:10

## 2023-06-23 RX ADMIN — CHLORHEXIDINE GLUCONATE 15 ML: 1.2 SOLUTION ORAL at 08:24

## 2023-06-23 RX ADMIN — PROPOFOL 10 MCG/KG/MIN: 10 INJECTION, EMULSION INTRAVENOUS at 13:13

## 2023-06-23 RX ADMIN — FENTANYL CITRATE 100 MCG: 50 INJECTION INTRAVENOUS at 10:39

## 2023-06-23 RX ADMIN — CEFAZOLIN 2 G: 1 INJECTION, POWDER, FOR SOLUTION INTRAMUSCULAR; INTRAVENOUS at 14:21

## 2023-06-23 RX ADMIN — SODIUM CHLORIDE, POTASSIUM CHLORIDE, SODIUM LACTATE AND CALCIUM CHLORIDE: 600; 310; 30; 20 INJECTION, SOLUTION INTRAVENOUS at 09:48

## 2023-06-23 RX ADMIN — VANCOMYCIN HYDROCHLORIDE 750 MG: 10 INJECTION, POWDER, LYOPHILIZED, FOR SOLUTION INTRAVENOUS at 22:08

## 2023-06-23 ASSESSMENT — PAIN DESCRIPTION - ONSET: ONSET: ON-GOING

## 2023-06-23 ASSESSMENT — PULMONARY FUNCTION TESTS
PIF_VALUE: 22
PIF_VALUE: 24
PIF_VALUE: 22
PIF_VALUE: 14
PIF_VALUE: 23
PIF_VALUE: 13
PIF_VALUE: 15
PIF_VALUE: 15

## 2023-06-23 ASSESSMENT — PAIN - FUNCTIONAL ASSESSMENT: PAIN_FUNCTIONAL_ASSESSMENT: PREVENTS OR INTERFERES WITH MANY ACTIVE NOT PASSIVE ACTIVITIES

## 2023-06-23 ASSESSMENT — PAIN DESCRIPTION - ORIENTATION: ORIENTATION: MID

## 2023-06-23 ASSESSMENT — PAIN DESCRIPTION - PAIN TYPE: TYPE: SURGICAL PAIN

## 2023-06-23 ASSESSMENT — PAIN DESCRIPTION - LOCATION: LOCATION: CHEST

## 2023-06-23 ASSESSMENT — PAIN DESCRIPTION - DESCRIPTORS: DESCRIPTORS: SHARP;TENDER

## 2023-06-23 ASSESSMENT — PAIN SCALES - GENERAL
PAINLEVEL_OUTOF10: 8
PAINLEVEL_OUTOF10: 3

## 2023-06-23 ASSESSMENT — PAIN DESCRIPTION - FREQUENCY: FREQUENCY: CONTINUOUS

## 2023-06-23 ASSESSMENT — COPD QUESTIONNAIRES: CAT_SEVERITY: MILD

## 2023-06-23 NOTE — ANESTHESIA PROCEDURE NOTES
Procedure Performed: REGINO       Start Time:  6/23/2023 9:57 AM       End Time:   6/23/2023 10:30 AM    Preanesthesia Checklist:  Patient identified, IV assessed, risks and benefits discussed, monitors and equipment assessed, procedure being performed at surgeon's request and anesthesia consent obtained. General Procedure Information  Diagnostic Indications for Echo:  assessment of ascending aorta, assessment of surgical repair, hemodynamic monitoring and assessment of valve function  Physician Requesting Echo: Rusty Merritt MD  Location performed:  OR  Intubated  Heart visualized  Probe Type:  Mulitplane  Modalities:  2D, color flow mapping, continuous wave Doppler, contrast and pulse wave Doppler    Echocardiographic and Doppler Measurements    Ventricles    Right Ventricle:  Cavity size normal.  Hypertrophy not present. Thrombus not present. Global function normal.    Left Ventricle:  Cavity size normal.  Hypertrophy present. Thrombus not present. Global Function normal.      Ventricular Regional Function:  4- Basal Inferolateral:  hypokinetic  5- Basal Inferior:  hypokinetic  11- Mid Inferior:  hypokinetic  15- Apical Inferior:  hypokinetic      Valves    Aortic Valve: Annulus normal.  Regurgitation none. Leaflet motions normal.      Mitral Valve: Annulus normal.  Stenosis not present. Regurgitation none. Leaflet motions normal.      Tricuspid Valve: Annulus normal.  Stenosis not present. Regurgitation none. Leaflet motions normal.    Pulmonic Valve: Annulus normal.  Stenosis not present. Regurgitation none. Aorta    Ascending Aorta:  Size normal.  Dissection not present. Aortic Arch:  Size normal.  Dissection not present. Descending Aorta:  Size normal.  Dissection not present. Atria    Right Atrium:  Size dilated. Spontaneous echo contrast not present. Thrombus not present. Tumor not present. Device not present. Left Atrium:  Size dilated.   Spontaneous echo

## 2023-06-23 NOTE — ANESTHESIA PROCEDURE NOTES
Arterial Line:    An arterial line was placed using ultrasound guidance and surface landmarks, in the OR for the following indication(s): continuous blood pressure monitoring and blood sampling needed. A 20 gauge (size), 1 and 3/4 inch (length), Arrow (type) catheter was placed, Seldinger technique used, into the right radial artery, secured by Tegaderm and tape. Anesthesia type: General    Events:  patient tolerated procedure well with no complications and EBL < 5mL. 6/23/2023 9:18 AM6/23/2023 9:24 AM  Anesthesiologist: Cindy Mitchell MD  Performed: Anesthesiologist   Preanesthetic Checklist  Completed: patient identified, IV checked, site marked, risks and benefits discussed, surgical/procedural consents, equipment checked, pre-op evaluation, timeout performed, anesthesia consent given, oxygen available and monitors applied/VS acknowledged

## 2023-06-23 NOTE — ANESTHESIA PROCEDURE NOTES
Central Venous Line:    A central venous line was placed using ultrasound guidance, in the OR for the following indication(s): central venous access and CVP monitoring.6/23/2023 9:34 AM6/23/2023 9:42 AM    Sterility preparation included the following: hand hygiene performed prior to procedure, maximum sterile barriers used and sterile technique used to drape from head to toe. The patient was placed in Trendelenburg position. The right subclavian vein was prepped. The site was prepped with Chloraprep. A 9 Fr (size), 10 (length), introducer SLIC was placed. During the procedure, the following specific steps were taken: target vein identified, needle advanced into vein and blood aspirated and guidewire advanced into vein. Intravenous verification was obtained by venous blood return and x-ray. Post insertion care included: all ports aspirated, all ports flushed easily, guidewire removed intact, Biopatch applied, line sutured in place and dressing applied. During the procedure the patient experienced: patient tolerated procedure well with no complications and EBL < 5mL.       Outcomes: uncomplicated and patient tolerated procedure well  Anesthesia type: general..No  Staffing  Performed: Anesthesiologist   Anesthesiologist: Jama Gleason MD  Preanesthetic Checklist  Completed: patient identified, IV checked, site marked, risks and benefits discussed, surgical/procedural consents, equipment checked, pre-op evaluation, timeout performed, anesthesia consent given, oxygen available and monitors applied/VS acknowledged

## 2023-06-23 NOTE — ANESTHESIA PRE PROCEDURE
Department of Anesthesiology  Preprocedure Note       Name:  Ronda Murguia   Age:  68 y.o.  :  1945                                          MRN:  [id number]         Date:  2023      Surgeon: Jomar Byrd):  Sharyle Hews, MD    Procedure: Procedure(s):  CABG CORONARY ARTERY BYPASS X3 ON PUMP SWAN REGINO    Medications prior to admission:   Prior to Admission medications    Medication Sig Start Date End Date Taking?  Authorizing Provider   amLODIPine (NORVASC) 10 MG tablet Take 1 tablet by mouth at bedtime 23   Colt Waller PA-C   acetaminophen (TYLENOL) 325 MG tablet Take 2 tablets by mouth every 4 hours as needed    Historical Provider, MD   hydrALAZINE (APRESOLINE) 100 MG tablet TAKE 1 TABLET BY MOUTH THREE TIMES DAILY ,  EXCEPT  ON  THE  MORNINGS  OF  DIALYSIS  Patient taking differently: Take 1 tablet by mouth 3 times daily TAKE 1 TABLET BY MOUTH THREE TIMES DAILY 23   Daily Coppola MD   White Petrolatum-Mineral Oil (REFRESH LACRI-LUBE) OINT ointment Apply 0.5 inches to eye nightly as needed (Dry eyes) 23   Christin Goldsmith MD   LANTUS SOLOSTAR 100 UNIT/ML injection pen Inject 24 Units into the skin 2 times daily 3/14/23 6/15/23  Chanel Timmons MD   CONTOUR TEST strip Inject 1 each into the skin 2 times daily 3/14/23 6/15/23  Chanel Timmons MD   sevelamer (RENVELA) 800 MG tablet Take 1 tablet by mouth 3 times daily (with meals)    Historical Provider, MD   metoprolol tartrate (LOPRESSOR) 25 MG tablet Take 1 tablet by mouth 2 times daily 2/3/23   Brenda Park PA-C   furosemide (LASIX) 40 MG tablet TAKE 1 & 1/2 (ONE & ONE-HALF) TABLETS BY MOUTH ONCE DAILY 22   Chanel Timmons MD   tamsulosin (FLOMAX) 0.4 MG capsule Take 1 capsule by mouth once daily  Patient taking differently: Take 1 capsule by mouth nightly 22   REY Pennington - CNP   nitroGLYCERIN (NITROSTAT) 0.4 MG SL tablet Place 1 tablet under the tongue every 5 minutes as needed for Chest pain

## 2023-06-23 NOTE — ANESTHESIA POSTPROCEDURE EVALUATION
Department of Anesthesiology  Postprocedure Note    Patient: Tasia Rivera  MRN: [id number]  [place]: 1945  Date of evaluation: 6/23/2023      Procedure Summary     Date: 06/23/23 Room / Location: [address]    Anesthesia Start: 7559 Anesthesia Stop: 9952    Procedure: CABG CORONARY ARTERY BYPASS X3 (SVG-OM1, SVG-OM3, LIMA-LAD),ON PUMP, REGINO, STERNAL LOCK 360 (Chest) Diagnosis:       Multiple vessel coronary artery disease      (MULTIPLE VESSEL CORONARY ARTERY DISEASE)    Surgeons: Elinor Stoll MD Responsible Provider: Jelly Nichols MD    Anesthesia Type: general ASA Status: 3          Anesthesia Type: No value filed.     Joshua Phase I:      Joshua Phase II:        Anesthesia Post Evaluation    Patient location during evaluation: ICU  Patient participation: complete - patient cannot participate  Level of consciousness: sedated and ventilated  Airway patency: patent  Nausea & Vomiting: no nausea and no vomiting  Complications: no  Cardiovascular status: vasoactive/inotropes and hemodynamically stable  Respiratory status: ventilator and acceptable  Hydration status: stable

## 2023-06-24 ENCOUNTER — APPOINTMENT (OUTPATIENT)
Dept: DIALYSIS | Age: 78
DRG: 235 | End: 2023-06-24
Attending: THORACIC SURGERY (CARDIOTHORACIC VASCULAR SURGERY)
Payer: MEDICARE

## 2023-06-24 ENCOUNTER — APPOINTMENT (OUTPATIENT)
Dept: GENERAL RADIOLOGY | Age: 78
DRG: 235 | End: 2023-06-24
Attending: THORACIC SURGERY (CARDIOTHORACIC VASCULAR SURGERY)
Payer: MEDICARE

## 2023-06-24 PROBLEM — Z95.1 HISTORY OF CORONARY ARTERY BYPASS GRAFT X 3: Status: ACTIVE | Noted: 2023-06-24

## 2023-06-24 PROBLEM — E11.69 DIABETES MELLITUS TYPE 2 IN OBESE (HCC): Status: ACTIVE | Noted: 2023-04-24

## 2023-06-24 PROBLEM — E66.9 DIABETES MELLITUS TYPE 2 IN OBESE (HCC): Status: ACTIVE | Noted: 2023-04-24

## 2023-06-24 LAB
ANION GAP SERPL CALCULATED.3IONS-SCNC: 13 MMOL/L (ref 9–17)
BUN SERPL-MCNC: 27 MG/DL (ref 8–23)
CA-I BLD-SCNC: 1.05 MMOL/L (ref 1.13–1.33)
CALCIUM SERPL-MCNC: 7.9 MG/DL (ref 8.6–10.4)
CHLORIDE SERPL-SCNC: 107 MMOL/L (ref 98–107)
CO2 SERPL-SCNC: 22 MMOL/L (ref 20–31)
CREAT SERPL-MCNC: 3.95 MG/DL (ref 0.7–1.2)
ERYTHROCYTE [DISTWIDTH] IN BLOOD BY AUTOMATED COUNT: 16.5 % (ref 11.8–14.4)
GFR SERPL CREATININE-BSD FRML MDRD: 15 ML/MIN/1.73M2
GLUCOSE BLD-MCNC: 108 MG/DL (ref 75–110)
GLUCOSE BLD-MCNC: 116 MG/DL (ref 75–110)
GLUCOSE BLD-MCNC: 117 MG/DL (ref 75–110)
GLUCOSE BLD-MCNC: 119 MG/DL (ref 75–110)
GLUCOSE BLD-MCNC: 138 MG/DL (ref 75–110)
GLUCOSE BLD-MCNC: 151 MG/DL (ref 75–110)
GLUCOSE BLD-MCNC: 83 MG/DL (ref 75–110)
GLUCOSE BLD-MCNC: 84 MG/DL (ref 75–110)
GLUCOSE BLD-MCNC: 86 MG/DL (ref 75–110)
GLUCOSE BLD-MCNC: 90 MG/DL (ref 75–110)
GLUCOSE BLD-MCNC: 97 MG/DL (ref 75–110)
GLUCOSE BLD-MCNC: 97 MG/DL (ref 75–110)
GLUCOSE SERPL-MCNC: 88 MG/DL (ref 70–99)
HCT VFR BLD AUTO: 29.5 % (ref 40.7–50.3)
HGB BLD-MCNC: 9.5 G/DL (ref 13–17)
INR PPP: 1.2
MAGNESIUM SERPL-MCNC: 2.7 MG/DL (ref 1.6–2.6)
MCH RBC QN AUTO: 30.4 PG (ref 25.2–33.5)
MCHC RBC AUTO-ENTMCNC: 32.2 G/DL (ref 28.4–34.8)
MCV RBC AUTO: 94.2 FL (ref 82.6–102.9)
NRBC BLD-RTO: 0 PER 100 WBC
PLATELET # BLD AUTO: 118 K/UL (ref 138–453)
PMV BLD AUTO: 9.9 FL (ref 8.1–13.5)
POTASSIUM SERPL-SCNC: 5.2 MMOL/L (ref 3.7–5.3)
PROTHROMBIN TIME: 15.4 SEC (ref 11.7–14.9)
RBC # BLD AUTO: 3.13 M/UL (ref 4.21–5.77)
SODIUM SERPL-SCNC: 142 MMOL/L (ref 135–144)
WBC OTHER # BLD: 11.9 K/UL (ref 3.5–11.3)

## 2023-06-24 PROCEDURE — 6370000000 HC RX 637 (ALT 250 FOR IP): Performed by: PHYSICIAN ASSISTANT

## 2023-06-24 PROCEDURE — 80048 BASIC METABOLIC PNL TOTAL CA: CPT

## 2023-06-24 PROCEDURE — 6360000002 HC RX W HCPCS: Performed by: PHYSICIAN ASSISTANT

## 2023-06-24 PROCEDURE — 97110 THERAPEUTIC EXERCISES: CPT

## 2023-06-24 PROCEDURE — 2000000000 HC ICU R&B

## 2023-06-24 PROCEDURE — 97162 PT EVAL MOD COMPLEX 30 MIN: CPT

## 2023-06-24 PROCEDURE — 2580000003 HC RX 258: Performed by: PHYSICIAN ASSISTANT

## 2023-06-24 PROCEDURE — 99233 SBSQ HOSP IP/OBS HIGH 50: CPT | Performed by: INTERNAL MEDICINE

## 2023-06-24 PROCEDURE — 71045 X-RAY EXAM CHEST 1 VIEW: CPT

## 2023-06-24 PROCEDURE — 6370000000 HC RX 637 (ALT 250 FOR IP): Performed by: INTERNAL MEDICINE

## 2023-06-24 PROCEDURE — 90935 HEMODIALYSIS ONE EVALUATION: CPT

## 2023-06-24 PROCEDURE — 85027 COMPLETE CBC AUTOMATED: CPT

## 2023-06-24 PROCEDURE — 2700000000 HC OXYGEN THERAPY PER DAY

## 2023-06-24 PROCEDURE — 94761 N-INVAS EAR/PLS OXIMETRY MLT: CPT

## 2023-06-24 PROCEDURE — 93005 ELECTROCARDIOGRAM TRACING: CPT | Performed by: THORACIC SURGERY (CARDIOTHORACIC VASCULAR SURGERY)

## 2023-06-24 PROCEDURE — 82330 ASSAY OF CALCIUM: CPT

## 2023-06-24 PROCEDURE — 85610 PROTHROMBIN TIME: CPT

## 2023-06-24 PROCEDURE — 2500000003 HC RX 250 WO HCPCS: Performed by: PHYSICIAN ASSISTANT

## 2023-06-24 PROCEDURE — 2100000001 HC CVICU R&B

## 2023-06-24 PROCEDURE — 83735 ASSAY OF MAGNESIUM: CPT

## 2023-06-24 PROCEDURE — 97530 THERAPEUTIC ACTIVITIES: CPT

## 2023-06-24 RX ORDER — FUROSEMIDE 40 MG/1
80 TABLET ORAL DAILY
Status: DISCONTINUED | OUTPATIENT
Start: 2023-06-24 | End: 2023-06-27 | Stop reason: HOSPADM

## 2023-06-24 RX ORDER — DIPHENHYDRAMINE HCL 25 MG
25 TABLET ORAL ONCE
Status: COMPLETED | OUTPATIENT
Start: 2023-06-24 | End: 2023-06-24

## 2023-06-24 RX ADMIN — FENTANYL CITRATE 50 MCG: 50 INJECTION, SOLUTION INTRAMUSCULAR; INTRAVENOUS at 23:21

## 2023-06-24 RX ADMIN — SODIUM CHLORIDE, PRESERVATIVE FREE 10 ML: 5 INJECTION INTRAVENOUS at 20:41

## 2023-06-24 RX ADMIN — INSULIN LISPRO 1 UNITS: 100 INJECTION, SOLUTION INTRAVENOUS; SUBCUTANEOUS at 20:40

## 2023-06-24 RX ADMIN — OXYCODONE HYDROCHLORIDE AND ACETAMINOPHEN 2 TABLET: 5; 325 TABLET ORAL at 04:48

## 2023-06-24 RX ADMIN — DIPHENHYDRAMINE HYDROCHLORIDE 25 MG: 25 TABLET ORAL at 14:33

## 2023-06-24 RX ADMIN — AMIODARONE HYDROCHLORIDE 200 MG: 200 TABLET ORAL at 14:18

## 2023-06-24 RX ADMIN — SENNOSIDES AND DOCUSATE SODIUM 1 TABLET: 50; 8.6 TABLET ORAL at 20:38

## 2023-06-24 RX ADMIN — SODIUM CHLORIDE, PRESERVATIVE FREE 10 ML: 5 INJECTION INTRAVENOUS at 09:15

## 2023-06-24 RX ADMIN — ONDANSETRON 4 MG: 2 INJECTION INTRAMUSCULAR; INTRAVENOUS at 12:29

## 2023-06-24 RX ADMIN — FENTANYL CITRATE 50 MCG: 50 INJECTION, SOLUTION INTRAMUSCULAR; INTRAVENOUS at 06:15

## 2023-06-24 RX ADMIN — MUPIROCIN: 20 OINTMENT TOPICAL at 09:15

## 2023-06-24 RX ADMIN — ONDANSETRON 4 MG: 2 INJECTION INTRAMUSCULAR; INTRAVENOUS at 06:18

## 2023-06-24 RX ADMIN — AMIODARONE HYDROCHLORIDE 200 MG: 200 TABLET ORAL at 20:37

## 2023-06-24 RX ADMIN — CALCIUM CHLORIDE 1000 MG: 100 INJECTION, SOLUTION INTRAVENOUS at 06:59

## 2023-06-24 RX ADMIN — MUPIROCIN: 20 OINTMENT TOPICAL at 20:42

## 2023-06-24 RX ADMIN — INSULIN GLARGINE 13 UNITS: 100 INJECTION, SOLUTION SUBCUTANEOUS at 20:41

## 2023-06-24 RX ADMIN — FUROSEMIDE 80 MG: 40 TABLET ORAL at 14:18

## 2023-06-24 RX ADMIN — OXYCODONE HYDROCHLORIDE AND ACETAMINOPHEN 2 TABLET: 5; 325 TABLET ORAL at 20:37

## 2023-06-24 RX ADMIN — OXYCODONE HYDROCHLORIDE AND ACETAMINOPHEN 2 TABLET: 5; 325 TABLET ORAL at 14:33

## 2023-06-24 RX ADMIN — METOPROLOL TARTRATE 12.5 MG: 25 TABLET, FILM COATED ORAL at 20:38

## 2023-06-24 RX ADMIN — DIPHENHYDRAMINE HYDROCHLORIDE 25 MG: 25 TABLET ORAL at 20:37

## 2023-06-24 RX ADMIN — Medication 1000 MG: at 14:18

## 2023-06-24 RX ADMIN — FENTANYL CITRATE 50 MCG: 50 INJECTION, SOLUTION INTRAMUSCULAR; INTRAVENOUS at 01:18

## 2023-06-24 ASSESSMENT — PAIN DESCRIPTION - LOCATION
LOCATION: CHEST
LOCATION: STERNUM
LOCATION: STERNUM
LOCATION: CHEST
LOCATION: STERNUM
LOCATION: CHEST
LOCATION: CHEST
LOCATION: STERNUM

## 2023-06-24 ASSESSMENT — PAIN SCALES - GENERAL
PAINLEVEL_OUTOF10: 5
PAINLEVEL_OUTOF10: 7
PAINLEVEL_OUTOF10: 8
PAINLEVEL_OUTOF10: 4
PAINLEVEL_OUTOF10: 7
PAINLEVEL_OUTOF10: 0
PAINLEVEL_OUTOF10: 3
PAINLEVEL_OUTOF10: 8
PAINLEVEL_OUTOF10: 6
PAINLEVEL_OUTOF10: 7
PAINLEVEL_OUTOF10: 5
PAINLEVEL_OUTOF10: 8

## 2023-06-24 ASSESSMENT — PAIN DESCRIPTION - ONSET
ONSET: ON-GOING

## 2023-06-24 ASSESSMENT — PAIN DESCRIPTION - FREQUENCY
FREQUENCY: CONTINUOUS

## 2023-06-24 ASSESSMENT — PAIN - FUNCTIONAL ASSESSMENT
PAIN_FUNCTIONAL_ASSESSMENT: PREVENTS OR INTERFERES SOME ACTIVE ACTIVITIES AND ADLS

## 2023-06-24 ASSESSMENT — PAIN DESCRIPTION - ORIENTATION
ORIENTATION: MID

## 2023-06-24 ASSESSMENT — PAIN DESCRIPTION - DESCRIPTORS
DESCRIPTORS: ACHING
DESCRIPTORS: ACHING
DESCRIPTORS: TENDER
DESCRIPTORS: ACHING
DESCRIPTORS: TENDER
DESCRIPTORS: ACHING

## 2023-06-24 ASSESSMENT — PAIN DESCRIPTION - PAIN TYPE
TYPE: SURGICAL PAIN

## 2023-06-25 ENCOUNTER — APPOINTMENT (OUTPATIENT)
Dept: GENERAL RADIOLOGY | Age: 78
DRG: 235 | End: 2023-06-25
Attending: THORACIC SURGERY (CARDIOTHORACIC VASCULAR SURGERY)
Payer: MEDICARE

## 2023-06-25 LAB
ANION GAP SERPL CALCULATED.3IONS-SCNC: 14 MMOL/L (ref 9–17)
BUN SERPL-MCNC: 23 MG/DL (ref 8–23)
CA-I BLD-SCNC: 1.03 MMOL/L (ref 1.13–1.33)
CALCIUM SERPL-MCNC: 8.5 MG/DL (ref 8.6–10.4)
CHLORIDE SERPL-SCNC: 102 MMOL/L (ref 98–107)
CO2 SERPL-SCNC: 24 MMOL/L (ref 20–31)
CREAT SERPL-MCNC: 3.96 MG/DL (ref 0.7–1.2)
EKG ATRIAL RATE: 67 BPM
EKG P AXIS: 45 DEGREES
EKG P-R INTERVAL: 256 MS
EKG Q-T INTERVAL: 464 MS
EKG QRS DURATION: 102 MS
EKG QTC CALCULATION (BAZETT): 490 MS
EKG R AXIS: -35 DEGREES
EKG T AXIS: 35 DEGREES
EKG VENTRICULAR RATE: 67 BPM
ERYTHROCYTE [DISTWIDTH] IN BLOOD BY AUTOMATED COUNT: 16.7 % (ref 11.8–14.4)
GFR SERPL CREATININE-BSD FRML MDRD: 15 ML/MIN/1.73M2
GLUCOSE BLD-MCNC: 156 MG/DL (ref 75–110)
GLUCOSE BLD-MCNC: 175 MG/DL (ref 75–110)
GLUCOSE BLD-MCNC: 181 MG/DL (ref 75–110)
GLUCOSE SERPL-MCNC: 108 MG/DL (ref 70–99)
HCT VFR BLD AUTO: 31.1 % (ref 40.7–50.3)
HGB BLD-MCNC: 9.3 G/DL (ref 13–17)
INR PPP: 1.2
MAGNESIUM SERPL-MCNC: 2.3 MG/DL (ref 1.6–2.6)
MCH RBC QN AUTO: 30.5 PG (ref 25.2–33.5)
MCHC RBC AUTO-ENTMCNC: 29.9 G/DL (ref 28.4–34.8)
MCV RBC AUTO: 102 FL (ref 82.6–102.9)
NRBC BLD-RTO: 0 PER 100 WBC
PLATELET # BLD AUTO: 121 K/UL (ref 138–453)
PMV BLD AUTO: 10.6 FL (ref 8.1–13.5)
POTASSIUM SERPL-SCNC: 4.3 MMOL/L (ref 3.7–5.3)
PROTHROMBIN TIME: 15.3 SEC (ref 11.7–14.9)
RBC # BLD AUTO: 3.05 M/UL (ref 4.21–5.77)
SODIUM SERPL-SCNC: 140 MMOL/L (ref 135–144)
WBC OTHER # BLD: 10.4 K/UL (ref 3.5–11.3)

## 2023-06-25 PROCEDURE — 82330 ASSAY OF CALCIUM: CPT

## 2023-06-25 PROCEDURE — 97110 THERAPEUTIC EXERCISES: CPT

## 2023-06-25 PROCEDURE — 6360000002 HC RX W HCPCS: Performed by: PHYSICIAN ASSISTANT

## 2023-06-25 PROCEDURE — 83735 ASSAY OF MAGNESIUM: CPT

## 2023-06-25 PROCEDURE — 2580000003 HC RX 258: Performed by: PHYSICIAN ASSISTANT

## 2023-06-25 PROCEDURE — 6370000000 HC RX 637 (ALT 250 FOR IP): Performed by: INTERNAL MEDICINE

## 2023-06-25 PROCEDURE — 85610 PROTHROMBIN TIME: CPT

## 2023-06-25 PROCEDURE — 71045 X-RAY EXAM CHEST 1 VIEW: CPT

## 2023-06-25 PROCEDURE — 99232 SBSQ HOSP IP/OBS MODERATE 35: CPT | Performed by: INTERNAL MEDICINE

## 2023-06-25 PROCEDURE — 2100000001 HC CVICU R&B

## 2023-06-25 PROCEDURE — 97116 GAIT TRAINING THERAPY: CPT

## 2023-06-25 PROCEDURE — 2700000000 HC OXYGEN THERAPY PER DAY

## 2023-06-25 PROCEDURE — 99231 SBSQ HOSP IP/OBS SF/LOW 25: CPT | Performed by: INTERNAL MEDICINE

## 2023-06-25 PROCEDURE — 97530 THERAPEUTIC ACTIVITIES: CPT

## 2023-06-25 PROCEDURE — 2000000000 HC ICU R&B

## 2023-06-25 PROCEDURE — 94761 N-INVAS EAR/PLS OXIMETRY MLT: CPT

## 2023-06-25 PROCEDURE — 82947 ASSAY GLUCOSE BLOOD QUANT: CPT

## 2023-06-25 PROCEDURE — 6370000000 HC RX 637 (ALT 250 FOR IP): Performed by: PHYSICIAN ASSISTANT

## 2023-06-25 PROCEDURE — 36415 COLL VENOUS BLD VENIPUNCTURE: CPT

## 2023-06-25 PROCEDURE — 85027 COMPLETE CBC AUTOMATED: CPT

## 2023-06-25 PROCEDURE — 97166 OT EVAL MOD COMPLEX 45 MIN: CPT

## 2023-06-25 PROCEDURE — 97535 SELF CARE MNGMENT TRAINING: CPT

## 2023-06-25 PROCEDURE — 80048 BASIC METABOLIC PNL TOTAL CA: CPT

## 2023-06-25 RX ORDER — LATANOPROST 50 UG/ML
1 SOLUTION/ DROPS OPHTHALMIC NIGHTLY
Status: DISCONTINUED | OUTPATIENT
Start: 2023-06-25 | End: 2023-06-27 | Stop reason: HOSPADM

## 2023-06-25 RX ORDER — PREDNISOLONE ACETATE 10 MG/ML
1 SUSPENSION/ DROPS OPHTHALMIC DAILY
Status: DISCONTINUED | OUTPATIENT
Start: 2023-06-25 | End: 2023-06-27 | Stop reason: HOSPADM

## 2023-06-25 RX ADMIN — METOPROLOL TARTRATE 12.5 MG: 25 TABLET, FILM COATED ORAL at 10:29

## 2023-06-25 RX ADMIN — PREDNISOLONE ACETATE 1 DROP: 10 SUSPENSION/ DROPS OPHTHALMIC at 14:27

## 2023-06-25 RX ADMIN — AMIODARONE HYDROCHLORIDE 200 MG: 200 TABLET ORAL at 20:42

## 2023-06-25 RX ADMIN — ASPIRIN 81 MG: 81 TABLET, COATED ORAL at 10:29

## 2023-06-25 RX ADMIN — MUPIROCIN: 20 OINTMENT TOPICAL at 20:42

## 2023-06-25 RX ADMIN — AMIODARONE HYDROCHLORIDE 200 MG: 200 TABLET ORAL at 14:27

## 2023-06-25 RX ADMIN — INSULIN LISPRO 1 UNITS: 100 INJECTION, SOLUTION INTRAVENOUS; SUBCUTANEOUS at 20:42

## 2023-06-25 RX ADMIN — METOPROLOL TARTRATE 12.5 MG: 25 TABLET, FILM COATED ORAL at 20:42

## 2023-06-25 RX ADMIN — POLYETHYLENE GLYCOL 3350 17 G: 17 POWDER, FOR SOLUTION ORAL at 10:29

## 2023-06-25 RX ADMIN — OXYCODONE HYDROCHLORIDE AND ACETAMINOPHEN 2 TABLET: 5; 325 TABLET ORAL at 05:45

## 2023-06-25 RX ADMIN — INSULIN LISPRO 1 UNITS: 100 INJECTION, SOLUTION INTRAVENOUS; SUBCUTANEOUS at 12:34

## 2023-06-25 RX ADMIN — CLOPIDOGREL BISULFATE 75 MG: 75 TABLET ORAL at 10:29

## 2023-06-25 RX ADMIN — LATANOPROST 1 DROP: 50 SOLUTION OPHTHALMIC at 20:45

## 2023-06-25 RX ADMIN — SENNOSIDES AND DOCUSATE SODIUM 1 TABLET: 50; 8.6 TABLET ORAL at 10:29

## 2023-06-25 RX ADMIN — FUROSEMIDE 80 MG: 40 TABLET ORAL at 10:32

## 2023-06-25 RX ADMIN — INSULIN LISPRO 1 UNITS: 100 INJECTION, SOLUTION INTRAVENOUS; SUBCUTANEOUS at 17:43

## 2023-06-25 RX ADMIN — OXYCODONE HYDROCHLORIDE AND ACETAMINOPHEN 2 TABLET: 5; 325 TABLET ORAL at 01:13

## 2023-06-25 RX ADMIN — MUPIROCIN: 20 OINTMENT TOPICAL at 10:32

## 2023-06-25 RX ADMIN — INSULIN GLARGINE 13 UNITS: 100 INJECTION, SOLUTION SUBCUTANEOUS at 20:42

## 2023-06-25 RX ADMIN — FENTANYL CITRATE 50 MCG: 50 INJECTION, SOLUTION INTRAMUSCULAR; INTRAVENOUS at 07:47

## 2023-06-25 RX ADMIN — OXYCODONE HYDROCHLORIDE AND ACETAMINOPHEN 2 TABLET: 5; 325 TABLET ORAL at 10:29

## 2023-06-25 RX ADMIN — SODIUM CHLORIDE, PRESERVATIVE FREE 10 ML: 5 INJECTION INTRAVENOUS at 10:32

## 2023-06-25 RX ADMIN — AMIODARONE HYDROCHLORIDE 200 MG: 200 TABLET ORAL at 10:29

## 2023-06-25 ASSESSMENT — PAIN SCALES - GENERAL
PAINLEVEL_OUTOF10: 8
PAINLEVEL_OUTOF10: 5
PAINLEVEL_OUTOF10: 7

## 2023-06-25 ASSESSMENT — PAIN DESCRIPTION - DESCRIPTORS: DESCRIPTORS: ACHING

## 2023-06-25 ASSESSMENT — PAIN DESCRIPTION - PAIN TYPE
TYPE: SURGICAL PAIN
TYPE: SURGICAL PAIN

## 2023-06-25 ASSESSMENT — PAIN DESCRIPTION - ORIENTATION: ORIENTATION: MID

## 2023-06-25 ASSESSMENT — PAIN DESCRIPTION - LOCATION
LOCATION: STERNUM
LOCATION: STERNUM

## 2023-06-26 ENCOUNTER — APPOINTMENT (OUTPATIENT)
Dept: GENERAL RADIOLOGY | Age: 78
DRG: 235 | End: 2023-06-26
Attending: THORACIC SURGERY (CARDIOTHORACIC VASCULAR SURGERY)
Payer: MEDICARE

## 2023-06-26 LAB
ANION GAP SERPL CALCULATED.3IONS-SCNC: 15 MMOL/L (ref 9–17)
BUN SERPL-MCNC: 35 MG/DL (ref 8–23)
CA-I BLD-SCNC: 1.05 MMOL/L (ref 1.13–1.33)
CA-I BLD-SCNC: 1.09 MMOL/L (ref 1.13–1.33)
CALCIUM SERPL-MCNC: 8.1 MG/DL (ref 8.6–10.4)
CHLORIDE SERPL-SCNC: 100 MMOL/L (ref 98–107)
CO2 SERPL-SCNC: 23 MMOL/L (ref 20–31)
CREAT SERPL-MCNC: 5.23 MG/DL (ref 0.7–1.2)
EKG ATRIAL RATE: 64 BPM
EKG P AXIS: 30 DEGREES
EKG P-R INTERVAL: 220 MS
EKG Q-T INTERVAL: 438 MS
EKG QRS DURATION: 96 MS
EKG QTC CALCULATION (BAZETT): 451 MS
EKG R AXIS: -16 DEGREES
EKG T AXIS: 20 DEGREES
EKG VENTRICULAR RATE: 64 BPM
ERYTHROCYTE [DISTWIDTH] IN BLOOD BY AUTOMATED COUNT: 16.2 % (ref 11.8–14.4)
GFR SERPL CREATININE-BSD FRML MDRD: 11 ML/MIN/1.73M2
GLUCOSE BLD-MCNC: 129 MG/DL (ref 75–110)
GLUCOSE BLD-MCNC: 155 MG/DL (ref 75–110)
GLUCOSE BLD-MCNC: 171 MG/DL (ref 75–110)
GLUCOSE BLD-MCNC: 175 MG/DL (ref 75–110)
GLUCOSE SERPL-MCNC: 114 MG/DL (ref 70–99)
HCT VFR BLD AUTO: 28.4 % (ref 40.7–50.3)
HGB BLD-MCNC: 9 G/DL (ref 13–17)
MAGNESIUM SERPL-MCNC: 2.3 MG/DL (ref 1.6–2.6)
MCH RBC QN AUTO: 31.1 PG (ref 25.2–33.5)
MCHC RBC AUTO-ENTMCNC: 31.7 G/DL (ref 28.4–34.8)
MCV RBC AUTO: 98.3 FL (ref 82.6–102.9)
NRBC BLD-RTO: 0 PER 100 WBC
PLATELET # BLD AUTO: 122 K/UL (ref 138–453)
PMV BLD AUTO: 10.1 FL (ref 8.1–13.5)
POTASSIUM SERPL-SCNC: 4 MMOL/L (ref 3.7–5.3)
RBC # BLD AUTO: 2.89 M/UL (ref 4.21–5.77)
SODIUM SERPL-SCNC: 138 MMOL/L (ref 135–144)
WBC OTHER # BLD: 9.8 K/UL (ref 3.5–11.3)

## 2023-06-26 PROCEDURE — 83735 ASSAY OF MAGNESIUM: CPT

## 2023-06-26 PROCEDURE — 82947 ASSAY GLUCOSE BLOOD QUANT: CPT

## 2023-06-26 PROCEDURE — 36415 COLL VENOUS BLD VENIPUNCTURE: CPT

## 2023-06-26 PROCEDURE — 99232 SBSQ HOSP IP/OBS MODERATE 35: CPT | Performed by: INTERNAL MEDICINE

## 2023-06-26 PROCEDURE — 6360000002 HC RX W HCPCS: Performed by: PHYSICIAN ASSISTANT

## 2023-06-26 PROCEDURE — 6360000002 HC RX W HCPCS: Performed by: INTERNAL MEDICINE

## 2023-06-26 PROCEDURE — 6370000000 HC RX 637 (ALT 250 FOR IP): Performed by: INTERNAL MEDICINE

## 2023-06-26 PROCEDURE — 90935 HEMODIALYSIS ONE EVALUATION: CPT | Performed by: INTERNAL MEDICINE

## 2023-06-26 PROCEDURE — 82330 ASSAY OF CALCIUM: CPT

## 2023-06-26 PROCEDURE — 2580000003 HC RX 258: Performed by: PHYSICIAN ASSISTANT

## 2023-06-26 PROCEDURE — 2700000000 HC OXYGEN THERAPY PER DAY

## 2023-06-26 PROCEDURE — 85027 COMPLETE CBC AUTOMATED: CPT

## 2023-06-26 PROCEDURE — 94761 N-INVAS EAR/PLS OXIMETRY MLT: CPT

## 2023-06-26 PROCEDURE — 6370000000 HC RX 637 (ALT 250 FOR IP): Performed by: PHYSICIAN ASSISTANT

## 2023-06-26 PROCEDURE — 71045 X-RAY EXAM CHEST 1 VIEW: CPT

## 2023-06-26 PROCEDURE — 97530 THERAPEUTIC ACTIVITIES: CPT

## 2023-06-26 PROCEDURE — 90935 HEMODIALYSIS ONE EVALUATION: CPT

## 2023-06-26 PROCEDURE — 80048 BASIC METABOLIC PNL TOTAL CA: CPT

## 2023-06-26 PROCEDURE — 2500000003 HC RX 250 WO HCPCS: Performed by: PHYSICIAN ASSISTANT

## 2023-06-26 PROCEDURE — 2140000001 HC CVICU INTERMEDIATE R&B

## 2023-06-26 RX ORDER — MIDODRINE HYDROCHLORIDE 5 MG/1
10 TABLET ORAL 3 TIMES DAILY PRN
Status: DISCONTINUED | OUTPATIENT
Start: 2023-06-26 | End: 2023-06-27 | Stop reason: HOSPADM

## 2023-06-26 RX ADMIN — FUROSEMIDE 80 MG: 40 TABLET ORAL at 08:09

## 2023-06-26 RX ADMIN — ONDANSETRON 4 MG: 2 INJECTION INTRAMUSCULAR; INTRAVENOUS at 18:17

## 2023-06-26 RX ADMIN — SODIUM CHLORIDE, PRESERVATIVE FREE 10 ML: 5 INJECTION INTRAVENOUS at 08:04

## 2023-06-26 RX ADMIN — SODIUM CHLORIDE, PRESERVATIVE FREE 10 ML: 5 INJECTION INTRAVENOUS at 21:15

## 2023-06-26 RX ADMIN — CALCIUM CHLORIDE 1000 MG: 100 INJECTION, SOLUTION INTRAVENOUS at 17:32

## 2023-06-26 RX ADMIN — METOPROLOL TARTRATE 12.5 MG: 25 TABLET, FILM COATED ORAL at 08:01

## 2023-06-26 RX ADMIN — INSULIN LISPRO 1 UNITS: 100 INJECTION, SOLUTION INTRAVENOUS; SUBCUTANEOUS at 13:08

## 2023-06-26 RX ADMIN — AMIODARONE HYDROCHLORIDE 200 MG: 200 TABLET ORAL at 08:01

## 2023-06-26 RX ADMIN — LATANOPROST 1 DROP: 50 SOLUTION OPHTHALMIC at 21:15

## 2023-06-26 RX ADMIN — ASPIRIN 81 MG: 81 TABLET, COATED ORAL at 08:01

## 2023-06-26 RX ADMIN — CLOPIDOGREL BISULFATE 75 MG: 75 TABLET ORAL at 08:01

## 2023-06-26 RX ADMIN — INSULIN LISPRO 1 UNITS: 100 INJECTION, SOLUTION INTRAVENOUS; SUBCUTANEOUS at 16:21

## 2023-06-26 RX ADMIN — OXYCODONE HYDROCHLORIDE AND ACETAMINOPHEN 1 TABLET: 5; 325 TABLET ORAL at 06:00

## 2023-06-26 RX ADMIN — PREDNISOLONE ACETATE 1 DROP: 10 SUSPENSION/ DROPS OPHTHALMIC at 08:02

## 2023-06-26 RX ADMIN — CALCIUM CHLORIDE 1000 MG: 100 INJECTION, SOLUTION INTRAVENOUS at 09:59

## 2023-06-26 RX ADMIN — AMIODARONE HYDROCHLORIDE 200 MG: 200 TABLET ORAL at 16:21

## 2023-06-26 RX ADMIN — MUPIROCIN: 20 OINTMENT TOPICAL at 08:04

## 2023-06-26 RX ADMIN — EPOETIN ALFA-EPBX 5000 UNITS: 10000 INJECTION, SOLUTION INTRAVENOUS; SUBCUTANEOUS at 12:18

## 2023-06-26 ASSESSMENT — PAIN SCALES - GENERAL
PAINLEVEL_OUTOF10: 8
PAINLEVEL_OUTOF10: 0

## 2023-06-27 ENCOUNTER — APPOINTMENT (OUTPATIENT)
Dept: GENERAL RADIOLOGY | Age: 78
DRG: 235 | End: 2023-06-27
Attending: THORACIC SURGERY (CARDIOTHORACIC VASCULAR SURGERY)
Payer: MEDICARE

## 2023-06-27 VITALS
HEART RATE: 66 BPM | TEMPERATURE: 98.1 F | SYSTOLIC BLOOD PRESSURE: 128 MMHG | DIASTOLIC BLOOD PRESSURE: 51 MMHG | RESPIRATION RATE: 18 BRPM | WEIGHT: 203.26 LBS | BODY MASS INDEX: 30.02 KG/M2 | OXYGEN SATURATION: 99 %

## 2023-06-27 LAB
ANION GAP SERPL CALCULATED.3IONS-SCNC: 15 MMOL/L (ref 9–17)
BUN SERPL-MCNC: 31 MG/DL (ref 8–23)
CA-I BLD-SCNC: 1.03 MMOL/L (ref 1.13–1.33)
CALCIUM SERPL-MCNC: 8.5 MG/DL (ref 8.6–10.4)
CHLORIDE SERPL-SCNC: 99 MMOL/L (ref 98–107)
CO2 SERPL-SCNC: 23 MMOL/L (ref 20–31)
CREAT SERPL-MCNC: 4.36 MG/DL (ref 0.7–1.2)
ERYTHROCYTE [DISTWIDTH] IN BLOOD BY AUTOMATED COUNT: 15.5 % (ref 11.8–14.4)
GFR SERPL CREATININE-BSD FRML MDRD: 13 ML/MIN/1.73M2
GLUCOSE SERPL-MCNC: 175 MG/DL (ref 70–99)
HCT VFR BLD AUTO: 32.4 % (ref 40.7–50.3)
HGB BLD-MCNC: 9.7 G/DL (ref 13–17)
MAGNESIUM SERPL-MCNC: 1.9 MG/DL (ref 1.6–2.6)
MCH RBC QN AUTO: 29.9 PG (ref 25.2–33.5)
MCHC RBC AUTO-ENTMCNC: 29.9 G/DL (ref 28.4–34.8)
MCV RBC AUTO: 100 FL (ref 82.6–102.9)
NRBC BLD-RTO: 0 PER 100 WBC
PLATELET # BLD AUTO: 135 K/UL (ref 138–453)
PMV BLD AUTO: 10 FL (ref 8.1–13.5)
POTASSIUM SERPL-SCNC: 3.7 MMOL/L (ref 3.7–5.3)
RBC # BLD AUTO: 3.24 M/UL (ref 4.21–5.77)
SODIUM SERPL-SCNC: 137 MMOL/L (ref 135–144)
WBC OTHER # BLD: 8.4 K/UL (ref 3.5–11.3)

## 2023-06-27 PROCEDURE — 2580000003 HC RX 258: Performed by: PHYSICIAN ASSISTANT

## 2023-06-27 PROCEDURE — 80048 BASIC METABOLIC PNL TOTAL CA: CPT

## 2023-06-27 PROCEDURE — 99024 POSTOP FOLLOW-UP VISIT: CPT | Performed by: NURSE PRACTITIONER

## 2023-06-27 PROCEDURE — 6370000000 HC RX 637 (ALT 250 FOR IP): Performed by: INTERNAL MEDICINE

## 2023-06-27 PROCEDURE — 99232 SBSQ HOSP IP/OBS MODERATE 35: CPT | Performed by: INTERNAL MEDICINE

## 2023-06-27 PROCEDURE — 83735 ASSAY OF MAGNESIUM: CPT

## 2023-06-27 PROCEDURE — 36415 COLL VENOUS BLD VENIPUNCTURE: CPT

## 2023-06-27 PROCEDURE — 82330 ASSAY OF CALCIUM: CPT

## 2023-06-27 PROCEDURE — 85027 COMPLETE CBC AUTOMATED: CPT

## 2023-06-27 PROCEDURE — 71045 X-RAY EXAM CHEST 1 VIEW: CPT

## 2023-06-27 PROCEDURE — 6360000002 HC RX W HCPCS: Performed by: PHYSICIAN ASSISTANT

## 2023-06-27 PROCEDURE — 6370000000 HC RX 637 (ALT 250 FOR IP): Performed by: PHYSICIAN ASSISTANT

## 2023-06-27 RX ORDER — ASPIRIN 81 MG/1
81 TABLET ORAL DAILY
Qty: 30 TABLET | Refills: 3 | Status: SHIPPED | OUTPATIENT
Start: 2023-06-28

## 2023-06-27 RX ORDER — MIDODRINE HYDROCHLORIDE 10 MG/1
10 TABLET ORAL 3 TIMES DAILY PRN
Qty: 90 TABLET | Refills: 3 | Status: SHIPPED | OUTPATIENT
Start: 2023-06-27

## 2023-06-27 RX ORDER — FUROSEMIDE 80 MG
80 TABLET ORAL DAILY
Qty: 60 TABLET | Refills: 3 | Status: CANCELLED | OUTPATIENT
Start: 2023-06-28

## 2023-06-27 RX ORDER — POLYETHYLENE GLYCOL 3350 17 G/17G
17 POWDER, FOR SOLUTION ORAL DAILY
Qty: 15 EACH | Refills: 1 | Status: SHIPPED | OUTPATIENT
Start: 2023-06-28 | End: 2023-07-28

## 2023-06-27 RX ORDER — ONDANSETRON 4 MG/1
4 TABLET, ORALLY DISINTEGRATING ORAL EVERY 8 HOURS PRN
Qty: 30 TABLET | Refills: 0 | Status: SHIPPED | OUTPATIENT
Start: 2023-06-27

## 2023-06-27 RX ORDER — OXYCODONE HYDROCHLORIDE AND ACETAMINOPHEN 5; 325 MG/1; MG/1
1 TABLET ORAL EVERY 8 HOURS PRN
Qty: 21 TABLET | Refills: 0 | Status: SHIPPED | OUTPATIENT
Start: 2023-06-27 | End: 2023-07-04

## 2023-06-27 RX ORDER — FUROSEMIDE 80 MG
80 TABLET ORAL DAILY
Qty: 60 TABLET | Refills: 3 | Status: SHIPPED | OUTPATIENT
Start: 2023-06-28

## 2023-06-27 RX ORDER — AMIODARONE HYDROCHLORIDE 200 MG/1
200 TABLET ORAL 2 TIMES DAILY
Qty: 30 TABLET | Refills: 0 | Status: SHIPPED | OUTPATIENT
Start: 2023-06-27

## 2023-06-27 RX ORDER — CLOPIDOGREL BISULFATE 75 MG/1
75 TABLET ORAL DAILY
Qty: 30 TABLET | Refills: 3 | Status: SHIPPED | OUTPATIENT
Start: 2023-06-28

## 2023-06-27 RX ADMIN — SODIUM CHLORIDE, PRESERVATIVE FREE 10 ML: 5 INJECTION INTRAVENOUS at 08:27

## 2023-06-27 RX ADMIN — CLOPIDOGREL BISULFATE 75 MG: 75 TABLET ORAL at 08:27

## 2023-06-27 RX ADMIN — MUPIROCIN: 20 OINTMENT TOPICAL at 08:27

## 2023-06-27 RX ADMIN — FUROSEMIDE 80 MG: 40 TABLET ORAL at 08:27

## 2023-06-27 RX ADMIN — AMIODARONE HYDROCHLORIDE 200 MG: 200 TABLET ORAL at 08:27

## 2023-06-27 RX ADMIN — ASPIRIN 81 MG: 81 TABLET, COATED ORAL at 08:27

## 2023-06-27 RX ADMIN — AMIODARONE HYDROCHLORIDE 200 MG: 200 TABLET ORAL at 12:31

## 2023-06-27 RX ADMIN — METOPROLOL TARTRATE 12.5 MG: 25 TABLET, FILM COATED ORAL at 08:27

## 2023-06-27 RX ADMIN — OXYCODONE HYDROCHLORIDE AND ACETAMINOPHEN 1 TABLET: 5; 325 TABLET ORAL at 12:31

## 2023-06-27 RX ADMIN — ONDANSETRON 4 MG: 2 INJECTION INTRAMUSCULAR; INTRAVENOUS at 00:00

## 2023-06-27 RX ADMIN — PREDNISOLONE ACETATE 1 DROP: 10 SUSPENSION/ DROPS OPHTHALMIC at 08:27

## 2023-06-27 ASSESSMENT — PAIN SCALES - GENERAL: PAINLEVEL_OUTOF10: 4

## 2023-06-27 ASSESSMENT — PAIN DESCRIPTION - LOCATION: LOCATION: INCISION

## 2023-07-03 ENCOUNTER — HOSPITAL ENCOUNTER (OUTPATIENT)
Age: 78
Setting detail: SPECIMEN
Discharge: HOME | End: 2023-07-03

## 2023-07-05 ENCOUNTER — HOSPITAL ENCOUNTER (OUTPATIENT)
Age: 78
Setting detail: SPECIMEN
Discharge: HOME | End: 2023-07-05
Payer: MEDICARE

## 2023-07-05 LAB
ALBUMIN SERPL-MCNC: 3.3 G/DL (ref 3.5–5.2)
ALBUMIN/GLOB SERPL: 1.1 {RATIO} (ref 1–2.5)
ALP SERPL-CCNC: 77 U/L (ref 40–129)
ALT SERPL-CCNC: <5 U/L (ref 5–41)
ANION GAP SERPL CALCULATED.3IONS-SCNC: 10 MMOL/L (ref 9–17)
AST SERPL-CCNC: 19 U/L
BASOPHILS # BLD: 0 K/UL (ref 0–0.2)
BASOPHILS NFR BLD: 0 % (ref 0–2)
BILIRUB SERPL-MCNC: 0.4 MG/DL (ref 0.3–1.2)
BUN SERPL-MCNC: 27 MG/DL (ref 8–23)
BUN/CREAT SERPL: 8 (ref 9–20)
CALCIUM SERPL-MCNC: 8.6 MG/DL (ref 8.6–10.4)
CHLORIDE SERPL-SCNC: 102 MMOL/L (ref 98–107)
CO2 SERPL-SCNC: 27 MMOL/L (ref 20–31)
CREAT SERPL-MCNC: 3.5 MG/DL (ref 0.7–1.2)
EOSINOPHIL # BLD: 1.32 K/UL (ref 0–0.44)
EOSINOPHILS RELATIVE PERCENT: 11 % (ref 1–4)
ERYTHROCYTE [DISTWIDTH] IN BLOOD BY AUTOMATED COUNT: 15.1 % (ref 11.8–14.4)
GFR SERPL CREATININE-BSD FRML MDRD: 17 ML/MIN/1.73M2
GLUCOSE SERPL-MCNC: 152 MG/DL (ref 70–99)
HCT VFR BLD AUTO: 32.8 % (ref 40.7–50.3)
HGB BLD-MCNC: 10.4 G/DL (ref 13–17)
IMM GRANULOCYTES # BLD AUTO: 0.36 K/UL (ref 0–0.3)
IMM GRANULOCYTES NFR BLD: 3 %
LYMPHOCYTES # BLD: 16 % (ref 24–43)
LYMPHOCYTES NFR BLD: 1.92 K/UL (ref 1.1–3.7)
MCH RBC QN AUTO: 30 PG (ref 25.2–33.5)
MCHC RBC AUTO-ENTMCNC: 31.7 G/DL (ref 28.4–34.8)
MCV RBC AUTO: 94.5 FL (ref 82.6–102.9)
MONOCYTES NFR BLD: 0.84 K/UL (ref 0.1–1.2)
MONOCYTES NFR BLD: 7 % (ref 3–12)
MORPHOLOGY: NORMAL
NEUTROPHILS NFR BLD: 63 % (ref 36–65)
NEUTS SEG NFR BLD: 7.56 K/UL (ref 1.5–8.1)
NRBC BLD-RTO: 0 PER 100 WBC
PLATELET # BLD AUTO: 313 K/UL (ref 138–453)
PMV BLD AUTO: 9.7 FL (ref 8.1–13.5)
POTASSIUM SERPL-SCNC: 4.4 MMOL/L (ref 3.7–5.3)
PROT SERPL-MCNC: 6.3 G/DL (ref 6.4–8.3)
RBC # BLD AUTO: 3.47 M/UL (ref 4.21–5.77)
SODIUM SERPL-SCNC: 139 MMOL/L (ref 135–144)
WBC OTHER # BLD: 12 K/UL (ref 3.5–11.3)

## 2023-07-05 PROCEDURE — 85027 COMPLETE CBC AUTOMATED: CPT

## 2023-07-05 PROCEDURE — 36415 COLL VENOUS BLD VENIPUNCTURE: CPT

## 2023-07-05 PROCEDURE — 80053 COMPREHEN METABOLIC PANEL: CPT

## 2023-07-06 DIAGNOSIS — I25.10 CAD IN NATIVE ARTERY: ICD-10-CM

## 2023-07-06 DIAGNOSIS — Z95.1 S/P CABG X 3: Primary | ICD-10-CM

## 2023-07-06 RX ORDER — OXYCODONE HYDROCHLORIDE AND ACETAMINOPHEN 5; 325 MG/1; MG/1
1 TABLET ORAL EVERY 6 HOURS PRN
Qty: 28 TABLET | Refills: 0 | Status: SHIPPED | OUTPATIENT
Start: 2023-07-06 | End: 2023-07-13

## 2023-07-10 ENCOUNTER — HOSPITAL ENCOUNTER (OUTPATIENT)
Age: 78
Setting detail: SPECIMEN
Discharge: HOME | End: 2023-07-10
Payer: MEDICARE

## 2023-07-10 LAB
ALBUMIN SERPL-MCNC: 3.7 G/DL (ref 3.5–5.2)
ALBUMIN/GLOB SERPL: 1.4 {RATIO} (ref 1–2.5)
ALP SERPL-CCNC: 107 U/L (ref 40–129)
ALT SERPL-CCNC: 7 U/L (ref 5–41)
ANION GAP SERPL CALCULATED.3IONS-SCNC: 12 MMOL/L (ref 9–17)
AST SERPL-CCNC: 14 U/L
BASOPHILS # BLD: 0.1 K/UL (ref 0–0.2)
BASOPHILS NFR BLD: 1 % (ref 0–2)
BILIRUB SERPL-MCNC: 0.4 MG/DL (ref 0.3–1.2)
BUN SERPL-MCNC: 27 MG/DL (ref 8–23)
BUN/CREAT SERPL: 7 (ref 9–20)
CALCIUM SERPL-MCNC: 8.7 MG/DL (ref 8.6–10.4)
CHLORIDE SERPL-SCNC: 104 MMOL/L (ref 98–107)
CO2 SERPL-SCNC: 25 MMOL/L (ref 20–31)
CREAT SERPL-MCNC: 4 MG/DL (ref 0.7–1.2)
EOSINOPHIL # BLD: 0.98 K/UL (ref 0–0.44)
EOSINOPHILS RELATIVE PERCENT: 10 % (ref 1–4)
ERYTHROCYTE [DISTWIDTH] IN BLOOD BY AUTOMATED COUNT: 15.2 % (ref 11.8–14.4)
GFR SERPL CREATININE-BSD FRML MDRD: 15 ML/MIN/1.73M2
GLUCOSE SERPL-MCNC: 202 MG/DL (ref 70–99)
HCT VFR BLD AUTO: 30.4 % (ref 40.7–50.3)
HGB BLD-MCNC: 9.7 G/DL (ref 13–17)
IMM GRANULOCYTES # BLD AUTO: 0.18 K/UL (ref 0–0.3)
IMM GRANULOCYTES NFR BLD: 2 %
LYMPHOCYTES # BLD: 11 % (ref 24–43)
LYMPHOCYTES NFR BLD: 1.11 K/UL (ref 1.1–3.7)
MCH RBC QN AUTO: 29.5 PG (ref 25.2–33.5)
MCHC RBC AUTO-ENTMCNC: 31.9 G/DL (ref 28.4–34.8)
MCV RBC AUTO: 92.4 FL (ref 82.6–102.9)
MONOCYTES NFR BLD: 0.59 K/UL (ref 0.1–1.2)
MONOCYTES NFR BLD: 6 % (ref 3–12)
NEUTROPHILS NFR BLD: 70 % (ref 36–65)
NEUTS SEG NFR BLD: 7.04 K/UL (ref 1.5–8.1)
NRBC BLD-RTO: 0 PER 100 WBC
PLATELET # BLD AUTO: 286 K/UL (ref 138–453)
PMV BLD AUTO: 9.3 FL (ref 8.1–13.5)
POTASSIUM SERPL-SCNC: 4.4 MMOL/L (ref 3.7–5.3)
PROT SERPL-MCNC: 6.3 G/DL (ref 6.4–8.3)
RBC # BLD AUTO: 3.29 M/UL (ref 4.21–5.77)
SODIUM SERPL-SCNC: 141 MMOL/L (ref 135–144)
WBC OTHER # BLD: 10 K/UL (ref 3.5–11.3)

## 2023-07-10 PROCEDURE — P9603 ONE-WAY ALLOW PRORATED MILES: HCPCS

## 2023-07-10 PROCEDURE — 85027 COMPLETE CBC AUTOMATED: CPT

## 2023-07-10 PROCEDURE — 80053 COMPREHEN METABOLIC PANEL: CPT

## 2023-07-11 ENCOUNTER — APPOINTMENT (OUTPATIENT)
Dept: GENERAL RADIOLOGY | Age: 78
End: 2023-07-11
Payer: MEDICARE

## 2023-07-11 ENCOUNTER — HOSPITAL ENCOUNTER (INPATIENT)
Age: 78
LOS: 4 days | Discharge: NURSING, REHAB OR LONG-TERM CARE | End: 2023-07-15
Attending: FAMILY MEDICINE
Payer: MEDICARE

## 2023-07-11 ENCOUNTER — TELEPHONE (OUTPATIENT)
Dept: VASCULAR SURGERY | Age: 78
End: 2023-07-11

## 2023-07-11 ENCOUNTER — APPOINTMENT (OUTPATIENT)
Dept: CT IMAGING | Age: 78
End: 2023-07-11
Payer: MEDICARE

## 2023-07-11 ENCOUNTER — HOSPITAL ENCOUNTER (EMERGENCY)
Age: 78
Discharge: ANOTHER HOSPITAL | End: 2023-07-11
Attending: STUDENT IN AN ORGANIZED HEALTH CARE EDUCATION/TRAINING PROGRAM
Payer: MEDICARE

## 2023-07-11 VITALS
OXYGEN SATURATION: 95 % | RESPIRATION RATE: 21 BRPM | DIASTOLIC BLOOD PRESSURE: 61 MMHG | HEART RATE: 69 BPM | TEMPERATURE: 97.4 F | SYSTOLIC BLOOD PRESSURE: 188 MMHG

## 2023-07-11 DIAGNOSIS — I16.0 HYPERTENSIVE URGENCY: ICD-10-CM

## 2023-07-11 DIAGNOSIS — R79.89 ELEVATED BRAIN NATRIURETIC PEPTIDE (BNP) LEVEL: ICD-10-CM

## 2023-07-11 DIAGNOSIS — R73.9 HYPERGLYCEMIA: Primary | ICD-10-CM

## 2023-07-11 DIAGNOSIS — R77.8 ELEVATED TROPONIN: ICD-10-CM

## 2023-07-11 DIAGNOSIS — Z95.1 S/P CABG X 3: ICD-10-CM

## 2023-07-11 DIAGNOSIS — I50.9 ACUTE ON CHRONIC CONGESTIVE HEART FAILURE, UNSPECIFIED HEART FAILURE TYPE (HCC): ICD-10-CM

## 2023-07-11 DIAGNOSIS — I25.10 CAD IN NATIVE ARTERY: ICD-10-CM

## 2023-07-11 PROBLEM — E11.65 TYPE 2 DIABETES MELLITUS WITH HYPERGLYCEMIA, WITH LONG-TERM CURRENT USE OF INSULIN (HCC): Status: ACTIVE | Noted: 2023-04-24

## 2023-07-11 PROBLEM — N18.4 CKD (CHRONIC KIDNEY DISEASE) STAGE 4, GFR 15-29 ML/MIN (HCC): Status: RESOLVED | Noted: 2022-04-28 | Resolved: 2023-07-11

## 2023-07-11 PROBLEM — I50.32 CHRONIC DIASTOLIC (CONGESTIVE) HEART FAILURE (HCC): Status: ACTIVE | Noted: 2021-11-22

## 2023-07-11 PROBLEM — Z79.4 TYPE 2 DIABETES MELLITUS WITH HYPERGLYCEMIA, WITH LONG-TERM CURRENT USE OF INSULIN (HCC): Status: ACTIVE | Noted: 2023-04-24

## 2023-07-11 LAB
ALBUMIN SERPL-MCNC: 3.7 G/DL (ref 3.5–5.2)
ALBUMIN/GLOB SERPL: 1.3 {RATIO} (ref 1–2.5)
ALP SERPL-CCNC: 106 U/L (ref 40–129)
ALT SERPL-CCNC: 8 U/L (ref 5–41)
ANION GAP SERPL CALCULATED.3IONS-SCNC: 10 MMOL/L
AST SERPL-CCNC: 13 U/L
BASOPHILS # BLD: 0.06 K/UL (ref 0–0.2)
BASOPHILS NFR BLD: 1 % (ref 0–2)
BILIRUB SERPL-MCNC: 0.4 MG/DL (ref 0.3–1.2)
BNP SERPL-MCNC: ABNORMAL PG/ML
BUN SERPL-MCNC: 25 MG/DL (ref 8–23)
BUN/CREAT SERPL: 8 (ref 9–20)
CALCIUM SERPL-MCNC: 8.6 MG/DL (ref 8.6–10.4)
CHLORIDE SERPL-SCNC: 99 MMOL/L (ref 98–107)
CO2 SERPL-SCNC: 26 MMOL/L (ref 20–31)
CREAT SERPL-MCNC: 3.1 MG/DL (ref 0.7–1.2)
EKG ATRIAL RATE: 65 BPM
EKG P AXIS: 27 DEGREES
EKG P-R INTERVAL: 220 MS
EKG Q-T INTERVAL: 484 MS
EKG QRS DURATION: 100 MS
EKG QTC CALCULATION (BAZETT): 503 MS
EKG R AXIS: -11 DEGREES
EKG T AXIS: 174 DEGREES
EKG VENTRICULAR RATE: 65 BPM
EOSINOPHIL # BLD: 0.81 K/UL (ref 0–0.44)
EOSINOPHILS RELATIVE PERCENT: 8 % (ref 1–4)
ERYTHROCYTE [DISTWIDTH] IN BLOOD BY AUTOMATED COUNT: 15.7 % (ref 11.8–14.4)
GFR SERPL CREATININE-BSD FRML MDRD: 20 ML/MIN/1.73M2
GLUCOSE BLD-MCNC: 174 MG/DL (ref 75–110)
GLUCOSE SERPL-MCNC: 240 MG/DL (ref 70–99)
HCT VFR BLD AUTO: 32.1 % (ref 40.7–50.3)
HGB BLD-MCNC: 10 G/DL (ref 13–17)
IMM GRANULOCYTES # BLD AUTO: 0.11 K/UL (ref 0–0.3)
IMM GRANULOCYTES NFR BLD: 1 %
LYMPHOCYTES # BLD: 11 % (ref 24–43)
LYMPHOCYTES NFR BLD: 1.17 K/UL (ref 1.1–3.7)
MCH RBC QN AUTO: 29.4 PG (ref 25.2–33.5)
MCHC RBC AUTO-ENTMCNC: 31.2 G/DL (ref 28.4–34.8)
MCV RBC AUTO: 94.4 FL (ref 82.6–102.9)
MONOCYTES NFR BLD: 0.68 K/UL (ref 0.1–1.2)
MONOCYTES NFR BLD: 7 % (ref 3–12)
NEUTROPHILS NFR BLD: 72 % (ref 36–65)
NEUTS SEG NFR BLD: 7.69 K/UL (ref 1.5–8.1)
NRBC BLD-RTO: 0 PER 100 WBC
PLATELET # BLD AUTO: 250 K/UL (ref 138–453)
PMV BLD AUTO: 9.3 FL (ref 8.1–13.5)
POTASSIUM SERPL-SCNC: 4.4 MMOL/L (ref 3.7–5.3)
PROT SERPL-MCNC: 6.6 G/DL (ref 6.4–8.3)
RBC # BLD AUTO: 3.4 M/UL (ref 4.21–5.77)
SODIUM SERPL-SCNC: 135 MMOL/L (ref 135–144)
TROPONIN I SERPL HS-MCNC: 150 NG/L (ref 0–22)
TROPONIN I SERPL HS-MCNC: 150 NG/L (ref 0–22)
WBC OTHER # BLD: 10.5 K/UL (ref 3.5–11.3)

## 2023-07-11 PROCEDURE — 99285 EMERGENCY DEPT VISIT HI MDM: CPT

## 2023-07-11 PROCEDURE — 82947 ASSAY GLUCOSE BLOOD QUANT: CPT

## 2023-07-11 PROCEDURE — 6360000002 HC RX W HCPCS: Performed by: NURSE PRACTITIONER

## 2023-07-11 PROCEDURE — 93005 ELECTROCARDIOGRAM TRACING: CPT | Performed by: STUDENT IN AN ORGANIZED HEALTH CARE EDUCATION/TRAINING PROGRAM

## 2023-07-11 PROCEDURE — 2060000000 HC ICU INTERMEDIATE R&B

## 2023-07-11 PROCEDURE — 80053 COMPREHEN METABOLIC PANEL: CPT

## 2023-07-11 PROCEDURE — 85027 COMPLETE CBC AUTOMATED: CPT

## 2023-07-11 PROCEDURE — 70450 CT HEAD/BRAIN W/O DYE: CPT

## 2023-07-11 PROCEDURE — 36415 COLL VENOUS BLD VENIPUNCTURE: CPT

## 2023-07-11 PROCEDURE — 93010 ELECTROCARDIOGRAM REPORT: CPT | Performed by: INTERNAL MEDICINE

## 2023-07-11 PROCEDURE — 6370000000 HC RX 637 (ALT 250 FOR IP): Performed by: NURSE PRACTITIONER

## 2023-07-11 PROCEDURE — 2580000003 HC RX 258: Performed by: NURSE PRACTITIONER

## 2023-07-11 PROCEDURE — 84484 ASSAY OF TROPONIN QUANT: CPT

## 2023-07-11 PROCEDURE — 83880 ASSAY OF NATRIURETIC PEPTIDE: CPT

## 2023-07-11 PROCEDURE — 71046 X-RAY EXAM CHEST 2 VIEWS: CPT

## 2023-07-11 PROCEDURE — 6360000002 HC RX W HCPCS: Performed by: STUDENT IN AN ORGANIZED HEALTH CARE EDUCATION/TRAINING PROGRAM

## 2023-07-11 PROCEDURE — 96375 TX/PRO/DX INJ NEW DRUG ADDON: CPT

## 2023-07-11 PROCEDURE — 96374 THER/PROPH/DIAG INJ IV PUSH: CPT

## 2023-07-11 PROCEDURE — 99223 1ST HOSP IP/OBS HIGH 75: CPT | Performed by: INTERNAL MEDICINE

## 2023-07-11 RX ORDER — FUROSEMIDE 40 MG/1
80 TABLET ORAL DAILY
Status: DISCONTINUED | OUTPATIENT
Start: 2023-07-11 | End: 2023-07-15 | Stop reason: HOSPADM

## 2023-07-11 RX ORDER — POTASSIUM CHLORIDE 7.45 MG/ML
10 INJECTION INTRAVENOUS PRN
Status: DISCONTINUED | OUTPATIENT
Start: 2023-07-11 | End: 2023-07-11

## 2023-07-11 RX ORDER — MAGNESIUM SULFATE 1 G/100ML
1000 INJECTION INTRAVENOUS PRN
Status: DISCONTINUED | OUTPATIENT
Start: 2023-07-11 | End: 2023-07-11

## 2023-07-11 RX ORDER — ACETAMINOPHEN 650 MG/1
650 SUPPOSITORY RECTAL EVERY 6 HOURS PRN
Status: DISCONTINUED | OUTPATIENT
Start: 2023-07-11 | End: 2023-07-15 | Stop reason: HOSPADM

## 2023-07-11 RX ORDER — AMLODIPINE BESYLATE 5 MG/1
5 TABLET ORAL DAILY
Status: DISCONTINUED | OUTPATIENT
Start: 2023-07-11 | End: 2023-07-12

## 2023-07-11 RX ORDER — ACETAMINOPHEN 325 MG/1
650 TABLET ORAL EVERY 6 HOURS PRN
Status: DISCONTINUED | OUTPATIENT
Start: 2023-07-11 | End: 2023-07-15 | Stop reason: HOSPADM

## 2023-07-11 RX ORDER — ONDANSETRON 2 MG/ML
4 INJECTION INTRAMUSCULAR; INTRAVENOUS EVERY 6 HOURS PRN
Status: DISCONTINUED | OUTPATIENT
Start: 2023-07-11 | End: 2023-07-15 | Stop reason: HOSPADM

## 2023-07-11 RX ORDER — SODIUM CHLORIDE 9 MG/ML
INJECTION, SOLUTION INTRAVENOUS PRN
Status: DISCONTINUED | OUTPATIENT
Start: 2023-07-11 | End: 2023-07-15 | Stop reason: HOSPADM

## 2023-07-11 RX ORDER — AMLODIPINE BESYLATE 5 MG/1
5 TABLET ORAL DAILY
COMMUNITY

## 2023-07-11 RX ORDER — CLONIDINE HYDROCHLORIDE 0.1 MG/1
0.2 TABLET ORAL ONCE
Status: COMPLETED | OUTPATIENT
Start: 2023-07-11 | End: 2023-07-11

## 2023-07-11 RX ORDER — CLOPIDOGREL BISULFATE 75 MG/1
75 TABLET ORAL DAILY
Status: DISCONTINUED | OUTPATIENT
Start: 2023-07-11 | End: 2023-07-15 | Stop reason: HOSPADM

## 2023-07-11 RX ORDER — PREDNISOLONE ACETATE 10 MG/ML
1 SUSPENSION/ DROPS OPHTHALMIC DAILY
Status: DISCONTINUED | OUTPATIENT
Start: 2023-07-11 | End: 2023-07-15 | Stop reason: HOSPADM

## 2023-07-11 RX ORDER — LATANOPROST 50 UG/ML
1 SOLUTION/ DROPS OPHTHALMIC NIGHTLY
Status: DISCONTINUED | OUTPATIENT
Start: 2023-07-11 | End: 2023-07-15 | Stop reason: HOSPADM

## 2023-07-11 RX ORDER — MIDODRINE HYDROCHLORIDE 5 MG/1
10 TABLET ORAL 3 TIMES DAILY PRN
Status: DISCONTINUED | OUTPATIENT
Start: 2023-07-11 | End: 2023-07-12

## 2023-07-11 RX ORDER — POTASSIUM CHLORIDE 20 MEQ/1
40 TABLET, EXTENDED RELEASE ORAL PRN
Status: DISCONTINUED | OUTPATIENT
Start: 2023-07-11 | End: 2023-07-11

## 2023-07-11 RX ORDER — ASPIRIN 81 MG/1
81 TABLET ORAL DAILY
Status: DISCONTINUED | OUTPATIENT
Start: 2023-07-11 | End: 2023-07-15 | Stop reason: HOSPADM

## 2023-07-11 RX ORDER — ONDANSETRON 4 MG/1
4 TABLET, ORALLY DISINTEGRATING ORAL EVERY 8 HOURS PRN
Status: DISCONTINUED | OUTPATIENT
Start: 2023-07-11 | End: 2023-07-15 | Stop reason: HOSPADM

## 2023-07-11 RX ORDER — HYDRALAZINE HYDROCHLORIDE 20 MG/ML
10 INJECTION INTRAMUSCULAR; INTRAVENOUS
Status: COMPLETED | OUTPATIENT
Start: 2023-07-11 | End: 2023-07-11

## 2023-07-11 RX ORDER — TAMSULOSIN HYDROCHLORIDE 0.4 MG/1
0.4 CAPSULE ORAL NIGHTLY
Status: DISCONTINUED | OUTPATIENT
Start: 2023-07-11 | End: 2023-07-15 | Stop reason: HOSPADM

## 2023-07-11 RX ORDER — PROCHLORPERAZINE EDISYLATE 5 MG/ML
10 INJECTION INTRAMUSCULAR; INTRAVENOUS EVERY 6 HOURS PRN
Status: DISCONTINUED | OUTPATIENT
Start: 2023-07-11 | End: 2023-07-15 | Stop reason: HOSPADM

## 2023-07-11 RX ORDER — AMIODARONE HYDROCHLORIDE 200 MG/1
200 TABLET ORAL 2 TIMES DAILY
Status: DISCONTINUED | OUTPATIENT
Start: 2023-07-11 | End: 2023-07-13

## 2023-07-11 RX ORDER — CLONIDINE 0.1 MG/24H
1 PATCH, EXTENDED RELEASE TRANSDERMAL WEEKLY
Status: DISCONTINUED | OUTPATIENT
Start: 2023-07-11 | End: 2023-07-14

## 2023-07-11 RX ORDER — INSULIN GLARGINE 100 [IU]/ML
24 INJECTION, SOLUTION SUBCUTANEOUS 2 TIMES DAILY
COMMUNITY

## 2023-07-11 RX ORDER — SODIUM CHLORIDE 0.9 % (FLUSH) 0.9 %
10 SYRINGE (ML) INJECTION PRN
Status: DISCONTINUED | OUTPATIENT
Start: 2023-07-11 | End: 2023-07-15 | Stop reason: HOSPADM

## 2023-07-11 RX ORDER — ONDANSETRON 2 MG/ML
4 INJECTION INTRAMUSCULAR; INTRAVENOUS ONCE
Status: COMPLETED | OUTPATIENT
Start: 2023-07-11 | End: 2023-07-11

## 2023-07-11 RX ORDER — INSULIN GLARGINE 100 [IU]/ML
24 INJECTION, SOLUTION SUBCUTANEOUS 2 TIMES DAILY
Status: DISCONTINUED | OUTPATIENT
Start: 2023-07-11 | End: 2023-07-15 | Stop reason: HOSPADM

## 2023-07-11 RX ORDER — HEPARIN SODIUM 5000 [USP'U]/ML
5000 INJECTION, SOLUTION INTRAVENOUS; SUBCUTANEOUS EVERY 8 HOURS SCHEDULED
Status: DISCONTINUED | OUTPATIENT
Start: 2023-07-11 | End: 2023-07-15 | Stop reason: HOSPADM

## 2023-07-11 RX ORDER — CLONIDINE 0.1 MG/24H
1 PATCH, EXTENDED RELEASE TRANSDERMAL WEEKLY
Status: ON HOLD | COMMUNITY
End: 2023-07-15 | Stop reason: HOSPADM

## 2023-07-11 RX ORDER — SEVELAMER CARBONATE 800 MG/1
800 TABLET, FILM COATED ORAL
Status: DISCONTINUED | OUTPATIENT
Start: 2023-07-11 | End: 2023-07-15 | Stop reason: HOSPADM

## 2023-07-11 RX ORDER — SODIUM CHLORIDE 0.9 % (FLUSH) 0.9 %
5-40 SYRINGE (ML) INJECTION EVERY 12 HOURS SCHEDULED
Status: DISCONTINUED | OUTPATIENT
Start: 2023-07-11 | End: 2023-07-15 | Stop reason: HOSPADM

## 2023-07-11 RX ORDER — DEXTROSE MONOHYDRATE 100 MG/ML
INJECTION, SOLUTION INTRAVENOUS CONTINUOUS PRN
Status: DISCONTINUED | OUTPATIENT
Start: 2023-07-11 | End: 2023-07-15 | Stop reason: HOSPADM

## 2023-07-11 RX ADMIN — SODIUM CHLORIDE, PRESERVATIVE FREE 10 ML: 5 INJECTION INTRAVENOUS at 20:37

## 2023-07-11 RX ADMIN — ACETAMINOPHEN 650 MG: 325 TABLET ORAL at 20:10

## 2023-07-11 RX ADMIN — METOPROLOL TARTRATE 12.5 MG: 25 TABLET ORAL at 20:56

## 2023-07-11 RX ADMIN — LATANOPROST 1 DROP: 50 SOLUTION/ DROPS OPHTHALMIC at 20:57

## 2023-07-11 RX ADMIN — SEVELAMER CARBONATE 800 MG: 800 TABLET, FILM COATED ORAL at 23:03

## 2023-07-11 RX ADMIN — TAMSULOSIN HYDROCHLORIDE 0.4 MG: 0.4 CAPSULE ORAL at 20:27

## 2023-07-11 RX ADMIN — ONDANSETRON 4 MG: 2 INJECTION INTRAMUSCULAR; INTRAVENOUS at 15:56

## 2023-07-11 RX ADMIN — HYDRALAZINE HYDROCHLORIDE 10 MG: 20 INJECTION INTRAMUSCULAR; INTRAVENOUS at 16:39

## 2023-07-11 RX ADMIN — SODIUM CHLORIDE, PRESERVATIVE FREE 10 ML: 5 INJECTION INTRAVENOUS at 21:13

## 2023-07-11 RX ADMIN — CLONIDINE HYDROCHLORIDE 0.2 MG: 0.1 TABLET ORAL at 23:09

## 2023-07-11 RX ADMIN — PROCHLORPERAZINE EDISYLATE 10 MG: 5 INJECTION INTRAMUSCULAR; INTRAVENOUS at 23:03

## 2023-07-11 RX ADMIN — HEPARIN SODIUM 5000 UNITS: 5000 INJECTION INTRAVENOUS; SUBCUTANEOUS at 23:03

## 2023-07-11 RX ADMIN — ONDANSETRON 4 MG: 2 INJECTION INTRAMUSCULAR; INTRAVENOUS at 21:13

## 2023-07-11 RX ADMIN — AMIODARONE HYDROCHLORIDE 200 MG: 200 TABLET ORAL at 20:57

## 2023-07-11 ASSESSMENT — PAIN DESCRIPTION - DESCRIPTORS: DESCRIPTORS: ACHING;DISCOMFORT

## 2023-07-11 ASSESSMENT — PAIN DESCRIPTION - ORIENTATION: ORIENTATION: MID

## 2023-07-11 ASSESSMENT — LIFESTYLE VARIABLES
HOW MANY STANDARD DRINKS CONTAINING ALCOHOL DO YOU HAVE ON A TYPICAL DAY: PATIENT DOES NOT DRINK
HOW OFTEN DO YOU HAVE A DRINK CONTAINING ALCOHOL: NEVER

## 2023-07-11 ASSESSMENT — PAIN DESCRIPTION - LOCATION: LOCATION: CHEST

## 2023-07-11 ASSESSMENT — PAIN SCALES - GENERAL: PAINLEVEL_OUTOF10: 6

## 2023-07-11 NOTE — ED NOTES
Pt presents to the ER via EMS. Pt is tx from [facility] for Hypertensive Emergency. Pt is staying at inpatient rehab facility where they took his BP and it was over 302 systolic. Pt is dialysis patient M/W/F. Pt states he got full dialysis treatment yesterday. Pt is 3 weeks post triple bypass surgery. Pt on arrival is A&O x4, in NAD. Pt placed on full cardiac bedside monitoring.       Luis Armando Roche RN  07/11/23 1203

## 2023-07-11 NOTE — TELEPHONE ENCOUNTER
The nurse at [facility] called into the office and states that the patient blood pressure has been elevated and patient is complaining of nausea. Patients blood pressure today was 194/86 and 208/72. Patient has been receiving all his medication with the addition of Klonopin patches. I instructed the nurse that the patient should be taken to [facility] ER for evaluation.

## 2023-07-12 LAB
ANION GAP SERPL CALCULATED.3IONS-SCNC: 12 MMOL/L (ref 9–17)
ANION GAP SERPL CALCULATED.3IONS-SCNC: 13 MMOL/L (ref 9–17)
BASOPHILS # BLD: 0.05 K/UL (ref 0–0.2)
BASOPHILS NFR BLD: 1 % (ref 0–2)
BUN SERPL-MCNC: 28 MG/DL (ref 8–23)
BUN SERPL-MCNC: 29 MG/DL (ref 8–23)
CALCIUM SERPL-MCNC: 8.7 MG/DL (ref 8.6–10.4)
CALCIUM SERPL-MCNC: 8.8 MG/DL (ref 8.6–10.4)
CHLORIDE SERPL-SCNC: 101 MMOL/L (ref 98–107)
CHLORIDE SERPL-SCNC: 103 MMOL/L (ref 98–107)
CO2 SERPL-SCNC: 24 MMOL/L (ref 20–31)
CO2 SERPL-SCNC: 25 MMOL/L (ref 20–31)
CREAT SERPL-MCNC: 3.5 MG/DL (ref 0.7–1.2)
CREAT SERPL-MCNC: 3.5 MG/DL (ref 0.7–1.2)
EOSINOPHIL # BLD: 0.65 K/UL (ref 0–0.44)
EOSINOPHILS RELATIVE PERCENT: 8 % (ref 1–4)
ERYTHROCYTE [DISTWIDTH] IN BLOOD BY AUTOMATED COUNT: 15.5 % (ref 11.8–14.4)
GFR SERPL CREATININE-BSD FRML MDRD: 17 ML/MIN/1.73M2
GFR SERPL CREATININE-BSD FRML MDRD: 17 ML/MIN/1.73M2
GLUCOSE BLD-MCNC: 162 MG/DL (ref 75–110)
GLUCOSE BLD-MCNC: 169 MG/DL (ref 75–110)
GLUCOSE BLD-MCNC: 258 MG/DL (ref 75–110)
GLUCOSE SERPL-MCNC: 175 MG/DL (ref 70–99)
GLUCOSE SERPL-MCNC: 186 MG/DL (ref 70–99)
HCT VFR BLD AUTO: 29.1 % (ref 40.7–50.3)
HGB BLD-MCNC: 8.9 G/DL (ref 13–17)
IMM GRANULOCYTES # BLD AUTO: 0.09 K/UL (ref 0–0.3)
IMM GRANULOCYTES NFR BLD: 1 %
INR PPP: 1.1
LYMPHOCYTES # BLD: 15 % (ref 24–43)
LYMPHOCYTES NFR BLD: 1.21 K/UL (ref 1.1–3.7)
MCH RBC QN AUTO: 29.4 PG (ref 25.2–33.5)
MCHC RBC AUTO-ENTMCNC: 30.6 G/DL (ref 28.4–34.8)
MCV RBC AUTO: 96 FL (ref 82.6–102.9)
MONOCYTES NFR BLD: 0.48 K/UL (ref 0.1–1.2)
MONOCYTES NFR BLD: 6 % (ref 3–12)
NEUTROPHILS NFR BLD: 69 % (ref 36–65)
NEUTS SEG NFR BLD: 5.64 K/UL (ref 1.5–8.1)
NRBC BLD-RTO: 0 PER 100 WBC
PLATELET # BLD AUTO: 246 K/UL (ref 138–453)
PMV BLD AUTO: 9.4 FL (ref 8.1–13.5)
POTASSIUM SERPL-SCNC: 4.4 MMOL/L (ref 3.7–5.3)
POTASSIUM SERPL-SCNC: 4.9 MMOL/L (ref 3.7–5.3)
PROTHROMBIN TIME: 14.1 SEC (ref 11.7–14.9)
RBC # BLD AUTO: 3.03 M/UL (ref 4.21–5.77)
RBC # BLD: ABNORMAL 10*6/UL
SODIUM SERPL-SCNC: 138 MMOL/L (ref 135–144)
SODIUM SERPL-SCNC: 140 MMOL/L (ref 135–144)
TSH SERPL DL<=0.05 MIU/L-ACNC: 4.91 UIU/ML (ref 0.3–5)
WBC OTHER # BLD: 8.1 K/UL (ref 3.5–11.3)

## 2023-07-12 PROCEDURE — 36415 COLL VENOUS BLD VENIPUNCTURE: CPT

## 2023-07-12 PROCEDURE — 6370000000 HC RX 637 (ALT 250 FOR IP): Performed by: INTERNAL MEDICINE

## 2023-07-12 PROCEDURE — 6360000002 HC RX W HCPCS: Performed by: INTERNAL MEDICINE

## 2023-07-12 PROCEDURE — 85027 COMPLETE CBC AUTOMATED: CPT

## 2023-07-12 PROCEDURE — 5A1D70Z PERFORMANCE OF URINARY FILTRATION, INTERMITTENT, LESS THAN 6 HOURS PER DAY: ICD-10-PCS | Performed by: INTERNAL MEDICINE

## 2023-07-12 PROCEDURE — 97162 PT EVAL MOD COMPLEX 30 MIN: CPT

## 2023-07-12 PROCEDURE — 90935 HEMODIALYSIS ONE EVALUATION: CPT

## 2023-07-12 PROCEDURE — 99223 1ST HOSP IP/OBS HIGH 75: CPT | Performed by: INTERNAL MEDICINE

## 2023-07-12 PROCEDURE — 6360000002 HC RX W HCPCS: Performed by: NURSE PRACTITIONER

## 2023-07-12 PROCEDURE — 99232 SBSQ HOSP IP/OBS MODERATE 35: CPT | Performed by: FAMILY MEDICINE

## 2023-07-12 PROCEDURE — 6370000000 HC RX 637 (ALT 250 FOR IP): Performed by: NURSE PRACTITIONER

## 2023-07-12 PROCEDURE — 97535 SELF CARE MNGMENT TRAINING: CPT

## 2023-07-12 PROCEDURE — 97530 THERAPEUTIC ACTIVITIES: CPT

## 2023-07-12 PROCEDURE — 97166 OT EVAL MOD COMPLEX 45 MIN: CPT

## 2023-07-12 PROCEDURE — 2060000000 HC ICU INTERMEDIATE R&B

## 2023-07-12 PROCEDURE — 94761 N-INVAS EAR/PLS OXIMETRY MLT: CPT

## 2023-07-12 PROCEDURE — 90935 HEMODIALYSIS ONE EVALUATION: CPT | Performed by: INTERNAL MEDICINE

## 2023-07-12 PROCEDURE — 80048 BASIC METABOLIC PNL TOTAL CA: CPT

## 2023-07-12 PROCEDURE — 2580000003 HC RX 258: Performed by: NURSE PRACTITIONER

## 2023-07-12 PROCEDURE — 84443 ASSAY THYROID STIM HORMONE: CPT

## 2023-07-12 PROCEDURE — 82947 ASSAY GLUCOSE BLOOD QUANT: CPT

## 2023-07-12 PROCEDURE — 85610 PROTHROMBIN TIME: CPT

## 2023-07-12 RX ORDER — ATENOLOL 50 MG/1
50 TABLET ORAL DAILY
Status: DISCONTINUED | OUTPATIENT
Start: 2023-07-12 | End: 2023-07-15 | Stop reason: HOSPADM

## 2023-07-12 RX ORDER — POLYETHYLENE GLYCOL 3350 17 G/17G
17 POWDER, FOR SOLUTION ORAL DAILY
Status: DISCONTINUED | OUTPATIENT
Start: 2023-07-12 | End: 2023-07-15 | Stop reason: HOSPADM

## 2023-07-12 RX ORDER — DIPHENHYDRAMINE HYDROCHLORIDE 50 MG/ML
12.5 INJECTION INTRAMUSCULAR; INTRAVENOUS
Status: COMPLETED | OUTPATIENT
Start: 2023-07-12 | End: 2023-07-12

## 2023-07-12 RX ORDER — HYDRALAZINE HYDROCHLORIDE 20 MG/ML
20 INJECTION INTRAMUSCULAR; INTRAVENOUS EVERY 4 HOURS PRN
Status: DISCONTINUED | OUTPATIENT
Start: 2023-07-12 | End: 2023-07-15 | Stop reason: HOSPADM

## 2023-07-12 RX ORDER — CLONIDINE HYDROCHLORIDE 0.2 MG/1
0.2 TABLET ORAL ONCE
Status: COMPLETED | OUTPATIENT
Start: 2023-07-12 | End: 2023-07-12

## 2023-07-12 RX ORDER — HYDRALAZINE HYDROCHLORIDE 25 MG/1
25 TABLET, FILM COATED ORAL EVERY 8 HOURS SCHEDULED
Status: DISCONTINUED | OUTPATIENT
Start: 2023-07-12 | End: 2023-07-12

## 2023-07-12 RX ORDER — AMLODIPINE BESYLATE 10 MG/1
10 TABLET ORAL DAILY
Status: DISCONTINUED | OUTPATIENT
Start: 2023-07-12 | End: 2023-07-15 | Stop reason: HOSPADM

## 2023-07-12 RX ORDER — HYDRALAZINE HYDROCHLORIDE 50 MG/1
50 TABLET, FILM COATED ORAL EVERY 8 HOURS SCHEDULED
Status: DISCONTINUED | OUTPATIENT
Start: 2023-07-12 | End: 2023-07-13

## 2023-07-12 RX ORDER — PANTOPRAZOLE SODIUM 40 MG/1
40 TABLET, DELAYED RELEASE ORAL
Status: DISCONTINUED | OUTPATIENT
Start: 2023-07-12 | End: 2023-07-15 | Stop reason: HOSPADM

## 2023-07-12 RX ADMIN — ACETAMINOPHEN 650 MG: 325 TABLET ORAL at 05:17

## 2023-07-12 RX ADMIN — CLOPIDOGREL BISULFATE 75 MG: 75 TABLET, FILM COATED ORAL at 20:44

## 2023-07-12 RX ADMIN — HYDRALAZINE HYDROCHLORIDE 20 MG: 20 INJECTION INTRAMUSCULAR; INTRAVENOUS at 05:05

## 2023-07-12 RX ADMIN — HEPARIN SODIUM 5000 UNITS: 5000 INJECTION INTRAVENOUS; SUBCUTANEOUS at 13:56

## 2023-07-12 RX ADMIN — CLONIDINE HYDROCHLORIDE 0.2 MG: 0.1 TABLET ORAL at 19:25

## 2023-07-12 RX ADMIN — Medication 81 MG: at 20:44

## 2023-07-12 RX ADMIN — SODIUM CHLORIDE, PRESERVATIVE FREE 10 ML: 5 INJECTION INTRAVENOUS at 08:09

## 2023-07-12 RX ADMIN — INSULIN GLARGINE 24 UNITS: 100 INJECTION, SOLUTION SUBCUTANEOUS at 07:59

## 2023-07-12 RX ADMIN — AMIODARONE HYDROCHLORIDE 200 MG: 200 TABLET ORAL at 20:42

## 2023-07-12 RX ADMIN — TAMSULOSIN HYDROCHLORIDE 0.4 MG: 0.4 CAPSULE ORAL at 20:45

## 2023-07-12 RX ADMIN — HEPARIN SODIUM 5000 UNITS: 5000 INJECTION INTRAVENOUS; SUBCUTANEOUS at 05:17

## 2023-07-12 RX ADMIN — SODIUM CHLORIDE, PRESERVATIVE FREE 10 ML: 5 INJECTION INTRAVENOUS at 05:05

## 2023-07-12 RX ADMIN — ATENOLOL 50 MG: 50 TABLET ORAL at 20:44

## 2023-07-12 RX ADMIN — EPOETIN ALFA-EPBX 5000 UNITS: 10000 INJECTION, SOLUTION INTRAVENOUS; SUBCUTANEOUS at 19:22

## 2023-07-12 RX ADMIN — PREDNISOLONE ACETATE 1 DROP: 10 SUSPENSION/ DROPS OPHTHALMIC at 07:57

## 2023-07-12 RX ADMIN — HEPARIN SODIUM 5000 UNITS: 5000 INJECTION INTRAVENOUS; SUBCUTANEOUS at 20:43

## 2023-07-12 RX ADMIN — HYDRALAZINE HYDROCHLORIDE 50 MG: 50 TABLET ORAL at 20:44

## 2023-07-12 RX ADMIN — ONDANSETRON 4 MG: 2 INJECTION INTRAMUSCULAR; INTRAVENOUS at 15:05

## 2023-07-12 RX ADMIN — AMLODIPINE BESYLATE 10 MG: 5 TABLET ORAL at 20:43

## 2023-07-12 RX ADMIN — SEVELAMER CARBONATE 800 MG: 800 TABLET, FILM COATED ORAL at 20:43

## 2023-07-12 RX ADMIN — ACETAMINOPHEN 650 MG: 325 TABLET ORAL at 21:00

## 2023-07-12 RX ADMIN — DIPHENHYDRAMINE HYDROCHLORIDE 12.5 MG: 50 INJECTION, SOLUTION INTRAMUSCULAR; INTRAVENOUS at 16:37

## 2023-07-12 RX ADMIN — PREDNISOLONE ACETATE 1 DROP: 10 SUSPENSION/ DROPS OPHTHALMIC at 20:42

## 2023-07-12 RX ADMIN — SODIUM CHLORIDE, PRESERVATIVE FREE 10 ML: 5 INJECTION INTRAVENOUS at 20:46

## 2023-07-12 RX ADMIN — LATANOPROST 1 DROP: 50 SOLUTION/ DROPS OPHTHALMIC at 20:48

## 2023-07-12 RX ADMIN — HYDRALAZINE HYDROCHLORIDE 20 MG: 20 INJECTION INTRAMUSCULAR; INTRAVENOUS at 16:15

## 2023-07-12 RX ADMIN — PANTOPRAZOLE SODIUM 40 MG: 40 TABLET, DELAYED RELEASE ORAL at 20:44

## 2023-07-12 ASSESSMENT — PAIN DESCRIPTION - ORIENTATION: ORIENTATION: MID

## 2023-07-12 ASSESSMENT — ENCOUNTER SYMPTOMS
RHINORRHEA: 0
BACK PAIN: 0
VOICE CHANGE: 0
SINUS PRESSURE: 0
COUGH: 0
VOMITING: 0
CHEST TIGHTNESS: 0
WHEEZING: 0
DIARRHEA: 0
ABDOMINAL PAIN: 0
CHOKING: 0
CONSTIPATION: 0
SHORTNESS OF BREATH: 0
NAUSEA: 0

## 2023-07-12 ASSESSMENT — PAIN SCALES - GENERAL
PAINLEVEL_OUTOF10: 5
PAINLEVEL_OUTOF10: 0

## 2023-07-12 ASSESSMENT — PAIN DESCRIPTION - LOCATION: LOCATION: CHEST

## 2023-07-12 NOTE — PLAN OF CARE
Problem: Chronic Conditions and Co-morbidities  Goal: Patient's chronic conditions and co-morbidity symptoms are monitored and maintained or improved  7/12/2023 1022 by Eleanor Elizondo RN  Outcome: Progressing  7/12/2023 0320 by Tanja Ferreira RN  Outcome: Progressing     Problem: Discharge Planning  Goal: Discharge to home or other facility with appropriate resources  7/12/2023 1022 by Eleanor Elizondo RN  Outcome: Progressing  7/12/2023 0320 by Tanja Ferreira RN  Outcome: Momo Mat (Taken 7/11/2023 2033 by Agus Traore RN)  Discharge to home or other facility with appropriate resources:   Identify barriers to discharge with patient and caregiver   Identify discharge learning needs (meds, wound care, etc)   Refer to discharge planning if patient needs post-hospital services based on physician order or complex needs related to functional status, cognitive ability or social support system   Arrange for needed discharge resources and transportation as appropriate   Arrange for interpreters to assist at discharge as needed     Problem: Safety - Adult  Goal: Free from fall injury  7/12/2023 1022 by Eleanor Elizondo RN  Outcome: Progressing  7/12/2023 0320 by Tanja Ferreira RN  Outcome: Progressing     Problem: ABCDS Injury Assessment  Goal: Absence of physical injury  7/12/2023 1022 by Eleanor Elizondo RN  Outcome: Progressing  7/12/2023 0320 by Tanja Ferreira RN  Outcome: Progressing     Problem: Skin/Tissue Integrity  Goal: Absence of new skin breakdown  Description: 1. Monitor for areas of redness and/or skin breakdown  2. Assess vascular access sites hourly  3. Every 4-6 hours minimum:  Change oxygen saturation probe site  4. Every 4-6 hours:  If on nasal continuous positive airway pressure, respiratory therapy assess nares and determine need for appliance change or resting period.   7/12/2023 1022 by Eleanor Elizondo RN  Outcome:

## 2023-07-12 NOTE — H&P
on PRox RCA: Ostial. 50% stenosis. EF:55%. H/O cardiovascular stress test 02/19/2016    Abnormal. Moderate perfusion defect of mild intensity in the inferior, inferoseptal adn inferoapical regions during stress imaging, which most consistent with ischemia. Global LV systolic function normal without regional wall motion abnormalities. OVerall these results are most consistent with an intermediate risk for signficant CAD. Additional testing including cardiac cath may be indicated. H/O tilt table evaluation 12/26/2017    Abnormal head upright tilt table study. The pt heart rate, blood pressure response and symptoms were most consistent with dysautonomia. Hemodialysis patient ([address])     [address] [place];  Left AV fistula    History of 24 hour EKG monitoring 08/14/2014    Occasional PAC's and PVC's which appear to be at least moderately symptomatic. History of 24 hour EKG monitoring 11/19/2014    Event Monitor. Sinus rhythm and sinus bradycardia. Infrequent isolated PVC's    History of blood transfusion     History of cardiovascular stress test 02/19/2014    Relatively NL    History of cardiovascular stress test 03/21/2018    Normal myocardial perfusion. Global left ventricular systolic function was normal with an EF of 68%. Overall, these results are most consistent with a low risk scan. History of coronary artery stent placement 04/2017    PTCA / Drug Eluting Stent:, CX and / or branches    History of CVA (cerebrovascular accident) without residual deficits 2014    Incidentally found on CT head. No known impairment now or in the past.    History of echocardiogram 02/18/2014    LA mildly dilated, EF 55%, LV wall thickness is moderately increased, no definite wall motion abnormalilities, what appears to be a pacer wire is seen w/n the RA and RV, mild-mod TR, mild pulmonary hypertension. History of Holter monitoring 12/29/2017    Rare PAC's and PVC's.      History of tilt table
g/dL    Albumin/Globulin Ratio 1.3 1.0 - 2.5   Troponin    Collection Time: 07/11/23  3:19 PM   Result Value Ref Range    Troponin, High Sensitivity 150 (HH) 0 - 22 ng/L   Brain Natriuretic Peptide    Collection Time: 07/11/23  3:19 PM   Result Value Ref Range    Pro-BNP 22,515 (H) <300 pg/mL   Troponin    Collection Time: 07/11/23  4:20 PM   Result Value Ref Range    Troponin, High Sensitivity 150 (HH) 0 - 22 ng/L      Latest Reference Range & Units 06/27/23 05:20 07/05/23 06:36 07/10/23 07:15   BUN,BUNPL 8 - 23 mg/dL 31 (H) 27 (H) 27 (H)   Creatinine 0.70 - 1.20 mg/dL 4.36 (H) 3.50 (H) 4.0 (H)   (H): Data is abnormally high     Latest Reference Range & Units 06/26/23 03:43 06/27/23 05:20 07/05/23 06:36 07/10/23 07:15   Hemoglobin Quant 13.0 - 17.0 g/dL 9.0 (L) 9.7 (L) 10.4 (L) 9.7 (L)   Hematocrit 40.7 - 50.3 % 28.4 (L) 32.4 (L) 32.8 (L) 30.4 (L)   MCV 82.6 - 102.9 fL 98.3 100.0 94.5 92.4   MCH 25.2 - 33.5 pg 31.1 29.9 30.0 29.5   MCHC 28.4 - 34.8 g/dL 31.7 29.9 31.7 31.9   MPV 8.1 - 13.5 fL 10.1 10.0 9.7 9.3   RDW 11.8 - 14.4 % 16.2 (H) 15.5 (H) 15.1 (H) 15.2 (H)   Platelet Count 396 - 453 k/uL 122 (L) 135 (L) 313 286   (L): Data is abnormally low  (H): Data is abnormally high  Imaging/Diagnostics:  XR CHEST (2 VW)    Result Date: 7/11/2023  Persistent left parahilar and basilar opacity could represent atelectasis or infection Stable cardiomegaly     Chest xray reviewed independently by myself with L>R basilar ASD vs 06/27    CT Head WO Contrast    Result Date: 7/11/2023  No acute intracranial abnormality. Echo 05/26/2023  CONCLUSIONS     Summary  Global left ventricular systolic function is normal.  Estimated ejection fraction is 50-55 % . Moderate left ventricular hypertrophy. Grade I (mild) left ventricular diastolic dysfunction. Pacemaker / ICD lead seen in right ventricle. Moderate tricuspid regurgitation. Estimated right ventricular systolic pressure is 39 mmHg.   No pericardial

## 2023-07-12 NOTE — PLAN OF CARE
Problem: Chronic Conditions and Co-morbidities  Goal: Patient's chronic conditions and co-morbidity symptoms are monitored and maintained or improved  Outcome: Progressing     Problem: Discharge Planning  Goal: Discharge to home or other facility with appropriate resources  Outcome: Progressing  Flowsheets (Taken 7/11/2023 2033 by Alona Gaitan RN)  Discharge to home or other facility with appropriate resources:   Identify barriers to discharge with patient and caregiver   Identify discharge learning needs (meds, wound care, etc)   Refer to discharge planning if patient needs post-hospital services based on physician order or complex needs related to functional status, cognitive ability or social support system   Arrange for needed discharge resources and transportation as appropriate   Arrange for interpreters to assist at discharge as needed     Problem: Safety - Adult  Goal: Free from fall injury  Outcome: Progressing     Problem: ABCDS Injury Assessment  Goal: Absence of physical injury  Outcome: Progressing

## 2023-07-12 NOTE — CONSULTS
[facility] Cardiology Cardiology    Consult / H&P               Today's Date: 7/12/2023  Patient Name: oJse Jara  Date of admission: 7/11/2023  6:50 PM  Patient's age: 68 y. o., 1945  Admission Dx: Hypertensive urgency [I16.0]    Requesting Physician: Luis Taylor MD    Cardiac Evaluation Reason:  Hypertensive Urgency    History Obtained From: patient and chart review     History of Present Illness: This patient 68y.o. years old with relevant past medical history of CABGx3, CAD, ESRD, pacemaker on right side, anemia, stroke, DM, CHF, that presents to the hospital from [facility] for hypertensive urgency. Patient was staying at the [facility] inpatient rehab facility since 6/28/2023 status post his CABG when they noted SBP over 200 and were unable to bring it down. He was brought to the ED in [facility] where his blood pressure was 210/58, EKG showed sinus rhythm with first-degree AV block, he was then transferred to [facility] for hypertensive urgency and was given hydralazine 10 and 20 mg IV push, blood pressure is now 174/66. The patient reports chest pain he describes it as a dull soreness near the incision site that hurts when he coughs and is nonradiating. Nothing makes it better and it only hurts with movement. Also endorses lightheadedness and dizziness with standing but denies shortness of breath, weakness, belly pain, recent swelling. His CABG on 6/23/2023 consisted of LIMA to LAD, reverse saphenous vein  graft to obtuse marginal 1 and reverse saphenous vein graft to obtuse  marginal 3/LPDA (left dominant coronary circulation).     Past Medical History:   has a past medical history of Abnormal tilt table test, Acute kidney injury ([address]), Acute MI ([address]), Acute renal failure superimposed on stage 4 chronic kidney disease ([address]), Acute renal failure with tubular necrosis ([address]), Anemia, Anemia in stage 4 chronic kidney disease ([address]), Angina, class II ([address]), Bell's palsy, CAD (coronary artery
PRN  dextrose bolus 10% 125 mL, PRN   Or  dextrose bolus 10% 250 mL, PRN  glucagon (rDNA) injection 1 mg, PRN  dextrose 10 % infusion, Continuous PRN  prochlorperazine (COMPAZINE) injection 10 mg, Q6H PRN      Labs:   CBC:   Recent Labs     07/10/23  0715 07/11/23  1519 07/12/23  0424   WBC 10.0 10.5 8.1   RBC 3.29* 3.40* 3.03*   HGB 9.7* 10.0* 8.9*   HCT 30.4* 32.1* 29.1*    250 246   MPV 9.3 9.3 9.4      BMP:   Recent Labs     07/11/23  1519 07/12/23  0424 07/12/23  0748    140 138   K 4.4 4.4 4.9   CL 99 103 101   CO2 26 25 24   BUN 25* 28* 29*   CREATININE 3.1* 3.5* 3.5*   GLUCOSE 240* 186* 175*   CALCIUM 8.6 8.8 8.7        Phosphorus:  No results for input(s): PHOS in the last 72 hours. Magnesium: No results for input(s): MG in the last 72 hours. Albumin:   Recent Labs     07/10/23  0715 07/11/23  1519   LABALBU 3.7 3.7       Dialysis bath:      Radiology:  Reviewed as available. Assessment:  1 ESRD:dialysis bath reviewed with nurse. Patient is on a Monday and Wednesday and Friday hemodialysis schedule at [facility] dialysis in [facility] under Dr. Mariola Jung of our group. 2.uncontrolled hypertension  3 recent open heart surgery  4 volume overload  5. ANEMIA OF CHRONIC DISEASE:   6.SECONDARY HYPERPARATHYROIDISM:     Plan:  1. Weight removal and dialysis orders reviewed. 2.  Extra dose of clonidine 0.2 mg oral in dialysis today and also have adjusted the hydralazine dose and other antihypertensive medications are reviewed. Continue the clonidine TTS #1 patch  3. Continue the patient on a Monday and Wednesday Friday hemodialysis schedule  4. Monitor hemodynamics      Please do not hesitate to call with questions. Thank you for allowing me to see this patient in nephrology consultation with the patient seen on hemodialysis today.     Electronically signed by Hector Fish MD on 7/12/2023 at 4:22 PM

## 2023-07-12 NOTE — CARE COORDINATION
Attempt to see patient for initial assessment. Patient is of the floor for dialysis. Will try again later as time allows.

## 2023-07-13 LAB
ANION GAP SERPL CALCULATED.3IONS-SCNC: 9 MMOL/L (ref 9–17)
BUN SERPL-MCNC: 17 MG/DL (ref 8–23)
CALCIUM SERPL-MCNC: 8.5 MG/DL (ref 8.6–10.4)
CHLORIDE SERPL-SCNC: 100 MMOL/L (ref 98–107)
CO2 SERPL-SCNC: 27 MMOL/L (ref 20–31)
CREAT SERPL-MCNC: 2.7 MG/DL (ref 0.7–1.2)
GFR SERPL CREATININE-BSD FRML MDRD: 24 ML/MIN/1.73M2
GLUCOSE BLD-MCNC: 144 MG/DL (ref 75–110)
GLUCOSE BLD-MCNC: 167 MG/DL (ref 75–110)
GLUCOSE BLD-MCNC: 182 MG/DL (ref 75–110)
GLUCOSE BLD-MCNC: 200 MG/DL (ref 75–110)
GLUCOSE BLD-MCNC: 300 MG/DL (ref 75–110)
GLUCOSE SERPL-MCNC: 147 MG/DL (ref 70–99)
POTASSIUM SERPL-SCNC: 4.2 MMOL/L (ref 3.7–5.3)
SODIUM SERPL-SCNC: 136 MMOL/L (ref 135–144)

## 2023-07-13 PROCEDURE — 6360000002 HC RX W HCPCS: Performed by: NURSE PRACTITIONER

## 2023-07-13 PROCEDURE — 82947 ASSAY GLUCOSE BLOOD QUANT: CPT

## 2023-07-13 PROCEDURE — 36415 COLL VENOUS BLD VENIPUNCTURE: CPT

## 2023-07-13 PROCEDURE — 94761 N-INVAS EAR/PLS OXIMETRY MLT: CPT

## 2023-07-13 PROCEDURE — 6370000000 HC RX 637 (ALT 250 FOR IP): Performed by: INTERNAL MEDICINE

## 2023-07-13 PROCEDURE — 6370000000 HC RX 637 (ALT 250 FOR IP): Performed by: NURSE PRACTITIONER

## 2023-07-13 PROCEDURE — 2060000000 HC ICU INTERMEDIATE R&B

## 2023-07-13 PROCEDURE — 99232 SBSQ HOSP IP/OBS MODERATE 35: CPT | Performed by: STUDENT IN AN ORGANIZED HEALTH CARE EDUCATION/TRAINING PROGRAM

## 2023-07-13 PROCEDURE — 99232 SBSQ HOSP IP/OBS MODERATE 35: CPT | Performed by: INTERNAL MEDICINE

## 2023-07-13 PROCEDURE — 80048 BASIC METABOLIC PNL TOTAL CA: CPT

## 2023-07-13 PROCEDURE — 2580000003 HC RX 258: Performed by: NURSE PRACTITIONER

## 2023-07-13 RX ORDER — AMIODARONE HYDROCHLORIDE 200 MG/1
200 TABLET ORAL DAILY
Status: DISCONTINUED | OUTPATIENT
Start: 2023-07-14 | End: 2023-07-13

## 2023-07-13 RX ORDER — HYDRALAZINE HYDROCHLORIDE 50 MG/1
100 TABLET, FILM COATED ORAL EVERY 8 HOURS SCHEDULED
Status: DISCONTINUED | OUTPATIENT
Start: 2023-07-13 | End: 2023-07-15 | Stop reason: HOSPADM

## 2023-07-13 RX ADMIN — INSULIN GLARGINE 24 UNITS: 100 INJECTION, SOLUTION SUBCUTANEOUS at 11:03

## 2023-07-13 RX ADMIN — PROCHLORPERAZINE EDISYLATE 10 MG: 5 INJECTION INTRAMUSCULAR; INTRAVENOUS at 19:43

## 2023-07-13 RX ADMIN — LATANOPROST 1 DROP: 50 SOLUTION/ DROPS OPHTHALMIC at 21:16

## 2023-07-13 RX ADMIN — HYDRALAZINE HYDROCHLORIDE 50 MG: 50 TABLET ORAL at 14:43

## 2023-07-13 RX ADMIN — AMLODIPINE BESYLATE 10 MG: 5 TABLET ORAL at 08:04

## 2023-07-13 RX ADMIN — SEVELAMER CARBONATE 800 MG: 800 TABLET, FILM COATED ORAL at 18:30

## 2023-07-13 RX ADMIN — Medication 81 MG: at 08:04

## 2023-07-13 RX ADMIN — CLOPIDOGREL BISULFATE 75 MG: 75 TABLET, FILM COATED ORAL at 08:04

## 2023-07-13 RX ADMIN — PREDNISOLONE ACETATE 1 DROP: 10 SUSPENSION/ DROPS OPHTHALMIC at 11:19

## 2023-07-13 RX ADMIN — TAMSULOSIN HYDROCHLORIDE 0.4 MG: 0.4 CAPSULE ORAL at 21:16

## 2023-07-13 RX ADMIN — HEPARIN SODIUM 5000 UNITS: 5000 INJECTION INTRAVENOUS; SUBCUTANEOUS at 21:16

## 2023-07-13 RX ADMIN — SODIUM CHLORIDE, PRESERVATIVE FREE 10 ML: 5 INJECTION INTRAVENOUS at 19:43

## 2023-07-13 RX ADMIN — FUROSEMIDE 80 MG: 40 TABLET ORAL at 08:04

## 2023-07-13 RX ADMIN — SODIUM CHLORIDE, PRESERVATIVE FREE 10 ML: 5 INJECTION INTRAVENOUS at 09:00

## 2023-07-13 RX ADMIN — SEVELAMER CARBONATE 800 MG: 800 TABLET, FILM COATED ORAL at 08:04

## 2023-07-13 RX ADMIN — ONDANSETRON 4 MG: 2 INJECTION INTRAMUSCULAR; INTRAVENOUS at 18:35

## 2023-07-13 RX ADMIN — HYDRALAZINE HYDROCHLORIDE 100 MG: 50 TABLET, FILM COATED ORAL at 21:16

## 2023-07-13 RX ADMIN — HEPARIN SODIUM 5000 UNITS: 5000 INJECTION INTRAVENOUS; SUBCUTANEOUS at 14:36

## 2023-07-13 RX ADMIN — HYDRALAZINE HYDROCHLORIDE 50 MG: 50 TABLET ORAL at 05:47

## 2023-07-13 RX ADMIN — HEPARIN SODIUM 5000 UNITS: 5000 INJECTION INTRAVENOUS; SUBCUTANEOUS at 05:43

## 2023-07-13 RX ADMIN — PANTOPRAZOLE SODIUM 40 MG: 40 TABLET, DELAYED RELEASE ORAL at 08:04

## 2023-07-13 RX ADMIN — AMIODARONE HYDROCHLORIDE 200 MG: 200 TABLET ORAL at 08:04

## 2023-07-13 ASSESSMENT — ENCOUNTER SYMPTOMS
CHOKING: 0
STRIDOR: 0
APNEA: 0
BLOOD IN STOOL: 0
SHORTNESS OF BREATH: 0
ABDOMINAL DISTENTION: 0
COUGH: 0
WHEEZING: 0
BACK PAIN: 0
CHEST TIGHTNESS: 0
ABDOMINAL PAIN: 0

## 2023-07-13 NOTE — PLAN OF CARE
Problem: Chronic Conditions and Co-morbidities  Goal: Patient's chronic conditions and co-morbidity symptoms are monitored and maintained or improved  Outcome: Progressing     Problem: Discharge Planning  Goal: Discharge to home or other facility with appropriate resources  Outcome: Progressing     Problem: Safety - Adult  Goal: Free from fall injury  Outcome: Progressing  Flowsheets (Taken 7/12/2023 2030 by Frank Burton)  Free From Fall Injury: Instruct family/caregiver on patient safety     Problem: ABCDS Injury Assessment  Goal: Absence of physical injury  Outcome: Progressing  Flowsheets (Taken 7/12/2023 2030 by Frank Burton)  Absence of Physical Injury: Implement safety measures based on patient assessment     Problem: Skin/Tissue Integrity  Goal: Absence of new skin breakdown  Description: 1. Monitor for areas of redness and/or skin breakdown  2. Assess vascular access sites hourly  3. Every 4-6 hours minimum:  Change oxygen saturation probe site  4. Every 4-6 hours:  If on nasal continuous positive airway pressure, respiratory therapy assess nares and determine need for appliance change or resting period.   Outcome: Progressing

## 2023-07-13 NOTE — CARE COORDINATION
Case Management Assessment  Initial Evaluation    Date/Time of Evaluation: 7/13/2023 9:25 AM  Assessment Completed by: La Scott RN    If patient is discharged prior to next notation, then this note serves as note for discharge by case management. Patient Name: Bibi Malik                   YOB: 1945  Diagnosis: Hypertensive urgency [I16.0]                   Date / Time: 7/11/2023  6:50 PM    Patient Admission Status: Inpatient   Readmission Risk (Low < 19, Mod (19-27), High > 27): Readmission Risk Score: 27.1    Current PCP: Luis Fernando Blankenship MD  PCP verified by CM? Yes Luis Fernando Blankenship)    Chart Reviewed: Yes      History Provided by: Patient  Patient Orientation: Alert and Oriented, Person, Place, Situation    Patient Cognition: Alert    Hospitalization in the last 30 days (Readmission):  Yes    If yes, Readmission Assessment in  Navigator will be completed. Advance Directives:      Code Status: Full Code   Patient's Primary Decision Maker is: Legal Next of Kin    Primary Decision MakerCarney Situ Spouse - [phone]    Secondary Decision Maker: Kaylen Valderrama - Child - [phone]    Discharge Planning:    Patient lives with: Other (Comment) (usually lives with wife, [employer] at this time) Type of Home: [address]  Primary Care Giver: Other (Comment) (SNF Staff)  Patient Support Systems include: Spouse/Significant Other, Children   Current Financial resources: Medicare  Current community resources:    Current services prior to admission: [address], Durable Medical Equipment            Current DME: Yrn Pablo            Type of Home Care services:  None    ADLS  Prior functional level:    Current functional level: Assistance with the following:, Bathing, Dressing, Mobility    PT AM-PAC: 18 /24  OT AM-PAC: 18 /24    Family can provide assistance at DC:     Would you like Case Management to discuss the discharge plan with any other

## 2023-07-13 NOTE — PLAN OF CARE
Problem: Chronic Conditions and Co-morbidities  Goal: Patient's chronic conditions and co-morbidity symptoms are monitored and maintained or improved  7/13/2023 1755 by Hector Chirinos RN  Outcome: Progressing  Flowsheets (Taken 7/13/2023 0800 by Katelynn Lomeli RN)  Care Plan - Patient's Chronic Conditions and Co-Morbidity Symptoms are Monitored and Maintained or Improved: Monitor and assess patient's chronic conditions and comorbid symptoms for stability, deterioration, or improvement  7/13/2023 0550 by Manuel Navarro RN  Outcome: Progressing     Problem: Discharge Planning  Goal: Discharge to home or other facility with appropriate resources  7/13/2023 1755 by Hector Chirinos RN  Outcome: Progressing  Flowsheets (Taken 7/13/2023 0800 by Katelynn Lomeli RN)  Discharge to home or other facility with appropriate resources: Identify barriers to discharge with patient and caregiver  7/13/2023 0550 by Manuel Navarro RN  Outcome: Progressing     Problem: Safety - Adult  Goal: Free from fall injury  7/13/2023 1755 by Hector Chirinos RN  Outcome: Progressing  7/13/2023 0550 by Manuel Navarro RN  Outcome: Progressing  Flowsheets (Taken 7/12/2023 2030 by Penney Litten)  Free From Fall Injury: Instruct family/caregiver on patient safety     Problem: ABCDS Injury Assessment  Goal: Absence of physical injury  7/13/2023 1755 by Hector Chirinos RN  Outcome: Progressing  7/13/2023 0550 by Manuel Navarro RN  Outcome: Progressing  Flowsheets (Taken 7/12/2023 2030 by Penney Litten)  Absence of Physical Injury: Implement safety measures based on patient assessment     Problem: Skin/Tissue Integrity  Goal: Absence of new skin breakdown  Description: 1. Monitor for areas of redness and/or skin breakdown  2. Assess vascular access sites hourly  3. Every 4-6 hours minimum:  Change oxygen saturation probe site  4.   Every 4-6 hours:  If on nasal continuous positive airway pressure, respiratory

## 2023-07-13 NOTE — CARE COORDINATION
07/13/23 1157   Readmission Assessment   Number of Days since last admission? 8-30 days   Previous Disposition SNF   Who is being Interviewed Patient   What was the patient's/caregiver's perception as to why they think they needed to return back to the hospital? Other (Comment)  (blood pressure)   Did you visit your Primary Care Physician after you left the hospital, before you returned this time? No   Why weren't you able to visit your PCP? Other (Comment)  (went to SNF)   Did you see a specialist, such as Cardiac, Pulmonary, Orthopedic Physician, etc. after you left the hospital? No   Who advised the patient to return to the hospital? Other (Comment)  (SNF)   Does the patient report anything that got in the way of taking their medications? No   In our efforts to provide the best possible care to you and others like you, can you think of anything that we could have done to help you after you left the hospital the first time, so that you might not have needed to return so soon?  Other (Comment)  (nothing)

## 2023-07-14 LAB
ANION GAP SERPL CALCULATED.3IONS-SCNC: 13 MMOL/L (ref 9–17)
BUN SERPL-MCNC: 29 MG/DL (ref 8–23)
CALCIUM SERPL-MCNC: 8.9 MG/DL (ref 8.6–10.4)
CHLORIDE SERPL-SCNC: 100 MMOL/L (ref 98–107)
CO2 SERPL-SCNC: 27 MMOL/L (ref 20–31)
CREAT SERPL-MCNC: 3.6 MG/DL (ref 0.7–1.2)
EKG ATRIAL RATE: 51 BPM
EKG P AXIS: 29 DEGREES
EKG P-R INTERVAL: 238 MS
EKG Q-T INTERVAL: 514 MS
EKG QRS DURATION: 90 MS
EKG QTC CALCULATION (BAZETT): 473 MS
EKG R AXIS: -5 DEGREES
EKG T AXIS: -155 DEGREES
EKG VENTRICULAR RATE: 51 BPM
GFR SERPL CREATININE-BSD FRML MDRD: 17 ML/MIN/1.73M2
GLUCOSE BLD-MCNC: 169 MG/DL (ref 75–110)
GLUCOSE BLD-MCNC: 207 MG/DL (ref 75–110)
GLUCOSE BLD-MCNC: 212 MG/DL (ref 75–110)
GLUCOSE SERPL-MCNC: 175 MG/DL (ref 70–99)
POTASSIUM SERPL-SCNC: 4.3 MMOL/L (ref 3.7–5.3)
SODIUM SERPL-SCNC: 140 MMOL/L (ref 135–144)

## 2023-07-14 PROCEDURE — 36415 COLL VENOUS BLD VENIPUNCTURE: CPT

## 2023-07-14 PROCEDURE — 80048 BASIC METABOLIC PNL TOTAL CA: CPT

## 2023-07-14 PROCEDURE — 6370000000 HC RX 637 (ALT 250 FOR IP): Performed by: INTERNAL MEDICINE

## 2023-07-14 PROCEDURE — 99233 SBSQ HOSP IP/OBS HIGH 50: CPT | Performed by: SURGERY

## 2023-07-14 PROCEDURE — 93005 ELECTROCARDIOGRAM TRACING: CPT | Performed by: STUDENT IN AN ORGANIZED HEALTH CARE EDUCATION/TRAINING PROGRAM

## 2023-07-14 PROCEDURE — 99232 SBSQ HOSP IP/OBS MODERATE 35: CPT | Performed by: STUDENT IN AN ORGANIZED HEALTH CARE EDUCATION/TRAINING PROGRAM

## 2023-07-14 PROCEDURE — 82947 ASSAY GLUCOSE BLOOD QUANT: CPT

## 2023-07-14 PROCEDURE — 2060000000 HC ICU INTERMEDIATE R&B

## 2023-07-14 PROCEDURE — 6360000002 HC RX W HCPCS: Performed by: NURSE PRACTITIONER

## 2023-07-14 PROCEDURE — 2580000003 HC RX 258: Performed by: NURSE PRACTITIONER

## 2023-07-14 PROCEDURE — 6370000000 HC RX 637 (ALT 250 FOR IP): Performed by: NURSE PRACTITIONER

## 2023-07-14 PROCEDURE — 6360000002 HC RX W HCPCS: Performed by: INTERNAL MEDICINE

## 2023-07-14 PROCEDURE — 90935 HEMODIALYSIS ONE EVALUATION: CPT

## 2023-07-14 PROCEDURE — 90935 HEMODIALYSIS ONE EVALUATION: CPT | Performed by: INTERNAL MEDICINE

## 2023-07-14 RX ORDER — DIPHENHYDRAMINE HYDROCHLORIDE 50 MG/ML
12.5 INJECTION INTRAMUSCULAR; INTRAVENOUS
Status: COMPLETED | OUTPATIENT
Start: 2023-07-14 | End: 2023-07-14

## 2023-07-14 RX ORDER — DIPHENHYDRAMINE HYDROCHLORIDE 50 MG/ML
25 INJECTION INTRAMUSCULAR; INTRAVENOUS
Status: CANCELLED | OUTPATIENT
Start: 2023-07-14 | End: 2023-07-15

## 2023-07-14 RX ADMIN — FUROSEMIDE 80 MG: 40 TABLET ORAL at 14:28

## 2023-07-14 RX ADMIN — PROCHLORPERAZINE EDISYLATE 10 MG: 5 INJECTION INTRAMUSCULAR; INTRAVENOUS at 14:28

## 2023-07-14 RX ADMIN — PROCHLORPERAZINE EDISYLATE 10 MG: 5 INJECTION INTRAMUSCULAR; INTRAVENOUS at 05:48

## 2023-07-14 RX ADMIN — SODIUM CHLORIDE, PRESERVATIVE FREE 10 ML: 5 INJECTION INTRAVENOUS at 20:43

## 2023-07-14 RX ADMIN — EPOETIN ALFA-EPBX 5000 UNITS: 10000 INJECTION, SOLUTION INTRAVENOUS; SUBCUTANEOUS at 12:04

## 2023-07-14 RX ADMIN — ONDANSETRON 4 MG: 2 INJECTION INTRAMUSCULAR; INTRAVENOUS at 18:36

## 2023-07-14 RX ADMIN — INSULIN GLARGINE 24 UNITS: 100 INJECTION, SOLUTION SUBCUTANEOUS at 14:28

## 2023-07-14 RX ADMIN — Medication 81 MG: at 14:28

## 2023-07-14 RX ADMIN — TAMSULOSIN HYDROCHLORIDE 0.4 MG: 0.4 CAPSULE ORAL at 20:43

## 2023-07-14 RX ADMIN — SODIUM CHLORIDE, PRESERVATIVE FREE 10 ML: 5 INJECTION INTRAVENOUS at 14:41

## 2023-07-14 RX ADMIN — DIPHENHYDRAMINE HYDROCHLORIDE 12.5 MG: 50 INJECTION, SOLUTION INTRAMUSCULAR; INTRAVENOUS at 12:02

## 2023-07-14 RX ADMIN — LATANOPROST 1 DROP: 50 SOLUTION/ DROPS OPHTHALMIC at 20:42

## 2023-07-14 RX ADMIN — AMLODIPINE BESYLATE 10 MG: 5 TABLET ORAL at 14:28

## 2023-07-14 RX ADMIN — PANTOPRAZOLE SODIUM 40 MG: 40 TABLET, DELAYED RELEASE ORAL at 14:28

## 2023-07-14 RX ADMIN — SODIUM CHLORIDE, PRESERVATIVE FREE 10 ML: 5 INJECTION INTRAVENOUS at 05:48

## 2023-07-14 RX ADMIN — SEVELAMER CARBONATE 800 MG: 800 TABLET, FILM COATED ORAL at 18:06

## 2023-07-14 RX ADMIN — HYDRALAZINE HYDROCHLORIDE 100 MG: 50 TABLET, FILM COATED ORAL at 14:27

## 2023-07-14 RX ADMIN — HYDRALAZINE HYDROCHLORIDE 100 MG: 50 TABLET, FILM COATED ORAL at 20:42

## 2023-07-14 RX ADMIN — HEPARIN SODIUM 5000 UNITS: 5000 INJECTION INTRAVENOUS; SUBCUTANEOUS at 20:43

## 2023-07-14 RX ADMIN — PREDNISOLONE ACETATE 1 DROP: 10 SUSPENSION/ DROPS OPHTHALMIC at 14:43

## 2023-07-14 RX ADMIN — POLYETHYLENE GLYCOL 3350 17 G: 17 POWDER, FOR SOLUTION ORAL at 14:49

## 2023-07-14 RX ADMIN — HEPARIN SODIUM 5000 UNITS: 5000 INJECTION INTRAVENOUS; SUBCUTANEOUS at 05:48

## 2023-07-14 RX ADMIN — HEPARIN SODIUM 5000 UNITS: 5000 INJECTION INTRAVENOUS; SUBCUTANEOUS at 14:27

## 2023-07-14 RX ADMIN — CLOPIDOGREL BISULFATE 75 MG: 75 TABLET, FILM COATED ORAL at 14:28

## 2023-07-14 ASSESSMENT — ENCOUNTER SYMPTOMS
BLOOD IN STOOL: 0
ABDOMINAL DISTENTION: 0
WHEEZING: 0
STRIDOR: 0
SHORTNESS OF BREATH: 0
BACK PAIN: 0
APNEA: 0
CHEST TIGHTNESS: 0
COUGH: 0
ABDOMINAL PAIN: 0
CHOKING: 0

## 2023-07-14 ASSESSMENT — PAIN SCALES - GENERAL
PAINLEVEL_OUTOF10: 0
PAINLEVEL_OUTOF10: 0

## 2023-07-14 NOTE — PLAN OF CARE
Problem: Chronic Conditions and Co-morbidities  Goal: Patient's chronic conditions and co-morbidity symptoms are monitored and maintained or improved  7/13/2023 2353 by Belkis Hicks RN  Outcome: Progressing  7/13/2023 1755 by Serjio Miller RN  Outcome: Progressing  Flowsheets (Taken 7/13/2023 0800 by Estella Gordillo RN)  Care Plan - Patient's Chronic Conditions and Co-Morbidity Symptoms are Monitored and Maintained or Improved: Monitor and assess patient's chronic conditions and comorbid symptoms for stability, deterioration, or improvement     Problem: Discharge Planning  Goal: Discharge to home or other facility with appropriate resources  7/13/2023 2353 by Belkis Hicks RN  Outcome: Progressing  7/13/2023 1755 by Serjio Miller RN  Outcome: Progressing  Flowsheets (Taken 7/13/2023 0800 by Estella Gordillo RN)  Discharge to home or other facility with appropriate resources: Identify barriers to discharge with patient and caregiver     Problem: Safety - Adult  Goal: Free from fall injury  7/13/2023 2353 by Belkis Hicks RN  Outcome: Progressing  7/13/2023 1755 by Serjio Miller RN  Outcome: Progressing     Problem: ABCDS Injury Assessment  Goal: Absence of physical injury  7/13/2023 2353 by Belkis Hicks RN  Outcome: Progressing  7/13/2023 1755 by Serjio Miller RN  Outcome: Progressing     Problem: Skin/Tissue Integrity  Goal: Absence of new skin breakdown  Description: 1. Monitor for areas of redness and/or skin breakdown  2. Assess vascular access sites hourly  3. Every 4-6 hours minimum:  Change oxygen saturation probe site  4. Every 4-6 hours:  If on nasal continuous positive airway pressure, respiratory therapy assess nares and determine need for appliance change or resting period.   7/13/2023 2353 by Belkis Hicks RN  Outcome: Progressing  7/13/2023 1755 by Serjio Miller RN  Outcome: Progressing

## 2023-07-15 VITALS
SYSTOLIC BLOOD PRESSURE: 153 MMHG | RESPIRATION RATE: 22 BRPM | TEMPERATURE: 97.7 F | BODY MASS INDEX: 28.67 KG/M2 | WEIGHT: 193.56 LBS | HEART RATE: 61 BPM | OXYGEN SATURATION: 95 % | DIASTOLIC BLOOD PRESSURE: 56 MMHG | HEIGHT: 69 IN

## 2023-07-15 PROBLEM — Z86.39 HISTORY OF TYPE 2 DIABETES MELLITUS: Status: ACTIVE | Noted: 2023-07-15

## 2023-07-15 LAB
METER GLUCOSE: 154 MG/DL (ref 75–110)
METER GLUCOSE: 176 MG/DL (ref 75–110)
METER GLUCOSE: 197 MG/DL (ref 75–110)
METER GLUCOSE: 217 MG/DL (ref 75–110)

## 2023-07-15 PROCEDURE — 6360000002 HC RX W HCPCS: Performed by: NURSE PRACTITIONER

## 2023-07-15 PROCEDURE — 6370000000 HC RX 637 (ALT 250 FOR IP): Performed by: INTERNAL MEDICINE

## 2023-07-15 PROCEDURE — 6370000000 HC RX 637 (ALT 250 FOR IP): Performed by: NURSE PRACTITIONER

## 2023-07-15 PROCEDURE — 99232 SBSQ HOSP IP/OBS MODERATE 35: CPT | Performed by: INTERNAL MEDICINE

## 2023-07-15 PROCEDURE — 99232 SBSQ HOSP IP/OBS MODERATE 35: CPT | Performed by: STUDENT IN AN ORGANIZED HEALTH CARE EDUCATION/TRAINING PROGRAM

## 2023-07-15 PROCEDURE — 2580000003 HC RX 258: Performed by: NURSE PRACTITIONER

## 2023-07-15 PROCEDURE — 82947 ASSAY GLUCOSE BLOOD QUANT: CPT

## 2023-07-15 RX ORDER — HYDRALAZINE HYDROCHLORIDE 100 MG/1
100 TABLET, FILM COATED ORAL EVERY 8 HOURS SCHEDULED
Qty: 90 TABLET | Refills: 0 | Status: SHIPPED | OUTPATIENT
Start: 2023-07-15 | End: 2023-08-14

## 2023-07-15 RX ORDER — OXYCODONE HYDROCHLORIDE AND ACETAMINOPHEN 5; 325 MG/1; MG/1
1 TABLET ORAL EVERY 8 HOURS PRN
Qty: 9 TABLET | Refills: 0 | Status: SHIPPED | OUTPATIENT
Start: 2023-07-15 | End: 2023-07-18

## 2023-07-15 RX ADMIN — Medication 81 MG: at 08:34

## 2023-07-15 RX ADMIN — CLOPIDOGREL BISULFATE 75 MG: 75 TABLET, FILM COATED ORAL at 08:34

## 2023-07-15 RX ADMIN — HEPARIN SODIUM 5000 UNITS: 5000 INJECTION INTRAVENOUS; SUBCUTANEOUS at 13:28

## 2023-07-15 RX ADMIN — AMLODIPINE BESYLATE 10 MG: 5 TABLET ORAL at 08:34

## 2023-07-15 RX ADMIN — SEVELAMER CARBONATE 800 MG: 800 TABLET, FILM COATED ORAL at 08:34

## 2023-07-15 RX ADMIN — INSULIN GLARGINE 24 UNITS: 100 INJECTION, SOLUTION SUBCUTANEOUS at 08:34

## 2023-07-15 RX ADMIN — HYDRALAZINE HYDROCHLORIDE 100 MG: 50 TABLET, FILM COATED ORAL at 13:28

## 2023-07-15 RX ADMIN — SEVELAMER CARBONATE 800 MG: 800 TABLET, FILM COATED ORAL at 12:29

## 2023-07-15 RX ADMIN — ONDANSETRON 4 MG: 4 TABLET, ORALLY DISINTEGRATING ORAL at 13:41

## 2023-07-15 RX ADMIN — PROCHLORPERAZINE EDISYLATE 10 MG: 5 INJECTION INTRAMUSCULAR; INTRAVENOUS at 13:27

## 2023-07-15 RX ADMIN — HYDRALAZINE HYDROCHLORIDE 100 MG: 50 TABLET, FILM COATED ORAL at 05:15

## 2023-07-15 RX ADMIN — FUROSEMIDE 80 MG: 40 TABLET ORAL at 08:34

## 2023-07-15 RX ADMIN — PROCHLORPERAZINE EDISYLATE 10 MG: 5 INJECTION INTRAMUSCULAR; INTRAVENOUS at 03:50

## 2023-07-15 RX ADMIN — PANTOPRAZOLE SODIUM 40 MG: 40 TABLET, DELAYED RELEASE ORAL at 08:34

## 2023-07-15 RX ADMIN — HEPARIN SODIUM 5000 UNITS: 5000 INJECTION INTRAVENOUS; SUBCUTANEOUS at 05:16

## 2023-07-15 RX ADMIN — SODIUM CHLORIDE, PRESERVATIVE FREE 10 ML: 5 INJECTION INTRAVENOUS at 08:36

## 2023-07-15 ASSESSMENT — PAIN DESCRIPTION - LOCATION: LOCATION: CHEST

## 2023-07-15 ASSESSMENT — ENCOUNTER SYMPTOMS
BLOOD IN STOOL: 0
COUGH: 0
CHEST TIGHTNESS: 0
STRIDOR: 0
CHOKING: 0
APNEA: 0
SHORTNESS OF BREATH: 0
WHEEZING: 0
ABDOMINAL DISTENTION: 0
ABDOMINAL PAIN: 0
BACK PAIN: 0

## 2023-07-15 ASSESSMENT — PAIN SCALES - GENERAL: PAINLEVEL_OUTOF10: 6

## 2023-07-15 NOTE — DISCHARGE INSTRUCTIONS
Continue Monday and Wednesday and Friday hemodialysis schedule at [facility] dialysis in [facility].

## 2023-07-15 NOTE — DISCHARGE INSTR - COC
PF, 0.5mL 10/28/2019    Influenza, Jackeline Brand (age 72 y+), High Dose, 0.7mL 10/19/2020, 10/07/2022    Influenza, High Dose (Fluzone 65 yrs and older) 10/19/2020, 10/25/2021    Influenza, Triv, 3 Years and older, IM, PF (Afluria 5yrs and older) 10/31/2017    Influenza, Triv, inactivated, subunit, adjuvanted, IM (Fluad 65 yrs and older) 10/30/2018    PPD Test 12/03/2021, 12/08/2021    Pneumococcal Conjugate 7-valent (Prevnar7) 01/01/2012    Pneumococcal Vaccine 07/01/2015, 07/25/2017, 01/01/2021    Pneumococcal, PCV-13, PREVNAR 15, (age 6w+), IM, 0.5mL 07/01/2015    Pneumococcal, PPSV23, PNEUMOVAX 21, (age 2y+), SC/IM, 0.5mL 07/25/2017    TDaP, ADACEL (age 6y-58y), BOOSTRIX (age 10y+), IM, 0.5mL 01/14/2015    Zoster Live (Zostavax) 01/01/2012    Zoster Recombinant (Shingrix) 10/19/2020, 03/26/2021       Active Problems:  Patient Active Problem List   Diagnosis Code    BPV (benign positional vertigo), unspecified laterality T08.57    Systolic murmur V03.8    History of MI (myocardial infarction) I25.2    History of colon polyps Z86.010    Coronary atherosclerosis due to calcified coronary lesion I25.10, I25.84    Displacement of intervertebral disc, site unspecified, without myelopathy [id number]    History of CVA (cerebrovascular accident) without residual deficits Z86.73    Neurogenic orthostatic hypotension (HCC) G90.3    Syncope, near R55    Chronotropic incompetence with autonomic dysfunction G90.8    Episodic lightheadedness R42    Primary hypertension I10    Ischemic chest pain (HCC) I20.9    Cervical stenosis of spinal canal M48.02    CAD in native artery I25.10    S/P drug eluting coronary stent placement-OM1 4/10/17 Z95.5    Dyslipidemia E78.5    Non critical Right Renal artery stenosis, native (HCC) I70.1    BPH with obstruction/lower urinary tract symptoms N40.1, N13.8    Elevated PSA R97.20    Gross hematuria R31.0    Chest pain R07.9    Normocytic normochromic anemia D64.9    Acute coronary syndrome with high

## 2023-07-15 NOTE — CARE COORDINATION
Transitional planning. Informed that pt is discharged today. LM for Patsy Ramírez with [facility] [phone], requesting CB concerning pt return today. Spoke to Michael Amaral with [employer], she stated pt can return when dc'd. Call her once we have transport time. [phone]    Report [phone]  FAX: [phone]  NO COVID test, no HENS needs. [id number] [employer] w/ [address] [place] [address]  confirmed a 4pm transport time, asking that pt be ready by 2pm (he will try to find earlier transport)   Informed pt's RN, 1401 Laura and pt.     7701 informed Kathy nnio/ [facility] of transport time.      1150 faxed STEPH/SARA /Script to [facility] @ [phone]

## 2023-07-15 NOTE — DISCHARGE SUMMARY
clopidogrel 75 MG tablet  Commonly known as: PLAVIX  Take 1 tablet by mouth daily     Contour Test strip  Generic drug: blood glucose test strips  Inject 1 each into the skin 2 times daily     epoetin shannon-epbx 72525 UNIT/ML Soln injection  Commonly known as: RETACRIT  Inject 0.5 mLs into the skin three times a week     furosemide 80 MG tablet  Commonly known as: LASIX  Take 1 tablet by mouth daily     latanoprost 0.005 % ophthalmic solution  Commonly known as: XALATAN     midodrine 10 MG tablet  Commonly known as: PROAMATINE  Take 1 tablet by mouth 3 times daily as needed (SBP < 100)     ondansetron 4 MG disintegrating tablet  Commonly known as: ZOFRAN-ODT  Take 1 tablet by mouth every 8 hours as needed for Nausea or Vomiting     Pen Needles 31G X 6 MM Misc  1 each by Does not apply route daily     polyethylene glycol 17 g packet  Commonly known as: GLYCOLAX  Take 17 g by mouth daily     prednisoLONE acetate 1 % ophthalmic suspension  Commonly known as: PRED FORTE     Refresh Lacri-Lube Oint ointment  Apply 0.5 inches to eye nightly as needed (Dry eyes)     sevelamer 800 MG tablet  Commonly known as: RENVELA            STOP taking these medications      amiodarone 200 MG tablet  Commonly known as: CORDARONE     cloNIDine 0.1 MG/24HR Ptwk  Commonly known as: CATAPRES     metoprolol tartrate 25 MG tablet  Commonly known as: LOPRESSOR               Where to Get Your Medications        These medications were sent to [facility] [address] [place], [address] [place], [place] [address] [facility]      Phone: [phone]   hydrALAZINE 100 MG tablet       You can get these medications from any pharmacy    Bring a paper prescription for each of these medications  oxyCODONE-acetaminophen 5-325 MG per tablet         No discharge procedures on file.     Time Spent on discharge is  32 mins in patient examination, evaluation, counseling as well as medication

## 2023-07-18 ENCOUNTER — TELEPHONE (OUTPATIENT)
Dept: CARDIOTHORACIC SURGERY | Age: 78
End: 2023-07-18

## 2023-07-19 ENCOUNTER — HOSPITAL ENCOUNTER (OUTPATIENT)
Age: 78
Setting detail: SPECIMEN
Discharge: HOME | End: 2023-07-19
Payer: MEDICARE

## 2023-07-19 LAB
ALBUMIN SERPL-MCNC: 3.3 G/DL (ref 3.5–5.2)
ALBUMIN/GLOB SERPL: 1.1 {RATIO} (ref 1–2.5)
ALP SERPL-CCNC: 239 U/L (ref 40–129)
ALT SERPL-CCNC: 8 U/L (ref 5–41)
ANION GAP SERPL CALCULATED.3IONS-SCNC: 13 MMOL/L (ref 9–17)
AST SERPL-CCNC: 12 U/L
BASOPHILS # BLD: 0.03 K/UL (ref 0–0.2)
BASOPHILS NFR BLD: 0 % (ref 0–2)
BILIRUB SERPL-MCNC: 0.5 MG/DL (ref 0.3–1.2)
BUN SERPL-MCNC: 31 MG/DL (ref 8–23)
BUN/CREAT SERPL: 7 (ref 9–20)
CALCIUM SERPL-MCNC: 8.5 MG/DL (ref 8.6–10.4)
CHLORIDE SERPL-SCNC: 99 MMOL/L (ref 98–107)
CO2 SERPL-SCNC: 25 MMOL/L (ref 20–31)
CREAT SERPL-MCNC: 4.4 MG/DL (ref 0.7–1.2)
EOSINOPHIL # BLD: 0.39 K/UL (ref 0–0.44)
EOSINOPHILS RELATIVE PERCENT: 3 % (ref 1–4)
ERYTHROCYTE [DISTWIDTH] IN BLOOD BY AUTOMATED COUNT: 16.2 % (ref 11.8–14.4)
GFR SERPL CREATININE-BSD FRML MDRD: 13 ML/MIN/1.73M2
GLUCOSE SERPL-MCNC: 322 MG/DL (ref 70–99)
HCT VFR BLD AUTO: 25.5 % (ref 40.7–50.3)
HGB BLD-MCNC: 8.2 G/DL (ref 13–17)
IMM GRANULOCYTES # BLD AUTO: 0.09 K/UL (ref 0–0.3)
IMM GRANULOCYTES NFR BLD: 1 %
LYMPHOCYTES # BLD: 7 % (ref 24–43)
LYMPHOCYTES NFR BLD: 0.77 K/UL (ref 1.1–3.7)
MCH RBC QN AUTO: 29.8 PG (ref 25.2–33.5)
MCHC RBC AUTO-ENTMCNC: 32.2 G/DL (ref 28.4–34.8)
MCV RBC AUTO: 92.7 FL (ref 82.6–102.9)
MONOCYTES NFR BLD: 0.7 K/UL (ref 0.1–1.2)
MONOCYTES NFR BLD: 6 % (ref 3–12)
NEUTROPHILS NFR BLD: 83 % (ref 36–65)
NEUTS SEG NFR BLD: 9.91 K/UL (ref 1.5–8.1)
NRBC BLD-RTO: 0 PER 100 WBC
PLATELET # BLD AUTO: 213 K/UL (ref 138–453)
PMV BLD AUTO: 10.2 FL (ref 8.1–13.5)
POTASSIUM SERPL-SCNC: 4 MMOL/L (ref 3.7–5.3)
PROT SERPL-MCNC: 6.3 G/DL (ref 6.4–8.3)
RBC # BLD AUTO: 2.75 M/UL (ref 4.21–5.77)
SODIUM SERPL-SCNC: 137 MMOL/L (ref 135–144)
WBC OTHER # BLD: 11.9 K/UL (ref 3.5–11.3)

## 2023-07-19 PROCEDURE — 80053 COMPREHEN METABOLIC PANEL: CPT

## 2023-07-19 PROCEDURE — 85027 COMPLETE CBC AUTOMATED: CPT

## 2023-07-20 ENCOUNTER — TRANSCRIBE ORDERS (OUTPATIENT)
Dept: ADMINISTRATIVE | Age: 78
End: 2023-07-20

## 2023-07-20 ENCOUNTER — HOSPITAL ENCOUNTER (OUTPATIENT)
Age: 78
Setting detail: SPECIMEN
Discharge: HOME | End: 2023-07-20
Payer: MEDICARE

## 2023-07-20 DIAGNOSIS — I10 BENIGN HYPERTENSION: ICD-10-CM

## 2023-07-20 DIAGNOSIS — R07.89 OTHER CHEST PAIN: Primary | ICD-10-CM

## 2023-07-20 LAB
ALBUMIN SERPL-MCNC: 3.4 G/DL (ref 3.5–5.2)
ALBUMIN/GLOB SERPL: 1.2 {RATIO} (ref 1–2.5)
ALP SERPL-CCNC: 101 U/L (ref 40–129)
ALT SERPL-CCNC: 8 U/L (ref 5–41)
ANION GAP SERPL CALCULATED.3IONS-SCNC: 11 MMOL/L (ref 9–17)
AST SERPL-CCNC: 13 U/L
BACTERIA URNS QL MICRO: ABNORMAL
BASOPHILS # BLD: 0.07 K/UL (ref 0–0.2)
BASOPHILS NFR BLD: 1 % (ref 0–2)
BILIRUB SERPL-MCNC: 0.5 MG/DL (ref 0.3–1.2)
BILIRUB UR QL STRIP: NEGATIVE
BNP SERPL-MCNC: ABNORMAL PG/ML
BUN SERPL-MCNC: 21 MG/DL (ref 8–23)
BUN/CREAT SERPL: 6 (ref 9–20)
CALCIUM SERPL-MCNC: 8.4 MG/DL (ref 8.6–10.4)
CHLORIDE SERPL-SCNC: 98 MMOL/L (ref 98–107)
CLARITY UR: ABNORMAL
CO2 SERPL-SCNC: 25 MMOL/L (ref 20–31)
COLOR UR: YELLOW
CREAT SERPL-MCNC: 3.8 MG/DL (ref 0.7–1.2)
EOSINOPHIL # BLD: 0.35 K/UL (ref 0–0.44)
EOSINOPHILS RELATIVE PERCENT: 5 % (ref 1–4)
EPI CELLS #/AREA URNS HPF: ABNORMAL /HPF (ref 0–5)
ERYTHROCYTE [DISTWIDTH] IN BLOOD BY AUTOMATED COUNT: 15.9 % (ref 11.8–14.4)
GFR SERPL CREATININE-BSD FRML MDRD: 16 ML/MIN/1.73M2
GLUCOSE SERPL-MCNC: 255 MG/DL (ref 70–99)
GLUCOSE UR STRIP-MCNC: NEGATIVE MG/DL
HCT VFR BLD AUTO: 26.5 % (ref 40.7–50.3)
HGB BLD-MCNC: 8.3 G/DL (ref 13–17)
HGB UR QL STRIP.AUTO: ABNORMAL
IMM GRANULOCYTES # BLD AUTO: 0.07 K/UL (ref 0–0.3)
IMM GRANULOCYTES NFR BLD: 1 %
KETONES UR STRIP-MCNC: NEGATIVE MG/DL
LEUKOCYTE ESTERASE UR QL STRIP: ABNORMAL
LYMPHOCYTES NFR BLD: 0.83 K/UL (ref 1.1–3.7)
LYMPHOCYTES RELATIVE PERCENT: 12 % (ref 24–43)
MCH RBC QN AUTO: 29.1 PG (ref 25.2–33.5)
MCHC RBC AUTO-ENTMCNC: 31.3 G/DL (ref 28.4–34.8)
MCV RBC AUTO: 93 FL (ref 82.6–102.9)
MONOCYTES NFR BLD: 0.55 K/UL (ref 0.1–1.2)
MONOCYTES NFR BLD: 8 % (ref 3–12)
MORPHOLOGY: ABNORMAL
NEUTROPHILS NFR BLD: 73 % (ref 36–65)
NEUTS SEG NFR BLD: 5.03 K/UL (ref 1.5–8.1)
NITRITE UR QL STRIP: NEGATIVE
NRBC BLD-RTO: 0 PER 100 WBC
PH UR STRIP: 6 [PH] (ref 5–9)
PLATELET # BLD AUTO: ABNORMAL K/UL (ref 138–453)
PLATELET, FLUORESCENCE: 219 K/UL (ref 138–453)
PLATELETS.RETICULATED NFR BLD AUTO: 2.3 % (ref 1.1–10.3)
POTASSIUM SERPL-SCNC: 3.9 MMOL/L (ref 3.7–5.3)
PROT SERPL-MCNC: 6.3 G/DL (ref 6.4–8.3)
PROT UR STRIP-MCNC: ABNORMAL MG/DL
RBC # BLD AUTO: 2.85 M/UL (ref 4.21–5.77)
RBC #/AREA URNS HPF: ABNORMAL /HPF (ref 0–2)
RENAL EPITHELIAL, UA: ABNORMAL /HPF
SODIUM SERPL-SCNC: 134 MMOL/L (ref 135–144)
SP GR UR STRIP: 1.01 (ref 1.01–1.02)
UROBILINOGEN UR STRIP-ACNC: NORMAL EU/DL (ref 0–1)
WBC #/AREA URNS HPF: ABNORMAL /HPF (ref 0–5)
WBC OTHER # BLD: 6.9 K/UL (ref 3.5–11.3)

## 2023-07-20 PROCEDURE — 80053 COMPREHEN METABOLIC PANEL: CPT

## 2023-07-20 PROCEDURE — 87186 SC STD MICRODIL/AGAR DIL: CPT

## 2023-07-20 PROCEDURE — 85027 COMPLETE CBC AUTOMATED: CPT

## 2023-07-20 PROCEDURE — 87086 URINE CULTURE/COLONY COUNT: CPT

## 2023-07-20 PROCEDURE — 87088 URINE BACTERIA CULTURE: CPT

## 2023-07-20 PROCEDURE — 83880 ASSAY OF NATRIURETIC PEPTIDE: CPT

## 2023-07-20 PROCEDURE — 81001 URINALYSIS AUTO W/SCOPE: CPT

## 2023-07-21 ENCOUNTER — HOSPITAL ENCOUNTER (OUTPATIENT)
Age: 78
Setting detail: SPECIMEN
Discharge: HOME | End: 2023-07-21
Payer: MEDICARE

## 2023-07-21 LAB
DATE, STOOL #1: ABNORMAL
HEMOCCULT SP1 STL QL: POSITIVE
TIME, STOOL #1: 1345

## 2023-07-21 PROCEDURE — 82272 OCCULT BLD FECES 1-3 TESTS: CPT

## 2023-07-22 LAB
MICROORGANISM SPEC CULT: ABNORMAL
SPECIMEN DESCRIPTION: ABNORMAL

## 2023-07-25 ENCOUNTER — OFFICE VISIT (OUTPATIENT)
Dept: CARDIOLOGY | Age: 78
End: 2023-07-25
Payer: MEDICARE

## 2023-07-25 VITALS
BODY MASS INDEX: 27.55 KG/M2 | DIASTOLIC BLOOD PRESSURE: 61 MMHG | WEIGHT: 186 LBS | OXYGEN SATURATION: 97 % | SYSTOLIC BLOOD PRESSURE: 134 MMHG | HEART RATE: 83 BPM | RESPIRATION RATE: 18 BRPM | HEIGHT: 69 IN

## 2023-07-25 DIAGNOSIS — E78.2 MIXED HYPERLIPIDEMIA: ICD-10-CM

## 2023-07-25 DIAGNOSIS — I50.32 CHRONIC DIASTOLIC CONGESTIVE HEART FAILURE (HCC): ICD-10-CM

## 2023-07-25 DIAGNOSIS — I10 ESSENTIAL HYPERTENSION: ICD-10-CM

## 2023-07-25 DIAGNOSIS — G90.1 DYSAUTONOMIA (HCC): Primary | ICD-10-CM

## 2023-07-25 DIAGNOSIS — D64.9 ANEMIA, UNSPECIFIED TYPE: ICD-10-CM

## 2023-07-25 DIAGNOSIS — I25.10 CAD, MULTIPLE VESSEL: ICD-10-CM

## 2023-07-25 DIAGNOSIS — N18.6 END STAGE RENAL DISEASE (HCC): ICD-10-CM

## 2023-07-25 PROCEDURE — 3074F SYST BP LT 130 MM HG: CPT | Performed by: PHYSICIAN ASSISTANT

## 2023-07-25 PROCEDURE — G8417 CALC BMI ABV UP PARAM F/U: HCPCS | Performed by: PHYSICIAN ASSISTANT

## 2023-07-25 PROCEDURE — 1123F ACP DISCUSS/DSCN MKR DOCD: CPT | Performed by: PHYSICIAN ASSISTANT

## 2023-07-25 PROCEDURE — 1111F DSCHRG MED/CURRENT MED MERGE: CPT | Performed by: PHYSICIAN ASSISTANT

## 2023-07-25 PROCEDURE — 1036F TOBACCO NON-USER: CPT | Performed by: PHYSICIAN ASSISTANT

## 2023-07-25 PROCEDURE — 99211 OFF/OP EST MAY X REQ PHY/QHP: CPT | Performed by: PHYSICIAN ASSISTANT

## 2023-07-25 PROCEDURE — 3078F DIAST BP <80 MM HG: CPT | Performed by: PHYSICIAN ASSISTANT

## 2023-07-25 PROCEDURE — 99214 OFFICE O/P EST MOD 30 MIN: CPT | Performed by: PHYSICIAN ASSISTANT

## 2023-07-25 PROCEDURE — G8427 DOCREV CUR MEDS BY ELIG CLIN: HCPCS | Performed by: PHYSICIAN ASSISTANT

## 2023-07-25 RX ORDER — OXYCODONE HYDROCHLORIDE AND ACETAMINOPHEN 5; 325 MG/1; MG/1
1 TABLET ORAL EVERY 4 HOURS PRN
COMMUNITY

## 2023-07-25 RX ORDER — FAMOTIDINE 20 MG/1
20 TABLET, FILM COATED ORAL 2 TIMES DAILY
COMMUNITY

## 2023-07-25 RX ORDER — DIPHENHYDRAMINE HCL 25 MG
25 CAPSULE ORAL EVERY 6 HOURS PRN
COMMUNITY

## 2023-07-25 RX ORDER — CEPHALEXIN 500 MG/1
500 CAPSULE ORAL 4 TIMES DAILY
COMMUNITY

## 2023-07-25 RX ORDER — METOPROLOL SUCCINATE 25 MG/1
12.5 TABLET, EXTENDED RELEASE ORAL DAILY
Qty: 45 TABLET | Refills: 3 | Status: CANCELLED | OUTPATIENT
Start: 2023-07-25

## 2023-07-25 RX ORDER — ACETAMINOPHEN 650 MG/1
650 SUPPOSITORY RECTAL EVERY 4 HOURS PRN
COMMUNITY

## 2023-07-25 RX ORDER — EZETIMIBE 10 MG/1
10 TABLET ORAL DAILY
Qty: 90 TABLET | Refills: 1 | Status: SHIPPED | OUTPATIENT
Start: 2023-07-25 | End: 2023-07-27 | Stop reason: SINTOL

## 2023-07-25 RX ORDER — NIFEDIPINE 90 MG/1
90 TABLET, FILM COATED, EXTENDED RELEASE ORAL DAILY
COMMUNITY

## 2023-07-25 NOTE — PROGRESS NOTES
office if he had any problems, but otherwise told him to Return in about 2 months (around 9/25/2023). However, as always I would be happy to see him sooner should the need arise. Once again, thank you for allowing me to participate in this patients care. Please do not hesitate to contact me could I be of further assistance. Sincerely,  [facility], [address], [address]  Phone: [phone], Fax: [phone]    I believe that the risk of significant morbidity and mortality related to the patient's current medical conditions are: intermediate-high. 45 minutes    The documentation recorded by the scribe, accurately and completely reflects the services I personally performed and the decisions made by me.  Zena Mora PA-C July 25, 2023

## 2023-07-25 NOTE — PATIENT INSTRUCTIONS
SURVEY:    You may be receiving a survey from [email or web address] regarding your visit today. Please complete the survey to enable us to provide the highest quality of care to you and your family. If you cannot score us a very good on any question, please call the office to discuss how we could have made your experience a very good one. Thank you.

## 2023-07-26 ENCOUNTER — HOSPITAL ENCOUNTER (OUTPATIENT)
Age: 78
Setting detail: SPECIMEN
Discharge: HOME | End: 2023-07-26
Payer: MEDICARE

## 2023-07-26 LAB
ALBUMIN SERPL-MCNC: 3.6 G/DL (ref 3.5–5.2)
ALBUMIN/GLOB SERPL: 1.3 {RATIO} (ref 1–2.5)
ALP SERPL-CCNC: 97 U/L (ref 40–129)
ALT SERPL-CCNC: 8 U/L (ref 5–41)
ANION GAP SERPL CALCULATED.3IONS-SCNC: 12 MMOL/L (ref 9–17)
AST SERPL-CCNC: 13 U/L
BASOPHILS # BLD: 0.05 K/UL (ref 0–0.2)
BASOPHILS NFR BLD: 1 % (ref 0–2)
BILIRUB SERPL-MCNC: 0.4 MG/DL (ref 0.3–1.2)
BUN SERPL-MCNC: 23 MG/DL (ref 8–23)
BUN/CREAT SERPL: 6 (ref 9–20)
CALCIUM SERPL-MCNC: 8.6 MG/DL (ref 8.6–10.4)
CHLORIDE SERPL-SCNC: 101 MMOL/L (ref 98–107)
CO2 SERPL-SCNC: 25 MMOL/L (ref 20–31)
CREAT SERPL-MCNC: 3.9 MG/DL (ref 0.7–1.2)
EOSINOPHIL # BLD: 0.43 K/UL (ref 0–0.44)
EOSINOPHILS RELATIVE PERCENT: 7 % (ref 1–4)
ERYTHROCYTE [DISTWIDTH] IN BLOOD BY AUTOMATED COUNT: 15.3 % (ref 11.8–14.4)
GFR SERPL CREATININE-BSD FRML MDRD: 15 ML/MIN/1.73M2
GLUCOSE SERPL-MCNC: 299 MG/DL (ref 70–99)
HCT VFR BLD AUTO: 26.6 % (ref 40.7–50.3)
HGB BLD-MCNC: 8.7 G/DL (ref 13–17)
IMM GRANULOCYTES # BLD AUTO: 0.25 K/UL (ref 0–0.3)
IMM GRANULOCYTES NFR BLD: 4 %
LYMPHOCYTES NFR BLD: 0.95 K/UL (ref 1.1–3.7)
LYMPHOCYTES RELATIVE PERCENT: 15 % (ref 24–43)
MCH RBC QN AUTO: 29.4 PG (ref 25.2–33.5)
MCHC RBC AUTO-ENTMCNC: 32.7 G/DL (ref 28.4–34.8)
MCV RBC AUTO: 89.9 FL (ref 82.6–102.9)
MONOCYTES NFR BLD: 0.4 K/UL (ref 0.1–1.2)
MONOCYTES NFR BLD: 6 % (ref 3–12)
NEUTROPHILS NFR BLD: 67 % (ref 36–65)
NEUTS SEG NFR BLD: 4.36 K/UL (ref 1.5–8.1)
NRBC BLD-RTO: 0 PER 100 WBC
PLATELET # BLD AUTO: 239 K/UL (ref 138–453)
PMV BLD AUTO: 10.1 FL (ref 8.1–13.5)
POTASSIUM SERPL-SCNC: 4.2 MMOL/L (ref 3.7–5.3)
PROT SERPL-MCNC: 6.3 G/DL (ref 6.4–8.3)
RBC # BLD AUTO: 2.96 M/UL (ref 4.21–5.77)
SODIUM SERPL-SCNC: 138 MMOL/L (ref 135–144)
WBC OTHER # BLD: 6.4 K/UL (ref 3.5–11.3)

## 2023-07-26 PROCEDURE — 85027 COMPLETE CBC AUTOMATED: CPT

## 2023-07-26 PROCEDURE — 80053 COMPREHEN METABOLIC PANEL: CPT

## 2023-07-31 ENCOUNTER — HOSPITAL ENCOUNTER (OUTPATIENT)
Age: 78
Setting detail: SPECIMEN
Discharge: HOME | End: 2023-07-31
Payer: MEDICARE

## 2023-07-31 LAB
ALBUMIN SERPL-MCNC: 3.6 G/DL (ref 3.5–5.2)
ALBUMIN/GLOB SERPL: 1.2 {RATIO} (ref 1–2.5)
ALP SERPL-CCNC: 111 U/L (ref 40–129)
ALT SERPL-CCNC: 9 U/L (ref 5–41)
ANION GAP SERPL CALCULATED.3IONS-SCNC: 14 MMOL/L (ref 9–17)
AST SERPL-CCNC: 12 U/L
BASOPHILS # BLD: 0.06 K/UL (ref 0–0.2)
BASOPHILS NFR BLD: 1 % (ref 0–2)
BILIRUB SERPL-MCNC: 0.4 MG/DL (ref 0.3–1.2)
BUN SERPL-MCNC: 30 MG/DL (ref 8–23)
BUN/CREAT SERPL: 7 (ref 9–20)
CALCIUM SERPL-MCNC: 8.6 MG/DL (ref 8.6–10.4)
CHLORIDE SERPL-SCNC: 101 MMOL/L (ref 98–107)
CO2 SERPL-SCNC: 22 MMOL/L (ref 20–31)
CREAT SERPL-MCNC: 4.6 MG/DL (ref 0.7–1.2)
EOSINOPHIL # BLD: 0.54 K/UL (ref 0–0.44)
EOSINOPHILS RELATIVE PERCENT: 4 % (ref 1–4)
ERYTHROCYTE [DISTWIDTH] IN BLOOD BY AUTOMATED COUNT: 15.5 % (ref 11.8–14.4)
GFR SERPL CREATININE-BSD FRML MDRD: 12 ML/MIN/1.73M2
GLUCOSE SERPL-MCNC: 231 MG/DL (ref 70–99)
HCT VFR BLD AUTO: 26.1 % (ref 40.7–50.3)
HGB BLD-MCNC: 8.5 G/DL (ref 13–17)
IMM GRANULOCYTES # BLD AUTO: 0.1 K/UL (ref 0–0.3)
IMM GRANULOCYTES NFR BLD: 1 %
LYMPHOCYTES NFR BLD: 1.92 K/UL (ref 1.1–3.7)
LYMPHOCYTES RELATIVE PERCENT: 16 % (ref 24–43)
MCH RBC QN AUTO: 29.4 PG (ref 25.2–33.5)
MCHC RBC AUTO-ENTMCNC: 32.6 G/DL (ref 28.4–34.8)
MCV RBC AUTO: 90.3 FL (ref 82.6–102.9)
MONOCYTES NFR BLD: 0.62 K/UL (ref 0.1–1.2)
MONOCYTES NFR BLD: 5 % (ref 3–12)
NEUTROPHILS NFR BLD: 74 % (ref 36–65)
NEUTS SEG NFR BLD: 8.97 K/UL (ref 1.5–8.1)
NRBC BLD-RTO: 0 PER 100 WBC
PLATELET # BLD AUTO: 254 K/UL (ref 138–453)
PMV BLD AUTO: 9.6 FL (ref 8.1–13.5)
POTASSIUM SERPL-SCNC: 4.1 MMOL/L (ref 3.7–5.3)
PROT SERPL-MCNC: 6.7 G/DL (ref 6.4–8.3)
RBC # BLD AUTO: 2.89 M/UL (ref 4.21–5.77)
SODIUM SERPL-SCNC: 137 MMOL/L (ref 135–144)
WBC OTHER # BLD: 12.2 K/UL (ref 3.5–11.3)

## 2023-07-31 PROCEDURE — 80053 COMPREHEN METABOLIC PANEL: CPT

## 2023-07-31 PROCEDURE — 85025 COMPLETE CBC W/AUTO DIFF WBC: CPT

## 2023-08-07 ENCOUNTER — HOSPITAL ENCOUNTER (OUTPATIENT)
Age: 78
Setting detail: SPECIMEN
Discharge: HOME | End: 2023-08-07
Payer: MEDICARE

## 2023-08-07 LAB
ALBUMIN SERPL-MCNC: 3.8 G/DL (ref 3.5–5.2)
ALBUMIN/GLOB SERPL: 1.4 {RATIO} (ref 1–2.5)
ALP SERPL-CCNC: 102 U/L (ref 40–129)
ALT SERPL-CCNC: 8 U/L (ref 5–41)
ANION GAP SERPL CALCULATED.3IONS-SCNC: 16 MMOL/L (ref 9–17)
AST SERPL-CCNC: 18 U/L
BASOPHILS # BLD: 0.08 K/UL (ref 0–0.2)
BASOPHILS NFR BLD: 1 % (ref 0–2)
BILIRUB SERPL-MCNC: 0.3 MG/DL (ref 0.3–1.2)
BUN SERPL-MCNC: 34 MG/DL (ref 8–23)
BUN/CREAT SERPL: 7 (ref 9–20)
CALCIUM SERPL-MCNC: 8.9 MG/DL (ref 8.6–10.4)
CHLORIDE SERPL-SCNC: 102 MMOL/L (ref 98–107)
CO2 SERPL-SCNC: 22 MMOL/L (ref 20–31)
CREAT SERPL-MCNC: 4.8 MG/DL (ref 0.7–1.2)
EOSINOPHIL # BLD: 0.38 K/UL (ref 0–0.44)
EOSINOPHILS RELATIVE PERCENT: 5 % (ref 1–4)
ERYTHROCYTE [DISTWIDTH] IN BLOOD BY AUTOMATED COUNT: 16.4 % (ref 11.8–14.4)
GFR SERPL CREATININE-BSD FRML MDRD: 12 ML/MIN/1.73M2
GLUCOSE SERPL-MCNC: 190 MG/DL (ref 70–99)
HCT VFR BLD AUTO: 27 % (ref 40.7–50.3)
HGB BLD-MCNC: 8.8 G/DL (ref 13–17)
IMM GRANULOCYTES # BLD AUTO: 0.21 K/UL (ref 0–0.3)
IMM GRANULOCYTES NFR BLD: 3 %
LYMPHOCYTES NFR BLD: 2.22 K/UL (ref 1.1–3.7)
LYMPHOCYTES RELATIVE PERCENT: 27 % (ref 24–43)
MCH RBC QN AUTO: 29.6 PG (ref 25.2–33.5)
MCHC RBC AUTO-ENTMCNC: 32.6 G/DL (ref 28.4–34.8)
MCV RBC AUTO: 90.9 FL (ref 82.6–102.9)
MONOCYTES NFR BLD: 0.53 K/UL (ref 0.1–1.2)
MONOCYTES NFR BLD: 6 % (ref 3–12)
NEUTROPHILS NFR BLD: 58 % (ref 36–65)
NEUTS SEG NFR BLD: 4.84 K/UL (ref 1.5–8.1)
NRBC BLD-RTO: 0 PER 100 WBC
PLATELET # BLD AUTO: 241 K/UL (ref 138–453)
PMV BLD AUTO: 9.6 FL (ref 8.1–13.5)
POTASSIUM SERPL-SCNC: 4.5 MMOL/L (ref 3.7–5.3)
PROT SERPL-MCNC: 6.6 G/DL (ref 6.4–8.3)
RBC # BLD AUTO: 2.97 M/UL (ref 4.21–5.77)
SODIUM SERPL-SCNC: 140 MMOL/L (ref 135–144)
WBC OTHER # BLD: 8.3 K/UL (ref 3.5–11.3)

## 2023-08-07 PROCEDURE — 80053 COMPREHEN METABOLIC PANEL: CPT

## 2023-08-07 PROCEDURE — 85025 COMPLETE CBC W/AUTO DIFF WBC: CPT

## 2023-08-15 ENCOUNTER — CLINICAL DOCUMENTATION ONLY (OUTPATIENT)
Facility: CLINIC | Age: 78
End: 2023-08-15

## 2023-08-15 ENCOUNTER — APPOINTMENT (OUTPATIENT)
Dept: GENERAL RADIOLOGY | Age: 78
End: 2023-08-15
Payer: MEDICARE

## 2023-08-15 ENCOUNTER — HOSPITAL ENCOUNTER (EMERGENCY)
Age: 78
Discharge: HOME | End: 2023-08-15
Attending: STUDENT IN AN ORGANIZED HEALTH CARE EDUCATION/TRAINING PROGRAM
Payer: MEDICARE

## 2023-08-15 ENCOUNTER — HOSPITAL ENCOUNTER (OUTPATIENT)
Age: 78
Setting detail: SPECIMEN
Discharge: HOME | End: 2023-08-15
Payer: MEDICARE

## 2023-08-15 VITALS
WEIGHT: 185 LBS | OXYGEN SATURATION: 96 % | HEART RATE: 91 BPM | TEMPERATURE: 100 F | BODY MASS INDEX: 27.32 KG/M2 | SYSTOLIC BLOOD PRESSURE: 188 MMHG | RESPIRATION RATE: 20 BRPM | DIASTOLIC BLOOD PRESSURE: 58 MMHG

## 2023-08-15 DIAGNOSIS — S51.012A SKIN TEAR OF LEFT ELBOW WITHOUT COMPLICATION, INITIAL ENCOUNTER: ICD-10-CM
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
## 2023-08-15 LAB
BACTERIA URNS QL MICRO: ABNORMAL
BILIRUB UR QL STRIP: NEGATIVE
CLARITY UR: ABNORMAL
COLOR UR: YELLOW
EPI CELLS #/AREA URNS HPF: ABNORMAL /HPF (ref 0–5)
GLUCOSE UR STRIP-MCNC: NEGATIVE MG/DL
HGB UR QL STRIP.AUTO: ABNORMAL
KETONES UR STRIP-MCNC: NEGATIVE MG/DL
LEUKOCYTE ESTERASE UR QL STRIP: ABNORMAL
NITRITE UR QL STRIP: NEGATIVE
PH UR STRIP: 7 [PH] (ref 5–9)
PROT UR STRIP-MCNC: ABNORMAL MG/DL
RBC #/AREA URNS HPF: ABNORMAL /HPF (ref 0–2)
SP GR UR STRIP: 1.01 (ref 1.01–1.02)
UROBILINOGEN UR STRIP-ACNC: NORMAL EU/DL (ref 0–1)
WBC #/AREA URNS HPF: ABNORMAL /HPF (ref 0–5)

## 2023-08-15 PROCEDURE — 87088 URINE BACTERIA CULTURE: CPT

## 2023-08-15 PROCEDURE — 73080 X-RAY EXAM OF ELBOW: CPT

## 2023-08-15 PROCEDURE — 99283 EMERGENCY DEPT VISIT LOW MDM: CPT

## 2023-08-15 PROCEDURE — 87086 URINE CULTURE/COLONY COUNT: CPT

## 2023-08-15 PROCEDURE — 81001 URINALYSIS AUTO W/SCOPE: CPT

## 2023-08-15 PROCEDURE — 6370000000 HC RX 637 (ALT 250 FOR IP): Performed by: STUDENT IN AN ORGANIZED HEALTH CARE EDUCATION/TRAINING PROGRAM

## 2023-08-15 PROCEDURE — 87186 SC STD MICRODIL/AGAR DIL: CPT

## 2023-08-15 PROCEDURE — 99284 EMERGENCY DEPT VISIT MOD MDM: CPT

## 2023-08-15 RX ORDER — BACITRACIN ZINC 500 [USP'U]/G
OINTMENT TOPICAL ONCE
Status: DISCONTINUED | OUTPATIENT
Start: 2023-08-15 | End: 2023-08-15 | Stop reason: HOSPADM

## 2023-08-15 RX ORDER — ASCORBIC ACID 500 MG
500 TABLET ORAL DAILY
COMMUNITY

## 2023-08-15 RX ORDER — OMEPRAZOLE 10 MG/1
10 CAPSULE, DELAYED RELEASE ORAL DAILY
COMMUNITY

## 2023-08-15 RX ORDER — POLYETHYLENE GLYCOL 3350 17 G/17G
17 POWDER, FOR SOLUTION ORAL DAILY PRN
COMMUNITY

## 2023-08-15 RX ORDER — MULTIVIT-MIN/IRON/FOLIC ACID/K 18-600-40
CAPSULE ORAL
COMMUNITY

## 2023-08-15 RX ORDER — ACETAMINOPHEN 500 MG
1000 TABLET ORAL ONCE
Status: COMPLETED | OUTPATIENT
Start: 2023-08-15 | End: 2023-08-15

## 2023-08-15 RX ADMIN — ACETAMINOPHEN 1000 MG: 500 TABLET, FILM COATED ORAL at 01:36

## 2023-08-15 ASSESSMENT — ENCOUNTER SYMPTOMS
COLOR CHANGE: 0
WHEEZING: 0
EYES NEGATIVE: 1
GASTROINTESTINAL NEGATIVE: 1
STRIDOR: 0
APNEA: 0

## 2023-08-15 ASSESSMENT — PAIN - FUNCTIONAL ASSESSMENT: PAIN_FUNCTIONAL_ASSESSMENT: 0-10

## 2023-08-15 ASSESSMENT — PAIN SCALES - GENERAL: PAINLEVEL_OUTOF10: 4

## 2023-08-15 NOTE — ED NOTES
Patient left elbow skin tear cleaned as well as nose laceration.      Margie Louis RN  08/15/23 3393

## 2023-08-15 NOTE — ED PROVIDER NOTES
[facility] ED  Emergency Department Encounter  Emergency Medicine Resident     Pt Jennifer Casas  MRN: [id number]  [address] [facility] 1945  Date of evaluation: 8/15/23  PCP:  Payam Armstrong MD  Note Started: 1:45 AM EDT      CHIEF COMPLAINT       Chief Complaint   Patient presents with    Fall     Pt from [facility], was sitting on the edge of the bed and told to wait a minute while staff got his wheelchair. Pt attempted to stand and transfer himself without staff and ended up falling forward, striking head on the floor. Pt with laceration to bridge of nose, bleeding from nose as well as skin tear on left elbow. No loss of consciousness, no blood thinners       HISTORY OF PRESENT ILLNESS  (Location/Symptom, Timing/Onset, Context/Setting, Quality, Duration, Modifying Factors, Severity.)      Neftaly Trevino is a 68 y.o. male who presents with small nose laceration and skin tear left elbow. Patient is coming from [facility] where he was sitting on the edge of his bed. Patient was told by staff to hold arm however the patient attempted to get up from his wheelchair lost his balance and fell forward onto his face. Patient did not lose consciousness, is not taking any blood thinners was immediately able to help. EMS was called secondary to the skin tear and need for evaluation and patient was brought to the emergency department. In the room, patient is awake, alert but breathing heavily. Patient was diagnosed with COVID-19 4 days prior and is still having residual effects. Patient states he is feels cold, vital signs show patient does have a temperature of 100.4. Skin tear on his left elbow is moderate in size however has full range of motion. Patient complaining about a 4 out of 10 pain on the left elbow with skin tear is.   PAST MEDICAL / SURGICAL / SOCIAL / FAMILY HISTORY      has a past medical history of Abnormal tilt table test, Acute kidney injury ([address]), Acute MI ([address]), Acute renal failure

## 2023-08-15 NOTE — DISCHARGE INSTRUCTIONS
You were evaluated in the emergency department after falling. X-rays were taken of the elbow which did not show anything that was wrong with the elbow. The elbow was not fractured but definitely has some deep bruising. Skin tears were repaired using Steri-Strips and benzoin. Please let the Steri-Strips fall off on their own and do not attempt to remove early. There is high likelihood that some of the skin will slough off in the next 4 to 10 days. Please return to the emerge apartment there is signs of infection including redness, heat or pus creation.

## 2023-08-17 LAB
MICROORGANISM SPEC CULT: ABNORMAL
SPECIMEN DESCRIPTION: ABNORMAL

## 2023-08-21 ENCOUNTER — HOSPITAL ENCOUNTER (OUTPATIENT)
Age: 78
Setting detail: SPECIMEN
Discharge: HOME | End: 2023-08-21

## 2023-08-21 LAB
ALBUMIN SERPL-MCNC: 3.7 G/DL (ref 3.5–5.2)
ALBUMIN SERPL-MCNC: 3.8 G/DL (ref 3.5–5.2)
ALBUMIN/GLOB SERPL: 1.2 {RATIO} (ref 1–2.5)
ALBUMIN/GLOB SERPL: 1.4 {RATIO} (ref 1–2.5)
ALP SERPL-CCNC: 78 U/L (ref 40–129)
ALP SERPL-CCNC: 88 U/L (ref 40–129)
ALT SERPL-CCNC: 7 U/L (ref 5–41)
ALT SERPL-CCNC: 8 U/L (ref 5–41)
ANION GAP SERPL CALCULATED.3IONS-SCNC: 16 MMOL/L (ref 9–17)
ANION GAP SERPL CALCULATED.3IONS-SCNC: 17 MMOL/L (ref 9–17)
AST SERPL-CCNC: 18 U/L
AST SERPL-CCNC: 19 U/L
BASOPHILS # BLD: 0 K/UL (ref 0–0.2)
BASOPHILS # BLD: 0.07 K/UL (ref 0–0.2)
BASOPHILS NFR BLD: 0 % (ref 0–2)
BASOPHILS NFR BLD: 1 % (ref 0–2)
BILIRUB SERPL-MCNC: 0.4 MG/DL (ref 0.3–1.2)
BILIRUB SERPL-MCNC: 0.4 MG/DL (ref 0.3–1.2)
BUN SERPL-MCNC: 26 MG/DL (ref 8–23)
BUN SERPL-MCNC: 27 MG/DL (ref 8–23)
BUN/CREAT SERPL: 5 (ref 9–20)
BUN/CREAT SERPL: 6 (ref 9–20)
CALCIUM SERPL-MCNC: 8.9 MG/DL (ref 8.6–10.4)
CALCIUM SERPL-MCNC: 9 MG/DL (ref 8.6–10.4)
CHLORIDE SERPL-SCNC: 100 MMOL/L (ref 98–107)
CHLORIDE SERPL-SCNC: 102 MMOL/L (ref 98–107)
CO2 SERPL-SCNC: 21 MMOL/L (ref 20–31)
CO2 SERPL-SCNC: 22 MMOL/L (ref 20–31)
CREAT SERPL-MCNC: 4.9 MG/DL (ref 0.7–1.2)
CREAT SERPL-MCNC: 5.1 MG/DL (ref 0.7–1.2)
EOSINOPHIL # BLD: 0.26 K/UL (ref 0–0.44)
EOSINOPHIL # BLD: 0.38 K/UL (ref 0–0.44)
EOSINOPHILS RELATIVE PERCENT: 3 % (ref 1–4)
EOSINOPHILS RELATIVE PERCENT: 4 % (ref 1–4)
ERYTHROCYTE [DISTWIDTH] IN BLOOD BY AUTOMATED COUNT: 15.4 % (ref 11.8–14.4)
ERYTHROCYTE [DISTWIDTH] IN BLOOD BY AUTOMATED COUNT: 15.7 % (ref 11.8–14.4)
GFR SERPL CREATININE-BSD FRML MDRD: 11 ML/MIN/1.73M2
GFR SERPL CREATININE-BSD FRML MDRD: 12 ML/MIN/1.73M2
GLUCOSE SERPL-MCNC: 152 MG/DL (ref 70–99)
GLUCOSE SERPL-MCNC: 245 MG/DL (ref 70–99)
HCT VFR BLD AUTO: 25.7 % (ref 40.7–50.3)
HCT VFR BLD AUTO: 28.3 % (ref 40.7–50.3)
HGB BLD-MCNC: 8.2 G/DL (ref 13–17)
HGB BLD-MCNC: 8.6 G/DL (ref 13–17)
IMM GRANULOCYTES # BLD AUTO: 0.26 K/UL (ref 0–0.3)
IMM GRANULOCYTES # BLD AUTO: 0.49 K/UL (ref 0–0.3)
IMM GRANULOCYTES NFR BLD: 3 %
IMM GRANULOCYTES NFR BLD: 5 %
LYMPHOCYTES # BLD: 25 % (ref 24–43)
LYMPHOCYTES NFR BLD: 1.58 K/UL (ref 1.1–3.7)
LYMPHOCYTES NFR BLD: 2.45 K/UL (ref 1.1–3.7)
LYMPHOCYTES RELATIVE PERCENT: 18 % (ref 24–43)
MCH RBC QN AUTO: 28 PG (ref 25.2–33.5)
MCH RBC QN AUTO: 28.6 PG (ref 25.2–33.5)
MCHC RBC AUTO-ENTMCNC: 30.4 G/DL (ref 28.4–34.8)
MCHC RBC AUTO-ENTMCNC: 31.9 G/DL (ref 28.4–34.8)
MCV RBC AUTO: 89.5 FL (ref 82.6–102.9)
MCV RBC AUTO: 92.2 FL (ref 82.6–102.9)
MONOCYTES NFR BLD: 0.35 K/UL (ref 0.1–1.2)
MONOCYTES NFR BLD: 0.49 K/UL (ref 0.1–1.2)
MONOCYTES NFR BLD: 4 % (ref 3–12)
MONOCYTES NFR BLD: 5 % (ref 3–12)
MORPHOLOGY: NORMAL
NEUTROPHILS NFR BLD: 60 % (ref 36–65)
NEUTROPHILS NFR BLD: 72 % (ref 36–65)
NEUTS SEG NFR BLD: 6.1 K/UL (ref 1.5–8.1)
NEUTS SEG NFR BLD: 6.35 K/UL (ref 1.5–8.1)
NRBC BLD-RTO: 0 PER 100 WBC
NRBC BLD-RTO: 0 PER 100 WBC
PLATELET # BLD AUTO: 299 K/UL (ref 138–453)
PLATELET # BLD AUTO: 306 K/UL (ref 138–453)
PMV BLD AUTO: 9.1 FL (ref 8.1–13.5)
PMV BLD AUTO: 9.6 FL (ref 8.1–13.5)
POTASSIUM SERPL-SCNC: 4.2 MMOL/L (ref 3.7–5.3)
POTASSIUM SERPL-SCNC: 4.3 MMOL/L (ref 3.7–5.3)
PROT SERPL-MCNC: 6.6 G/DL (ref 6.4–8.3)
PROT SERPL-MCNC: 6.9 G/DL (ref 6.4–8.3)
RBC # BLD AUTO: 2.87 M/UL (ref 4.21–5.77)
RBC # BLD AUTO: 3.07 M/UL (ref 4.21–5.77)
SODIUM SERPL-SCNC: 138 MMOL/L (ref 135–144)
SODIUM SERPL-SCNC: 140 MMOL/L (ref 135–144)
WBC OTHER # BLD: 10 K/UL (ref 3.5–11.3)
WBC OTHER # BLD: 8.8 K/UL (ref 3.5–11.3)

## 2023-08-21 PROCEDURE — P9604 ONE-WAY ALLOW PRORATED TRIP: HCPCS

## 2023-08-21 PROCEDURE — 36415 COLL VENOUS BLD VENIPUNCTURE: CPT

## 2023-08-21 PROCEDURE — 80053 COMPREHEN METABOLIC PANEL: CPT

## 2023-08-21 PROCEDURE — 85025 COMPLETE CBC W/AUTO DIFF WBC: CPT

## 2023-08-25 ENCOUNTER — TELEPHONE (OUTPATIENT)
Dept: UROLOGY | Age: 78
End: 2023-08-25

## 2023-08-25 DIAGNOSIS — N20.0 RENAL CALCULUS: Primary | ICD-10-CM

## 2023-08-28 ENCOUNTER — HOSPITAL ENCOUNTER (OUTPATIENT)
Age: 78
Setting detail: SPECIMEN
Discharge: HOME | End: 2023-08-28
Payer: MEDICARE

## 2023-08-28 ENCOUNTER — TELEPHONE (OUTPATIENT)
Dept: UROLOGY | Age: 78
End: 2023-08-28

## 2023-08-28 LAB
ALBUMIN SERPL-MCNC: 3.6 G/DL (ref 3.5–5.2)
ALBUMIN/GLOB SERPL: 1.3 {RATIO} (ref 1–2.5)
ALP SERPL-CCNC: 82 U/L (ref 40–129)
ALT SERPL-CCNC: 8 U/L (ref 5–41)
ANION GAP SERPL CALCULATED.3IONS-SCNC: 16 MMOL/L (ref 9–17)
AST SERPL-CCNC: 15 U/L
BASOPHILS # BLD: 0.06 K/UL (ref 0–0.2)
BASOPHILS NFR BLD: 1 % (ref 0–2)
BILIRUB SERPL-MCNC: 0.2 MG/DL (ref 0.3–1.2)
BUN SERPL-MCNC: 35 MG/DL (ref 8–23)
BUN/CREAT SERPL: 7 (ref 9–20)
CALCIUM SERPL-MCNC: 8.5 MG/DL (ref 8.6–10.4)
CHLORIDE SERPL-SCNC: 103 MMOL/L (ref 98–107)
CO2 SERPL-SCNC: 21 MMOL/L (ref 20–31)
CREAT SERPL-MCNC: 4.8 MG/DL (ref 0.7–1.2)
EOSINOPHIL # BLD: 0.3 K/UL (ref 0–0.44)
EOSINOPHILS RELATIVE PERCENT: 3 % (ref 1–4)
ERYTHROCYTE [DISTWIDTH] IN BLOOD BY AUTOMATED COUNT: 15.8 % (ref 11.8–14.4)
GFR SERPL CREATININE-BSD FRML MDRD: 12 ML/MIN/1.73M2
GLUCOSE SERPL-MCNC: 240 MG/DL (ref 70–99)
HCT VFR BLD AUTO: 24 % (ref 40.7–50.3)
HGB BLD-MCNC: 7.5 G/DL (ref 13–17)
IMM GRANULOCYTES # BLD AUTO: 0.12 K/UL (ref 0–0.3)
IMM GRANULOCYTES NFR BLD: 1 %
LYMPHOCYTES # BLD: 20 % (ref 24–43)
LYMPHOCYTES NFR BLD: 1.74 K/UL (ref 1.1–3.7)
MCH RBC QN AUTO: 28.3 PG (ref 25.2–33.5)
MCHC RBC AUTO-ENTMCNC: 31.3 G/DL (ref 28.4–34.8)
MCV RBC AUTO: 90.6 FL (ref 82.6–102.9)
MONOCYTES NFR BLD: 0.53 K/UL (ref 0.1–1.2)
MONOCYTES NFR BLD: 6 % (ref 3–12)
NEUTROPHILS NFR BLD: 69 % (ref 36–65)
NEUTS SEG NFR BLD: 6.04 K/UL (ref 1.5–8.1)
NRBC BLD-RTO: 0 PER 100 WBC
PLATELET # BLD AUTO: 232 K/UL (ref 138–453)
PMV BLD AUTO: 9.9 FL (ref 8.1–13.5)
POTASSIUM SERPL-SCNC: 3.9 MMOL/L (ref 3.7–5.3)
PROT SERPL-MCNC: 6.4 G/DL (ref 6.4–8.3)
RBC # BLD AUTO: 2.65 M/UL (ref 4.21–5.77)
SODIUM SERPL-SCNC: 140 MMOL/L (ref 135–144)
WBC OTHER # BLD: 8.8 K/UL (ref 3.5–11.3)

## 2023-08-28 PROCEDURE — 80053 COMPREHEN METABOLIC PANEL: CPT

## 2023-08-28 PROCEDURE — 85025 COMPLETE CBC W/AUTO DIFF WBC: CPT

## 2023-08-28 NOTE — TELEPHONE ENCOUNTER
Called [facility] and was advised to speak with [facility] regarding scheduling his upcoming appointment with us. I did tell her that I spoke with his daughter previously. Information was given regarding testing and appointment time.

## 2023-08-28 NOTE — TELEPHONE ENCOUNTER
Patient's daughter called back and she was advised of response. Her dad does dialysis on Monday, Wednesday and Fridays so we need to schedule on Tuesday or Thursday's. I advised her that we are [facility] so everyone will be on the same page.

## 2023-08-28 NOTE — TELEPHONE ENCOUNTER
Reschedule apt for 2-3 weeks out. Get KUB prior, but NOT PSA due to recent UTI.  We will evaluate urinary symptoms and then decide when to check PSA

## 2023-08-28 NOTE — TELEPHONE ENCOUNTER
The patient's daughter called regarding her fathers upcoming appointment scheduled for tomorrow. She said that he has not had the psa or kub done yet as he has had a lot of other health issues recently and is in [address]. She said that he is currently getting injections for a UTI and they should be contacting us to schedule a follow up on that as well. I called [facility] and spoke with Slim Hartman to verify this. She said they checked a urine culture due to increased frequency, weakness and a fall. He was prescribed Keflex right away then when the culture came back he was changed to IM Rocephin for 14 days. He started this on 08/18/2023, due to E-coli. When would you like to see him, typically they have to schedule with [facility] to come to the hospital for testing and visits, so they may not be able to get here on short notice.

## 2023-08-28 NOTE — TELEPHONE ENCOUNTER
I did reschedule his apt a few weeks out. I called his daughter Zoila Rios and also [facility] and left messages for them to return a call to the office.

## 2023-08-29 ENCOUNTER — HOSPITAL ENCOUNTER (OUTPATIENT)
Dept: ULTRASOUND IMAGING | Age: 78
Discharge: HOME | End: 2023-08-31
Payer: MEDICARE

## 2023-08-29 ENCOUNTER — HOSPITAL ENCOUNTER (OUTPATIENT)
Dept: GENERAL RADIOLOGY | Age: 78
Discharge: HOME | End: 2023-08-31
Payer: MEDICARE

## 2023-08-29 DIAGNOSIS — N20.0 RENAL CALCULUS: ICD-10-CM

## 2023-08-29 DIAGNOSIS — E04.1 THYROID NODULE: ICD-10-CM

## 2023-08-29 PROCEDURE — 76536 US EXAM OF HEAD AND NECK: CPT

## 2023-08-29 PROCEDURE — 74018 RADEX ABDOMEN 1 VIEW: CPT

## 2023-09-04 ENCOUNTER — HOSPITAL ENCOUNTER (OUTPATIENT)
Age: 78
Setting detail: SPECIMEN
Discharge: HOME | End: 2023-09-04
Payer: MEDICARE

## 2023-09-04 LAB
ALBUMIN SERPL-MCNC: 3.9 G/DL (ref 3.5–5.2)
ALBUMIN/GLOB SERPL: 1.4 {RATIO} (ref 1–2.5)
ALP SERPL-CCNC: 87 U/L (ref 40–129)
ALT SERPL-CCNC: 11 U/L (ref 5–41)
ANION GAP SERPL CALCULATED.3IONS-SCNC: 17 MMOL/L (ref 9–17)
AST SERPL-CCNC: 19 U/L
BASOPHILS # BLD: 0.06 K/UL (ref 0–0.2)
BASOPHILS NFR BLD: 1 % (ref 0–2)
BILIRUB SERPL-MCNC: 0.3 MG/DL (ref 0.3–1.2)
BUN SERPL-MCNC: 33 MG/DL (ref 8–23)
BUN/CREAT SERPL: 7 (ref 9–20)
CALCIUM SERPL-MCNC: 8.9 MG/DL (ref 8.6–10.4)
CHLORIDE SERPL-SCNC: 98 MMOL/L (ref 98–107)
CO2 SERPL-SCNC: 20 MMOL/L (ref 20–31)
CREAT SERPL-MCNC: 4.7 MG/DL (ref 0.7–1.2)
EOSINOPHIL # BLD: 0.37 K/UL (ref 0–0.44)
EOSINOPHILS RELATIVE PERCENT: 5 % (ref 1–4)
ERYTHROCYTE [DISTWIDTH] IN BLOOD BY AUTOMATED COUNT: 16.9 % (ref 11.8–14.4)
GFR SERPL CREATININE-BSD FRML MDRD: 12 ML/MIN/1.73M2
GLUCOSE SERPL-MCNC: 251 MG/DL (ref 70–99)
HCT VFR BLD AUTO: 26.2 % (ref 40.7–50.3)
HGB BLD-MCNC: 8.1 G/DL (ref 13–17)
IMM GRANULOCYTES # BLD AUTO: 0.15 K/UL (ref 0–0.3)
IMM GRANULOCYTES NFR BLD: 2 %
LYMPHOCYTES NFR BLD: 2.08 K/UL (ref 1.1–3.7)
LYMPHOCYTES RELATIVE PERCENT: 25 % (ref 24–43)
MCH RBC QN AUTO: 28.1 PG (ref 25.2–33.5)
MCHC RBC AUTO-ENTMCNC: 30.9 G/DL (ref 28.4–34.8)
MCV RBC AUTO: 91 FL (ref 82.6–102.9)
MONOCYTES NFR BLD: 0.5 K/UL (ref 0.1–1.2)
MONOCYTES NFR BLD: 6 % (ref 3–12)
NEUTROPHILS NFR BLD: 62 % (ref 36–65)
NEUTS SEG NFR BLD: 5.08 K/UL (ref 1.5–8.1)
NRBC BLD-RTO: 0 PER 100 WBC
PLATELET # BLD AUTO: 243 K/UL (ref 138–453)
PMV BLD AUTO: 10 FL (ref 8.1–13.5)
POTASSIUM SERPL-SCNC: 4.1 MMOL/L (ref 3.7–5.3)
PROT SERPL-MCNC: 6.6 G/DL (ref 6.4–8.3)
RBC # BLD AUTO: 2.88 M/UL (ref 4.21–5.77)
SODIUM SERPL-SCNC: 135 MMOL/L (ref 135–144)
WBC OTHER # BLD: 8.3 K/UL (ref 3.5–11.3)

## 2023-09-04 PROCEDURE — 80053 COMPREHEN METABOLIC PANEL: CPT

## 2023-09-04 PROCEDURE — 85025 COMPLETE CBC W/AUTO DIFF WBC: CPT

## 2023-09-05 ENCOUNTER — HOSPITAL ENCOUNTER (OUTPATIENT)
Age: 78
Setting detail: SPECIMEN
Discharge: HOME | End: 2023-09-05

## 2023-09-05 LAB
ALBUMIN SERPL-MCNC: 3.6 G/DL (ref 3.5–5.2)
ALBUMIN/GLOB SERPL: 1.4 {RATIO} (ref 1–2.5)
ALP SERPL-CCNC: 85 U/L (ref 40–129)
ALT SERPL-CCNC: 9 U/L (ref 5–41)
ANION GAP SERPL CALCULATED.3IONS-SCNC: 11 MMOL/L (ref 9–17)
AST SERPL-CCNC: 16 U/L
BILIRUB SERPL-MCNC: 0.3 MG/DL (ref 0.3–1.2)
BUN SERPL-MCNC: 21 MG/DL (ref 8–23)
BUN/CREAT SERPL: 6 (ref 9–20)
CALCIUM SERPL-MCNC: 8.7 MG/DL (ref 8.6–10.4)
CHLORIDE SERPL-SCNC: 102 MMOL/L (ref 98–107)
CO2 SERPL-SCNC: 27 MMOL/L (ref 20–31)
CREAT SERPL-MCNC: 3.5 MG/DL (ref 0.7–1.2)
ERYTHROCYTE [DISTWIDTH] IN BLOOD BY AUTOMATED COUNT: 17 % (ref 11.8–14.4)
GFR SERPL CREATININE-BSD FRML MDRD: 17 ML/MIN/1.73M2
GLUCOSE SERPL-MCNC: 181 MG/DL (ref 70–99)
HCT VFR BLD AUTO: 25.2 % (ref 40.7–50.3)
HGB BLD-MCNC: 7.9 G/DL (ref 13–17)
MCH RBC QN AUTO: 28.5 PG (ref 25.2–33.5)
MCHC RBC AUTO-ENTMCNC: 31.3 G/DL (ref 28.4–34.8)
MCV RBC AUTO: 91 FL (ref 82.6–102.9)
NRBC BLD-RTO: 0 PER 100 WBC
PLATELET # BLD AUTO: 199 K/UL (ref 138–453)
PMV BLD AUTO: 9.5 FL (ref 8.1–13.5)
POTASSIUM SERPL-SCNC: 4.1 MMOL/L (ref 3.7–5.3)
PROT SERPL-MCNC: 6.2 G/DL (ref 6.4–8.3)
RBC # BLD AUTO: 2.77 M/UL (ref 4.21–5.77)
SODIUM SERPL-SCNC: 140 MMOL/L (ref 135–144)
WBC OTHER # BLD: 6.6 K/UL (ref 3.5–11.3)

## 2023-09-05 PROCEDURE — 36415 COLL VENOUS BLD VENIPUNCTURE: CPT

## 2023-09-05 PROCEDURE — 85027 COMPLETE CBC AUTOMATED: CPT

## 2023-09-05 PROCEDURE — 80053 COMPREHEN METABOLIC PANEL: CPT

## 2023-09-11 ENCOUNTER — HOSPITAL ENCOUNTER (OUTPATIENT)
Age: 78
Setting detail: SPECIMEN
Discharge: HOME | End: 2023-09-11

## 2023-09-11 DIAGNOSIS — E04.1 THYROID NODULE: ICD-10-CM

## 2023-09-11 DIAGNOSIS — K90.9 IRON MALABSORPTION: ICD-10-CM

## 2023-09-11 DIAGNOSIS — Z12.5 SCREENING PSA (PROSTATE SPECIFIC ANTIGEN): ICD-10-CM

## 2023-09-11 LAB
ALBUMIN SERPL-MCNC: 4 G/DL (ref 3.5–5.2)
ALBUMIN SERPL-MCNC: 4.3 G/DL (ref 3.5–5.2)
ALBUMIN/GLOB SERPL: 1.5 {RATIO} (ref 1–2.5)
ALBUMIN/GLOB SERPL: 1.7 {RATIO} (ref 1–2.5)
ALP SERPL-CCNC: 87 U/L (ref 40–129)
ALP SERPL-CCNC: 96 U/L (ref 40–129)
ALT SERPL-CCNC: 8 U/L (ref 5–41)
ALT SERPL-CCNC: 9 U/L (ref 5–41)
ANION GAP SERPL CALCULATED.3IONS-SCNC: 16 MMOL/L (ref 9–17)
ANION GAP SERPL CALCULATED.3IONS-SCNC: 16 MMOL/L (ref 9–17)
AST SERPL-CCNC: 14 U/L
AST SERPL-CCNC: 16 U/L
BASOPHILS # BLD: 0.07 K/UL (ref 0–0.2)
BASOPHILS NFR BLD: 1 % (ref 0–2)
BILIRUB SERPL-MCNC: 0.3 MG/DL (ref 0.3–1.2)
BILIRUB SERPL-MCNC: 0.4 MG/DL (ref 0.3–1.2)
BUN SERPL-MCNC: 39 MG/DL (ref 8–23)
BUN SERPL-MCNC: 40 MG/DL (ref 8–23)
BUN/CREAT SERPL: 8 (ref 9–20)
BUN/CREAT SERPL: 9 (ref 9–20)
CALCIUM SERPL-MCNC: 9 MG/DL (ref 8.6–10.4)
CALCIUM SERPL-MCNC: 9.2 MG/DL (ref 8.6–10.4)
CHLORIDE SERPL-SCNC: 102 MMOL/L (ref 98–107)
CHLORIDE SERPL-SCNC: 104 MMOL/L (ref 98–107)
CO2 SERPL-SCNC: 21 MMOL/L (ref 20–31)
CO2 SERPL-SCNC: 22 MMOL/L (ref 20–31)
CREAT SERPL-MCNC: 4.6 MG/DL (ref 0.7–1.2)
CREAT SERPL-MCNC: 4.7 MG/DL (ref 0.7–1.2)
EOSINOPHIL # BLD: 0.53 K/UL (ref 0–0.44)
EOSINOPHILS RELATIVE PERCENT: 7 % (ref 1–4)
ERYTHROCYTE [DISTWIDTH] IN BLOOD BY AUTOMATED COUNT: 16.8 % (ref 11.8–14.4)
ERYTHROCYTE [DISTWIDTH] IN BLOOD BY AUTOMATED COUNT: 17 % (ref 11.8–14.4)
FERRITIN SERPL-MCNC: 1787 NG/ML (ref 30–400)
GFR SERPL CREATININE-BSD FRML MDRD: 12 ML/MIN/1.73M2
GFR SERPL CREATININE-BSD FRML MDRD: 12 ML/MIN/1.73M2
GLUCOSE SERPL-MCNC: 166 MG/DL (ref 70–99)
GLUCOSE SERPL-MCNC: 247 MG/DL (ref 70–99)
HCT VFR BLD AUTO: 25.9 % (ref 40.7–50.3)
HCT VFR BLD AUTO: 29 % (ref 40.7–50.3)
HGB BLD-MCNC: 8.3 G/DL (ref 13–17)
HGB BLD-MCNC: 8.9 G/DL (ref 13–17)
IMM GRANULOCYTES # BLD AUTO: 0.12 K/UL (ref 0–0.3)
IMM GRANULOCYTES NFR BLD: 2 %
IRON SATN MFR SERPL: 28 % (ref 20–55)
IRON SERPL-MCNC: 55 UG/DL (ref 59–158)
LYMPHOCYTES # BLD: 25 % (ref 24–43)
LYMPHOCYTES NFR BLD: 1.85 K/UL (ref 1.1–3.7)
MCH RBC QN AUTO: 28 PG (ref 25.2–33.5)
MCH RBC QN AUTO: 28.9 PG (ref 25.2–33.5)
MCHC RBC AUTO-ENTMCNC: 30.7 G/DL (ref 28.4–34.8)
MCHC RBC AUTO-ENTMCNC: 32 G/DL (ref 28.4–34.8)
MCV RBC AUTO: 90.2 FL (ref 82.6–102.9)
MCV RBC AUTO: 91.2 FL (ref 82.6–102.9)
MONOCYTES NFR BLD: 0.47 K/UL (ref 0.1–1.2)
MONOCYTES NFR BLD: 6 % (ref 3–12)
NEUTROPHILS NFR BLD: 59 % (ref 36–65)
NEUTS SEG NFR BLD: 4.5 K/UL (ref 1.5–8.1)
NRBC BLD-RTO: 0 PER 100 WBC
NRBC BLD-RTO: 0 PER 100 WBC
PLATELET # BLD AUTO: 204 K/UL (ref 138–453)
PLATELET # BLD AUTO: 226 K/UL (ref 138–453)
PMV BLD AUTO: 10.3 FL (ref 8.1–13.5)
PMV BLD AUTO: 9.4 FL (ref 8.1–13.5)
POTASSIUM SERPL-SCNC: 4.1 MMOL/L (ref 3.7–5.3)
POTASSIUM SERPL-SCNC: 4.2 MMOL/L (ref 3.7–5.3)
PROT SERPL-MCNC: 6.7 G/DL (ref 6.4–8.3)
PROT SERPL-MCNC: 6.9 G/DL (ref 6.4–8.3)
PSA SERPL-MCNC: 3.28 NG/ML
RBC # BLD AUTO: 2.87 M/UL (ref 4.21–5.77)
RBC # BLD AUTO: 3.18 M/UL (ref 4.21–5.77)
SODIUM SERPL-SCNC: 139 MMOL/L (ref 135–144)
SODIUM SERPL-SCNC: 142 MMOL/L (ref 135–144)
TIBC SERPL-MCNC: 194 UG/DL (ref 250–450)
UNSATURATED IRON BINDING CAPACITY: 139 UG/DL (ref 112–347)
WBC OTHER # BLD: 7.5 K/UL (ref 3.5–11.3)
WBC OTHER # BLD: 9.5 K/UL (ref 3.5–11.3)

## 2023-09-11 PROCEDURE — P9603 ONE-WAY ALLOW PRORATED MILES: HCPCS

## 2023-09-11 PROCEDURE — 82607 VITAMIN B-12: CPT

## 2023-09-11 PROCEDURE — 82746 ASSAY OF FOLIC ACID SERUM: CPT

## 2023-09-11 PROCEDURE — 83550 IRON BINDING TEST: CPT

## 2023-09-11 PROCEDURE — 85025 COMPLETE CBC W/AUTO DIFF WBC: CPT

## 2023-09-11 PROCEDURE — 36415 COLL VENOUS BLD VENIPUNCTURE: CPT

## 2023-09-11 PROCEDURE — 80053 COMPREHEN METABOLIC PANEL: CPT

## 2023-09-11 PROCEDURE — 85027 COMPLETE CBC AUTOMATED: CPT

## 2023-09-11 PROCEDURE — G0103 PSA SCREENING: HCPCS

## 2023-09-11 PROCEDURE — 83540 ASSAY OF IRON: CPT

## 2023-09-11 PROCEDURE — 82728 ASSAY OF FERRITIN: CPT

## 2023-09-12 LAB
FOLATE SERPL-MCNC: >20 NG/ML
VIT B12 SERPL-MCNC: 619 PG/ML (ref 232–1245)

## 2023-09-14 ENCOUNTER — OFFICE VISIT (OUTPATIENT)
Dept: ONCOLOGY | Age: 78
End: 2023-09-14

## 2023-09-14 VITALS
BODY MASS INDEX: 26.73 KG/M2 | TEMPERATURE: 96.9 F | WEIGHT: 181 LBS | DIASTOLIC BLOOD PRESSURE: 53 MMHG | RESPIRATION RATE: 18 BRPM | SYSTOLIC BLOOD PRESSURE: 121 MMHG | HEART RATE: 64 BPM

## 2023-09-14 DIAGNOSIS — D64.9 NORMOCYTIC NORMOCHROMIC ANEMIA: Primary | ICD-10-CM

## 2023-09-14 DIAGNOSIS — N18.4 CKD (CHRONIC KIDNEY DISEASE) STAGE 4, GFR 15-29 ML/MIN (HCC): ICD-10-CM

## 2023-09-14 NOTE — PROGRESS NOTES
_               Mr. Scott Perry is a very pleasant 68 y.o. male with history of multiple co morbidities as listed. The patient is referred for further management of anemia. Patient has episodes of black tarry stool. He had upper and lower endoscopy in October 2020 which were negative. He is scheduled for follow-up with gastroenterology in [place] next week for capsule camera evaluation. Patient is having symptomatic anemia. Is having weakness and fatigue. Feels tired. He has shortness of breath on exertion. He is having epigastric pain. No hematochezia. No hematemesis. Patient denies smoking or alcohol drinking,   Started on dialysis which is ongoing and getting EPO and iron per nephrology however ferritin high and has been off iron and as needed EPO  Bone marrow biopsy and aspirate and myeloma work-up ordered negative        Interim history:  Patient presents to the clinic for a follow-up visit and to discuss results of his lab work-up and other relevant clinical data. Overall patient has been tolerating treatment without unexpected or severe side effects. During this visit patient's allergy, social, medical, surgical history and medications were reviewed and updated.     On RAVI and hemoglobin around 10 currently on dialysis twice a week  Capsule endoscopy negative  And iron panel compatible of anemia of chronic disease  Status post heart surgery back in June 2023  Still weak  Hemoglobin trending down to 8      PAST MEDICAL HISTORY: has a past medical history of Abnormal tilt table test, Acute kidney injury ([address]), Acute MI ([address]), Acute renal failure superimposed on stage 4 chronic kidney disease ([address]), Acute renal failure with tubular necrosis (HCC), Anemia, Anemia in stage 4 chronic kidney disease ([address]), Angina, class II ([address]), Bell's palsy, CAD (coronary artery disease), Cerebral artery occlusion with

## 2023-09-16 ENCOUNTER — APPOINTMENT (OUTPATIENT)
Dept: CT IMAGING | Age: 78
End: 2023-09-16
Payer: MEDICARE

## 2023-09-16 ENCOUNTER — HOSPITAL ENCOUNTER (INPATIENT)
Age: 78
LOS: 3 days | Discharge: NURSING, REHAB OR LONG-TERM CARE | DRG: 069 | End: 2023-09-19
Attending: INTERNAL MEDICINE | Admitting: INTERNAL MEDICINE
Payer: MEDICARE

## 2023-09-16 ENCOUNTER — HOSPITAL ENCOUNTER (EMERGENCY)
Age: 78
Discharge: ANOTHER HOSPITAL | End: 2023-09-16
Payer: MEDICARE

## 2023-09-16 ENCOUNTER — DIRECT ADMIT ORDERS (OUTPATIENT)
Dept: INTERNAL MEDICINE CLINIC | Age: 78
End: 2023-09-16

## 2023-09-16 VITALS
HEIGHT: 69 IN | OXYGEN SATURATION: 97 % | HEART RATE: 71 BPM | SYSTOLIC BLOOD PRESSURE: 198 MMHG | BODY MASS INDEX: 27.25 KG/M2 | DIASTOLIC BLOOD PRESSURE: 49 MMHG | RESPIRATION RATE: 24 BRPM | WEIGHT: 184 LBS | TEMPERATURE: 98.6 F

## 2023-09-16 DIAGNOSIS — G45.9 TIA (TRANSIENT ISCHEMIC ATTACK): Primary | ICD-10-CM

## 2023-09-16 PROBLEM — R29.90 STROKE-LIKE SYMPTOMS: Status: ACTIVE | Noted: 2023-09-16

## 2023-09-16 LAB
ALBUMIN SERPL-MCNC: 4.1 G/DL (ref 3.5–5.2)
ALBUMIN/GLOB SERPL: 1.5 {RATIO} (ref 1–2.5)
ALP SERPL-CCNC: 94 U/L (ref 40–129)
ALT SERPL-CCNC: <5 U/L (ref 5–41)
AMPHET UR QL SCN: NEGATIVE
ANION GAP SERPL CALCULATED.3IONS-SCNC: 12 MMOL/L (ref 9–17)
AST SERPL-CCNC: <5 U/L
BACTERIA URNS QL MICRO: ABNORMAL
BARBITURATES UR QL SCN: NEGATIVE
BASOPHILS # BLD: 0.07 K/UL (ref 0–0.2)
BASOPHILS NFR BLD: 1 % (ref 0–2)
BENZODIAZ UR QL: NEGATIVE
BILIRUB SERPL-MCNC: 0.2 MG/DL (ref 0.3–1.2)
BILIRUB UR QL STRIP: NEGATIVE
BUN SERPL-MCNC: 27 MG/DL (ref 8–23)
BUN/CREAT SERPL: 6 (ref 9–20)
BUPRENORPHINE UR QL: NEGATIVE
CALCIUM SERPL-MCNC: 9.1 MG/DL (ref 8.6–10.4)
CANNABINOIDS UR QL SCN: NEGATIVE
CASTS #/AREA URNS LPF: ABNORMAL /LPF
CHLORIDE SERPL-SCNC: 98 MMOL/L (ref 98–107)
CLARITY UR: CLEAR
CO2 SERPL-SCNC: 26 MMOL/L (ref 20–31)
COCAINE UR QL SCN: NEGATIVE
COLOR UR: YELLOW
CREAT SERPL-MCNC: 4.5 MG/DL (ref 0.7–1.2)
EOSINOPHIL # BLD: 0.33 K/UL (ref 0–0.44)
EOSINOPHILS RELATIVE PERCENT: 4 % (ref 1–4)
EPI CELLS #/AREA URNS HPF: ABNORMAL /HPF (ref 0–5)
ERYTHROCYTE [DISTWIDTH] IN BLOOD BY AUTOMATED COUNT: 17.7 % (ref 11.8–14.4)
FENTANYL UR QL: NEGATIVE
GFR SERPL CREATININE-BSD FRML MDRD: 13 ML/MIN/1.73M2
GLUCOSE BLD-MCNC: 265 MG/DL
GLUCOSE BLD-MCNC: 265 MG/DL (ref 74–100)
GLUCOSE SERPL-MCNC: 300 MG/DL (ref 70–99)
GLUCOSE UR STRIP-MCNC: ABNORMAL MG/DL
HCT VFR BLD AUTO: 29.7 % (ref 40.7–50.3)
HGB BLD-MCNC: 9.5 G/DL (ref 13–17)
HGB UR QL STRIP.AUTO: ABNORMAL
IMM GRANULOCYTES # BLD AUTO: 0.1 K/UL (ref 0–0.3)
IMM GRANULOCYTES NFR BLD: 1 %
INR PPP: 0.9
KETONES UR STRIP-MCNC: ABNORMAL MG/DL
LEUKOCYTE ESTERASE UR QL STRIP: NEGATIVE
LYMPHOCYTES NFR BLD: 1.9 K/UL (ref 1.1–3.7)
LYMPHOCYTES RELATIVE PERCENT: 25 % (ref 24–43)
MCH RBC QN AUTO: 29.9 PG (ref 25.2–33.5)
MCHC RBC AUTO-ENTMCNC: 32 G/DL (ref 28.4–34.8)
MCV RBC AUTO: 93.4 FL (ref 82.6–102.9)
METHADONE UR QL: NEGATIVE
MONOCYTES NFR BLD: 0.5 K/UL (ref 0.1–1.2)
MONOCYTES NFR BLD: 7 % (ref 3–12)
NEUTROPHILS NFR BLD: 62 % (ref 36–65)
NEUTS SEG NFR BLD: 4.7 K/UL (ref 1.5–8.1)
NITRITE UR QL STRIP: NEGATIVE
NRBC BLD-RTO: 0 PER 100 WBC
OPIATES UR QL SCN: NEGATIVE
OXYCODONE UR QL SCN: NEGATIVE
PARTIAL THROMBOPLASTIN TIME: 39.6 SEC (ref 26.8–34.8)
PCP UR QL SCN: NEGATIVE
PH UR STRIP: 6 [PH] (ref 5–9)
PLATELET # BLD AUTO: 240 K/UL (ref 138–453)
PMV BLD AUTO: 9.5 FL (ref 8.1–13.5)
POTASSIUM SERPL-SCNC: 4.6 MMOL/L (ref 3.7–5.3)
PROT SERPL-MCNC: 6.9 G/DL (ref 6.4–8.3)
PROT UR STRIP-MCNC: ABNORMAL MG/DL
PROTHROMBIN TIME: 12.7 SEC (ref 11.9–14.8)
RBC # BLD AUTO: 3.18 M/UL (ref 4.21–5.77)
RBC #/AREA URNS HPF: ABNORMAL /HPF (ref 0–2)
SODIUM SERPL-SCNC: 136 MMOL/L (ref 135–144)
SP GR UR STRIP: 1.02 (ref 1.01–1.02)
TEST INFORMATION: NORMAL
UROBILINOGEN UR STRIP-ACNC: NORMAL EU/DL (ref 0–1)
WBC #/AREA URNS HPF: ABNORMAL /HPF (ref 0–5)
WBC OTHER # BLD: 7.6 K/UL (ref 3.5–11.3)

## 2023-09-16 PROCEDURE — 70450 CT HEAD/BRAIN W/O DYE: CPT

## 2023-09-16 PROCEDURE — 80307 DRUG TEST PRSMV CHEM ANLYZR: CPT

## 2023-09-16 PROCEDURE — 85730 THROMBOPLASTIN TIME PARTIAL: CPT

## 2023-09-16 PROCEDURE — 82947 ASSAY GLUCOSE BLOOD QUANT: CPT

## 2023-09-16 PROCEDURE — 99285 EMERGENCY DEPT VISIT HI MDM: CPT

## 2023-09-16 PROCEDURE — 85025 COMPLETE CBC W/AUTO DIFF WBC: CPT

## 2023-09-16 PROCEDURE — 2060000000 HC ICU INTERMEDIATE R&B

## 2023-09-16 PROCEDURE — 85610 PROTHROMBIN TIME: CPT

## 2023-09-16 PROCEDURE — 36415 COLL VENOUS BLD VENIPUNCTURE: CPT

## 2023-09-16 PROCEDURE — 80053 COMPREHEN METABOLIC PANEL: CPT

## 2023-09-16 PROCEDURE — 81001 URINALYSIS AUTO W/SCOPE: CPT

## 2023-09-16 PROCEDURE — 74176 CT ABD & PELVIS W/O CONTRAST: CPT

## 2023-09-16 PROCEDURE — 93005 ELECTROCARDIOGRAM TRACING: CPT | Performed by: EMERGENCY MEDICINE

## 2023-09-16 PROCEDURE — 6370000000 HC RX 637 (ALT 250 FOR IP): Performed by: PHYSICIAN ASSISTANT

## 2023-09-16 RX ORDER — ASPIRIN 81 MG/1
81 TABLET, CHEWABLE ORAL DAILY
Status: DISCONTINUED | OUTPATIENT
Start: 2023-09-17 | End: 2023-09-19 | Stop reason: SDUPTHER

## 2023-09-16 RX ORDER — ASPIRIN 300 MG/1
300 SUPPOSITORY RECTAL DAILY
Status: DISCONTINUED | OUTPATIENT
Start: 2023-09-17 | End: 2023-09-19 | Stop reason: SDUPTHER

## 2023-09-16 RX ORDER — INSULIN LISPRO 100 [IU]/ML
0-8 INJECTION, SOLUTION INTRAVENOUS; SUBCUTANEOUS
Status: DISCONTINUED | OUTPATIENT
Start: 2023-09-17 | End: 2023-09-19 | Stop reason: HOSPADM

## 2023-09-16 RX ORDER — INSULIN LISPRO 100 [IU]/ML
0-4 INJECTION, SOLUTION INTRAVENOUS; SUBCUTANEOUS NIGHTLY
Status: DISCONTINUED | OUTPATIENT
Start: 2023-09-16 | End: 2023-09-19 | Stop reason: HOSPADM

## 2023-09-16 RX ORDER — INSULIN LISPRO 100 [IU]/ML
0-8 INJECTION, SOLUTION INTRAVENOUS; SUBCUTANEOUS
Status: CANCELLED | OUTPATIENT
Start: 2023-09-17

## 2023-09-16 RX ORDER — ONDANSETRON 2 MG/ML
4 INJECTION INTRAMUSCULAR; INTRAVENOUS EVERY 6 HOURS PRN
Status: DISCONTINUED | OUTPATIENT
Start: 2023-09-16 | End: 2023-09-19 | Stop reason: HOSPADM

## 2023-09-16 RX ORDER — SODIUM CHLORIDE 9 MG/ML
INJECTION, SOLUTION INTRAVENOUS PRN
Status: CANCELLED | OUTPATIENT
Start: 2023-09-16

## 2023-09-16 RX ORDER — ZINC GLUCONATE 50 MG
50 TABLET ORAL DAILY
COMMUNITY

## 2023-09-16 RX ORDER — SODIUM CHLORIDE 0.9 % (FLUSH) 0.9 %
10 SYRINGE (ML) INJECTION EVERY 12 HOURS SCHEDULED
Status: DISCONTINUED | OUTPATIENT
Start: 2023-09-16 | End: 2023-09-19 | Stop reason: HOSPADM

## 2023-09-16 RX ORDER — ONDANSETRON 4 MG/1
4 TABLET, ORALLY DISINTEGRATING ORAL EVERY 8 HOURS PRN
Status: CANCELLED | OUTPATIENT
Start: 2023-09-16

## 2023-09-16 RX ORDER — POLYETHYLENE GLYCOL 3350 17 G/17G
17 POWDER, FOR SOLUTION ORAL DAILY PRN
Status: DISCONTINUED | OUTPATIENT
Start: 2023-09-16 | End: 2023-09-19 | Stop reason: HOSPADM

## 2023-09-16 RX ORDER — ONDANSETRON 2 MG/ML
4 INJECTION INTRAMUSCULAR; INTRAVENOUS EVERY 6 HOURS PRN
Status: CANCELLED | OUTPATIENT
Start: 2023-09-16

## 2023-09-16 RX ORDER — GLUCAGON 1 MG/ML
1 KIT INJECTION PRN
Status: CANCELLED | OUTPATIENT
Start: 2023-09-16

## 2023-09-16 RX ORDER — DEXTROSE MONOHYDRATE 100 MG/ML
INJECTION, SOLUTION INTRAVENOUS CONTINUOUS PRN
Status: DISCONTINUED | OUTPATIENT
Start: 2023-09-16 | End: 2023-09-19 | Stop reason: HOSPADM

## 2023-09-16 RX ORDER — ASPIRIN 81 MG/1
81 TABLET, CHEWABLE ORAL DAILY
Status: CANCELLED | OUTPATIENT
Start: 2023-09-17

## 2023-09-16 RX ORDER — ASPIRIN 325 MG
325 TABLET ORAL ONCE
Status: COMPLETED | OUTPATIENT
Start: 2023-09-16 | End: 2023-09-16

## 2023-09-16 RX ORDER — SODIUM CHLORIDE 9 MG/ML
INJECTION, SOLUTION INTRAVENOUS PRN
Status: DISCONTINUED | OUTPATIENT
Start: 2023-09-16 | End: 2023-09-19 | Stop reason: HOSPADM

## 2023-09-16 RX ORDER — SODIUM CHLORIDE 0.9 % (FLUSH) 0.9 %
10 SYRINGE (ML) INJECTION PRN
Status: DISCONTINUED | OUTPATIENT
Start: 2023-09-16 | End: 2023-09-19 | Stop reason: HOSPADM

## 2023-09-16 RX ORDER — ASPIRIN 300 MG/1
300 SUPPOSITORY RECTAL DAILY
Status: CANCELLED | OUTPATIENT
Start: 2023-09-17

## 2023-09-16 RX ORDER — ONDANSETRON 4 MG/1
4 TABLET, ORALLY DISINTEGRATING ORAL EVERY 8 HOURS PRN
Status: DISCONTINUED | OUTPATIENT
Start: 2023-09-16 | End: 2023-09-19 | Stop reason: HOSPADM

## 2023-09-16 RX ORDER — POLYETHYLENE GLYCOL 3350 17 G/17G
17 POWDER, FOR SOLUTION ORAL DAILY PRN
Status: CANCELLED | OUTPATIENT
Start: 2023-09-16

## 2023-09-16 RX ORDER — INSULIN LISPRO 100 [IU]/ML
0-4 INJECTION, SOLUTION INTRAVENOUS; SUBCUTANEOUS NIGHTLY
Status: CANCELLED | OUTPATIENT
Start: 2023-09-16

## 2023-09-16 RX ORDER — SODIUM CHLORIDE 0.9 % (FLUSH) 0.9 %
10 SYRINGE (ML) INJECTION PRN
Status: CANCELLED | OUTPATIENT
Start: 2023-09-16

## 2023-09-16 RX ORDER — SODIUM CHLORIDE 0.9 % (FLUSH) 0.9 %
10 SYRINGE (ML) INJECTION EVERY 12 HOURS SCHEDULED
Status: CANCELLED | OUTPATIENT
Start: 2023-09-16

## 2023-09-16 RX ORDER — MIRTAZAPINE 15 MG/1
7.5 TABLET, FILM COATED ORAL NIGHTLY
COMMUNITY

## 2023-09-16 RX ORDER — DEXTROSE MONOHYDRATE 100 MG/ML
INJECTION, SOLUTION INTRAVENOUS CONTINUOUS PRN
Status: CANCELLED | OUTPATIENT
Start: 2023-09-16

## 2023-09-16 RX ADMIN — ASPIRIN 325 MG: 325 TABLET ORAL at 19:45

## 2023-09-16 ASSESSMENT — PAIN - FUNCTIONAL ASSESSMENT: PAIN_FUNCTIONAL_ASSESSMENT: NONE - DENIES PAIN

## 2023-09-16 ASSESSMENT — ENCOUNTER SYMPTOMS: RESPIRATORY NEGATIVE: 1

## 2023-09-16 NOTE — ED PROVIDER NOTES
[facility] ED  EMERGENCY DEPARTMENT ENCOUNTER      Pt Name: Yolette Morrison  MRN: [id number]  [address] [facility] 1945  Date of evaluation: 9/16/2023  Provider: Jayna Lakhani PA-C    CHIEF COMPLAINT       Chief Complaint   Patient presents with    Extremity Weakness     Patient presents to the emergency department with with family from the rehabilitation center for complaint of left side weakness, bilateral leg weakness, and intermitent slurred speech that began yesterday with one hour left in his dialysis (approx 10am). The symptoms such as slurred speech come and go. Patient does report that he was able to use the bathroom this am with a walker. A family member had picked him up today at 11am patient was in the wheelchair and was unable to walk, was extremely weak          HISTORY OF PRESENT ILLNESS   (Location/Symptom, Timing/Onset, Context/Setting, Quality, Duration, Modifying Factors, Severity)  Note limiting factors. Yolette Morrison is a 68 y.o. 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 Patient reports he typically brings patient to his home on Saturday's and notes patient had markedly worsening weakness to the point of where he almost had to carry his father. Patient notes his lower legs felt weak which has since resolved. Son notes father reported both of his feet felt \"numb\" while trying to walk but he now has sensation. There is no history of peripheral neuropathies per family or patient. Family at bedside notes patient sometimes \"sounds drunk\" but then his speech resolves. Son reports patient had a full dialysis session yesterday, they admit he still makes urine.   Patient denies headache, changes in

## 2023-09-16 NOTE — ED NOTES
Patient is unable to tolerate standing at this time, complains of dizziness.      Romaine Gu, PIO  09/16/23 8112

## 2023-09-17 ENCOUNTER — APPOINTMENT (OUTPATIENT)
Dept: CT IMAGING | Age: 78
DRG: 069 | End: 2023-09-17
Attending: INTERNAL MEDICINE
Payer: MEDICARE

## 2023-09-17 PROBLEM — R29.818 TRANSIENT NEUROLOGICAL SYMPTOMS: Status: ACTIVE | Noted: 2023-09-17

## 2023-09-17 LAB
CHOLEST SERPL-MCNC: 118 MG/DL
CHOLESTEROL/HDL RATIO: 4.5
EKG ATRIAL RATE: 69 BPM
EKG P AXIS: 35 DEGREES
EKG P-R INTERVAL: 208 MS
EKG Q-T INTERVAL: 442 MS
EKG QRS DURATION: 88 MS
EKG QTC CALCULATION (BAZETT): 473 MS
EKG R AXIS: -3 DEGREES
EKG T AXIS: -9 DEGREES
EKG VENTRICULAR RATE: 69 BPM
ERYTHROCYTE [DISTWIDTH] IN BLOOD BY AUTOMATED COUNT: 17.8 % (ref 11.8–14.4)
EST. AVERAGE GLUCOSE BLD GHB EST-MCNC: 131 MG/DL
FOLATE SERPL-MCNC: 18.1 NG/ML
GLUCOSE BLD-MCNC: 118 MG/DL (ref 75–110)
GLUCOSE BLD-MCNC: 204 MG/DL (ref 75–110)
GLUCOSE BLD-MCNC: 209 MG/DL (ref 75–110)
GLUCOSE BLD-MCNC: 229 MG/DL (ref 75–110)
GLUCOSE BLD-MCNC: 235 MG/DL (ref 75–110)
HBA1C MFR BLD: 6.2 % (ref 4–6)
HCT VFR BLD AUTO: 29.5 % (ref 40.7–50.3)
HDLC SERPL-MCNC: 26 MG/DL
HGB BLD-MCNC: 9.1 G/DL (ref 13–17)
INR PPP: 1.1
LDLC SERPL CALC-MCNC: 47 MG/DL (ref 0–130)
MCH RBC QN AUTO: 28.7 PG (ref 25.2–33.5)
MCHC RBC AUTO-ENTMCNC: 30.8 G/DL (ref 28.4–34.8)
MCV RBC AUTO: 93.1 FL (ref 82.6–102.9)
NRBC BLD-RTO: 0 PER 100 WBC
PLATELET # BLD AUTO: 181 K/UL (ref 138–453)
PMV BLD AUTO: 9.8 FL (ref 8.1–13.5)
PROTHROMBIN TIME: 13.6 SEC (ref 11.7–14.9)
RBC # BLD AUTO: 3.17 M/UL (ref 4.21–5.77)
TRIGL SERPL-MCNC: 227 MG/DL
TSH SERPL DL<=0.05 MIU/L-ACNC: 3.33 UIU/ML (ref 0.3–5)
VIT B12 SERPL-MCNC: 527 PG/ML (ref 232–1245)
WBC OTHER # BLD: 6.3 K/UL (ref 3.5–11.3)

## 2023-09-17 PROCEDURE — 99233 SBSQ HOSP IP/OBS HIGH 50: CPT | Performed by: INTERNAL MEDICINE

## 2023-09-17 PROCEDURE — 97116 GAIT TRAINING THERAPY: CPT

## 2023-09-17 PROCEDURE — 6360000004 HC RX CONTRAST MEDICATION: Performed by: STUDENT IN AN ORGANIZED HEALTH CARE EDUCATION/TRAINING PROGRAM

## 2023-09-17 PROCEDURE — 85027 COMPLETE CBC AUTOMATED: CPT

## 2023-09-17 PROCEDURE — 6370000000 HC RX 637 (ALT 250 FOR IP): Performed by: NURSE PRACTITIONER

## 2023-09-17 PROCEDURE — 83036 HEMOGLOBIN GLYCOSYLATED A1C: CPT

## 2023-09-17 PROCEDURE — 80061 LIPID PANEL: CPT

## 2023-09-17 PROCEDURE — 6370000000 HC RX 637 (ALT 250 FOR IP): Performed by: EMERGENCY MEDICINE

## 2023-09-17 PROCEDURE — 82607 VITAMIN B-12: CPT

## 2023-09-17 PROCEDURE — 82947 ASSAY GLUCOSE BLOOD QUANT: CPT

## 2023-09-17 PROCEDURE — 2580000003 HC RX 258: Performed by: NURSE PRACTITIONER

## 2023-09-17 PROCEDURE — 82746 ASSAY OF FOLIC ACID SERUM: CPT

## 2023-09-17 PROCEDURE — 6360000002 HC RX W HCPCS: Performed by: NURSE PRACTITIONER

## 2023-09-17 PROCEDURE — 97535 SELF CARE MNGMENT TRAINING: CPT

## 2023-09-17 PROCEDURE — 2060000000 HC ICU INTERMEDIATE R&B

## 2023-09-17 PROCEDURE — 36415 COLL VENOUS BLD VENIPUNCTURE: CPT

## 2023-09-17 PROCEDURE — 93010 ELECTROCARDIOGRAM REPORT: CPT | Performed by: INTERNAL MEDICINE

## 2023-09-17 PROCEDURE — 97530 THERAPEUTIC ACTIVITIES: CPT

## 2023-09-17 PROCEDURE — 97161 PT EVAL LOW COMPLEX 20 MIN: CPT

## 2023-09-17 PROCEDURE — 84443 ASSAY THYROID STIM HORMONE: CPT

## 2023-09-17 PROCEDURE — 99222 1ST HOSP IP/OBS MODERATE 55: CPT | Performed by: NURSE PRACTITIONER

## 2023-09-17 PROCEDURE — 85610 PROTHROMBIN TIME: CPT

## 2023-09-17 PROCEDURE — 99222 1ST HOSP IP/OBS MODERATE 55: CPT | Performed by: PSYCHIATRY & NEUROLOGY

## 2023-09-17 PROCEDURE — 70498 CT ANGIOGRAPHY NECK: CPT

## 2023-09-17 PROCEDURE — 97166 OT EVAL MOD COMPLEX 45 MIN: CPT

## 2023-09-17 RX ORDER — NIFEDIPINE 90 MG/1
90 TABLET, EXTENDED RELEASE ORAL DAILY
Status: DISCONTINUED | OUTPATIENT
Start: 2023-09-17 | End: 2023-09-19 | Stop reason: HOSPADM

## 2023-09-17 RX ORDER — HYDRALAZINE HYDROCHLORIDE 50 MG/1
100 TABLET, FILM COATED ORAL EVERY 8 HOURS SCHEDULED
Status: DISCONTINUED | OUTPATIENT
Start: 2023-09-17 | End: 2023-09-19 | Stop reason: HOSPADM

## 2023-09-17 RX ORDER — FAMOTIDINE 20 MG/1
20 TABLET, FILM COATED ORAL DAILY
Status: DISCONTINUED | OUTPATIENT
Start: 2023-09-18 | End: 2023-09-19 | Stop reason: HOSPADM

## 2023-09-17 RX ORDER — MIRTAZAPINE 15 MG/1
7.5 TABLET, FILM COATED ORAL NIGHTLY
Status: DISCONTINUED | OUTPATIENT
Start: 2023-09-17 | End: 2023-09-19 | Stop reason: HOSPADM

## 2023-09-17 RX ORDER — ASPIRIN 81 MG/1
81 TABLET ORAL DAILY
Status: DISCONTINUED | OUTPATIENT
Start: 2023-09-17 | End: 2023-09-19 | Stop reason: HOSPADM

## 2023-09-17 RX ORDER — PREDNISOLONE ACETATE 10 MG/ML
1 SUSPENSION/ DROPS OPHTHALMIC DAILY
Status: DISCONTINUED | OUTPATIENT
Start: 2023-09-17 | End: 2023-09-19 | Stop reason: HOSPADM

## 2023-09-17 RX ORDER — MINERAL OIL, PETROLATUM 425; 568 MG/G; MG/G
1 OINTMENT OPHTHALMIC NIGHTLY PRN
Status: DISCONTINUED | OUTPATIENT
Start: 2023-09-17 | End: 2023-09-19 | Stop reason: HOSPADM

## 2023-09-17 RX ORDER — HYDRALAZINE HYDROCHLORIDE 20 MG/ML
10 INJECTION INTRAMUSCULAR; INTRAVENOUS ONCE
Status: COMPLETED | OUTPATIENT
Start: 2023-09-17 | End: 2023-09-17

## 2023-09-17 RX ORDER — NIFEDIPINE 90 MG/1
90 TABLET, FILM COATED, EXTENDED RELEASE ORAL DAILY
Status: DISCONTINUED | OUTPATIENT
Start: 2023-09-17 | End: 2023-09-17

## 2023-09-17 RX ORDER — LATANOPROST 50 UG/ML
1 SOLUTION/ DROPS OPHTHALMIC NIGHTLY
Status: DISCONTINUED | OUTPATIENT
Start: 2023-09-17 | End: 2023-09-19 | Stop reason: HOSPADM

## 2023-09-17 RX ORDER — TAMSULOSIN HYDROCHLORIDE 0.4 MG/1
0.4 CAPSULE ORAL DAILY
Status: DISCONTINUED | OUTPATIENT
Start: 2023-09-17 | End: 2023-09-19 | Stop reason: HOSPADM

## 2023-09-17 RX ORDER — SEVELAMER CARBONATE 800 MG/1
800 TABLET, FILM COATED ORAL
Status: DISCONTINUED | OUTPATIENT
Start: 2023-09-17 | End: 2023-09-19 | Stop reason: HOSPADM

## 2023-09-17 RX ORDER — INSULIN GLARGINE 100 [IU]/ML
24 INJECTION, SOLUTION SUBCUTANEOUS 2 TIMES DAILY
Status: DISCONTINUED | OUTPATIENT
Start: 2023-09-17 | End: 2023-09-19 | Stop reason: HOSPADM

## 2023-09-17 RX ORDER — CLOPIDOGREL BISULFATE 75 MG/1
75 TABLET ORAL DAILY
Status: DISCONTINUED | OUTPATIENT
Start: 2023-09-17 | End: 2023-09-19 | Stop reason: HOSPADM

## 2023-09-17 RX ORDER — FAMOTIDINE 20 MG/1
20 TABLET, FILM COATED ORAL 2 TIMES DAILY
Status: DISCONTINUED | OUTPATIENT
Start: 2023-09-17 | End: 2023-09-17

## 2023-09-17 RX ORDER — MIDODRINE HYDROCHLORIDE 5 MG/1
10 TABLET ORAL 3 TIMES DAILY PRN
Status: DISCONTINUED | OUTPATIENT
Start: 2023-09-17 | End: 2023-09-19 | Stop reason: HOSPADM

## 2023-09-17 RX ADMIN — SEVELAMER CARBONATE 800 MG: 800 TABLET, FILM COATED ORAL at 12:38

## 2023-09-17 RX ADMIN — NIFEDIPINE 90 MG: 90 TABLET, FILM COATED, EXTENDED RELEASE ORAL at 08:55

## 2023-09-17 RX ADMIN — IOPAMIDOL 75 ML: 755 INJECTION, SOLUTION INTRAVENOUS at 17:13

## 2023-09-17 RX ADMIN — POLYETHYLENE GLYCOL 3350 17 G: 17 POWDER, FOR SOLUTION ORAL at 08:32

## 2023-09-17 RX ADMIN — SODIUM CHLORIDE, PRESERVATIVE FREE 10 ML: 5 INJECTION INTRAVENOUS at 08:33

## 2023-09-17 RX ADMIN — MIRTAZAPINE 7.5 MG: 15 TABLET, FILM COATED ORAL at 20:35

## 2023-09-17 RX ADMIN — SEVELAMER CARBONATE 800 MG: 800 TABLET, FILM COATED ORAL at 16:38

## 2023-09-17 RX ADMIN — METOPROLOL TARTRATE 25 MG: 25 TABLET, FILM COATED ORAL at 20:35

## 2023-09-17 RX ADMIN — METOPROLOL TARTRATE 25 MG: 25 TABLET, FILM COATED ORAL at 08:32

## 2023-09-17 RX ADMIN — INSULIN LISPRO 2 UNITS: 100 INJECTION, SOLUTION INTRAVENOUS; SUBCUTANEOUS at 10:29

## 2023-09-17 RX ADMIN — INSULIN GLARGINE 24 UNITS: 100 INJECTION, SOLUTION SUBCUTANEOUS at 08:33

## 2023-09-17 RX ADMIN — ASPIRIN 81 MG: 81 TABLET, COATED ORAL at 08:38

## 2023-09-17 RX ADMIN — CLOPIDOGREL BISULFATE 75 MG: 75 TABLET ORAL at 08:32

## 2023-09-17 RX ADMIN — HYDRALAZINE HYDROCHLORIDE 10 MG: 20 INJECTION, SOLUTION INTRAMUSCULAR; INTRAVENOUS at 11:04

## 2023-09-17 RX ADMIN — SODIUM CHLORIDE, PRESERVATIVE FREE 10 ML: 5 INJECTION INTRAVENOUS at 20:36

## 2023-09-17 RX ADMIN — HYDRALAZINE HYDROCHLORIDE 100 MG: 50 TABLET, FILM COATED ORAL at 20:35

## 2023-09-17 RX ADMIN — PREDNISOLONE ACETATE 1 DROP: 10 SUSPENSION/ DROPS OPHTHALMIC at 08:55

## 2023-09-17 RX ADMIN — HYDRALAZINE HYDROCHLORIDE 100 MG: 50 TABLET, FILM COATED ORAL at 08:56

## 2023-09-17 RX ADMIN — SEVELAMER CARBONATE 800 MG: 800 TABLET, FILM COATED ORAL at 08:56

## 2023-09-17 RX ADMIN — LATANOPROST 1 DROP: 50 SOLUTION OPHTHALMIC at 20:36

## 2023-09-17 RX ADMIN — HYDRALAZINE HYDROCHLORIDE 100 MG: 50 TABLET, FILM COATED ORAL at 14:02

## 2023-09-17 RX ADMIN — FAMOTIDINE 20 MG: 20 TABLET, FILM COATED ORAL at 08:32

## 2023-09-17 RX ADMIN — TAMSULOSIN HYDROCHLORIDE 0.4 MG: 0.4 CAPSULE ORAL at 08:56

## 2023-09-17 RX ADMIN — INSULIN LISPRO 2 UNITS: 100 INJECTION, SOLUTION INTRAVENOUS; SUBCUTANEOUS at 12:38

## 2023-09-17 ASSESSMENT — ENCOUNTER SYMPTOMS
COUGH: 0
SHORTNESS OF BREATH: 0
ABDOMINAL DISTENTION: 0
CHEST TIGHTNESS: 0
APNEA: 0
VOMITING: 0
CONSTIPATION: 0
ABDOMINAL PAIN: 0
NAUSEA: 0
DIARRHEA: 0
WHEEZING: 0

## 2023-09-17 NOTE — PLAN OF CARE
Please fill out the MRI Screening form and fax to dept @ [phone]. Any questions. .please call [facility] MRI @ [phone]. MRI exam will be scheduled after receiving the completed screening form.  Thank you!!

## 2023-09-17 NOTE — CONSULTS
call with questions. Electronically signed by PONCHO Issa on 9/17/2023 at 1:47 PM  [facility]     Attending Physician Statement  I have discussed the care of Estefany Buckley, including pertinent history and exam findings with the NP I have reviewed the key elements of all parts of the encounter with the np. Note was updated and recorded changes were made I have seen and examined the patient with the np. I agree with the assessment and plan and status of the problem list as documented.  Clemente Andrews MD
staff. Patient is admitted as inpatient status because of co-morbidities listed above, severity of signs and symptoms as outlined, requirement for current medical therapies and most importantly because of direct risk to patient if care not provided in a hospital setting. Fred Aguillon MD  Neurology Resident PGY-4  9/17/2023  2:52 PM    Copy sent to Dr. Óscar Rodriguez MD    Attending Physician Statement:  I have discussed the case of Kylah Lazaro, including pertinent history and exam findings with the resident. I have seen and examined the patient and the key elements of the encounter have been performed by me. I have reviewed medications, clinical laboratory, imaging and other diagnostic tests with the residents. I agree with the assessment, plan and orders as documented by the resident with changes made to the note as needed. In addition:     79-year-old right handed male presents after transient episode of left arm weakness, numbness, dysarthria/aphasia? and episode of legs giving out when standing from seated position. At OSH, work-up relatively unremarkable. He is currently asymptomatic, at baseline. Exam nonfocal.    Focal transient neurological symptoms including dysarthria and possible aphasia warrant further evaluation and work-up for possible vascular etiology including TIA/stroke given his risk factors. Obtain CTA head and neck to evaluate vasculature. If significant stenosis, may need to change antiplatelet regimen. Unfortunately, he has a pacemaker that is not compatible with MRI. In regards to his symptoms of legs giving out when standing up, may be due to orthostatic intolerance given his baseline low diastolic blood pressures. Of note, there was a recent change in his dialysis schedule from twice to 3 times a week which has been draining on him.     Em Burgess DO 9/17/2023 6:44 PM

## 2023-09-17 NOTE — ED NOTES
[facility] updated on patient transfer to [facility]. Linda Jovel RN  09/16/23 8338

## 2023-09-17 NOTE — PLAN OF CARE
Problem: Chronic Conditions and Co-morbidities  Goal: Patient's chronic conditions and co-morbidity symptoms are monitored and maintained or improved  9/17/2023 1047 by Kymberly Hightower RN  Outcome: Progressing  9/17/2023 0051 by Mila Mandel RN  Outcome: Progressing     Problem: Discharge Planning  Goal: Discharge to home or other facility with appropriate resources  9/17/2023 1047 by Kymberly Hightower RN  Outcome: Progressing  9/17/2023 0051 by Mila Mandel RN  Outcome: Progressing     Problem: Safety - Adult  Goal: Free from fall injury  9/17/2023 1047 by Kymberly Hightower RN  Outcome: Progressing  9/17/2023 0051 by Mila Mandel RN  Outcome: Progressing

## 2023-09-17 NOTE — ACP (ADVANCE CARE PLANNING)
Advance Care Planning     Advance Care Planning Activator (Inpatient)  Conversation Note      Date of ACP Conversation: 9/17/2023     Conversation Conducted with: Patient with Decision Making Capacity    ACP Activator: Page Lunch, [address] Decision Maker:     Current Designated Health Care Decision Maker:     Primary Decision Maker: Javier Denney - [phone]    Secondary Decision Maker: Gayle Ferrara - Hakeem - [phone]        Care Preferences    Ventilation: \"If you were in your present state of health and suddenly became very ill and were unable to breathe on your own, what would your preference be about the use of a ventilator (breathing machine) if it were available to you? \"      Would the patient desire the use of ventilator (breathing machine)?: yes    \"If your health worsens and it becomes clear that your chance of recovery is unlikely, what would your preference be about the use of a ventilator (breathing machine) if it were available to you? \"     Would the patient desire the use of ventilator (breathing machine)?: Yes      Resuscitation  \"CPR works best to restart the heart when there is a sudden event, like a heart attack, in someone who is otherwise healthy. Unfortunately, CPR does not typically restart the heart for people who have serious health conditions or who are very sick. \"    \"In the event your heart stopped as a result of an underlying serious health condition, would you want attempts to be made to restart your heart (answer \"yes\" for attempt to resuscitate) or would you prefer a natural death (answer \"no\" for do not attempt to resuscitate)? \" yes       [] Yes   [] No   Educated Patient / Cyndra Favor regarding differences between Advance Directives and portable DNR orders.     Length of ACP Conversation in minutes:      Conversation Outcomes:  ACP discussion completed    Follow-up plan:    [] Schedule follow-up conversation to continue planning  [] Referred

## 2023-09-17 NOTE — H&P
cardiovascular stress test 02/19/2014    Relatively NL    History of cardiovascular stress test 03/21/2018    Normal myocardial perfusion. Global left ventricular systolic function was normal with an EF of 68%. Overall, these results are most consistent with a low risk scan. History of coronary artery stent placement 04/2017    PTCA / Drug Eluting Stent:, CX and / or branches    History of CVA (cerebrovascular accident) without residual deficits 2014    Incidentally found on CT head. No known impairment now or in the past.    History of echocardiogram 02/18/2014    LA mildly dilated, EF 55%, LV wall thickness is moderately increased, no definite wall motion abnormalilities, what appears to be a pacer wire is seen w/n the RA and RV, mild-mod TR, mild pulmonary hypertension. History of Holter monitoring 12/29/2017    Rare PAC's and PVC's.      History of tilt table evaluation 08/12/2014    Abnormal    Hyperlipidemia     Hypertension     Medication side effect, initial encounter 03/23/2018    Non critical Right Renal artery stenosis, native ([address]) 10/02/2018    Non critical Right Renal artery stenosis, based on cath in 2016, Rt RA 30% stenosis    Pacemaker     non-functioning    S/P cardiac cath 04/10/2017    S/P coronary artery stent placement     Successful PTCA - HA Om1    SIRS (systemic inflammatory response syndrome) ([address]) 05/19/2019    Type II or unspecified type diabetes mellitus without mention of complication, not stated as uncontrolled     Under care of team     Cardiology, [facility]    Under care of team     Nephrology; Dr [address] (sees at dialysis monthly)    Under care of team     Hematology, Dr Kana Mosquera for anemia ([facility])    Wellness examination     PCP [facility]; last visit May 2023        Past Surgical History:     Past Surgical History:   Procedure Laterality Date    AV FISTULA CREATION Left     BACK SURGERY  2006    Antony Arrington, [address] Left 02/19/2016    via

## 2023-09-18 PROBLEM — E87.20 METABOLIC ACIDOSIS: Status: ACTIVE | Noted: 2023-09-18

## 2023-09-18 LAB
ANION GAP SERPL CALCULATED.3IONS-SCNC: 18 MMOL/L (ref 9–17)
BUN SERPL-MCNC: 41 MG/DL (ref 8–23)
CALCIUM SERPL-MCNC: 9.3 MG/DL (ref 8.6–10.4)
CHLORIDE SERPL-SCNC: 102 MMOL/L (ref 98–107)
CO2 SERPL-SCNC: 19 MMOL/L (ref 20–31)
CREAT SERPL-MCNC: 5.1 MG/DL (ref 0.7–1.2)
ERYTHROCYTE [DISTWIDTH] IN BLOOD BY AUTOMATED COUNT: 18 % (ref 11.8–14.4)
GFR SERPL CREATININE-BSD FRML MDRD: 11 ML/MIN/1.73M2
GLUCOSE BLD-MCNC: 136 MG/DL (ref 75–110)
GLUCOSE BLD-MCNC: 154 MG/DL (ref 75–110)
GLUCOSE BLD-MCNC: 269 MG/DL (ref 75–110)
GLUCOSE SERPL-MCNC: 160 MG/DL (ref 70–99)
HCT VFR BLD AUTO: 28.7 % (ref 40.7–50.3)
HGB BLD-MCNC: 8.7 G/DL (ref 13–17)
MCH RBC QN AUTO: 28.3 PG (ref 25.2–33.5)
MCHC RBC AUTO-ENTMCNC: 30.3 G/DL (ref 28.4–34.8)
MCV RBC AUTO: 93.5 FL (ref 82.6–102.9)
NRBC BLD-RTO: 0 PER 100 WBC
PLATELET # BLD AUTO: 188 K/UL (ref 138–453)
PMV BLD AUTO: 9.4 FL (ref 8.1–13.5)
POTASSIUM SERPL-SCNC: 4.6 MMOL/L (ref 3.7–5.3)
RBC # BLD AUTO: 3.07 M/UL (ref 4.21–5.77)
REASON FOR REJECTION: NORMAL
SODIUM SERPL-SCNC: 139 MMOL/L (ref 135–144)
SPECIMEN SOURCE: NORMAL
WBC OTHER # BLD: 6.4 K/UL (ref 3.5–11.3)
ZZ NTE CLEAN UP: ORDERED TEST: NORMAL

## 2023-09-18 PROCEDURE — 85027 COMPLETE CBC AUTOMATED: CPT

## 2023-09-18 PROCEDURE — 82947 ASSAY GLUCOSE BLOOD QUANT: CPT

## 2023-09-18 PROCEDURE — 36415 COLL VENOUS BLD VENIPUNCTURE: CPT

## 2023-09-18 PROCEDURE — 6370000000 HC RX 637 (ALT 250 FOR IP): Performed by: NURSE PRACTITIONER

## 2023-09-18 PROCEDURE — 80048 BASIC METABOLIC PNL TOTAL CA: CPT

## 2023-09-18 PROCEDURE — 99232 SBSQ HOSP IP/OBS MODERATE 35: CPT | Performed by: STUDENT IN AN ORGANIZED HEALTH CARE EDUCATION/TRAINING PROGRAM

## 2023-09-18 PROCEDURE — 5A1D70Z PERFORMANCE OF URINARY FILTRATION, INTERMITTENT, LESS THAN 6 HOURS PER DAY: ICD-10-PCS | Performed by: INTERNAL MEDICINE

## 2023-09-18 PROCEDURE — 99232 SBSQ HOSP IP/OBS MODERATE 35: CPT | Performed by: PSYCHIATRY & NEUROLOGY

## 2023-09-18 PROCEDURE — 2580000003 HC RX 258: Performed by: NURSE PRACTITIONER

## 2023-09-18 PROCEDURE — 90935 HEMODIALYSIS ONE EVALUATION: CPT | Performed by: INTERNAL MEDICINE

## 2023-09-18 PROCEDURE — 6360000002 HC RX W HCPCS: Performed by: INTERNAL MEDICINE

## 2023-09-18 PROCEDURE — 90935 HEMODIALYSIS ONE EVALUATION: CPT

## 2023-09-18 PROCEDURE — 6370000000 HC RX 637 (ALT 250 FOR IP): Performed by: EMERGENCY MEDICINE

## 2023-09-18 PROCEDURE — 2060000000 HC ICU INTERMEDIATE R&B

## 2023-09-18 RX ORDER — DIPHENHYDRAMINE HYDROCHLORIDE 50 MG/ML
25 INJECTION INTRAMUSCULAR; INTRAVENOUS PRN
Status: DISCONTINUED | OUTPATIENT
Start: 2023-09-18 | End: 2023-09-19 | Stop reason: HOSPADM

## 2023-09-18 RX ADMIN — HYDRALAZINE HYDROCHLORIDE 100 MG: 50 TABLET, FILM COATED ORAL at 06:35

## 2023-09-18 RX ADMIN — SODIUM CHLORIDE, PRESERVATIVE FREE 10 ML: 5 INJECTION INTRAVENOUS at 08:38

## 2023-09-18 RX ADMIN — HYDRALAZINE HYDROCHLORIDE 100 MG: 50 TABLET, FILM COATED ORAL at 15:27

## 2023-09-18 RX ADMIN — SEVELAMER CARBONATE 800 MG: 800 TABLET, FILM COATED ORAL at 15:27

## 2023-09-18 RX ADMIN — HYDRALAZINE HYDROCHLORIDE 100 MG: 50 TABLET, FILM COATED ORAL at 20:36

## 2023-09-18 RX ADMIN — METOPROLOL TARTRATE 25 MG: 25 TABLET, FILM COATED ORAL at 16:31

## 2023-09-18 RX ADMIN — MIRTAZAPINE 7.5 MG: 15 TABLET, FILM COATED ORAL at 20:36

## 2023-09-18 RX ADMIN — DIPHENHYDRAMINE HYDROCHLORIDE 25 MG: 50 INJECTION INTRAMUSCULAR; INTRAVENOUS at 11:24

## 2023-09-18 RX ADMIN — LATANOPROST 1 DROP: 50 SOLUTION OPHTHALMIC at 20:36

## 2023-09-18 RX ADMIN — PREDNISOLONE ACETATE 1 DROP: 10 SUSPENSION/ DROPS OPHTHALMIC at 08:38

## 2023-09-18 RX ADMIN — INSULIN GLARGINE 24 UNITS: 100 INJECTION, SOLUTION SUBCUTANEOUS at 20:38

## 2023-09-18 RX ADMIN — METOPROLOL TARTRATE 25 MG: 25 TABLET, FILM COATED ORAL at 20:36

## 2023-09-18 RX ADMIN — EPOETIN ALFA-EPBX 5000 UNITS: 10000 INJECTION, SOLUTION INTRAVENOUS; SUBCUTANEOUS at 11:26

## 2023-09-18 RX ADMIN — NIFEDIPINE 90 MG: 90 TABLET, FILM COATED, EXTENDED RELEASE ORAL at 16:33

## 2023-09-18 ASSESSMENT — PAIN SCALES - GENERAL
PAINLEVEL_OUTOF10: 0

## 2023-09-18 NOTE — PLAN OF CARE
I opened the chart as I paged for consult for this patient by intermed team. But we found that it was for other patient and they sent this patient informations by mistake.       Kenyatta Wu MD.  Fellow, cardiovascular disease   [facility], [place]

## 2023-09-18 NOTE — PLAN OF CARE
Problem: Chronic Conditions and Co-morbidities  Goal: Patient's chronic conditions and co-morbidity symptoms are monitored and maintained or improved  9/18/2023 1715 by Yara Membreno RN  Outcome: Progressing  Flowsheets (Taken 9/18/2023 0800)  Care Plan - Patient's Chronic Conditions and Co-Morbidity Symptoms are Monitored and Maintained or Improved:   Monitor and assess patient's chronic conditions and comorbid symptoms for stability, deterioration, or improvement   Collaborate with multidisciplinary team to address chronic and comorbid conditions and prevent exacerbation or deterioration   Update acute care plan with appropriate goals if chronic or comorbid symptoms are exacerbated and prevent overall improvement and discharge  9/18/2023 0442 by Shelli Steel RN  Outcome: Progressing  Flowsheets (Taken 9/17/2023 2000)  Care Plan - Patient's Chronic Conditions and Co-Morbidity Symptoms are Monitored and Maintained or Improved: Monitor and assess patient's chronic conditions and comorbid symptoms for stability, deterioration, or improvement     Problem: Discharge Planning  Goal: Discharge to home or other facility with appropriate resources  9/18/2023 1715 by Yara Membreno RN  Outcome: Progressing  Flowsheets (Taken 9/18/2023 0800)  Discharge to home or other facility with appropriate resources:   Identify barriers to discharge with patient and caregiver   Arrange for needed discharge resources and transportation as appropriate   Identify discharge learning needs (meds, wound care, etc)  9/18/2023 0442 by Shelli Steel RN  Outcome: Progressing  Flowsheets (Taken 9/17/2023 2000)  Discharge to home or other facility with appropriate resources: Identify barriers to discharge with patient and caregiver     Problem: Safety - Adult  Goal: Free from fall injury  Outcome: Progressing  Flowsheets (Taken 9/18/2023 0731)  Free From Fall Injury: Instruct family/caregiver on patient safety     Problem:

## 2023-09-19 VITALS
OXYGEN SATURATION: 96 % | BODY MASS INDEX: 26.51 KG/M2 | SYSTOLIC BLOOD PRESSURE: 130 MMHG | HEART RATE: 64 BPM | TEMPERATURE: 98 F | HEIGHT: 69 IN | DIASTOLIC BLOOD PRESSURE: 57 MMHG | WEIGHT: 179.01 LBS | RESPIRATION RATE: 11 BRPM

## 2023-09-19 LAB
ERYTHROCYTE [DISTWIDTH] IN BLOOD BY AUTOMATED COUNT: 18 % (ref 11.8–14.4)
GLUCOSE BLD-MCNC: 127 MG/DL (ref 75–110)
GLUCOSE BLD-MCNC: 213 MG/DL (ref 75–110)
GLUCOSE BLD-MCNC: 245 MG/DL (ref 75–110)
HCT VFR BLD AUTO: 30.7 % (ref 40.7–50.3)
HGB BLD-MCNC: 9.4 G/DL (ref 13–17)
MCH RBC QN AUTO: 28.7 PG (ref 25.2–33.5)
MCHC RBC AUTO-ENTMCNC: 30.6 G/DL (ref 28.4–34.8)
MCV RBC AUTO: 93.6 FL (ref 82.6–102.9)
NRBC BLD-RTO: 0 PER 100 WBC
PLATELET # BLD AUTO: 182 K/UL (ref 138–453)
PMV BLD AUTO: 9.4 FL (ref 8.1–13.5)
RBC # BLD AUTO: 3.28 M/UL (ref 4.21–5.77)
WBC OTHER # BLD: 6.5 K/UL (ref 3.5–11.3)

## 2023-09-19 PROCEDURE — 92523 SPEECH SOUND LANG COMPREHEN: CPT

## 2023-09-19 PROCEDURE — 6370000000 HC RX 637 (ALT 250 FOR IP): Performed by: NURSE PRACTITIONER

## 2023-09-19 PROCEDURE — 36415 COLL VENOUS BLD VENIPUNCTURE: CPT

## 2023-09-19 PROCEDURE — 99232 SBSQ HOSP IP/OBS MODERATE 35: CPT | Performed by: INTERNAL MEDICINE

## 2023-09-19 PROCEDURE — 6370000000 HC RX 637 (ALT 250 FOR IP): Performed by: EMERGENCY MEDICINE

## 2023-09-19 PROCEDURE — 6370000000 HC RX 637 (ALT 250 FOR IP): Performed by: INTERNAL MEDICINE

## 2023-09-19 PROCEDURE — 99239 HOSP IP/OBS DSCHRG MGMT >30: CPT | Performed by: INTERNAL MEDICINE

## 2023-09-19 PROCEDURE — 85027 COMPLETE CBC AUTOMATED: CPT

## 2023-09-19 PROCEDURE — 82947 ASSAY GLUCOSE BLOOD QUANT: CPT

## 2023-09-19 RX ADMIN — NIFEDIPINE 90 MG: 90 TABLET, FILM COATED, EXTENDED RELEASE ORAL at 08:30

## 2023-09-19 RX ADMIN — INSULIN LISPRO 2 UNITS: 100 INJECTION, SOLUTION INTRAVENOUS; SUBCUTANEOUS at 16:23

## 2023-09-19 RX ADMIN — HYDRALAZINE HYDROCHLORIDE 100 MG: 50 TABLET, FILM COATED ORAL at 14:35

## 2023-09-19 RX ADMIN — CLOPIDOGREL BISULFATE 75 MG: 75 TABLET ORAL at 08:27

## 2023-09-19 RX ADMIN — INSULIN LISPRO 2 UNITS: 100 INJECTION, SOLUTION INTRAVENOUS; SUBCUTANEOUS at 11:30

## 2023-09-19 RX ADMIN — FAMOTIDINE 20 MG: 20 TABLET ORAL at 08:28

## 2023-09-19 RX ADMIN — SEVELAMER CARBONATE 800 MG: 800 TABLET, FILM COATED ORAL at 11:28

## 2023-09-19 RX ADMIN — HYDRALAZINE HYDROCHLORIDE 100 MG: 50 TABLET, FILM COATED ORAL at 06:01

## 2023-09-19 RX ADMIN — PREDNISOLONE ACETATE 1 DROP: 10 SUSPENSION/ DROPS OPHTHALMIC at 08:30

## 2023-09-19 RX ADMIN — SEVELAMER CARBONATE 800 MG: 800 TABLET, FILM COATED ORAL at 16:23

## 2023-09-19 RX ADMIN — INSULIN GLARGINE 24 UNITS: 100 INJECTION, SOLUTION SUBCUTANEOUS at 08:35

## 2023-09-19 RX ADMIN — TAMSULOSIN HYDROCHLORIDE 0.4 MG: 0.4 CAPSULE ORAL at 08:29

## 2023-09-19 RX ADMIN — ASPIRIN 81 MG: 81 TABLET, COATED ORAL at 08:27

## 2023-09-19 RX ADMIN — METOPROLOL TARTRATE 25 MG: 25 TABLET, FILM COATED ORAL at 08:27

## 2023-09-19 RX ADMIN — SEVELAMER CARBONATE 800 MG: 800 TABLET, FILM COATED ORAL at 08:29

## 2023-09-19 NOTE — CARE COORDINATION
CM called and left message requesting call back for Johnathon Meek with [facility]. Requested confirmation that they will be able to accept patient back at discharge. Updated clinicals faxed. [phone]- Return call received from Johnathon Meek with [facility] stating they are able to accept patient back today.
Discharge [address] Case Management Department  Written by: Josselyn Gentile RN    Patient Name: Patricia Tavarez  Attending Provider: Cristino Murray MD  Admit Date: 2023 11:03 PM  MRN: [id number]  Account: [de-identified]                     : 1945  Discharge Date:       Disposition: SNF, [facility] per FLN, AVS faxed, report number given to RN, facility updated on 6pm  time.      Josselyn Gentile RN
PHQ-9  Pt present with LUE weakness and slurred speech  Met with pt this date to assess for support and needs. Pt was awake and cooperative. Pt states he lives with his wife. Has 1 dtr,2 sons, 7 grandchildren and 8 great grandchildren. Pt has no SI / depression      Patient Health Questionnaire-9 (PHQ-9)    Over the last 2 weeks, how often have you been bothered by any of the following problems? 1. Little Interest or pleasure in doing things? [x] Not at all  [] Several Days  [] More than half the day  []  Nearly every day    2. Feeling down, depressed or hopeless? [x] Not at all  [] Several Days  [] More than half the day  []  Nearly every day    3. Trouble falling or staying asleep, or sleeping too much? [x] Not at all  [] Several Days  [] More than half the day  []  Nearly every day    4. Feeling tired or having little energy? [] Not at all  [x] Several Days  [] More than half the day  []  Nearly every day    5. Poor apettite or overeating? [x] Not at all  [] Several Days  [] More than half the day  []  Nearly every day    6. Feeling bad about yourself-or that you are a failure or have let yourself or your family down? [x] Not at all  [] Several Days  [] More than half the day  []  Nearly every day    7. Trouble concentrating on things, such as reading the newspaper or watching television? [x] Not at all  [] Several Days  [] More than half the day  []  Nearly every day    8. Moving or speaking so slowly that other people could have noticed? Or the opposite-being so fidgety or restless that you have been moving around a lot more than usual?   [x] Not at all  [] Several Days  [] More than half the day  []  Nearly every day    9. Thoughts that you would be better off dead or of hurting yourself in some way?    [x] Not at all  [] Several Days  [] More than half the day  []  Nearly every day    Total Score: 1    If you checked off any problems, how difficult have these problems made it for you to do
No  Would you like Case Management to discuss the discharge plan with any other family members/significant others, and if so, who? (P) No  Plans to Return to Present Housing: (P) Yes  Other Identified Issues/Barriers to RETURNING to current housing: na  Potential Assistance needed at discharge: (P) N/A            Potential DME:    Patient expects to discharge to: (P) [address] for transportation at discharge: (P) Family    Financial    Payor: MEDICARE / Plan: MEDICARE PART A AND B / Product Type: *No Product type* /     Does insurance require precert for SNF: No    Potential assistance Purchasing Medications: (P) No  Meds-to-Beds request:        [facility], [facility] [address] [place] [address] [place] [address]  Phone: [phone] Fax: [phone]    [id number] OQ [address] [facility], [address] [place] [address]  [address] [place] [address]  Phone: [phone] Fax: [phone]      Notes:    Factors facilitating achievement of predicted outcomes: Family support, Cooperative, and Pleasant    Barriers to discharge: Medical complications    Additional Case Management Notes: spoke with patient and family at bedside, role explained. Patient is from [facility] and plans on returning. Referral sent to confirm able to return to facility. Patient may need transportation. Address, PCP and insurance reviewed. The Plan for Transition of Care is related to the following treatment goals of Stroke-like symptoms [Q70.68]    IF APPLICABLE: The Patient and/or patient representative Philip Soria and his family were provided with a choice of provider and agrees with the discharge plan.  Freedom of choice list with basic dialogue that supports the patient's individualized plan of care/goals and shares the quality data associated with the providers was provided to: (P)

## 2023-09-19 NOTE — PLAN OF CARE
Problem: Chronic Conditions and Co-morbidities  Goal: Patient's chronic conditions and co-morbidity symptoms are monitored and maintained or improved  9/19/2023 0421 by Pedrito Platt RN  Outcome: Progressing  9/18/2023 1715 by Odin Benitez RN  Outcome: Progressing  Flowsheets (Taken 9/18/2023 0800)  Care Plan - Patient's Chronic Conditions and Co-Morbidity Symptoms are Monitored and Maintained or Improved:   Monitor and assess patient's chronic conditions and comorbid symptoms for stability, deterioration, or improvement   Collaborate with multidisciplinary team to address chronic and comorbid conditions and prevent exacerbation or deterioration   Update acute care plan with appropriate goals if chronic or comorbid symptoms are exacerbated and prevent overall improvement and discharge     Problem: Discharge Planning  Goal: Discharge to home or other facility with appropriate resources  9/19/2023 0421 by Pedrito Platt RN  Outcome: Progressing  9/18/2023 1715 by Odin Benitez RN  Outcome: Progressing  Flowsheets (Taken 9/18/2023 0800)  Discharge to home or other facility with appropriate resources:   Identify barriers to discharge with patient and caregiver   Arrange for needed discharge resources and transportation as appropriate   Identify discharge learning needs (meds, wound care, etc)     Problem: Safety - Adult  Goal: Free from fall injury  9/19/2023 0421 by Pedrito Platt RN  Outcome: Progressing  9/18/2023 1715 by Odin Benitez RN  Outcome: Progressing  Flowsheets (Taken 9/18/2023 0731)  Free From Fall Injury: Instruct family/caregiver on patient safety     Problem: Pain  Goal: Verbalizes/displays adequate comfort level or baseline comfort level  9/19/2023 0421 by Pedrito Platt RN  Outcome: Progressing  9/18/2023 1715 by Odin Benitez RN  Outcome: Progressing     Problem: ABCDS Injury Assessment  Goal: Absence of physical injury  Outcome: Progressing

## 2023-09-19 NOTE — PLAN OF CARE
Problem: Chronic Conditions and Co-morbidities  Goal: Patient's chronic conditions and co-morbidity symptoms are monitored and maintained or improved  9/19/2023 1600 by Jairon Thomas RN  Outcome: Completed  9/19/2023 0421 by Bindu Floyd RN  Outcome: Progressing     Problem: Discharge Planning  Goal: Discharge to home or other facility with appropriate resources  9/19/2023 1600 by Jairon Thomas RN  Outcome: Completed  9/19/2023 0421 by Bindu Floyd RN  Outcome: Progressing     Problem: Safety - Adult  Goal: Free from fall injury  9/19/2023 1600 by Jairon Thomas RN  Outcome: Completed  9/19/2023 0421 by Bindu Floyd RN  Outcome: Progressing     Problem: Pain  Goal: Verbalizes/displays adequate comfort level or baseline comfort level  9/19/2023 1600 by Jairon Thomas RN  Outcome: Completed  9/19/2023 0421 by Bindu Floyd RN  Outcome: Progressing     Problem: ABCDS Injury Assessment  Goal: Absence of physical injury  9/19/2023 1600 by Jairon Thomas RN  Outcome: Completed  9/19/2023 0421 by Bindu Floyd RN  Outcome: Progressing

## 2023-09-20 ENCOUNTER — CLINICAL DOCUMENTATION ONLY (OUTPATIENT)
Facility: CLINIC | Age: 78
End: 2023-09-20

## 2023-09-20 NOTE — DISCHARGE SUMMARY
[facility]  Office: [phone]  Arcelia Thomaser, DO, Geovanny Grullon Blood, DO, Bossman Mcginnis MD, Talha Landry MD, Emmanuel Stiles MD, Ramandeep Morataya MD,  Yu Pizano MD, Chidi Jones MD, Jaed García, DO, Carly Thornton MD,  Tamera Marie, DO, Kishore Rodriguez MD, Trung Wilson MD, Lexus Pa DO, Anabel Plunkett MD,  Lyn Alfonso DO, Danyel Barnard MD, Sherif Johnson MD, Deonte Santamaria MD, Eda Mason MD,  Winston Mendoza MD, Rick Thomson MD, Eben Bernal MD, Kelsi Hernandez MD, Ibrahima Zuleta DO, Farrukh Luna MD,  Yu Lezama MD, Angeles Kim MD, Sonja Brar, CNP,  Malcom Heimlich, CNP,, Elissa Chow, CNP,  Naila Wilson, DNP, Stevo Rice, CNP, Katlyn Mckeon, CNP, Rasheeda Coppola, CNP, Fermin Pizano, CNP, Blanca Dillon, CNP, Donna Case, CNP, Brandon Lane, CNS, Tabitha Pena, CNP, Mariann Aaron, [facility]    Discharge Summary     Patient ID: Polly Durham  :  1945   MRN: [id number]     ACCOUNT:  [de-identified]   Patient's PCP: Terrell Alexander MD  Admit Date: 2023   Discharge Date: 2023  Length of Stay: 3  Code Status:  Prior  Admitting Physician: No admitting provider for patient encounter. Discharge Physician: Yu Pizano MD     Active Discharge Diagnoses:     Hospital Problem Lists:  Principal Problem:    Stroke-like symptoms  Active Problems:    S/P drug eluting coronary stent placement-OM1 4/10/17    Anemia of chronic disease    Orthostatic hypotension    ESRD on dialysis ([address])    Type 2 diabetes mellitus with hyperglycemia, with long-term current use of insulin (HCC)    Hypertension, essential    CAD, multiple vessel    S/P CABG x 3    Hypertensive urgency    Transient neurological symptoms    Metabolic acidosis  Resolved Problems:    * No resolved hospital problems.  *      Admission Condition:  fair     Discharged

## 2023-09-21 NOTE — FLOWSHEET NOTE
Stroke Follow up Phone Call    Megan this is Jose Chang  from [facility] stroke team calling to see how you are doing since your d/c. Medication List        CONTINUE taking these medications      aspirin 81 MG EC tablet  Take 1 tablet by mouth daily     clopidogrel 75 MG tablet  Commonly known as: PLAVIX  Take 1 tablet by mouth daily     Contour Test strip  Generic drug: blood glucose test strips  Inject 1 each into the skin 2 times daily     DIALYVITE 800/ZINC PO     diphenhydrAMINE 25 MG capsule  Commonly known as: BENADRYL     famotidine 20 MG tablet  Commonly known as: PEPCID     furosemide 80 MG tablet  Commonly known as: LASIX  Take 1 tablet by mouth daily     hydrALAZINE 100 MG tablet  Commonly known as: APRESOLINE  Take 1 tablet by mouth every 8 hours     insulin glargine 100 UNIT/ML injection vial  Commonly known as: LANTUS     latanoprost 0.005 % ophthalmic solution  Commonly known as: XALATAN     magnesium hydroxide 400 MG/5ML suspension  Commonly known as: MILK OF MAGNESIA     metoprolol tartrate 25 MG tablet  Commonly known as: LOPRESSOR  Take 1 tablet by mouth 2 times daily Hold on dialysis days.      midodrine 10 MG tablet  Commonly known as: PROAMATINE  Take 1 tablet by mouth 3 times daily as needed (SBP < 100)     mirtazapine 15 MG tablet  Commonly known as: REMERON     NIFEdipine 90 MG extended release tablet  Commonly known as: ADALAT CC     omeprazole 10 MG delayed release capsule  Commonly known as: PRILOSEC     ondansetron 4 MG disintegrating tablet  Commonly known as: ZOFRAN-ODT  Take 1 tablet by mouth every 8 hours as needed for Nausea or Vomiting     Pen Needles 31G X 6 MM Misc  1 each by Does not apply route daily     polyethylene glycol 17 g packet  Commonly known as: GLYCOLAX     prednisoLONE acetate 1 % ophthalmic suspension  Commonly known as: PRED FORTE     Refresh Lacri-Lube Oint ointment  Apply 0.5 inches to eye nightly as needed (Dry eyes)

## 2023-09-25 ENCOUNTER — HOSPITAL ENCOUNTER (OUTPATIENT)
Age: 78
Setting detail: SPECIMEN
Discharge: HOME | End: 2023-09-25

## 2023-09-25 LAB
ALBUMIN SERPL-MCNC: 4.3 G/DL (ref 3.5–5.2)
ALBUMIN/GLOB SERPL: 1.8 {RATIO} (ref 1–2.5)
ALP SERPL-CCNC: 90 U/L (ref 40–129)
ALT SERPL-CCNC: 9 U/L (ref 5–41)
ANION GAP SERPL CALCULATED.3IONS-SCNC: 15 MMOL/L (ref 9–17)
AST SERPL-CCNC: 13 U/L
BASOPHILS # BLD: 0.07 K/UL (ref 0–0.2)
BASOPHILS NFR BLD: 1 % (ref 0–2)
BILIRUB SERPL-MCNC: 0.3 MG/DL (ref 0.3–1.2)
BUN SERPL-MCNC: 31 MG/DL (ref 8–23)
BUN/CREAT SERPL: 6 (ref 9–20)
CALCIUM SERPL-MCNC: 9.1 MG/DL (ref 8.6–10.4)
CHLORIDE SERPL-SCNC: 101 MMOL/L (ref 98–107)
CO2 SERPL-SCNC: 22 MMOL/L (ref 20–31)
CREAT SERPL-MCNC: 5 MG/DL (ref 0.7–1.2)
EOSINOPHIL # BLD: 0.57 K/UL (ref 0–0.44)
EOSINOPHILS RELATIVE PERCENT: 9 % (ref 1–4)
ERYTHROCYTE [DISTWIDTH] IN BLOOD BY AUTOMATED COUNT: 17.2 % (ref 11.8–14.4)
GFR SERPL CREATININE-BSD FRML MDRD: 11 ML/MIN/1.73M2
GLUCOSE SERPL-MCNC: 204 MG/DL (ref 70–99)
HCT VFR BLD AUTO: 29.2 % (ref 40.7–50.3)
HGB BLD-MCNC: 9.4 G/DL (ref 13–17)
IMM GRANULOCYTES # BLD AUTO: 0.07 K/UL (ref 0–0.3)
IMM GRANULOCYTES NFR BLD: 1 %
LYMPHOCYTES # BLD: 23 % (ref 24–43)
LYMPHOCYTES NFR BLD: 1.55 K/UL (ref 1.1–3.7)
MCH RBC QN AUTO: 29.7 PG (ref 25.2–33.5)
MCHC RBC AUTO-ENTMCNC: 32.2 G/DL (ref 28.4–34.8)
MCV RBC AUTO: 92.4 FL (ref 82.6–102.9)
MONOCYTES NFR BLD: 0.46 K/UL (ref 0.1–1.2)
MONOCYTES NFR BLD: 7 % (ref 3–12)
NEUTROPHILS NFR BLD: 59 % (ref 36–65)
NEUTS SEG NFR BLD: 3.95 K/UL (ref 1.5–8.1)
NRBC BLD-RTO: 0 PER 100 WBC
PLATELET # BLD AUTO: 177 K/UL (ref 138–453)
PMV BLD AUTO: 9.5 FL (ref 8.1–13.5)
POTASSIUM SERPL-SCNC: 4 MMOL/L (ref 3.7–5.3)
PROT SERPL-MCNC: 6.7 G/DL (ref 6.4–8.3)
RBC # BLD AUTO: 3.16 M/UL (ref 4.21–5.77)
SODIUM SERPL-SCNC: 138 MMOL/L (ref 135–144)
WBC OTHER # BLD: 6.7 K/UL (ref 3.5–11.3)

## 2023-09-25 PROCEDURE — 85025 COMPLETE CBC W/AUTO DIFF WBC: CPT

## 2023-09-25 PROCEDURE — 36415 COLL VENOUS BLD VENIPUNCTURE: CPT

## 2023-09-25 PROCEDURE — 85027 COMPLETE CBC AUTOMATED: CPT

## 2023-09-25 PROCEDURE — P9603 ONE-WAY ALLOW PRORATED MILES: HCPCS

## 2023-09-25 PROCEDURE — 80053 COMPREHEN METABOLIC PANEL: CPT

## 2023-09-26 ENCOUNTER — HOSPITAL ENCOUNTER (OUTPATIENT)
Age: 78
Discharge: HOME | End: 2023-09-28
Attending: FAMILY MEDICINE
Payer: MEDICARE

## 2023-09-26 ENCOUNTER — OFFICE VISIT (OUTPATIENT)
Dept: CARDIOLOGY | Age: 78
End: 2023-09-26
Payer: MEDICARE

## 2023-09-26 VITALS
WEIGHT: 187 LBS | RESPIRATION RATE: 16 BRPM | DIASTOLIC BLOOD PRESSURE: 58 MMHG | HEART RATE: 73 BPM | SYSTOLIC BLOOD PRESSURE: 119 MMHG | BODY MASS INDEX: 27.62 KG/M2

## 2023-09-26 DIAGNOSIS — I25.10 ASHD (ARTERIOSCLEROTIC HEART DISEASE): ICD-10-CM

## 2023-09-26 DIAGNOSIS — D64.9 ANEMIA, UNSPECIFIED TYPE: ICD-10-CM

## 2023-09-26 DIAGNOSIS — I10 ESSENTIAL HYPERTENSION: ICD-10-CM

## 2023-09-26 DIAGNOSIS — N18.6 ESRD (END STAGE RENAL DISEASE) (HCC): ICD-10-CM

## 2023-09-26 DIAGNOSIS — G90.1 DYSAUTONOMIA (HCC): Primary | ICD-10-CM

## 2023-09-26 DIAGNOSIS — R00.2 PALPITATIONS: ICD-10-CM

## 2023-09-26 DIAGNOSIS — Z95.1 S/P CABG X 3: ICD-10-CM

## 2023-09-26 DIAGNOSIS — I50.32 CHRONIC DIASTOLIC HEART FAILURE (HCC): ICD-10-CM

## 2023-09-26 PROCEDURE — G8427 DOCREV CUR MEDS BY ELIG CLIN: HCPCS | Performed by: FAMILY MEDICINE

## 2023-09-26 PROCEDURE — 1111F DSCHRG MED/CURRENT MED MERGE: CPT | Performed by: FAMILY MEDICINE

## 2023-09-26 PROCEDURE — 3078F DIAST BP <80 MM HG: CPT | Performed by: FAMILY MEDICINE

## 2023-09-26 PROCEDURE — 93247 EXT ECG>7D<15D SCAN A/R: CPT

## 2023-09-26 PROCEDURE — G8417 CALC BMI ABV UP PARAM F/U: HCPCS | Performed by: FAMILY MEDICINE

## 2023-09-26 PROCEDURE — 1123F ACP DISCUSS/DSCN MKR DOCD: CPT | Performed by: FAMILY MEDICINE

## 2023-09-26 PROCEDURE — 1036F TOBACCO NON-USER: CPT | Performed by: FAMILY MEDICINE

## 2023-09-26 PROCEDURE — 3074F SYST BP LT 130 MM HG: CPT | Performed by: FAMILY MEDICINE

## 2023-09-26 PROCEDURE — 99214 OFFICE O/P EST MOD 30 MIN: CPT | Performed by: FAMILY MEDICINE

## 2023-09-26 RX ORDER — HYDRALAZINE HYDROCHLORIDE 50 MG/1
50 TABLET, FILM COATED ORAL EVERY 8 HOURS
Qty: 270 TABLET | Refills: 3 | Status: SHIPPED | OUTPATIENT
Start: 2023-09-26 | End: 2024-09-20

## 2023-09-26 NOTE — PATIENT INSTRUCTIONS
SURVEY:    You may be receiving a survey from [employer] regarding your visit today. Please complete the survey to enable us to provide the highest quality of care to you and your family. If you cannot score us a very good on any question, please call the office to discuss how we could have made your experience a very good one. Thank you.

## 2023-09-26 NOTE — PROGRESS NOTES
pt heart rate, blood pressure response and symptoms were most consistent with dysautonomia. Hemodialysis patient ([address])     [address] [place];  Left AV fistula    History of 24 hour EKG monitoring 08/14/2014    Occasional PAC's and PVC's which appear to be at least moderately symptomatic. History of 24 hour EKG monitoring 11/19/2014    Event Monitor. Sinus rhythm and sinus bradycardia. Infrequent isolated PVC's    History of blood transfusion     History of cardiovascular stress test 02/19/2014    Relatively NL    History of cardiovascular stress test 03/21/2018    Normal myocardial perfusion. Global left ventricular systolic function was normal with an EF of 68%. Overall, these results are most consistent with a low risk scan. History of coronary artery stent placement 04/2017    PTCA / Drug Eluting Stent:, CX and / or branches    History of CVA (cerebrovascular accident) without residual deficits 2014    Incidentally found on CT head. No known impairment now or in the past.    History of echocardiogram 02/18/2014    LA mildly dilated, EF 55%, LV wall thickness is moderately increased, no definite wall motion abnormalilities, what appears to be a pacer wire is seen w/n the RA and RV, mild-mod TR, mild pulmonary hypertension. History of Holter monitoring 12/29/2017    Rare PAC's and PVC's.      History of tilt table evaluation 08/12/2014    Abnormal    Hyperlipidemia     Hypertension     Medication side effect, initial encounter 03/23/2018    Non critical Right Renal artery stenosis, native ([address]) 10/02/2018    Non critical Right Renal artery stenosis, based on cath in 2016, Rt RA 30% stenosis    Pacemaker     non-functioning    S/P cardiac cath 04/10/2017    S/P coronary artery stent placement     Successful PTCA - HA Om1    SIRS (systemic inflammatory response syndrome) ([address]) 05/19/2019    Type II or unspecified type diabetes mellitus without mention of complication, not stated as

## 2023-09-28 ENCOUNTER — TELEPHONE (OUTPATIENT)
Dept: UROLOGY | Age: 78
End: 2023-09-28

## 2023-09-28 DIAGNOSIS — N20.0 RENAL CALCULUS: Primary | ICD-10-CM

## 2023-09-28 NOTE — TELEPHONE ENCOUNTER
Phone call to [facility] to advise Gina(/transportation) and Terrell Cedillo, the nurse, of the patient's appt 10/05/2023 3151 and the need to get KUB prior to visit. Kathy and Gina verbalize understanding.

## 2023-10-02 ENCOUNTER — HOSPITAL ENCOUNTER (OUTPATIENT)
Age: 78
Setting detail: SPECIMEN
Discharge: HOME | End: 2023-10-02

## 2023-10-03 ENCOUNTER — HOSPITAL ENCOUNTER (OUTPATIENT)
Age: 78
Setting detail: SPECIMEN
Discharge: HOME | End: 2023-10-03

## 2023-10-03 LAB
ALBUMIN SERPL-MCNC: 3.7 G/DL (ref 3.5–5.2)
ALBUMIN/GLOB SERPL: 1.6 {RATIO} (ref 1–2.5)
ALP SERPL-CCNC: 82 U/L (ref 40–129)
ALT SERPL-CCNC: 7 U/L (ref 5–41)
ANION GAP SERPL CALCULATED.3IONS-SCNC: 10 MMOL/L (ref 9–17)
AST SERPL-CCNC: 16 U/L
BILIRUB SERPL-MCNC: 0.3 MG/DL (ref 0.3–1.2)
BUN SERPL-MCNC: 18 MG/DL (ref 8–23)
BUN/CREAT SERPL: 7 (ref 9–20)
CALCIUM SERPL-MCNC: 8.7 MG/DL (ref 8.6–10.4)
CHLORIDE SERPL-SCNC: 102 MMOL/L (ref 98–107)
CO2 SERPL-SCNC: 27 MMOL/L (ref 20–31)
CREAT SERPL-MCNC: 2.7 MG/DL (ref 0.7–1.2)
ERYTHROCYTE [DISTWIDTH] IN BLOOD BY AUTOMATED COUNT: 17.2 % (ref 11.8–14.4)
GFR SERPL CREATININE-BSD FRML MDRD: 24 ML/MIN/1.73M2
GLUCOSE SERPL-MCNC: 104 MG/DL (ref 70–99)
HCT VFR BLD AUTO: 27.3 % (ref 40.7–50.3)
HGB BLD-MCNC: 8.8 G/DL (ref 13–17)
MCH RBC QN AUTO: 29.8 PG (ref 25.2–33.5)
MCHC RBC AUTO-ENTMCNC: 32.2 G/DL (ref 28.4–34.8)
MCV RBC AUTO: 92.5 FL (ref 82.6–102.9)
NRBC BLD-RTO: 0 PER 100 WBC
PLATELET # BLD AUTO: 163 K/UL (ref 138–453)
PMV BLD AUTO: 8.9 FL (ref 8.1–13.5)
POTASSIUM SERPL-SCNC: 4.1 MMOL/L (ref 3.7–5.3)
PROT SERPL-MCNC: 6 G/DL (ref 6.4–8.3)
RBC # BLD AUTO: 2.95 M/UL (ref 4.21–5.77)
SODIUM SERPL-SCNC: 139 MMOL/L (ref 135–144)
WBC OTHER # BLD: 6.5 K/UL (ref 3.5–11.3)

## 2023-10-03 PROCEDURE — 85027 COMPLETE CBC AUTOMATED: CPT

## 2023-10-03 PROCEDURE — 36415 COLL VENOUS BLD VENIPUNCTURE: CPT

## 2023-10-03 PROCEDURE — 80053 COMPREHEN METABOLIC PANEL: CPT

## 2023-10-05 ENCOUNTER — OFFICE VISIT (OUTPATIENT)
Dept: UROLOGY | Age: 78
End: 2023-10-05
Payer: MEDICARE

## 2023-10-05 ENCOUNTER — HOSPITAL ENCOUNTER (OUTPATIENT)
Dept: GENERAL RADIOLOGY | Age: 78
Discharge: HOME | End: 2023-10-07
Payer: MEDICARE

## 2023-10-05 ENCOUNTER — OFFICE VISIT (OUTPATIENT)
Dept: NEUROLOGY | Age: 78
End: 2023-10-05
Payer: MEDICARE

## 2023-10-05 ENCOUNTER — HOSPITAL ENCOUNTER (OUTPATIENT)
Age: 78
Discharge: HOME | End: 2023-10-07
Payer: MEDICARE

## 2023-10-05 VITALS
DIASTOLIC BLOOD PRESSURE: 71 MMHG | HEART RATE: 64 BPM | BODY MASS INDEX: 28.56 KG/M2 | TEMPERATURE: 96.8 F | HEIGHT: 69 IN | WEIGHT: 192.8 LBS | SYSTOLIC BLOOD PRESSURE: 159 MMHG | RESPIRATION RATE: 18 BRPM

## 2023-10-05 VITALS
DIASTOLIC BLOOD PRESSURE: 75 MMHG | HEART RATE: 61 BPM | HEIGHT: 69 IN | SYSTOLIC BLOOD PRESSURE: 173 MMHG | BODY MASS INDEX: 27.62 KG/M2 | TEMPERATURE: 97.8 F

## 2023-10-05 DIAGNOSIS — R31.0 GROSS HEMATURIA: ICD-10-CM

## 2023-10-05 DIAGNOSIS — N13.8 BPH WITH OBSTRUCTION/LOWER URINARY TRACT SYMPTOMS: Primary | ICD-10-CM

## 2023-10-05 DIAGNOSIS — Z12.5 SCREENING PSA (PROSTATE SPECIFIC ANTIGEN): ICD-10-CM

## 2023-10-05 DIAGNOSIS — N20.0 RENAL CALCULUS: ICD-10-CM

## 2023-10-05 DIAGNOSIS — N40.1 BPH WITH OBSTRUCTION/LOWER URINARY TRACT SYMPTOMS: Primary | ICD-10-CM

## 2023-10-05 DIAGNOSIS — G45.9 TIA (TRANSIENT ISCHEMIC ATTACK): Primary | ICD-10-CM

## 2023-10-05 PROCEDURE — 1036F TOBACCO NON-USER: CPT | Performed by: NEUROMUSCULOSKELETAL MEDICINE, SPORTS MEDICINE

## 2023-10-05 PROCEDURE — G8427 DOCREV CUR MEDS BY ELIG CLIN: HCPCS | Performed by: NEUROMUSCULOSKELETAL MEDICINE, SPORTS MEDICINE

## 2023-10-05 PROCEDURE — 3077F SYST BP >= 140 MM HG: CPT | Performed by: NEUROMUSCULOSKELETAL MEDICINE, SPORTS MEDICINE

## 2023-10-05 PROCEDURE — 3078F DIAST BP <80 MM HG: CPT | Performed by: NEUROMUSCULOSKELETAL MEDICINE, SPORTS MEDICINE

## 2023-10-05 PROCEDURE — 99213 OFFICE O/P EST LOW 20 MIN: CPT | Performed by: NEUROMUSCULOSKELETAL MEDICINE, SPORTS MEDICINE

## 2023-10-05 PROCEDURE — 99214 OFFICE O/P EST MOD 30 MIN: CPT | Performed by: NEUROMUSCULOSKELETAL MEDICINE, SPORTS MEDICINE

## 2023-10-05 PROCEDURE — 1123F ACP DISCUSS/DSCN MKR DOCD: CPT | Performed by: NEUROMUSCULOSKELETAL MEDICINE, SPORTS MEDICINE

## 2023-10-05 PROCEDURE — 1111F DSCHRG MED/CURRENT MED MERGE: CPT | Performed by: NEUROMUSCULOSKELETAL MEDICINE, SPORTS MEDICINE

## 2023-10-05 PROCEDURE — 51798 US URINE CAPACITY MEASURE: CPT | Performed by: NURSE PRACTITIONER

## 2023-10-05 PROCEDURE — G8417 CALC BMI ABV UP PARAM F/U: HCPCS | Performed by: NEUROMUSCULOSKELETAL MEDICINE, SPORTS MEDICINE

## 2023-10-05 PROCEDURE — 74018 RADEX ABDOMEN 1 VIEW: CPT

## 2023-10-05 PROCEDURE — G8484 FLU IMMUNIZE NO ADMIN: HCPCS | Performed by: NEUROMUSCULOSKELETAL MEDICINE, SPORTS MEDICINE

## 2023-10-05 RX ORDER — TAMSULOSIN HYDROCHLORIDE 0.4 MG/1
CAPSULE ORAL
Qty: 90 CAPSULE | Refills: 3 | Status: SHIPPED | OUTPATIENT
Start: 2023-10-05

## 2023-10-05 ASSESSMENT — ENCOUNTER SYMPTOMS
VOMITING: 0
CONSTIPATION: 0
COLOR CHANGE: 0
NAUSEA: 0
ABDOMINAL PAIN: 0
BACK PAIN: 0
WHEEZING: 0
COUGH: 0
EYE REDNESS: 0
SHORTNESS OF BREATH: 0

## 2023-10-05 NOTE — PATIENT INSTRUCTIONS
SURVEY:    You may be receiving a survey from [employer] regarding your visit today. Please complete the survey to enable us to provide the highest quality of care to you and your family. If you cannot score us a very good on any question, please call the office to discuss how we could have made your experience a very good one. Thank you.

## 2023-10-05 NOTE — PATIENT INSTRUCTIONS
SURVEY:    You may be receiving a survey from [employer] regarding your visit today. Please complete the survey to enable us to provide the highest quality of care to you and your family. If you cannot score us a very good on any question, please call the office to discuss how we could have made your experience a very good one. Thank you.

## 2023-10-05 NOTE — PROGRESS NOTES
Take 1 tablet by mouth daily      Cholecalciferol (VITAMIN D) 50 MCG (2000 UT) CAPS capsule Take by mouth      NIFEdipine (ADALAT CC) 90 MG extended release tablet Take 1 tablet by mouth daily      diphenhydrAMINE (BENADRYL) 25 MG capsule Take 1 capsule by mouth every 6 hours as needed for Itching      magnesium hydroxide (MILK OF MAGNESIA) 400 MG/5ML suspension Take by mouth daily as needed for Constipation      famotidine (PEPCID) 20 MG tablet Take 1 tablet by mouth 2 times daily      metoprolol tartrate (LOPRESSOR) 25 MG tablet Take 1 tablet by mouth 2 times daily Hold on dialysis days.  180 tablet 1    insulin glargine (LANTUS) 100 UNIT/ML injection vial Inject 24 Units into the skin 2 times daily      furosemide (LASIX) 80 MG tablet Take 1 tablet by mouth daily 60 tablet 3    aspirin 81 MG EC tablet Take 1 tablet by mouth daily 30 tablet 3    clopidogrel (PLAVIX) 75 MG tablet Take 1 tablet by mouth daily 30 tablet 3    epoetin shannon-epbx (RETACRIT) 22341 UNIT/ML SOLN injection Inject 0.5 mLs into the skin three times a week (Patient taking differently: Inject 0.5 mLs into the skin) 6.6 mL 0    midodrine (PROAMATINE) 10 MG tablet Take 1 tablet by mouth 3 times daily as needed (SBP < 100) 90 tablet 3    ondansetron (ZOFRAN-ODT) 4 MG disintegrating tablet Take 1 tablet by mouth every 8 hours as needed for Nausea or Vomiting 30 tablet 0    White Petrolatum-Mineral Oil (REFRESH LACRI-LUBE) OINT ointment Apply 0.5 inches to eye nightly as needed (Dry eyes) 1 each 0    CONTOUR TEST strip Inject 1 each into the skin 2 times daily 200 each 3    sevelamer (RENVELA) 800 MG tablet Take 1 tablet by mouth 3 times daily (with meals)      latanoprost (XALATAN) 0.005 % ophthalmic solution Place 1 drop into both eyes nightly      Insulin Pen Needle (PEN NEEDLES) 31G X 6 MM MISC 1 each by Does not apply route daily 100 each 3    prednisoLONE acetate (PRED FORTE) 1 % ophthalmic suspension Place 1 drop into the left eye daily

## 2023-10-05 NOTE — PROGRESS NOTES
HPI:          Patient is a 68 y.o. male in no acute distress. He is alert and oriented to person, place, and time. History  12/2019 Referral from Dr. Nannette Hernandez for elevated PSA of 6.13. He was a previous patient of Dr. Sandra Rahman for elevated PSA. Several brothers with prostate cancer. PSA  9/2023 - 3.28  8/2022 - 3.28  1/2022 - 3.36  4/2021 - 3.68  10/2020 - 5.43  4/2020 - 4.42  1/2020 - 5.00  11/2019 - 6.13  11/2018 - 3.11  11/2017 - 3.26  11/2016 - 2.43  10/2015 - 2.70  10/2014 - 2.35  10/2013 - 2.15     11/2020 Prostate biopsy for PSA of 5.43. Pathology: benign prostate tissue in all 12 cores    5/2021 gross hematuria. CT urogram showed a nonobstructing 3 mm left renal stone. Refused cystoscopy    12/2021 - urinary retention/constipation -Flomax started    On HD for ESRD    Today  Here today to follow-up for BPH and constipation. He has had some recent health issues and has been in and out of the hospital.  He is currently at [facility]. He did have a urinary tract infection at the end of August.  His urine was checked for increased frequency. Urine culture was positive for E. coli. Currently he urinates apx every 8 hours, nocturia x1. Dialysis is now only Monday and Fridays. He denies any dysuria or gross hematuria. He did have a CT abdomen and pelvis. This film was independently reviewed and shows a punctate left renal stone. There is a 1 cm hemorrhagic cyst in the upper pole of the left kidney. This is benign. Sharita Netters is not evident on recent KUB. He is having daily bowel movements. Most recent PSA is 3.28. This is stable for patient.     Past Medical History:   Diagnosis Date    Abnormal tilt table test 02/23/2016    Acute kidney injury ([address])     Acute MI ([address])     Acute renal failure superimposed on stage 4 chronic kidney disease ([address]) 10/02/2018    ssecondary to hemodynamic effects of loop diuretics and ace inhibitors, bbaseline 1.2-1.3 which peaked up to 1.8, resolving    Acute renal

## 2023-10-11 ENCOUNTER — TELEPHONE (OUTPATIENT)
Dept: PRIMARY CARE CLINIC | Age: 78
End: 2023-10-11

## 2023-10-11 NOTE — TELEPHONE ENCOUNTER
Hello, I would have the patient schedule an appointment and we can discuss it further. He could also reach out to the Cardiology office as he had recently been evaluated by them and they had discussed this medication based on the notes. Thank you.   Electronically signed by Morenita Mark MD on 10/11/2023 at 2:28 PM

## 2023-10-11 NOTE — TELEPHONE ENCOUNTER
Patient has had low BP running around  103/44 and 116/45. Patient is unsure if he should take his Midodrine, Dr. Lakisha Barnes prescribed the medication and stated that not to take unless under 156 for systolic. Please advise.  Call patients wife back at [phone]

## 2023-10-12 NOTE — TELEPHONE ENCOUNTER
Spoke with patient and he stated that he is not having the issue today and he felt good. I explained patient could call Cardio if he felt he needed to and then if not we would see him at his appointment on the 19th. Patient stated understanding.

## 2023-10-13 ENCOUNTER — TELEPHONE (OUTPATIENT)
Dept: CARDIOLOGY | Age: 78
End: 2023-10-13

## 2023-10-13 NOTE — TELEPHONE ENCOUNTER
----- Message from Yuval Moore MD sent at 10/12/2023 11:20 PM EDT -----  Let Mr. Khris Malena know their test result was ok. Will discuss at next visit. Thanks.

## 2023-10-16 ENCOUNTER — HOSPITAL ENCOUNTER (OUTPATIENT)
Age: 78
Setting detail: SPECIMEN
Discharge: HOME | End: 2023-10-16
Payer: MEDICARE

## 2023-10-16 LAB
ALBUMIN SERPL-MCNC: 4.2 G/DL (ref 3.5–5.2)
ALBUMIN/GLOB SERPL: 1.6 {RATIO} (ref 1–2.5)
ALP SERPL-CCNC: 88 U/L (ref 40–129)
ALT SERPL-CCNC: 8 U/L (ref 5–41)
ANION GAP SERPL CALCULATED.3IONS-SCNC: 17 MMOL/L (ref 9–17)
AST SERPL-CCNC: 18 U/L
BASOPHILS # BLD: 0.08 K/UL (ref 0–0.2)
BASOPHILS NFR BLD: 1 % (ref 0–2)
BILIRUB SERPL-MCNC: 0.3 MG/DL (ref 0.3–1.2)
BUN SERPL-MCNC: 47 MG/DL (ref 8–23)
BUN/CREAT SERPL: 10 (ref 9–20)
CALCIUM SERPL-MCNC: 9 MG/DL (ref 8.6–10.4)
CHLORIDE SERPL-SCNC: 101 MMOL/L (ref 98–107)
CO2 SERPL-SCNC: 22 MMOL/L (ref 20–31)
CREAT SERPL-MCNC: 4.8 MG/DL (ref 0.7–1.2)
EOSINOPHIL # BLD: 0.41 K/UL (ref 0–0.44)
EOSINOPHILS RELATIVE PERCENT: 6 % (ref 1–4)
ERYTHROCYTE [DISTWIDTH] IN BLOOD BY AUTOMATED COUNT: 16.9 % (ref 11.8–14.4)
GFR SERPL CREATININE-BSD FRML MDRD: 12 ML/MIN/1.73M2
GLUCOSE SERPL-MCNC: 205 MG/DL (ref 70–99)
HCT VFR BLD AUTO: 30.5 % (ref 40.7–50.3)
HGB BLD-MCNC: 9.6 G/DL (ref 13–17)
IMM GRANULOCYTES # BLD AUTO: 0.07 K/UL (ref 0–0.3)
IMM GRANULOCYTES NFR BLD: 1 %
LYMPHOCYTES # BLD: 18 % (ref 24–43)
LYMPHOCYTES NFR BLD: 1.29 K/UL (ref 1.1–3.7)
MCH RBC QN AUTO: 29.2 PG (ref 25.2–33.5)
MCHC RBC AUTO-ENTMCNC: 31.5 G/DL (ref 28.4–34.8)
MCV RBC AUTO: 92.7 FL (ref 82.6–102.9)
MONOCYTES NFR BLD: 0.48 K/UL (ref 0.1–1.2)
MONOCYTES NFR BLD: 7 % (ref 3–12)
NEUTROPHILS NFR BLD: 67 % (ref 36–65)
NEUTS SEG NFR BLD: 4.83 K/UL (ref 1.5–8.1)
NRBC BLD-RTO: 0 PER 100 WBC
PLATELET # BLD AUTO: 237 K/UL (ref 138–453)
PMV BLD AUTO: 10.1 FL (ref 8.1–13.5)
POTASSIUM SERPL-SCNC: 4.8 MMOL/L (ref 3.7–5.3)
PROT SERPL-MCNC: 6.8 G/DL (ref 6.4–8.3)
RBC # BLD AUTO: 3.29 M/UL (ref 4.21–5.77)
SODIUM SERPL-SCNC: 140 MMOL/L (ref 135–144)
WBC OTHER # BLD: 7.2 K/UL (ref 3.5–11.3)

## 2023-10-16 PROCEDURE — 85025 COMPLETE CBC W/AUTO DIFF WBC: CPT

## 2023-10-16 PROCEDURE — 80053 COMPREHEN METABOLIC PANEL: CPT

## 2023-10-19 ENCOUNTER — OFFICE VISIT (OUTPATIENT)
Dept: PRIMARY CARE CLINIC | Age: 78
End: 2023-10-19
Payer: MEDICARE

## 2023-10-19 VITALS
RESPIRATION RATE: 16 BRPM | HEIGHT: 69 IN | HEART RATE: 80 BPM | BODY MASS INDEX: 28.44 KG/M2 | SYSTOLIC BLOOD PRESSURE: 152 MMHG | WEIGHT: 192 LBS | DIASTOLIC BLOOD PRESSURE: 60 MMHG

## 2023-10-19 DIAGNOSIS — E11.22 TYPE 2 DIABETES MELLITUS WITH ESRD (END-STAGE RENAL DISEASE) (HCC): ICD-10-CM

## 2023-10-19 DIAGNOSIS — I10 ESSENTIAL HYPERTENSION: Primary | ICD-10-CM

## 2023-10-19 DIAGNOSIS — N18.6 TYPE 2 DIABETES MELLITUS WITH ESRD (END-STAGE RENAL DISEASE) (HCC): ICD-10-CM

## 2023-10-19 DIAGNOSIS — R29.818 DIFFICULTY BALANCING: ICD-10-CM

## 2023-10-19 PROCEDURE — 3078F DIAST BP <80 MM HG: CPT | Performed by: STUDENT IN AN ORGANIZED HEALTH CARE EDUCATION/TRAINING PROGRAM

## 2023-10-19 PROCEDURE — 1123F ACP DISCUSS/DSCN MKR DOCD: CPT | Performed by: STUDENT IN AN ORGANIZED HEALTH CARE EDUCATION/TRAINING PROGRAM

## 2023-10-19 PROCEDURE — 99214 OFFICE O/P EST MOD 30 MIN: CPT | Performed by: STUDENT IN AN ORGANIZED HEALTH CARE EDUCATION/TRAINING PROGRAM

## 2023-10-19 PROCEDURE — 1036F TOBACCO NON-USER: CPT | Performed by: STUDENT IN AN ORGANIZED HEALTH CARE EDUCATION/TRAINING PROGRAM

## 2023-10-19 PROCEDURE — G8417 CALC BMI ABV UP PARAM F/U: HCPCS | Performed by: STUDENT IN AN ORGANIZED HEALTH CARE EDUCATION/TRAINING PROGRAM

## 2023-10-19 PROCEDURE — 1111F DSCHRG MED/CURRENT MED MERGE: CPT | Performed by: STUDENT IN AN ORGANIZED HEALTH CARE EDUCATION/TRAINING PROGRAM

## 2023-10-19 PROCEDURE — G8427 DOCREV CUR MEDS BY ELIG CLIN: HCPCS | Performed by: STUDENT IN AN ORGANIZED HEALTH CARE EDUCATION/TRAINING PROGRAM

## 2023-10-19 PROCEDURE — 3044F HG A1C LEVEL LT 7.0%: CPT | Performed by: STUDENT IN AN ORGANIZED HEALTH CARE EDUCATION/TRAINING PROGRAM

## 2023-10-19 PROCEDURE — G8484 FLU IMMUNIZE NO ADMIN: HCPCS | Performed by: STUDENT IN AN ORGANIZED HEALTH CARE EDUCATION/TRAINING PROGRAM

## 2023-10-19 PROCEDURE — 3077F SYST BP >= 140 MM HG: CPT | Performed by: STUDENT IN AN ORGANIZED HEALTH CARE EDUCATION/TRAINING PROGRAM

## 2023-10-19 RX ORDER — MIDODRINE HYDROCHLORIDE 2.5 MG/1
2.5 TABLET ORAL DAILY
Qty: 90 TABLET | Refills: 0 | Status: SHIPPED | OUTPATIENT
Start: 2023-10-19

## 2023-10-19 RX ORDER — INSULIN GLARGINE 100 [IU]/ML
INJECTION, SOLUTION SUBCUTANEOUS
Qty: 68.4 ML | Refills: 0 | Status: SHIPPED | OUTPATIENT
Start: 2023-10-19

## 2023-10-19 RX ORDER — NIFEDIPINE 90 MG/1
90 TABLET, FILM COATED, EXTENDED RELEASE ORAL DAILY
Qty: 90 TABLET | Refills: 0 | Status: SHIPPED | OUTPATIENT
Start: 2023-10-19

## 2023-10-19 NOTE — PROGRESS NOTES
he is not doing to well. He feels weak and cant walk much. He reports that feels dizzy, typically in the morning and evening. He feels that he wants to fall towards the left/right for years, not new but worsening. He had been to Neurology at the time, no changes noted. The patient's glucose has been holding steady at this time. No issues since using 28/10U of Lantus. He feels dizziness before the medicine and the medicine makes it worse. He usually takes his medications around 7am. On M/ he does to Dialysis he gets them earlier. Relieving Factors/Treatment tried - Epley maneuver (no relief). Overall change in status since onset - worsening. Health Maintenance -   Alcohol/Substance use History - None/Minimal    Tobacco Use      Smoking status: Former        Packs/day: 1.50        Years: 8.00        Additional pack years: 0.00        Total pack years: 12.00        Types: Cigarettes        Quit date: 3/4/1980        Years since quittin.6      Smokeless tobacco: Never    Family History   Problem Relation Age of Onset    Cancer Mother         breast    Cancer Father         lung    Diabetes Sister     Diabetes Brother            3/30/2023    10:05 AM 2022    10:52 AM 2022     1:41 PM 2021     9:11 AM 10/19/2020     9:03 AM 2019     8:55 AM 2018     9:37 AM   PHQ Scores   PHQ2 Score 0 0 0 3 0 0 0   PHQ9 Score 0 0 0 8 0 0 0     Interpretation of Total Score Depression Severity: 1-4 = Minimal depression, 5-9 = Mild depression, 10-14 = Moderate depression, 15-19 = Moderately severe depression, 20-27 = Severe depression    Review of Systems  Constitutional: Negative for activity change, appetite change, chills, diaphoresis, fatigue, fever and unexpected weight change. HENT: Negative for sinus pressure, sinus pain, sore throat and trouble swallowing. Respiratory: Negative for cough, shortness of breath and wheezing.     Cardiovascular: Negative for chest pain, palpitations and leg

## 2023-10-24 ENCOUNTER — OFFICE VISIT (OUTPATIENT)
Dept: CARDIOLOGY | Age: 78
End: 2023-10-24
Payer: MEDICARE

## 2023-10-24 VITALS
HEIGHT: 69 IN | RESPIRATION RATE: 18 BRPM | WEIGHT: 186 LBS | OXYGEN SATURATION: 97 % | BODY MASS INDEX: 27.55 KG/M2 | DIASTOLIC BLOOD PRESSURE: 56 MMHG | HEART RATE: 64 BPM | SYSTOLIC BLOOD PRESSURE: 121 MMHG

## 2023-10-24 DIAGNOSIS — G90.1 DYSAUTONOMIA (HCC): ICD-10-CM

## 2023-10-24 DIAGNOSIS — Z86.73 HISTORY OF TIA (TRANSIENT ISCHEMIC ATTACK): ICD-10-CM

## 2023-10-24 DIAGNOSIS — D64.9 ANEMIA, UNSPECIFIED TYPE: ICD-10-CM

## 2023-10-24 DIAGNOSIS — I47.29 NSVT (NONSUSTAINED VENTRICULAR TACHYCARDIA) (HCC): ICD-10-CM

## 2023-10-24 DIAGNOSIS — N18.6 ESRD (END STAGE RENAL DISEASE) (HCC): ICD-10-CM

## 2023-10-24 DIAGNOSIS — I10 ESSENTIAL HYPERTENSION: ICD-10-CM

## 2023-10-24 DIAGNOSIS — I25.10 ASHD (ARTERIOSCLEROTIC HEART DISEASE): ICD-10-CM

## 2023-10-24 DIAGNOSIS — R00.2 PALPITATIONS: ICD-10-CM

## 2023-10-24 DIAGNOSIS — I50.32 CHRONIC DIASTOLIC HEART FAILURE (HCC): Primary | ICD-10-CM

## 2023-10-24 DIAGNOSIS — E78.2 MIXED HYPERLIPIDEMIA: ICD-10-CM

## 2023-10-24 DIAGNOSIS — R55 RECURRENT SYNCOPE: ICD-10-CM

## 2023-10-24 PROCEDURE — 99214 OFFICE O/P EST MOD 30 MIN: CPT | Performed by: FAMILY MEDICINE

## 2023-10-24 PROCEDURE — 3078F DIAST BP <80 MM HG: CPT | Performed by: FAMILY MEDICINE

## 2023-10-24 PROCEDURE — G8427 DOCREV CUR MEDS BY ELIG CLIN: HCPCS | Performed by: FAMILY MEDICINE

## 2023-10-24 PROCEDURE — G8484 FLU IMMUNIZE NO ADMIN: HCPCS | Performed by: FAMILY MEDICINE

## 2023-10-24 PROCEDURE — 3074F SYST BP LT 130 MM HG: CPT | Performed by: FAMILY MEDICINE

## 2023-10-24 PROCEDURE — G8417 CALC BMI ABV UP PARAM F/U: HCPCS | Performed by: FAMILY MEDICINE

## 2023-10-24 PROCEDURE — 1123F ACP DISCUSS/DSCN MKR DOCD: CPT | Performed by: FAMILY MEDICINE

## 2023-10-24 PROCEDURE — 1036F TOBACCO NON-USER: CPT | Performed by: FAMILY MEDICINE

## 2023-10-24 RX ORDER — METOPROLOL SUCCINATE 50 MG/1
50 TABLET, EXTENDED RELEASE ORAL DAILY
Qty: 90 TABLET | Refills: 3 | Status: SHIPPED | OUTPATIENT
Start: 2023-10-24

## 2023-10-24 NOTE — PROGRESS NOTES
Reji Peguero RN am scribing for and in the presence of Karely Nation MD, MS, F.A.C.C..    Patient: Mihai Hernandez  : 1945  Date of Visit: 2023    REASON FOR VISIT / CONSULTATION: Follow-up (HX: dysautonomia, anemia, CHF, CAD, CABG X 3 in 2023 angina, HTN, HLD, stage 4 CKD, renal artery stenosis. 4 week follow up nurse visit. Pt reports he is doing well. Light headed/dizziness every day. He does have palpitations every now and then. Denies CP, SOB. ) and Congestive Heart Failure (Patient also has questions on dialysis. He says that Dr Anum Ramirez says he feels week and tired because of only doing dialysis twice a week (but they are taking seven pounds off at a time) and patient and family thinks this is too much and would like dr Evie Kaplan opinion on it as patient does not want to do three days  week and only wants to do two but also doesn't want so much taken off where he feels bad. )    Dear Huseyin Artis MD,    As you know, Mr. Adry May is a 70 y.o. male with a known history of dysautonomia where on tilt table testing his blood pressure dropped from 518 systolic to 78 systolic with only a 52-72 HR response. A CT of he head in the past showed evidence of at least 2 old CVA's and a heart catheterization showed severe single vessel disease in the ostium of a moderate sized OM1 branch of the circumflex. However, maximal guidelines directed medical management was opted for an place of stenting partly due to the risk associated with stenting this vessel. He has had a coronary angiography procedure with stenting of his HA Om1. As you know, Mr. Adry May  is a 66 y.o. male with a history of recent onset substernal chest discomfort that led to a stress test and a subsequent heart catheterization which showed severe single vessel coronary artery disease of the HA Om1 with successful angioplasty and stenting of that vessel that was done by Dr. Gonzalo Rubio on 4/10/17.  He has had problems with balance

## 2023-10-24 NOTE — PATIENT INSTRUCTIONS
SURVEY:    You may be receiving a survey from [employer] regarding your visit today. Please complete the survey to enable us to provide the highest quality of care to you and your family. If you cannot score us a very good on any question, please call the office to discuss how we could have made your experience a very good one. Thank you.

## 2023-10-26 ENCOUNTER — HOSPITAL ENCOUNTER (OUTPATIENT)
Age: 78
Setting detail: OUTPATIENT SURGERY
Discharge: HOME | End: 2023-10-26
Attending: FAMILY MEDICINE | Admitting: FAMILY MEDICINE
Payer: MEDICARE

## 2023-10-26 VITALS
OXYGEN SATURATION: 97 % | HEART RATE: 70 BPM | WEIGHT: 185 LBS | BODY MASS INDEX: 27.32 KG/M2 | DIASTOLIC BLOOD PRESSURE: 44 MMHG | TEMPERATURE: 97 F | SYSTOLIC BLOOD PRESSURE: 148 MMHG | RESPIRATION RATE: 20 BRPM

## 2023-10-26 DIAGNOSIS — I63.9 CVA (CEREBRAL VASCULAR ACCIDENT) (HCC): ICD-10-CM

## 2023-10-26 DIAGNOSIS — I47.29 NSVT (NONSUSTAINED VENTRICULAR TACHYCARDIA) (HCC): ICD-10-CM

## 2023-10-26 PROCEDURE — C1764 EVENT RECORDER, CARDIAC: HCPCS | Performed by: FAMILY MEDICINE

## 2023-10-26 PROCEDURE — 7100000011 HC PHASE II RECOVERY - ADDTL 15 MIN: Performed by: FAMILY MEDICINE

## 2023-10-26 PROCEDURE — 33285 INSJ SUBQ CAR RHYTHM MNTR: CPT | Performed by: FAMILY MEDICINE

## 2023-10-26 PROCEDURE — 2500000003 HC RX 250 WO HCPCS: Performed by: FAMILY MEDICINE

## 2023-10-26 PROCEDURE — 7100000010 HC PHASE II RECOVERY - FIRST 15 MIN: Performed by: FAMILY MEDICINE

## 2023-10-26 PROCEDURE — 2709999900 HC NON-CHARGEABLE SUPPLY: Performed by: FAMILY MEDICINE

## 2023-10-26 PROCEDURE — 2500000003 HC RX 250 WO HCPCS

## 2023-10-26 DEVICE — RECORDER CARD MON REVEAL LINQ MYCARELINK IMPL LINQSYS: Type: IMPLANTABLE DEVICE | Site: CHEST  WALL | Status: FUNCTIONAL

## 2023-10-26 RX ORDER — SODIUM CHLORIDE 9 MG/ML
INJECTION, SOLUTION INTRAVENOUS PRN
Status: DISCONTINUED | OUTPATIENT
Start: 2023-10-26 | End: 2023-10-26 | Stop reason: HOSPADM

## 2023-10-26 RX ORDER — LIDOCAINE HYDROCHLORIDE AND EPINEPHRINE 10; 10 MG/ML; UG/ML
INJECTION, SOLUTION INFILTRATION; PERINEURAL PRN
Status: DISCONTINUED | OUTPATIENT
Start: 2023-10-26 | End: 2023-10-26 | Stop reason: HOSPADM

## 2023-10-26 RX ORDER — SODIUM CHLORIDE 0.9 % (FLUSH) 0.9 %
5-40 SYRINGE (ML) INJECTION PRN
Status: DISCONTINUED | OUTPATIENT
Start: 2023-10-26 | End: 2023-10-26 | Stop reason: HOSPADM

## 2023-10-26 RX ORDER — SODIUM CHLORIDE 0.9 % (FLUSH) 0.9 %
5-40 SYRINGE (ML) INJECTION EVERY 12 HOURS SCHEDULED
Status: DISCONTINUED | OUTPATIENT
Start: 2023-10-26 | End: 2023-10-26 | Stop reason: HOSPADM

## 2023-10-26 NOTE — PROGRESS NOTES
All discharge instructions given. All questions answered at this time. All belongings returned. lightheadedness

## 2023-11-02 ENCOUNTER — NURSE ONLY (OUTPATIENT)
Dept: CARDIOLOGY | Age: 78
End: 2023-11-02

## 2023-11-02 ENCOUNTER — OFFICE VISIT (OUTPATIENT)
Dept: PRIMARY CARE CLINIC | Age: 78
End: 2023-11-02
Payer: COMMERCIAL

## 2023-11-02 ENCOUNTER — TELEPHONE (OUTPATIENT)
Dept: CARDIOLOGY | Age: 78
End: 2023-11-02

## 2023-11-02 VITALS
WEIGHT: 185 LBS | OXYGEN SATURATION: 98 % | HEIGHT: 69 IN | SYSTOLIC BLOOD PRESSURE: 130 MMHG | RESPIRATION RATE: 18 BRPM | HEART RATE: 68 BPM | DIASTOLIC BLOOD PRESSURE: 58 MMHG | BODY MASS INDEX: 27.4 KG/M2

## 2023-11-02 DIAGNOSIS — K59.00 CONSTIPATION, UNSPECIFIED CONSTIPATION TYPE: Primary | ICD-10-CM

## 2023-11-02 DIAGNOSIS — R42 DIZZY: ICD-10-CM

## 2023-11-02 DIAGNOSIS — I10 ESSENTIAL HYPERTENSION: ICD-10-CM

## 2023-11-02 DIAGNOSIS — Z51.89 VISIT FOR WOUND CHECK: Primary | ICD-10-CM

## 2023-11-02 PROCEDURE — 99214 OFFICE O/P EST MOD 30 MIN: CPT | Performed by: STUDENT IN AN ORGANIZED HEALTH CARE EDUCATION/TRAINING PROGRAM

## 2023-11-02 PROCEDURE — 3078F DIAST BP <80 MM HG: CPT | Performed by: STUDENT IN AN ORGANIZED HEALTH CARE EDUCATION/TRAINING PROGRAM

## 2023-11-02 PROCEDURE — 3075F SYST BP GE 130 - 139MM HG: CPT | Performed by: STUDENT IN AN ORGANIZED HEALTH CARE EDUCATION/TRAINING PROGRAM

## 2023-11-02 PROCEDURE — 1123F ACP DISCUSS/DSCN MKR DOCD: CPT | Performed by: STUDENT IN AN ORGANIZED HEALTH CARE EDUCATION/TRAINING PROGRAM

## 2023-11-02 PROCEDURE — G8417 CALC BMI ABV UP PARAM F/U: HCPCS | Performed by: STUDENT IN AN ORGANIZED HEALTH CARE EDUCATION/TRAINING PROGRAM

## 2023-11-02 PROCEDURE — 1036F TOBACCO NON-USER: CPT | Performed by: STUDENT IN AN ORGANIZED HEALTH CARE EDUCATION/TRAINING PROGRAM

## 2023-11-02 PROCEDURE — G8484 FLU IMMUNIZE NO ADMIN: HCPCS | Performed by: STUDENT IN AN ORGANIZED HEALTH CARE EDUCATION/TRAINING PROGRAM

## 2023-11-02 PROCEDURE — G8427 DOCREV CUR MEDS BY ELIG CLIN: HCPCS | Performed by: STUDENT IN AN ORGANIZED HEALTH CARE EDUCATION/TRAINING PROGRAM

## 2023-11-02 RX ORDER — SENNOSIDES A AND B 8.6 MG/1
1 TABLET, FILM COATED ORAL DAILY
Qty: 90 TABLET | Refills: 0 | Status: SHIPPED | OUTPATIENT
Start: 2023-11-02 | End: 2024-01-31

## 2023-11-02 RX ORDER — ASCORBIC ACID, THIAMINE, RIBOFLAVIN, NIACINAMIDE, PYRIDOXINE, FOLIC ACID, COBALAMIN, BIOTIN, PANTOTHENIC ACID, ZINC 100; 1.5; 1.7; 20; 10; 1; 6; 300; 10; 5 MG/1; MG/1; MG/1; MG/1; MG/1; MG/1; UG/1; UG/1; MG/1; MG/1
1 TABLET, COATED ORAL DAILY
COMMUNITY
Start: 2023-10-29

## 2023-11-02 NOTE — PROGRESS NOTES
is normal and behavior is normal. Thought content normal.   Vitals reviewed. Lab Results   Component Value Date    WBC 7.2 10/16/2023    HGB 9.6 (L) 10/16/2023    HCT 30.5 (L) 10/16/2023     10/16/2023    CHOL 118 09/17/2023    TRIG 227 (H) 09/17/2023    HDL 26 (L) 09/17/2023    LDLDIRECT 44 11/02/2017    ALT 8 10/16/2023    AST 18 10/16/2023     10/16/2023    K 4.8 10/16/2023     10/16/2023    CREATININE 4.8 (H) 10/16/2023    BUN 47 (H) 10/16/2023    CO2 22 10/16/2023    TSH 3.33 09/17/2023    PSA 3.28 09/11/2023    INR 1.1 09/17/2023    LABA1C 6.2 (H) 09/17/2023     Lab Results   Component Value Date    CALCIUM 9.0 10/16/2023    PHOS 3.9 11/25/2021     Lab Results   Component Value Date    LDLCHOLESTEROL 47 09/17/2023    LDLDIRECT 44 11/02/2017       Please note that this chart was generated using voice recognition Dragon dictation software. Although every effort was made to ensure the accuracy of this automated transcription, some errors in transcription may have occurred.     Electronically signed by Dr. Huseyin Artis MD on 11/2/2023 at 4:45 PM

## 2023-11-02 NOTE — TELEPHONE ENCOUNTER
Mr. Micheal Christie wife, Jus Sun, called in because Abhishek Nielsen was here for a wound check this morning and we noticed his eye was blood shot. He seen his eye  This afternoon and they are wondering if his Aspirin and Plavix can be stopped for 2 weeks to help this clear up. Please advise.

## 2023-11-02 NOTE — PROGRESS NOTES
Mr. Jesu Dennis came into the office today s/p Loop insert done on 10/26/2023. Pt denied any issues at this time with the recorder. The wound is cleansed, debrided of foreign material as much as possible, and dressed. The patient is alerted to watch for any signs of infection (redness, pus, pain, increased swelling or fever) and call if such occurs. Mr. Jesu Dennis did have a blood shot eye. He is going to be seeing his eye doctor today at 1 pm for this issue.

## 2023-11-21 ENCOUNTER — OFFICE VISIT (OUTPATIENT)
Dept: CARDIOLOGY | Age: 78
End: 2023-11-21
Payer: MEDICARE

## 2023-11-21 VITALS
WEIGHT: 188 LBS | SYSTOLIC BLOOD PRESSURE: 94 MMHG | BODY MASS INDEX: 27.85 KG/M2 | HEIGHT: 69 IN | DIASTOLIC BLOOD PRESSURE: 57 MMHG | HEART RATE: 74 BPM | RESPIRATION RATE: 16 BRPM

## 2023-11-21 DIAGNOSIS — I25.10 ASHD (ARTERIOSCLEROTIC HEART DISEASE): ICD-10-CM

## 2023-11-21 DIAGNOSIS — Z95.818 IMPLANTABLE LOOP RECORDER PRESENT: ICD-10-CM

## 2023-11-21 DIAGNOSIS — D64.9 ANEMIA, UNSPECIFIED TYPE: ICD-10-CM

## 2023-11-21 DIAGNOSIS — G90.1 DYSAUTONOMIA (HCC): ICD-10-CM

## 2023-11-21 DIAGNOSIS — I47.29 NSVT (NONSUSTAINED VENTRICULAR TACHYCARDIA) (HCC): ICD-10-CM

## 2023-11-21 DIAGNOSIS — R00.2 PALPITATIONS: ICD-10-CM

## 2023-11-21 DIAGNOSIS — I10 ESSENTIAL HYPERTENSION: ICD-10-CM

## 2023-11-21 DIAGNOSIS — N18.6 ESRD (END STAGE RENAL DISEASE) (HCC): ICD-10-CM

## 2023-11-21 DIAGNOSIS — G45.9 TIA (TRANSIENT ISCHEMIC ATTACK): ICD-10-CM

## 2023-11-21 DIAGNOSIS — I95.2 HYPOTENSION DUE TO MEDICATION: Primary | ICD-10-CM

## 2023-11-21 DIAGNOSIS — I50.32 CHRONIC DIASTOLIC HEART FAILURE (HCC): ICD-10-CM

## 2023-11-21 PROCEDURE — 3078F DIAST BP <80 MM HG: CPT | Performed by: FAMILY MEDICINE

## 2023-11-21 PROCEDURE — 1036F TOBACCO NON-USER: CPT | Performed by: FAMILY MEDICINE

## 2023-11-21 PROCEDURE — 1123F ACP DISCUSS/DSCN MKR DOCD: CPT | Performed by: FAMILY MEDICINE

## 2023-11-21 PROCEDURE — G8427 DOCREV CUR MEDS BY ELIG CLIN: HCPCS | Performed by: FAMILY MEDICINE

## 2023-11-21 PROCEDURE — 99214 OFFICE O/P EST MOD 30 MIN: CPT | Performed by: FAMILY MEDICINE

## 2023-11-21 PROCEDURE — G8484 FLU IMMUNIZE NO ADMIN: HCPCS | Performed by: FAMILY MEDICINE

## 2023-11-21 PROCEDURE — 3074F SYST BP LT 130 MM HG: CPT | Performed by: FAMILY MEDICINE

## 2023-11-21 PROCEDURE — G8417 CALC BMI ABV UP PARAM F/U: HCPCS | Performed by: FAMILY MEDICINE

## 2023-11-21 RX ORDER — HYDRALAZINE HYDROCHLORIDE 50 MG/1
50 TABLET, FILM COATED ORAL 2 TIMES DAILY
Qty: 180 TABLET | Refills: 3
Start: 2023-11-21 | End: 2024-11-15

## 2023-11-21 NOTE — PROGRESS NOTES
Che Valero RN am scribing for and in the presence of Pacheco St MD, MS, F.A.C.C..    Patient: Gemma Blanco  : 1945  Date of Visit: 2023    REASON FOR VISIT / CONSULTATION: Congestive Heart Failure (Pt is here today for 4 week nurse visit. He had loop implanted on 10/26/2023. Hx: CABGx3, hypertensive emergency, VT seen on CAM. Last visit lopressor was switched to metoprolol. No alerts on LINQ device. Last weight was 186 lb and todays weight is 188 lb. Restarted plavix and asa on Friday. Denies SOB, CP. C/O: lightheadedness/dizziness but has stayed the same but is when he stands up and doesn't go away until he sits back down but motion sickness pills have been helping with this. Denies CP, palps. )    Dear Shamika Ferguson MD,    As you know, Mr. Stephen Cristobal is a 70 y.o. male with a known history of dysautonomia where on tilt table testing his blood pressure dropped from 119 systolic to 78 systolic with only a 17-44 HR response. A CT of he head in the past showed evidence of at least 2 old CVA's and a heart catheterization showed severe single vessel disease in the ostium of a moderate sized OM1 branch of the circumflex. However, maximal guidelines directed medical management was opted for an place of stenting partly due to the risk associated with stenting this vessel. He has had a coronary angiography procedure with stenting of his HA Om1. As you know, Mr. Stephen Cristobal  is a 66 y.o. male with a history of recent onset substernal chest discomfort that led to a stress test and a subsequent heart catheterization which showed severe single vessel coronary artery disease of the HA Om1 with successful angioplasty and stenting of that vessel that was done by Dr. Inocencia Mayers on 4/10/17. He has had problems with balance problems when he is in his feet and says that a Neurologist has told him in the past this is because of his previous strokes.  He underwent a cardiovascular stress test which

## 2023-11-21 NOTE — PATIENT INSTRUCTIONS
SURVEY:    You may be receiving a survey from [employer] regarding your visit today. Please complete the survey to enable us to provide the highest quality of care to you and your family. If you cannot score us a very good on any question, please call the office to discuss how we could have made your experience a very good one. Thank you.

## 2023-11-27 ENCOUNTER — HOSPITAL ENCOUNTER (OUTPATIENT)
Age: 78
Setting detail: SPECIMEN
Discharge: HOME | End: 2023-11-27
Payer: MEDICARE

## 2023-11-27 DIAGNOSIS — N18.4 CKD (CHRONIC KIDNEY DISEASE) STAGE 4, GFR 15-29 ML/MIN (HCC): ICD-10-CM

## 2023-11-27 DIAGNOSIS — D64.9 NORMOCYTIC NORMOCHROMIC ANEMIA: ICD-10-CM

## 2023-11-27 LAB
ALBUMIN SERPL-MCNC: 4.1 G/DL (ref 3.5–5.2)
ALBUMIN/GLOB SERPL: 1.8 {RATIO} (ref 1–2.5)
ALP SERPL-CCNC: 80 U/L (ref 40–129)
ALT SERPL-CCNC: 8 U/L (ref 5–41)
ANION GAP SERPL CALCULATED.3IONS-SCNC: 15 MMOL/L (ref 9–17)
AST SERPL-CCNC: 17 U/L
BASOPHILS # BLD: 0.11 K/UL (ref 0–0.2)
BASOPHILS NFR BLD: 1 % (ref 0–2)
BILIRUB SERPL-MCNC: 0.2 MG/DL (ref 0.3–1.2)
BUN SERPL-MCNC: 48 MG/DL (ref 8–23)
BUN/CREAT SERPL: 9 (ref 9–20)
CALCIUM SERPL-MCNC: 8.9 MG/DL (ref 8.6–10.4)
CHLORIDE SERPL-SCNC: 103 MMOL/L (ref 98–107)
CO2 SERPL-SCNC: 21 MMOL/L (ref 20–31)
CREAT SERPL-MCNC: 5.5 MG/DL (ref 0.7–1.2)
EOSINOPHIL # BLD: 0.56 K/UL (ref 0–0.44)
EOSINOPHILS RELATIVE PERCENT: 6 % (ref 1–4)
ERYTHROCYTE [DISTWIDTH] IN BLOOD BY AUTOMATED COUNT: 17.8 % (ref 11.8–14.4)
FERRITIN SERPL-MCNC: 1172 NG/ML (ref 30–400)
GFR SERPL CREATININE-BSD FRML MDRD: 10 ML/MIN/1.73M2
GLUCOSE SERPL-MCNC: 253 MG/DL (ref 70–99)
HCT VFR BLD AUTO: 36.1 % (ref 40.7–50.3)
HGB BLD-MCNC: 11.7 G/DL (ref 13–17)
IMM GRANULOCYTES # BLD AUTO: 0.08 K/UL (ref 0–0.3)
IMM GRANULOCYTES NFR BLD: 1 %
IRON SATN MFR SERPL: 26 % (ref 20–55)
IRON SERPL-MCNC: 47 UG/DL (ref 59–158)
LYMPHOCYTES NFR BLD: 1.37 K/UL (ref 1.1–3.7)
LYMPHOCYTES RELATIVE PERCENT: 15 % (ref 24–43)
MCH RBC QN AUTO: 31.1 PG (ref 25.2–33.5)
MCHC RBC AUTO-ENTMCNC: 32.4 G/DL (ref 28.4–34.8)
MCV RBC AUTO: 96 FL (ref 82.6–102.9)
MONOCYTES NFR BLD: 0.67 K/UL (ref 0.1–1.2)
MONOCYTES NFR BLD: 8 % (ref 3–12)
NEUTROPHILS NFR BLD: 69 % (ref 36–65)
NEUTS SEG NFR BLD: 6.16 K/UL (ref 1.5–8.1)
NRBC BLD-RTO: 0 PER 100 WBC
PLATELET # BLD AUTO: 216 K/UL (ref 138–453)
PMV BLD AUTO: 10.6 FL (ref 8.1–13.5)
POTASSIUM SERPL-SCNC: 4 MMOL/L (ref 3.7–5.3)
PROT SERPL-MCNC: 6.4 G/DL (ref 6.4–8.3)
RBC # BLD AUTO: 3.76 M/UL (ref 4.21–5.77)
SODIUM SERPL-SCNC: 139 MMOL/L (ref 135–144)
TIBC SERPL-MCNC: 184 UG/DL (ref 250–450)
UNSATURATED IRON BINDING CAPACITY: 137 UG/DL (ref 112–347)
WBC OTHER # BLD: 9 K/UL (ref 3.5–11.3)

## 2023-11-27 PROCEDURE — 85025 COMPLETE CBC W/AUTO DIFF WBC: CPT

## 2023-11-27 PROCEDURE — 80053 COMPREHEN METABOLIC PANEL: CPT

## 2023-11-27 PROCEDURE — 83550 IRON BINDING TEST: CPT

## 2023-11-27 PROCEDURE — 83540 ASSAY OF IRON: CPT

## 2023-11-27 PROCEDURE — 82728 ASSAY OF FERRITIN: CPT

## 2023-11-28 PROCEDURE — 93298 REM INTERROG DEV EVAL SCRMS: CPT | Performed by: FAMILY MEDICINE

## 2023-11-29 ENCOUNTER — NURSE ONLY (OUTPATIENT)
Dept: CARDIOLOGY | Age: 78
End: 2023-11-29
Payer: MEDICARE

## 2023-11-29 DIAGNOSIS — G45.9 TIA (TRANSIENT ISCHEMIC ATTACK): ICD-10-CM

## 2023-11-29 DIAGNOSIS — Z95.818 IMPLANTABLE LOOP RECORDER PRESENT: Primary | ICD-10-CM

## 2023-12-05 ENCOUNTER — OFFICE VISIT (OUTPATIENT)
Dept: PRIMARY CARE CLINIC | Age: 78
End: 2023-12-05
Payer: MEDICARE

## 2023-12-05 VITALS
SYSTOLIC BLOOD PRESSURE: 136 MMHG | WEIGHT: 188 LBS | HEIGHT: 69 IN | OXYGEN SATURATION: 96 % | DIASTOLIC BLOOD PRESSURE: 58 MMHG | HEART RATE: 63 BPM | BODY MASS INDEX: 27.85 KG/M2

## 2023-12-05 DIAGNOSIS — R42 DIZZY: ICD-10-CM

## 2023-12-05 DIAGNOSIS — K59.00 CONSTIPATION, UNSPECIFIED CONSTIPATION TYPE: Primary | ICD-10-CM

## 2023-12-05 DIAGNOSIS — I10 ESSENTIAL HYPERTENSION: ICD-10-CM

## 2023-12-05 PROCEDURE — 3078F DIAST BP <80 MM HG: CPT | Performed by: STUDENT IN AN ORGANIZED HEALTH CARE EDUCATION/TRAINING PROGRAM

## 2023-12-05 PROCEDURE — G8484 FLU IMMUNIZE NO ADMIN: HCPCS | Performed by: STUDENT IN AN ORGANIZED HEALTH CARE EDUCATION/TRAINING PROGRAM

## 2023-12-05 PROCEDURE — 1036F TOBACCO NON-USER: CPT | Performed by: STUDENT IN AN ORGANIZED HEALTH CARE EDUCATION/TRAINING PROGRAM

## 2023-12-05 PROCEDURE — 1123F ACP DISCUSS/DSCN MKR DOCD: CPT | Performed by: STUDENT IN AN ORGANIZED HEALTH CARE EDUCATION/TRAINING PROGRAM

## 2023-12-05 PROCEDURE — G8427 DOCREV CUR MEDS BY ELIG CLIN: HCPCS | Performed by: STUDENT IN AN ORGANIZED HEALTH CARE EDUCATION/TRAINING PROGRAM

## 2023-12-05 PROCEDURE — 99214 OFFICE O/P EST MOD 30 MIN: CPT | Performed by: STUDENT IN AN ORGANIZED HEALTH CARE EDUCATION/TRAINING PROGRAM

## 2023-12-05 PROCEDURE — G8417 CALC BMI ABV UP PARAM F/U: HCPCS | Performed by: STUDENT IN AN ORGANIZED HEALTH CARE EDUCATION/TRAINING PROGRAM

## 2023-12-05 PROCEDURE — 3075F SYST BP GE 130 - 139MM HG: CPT | Performed by: STUDENT IN AN ORGANIZED HEALTH CARE EDUCATION/TRAINING PROGRAM

## 2023-12-05 RX ORDER — TORSEMIDE 100 MG/1
100 TABLET ORAL DAILY
COMMUNITY

## 2023-12-05 RX ORDER — NIFEDIPINE 90 MG/1
90 TABLET, FILM COATED, EXTENDED RELEASE ORAL DAILY
Qty: 90 TABLET | Refills: 0 | Status: SHIPPED | OUTPATIENT
Start: 2023-12-05

## 2023-12-05 NOTE — PROGRESS NOTES
[address], Jim [address], [place], [address] [facility] is a 66 y.o. male with  has a past medical history of Abnormal tilt table test, Acute kidney injury ([address]), Acute MI ([address]), Acute renal failure superimposed on stage 4 chronic kidney disease ([address]), Acute renal failure with tubular necrosis ([address]), Anemia, Anemia in stage 4 chronic kidney disease ([address]), Angina, class II ([address]), Bell's palsy, CAD (coronary artery disease), Cerebral artery occlusion with cerebral infarction ([address]), CHF (congestive heart failure) ([address]), Cholecystitis, Chronic back pain, Chronic diastolic HF (heart failure), NYHA class 3 ([address]), Chronic kidney disease, stage III (moderate) ([address]), Closed fracture of lumbar vertebra without mention of spinal cord injury, COVID-19 vaccine administered, Displacement of intervertebral disc, site unspecified, without myelopathy, H/O cardiac catheterization, H/O cardiovascular stress test, H/O tilt table evaluation, Hemodialysis patient [facility]), History of 24 hour EKG monitoring, History of 24 hour EKG monitoring, History of blood transfusion, History of cardiovascular stress test, History of cardiovascular stress test, History of coronary artery stent placement, History of CVA (cerebrovascular accident) without residual deficits, History of echocardiogram, History of Holter monitoring, History of tilt table evaluation, Hyperlipidemia, Hypertension, Medication side effect, initial encounter, Non critical Right Renal artery stenosis, native ([address]), Pacemaker, S/P cardiac cath, S/P coronary artery stent placement, SIRS (systemic inflammatory response syndrome) ([address]), TIA (transient ischemic attack), Type II or unspecified type diabetes mellitus without mention of complication, not stated as uncontrolled, Under care of team, Under care of team, Under care of team, and Wellness examination.   Presented to the office today for:  Chief Complaint   Patient presents

## 2023-12-07 ENCOUNTER — OFFICE VISIT (OUTPATIENT)
Dept: ONCOLOGY | Age: 78
End: 2023-12-07
Payer: MEDICARE

## 2023-12-07 VITALS
BODY MASS INDEX: 28.29 KG/M2 | RESPIRATION RATE: 18 BRPM | TEMPERATURE: 96.3 F | HEIGHT: 69 IN | SYSTOLIC BLOOD PRESSURE: 105 MMHG | WEIGHT: 191 LBS | HEART RATE: 71 BPM | DIASTOLIC BLOOD PRESSURE: 44 MMHG

## 2023-12-07 DIAGNOSIS — D63.8 ANEMIA OF CHRONIC DISEASE: ICD-10-CM

## 2023-12-07 DIAGNOSIS — E04.1 THYROID NODULE: ICD-10-CM

## 2023-12-07 DIAGNOSIS — R89.8 EOSINOPHIL COUNT RAISED: Primary | ICD-10-CM

## 2023-12-07 DIAGNOSIS — Z86.2 HISTORY OF IRON DEFICIENCY ANEMIA: ICD-10-CM

## 2023-12-07 PROCEDURE — G8484 FLU IMMUNIZE NO ADMIN: HCPCS | Performed by: INTERNAL MEDICINE

## 2023-12-07 PROCEDURE — 99214 OFFICE O/P EST MOD 30 MIN: CPT | Performed by: INTERNAL MEDICINE

## 2023-12-07 PROCEDURE — 1123F ACP DISCUSS/DSCN MKR DOCD: CPT | Performed by: INTERNAL MEDICINE

## 2023-12-07 PROCEDURE — 99211 OFF/OP EST MAY X REQ PHY/QHP: CPT | Performed by: INTERNAL MEDICINE

## 2023-12-07 PROCEDURE — 1036F TOBACCO NON-USER: CPT | Performed by: INTERNAL MEDICINE

## 2023-12-07 PROCEDURE — G8427 DOCREV CUR MEDS BY ELIG CLIN: HCPCS | Performed by: INTERNAL MEDICINE

## 2023-12-07 PROCEDURE — 3078F DIAST BP <80 MM HG: CPT | Performed by: INTERNAL MEDICINE

## 2023-12-07 PROCEDURE — 3074F SYST BP LT 130 MM HG: CPT | Performed by: INTERNAL MEDICINE

## 2023-12-07 PROCEDURE — G8417 CALC BMI ABV UP PARAM F/U: HCPCS | Performed by: INTERNAL MEDICINE

## 2023-12-07 NOTE — PROGRESS NOTES
_               Mr. Holly Dumont is a very pleasant 66 y.o. male with history of multiple co morbidities as listed. The patient is referred for further management of anemia. Patient has episodes of black tarry stool. He had upper and lower endoscopy in October 2020 which were negative. He is scheduled for follow-up with gastroenterology in [place] next week for capsule camera evaluation. Patient is having symptomatic anemia. Is having weakness and fatigue. Feels tired. He has shortness of breath on exertion. He is having epigastric pain. No hematochezia. No hematemesis. Patient denies smoking or alcohol drinking,   Started on dialysis which is ongoing and getting EPO and iron per nephrology however ferritin high and has been off iron and as needed EPO  Bone marrow biopsy and aspirate and myeloma work-up ordered negative        Interim history:  Patient presents to the clinic for a follow-up visit and to discuss results of his lab work-up and other relevant clinical data. Overall patient has been tolerating treatment without unexpected or severe side effects. During this visit patient's allergy, social, medical, surgical history and medications were reviewed and updated.     On RAVI   Capsule endoscopy negative  And iron panel compatible of anemia of chronic disease  Status post heart surgery back in June 2023  Hemoglobin initially down after the heart surgery and then start improving  Patient condition improved  High eosinophil count      PAST MEDICAL HISTORY: has a past medical history of Abnormal tilt table test, Acute kidney injury ([address]), Acute MI ([address]), Acute renal failure superimposed on stage 4 chronic kidney disease ([address]), Acute renal failure with tubular necrosis ([address]), Anemia, Anemia in stage 4 chronic kidney disease ([address]), Angina, class II ([address]), Bell's palsy, CAD (coronary artery disease), Cerebral

## 2023-12-11 NOTE — PROGRESS NOTES
[facility]   [phone]   Nephrology progress note  11/20/2021, 2:39 PM      Pt Name:    Anoop Lal  MRN:     [id number]     Daniellegfurt:    1945  Admit Date:    11/11/2021  5:03 PM  Primary Care Physician:  Jimmy Washburn MD   Room number  6660/6378-27    TELEHEALTH EVALUATION -- Audio/Visual (During [id number] public health emergency)    Patient's Location: [address] [place], [place] [address]il (myself) Location:  [address] [place] [address]  Patient's nurse: present    Chief Complaint: Nephrology following for VIDAL/CKD    Subjective:  Patient seen and examined  Poor appetite  Some shortness of breath  Reports nausea and vomiting  Awaiting transfer    Objective:  24HR INTAKE/OUTPUT:    Intake/Output Summary (Last 24 hours) at 11/20/2021 1439  Last data filed at 11/20/2021 0430  Gross per 24 hour   Intake 175 ml   Output 2450 ml   Net -2275 ml     I/O last 3 completed shifts: In: 525 [P.O.:515; I.V.:10]  Out: 2450 [Urine:2450]  No intake/output data recorded. Admission weight: 195 lb (88.5 kg)  Wt Readings from Last 3 Encounters:   11/20/21 207 lb 6.4 oz (94.1 kg)   11/10/21 193 lb 6.4 oz (87.7 kg)   11/02/21 195 lb (88.5 kg)     Body mass index is 30.63 kg/m². Physical examination  VITALS:     Vitals:    11/1945 11/20/21 0050 11/20/21 0430 11/20/21 1015   BP: (!) 153/58 (!) 144/54  (!) 129/52   Pulse: 68 72     Resp: 16 16  16   Temp: 97 °F (36.1 °C) 97.4 °F (36.3 °C)  97.2 °F (36.2 °C)   TempSrc: Temporal Temporal  Temporal   SpO2: 96% 97%     Weight:   207 lb 6.4 oz (94.1 kg)    Height:         Constitutional and General Appearance: alert and cooperative, appears comfortable, no apparent distress, not diaphoretic, Appears well-developed and well-nourished.    Eyes:  no discharge seen from left eye or right eye  Lungs: no use of accessory muscles or labored breathing noted, Respiratory effort observed to be normal  Extremities: No sig visible swelling    Due to this being a TeleHealth encounter, evaluation of the following organ systems is limited: EENT/Resp/CV/GI//MS/Neuro/Skin/Lymph    Lab Data  CBC:   Recent Labs     11/18/21  0528 11/19/21  0545 11/20/21  0545   WBC 8.9 9.7 9.0   HGB 8.2* 8.3* 8.0*   HCT 25.6* 26.5* 25.8*   * 138 130*     BMP:  Recent Labs     11/18/21  0528 11/19/21  0545 11/20/21  0545    134* 136   K 4.1 4.6 4.9   * 104 106   CO2 16* 18* 18*   * 94* 91*   CREATININE 3.23* 3.48* 3.72*   GLUCOSE 125* 84 156*   CALCIUM 7.3* 7.5* 7.5*     Hepatic: No results for input(s): LABALBU, AST, ALT, ALB, BILITOT, ALKPHOS in the last 72 hours. Meds:  Infusion:    sodium chloride      dextrose      sodium chloride       Meds:    insulin glargine  25 Units SubCUTAneous BID    darbepoetin shannon-polysorbate  60 mcg SubCUTAneous Weekly    cloNIDine  0.2 mg Oral TID    glipiZIDE  10 mg Oral BID AC    cetirizine  5 mg Oral Daily    insulin lispro  0-18 Units SubCUTAneous TID WC    insulin lispro  0-9 Units SubCUTAneous Nightly    acyclovir  400 mg Oral BID    amLODIPine  10 mg Oral Nightly    hydrALAZINE  100 mg Oral TID    prednisoLONE acetate  1 drop Left Eye Daily    sodium chloride flush  5-40 mL IntraVENous 2 times per day     Meds prn: aluminum & magnesium hydroxide-simethicone, sodium chloride, glucose, dextrose, glucagon (rDNA), dextrose, sodium chloride flush, sodium chloride, ondansetron **OR** ondansetron, polyethylene glycol, acetaminophen **OR** acetaminophen       Impression and Plan:  1. VIDAL on CKD 3. Patient initially presented with fluid overload and received diuretics. Currently saturating/oxygenating well  Has no lower extremity edema whatsoever. Serum creatinine is slowly worsening but overall stable electrolytes  No emergent need for dialysis at this time. However patient is awaiting transfer to [facility] where he follows with his primary nephrologist.    2.  Mild metabolic acidosis. Continue with oral sodium bicarb  3.   Recent hyperkalemia in setting of inspra and ACE inhibitor's. Resolved  4. Insulin-dependent diabetes mellitus  5. Hypertension. On clonidine and Norvasc and hydralazine  6. Anemia in CKD. Continue Aranesp  7. Azotemia in setting of CKD and diuretics. BUN slowly improving    D/W patient and RN    Kristine Molina MD  [facility]    Pursuant to the emergency declaration under the [facility], [facility] waiver authority and the Coronavirus Preparedness and [employer] Act, this Virtual  Visit was conducted, with patient's consent, to reduce the patient's risk of exposure to COVID-19. Caregiver (Patient's RN) was present when appropriate. Virtual visit was done to address concerns as mentioned above. Due to this being a TeleHealth encounter (During UJS-17 public health emergency), evaluation of the following organ systems was limited: EENT/Resp/CV/GI//MS/Neuro/Skin/Lymph     Services were provided through a video synchronous discussion virtually to substitute for in-person visit. no

## 2024-01-02 PROCEDURE — 93298 REM INTERROG DEV EVAL SCRMS: CPT | Performed by: FAMILY MEDICINE

## 2024-01-10 ENCOUNTER — NURSE ONLY (OUTPATIENT)
Dept: CARDIOLOGY | Age: 79
End: 2024-01-10
Payer: MEDICARE

## 2024-01-10 DIAGNOSIS — Z95.818 IMPLANTABLE LOOP RECORDER PRESENT: Primary | ICD-10-CM

## 2024-01-10 DIAGNOSIS — G45.9 TIA (TRANSIENT ISCHEMIC ATTACK): ICD-10-CM

## 2024-01-19 ENCOUNTER — TELEPHONE (OUTPATIENT)
Dept: PRIMARY CARE CLINIC | Age: 79
End: 2024-01-19

## 2024-01-19 NOTE — TELEPHONE ENCOUNTER
Called Kaylen back and made an open appointment. She is going to call back to verify that this appointment will work with their schedule.

## 2024-01-19 NOTE — TELEPHONE ENCOUNTER
Caryle Raven ordered an CT of the Lumbar spine and let patients daughter know that there was something that showed up with the Aorta. Patients Pedro Morgan is very concerned and would like to know if it's something that needs taken care of.  Please advise
Please let patient know that although the report on the aorta was unusual I would not worry about it at all as I do not think it is dangerous.
Spoke with John Reeves and she verbalized understanding
MED/SURG

## 2024-01-19 NOTE — TELEPHONE ENCOUNTER
Dr. Burrell patient. Patient daughter called asking if patient is able to drive now. [facility]. Daughter states patient has been doing really well and rides his stationary bike twice a day for 12 minutes each time and does great. Patient has full function of all extremeties and has no memory impairment. Patient ready to drive (not today due to weather). Daughter agrees patient is ready, rehab said he needed to be clear by provider. Can he start driving or does he need an appointment with Dr. Burrell to get evaluated?     Call daughter not Mai Burger Daughter-[phone]

## 2024-02-02 ENCOUNTER — HOSPITAL ENCOUNTER (OUTPATIENT)
Age: 79
Setting detail: SPECIMEN
Discharge: HOME | End: 2024-02-02
Payer: MEDICARE

## 2024-02-02 LAB — POTASSIUM SERPL-SCNC: 3.5 MMOL/L (ref 3.7–5.3)

## 2024-02-02 PROCEDURE — 84132 ASSAY OF SERUM POTASSIUM: CPT

## 2024-02-05 ENCOUNTER — HOSPITAL ENCOUNTER (OUTPATIENT)
Age: 79
Setting detail: SPECIMEN
Discharge: HOME | End: 2024-02-05
Payer: MEDICARE

## 2024-02-05 LAB — POTASSIUM SERPL-SCNC: 3.8 MMOL/L (ref 3.7–5.3)

## 2024-02-05 PROCEDURE — 84132 ASSAY OF SERUM POTASSIUM: CPT

## 2024-02-06 PROCEDURE — 93298 REM INTERROG DEV EVAL SCRMS: CPT | Performed by: FAMILY MEDICINE

## 2024-02-07 ENCOUNTER — NURSE ONLY (OUTPATIENT)
Dept: CARDIOLOGY | Age: 79
End: 2024-02-07
Payer: MEDICARE

## 2024-02-07 DIAGNOSIS — Z95.818 IMPLANTABLE LOOP RECORDER PRESENT: Primary | ICD-10-CM

## 2024-02-07 DIAGNOSIS — I63.9 CRYPTOGENIC STROKE (HCC): ICD-10-CM

## 2024-02-12 DIAGNOSIS — N18.6 TYPE 2 DIABETES MELLITUS WITH ESRD (END-STAGE RENAL DISEASE) (HCC): Primary | ICD-10-CM

## 2024-02-12 DIAGNOSIS — E11.22 TYPE 2 DIABETES MELLITUS WITH ESRD (END-STAGE RENAL DISEASE) (HCC): Primary | ICD-10-CM

## 2024-02-12 NOTE — TELEPHONE ENCOUNTER
Hello, this is a good question.  I reviewed prior A1c levels. Per AGS/ADA typically we should aim for an A1c (7.5-8) or even 8-8.5 - The question is how is Sofiyaster doing with his current medications (please review quantity/dose) and perhaps we can adjust these prior to the appointment. Given the goals, a 7.7 falls into the A1c 'goal'. Thank you.  Electronically signed by Himanshu Burrell MD on 2/12/2024 at 6:39 PM

## 2024-02-12 NOTE — TELEPHONE ENCOUNTER
----- Message from Layla Stephon sent at 2/12/2024  3:30 PM EST -----  Subject: Appointment Request    Reason for Call: Established Patient Appointment needed: Routine Existing   Condition Follow Up    QUESTIONS    Reason for appointment request? Available appointments did not meet   patient need     Additional Information for Provider? Pt calling as his A1C is at 7.7, has   it run at dialyses, Monday and Friday he goes and was advised to see his   PCP due to his A1C. Pt needs know what he should do as he cannot get in   until 3/1, but cannot do Friday appts due dialyses. Please call with   instructions.   ---------------------------------------------------------------------------  --------------  CALL BACK INFO  [phone]; OK to leave message on voicemail  ---------------------------------------------------------------------------  --------------  SCRIPT ANSWERS

## 2024-02-13 NOTE — TELEPHONE ENCOUNTER
Called and spoke to patient. With his insulin he is currently taking 28U in the morning and 10U in the evening. He stated his sugars are averaging 140-150. He is due for a refill, but I did not pend as I was unsure if you were going to want to make any changes.

## 2024-02-13 NOTE — TELEPHONE ENCOUNTER
Hello, we can have him Increase the insulin to 30U and 12U, and then check in 1-2 weeks' time with an update, thank you.  Electronically signed by Himanshu Burrell MD on 2/13/2024 at 6:59 PM

## 2024-02-14 RX ORDER — INSULIN GLARGINE 100 [IU]/ML
INJECTION, SOLUTION SUBCUTANEOUS
Qty: 45 ML | Refills: 0 | Status: SHIPPED | OUTPATIENT
Start: 2024-02-14

## 2024-02-14 RX ORDER — CLOPIDOGREL BISULFATE 75 MG/1
75 TABLET ORAL DAILY
Qty: 30 TABLET | Refills: 3 | Status: SHIPPED | OUTPATIENT
Start: 2024-02-14

## 2024-03-12 ENCOUNTER — OFFICE VISIT (OUTPATIENT)
Dept: PRIMARY CARE CLINIC | Age: 79
End: 2024-03-12
Payer: MEDICARE

## 2024-03-12 VITALS
SYSTOLIC BLOOD PRESSURE: 122 MMHG | OXYGEN SATURATION: 98 % | BODY MASS INDEX: 28.58 KG/M2 | WEIGHT: 193 LBS | HEIGHT: 69 IN | DIASTOLIC BLOOD PRESSURE: 64 MMHG

## 2024-03-12 DIAGNOSIS — I70.1 RENAL ARTERY STENOSIS, NATIVE (HCC): ICD-10-CM

## 2024-03-12 DIAGNOSIS — E11.22 TYPE 2 DIABETES MELLITUS WITH ESRD (END-STAGE RENAL DISEASE) (HCC): ICD-10-CM

## 2024-03-12 DIAGNOSIS — K63.5 POLYP OF COLON, UNSPECIFIED PART OF COLON, UNSPECIFIED TYPE: ICD-10-CM

## 2024-03-12 DIAGNOSIS — Z99.2 DEPENDENCE ON RENAL DIALYSIS (HCC): ICD-10-CM

## 2024-03-12 DIAGNOSIS — F33.0 MAJOR DEPRESSIVE DISORDER, RECURRENT, MILD (HCC): ICD-10-CM

## 2024-03-12 DIAGNOSIS — G90.3 NEUROGENIC ORTHOSTATIC HYPOTENSION (HCC): ICD-10-CM

## 2024-03-12 DIAGNOSIS — G90.1 DYSAUTONOMIA (HCC): Primary | ICD-10-CM

## 2024-03-12 DIAGNOSIS — I20.9 ANGINA, CLASS II (HCC): ICD-10-CM

## 2024-03-12 DIAGNOSIS — I47.29 NSVT (NONSUSTAINED VENTRICULAR TACHYCARDIA) (HCC): ICD-10-CM

## 2024-03-12 DIAGNOSIS — N18.6 ESRD (END STAGE RENAL DISEASE) (HCC): ICD-10-CM

## 2024-03-12 DIAGNOSIS — I50.32 CHRONIC DIASTOLIC HEART FAILURE (HCC): ICD-10-CM

## 2024-03-12 DIAGNOSIS — I10 ESSENTIAL HYPERTENSION: ICD-10-CM

## 2024-03-12 DIAGNOSIS — I50.33 ACUTE ON CHRONIC DIASTOLIC (CONGESTIVE) HEART FAILURE (HCC): ICD-10-CM

## 2024-03-12 DIAGNOSIS — N18.6 TYPE 2 DIABETES MELLITUS WITH ESRD (END-STAGE RENAL DISEASE) (HCC): ICD-10-CM

## 2024-03-12 PROCEDURE — G8427 DOCREV CUR MEDS BY ELIG CLIN: HCPCS | Performed by: STUDENT IN AN ORGANIZED HEALTH CARE EDUCATION/TRAINING PROGRAM

## 2024-03-12 PROCEDURE — 1123F ACP DISCUSS/DSCN MKR DOCD: CPT | Performed by: STUDENT IN AN ORGANIZED HEALTH CARE EDUCATION/TRAINING PROGRAM

## 2024-03-12 PROCEDURE — G8484 FLU IMMUNIZE NO ADMIN: HCPCS | Performed by: STUDENT IN AN ORGANIZED HEALTH CARE EDUCATION/TRAINING PROGRAM

## 2024-03-12 PROCEDURE — 3074F SYST BP LT 130 MM HG: CPT | Performed by: STUDENT IN AN ORGANIZED HEALTH CARE EDUCATION/TRAINING PROGRAM

## 2024-03-12 PROCEDURE — 3078F DIAST BP <80 MM HG: CPT | Performed by: STUDENT IN AN ORGANIZED HEALTH CARE EDUCATION/TRAINING PROGRAM

## 2024-03-12 PROCEDURE — G8417 CALC BMI ABV UP PARAM F/U: HCPCS | Performed by: STUDENT IN AN ORGANIZED HEALTH CARE EDUCATION/TRAINING PROGRAM

## 2024-03-12 PROCEDURE — 99214 OFFICE O/P EST MOD 30 MIN: CPT | Performed by: STUDENT IN AN ORGANIZED HEALTH CARE EDUCATION/TRAINING PROGRAM

## 2024-03-12 PROCEDURE — G2211 COMPLEX E/M VISIT ADD ON: HCPCS | Performed by: STUDENT IN AN ORGANIZED HEALTH CARE EDUCATION/TRAINING PROGRAM

## 2024-03-12 PROCEDURE — 1036F TOBACCO NON-USER: CPT | Performed by: STUDENT IN AN ORGANIZED HEALTH CARE EDUCATION/TRAINING PROGRAM

## 2024-03-12 RX ORDER — NIFEDIPINE 90 MG/1
90 TABLET, FILM COATED, EXTENDED RELEASE ORAL DAILY
Qty: 90 TABLET | Refills: 1 | Status: SHIPPED | OUTPATIENT
Start: 2024-03-12

## 2024-03-12 RX ORDER — CALCIUM ACETATE 667 MG/1
2 CAPSULE ORAL 3 TIMES DAILY
COMMUNITY
Start: 2024-03-08

## 2024-03-12 RX ORDER — ACYCLOVIR 400 MG/1
400 TABLET ORAL 2 TIMES DAILY
COMMUNITY
Start: 2024-02-08

## 2024-03-12 RX ORDER — NITROGLYCERIN 0.4 MG/1
0.4 TABLET SUBLINGUAL EVERY 5 MIN PRN
Qty: 25 TABLET | Refills: 3 | Status: SHIPPED | OUTPATIENT
Start: 2024-03-12

## 2024-03-12 ASSESSMENT — PATIENT HEALTH QUESTIONNAIRE - PHQ9
2. FEELING DOWN, DEPRESSED OR HOPELESS: 0
SUM OF ALL RESPONSES TO PHQ QUESTIONS 1-9: 0
4. FEELING TIRED OR HAVING LITTLE ENERGY: 0
7. TROUBLE CONCENTRATING ON THINGS, SUCH AS READING THE NEWSPAPER OR WATCHING TELEVISION: 0
SUM OF ALL RESPONSES TO PHQ QUESTIONS 1-9: 0
SUM OF ALL RESPONSES TO PHQ9 QUESTIONS 1 & 2: 0
3. TROUBLE FALLING OR STAYING ASLEEP: 0
8. MOVING OR SPEAKING SO SLOWLY THAT OTHER PEOPLE COULD HAVE NOTICED. OR THE OPPOSITE, BEING SO FIGETY OR RESTLESS THAT YOU HAVE BEEN MOVING AROUND A LOT MORE THAN USUAL: 0
9. THOUGHTS THAT YOU WOULD BE BETTER OFF DEAD, OR OF HURTING YOURSELF: 0
SUM OF ALL RESPONSES TO PHQ QUESTIONS 1-9: 0
10. IF YOU CHECKED OFF ANY PROBLEMS, HOW DIFFICULT HAVE THESE PROBLEMS MADE IT FOR YOU TO DO YOUR WORK, TAKE CARE OF THINGS AT HOME, OR GET ALONG WITH OTHER PEOPLE: 0
6. FEELING BAD ABOUT YOURSELF - OR THAT YOU ARE A FAILURE OR HAVE LET YOURSELF OR YOUR FAMILY DOWN: 0
1. LITTLE INTEREST OR PLEASURE IN DOING THINGS: 0
DEPRESSION UNABLE TO ASSESS: PT REFUSES
5. POOR APPETITE OR OVEREATING: 0
SUM OF ALL RESPONSES TO PHQ QUESTIONS 1-9: 0

## 2024-03-12 NOTE — PROGRESS NOTES
[facility] PRIMARY CARE  [address] [place] [address] , [address]  [place], [place], [address]    Janelle Jimenez is a 78 y.o. male with  has a past medical history of Abnormal tilt table test, Acute kidney injury ([facility]), Acute MI ([facility]), Acute renal failure superimposed on stage 4 chronic kidney disease ([facility]), Acute renal failure with tubular necrosis ([facility]), Anemia, Anemia in stage 4 chronic kidney disease ([facility]), Angina, class II ([facility]), Bell's palsy, CAD (coronary artery disease), Cerebral artery occlusion with cerebral infarction ([facility]), CHF (congestive heart failure) ([facility]), Cholecystitis, Chronic back pain, Chronic diastolic HF (heart failure), NYHA class 3 ([facility]), Chronic kidney disease, stage III (moderate) ([facility]), Closed fracture of lumbar vertebra without mention of spinal cord injury, COVID-19 vaccine administered, Displacement of intervertebral disc, site unspecified, without myelopathy, H/O cardiac catheterization, H/O cardiovascular stress test, H/O tilt table evaluation, Hemodialysis patient ([facility]), History of 24 hour EKG monitoring, History of 24 hour EKG monitoring, History of blood transfusion, History of cardiovascular stress test, History of cardiovascular stress test, History of coronary artery stent placement, History of CVA (cerebrovascular accident) without residual deficits, History of echocardiogram, History of Holter monitoring, History of tilt table evaluation, Hyperlipidemia, Hypertension, Medication side effect, initial encounter, Non critical Right Renal artery stenosis, native ([facility]), Pacemaker, S/P cardiac cath, S/P coronary artery stent placement, SIRS (systemic inflammatory response syndrome) ([facility]), TIA (transient ischemic attack), Type II or unspecified type diabetes mellitus without mention of complication, not stated as uncontrolled, Under care of team, Under care of team, Under care of team, and Wellness examination.  Presented to the office today for:  Chief Complaint   Patient presents

## 2024-03-15 ENCOUNTER — TELEPHONE (OUTPATIENT)
Dept: PRIMARY CARE CLINIC | Age: 79
End: 2024-03-15

## 2024-03-15 NOTE — TELEPHONE ENCOUNTER
Patient called in for Blood sugar readings.    3/12/24 evening- 242  3/13/24 Am-137 evening-274  3/14/24 am-114 evening-285  3/15/24-am-109

## 2024-03-26 ENCOUNTER — OFFICE VISIT (OUTPATIENT)
Dept: CARDIOLOGY | Age: 79
End: 2024-03-26
Payer: MEDICARE

## 2024-03-26 ENCOUNTER — TELEPHONE (OUTPATIENT)
Dept: SURGERY | Age: 79
End: 2024-03-26

## 2024-03-26 VITALS
HEIGHT: 69 IN | RESPIRATION RATE: 16 BRPM | WEIGHT: 195.2 LBS | DIASTOLIC BLOOD PRESSURE: 54 MMHG | SYSTOLIC BLOOD PRESSURE: 111 MMHG | BODY MASS INDEX: 28.91 KG/M2 | HEART RATE: 81 BPM

## 2024-03-26 DIAGNOSIS — I10 ESSENTIAL HYPERTENSION: ICD-10-CM

## 2024-03-26 DIAGNOSIS — G45.9 TIA (TRANSIENT ISCHEMIC ATTACK): ICD-10-CM

## 2024-03-26 DIAGNOSIS — I25.10 ASHD (ARTERIOSCLEROTIC HEART DISEASE): ICD-10-CM

## 2024-03-26 DIAGNOSIS — R42 LIGHTHEADEDNESS: ICD-10-CM

## 2024-03-26 DIAGNOSIS — N18.6 ESRD (END STAGE RENAL DISEASE) (HCC): ICD-10-CM

## 2024-03-26 DIAGNOSIS — E78.2 MIXED HYPERLIPIDEMIA: ICD-10-CM

## 2024-03-26 DIAGNOSIS — H81.10 BPV (BENIGN POSITIONAL VERTIGO), UNSPECIFIED LATERALITY: ICD-10-CM

## 2024-03-26 DIAGNOSIS — R07.89 ATYPICAL CHEST PAIN: Primary | ICD-10-CM

## 2024-03-26 DIAGNOSIS — I50.22 CHRONIC SYSTOLIC (CONGESTIVE) HEART FAILURE (HCC): ICD-10-CM

## 2024-03-26 DIAGNOSIS — D64.9 ANEMIA, UNSPECIFIED TYPE: ICD-10-CM

## 2024-03-26 DIAGNOSIS — Z98.890 HISTORY OF LOOP RECORDER: ICD-10-CM

## 2024-03-26 DIAGNOSIS — G90.1 DYSAUTONOMIA (HCC): ICD-10-CM

## 2024-03-26 PROCEDURE — 99214 OFFICE O/P EST MOD 30 MIN: CPT | Performed by: FAMILY MEDICINE

## 2024-03-26 PROCEDURE — 1036F TOBACCO NON-USER: CPT | Performed by: FAMILY MEDICINE

## 2024-03-26 PROCEDURE — G8484 FLU IMMUNIZE NO ADMIN: HCPCS | Performed by: FAMILY MEDICINE

## 2024-03-26 PROCEDURE — G8417 CALC BMI ABV UP PARAM F/U: HCPCS | Performed by: FAMILY MEDICINE

## 2024-03-26 PROCEDURE — 3074F SYST BP LT 130 MM HG: CPT | Performed by: FAMILY MEDICINE

## 2024-03-26 PROCEDURE — 1123F ACP DISCUSS/DSCN MKR DOCD: CPT | Performed by: FAMILY MEDICINE

## 2024-03-26 PROCEDURE — 3078F DIAST BP <80 MM HG: CPT | Performed by: FAMILY MEDICINE

## 2024-03-26 PROCEDURE — G8427 DOCREV CUR MEDS BY ELIG CLIN: HCPCS | Performed by: FAMILY MEDICINE

## 2024-03-26 NOTE — TELEPHONE ENCOUNTER
[facility] Colon[facility] Worksheet    Patient Name: Janelle Jimenez  : 1945  Primary Care Physician: Himanshu Burrell MD  Today's Date: 3/26/2024    Surgery Location:   [][facility]  [x] [facility] [] Other    Why has a Colonoscopy been recommended for you?   - history of polyps    Are you currently having any of the following symptoms?  [] Rectal Bleeding (K62.5) [] Bloody Stool (K92.1) [] Dark Tarry Stool (K92.1)  [] Fecal Occult Positive Blood (confirmed by blood test R19.5)  [] Anemia (low hemoglobin D64.9) [] Abdominal Pain (R10.84) [] Diarrhea (R19.7)    Have had a colonoscopy before?  [x] Yes  [] No    If so, where and when was that done?   - [facility]    Was anything found during the last scope?  - polyps    Do you have a family history of colon cancer?   - yes, mother and brother and son    Family History   Problem Relation Age of Onset    Cancer Mother         breast    Cancer Father         lung    Diabetes Sister     Diabetes Brother          How much caffeine do you drink in a day?   - no    Any tobacco use?    [] Yes    [x] No    Any alcohol use?     [] Yes    [x] No  If yes, how often?       In the last six (6) months have you experienced any of the following symptoms?   [] Blood from the rectum or stool  [] Abdominal Pain   [] Diarrhea  [] Itching of rectum   [] Vomiting  [] Constipation   [] Black stools  [] Bloating   [] Mucous in your stool  [] [place]   [] Change in bowel habits    Do you have any medication allergies?   [] Yes  If yes, please list:   [] No    Do you take Warfarin, Coumadin, Plavix, Eliquis, Xarelto, or aspirin OR do you take a medication that thins your blood?  [x] Yes, Aspirin 81 mg, plavix   [] No    Medications:    Current Outpatient Medications on File Prior to Visit   Medication Sig Dispense Refill    calcium acetate (PHOSLO) 667 MG CAPS capsule Take 2 capsules by mouth 3 times daily      acyclovir (ZOVIRAX) 400 MG tablet Take 1 tablet by mouth 2 times daily

## 2024-03-26 NOTE — PROGRESS NOTES
I, Jessica Pabon RN am scribing for and in the presence of Sandoval Benítez MD, MS, F.A.C.C..    Patient: Janelle Jimenez  : 1945  Date of Visit: 2024    REASON FOR VISIT / CONSULTATION: Chest Pain (CP (that radiates from the back, shoulder then to chest the last two days (takes nitro went away after about a half hour) lasts for a couple of hours, then takes nitro and goes away after about an half hour. Denies SOB. Pt continues to have lightheadedness/dizziness and the other day he got up from bed to go to the bathroom and fell and stayed the same since last visit. )    Dear Himanshu Burrell MD,     As you know, Mr. Jimenez is a 71 y.o. male with a known history of dysautonomia where on tilt table testing his blood pressure dropped from 169 systolic to 78 systolic with only a 53-67 HR response. A CT of he head in the past showed evidence of at least 2 old CVA's and a heart catheterization showed severe single vessel disease in the ostium of a moderate sized OM1 branch of the circumflex. However, maximal guidelines directed medical management was opted for an place of stenting partly due to the risk associated with stenting this vessel. He has had a coronary angiography procedure with stenting of his HA Om1. As you know, Mr. Jimenez  is a 78 y.o. male with a history of recent onset substernal chest discomfort that led to a stress test and a subsequent heart catheterization which showed severe single vessel coronary artery disease of the HA Om1 with successful angioplasty and stenting of that vessel that was done by Dr. Curry on 4/10/17. He has had problems with balance problems when he is in his feet and says that a Neurologist has told him in the past this is because of his previous strokes. He underwent a cardiovascular stress test which fortunately had shown to be normal on 3/21/2018. Mr. Jimenez has significant normal stress test and echocardiogram on 2020. Holter monitor done on

## 2024-03-27 ENCOUNTER — TELEPHONE (OUTPATIENT)
Dept: PRIMARY CARE CLINIC | Age: 79
End: 2024-03-27

## 2024-03-27 NOTE — TELEPHONE ENCOUNTER
Hello, can you please clarify if this is a BP or glucose reading and his current med/insulin dose? Thank you.  Electronically signed by Himanshu Burrell MD on 3/27/2024 at 5:23 PM

## 2024-03-27 NOTE — TELEPHONE ENCOUNTER
----- Message from Mami Palmer sent at 3/27/2024  1:40 PM EDT -----  Subject: Message to Provider    QUESTIONS  Information for Provider? 3/20   3/21   3/22 AM   129  3/23   3/24   3/23    ---------------------------------------------------------------------------  --------------  CALL BACK INFO  [phone]; OK to leave message on voicemail  ---------------------------------------------------------------------------  --------------  SCRIPT ANSWERS  Relationship to Patient? Self

## 2024-03-28 NOTE — TELEPHONE ENCOUNTER
Hello, let's increase the AM to 36 Units  and 18 units at nighttime, and then check in thank you.  Electronically signed by Himanshu Burrell MD on 3/28/2024 at 10:34 AM

## 2024-04-11 ENCOUNTER — TELEPHONE (OUTPATIENT)
Dept: PRIMARY CARE CLINIC | Age: 79
End: 2024-04-11

## 2024-04-11 NOTE — TELEPHONE ENCOUNTER
Blood sugar levels\"  4.2-P74  pm 263  70 a     221p  99a 212p  96a     358p  78a     257p  90a a     269P   89a      222P  93a     221p  Patient amado alvareztus 18 urints in evening 36 units in the am. Any changes.

## 2024-04-11 NOTE — TELEPHONE ENCOUNTER
Hello, I would increase the Lantus to 22U in the evening and keep the 36 in the AM for now, and then check in with us /repeat blood glucose, thank you.  Electronically signed by Himanshu Burrell MD on 4/11/2024 at 3:03 PM

## 2024-04-16 ENCOUNTER — HOSPITAL ENCOUNTER (OUTPATIENT)
Dept: NUCLEAR MEDICINE | Age: 79
Discharge: HOME | End: 2024-04-18
Attending: FAMILY MEDICINE
Payer: MEDICARE

## 2024-04-16 ENCOUNTER — HOSPITAL ENCOUNTER (OUTPATIENT)
Age: 79
Discharge: HOME | End: 2024-04-18
Attending: FAMILY MEDICINE
Payer: MEDICARE

## 2024-04-16 DIAGNOSIS — I25.10 ASHD (ARTERIOSCLEROTIC HEART DISEASE): ICD-10-CM

## 2024-04-16 DIAGNOSIS — E78.2 MIXED HYPERLIPIDEMIA: ICD-10-CM

## 2024-04-16 DIAGNOSIS — Z98.890 HISTORY OF LOOP RECORDER: ICD-10-CM

## 2024-04-16 DIAGNOSIS — I10 ESSENTIAL HYPERTENSION: ICD-10-CM

## 2024-04-16 DIAGNOSIS — N18.6 ESRD (END STAGE RENAL DISEASE) (HCC): ICD-10-CM

## 2024-04-16 DIAGNOSIS — D64.9 ANEMIA, UNSPECIFIED TYPE: ICD-10-CM

## 2024-04-16 DIAGNOSIS — R42 LIGHTHEADEDNESS: ICD-10-CM

## 2024-04-16 DIAGNOSIS — G90.1 DYSAUTONOMIA (HCC): ICD-10-CM

## 2024-04-16 DIAGNOSIS — G45.9 TIA (TRANSIENT ISCHEMIC ATTACK): ICD-10-CM

## 2024-04-16 DIAGNOSIS — R07.89 ATYPICAL CHEST PAIN: ICD-10-CM

## 2024-04-16 PROCEDURE — A9500 TC99M SESTAMIBI: HCPCS | Performed by: FAMILY MEDICINE

## 2024-04-16 PROCEDURE — 6360000002 HC RX W HCPCS: Performed by: FAMILY MEDICINE

## 2024-04-16 PROCEDURE — 3430000000 HC RX DIAGNOSTIC RADIOPHARMACEUTICAL: Performed by: FAMILY MEDICINE

## 2024-04-16 PROCEDURE — 78452 HT MUSCLE IMAGE SPECT MULT: CPT

## 2024-04-16 RX ORDER — REGADENOSON 0.08 MG/ML
0.4 INJECTION, SOLUTION INTRAVENOUS
Status: COMPLETED | OUTPATIENT
Start: 2024-04-16 | End: 2024-04-16

## 2024-04-16 RX ORDER — TETRAKIS(2-METHOXYISOBUTYLISOCYANIDE)COPPER(I) TETRAFLUOROBORATE 1 MG/ML
30 INJECTION, POWDER, LYOPHILIZED, FOR SOLUTION INTRAVENOUS
Status: COMPLETED | OUTPATIENT
Start: 2024-04-16 | End: 2024-04-16

## 2024-04-16 RX ADMIN — Medication 30 MILLICURIE: at 11:00

## 2024-04-16 RX ADMIN — REGADENOSON 0.4 MG: 0.08 INJECTION, SOLUTION INTRAVENOUS at 11:13

## 2024-04-17 ENCOUNTER — HOSPITAL ENCOUNTER (OUTPATIENT)
Dept: NUCLEAR MEDICINE | Age: 79
Discharge: HOME | End: 2024-04-19
Attending: FAMILY MEDICINE
Payer: MEDICARE

## 2024-04-17 ENCOUNTER — OFFICE VISIT (OUTPATIENT)
Dept: SURGERY | Age: 79
End: 2024-04-17
Payer: MEDICARE

## 2024-04-17 VITALS — HEART RATE: 66 BPM | DIASTOLIC BLOOD PRESSURE: 69 MMHG | SYSTOLIC BLOOD PRESSURE: 143 MMHG

## 2024-04-17 DIAGNOSIS — R19.7 DIARRHEA, UNSPECIFIED TYPE: Primary | ICD-10-CM

## 2024-04-17 LAB
NUC STRESS EJECTION FRACTION: 71 %
STRESS BASELINE DIAS BP: 68 MMHG
STRESS BASELINE HR: 65 BPM
STRESS BASELINE SYS BP: 110 MMHG
STRESS PEAK DIAS BP: 52 MMHG
STRESS PEAK SYS BP: 100 MMHG
STRESS PERCENT HR ACHIEVED: 62 %
STRESS POST PEAK HR: 88 BPM
STRESS RATE PRESSURE PRODUCT: 8800 BPM*MMHG
STRESS STAGE RECOVERY 1 BP: NORMAL MMHG
STRESS STAGE RECOVERY 1 DURATION: 0 MIN:SEC
STRESS STAGE RECOVERY 1 HR: 76 BPM
STRESS STAGE RECOVERY 2 DURATION: 1 MIN:SEC
STRESS STAGE RECOVERY 2 HR: 74 BPM
STRESS STAGE RECOVERY 3 BP: NORMAL MMHG
STRESS STAGE RECOVERY 3 DURATION: 3 MIN:SEC
STRESS STAGE RECOVERY 3 HR: 71 BPM
STRESS STAGE RECOVERY 4 BP: NORMAL MMHG
STRESS STAGE RECOVERY 4 DURATION: 5 MIN:SEC
STRESS STAGE RECOVERY 4 HR: 69 BPM
STRESS TARGET HR: 142 BPM
TID: 0.89

## 2024-04-17 PROCEDURE — 99203 OFFICE O/P NEW LOW 30 MIN: CPT | Performed by: SURGERY

## 2024-04-17 PROCEDURE — 93018 CV STRESS TEST I&R ONLY: CPT | Performed by: INTERNAL MEDICINE

## 2024-04-17 PROCEDURE — 78452 HT MUSCLE IMAGE SPECT MULT: CPT | Performed by: INTERNAL MEDICINE

## 2024-04-17 PROCEDURE — 3078F DIAST BP <80 MM HG: CPT | Performed by: SURGERY

## 2024-04-17 PROCEDURE — A9500 TC99M SESTAMIBI: HCPCS | Performed by: FAMILY MEDICINE

## 2024-04-17 PROCEDURE — 3430000000 HC RX DIAGNOSTIC RADIOPHARMACEUTICAL: Performed by: FAMILY MEDICINE

## 2024-04-17 PROCEDURE — 1036F TOBACCO NON-USER: CPT | Performed by: SURGERY

## 2024-04-17 PROCEDURE — 1123F ACP DISCUSS/DSCN MKR DOCD: CPT | Performed by: SURGERY

## 2024-04-17 PROCEDURE — 3077F SYST BP >= 140 MM HG: CPT | Performed by: SURGERY

## 2024-04-17 PROCEDURE — G8427 DOCREV CUR MEDS BY ELIG CLIN: HCPCS | Performed by: SURGERY

## 2024-04-17 PROCEDURE — 93016 CV STRESS TEST SUPVJ ONLY: CPT | Performed by: INTERNAL MEDICINE

## 2024-04-17 PROCEDURE — G8417 CALC BMI ABV UP PARAM F/U: HCPCS | Performed by: SURGERY

## 2024-04-17 RX ORDER — TETRAKIS(2-METHOXYISOBUTYLISOCYANIDE)COPPER(I) TETRAFLUOROBORATE 1 MG/ML
30 INJECTION, POWDER, LYOPHILIZED, FOR SOLUTION INTRAVENOUS
Status: COMPLETED | OUTPATIENT
Start: 2024-04-17 | End: 2024-04-17

## 2024-04-17 RX ADMIN — Medication 30 MILLICURIE: at 14:19

## 2024-04-17 ASSESSMENT — ENCOUNTER SYMPTOMS
DIARRHEA: 1
ABDOMINAL PAIN: 1
VOMITING: 0

## 2024-04-17 NOTE — PROGRESS NOTES
CT head. No known impairment now or in the past.    History of echocardiogram 02/18/2014    LA mildly dilated, EF 55%, LV wall thickness is moderately increased, no definite wall motion abnormalilities, what appears to be a pacer wire is seen w/n the RA and RV, mild-mod TR, mild pulmonary hypertension.     History of Holter monitoring 12/29/2017    Rare PAC's and PVC's.     History of tilt table evaluation 08/12/2014    Abnormal    Hyperlipidemia     Hypertension     Medication side effect, initial encounter 03/23/2018    Non critical Right Renal artery stenosis, native ([facility]) 10/02/2018    Non critical Right Renal artery stenosis, based on cath in 2016, Rt RA 30% stenosis    Pacemaker     non-functioning    S/P cardiac cath 04/10/2017    S/P coronary artery stent placement     Successful PTCA - HA Om1    SIRS (systemic inflammatory response syndrome) ([facility]) 05/19/2019    TIA (transient ischemic attack) 09/2023    Type II or unspecified type diabetes mellitus without mention of complication, not stated as uncontrolled     Under care of team     Cardiology, Saulo Mendosa    Under care of team     Nephrology; Dr Botello (sees at dialysis monthly)    Under care of team     Hematology, Dr Aguirre for anemia ([place])    Wellness examination     PCP Saulo Guerrier; last visit May 2023     Past Surgical History:   Procedure Laterality Date    AV FISTULA CREATION Left     BACK SURGERY  2006    [place], [place]    CARDIAC CATHETERIZATION Left 02/19/2016    via right radial approach/ [facility]/ Dr. Benítez    CARDIAC CATHETERIZATION Left 10/05/2021    Dr Benítez/[facility] Sa[facility]/right radial-Moderate three vessel coronary artery disease involving a ostial 50-60% stenosis in a small, non-dominant RCA, a 60% mid LAD stenosis and a proximal 50-60% stenosis in the left anterior descending coronary artery. Normal left ventricular end diastolic pressure. Proceed with aggressive maximal medical management as clinically

## 2024-04-18 ENCOUNTER — TELEPHONE (OUTPATIENT)
Dept: CARDIOLOGY | Age: 79
End: 2024-04-18

## 2024-04-18 NOTE — TELEPHONE ENCOUNTER
----- Message from Sandoval Benítez MD sent at 4/17/2024 11:44 PM EDT -----  Let Mr. Jimenez know their test result was ok. Will discuss at next visit. Thanks.

## 2024-04-23 ENCOUNTER — HOSPITAL ENCOUNTER (OUTPATIENT)
Age: 79
Setting detail: SPECIMEN
Discharge: HOME | End: 2024-04-23
Payer: MEDICARE

## 2024-04-23 DIAGNOSIS — R19.7 DIARRHEA, UNSPECIFIED TYPE: ICD-10-CM

## 2024-04-23 LAB
C DIFF GDH + TOXINS A+B STL QL IA.RAPID: NEGATIVE
LACTOFERRIN STL QL: NORMAL
SPECIMEN DESCRIPTION: NORMAL

## 2024-04-23 PROCEDURE — 87506 IADNA-DNA/RNA PROBE TQ 6-11: CPT

## 2024-04-23 PROCEDURE — 87449 NOS EACH ORGANISM AG IA: CPT

## 2024-04-23 PROCEDURE — 87324 CLOSTRIDIUM AG IA: CPT

## 2024-04-23 PROCEDURE — 83630 LACTOFERRIN FECAL (QUAL): CPT

## 2024-04-24 ENCOUNTER — TELEPHONE (OUTPATIENT)
Dept: SURGERY | Age: 79
End: 2024-04-24

## 2024-04-24 LAB
CAMPYLOBACTER DNA SPEC NAA+PROBE: NORMAL
ETEC ELTA+ESTB GENES STL QL NAA+PROBE: NORMAL
P SHIGELLOIDES DNA STL QL NAA+PROBE: NORMAL
SALMONELLA DNA SPEC QL NAA+PROBE: NORMAL
SHIGA TOXIN STX GENE SPEC NAA+PROBE: NORMAL
SHIGELLA DNA SPEC QL NAA+PROBE: NORMAL
SPECIMEN DESCRIPTION: NORMAL
V CHOL+PARA RFBL+TRKH+TNAA STL QL NAA+PR: NORMAL
Y ENTERO RECN STL QL NAA+PROBE: NORMAL

## 2024-04-24 NOTE — TELEPHONE ENCOUNTER
----- Message from Jhonny Carreon MD sent at 4/23/2024  5:21 PM EDT -----  I thought he was already scheduled for a colonoscopy.    JAP  ----- Message -----  From: Helen Pereira LPN  Sent: 4/23/2024   4:46 PM EDT  To: Jhonny Carreon MD    Patient would like to clarify if he should be scheduled for colonoscopy or any further testing?

## 2024-04-26 NOTE — TELEPHONE ENCOUNTER
Writer spoke with wife regarding scheduling colonoscopy. They would like to proceed with this. Cardiac clearance form faxed to Dr. Benítez's office regarding clearance and plavix/aspirin instructions.

## 2024-04-26 NOTE — TELEPHONE ENCOUNTER
Janelle Jimenez     1945        male    [address]  [facility] [address]         [id number]           Legal Guardian NO  If yes, Name:       Skilled Facility NO     If yes, Name:                                             Home Phone: [phone]         Cell Phone:    Telephone Information:   Mobile [phone]                                           Surgeon: Dr. Carreon Surgery Date: 5/7/24                    Time: TBD    Procedure: colonoscopy  Duration:    Diagnosis: screening colonosocpy   CPT Codes:    Important Medical History:  In Epic    First Assistant No  Special Inst/Equip/Implants: Regular    Nickel allergy  NO  Latex Allergy: NO      Cardiac Device:  Yes, LINQ implant 10/26/2023, pacemaker-patient says this is non-functional  If yes, need most recent pacemaker interrogation from Cardiologist:  Type of pacemaker:    Anesthesia:    MAC                       Admission Type:  Same Day                        Admit Prior to Day of Surgery: No    Case Location:  Ambulatory            Preadmission Testing:  Phone Call             PAT Date and Time: NA    Need Preop Cardiac Clearance: yes*  Need Pre-op/Medical Clearance:no    Does Patient have Cardiologist/physician? Name of Physician:    Dr. Benítez    Special Needs Communication:  Jaja Lift no    needed no          Does patient sign for self yes*

## 2024-04-26 NOTE — TELEPHONE ENCOUNTER
Wife made aware that patient is to follow written preps for colonoscopy. She will  the form next week. She understands to have patient NPO after midnight the day of procedure. To have transportation to/from the hospital.

## 2024-05-01 ENCOUNTER — TELEPHONE (OUTPATIENT)
Dept: PREADMISSION TESTING | Age: 79
End: 2024-05-01

## 2024-05-01 ENCOUNTER — ANESTHESIA EVENT (OUTPATIENT)
Dept: OPERATING ROOM | Age: 79
End: 2024-05-01
Payer: MEDICARE

## 2024-05-01 NOTE — TELEPHONE ENCOUNTER
Please review chart. Patient has a significant health history. Scheduled with Dr. Carreon 5/7/24. Thank you.

## 2024-05-01 NOTE — PROGRESS NOTES
Patient states they received their colon prep instructions and home medications that are to be taken on the day of their procedure with a small sip of water only, from the physician's office. Instructed pt to take metoprolol and nifedipine with a small sip of water prior to arriving to the hospital the day of surgery. Patient states he has stopped his plavix per MD instructions.

## 2024-05-01 NOTE — TELEPHONE ENCOUNTER
Patient just had cardiac testing for atypical chest pain. The patient had good results from the testing. Looks like Jackelin has been overseeing this patient. See if Jackelin will give us cardiac clearance. If he does then we can proceed here.

## 2024-05-06 PROBLEM — K52.9 CHRONIC DIARRHEA: Status: ACTIVE | Noted: 2024-05-06

## 2024-05-06 ASSESSMENT — ENCOUNTER SYMPTOMS
VOMITING: 0
ABDOMINAL PAIN: 1
DIARRHEA: 1

## 2024-05-06 NOTE — H&P
encounter.     Current Outpatient Medications   Medication Sig Dispense Refill    calcium acetate (PHOSLO) 667 MG CAPS capsule Take 2 capsules by mouth 3 times daily      acyclovir (ZOVIRAX) 400 MG tablet Take 1 tablet by mouth 2 times daily      NIFEdipine (ADALAT CC) 90 MG extended release tablet Take 1 tablet by mouth daily 90 tablet 1    nitroGLYCERIN (NITROSTAT) 0.4 MG SL tablet Place 1 tablet under the tongue every 5 minutes as needed for Chest pain up to max of 3 total doses. If no relief after 1 dose, call 911. 25 tablet 3    clopidogrel (PLAVIX) 75 MG tablet Take 1 tablet by mouth daily 30 tablet 3    insulin glargine (LANTUS SOLOSTAR) 100 UNIT/ML injection pen Inject 30 units SC qam and 12 units QHS. 45 mL 0    torsemide (DEMADEX) 100 MG tablet Take 1 tablet by mouth daily      B Complex-C-Zn-Folic Acid (DIALYVITE/ZINC) TABS Take 1 tablet by mouth daily      metoprolol succinate (TOPROL XL) 50 MG extended release tablet Take 1 tablet by mouth daily 90 tablet 3    tamsulosin (FLOMAX) 0.4 MG capsule Take 1 capsule by mouth once daily 90 capsule 3    zinc 50 MG TABS tablet Take 1 tablet by mouth daily      vitamin C (ASCORBIC ACID) 500 MG tablet Take 1 tablet by mouth daily      Cholecalciferol (VITAMIN D) 50 MCG (2000 UT) CAPS capsule Take by mouth      famotidine (PEPCID) 20 MG tablet Take 1 tablet by mouth as needed      aspirin 81 MG EC tablet Take 1 tablet by mouth daily 30 tablet 3    epoetin shannon-epbx (RETACRIT) 60447 UNIT/ML SOLN injection Inject 0.5 mLs into the skin three times a week (Patient taking differently: Inject 0.5 mLs into the skin every 14 days) 6.6 mL 0    midodrine (PROAMATINE) 10 MG tablet Take 1 tablet by mouth 3 times daily as needed (SBP < 100) 90 tablet 3    ondansetron (ZOFRAN-ODT) 4 MG disintegrating tablet Take 1 tablet by mouth every 8 hours as needed for Nausea or Vomiting 30 tablet 0    White Petrolatum-Mineral Oil (REFRESH LACRI-LUBE) OINT ointment Apply 0.5 inches to eye  are normal. There is no distension.      Palpations: There is no mass.      Tenderness: There is no abdominal tenderness.   Neurological:      General: No focal deficit present.      Mental Status: He is alert and oriented to person, place, and time.        IMP/PLAN  1) Chronic Diarrhea of unknown etiology - very disruptive for his life.   - Check stools studies  - Colonoscopy  Risks and benefits of colonoscopy were discussed with Janelle Jimenez.  In particular I discussed the possibility of incomplete colonoscopy and failure to make a diagnosis.  I also discussed the risks and consequences of reactions to the sedatives, bleeding and perforation.  Alternate ways of evaluating the colon including barium enema, CT colonography and sigmoidoscopy were discussed.  he was also given the opportunity to have any questions answered, and encouraged to call the office with additional issues.       NO changes.  No additional questions or concerns.  JAP

## 2024-05-07 ENCOUNTER — HOSPITAL ENCOUNTER (OUTPATIENT)
Age: 79
Setting detail: OUTPATIENT SURGERY
Discharge: HOME | End: 2024-05-07
Attending: SURGERY | Admitting: SURGERY
Payer: MEDICARE

## 2024-05-07 ENCOUNTER — ANESTHESIA (OUTPATIENT)
Dept: OPERATING ROOM | Age: 79
End: 2024-05-07
Payer: MEDICARE

## 2024-05-07 VITALS
BODY MASS INDEX: 28.88 KG/M2 | TEMPERATURE: 97 F | RESPIRATION RATE: 16 BRPM | OXYGEN SATURATION: 98 % | SYSTOLIC BLOOD PRESSURE: 105 MMHG | HEART RATE: 62 BPM | HEIGHT: 69 IN | WEIGHT: 195 LBS | DIASTOLIC BLOOD PRESSURE: 38 MMHG

## 2024-05-07 DIAGNOSIS — Z12.11 SCREENING FOR COLON CANCER: ICD-10-CM

## 2024-05-07 LAB — GLUCOSE BLD-MCNC: 110 MG/DL (ref 74–100)

## 2024-05-07 PROCEDURE — 7100000010 HC PHASE II RECOVERY - FIRST 15 MIN: Performed by: SURGERY

## 2024-05-07 PROCEDURE — 3700000001 HC ADD 15 MINUTES (ANESTHESIA): Performed by: SURGERY

## 2024-05-07 PROCEDURE — 6360000002 HC RX W HCPCS: Performed by: NURSE ANESTHETIST, CERTIFIED REGISTERED

## 2024-05-07 PROCEDURE — 3700000000 HC ANESTHESIA ATTENDED CARE: Performed by: SURGERY

## 2024-05-07 PROCEDURE — 2580000003 HC RX 258: Performed by: NURSE ANESTHETIST, CERTIFIED REGISTERED

## 2024-05-07 PROCEDURE — 45380 COLONOSCOPY AND BIOPSY: CPT | Performed by: SURGERY

## 2024-05-07 PROCEDURE — 88305 TISSUE EXAM BY PATHOLOGIST: CPT

## 2024-05-07 PROCEDURE — 2500000003 HC RX 250 WO HCPCS: Performed by: NURSE ANESTHETIST, CERTIFIED REGISTERED

## 2024-05-07 PROCEDURE — 82947 ASSAY GLUCOSE BLOOD QUANT: CPT

## 2024-05-07 PROCEDURE — 2709999900 HC NON-CHARGEABLE SUPPLY: Performed by: SURGERY

## 2024-05-07 PROCEDURE — 7100000011 HC PHASE II RECOVERY - ADDTL 15 MIN: Performed by: SURGERY

## 2024-05-07 PROCEDURE — 45385 COLONOSCOPY W/LESION REMOVAL: CPT | Performed by: SURGERY

## 2024-05-07 PROCEDURE — 3609010600 HC COLONOSCOPY POLYPECTOMY SNARE/COLD BIOPSY: Performed by: SURGERY

## 2024-05-07 RX ORDER — NALOXONE HYDROCHLORIDE 0.4 MG/ML
INJECTION, SOLUTION INTRAMUSCULAR; INTRAVENOUS; SUBCUTANEOUS PRN
Status: DISCONTINUED | OUTPATIENT
Start: 2024-05-07 | End: 2024-05-07 | Stop reason: HOSPADM

## 2024-05-07 RX ORDER — PHENYLEPHRINE HYDROCHLORIDE 10 MG/ML
INJECTION INTRAVENOUS PRN
Status: DISCONTINUED | OUTPATIENT
Start: 2024-05-07 | End: 2024-05-07 | Stop reason: SDUPTHER

## 2024-05-07 RX ORDER — SODIUM CHLORIDE 0.9 % (FLUSH) 0.9 %
5-40 SYRINGE (ML) INJECTION PRN
Status: DISCONTINUED | OUTPATIENT
Start: 2024-05-07 | End: 2024-05-07 | Stop reason: HOSPADM

## 2024-05-07 RX ORDER — SODIUM CHLORIDE 9 MG/ML
INJECTION, SOLUTION INTRAVENOUS CONTINUOUS
Status: DISCONTINUED | OUTPATIENT
Start: 2024-05-07 | End: 2024-05-07 | Stop reason: HOSPADM

## 2024-05-07 RX ORDER — SODIUM CHLORIDE 9 MG/ML
INJECTION, SOLUTION INTRAVENOUS PRN
Status: DISCONTINUED | OUTPATIENT
Start: 2024-05-07 | End: 2024-05-07 | Stop reason: HOSPADM

## 2024-05-07 RX ORDER — SODIUM CHLORIDE 0.9 % (FLUSH) 0.9 %
5-40 SYRINGE (ML) INJECTION EVERY 12 HOURS SCHEDULED
Status: DISCONTINUED | OUTPATIENT
Start: 2024-05-07 | End: 2024-05-07 | Stop reason: HOSPADM

## 2024-05-07 RX ORDER — PROPOFOL 10 MG/ML
INJECTION, EMULSION INTRAVENOUS CONTINUOUS PRN
Status: DISCONTINUED | OUTPATIENT
Start: 2024-05-07 | End: 2024-05-07 | Stop reason: SDUPTHER

## 2024-05-07 RX ORDER — LIDOCAINE HYDROCHLORIDE 20 MG/ML
INJECTION, SOLUTION EPIDURAL; INFILTRATION; INTRACAUDAL; PERINEURAL PRN
Status: DISCONTINUED | OUTPATIENT
Start: 2024-05-07 | End: 2024-05-07 | Stop reason: SDUPTHER

## 2024-05-07 RX ADMIN — SODIUM CHLORIDE: 9 INJECTION, SOLUTION INTRAVENOUS at 15:11

## 2024-05-07 RX ADMIN — PHENYLEPHRINE HYDROCHLORIDE 200 MCG: 10 INJECTION INTRAVENOUS at 16:47

## 2024-05-07 RX ADMIN — PROPOFOL 125 MCG/KG/MIN: 10 INJECTION, EMULSION INTRAVENOUS at 16:21

## 2024-05-07 RX ADMIN — LIDOCAINE HYDROCHLORIDE 5 ML: 20 INJECTION, SOLUTION EPIDURAL; INFILTRATION; INTRACAUDAL; PERINEURAL at 16:21

## 2024-05-07 RX ADMIN — PHENYLEPHRINE HYDROCHLORIDE 200 MCG: 10 INJECTION INTRAVENOUS at 16:43

## 2024-05-07 RX ADMIN — PHENYLEPHRINE HYDROCHLORIDE 200 MCG: 10 INJECTION INTRAVENOUS at 16:39

## 2024-05-07 RX ADMIN — PHENYLEPHRINE HYDROCHLORIDE 200 MCG: 10 INJECTION INTRAVENOUS at 16:53

## 2024-05-07 RX ADMIN — PHENYLEPHRINE HYDROCHLORIDE 100 MCG: 10 INJECTION INTRAVENOUS at 16:23

## 2024-05-07 RX ADMIN — PHENYLEPHRINE HYDROCHLORIDE 100 MCG: 10 INJECTION INTRAVENOUS at 16:25

## 2024-05-07 ASSESSMENT — PAIN - FUNCTIONAL ASSESSMENT
PAIN_FUNCTIONAL_ASSESSMENT: 0-10
PAIN_FUNCTIONAL_ASSESSMENT: 0-10

## 2024-05-07 NOTE — PROGRESS NOTES
Discharge instructions given to patient and wife with understanding voiced. No questions asked at this time.

## 2024-05-07 NOTE — ANESTHESIA PRE PROCEDURE
CHF:      ECG reviewed          Cleared by cardiology           ROS comment: Cardiac clearance given     Neuro/Psych:   (+) CVA:            GI/Hepatic/Renal:   (+) GERD: well controlled, renal disease (monday and friday done yesterday, no problems): ESRD and dialysis, bowel prep          Endo/Other:    (+) DiabetesType II DM, blood dyscrasia: anticoagulation therapy:..                 Abdominal:             Vascular:          Other Findings:       Anesthesia Plan      general and TIVA     ASA 4       Induction: intravenous.                        Radha Pickering, REY - CRNA   5/7/2024

## 2024-05-07 NOTE — OP NOTE
Operative Note      Patient: Janelle Jimenez  YOB: 1945  MRN: [id number]    Date of Procedure: 5/7/2024    Pre-op Diagnosis: Chronic Diarrhea    Post-Op Diagnosis:  Extensive diverticulosis, particularly in the left colon, with spasm and possible stenosis.  Polyps x 3,        Procedure: Colonoscopy + random biopsies + polyp biopsies and snare polypectomy x 1    Surgeon(s):  Jhonny Carreon MD    Assistant:   * No surgical staff found *    Anesthesia: Monitor Anesthesia Care    Estimated Blood Loss (mL): Minimal    Complications: None    Specimens:   ID Type Source Tests Collected by Time Destination   A : Cecum/ascending Tissue Cecum SURGICAL PATHOLOGY Jhonny Carreon MD 5/7/2024 1640    B : Ascending colon polyp Tissue Colon-Ascending SURGICAL PATHOLOGY Jhonny Carreon MD 5/7/2024 1642    C : Hepatic flexure tissue Tissue Hepatic Flexure SURGICAL PATHOLOGY Jhonny Carreon MD 5/7/2024 1655    D : Transverse colon Bx Tissue Colon-Transverse SURGICAL PATHOLOGY Jhonny Carreon MD 5/7/2024 1650    E : Sigmoid colon bx Tissue Sigmoid Colon SURGICAL PATHOLOGY Jhonny Carreon MD 5/7/2024 1653        Implants:  * No implants in log *      Drains: * No LDAs found *    F patient was brought to the endoscopy area and IV sedation was induced by the CRNA scope was put in his rectum and passed proximally.  His bowel prep was good to fair.  He had a lot of heavily particulate stool some of this is fairly pasty throughout his colon.  Small and even possibly medium sized lesions could have been overlooked in scattered areas.  Patient has extensive left-sided diverticulosis.  He has haustral hypertrophy and may have some ongoing stenosis generally of his sigmoid.  No obvious active inflammation was seen.  It was slow going but I eventually got the scope all around the cecum.  Did a lot of irrigation and suctioning both the way and on the way out.  I was able to cannulate the terminal ileum briefly and the

## 2024-05-07 NOTE — PROGRESS NOTES
Discharge Criteria    Inpatients must meet Criteria 1 through 7. All other patients are either YES or N/A. If a NO is chosen then Anesthesia or Surgeon must be notified.      1.  Minimum 30 minutes after last dose of sedative medication.    Yes      2.  Systolic BP between 90 - 160. Diastolic BP between 60 - 90.    Yes      3.  Pulse between 60 - 120    Yes      4.  Respirations between 8 - 25.    Yes      5.  SpO2 92% - 100%.    Yes      6.  Able to cough and swallow or return to baseline function.    Yes      7.  Alert and oriented or return to baseline mental status.    Yes      8.  Demonstrates controlled, coordinated movements, ambulates with steady gait, or return to baseline activity function.    Yes      9.  Minimal or no pain or nausea, or at a level tolerable and acceptable to patient.    Yes      10. Takes and retains oral fluids as allowed.    Yes      11. Procedural / perioperative site stable.  Minimal or no bleeding.    N/A          12. If GI endoscopy procedure, minimal or no abdominal distention or passing flatus.    Yes      13. Written discharge instructions and emergency telephone number provided.    Yes      14. Accompanied by a responsible adult.    Yes

## 2024-05-07 NOTE — DISCHARGE INSTRUCTIONS
1) The was no apparent colitis which might explain your diarrhea.  2) You do have extensive diverticulosis  3) You 3 small polyp which were removed.  4) Random biopsies were done to look for underlying inflammation  5) You will be called with the biopsy results and recommendations.  6) Most likely your diarrhea is related to you diet, perhaps medications,   7) Follow up in the office if you would like.  Discharge Instructions for Colonoscopy     Colonoscopy is a visual exam of the lining of the large intestine, also called the bowel or colon, with a colonoscope. A colonoscope is a flexible tube with a light and a viewing device. It allows the doctor to view the inside of the colon through a tiny video camera.   Colonoscopy is performed for many reasons: unexplained anemia , pain, diarrhea , bloody stools, cancer screening, among many other reasons.   Complications from a colonoscopy are rare. Some possible serious complications include perforated bowel (which might require surgery) and bleeding (which could require blood transfusion ). Minor complications include bloating, gas, and cramping that can last for 1-2 days after the procedure.   Because air is put into your colon during the procedure, it is normal to pass large amounts of air from your rectum. You may not have a bowel movement for 1-3 days after the procedure.   What You Will Need   Someone to drive you home after the procedure    Steps to Take   Home Care    Rest when you get home.    Because the sedative will make you drowsy, don't drive, operate machinery, or make important decisions the day of the procedure.      Feelings of bloating, gas, or cramping may persist for 24 hours.   Diet    Try sips of water first. If tolerated, resume regular diet or the diet recommended by your physician.    Do not drink alcohol for 24 hours.    Physical Activity    You may return to work tomorrow.    Do not drive, operate heavy machinery, or do activities that require  coordination or balance until tomorrow  Otherwise, return to your normal routine as soon as you are comfortable to do so, which is usually the next day after the procedure.    Medications    When taking medications, it's important to:   Take your medication as directednot more, not less, not at a different time.   Do not stop taking them without consulting your healthcare provider.   Don't share them with anyone else.   Know what effects and side effects to expect, and report them to your healthcare provider.   If you are taking more than one drug, even if it is an over-the-counter medication, herb, or dietary supplement, be sure to check with a physician or pharmacist about drug interactions.   Plan ahead for refills so you don't run out.     Follow-up   You will be called with your results usually within a week or  We will have you make a follow up appointment  You are always welcome to follow up in person if you have questions or there are other issue you would like addressed      Call Your Doctor If Any of the Following Occurs   Monitor your recovery once you leave the hospital. As soon as you have a problem, alert your doctor. If any of the following occur, call your doctor or come to the emergency room  Bleeding from your rectum; notify your doctor if you pass a teaspoonful or more of blood   Black, tarry stools   Severe abdominal pain   Hard, swollen abdomen   Signs of infection, including fever or chills   Inability to pass gas or stool   Coughing, shortness of breath, chest pain, severe nausea or vomiting   In case of an emergency, call 911 immediately.          Learning About Diverticulosis and Diverticulitis  What are diverticulosis and diverticulitis?     In diverticulosis and diverticulitis, pouches called diverticula form in the wall of the large intestine, or colon.  In diverticulosis, the pouches do not cause any pain or other symptoms.  In diverticulitis, the pouches get inflamed or infected and

## 2024-05-07 NOTE — ANESTHESIA POSTPROCEDURE EVALUATION
Department of Anesthesiology  Postprocedure Note    Patient: Janelle Jimenez  MRN: [id number]  YOB: 1945  Date of evaluation: 5/7/2024    Procedure Summary       Date: 05/07/24 Room / Location: [place] [address] / [facility]    Anesthesia Start: 1619 Anesthesia Stop: 1704    Procedure: COLONOSCOPY POLYPECTOMY SNARE BIOPSY Diagnosis:       Screening for colon cancer      (Screening for colon cancer [Z12.11])    Surgeons: Jhonny Carreon MD Responsible Provider: Marc Conway APRN - CRNA    Anesthesia Type: general, TIVA ASA Status: 4            Anesthesia Type: No value filed.    Joshua Phase I: Joshua Score: 10    Johsua Phase II: Joshua Score: 9    Anesthesia Post Evaluation    Patient location during evaluation: bedside  Patient participation: complete - patient participated  Level of consciousness: awake and alert  Pain score: 0  Airway patency: patent  Nausea & Vomiting: no nausea and no vomiting  Cardiovascular status: hemodynamically stable  Respiratory status: acceptable  Hydration status: stable  Pain management: adequate    No notable events documented.

## 2024-05-09 ENCOUNTER — TELEPHONE (OUTPATIENT)
Dept: SURGERY | Age: 79
End: 2024-05-09

## 2024-05-09 LAB — SURGICAL PATHOLOGY REPORT: NORMAL

## 2024-05-09 NOTE — TELEPHONE ENCOUNTER
----- Message from Jhonny Carreon MD sent at 5/9/2024  2:52 PM EDT -----  2 small adenomas.  No colitis. At his age he does not need a routine repeat colonoscopy.  His diarrhea is most likely related to food intolerance (lactose intolerance is the most common),  diabetic gastroenteropathy, medication side effects,  etc.   I recommend a lactose free diet. Stop taking most of his supplements, unless recommended by Dr. Burrell.  May use Imodium PRN.

## 2024-05-10 ENCOUNTER — NURSE ONLY (OUTPATIENT)
Dept: CARDIOLOGY | Age: 79
End: 2024-05-10

## 2024-05-10 DIAGNOSIS — I63.9 CRYPTOGENIC STROKE (HCC): Primary | ICD-10-CM

## 2024-05-10 DIAGNOSIS — Z95.818 IMPLANTABLE LOOP RECORDER PRESENT: ICD-10-CM

## 2024-05-13 NOTE — TELEPHONE ENCOUNTER
Patient made aware of colonoscopy results and dietary recommendations. Patient voiced understanding.

## 2024-05-29 DIAGNOSIS — E11.22 TYPE 2 DIABETES MELLITUS WITH ESRD (END-STAGE RENAL DISEASE) (HCC): ICD-10-CM

## 2024-05-29 DIAGNOSIS — N18.6 TYPE 2 DIABETES MELLITUS WITH ESRD (END-STAGE RENAL DISEASE) (HCC): ICD-10-CM

## 2024-05-29 RX ORDER — INSULIN GLARGINE 100 [IU]/ML
INJECTION, SOLUTION SUBCUTANEOUS
Qty: 54 ML | Refills: 1 | Status: SHIPPED | OUTPATIENT
Start: 2024-05-29

## 2024-05-29 NOTE — PROGRESS NOTES
Patient confirmed he is taking 36 units lantus qam and 18 units qhs. Patient requesting lantus refill.

## 2024-06-03 ENCOUNTER — HOSPITAL ENCOUNTER (OUTPATIENT)
Age: 79
Setting detail: SPECIMEN
Discharge: HOME | End: 2024-06-03
Payer: MEDICARE

## 2024-06-03 LAB
ALBUMIN SERPL-MCNC: 4.1 G/DL (ref 3.5–5.2)
ALBUMIN/GLOB SERPL: 1.6 {RATIO} (ref 1–2.5)
ALP SERPL-CCNC: 76 U/L (ref 40–129)
ALT SERPL-CCNC: 12 U/L (ref 5–41)
ANION GAP SERPL CALCULATED.3IONS-SCNC: 15 MMOL/L (ref 9–17)
AST SERPL-CCNC: 15 U/L
BASOPHILS # BLD: 0.08 K/UL (ref 0–0.2)
BASOPHILS NFR BLD: 1 % (ref 0–2)
BILIRUB SERPL-MCNC: 0.3 MG/DL (ref 0.3–1.2)
BUN SERPL-MCNC: 59 MG/DL (ref 8–23)
BUN/CREAT SERPL: 9 (ref 9–20)
CALCIUM SERPL-MCNC: 8.8 MG/DL (ref 8.6–10.4)
CHLORIDE SERPL-SCNC: 101 MMOL/L (ref 98–107)
CO2 SERPL-SCNC: 23 MMOL/L (ref 20–31)
CREAT SERPL-MCNC: 6.7 MG/DL (ref 0.7–1.2)
EOSINOPHIL # BLD: 0.54 K/UL (ref 0–0.44)
EOSINOPHILS RELATIVE PERCENT: 5 % (ref 1–4)
ERYTHROCYTE [DISTWIDTH] IN BLOOD BY AUTOMATED COUNT: 14.6 % (ref 11.8–14.4)
FERRITIN SERPL-MCNC: 1525 NG/ML (ref 30–400)
FOLATE SERPL-MCNC: >20 NG/ML (ref 4.8–24.2)
GFR, ESTIMATED: 8 ML/MIN/1.73M2
GLUCOSE SERPL-MCNC: 135 MG/DL (ref 70–99)
HCT VFR BLD AUTO: 28.1 % (ref 40.7–50.3)
HGB BLD-MCNC: 9.8 G/DL (ref 13–17)
IMM GRANULOCYTES # BLD AUTO: 0.12 K/UL (ref 0–0.3)
IMM GRANULOCYTES NFR BLD: 1 %
IRON SATN MFR SERPL: 22 % (ref 20–55)
IRON SERPL-MCNC: 41 UG/DL (ref 61–157)
LYMPHOCYTES NFR BLD: 2.24 K/UL (ref 1.1–3.7)
LYMPHOCYTES RELATIVE PERCENT: 21 % (ref 24–43)
MCH RBC QN AUTO: 34.3 PG (ref 25.2–33.5)
MCHC RBC AUTO-ENTMCNC: 34.9 G/DL (ref 28.4–34.8)
MCV RBC AUTO: 98.3 FL (ref 82.6–102.9)
MONOCYTES NFR BLD: 0.6 K/UL (ref 0.1–1.2)
MONOCYTES NFR BLD: 6 % (ref 3–12)
NEUTROPHILS NFR BLD: 66 % (ref 36–65)
NEUTS SEG NFR BLD: 7.03 K/UL (ref 1.5–8.1)
NRBC BLD-RTO: 0 PER 100 WBC
PLATELET # BLD AUTO: 216 K/UL (ref 138–453)
PMV BLD AUTO: 10.8 FL (ref 8.1–13.5)
POTASSIUM SERPL-SCNC: 3.6 MMOL/L (ref 3.7–5.3)
PROT SERPL-MCNC: 6.6 G/DL (ref 6.4–8.3)
RBC # BLD AUTO: 2.86 M/UL (ref 4.21–5.77)
SODIUM SERPL-SCNC: 139 MMOL/L (ref 135–144)
TIBC SERPL-MCNC: 189 UG/DL (ref 250–450)
UNSATURATED IRON BINDING CAPACITY: 148 UG/DL (ref 112–347)
VIT B12 SERPL-MCNC: 702 PG/ML (ref 232–1245)
WBC OTHER # BLD: 10.6 K/UL (ref 3.5–11.3)

## 2024-06-03 PROCEDURE — 82746 ASSAY OF FOLIC ACID SERUM: CPT

## 2024-06-03 PROCEDURE — 82607 VITAMIN B-12: CPT

## 2024-06-03 PROCEDURE — 80053 COMPREHEN METABOLIC PANEL: CPT

## 2024-06-03 PROCEDURE — 83540 ASSAY OF IRON: CPT

## 2024-06-03 PROCEDURE — 85025 COMPLETE CBC W/AUTO DIFF WBC: CPT

## 2024-06-03 PROCEDURE — 83550 IRON BINDING TEST: CPT

## 2024-06-03 PROCEDURE — 82728 ASSAY OF FERRITIN: CPT

## 2024-06-13 ENCOUNTER — OFFICE VISIT (OUTPATIENT)
Dept: ONCOLOGY | Age: 79
End: 2024-06-13
Payer: MEDICARE

## 2024-06-13 VITALS
DIASTOLIC BLOOD PRESSURE: 60 MMHG | BODY MASS INDEX: 28.88 KG/M2 | HEIGHT: 69 IN | WEIGHT: 195 LBS | RESPIRATION RATE: 18 BRPM | TEMPERATURE: 97.2 F | HEART RATE: 69 BPM | SYSTOLIC BLOOD PRESSURE: 138 MMHG

## 2024-06-13 DIAGNOSIS — K90.9 IRON MALABSORPTION: Primary | ICD-10-CM

## 2024-06-13 DIAGNOSIS — N18.6 ESRD ON DIALYSIS (HCC): ICD-10-CM

## 2024-06-13 DIAGNOSIS — Z99.2 ESRD ON DIALYSIS (HCC): ICD-10-CM

## 2024-06-13 DIAGNOSIS — D63.8 ANEMIA OF CHRONIC DISEASE: ICD-10-CM

## 2024-06-13 DIAGNOSIS — R79.89 HIGH SERUM FERRITIN: ICD-10-CM

## 2024-06-13 PROCEDURE — 99211 OFF/OP EST MAY X REQ PHY/QHP: CPT | Performed by: INTERNAL MEDICINE

## 2024-06-13 PROCEDURE — G8427 DOCREV CUR MEDS BY ELIG CLIN: HCPCS | Performed by: INTERNAL MEDICINE

## 2024-06-13 PROCEDURE — G8417 CALC BMI ABV UP PARAM F/U: HCPCS | Performed by: INTERNAL MEDICINE

## 2024-06-13 PROCEDURE — 3075F SYST BP GE 130 - 139MM HG: CPT | Performed by: INTERNAL MEDICINE

## 2024-06-13 PROCEDURE — 3078F DIAST BP <80 MM HG: CPT | Performed by: INTERNAL MEDICINE

## 2024-06-13 PROCEDURE — 1123F ACP DISCUSS/DSCN MKR DOCD: CPT | Performed by: INTERNAL MEDICINE

## 2024-06-13 PROCEDURE — 99214 OFFICE O/P EST MOD 30 MIN: CPT | Performed by: INTERNAL MEDICINE

## 2024-06-13 PROCEDURE — 1036F TOBACCO NON-USER: CPT | Performed by: INTERNAL MEDICINE

## 2024-06-13 NOTE — PROGRESS NOTES
_               Mr. Janelle Jimenez is a very pleasant 78 y.o. male with history of multiple co morbidities as listed.  The patient is referred for further management of anemia.  Patient has episodes of black tarry stool.  He had upper and lower endoscopy in October 2020 which were negative.  He is scheduled for follow-up with gastroenterology in [place] next week for capsule camera evaluation.  Patient is having symptomatic anemia.  Is having weakness and fatigue.  Feels tired.  He has shortness of breath on exertion.  He is having epigastric pain.  No hematochezia.  No hematemesis.  Patient denies smoking or alcohol drinking,   Started on dialysis which is ongoing and getting EPO and iron per nephrology however ferritin high and has been off iron and as needed EPO  Bone marrow biopsy and aspirate and myeloma work-up ordered negative        Interim history:  Patient presents to the clinic for a follow-up visit and to discuss results of his lab work-up and other relevant clinical data.  Overall patient has been tolerating treatment without unexpected or severe side effects.    During this visit patient's allergy, social, medical, surgical history and medications were reviewed and updated.    On RAVI   Capsule endoscopy negative  And iron panel compatible of anemia of chronic disease  Status post heart surgery back in June 2023  Hemoglobin initially down after the heart surgery and then start improving  Patient condition improved  High eosinophil count  Superhigh ferritin and he said he is getting IV iron with dialysis      PAST MEDICAL HISTORY: has a past medical history of Abnormal tilt table test, Acute kidney injury (HCC), Acute MI (HCC), Acute renal failure superimposed on stage 4 chronic kidney disease (HCC), Acute renal failure with tubular necrosis (HCC), Anemia, Anemia in stage 4 chronic kidney disease (HCC), Angina, class

## 2024-06-13 NOTE — PATIENT INSTRUCTIONS
Pls check with nephrologist office about if patient getting IV IRON AS HIS FERTTIN SUPER HIGH  RTC IN 6 MONTHS WITH LABS

## 2024-06-14 ENCOUNTER — TELEPHONE (OUTPATIENT)
Dept: ONCOLOGY | Age: 79
End: 2024-06-14

## 2024-06-14 NOTE — TELEPHONE ENCOUNTER
Dr. Jones requested information related to infusion of iron with dialysis.  No iron being administered; but record scanned to chart to indicate Micera dosages administered.

## 2024-06-18 ENCOUNTER — OFFICE VISIT (OUTPATIENT)
Dept: PRIMARY CARE CLINIC | Age: 79
End: 2024-06-18
Payer: MEDICARE

## 2024-06-18 VITALS
HEIGHT: 69 IN | OXYGEN SATURATION: 97 % | HEART RATE: 70 BPM | BODY MASS INDEX: 28.58 KG/M2 | RESPIRATION RATE: 18 BRPM | SYSTOLIC BLOOD PRESSURE: 118 MMHG | WEIGHT: 193 LBS | DIASTOLIC BLOOD PRESSURE: 68 MMHG

## 2024-06-18 DIAGNOSIS — N18.6 ESRD (END STAGE RENAL DISEASE) (HCC): ICD-10-CM

## 2024-06-18 DIAGNOSIS — I47.29 NSVT (NONSUSTAINED VENTRICULAR TACHYCARDIA) (HCC): ICD-10-CM

## 2024-06-18 DIAGNOSIS — M54.50 CHRONIC LOW BACK PAIN, UNSPECIFIED BACK PAIN LATERALITY, UNSPECIFIED WHETHER SCIATICA PRESENT: ICD-10-CM

## 2024-06-18 DIAGNOSIS — G89.29 CHRONIC LOW BACK PAIN, UNSPECIFIED BACK PAIN LATERALITY, UNSPECIFIED WHETHER SCIATICA PRESENT: ICD-10-CM

## 2024-06-18 DIAGNOSIS — G90.1 DYSAUTONOMIA (HCC): ICD-10-CM

## 2024-06-18 DIAGNOSIS — G62.9 NEUROPATHY: ICD-10-CM

## 2024-06-18 DIAGNOSIS — Z79.4 DIABETES MELLITUS DUE TO UNDERLYING CONDITION WITH DIABETIC NEUROPATHY, WITH LONG-TERM CURRENT USE OF INSULIN (HCC): ICD-10-CM

## 2024-06-18 DIAGNOSIS — N18.6 TYPE 2 DIABETES MELLITUS WITH ESRD (END-STAGE RENAL DISEASE) (HCC): Primary | ICD-10-CM

## 2024-06-18 DIAGNOSIS — E08.40 DIABETES MELLITUS DUE TO UNDERLYING CONDITION WITH DIABETIC NEUROPATHY, WITH LONG-TERM CURRENT USE OF INSULIN (HCC): ICD-10-CM

## 2024-06-18 DIAGNOSIS — E11.22 TYPE 2 DIABETES MELLITUS WITH ESRD (END-STAGE RENAL DISEASE) (HCC): Primary | ICD-10-CM

## 2024-06-18 PROCEDURE — G8427 DOCREV CUR MEDS BY ELIG CLIN: HCPCS | Performed by: STUDENT IN AN ORGANIZED HEALTH CARE EDUCATION/TRAINING PROGRAM

## 2024-06-18 PROCEDURE — G8417 CALC BMI ABV UP PARAM F/U: HCPCS | Performed by: STUDENT IN AN ORGANIZED HEALTH CARE EDUCATION/TRAINING PROGRAM

## 2024-06-18 PROCEDURE — 1123F ACP DISCUSS/DSCN MKR DOCD: CPT | Performed by: STUDENT IN AN ORGANIZED HEALTH CARE EDUCATION/TRAINING PROGRAM

## 2024-06-18 PROCEDURE — 99211 OFF/OP EST MAY X REQ PHY/QHP: CPT | Performed by: STUDENT IN AN ORGANIZED HEALTH CARE EDUCATION/TRAINING PROGRAM

## 2024-06-18 PROCEDURE — 1036F TOBACCO NON-USER: CPT | Performed by: STUDENT IN AN ORGANIZED HEALTH CARE EDUCATION/TRAINING PROGRAM

## 2024-06-18 PROCEDURE — 3078F DIAST BP <80 MM HG: CPT | Performed by: STUDENT IN AN ORGANIZED HEALTH CARE EDUCATION/TRAINING PROGRAM

## 2024-06-18 PROCEDURE — 3074F SYST BP LT 130 MM HG: CPT | Performed by: STUDENT IN AN ORGANIZED HEALTH CARE EDUCATION/TRAINING PROGRAM

## 2024-06-18 PROCEDURE — 99214 OFFICE O/P EST MOD 30 MIN: CPT | Performed by: STUDENT IN AN ORGANIZED HEALTH CARE EDUCATION/TRAINING PROGRAM

## 2024-06-18 PROCEDURE — G2211 COMPLEX E/M VISIT ADD ON: HCPCS | Performed by: STUDENT IN AN ORGANIZED HEALTH CARE EDUCATION/TRAINING PROGRAM

## 2024-06-18 RX ORDER — PREGABALIN 25 MG/1
25 CAPSULE ORAL DAILY
Qty: 30 CAPSULE | Refills: 0 | Status: SHIPPED | OUTPATIENT
Start: 2024-06-18 | End: 2024-07-18

## 2024-06-18 RX ORDER — CLOPIDOGREL BISULFATE 75 MG/1
75 TABLET ORAL DAILY
Qty: 90 TABLET | Refills: 1 | Status: SHIPPED | OUTPATIENT
Start: 2024-06-18

## 2024-06-18 SDOH — ECONOMIC STABILITY: FOOD INSECURITY: WITHIN THE PAST 12 MONTHS, YOU WORRIED THAT YOUR FOOD WOULD RUN OUT BEFORE YOU GOT MONEY TO BUY MORE.: NEVER TRUE

## 2024-06-18 SDOH — ECONOMIC STABILITY: FOOD INSECURITY: WITHIN THE PAST 12 MONTHS, THE FOOD YOU BOUGHT JUST DIDN'T LAST AND YOU DIDN'T HAVE MONEY TO GET MORE.: NEVER TRUE

## 2024-06-18 SDOH — ECONOMIC STABILITY: INCOME INSECURITY: HOW HARD IS IT FOR YOU TO PAY FOR THE VERY BASICS LIKE FOOD, HOUSING, MEDICAL CARE, AND HEATING?: NOT HARD AT ALL

## 2024-06-18 NOTE — PROGRESS NOTES
[facility] PRIMARY CARE  [address] [place] [address] , [address]  [place], [place], [address]    Janelle Jimenez is a 78 y.o. male with  has a past medical history of Abnormal tilt table test, Acute kidney injury ([facility]), Acute MI ([facility]), Acute renal failure superimposed on stage 4 chronic kidney disease ([facility]), Acute renal failure with tubular necrosis ([facility]), Anemia, Anemia in stage 4 chronic kidney disease ([facility]), Angina, class II ([facility]), Bell's palsy, CAD (coronary artery disease), Cerebral artery occlusion with cerebral infarction ([facility]), CHF (congestive heart failure) ([facility]), Cholecystitis, Chronic back pain, Chronic diastolic HF (heart failure), NYHA class 3 ([facility]), Chronic kidney disease, stage III (moderate) ([facility]), Closed fracture of lumbar vertebra without mention of spinal cord injury, COVID-19 vaccine administered, Displacement of intervertebral disc, site unspecified, without myelopathy, H/O cardiac catheterization, H/O cardiovascular stress test, H/O tilt table evaluation, Hemodialysis patient ([facility]), History of 24 hour EKG monitoring, History of 24 hour EKG monitoring, History of blood transfusion, History of cardiovascular stress test, History of cardiovascular stress test, History of coronary artery stent placement, History of CVA (cerebrovascular accident) without residual deficits, History of echocardiogram, History of Holter monitoring, History of tilt table evaluation, Hyperlipidemia, Hypertension, Medication side effect, initial encounter, Non critical Right Renal artery stenosis, native ([facility]), Pacemaker, S/P cardiac cath, S/P coronary artery stent placement, SIRS (systemic inflammatory response syndrome) ([facility]), TIA (transient ischemic attack), Type II or unspecified type diabetes mellitus without mention of complication, not stated as uncontrolled, Under care of team, Under care of team, Under care of team, and Wellness examination.  Presented to the office today for:  Chief Complaint   Patient presents

## 2024-06-20 ENCOUNTER — TELEPHONE (OUTPATIENT)
Age: 79
End: 2024-06-20

## 2024-06-20 NOTE — TELEPHONE ENCOUNTER
[facility] to schedule NP appt    Dx: Chronic low back pain, unspecified back pain laterality, unspecified whether sciatica present

## 2024-06-27 ENCOUNTER — HOSPITAL ENCOUNTER (OUTPATIENT)
Dept: CT IMAGING | Age: 79
Discharge: HOME | End: 2024-06-29
Attending: STUDENT IN AN ORGANIZED HEALTH CARE EDUCATION/TRAINING PROGRAM
Payer: MEDICARE

## 2024-06-27 DIAGNOSIS — M54.50 CHRONIC LOW BACK PAIN, UNSPECIFIED BACK PAIN LATERALITY, UNSPECIFIED WHETHER SCIATICA PRESENT: ICD-10-CM

## 2024-06-27 DIAGNOSIS — G89.29 CHRONIC LOW BACK PAIN, UNSPECIFIED BACK PAIN LATERALITY, UNSPECIFIED WHETHER SCIATICA PRESENT: ICD-10-CM

## 2024-06-27 PROCEDURE — 72131 CT LUMBAR SPINE W/O DYE: CPT

## 2024-07-29 ENCOUNTER — TELEPHONE (OUTPATIENT)
Dept: PRIMARY CARE CLINIC | Age: 79
End: 2024-07-29

## 2024-07-29 NOTE — TELEPHONE ENCOUNTER
Left message for patient to return call to the office to notify of results of EMG that patient had done with Dr. Petersen. Results show polyneuropathy, likely B12 deficiency as cause.

## 2024-07-30 ENCOUNTER — OFFICE VISIT (OUTPATIENT)
Dept: CARDIOLOGY | Age: 79
End: 2024-07-30
Payer: MEDICARE

## 2024-07-30 VITALS
WEIGHT: 197 LBS | DIASTOLIC BLOOD PRESSURE: 59 MMHG | RESPIRATION RATE: 16 BRPM | OXYGEN SATURATION: 99 % | BODY MASS INDEX: 29.09 KG/M2 | HEART RATE: 73 BPM | SYSTOLIC BLOOD PRESSURE: 129 MMHG

## 2024-07-30 DIAGNOSIS — I63.9 CRYPTOGENIC STROKE (HCC): ICD-10-CM

## 2024-07-30 DIAGNOSIS — G90.1 DYSAUTONOMIA (HCC): ICD-10-CM

## 2024-07-30 DIAGNOSIS — R42 LIGHTHEADEDNESS: Primary | ICD-10-CM

## 2024-07-30 DIAGNOSIS — D63.8 ANEMIA, CHRONIC DISEASE: ICD-10-CM

## 2024-07-30 DIAGNOSIS — I25.10 ASHD (ARTERIOSCLEROTIC HEART DISEASE): ICD-10-CM

## 2024-07-30 DIAGNOSIS — N18.6 ESRD (END STAGE RENAL DISEASE) (HCC): ICD-10-CM

## 2024-07-30 DIAGNOSIS — I47.29 NSVT (NONSUSTAINED VENTRICULAR TACHYCARDIA) (HCC): ICD-10-CM

## 2024-07-30 DIAGNOSIS — I50.32 CHRONIC DIASTOLIC HEART FAILURE (HCC): ICD-10-CM

## 2024-07-30 DIAGNOSIS — I10 ESSENTIAL HYPERTENSION: ICD-10-CM

## 2024-07-30 DIAGNOSIS — E78.2 MIXED HYPERLIPIDEMIA: ICD-10-CM

## 2024-07-30 DIAGNOSIS — Z98.890 HISTORY OF LOOP RECORDER: ICD-10-CM

## 2024-07-30 PROCEDURE — 3078F DIAST BP <80 MM HG: CPT | Performed by: FAMILY MEDICINE

## 2024-07-30 PROCEDURE — 3074F SYST BP LT 130 MM HG: CPT | Performed by: FAMILY MEDICINE

## 2024-07-30 PROCEDURE — G8427 DOCREV CUR MEDS BY ELIG CLIN: HCPCS | Performed by: FAMILY MEDICINE

## 2024-07-30 PROCEDURE — 1123F ACP DISCUSS/DSCN MKR DOCD: CPT | Performed by: FAMILY MEDICINE

## 2024-07-30 PROCEDURE — 99214 OFFICE O/P EST MOD 30 MIN: CPT | Performed by: FAMILY MEDICINE

## 2024-07-30 PROCEDURE — 1036F TOBACCO NON-USER: CPT | Performed by: FAMILY MEDICINE

## 2024-07-30 PROCEDURE — G8417 CALC BMI ABV UP PARAM F/U: HCPCS | Performed by: FAMILY MEDICINE

## 2024-07-30 NOTE — PROGRESS NOTES
I, Jessica Pabon RN am scribing for and in the presence of Sandoval Benítez MD, MS, F.A.C.C..    Patient: Janelle Jimenez  : 1945  Date of Visit: 2024    REASON FOR VISIT / CONSULTATION: Dizziness (Hx: NSVT, lightheadedness, Hx: CABG x 3, CAD, HTN.  Pt says that he is still getting lightheadedness and dizziness and has not been able to go to PT due to limited time but says he is doing the head exercises at home and this has helped some. Denies fall or near falls. He is no longer having CP. Denies SOB. He says that he does have pounding in his chest and depends on how he is laying at night time but also happens during the day and as soon as he touches it it goes away. Increased two pounds last vis)    Dear Himanshu Burrell MD,     As you know, Mr. Jimenez is a 71 y.o. male with a known history of dysautonomia where on tilt table testing his blood pressure dropped from 169 systolic to 78 systolic with only a 53-67 HR response. A CT of he head in the past showed evidence of at least 2 old CVA's and a heart catheterization showed severe single vessel disease in the ostium of a moderate sized OM1 branch of the circumflex. However, maximal guidelines directed medical management was opted for an place of stenting partly due to the risk associated with stenting this vessel. He has had a coronary angiography procedure with stenting of his HA Om1. As you know, Mr. Jimenez  is a 78 y.o. male with a history of recent onset substernal chest discomfort that led to a stress test and a subsequent heart catheterization which showed severe single vessel coronary artery disease of the HA Om1 with successful angioplasty and stenting of that vessel that was done by Dr. Curry on 4/10/17. He has had problems with balance problems when he is in his feet and says that a Neurologist has told him in the past this is because of his previous strokes. He underwent a cardiovascular stress test which fortunately had shown to be

## 2024-08-09 ENCOUNTER — NURSE ONLY (OUTPATIENT)
Dept: CARDIOLOGY | Age: 79
End: 2024-08-09

## 2024-08-09 DIAGNOSIS — R42 LIGHTHEADEDNESS: Primary | ICD-10-CM

## 2024-08-09 DIAGNOSIS — Z95.818 IMPLANTABLE LOOP RECORDER PRESENT: ICD-10-CM

## 2024-08-09 DIAGNOSIS — I63.9 CRYPTOGENIC STROKE (HCC): ICD-10-CM

## 2024-08-13 ENCOUNTER — HOSPITAL ENCOUNTER (OUTPATIENT)
Age: 79
Discharge: HOME | End: 2024-08-15
Attending: FAMILY MEDICINE
Payer: MEDICARE

## 2024-08-13 VITALS
BODY MASS INDEX: 29.18 KG/M2 | DIASTOLIC BLOOD PRESSURE: 59 MMHG | WEIGHT: 197 LBS | HEIGHT: 69 IN | SYSTOLIC BLOOD PRESSURE: 129 MMHG

## 2024-08-13 DIAGNOSIS — E78.2 MIXED HYPERLIPIDEMIA: ICD-10-CM

## 2024-08-13 DIAGNOSIS — R42 LIGHTHEADEDNESS: ICD-10-CM

## 2024-08-13 DIAGNOSIS — I47.29 NSVT (NONSUSTAINED VENTRICULAR TACHYCARDIA) (HCC): ICD-10-CM

## 2024-08-13 DIAGNOSIS — I63.9 CRYPTOGENIC STROKE (HCC): ICD-10-CM

## 2024-08-13 DIAGNOSIS — D63.8 ANEMIA, CHRONIC DISEASE: ICD-10-CM

## 2024-08-13 DIAGNOSIS — Z98.890 HISTORY OF LOOP RECORDER: ICD-10-CM

## 2024-08-13 DIAGNOSIS — G90.1 DYSAUTONOMIA (HCC): ICD-10-CM

## 2024-08-13 DIAGNOSIS — I25.10 ASHD (ARTERIOSCLEROTIC HEART DISEASE): ICD-10-CM

## 2024-08-13 DIAGNOSIS — I10 ESSENTIAL HYPERTENSION: ICD-10-CM

## 2024-08-13 DIAGNOSIS — I50.32 CHRONIC DIASTOLIC HEART FAILURE (HCC): ICD-10-CM

## 2024-08-13 DIAGNOSIS — N18.6 ESRD (END STAGE RENAL DISEASE) (HCC): ICD-10-CM

## 2024-08-13 LAB
ECHO AO SINUS VALSALVA DIAM: 3.7 CM
ECHO AO SINUS VALSALVA INDEX: 1.8 CM/M2
ECHO AO ST JNCT DIAM: 2.8 CM
ECHO AV CUSP MM: 1.6 CM
ECHO AV MEAN GRADIENT: 5 MMHG
ECHO AV MEAN VELOCITY: 1 M/S
ECHO AV PEAK GRADIENT: 9 MMHG
ECHO AV PEAK VELOCITY: 1.5 M/S
ECHO AV VELOCITY RATIO: 0.67
ECHO AV VTI: 30.3 CM
ECHO BSA: 2.09 M2
ECHO EST RA PRESSURE: 3 MMHG
ECHO LA AREA 2C: 18.7 CM2
ECHO LA AREA 4C: 14.6 CM2
ECHO LA MAJOR AXIS: 4.8 CM
ECHO LA MINOR AXIS: 5.3 CM
ECHO LA VOL BP: 46 ML (ref 18–58)
ECHO LA VOL MOD A2C: 53 ML (ref 18–58)
ECHO LA VOL MOD A4C: 36 ML (ref 18–58)
ECHO LA VOL/BSA BIPLANE: 22 ML/M2 (ref 16–34)
ECHO LA VOLUME INDEX MOD A2C: 26 ML/M2 (ref 16–34)
ECHO LA VOLUME INDEX MOD A4C: 18 ML/M2 (ref 16–34)
ECHO LV E' LATERAL VELOCITY: 13 CM/S
ECHO LV EDV A2C: 80 ML
ECHO LV EDV A4C: 62 ML
ECHO LV EDV INDEX A4C: 30 ML/M2
ECHO LV EDV NDEX A2C: 39 ML/M2
ECHO LV EJECTION FRACTION A2C: 62 %
ECHO LV EJECTION FRACTION A4C: 53 %
ECHO LV EJECTION FRACTION BIPLANE: 58 % (ref 55–100)
ECHO LV ESV A2C: 31 ML
ECHO LV ESV A4C: 29 ML
ECHO LV ESV INDEX A2C: 15 ML/M2
ECHO LV ESV INDEX A4C: 14 ML/M2
ECHO LV FRACTIONAL SHORTENING: 35 % (ref 28–44)
ECHO LV INTERNAL DIMENSION DIASTOLE INDEX: 2.34 CM/M2
ECHO LV INTERNAL DIMENSION DIASTOLIC: 4.8 CM (ref 4.2–5.9)
ECHO LV INTERNAL DIMENSION SYSTOLIC INDEX: 1.51 CM/M2
ECHO LV INTERNAL DIMENSION SYSTOLIC: 3.1 CM
ECHO LV IVSD: 1.1 CM (ref 0.6–1)
ECHO LV MASS 2D: 194 G (ref 88–224)
ECHO LV MASS INDEX 2D: 94.6 G/M2 (ref 49–115)
ECHO LV POSTERIOR WALL DIASTOLIC: 1.1 CM (ref 0.6–1)
ECHO LV RELATIVE WALL THICKNESS RATIO: 0.46
ECHO LVOT AV VTI INDEX: 0.68
ECHO LVOT MEAN GRADIENT: 2 MMHG
ECHO LVOT PEAK GRADIENT: 4 MMHG
ECHO LVOT PEAK VELOCITY: 1 M/S
ECHO LVOT VTI: 20.6 CM
ECHO MV A VELOCITY: 1.08 M/S
ECHO MV E DECELERATION TIME (DT): 285 MS
ECHO MV E VELOCITY: 0.6 M/S
ECHO MV E/A RATIO: 0.56
ECHO MV E/E' LATERAL: 4.62
ECHO PV MAX VELOCITY: 1.3 M/S
ECHO PV PEAK GRADIENT: 6 MMHG
ECHO RIGHT VENTRICULAR SYSTOLIC PRESSURE (RVSP): 24 MMHG
ECHO TV REGURGITANT MAX VELOCITY: 2.28 M/S
ECHO TV REGURGITANT PEAK GRADIENT: 21 MMHG

## 2024-08-13 PROCEDURE — 93306 TTE W/DOPPLER COMPLETE: CPT

## 2024-08-13 PROCEDURE — 93306 TTE W/DOPPLER COMPLETE: CPT | Performed by: INTERNAL MEDICINE

## 2024-08-14 ENCOUNTER — TELEPHONE (OUTPATIENT)
Dept: CARDIOLOGY | Age: 79
End: 2024-08-14

## 2024-08-14 NOTE — TELEPHONE ENCOUNTER
----- Message from Dr. Sandoval Benítez MD sent at 8/13/2024 10:56 PM EDT -----  Let Mr. Jimenez know their test result was ok. Will discuss at next visit. Thanks.

## 2024-08-21 DIAGNOSIS — E11.65 TYPE 2 DIABETES MELLITUS WITH HYPERGLYCEMIA, WITH LONG-TERM CURRENT USE OF INSULIN (HCC): ICD-10-CM

## 2024-08-21 DIAGNOSIS — Z79.4 TYPE 2 DIABETES MELLITUS WITH HYPERGLYCEMIA, WITH LONG-TERM CURRENT USE OF INSULIN (HCC): ICD-10-CM

## 2024-08-21 RX ORDER — CARVEDILOL 25 MG/1
1 TABLET, FILM COATED ORAL 2 TIMES DAILY
Qty: 200 EACH | Refills: 3 | Status: SHIPPED | OUTPATIENT
Start: 2024-08-21 | End: 2024-11-19

## 2024-09-19 ENCOUNTER — HOSPITAL ENCOUNTER (EMERGENCY)
Age: 79
Discharge: ANOTHER HOSPITAL | End: 2024-09-20
Attending: STUDENT IN AN ORGANIZED HEALTH CARE EDUCATION/TRAINING PROGRAM
Payer: MEDICARE

## 2024-09-19 ENCOUNTER — APPOINTMENT (OUTPATIENT)
Dept: CT IMAGING | Age: 79
End: 2024-09-19
Payer: MEDICARE

## 2024-09-19 ENCOUNTER — APPOINTMENT (OUTPATIENT)
Dept: GENERAL RADIOLOGY | Age: 79
End: 2024-09-19
Payer: MEDICARE

## 2024-09-19 DIAGNOSIS — A41.9 SEPTICEMIA (HCC): Primary | ICD-10-CM

## 2024-09-19 DIAGNOSIS — N30.01 ACUTE CYSTITIS WITH HEMATURIA: ICD-10-CM

## 2024-09-19 LAB
ALBUMIN SERPL-MCNC: 4.6 G/DL (ref 3.5–5.2)
ALBUMIN/GLOB SERPL: 1.6 {RATIO} (ref 1–2.5)
ALP SERPL-CCNC: 89 U/L (ref 40–129)
ALT SERPL-CCNC: 16 U/L (ref 10–50)
ANION GAP SERPL CALCULATED.3IONS-SCNC: 17 MMOL/L (ref 9–16)
AST SERPL-CCNC: 20 U/L (ref 10–50)
BACTERIA URNS QL MICRO: ABNORMAL
BASOPHILS # BLD: 0.07 K/UL (ref 0–0.2)
BASOPHILS NFR BLD: 0 % (ref 0–2)
BILIRUB SERPL-MCNC: 0.4 MG/DL (ref 0–1.2)
BILIRUB UR QL STRIP: NEGATIVE
BUN SERPL-MCNC: 59 MG/DL (ref 8–23)
BUN/CREAT SERPL: 9 (ref 9–20)
CALCIUM SERPL-MCNC: 9.6 MG/DL (ref 8.6–10.4)
CHLORIDE SERPL-SCNC: 100 MMOL/L (ref 98–107)
CLARITY UR: ABNORMAL
CO2 SERPL-SCNC: 22 MMOL/L (ref 20–31)
COLOR UR: YELLOW
CREAT SERPL-MCNC: 6.3 MG/DL (ref 0.7–1.2)
EOSINOPHIL # BLD: 0.16 K/UL (ref 0–0.44)
EOSINOPHILS RELATIVE PERCENT: 1 % (ref 1–4)
EPI CELLS #/AREA URNS HPF: ABNORMAL /HPF (ref 0–5)
ERYTHROCYTE [DISTWIDTH] IN BLOOD BY AUTOMATED COUNT: 14.4 % (ref 11.8–14.4)
GFR, ESTIMATED: 8 ML/MIN/1.73M2
GLUCOSE SERPL-MCNC: 141 MG/DL (ref 74–99)
GLUCOSE UR STRIP-MCNC: NEGATIVE MG/DL
HCT VFR BLD AUTO: 33.3 % (ref 40.7–50.3)
HGB BLD-MCNC: 11.7 G/DL (ref 13–17)
HGB UR QL STRIP.AUTO: ABNORMAL
IMM GRANULOCYTES # BLD AUTO: 0.16 K/UL (ref 0–0.3)
IMM GRANULOCYTES NFR BLD: 1 %
KETONES UR STRIP-MCNC: NEGATIVE MG/DL
LACTATE BLDV-SCNC: 3.1 MMOL/L (ref 0.5–2.2)
LACTATE BLDV-SCNC: 3.3 MMOL/L (ref 0.5–2.2)
LEUKOCYTE ESTERASE UR QL STRIP: ABNORMAL
LIPASE SERPL-CCNC: 43 U/L (ref 13–60)
LYMPHOCYTES NFR BLD: 0.96 K/UL (ref 1.1–3.7)
LYMPHOCYTES RELATIVE PERCENT: 5 % (ref 24–43)
MCH RBC QN AUTO: 34.8 PG (ref 25.2–33.5)
MCHC RBC AUTO-ENTMCNC: 35.1 G/DL (ref 28.4–34.8)
MCV RBC AUTO: 99.1 FL (ref 82.6–102.9)
MONOCYTES NFR BLD: 0.67 K/UL (ref 0.1–1.2)
MONOCYTES NFR BLD: 3 % (ref 3–12)
NEUTROPHILS NFR BLD: 90 % (ref 36–65)
NEUTS SEG NFR BLD: 18.95 K/UL (ref 1.5–8.1)
NITRITE UR QL STRIP: NEGATIVE
NRBC BLD-RTO: 0 PER 100 WBC
PH UR STRIP: 6 [PH] (ref 5–9)
PLATELET # BLD AUTO: 186 K/UL (ref 138–453)
PMV BLD AUTO: 10.2 FL (ref 8.1–13.5)
POTASSIUM SERPL-SCNC: 4.2 MMOL/L (ref 3.7–5.3)
PROT SERPL-MCNC: 7.4 G/DL (ref 6.6–8.7)
PROT UR STRIP-MCNC: ABNORMAL MG/DL
RBC # BLD AUTO: 3.36 M/UL (ref 4.21–5.77)
RBC #/AREA URNS HPF: ABNORMAL /HPF (ref 0–2)
SODIUM SERPL-SCNC: 139 MMOL/L (ref 136–145)
SP GR UR STRIP: 1.02 (ref 1.01–1.02)
TROPONIN I SERPL HS-MCNC: 75 NG/L (ref 0–22)
UROBILINOGEN UR STRIP-ACNC: NORMAL EU/DL (ref 0–1)
WBC #/AREA URNS HPF: ABNORMAL /HPF (ref 0–5)
WBC OTHER # BLD: 21 K/UL (ref 3.5–11.3)

## 2024-09-19 PROCEDURE — 6370000000 HC RX 637 (ALT 250 FOR IP): Performed by: STUDENT IN AN ORGANIZED HEALTH CARE EDUCATION/TRAINING PROGRAM

## 2024-09-19 PROCEDURE — 36415 COLL VENOUS BLD VENIPUNCTURE: CPT

## 2024-09-19 PROCEDURE — 81001 URINALYSIS AUTO W/SCOPE: CPT

## 2024-09-19 PROCEDURE — 87040 BLOOD CULTURE FOR BACTERIA: CPT

## 2024-09-19 PROCEDURE — 71045 X-RAY EXAM CHEST 1 VIEW: CPT

## 2024-09-19 PROCEDURE — 51702 INSERT TEMP BLADDER CATH: CPT

## 2024-09-19 PROCEDURE — 83690 ASSAY OF LIPASE: CPT

## 2024-09-19 PROCEDURE — 83605 ASSAY OF LACTIC ACID: CPT

## 2024-09-19 PROCEDURE — 80053 COMPREHEN METABOLIC PANEL: CPT

## 2024-09-19 PROCEDURE — 96375 TX/PRO/DX INJ NEW DRUG ADDON: CPT

## 2024-09-19 PROCEDURE — 87088 URINE BACTERIA CULTURE: CPT

## 2024-09-19 PROCEDURE — 96365 THER/PROPH/DIAG IV INF INIT: CPT

## 2024-09-19 PROCEDURE — 96361 HYDRATE IV INFUSION ADD-ON: CPT

## 2024-09-19 PROCEDURE — 87086 URINE CULTURE/COLONY COUNT: CPT

## 2024-09-19 PROCEDURE — 85025 COMPLETE CBC W/AUTO DIFF WBC: CPT

## 2024-09-19 PROCEDURE — 51798 US URINE CAPACITY MEASURE: CPT

## 2024-09-19 PROCEDURE — 99285 EMERGENCY DEPT VISIT HI MDM: CPT

## 2024-09-19 PROCEDURE — 87186 SC STD MICRODIL/AGAR DIL: CPT

## 2024-09-19 PROCEDURE — 6360000002 HC RX W HCPCS: Performed by: STUDENT IN AN ORGANIZED HEALTH CARE EDUCATION/TRAINING PROGRAM

## 2024-09-19 PROCEDURE — 84484 ASSAY OF TROPONIN QUANT: CPT

## 2024-09-19 PROCEDURE — 74176 CT ABD & PELVIS W/O CONTRAST: CPT

## 2024-09-19 PROCEDURE — 93005 ELECTROCARDIOGRAM TRACING: CPT | Performed by: STUDENT IN AN ORGANIZED HEALTH CARE EDUCATION/TRAINING PROGRAM

## 2024-09-19 PROCEDURE — 2580000003 HC RX 258: Performed by: STUDENT IN AN ORGANIZED HEALTH CARE EDUCATION/TRAINING PROGRAM

## 2024-09-19 RX ORDER — ONDANSETRON 2 MG/ML
4 INJECTION INTRAMUSCULAR; INTRAVENOUS ONCE
Status: COMPLETED | OUTPATIENT
Start: 2024-09-19 | End: 2024-09-19

## 2024-09-19 RX ORDER — 0.9 % SODIUM CHLORIDE 0.9 %
250 INTRAVENOUS SOLUTION INTRAVENOUS ONCE
Status: COMPLETED | OUTPATIENT
Start: 2024-09-19 | End: 2024-09-19

## 2024-09-19 RX ORDER — FENTANYL CITRATE 50 UG/ML
50 INJECTION, SOLUTION INTRAMUSCULAR; INTRAVENOUS ONCE
Status: COMPLETED | OUTPATIENT
Start: 2024-09-19 | End: 2024-09-19

## 2024-09-19 RX ORDER — ACETAMINOPHEN 500 MG
1000 TABLET ORAL ONCE
Status: COMPLETED | OUTPATIENT
Start: 2024-09-19 | End: 2024-09-19

## 2024-09-19 RX ADMIN — PIPERACILLIN AND TAZOBACTAM 3375 MG: 3; .375 INJECTION, POWDER, LYOPHILIZED, FOR SOLUTION INTRAVENOUS at 20:37

## 2024-09-19 RX ADMIN — ONDANSETRON 4 MG: 2 INJECTION INTRAMUSCULAR; INTRAVENOUS at 19:37

## 2024-09-19 RX ADMIN — SODIUM CHLORIDE 250 ML: 9 INJECTION, SOLUTION INTRAVENOUS at 19:36

## 2024-09-19 RX ADMIN — FENTANYL CITRATE 50 MCG: 0.05 INJECTION, SOLUTION INTRAMUSCULAR; INTRAVENOUS at 19:39

## 2024-09-19 RX ADMIN — ACETAMINOPHEN 1000 MG: 500 TABLET ORAL at 21:23

## 2024-09-19 ASSESSMENT — PAIN SCALES - GENERAL: PAINLEVEL_OUTOF10: 8

## 2024-09-19 ASSESSMENT — PAIN DESCRIPTION - LOCATION: LOCATION: ABDOMEN

## 2024-09-19 ASSESSMENT — PAIN - FUNCTIONAL ASSESSMENT: PAIN_FUNCTIONAL_ASSESSMENT: 0-10

## 2024-09-20 ENCOUNTER — HOSPITAL ENCOUNTER (INPATIENT)
Age: 79
LOS: 3 days | Discharge: HOME | DRG: 871 | End: 2024-09-23
Attending: STUDENT IN AN ORGANIZED HEALTH CARE EDUCATION/TRAINING PROGRAM | Admitting: STUDENT IN AN ORGANIZED HEALTH CARE EDUCATION/TRAINING PROGRAM
Payer: MEDICARE

## 2024-09-20 VITALS
DIASTOLIC BLOOD PRESSURE: 42 MMHG | BODY MASS INDEX: 28.96 KG/M2 | TEMPERATURE: 99 F | SYSTOLIC BLOOD PRESSURE: 141 MMHG | RESPIRATION RATE: 26 BRPM | WEIGHT: 196.21 LBS | OXYGEN SATURATION: 98 % | HEART RATE: 77 BPM

## 2024-09-20 PROBLEM — N30.01 ACUTE CYSTITIS WITH HEMATURIA: Status: ACTIVE | Noted: 2024-09-20

## 2024-09-20 PROBLEM — A41.9 SEPSIS (HCC): Status: ACTIVE | Noted: 2024-09-20

## 2024-09-20 LAB
ANION GAP SERPL CALCULATED.3IONS-SCNC: 20 MMOL/L (ref 9–16)
BASOPHILS # BLD: 0 K/UL (ref 0–0.2)
BASOPHILS NFR BLD: 0 % (ref 0–2)
BNP SERPL-MCNC: ABNORMAL PG/ML (ref 0–300)
BODY TEMPERATURE: 37
BUN SERPL-MCNC: 67 MG/DL (ref 8–23)
CALCIUM SERPL-MCNC: 8.5 MG/DL (ref 8.6–10.4)
CHLORIDE SERPL-SCNC: 100 MMOL/L (ref 98–107)
CO2 SERPL-SCNC: 17 MMOL/L (ref 20–31)
COHGB MFR BLD: 2.6 % (ref 0–5)
CREAT SERPL-MCNC: 6.6 MG/DL (ref 0.7–1.2)
EKG ATRIAL RATE: 95 BPM
EKG P AXIS: 36 DEGREES
EKG P-R INTERVAL: 226 MS
EKG Q-T INTERVAL: 332 MS
EKG QRS DURATION: 84 MS
EKG QTC CALCULATION (BAZETT): 417 MS
EKG R AXIS: 4 DEGREES
EKG T AXIS: 86 DEGREES
EKG VENTRICULAR RATE: 95 BPM
EOSINOPHIL # BLD: 0 K/UL (ref 0–0.4)
EOSINOPHILS RELATIVE PERCENT: 0 % (ref 1–4)
ERYTHROCYTE [DISTWIDTH] IN BLOOD BY AUTOMATED COUNT: 15.2 % (ref 11.8–14.4)
FIO2 ON VENT: ABNORMAL %
GFR, ESTIMATED: 8 ML/MIN/1.73M2
GLUCOSE BLD-MCNC: 183 MG/DL (ref 75–110)
GLUCOSE SERPL-MCNC: 157 MG/DL (ref 74–99)
HCO3 VENOUS: 20.2 MMOL/L (ref 24–30)
HCT VFR BLD AUTO: 32.4 % (ref 40.7–50.3)
HGB BLD-MCNC: 9.8 G/DL (ref 13–17)
IMM GRANULOCYTES # BLD AUTO: 0.3 K/UL (ref 0–0.3)
IMM GRANULOCYTES NFR BLD: 1 %
LACTATE BLDV-SCNC: 2.8 MMOL/L (ref 0.5–2.2)
LACTIC ACID, WHOLE BLOOD: 3.9 MMOL/L (ref 0.7–2.1)
LYMPHOCYTES NFR BLD: 2.69 K/UL (ref 1–4.8)
LYMPHOCYTES RELATIVE PERCENT: 9 % (ref 24–44)
MCH RBC QN AUTO: 33.7 PG (ref 25.2–33.5)
MCHC RBC AUTO-ENTMCNC: 30.2 G/DL (ref 28.4–34.8)
MCV RBC AUTO: 111.3 FL (ref 82.6–102.9)
MONOCYTES NFR BLD: 0.6 K/UL (ref 0.1–0.8)
MONOCYTES NFR BLD: 2 % (ref 1–7)
MORPHOLOGY: ABNORMAL
MORPHOLOGY: ABNORMAL
NEGATIVE BASE EXCESS, VEN: 5.1 MMOL/L (ref 0–2)
NEUTROPHILS NFR BLD: 88 % (ref 36–66)
NEUTS SEG NFR BLD: 26.31 K/UL (ref 1.8–7.7)
NRBC BLD-RTO: 0 PER 100 WBC
O2 SAT, VEN: 93.7 % (ref 60–85)
PCO2 VENOUS: 41.2 MM HG (ref 39–55)
PH VENOUS: 7.31 (ref 7.32–7.42)
PLATELET # BLD AUTO: 171 K/UL (ref 138–453)
PMV BLD AUTO: 10.4 FL (ref 8.1–13.5)
PO2 VENOUS: 71.3 MM HG (ref 30–50)
POTASSIUM SERPL-SCNC: 4.7 MMOL/L (ref 3.7–5.3)
RBC # BLD AUTO: 2.91 M/UL (ref 4.21–5.77)
SODIUM SERPL-SCNC: 137 MMOL/L (ref 136–145)
TROPONIN I SERPL HS-MCNC: 88 NG/L (ref 0–22)
TROPONIN I SERPL HS-MCNC: 96 NG/L (ref 0–22)
WBC OTHER # BLD: 29.9 K/UL (ref 3.5–11.3)

## 2024-09-20 PROCEDURE — 84484 ASSAY OF TROPONIN QUANT: CPT

## 2024-09-20 PROCEDURE — 2060000000 HC ICU INTERMEDIATE R&B

## 2024-09-20 PROCEDURE — 87641 MR-STAPH DNA AMP PROBE: CPT

## 2024-09-20 PROCEDURE — 6360000002 HC RX W HCPCS: Performed by: STUDENT IN AN ORGANIZED HEALTH CARE EDUCATION/TRAINING PROGRAM

## 2024-09-20 PROCEDURE — 93010 ELECTROCARDIOGRAM REPORT: CPT | Performed by: INTERNAL MEDICINE

## 2024-09-20 PROCEDURE — 2580000003 HC RX 258: Performed by: PHYSICIAN ASSISTANT

## 2024-09-20 PROCEDURE — 2500000003 HC RX 250 WO HCPCS: Performed by: PHYSICIAN ASSISTANT

## 2024-09-20 PROCEDURE — 80048 BASIC METABOLIC PNL TOTAL CA: CPT

## 2024-09-20 PROCEDURE — 99233 SBSQ HOSP IP/OBS HIGH 50: CPT | Performed by: INTERNAL MEDICINE

## 2024-09-20 PROCEDURE — 1200000000 HC SEMI PRIVATE

## 2024-09-20 PROCEDURE — 83880 ASSAY OF NATRIURETIC PEPTIDE: CPT

## 2024-09-20 PROCEDURE — 83605 ASSAY OF LACTIC ACID: CPT

## 2024-09-20 PROCEDURE — 6360000002 HC RX W HCPCS: Performed by: PHYSICIAN ASSISTANT

## 2024-09-20 PROCEDURE — 96361 HYDRATE IV INFUSION ADD-ON: CPT

## 2024-09-20 PROCEDURE — 2580000003 HC RX 258: Performed by: NURSE PRACTITIONER

## 2024-09-20 PROCEDURE — 85025 COMPLETE CBC W/AUTO DIFF WBC: CPT

## 2024-09-20 PROCEDURE — 90935 HEMODIALYSIS ONE EVALUATION: CPT

## 2024-09-20 PROCEDURE — 2580000003 HC RX 258: Performed by: STUDENT IN AN ORGANIZED HEALTH CARE EDUCATION/TRAINING PROGRAM

## 2024-09-20 PROCEDURE — 96376 TX/PRO/DX INJ SAME DRUG ADON: CPT

## 2024-09-20 PROCEDURE — 96367 TX/PROPH/DG ADDL SEQ IV INF: CPT

## 2024-09-20 PROCEDURE — 99223 1ST HOSP IP/OBS HIGH 75: CPT | Performed by: PHYSICIAN ASSISTANT

## 2024-09-20 PROCEDURE — 6370000000 HC RX 637 (ALT 250 FOR IP): Performed by: PHYSICIAN ASSISTANT

## 2024-09-20 PROCEDURE — 82805 BLOOD GASES W/O2 SATURATION: CPT

## 2024-09-20 PROCEDURE — 36415 COLL VENOUS BLD VENIPUNCTURE: CPT

## 2024-09-20 PROCEDURE — 5A1D70Z PERFORMANCE OF URINARY FILTRATION, INTERMITTENT, LESS THAN 6 HOURS PER DAY: ICD-10-PCS | Performed by: INTERNAL MEDICINE

## 2024-09-20 PROCEDURE — 82947 ASSAY GLUCOSE BLOOD QUANT: CPT

## 2024-09-20 RX ORDER — INSULIN GLARGINE 100 [IU]/ML
36 INJECTION, SOLUTION SUBCUTANEOUS DAILY
Status: DISCONTINUED | OUTPATIENT
Start: 2024-09-21 | End: 2024-09-23 | Stop reason: HOSPADM

## 2024-09-20 RX ORDER — NIFEDIPINE 90 MG/1
90 TABLET, EXTENDED RELEASE ORAL DAILY
Status: DISCONTINUED | OUTPATIENT
Start: 2024-09-20 | End: 2024-09-23 | Stop reason: HOSPADM

## 2024-09-20 RX ORDER — 0.9 % SODIUM CHLORIDE 0.9 %
250 INTRAVENOUS SOLUTION INTRAVENOUS ONCE
Status: COMPLETED | OUTPATIENT
Start: 2024-09-20 | End: 2024-09-20

## 2024-09-20 RX ORDER — ACETAMINOPHEN 325 MG/1
650 TABLET ORAL EVERY 6 HOURS PRN
Status: DISCONTINUED | OUTPATIENT
Start: 2024-09-20 | End: 2024-09-23 | Stop reason: HOSPADM

## 2024-09-20 RX ORDER — METOPROLOL SUCCINATE 25 MG/1
50 TABLET, EXTENDED RELEASE ORAL DAILY
Status: DISCONTINUED | OUTPATIENT
Start: 2024-09-20 | End: 2024-09-23 | Stop reason: HOSPADM

## 2024-09-20 RX ORDER — INSULIN LISPRO 100 [IU]/ML
0-4 INJECTION, SOLUTION INTRAVENOUS; SUBCUTANEOUS NIGHTLY
Status: DISCONTINUED | OUTPATIENT
Start: 2024-09-20 | End: 2024-09-21

## 2024-09-20 RX ORDER — ASPIRIN 81 MG/1
81 TABLET ORAL DAILY
Status: DISCONTINUED | OUTPATIENT
Start: 2024-09-20 | End: 2024-09-23 | Stop reason: HOSPADM

## 2024-09-20 RX ORDER — CLOPIDOGREL BISULFATE 75 MG/1
75 TABLET ORAL DAILY
Status: DISCONTINUED | OUTPATIENT
Start: 2024-09-20 | End: 2024-09-23 | Stop reason: HOSPADM

## 2024-09-20 RX ORDER — ONDANSETRON 2 MG/ML
4 INJECTION INTRAMUSCULAR; INTRAVENOUS ONCE
Status: COMPLETED | OUTPATIENT
Start: 2024-09-20 | End: 2024-09-20

## 2024-09-20 RX ORDER — HEPARIN SODIUM 5000 [USP'U]/ML
5000 INJECTION, SOLUTION INTRAVENOUS; SUBCUTANEOUS EVERY 8 HOURS SCHEDULED
Status: DISCONTINUED | OUTPATIENT
Start: 2024-09-20 | End: 2024-09-23 | Stop reason: HOSPADM

## 2024-09-20 RX ORDER — GLUCAGON 1 MG/ML
1 KIT INJECTION PRN
Status: DISCONTINUED | OUTPATIENT
Start: 2024-09-20 | End: 2024-09-23 | Stop reason: HOSPADM

## 2024-09-20 RX ORDER — TAMSULOSIN HYDROCHLORIDE 0.4 MG/1
0.4 CAPSULE ORAL DAILY
Status: DISCONTINUED | OUTPATIENT
Start: 2024-09-21 | End: 2024-09-23 | Stop reason: HOSPADM

## 2024-09-20 RX ORDER — MIDODRINE HYDROCHLORIDE 5 MG/1
10 TABLET ORAL 3 TIMES DAILY PRN
Status: DISCONTINUED | OUTPATIENT
Start: 2024-09-20 | End: 2024-09-23 | Stop reason: HOSPADM

## 2024-09-20 RX ORDER — TORSEMIDE 20 MG/1
100 TABLET ORAL DAILY
Status: DISCONTINUED | OUTPATIENT
Start: 2024-09-20 | End: 2024-09-23 | Stop reason: HOSPADM

## 2024-09-20 RX ORDER — SODIUM CHLORIDE 0.9 % (FLUSH) 0.9 %
5-40 SYRINGE (ML) INJECTION PRN
Status: DISCONTINUED | OUTPATIENT
Start: 2024-09-20 | End: 2024-09-23 | Stop reason: HOSPADM

## 2024-09-20 RX ORDER — SODIUM CHLORIDE 0.9 % (FLUSH) 0.9 %
5-40 SYRINGE (ML) INJECTION EVERY 12 HOURS SCHEDULED
Status: DISCONTINUED | OUTPATIENT
Start: 2024-09-20 | End: 2024-09-23 | Stop reason: HOSPADM

## 2024-09-20 RX ORDER — ACETAMINOPHEN 650 MG/1
650 SUPPOSITORY RECTAL EVERY 6 HOURS PRN
Status: DISCONTINUED | OUTPATIENT
Start: 2024-09-20 | End: 2024-09-23 | Stop reason: HOSPADM

## 2024-09-20 RX ORDER — SODIUM CHLORIDE 9 MG/ML
INJECTION, SOLUTION INTRAVENOUS PRN
Status: DISCONTINUED | OUTPATIENT
Start: 2024-09-20 | End: 2024-09-23 | Stop reason: HOSPADM

## 2024-09-20 RX ORDER — ONDANSETRON 2 MG/ML
4 INJECTION INTRAMUSCULAR; INTRAVENOUS EVERY 6 HOURS PRN
Status: DISCONTINUED | OUTPATIENT
Start: 2024-09-20 | End: 2024-09-23 | Stop reason: HOSPADM

## 2024-09-20 RX ORDER — CALCIUM ACETATE 667 MG/1
2 CAPSULE ORAL 3 TIMES DAILY
Status: DISCONTINUED | OUTPATIENT
Start: 2024-09-20 | End: 2024-09-23 | Stop reason: HOSPADM

## 2024-09-20 RX ORDER — DEXTROSE MONOHYDRATE 100 MG/ML
INJECTION, SOLUTION INTRAVENOUS CONTINUOUS PRN
Status: DISCONTINUED | OUTPATIENT
Start: 2024-09-20 | End: 2024-09-23 | Stop reason: HOSPADM

## 2024-09-20 RX ORDER — INSULIN LISPRO 100 [IU]/ML
0-8 INJECTION, SOLUTION INTRAVENOUS; SUBCUTANEOUS
Status: DISCONTINUED | OUTPATIENT
Start: 2024-09-20 | End: 2024-09-21

## 2024-09-20 RX ADMIN — CEFTRIAXONE SODIUM 1000 MG: 1 INJECTION, POWDER, FOR SOLUTION INTRAMUSCULAR; INTRAVENOUS at 05:35

## 2024-09-20 RX ADMIN — ONDANSETRON 4 MG: 2 INJECTION INTRAMUSCULAR; INTRAVENOUS at 19:12

## 2024-09-20 RX ADMIN — SODIUM CHLORIDE 1250 MG: 9 INJECTION, SOLUTION INTRAVENOUS at 15:43

## 2024-09-20 RX ADMIN — SODIUM CHLORIDE 25 ML: 9 INJECTION, SOLUTION INTRAVENOUS at 19:20

## 2024-09-20 RX ADMIN — ASPIRIN 81 MG: 81 TABLET, COATED ORAL at 22:03

## 2024-09-20 RX ADMIN — ONDANSETRON 4 MG: 2 INJECTION INTRAMUSCULAR; INTRAVENOUS at 07:03

## 2024-09-20 RX ADMIN — ONDANSETRON 4 MG: 2 INJECTION INTRAMUSCULAR; INTRAVENOUS at 11:04

## 2024-09-20 RX ADMIN — MEROPENEM 1000 MG: 1 INJECTION INTRAVENOUS at 19:21

## 2024-09-20 RX ADMIN — SODIUM CHLORIDE, PRESERVATIVE FREE 10 ML: 5 INJECTION INTRAVENOUS at 21:08

## 2024-09-20 RX ADMIN — SODIUM CHLORIDE 250 ML: 9 INJECTION, SOLUTION INTRAVENOUS at 01:05

## 2024-09-20 RX ADMIN — CLOPIDOGREL BISULFATE 75 MG: 75 TABLET ORAL at 22:02

## 2024-09-20 RX ADMIN — CALCIUM ACETATE 1334 MG: 667 CAPSULE ORAL at 22:02

## 2024-09-20 ASSESSMENT — PAIN SCALES - GENERAL
PAINLEVEL_OUTOF10: 0
PAINLEVEL_OUTOF10: 3
PAINLEVEL_OUTOF10: 0

## 2024-09-20 ASSESSMENT — ENCOUNTER SYMPTOMS
VOMITING: 1
ABDOMINAL PAIN: 1
NAUSEA: 1
SHORTNESS OF BREATH: 0

## 2024-09-20 ASSESSMENT — PAIN DESCRIPTION - LOCATION: LOCATION: LEG

## 2024-09-20 ASSESSMENT — PAIN DESCRIPTION - ORIENTATION: ORIENTATION: RIGHT;LEFT

## 2024-09-20 ASSESSMENT — PAIN DESCRIPTION - PAIN TYPE: TYPE: ACUTE PAIN

## 2024-09-20 ASSESSMENT — PAIN DESCRIPTION - DESCRIPTORS: DESCRIPTORS: CRAMPING

## 2024-09-21 PROBLEM — R79.89 TROPONIN LEVEL ELEVATED: Status: ACTIVE | Noted: 2024-09-21

## 2024-09-21 LAB
ANION GAP SERPL CALCULATED.3IONS-SCNC: 14 MMOL/L (ref 9–16)
BASOPHILS # BLD: 0.08 K/UL (ref 0–0.2)
BASOPHILS NFR BLD: 0 % (ref 0–2)
BUN SERPL-MCNC: 36 MG/DL (ref 8–23)
CALCIUM SERPL-MCNC: 8.7 MG/DL (ref 8.6–10.4)
CHLORIDE SERPL-SCNC: 94 MMOL/L (ref 98–107)
CO2 SERPL-SCNC: 27 MMOL/L (ref 20–31)
CREAT SERPL-MCNC: 4.5 MG/DL (ref 0.7–1.2)
EOSINOPHIL # BLD: <0.03 K/UL (ref 0–0.44)
EOSINOPHILS RELATIVE PERCENT: 0 % (ref 1–4)
ERYTHROCYTE [DISTWIDTH] IN BLOOD BY AUTOMATED COUNT: 15 % (ref 11.8–14.4)
GFR, ESTIMATED: 13 ML/MIN/1.73M2
GLUCOSE BLD-MCNC: 203 MG/DL (ref 75–110)
GLUCOSE BLD-MCNC: 211 MG/DL (ref 75–110)
GLUCOSE BLD-MCNC: 227 MG/DL (ref 75–110)
GLUCOSE BLD-MCNC: 325 MG/DL (ref 75–110)
GLUCOSE SERPL-MCNC: 257 MG/DL (ref 74–99)
HCT VFR BLD AUTO: 28.2 % (ref 40.7–50.3)
HGB BLD-MCNC: 9.1 G/DL (ref 13–17)
IMM GRANULOCYTES # BLD AUTO: 0.37 K/UL (ref 0–0.3)
IMM GRANULOCYTES NFR BLD: 2 %
LYMPHOCYTES NFR BLD: 1.55 K/UL (ref 1.1–3.7)
LYMPHOCYTES RELATIVE PERCENT: 7 % (ref 24–43)
MAGNESIUM SERPL-MCNC: 1.9 MG/DL (ref 1.6–2.4)
MCH RBC QN AUTO: 34.7 PG (ref 25.2–33.5)
MCHC RBC AUTO-ENTMCNC: 32.3 G/DL (ref 28.4–34.8)
MCV RBC AUTO: 107.6 FL (ref 82.6–102.9)
MICROORGANISM SPEC CULT: ABNORMAL
MONOCYTES NFR BLD: 1 K/UL (ref 0.1–1.2)
MONOCYTES NFR BLD: 5 % (ref 3–12)
MRSA, DNA, NASAL: NEGATIVE
NEUTROPHILS NFR BLD: 86 % (ref 36–65)
NEUTS SEG NFR BLD: 19.04 K/UL (ref 1.5–8.1)
NRBC BLD-RTO: 0 PER 100 WBC
PLATELET # BLD AUTO: 151 K/UL (ref 138–453)
PMV BLD AUTO: 10.3 FL (ref 8.1–13.5)
POTASSIUM SERPL-SCNC: 3.6 MMOL/L (ref 3.7–5.3)
POTASSIUM SERPL-SCNC: 3.9 MMOL/L (ref 3.7–5.3)
RBC # BLD AUTO: 2.62 M/UL (ref 4.21–5.77)
RBC # BLD: ABNORMAL 10*6/UL
RBC # BLD: ABNORMAL 10*6/UL
SODIUM SERPL-SCNC: 135 MMOL/L (ref 136–145)
SPECIMEN DESCRIPTION: ABNORMAL
SPECIMEN DESCRIPTION: NORMAL
TROPONIN I SERPL HS-MCNC: 92 NG/L (ref 0–22)
VANCOMYCIN SERPL-MCNC: 11.2 UG/ML (ref 5–40)
WBC OTHER # BLD: 22.1 K/UL (ref 3.5–11.3)

## 2024-09-21 PROCEDURE — 2500000003 HC RX 250 WO HCPCS: Performed by: PHYSICIAN ASSISTANT

## 2024-09-21 PROCEDURE — 6360000002 HC RX W HCPCS: Performed by: STUDENT IN AN ORGANIZED HEALTH CARE EDUCATION/TRAINING PROGRAM

## 2024-09-21 PROCEDURE — 85025 COMPLETE CBC W/AUTO DIFF WBC: CPT

## 2024-09-21 PROCEDURE — 6360000002 HC RX W HCPCS: Performed by: PHYSICIAN ASSISTANT

## 2024-09-21 PROCEDURE — 2580000003 HC RX 258: Performed by: STUDENT IN AN ORGANIZED HEALTH CARE EDUCATION/TRAINING PROGRAM

## 2024-09-21 PROCEDURE — 84132 ASSAY OF SERUM POTASSIUM: CPT

## 2024-09-21 PROCEDURE — 36415 COLL VENOUS BLD VENIPUNCTURE: CPT

## 2024-09-21 PROCEDURE — 6370000000 HC RX 637 (ALT 250 FOR IP): Performed by: STUDENT IN AN ORGANIZED HEALTH CARE EDUCATION/TRAINING PROGRAM

## 2024-09-21 PROCEDURE — 2060000000 HC ICU INTERMEDIATE R&B

## 2024-09-21 PROCEDURE — 82270 OCCULT BLOOD FECES: CPT

## 2024-09-21 PROCEDURE — 80202 ASSAY OF VANCOMYCIN: CPT

## 2024-09-21 PROCEDURE — 99232 SBSQ HOSP IP/OBS MODERATE 35: CPT | Performed by: INTERNAL MEDICINE

## 2024-09-21 PROCEDURE — 6370000000 HC RX 637 (ALT 250 FOR IP): Performed by: PHYSICIAN ASSISTANT

## 2024-09-21 PROCEDURE — 99232 SBSQ HOSP IP/OBS MODERATE 35: CPT | Performed by: STUDENT IN AN ORGANIZED HEALTH CARE EDUCATION/TRAINING PROGRAM

## 2024-09-21 PROCEDURE — 2580000003 HC RX 258: Performed by: NURSE PRACTITIONER

## 2024-09-21 PROCEDURE — 6370000000 HC RX 637 (ALT 250 FOR IP): Performed by: NURSE PRACTITIONER

## 2024-09-21 PROCEDURE — 2580000003 HC RX 258: Performed by: PHYSICIAN ASSISTANT

## 2024-09-21 PROCEDURE — 82947 ASSAY GLUCOSE BLOOD QUANT: CPT

## 2024-09-21 PROCEDURE — 83735 ASSAY OF MAGNESIUM: CPT

## 2024-09-21 PROCEDURE — 93005 ELECTROCARDIOGRAM TRACING: CPT

## 2024-09-21 PROCEDURE — 84484 ASSAY OF TROPONIN QUANT: CPT

## 2024-09-21 PROCEDURE — 80048 BASIC METABOLIC PNL TOTAL CA: CPT

## 2024-09-21 PROCEDURE — 87506 IADNA-DNA/RNA PROBE TQ 6-11: CPT

## 2024-09-21 PROCEDURE — 99222 1ST HOSP IP/OBS MODERATE 55: CPT | Performed by: INTERNAL MEDICINE

## 2024-09-21 RX ORDER — POTASSIUM CHLORIDE 1500 MG/1
20 TABLET, EXTENDED RELEASE ORAL ONCE
Status: COMPLETED | OUTPATIENT
Start: 2024-09-21 | End: 2024-09-21

## 2024-09-21 RX ORDER — INSULIN LISPRO 100 [IU]/ML
0-16 INJECTION, SOLUTION INTRAVENOUS; SUBCUTANEOUS
Status: DISCONTINUED | OUTPATIENT
Start: 2024-09-21 | End: 2024-09-23 | Stop reason: HOSPADM

## 2024-09-21 RX ORDER — INSULIN LISPRO 100 [IU]/ML
0-4 INJECTION, SOLUTION INTRAVENOUS; SUBCUTANEOUS NIGHTLY
Status: DISCONTINUED | OUTPATIENT
Start: 2024-09-21 | End: 2024-09-23 | Stop reason: HOSPADM

## 2024-09-21 RX ADMIN — POTASSIUM CHLORIDE 20 MEQ: 1500 TABLET, EXTENDED RELEASE ORAL at 09:29

## 2024-09-21 RX ADMIN — SODIUM CHLORIDE, PRESERVATIVE FREE 10 ML: 5 INJECTION INTRAVENOUS at 09:45

## 2024-09-21 RX ADMIN — SODIUM CHLORIDE: 9 INJECTION, SOLUTION INTRAVENOUS at 12:05

## 2024-09-21 RX ADMIN — ONDANSETRON 4 MG: 2 INJECTION INTRAMUSCULAR; INTRAVENOUS at 16:09

## 2024-09-21 RX ADMIN — ONDANSETRON 4 MG: 2 INJECTION INTRAMUSCULAR; INTRAVENOUS at 09:44

## 2024-09-21 RX ADMIN — ONDANSETRON 4 MG: 2 INJECTION INTRAMUSCULAR; INTRAVENOUS at 03:25

## 2024-09-21 RX ADMIN — SODIUM CHLORIDE, PRESERVATIVE FREE 10 ML: 5 INJECTION INTRAVENOUS at 21:25

## 2024-09-21 RX ADMIN — CLOPIDOGREL BISULFATE 75 MG: 75 TABLET ORAL at 09:29

## 2024-09-21 RX ADMIN — MEROPENEM 500 MG: 500 INJECTION, POWDER, FOR SOLUTION INTRAVENOUS at 13:17

## 2024-09-21 RX ADMIN — Medication 1000 MG: at 16:01

## 2024-09-21 RX ADMIN — INSULIN LISPRO 6 UNITS: 100 INJECTION, SOLUTION INTRAVENOUS; SUBCUTANEOUS at 12:07

## 2024-09-21 RX ADMIN — INSULIN LISPRO 4 UNITS: 100 INJECTION, SOLUTION INTRAVENOUS; SUBCUTANEOUS at 18:36

## 2024-09-21 RX ADMIN — CALCIUM ACETATE 1334 MG: 667 CAPSULE ORAL at 21:21

## 2024-09-21 RX ADMIN — INSULIN GLARGINE 36 UNITS: 100 INJECTION, SOLUTION SUBCUTANEOUS at 09:29

## 2024-09-21 RX ADMIN — NIFEDIPINE 90 MG: 90 TABLET, FILM COATED, EXTENDED RELEASE ORAL at 09:29

## 2024-09-21 RX ADMIN — ASPIRIN 81 MG: 81 TABLET, COATED ORAL at 09:29

## 2024-09-21 RX ADMIN — METOPROLOL SUCCINATE 50 MG: 25 TABLET, EXTENDED RELEASE ORAL at 09:29

## 2024-09-21 RX ADMIN — Medication 1000 MG: at 16:02

## 2024-09-21 RX ADMIN — VANCOMYCIN HYDROCHLORIDE 500 MG: 500 INJECTION, POWDER, LYOPHILIZED, FOR SOLUTION INTRAVENOUS at 12:11

## 2024-09-21 RX ADMIN — INSULIN LISPRO 2 UNITS: 100 INJECTION, SOLUTION INTRAVENOUS; SUBCUTANEOUS at 09:29

## 2024-09-21 RX ADMIN — CALCIUM ACETATE 1334 MG: 667 CAPSULE ORAL at 12:06

## 2024-09-21 RX ADMIN — CALCIUM ACETATE 1334 MG: 667 CAPSULE ORAL at 09:29

## 2024-09-21 RX ADMIN — TAMSULOSIN HYDROCHLORIDE 0.4 MG: 0.4 CAPSULE ORAL at 09:31

## 2024-09-21 RX ADMIN — TORSEMIDE 100 MG: 20 TABLET ORAL at 09:28

## 2024-09-22 PROBLEM — R11.0 NAUSEA: Status: ACTIVE | Noted: 2024-09-22

## 2024-09-22 LAB
ANION GAP SERPL CALCULATED.3IONS-SCNC: 16 MMOL/L (ref 9–16)
BASOPHILS # BLD: 0.09 K/UL (ref 0–0.2)
BASOPHILS NFR BLD: 1 % (ref 0–2)
BUN SERPL-MCNC: 57 MG/DL (ref 8–23)
CALCIUM SERPL-MCNC: 8.7 MG/DL (ref 8.6–10.4)
CAMPYLOBACTER DNA SPEC NAA+PROBE: NORMAL
CHLORIDE SERPL-SCNC: 96 MMOL/L (ref 98–107)
CO2 SERPL-SCNC: 24 MMOL/L (ref 20–31)
CREAT SERPL-MCNC: 6.4 MG/DL (ref 0.7–1.2)
EOSINOPHIL # BLD: 0.16 K/UL (ref 0–0.44)
EOSINOPHILS RELATIVE PERCENT: 1 % (ref 1–4)
ERYTHROCYTE [DISTWIDTH] IN BLOOD BY AUTOMATED COUNT: 14.7 % (ref 11.8–14.4)
ETEC ELTA+ESTB GENES STL QL NAA+PROBE: NORMAL
GFR, ESTIMATED: 8 ML/MIN/1.73M2
GLUCOSE BLD-MCNC: 186 MG/DL (ref 75–110)
GLUCOSE BLD-MCNC: 188 MG/DL (ref 75–110)
GLUCOSE BLD-MCNC: 223 MG/DL (ref 75–110)
GLUCOSE BLD-MCNC: 291 MG/DL (ref 75–110)
GLUCOSE SERPL-MCNC: 183 MG/DL (ref 74–99)
HCT VFR BLD AUTO: 26.8 % (ref 40.7–50.3)
HGB BLD-MCNC: 8.6 G/DL (ref 13–17)
IMM GRANULOCYTES # BLD AUTO: 0.19 K/UL (ref 0–0.3)
IMM GRANULOCYTES NFR BLD: 1 %
LYMPHOCYTES NFR BLD: 2.08 K/UL (ref 1.1–3.7)
LYMPHOCYTES RELATIVE PERCENT: 11 % (ref 24–43)
MAGNESIUM SERPL-MCNC: 2.1 MG/DL (ref 1.6–2.4)
MCH RBC QN AUTO: 33.6 PG (ref 25.2–33.5)
MCHC RBC AUTO-ENTMCNC: 32.1 G/DL (ref 28.4–34.8)
MCV RBC AUTO: 104.7 FL (ref 82.6–102.9)
MICROORGANISM SPEC CULT: NORMAL
MICROORGANISM SPEC CULT: NORMAL
MONOCYTES NFR BLD: 0.94 K/UL (ref 0.1–1.2)
MONOCYTES NFR BLD: 5 % (ref 3–12)
NEUTROPHILS NFR BLD: 81 % (ref 36–65)
NEUTS SEG NFR BLD: 15.03 K/UL (ref 1.5–8.1)
NRBC BLD-RTO: 0 PER 100 WBC
P SHIGELLOIDES DNA STL QL NAA+PROBE: NORMAL
PLATELET # BLD AUTO: 159 K/UL (ref 138–453)
PMV BLD AUTO: 11 FL (ref 8.1–13.5)
POTASSIUM SERPL-SCNC: 3.5 MMOL/L (ref 3.7–5.3)
RBC # BLD AUTO: 2.56 M/UL (ref 4.21–5.77)
RBC # BLD: ABNORMAL 10*6/UL
RBC # BLD: ABNORMAL 10*6/UL
SALMONELLA DNA SPEC QL NAA+PROBE: NORMAL
SERVICE CMNT-IMP: NORMAL
SERVICE CMNT-IMP: NORMAL
SHIGA TOXIN STX GENE SPEC NAA+PROBE: NORMAL
SHIGELLA DNA SPEC QL NAA+PROBE: NORMAL
SODIUM SERPL-SCNC: 136 MMOL/L (ref 136–145)
SPECIMEN DESCRIPTION: NORMAL
V CHOL+PARA RFBL+TRKH+TNAA STL QL NAA+PR: NORMAL
WBC OTHER # BLD: 18.5 K/UL (ref 3.5–11.3)
Y ENTERO RECN STL QL NAA+PROBE: NORMAL

## 2024-09-22 PROCEDURE — 2580000003 HC RX 258: Performed by: NURSE PRACTITIONER

## 2024-09-22 PROCEDURE — 82947 ASSAY GLUCOSE BLOOD QUANT: CPT

## 2024-09-22 PROCEDURE — 80048 BASIC METABOLIC PNL TOTAL CA: CPT

## 2024-09-22 PROCEDURE — 6360000002 HC RX W HCPCS: Performed by: STUDENT IN AN ORGANIZED HEALTH CARE EDUCATION/TRAINING PROGRAM

## 2024-09-22 PROCEDURE — 2500000003 HC RX 250 WO HCPCS: Performed by: PHYSICIAN ASSISTANT

## 2024-09-22 PROCEDURE — 83735 ASSAY OF MAGNESIUM: CPT

## 2024-09-22 PROCEDURE — 99232 SBSQ HOSP IP/OBS MODERATE 35: CPT | Performed by: NURSE PRACTITIONER

## 2024-09-22 PROCEDURE — 99232 SBSQ HOSP IP/OBS MODERATE 35: CPT | Performed by: INTERNAL MEDICINE

## 2024-09-22 PROCEDURE — 36415 COLL VENOUS BLD VENIPUNCTURE: CPT

## 2024-09-22 PROCEDURE — 85025 COMPLETE CBC W/AUTO DIFF WBC: CPT

## 2024-09-22 PROCEDURE — 97162 PT EVAL MOD COMPLEX 30 MIN: CPT

## 2024-09-22 PROCEDURE — 2060000000 HC ICU INTERMEDIATE R&B

## 2024-09-22 PROCEDURE — 6370000000 HC RX 637 (ALT 250 FOR IP): Performed by: NURSE PRACTITIONER

## 2024-09-22 PROCEDURE — 6370000000 HC RX 637 (ALT 250 FOR IP): Performed by: STUDENT IN AN ORGANIZED HEALTH CARE EDUCATION/TRAINING PROGRAM

## 2024-09-22 PROCEDURE — 99232 SBSQ HOSP IP/OBS MODERATE 35: CPT | Performed by: STUDENT IN AN ORGANIZED HEALTH CARE EDUCATION/TRAINING PROGRAM

## 2024-09-22 PROCEDURE — 97530 THERAPEUTIC ACTIVITIES: CPT

## 2024-09-22 PROCEDURE — 6370000000 HC RX 637 (ALT 250 FOR IP): Performed by: PHYSICIAN ASSISTANT

## 2024-09-22 PROCEDURE — 97116 GAIT TRAINING THERAPY: CPT

## 2024-09-22 RX ORDER — POTASSIUM CHLORIDE 1500 MG/1
20 TABLET, EXTENDED RELEASE ORAL ONCE
Status: COMPLETED | OUTPATIENT
Start: 2024-09-22 | End: 2024-09-22

## 2024-09-22 RX ORDER — POTASSIUM CHLORIDE 7.45 MG/ML
10 INJECTION INTRAVENOUS
Status: DISCONTINUED | OUTPATIENT
Start: 2024-09-22 | End: 2024-09-22

## 2024-09-22 RX ORDER — MECLIZINE HCL 12.5 MG 12.5 MG/1
12.5 TABLET ORAL 3 TIMES DAILY PRN
Status: DISCONTINUED | OUTPATIENT
Start: 2024-09-22 | End: 2024-09-23 | Stop reason: HOSPADM

## 2024-09-22 RX ORDER — LOPERAMIDE HCL 2 MG
2 CAPSULE ORAL 4 TIMES DAILY PRN
Status: DISCONTINUED | OUTPATIENT
Start: 2024-09-22 | End: 2024-09-23 | Stop reason: HOSPADM

## 2024-09-22 RX ADMIN — ACETAMINOPHEN 650 MG: 325 TABLET ORAL at 23:11

## 2024-09-22 RX ADMIN — METOPROLOL SUCCINATE 50 MG: 25 TABLET, EXTENDED RELEASE ORAL at 08:51

## 2024-09-22 RX ADMIN — ASPIRIN 81 MG: 81 TABLET, COATED ORAL at 08:54

## 2024-09-22 RX ADMIN — TAMSULOSIN HYDROCHLORIDE 0.4 MG: 0.4 CAPSULE ORAL at 08:55

## 2024-09-22 RX ADMIN — TORSEMIDE 100 MG: 20 TABLET ORAL at 08:54

## 2024-09-22 RX ADMIN — Medication 1000 MG: at 14:07

## 2024-09-22 RX ADMIN — ACETAMINOPHEN 650 MG: 325 TABLET ORAL at 08:55

## 2024-09-22 RX ADMIN — POTASSIUM CHLORIDE 20 MEQ: 1500 TABLET, EXTENDED RELEASE ORAL at 11:27

## 2024-09-22 RX ADMIN — CALCIUM ACETATE 1334 MG: 667 CAPSULE ORAL at 14:14

## 2024-09-22 RX ADMIN — LOPERAMIDE HYDROCHLORIDE 2 MG: 2 CAPSULE ORAL at 23:11

## 2024-09-22 RX ADMIN — CALCIUM ACETATE 1334 MG: 667 CAPSULE ORAL at 20:18

## 2024-09-22 RX ADMIN — SODIUM CHLORIDE, PRESERVATIVE FREE 10 ML: 5 INJECTION INTRAVENOUS at 20:19

## 2024-09-22 RX ADMIN — INSULIN GLARGINE 36 UNITS: 100 INJECTION, SOLUTION SUBCUTANEOUS at 09:00

## 2024-09-22 RX ADMIN — CALCIUM ACETATE 1334 MG: 667 CAPSULE ORAL at 08:50

## 2024-09-22 RX ADMIN — INSULIN LISPRO 8 UNITS: 100 INJECTION, SOLUTION INTRAVENOUS; SUBCUTANEOUS at 17:10

## 2024-09-22 RX ADMIN — POTASSIUM CHLORIDE 10 MEQ: 7.46 INJECTION, SOLUTION INTRAVENOUS at 10:17

## 2024-09-22 RX ADMIN — NIFEDIPINE 90 MG: 90 TABLET, FILM COATED, EXTENDED RELEASE ORAL at 08:54

## 2024-09-22 RX ADMIN — CLOPIDOGREL BISULFATE 75 MG: 75 TABLET ORAL at 08:51

## 2024-09-22 RX ADMIN — MECLIZINE 12.5 MG: 12.5 TABLET ORAL at 10:12

## 2024-09-22 RX ADMIN — SODIUM CHLORIDE, PRESERVATIVE FREE 10 ML: 5 INJECTION INTRAVENOUS at 08:49

## 2024-09-22 ASSESSMENT — PAIN DESCRIPTION - LOCATION: LOCATION: BACK

## 2024-09-22 ASSESSMENT — PAIN DESCRIPTION - DESCRIPTORS: DESCRIPTORS: ACHING

## 2024-09-22 ASSESSMENT — PAIN DESCRIPTION - ORIENTATION: ORIENTATION: LOWER

## 2024-09-23 VITALS
SYSTOLIC BLOOD PRESSURE: 131 MMHG | WEIGHT: 188.93 LBS | HEIGHT: 69 IN | DIASTOLIC BLOOD PRESSURE: 56 MMHG | OXYGEN SATURATION: 94 % | HEART RATE: 69 BPM | BODY MASS INDEX: 27.98 KG/M2 | RESPIRATION RATE: 21 BRPM | TEMPERATURE: 98 F

## 2024-09-23 PROBLEM — A41.9 SEPSIS (HCC): Status: RESOLVED | Noted: 2024-09-20 | Resolved: 2024-09-23

## 2024-09-23 LAB
ANION GAP SERPL CALCULATED.3IONS-SCNC: 15 MMOL/L (ref 9–16)
BASOPHILS # BLD: 0.08 K/UL (ref 0–0.2)
BASOPHILS NFR BLD: 1 % (ref 0–2)
BUN SERPL-MCNC: 74 MG/DL (ref 8–23)
CALCIUM SERPL-MCNC: 8.9 MG/DL (ref 8.6–10.4)
CHLORIDE SERPL-SCNC: 97 MMOL/L (ref 98–107)
CO2 SERPL-SCNC: 25 MMOL/L (ref 20–31)
CREAT SERPL-MCNC: 7.8 MG/DL (ref 0.7–1.2)
EKG ATRIAL RATE: 82 BPM
EKG P AXIS: 32 DEGREES
EKG P-R INTERVAL: 216 MS
EKG Q-T INTERVAL: 384 MS
EKG QRS DURATION: 102 MS
EKG QTC CALCULATION (BAZETT): 448 MS
EKG R AXIS: -5 DEGREES
EKG T AXIS: 76 DEGREES
EKG VENTRICULAR RATE: 82 BPM
EOSINOPHIL # BLD: 0.38 K/UL (ref 0–0.44)
EOSINOPHILS RELATIVE PERCENT: 3 % (ref 1–4)
ERYTHROCYTE [DISTWIDTH] IN BLOOD BY AUTOMATED COUNT: 14.2 % (ref 11.8–14.4)
GFR, ESTIMATED: 7 ML/MIN/1.73M2
GLUCOSE BLD-MCNC: 147 MG/DL (ref 75–110)
GLUCOSE BLD-MCNC: 206 MG/DL (ref 75–110)
GLUCOSE BLD-MCNC: 232 MG/DL (ref 75–110)
GLUCOSE SERPL-MCNC: 169 MG/DL (ref 74–99)
HCT VFR BLD AUTO: 27.9 % (ref 40.7–50.3)
HGB BLD-MCNC: 9 G/DL (ref 13–17)
IMM GRANULOCYTES # BLD AUTO: 0.15 K/UL (ref 0–0.3)
IMM GRANULOCYTES NFR BLD: 1 %
LYMPHOCYTES NFR BLD: 2.06 K/UL (ref 1.1–3.7)
LYMPHOCYTES RELATIVE PERCENT: 18 % (ref 24–43)
MAGNESIUM SERPL-MCNC: 2.2 MG/DL (ref 1.6–2.4)
MCH RBC QN AUTO: 33.6 PG (ref 25.2–33.5)
MCHC RBC AUTO-ENTMCNC: 32.3 G/DL (ref 28.4–34.8)
MCV RBC AUTO: 104.1 FL (ref 82.6–102.9)
MONOCYTES NFR BLD: 0.79 K/UL (ref 0.1–1.2)
MONOCYTES NFR BLD: 7 % (ref 3–12)
NEUTROPHILS NFR BLD: 69 % (ref 36–65)
NEUTS SEG NFR BLD: 7.84 K/UL (ref 1.5–8.1)
NRBC BLD-RTO: 0 PER 100 WBC
PLATELET # BLD AUTO: 187 K/UL (ref 138–453)
PMV BLD AUTO: 10.4 FL (ref 8.1–13.5)
POTASSIUM SERPL-SCNC: 3.6 MMOL/L (ref 3.7–5.3)
RBC # BLD AUTO: 2.68 M/UL (ref 4.21–5.77)
RBC # BLD: ABNORMAL 10*6/UL
SODIUM SERPL-SCNC: 137 MMOL/L (ref 136–145)
WBC OTHER # BLD: 11.3 K/UL (ref 3.5–11.3)

## 2024-09-23 PROCEDURE — 6370000000 HC RX 637 (ALT 250 FOR IP)

## 2024-09-23 PROCEDURE — 99239 HOSP IP/OBS DSCHRG MGMT >30: CPT | Performed by: NURSE PRACTITIONER

## 2024-09-23 PROCEDURE — 6370000000 HC RX 637 (ALT 250 FOR IP): Performed by: NURSE PRACTITIONER

## 2024-09-23 PROCEDURE — 90935 HEMODIALYSIS ONE EVALUATION: CPT | Performed by: INTERNAL MEDICINE

## 2024-09-23 PROCEDURE — 82947 ASSAY GLUCOSE BLOOD QUANT: CPT

## 2024-09-23 PROCEDURE — 2500000003 HC RX 250 WO HCPCS: Performed by: PHYSICIAN ASSISTANT

## 2024-09-23 PROCEDURE — 6360000002 HC RX W HCPCS: Performed by: STUDENT IN AN ORGANIZED HEALTH CARE EDUCATION/TRAINING PROGRAM

## 2024-09-23 PROCEDURE — 6370000000 HC RX 637 (ALT 250 FOR IP): Performed by: PHYSICIAN ASSISTANT

## 2024-09-23 PROCEDURE — 80048 BASIC METABOLIC PNL TOTAL CA: CPT

## 2024-09-23 PROCEDURE — 36415 COLL VENOUS BLD VENIPUNCTURE: CPT

## 2024-09-23 PROCEDURE — 83735 ASSAY OF MAGNESIUM: CPT

## 2024-09-23 PROCEDURE — 90935 HEMODIALYSIS ONE EVALUATION: CPT

## 2024-09-23 PROCEDURE — 6370000000 HC RX 637 (ALT 250 FOR IP): Performed by: STUDENT IN AN ORGANIZED HEALTH CARE EDUCATION/TRAINING PROGRAM

## 2024-09-23 PROCEDURE — 85025 COMPLETE CBC W/AUTO DIFF WBC: CPT

## 2024-09-23 RX ORDER — MECLIZINE HCL 12.5 MG 12.5 MG/1
12.5 TABLET ORAL 3 TIMES DAILY PRN
Qty: 30 TABLET | Refills: 0 | Status: SHIPPED | OUTPATIENT
Start: 2024-09-23 | End: 2024-10-03

## 2024-09-23 RX ORDER — LEVOFLOXACIN 250 MG/1
250 TABLET, FILM COATED ORAL DAILY
Qty: 7 TABLET | Refills: 0 | Status: SHIPPED | OUTPATIENT
Start: 2024-09-23 | End: 2024-09-30

## 2024-09-23 RX ORDER — POTASSIUM CHLORIDE 1500 MG/1
20 TABLET, EXTENDED RELEASE ORAL ONCE
Status: COMPLETED | OUTPATIENT
Start: 2024-09-23 | End: 2024-09-23

## 2024-09-23 RX ADMIN — CLOPIDOGREL BISULFATE 75 MG: 75 TABLET ORAL at 15:35

## 2024-09-23 RX ADMIN — ONDANSETRON 4 MG: 2 INJECTION INTRAMUSCULAR; INTRAVENOUS at 09:15

## 2024-09-23 RX ADMIN — TAMSULOSIN HYDROCHLORIDE 0.4 MG: 0.4 CAPSULE ORAL at 15:34

## 2024-09-23 RX ADMIN — METOPROLOL SUCCINATE 50 MG: 25 TABLET, EXTENDED RELEASE ORAL at 15:33

## 2024-09-23 RX ADMIN — INSULIN GLARGINE 36 UNITS: 100 INJECTION, SOLUTION SUBCUTANEOUS at 09:16

## 2024-09-23 RX ADMIN — CALCIUM ACETATE 1334 MG: 667 CAPSULE ORAL at 15:32

## 2024-09-23 RX ADMIN — POTASSIUM CHLORIDE 20 MEQ: 1500 TABLET, EXTENDED RELEASE ORAL at 15:35

## 2024-09-23 RX ADMIN — ONDANSETRON 4 MG: 2 INJECTION INTRAMUSCULAR; INTRAVENOUS at 17:29

## 2024-09-23 RX ADMIN — ASPIRIN 81 MG: 81 TABLET, COATED ORAL at 15:33

## 2024-09-23 RX ADMIN — NIFEDIPINE 90 MG: 90 TABLET, FILM COATED, EXTENDED RELEASE ORAL at 15:33

## 2024-09-23 RX ADMIN — Medication 1000 MG: at 15:35

## 2024-09-23 RX ADMIN — INSULIN LISPRO 4 UNITS: 100 INJECTION, SOLUTION INTRAVENOUS; SUBCUTANEOUS at 09:15

## 2024-09-23 RX ADMIN — LOPERAMIDE HYDROCHLORIDE 2 MG: 2 CAPSULE ORAL at 15:34

## 2024-09-23 RX ADMIN — INSULIN LISPRO 4 UNITS: 100 INJECTION, SOLUTION INTRAVENOUS; SUBCUTANEOUS at 17:55

## 2024-09-23 RX ADMIN — LOPERAMIDE HYDROCHLORIDE 2 MG: 2 CAPSULE ORAL at 09:10

## 2024-09-24 ENCOUNTER — TELEPHONE (OUTPATIENT)
Dept: PRIMARY CARE CLINIC | Age: 79
End: 2024-09-24

## 2024-09-24 LAB
DATE, STOOL #1: NORMAL
HEMOCCULT SP1 STL QL: NEGATIVE
MICROORGANISM SPEC CULT: NORMAL
MICROORGANISM SPEC CULT: NORMAL
SERVICE CMNT-IMP: NORMAL
SERVICE CMNT-IMP: NORMAL
SPECIMEN DESCRIPTION: NORMAL
SPECIMEN DESCRIPTION: NORMAL
TIME, STOOL #1: NORMAL

## 2024-09-25 ENCOUNTER — OFFICE VISIT (OUTPATIENT)
Dept: PRIMARY CARE CLINIC | Age: 79
End: 2024-09-25

## 2024-09-25 VITALS
HEART RATE: 67 BPM | BODY MASS INDEX: 28.19 KG/M2 | WEIGHT: 191 LBS | DIASTOLIC BLOOD PRESSURE: 62 MMHG | OXYGEN SATURATION: 98 % | SYSTOLIC BLOOD PRESSURE: 132 MMHG

## 2024-09-25 DIAGNOSIS — N39.0 URINARY TRACT INFECTION WITHOUT HEMATURIA, SITE UNSPECIFIED: ICD-10-CM

## 2024-09-25 DIAGNOSIS — Z09 HOSPITAL DISCHARGE FOLLOW-UP: Primary | ICD-10-CM

## 2024-10-01 ENCOUNTER — HOSPITAL ENCOUNTER (OUTPATIENT)
Age: 79
Discharge: HOME | End: 2024-10-01
Payer: MEDICARE

## 2024-10-01 DIAGNOSIS — Z12.5 SCREENING PSA (PROSTATE SPECIFIC ANTIGEN): ICD-10-CM

## 2024-10-01 PROCEDURE — 36415 COLL VENOUS BLD VENIPUNCTURE: CPT

## 2024-10-01 PROCEDURE — G0103 PSA SCREENING: HCPCS

## 2024-10-02 LAB — PSA SERPL-MCNC: 16.9 NG/ML (ref 0–4)

## 2024-10-04 ENCOUNTER — HOSPITAL ENCOUNTER (OUTPATIENT)
Dept: GENERAL RADIOLOGY | Age: 79
Discharge: HOME | End: 2024-10-06
Payer: MEDICARE

## 2024-10-04 ENCOUNTER — HOSPITAL ENCOUNTER (OUTPATIENT)
Age: 79
Discharge: HOME | End: 2024-10-06
Payer: MEDICARE

## 2024-10-04 DIAGNOSIS — N20.0 RENAL CALCULUS: ICD-10-CM

## 2024-10-04 PROCEDURE — 74018 RADEX ABDOMEN 1 VIEW: CPT

## 2024-10-08 ENCOUNTER — OFFICE VISIT (OUTPATIENT)
Dept: UROLOGY | Age: 79
End: 2024-10-08
Payer: MEDICARE

## 2024-10-08 ENCOUNTER — HOSPITAL ENCOUNTER (OUTPATIENT)
Age: 79
Setting detail: SPECIMEN
Discharge: HOME | End: 2024-10-08
Payer: MEDICARE

## 2024-10-08 VITALS
DIASTOLIC BLOOD PRESSURE: 68 MMHG | TEMPERATURE: 97.1 F | BODY MASS INDEX: 28.78 KG/M2 | RESPIRATION RATE: 18 BRPM | WEIGHT: 195 LBS | SYSTOLIC BLOOD PRESSURE: 152 MMHG

## 2024-10-08 DIAGNOSIS — N18.6 ESRD ON DIALYSIS (HCC): ICD-10-CM

## 2024-10-08 DIAGNOSIS — N39.0 URINARY TRACT INFECTION WITHOUT HEMATURIA, SITE UNSPECIFIED: ICD-10-CM

## 2024-10-08 DIAGNOSIS — N13.8 BPH WITH OBSTRUCTION/LOWER URINARY TRACT SYMPTOMS: ICD-10-CM

## 2024-10-08 DIAGNOSIS — Z99.2 ESRD ON DIALYSIS (HCC): ICD-10-CM

## 2024-10-08 DIAGNOSIS — K59.00 CONSTIPATION, UNSPECIFIED CONSTIPATION TYPE: ICD-10-CM

## 2024-10-08 DIAGNOSIS — R97.20 ELEVATED PSA: ICD-10-CM

## 2024-10-08 DIAGNOSIS — N20.0 RENAL CALCULUS: ICD-10-CM

## 2024-10-08 DIAGNOSIS — N40.1 BPH WITH OBSTRUCTION/LOWER URINARY TRACT SYMPTOMS: ICD-10-CM

## 2024-10-08 DIAGNOSIS — R31.0 GROSS HEMATURIA: Primary | ICD-10-CM

## 2024-10-08 LAB
BACTERIA URNS QL MICRO: ABNORMAL
BILIRUB UR QL STRIP: NEGATIVE
CHARACTER UR: ABNORMAL
CHARACTER UR: ABNORMAL
CLARITY UR: CLEAR
COLOR UR: YELLOW
EPI CELLS #/AREA URNS HPF: ABNORMAL /HPF (ref 0–5)
GLUCOSE UR STRIP-MCNC: NEGATIVE MG/DL
HGB UR QL STRIP.AUTO: NEGATIVE
KETONES UR STRIP-MCNC: ABNORMAL MG/DL
LEUKOCYTE ESTERASE UR QL STRIP: ABNORMAL
NITRITE UR QL STRIP: NEGATIVE
PH UR STRIP: 5.5 [PH] (ref 5–9)
PROT UR STRIP-MCNC: ABNORMAL MG/DL
RBC #/AREA URNS HPF: ABNORMAL /HPF (ref 0–2)
SP GR UR STRIP: 1.02 (ref 1.01–1.02)
UROBILINOGEN UR STRIP-ACNC: NORMAL EU/DL (ref 0–1)
WBC #/AREA URNS HPF: ABNORMAL /HPF (ref 0–5)

## 2024-10-08 PROCEDURE — 81001 URINALYSIS AUTO W/SCOPE: CPT

## 2024-10-08 PROCEDURE — 87086 URINE CULTURE/COLONY COUNT: CPT

## 2024-10-08 PROCEDURE — 51798 US URINE CAPACITY MEASURE: CPT | Performed by: NURSE PRACTITIONER

## 2024-10-08 RX ORDER — TAMSULOSIN HYDROCHLORIDE 0.4 MG/1
CAPSULE ORAL
Qty: 90 CAPSULE | Refills: 3 | Status: SHIPPED | OUTPATIENT
Start: 2024-10-08

## 2024-10-08 NOTE — PROGRESS NOTES
History  12/2019 Referral from Dr. Arce for elevated PSA of 6.13. He was a previous patient of Dr. Gregg for elevated PSA. Several brothers with prostate cancer.      PSA  10/2024 - 16.90  9/2023 - 3.28  8/2022 - 3.28  1/2022 - 3.36  4/2021 - 3.68  10/2020 - 5.43  4/2020 - 4.42  1/2020 - 5.00  11/2019 - 6.13  11/2018 - 3.11  11/2017 - 3.26  11/2016 - 2.43  10/2015 - 2.70  10/2014 - 2.35  10/2013 - 2.15     11/2020 Prostate biopsy for PSA of 5.43. Pathology: benign prostate tissue in all 12 cores    5/2021 gross hematuria.  CT urogram showed a nonobstructing 3 mm left renal stone.  Refused cystoscopy    12/2021 - urinary retention/constipation -Flomax started    On HD for ESRD    Today  Here today to follow-up for BPH and constipation.  He did have a urinary tract infection and hospitalization for sepsis secondary to nausea and vomiting in September. Urine culture was positive for E. coli.  He was treated with Rocephin, then culture specific Levaquin.  He did have a CT abdomen and pelvis.  This film was independently reviewed and shows a punctate left renal stone.  There is a 1 cm hemorrhagic cyst in the upper pole of the left kidney.  This is benign.  Stone is not evident on recent KUB.  CT also showed an enlarged prostate.  Brandon catheter in the bladder and air in the bladder.  He did have significant stool in the rectum concerning for fecal impaction on the CT scan.  He states he is having a daily bowel movement now.  He did have hematuria with a Brandon catheter in place.  The catheter was removed on day of discharge.  Recent PSA is 16.90.  Denies any current dysuria or gross hematuria.  Currently he urinates apx 3 times per day, nocturia x1.  PVR is low.  Dialysis is now only Monday and Fridays.    Plan  Continue Flomax daily    Urine culture due to elevated PSA    We will have him take a course of antibiotics based on culture results, if negative he will still take antibiotics due to elevated PSA    We will

## 2024-10-09 LAB
MICROORGANISM SPEC CULT: NORMAL
SERVICE CMNT-IMP: NORMAL
SPECIMEN DESCRIPTION: NORMAL

## 2024-10-14 ENCOUNTER — TELEPHONE (OUTPATIENT)
Dept: UROLOGY | Age: 79
End: 2024-10-14

## 2024-10-15 RX ORDER — METOPROLOL SUCCINATE 50 MG/1
50 TABLET, EXTENDED RELEASE ORAL DAILY
Qty: 90 TABLET | Refills: 0 | Status: SHIPPED | OUTPATIENT
Start: 2024-10-15

## 2024-10-15 RX ORDER — LEVOFLOXACIN 250 MG/1
250 TABLET, FILM COATED ORAL DAILY
Qty: 10 TABLET | Refills: 0 | Status: SHIPPED | OUTPATIENT
Start: 2024-10-15 | End: 2024-10-25

## 2024-10-15 NOTE — TELEPHONE ENCOUNTER
Informed patient of the response and he voiced understanding.   
Patient left a voicemail that he has been waiting on an antibiotic for his PSA.  He said  he got the results of his urine from his PCP but hasn't heard from us yet.   
Please let the patient know that his urine results never came to us    Levaquin 250mg PO daily sent. Take this with food.  
4 = No assist / stand by assistance

## 2024-10-25 PROBLEM — Z09 HOSPITAL DISCHARGE FOLLOW-UP: Status: RESOLVED | Noted: 2024-09-25 | Resolved: 2024-10-25

## 2024-11-03 DIAGNOSIS — I10 ESSENTIAL HYPERTENSION: ICD-10-CM

## 2024-11-04 RX ORDER — NIFEDIPINE 90 MG/1
90 TABLET, FILM COATED, EXTENDED RELEASE ORAL DAILY
Qty: 90 TABLET | Refills: 0 | Status: SHIPPED | OUTPATIENT
Start: 2024-11-04

## 2024-11-08 ENCOUNTER — NURSE ONLY (OUTPATIENT)
Dept: CARDIOLOGY | Age: 79
End: 2024-11-08

## 2024-11-08 DIAGNOSIS — R42 LIGHTHEADEDNESS: ICD-10-CM

## 2024-11-08 DIAGNOSIS — I63.9 CRYPTOGENIC STROKE (HCC): ICD-10-CM

## 2024-11-08 DIAGNOSIS — Z98.890 HISTORY OF LOOP RECORDER: Primary | ICD-10-CM

## 2024-11-18 ENCOUNTER — HOSPITAL ENCOUNTER (OUTPATIENT)
Age: 79
Setting detail: SPECIMEN
Discharge: HOME | End: 2024-11-18
Payer: MEDICARE

## 2024-11-18 DIAGNOSIS — R97.20 ELEVATED PSA: ICD-10-CM

## 2024-11-18 LAB — PSA SERPL-MCNC: 7.4 NG/ML (ref 0–4)

## 2024-11-18 PROCEDURE — 84153 ASSAY OF PSA TOTAL: CPT

## 2024-11-26 NOTE — PROGRESS NOTES
HPI:          Patient is a 79 y.o. male in no acute distress.  He is alert and oriented to person, place, and time.         History  12/2019 Referral from Dr. Arce for elevated PSA of 6.13. He was a previous patient of Dr. Gregg for elevated PSA. Several brothers with prostate cancer.      PSA  11/2024 - 7.40  10/2024 - 16.90  9/2023 - 3.28  8/2022 - 3.28  1/2022 - 3.36  4/2021 - 3.68  10/2020 - 5.43  4/2020 - 4.42  1/2020 - 5.00  11/2019 - 6.13  11/2018 - 3.11  11/2017 - 3.26  11/2016 - 2.43  10/2015 - 2.70  10/2014 - 2.35  10/2013 - 2.15     11/2020 Prostate biopsy for PSA of 5.43. Pathology: benign prostate tissue in all 12 cores     5/2021 gross hematuria.  CT urogram showed a nonobstructing 3 mm left renal stone.  Refused cystoscopy     12/2021 - urinary retention/constipation -Flomax started     On HD for ESRD    Currently  Patient is here today for follow-up.  Patient did have episode of gross hematuria.  Patient did also have an elevation in his PSA.  We did give him a course of antibiotics.  We did repeat the PSA.  The PSA came down nicely to 7.40.  Patient has had gross hematuria in the past he has refused cystoscopy.  Patient is taking Flomax.  He is doing well.  No recent gross hematuria.  He has no pain today.    Cystoscopy Procedure Note    Pre-operative Diagnosis: gross hematuria    Post-operative Diagnosis: Same     Surgeon: Sindy    Assistants: None    Anesthesia : Local    Procedure Details   The risks, benefits, complications, treatment options, and expected outcomes were discussed with the patient. The patient concurred with the proposed plan, giving informed consent.    Cystoscopy was performed today under local anesthesia, using sterile technique. The patient was placed in the dorsal lithotomy position, prepped with CHG, and draped in the usual sterile fashion. A 14 Setswana flexible cystoscope was used to systematically inspect both the urethra and bladder in their

## 2024-11-27 ENCOUNTER — PROCEDURE VISIT (OUTPATIENT)
Dept: UROLOGY | Age: 79
End: 2024-11-27
Payer: MEDICARE

## 2024-11-27 VITALS
SYSTOLIC BLOOD PRESSURE: 144 MMHG | DIASTOLIC BLOOD PRESSURE: 68 MMHG | TEMPERATURE: 97.5 F | WEIGHT: 193 LBS | BODY MASS INDEX: 28.49 KG/M2

## 2024-11-27 DIAGNOSIS — R97.20 ELEVATED PSA: ICD-10-CM

## 2024-11-27 DIAGNOSIS — N13.8 BPH WITH OBSTRUCTION/LOWER URINARY TRACT SYMPTOMS: ICD-10-CM

## 2024-11-27 DIAGNOSIS — N20.0 RENAL CALCULUS: ICD-10-CM

## 2024-11-27 DIAGNOSIS — R31.0 GROSS HEMATURIA: Primary | ICD-10-CM

## 2024-11-27 DIAGNOSIS — N40.1 BPH WITH OBSTRUCTION/LOWER URINARY TRACT SYMPTOMS: ICD-10-CM

## 2024-11-27 PROCEDURE — 52000 CYSTOURETHROSCOPY: CPT | Performed by: UROLOGY

## 2024-11-27 NOTE — PROGRESS NOTES
During cystoscopy the following was utilized on patient with no adverse affects:    45% SODIUM CHLORIDE 500 ML BAG  Lot number: [id number]  Expiration date: 09/25      LIDOCAINE HYDROCHLORIDE JELLY 2%   Lot number: [id number]  Expiration date: 04/26    Cystoscope  LOT [id number]  Exp 06/24/2027

## 2024-12-30 RX ORDER — CLOPIDOGREL BISULFATE 75 MG/1
75 TABLET ORAL DAILY
Qty: 90 TABLET | Refills: 1 | Status: SHIPPED | OUTPATIENT
Start: 2024-12-30

## 2025-01-02 DIAGNOSIS — D50.8 IRON DEFICIENCY ANEMIA SECONDARY TO INADEQUATE DIETARY IRON INTAKE: Primary | ICD-10-CM

## 2025-01-06 ENCOUNTER — HOSPITAL ENCOUNTER (OUTPATIENT)
Age: 80
Setting detail: SPECIMEN
Discharge: HOME | End: 2025-01-06
Payer: MEDICARE

## 2025-01-06 DIAGNOSIS — D50.8 IRON DEFICIENCY ANEMIA SECONDARY TO INADEQUATE DIETARY IRON INTAKE: ICD-10-CM

## 2025-01-06 LAB
ALBUMIN SERPL-MCNC: 4 G/DL (ref 3.5–5.2)
ALBUMIN/GLOB SERPL: 1.8 {RATIO} (ref 1–2.5)
ALP SERPL-CCNC: 66 U/L (ref 40–129)
ALT SERPL-CCNC: 13 U/L (ref 10–50)
ANION GAP SERPL CALCULATED.3IONS-SCNC: 16 MMOL/L (ref 9–16)
AST SERPL-CCNC: 17 U/L (ref 10–50)
BILIRUB SERPL-MCNC: 0.3 MG/DL (ref 0–1.2)
BUN SERPL-MCNC: 64 MG/DL (ref 8–23)
BUN/CREAT SERPL: 9 (ref 9–20)
CALCIUM SERPL-MCNC: 9.2 MG/DL (ref 8.6–10.4)
CHLORIDE SERPL-SCNC: 99 MMOL/L (ref 98–107)
CO2 SERPL-SCNC: 23 MMOL/L (ref 20–31)
CREAT SERPL-MCNC: 7.4 MG/DL (ref 0.7–1.2)
FERRITIN SERPL-MCNC: 1312 NG/ML
GFR, ESTIMATED: 7 ML/MIN/1.73M2
GLUCOSE SERPL-MCNC: 145 MG/DL (ref 74–99)
IRON SATN MFR SERPL: 28 % (ref 20–55)
IRON SERPL-MCNC: 56 UG/DL (ref 61–157)
POTASSIUM SERPL-SCNC: 3.4 MMOL/L (ref 3.7–5.3)
PROT SERPL-MCNC: 6.2 G/DL (ref 6.6–8.7)
SODIUM SERPL-SCNC: 138 MMOL/L (ref 136–145)
TIBC SERPL-MCNC: 198 UG/DL (ref 250–450)
UNSATURATED IRON BINDING CAPACITY: 142 UG/DL (ref 112–347)

## 2025-01-06 PROCEDURE — 82728 ASSAY OF FERRITIN: CPT

## 2025-01-06 PROCEDURE — 83550 IRON BINDING TEST: CPT

## 2025-01-06 PROCEDURE — 83540 ASSAY OF IRON: CPT

## 2025-01-06 PROCEDURE — 80053 COMPREHEN METABOLIC PANEL: CPT

## 2025-01-07 ENCOUNTER — OFFICE VISIT (OUTPATIENT)
Dept: PRIMARY CARE CLINIC | Age: 80
End: 2025-01-07
Payer: MEDICARE

## 2025-01-07 VITALS
HEART RATE: 88 BPM | HEIGHT: 69 IN | SYSTOLIC BLOOD PRESSURE: 136 MMHG | OXYGEN SATURATION: 97 % | DIASTOLIC BLOOD PRESSURE: 76 MMHG | WEIGHT: 199 LBS | BODY MASS INDEX: 29.47 KG/M2

## 2025-01-07 DIAGNOSIS — N18.6 TYPE 2 DIABETES MELLITUS WITH ESRD (END-STAGE RENAL DISEASE) (HCC): ICD-10-CM

## 2025-01-07 DIAGNOSIS — I50.33 ACUTE ON CHRONIC DIASTOLIC (CONGESTIVE) HEART FAILURE (HCC): ICD-10-CM

## 2025-01-07 DIAGNOSIS — E11.22 TYPE 2 DIABETES MELLITUS WITH ESRD (END-STAGE RENAL DISEASE) (HCC): ICD-10-CM

## 2025-01-07 DIAGNOSIS — G90.3 NEUROGENIC ORTHOSTATIC HYPOTENSION (HCC): ICD-10-CM

## 2025-01-07 DIAGNOSIS — G90.1 DYSAUTONOMIA (HCC): ICD-10-CM

## 2025-01-07 DIAGNOSIS — I47.29 NSVT (NONSUSTAINED VENTRICULAR TACHYCARDIA) (HCC): ICD-10-CM

## 2025-01-07 DIAGNOSIS — Z00.00 MEDICARE ANNUAL WELLNESS VISIT, SUBSEQUENT: Primary | ICD-10-CM

## 2025-01-07 PROCEDURE — 3078F DIAST BP <80 MM HG: CPT | Performed by: STUDENT IN AN ORGANIZED HEALTH CARE EDUCATION/TRAINING PROGRAM

## 2025-01-07 PROCEDURE — 1036F TOBACCO NON-USER: CPT | Performed by: STUDENT IN AN ORGANIZED HEALTH CARE EDUCATION/TRAINING PROGRAM

## 2025-01-07 PROCEDURE — 1160F RVW MEDS BY RX/DR IN RCRD: CPT | Performed by: STUDENT IN AN ORGANIZED HEALTH CARE EDUCATION/TRAINING PROGRAM

## 2025-01-07 PROCEDURE — G0439 PPPS, SUBSEQ VISIT: HCPCS | Performed by: STUDENT IN AN ORGANIZED HEALTH CARE EDUCATION/TRAINING PROGRAM

## 2025-01-07 PROCEDURE — 3075F SYST BP GE 130 - 139MM HG: CPT | Performed by: STUDENT IN AN ORGANIZED HEALTH CARE EDUCATION/TRAINING PROGRAM

## 2025-01-07 PROCEDURE — G8427 DOCREV CUR MEDS BY ELIG CLIN: HCPCS | Performed by: STUDENT IN AN ORGANIZED HEALTH CARE EDUCATION/TRAINING PROGRAM

## 2025-01-07 PROCEDURE — 1159F MED LIST DOCD IN RCRD: CPT | Performed by: STUDENT IN AN ORGANIZED HEALTH CARE EDUCATION/TRAINING PROGRAM

## 2025-01-07 PROCEDURE — G8417 CALC BMI ABV UP PARAM F/U: HCPCS | Performed by: STUDENT IN AN ORGANIZED HEALTH CARE EDUCATION/TRAINING PROGRAM

## 2025-01-07 PROCEDURE — 99214 OFFICE O/P EST MOD 30 MIN: CPT | Performed by: STUDENT IN AN ORGANIZED HEALTH CARE EDUCATION/TRAINING PROGRAM

## 2025-01-07 PROCEDURE — 1123F ACP DISCUSS/DSCN MKR DOCD: CPT | Performed by: STUDENT IN AN ORGANIZED HEALTH CARE EDUCATION/TRAINING PROGRAM

## 2025-01-07 PROCEDURE — M1308 PR FLU IMMUNIZE NO ADMIN: HCPCS | Performed by: STUDENT IN AN ORGANIZED HEALTH CARE EDUCATION/TRAINING PROGRAM

## 2025-01-07 RX ORDER — BLOOD SUGAR DIAGNOSTIC
1 STRIP MISCELLANEOUS DAILY
Qty: 100 EACH | Refills: 3 | Status: SHIPPED | OUTPATIENT
Start: 2025-01-07

## 2025-01-07 RX ORDER — INSULIN GLARGINE 100 [IU]/ML
INJECTION, SOLUTION SUBCUTANEOUS
Qty: 54 ML | Refills: 3 | Status: SHIPPED | OUTPATIENT
Start: 2025-01-07

## 2025-01-07 ASSESSMENT — PATIENT HEALTH QUESTIONNAIRE - PHQ9
5. POOR APPETITE OR OVEREATING: NOT AT ALL
1. LITTLE INTEREST OR PLEASURE IN DOING THINGS: NEARLY EVERY DAY
SUM OF ALL RESPONSES TO PHQ QUESTIONS 1-9: 8
9. THOUGHTS THAT YOU WOULD BE BETTER OFF DEAD, OR OF HURTING YOURSELF: NOT AT ALL
SUM OF ALL RESPONSES TO PHQ QUESTIONS 1-9: 8
7. TROUBLE CONCENTRATING ON THINGS, SUCH AS READING THE NEWSPAPER OR WATCHING TELEVISION: NOT AT ALL
3. TROUBLE FALLING OR STAYING ASLEEP: NEARLY EVERY DAY
SUM OF ALL RESPONSES TO PHQ9 QUESTIONS 1 & 2: 3
6. FEELING BAD ABOUT YOURSELF - OR THAT YOU ARE A FAILURE OR HAVE LET YOURSELF OR YOUR FAMILY DOWN: NOT AT ALL
SUM OF ALL RESPONSES TO PHQ QUESTIONS 1-9: 8
8. MOVING OR SPEAKING SO SLOWLY THAT OTHER PEOPLE COULD HAVE NOTICED. OR THE OPPOSITE, BEING SO FIGETY OR RESTLESS THAT YOU HAVE BEEN MOVING AROUND A LOT MORE THAN USUAL: NOT AT ALL
2. FEELING DOWN, DEPRESSED OR HOPELESS: NOT AT ALL
4. FEELING TIRED OR HAVING LITTLE ENERGY: MORE THAN HALF THE DAYS
10. IF YOU CHECKED OFF ANY PROBLEMS, HOW DIFFICULT HAVE THESE PROBLEMS MADE IT FOR YOU TO DO YOUR WORK, TAKE CARE OF THINGS AT HOME, OR GET ALONG WITH OTHER PEOPLE: NOT DIFFICULT AT ALL
SUM OF ALL RESPONSES TO PHQ QUESTIONS 1-9: 8

## 2025-01-07 ASSESSMENT — LIFESTYLE VARIABLES: HOW OFTEN DO YOU HAVE A DRINK CONTAINING ALCOHOL: NEVER

## 2025-01-07 NOTE — PROGRESS NOTES
Services Due: see orders and patient instructions/AVS.  Recommended screening schedule for the next 5-10 years is provided to the patient in written form: see Patient Instructions/AVS.     Reviewed and updated this visit:  Tobacco  Allergies  Meds  Problems  Med Hx  Surg Hx  Soc Hx  Fam Hx        Electronically signed by Himanshu Burrell MD on 1/7/2025 at 2:15 PM

## 2025-01-09 ENCOUNTER — OFFICE VISIT (OUTPATIENT)
Dept: ONCOLOGY | Age: 80
End: 2025-01-09
Payer: MEDICARE

## 2025-01-09 VITALS
RESPIRATION RATE: 18 BRPM | BODY MASS INDEX: 29.74 KG/M2 | HEART RATE: 69 BPM | WEIGHT: 200.8 LBS | DIASTOLIC BLOOD PRESSURE: 64 MMHG | SYSTOLIC BLOOD PRESSURE: 160 MMHG | HEIGHT: 69 IN | TEMPERATURE: 97.2 F

## 2025-01-09 DIAGNOSIS — Z99.2 ANEMIA DUE TO CHRONIC KIDNEY DISEASE, ON CHRONIC DIALYSIS (HCC): ICD-10-CM

## 2025-01-09 DIAGNOSIS — R79.89 HIGH SERUM FERRITIN: ICD-10-CM

## 2025-01-09 DIAGNOSIS — N18.6 ESRD (END STAGE RENAL DISEASE) (HCC): Primary | ICD-10-CM

## 2025-01-09 DIAGNOSIS — N18.6 ANEMIA DUE TO CHRONIC KIDNEY DISEASE, ON CHRONIC DIALYSIS (HCC): ICD-10-CM

## 2025-01-09 DIAGNOSIS — D63.1 ANEMIA DUE TO CHRONIC KIDNEY DISEASE, ON CHRONIC DIALYSIS (HCC): ICD-10-CM

## 2025-01-09 DIAGNOSIS — D64.9 NORMOCYTIC NORMOCHROMIC ANEMIA: ICD-10-CM

## 2025-01-09 PROBLEM — N18.9 ANEMIA DUE TO CHRONIC KIDNEY DISEASE: Status: ACTIVE | Noted: 2025-01-09

## 2025-01-09 PROCEDURE — G8417 CALC BMI ABV UP PARAM F/U: HCPCS | Performed by: INTERNAL MEDICINE

## 2025-01-09 PROCEDURE — 1126F AMNT PAIN NOTED NONE PRSNT: CPT | Performed by: INTERNAL MEDICINE

## 2025-01-09 PROCEDURE — 3078F DIAST BP <80 MM HG: CPT | Performed by: INTERNAL MEDICINE

## 2025-01-09 PROCEDURE — 1123F ACP DISCUSS/DSCN MKR DOCD: CPT | Performed by: INTERNAL MEDICINE

## 2025-01-09 PROCEDURE — 1159F MED LIST DOCD IN RCRD: CPT | Performed by: INTERNAL MEDICINE

## 2025-01-09 PROCEDURE — 1036F TOBACCO NON-USER: CPT | Performed by: INTERNAL MEDICINE

## 2025-01-09 PROCEDURE — G8427 DOCREV CUR MEDS BY ELIG CLIN: HCPCS | Performed by: INTERNAL MEDICINE

## 2025-01-09 PROCEDURE — 99211 OFF/OP EST MAY X REQ PHY/QHP: CPT | Performed by: INTERNAL MEDICINE

## 2025-01-09 PROCEDURE — 99214 OFFICE O/P EST MOD 30 MIN: CPT | Performed by: INTERNAL MEDICINE

## 2025-01-09 PROCEDURE — 3077F SYST BP >= 140 MM HG: CPT | Performed by: INTERNAL MEDICINE

## 2025-01-21 RX ORDER — METOPROLOL SUCCINATE 50 MG/1
50 TABLET, EXTENDED RELEASE ORAL DAILY
Qty: 90 TABLET | Refills: 0 | Status: SHIPPED | OUTPATIENT
Start: 2025-01-21

## 2025-02-04 ENCOUNTER — NURSE ONLY (OUTPATIENT)
Dept: CARDIOLOGY | Age: 80
End: 2025-02-04
Payer: MEDICARE

## 2025-02-04 DIAGNOSIS — Z95.818 IMPLANTABLE LOOP RECORDER PRESENT: Primary | ICD-10-CM

## 2025-02-04 DIAGNOSIS — I63.9 CRYPTOGENIC STROKE (HCC): ICD-10-CM

## 2025-02-04 PROCEDURE — 93298 REM INTERROG DEV EVAL SCRMS: CPT | Performed by: FAMILY MEDICINE

## 2025-02-06 PROBLEM — Z00.00 MEDICARE ANNUAL WELLNESS VISIT, SUBSEQUENT: Status: RESOLVED | Noted: 2025-01-07 | Resolved: 2025-02-06

## 2025-02-10 ENCOUNTER — APPOINTMENT (OUTPATIENT)
Dept: GENERAL RADIOLOGY | Age: 80
End: 2025-02-10
Payer: MEDICARE

## 2025-02-10 ENCOUNTER — APPOINTMENT (OUTPATIENT)
Dept: CT IMAGING | Age: 80
End: 2025-02-10
Payer: MEDICARE

## 2025-02-10 ENCOUNTER — HOSPITAL ENCOUNTER (EMERGENCY)
Age: 80
Discharge: ANOTHER HOSPITAL | End: 2025-02-11
Attending: EMERGENCY MEDICINE
Payer: MEDICARE

## 2025-02-10 DIAGNOSIS — J18.9 PNEUMONIA OF BOTH LOWER LOBES DUE TO INFECTIOUS ORGANISM: Primary | ICD-10-CM

## 2025-02-10 DIAGNOSIS — A41.9 SEPTICEMIA (HCC): ICD-10-CM

## 2025-02-10 LAB
ALBUMIN SERPL-MCNC: 4.2 G/DL (ref 3.5–5.2)
ALBUMIN/GLOB SERPL: 1.4 {RATIO} (ref 1–2.5)
ALP SERPL-CCNC: 70 U/L (ref 40–129)
ALT SERPL-CCNC: 18 U/L (ref 10–50)
ANION GAP SERPL CALCULATED.3IONS-SCNC: 18 MMOL/L (ref 9–16)
AST SERPL-CCNC: 28 U/L (ref 10–50)
BACTERIA URNS QL MICRO: ABNORMAL
BASOPHILS # BLD: 0.05 K/UL (ref 0–0.2)
BASOPHILS NFR BLD: 0 % (ref 0–2)
BILIRUB DIRECT SERPL-MCNC: 0.2 MG/DL (ref 0–0.3)
BILIRUB INDIRECT SERPL-MCNC: 0.2 MG/DL (ref 0–1)
BILIRUB SERPL-MCNC: 0.5 MG/DL (ref 0–1.2)
BILIRUB UR QL STRIP: NEGATIVE
BUN SERPL-MCNC: 78 MG/DL (ref 8–23)
BUN/CREAT SERPL: 12 (ref 9–20)
CALCIUM SERPL-MCNC: 8.2 MG/DL (ref 8.6–10.4)
CHLORIDE SERPL-SCNC: 96 MMOL/L (ref 98–107)
CLARITY UR: ABNORMAL
CO2 SERPL-SCNC: 22 MMOL/L (ref 20–31)
COLOR UR: ABNORMAL
CREAT SERPL-MCNC: 6.5 MG/DL (ref 0.7–1.2)
EOSINOPHIL # BLD: 0.04 K/UL (ref 0–0.44)
EOSINOPHILS RELATIVE PERCENT: 0 % (ref 1–4)
EPI CELLS #/AREA URNS HPF: ABNORMAL /HPF (ref 0–5)
ERYTHROCYTE [DISTWIDTH] IN BLOOD BY AUTOMATED COUNT: 14.9 % (ref 11.8–14.4)
FLUAV AG SPEC QL: NEGATIVE
FLUBV AG SPEC QL: NEGATIVE
GFR, ESTIMATED: 8 ML/MIN/1.73M2
GLUCOSE SERPL-MCNC: 254 MG/DL (ref 74–99)
GLUCOSE UR STRIP-MCNC: ABNORMAL MG/DL
HCT VFR BLD AUTO: 32.8 % (ref 40.7–50.3)
HGB BLD-MCNC: 11.1 G/DL (ref 13–17)
HGB UR QL STRIP.AUTO: ABNORMAL
IMM GRANULOCYTES # BLD AUTO: 0.1 K/UL (ref 0–0.3)
IMM GRANULOCYTES NFR BLD: 1 %
INR PPP: 1.2
KETONES UR STRIP-MCNC: NEGATIVE MG/DL
LACTATE BLDV-SCNC: 1.2 MMOL/L (ref 0.5–1.9)
LACTATE BLDV-SCNC: 1.7 MMOL/L (ref 0.5–1.9)
LEUKOCYTE ESTERASE UR QL STRIP: NEGATIVE
LIPASE SERPL-CCNC: 24 U/L (ref 13–60)
LYMPHOCYTES NFR BLD: 1.68 K/UL (ref 1.1–3.7)
LYMPHOCYTES RELATIVE PERCENT: 11 % (ref 24–43)
MAGNESIUM SERPL-MCNC: 2.2 MG/DL (ref 1.6–2.4)
MCH RBC QN AUTO: 33.2 PG (ref 25.2–33.5)
MCHC RBC AUTO-ENTMCNC: 33.8 G/DL (ref 28.4–34.8)
MCV RBC AUTO: 98.2 FL (ref 82.6–102.9)
MONOCYTES NFR BLD: 1.2 K/UL (ref 0.1–1.2)
MONOCYTES NFR BLD: 8 % (ref 3–12)
NEUTROPHILS NFR BLD: 80 % (ref 36–65)
NEUTS SEG NFR BLD: 12.92 K/UL (ref 1.5–8.1)
NITRITE UR QL STRIP: NEGATIVE
NRBC BLD-RTO: 0 PER 100 WBC
PARTIAL THROMBOPLASTIN TIME: 51.8 SEC (ref 26.8–34.8)
PH UR STRIP: 6 [PH] (ref 5–9)
PHOSPHATE SERPL-MCNC: 6.3 MG/DL (ref 2.5–4.5)
PLATELET # BLD AUTO: 177 K/UL (ref 138–453)
PMV BLD AUTO: 10.4 FL (ref 8.1–13.5)
POTASSIUM SERPL-SCNC: 4.2 MMOL/L (ref 3.7–5.3)
PROT SERPL-MCNC: 7.2 G/DL (ref 6.6–8.7)
PROT UR STRIP-MCNC: ABNORMAL MG/DL
PROTHROMBIN TIME: 14.5 SEC (ref 11.7–14.1)
RBC # BLD AUTO: 3.34 M/UL (ref 4.21–5.77)
RBC #/AREA URNS HPF: ABNORMAL /HPF (ref 0–2)
SARS-COV-2 RDRP RESP QL NAA+PROBE: DETECTED
SODIUM SERPL-SCNC: 136 MMOL/L (ref 136–145)
SP GR UR STRIP: 1.02 (ref 1.01–1.02)
SPECIMEN DESCRIPTION: ABNORMAL
UROBILINOGEN UR STRIP-ACNC: NORMAL EU/DL (ref 0–1)
WBC #/AREA URNS HPF: ABNORMAL /HPF (ref 0–5)
WBC OTHER # BLD: 16 K/UL (ref 3.5–11.3)

## 2025-02-10 PROCEDURE — 74176 CT ABD & PELVIS W/O CONTRAST: CPT

## 2025-02-10 PROCEDURE — 94664 DEMO&/EVAL PT USE INHALER: CPT

## 2025-02-10 PROCEDURE — 87804 INFLUENZA ASSAY W/OPTIC: CPT

## 2025-02-10 PROCEDURE — 96375 TX/PRO/DX INJ NEW DRUG ADDON: CPT

## 2025-02-10 PROCEDURE — 80076 HEPATIC FUNCTION PANEL: CPT

## 2025-02-10 PROCEDURE — 84100 ASSAY OF PHOSPHORUS: CPT

## 2025-02-10 PROCEDURE — 93005 ELECTROCARDIOGRAM TRACING: CPT | Performed by: EMERGENCY MEDICINE

## 2025-02-10 PROCEDURE — 85025 COMPLETE CBC W/AUTO DIFF WBC: CPT

## 2025-02-10 PROCEDURE — 83605 ASSAY OF LACTIC ACID: CPT

## 2025-02-10 PROCEDURE — 87040 BLOOD CULTURE FOR BACTERIA: CPT

## 2025-02-10 PROCEDURE — 87086 URINE CULTURE/COLONY COUNT: CPT

## 2025-02-10 PROCEDURE — 99285 EMERGENCY DEPT VISIT HI MDM: CPT

## 2025-02-10 PROCEDURE — 80048 BASIC METABOLIC PNL TOTAL CA: CPT

## 2025-02-10 PROCEDURE — 6370000000 HC RX 637 (ALT 250 FOR IP): Performed by: EMERGENCY MEDICINE

## 2025-02-10 PROCEDURE — 2580000003 HC RX 258: Performed by: EMERGENCY MEDICINE

## 2025-02-10 PROCEDURE — 96366 THER/PROPH/DIAG IV INF ADDON: CPT

## 2025-02-10 PROCEDURE — 2500000003 HC RX 250 WO HCPCS: Performed by: EMERGENCY MEDICINE

## 2025-02-10 PROCEDURE — 81001 URINALYSIS AUTO W/SCOPE: CPT

## 2025-02-10 PROCEDURE — 6370000000 HC RX 637 (ALT 250 FOR IP): Performed by: STUDENT IN AN ORGANIZED HEALTH CARE EDUCATION/TRAINING PROGRAM

## 2025-02-10 PROCEDURE — 71045 X-RAY EXAM CHEST 1 VIEW: CPT

## 2025-02-10 PROCEDURE — 96365 THER/PROPH/DIAG IV INF INIT: CPT

## 2025-02-10 PROCEDURE — 83690 ASSAY OF LIPASE: CPT

## 2025-02-10 PROCEDURE — 94640 AIRWAY INHALATION TREATMENT: CPT

## 2025-02-10 PROCEDURE — 87635 SARS-COV-2 COVID-19 AMP PRB: CPT

## 2025-02-10 PROCEDURE — 6360000002 HC RX W HCPCS: Performed by: EMERGENCY MEDICINE

## 2025-02-10 PROCEDURE — 83735 ASSAY OF MAGNESIUM: CPT

## 2025-02-10 PROCEDURE — 85730 THROMBOPLASTIN TIME PARTIAL: CPT

## 2025-02-10 PROCEDURE — 85610 PROTHROMBIN TIME: CPT

## 2025-02-10 RX ORDER — IPRATROPIUM BROMIDE AND ALBUTEROL SULFATE 2.5; .5 MG/3ML; MG/3ML
1 SOLUTION RESPIRATORY (INHALATION) ONCE
Status: COMPLETED | OUTPATIENT
Start: 2025-02-10 | End: 2025-02-10

## 2025-02-10 RX ADMIN — IPRATROPIUM BROMIDE AND ALBUTEROL SULFATE 1 DOSE: 2.5; .5 SOLUTION RESPIRATORY (INHALATION) at 19:39

## 2025-02-10 RX ADMIN — AZITHROMYCIN MONOHYDRATE 500 MG: 500 INJECTION, POWDER, LYOPHILIZED, FOR SOLUTION INTRAVENOUS at 15:37

## 2025-02-10 RX ADMIN — WATER 1000 MG: 1 INJECTION INTRAMUSCULAR; INTRAVENOUS; SUBCUTANEOUS at 15:30

## 2025-02-10 RX ADMIN — IPRATROPIUM BROMIDE AND ALBUTEROL SULFATE 1 DOSE: .5; 2.5 SOLUTION RESPIRATORY (INHALATION) at 17:02

## 2025-02-10 ASSESSMENT — PAIN - FUNCTIONAL ASSESSMENT: PAIN_FUNCTIONAL_ASSESSMENT: 0-10

## 2025-02-10 ASSESSMENT — PAIN DESCRIPTION - LOCATION: LOCATION: ABDOMEN

## 2025-02-10 ASSESSMENT — LIFESTYLE VARIABLES
HOW OFTEN DO YOU HAVE A DRINK CONTAINING ALCOHOL: NEVER
HOW MANY STANDARD DRINKS CONTAINING ALCOHOL DO YOU HAVE ON A TYPICAL DAY: PATIENT DOES NOT DRINK

## 2025-02-10 ASSESSMENT — PAIN DESCRIPTION - ORIENTATION: ORIENTATION: LOWER

## 2025-02-10 ASSESSMENT — PAIN SCALES - GENERAL: PAINLEVEL_OUTOF10: 8

## 2025-02-10 NOTE — ED NOTES
Pt accepted at [facility], waiting for bed. They are full and not sure when they will have an open bed.  May be tonight or tomorrow

## 2025-02-10 NOTE — ED PROVIDER NOTES
EMERGENCY DEPARTMENT ENCOUNTER    Pt Name: Janelle Jimenez  MRN: [id number]  Birthdate 1945  Date of evaluation: 2/10/25  CHIEF COMPLAINT       Chief Complaint   Patient presents with    Cough    Vomiting    Nausea    Diarrhea    Shortness of Breath     Pt states N/V/D, SOB, Cough, weakness, and fevers starting Friday. Pt states dialysis pt and canceled appt for today d/t weakness. Pt states dialysis days are Monday & Friday.     Fever    Fatigue     HISTORY OF PRESENT ILLNESS   This is a 79-year-old male who presents emergency department with complaints of nausea and vomiting, some generalized weakness, cough, shortness of breath and just generally not feeling well.  Patient states that he started feeling ill on Friday, he was so weak today that he canceled his dialysis appointment today.  Patient describes the symptoms as severe.           REVIEW OF SYSTEMS     Review of Systems  PASTMEDICAL HISTORY     Past Medical History:   Diagnosis Date    Abnormal tilt table test 02/23/2016    Acute kidney injury ([facility]CC)     Acute MI ([facility])     Acute renal failure superimposed on stage 4 chronic kidney disease ([facility]) 10/02/2018    ssecondary to hemodynamic effects of loop diuretics and ace inhibitors, bbaseline 1.2-1.3 which peaked up to 1.8, resolving    Acute renal failure with tubular necrosis ([facility]) 10/02/2018    ssecondary to hemodynamic effects of loop diuretics and ace inhibitors, bbaseline 1.2-1.3 which peaked up to 1.8, resolving    Anemia     Anemia in stage 4 chronic kidney disease ([facility]) 05/21/2019    Also from suspected blood loss    Angina, class II ([facility]) 01/08/2018    Bell's palsy 04/2023    CAD (coronary artery disease)     Cerebral artery occlusion with cerebral infarction ([facility])     CHF (congestive heart failure) ([facility])     Cholecystitis 08/17/2015    Chronic back pain     Chronic diastolic HF (heart failure), NYHA class 3 ([facility]) 04/24/2017    Chronic kidney disease, stage III (moderate) ([facility]) 02/22/2016

## 2025-02-11 ENCOUNTER — APPOINTMENT (OUTPATIENT)
Dept: GENERAL RADIOLOGY | Age: 80
End: 2025-02-11
Payer: MEDICARE

## 2025-02-11 VITALS
SYSTOLIC BLOOD PRESSURE: 123 MMHG | WEIGHT: 200 LBS | RESPIRATION RATE: 20 BRPM | OXYGEN SATURATION: 97 % | DIASTOLIC BLOOD PRESSURE: 41 MMHG | BODY MASS INDEX: 29.62 KG/M2 | HEIGHT: 69 IN | TEMPERATURE: 98 F | HEART RATE: 70 BPM

## 2025-02-11 LAB
ARTERIAL PATENCY WRIST A: ABNORMAL
BODY TEMPERATURE: 37
EKG ATRIAL RATE: 71 BPM
EKG ATRIAL RATE: 78 BPM
EKG P AXIS: 43 DEGREES
EKG P AXIS: 48 DEGREES
EKG P-R INTERVAL: 212 MS
EKG P-R INTERVAL: 216 MS
EKG Q-T INTERVAL: 398 MS
EKG Q-T INTERVAL: 432 MS
EKG QRS DURATION: 88 MS
EKG QRS DURATION: 92 MS
EKG QTC CALCULATION (BAZETT): 453 MS
EKG QTC CALCULATION (BAZETT): 469 MS
EKG R AXIS: 15 DEGREES
EKG R AXIS: 8 DEGREES
EKG T AXIS: 68 DEGREES
EKG T AXIS: 75 DEGREES
EKG VENTRICULAR RATE: 71 BPM
EKG VENTRICULAR RATE: 78 BPM
FIO2 ON VENT: 30 %
GAS FLOW.O2 O2 DELIVERY SYS: ABNORMAL L/MIN
HCO3 VENOUS: 20.1 MMOL/L (ref 24–30)
NEGATIVE BASE EXCESS, VEN: 4.6 MMOL/L (ref 0–2)
O2 SAT, VEN: 87.9 % (ref 60–85)
PCO2 VENOUS: 36.5 MM HG (ref 39–55)
PCO2, VEN, TEMP ADJ: 36.5 MMHG (ref 39–55)
PH VENOUS: 7.36 (ref 7.32–7.42)
PH, VEN, TEMP ADJ: 7.36 (ref 7.32–7.42)
PO2 VENOUS: 55.4 MM HG (ref 30–50)
PO2, VEN, TEMP ADJ: 55.4 MMHG (ref 30–50)
TROPONIN I SERPL HS-MCNC: 96 NG/L (ref 0–22)
TROPONIN I SERPL HS-MCNC: 98 NG/L (ref 0–22)

## 2025-02-11 PROCEDURE — 93010 ELECTROCARDIOGRAM REPORT: CPT | Performed by: INTERNAL MEDICINE

## 2025-02-11 PROCEDURE — 6370000000 HC RX 637 (ALT 250 FOR IP): Performed by: EMERGENCY MEDICINE

## 2025-02-11 PROCEDURE — 93005 ELECTROCARDIOGRAM TRACING: CPT | Performed by: EMERGENCY MEDICINE

## 2025-02-11 PROCEDURE — 6370000000 HC RX 637 (ALT 250 FOR IP): Performed by: STUDENT IN AN ORGANIZED HEALTH CARE EDUCATION/TRAINING PROGRAM

## 2025-02-11 PROCEDURE — 84484 ASSAY OF TROPONIN QUANT: CPT

## 2025-02-11 PROCEDURE — 82805 BLOOD GASES W/O2 SATURATION: CPT

## 2025-02-11 PROCEDURE — 94640 AIRWAY INHALATION TREATMENT: CPT

## 2025-02-11 PROCEDURE — 71045 X-RAY EXAM CHEST 1 VIEW: CPT

## 2025-02-11 RX ORDER — IPRATROPIUM BROMIDE AND ALBUTEROL SULFATE 2.5; .5 MG/3ML; MG/3ML
1 SOLUTION RESPIRATORY (INHALATION) ONCE
Status: COMPLETED | OUTPATIENT
Start: 2025-02-11 | End: 2025-02-11

## 2025-02-11 RX ADMIN — IPRATROPIUM BROMIDE AND ALBUTEROL SULFATE 1 DOSE: 2.5; .5 SOLUTION RESPIRATORY (INHALATION) at 01:40

## 2025-02-11 RX ADMIN — IPRATROPIUM BROMIDE AND ALBUTEROL SULFATE 1 DOSE: 2.5; .5 SOLUTION RESPIRATORY (INHALATION) at 07:43

## 2025-02-11 NOTE — PROGRESS NOTES
Writer placed pt on room air at this time due to pt requesting to have a break. Pt is does not currently seem to be experiencing shortness of breath at this time. Care on going. Will continue to monitor.

## 2025-02-11 NOTE — ED NOTES
Contacted [facility] requesting they excalate case for pt to receive bed at [facility].  Per Dr. Servin, pt's condition is worsening.

## 2025-02-11 NOTE — ED NOTES
Adult Non-Invasive Positive Pressure Ventilation for Acute Respiratory Distress  Patient & Family Education Note     Patient: Janelle Jimenez  Age: 79 y.o.     The patient and/or family has been educated on the following items and have verbalized understanding and agreement:    [x]Patient and/or family have been educated on the purpose and advantages of NIV and have verbalized understanding and agreement.  [x]Patient and/or family is in agreement that the patient will be NPO (Nothing by Mouth) for the duration of NIV use.  [x]Patient and/or  family is in agreement that NIV will not be routinely disrupted except to complete oral care.  [x]Patient and/or family have been educated on the level of care, vital signs frequency and monitoring requirements for NIV and are in agreement.  [x]Patient and/or family have been educated on reporting any nausea, chest discomfort, sudden increase in shortness of breath, or a severe headache to the staff immediately.

## 2025-02-11 NOTE — ED NOTES
Received call from [facility] stating no bed available for this pt but hopefully later this morning

## 2025-02-11 NOTE — ED PROVIDER NOTES
7 AM patient's presentation evaluation investigative workup reviewed and discussed with Dr. Bridges emergency department attending    Reevaluation of patient at 750  Pulse oximeter was 91% room air      The patient endorsed that he missed his dialysis Monday last dialysis Friday    The patient is alert oriented hemodynamically stable    Exercise JVD trachea is midline    Per auscultation and expiratory wheezes noted cardiovascular regular rhythm extremities without edema    Patient did receive an additional DuoNeb unit dose aerosol    Will check a venous blood gases, repeat BMP/chest x-ray.EKG-will also obtain troponin now and timed for 1 hour      BiPAP to be initiated    An EKG which had been performed at 139 on 2/10/2025 first-degree AV block no ST segment elevation [place] interval 0.212 QRS duration point 088 QTc corrected point 453    Repeat EKG did not eat  -A ventricular rate 71, [place] interval 0.216, QRS duration point 092, QTc corrected point 469 no STEMI.  Patient does have a first-degree AV block    Troponin levels remain flat no delta change    Patient tolerated BiPAP well reexamination patient clear to auscultation    Respiratory therapy did evaluate the patient currently on room air pulse oximeter of 94% awaiting transfer      Critical care time 45 minutes as patient necessitated BiPAP administration       Dejon Servin MD  02/13/25 1133

## 2025-02-11 NOTE — ED NOTES
This writer attempted to call report on patient to [facility] with no answer, will attempt again.

## 2025-02-11 NOTE — ED NOTES
Patient asking about how long he will need to stay on Bi-pap machine, his writer has informed Dr. Servin about patient request.

## 2025-02-12 ENCOUNTER — TELEPHONE (OUTPATIENT)
Dept: PRIMARY CARE CLINIC | Age: 80
End: 2025-02-12

## 2025-02-12 LAB
MICROORGANISM SPEC CULT: NO GROWTH
SERVICE CMNT-IMP: NORMAL
SPECIMEN DESCRIPTION: NORMAL

## 2025-02-12 NOTE — TELEPHONE ENCOUNTER
Writer reached out to patient and wife answered the phone stating he is still in the hospital at [facility]. Writer let her know that we don't always find out when they are discharged from [facility]. She states she will call our office when he gets out to get an appt made with Dr. Burrell. Writer did state that we would reach back out also just in case she forgets.

## 2025-02-14 ENCOUNTER — TELEPHONE (OUTPATIENT)
Dept: PRIMARY CARE CLINIC | Age: 80
End: 2025-02-14

## 2025-02-14 NOTE — TELEPHONE ENCOUNTER
Wife states Melina said he had COVID and when they transferred him to [facility] it was negative. He was positive for RSV, influenza, and double lung pneumonia. Patient is still admitted to [facility] due to wheezing. We will check back on Monday.

## 2025-02-15 LAB
MICROORGANISM SPEC CULT: NORMAL
MICROORGANISM SPEC CULT: NORMAL
SERVICE CMNT-IMP: NORMAL
SERVICE CMNT-IMP: NORMAL
SPECIMEN DESCRIPTION: NORMAL
SPECIMEN DESCRIPTION: NORMAL

## 2025-02-20 ENCOUNTER — HOSPITAL ENCOUNTER (OUTPATIENT)
Dept: GENERAL RADIOLOGY | Age: 80
Discharge: HOME | End: 2025-02-22
Payer: MEDICARE

## 2025-02-20 ENCOUNTER — OFFICE VISIT (OUTPATIENT)
Dept: PRIMARY CARE CLINIC | Age: 80
End: 2025-02-20
Payer: MEDICARE

## 2025-02-20 ENCOUNTER — HOSPITAL ENCOUNTER (OUTPATIENT)
Age: 80
Discharge: HOME | End: 2025-02-22
Payer: MEDICARE

## 2025-02-20 VITALS
WEIGHT: 191 LBS | DIASTOLIC BLOOD PRESSURE: 44 MMHG | RESPIRATION RATE: 16 BRPM | OXYGEN SATURATION: 98 % | HEART RATE: 80 BPM | BODY MASS INDEX: 28.29 KG/M2 | HEIGHT: 69 IN | SYSTOLIC BLOOD PRESSURE: 112 MMHG

## 2025-02-20 DIAGNOSIS — J18.9 PNEUMONIA DUE TO INFECTIOUS ORGANISM, UNSPECIFIED LATERALITY, UNSPECIFIED PART OF LUNG: ICD-10-CM

## 2025-02-20 DIAGNOSIS — Z09 HOSPITAL DISCHARGE FOLLOW-UP: Primary | ICD-10-CM

## 2025-02-20 DIAGNOSIS — J20.5 RSV BRONCHITIS: ICD-10-CM

## 2025-02-20 DIAGNOSIS — Z09 HOSPITAL DISCHARGE FOLLOW-UP: ICD-10-CM

## 2025-02-20 PROCEDURE — 71046 X-RAY EXAM CHEST 2 VIEWS: CPT

## 2025-02-20 RX ORDER — VIT C/HESPERIDIN/BIOFLAVONOIDS 500-100 MG
TABLET ORAL
COMMUNITY

## 2025-02-20 RX ORDER — METHYLPREDNISOLONE 4 MG/1
TABLET ORAL
Qty: 1 KIT | Refills: 0 | Status: SHIPPED | OUTPATIENT
Start: 2025-02-20 | End: 2025-02-26

## 2025-02-20 RX ORDER — PREDNISONE 20 MG/1
TABLET ORAL
COMMUNITY
Start: 2025-02-17

## 2025-02-20 RX ORDER — DOXYCYCLINE HYCLATE 100 MG
100 TABLET ORAL 2 TIMES DAILY
Qty: 14 TABLET | Refills: 0 | Status: SHIPPED | OUTPATIENT
Start: 2025-02-20 | End: 2025-02-27

## 2025-02-20 RX ORDER — PREGABALIN 25 MG/1
CAPSULE ORAL
COMMUNITY

## 2025-02-20 RX ORDER — AMLODIPINE BESYLATE 10 MG/1
TABLET ORAL
COMMUNITY
Start: 2025-02-17 | End: 2025-02-20

## 2025-02-20 NOTE — PROGRESS NOTES
667 MG CAPS capsule Take 2 capsules by mouth 3 times daily      acyclovir (ZOVIRAX) 400 MG tablet Take 1 tablet by mouth 2 times daily      nitroGLYCERIN (NITROSTAT) 0.4 MG SL tablet Place 1 tablet under the tongue every 5 minutes as needed for Chest pain up to max of 3 total doses. If no relief after 1 dose, call 911. 25 tablet 3    torsemide (DEMADEX) 100 MG tablet Take 1 tablet by mouth daily      B Complex-C-Zn-Folic Acid (DIALYVITE/ZINC) TABS Take 1 tablet by mouth daily      Cholecalciferol (VITAMIN D) 50 MCG (2000 UT) CAPS capsule Take by mouth      famotidine (PEPCID) 20 MG tablet Take 1 tablet by mouth as needed      aspirin 81 MG EC tablet Take 1 tablet by mouth daily 30 tablet 3    midodrine (PROAMATINE) 10 MG tablet Take 1 tablet by mouth 3 times daily as needed (SBP < 100) 90 tablet 3    ondansetron (ZOFRAN-ODT) 4 MG disintegrating tablet Take 1 tablet by mouth every 8 hours as needed for Nausea or Vomiting 30 tablet 0    White Petrolatum-Mineral Oil (REFRESH LACRI-LUBE) OINT ointment Apply 0.5 inches to eye nightly as needed (Dry eyes) 1 each 0    latanoprost (XALATAN) 0.005 % ophthalmic solution Place 1 drop into both eyes nightly      prednisoLONE acetate (PRED FORTE) 1 % ophthalmic suspension Place 1 drop into the left eye daily          Medications patient taking as of now reconciled against medications ordered at time of hospital discharge: Yes    A comprehensive review of systems was negative except for what was noted in the HPI.    Objective:    BP (!) 112/44   Resp 16   Ht 1.753 m (5' 9\")   Wt 86.6 kg (191 lb)   BMI 28.21 kg/m²   General Appearance: alert and oriented to person, place and time, well developed and well- nourished, in no acute distress  Skin: warm and dry, no rash or erythema  Head: normocephalic and atraumatic  Eyes: pupils equal, round, and reactive to light, extraocular eye movements intact, conjunctivae normal  ENT: tympanic membrane, external ear and ear canal normal

## 2025-02-21 ENCOUNTER — HOSPITAL ENCOUNTER (OUTPATIENT)
Age: 80
Setting detail: SPECIMEN
Discharge: HOME | End: 2025-02-21
Payer: MEDICARE

## 2025-02-21 LAB
BASOPHILS # BLD: 0 K/UL (ref 0–0.2)
BASOPHILS NFR BLD: 0 % (ref 0–2)
CRP SERPL HS-MCNC: 10.5 MG/L (ref 0–5)
EOSINOPHIL # BLD: 0 K/UL (ref 0–0.44)
EOSINOPHILS RELATIVE PERCENT: 0 % (ref 1–4)
ERYTHROCYTE [DISTWIDTH] IN BLOOD BY AUTOMATED COUNT: 15.5 % (ref 11.8–14.4)
ERYTHROCYTE [SEDIMENTATION RATE] IN BLOOD BY PHOTOMETRIC METHOD: 19 MM/HR (ref 0–20)
HCT VFR BLD AUTO: 28.5 % (ref 40.7–50.3)
HGB BLD-MCNC: 9.8 G/DL (ref 13–17)
IMM GRANULOCYTES # BLD AUTO: 0.32 K/UL (ref 0–0.3)
IMM GRANULOCYTES NFR BLD: 2 %
LYMPHOCYTES NFR BLD: 2.75 K/UL (ref 1.1–3.7)
LYMPHOCYTES RELATIVE PERCENT: 17 % (ref 24–43)
MCH RBC QN AUTO: 33.7 PG (ref 25.2–33.5)
MCHC RBC AUTO-ENTMCNC: 34.4 G/DL (ref 28.4–34.8)
MCV RBC AUTO: 97.9 FL (ref 82.6–102.9)
MONOCYTES NFR BLD: 0.32 K/UL (ref 0.1–1.2)
MONOCYTES NFR BLD: 2 % (ref 3–12)
MORPHOLOGY: NORMAL
NEUTROPHILS NFR BLD: 79 % (ref 36–65)
NEUTS SEG NFR BLD: 12.81 K/UL (ref 1.5–8.1)
NRBC BLD-RTO: 0 PER 100 WBC
PLATELET # BLD AUTO: 324 K/UL (ref 138–453)
PMV BLD AUTO: 11 FL (ref 8.1–13.5)
RBC # BLD AUTO: 2.91 M/UL (ref 4.21–5.77)
WBC OTHER # BLD: 16.2 K/UL (ref 3.5–11.3)

## 2025-02-21 PROCEDURE — 85025 COMPLETE CBC W/AUTO DIFF WBC: CPT

## 2025-02-21 PROCEDURE — 85652 RBC SED RATE AUTOMATED: CPT

## 2025-02-21 PROCEDURE — 86140 C-REACTIVE PROTEIN: CPT

## 2025-02-24 ENCOUNTER — TELEPHONE (OUTPATIENT)
Dept: PRIMARY CARE CLINIC | Age: 80
End: 2025-02-24

## 2025-02-24 DIAGNOSIS — I10 ESSENTIAL HYPERTENSION: ICD-10-CM

## 2025-02-24 NOTE — TELEPHONE ENCOUNTER
Hello please discontinue this and if symptoms are improving then we can monitor off any abx, thank you.  Electronically signed by Himanshu Burrell MD on 2/24/2025 at 6:41 PM

## 2025-02-24 NOTE — TELEPHONE ENCOUNTER
Pt's wife reports pt has been unable to take Doxycycline since Saturday, states it's causing him to have a very upset stomach.     Please advise

## 2025-02-25 RX ORDER — NIFEDIPINE 90 MG/1
90 TABLET, FILM COATED, EXTENDED RELEASE ORAL DAILY
Qty: 90 TABLET | Refills: 0 | Status: SHIPPED | OUTPATIENT
Start: 2025-02-25 | End: 2025-04-09

## 2025-03-19 NOTE — PROGRESS NOTES
Patient is here today for follow-up on hospitalization for chest pain,HTN and low hemoglobin. Symptoms have -chest pain has improved, still has severe fatigue and SOB  Current complaints-fatigue and SOB with walking or other minimal activity. Medication change-as per chart-has DC'D ASA  Follow-up with specialists-10/04/2021 with Dr Radha Cam needs-will nee camera endoscopy. Post-Discharge Transitional Care Management Services or Hospital Follow Up      Devin Park   YOB: 1945    Date of Office Visit:  9/23/2021  Date of Hospital Admission: 9/20/21  Date of Hospital Discharge: 9/22/21  Readmission Risk Score(high >=14%.  Medium >=10%):No data recorded    Care management risk score Rising risk (score 2-5) and Complex Care (Scores >=6): 4     Non face to face  following discharge, date last encounter closed (first attempt may have been earlier): 9/23/2021  9:24 AM 9/23/2021  9:24 AM    Call initiated 2 business days of discharge: Yes     Patient Active Problem List   Diagnosis    Vertigo, benign paroxysmal    Systolic murmur    History of MI (myocardial infarction)    History of colon polyps    Coronary atherosclerosis due to calcified coronary lesion    Displacement of intervertebral disc, site unspecified, without myelopathy    History of CVA (cerebrovascular accident) without residual deficits    Dysautonomia ([address]    Syncope, near    Cholecystitis    Chronotropic incompetence with autonomic dysfunction    Episodic lightheadedness    Accelerated hypertension    Ischemic chest pain (HCC)    Abnormal cardiovascular stress test    Chronic kidney disease, stage III (moderate) (HCC)    Controlled type 2 diabetes mellitus with diabetic nephropathy, with long-term current use of insulin (HCC)    Abnormal tilt table test    Cervical stenosis of spinal canal    Coronary artery disease involving native coronary artery of native heart without angina pectoris    S/P drug eluting coronary stent placement-OM1 4/10/17    Hyperlipidemia    Chronic diastolic heart failure (HCC)    Angina, class II [facility]    Medication side effect, initial encounter    Acute renal failure with tubular necrosis (HCC)    Non critical Right Renal artery stenosis, native (HCC)    Anemia in stage 3 chronic kidney disease ([facility]    BPH with obstruction/lower urinary tract symptoms    Elevated PSA    Gross hematuria    Chest pain    Symptomatic anemia    Acute coronary syndrome with high troponin ([facility])       Allergies   Allergen Reactions    Lipitor [Atorvastatin] Other (See Comments)     Muscle aches and joint pain    Aldactone [Spironolactone] Hives    Crestor [Rosuvastatin Calcium] Other (See Comments)     Muscle aches and joint pain    Lopid [Gemfibrozil]      hyperglycemia    Invokana [Canagliflozin] Rash    Januvia [Sitagliptin] Nausea And Vomiting       Medications listed as ordered at the time of discharge from hospital   Jobie Medal   Home Medication Instructions B:[id number]    Printed on:09/23/21 1011   Medication Information                      acyclovir (ZOVIRAX) 400 MG tablet  400 mg 2 times daily              amLODIPine (NORVASC) 10 MG tablet  Take 1 tablet by mouth nightly             DIANA MICROLET LANCETS MISC  TEST TWICE DAILY             CONTOUR TEST strip  USE 1 STRIP TO CHECK GLUCOSE TWICE DAILY             eplerenone (INSPRA) 50 MG tablet  Take 1 tablet by mouth once daily             ezetimibe (ZETIA) 10 MG tablet  Take 1 tablet by mouth daily             ferrous sulfate 325 (65 Fe) MG tablet  Take 325 mg by mouth daily (with breakfast)              glipiZIDE (GLUCOTROL XL) 5 MG extended release tablet  Take 2 tablets by mouth 2 times daily             hydrALAZINE (APRESOLINE) 100 MG tablet  Take 1 tablet by mouth 3 times daily Patient is taking 100 MG 3 times daily             insulin glargine (LANTUS SOLOSTAR) 100 UNIT/ML injection pen  Inject 28 Units into the skin 2 times daily             Insulin Pen Needle (PEN NEEDLES) 31G X 6 MM MISC  1 each by Does not apply route daily             latanoprost (XALATAN) 0.005 % ophthalmic solution               lisinopril (PRINIVIL;ZESTRIL) 40 MG tablet  Take 1 tablet by mouth daily             nitroGLYCERIN (NITROSTAT) 0.4 MG SL tablet  Place 1 tablet under the tongue every 5 minutes as needed for Chest pain             Omega-3 Fatty Acids (FISH OIL) 1000 MG CAPS  Take 1,000 mg by mouth daily              prednisoLONE acetate (PRED FORTE) 1 % ophthalmic suspension  Place 1 drop into the left eye daily                    Medications marked \"taking\" at this time  No outpatient medications have been marked as taking for the 9/23/21 encounter (Office Visit) with Agueda May MD.        Medications patient taking as of now reconciled against medications ordered at time of hospital discharge: Yes    Chief Complaint   Patient presents with   Tri Combs from Hospital       HPI    Inpatient course: Discharge summary reviewed- see chart. Interval history/Current status: has fatigue    Review of Systems    Vitals:    09/23/21 0953   BP: (!) 146/82   Site: Left Upper Arm   Position: Sitting   Pulse: 76   Resp: 16     There is no height or weight on file to calculate BMI. Wt Readings from Last 3 Encounters:   09/22/21 207 lb 7.3 oz (94.1 kg)   08/19/21 209 lb 3.2 oz (94.9 kg)   08/05/21 207 lb 6.4 oz (94.1 kg)     BP Readings from Last 3 Encounters:   09/23/21 (!) 146/82   09/22/21 (!) 162/55   08/19/21 (!) 164/64       Physical Exam    Exam:  GEN:   A & O x3, no apparent distress  EYES: No gross abnormalities.   ENT:ENT exam normal, no neck nodes or sinus tenderness  NECK: normal, no lymphadenopathy,  no carotid bruits  PULM: clear to auscultation bilaterally- no wheezes, rales or rhonchi, normal air movement, no respiratory distress  COR: regular rate & rhythm and 2/6 murmer  ABD:  soft, non-tender, non-distended, normal bowel sounds, no masses or organomegaly          Assessment/Plan:  1. Angina pectoris, unspecified ([address]  Prn ntg,exercise as tolerated    2. Essential hypertension  Improving,continue close monitoring    3. Chronic diastolic heart failure (HCC)  Volume status stable    4. Controlled type 2 diabetes mellitus with diabetic nephropathy, with long-term current use of insulin (HCC)  Blood sugars well controlled    5.  Acute on chronic anemia  Will need to schedule capsular endoscopy  - CBC Auto Differential; Future        Medical Decision Making: high complexity Negative

## 2025-03-22 PROBLEM — Z09 HOSPITAL DISCHARGE FOLLOW-UP: Status: RESOLVED | Noted: 2025-02-20 | Resolved: 2025-03-22

## 2025-04-02 NOTE — PROGRESS NOTES
edema. No palpitation.   Gastrointestinal: As above.    Genitourinary: No dysuria, hematuria, frequency or urgency.     Musculoskeletal: No muscle aches or pains. No limitation of movement. No back pain. No gait disturbance, No joint complaints.  Dermatologic: No skin rashes or pruritus. No skin lesions or discolorations.   Psychiatric: No depression, anxiety, or stress or signs of schizophrenia. No change in mood or affect.    Hematologic: No history of bleeding tendency. No bruises or ecchymosis. No history of clotting problems.  Infectious disease: No fever, chills or frequent infections.   Endocrine: No polydipsia or polyuria. No temperature intolerance.  Neurologic: No headaches or dizziness. No weakness or numbness of the extremities. No changes in balance, coordination,  memory, mentation, behavior.   Allergic/Immunologic: No nasal congestion or hives. No repeated infections.       PHYSICAL EXAM:  The patient is not in acute distress.  Vital signs: Blood pressure (!) 160/64, pulse 69, temperature 97.2 °F (36.2 °C), temperature source Temporal, resp. rate 18, height 1.753 m (5' 9\"), weight 91.1 kg (200 lb 12.8 oz).     General appearance - well appearing, not in pain or distress  Mental status - good mood, alert and oriented  Eyes - pupils equal and reactive, extraocular eye movements intact  Ears - bilateral TM's and external ear canals normal  Nose - normal and patent, no erythema, discharge or polyps  Mouth - mucous membranes moist, pharynx normal without lesions  Neck - supple, no significant adenopathy  Lymphatics - no palpable lymphadenopathy, no hepatosplenomegaly  Chest - clear to auscultation, no wheezes, rales or rhonchi, symmetric air entry  Heart - normal rate, regular rhythm, normal S1, S2, no murmurs, rubs, clicks or gallops  Abdomen - soft, nontender, nondistended, no masses or organomegaly  Neurological - alert, oriented, normal speech, no focal findings or movement disorder 
Patient expressed no known problems or needs

## 2025-04-09 ENCOUNTER — OFFICE VISIT (OUTPATIENT)
Dept: PRIMARY CARE CLINIC | Age: 80
End: 2025-04-09
Payer: MEDICARE

## 2025-04-09 VITALS
HEART RATE: 71 BPM | SYSTOLIC BLOOD PRESSURE: 122 MMHG | OXYGEN SATURATION: 96 % | RESPIRATION RATE: 18 BRPM | HEIGHT: 69 IN | BODY MASS INDEX: 29.62 KG/M2 | WEIGHT: 200 LBS | DIASTOLIC BLOOD PRESSURE: 58 MMHG

## 2025-04-09 DIAGNOSIS — N18.6 TYPE 2 DIABETES MELLITUS WITH ESRD (END-STAGE RENAL DISEASE) (HCC): ICD-10-CM

## 2025-04-09 DIAGNOSIS — E11.22 TYPE 2 DIABETES MELLITUS WITH ESRD (END-STAGE RENAL DISEASE) (HCC): ICD-10-CM

## 2025-04-09 DIAGNOSIS — I10 ESSENTIAL HYPERTENSION: Primary | ICD-10-CM

## 2025-04-09 PROCEDURE — 1159F MED LIST DOCD IN RCRD: CPT | Performed by: STUDENT IN AN ORGANIZED HEALTH CARE EDUCATION/TRAINING PROGRAM

## 2025-04-09 PROCEDURE — 1036F TOBACCO NON-USER: CPT | Performed by: STUDENT IN AN ORGANIZED HEALTH CARE EDUCATION/TRAINING PROGRAM

## 2025-04-09 PROCEDURE — 1160F RVW MEDS BY RX/DR IN RCRD: CPT | Performed by: STUDENT IN AN ORGANIZED HEALTH CARE EDUCATION/TRAINING PROGRAM

## 2025-04-09 PROCEDURE — 3074F SYST BP LT 130 MM HG: CPT | Performed by: STUDENT IN AN ORGANIZED HEALTH CARE EDUCATION/TRAINING PROGRAM

## 2025-04-09 PROCEDURE — 99214 OFFICE O/P EST MOD 30 MIN: CPT | Performed by: STUDENT IN AN ORGANIZED HEALTH CARE EDUCATION/TRAINING PROGRAM

## 2025-04-09 PROCEDURE — G8417 CALC BMI ABV UP PARAM F/U: HCPCS | Performed by: STUDENT IN AN ORGANIZED HEALTH CARE EDUCATION/TRAINING PROGRAM

## 2025-04-09 PROCEDURE — 1123F ACP DISCUSS/DSCN MKR DOCD: CPT | Performed by: STUDENT IN AN ORGANIZED HEALTH CARE EDUCATION/TRAINING PROGRAM

## 2025-04-09 PROCEDURE — G8427 DOCREV CUR MEDS BY ELIG CLIN: HCPCS | Performed by: STUDENT IN AN ORGANIZED HEALTH CARE EDUCATION/TRAINING PROGRAM

## 2025-04-09 PROCEDURE — G2211 COMPLEX E/M VISIT ADD ON: HCPCS | Performed by: STUDENT IN AN ORGANIZED HEALTH CARE EDUCATION/TRAINING PROGRAM

## 2025-04-09 PROCEDURE — 3078F DIAST BP <80 MM HG: CPT | Performed by: STUDENT IN AN ORGANIZED HEALTH CARE EDUCATION/TRAINING PROGRAM

## 2025-04-09 RX ORDER — INSULIN GLARGINE 100 [IU]/ML
INJECTION, SOLUTION SUBCUTANEOUS
Qty: 54 ML | Refills: 5 | Status: SHIPPED | OUTPATIENT
Start: 2025-04-09

## 2025-04-09 NOTE — PROGRESS NOTES
Hand Therapy  Progress Note  2/13/2018  Reporting period: 1/16/18 to current date    S:  Subjective changes as noted by patient: still feel some pull on the thumb and wrist with full thumb ext/abd plane, per his motion. The ulnar side of the wrist is better. Still working under restrictions by the MD. And wearing the thumb splint at work,.   Functional changes noted by patient: reports still limiting what he is doing at home and at work  Response to previous treatment:  good  Patient has noted adverse reaction to:   None     Objective:  Pain Level Report  VAS(0-10) 12/8/2017 2/2/18    At Rest: 4/10 5/10 1-2/10   With Use: 5/10 8/10 2-4/10     Report of Pain:  Location:  wrist and hand  Pain Quality: remains, better over all  Frequency: less constant and more intermittent with motion  Pain is worst:  daytime  Exacerbated by:  Movement   Relieved by:  Rest    Radial Wrist: Provocative Tests  Radial Wrist Zone: Provocative Tests: 1/16/2018 2/13/18   Pain Report:  - none    + mild    ++ moderate    +++ severe  Right right   Finkelstein's Test ++ +   Dequervain's: APL test + +   EPB test:  + +   Intersection: APL/EPB & ECRB/L test - -   STT Test (OA of same) -    ECU stress test:  ++ +   ECU: supination overpressure + +       ROM:  Pain Report:  - none    + mild    ++ moderate    +++ severe   Wrist  12/8/17 12/13/17 12/13/17 12/18/17 12/26/17 1/2/18 1/8 2/13/18   AROM (PROM) R L R R   R Right    Extension 15 65 35 35  P: 45 35 40 45 50   Flexion 20 62 32 25  P: 30 26 41 40 45   RD 0 18 18  15 12  10   UD 0 32 15  15 23  35   Supination 45 85 75 65 70 74  70   Pronation 90 90 90     90     ROM:  Thumb 2/13/2018 2/13/2018   AROM(PROM) Right Left   MP /60 /70   IP /45 /70   RAbd 54+ 60   PAbd 54+ 55   Retropulsion     Kapandji Opposition Scale (0-10/10)       7   Fingers  Extension/Flexion, AROM(PROM)  Date:   12/13 1/2/18 1/8   Side:  Right     AROM(PROM)        Index:  MP               PIP               DIP  75  95  50  -14/72  0/84  0/35  177 0/82  0/92  0/40    Long:   MP               PIP               DIP  71  97  61 0/80  0/95  0/50  225     Ring:    MP                PIP                DIP  70  97  55 0/77  0/97  0/55  229     Small:   MP                PIP                DIP  75  95  58 -7/80  -4/94  0/58  221    Thumb:  MP                IP                RABD                PABD  25/61  +/30  45  55 -19/55  +/40  51  53      Assessment:  Response to therapy has been improvement to:  ROM of Wrist:  Ext , Flex, Supination and Pronation  Thumb:  All Planes    Overall Assessment:  Patient would benefit from continued therapy to achieve rehab potential  STG/LTG:  STGoals have been reviewed and progress or achievement has occurred;  see goal sheet for details and updates.  I have re-evaluated this patient and find that the nature, scope, duration and intensity of the therapy is appropriate for the medical condition of the patient.        P:  Frequency/Duration:  Recommend continuing with the current treatment plan. 1 X week, once daily  for 3 more weeks per authorized visits  Recommendations for Continued Therapy  Treatment Plan:    Additions to Treatment Plan -  Modalities:  US  Therapeutic Exercise:  AROM, AAROM, Tendon Gliding, Isotonics and Isometrics        Home Program:   AROM of wrist and forearm all planes  Tendon gliding fist series  Forearm based wrist orthosis-weaning  Avoid activities that exacerbate pain   Thumb AROM - composite flexion, IPJ blocking, opposition  Added Velcro strapping to orthosis to assist with self removal  12/18/2017  Flex strap for IF-MF-RF daily 10-30 min x4 per day  EDC glides with different wrist positions  12/21/2017  Prayer stretch, flexion PROM, continue finger flexion straps, out of ortoses at work as able, for light duty  FPL flexion with wrist ext/flex, and claw with wrist flex/ext for tendon adhesion reduction  1/8/2018  Pen roll  Icing to ECU and avoid painful motions.    2/13/2018  Continue wearing thumb spica for Dequervains, and ECU tendonitis  Continue thumb and Wrist ROM exercises    Next Visit:  Progress Dequervains to stage 2       normal. Patient exhibits no distension. There is no tenderness.   Musculoskeletal:  Patient exhibits no edema and tenderness. Patient exhibits no deformity.   Neurological: Patient is alert and oriented to person, place, and time.   Skin: Skin is warm and dry. Patient is not diaphoretic.   Psychiatric: Patient's speech is normal and behavior is normal. Thought content normal.   Vitals reviewed.      Lab Results   Component Value Date    WBC 16.2 (H) 02/21/2025    HGB 9.8 (L) 02/21/2025    HCT 28.5 (L) 02/21/2025     02/21/2025    CHOL 118 09/17/2023    TRIG 227 (H) 09/17/2023    HDL 26 (L) 09/17/2023    LDLDIRECT 44 11/02/2017    ALT 18 02/10/2025    AST 28 02/10/2025     02/10/2025    K 4.2 02/10/2025    CL 96 (L) 02/10/2025    CREATININE 6.5 (HH) 02/10/2025    BUN 78 (H) 02/10/2025    CO2 22 02/10/2025    TSH 3.33 09/17/2023    PSA 7.40 (H) 11/18/2024    INR 1.2 02/10/2025    LABA1C 6.2 (H) 09/17/2023     Lab Results   Component Value Date    CALCIUM 8.2 (L) 02/10/2025    PHOS 6.3 (H) 02/10/2025     Lab Results   Component Value Date    LDLDIRECT 44 11/02/2017       Please note that this chart was generated using voice recognition Dragon dictation software. Although every effort was made to ensure the accuracy of this automated transcription, some errors in transcription may have occurred.    The patient (or guardian, if applicable) and other individuals in attendance with the patient were advised that Artificial Intelligence will be utilized during this visit to record, process the conversation to generate a clinical note, and support improvement of the AI technology. The patient (or guardian, if applicable) and other individuals in attendance at the appointment consented to the use of AI, including the recording.      Electronically signed by Dr. Himanshu Burrell MD on 4/9/2025 at 1:25 PM

## 2025-04-18 ENCOUNTER — HOSPITAL ENCOUNTER (OUTPATIENT)
Age: 80
Setting detail: SPECIMEN
Discharge: HOME | End: 2025-04-18
Payer: MEDICARE

## 2025-04-18 DIAGNOSIS — E11.22 TYPE 2 DIABETES MELLITUS WITH ESRD (END-STAGE RENAL DISEASE) (HCC): ICD-10-CM

## 2025-04-18 DIAGNOSIS — N18.6 TYPE 2 DIABETES MELLITUS WITH ESRD (END-STAGE RENAL DISEASE) (HCC): ICD-10-CM

## 2025-04-18 LAB
EST. AVERAGE GLUCOSE BLD GHB EST-MCNC: 140 MG/DL
HBA1C MFR BLD: 6.5 % (ref 4–6)

## 2025-04-18 PROCEDURE — 83036 HEMOGLOBIN GLYCOSYLATED A1C: CPT

## 2025-04-19 ENCOUNTER — RESULTS FOLLOW-UP (OUTPATIENT)
Dept: INTERNAL MEDICINE CLINIC | Age: 80
End: 2025-04-19

## 2025-04-28 RX ORDER — METOPROLOL SUCCINATE 50 MG/1
50 TABLET, EXTENDED RELEASE ORAL DAILY
Qty: 90 TABLET | Refills: 3 | Status: SHIPPED | OUTPATIENT
Start: 2025-04-28

## 2025-05-06 PROCEDURE — 93298 REM INTERROG DEV EVAL SCRMS: CPT | Performed by: FAMILY MEDICINE

## 2025-05-07 ENCOUNTER — CLINICAL SUPPORT (OUTPATIENT)
Dept: CARDIOLOGY | Age: 80
End: 2025-05-07
Payer: MEDICARE

## 2025-05-07 DIAGNOSIS — I63.9 CRYPTOGENIC STROKE (HCC): ICD-10-CM

## 2025-05-07 DIAGNOSIS — Z95.818 IMPLANTABLE LOOP RECORDER PRESENT: Primary | ICD-10-CM

## 2025-05-12 ENCOUNTER — HOSPITAL ENCOUNTER (OUTPATIENT)
Age: 80
Setting detail: SPECIMEN
Discharge: HOME | End: 2025-05-12
Payer: MEDICARE

## 2025-05-12 LAB — POTASSIUM SERPL-SCNC: 3.8 MMOL/L (ref 3.7–5.3)

## 2025-05-12 PROCEDURE — 84132 ASSAY OF SERUM POTASSIUM: CPT

## 2025-05-23 ENCOUNTER — HOSPITAL ENCOUNTER (OUTPATIENT)
Age: 80
Setting detail: SPECIMEN
Discharge: HOME | End: 2025-05-23
Payer: MEDICARE

## 2025-05-23 DIAGNOSIS — R97.20 ELEVATED PSA: ICD-10-CM

## 2025-05-23 LAB — PSA SERPL-MCNC: 7.51 NG/ML (ref 0–4)

## 2025-05-23 PROCEDURE — 84153 ASSAY OF PSA TOTAL: CPT

## 2025-05-27 ENCOUNTER — RESULTS FOLLOW-UP (OUTPATIENT)
Dept: UROLOGY | Age: 80
End: 2025-05-27

## 2025-05-28 ENCOUNTER — OFFICE VISIT (OUTPATIENT)
Dept: UROLOGY | Age: 80
End: 2025-05-28
Payer: MEDICARE

## 2025-05-28 VITALS
DIASTOLIC BLOOD PRESSURE: 60 MMHG | SYSTOLIC BLOOD PRESSURE: 138 MMHG | TEMPERATURE: 97.5 F | BODY MASS INDEX: 29.18 KG/M2 | HEIGHT: 69 IN | WEIGHT: 197 LBS

## 2025-05-28 DIAGNOSIS — N13.8 BPH WITH OBSTRUCTION/LOWER URINARY TRACT SYMPTOMS: Primary | ICD-10-CM

## 2025-05-28 DIAGNOSIS — N40.1 BPH WITH OBSTRUCTION/LOWER URINARY TRACT SYMPTOMS: Primary | ICD-10-CM

## 2025-05-28 DIAGNOSIS — N20.0 RENAL CALCULUS: ICD-10-CM

## 2025-05-28 DIAGNOSIS — R97.20 ELEVATED PSA: ICD-10-CM

## 2025-05-28 PROCEDURE — 51798 US URINE CAPACITY MEASURE: CPT | Performed by: PHYSICIAN ASSISTANT

## 2025-05-28 PROCEDURE — 1123F ACP DISCUSS/DSCN MKR DOCD: CPT | Performed by: PHYSICIAN ASSISTANT

## 2025-05-28 PROCEDURE — G8417 CALC BMI ABV UP PARAM F/U: HCPCS | Performed by: PHYSICIAN ASSISTANT

## 2025-05-28 PROCEDURE — 1036F TOBACCO NON-USER: CPT | Performed by: PHYSICIAN ASSISTANT

## 2025-05-28 PROCEDURE — 3078F DIAST BP <80 MM HG: CPT | Performed by: PHYSICIAN ASSISTANT

## 2025-05-28 PROCEDURE — PBSHW PBB SHADOW CHARGE: Performed by: PHYSICIAN ASSISTANT

## 2025-05-28 PROCEDURE — G8428 CUR MEDS NOT DOCUMENT: HCPCS | Performed by: PHYSICIAN ASSISTANT

## 2025-05-28 PROCEDURE — 99214 OFFICE O/P EST MOD 30 MIN: CPT | Performed by: PHYSICIAN ASSISTANT

## 2025-05-28 PROCEDURE — 3075F SYST BP GE 130 - 139MM HG: CPT | Performed by: PHYSICIAN ASSISTANT

## 2025-05-28 NOTE — PROGRESS NOTES
HPI:      Patient is a 79 y.o. male in no acute distress.  He is alert and oriented to person, place, and time.       History  12/2019 Referral from Dr. Arce for elevated PSA of 6.13. He was a previous patient of Dr. Gregg for elevated PSA. Several brothers with prostate cancer.      PSA  5/2025 - 7.51  11/2024 - 7.40 after antibiotics  10/2024 - 16.90  9/2023 - 3.28  8/2022 - 3.28  1/2022 - 3.36  4/2021 - 3.68  10/2020 - 5.43  4/2020 - 4.42  1/2020 - 5.00  11/2019 - 6.13  11/2018 - 3.11  11/2017 - 3.26  11/2016 - 2.43  10/2015 - 2.70  10/2014 - 2.35  10/2013 - 2.15     11/2020 Prostate biopsy for PSA of 5.43. Pathology: benign prostate tissue in all 12 cores     5/2021 gross hematuria.  CT urogram showed a nonobstructing 3 mm left renal stone.  Refused cystoscopy     12/2021 - urinary retention/constipation -Flomax started    11/2024 -hematuria -cystoscopy -  No mucosal abnormality     On HD for ESRD    Today:  Patient is here today for follow-up elevated PSA and BPH.  Patient did have a hospitalization back in February for pneumonia/COVID/ureteral stone.  Patient states that he had to be transferred to outside facility as he is on dialysis.  He states that while he was hospitalized he did pass the kidney stone. Patient is taking Flomax.  Patient had a hematuria workup with cystoscopy the end of last year.  He is urinating well has no new complaints.  Patient had a PSA prior visit today which was 7.51.  This is similar to 6 months ago which was 7.4.  Patient is going to be 80 later on the year.  He denies any unintentional weight loss or loss of appetite.  He denies any new or worsening hip back or pelvis pain.  Overall he is content with current situation. Bladderscan performed in office today:  PVR - 57 mL    Patient has been unable to have an erection in many years.  Patient is on multiple cardiac medications.  He is also a diabetic.  He has an upcoming appointment with cardiology.  He is going to ask them if he

## 2025-07-01 ENCOUNTER — OFFICE VISIT (OUTPATIENT)
Dept: CARDIOLOGY | Age: 80
End: 2025-07-01
Payer: MEDICARE

## 2025-07-01 VITALS
BODY MASS INDEX: 29.15 KG/M2 | RESPIRATION RATE: 16 BRPM | SYSTOLIC BLOOD PRESSURE: 102 MMHG | WEIGHT: 196.8 LBS | OXYGEN SATURATION: 99 % | HEART RATE: 74 BPM | DIASTOLIC BLOOD PRESSURE: 58 MMHG | HEIGHT: 69 IN

## 2025-07-01 DIAGNOSIS — Z95.818 IMPLANTABLE LOOP RECORDER PRESENT: ICD-10-CM

## 2025-07-01 DIAGNOSIS — G90.1 DYSAUTONOMIA (HCC): ICD-10-CM

## 2025-07-01 DIAGNOSIS — I47.29 NSVT (NONSUSTAINED VENTRICULAR TACHYCARDIA) (HCC): ICD-10-CM

## 2025-07-01 DIAGNOSIS — E78.2 MIXED HYPERLIPIDEMIA: ICD-10-CM

## 2025-07-01 DIAGNOSIS — I10 PRIMARY HYPERTENSION: ICD-10-CM

## 2025-07-01 DIAGNOSIS — Z95.1 S/P CABG X 3: Primary | ICD-10-CM

## 2025-07-01 DIAGNOSIS — I63.9 CRYPTOGENIC STROKE (HCC): ICD-10-CM

## 2025-07-01 DIAGNOSIS — I25.3 ATRIAL SEPTAL ANEURYSM: Primary | ICD-10-CM

## 2025-07-01 DIAGNOSIS — I25.10 ASHD (ARTERIOSCLEROTIC HEART DISEASE): ICD-10-CM

## 2025-07-01 DIAGNOSIS — Z95.1 S/P CABG X 3: ICD-10-CM

## 2025-07-01 DIAGNOSIS — D63.8 ANEMIA, CHRONIC DISEASE: ICD-10-CM

## 2025-07-01 DIAGNOSIS — I50.32 CHRONIC DIASTOLIC HEART FAILURE (HCC): ICD-10-CM

## 2025-07-01 DIAGNOSIS — N18.6 ESRD (END STAGE RENAL DISEASE) (HCC): ICD-10-CM

## 2025-07-01 PROCEDURE — 99214 OFFICE O/P EST MOD 30 MIN: CPT | Performed by: NURSE PRACTITIONER

## 2025-07-01 PROCEDURE — 1160F RVW MEDS BY RX/DR IN RCRD: CPT | Performed by: NURSE PRACTITIONER

## 2025-07-01 PROCEDURE — G8417 CALC BMI ABV UP PARAM F/U: HCPCS | Performed by: NURSE PRACTITIONER

## 2025-07-01 PROCEDURE — G8427 DOCREV CUR MEDS BY ELIG CLIN: HCPCS | Performed by: NURSE PRACTITIONER

## 2025-07-01 PROCEDURE — 1159F MED LIST DOCD IN RCRD: CPT | Performed by: NURSE PRACTITIONER

## 2025-07-01 PROCEDURE — 3078F DIAST BP <80 MM HG: CPT | Performed by: NURSE PRACTITIONER

## 2025-07-01 PROCEDURE — 1036F TOBACCO NON-USER: CPT | Performed by: NURSE PRACTITIONER

## 2025-07-01 PROCEDURE — 3074F SYST BP LT 130 MM HG: CPT | Performed by: NURSE PRACTITIONER

## 2025-07-01 PROCEDURE — 1123F ACP DISCUSS/DSCN MKR DOCD: CPT | Performed by: NURSE PRACTITIONER

## 2025-07-01 RX ORDER — MIDODRINE HYDROCHLORIDE 2.5 MG/1
2.5 TABLET ORAL 2 TIMES DAILY
Qty: 180 TABLET | Refills: 3 | Status: SHIPPED | OUTPATIENT
Start: 2025-07-01

## 2025-07-01 RX ORDER — EZETIMIBE 10 MG/1
10 TABLET ORAL DAILY
Qty: 90 TABLET | Refills: 1 | Status: SHIPPED | OUTPATIENT
Start: 2025-07-01

## 2025-07-01 RX ORDER — MIDODRINE HYDROCHLORIDE 10 MG/1
10 TABLET ORAL 3 TIMES DAILY PRN
Qty: 90 TABLET | Refills: 3 | Status: SHIPPED | OUTPATIENT
Start: 2025-07-01

## 2025-07-01 NOTE — PROGRESS NOTES
recorder: showed no arrhythmias present via remote monitoring on 5/6/2025  Beta Blocker: Continue Metoprolol succinate (Toprol XL) 50 mg daily.     Anemia of chronic disease: CBC done on 11/27/2023 was 11.7 g/dL. Following with hematology.  Continue to monitor this via blood work.- he recently had blood work done by [facility] and his wife reported a hgb: 11. We will retrieve these labs and I will review them once received.   Procrit per nephrology.  Iron and EPO per dialysis protocol.    Chronic Diastolic Heart Failure: EF of 60% via echo on 11/11/2021. Repeat echo in 2023 EF: 50-55%.Currently well controlled. Continue dialysis. His weight is stable. No significant shortness of breath. Lungs are clear. No lower extremity edema. Currently euvolemic.   Beta Blocker: Continue Metoprolol succinate (Toprol XL) 50 mg daily. Hold morning lopressor dose on dialysis days and hold evening dose if SBP is not greater than 150.  Diuretics: Continue furosemide (Lasix) 80 mg every morning.  Nonpharmacologic management of Heart Failure: I advised him to try and keep his legs up whenever possible and to limit salt in his diet.   Additional Testing: I Ordered an Echocardiogram to assess Mr. Jimenez's ejection fraction and to look for significant valvular heart disease as a source of Mr. Jimenez symptoms    Atherosclerotic Heart Disease: Coronary Artery stent: 4/10/2017. Cardiac cath done on 10/5/2021 showed Moderate three vessel coronary artery disease involving a ostial 50-60% stenosis in a small, non-dominant RCA, a 60% mid LAD stenosis and a proximal 50-60% stenosis in the left anterior descending coronary artery. CABG x 3 done 6/26/2023. Stress test on 4/2024: Low risk for CAD.   Antiplatelet Agent: Continue Plavix 75 mg daily and aspirin 81 mg daily.   Beta Blocker: Continue Metoprolol succinate (Toprol XL) 50 mg daily.  Cholesterol Reduction Therapy: START ezetimide (Zetia) 10 mg daily. He is intolerant to Statin therapy.

## 2025-07-03 ENCOUNTER — HOSPITAL ENCOUNTER (OUTPATIENT)
Age: 80
Discharge: HOME | End: 2025-07-03
Payer: MEDICARE

## 2025-07-03 ENCOUNTER — RESULTS FOLLOW-UP (OUTPATIENT)
Dept: CARDIOLOGY | Age: 80
End: 2025-07-03

## 2025-07-03 DIAGNOSIS — E78.2 MIXED HYPERLIPIDEMIA: ICD-10-CM

## 2025-07-03 DIAGNOSIS — I10 PRIMARY HYPERTENSION: ICD-10-CM

## 2025-07-03 DIAGNOSIS — Z95.1 S/P CABG X 3: ICD-10-CM

## 2025-07-03 LAB
CHOLEST SERPL-MCNC: 151 MG/DL (ref 0–199)
CHOLESTEROL/HDL RATIO: 5.8
HDLC SERPL-MCNC: 26 MG/DL
LDLC SERPL CALC-MCNC: ABNORMAL MG/DL (ref 0–100)
LDLC SERPL DIRECT ASSAY-MCNC: 47 MG/DL
TRIGL SERPL-MCNC: 427 MG/DL
VLDLC SERPL CALC-MCNC: ABNORMAL MG/DL (ref 1–30)

## 2025-07-03 PROCEDURE — 36415 COLL VENOUS BLD VENIPUNCTURE: CPT

## 2025-07-03 PROCEDURE — 83721 ASSAY OF BLOOD LIPOPROTEIN: CPT

## 2025-07-03 PROCEDURE — 80061 LIPID PANEL: CPT

## 2025-07-03 NOTE — RESULT ENCOUNTER NOTE
Please let patient know LDL is at goal, however triglycerides are elevated. Please encourage him to try and avoid fatty and processed foods. We will continue current treatment and follow up. Thank you

## 2025-07-07 NOTE — TELEPHONE ENCOUNTER
----- Message from REY Swenson CNP sent at 7/3/2025  5:54 PM EDT -----  Please let patient know LDL is at goal, however triglycerides are elevated. Please encourage him to try and avoid fatty and processed foods. We will continue current treatment and follow up. Thank you

## 2025-07-08 ENCOUNTER — HOSPITAL ENCOUNTER (OUTPATIENT)
Age: 80
Discharge: HOME | End: 2025-07-10
Payer: MEDICARE

## 2025-07-08 ENCOUNTER — RESULTS FOLLOW-UP (OUTPATIENT)
Dept: CARDIOLOGY | Age: 80
End: 2025-07-08

## 2025-07-08 VITALS
HEIGHT: 69 IN | WEIGHT: 196 LBS | BODY MASS INDEX: 29.03 KG/M2 | SYSTOLIC BLOOD PRESSURE: 102 MMHG | DIASTOLIC BLOOD PRESSURE: 58 MMHG

## 2025-07-08 DIAGNOSIS — I63.9 CRYPTOGENIC STROKE (HCC): ICD-10-CM

## 2025-07-08 DIAGNOSIS — N18.6 ESRD (END STAGE RENAL DISEASE) (HCC): ICD-10-CM

## 2025-07-08 DIAGNOSIS — I25.3 ATRIAL SEPTAL ANEURYSM: ICD-10-CM

## 2025-07-08 DIAGNOSIS — I47.29 NSVT (NONSUSTAINED VENTRICULAR TACHYCARDIA) (HCC): ICD-10-CM

## 2025-07-08 DIAGNOSIS — D63.8 ANEMIA, CHRONIC DISEASE: ICD-10-CM

## 2025-07-08 DIAGNOSIS — I50.32 CHRONIC DIASTOLIC HEART FAILURE (HCC): ICD-10-CM

## 2025-07-08 DIAGNOSIS — G90.1 DYSAUTONOMIA (HCC): ICD-10-CM

## 2025-07-08 DIAGNOSIS — Z95.1 S/P CABG X 3: ICD-10-CM

## 2025-07-08 DIAGNOSIS — I25.10 ASHD (ARTERIOSCLEROTIC HEART DISEASE): ICD-10-CM

## 2025-07-08 DIAGNOSIS — Z95.818 IMPLANTABLE LOOP RECORDER PRESENT: ICD-10-CM

## 2025-07-08 LAB
ECHO AO SINUS VALSALVA DIAM: 3.7 CM
ECHO AO SINUS VALSALVA INDEX: 1.8 CM/M2
ECHO AV CUSP MM: 1.9 CM
ECHO AV MEAN GRADIENT: 5 MMHG
ECHO AV MEAN VELOCITY: 1 M/S
ECHO AV PEAK GRADIENT: 8 MMHG
ECHO AV PEAK VELOCITY: 1.5 M/S
ECHO AV VELOCITY RATIO: 0.6
ECHO AV VTI: 31.6 CM
ECHO BSA: 2.08 M2
ECHO EST RA PRESSURE: 3 MMHG
ECHO LA AREA 2C: 21.1 CM2
ECHO LA AREA 4C: 15.9 CM2
ECHO LA MAJOR AXIS: 5.3 CM
ECHO LA MINOR AXIS: 6 CM
ECHO LA VOL BP: 51 ML (ref 18–58)
ECHO LA VOL MOD A2C: 61 ML (ref 18–58)
ECHO LA VOL MOD A4C: 38 ML (ref 18–58)
ECHO LA VOL/BSA BIPLANE: 25 ML/M2 (ref 16–34)
ECHO LA VOLUME INDEX MOD A2C: 30 ML/M2 (ref 16–34)
ECHO LA VOLUME INDEX MOD A4C: 19 ML/M2 (ref 16–34)
ECHO LV E' LATERAL VELOCITY: 8.81 CM/S
ECHO LV EDV A2C: 99 ML
ECHO LV EDV A4C: 84 ML
ECHO LV EDV INDEX A4C: 41 ML/M2
ECHO LV EDV NDEX A2C: 48 ML/M2
ECHO LV EF PHYSICIAN: 60 %
ECHO LV EJECTION FRACTION A2C: 66 %
ECHO LV EJECTION FRACTION A4C: 57 %
ECHO LV EJECTION FRACTION BIPLANE: 61 % (ref 55–100)
ECHO LV ESV A2C: 33 ML
ECHO LV ESV A4C: 36 ML
ECHO LV ESV INDEX A2C: 16 ML/M2
ECHO LV ESV INDEX A4C: 18 ML/M2
ECHO LV FRACTIONAL SHORTENING: 30 % (ref 28–44)
ECHO LV INTERNAL DIMENSION DIASTOLE INDEX: 2.15 CM/M2
ECHO LV INTERNAL DIMENSION DIASTOLIC: 4.4 CM (ref 4.2–5.9)
ECHO LV INTERNAL DIMENSION SYSTOLIC INDEX: 1.51 CM/M2
ECHO LV INTERNAL DIMENSION SYSTOLIC: 3.1 CM
ECHO LV IVSD: 1.3 CM (ref 0.6–1)
ECHO LV MASS 2D: 215.1 G (ref 88–224)
ECHO LV MASS INDEX 2D: 104.9 G/M2 (ref 49–115)
ECHO LV POSTERIOR WALL DIASTOLIC: 1.3 CM (ref 0.6–1)
ECHO LV RELATIVE WALL THICKNESS RATIO: 0.59
ECHO LVOT AV VTI INDEX: 0.64
ECHO LVOT MEAN GRADIENT: 2 MMHG
ECHO LVOT PEAK GRADIENT: 3 MMHG
ECHO LVOT PEAK VELOCITY: 0.9 M/S
ECHO LVOT VTI: 20.3 CM
ECHO MV A VELOCITY: 1.22 M/S
ECHO MV E DECELERATION TIME (DT): 268 MS
ECHO MV E VELOCITY: 0.69 M/S
ECHO MV E/A RATIO: 0.57
ECHO MV E/E' LATERAL: 7.83
ECHO PV MAX VELOCITY: 1.1 M/S
ECHO PV PEAK GRADIENT: 5 MMHG
ECHO RIGHT VENTRICULAR SYSTOLIC PRESSURE (RVSP): 17 MMHG
ECHO RV TAPSE: 1.7 CM (ref 1.7–?)
ECHO TV REGURGITANT MAX VELOCITY: 1.86 M/S
ECHO TV REGURGITANT PEAK GRADIENT: 14 MMHG

## 2025-07-08 PROCEDURE — 93306 TTE W/DOPPLER COMPLETE: CPT

## 2025-07-08 PROCEDURE — 93306 TTE W/DOPPLER COMPLETE: CPT | Performed by: INTERNAL MEDICINE

## 2025-07-08 NOTE — RESULT ENCOUNTER NOTE
Please let patient know echo looked ok. Although atrial septal aneurysm is present as discussed in the office there was no evidence of interatrial shunt during study which is good news. Please continue current treatment and follow up. Please call with questions and concerns. Thank you

## 2025-07-10 ENCOUNTER — OFFICE VISIT (OUTPATIENT)
Dept: PRIMARY CARE CLINIC | Age: 80
End: 2025-07-10
Payer: MEDICARE

## 2025-07-10 VITALS
HEART RATE: 67 BPM | RESPIRATION RATE: 16 BRPM | SYSTOLIC BLOOD PRESSURE: 140 MMHG | OXYGEN SATURATION: 93 % | BODY MASS INDEX: 29.92 KG/M2 | HEIGHT: 69 IN | WEIGHT: 202 LBS | DIASTOLIC BLOOD PRESSURE: 60 MMHG

## 2025-07-10 DIAGNOSIS — G62.9 NEUROPATHY: ICD-10-CM

## 2025-07-10 DIAGNOSIS — I10 ESSENTIAL HYPERTENSION: ICD-10-CM

## 2025-07-10 DIAGNOSIS — G89.29 CHRONIC BILATERAL LOW BACK PAIN, UNSPECIFIED WHETHER SCIATICA PRESENT: ICD-10-CM

## 2025-07-10 DIAGNOSIS — Z79.4 TYPE 2 DIABETES MELLITUS WITH HYPERGLYCEMIA, WITH LONG-TERM CURRENT USE OF INSULIN (HCC): Primary | ICD-10-CM

## 2025-07-10 DIAGNOSIS — E11.65 TYPE 2 DIABETES MELLITUS WITH HYPERGLYCEMIA, WITH LONG-TERM CURRENT USE OF INSULIN (HCC): Primary | ICD-10-CM

## 2025-07-10 DIAGNOSIS — E78.5 DYSLIPIDEMIA: ICD-10-CM

## 2025-07-10 DIAGNOSIS — M54.50 CHRONIC BILATERAL LOW BACK PAIN, UNSPECIFIED WHETHER SCIATICA PRESENT: ICD-10-CM

## 2025-07-10 DIAGNOSIS — R26.89 BALANCE PROBLEM: ICD-10-CM

## 2025-07-10 PROCEDURE — 1160F RVW MEDS BY RX/DR IN RCRD: CPT | Performed by: STUDENT IN AN ORGANIZED HEALTH CARE EDUCATION/TRAINING PROGRAM

## 2025-07-10 PROCEDURE — 99214 OFFICE O/P EST MOD 30 MIN: CPT | Performed by: STUDENT IN AN ORGANIZED HEALTH CARE EDUCATION/TRAINING PROGRAM

## 2025-07-10 PROCEDURE — 3078F DIAST BP <80 MM HG: CPT | Performed by: STUDENT IN AN ORGANIZED HEALTH CARE EDUCATION/TRAINING PROGRAM

## 2025-07-10 PROCEDURE — 3077F SYST BP >= 140 MM HG: CPT | Performed by: STUDENT IN AN ORGANIZED HEALTH CARE EDUCATION/TRAINING PROGRAM

## 2025-07-10 PROCEDURE — G8427 DOCREV CUR MEDS BY ELIG CLIN: HCPCS | Performed by: STUDENT IN AN ORGANIZED HEALTH CARE EDUCATION/TRAINING PROGRAM

## 2025-07-10 PROCEDURE — 3044F HG A1C LEVEL LT 7.0%: CPT | Performed by: STUDENT IN AN ORGANIZED HEALTH CARE EDUCATION/TRAINING PROGRAM

## 2025-07-10 PROCEDURE — 99211 OFF/OP EST MAY X REQ PHY/QHP: CPT | Performed by: STUDENT IN AN ORGANIZED HEALTH CARE EDUCATION/TRAINING PROGRAM

## 2025-07-10 PROCEDURE — 1036F TOBACCO NON-USER: CPT | Performed by: STUDENT IN AN ORGANIZED HEALTH CARE EDUCATION/TRAINING PROGRAM

## 2025-07-10 PROCEDURE — 1159F MED LIST DOCD IN RCRD: CPT | Performed by: STUDENT IN AN ORGANIZED HEALTH CARE EDUCATION/TRAINING PROGRAM

## 2025-07-10 PROCEDURE — G8417 CALC BMI ABV UP PARAM F/U: HCPCS | Performed by: STUDENT IN AN ORGANIZED HEALTH CARE EDUCATION/TRAINING PROGRAM

## 2025-07-10 PROCEDURE — 1123F ACP DISCUSS/DSCN MKR DOCD: CPT | Performed by: STUDENT IN AN ORGANIZED HEALTH CARE EDUCATION/TRAINING PROGRAM

## 2025-07-10 PROCEDURE — G2211 COMPLEX E/M VISIT ADD ON: HCPCS | Performed by: STUDENT IN AN ORGANIZED HEALTH CARE EDUCATION/TRAINING PROGRAM

## 2025-07-10 RX ORDER — PREGABALIN 25 MG/1
25 CAPSULE ORAL DAILY
Qty: 30 CAPSULE | Refills: 0 | Status: SHIPPED | OUTPATIENT
Start: 2025-07-10 | End: 2025-08-09

## 2025-07-10 NOTE — PROGRESS NOTES
Neuropathy/numbness  Patient reports he has been moving around slowly. States both feet are numb throughout the whole day.. Especially when standing he has difficulty ambulating. He is having lower back pain. It is 4-5/10 worsening over the past 1-2 months. He had prior injections years ago.        History of Present Illness    Health Maintenance -   Alcohol/Substance use History - None/Minimal    Tobacco Use      Smoking status: Former        Packs/day: 0.00        Years: 1.5 packs/day for 8.0 years (12.0 ttl pk-yrs)        Types: Cigarettes        Start date: 3/4/1972        Quit date: 3/4/1980        Years since quittin.3      Smokeless tobacco: Never    Family History   Problem Relation Age of Onset    Cancer Mother         breast    Cancer Father         lung    Diabetes Sister     Diabetes Brother            2025     1:16 PM 3/12/2024     3:35 PM 3/30/2023    10:05 AM 2022    10:52 AM 2022     1:41 PM 2021     9:11 AM 10/19/2020     9:03 AM   PHQ Scores   PHQ2 Score 3 0 0 0 0 3 0   PHQ9 Score 8 0 0 0 0 8 0     Interpretation of Total Score Depression Severity: 1-4 = Minimal depression, 5-9 = Mild depression, 10-14 = Moderate depression, 15-19 = Moderately severe depression, 20-27 = Severe depression    Review of Systems  Constitutional: Negative for activity change, appetite change, chills, diaphoresis, fatigue, fever and unexpected weight change.   HENT: Negative for sinus pressure, sinus pain, sore throat and trouble swallowing.    Respiratory: Negative for cough, shortness of breath and wheezing.    Cardiovascular: Negative for chest pain, palpitations and leg swelling.   Gastrointestinal: Negative for abdominal pain, diarrhea, nausea and vomiting.   Endocrine: Negative for cold intolerance, polydipsia, polyphagia and polyuria.   Genitourinary: Negative for difficulty urinating, flank pain and frequency.   Musculoskeletal: Negative for gait problem and joint swelling. Positive

## 2025-07-11 ENCOUNTER — HOSPITAL ENCOUNTER (OUTPATIENT)
Age: 80
Setting detail: SPECIMEN
Discharge: HOME | End: 2025-07-11
Payer: MEDICARE

## 2025-07-11 DIAGNOSIS — I10 ESSENTIAL HYPERTENSION: ICD-10-CM

## 2025-07-11 DIAGNOSIS — G62.9 NEUROPATHY: ICD-10-CM

## 2025-07-11 DIAGNOSIS — G89.29 CHRONIC BILATERAL LOW BACK PAIN, UNSPECIFIED WHETHER SCIATICA PRESENT: ICD-10-CM

## 2025-07-11 DIAGNOSIS — E11.65 TYPE 2 DIABETES MELLITUS WITH HYPERGLYCEMIA, WITH LONG-TERM CURRENT USE OF INSULIN (HCC): ICD-10-CM

## 2025-07-11 DIAGNOSIS — E78.5 DYSLIPIDEMIA: ICD-10-CM

## 2025-07-11 DIAGNOSIS — M54.50 CHRONIC BILATERAL LOW BACK PAIN, UNSPECIFIED WHETHER SCIATICA PRESENT: ICD-10-CM

## 2025-07-11 DIAGNOSIS — Z79.4 TYPE 2 DIABETES MELLITUS WITH HYPERGLYCEMIA, WITH LONG-TERM CURRENT USE OF INSULIN (HCC): ICD-10-CM

## 2025-07-11 LAB
FOLATE SERPL-MCNC: 18.4 NG/ML (ref 4.8–24.2)
TSH SERPL DL<=0.05 MIU/L-ACNC: 2.54 UIU/ML (ref 0.27–4.2)
VIT B12 SERPL-MCNC: 544 PG/ML (ref 232–1245)

## 2025-07-11 PROCEDURE — 82746 ASSAY OF FOLIC ACID SERUM: CPT

## 2025-07-11 PROCEDURE — 84443 ASSAY THYROID STIM HORMONE: CPT

## 2025-07-11 PROCEDURE — 82607 VITAMIN B-12: CPT

## 2025-07-13 ENCOUNTER — RESULTS FOLLOW-UP (OUTPATIENT)
Dept: PRIMARY CARE CLINIC | Age: 80
End: 2025-07-13

## 2025-07-14 ENCOUNTER — HOSPITAL ENCOUNTER (OUTPATIENT)
Age: 80
Setting detail: SPECIMEN
Discharge: HOME | End: 2025-07-14
Payer: MEDICARE

## 2025-07-14 LAB
ALBUMIN SERPL-MCNC: 4 G/DL (ref 3.5–5.2)
ALBUMIN/GLOB SERPL: 1.7 {RATIO} (ref 1–2.5)
ALP SERPL-CCNC: 63 U/L (ref 40–129)
ALT SERPL-CCNC: 23 U/L (ref 10–50)
ANION GAP SERPL CALCULATED.3IONS-SCNC: 15 MMOL/L (ref 9–16)
AST SERPL-CCNC: 24 U/L (ref 10–50)
BASOPHILS # BLD: 0.08 K/UL (ref 0–0.2)
BASOPHILS NFR BLD: 1 % (ref 0–2)
BILIRUB SERPL-MCNC: 0.4 MG/DL (ref 0–1.2)
BUN SERPL-MCNC: 45 MG/DL (ref 8–23)
BUN/CREAT SERPL: 8 (ref 9–20)
CALCIUM SERPL-MCNC: 9.1 MG/DL (ref 8.6–10.4)
CHLORIDE SERPL-SCNC: 102 MMOL/L (ref 98–107)
CO2 SERPL-SCNC: 23 MMOL/L (ref 20–31)
CREAT SERPL-MCNC: 5.7 MG/DL (ref 0.7–1.2)
EOSINOPHIL # BLD: 0.42 K/UL (ref 0–0.44)
EOSINOPHILS RELATIVE PERCENT: 5 % (ref 1–4)
ERYTHROCYTE [DISTWIDTH] IN BLOOD BY AUTOMATED COUNT: 14.6 % (ref 11.8–14.4)
FERRITIN SERPL-MCNC: 1408 NG/ML
GFR, ESTIMATED: 10 ML/MIN/1.73M2
GLUCOSE SERPL-MCNC: 176 MG/DL (ref 74–99)
HCT VFR BLD AUTO: 29.4 % (ref 40.7–50.3)
HGB BLD-MCNC: 10 G/DL (ref 13–17)
IMM GRANULOCYTES # BLD AUTO: 0.16 K/UL (ref 0–0.3)
IMM GRANULOCYTES NFR BLD: 2 %
IRON SATN MFR SERPL: 32 % (ref 20–55)
IRON SERPL-MCNC: 65 UG/DL (ref 61–157)
LYMPHOCYTES NFR BLD: 2.26 K/UL (ref 1.1–3.7)
LYMPHOCYTES RELATIVE PERCENT: 24 % (ref 24–43)
MCH RBC QN AUTO: 34.1 PG (ref 25.2–33.5)
MCHC RBC AUTO-ENTMCNC: 34 G/DL (ref 28.4–34.8)
MCV RBC AUTO: 100.3 FL (ref 82.6–102.9)
MONOCYTES NFR BLD: 0.68 K/UL (ref 0.1–1.2)
MONOCYTES NFR BLD: 7 % (ref 3–12)
NEUTROPHILS NFR BLD: 61 % (ref 36–65)
NEUTS SEG NFR BLD: 5.82 K/UL (ref 1.5–8.1)
NRBC BLD-RTO: 0 PER 100 WBC
PLATELET # BLD AUTO: 181 K/UL (ref 138–453)
PMV BLD AUTO: 10.5 FL (ref 8.1–13.5)
POTASSIUM SERPL-SCNC: 4.4 MMOL/L (ref 3.7–5.3)
PROT SERPL-MCNC: 6.4 G/DL (ref 6.6–8.7)
RBC # BLD AUTO: 2.93 M/UL (ref 4.21–5.77)
SODIUM SERPL-SCNC: 140 MMOL/L (ref 136–145)
TIBC SERPL-MCNC: 201 UG/DL (ref 250–450)
UNSATURATED IRON BINDING CAPACITY: 136 UG/DL (ref 112–347)
WBC OTHER # BLD: 9.4 K/UL (ref 3.5–11.3)

## 2025-07-14 PROCEDURE — 82728 ASSAY OF FERRITIN: CPT

## 2025-07-14 PROCEDURE — 80053 COMPREHEN METABOLIC PANEL: CPT

## 2025-07-14 PROCEDURE — 83540 ASSAY OF IRON: CPT

## 2025-07-14 PROCEDURE — 83550 IRON BINDING TEST: CPT

## 2025-07-14 PROCEDURE — 85025 COMPLETE CBC W/AUTO DIFF WBC: CPT

## 2025-07-15 ENCOUNTER — HOSPITAL ENCOUNTER (OUTPATIENT)
Dept: PHYSICAL THERAPY | Age: 80
Setting detail: THERAPIES SERIES
Discharge: HOME | End: 2025-07-15
Attending: STUDENT IN AN ORGANIZED HEALTH CARE EDUCATION/TRAINING PROGRAM
Payer: MEDICARE

## 2025-07-15 ENCOUNTER — HOSPITAL ENCOUNTER (OUTPATIENT)
Dept: GENERAL RADIOLOGY | Age: 80
Discharge: HOME | End: 2025-07-17
Payer: MEDICARE

## 2025-07-15 DIAGNOSIS — G89.29 CHRONIC BILATERAL LOW BACK PAIN, UNSPECIFIED WHETHER SCIATICA PRESENT: ICD-10-CM

## 2025-07-15 DIAGNOSIS — M54.50 CHRONIC BILATERAL LOW BACK PAIN, UNSPECIFIED WHETHER SCIATICA PRESENT: ICD-10-CM

## 2025-07-15 DIAGNOSIS — G62.9 NEUROPATHY: ICD-10-CM

## 2025-07-15 PROCEDURE — 97161 PT EVAL LOW COMPLEX 20 MIN: CPT

## 2025-07-15 PROCEDURE — 72100 X-RAY EXAM L-S SPINE 2/3 VWS: CPT

## 2025-07-15 NOTE — PROGRESS NOTES
Phone: [phone]                       [facility]          Fax: [phone]                      Outpatient Physical Therapy                                                                      Evaluation  Date: 7/15/2025  Patient: Janelle Jimenez  : 1945  [facility] #: [id number]    Referring Physician: Himanshu Burrell MD    Medical Diagnosis: M54.50, G89.29 (ICD-10-CM) - Chronic bilateral low back pain, unspecified whether sciatica present  R26.89 (ICD-10-CM) - Balance problem    Treatment Diagnosis: low back pain, generalized weakness  PT Insurance Information: medicare  Total # of Visits Approved: 10   Total # of Visits to Date: 1  No Show: 0  Canceled Appointment: 0    [x] This writer acknowledges review of patient history form     Subjective  Subjective: Pt reports chronic history of low back pain. Reports difficulty sleeping as back pain wakes him up around 2am.  Additional Pertinent Hx: HTN, heart problems, stroke, pacemaker, DM, history of spinal fusion, 3 bypass surgeries      Objective        Strength       Strength LLE  L Hip Flexion: 4-/5  L Hip ABduction: 4/5  L Hip ADduction: 4/5    Right Strength         Strength RLE  R Hip Flexion: 4-/5  R Hip ABduction: 4/5  R Hip ADduction: 4/5              Lumbar Assessment     AROM Lumbar Spine   Lumbar Spine AROM : Painful, WFL  Lumbar spine general AROM: 50% limited in all planes       Special Tests:   Special Tests Lumbar Spine  SLR: R (+), L (+)  Slump Test: R (+), L (+)    Exercises:  Exercise 1: HEP: sink ex, LTR  Exercise 2: * hip/core strength, lumbar mobility per tolerance      Assessment  Body Structures, Functions, Activity Limitations Requiring Skilled Therapeutic Intervention: Decreased functional mobility , Decreased ADL status, Decreased ROM, Decreased body mechanics, Decreased tolerance to work activity, Decreased strength, Decreased endurance, Decreased sensation, Decreased balance, Increased pain  Assessment: Pt is a 79 y.o. male

## 2025-07-18 ENCOUNTER — APPOINTMENT (OUTPATIENT)
Dept: PHYSICAL THERAPY | Age: 80
End: 2025-07-18
Attending: STUDENT IN AN ORGANIZED HEALTH CARE EDUCATION/TRAINING PROGRAM
Payer: MEDICARE

## 2025-07-22 ENCOUNTER — HOSPITAL ENCOUNTER (OUTPATIENT)
Dept: PHYSICAL THERAPY | Age: 80
Setting detail: THERAPIES SERIES
Discharge: HOME | End: 2025-07-22
Attending: STUDENT IN AN ORGANIZED HEALTH CARE EDUCATION/TRAINING PROGRAM
Payer: MEDICARE

## 2025-07-22 PROCEDURE — 97113 AQUATIC THERAPY/EXERCISES: CPT

## 2025-07-22 NOTE — PROGRESS NOTES
Phone: [phone]                 [facility]           Fax: [phone]                           Outpatient Physical Therapy                                                                            Daily Note    Patient: Janelle Jimenez : 1945  [facility] #: [id number]   Referring Physician: Himanshu Burrell MD  Date: 2025    Diagnosis: M54.50, G89.29 (ICD-10-CM) - Chronic bilateral low back pain, unspecified whether sciatica present  R26.89 (ICD-10-CM) - Balance problem  Treatment Diagnosis: low back pain, generalized weakness    PT Insurance Information: medicare  Total # of Visits Approved: 10 Per Physician Order  Total # of Visits to Date: 2  No Show: 0  Canceled Appointment: 0    25 Plan of Care/Recert Due    Pre-Treatment Pain:  6/10  Subjective: Pt reports with 6/10 pain. States pain radiates all the way to his feet    Exercises:  Exercise 1: HEP: sink ex, LTR  Exercise 2: * hip/core strength, lumbar mobility per tolerance  Exercise 3: aq. walking forward, sideways, retro  Exercise 4: aq. walking marching  Exercise 5: aq. sink exercise  Exercise 6: aq. bench exercis while on jets  Exercise 7: aq. deep well hanging 10-15 min    Assessment  Assessment: Pt rates pain is 6/10 today. States pain radiates don both legs to feet. Pt started on aquatic exerices today to assist with decreasing LBP and improving strength. Pt did well with relief reported with deep well hanging . Pt will be progress as tolerated    Activity Tolerance  Activity Tolerance: Patient tolerated treatment well    Patient Education  Patient Education: Aquatic exercise  Pt verbalized/demonstrated good understanding:     [x] Yes         [] No, pt required further clarification.       Post Treatment Pain:  5/10      Plan  Plan Frequency: 2  Plan weeks: 4       Goals  (Total # of Visits to Date: 2)      Short Term Goals  Time Frame for Short Term Goals: 2 weeks  Short Term Goal 1: Pt will be inititated with HEP.  Short Term

## 2025-07-24 ENCOUNTER — OFFICE VISIT (OUTPATIENT)
Dept: ONCOLOGY | Age: 80
End: 2025-07-24
Payer: MEDICARE

## 2025-07-24 ENCOUNTER — HOSPITAL ENCOUNTER (OUTPATIENT)
Dept: PHYSICAL THERAPY | Age: 80
Setting detail: THERAPIES SERIES
Discharge: HOME | End: 2025-07-24
Attending: STUDENT IN AN ORGANIZED HEALTH CARE EDUCATION/TRAINING PROGRAM
Payer: MEDICARE

## 2025-07-24 VITALS
RESPIRATION RATE: 18 BRPM | WEIGHT: 198 LBS | HEART RATE: 68 BPM | SYSTOLIC BLOOD PRESSURE: 136 MMHG | DIASTOLIC BLOOD PRESSURE: 64 MMHG | BODY MASS INDEX: 29.33 KG/M2 | HEIGHT: 69 IN | TEMPERATURE: 97.2 F

## 2025-07-24 DIAGNOSIS — N18.6 ANEMIA DUE TO CHRONIC KIDNEY DISEASE, ON CHRONIC DIALYSIS (HCC): Primary | ICD-10-CM

## 2025-07-24 DIAGNOSIS — Z99.2 ANEMIA DUE TO CHRONIC KIDNEY DISEASE, ON CHRONIC DIALYSIS (HCC): Primary | ICD-10-CM

## 2025-07-24 DIAGNOSIS — N18.6 ESRD (END STAGE RENAL DISEASE) (HCC): ICD-10-CM

## 2025-07-24 DIAGNOSIS — D63.1 ANEMIA DUE TO CHRONIC KIDNEY DISEASE, ON CHRONIC DIALYSIS (HCC): Primary | ICD-10-CM

## 2025-07-24 DIAGNOSIS — R79.89 HIGH SERUM FERRITIN: ICD-10-CM

## 2025-07-24 PROCEDURE — 3078F DIAST BP <80 MM HG: CPT | Performed by: INTERNAL MEDICINE

## 2025-07-24 PROCEDURE — 99214 OFFICE O/P EST MOD 30 MIN: CPT | Performed by: INTERNAL MEDICINE

## 2025-07-24 PROCEDURE — 1036F TOBACCO NON-USER: CPT | Performed by: INTERNAL MEDICINE

## 2025-07-24 PROCEDURE — G8417 CALC BMI ABV UP PARAM F/U: HCPCS | Performed by: INTERNAL MEDICINE

## 2025-07-24 PROCEDURE — 97113 AQUATIC THERAPY/EXERCISES: CPT

## 2025-07-24 PROCEDURE — 1123F ACP DISCUSS/DSCN MKR DOCD: CPT | Performed by: INTERNAL MEDICINE

## 2025-07-24 PROCEDURE — G8427 DOCREV CUR MEDS BY ELIG CLIN: HCPCS | Performed by: INTERNAL MEDICINE

## 2025-07-24 PROCEDURE — 1159F MED LIST DOCD IN RCRD: CPT | Performed by: INTERNAL MEDICINE

## 2025-07-24 PROCEDURE — 3075F SYST BP GE 130 - 139MM HG: CPT | Performed by: INTERNAL MEDICINE

## 2025-07-24 PROCEDURE — 1126F AMNT PAIN NOTED NONE PRSNT: CPT | Performed by: INTERNAL MEDICINE

## 2025-07-24 PROCEDURE — 99211 OFF/OP EST MAY X REQ PHY/QHP: CPT | Performed by: INTERNAL MEDICINE

## 2025-07-24 NOTE — PROGRESS NOTES
_               Mr. Janelle Jimenez is a very pleasant 79 y.o. male with history of multiple co morbidities as listed.  The patient is referred for further management of anemia.  Patient has episodes of black tarry stool.  He had upper and lower endoscopy in October 2020 which were negative.  He is scheduled for follow-up with gastroenterology in [place] next week for capsule camera evaluation.  Patient is having symptomatic anemia.  Is having weakness and fatigue.  Feels tired.  He has shortness of breath on exertion.  He is having epigastric pain.  No hematochezia.  No hematemesis.  Patient denies smoking or alcohol drinking,   Started on dialysis which is ongoing and getting EPO and iron per nephrology however ferritin high and has been off iron and as needed EPO  Bone marrow biopsy and aspirate and myeloma work-up ordered negative  On RAVI   Capsule endoscopy negative  And iron panel compatible of anemia of chronic disease  Status post heart surgery back in June 2023        Interim history/  History of Present Illness  The patient presents for evaluation of anemia, iron overload, and neuropathy.    He recently experienced a collapse during physical therapy, necessitating the use of a wheelchair instead of his usual walker. He is currently undergoing dialysis, which he believes is contributing to his overall weakness. He has been on dialysis for four years and receives an injection every two weeks as part of his treatment.    He was referred for a neuropathy evaluation due to issues with his legs, which have been causing him to fall. Today, during a water therapy session, his left leg gave way, leading to his collapse.    He underwent heart surgery in 2023, which has not yet fully healed. However, he reports improved breathing following a triple bypass procedure.    Superhigh ferritin and he said he is getting IV iron with

## 2025-07-24 NOTE — PROGRESS NOTES
Phone: [phone]                 [facility]           Fax: [phone]                           Outpatient Physical Therapy                                                                            Daily Note    Patient: Janelle Jimenez : 1945  [facility] #: [id number]   Referring Physician: Himanshu Burrell MD  Date: 2025    Treatment Diagnosis: low back pain, generalized weakness    PT Insurance Information: medicare  Total # of Visits Approved: 10 Per Physician Order  Total # of Visits to Date: 3  No Show: 0  Canceled Appointment: 0    25 Plan of Care/Recert Due    Pre-Treatment Pain:  6/10  Subjective: Pt. reports pain in LEs, stated he feels unsteady and balance is off todya.    Exercises:  Exercise 1: HEP: sink ex, LTR  Exercise 2: * hip/core strength, lumbar mobility per tolerance  Exercise 3: aq. walking forward, sideways, retro  Exercise 6: aq. bench exercis while on jets-no jets this date see note    Assessment  Body Structures, Functions, Activity Limitations Requiring Skilled Therapeutic Intervention: Decreased functional mobility , Decreased ADL status, Decreased ROM, Decreased body mechanics, Decreased tolerance to work activity, Decreased strength, Decreased endurance, Decreased sensation, Decreased balance, Increased pain  Assessment: Pt. tolerated treatment fair this date. Pt. required 2 hand assist with ambulation in pool d/t feeling off balance, after two laps of walking FW pt. required seated RB d/t LE weakness and fatigue. Performed seated exercises and noted poor control of LEs with eccentric movement. After seated RB pt. able to resume ambulation but then reported LE weaknes and LEs feeling like they were going to give out. Educated pt. on safety concerns and pt. agreed to end treatment early d/t not safety concerns and fall rsik. Assisted pt. out of the pool and to locker room, pts. wife assisted with dressing pt. Transport chair given at this time d/t pt. not

## 2025-07-29 ENCOUNTER — HOSPITAL ENCOUNTER (OUTPATIENT)
Dept: PHYSICAL THERAPY | Age: 80
Setting detail: THERAPIES SERIES
Discharge: HOME | End: 2025-07-29
Attending: STUDENT IN AN ORGANIZED HEALTH CARE EDUCATION/TRAINING PROGRAM
Payer: MEDICARE

## 2025-07-29 PROCEDURE — 97113 AQUATIC THERAPY/EXERCISES: CPT

## 2025-07-29 NOTE — PROGRESS NOTES
Physical Therapy  Phone: [phone]                 [facility]           Fax: [phone]                           Outpatient Physical Therapy                                                                            Daily Note    Patient: Janelle Jimenez : 1945  [facility] #: [id number]   Referring Physician: Himanshu Burrell MD  Date: 2025     Treatment Diagnosis: low back pain, generalized weakness    PT Insurance Information: medicare  Total # of Visits Approved: 10 Per Physician Order  Total # of Visits to Date: 4  No Show: 0  Canceled Appointment: 0    25 Plan of Care/Recert Due    Pre-Treatment Pain:  0/10  Subjective: Gabe reports he feels ill today but agreeable to therapy. Reports he always feels ill a day after diaylsis.    Exercises:  Exercise 1: HEP: sink ex, LTR  Exercise 2: * hip/core strength, lumbar mobility per tolerance  Exercise 3: aq. walking forward, sideways, retro  Exercise 5: aq. sink exercise (HR and squats only today d/t illness)  Exercise 6: aq. bench exercis while on jets-no jets this date see note    Assessment  Body Structures, Functions, Activity Limitations Requiring Skilled Therapeutic Intervention: Decreased functional mobility , Decreased ADL status, Decreased ROM, Decreased body mechanics, Decreased tolerance to work activity, Decreased strength, Decreased endurance, Decreased sensation, Decreased balance, Increased pain  Assessment: PTA in pool today secondary to patient safety concerns.Patient limited with activity toelrance secondary to complaints of stomach illness stating \" I'm going to barf\". Allowed time for rest with gabe able to complete a few standing ther ex before requesting to sit again. After reporting \" I'm going to barf again\", PTA ended session and assited richard leaving the pool. Spoke with spouse with richard present and isntructed to contact MD secondary to ongoing sypmtoms, gabe is inapporipaite for aqautic therapy due to

## 2025-07-31 ENCOUNTER — HOSPITAL ENCOUNTER (OUTPATIENT)
Dept: PHYSICAL THERAPY | Age: 80
Setting detail: THERAPIES SERIES
Discharge: HOME | End: 2025-07-31
Attending: STUDENT IN AN ORGANIZED HEALTH CARE EDUCATION/TRAINING PROGRAM
Payer: MEDICARE

## 2025-07-31 NOTE — PROGRESS NOTES
[facility]  Inpatient/Observation/Outpatient Rehabilitation    Date: 2025  Patient Name: Janelle Jimenez       [] Inpatient Acute/Observation       [x]  Outpatient  : 1945       [] Pt refused/declined therapy at this time due to:           [] Pt cancelled due to:  [] No Reason Given   [x] Sick/ill   [] Other:      [] Evaluation held by RN/Provider/Physical Therapist due to:    [] High Heart Rate   [] High Blood Pressure   [] Orthopedic Consult   [] Hgb < 7   [] Other:    [] Pt ordered brace per physician request:  [] Proper fit will be completed and education for wearing/skin checks    [] Pt does not require skilled services due to:      Therapist/Assistant will attempt to see this patient, at our earliest opportunity.       Yvette Dickerson Date: 2025

## 2025-08-04 RX ORDER — CLOPIDOGREL BISULFATE 75 MG/1
75 TABLET ORAL DAILY
Qty: 90 TABLET | Refills: 0 | Status: SHIPPED | OUTPATIENT
Start: 2025-08-04

## 2025-08-05 ENCOUNTER — CLINICAL SUPPORT (OUTPATIENT)
Dept: CARDIOLOGY | Age: 80
End: 2025-08-05
Payer: MEDICARE

## 2025-08-05 ENCOUNTER — HOSPITAL ENCOUNTER (OUTPATIENT)
Dept: PHYSICAL THERAPY | Age: 80
Setting detail: THERAPIES SERIES
Discharge: HOME | End: 2025-08-05
Attending: STUDENT IN AN ORGANIZED HEALTH CARE EDUCATION/TRAINING PROGRAM
Payer: MEDICARE

## 2025-08-05 DIAGNOSIS — Z95.818 IMPLANTABLE LOOP RECORDER PRESENT: Primary | ICD-10-CM

## 2025-08-05 DIAGNOSIS — I63.9 CRYPTOGENIC STROKE (HCC): ICD-10-CM

## 2025-08-05 PROCEDURE — 93298 REM INTERROG DEV EVAL SCRMS: CPT | Performed by: FAMILY MEDICINE

## 2025-08-07 ENCOUNTER — HOSPITAL ENCOUNTER (OUTPATIENT)
Dept: PHYSICAL THERAPY | Age: 80
Setting detail: THERAPIES SERIES
Discharge: HOME | End: 2025-08-07
Attending: STUDENT IN AN ORGANIZED HEALTH CARE EDUCATION/TRAINING PROGRAM
Payer: MEDICARE

## 2025-08-07 PROCEDURE — 97113 AQUATIC THERAPY/EXERCISES: CPT

## 2025-08-12 ENCOUNTER — APPOINTMENT (OUTPATIENT)
Dept: PHYSICAL THERAPY | Age: 80
End: 2025-08-12
Attending: STUDENT IN AN ORGANIZED HEALTH CARE EDUCATION/TRAINING PROGRAM
Payer: MEDICARE

## 2025-08-21 ENCOUNTER — HOSPITAL ENCOUNTER (OUTPATIENT)
Dept: PHYSICAL THERAPY | Age: 80
Setting detail: THERAPIES SERIES
Discharge: HOME | End: 2025-08-21
Attending: STUDENT IN AN ORGANIZED HEALTH CARE EDUCATION/TRAINING PROGRAM
Payer: MEDICARE

## 2025-08-21 PROCEDURE — 97110 THERAPEUTIC EXERCISES: CPT

## (undated) DEVICE — APPLICATOR MEDICATED 10.5 CC SOLUTION HI LT ORNG CHLORAPREP

## (undated) DEVICE — SUTURE VCRL + SZ 4-0 L27IN ABSRB UD L26MM SH 1/2 CIR VCP415H

## (undated) DEVICE — Device: Brand: VIRTUOSAPH PLUS WITH RADIAL INDICATION

## (undated) DEVICE — GOWN,SIRUS,POLYRNF,XLN/3XL,18/CS: Brand: MEDLINE

## (undated) DEVICE — SOLUTION IV IRRIG POUR BRL 0.9% SODIUM CHL 2F7124

## (undated) DEVICE — ACUSNARE POLYPECTOMY SNARE: Brand: ACUSNARE

## (undated) DEVICE — AGENT HEMSTAT W2XL4IN OXIDIZED REGENERATED CELOS ABSRB SFT

## (undated) DEVICE — BLADE OPHTH D5MM 15DEG GRN W/ RND KNURLED HNDL MICRO-SHARP

## (undated) DEVICE — BATTERY DRVR W/ SELF DRL TAP FOR THINFLAP PWR DRVR

## (undated) DEVICE — GOWN,SIRUS,NONRNF,SETINSLV,2XL,18/CS: Brand: MEDLINE

## (undated) DEVICE — MEDI-VAC NON-CONDUCTIVE TUBING7MM X 30.5 (100FT): Brand: CARDINAL HEALTH

## (undated) DEVICE — NEEDLE 25GAX5.0MM INJ CARR LOCKE

## (undated) DEVICE — WIRE PACING BIPOLAR VENTRICULAR 60CM ULTRA THIN

## (undated) DEVICE — TRAP SURG QUAD PARABOLA SLOT DSGN SFTY SCRN TRAPEASE

## (undated) DEVICE — TUBING SUCT NON-STRL 9/32X100 W/CNNT

## (undated) DEVICE — TUBE CARDIAC SUCTION 6FR SOFT TIP 10FR

## (undated) DEVICE — GLOVE SURG SZ 65 L12IN FNGR THK79MIL GRN LTX FREE

## (undated) DEVICE — STERNUM BLADE, OFFSET (32.0 X 0.8 X 6.3MM)

## (undated) DEVICE — GLOVE SURG SZ 8 CRM LTX FREE POLYISOPRENE POLYMER BEAD ANTI

## (undated) DEVICE — CANNULA ORAL NSL AD CO2 N INTUB O2 DEL DISP TRU LNK

## (undated) DEVICE — CANNULA PERFUSION 5.5IN 9FR AORTIC ROOT

## (undated) DEVICE — BLADE OPHTH ORNG GRINDLESS SMALLER ALTERNATIVE TO NO15 GEN

## (undated) DEVICE — FOGARTY - HYDRAGRIP SURGICAL - CLAMP INSERTS: Brand: FOGARTY HYDRAJAW

## (undated) DEVICE — SUTURE ETHIB EXCL BR GRN TAPR PT 2-0 30 X563H X563H

## (undated) DEVICE — DRAPE SLUSH DISC W44XL66IN ST FOR RND BSIN HUSH SLUSH SYS

## (undated) DEVICE — GLOVE SURG SZ 65 CRM LTX FREE POLYISOPRENE POLYMER BEAD ANTI

## (undated) DEVICE — BLADE,CARBON-STEEL,15,STRL,DISPOSABLE,TB: Brand: MEDLINE

## (undated) DEVICE — SPONGE LAP W18XL18IN WHT COT 4 PLY FLD STRUNG RADPQ DISP ST 2 PER PACK

## (undated) DEVICE — AVANOS* UNIVERSAL BLOCK TRAYS: Brand: AVANOS

## (undated) DEVICE — SUTURE PROL SZ 6-0 L18IN NONABSORBABLE BLU RB-2 L13MM 1/2 8714H

## (undated) DEVICE — 3M™ STERI-DRAPE™ SMALL DRAPE WITH ADHESIVE APERTURE 1092 25/BX,4/CS&#X20;: Brand: STERI-DRAPE™

## (undated) DEVICE — WAX SURG 2.5GM HEMSTAT BNE BEESWAX PARAFFIN ISO PALMITATE

## (undated) DEVICE — GOWN,SIRUS,NONRNF,SETINSLV,XL,20/CS: Brand: MEDLINE

## (undated) DEVICE — ELECTRODE ES AD CRD L15FT DISP FOR PT BELOW 30LB REM

## (undated) DEVICE — PENCIL ES L3M BTTN SWCH HOLSTER W/ BLDE ELECTRD EDGE

## (undated) DEVICE — SOLUTION IRRIG 1000ML 0.9% SOD CHL USP POUR PLAS BTL

## (undated) DEVICE — GOWN,AURORA,NONRNF,XL,30/CS: Brand: MEDLINE

## (undated) DEVICE — SUTURE VCRL + SZ 2-0 L27IN ABSRB CLR CT-1 1/2 CIR TAPERCUT VCP259H

## (undated) DEVICE — YANKAUER,BULB TIP,W/O VENT,RIGID,STERILE: Brand: MEDLINE

## (undated) DEVICE — STANDARD HYPODERMIC NEEDLE,POLYPROPYLENE HUB: Brand: MONOJECT

## (undated) DEVICE — DRAIN,WOUND,ROUND,24FR,5/16",FULL-FLUTED: Brand: MEDLINE

## (undated) DEVICE — GLOVE SURG SZ 8 L12IN FNGR THK79MIL GRN LTX FREE

## (undated) DEVICE — SUTURE PROL SZ 4-0 L36IN NONABSORBABLE BLU L26MM SH 1/2 CIR 8521H

## (undated) DEVICE — CANNULA PERF L2IN BLNT TIP 2MM VES CLR RADPQ BODY FEM LUER

## (undated) DEVICE — GLOVE SURG SZ 7.5 L11.73IN FNGR THK9.8MIL STRW LTX POLYMER

## (undated) DEVICE — PAIN TRAY: Brand: MEDLINE INDUSTRIES, INC.

## (undated) DEVICE — 1LYRTR 16FR10ML100%SILTMPS SNP: Brand: MEDLINE INDUSTRIES, INC.

## (undated) DEVICE — PACK PROCEDURE SURG OPN HRT ADD ON

## (undated) DEVICE — CONNECTOR TBNG Y 6IN 1 PLAS LTWT

## (undated) DEVICE — FORCEPS BX L240CM JAW DIA2.4MM ORNG L CAP W/ NDL DISP RAD

## (undated) DEVICE — GLOVE SURG SZ 7 L12IN FNGR THK79MIL GRN LTX FREE

## (undated) DEVICE — LIQUIBAND RAPID ADHESIVE 36/CS 0.8ML: Brand: MEDLINE

## (undated) DEVICE — BLADE SCREWDRVR W2.4MM PRI CLSR SYS PWR DRVR STERNALOCK BLU

## (undated) DEVICE — DRAIN SURG SGL COLL PT TB FOR ATS BG OASIS

## (undated) DEVICE — INSUFFLATION TUBING SET WITH FILTER, FUNNEL CONNECTOR AND LUER LOCK: Brand: JOSNOE MEDICAL INC

## (undated) DEVICE — SET EXTN TBNG MINIBOR 20IN

## (undated) DEVICE — TOWEL,OR,DSP,ST,NATURAL,DLX,4/PK,20PK/CS: Brand: MEDLINE

## (undated) DEVICE — SUTURE PERMA-HAND SZ 0 L18IN NONABSORBABLE BLK CT-2 L26MM C027D

## (undated) DEVICE — PAD ADH AD ELECTRD 2 PLT W 5M CRD

## (undated) DEVICE — COVER,LIGHT HANDLE,FLX,2/PK: Brand: MEDLINE INDUSTRIES, INC.

## (undated) DEVICE — PLEDGET SURG W3.5XL7MM THK1.5MM WHT PTFE RECT FIRM TFE

## (undated) DEVICE — TOWEL,OR,DSP,ST,BLUE,DLX,XR,4/PK,20PK/CS: Brand: MEDLINE

## (undated) DEVICE — SNARE EXACTO COLD

## (undated) DEVICE — GLOVE SURG SZ 75 L12IN FNGR THK79MIL GRN LTX FREE

## (undated) DEVICE — APPLICATOR MEDICATED 26 CC SOLUTION HI LT ORNG CHLORAPREP

## (undated) DEVICE — ELECTRODE PT RET AD L9FT HI MOIST COND ADH HYDRGEL CORDED

## (undated) DEVICE — PACK PROCEDURE SURG OPN HRT

## (undated) DEVICE — BLADE ES L6IN ELASTOMERIC COAT EXT DURABLE BEND UPTO 90DEG

## (undated) DEVICE — GOWN,AURORA,NONREINFORCED,LARGE: Brand: MEDLINE

## (undated) DEVICE — Device: Brand: ZDRIVE™

## (undated) DEVICE — GLOVE SURG SZ 7 CRM LTX FREE POLYISOPRENE POLYMER BEAD ANTI

## (undated) DEVICE — SUTURE VCRL + SZ 4-0 L18IN ABSRB UD L19MM PS-2 3/8 CIR PRIM VCP496H

## (undated) DEVICE — CATHETER,URETHRAL,REDRUBBER,STRL,18FR: Brand: MEDLINE

## (undated) DEVICE — RETRACTOR SURG INSRT SUT HLD OCTOBASE

## (undated) DEVICE — PREMIUM DRY TRAY LF: Brand: MEDLINE INDUSTRIES, INC.

## (undated) DEVICE — SYRINGE, LUER LOCK, 10ML: Brand: MEDLINE

## (undated) DEVICE — SUTURE PDS II SZ 0 L27IN ABSRB VLT L36MM CT-1 1/2 CIR Z340H

## (undated) DEVICE — SUTURE PROL SZ 7-0 L24IN NONABSORBABLE BLU L8MM BV175-6 3/8 8735H